# Patient Record
Sex: MALE | Race: WHITE | NOT HISPANIC OR LATINO | Employment: OTHER | URBAN - METROPOLITAN AREA
[De-identification: names, ages, dates, MRNs, and addresses within clinical notes are randomized per-mention and may not be internally consistent; named-entity substitution may affect disease eponyms.]

---

## 2017-01-04 ENCOUNTER — OFFICE VISIT (OUTPATIENT)
Dept: FAMILY MEDICINE | Facility: CLINIC | Age: 72
End: 2017-01-04
Payer: MEDICARE

## 2017-01-04 VITALS
HEIGHT: 69 IN | SYSTOLIC BLOOD PRESSURE: 132 MMHG | DIASTOLIC BLOOD PRESSURE: 76 MMHG | HEART RATE: 72 BPM | TEMPERATURE: 98 F | WEIGHT: 200.75 LBS | BODY MASS INDEX: 29.73 KG/M2

## 2017-01-04 DIAGNOSIS — D69.6 THROMBOCYTOPENIA: ICD-10-CM

## 2017-01-04 DIAGNOSIS — J04.0 LARYNGITIS: Primary | ICD-10-CM

## 2017-01-04 DIAGNOSIS — R05.9 COUGH: ICD-10-CM

## 2017-01-04 PROCEDURE — 1159F MED LIST DOCD IN RCRD: CPT | Mod: S$GLB,,, | Performed by: FAMILY MEDICINE

## 2017-01-04 PROCEDURE — 1157F ADVNC CARE PLAN IN RCRD: CPT | Mod: S$GLB,,, | Performed by: FAMILY MEDICINE

## 2017-01-04 PROCEDURE — 1125F AMNT PAIN NOTED PAIN PRSNT: CPT | Mod: S$GLB,,, | Performed by: FAMILY MEDICINE

## 2017-01-04 PROCEDURE — 99214 OFFICE O/P EST MOD 30 MIN: CPT | Mod: S$GLB,,, | Performed by: FAMILY MEDICINE

## 2017-01-04 PROCEDURE — 99999 PR PBB SHADOW E&M-EST. PATIENT-LVL III: CPT | Mod: PBBFAC,,, | Performed by: FAMILY MEDICINE

## 2017-01-04 PROCEDURE — 3078F DIAST BP <80 MM HG: CPT | Mod: S$GLB,,, | Performed by: FAMILY MEDICINE

## 2017-01-04 PROCEDURE — 3075F SYST BP GE 130 - 139MM HG: CPT | Mod: S$GLB,,, | Performed by: FAMILY MEDICINE

## 2017-01-04 PROCEDURE — 2022F DILAT RTA XM EVC RTNOPTHY: CPT | Mod: S$GLB,,, | Performed by: FAMILY MEDICINE

## 2017-01-04 PROCEDURE — 3045F PR MOST RECENT HEMOGLOBIN A1C LEVEL 7.0-9.0%: CPT | Mod: S$GLB,,, | Performed by: FAMILY MEDICINE

## 2017-01-04 PROCEDURE — 1160F RVW MEDS BY RX/DR IN RCRD: CPT | Mod: S$GLB,,, | Performed by: FAMILY MEDICINE

## 2017-01-04 PROCEDURE — 99499 UNLISTED E&M SERVICE: CPT | Mod: S$GLB,,, | Performed by: FAMILY MEDICINE

## 2017-01-04 PROCEDURE — 3060F POS MICROALBUMINURIA REV: CPT | Mod: S$GLB,,, | Performed by: FAMILY MEDICINE

## 2017-01-04 RX ORDER — ALBUTEROL SULFATE 90 UG/1
1-2 AEROSOL, METERED RESPIRATORY (INHALATION) EVERY 8 HOURS PRN
Qty: 18 G | Refills: 1 | Status: SHIPPED | OUTPATIENT
Start: 2017-01-04 | End: 2017-03-09

## 2017-01-04 RX ORDER — NYSTATIN 100000 [USP'U]/ML
4 SUSPENSION ORAL 2 TIMES DAILY
Qty: 60 ML | Refills: 0 | Status: SHIPPED | OUTPATIENT
Start: 2017-01-04 | End: 2017-02-24 | Stop reason: SDUPTHER

## 2017-01-04 RX ORDER — HYDROCODONE BITARTRATE AND ACETAMINOPHEN 10; 325 MG/1; MG/1
1 TABLET ORAL 4 TIMES DAILY
Qty: 120 TABLET | Refills: 0 | Status: SHIPPED | OUTPATIENT
Start: 2017-01-04 | End: 2017-01-30 | Stop reason: SDUPTHER

## 2017-01-04 NOTE — MR AVS SNAPSHOT
[facility]  [address]  [place] [address]  Phone: [phone]  Fax: [phone]                  Wilsonjanessa June JrTruman   2017 10:40 AM   Office Visit    Description:  Male : 1945   Provider:  Alie Womack MD   Department:  [facility]           Reason for Visit     Follow-up           Diagnoses this Visit        Comments    Laryngitis    -  Primary     Uncontrolled type 2 diabetes mellitus with diabetic polyneuropathy, without long-term current use of insulin         Cough                To Do List           Future Appointments        Provider Department Dept Phone    2017 8:15 AM LAB, [place] [facility] [phone]    2017 9:45 AM [facility] CT1 LIMIT 450 LBS [facility] [phone]      Goals (5 Years of Data)     None       These Medications        Disp Refills Start End    hydrocodone-acetaminophen 10-325mg (NORCO)  mg Tab 120 tablet 0 2017     Take 1 tablet by mouth 4 (four) times daily. - Oral    Pharmacy: [facility] [place] [address] & [facility] Ph #: [phone]       nystatin (MYCOSTATIN) 100,000 unit/mL suspension 60 mL 0 2017    Take 4 mLs (400,000 Units total) by mouth 2 (two) times daily. - Oral    Pharmacy: [facility] [place] [address] & [facility] Ph #: [phone]       VENTOLIN HFA 90 mcg/actuation inhaler 18 g 1 2017     Inhale 1-2 puffs into the lungs every 8 (eight) hours as needed for Shortness of Breath. - Inhalation    Pharmacy: [facility] [place] [address] & [facility] Ph #: [phone]         [facility] On Call     [facility] On Call Nurse Care Line -  Assistance  Registered nurses in the [facility] Center provide clinical advisement, health education, appointment  booking, and other advisory services.  Call for this free service at [phone].             Medications           Message regarding Medications     Verify the changes and/or additions to your medication regime listed below are the same as discussed with your clinician today.  If any of these changes or additions are incorrect, please notify your healthcare provider.        START taking these NEW medications        Refills    nystatin (MYCOSTATIN) 100,000 unit/mL suspension 0    Sig: Take 4 mLs (400,000 Units total) by mouth 2 (two) times daily.    Class: Normal    Route: Oral      CHANGE how you are taking these medications     Start Taking Instead of    VENTOLIN HFA 90 mcg/actuation inhaler VENTOLIN HFA 90 mcg/actuation inhaler    Dosage:  Inhale 1-2 puffs into the lungs every 8 (eight) hours as needed for Shortness of Breath.     Reason for Change:  Reorder            Verify that the below list of medications is an accurate representation of the medications you are currently taking.  If none reported, the list may be blank. If incorrect, please contact your healthcare provider. Carry this list with you in case of emergency.           Current Medications     ACCU-CHEK VEENA PLUS METER [facility]     blood sugar diagnostic Strp accu-chek veena plus test strp, 2 x day    blood-glucose meter kit Use as instructed    carvedilol (COREG) 6.25 MG tablet Take 1 tablet (6.25 mg total) by mouth 2 (two) times daily.    fluorouracil (EFUDEX) 5 % cream AAA scalp bid x 2 weeks, do not start treating until healed from cryo    GLUCOSAMINE HCL/CHONDR CHISHOLM A NA (OSTEO BI-FLEX ORAL) Take 2 tablets by mouth once daily.    hydrocodone-acetaminophen 10-325mg (NORCO)  mg Tab Take 1 tablet by mouth 4 (four) times daily.    lancets (ONETOUCH ULTRASOFT LANCETS) Misc 1 lancet by Misc.(Non-Drug; Combo Route) route 2 (two) times daily.    loratadine-pseudoephedrine  mg (CLARITIN-D 24-HOUR)  mg per 24 hr tablet Take 1 tablet by  "mouth once daily.    metformin (GLUCOPHAGE) 1000 MG tablet TAKE 1 TABLET BY MOUTH TWICE DAILY WITH MEALS    VENTOLIN HFA 90 mcg/actuation inhaler Inhale 1-2 puffs into the lungs every 8 (eight) hours as needed for Shortness of Breath.    nystatin (MYCOSTATIN) 100,000 unit/mL suspension Take 4 mLs (400,000 Units total) by mouth 2 (two) times daily.           Clinical Reference Information           Vital Signs - Last Recorded  Most recent update: 1/4/2017 11:03 AM by Shweta Lacy LPN    BP Pulse Temp Ht Wt BMI    132/76 72 98.2 °F (36.8 °C) 5' 9" (1.753 m) 91 kg (200 lb 11.7 oz) 29.64 kg/m2      Blood Pressure          Most Recent Value    BP  132/76      Allergies as of 1/4/2017     Adhesive Tape-silicones    Doxycycline    Oxycontin [Oxycodone]      Immunizations Administered on Date of Encounter - 1/4/2017     None      Orders Placed During Today's Visit     Future Labs/Procedures Expected by Expires    Comprehensive metabolic panel  1/4/2017 3/5/2018    CT Soft Tissue Neck With Contrast  1/4/2017 1/4/2018    Hemoglobin A1c  1/4/2017 3/5/2018      "

## 2017-01-04 NOTE — PROGRESS NOTES
Subjective:       Patient ID: Steve June Jr. is a 71 y.o. male.    Chief Complaint: Follow-up    HPI   Patient here for ongoing f/u.  We reviewed his last visit re: ongoing sore throat. He refuses to see the ENT bc he doesn't want to pay the copay and doesn't feel like it's an emergency. He states that he will go when it's an emergency. He understands that I am not able to fully or adequately visualize the vocal cords and larynx to further assess.   occ using the ventolin inhaler on med list.  He was under the impression that his recent labs and imaging had ruled out any potential cancers.  Post-nasal drainage with occ cough.   Refill of hydrocodone today. Taking 4 tablets daily, chronic and long-term regimen for multiple joint pain. Unable to progress therapy due to chronic medical conditions.  Reports that his FBS was over 500 a few days ago. Using apple cider vinegar. States that he is not eating any sugar at all, but his FBS this morning was still over 300. Neuropathy worse as a result.    Review of Systems   Constitutional: Positive for fatigue. Negative for fever and unexpected weight change.   HENT: Positive for postnasal drip, sore throat and voice change.    Respiratory: Positive for cough. Negative for shortness of breath.    Neurological: Negative for dizziness and headaches.       Objective:      Physical Exam   Constitutional: He is oriented to person, place, and time. He appears well-developed and well-nourished. No distress.   HENT:   Head: Normocephalic and atraumatic.   Hoarse voice.   Eyes: Conjunctivae are normal. Right eye exhibits no discharge. Left eye exhibits no discharge. No scleral icterus.   Cardiovascular: Normal rate.    Pulmonary/Chest: Effort normal and breath sounds normal. No respiratory distress.   Dry cough   Abdominal: He exhibits distension. There is no guarding.   Neurological: He is alert and oriented to person, place, and time. No cranial nerve deficit.   Skin: Skin is  warm and dry.   Psychiatric: He has a normal mood and affect. His behavior is normal.   Nursing note and vitals reviewed.        Laryngitis  -     CT Soft Tissue Neck With Contrast; Future; Expected date: 1/4/17  Update imaging, anticipate f/u with ENT which he has declined to date.  Uncontrolled type 2 diabetes mellitus with diabetic polyneuropathy, without long-term current use of insulin  -     Comprehensive metabolic panel; Future; Expected date: 1/4/17  -     Hemoglobin A1c; Future; Expected date: 1/4/17  FBS at home x 1-2 weeks. Discussed FBS goal <150, and anticipated need for medications or even insulin if unable to effect consistent change. Patient expressed understanding. Update lab and a1c in 1mo.  Cough  Add ventolin twice daily.  Other orders  -     hydrocodone-acetaminophen 10-325mg (NORCO)  mg Tab; Take 1 tablet by mouth 4 (four) times daily.  Dispense: 120 tablet; Refill: 0  -     nystatin (MYCOSTATIN) 100,000 unit/mL suspension; Take 4 mLs (400,000 Units total) by mouth 2 (two) times daily.  Dispense: 60 mL; Refill: 0  -     VENTOLIN HFA 90 mcg/actuation inhaler; Inhale 1-2 puffs into the lungs every 8 (eight) hours as needed for Shortness of Breath.  Dispense: 18 g; Refill: 1  Thrombocytopenia  Low platelet count, but stable compared to previous. Significant limiting factor when considering more active management of chronic health conditions.

## 2017-01-18 ENCOUNTER — HOSPITAL ENCOUNTER (OUTPATIENT)
Dept: RADIOLOGY | Facility: HOSPITAL | Age: 72
Discharge: HOME | End: 2017-01-18
Attending: FAMILY MEDICINE
Payer: MEDICARE

## 2017-01-18 DIAGNOSIS — J04.0 LARYNGITIS: ICD-10-CM

## 2017-01-18 PROCEDURE — 70491 CT SOFT TISSUE NECK W/DYE: CPT | Mod: 26,,, | Performed by: RADIOLOGY

## 2017-01-18 PROCEDURE — 25500020 PHARM REV CODE 255: Mod: PO | Performed by: FAMILY MEDICINE

## 2017-01-18 PROCEDURE — 70491 CT SOFT TISSUE NECK W/DYE: CPT | Mod: TC,PO

## 2017-01-18 RX ADMIN — IOHEXOL 75 ML: 350 INJECTION, SOLUTION INTRAVENOUS at 11:01

## 2017-01-19 RX ORDER — GLIMEPIRIDE 1 MG/1
1 TABLET ORAL
Qty: 30 TABLET | Refills: 2 | Status: SHIPPED | OUTPATIENT
Start: 2017-01-19 | End: 2017-04-04

## 2017-01-30 RX ORDER — HYDROCODONE BITARTRATE AND ACETAMINOPHEN 10; 325 MG/1; MG/1
1 TABLET ORAL 4 TIMES DAILY
Qty: 120 TABLET | Refills: 0 | Status: SHIPPED | OUTPATIENT
Start: 2017-01-30 | End: 2017-02-24 | Stop reason: SDUPTHER

## 2017-01-30 NOTE — TELEPHONE ENCOUNTER
----- Message from Ruchi Stallworth sent at 1/30/2017  8:07 AM CST -----  Contact: pt   hydrocodone-acetaminophen 10-325mg (NORCO)  mg   .  [facility] [address] [address] [facility] [address] [address]  Phone: [phone] Fax: [phone]    Call back [phone]

## 2017-01-31 ENCOUNTER — TELEPHONE (OUTPATIENT)
Dept: FAMILY MEDICINE | Facility: CLINIC | Age: 72
End: 2017-01-31

## 2017-01-31 NOTE — TELEPHONE ENCOUNTER
Spoke w/ pt and advised as recommended. He agreed and will report in 2 wk w/ BS log and will call PRN if hypoglycemic or feeling bad.

## 2017-01-31 NOTE — TELEPHONE ENCOUNTER
Pt came by and dropped off BS log for last 2 wks and would like a call back w/ advice. Log is in your tray for review. Pt states only DM med he takes is Metformin 1000mg BID. He would like us to call him back at [phone] once addressed by Dr Womack.

## 2017-01-31 NOTE — TELEPHONE ENCOUNTER
I sent glimepiride to his pharmacy on 1/19. Needs to take 1 tablet twice daily (breakfast and lunch). Cont FBS log twice daily and prn. Return log in 2 weeks. Call if hypoglycemia occurs.

## 2017-02-24 DIAGNOSIS — I10 ESSENTIAL HYPERTENSION: ICD-10-CM

## 2017-02-24 RX ORDER — NYSTATIN 100000 [USP'U]/ML
SUSPENSION ORAL
Qty: 60 ML | Refills: 0 | Status: SHIPPED | OUTPATIENT
Start: 2017-02-24 | End: 2017-03-24 | Stop reason: SDUPTHER

## 2017-02-24 RX ORDER — CARVEDILOL 6.25 MG/1
6.25 TABLET ORAL 2 TIMES DAILY
Qty: 60 TABLET | Refills: 2 | Status: SHIPPED | OUTPATIENT
Start: 2017-02-24 | End: 2017-08-22 | Stop reason: SDUPTHER

## 2017-02-24 RX ORDER — HYDROCODONE BITARTRATE AND ACETAMINOPHEN 10; 325 MG/1; MG/1
1 TABLET ORAL 4 TIMES DAILY
Qty: 120 TABLET | Refills: 0 | Status: SHIPPED | OUTPATIENT
Start: 2017-02-24 | End: 2017-03-24 | Stop reason: SDUPTHER

## 2017-02-24 NOTE — TELEPHONE ENCOUNTER
----- Message from Maykel Mckinney sent at 2/24/2017  8:56 AM CST -----  Contact: self   Patient need refill on medications please send to [facility] please call patient back after sent to pharmacy [phone] (home)       [facility] [address] [address] AT [facility] [address] [address]  Phone: [phone] Fax: [phone]

## 2017-03-09 ENCOUNTER — OFFICE VISIT (OUTPATIENT)
Dept: FAMILY MEDICINE | Facility: CLINIC | Age: 72
End: 2017-03-09
Payer: MEDICARE

## 2017-03-09 VITALS
HEIGHT: 69 IN | WEIGHT: 202.69 LBS | TEMPERATURE: 99 F | SYSTOLIC BLOOD PRESSURE: 126 MMHG | DIASTOLIC BLOOD PRESSURE: 70 MMHG | BODY MASS INDEX: 30.02 KG/M2

## 2017-03-09 DIAGNOSIS — S99.921A TOE INJURY, RIGHT, INITIAL ENCOUNTER: Primary | ICD-10-CM

## 2017-03-09 DIAGNOSIS — G89.29 CHRONIC BILATERAL LOW BACK PAIN WITHOUT SCIATICA: ICD-10-CM

## 2017-03-09 DIAGNOSIS — M54.50 CHRONIC BILATERAL LOW BACK PAIN WITHOUT SCIATICA: ICD-10-CM

## 2017-03-09 DIAGNOSIS — E11.65 UNCONTROLLED TYPE 2 DIABETES MELLITUS WITH HYPERGLYCEMIA, WITHOUT LONG-TERM CURRENT USE OF INSULIN: ICD-10-CM

## 2017-03-09 PROCEDURE — 3074F SYST BP LT 130 MM HG: CPT | Mod: S$GLB,,, | Performed by: FAMILY MEDICINE

## 2017-03-09 PROCEDURE — 99999 PR PBB SHADOW E&M-EST. PATIENT-LVL III: CPT | Mod: PBBFAC,,, | Performed by: FAMILY MEDICINE

## 2017-03-09 PROCEDURE — 1157F ADVNC CARE PLAN IN RCRD: CPT | Mod: S$GLB,,, | Performed by: FAMILY MEDICINE

## 2017-03-09 PROCEDURE — 99214 OFFICE O/P EST MOD 30 MIN: CPT | Mod: S$GLB,,, | Performed by: FAMILY MEDICINE

## 2017-03-09 PROCEDURE — 1159F MED LIST DOCD IN RCRD: CPT | Mod: S$GLB,,, | Performed by: FAMILY MEDICINE

## 2017-03-09 PROCEDURE — 1160F RVW MEDS BY RX/DR IN RCRD: CPT | Mod: S$GLB,,, | Performed by: FAMILY MEDICINE

## 2017-03-09 PROCEDURE — 99499 UNLISTED E&M SERVICE: CPT | Mod: S$GLB,,, | Performed by: FAMILY MEDICINE

## 2017-03-09 PROCEDURE — 3060F POS MICROALBUMINURIA REV: CPT | Mod: S$GLB,,, | Performed by: FAMILY MEDICINE

## 2017-03-09 PROCEDURE — 1125F AMNT PAIN NOTED PAIN PRSNT: CPT | Mod: S$GLB,,, | Performed by: FAMILY MEDICINE

## 2017-03-09 PROCEDURE — 3046F HEMOGLOBIN A1C LEVEL >9.0%: CPT | Mod: S$GLB,,, | Performed by: FAMILY MEDICINE

## 2017-03-09 PROCEDURE — 3078F DIAST BP <80 MM HG: CPT | Mod: S$GLB,,, | Performed by: FAMILY MEDICINE

## 2017-03-09 PROCEDURE — 2022F DILAT RTA XM EVC RTNOPTHY: CPT | Mod: S$GLB,,, | Performed by: FAMILY MEDICINE

## 2017-03-09 NOTE — MR AVS SNAPSHOT
[facility]  [address]  [place] [address]  Phone: [phone]  Fax: [phone]                  Wilsonjanessa June JrTruman   3/9/2017 1:00 PM   Office Visit    Description:  Male : 1945   Provider:  Alie Womack MD   Department:  [facility]           Reason for Visit     Follow-up     Toe Injury     Leg Pain           Diagnoses this Visit        Comments    Toe injury, right, initial encounter    -  Primary     Chronic bilateral low back pain without sciatica         Uncontrolled type 2 diabetes mellitus with hyperglycemia, without long-term current use of insulin                To Do List           Future Appointments        Provider Department Dept Phone    2017 2:30 PM MAKI Sherman,FNP-C [facility] [phone]      Goals (5 Years of Data)     None      [facility]     [facility] Nurse Care Line -  Assistance  Registered nurses in the [facility] On Call Center provide clinical advisement, health education, appointment booking, and other advisory services.  Call for this free service at [phone].             Medications           Message regarding Medications     Verify the changes and/or additions to your medication regime listed below are the same as discussed with your clinician today.  If any of these changes or additions are incorrect, please notify your healthcare provider.        STOP taking these medications     VENTOLIN HFA 90 mcg/actuation inhaler Inhale 1-2 puffs into the lungs every 8 (eight) hours as needed for Shortness of Breath.           Verify that the below list of medications is an accurate representation of the medications you are currently taking.  If none reported, the list may be blank. If incorrect, please contact your healthcare provider. Carry this list with you in case of emergency.           Current Medications     ACCU-CHEK VEENA PLUS METER Misc     blood sugar  "diagnostic Strp accu-chek john plus test strp, 2 x day    blood-glucose meter kit Use as instructed    carvedilol (COREG) 6.25 MG tablet Take 1 tablet (6.25 mg total) by mouth 2 (two) times daily.    fluorouracil (EFUDEX) 5 % cream AAA scalp bid x 2 weeks, do not start treating until healed from cryo    GLUCOSAMINE HCL/CHONDR RIZVI A NA (OSTEO BI-FLEX ORAL) Take 2 tablets by mouth once daily.    hydrocodone-acetaminophen 10-325mg (NORCO)  mg Tab Take 1 tablet by mouth 4 (four) times daily.    lancets (ONETOUCH ULTRASOFT LANCETS) Misc 1 lancet by Misc.(Non-Drug; Combo Route) route 2 (two) times daily.    metformin (GLUCOPHAGE) 1000 MG tablet TAKE 1 TABLET BY MOUTH TWICE DAILY WITH MEALS    nystatin (MYCOSTATIN) 100,000 unit/mL suspension TAKE 4ML BY MOUTH TWICE DAILY    glimepiride (AMARYL) 1 MG tablet Take 1 tablet (1 mg total) by mouth before breakfast.    loratadine-pseudoephedrine  mg (CLARITIN-D 24-HOUR)  mg per 24 hr tablet Take 1 tablet by mouth once daily.           Clinical Reference Information           Your Vitals Were     BP Temp Height Weight BMI    126/70 98.9 °F (37.2 °C) 5' 9" (1.753 m) 92 kg (202 lb 11.4 oz) 29.94 kg/m2      Blood Pressure          Most Recent Value    BP  126/70      Allergies as of 3/9/2017     Adhesive Tape-silicones    Doxycycline      Immunizations Administered on Date of Encounter - 3/9/2017     None      Orders Placed During Today's Visit      Normal Orders This Visit    Ambulatory referral to Endocrinology       Language Assistance Services     ATTENTION: Language assistance services are available, free of charge. Please call [phone].      ATENCIÓN: Si anastaciala amarjit, tiene a rizvi disposición servicios gratuitos de asistencia lingüística. Llame al [phone].     YARELIS Ý: N?u b?n nói Ti?ng Vi?t, có các d?ch v? h? tr? ngôn ng? mi?n phí dành cho b?n. G?i s? [phone].         [facility] complies with applicable Federal civil rights " laws and does not discriminate on the basis of race, color, national origin, age, disability, or sex.

## 2017-03-09 NOTE — PROGRESS NOTES
Subjective:       Patient ID: Steve June Jr. is a 71 y.o. male.    Chief Complaint: Follow-up (diabetes); Toe Injury (pt states he stubbed his toe about a month ago and believes it's broken. ); and Leg Pain (pt states he is being woken up 2-3 times a night with hip and leg pain. )    HPI   Patient states that he injured the R great toe while walking his dog. Tripped over a stump. Toe was black/blue, resolved over the course of the last 3 weeks. Walking is preserved, but toe flexion is less than previous.  Legs have been aching more in the evening hours. Currently taking hydrocodone 1/2 tablet twice during the night to help with rest. Also, waking up more often during the night with increase in pain from typical.   Has a friend who is on percocet and wonders if that could be used instead of the hydrocodone. Discussed risks of use, to f/u with pain management for request to change to percocet. He declined for now, but aware that we can do this in the future if needed.  DM2. Blood sugars are pretty elevated. He is ranging 215-380. Values seem to correlate with leg pain as well. Admits to significant dietary excursions. Only took glimepiride once, and then stopped bc he had a significant Increase in BS after taking.    Review of Systems   Constitutional: Negative for chills, fatigue and fever.   HENT: Negative for congestion and rhinorrhea.    Respiratory: Negative for cough and shortness of breath.    Cardiovascular: Negative for chest pain, palpitations and leg swelling.   Gastrointestinal: Positive for abdominal distention (chronic, related to his hernia) and abdominal pain (as above). Negative for blood in stool, constipation (resolved with use of prunes daily), diarrhea, nausea and vomiting.   Genitourinary: Negative for difficulty urinating and dysuria.   Musculoskeletal: Positive for arthralgias (chronic pain in his feet, reportedly using 1/2 tablet of norco TID, but also states that he is almost out of  the medication and worried that he will not improve) and back pain.   Skin: Negative for rash.   Neurological: Negative for dizziness, weakness, light-headedness and headaches.       Objective:      Physical Exam   Constitutional: He is oriented to person, place, and time. He appears well-developed and well-nourished. No distress.   HENT:   Head: Normocephalic and atraumatic.   Eyes: Conjunctivae are normal. Right eye exhibits no discharge. Left eye exhibits no discharge. No scleral icterus.   Cardiovascular: Normal rate.    Pulmonary/Chest: Effort normal. No respiratory distress.   Musculoskeletal:   R great toe coloration is normal. Limited flexion (30deg at most).   Neurological: He is alert and oriented to person, place, and time. No cranial nerve deficit.   Skin: Skin is warm and dry.   BLE changes c/w chronic vascular dz. No acute erythema observed.    Psychiatric: He has a normal mood and affect. His behavior is normal.   Nursing note and vitals reviewed.        Toe injury, right, initial encounter  Conservative management reviewed. Stable ambulation, and improving pain over time. Recommend observation. Discussed ortho eval with xrays, but patient well aware that he is not a surgical candidate if anything was needed due to comorbidities, and I would have concerns re: healing given vascular status.  Chronic bilateral low back pain without sciatica  Stable regimen. Discussed eval with pain management if he would like to pursue switching to percocet, but he declined for now and requests to stay on current dose.  Uncontrolled type 2 diabetes mellitus with hyperglycemia, without long-term current use of insulin  -     Ambulatory referral to Endocrinology  Need to consider insulin at this point. Will refer to Rain for review.

## 2017-03-24 RX ORDER — NYSTATIN 100000 [USP'U]/ML
SUSPENSION ORAL
Qty: 60 ML | Refills: 5 | Status: SHIPPED | OUTPATIENT
Start: 2017-03-24 | End: 2018-02-09 | Stop reason: ALTCHOICE

## 2017-03-24 RX ORDER — HYDROCODONE BITARTRATE AND ACETAMINOPHEN 10; 325 MG/1; MG/1
1 TABLET ORAL 4 TIMES DAILY
Qty: 120 TABLET | Refills: 0 | Status: SHIPPED | OUTPATIENT
Start: 2017-03-24 | End: 2017-04-18 | Stop reason: SDUPTHER

## 2017-03-24 NOTE — TELEPHONE ENCOUNTER
----- Message from Anne-Marie Conner sent at 3/24/2017  9:26 AM CDT -----  Refill  hydrocodone-acetaminophen 10-325mg (NORCO)  mg Tab  nystatin (MYCOSTATIN) 100,000 unit/mL suspension    [facility] [address] [address] [address] [address]  Phone: [phone] Fax: [phone]

## 2017-03-24 NOTE — TELEPHONE ENCOUNTER
norco and nystatin rinse refill, pt states he uses nystatin rinse when he develops thrush due to not having teeth.

## 2017-03-24 NOTE — TELEPHONE ENCOUNTER
----- Message from RT Ángel sent at 3/24/2017  9:57 AM CDT -----  Contact: pt    Called pod, pt , returned missed call, thanks.

## 2017-04-04 ENCOUNTER — OFFICE VISIT (OUTPATIENT)
Dept: ENDOCRINOLOGY | Facility: CLINIC | Age: 72
End: 2017-04-04
Payer: MEDICARE

## 2017-04-04 VITALS
HEART RATE: 61 BPM | BODY MASS INDEX: 29.46 KG/M2 | DIASTOLIC BLOOD PRESSURE: 70 MMHG | HEIGHT: 69 IN | WEIGHT: 198.88 LBS | SYSTOLIC BLOOD PRESSURE: 118 MMHG

## 2017-04-04 DIAGNOSIS — R80.9 MICROALBUMINURIA DUE TO TYPE 2 DIABETES MELLITUS: ICD-10-CM

## 2017-04-04 DIAGNOSIS — E11.51 TYPE 2 DIABETES MELLITUS WITH DIABETIC PERIPHERAL ANGIOPATHY WITHOUT GANGRENE, WITHOUT LONG-TERM CURRENT USE OF INSULIN: Primary | ICD-10-CM

## 2017-04-04 DIAGNOSIS — I10 ESSENTIAL HYPERTENSION: ICD-10-CM

## 2017-04-04 DIAGNOSIS — E11.29 MICROALBUMINURIA DUE TO TYPE 2 DIABETES MELLITUS: ICD-10-CM

## 2017-04-04 DIAGNOSIS — E11.42 TYPE 2 DIABETES MELLITUS WITH DIABETIC POLYNEUROPATHY, WITHOUT LONG-TERM CURRENT USE OF INSULIN: ICD-10-CM

## 2017-04-04 PROCEDURE — 99499 UNLISTED E&M SERVICE: CPT | Mod: S$GLB,,, | Performed by: NURSE PRACTITIONER

## 2017-04-04 PROCEDURE — 1126F AMNT PAIN NOTED NONE PRSNT: CPT | Mod: S$GLB,,, | Performed by: NURSE PRACTITIONER

## 2017-04-04 PROCEDURE — 3060F POS MICROALBUMINURIA REV: CPT | Mod: S$GLB,,, | Performed by: NURSE PRACTITIONER

## 2017-04-04 PROCEDURE — 99214 OFFICE O/P EST MOD 30 MIN: CPT | Mod: S$GLB,,, | Performed by: NURSE PRACTITIONER

## 2017-04-04 PROCEDURE — 99999 PR PBB SHADOW E&M-EST. PATIENT-LVL IV: CPT | Mod: PBBFAC,,, | Performed by: NURSE PRACTITIONER

## 2017-04-04 PROCEDURE — 1157F ADVNC CARE PLAN IN RCRD: CPT | Mod: S$GLB,,, | Performed by: NURSE PRACTITIONER

## 2017-04-04 PROCEDURE — 3078F DIAST BP <80 MM HG: CPT | Mod: S$GLB,,, | Performed by: NURSE PRACTITIONER

## 2017-04-04 PROCEDURE — 1159F MED LIST DOCD IN RCRD: CPT | Mod: S$GLB,,, | Performed by: NURSE PRACTITIONER

## 2017-04-04 PROCEDURE — 1160F RVW MEDS BY RX/DR IN RCRD: CPT | Mod: S$GLB,,, | Performed by: NURSE PRACTITIONER

## 2017-04-04 PROCEDURE — 3046F HEMOGLOBIN A1C LEVEL >9.0%: CPT | Mod: S$GLB,,, | Performed by: NURSE PRACTITIONER

## 2017-04-04 PROCEDURE — 3074F SYST BP LT 130 MM HG: CPT | Mod: S$GLB,,, | Performed by: NURSE PRACTITIONER

## 2017-04-04 RX ORDER — REPAGLINIDE 1 MG/1
1 TABLET ORAL
Qty: 90 TABLET | Refills: 6 | Status: SHIPPED | OUTPATIENT
Start: 2017-04-04 | End: 2017-05-15

## 2017-04-04 NOTE — LETTER
April 4, 2017      Alie Womack MD  [address]  [place] [address]           [facility] Endocrin[facility]  [address]  [place] [address]  Phone: [phone]  Fax: [phone]          Patient: Steve June Jr.   MR Number: [id number]   YOB: 1945   Date of Visit: 4/4/2017       Dear Dr. Alie Womack:    Thank you for referring Steve June to me for evaluation. Attached you will find relevant portions of my assessment and plan of care.    If you have questions, please do not hesitate to call me. I look forward to following Steve June along with you.    Sincerely,    MAKI Sherman,ANTHONYP-C    Enclosure  CC:  No Recipients    If you would like to receive this communication electronically, please contact [email or web address] or [phone] to request more information on [employer] access.    For providers and/or their staff who would like to refer a patient to [facility], please contact us through our one-stop-shop provider referral line, [facility], at [phone].    If you feel you have received this communication in error or would no longer like to receive these types of communications, please e-mail [email or web address]

## 2017-04-04 NOTE — PATIENT INSTRUCTIONS
Hypoglycemia (Low Blood Sugar)     Fast-acting sugar includes a cup of nonfat milk.     Too little sugar (glucose) in your blood is called hypoglycemia or low blood sugar. Low blood sugar usually means anything lower than 70 mg/dL. Talk with your healthcare provider about your target range and what level is too low for you. Diabetes itself doesnt cause low blood sugar. But some of the treatments for diabetes, such as pills or insulin, may raise your risk for it. Low blood sugar may cause you to pass out or have a seizure. So always treat low blood sugar right away, but don't overeat.  Special note: Always carry a source of fast-acting sugar and a snack in case of hypoglycemia.   What you may notice  If you have low blood sugar, you may have one or more of these symptoms:  · Shakiness or dizziness  · Cold, clammy skin or sweating  · Feelings of hunger  · Headache  · Nervousness  · A hard, fast heartbeat  · Weakness  · Confusion or irritability  · Blurred vision  · Having nightmares or waking up confused or sweating  · Numbness or tingling in the lips or tongue  What you should do  Here are tips to follow if you have hypoglycemia:   · First check your blood sugar. If it is too low (out of your target range), eat or drink 15 to 20 grams of fast-acting sugar. This may be 3 to 4 glucose tablets, 4 ounces (half a cup) of fruit juice or regular (nondiet) soda, 8 ounces (1 cup) of fat-free milk, or 1 tablespoon of honey. Dont take more than this, or your blood sugar may go too high.  · Wait 15 minutes. Then recheck your blood sugar if you can.  · If your blood sugar is still too low, repeat the steps above and check your blood sugar again. If your blood sugar still has not returned to your target range, contact your healthcare provider or seek emergency care.  · Once your blood sugar returns to target range, eat a snack or meal.  Preventing low blood sugar  Things you can do include the following:   · If your condition  needs a strict treatment plan, eat your meals and snacks at the same times each day. Dont skip meals!  · If your treatment plan lets you change when you eat and what you eat, learn how to change the time and dose of your rapid-acting insulin to match this.   · Ask your healthcare provider if it is safe for you to drink alcohol. Never drink on an empty stomach.  · Take your medicine at the prescribed times.  · Always carry a source of fast-acting sugar and a snack when youre away from home.  Other things to do  Additional tips include the following:  · Carry a medical ID card, a compact USB drive, or wear a medical alert bracelet or necklace. It should say that you have diabetes. It should also say what to do if you pass out or have a seizure.  · Make sure your family, friends, and coworkers know the signs of low blood sugar. Tell them what to do if your blood sugar falls very low and you cant treat yourself.  · Keep a glucagon emergency kit handy. Be sure your family, friends, and coworkers know how and when to use it. Check it regularly and replace the glucagon before it expires.  · Talk with your health care team about other things you can do to prevent low blood sugar.     If you have unexplained hypoglycemia or hypoglycemia several times, call your healthcare provider.   Date Last Reviewed: 5/1/2016  © 5779-5572 [employer]. [address] [place] [address], [place] [address]. All rights reserved. This information is not intended as a substitute for professional medical care. Always follow your healthcare professional's instructions.

## 2017-04-04 NOTE — PROGRESS NOTES
CC: Mr. Steve June Jr. arrives today for management of Type 2 DM and review of chronic medical conditions, as listed in the Visit Diagnosis section of this encounter.       HPI: Mr. Steve June Jr. was diagnosed with Type 2 DM in ~ . He was diagnosed based on lab work. Initial treatment consisted of metformin. He denies FH of DM. Denies hospitalizations due to DM.     This is his first time being seen in endocrine.   He is new to me today.    Dr. Womack is his PCP. He reports that since his last visit with her, he recently cut out sweets/snacking and his BG have come down.    BG readings are checked 1x/day (fasting only). No logs to clinic. He reports the following:  Fastin-200     Hypoglycemia: No    Missing Insulin/PO medication doses: sometimes misses night metformin dose.     Exercise: Walks 1.5 miles/day with his dog.     Dietary Habits: Eats 2-3 meals/day. May skip lunch. Avoids snacking and sugar sweetened beverages..    Last DM education appointment: He has declined DM education in the past. He states that he knows how he should.     He reports that he took glimepiride before but he reports this caused his BG to elevate.       CURRENT DIABETIC MEDS: metformin 1000 mg BID  Glucometer type: Accu check     Previous DM treatments:  Glimepiride - nausea, elevated BG    Last Eye Exam: 3/28/17, no DR per patient.   Last Podiatry Exam: 2016    REVIEW OF SYSTEMS  Constitutional: no c/o weakness, or weight loss. + fatigue  Eyes: + floaters, ophthalmology aware. Wears glasses.  ENT: denies dysphagia, hearing loss. + hoarseness since vocal cord injury that he believes occurred during EGD  Cardiac: no palpitations or chest pain.  Respiratory: no cough or dyspnea.  GI: no c/o nausea. Denies h/o pancreatitis. + generalized abd pain that has been going on for 1 week. Rolaids help.   : denies urinary frequency, burning, discharge, frequent UTIs  Skin: no lesions or rashes. + bruising that he  "attributes to low platelets.   Musculoskeletal: denies difficulty mobilizing, denies muscle or joint pains  Neuro: + numbness, tingling, pain in BLE  Endocrine: denies polyphagia, polydipsia, polyuria      Personally reviewed Past Medical, Surgical, Social History.    Vital Signs  /70 (BP Location: Left arm, Patient Position: Sitting, BP Method: Manual)  Pulse 61  Ht 5' 9" (1.753 m)  Wt 90.2 kg (198 lb 13.7 oz)  BMI 29.37 kg/m2    Personally reviewed the below labs:    Hemoglobin A1C   Date Value Ref Range Status   01/18/2017 9.4 (H) 4.5 - 6.2 % Final     Comment:     According to ADA guidelines, hemoglobin A1C <7.0% represents  optimal control in non-pregnant diabetic patients.  Different  metrics may apply to specific populations.   Standards of Medical Care in Diabetes - 2016.  For the purpose of screening for the presence of diabetes:  <5.7%     Consistent with the absence of diabetes  5.7-6.4%  Consistent with increasing risk for diabetes   (prediabetes)  >or=6.5%  Consistent with diabetes  Currently no consensus exists for use of hemoglobin A1C  for diagnosis of diabetes for children.     11/10/2016 8.2 (H) 4.5 - 6.2 % Final     Comment:     According to ADA guidelines, hemoglobin A1C <7.0% represents  optimal control in non-pregnant diabetic patients.  Different  metrics may apply to specific populations.   Standards of Medical Care in Diabetes - 2016.  For the purpose of screening for the presence of diabetes:  <5.7%     Consistent with the absence of diabetes  5.7-6.4%  Consistent with increasing risk for diabetes   (prediabetes)  >or=6.5%  Consistent with diabetes  Currently no consensus exists for use of hemoglobin A1C  for diagnosis of diabetes for children.     08/05/2016 8.2 (H) 4.5 - 6.2 % Final     Comment:     According to ADA guidelines, hemoglobin A1C <7.0% represents  optimal control in non-pregnant diabetic patients.  Different  metrics may apply to specific populations.   Standards of " Medical Care in Diabetes - 2016.  For the purpose of screening for the presence of diabetes:  <5.7%     Consistent with the absence of diabetes  5.7-6.4%  Consistent with increasing risk for diabetes   (prediabetes)  >or=6.5%  Consistent with diabetes  Currently no consensus exists for use of hemoglobin A1C  for diagnosis of diabetes for children.         Chemistry        Component Value Date/Time     01/18/2017 1002    K 5.4 (H) 01/18/2017 1002     01/18/2017 1002    CO2 26 01/18/2017 1002    BUN 22 01/18/2017 1002    CREATININE 1.1 01/18/2017 1002     (H) 01/18/2017 1002        Component Value Date/Time    CALCIUM 9.6 01/18/2017 1002    ALKPHOS 62 01/18/2017 1002    AST 20 01/18/2017 1002    ALT 14 01/18/2017 1002    BILITOT 1.4 (H) 01/18/2017 1002          Lab Results   Component Value Date    CHOL 158 12/17/2015    CHOL 145 08/13/2014    CHOL 162 02/04/2014     Lab Results   Component Value Date    HDL 32 (L) 12/17/2015    HDL 42 08/13/2014    HDL 48 02/04/2014     Lab Results   Component Value Date    LDLCALC 99.0 12/17/2015    LDLCALC 91.4 08/13/2014    LDLCALC 103.8 02/04/2014     Lab Results   Component Value Date    TRIG 135 12/17/2015    TRIG 58 08/13/2014    TRIG 51 02/04/2014     Lab Results   Component Value Date    CHOLHDL 20.3 12/17/2015    CHOLHDL 29.0 08/13/2014    CHOLHDL 29.6 02/04/2014       Lab Results   Component Value Date    MICALBCREAT 83.8 (H) 11/10/2016     Lab Results   Component Value Date    TSH 6.283 (H) 11/19/2015       CrCl cannot be calculated (Patient has no serum creatinine result on file.).    No results found for: [id number]      PHYSICAL EXAMINATION  Constitutional: Appears well, no distress  Neck: Supple, trachea midline; no thyromegaly or nodules.   Respiratory: CTA, even and unlabored.  Cardiovascular: RRR, no murmurs, no carotid bruits. DP pulses  2+ bilaterally; no edema.    GI: bowel sounds active, + umbilical hernia  Lymph: no cervical or  supraclavicular lymphadenopathy  Skin: warm and dry; no lipohypertrophy, or acanthosis nigracans observed.  Psych: normal affect, pleasant mood, conversant  Musculoskeletal: steady gait, no clubbing, full ROM to all extremities  Neuro: DTR 2+ BUE/2+BLE.  Feet: + calluses to plantar surface of feet. appropriate footwear. + varicosities.       Assessment/Plan  1. Type 2 diabetes mellitus with diabetic peripheral angiopathy without gangrene, without long-term current use of insulin  -- last A1c in January was elevated at 9.4%. He reports that BG have improved since cutting out sweets. However, no log to clinic.   -- discussed basal insulin but he declines.  repaglinide (PRANDIN) 1 MG tablet before each meal. Hold if not eating a meal. Discussed medication's mechanism of action, side effects, and contraindications.   -- continue metformin  -- he has a h/o hematuria. Will avoid Actos.  -- discussed adding either glipizide or Januvia in the future.   -- check BG 2x/day    -- Discussed diagnosis of DM, A1c goals, progression of disease, long term complications and tx options.  Advised patient to check BG before activities, such as driving or exercise.  -- Reviewed hypoglycemia management: treat with 1/2 glass of juice, 1/2 can regular coke, or 4 glucose tablets. Monitor and repeat treatment every 15 minutes until BG is >70 Then have a snack, which includes a complex carbohydrate and protein.     2. Type 2 diabetes mellitus with diabetic polyneuropathy, without long-term current use of insulin  -- obtain DM control    3. Microalbuminuria due to type 2 diabetes mellitus  -- obtain DM control  -- consider adding ACE-i    4. Essential hypertension  -- controlled, continue current meds       FOLLOW UP  Return in about 3 months (around 7/4/2017).   Patient instructed to bring BG logs to each follow up   Patient encouraged to call for any BG/medication issues, concerns, or questions.      Orders Placed This Encounter   Procedures     Hemoglobin A1c    Comprehensive metabolic panel    Lipid panel

## 2017-04-04 NOTE — MR AVS SNAPSHOT
[facility]  [address] [facility] [address]  Phone: [phone]  Fax: [phone]                  Wilsonjanessa June JrTruman   2017 2:30 PM   Office Visit    Description:  Male : 1945   Provider:  MAKI Sherman FNP-C   Department:  [facility]           Reason for Visit     Diabetes Mellitus           Diagnoses this Visit        Comments    Type 2 diabetes mellitus with diabetic peripheral angiopathy without gangrene, without long-term current use of insulin    -  Primary     Type 2 diabetes mellitus with diabetic polyneuropathy, without long-term current use of insulin         Microalbuminuria due to type 2 diabetes mellitus         Essential hypertension                To Do List           Future Appointments        Provider Department Dept Phone    2017 9:00 AM [facility] [phone]    2017 9:30 AM MAKI Sherman FNP-C [facility] [phone]      Goals (5 Years of Data)     None      Follow-Up and Disposition     Return in about 3 months (around 2017).       These Medications        Disp Refills Start End    repaglinide (PRANDIN) 1 MG tablet 90 tablet 6 2017    Take 1 tablet (1 mg total) by mouth 3 (three) times daily before meals. - Oral    Pharmacy: [facility] [address] [place] [address] AT [place] [address] & [facility] Ph #: [phone]         [facility] -  Assistance  Unless otherwise directed by your provider, please contact [facility], our nurse care line that is available for  assistance.     Registered nurses in the [facility] provide: appointment scheduling, clinical advisement, health education, and other advisory services.  Call: [phone] (toll free)               Medications           Message regarding Medications     Verify the changes  and/or additions to your medication regime listed below are the same as discussed with your clinician today.  If any of these changes or additions are incorrect, please notify your healthcare provider.        START taking these NEW medications        Refills    repaglinide (PRANDIN) 1 MG tablet 6    Sig: Take 1 tablet (1 mg total) by mouth 3 (three) times daily before meals.    Class: Print    Route: Oral      STOP taking these medications     lancets (ONETOUCH ULTRASOFT LANCETS) Misc 1 lancet by Misc.(Non-Drug; Combo Route) route 2 (two) times daily.    glimepiride (AMARYL) 1 MG tablet Take 1 tablet (1 mg total) by mouth before breakfast.           Verify that the below list of medications is an accurate representation of the medications you are currently taking.  If none reported, the list may be blank. If incorrect, please contact your healthcare provider. Carry this list with you in case of emergency.           Current Medications     ACCU-CHEK VEENA PLUS METER [facility]     blood sugar diagnostic Strp accu-chek veena plus test strp, 2 x day    blood-glucose meter kit Use as instructed    carvedilol (COREG) 6.25 MG tablet Take 1 tablet (6.25 mg total) by mouth 2 (two) times daily.    fluorouracil (EFUDEX) 5 % cream AAA scalp bid x 2 weeks, do not start treating until healed from cryo    GLUCOSAMINE HCL/CHONDR CHISHOLM A NA (OSTEO BI-FLEX ORAL) Take 2 tablets by mouth once daily.    hydrocodone-acetaminophen 10-325mg (NORCO)  mg Tab Take 1 tablet by mouth 4 (four) times daily.    loratadine-pseudoephedrine  mg (CLARITIN-D 24-HOUR)  mg per 24 hr tablet Take 1 tablet by mouth once daily.    metformin (GLUCOPHAGE) 1000 MG tablet TAKE 1 TABLET BY MOUTH TWICE DAILY WITH MEALS    nystatin (MYCOSTATIN) 100,000 unit/mL suspension TAKE 4ML BY MOUTH TWICE DAILY    repaglinide (PRANDIN) 1 MG tablet Take 1 tablet (1 mg total) by mouth 3 (three) times daily before meals.           Clinical Reference Information          "  Your Vitals Were     BP Pulse Height Weight BMI    118/70 (BP Location: Left arm, Patient Position: Sitting, BP Method: Manual) 61 5' 9" (1.753 m) 90.2 kg (198 lb 13.7 oz) 29.37 kg/m2      Blood Pressure          Most Recent Value    BP  118/70      Allergies as of 4/4/2017     Adhesive Tape-silicones    Doxycycline      Immunizations Administered on Date of Encounter - 4/4/2017     None      Orders Placed During Today's Visit     Future Labs/Procedures Expected by Expires    Comprehensive metabolic panel  4/4/2017 6/3/2018    Hemoglobin A1c  4/4/2017 6/3/2018    Lipid panel  4/4/2017 6/3/2018      Instructions      Hypoglycemia (Low Blood Sugar)     Fast-acting sugar includes a cup of nonfat milk.     Too little sugar (glucose) in your blood is called hypoglycemia or low blood sugar. Low blood sugar usually means anything lower than 70 mg/dL. Talk with your healthcare provider about your target range and what level is too low for you. Diabetes itself doesnt cause low blood sugar. But some of the treatments for diabetes, such as pills or insulin, may raise your risk for it. Low blood sugar may cause you to pass out or have a seizure. So always treat low blood sugar right away, but don't overeat.  Special note: Always carry a source of fast-acting sugar and a snack in case of hypoglycemia.   What you may notice  If you have low blood sugar, you may have one or more of these symptoms:  · Shakiness or dizziness  · Cold, clammy skin or sweating  · Feelings of hunger  · Headache  · Nervousness  · A hard, fast heartbeat  · Weakness  · Confusion or irritability  · Blurred vision  · Having nightmares or waking up confused or sweating  · Numbness or tingling in the lips or tongue  What you should do  Here are tips to follow if you have hypoglycemia:   · First check your blood sugar. If it is too low (out of your target range), eat or drink 15 to 20 grams of fast-acting sugar. This may be 3 to 4 glucose tablets, 4 ounces " (half a cup) of fruit juice or regular (nondiet) soda, 8 ounces (1 cup) of fat-free milk, or 1 tablespoon of honey. Dont take more than this, or your blood sugar may go too high.  · Wait 15 minutes. Then recheck your blood sugar if you can.  · If your blood sugar is still too low, repeat the steps above and check your blood sugar again. If your blood sugar still has not returned to your target range, contact your healthcare provider or seek emergency care.  · Once your blood sugar returns to target range, eat a snack or meal.  Preventing low blood sugar  Things you can do include the following:   · If your condition needs a strict treatment plan, eat your meals and snacks at the same times each day. Dont skip meals!  · If your treatment plan lets you change when you eat and what you eat, learn how to change the time and dose of your rapid-acting insulin to match this.   · Ask your healthcare provider if it is safe for you to drink alcohol. Never drink on an empty stomach.  · Take your medicine at the prescribed times.  · Always carry a source of fast-acting sugar and a snack when youre away from home.  Other things to do  Additional tips include the following:  · Carry a medical ID card, a compact USB drive, or wear a medical alert bracelet or necklace. It should say that you have diabetes. It should also say what to do if you pass out or have a seizure.  · Make sure your family, friends, and coworkers know the signs of low blood sugar. Tell them what to do if your blood sugar falls very low and you cant treat yourself.  · Keep a glucagon emergency kit handy. Be sure your family, friends, and coworkers know how and when to use it. Check it regularly and replace the glucagon before it expires.  · Talk with your health care team about other things you can do to prevent low blood sugar.     If you have unexplained hypoglycemia or hypoglycemia several times, call your healthcare provider.   Date Last Reviewed:  5/1/2016  © 9112-2995 [employer]. [address] [place] [address], [place] [address]. All rights reserved. This information is not intended as a substitute for professional medical care. Always follow your healthcare professional's instructions.             Language Assistance Services     ATTENTION: Language assistance services are available, free of charge. Please call [phone].      ATENCIÓN: Si habla español, tiene a rizvi disposición servicios gratuitos de asistencia lingüística. Llame al [phone].     CHÚ Ý: N?u b?n nói Ti?ng Vi?t, có các d?ch v? h? tr? ngôn ng? mi?n phí dành cho b?n. G?i s? [phone].         [facility] complies with applicable Federal civil rights laws and does not discriminate on the basis of race, color, national origin, age, disability, or sex.

## 2017-04-18 ENCOUNTER — TELEPHONE (OUTPATIENT)
Dept: FAMILY MEDICINE | Facility: CLINIC | Age: 72
End: 2017-04-18

## 2017-04-18 RX ORDER — HYDROCODONE BITARTRATE AND ACETAMINOPHEN 10; 325 MG/1; MG/1
1 TABLET ORAL 4 TIMES DAILY
Qty: 120 TABLET | Refills: 0 | Status: CANCELLED | OUTPATIENT
Start: 2017-04-18

## 2017-04-18 RX ORDER — HYDROCODONE BITARTRATE AND ACETAMINOPHEN 10; 325 MG/1; MG/1
1 TABLET ORAL 4 TIMES DAILY
Qty: 120 TABLET | Refills: 0 | Status: SHIPPED | OUTPATIENT
Start: 2017-04-21 | End: 2017-05-17 | Stop reason: SDUPTHER

## 2017-04-18 NOTE — TELEPHONE ENCOUNTER
----- Message from Rima Shelby sent at 4/18/2017  8:02 AM CDT -----  Pt is requesting refill on hydrocodone ... Call pt at [phone] (home)     [facility] [address] [address] & [address] [address]  Phone: [phone] Fax: [phone]

## 2017-04-25 ENCOUNTER — TELEPHONE (OUTPATIENT)
Dept: HEPATOLOGY | Facility: CLINIC | Age: 72
End: 2017-04-25

## 2017-04-25 NOTE — TELEPHONE ENCOUNTER
----- Message from Eva Cannon RN sent at 4/25/2017 11:06 AM CDT -----  Contact: patient      ----- Message -----     From: Rodger Clancy     Sent: 4/24/2017   3:33 PM       To: [facility] Hepatitis C Staff    Calling to schedule appt please call

## 2017-04-25 NOTE — TELEPHONE ENCOUNTER
MA called patient back, schedule patient US, labs and follow up visit patient accepted 6/6/17 mailed appt reminder to patient.Lyndsey

## 2017-05-01 ENCOUNTER — TELEPHONE (OUTPATIENT)
Dept: FAMILY MEDICINE | Facility: CLINIC | Age: 72
End: 2017-05-01

## 2017-05-01 DIAGNOSIS — F11.90 CHRONIC, CONTINUOUS USE OF OPIOIDS: ICD-10-CM

## 2017-05-01 DIAGNOSIS — G89.29 OTHER CHRONIC PAIN: Primary | ICD-10-CM

## 2017-05-01 NOTE — TELEPHONE ENCOUNTER
----- Message from Sarai Phillip sent at 5/1/2017  9:00 AM CDT -----  Contact: pt  Pt is requesting a Pain [facility]ament Dr in [place] area  Call back on # [phone]  thanks

## 2017-05-01 NOTE — TELEPHONE ENCOUNTER
Left message advised pt referral done. He can call scheduling to schedule with pain management in [place].

## 2017-05-01 NOTE — TELEPHONE ENCOUNTER
Spoke to pt, he is requesting a referral to pain management.  He states he wants to eventually be off of the norco because he fears he is addicted to them and wants to take another approach to managing his pain.  Pt states he is tired of being concerned with whether he took his pain med or not, or if he may have taken too many close together. He states its just gets to be too confusing to him.  Pt also states he is afraid of withdrawal symptoms.  Wants to know if pain management is the correct way to go. Please advise.

## 2017-05-03 ENCOUNTER — OFFICE VISIT (OUTPATIENT)
Dept: PAIN MEDICINE | Facility: CLINIC | Age: 72
End: 2017-05-03
Payer: MEDICARE

## 2017-05-03 ENCOUNTER — TELEPHONE (OUTPATIENT)
Dept: FAMILY MEDICINE | Facility: CLINIC | Age: 72
End: 2017-05-03

## 2017-05-03 VITALS
DIASTOLIC BLOOD PRESSURE: 83 MMHG | SYSTOLIC BLOOD PRESSURE: 164 MMHG | WEIGHT: 202.38 LBS | HEIGHT: 69 IN | HEART RATE: 74 BPM | BODY MASS INDEX: 29.98 KG/M2

## 2017-05-03 DIAGNOSIS — D69.6 THROMBOCYTOPENIA: ICD-10-CM

## 2017-05-03 DIAGNOSIS — M47.819 FACET ARTHROPATHY: Primary | ICD-10-CM

## 2017-05-03 DIAGNOSIS — M17.10 PRIMARY OSTEOARTHRITIS OF KNEE, UNSPECIFIED LATERALITY: ICD-10-CM

## 2017-05-03 PROCEDURE — 99499 UNLISTED E&M SERVICE: CPT | Mod: S$GLB,,, | Performed by: ANESTHESIOLOGY

## 2017-05-03 PROCEDURE — 1157F ADVNC CARE PLAN IN RCRD: CPT | Mod: 8P,S$GLB,, | Performed by: ANESTHESIOLOGY

## 2017-05-03 PROCEDURE — 1159F MED LIST DOCD IN RCRD: CPT | Mod: S$GLB,,, | Performed by: ANESTHESIOLOGY

## 2017-05-03 PROCEDURE — 3079F DIAST BP 80-89 MM HG: CPT | Mod: S$GLB,,, | Performed by: ANESTHESIOLOGY

## 2017-05-03 PROCEDURE — 3077F SYST BP >= 140 MM HG: CPT | Mod: S$GLB,,, | Performed by: ANESTHESIOLOGY

## 2017-05-03 PROCEDURE — 99204 OFFICE O/P NEW MOD 45 MIN: CPT | Mod: S$GLB,,, | Performed by: ANESTHESIOLOGY

## 2017-05-03 PROCEDURE — 1160F RVW MEDS BY RX/DR IN RCRD: CPT | Mod: S$GLB,,, | Performed by: ANESTHESIOLOGY

## 2017-05-03 PROCEDURE — 99999 PR PBB SHADOW E&M-EST. PATIENT-LVL III: CPT | Mod: PBBFAC,,, | Performed by: ANESTHESIOLOGY

## 2017-05-03 PROCEDURE — 1125F AMNT PAIN NOTED PAIN PRSNT: CPT | Mod: S$GLB,,, | Performed by: ANESTHESIOLOGY

## 2017-05-03 NOTE — PROGRESS NOTES
"This note was completed with dictation software and grammatical errors may exist.    Referring Physician: Alie Womack MD    PCP: Alie Womack MD      CC: Lower back and knee pain    HPI:   Steve June Jr. is a 71 y.o. male referred to us for lower back and knee pain.  He has significant history of pancytopenia, cirrhosis.  He presents with constant aching, sharp pain in his lower back as well as his left knee.  Pain worsens sitting, standing, bending, walking.  Pain improves with rest.  X-rays performed of the lumbar spine as well as knee shows left knee osteoarthritis.  He has tried physical therapy with minimal benefit.  He currently takes Norco 10 mg every 6 hours as needed with mild to moderate benefit.  He is unable to have any procedures due to his thrombocytopenia.  He also has ventral hernia, but surgical attention is currently not recommended due to his thrombocytopenia.  His main concern today is wishing to decrease his opioid medications.  He states being very "foggy" with his current medications.  He denies any increased sedation.  He denies any weakness.  No bowel bladder changes.    ROS:  CONSTITUTIONAL: No fevers, chills, night sweats, wt. loss, appetite changes  SKIN: no rashes or itching  ENT: No headaches, head trauma, vision changes, or eye pain  LYMPH NODES: None noticed   CV: No chest pain, palpitations.   RESP: No shortness of breath, dyspnea on exertion, cough, wheezing, or hemoptysis  GI: No nausea, emesis, diarrhea, constipation, melena, hematochezia, pain.    : No dysuria, hematuria, urgency, or frequency   HEME: No easy bruising, bleeding problems  PSYCHIATRIC: No depression, anxiety, psychosis, hallucinations.  NEURO: No seizures, memory loss, dizziness or difficulty sleeping  MSK: + History of present illness      Past Medical History:   Diagnosis Date    Abnormal thyroid function test     Allergy     Seasonal    Anemia     Arthritis     Gaviria esophagus     Basal " cell carcinoma     right forearm    Basal cell carcinoma 12/2011    lower post neck    Cancer     skin CA    Cataract     Cirrhosis     Encounter for blood transfusion     Esophageal varices in cirrhosis     grade II on 7/12 EGD    Gastritis     on 7/12 EGD    GERD (gastroesophageal reflux disease) 2/28/2015    Hard of hearing     Hiatal hernia     History of hepatitis C 8/10/2012    tx with harvoni x 41 days (started 10/22/15). 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    Valvular heart disease     mild MR 12     Past Surgical History:   Procedure Laterality Date    CATARACT EXTRACTION  1/10/13    left eye    CATARACT EXTRACTION      right eye    CHOLECYSTECTOMY      COLONOSCOPY      EYE SURGERY      Cataract surgery to right eye    KNEE ARTHROSCOPY W/ MENISCAL REPAIR      KNEE CARTILAGE SURGERY      left knee    KNEE SURGERY  12/2006    left    SKIN LESION EXCISION      TONSILLECTOMY      UPPER GASTROINTESTINAL ENDOSCOPY       Family History   Problem Relation Age of Onset    Leukemia Mother     Cancer Mother      bone    Alcohol abuse Father     Cirrhosis Father      EtOH induced    Amblyopia Neg Hx     Blindness Neg Hx     Cataracts Neg Hx     Diabetes Neg Hx     Glaucoma Neg Hx     Hypertension Neg Hx     Macular degeneration Neg Hx     Retinal detachment Neg Hx     Strabismus Neg Hx     Stroke Neg Hx     Thyroid disease Neg Hx     Psoriasis Neg Hx     Lupus Neg Hx     Eczema Neg Hx     Acne Neg Hx     Melanoma Neg Hx      Social History     Social History    Marital status:      Spouse name: N/A    Number of children: 5    Years of education: N/A     Social History Main Topics    Smoking  "status: Former Smoker     Packs/day: 1.00     Years: 25.00     Quit date: 8/9/2000    Smokeless tobacco: Never Used    Alcohol use No      Comment: states he stopped drinking approx. 7 yrs ago/"I might drink a beer every 2 weeks"    Drug use: No    Sexual activity: No     Other Topics Concern    None     Social History Narrative    He is .  He has children.  He is currently retired.         Medications/Allergies: See med card    Vitals:    05/03/17 1017   BP: (!) 164/83   Pulse: 74   Weight: 91.8 kg (202 lb 6.1 oz)   Height: 5' 9" (1.753 m)   PainSc:   8         Physical exam:    GENERAL: A and O x3, the patient appears well groomed and is in no acute distress.  Skin: No rashes or obvious lesions  HEENT: normocephalic, atraumatic  CARDIOVASCULAR:  Palpable peripheral pulses  LUNGS: easy work of breathing  ABDOMEN: soft, nontender, + sizaeable ventral hernia in epigastric region.    UPPER EXTREMITIES: Normal alignment, normal range of motion, no atrophy, no skin changes,  hair growth and nail growth normal and equal bilaterally. No swelling, no tenderness.    LOWER EXTREMITIES:  Normal alignment, normal range of motion, no atrophy, no skin changes,  hair growth and nail growth normal and equal bilaterally. No swelling, no tenderness.    LUMBAR SPINE  Lumbar spine: ROM is full with flexion extension and oblique extension with moderate increased pain.    Erasmo's test causes no increased pain on either side.    Supine straight leg raise is negative bilaterally.    Internal and external rotation of the hip causes no increased pain on either side.  Myofascial exam: No tenderness to palpation across lumbar paraspinous muscles.      MENTAL STATUS: normal orientation, speech, language, and fund of knowledge for social situation.  Emotional state appropriate.    CRANIAL NERVES:  II:  PERRL bilaterally,   III,IV,VI: EOMI.    V:  Facial sensation equal bilaterally  VII:  Facial motor function normal.  VIII: " " Hearing equal to finger rub bilaterally  IX/X: Gag normal, palate symmetric  XI:  Shoulder shrug equal, head turn equal  XII:  Tongue midline without fasciculations      MOTOR: Tone and bulk: normal bilateral upper and lower Strength: normal   Delt Bi Tri WE WF     R 5 5 5 5 5 5   L 5 5 5 5 5 5     IP ADD ABD Quad TA Gas HAM  R 5 5 5 5 5 5 5  L 5 5 5 5 5 5 5    SENSATION: Light touch and pinprick intact bilaterally  REFLEXES: normal, symmetric, nonbrisk.  Toes down, no clonus. No hoffmans.  GAIT: normal rise, base, steps, and arm swing.        Imaging:  Xray L-spine 4/2013   Alignment is otherwise normal.  Vertebral body heights and disc space heights are relatively well maintained.  Vertebral end plate osteophytes are present anteriorly   at multiple levels.  The facet joints and posterior elements are unremarkable         Xray Knee 12/2013  Degenerative change of the knees, left greater than right.    Assessment:  Patient referred for lower back and left knee pain  1. Facet arthropathy    2. Primary osteoarthritis of knee, unspecified laterality    3. Thrombocytopenia          Plan:  - I have stressed the importance of physical activity and exercise to improve overall health  - Any interventional procedures will be deferred due to his low platelet count.  Patient expressed understanding.  - Patient main concern was with his ongoing opioid medications.  He states feeling "foggy" with his medications.  Discuss that he can titrate down himself slowly to see if his symptoms improved.  He will try to take his opioid medications less frequently and discuss this with his PCP, Dr. Womack  - He will follow up with us as needed      Thank you for referring this interesting patient, and I look forward to continuing to collaborate in his care.    "

## 2017-05-03 NOTE — TELEPHONE ENCOUNTER
Dr Vu MAZARIEGOS  Pt called and stated he went to his visit with pain management.  Will continue pain medication but will try to titrate off as recommended by pain management physician.  Also advised pt to write down dates and times he takes his medication to make it easier to manage. Pt verbalized understanding.

## 2017-05-03 NOTE — TELEPHONE ENCOUNTER
----- Message from Tami Lal sent at 5/3/2017 11:42 AM CDT -----  Patient needs to talk to office concerning his medication. Please call back at [phone].

## 2017-05-15 ENCOUNTER — OFFICE VISIT (OUTPATIENT)
Dept: FAMILY MEDICINE | Facility: CLINIC | Age: 72
End: 2017-05-15
Payer: MEDICARE

## 2017-05-15 ENCOUNTER — TELEPHONE (OUTPATIENT)
Dept: FAMILY MEDICINE | Facility: CLINIC | Age: 72
End: 2017-05-15

## 2017-05-15 VITALS
WEIGHT: 197.56 LBS | TEMPERATURE: 98 F | HEIGHT: 69 IN | BODY MASS INDEX: 29.26 KG/M2 | SYSTOLIC BLOOD PRESSURE: 120 MMHG | DIASTOLIC BLOOD PRESSURE: 72 MMHG | OXYGEN SATURATION: 97 % | HEART RATE: 64 BPM

## 2017-05-15 DIAGNOSIS — S89.92XA LEFT KNEE INJURY, INITIAL ENCOUNTER: ICD-10-CM

## 2017-05-15 DIAGNOSIS — M54.2 NECK PAIN: ICD-10-CM

## 2017-05-15 DIAGNOSIS — M25.512 ACUTE PAIN OF LEFT SHOULDER: ICD-10-CM

## 2017-05-15 DIAGNOSIS — S09.90XA HEAD INJURY, INITIAL ENCOUNTER: ICD-10-CM
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
## 2017-05-15 DIAGNOSIS — S79.919A HIP INJURY, UNSPECIFIED LATERALITY, INITIAL ENCOUNTER: ICD-10-CM

## 2017-05-15 DIAGNOSIS — S99.912A LEFT ANKLE INJURY, INITIAL ENCOUNTER: ICD-10-CM

## 2017-05-15 PROCEDURE — 3078F DIAST BP <80 MM HG: CPT | Mod: S$GLB,,, | Performed by: NURSE PRACTITIONER

## 2017-05-15 PROCEDURE — 1125F AMNT PAIN NOTED PAIN PRSNT: CPT | Mod: S$GLB,,, | Performed by: NURSE PRACTITIONER

## 2017-05-15 PROCEDURE — 1160F RVW MEDS BY RX/DR IN RCRD: CPT | Mod: S$GLB,,, | Performed by: NURSE PRACTITIONER

## 2017-05-15 PROCEDURE — 1159F MED LIST DOCD IN RCRD: CPT | Mod: S$GLB,,, | Performed by: NURSE PRACTITIONER

## 2017-05-15 PROCEDURE — 99214 OFFICE O/P EST MOD 30 MIN: CPT | Mod: S$GLB,,, | Performed by: NURSE PRACTITIONER

## 2017-05-15 PROCEDURE — 3074F SYST BP LT 130 MM HG: CPT | Mod: S$GLB,,, | Performed by: NURSE PRACTITIONER

## 2017-05-15 PROCEDURE — 99999 PR PBB SHADOW E&M-EST. PATIENT-LVL V: CPT | Mod: PBBFAC,,, | Performed by: NURSE PRACTITIONER

## 2017-05-15 NOTE — TELEPHONE ENCOUNTER
----- Message from Paulo Jones sent at 5/15/2017  8:05 AM CDT -----  Contact: Patient  Patient states that he fell yesterday and is in a lot of pain.  States that the paramedics assisted him with the bruises and told the patient to see the doctor today.  Please call [phone].  Thank you

## 2017-05-15 NOTE — PROGRESS NOTES
"Subjective:       Patient ID: Steve June Jr. is a 72 y.o. male.    Chief Complaint: Fall (x 1 day ago at [facility])    HPI     Patient presents to clinic for a fall - he tripped while walking out of the [facility] yesterday evening.    He landed on the floor, hitting his left side of head, shoulder, hip, and ribs.  Notes persistent pain to his left side, neck, leg and arm. He was evaluated by EMS on site - was encouraged to see his PCP.   Denies loc. Gilbert "woozy" and some confusion afterwards.     Review of Systems   Constitutional: Negative for chills and fever.   Respiratory: Negative for cough, shortness of breath and wheezing.    Cardiovascular: Negative for chest pain and palpitations.   Musculoskeletal: Positive for arthralgias, back pain, myalgias, neck pain and neck stiffness.   Skin: Positive for color change and wound.   Neurological: Positive for light-headedness. Negative for dizziness, tremors, speech difficulty, weakness and headaches.       Objective:      Physical Exam   Constitutional: He is oriented to person, place, and time. He appears well-developed and well-nourished.   HENT:   Head: Normocephalic and atraumatic.   Cardiovascular: Normal rate, regular rhythm and normal heart sounds.    No murmur heard.  Pulmonary/Chest: Effort normal and breath sounds normal. No respiratory distress. He has no wheezes. He has no rales.   Musculoskeletal: He exhibits no edema or deformity.        Left shoulder: He exhibits decreased range of motion, tenderness, bony tenderness, swelling and pain.        Left hip: He exhibits decreased range of motion, decreased strength and bony tenderness.        Left knee: He exhibits decreased range of motion, swelling and ecchymosis. Tenderness found.        Left ankle: He exhibits decreased range of motion and swelling. Tenderness.        Cervical back: He exhibits tenderness and spasm. He exhibits normal range of motion and no bony tenderness.   Neurological: He is alert " and oriented to person, place, and time. No cranial nerve deficit.   Skin: Skin is warm and dry.   Psychiatric: He has a normal mood and affect. His behavior is normal.   Nursing note and vitals reviewed.      Assessment:       1. Fall, initial encounter    2. Head injury, initial encounter    3. Neck pain    4. Acute pain of left shoulder    5. Hip injury, unspecified laterality, initial encounter    6. Left ankle injury, initial encounter    7. Left knee injury, initial encounter        Plan:   Steve was seen today for fall.    Diagnoses and all orders for this visit:    Fall, initial encounter  -     CT Head Without Contrast; Future  -     X-Ray Cervical Spine AP And Lateral; Future  -     X-Ray Shoulder Trauma 3 view Left; Future  -     X-Ray Hip 2 or 3 views Left; Future  -     X-ray Knee Ortho Left; Future  -     X-Ray Ankle Complete Left; Future    Head injury, initial encounter  -     CT Head Without Contrast; Future  -     X-Ray Cervical Spine AP And Lateral; Future  -     X-Ray Shoulder Trauma 3 view Left; Future  -     X-Ray Hip 2 or 3 views Left; Future  -     X-ray Knee Ortho Left; Future  -     X-Ray Ankle Complete Left; Future    Neck pain  -     CT Head Without Contrast; Future  -     X-Ray Cervical Spine AP And Lateral; Future  -     X-Ray Shoulder Trauma 3 view Left; Future  -     X-Ray Hip 2 or 3 views Left; Future  -     X-ray Knee Ortho Left; Future  -     X-Ray Ankle Complete Left; Future    Acute pain of left shoulder  -     CT Head Without Contrast; Future  -     X-Ray Cervical Spine AP And Lateral; Future  -     X-Ray Shoulder Trauma 3 view Left; Future  -     X-Ray Hip 2 or 3 views Left; Future  -     X-ray Knee Ortho Left; Future  -     X-Ray Ankle Complete Left; Future    Hip injury, unspecified laterality, initial encounter  -     CT Head Without Contrast; Future  -     X-Ray Cervical Spine AP And Lateral; Future  -     X-Ray Shoulder Trauma 3 view Left; Future  -     X-Ray Hip 2 or 3  views Left; Future  -     X-ray Knee Ortho Left; Future  -     X-Ray Ankle Complete Left; Future    Left ankle injury, initial encounter  -     CT Head Without Contrast; Future  -     X-Ray Cervical Spine AP And Lateral; Future  -     X-Ray Shoulder Trauma 3 view Left; Future  -     X-Ray Hip 2 or 3 views Left; Future  -     X-ray Knee Ortho Left; Future  -     X-Ray Ankle Complete Left; Future    Left knee injury, initial encounter  -     CT Head Without Contrast; Future  -     X-Ray Cervical Spine AP And Lateral; Future  -     X-Ray Shoulder Trauma 3 view Left; Future  -     X-Ray Hip 2 or 3 views Left; Future  -     X-ray Knee Ortho Left; Future  -     X-Ray Ankle Complete Left; Future

## 2017-05-15 NOTE — MR AVS SNAPSHOT
[facility]  [address]  [place] [address]  Phone: [phone]  Fax: [phone]                  Steve June JrTruman   5/15/2017 9:40 AM   Office Visit    Description:  Male : 1945   Provider:  Ny Lujan NP   Department:  [facility]           Reason for Visit     Fall           Diagnoses this Visit        Comments    Fall, initial encounter    -  Primary     Head injury, initial encounter         Neck pain         Acute pain of left shoulder         Hip injury, unspecified laterality, initial encounter         Left ankle injury, initial encounter         Left knee injury, initial encounter                To Do List           Future Appointments        Provider Department Dept Phone    2017 12:00 AM [facility] [id number] [facility] [phone]    2017 10:45 AM [facility] CT2 LIMIT 500 LBS [facility] [phone]    2017 10:15 AM NOM US 11 ALL [facility] [phone]    2017 11:00 AM LAB, APPOINTMENT [place] [facility] [phone]    2017 1:00 PM Joanne Cunha NP [facility] - Hepatology [phone]      Goals (5 Years of Data)     None      [facility]     [facility] Nurse Care Line - 24/ Assistance  Unless otherwise directed by your provider, please contact [facility], our nurse care line that is available for / assistance.     Registered nurses in the [facility] provide: appointment scheduling, clinical advisement, health education, and other advisory services.  Call: [phone] (toll free)               Medications           Message regarding Medications     Verify the changes and/or additions to your medication regime listed below are the same as discussed with your clinician today.  If any of these changes or additions are incorrect, please notify your healthcare provider.        STOP taking these  "medications     repaglinide (PRANDIN) 1 MG tablet Take 1 tablet (1 mg total) by mouth 3 (three) times daily before meals.           Verify that the below list of medications is an accurate representation of the medications you are currently taking.  If none reported, the list may be blank. If incorrect, please contact your healthcare provider. Carry this list with you in case of emergency.           Current Medications     ACCU-CHEK VEENA PLUS METER Misc     blood sugar diagnostic Strp accu-chek veena plus test strp, 2 x day    blood-glucose meter kit Use as instructed    carvedilol (COREG) 6.25 MG tablet Take 1 tablet (6.25 mg total) by mouth 2 (two) times daily.    fluorouracil (EFUDEX) 5 % cream AAA scalp bid x 2 weeks, do not start treating until healed from cryo    GLUCOSAMINE HCL/CHONDR CHISHOLM A NA (OSTEO BI-FLEX ORAL) Take 2 tablets by mouth once daily.    hydrocodone-acetaminophen 10-325mg (NORCO)  mg Tab Take 1 tablet by mouth 4 (four) times daily.    loratadine-pseudoephedrine  mg (CLARITIN-D 24-HOUR)  mg per 24 hr tablet Take 1 tablet by mouth once daily.    metformin (GLUCOPHAGE) 1000 MG tablet TAKE 1 TABLET BY MOUTH TWICE DAILY WITH MEALS    nystatin (MYCOSTATIN) 100,000 unit/mL suspension TAKE 4ML BY MOUTH TWICE DAILY           Clinical Reference Information           Your Vitals Were     BP Pulse Temp Height Weight SpO2    120/72 (BP Location: Left arm, Patient Position: Sitting, BP Method: Manual) 64 98.2 °F (36.8 °C) (Oral) 5' 9" (1.753 m) 89.6 kg (197 lb 8.5 oz) 97%    BMI                29.17 kg/m2          Blood Pressure          Most Recent Value    BP  120/72      Allergies as of 5/15/2017     Adhesive Tape-silicones    Doxycycline      Immunizations Administered on Date of Encounter - 5/15/2017     None      Orders Placed During Today's Visit     Future Labs/Procedures Expected by Expires    CT Head Without Contrast  5/15/2017 5/15/2018    X-Ray Ankle Complete Left  5/15/2017 " 5/15/2018    X-Ray Cervical Spine AP And Lateral  5/15/2017 8/13/2017    X-Ray Hip 2 or 3 views Left  5/15/2017 8/13/2017    X-ray Knee Ortho Left  5/15/2017 5/15/2018    X-Ray Shoulder Trauma 3 view Left  5/15/2017 5/15/2018      Language Assistance Services     ATTENTION: Language assistance services are available, free of charge. Please call [phone].      ATENCIÓN: Si habla español, tiene a rizvi disposición servicios gratuitos de asistencia lingüística. Llame al [phone].     CHÚ Ý: N?u b?n nói Ti?ng Vi?t, có các d?ch v? h? tr? ngôn ng? mi?n phí dành cho b?n. G?i s? [phone].         [facility] complies with applicable Federal civil rights laws and does not discriminate on the basis of race, color, national origin, age, disability, or sex.

## 2017-05-16 ENCOUNTER — HOSPITAL ENCOUNTER (OUTPATIENT)
Dept: RADIOLOGY | Facility: HOSPITAL | Age: 72
Discharge: HOME | End: 2017-05-16
Attending: NURSE PRACTITIONER
Payer: MEDICARE
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
## 2017-05-16 DIAGNOSIS — M25.512 ACUTE PAIN OF LEFT SHOULDER: ICD-10-CM

## 2017-05-16 DIAGNOSIS — M54.2 NECK PAIN: ICD-10-CM

## 2017-05-16 DIAGNOSIS — S79.919A HIP INJURY, UNSPECIFIED LATERALITY, INITIAL ENCOUNTER: ICD-10-CM

## 2017-05-16 DIAGNOSIS — S09.90XA HEAD INJURY, INITIAL ENCOUNTER: ICD-10-CM

## 2017-05-16 DIAGNOSIS — S99.912A LEFT ANKLE INJURY, INITIAL ENCOUNTER: ICD-10-CM

## 2017-05-16 DIAGNOSIS — S89.92XA LEFT KNEE INJURY, INITIAL ENCOUNTER: ICD-10-CM

## 2017-05-16 PROCEDURE — 73502 X-RAY EXAM HIP UNI 2-3 VIEWS: CPT | Mod: TC,LT

## 2017-05-16 PROCEDURE — 72040 X-RAY EXAM NECK SPINE 2-3 VW: CPT | Mod: TC

## 2017-05-16 PROCEDURE — 70450 CT HEAD/BRAIN W/O DYE: CPT | Mod: TC

## 2017-05-16 PROCEDURE — 70450 CT HEAD/BRAIN W/O DYE: CPT | Mod: 26,,, | Performed by: RADIOLOGY

## 2017-05-16 PROCEDURE — 72040 X-RAY EXAM NECK SPINE 2-3 VW: CPT | Mod: 26,,, | Performed by: RADIOLOGY

## 2017-05-16 PROCEDURE — 73560 X-RAY EXAM OF KNEE 1 OR 2: CPT | Mod: 26,59,RT, | Performed by: RADIOLOGY

## 2017-05-16 PROCEDURE — 73030 X-RAY EXAM OF SHOULDER: CPT | Mod: TC,LT

## 2017-05-16 PROCEDURE — 73502 X-RAY EXAM HIP UNI 2-3 VIEWS: CPT | Mod: 26,LT,, | Performed by: RADIOLOGY

## 2017-05-16 PROCEDURE — 73610 X-RAY EXAM OF ANKLE: CPT | Mod: 26,LT,, | Performed by: RADIOLOGY

## 2017-05-16 PROCEDURE — 73560 X-RAY EXAM OF KNEE 1 OR 2: CPT | Mod: TC,RT,LT

## 2017-05-16 PROCEDURE — 73610 X-RAY EXAM OF ANKLE: CPT | Mod: TC,LT

## 2017-05-16 PROCEDURE — 73030 X-RAY EXAM OF SHOULDER: CPT | Mod: 26,LT,, | Performed by: RADIOLOGY

## 2017-05-16 PROCEDURE — 73562 X-RAY EXAM OF KNEE 3: CPT | Mod: 26,LT,, | Performed by: RADIOLOGY

## 2017-05-17 ENCOUNTER — PATIENT MESSAGE (OUTPATIENT)
Dept: FAMILY MEDICINE | Facility: CLINIC | Age: 72
End: 2017-05-17

## 2017-05-17 DIAGNOSIS — J90 PLEURAL EFFUSION: Primary | ICD-10-CM
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
## 2017-05-17 RX ORDER — HYDROCODONE BITARTRATE AND ACETAMINOPHEN 10; 325 MG/1; MG/1
1 TABLET ORAL 4 TIMES DAILY
Qty: 120 TABLET | Refills: 0 | Status: SHIPPED | OUTPATIENT
Start: 2017-05-17 | End: 2017-06-13 | Stop reason: SDUPTHER

## 2017-05-17 NOTE — TELEPHONE ENCOUNTER
Pt states he is still extremely sore, needs refill on pain meds and would like to know if there is anything else he can do to get some relief.   Also requesting xray results.   Please advise

## 2017-05-17 NOTE — TELEPHONE ENCOUNTER
----- Message from Sarai Phillip sent at 5/17/2017  8:08 AM CDT -----  Contact: pt  Refill on pain medication (pt will be out tomorrow), pt also have questions regarding fall  Call back on# [phone]  thanks    [facility] [employer] [address] [address] [address] [address]  Phone: [phone] Fax: [phone]

## 2017-05-17 NOTE — TELEPHONE ENCOUNTER
Results reviewed with patient.   Hydrocodone refill request sent to Dr. Womack for Review.    consistent.

## 2017-05-19 ENCOUNTER — TELEPHONE (OUTPATIENT)
Dept: FAMILY MEDICINE | Facility: CLINIC | Age: 72
End: 2017-05-19

## 2017-05-19 ENCOUNTER — CLINICAL SUPPORT (OUTPATIENT)
Dept: REHABILITATION | Facility: HOSPITAL | Age: 72
End: 2017-05-19
Attending: NURSE PRACTITIONER
Payer: MEDICARE
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
## 2017-05-19 DIAGNOSIS — R93.0 ABNORMAL HEAD CT: Primary | ICD-10-CM

## 2017-05-19 PROCEDURE — G8979 MOBILITY GOAL STATUS: HCPCS | Mod: CJ,PN

## 2017-05-19 PROCEDURE — 97162 PT EVAL MOD COMPLEX 30 MIN: CPT | Mod: PN

## 2017-05-19 PROCEDURE — G8978 MOBILITY CURRENT STATUS: HCPCS | Mod: CK,PN

## 2017-05-19 NOTE — PLAN OF CARE
TIME RECORD    05/19/2017    Start Time:  0910  Stop Time:  1000    PROCEDURES:    TIMED  Procedure Time Min.    Start:  Stop:     Start:  Stop:     Start:  Stop:     Start:  Stop:          UNTIMED  Procedure Time Min.   Evaluation Start:0910  Stop:1000 50    Start:  Stop:      Total Timed Minutes:  0  Total Timed Units:  0  Total Untimed Units:  1  Charges Billed/# of units:  1    OUTPATIENT PHYSICAL THERAPY   PATIENT EVALUATION  Onset Date: 05/14/17  Problem List Items Addressed This Visit     Fall      Other Visit Diagnoses    None.       Treatment Diagnosis: Impaired mobility following fall    Past Medical History:   Diagnosis Date    Abnormal thyroid function test     Allergy     Seasonal    Anemia     Arthritis     Gaviria esophagus     Basal cell carcinoma     right forearm    Basal cell carcinoma 12/2011    lower post neck    Cancer     skin CA    Cataract     Cirrhosis     Encounter for blood transfusion     Esophageal varices in cirrhosis     grade II on 7/12 EGD    Gastritis     on 7/12 EGD    GERD (gastroesophageal reflux disease) 2/28/2015    Hard of hearing     Hiatal hernia     History of hepatitis C 8/10/2012    tx with harvoni x 41 days (started 10/22/15). 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    Valvular heart disease     mild MR 12       Past Surgical History:   Procedure Laterality Date    CATARACT EXTRACTION  1/10/13    left eye    CATARACT EXTRACTION      right eye    CHOLECYSTECTOMY      COLONOSCOPY      EYE SURGERY      Cataract surgery to right eye    KNEE ARTHROSCOPY W/ MENISCAL REPAIR      KNEE CARTILAGE SURGERY      left knee    KNEE SURGERY  12/2006    left    SKIN LESION EXCISION       "TONSILLECTOMY      UPPER GASTROINTESTINAL ENDOSCOPY         has a current medication list which includes the following prescription(s): accu-chek john plus meter, blood sugar diagnostic, blood-glucose meter, carvedilol, fluorouracil, glucosamine hcl/chondr rizvi a na, hydrocodone-acetaminophen 10-325mg, loratadine-pseudoephedrine  mg, metformin, and nystatin.    Precautions: [place], cardiac, DM, hx CA   Surgical history: see above  Prior Therapy: no  History of Present Illness: Patient is a 71 yo right-handed male who presents to physical therapy with pain following a fall on 05/14/17 at the [facility]. Struck his head and the left side of his shoulder, hip, and ribs. He denies LOC but did feel woozy/confused afterwards. Pt was evaluated by EMS on-site and seen by NP on 05/15. Multiple xrays available.   Prior Level of Function: Independent  Social History:    Lives with wife   Living environment: 1st floor apartment    Stairs to enter home: none    Railings:    Bathroom setup: tub shower   Transportation: drives   Leisure activities/hobbies: enjoys [employer]    Current level of function: Independent  Functional Deficits Leading to Referral/Nature of Injury: decreased tolerance to activity, impaired I/ADL's  Patient Therapy Goals: "Relieve the pain I have, that's all."     Patient Identified Problem List:    1. Standing too long  2. Getting in/out of car (has to lift leg with his hands)  3. Getting out of chairs  4. Turning his head/driving    Subjective     Steve June Jr. states his neck pain is the worst area.    Pain:  Location: left sided occiput, lateral and posterior C/S, shoulder  Description: "hurting pain", tightness  Activities Which Increase Pain: moving his head and arm  Activities Which Decrease Pain: tatianaone,   Pain Scale: 6/10 at best 6/10 now  7/10 at worst    Location: lateral and posterior left hip, lateral and anterior left knee, lateral lower leg,  ankle  Description: "hurting " "pain"  Activities Which Increase Pain: laying on his left side, standing too long, getting out of chairs  Activities Which Decrease Pain: rest  Pain Scale: 5-6/10 at best 5-6/10 now, 7-8/10 at worst    Objective     Observation: + bruising, redness LUE, LLE at knee, lower leg  Posture: Fwd head position, rounded shoulders, fwd trunk position in standing c/ WBOS, dec'd WB LLE  Palpation: TTP cervical paraspinals, L sided UT, deltoids, lat/post hip, lat/post thigh, ant/lat knee, calf  Sensation: NT   DTRs: NT 2/2 pain/tenderness   Range of Motion/Strength:      Cervical AROM: Pain/Dysfunction with Movement:   Flexion 60* Increased pain   Extension 50* Increased pain   Right side bending 20* Increased pain    Left side bending 18* Increased pain   Right rotation 30* Increased pain    Left rotation 20* Increased pain     Upper Extremity Range of Motion:   Right Upper Extremity: WFL   Left Upper Extremity: shoulder AROM flexion 116*, ABD 94*, elbow/wrist WFL    Upper Extremity Strength:   Right Upper Extremity: 4+/5 to 5/5   Left Upper Extremity: shoulder 3+/5, elbow 4-/5, wrist 4+/5    Lower Extremity Range of Motion:  Right Lower Extremity: WFL  Left Lower Extremity: hip flex 108*, knee 0-110*, ankle WFL    Lower Extremity Strength:  Right Lower Extremity: 4+/5 to 5/5  Left Lower Extremity: hip flex 3+/5, knee 4-/5, ankle 4/5      Flexibility: Tightness present in bilateral gastrocs, hamstrings left 126*, right 140* c/ pain/muscle guarding  Gait: antalgic  Bed Mobility: Supine<>sit SBA  Transfers: Sit<>stand SBA bilateral UE support  Functional Outcome Measure:   Lower Extremity Functional Scale (LEFS):17/80  Neck Disability Index (NDI): 21/45  G code tool used: NDI  Current modifier: CK  Goal modifier: CJ  Treatment: Educated on role/goal PT, POC.  Pt verbalizing understanding and agreement.     Assessment       Initial Assessment (Pertinent finding, problem list and factors affecting outcome): Patient is a 73 yo male " presenting to PT with c/o pain in his neck and the left side of his body following a fall on 05/14/17. Notable impairments include decreased ROM, weakness, postural/gait abnormality, and impaired functional mobility. Patient would benefit from skilled PT to address notable impairments and improve functional mobility to PLOF.    Rehab Potiential: good    Short Term Goals (2 Weeks): 1) Pt will initiate HEP 2) Patient demonstrate C/S rotation to 45* L/R or >   Long Term Goals (4 Weeks): 1) Pt will be I with HEP 2) Pt will return to community ambulation with strength 4/5 or > LLE 3) Pt will demonstrate full and symmetric UE ROM with strength 4/5 or > 4) Pt will demonstrate sit<>stand c/ no UE usage 5) NDI <40% impairment 6) LEFS <70% impairment    Plan     Certification Period: 05/19/17 to 06/23/17  Recommended Treatment Plan: 2 times per week for 4 weeks: Electrical Stimulation PRN, Gait Training, Group Therapy, Manual Therapy, Moist Heat/ Ice, Patient Education, Therapeutic Activites, Therapeutic Exercise and Other dry needling PRN  Other Recommendations:     Thank you for referral.    Therapist: Karolina Sales, PT    I CERTIFY THE NEED FOR THESE SERVICES FURNISHED UNDER THIS PLAN OF TREATMENT AND WHILE UNDER MY CARE    Physician's comments: ________________________________________________________________________________________________________________________________________________      Physician's Name: ___________________________________

## 2017-05-22 NOTE — TELEPHONE ENCOUNTER
Called pt, no answer. Left message on voicemail with clinic call back number to call regarding referrals placed.

## 2017-05-23 ENCOUNTER — CLINICAL SUPPORT (OUTPATIENT)
Dept: REHABILITATION | Facility: HOSPITAL | Age: 72
End: 2017-05-23
Attending: NURSE PRACTITIONER
Payer: MEDICARE

## 2017-05-23 ENCOUNTER — TELEPHONE (OUTPATIENT)
Dept: FAMILY MEDICINE | Facility: CLINIC | Age: 72
End: 2017-05-23
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
## 2017-05-23 PROCEDURE — 97110 THERAPEUTIC EXERCISES: CPT | Mod: PN

## 2017-05-23 PROCEDURE — 97010 HOT OR COLD PACKS THERAPY: CPT | Mod: PN

## 2017-05-23 NOTE — TELEPHONE ENCOUNTER
3 rd attempt to call pt, lm on vm to call office and sent a my [facility] message.  Unable to reach patient how would you like to proceed.

## 2017-05-23 NOTE — PROGRESS NOTES
"Name: Steve June Jr.  Date:   05/23/2017  Primary Diagnosis: .  Problem List Items Addressed This Visit     Fall      Other Visit Diagnoses    None.         Time in: 1005  Time Out: 1057  Total Treatment Time: 52  Group Time: 0      Subjective:    Patient stating his pain is the worst in his neck and shoulders. Reports pain preventing him from sleeping, cannot get comfortable at night.  Patient reports their pain to be 7/10 in the neck, back, and hip on a 0-10 scale with 0 being no pain and 10 being the worst pain imaginable.    Objective    Patient received individual therapy to address strength, endurance, ROM, flexibility and posture with 0 other patients with activities as follows:     Patient received therapeutic exercises to develop strength, endurance, ROM, flexibility and posture for 42 minutes including:     OH pulleys x 3 min   C/S AROM flex/ext/SB/rot x 10 L/R  Shoulder rolls retro 2x10  Nustep L1 x 5 min LE's only  Seated hamstring stretch 3/30" B    CP x 10 min in seated to C/S, left hip/LB      Assessment:     Pt very guarded with all exercises and transitional movements. Labored sit<>stands c/ bilateral UE support on armrests. Increased time to perform all activities.    Pt will continue to benefit from skilled PT intervention. Medical Necessity is demonstrated by:  Fall Risk, Pain limits function of effected part for some activities, Unable to participate fully in daily activities, Requires skilled supervision to complete and progress HEP and Weakness.    Patient is making good progress towards established goals.    Plan:  Continue with established Plan of Care towards PT goals.     "

## 2017-05-25 ENCOUNTER — CLINICAL SUPPORT (OUTPATIENT)
Dept: CARDIOLOGY | Facility: CLINIC | Age: 72
End: 2017-05-25
Payer: MEDICARE

## 2017-05-25 ENCOUNTER — HOSPITAL ENCOUNTER (OUTPATIENT)
Dept: RADIOLOGY | Facility: HOSPITAL | Age: 72
Discharge: HOME | End: 2017-05-25
Attending: NURSE PRACTITIONER
Payer: MEDICARE

## 2017-05-25 DIAGNOSIS — J90 PLEURAL EFFUSION: ICD-10-CM
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
## 2017-05-25 LAB
DIASTOLIC DYSFUNCTION: NO
ESTIMATED PA SYSTOLIC PRESSURE: 23.98
MITRAL VALVE REGURGITATION: NORMAL
RETIRED EF AND QEF - SEE NOTES: 70 (ref 55–65)
TRICUSPID VALVE REGURGITATION: NORMAL

## 2017-05-25 PROCEDURE — 93306 TTE W/DOPPLER COMPLETE: CPT | Mod: S$GLB,,, | Performed by: INTERNAL MEDICINE

## 2017-05-25 PROCEDURE — 71020 XR CHEST PA AND LATERAL: CPT | Mod: TC

## 2017-05-25 PROCEDURE — 71020 XR CHEST PA AND LATERAL: CPT | Mod: 26,,, | Performed by: RADIOLOGY

## 2017-05-26 ENCOUNTER — OFFICE VISIT (OUTPATIENT)
Dept: CARDIOLOGY | Facility: CLINIC | Age: 72
End: 2017-05-26
Payer: MEDICARE

## 2017-05-26 ENCOUNTER — LAB VISIT (OUTPATIENT)
Dept: LAB | Facility: HOSPITAL | Age: 72
End: 2017-05-26
Attending: INTERNAL MEDICINE
Payer: MEDICARE

## 2017-05-26 ENCOUNTER — CLINICAL SUPPORT (OUTPATIENT)
Dept: REHABILITATION | Facility: HOSPITAL | Age: 72
End: 2017-05-26
Attending: NURSE PRACTITIONER
Payer: MEDICARE

## 2017-05-26 VITALS
DIASTOLIC BLOOD PRESSURE: 83 MMHG | WEIGHT: 199.75 LBS | HEIGHT: 69 IN | HEART RATE: 61 BPM | SYSTOLIC BLOOD PRESSURE: 138 MMHG | BODY MASS INDEX: 29.59 KG/M2

## 2017-05-26 DIAGNOSIS — R93.89 ABNORMAL CXR (CHEST X-RAY): ICD-10-CM
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
## 2017-05-26 DIAGNOSIS — R93.89 ABNORMAL CXR (CHEST X-RAY): Primary | ICD-10-CM

## 2017-05-26 LAB — BNP SERPL-MCNC: 60 PG/ML

## 2017-05-26 PROCEDURE — 97110 THERAPEUTIC EXERCISES: CPT | Mod: PN

## 2017-05-26 PROCEDURE — 99204 OFFICE O/P NEW MOD 45 MIN: CPT | Mod: S$GLB,,, | Performed by: INTERNAL MEDICINE

## 2017-05-26 PROCEDURE — 1159F MED LIST DOCD IN RCRD: CPT | Mod: S$GLB,,, | Performed by: INTERNAL MEDICINE

## 2017-05-26 PROCEDURE — 97010 HOT OR COLD PACKS THERAPY: CPT | Mod: PN

## 2017-05-26 PROCEDURE — 99999 PR PBB SHADOW E&M-EST. PATIENT-LVL III: CPT | Mod: PBBFAC,,, | Performed by: INTERNAL MEDICINE

## 2017-05-26 PROCEDURE — 1126F AMNT PAIN NOTED NONE PRSNT: CPT | Mod: S$GLB,,, | Performed by: INTERNAL MEDICINE

## 2017-05-26 PROCEDURE — 36415 COLL VENOUS BLD VENIPUNCTURE: CPT | Mod: PO

## 2017-05-26 PROCEDURE — 83880 ASSAY OF NATRIURETIC PEPTIDE: CPT

## 2017-05-26 PROCEDURE — 97014 ELECTRIC STIMULATION THERAPY: CPT | Mod: PN

## 2017-05-26 NOTE — LETTER
May 26, 2017      Alie Womack MD  [address]  [facility] [address]           [facility]  [address] [facility] [address] [place] [address]  Phone: [phone]          Patient: Steve June Jr.   MR Number: [id number]   YOB: 1945   Date of Visit: 5/26/2017       Dear Dr. Alie Womack:    Thank you for referring Steve June to me for evaluation. Attached you will find relevant portions of my assessment and plan of care.    If you have questions, please do not hesitate to call me. I look forward to following Steve June along with you.    Sincerely,    Erasmo Gonzalez Jr., MD    Enclosure  CC:  No Recipients    If you would like to receive this communication electronically, please contact [email or web address] or [phone] to request more information on [employer] access.    For providers and/or their staff who would like to refer a patient to [facility], please contact us through our one-stop-shop provider referral line, [facility], [place], at [phone].    If you feel you have received this communication in error or would no longer like to receive these types of communications, please e-mail [email or web address]

## 2017-05-26 NOTE — PROGRESS NOTES
Name: Steve June Jr.  Clinic Number: [id number]  Date of Treatment: 05/26/2017   Diagnosis:   Encounter Diagnosis   Name Primary?    Fall, subsequent encounter        Time in: 1115  Time Out: 1215  Total Treatment Time: 60    Subjective:    Steve reports decreased pain.  Patient reports their L shoulder pain to be 4/10 on a 0-10 scale with 0 being no pain and 10 being the worst pain imaginable. Liked the cold packs after session.     Objective    Patient received individual therapy to increase strength, endurance, ROM, flexibility, posture and core stabilization with activities as follows:     Steve received therapeutic exercises to develop strength, endurance, ROM, flexibility, posture and core stabilization for 35 minutes including:     Overhead pulley seated B shoulder flexion 5'  Seated c/s AROM 10 reps: rot, SB, flex, extn  Seated retro shoulder rolls B 10/3  Seated scap retraction B 10/3  NuStep L1 LE's only 5  Sit-stands without UE's x 3 reps with cues for proper weight shifting  Seated hamstring stretches 3/30s L/R    Steve received the following manual therapy techniques: Very gentle soft tissue Mobilization were applied to the: post neck and shoulders for 10 minutes.     CP with IFC applied to the L shoulder x 15 minutes in sitting position after being cleared for contradictions.    Continue HEP daily. Written and pictorial HEP of ex's in EPIC reviewed and issued to pt. Pt instructed to perform HEP daily and stop if symptoms increase.       Pt demo good understanding of the education provided. Steve demonstrated good return demonstration of activities.     Assessment:     Improved pain after session.     Pt will continue to benefit from skilled PT intervention. Medical Necessity is demonstrated by:  Requires skilled supervision to complete and progress HEP and Weakness.    Patient is making good progress towards established goals.    Plan:    Continue with established Plan of Care towards PT goals.

## 2017-05-26 NOTE — PATIENT INSTRUCTIONS
AROM: Lateral Neck Flexion        Slowly tilt head toward one shoulder, then the other. Hold each position __3__ seconds.  Repeat __10__ times per set. Do _1___ sets per session. Do _1-2__ sessions per day.     https://[employer]/296     Copyright © [employer]. All rights reserved.   AROM: Neck Extension        Bend head backward. Hold __3__ seconds.  Repeat _10___ times per set. Do _1___ sets per session. Do _1-2___ sessions per day.     https://[employer]/300     Copyright © [employer]. All rights reserved.   AROM: Neck Flexion        Bend head forward. Hold _3___ seconds.  Repeat _10___ times per set. Do _1___ sets per session. Do _1-2___ sessions per day.     https://[employer]/298     Copyright © [employer]. All rights reserved.   AROM: Neck Rotation        Turn head slowly to look over one shoulder, then the other. Hold each position _3___ seconds.  Repeat _10___ times per set. Do __1__ sets per session. Do _1-2__ sessions per day.     https://[employer]/294     Copyright © [employer]. All rights reserved.   Flexibility: Neck Retraction        Pull head straight back, keeping eyes and jaw level.  Repeat __10__ times per set. Do _3___ sets per session. Do __1-2__ sessions per day.     https://[employer]/344     Copyright © [employer]. All rights reserved.

## 2017-05-26 NOTE — PROGRESS NOTES
Subjective:    Patient ID:  Steve June Jr. is a 72 y.o. male who presents for evaluation of new pt (new pt); ref from [facility]ke[facility]; and Abnormal test results      HPI 73 yo WM with hx of cirrhosis and liver issues who fell and in the course of evaluation had some x- rays that the radiologist read as possible CHF. Patient has no cardiac hx. No hx of MI, CP or heart failure. He states the fall was the result of tripping on the carpet.       CONCLUSIONS     1 - No wall motion abnormalities.     2 - Hyperdynamic left ventricular systolic function (EF 65-70%).     3 - Normal left ventricular diastolic function.     4 - Normal right ventricular systolic function .     5 - The estimated PA systolic pressure is 24 mmHg.     6 - Suggestion for improve diastolic function from Echo in 3/2012.     7 - Difficult apical windows.             This document has been electronically    SIGNED BY: Chase Alexandre MD On: 05/25/2017 12:24    Review of Systems   Constitution: Negative for decreased appetite, fever, weakness, malaise/fatigue, weight gain and weight loss.   HENT: Negative for headaches, hearing loss and nosebleeds.    Eyes: Negative for visual disturbance.   Cardiovascular: Positive for dyspnea on exertion and leg swelling. Negative for chest pain, claudication, cyanosis, irregular heartbeat, near-syncope, orthopnea, palpitations, paroxysmal nocturnal dyspnea and syncope.   Respiratory: Positive for shortness of breath. Negative for cough, hemoptysis, sleep disturbances due to breathing, snoring and wheezing.    Endocrine: Negative for cold intolerance, heat intolerance, polydipsia and polyuria.   Hematologic/Lymphatic: Negative for adenopathy and bleeding problem. Does not bruise/bleed easily.   Skin: Positive for color change. Negative for itching, poor wound healing, rash and suspicious lesions.   Musculoskeletal: Positive for joint pain and joint swelling. Negative for arthritis, back pain, falls, muscle cramps,  "muscle weakness and myalgias.   Gastrointestinal: Positive for bloating. Negative for abdominal pain, change in bowel habit, constipation, flatus, heartburn, hematemesis, hematochezia, hemorrhoids, jaundice, melena, nausea and vomiting.   Genitourinary: Negative for bladder incontinence, decreased libido, frequency, hematuria, hesitancy and urgency.   Neurological: Negative for brief paralysis, difficulty with concentration, excessive daytime sleepiness, dizziness, focal weakness, light-headedness, loss of balance, numbness and vertigo.   Psychiatric/Behavioral: Negative for altered mental status, depression and memory loss. The patient does not have insomnia and is not nervous/anxious.    Allergic/Immunologic: Negative for environmental allergies, hives and persistent infections.        Objective:    Physical Exam   Constitutional: He is oriented to person, place, and time. He appears well-developed and well-nourished. No distress.   /83   Pulse 61   Ht 5' 9" (1.753 m)   Wt 90.6 kg (199 lb 11.8 oz)   BMI 29.50 kg/m²      HENT:   Head: Normocephalic and atraumatic.   Eyes: Conjunctivae and lids are normal. Pupils are equal, round, and reactive to light. Right eye exhibits no discharge. No scleral icterus.   Neck: Normal range of motion. Neck supple. No JVD present. No tracheal deviation present. No thyromegaly present.   Cardiovascular: Normal rate, regular rhythm, S1 normal, S2 normal and intact distal pulses.  Exam reveals no gallop and no friction rub.    Murmur heard.   Medium-pitched early systolic murmur is present with a grade of 1/6   Pulses:       Carotid pulses are 2+ on the right side, and 2+ on the left side.       Radial pulses are 2+ on the right side, and 2+ on the left side.        Femoral pulses are 2+ on the right side, and 2+ on the left side.       Popliteal pulses are 2+ on the right side, and 2+ on the left side.        Dorsalis pedis pulses are 2+ on the right side, and 2+ on the " left side.        Posterior tibial pulses are 1+ on the right side, and 1+ on the left side.   Pulmonary/Chest: Effort normal and breath sounds normal. No respiratory distress. He has no wheezes. He has no rales. He exhibits no tenderness.   Abdominal: Soft. Bowel sounds are normal. He exhibits no distension and no mass. There is no hepatosplenomegaly or hepatomegaly. There is no tenderness. There is no rebound and no guarding.   Ventral hernia   Musculoskeletal: Normal range of motion. He exhibits no edema or tenderness.   Lymphadenopathy:     He has no cervical adenopathy.   Neurological: He is alert and oriented to person, place, and time. He has normal reflexes. No cranial nerve deficit. Coordination normal.   Skin: Skin is warm and dry. No rash noted. He is not diaphoretic. No erythema. No pallor.   Psychiatric: He has a normal mood and affect. His speech is normal and behavior is normal. Judgment and thought content normal. Cognition and memory are normal.         Assessment:       1. 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

## 2017-05-30 ENCOUNTER — CLINICAL SUPPORT (OUTPATIENT)
Dept: REHABILITATION | Facility: HOSPITAL | Age: 72
End: 2017-05-30
Attending: NURSE PRACTITIONER
Payer: MEDICARE
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
## 2017-05-30 PROCEDURE — 97110 THERAPEUTIC EXERCISES: CPT | Mod: PN

## 2017-05-30 PROCEDURE — 97010 HOT OR COLD PACKS THERAPY: CPT | Mod: PN

## 2017-05-30 PROCEDURE — 97140 MANUAL THERAPY 1/> REGIONS: CPT | Mod: PN

## 2017-05-30 NOTE — PROGRESS NOTES
"Name: Steve June .  Clinic Number: [id number]  Date of Treatment: 05/30/2017   Diagnosis:   Encounter Diagnosis   Name Primary?    Fall, subsequent encounter        Time in: 1010  Time Out: 1100  Total Treatment Time: 50      Subjective:    Steve reports increased pain and "this weather has my arthritis all flared up".  Patient reports their pain to be 7/10 on a 0-10 scale with 0 being no pain and 10 being the worst pain imaginable.    Objective  Steve received therapeutic exercises to develop strength, endurance, ROM, flexibility, posture and core stabilization for 27 minutes including:   Overhead pulley seated B shoulder flexion 5'   Seated c/s AROM 10 reps: rot, SB, flex, extn (very small, painful, range of motion)   Seated retro shoulder rolls B 10/3   Seated scap retraction B 10/3     MHP x 10' to cspine and B UT in supine prior to manual therapy, 13' of STM in form of MFR and STM to B upper trap, levator scap, and paraspinals to decrease muscle tension and TrP's.    Written Home Exercises Provided: Cont with HEP  Pt demo good understanding of the education provided. Steve demonstrated good return demonstration of activities.     Assessment:   Patient with decreased pain and increased cervical ROM after heat and manual therapy.  Patient compensating for lack of ROM in cspine with trunk rotation.    Pt will continue to benefit from skilled PT intervention. Medical Necessity is demonstrated by:  Fall Risk, Continued inability to participate in vocational pursuits, Pain limits function of effected part for some activities, Unable to participate fully in daily activities, Requires skilled supervision to complete and progress HEP and Weakness.    Patient is making good progress towards established goals.    Plan:  Continue with established Plan of Care towards PT goals.   "

## 2017-06-02 ENCOUNTER — CLINICAL SUPPORT (OUTPATIENT)
Dept: REHABILITATION | Facility: HOSPITAL | Age: 72
End: 2017-06-02
Attending: NURSE PRACTITIONER
Payer: MEDICARE
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
## 2017-06-02 PROCEDURE — 97110 THERAPEUTIC EXERCISES: CPT | Mod: PN

## 2017-06-02 PROCEDURE — 97140 MANUAL THERAPY 1/> REGIONS: CPT | Mod: PN

## 2017-06-02 PROCEDURE — 97010 HOT OR COLD PACKS THERAPY: CPT | Mod: PN

## 2017-06-02 RX ORDER — METFORMIN HYDROCHLORIDE 1000 MG/1
TABLET ORAL
Qty: 180 TABLET | Refills: 1 | Status: SHIPPED | OUTPATIENT
Start: 2017-06-02 | End: 2017-08-23 | Stop reason: SDUPTHER

## 2017-06-02 NOTE — PROGRESS NOTES
"Name: Steve June .  Clinic Number: [id number]  Date of Treatment: 06/02/2017   Diagnosis:   Encounter Diagnosis   Name Primary?    Fall, subsequent encounter        Time in: 0956  Time Out: 1051  Total Treatment Time: 55      Subjective:    Steve reports "I was so sore after you worked on my neck last time, but after the soreness wore off I felt much better."  Patient reports their pain to be 5/10 on a 0-10 scale with 0 being no pain and 10 being the worst pain imaginable.    Objective  Steve received therapeutic exercises to develop strength, endurance, ROM, flexibility and posture for 35 minutes including:   NuStep 10' L5 with UE and LE  Seated c/s AROM 2x10 reps: rot, SB, flex, extn (very small, painful, range of motion)  Seated retro shoulder rolls B 3/10  Seated scap retraction B 3/10  Hamstring stretch 3/30s R/L  LAQ 3/10 R/L  Marching 3/10 R/L  Ball squeeze 3/10  Sitting CLAM GTB 3/10        MHP x 10' to cspine and B UT in sitting prior to manual therapy, 10' of STM in form of MFR and STM to B upper trap, levator scap, and paraspinals to decrease muscle tension and TrP's.    Written Home Exercises Provided: Cont with HEP  Pt demo good understanding of the education provided. Steve demonstrated good return demonstration of activities.     Assessment:   Good tolerance of therex, minimal increase in ease of cervical ROM therex noted.  Decreased pain/stiffness after manual therapy.    Pt will continue to benefit from skilled PT intervention. Medical Necessity is demonstrated by:  Fall Risk, Continued inability to participate in vocational pursuits, Pain limits function of effected part for some activities, Unable to participate fully in daily activities, Requires skilled supervision to complete and progress HEP and Weakness.    Patient is making good progress towards established goals.    Plan:  Continue with established Plan of Care towards PT goals.   "

## 2017-06-05 ENCOUNTER — CLINICAL SUPPORT (OUTPATIENT)
Dept: REHABILITATION | Facility: HOSPITAL | Age: 72
End: 2017-06-05
Attending: NURSE PRACTITIONER
Payer: MEDICARE
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
## 2017-06-05 PROCEDURE — 97010 HOT OR COLD PACKS THERAPY: CPT | Mod: PN

## 2017-06-05 PROCEDURE — 97110 THERAPEUTIC EXERCISES: CPT | Mod: PN

## 2017-06-05 PROCEDURE — 97140 MANUAL THERAPY 1/> REGIONS: CPT | Mod: PN

## 2017-06-05 NOTE — PROGRESS NOTES
"Name: Steve June Jr.  Clinic Number: [id number]  Date of Treatment: 06/05/2017   Diagnosis:   Encounter Diagnosis   Name Primary?    Fall, subsequent encounter        Time in: 1005  Time Out: 1105  Total Treatment Time: 60      Subjective:    Steve reports worsening of symptoms. States he played with his grand kids this weekend and now is in worse pain. "I could not sleep well last night and only able to sleep on my back due to pain." Patient reports their pain to be 7-8/10 on a 0-10 scale with 0 being no pain and 10 being the worst pain imaginable. After STM, patient stated he felt so much better and had no pain with shoulder movement on the left side. His over all pain is 3/10 after therapy today.    Objective      Steve received therapeutic exercises to develop strength, endurance, ROM, flexibility and posture for 50 minutes including:     NuStep 10' L5 with UE and LE   Seated cervical AROM 2x10 reps: rot, SB, flex, extension. MHP placed on patient during this exercise x 10 min.   Seated retro shoulder rolls B 3/10   Seated scap retraction B 3/10   Hamstring stretch 3/30s R/L   LAQ 3/10 R/L   Marching 3/10 R/L   Ball squeeze 3/10   Sitting CLAM GTB 3/10      Steve received the following manual therapy techniques: Soft tissue Mobilization were applied to the: Left upper traps and cervical spine for 10 minutes to reduce muscle spasms and increase ROM in pain free range.     Written Home Exercises Provided: continue with current  Pt demo fair understanding of the education provided. Steve demonstrated fair return demonstration of activities.     Assessment:       Pt will continue to benefit from skilled PT intervention. Medical Necessity is demonstrated by:  Pain limits function of effected part for some activities, Unable to participate fully in daily activities, Requires skilled supervision to complete and progress HEP and Weakness.    Patient is making fair progress towards established " goals.      Plan:  Continue with established Plan of Care towards PT goals.

## 2017-06-06 ENCOUNTER — OFFICE VISIT (OUTPATIENT)
Dept: HEPATOLOGY | Facility: CLINIC | Age: 72
End: 2017-06-06
Payer: MEDICARE

## 2017-06-06 ENCOUNTER — HOSPITAL ENCOUNTER (OUTPATIENT)
Dept: RADIOLOGY | Facility: HOSPITAL | Age: 72
Discharge: HOME | End: 2017-06-06
Attending: NURSE PRACTITIONER
Payer: MEDICARE

## 2017-06-06 ENCOUNTER — TELEPHONE (OUTPATIENT)
Dept: HEPATOLOGY | Facility: CLINIC | Age: 72
End: 2017-06-06

## 2017-06-06 VITALS
SYSTOLIC BLOOD PRESSURE: 155 MMHG | RESPIRATION RATE: 18 BRPM | TEMPERATURE: 97 F | OXYGEN SATURATION: 97 % | DIASTOLIC BLOOD PRESSURE: 78 MMHG | HEIGHT: 69 IN | BODY MASS INDEX: 29.29 KG/M2 | HEART RATE: 61 BPM | WEIGHT: 197.75 LBS

## 2017-06-06 DIAGNOSIS — K74.60 UNSPECIFIED CIRRHOSIS OF LIVER: ICD-10-CM

## 2017-06-06 DIAGNOSIS — K74.60 CIRRHOSIS OF LIVER WITHOUT ASCITES, UNSPECIFIED HEPATIC CIRRHOSIS TYPE: Primary | ICD-10-CM

## 2017-06-06 DIAGNOSIS — Z86.19 HISTORY OF HEPATITIS C: ICD-10-CM

## 2017-06-06 DIAGNOSIS — D69.6 THROMBOCYTOPENIA: ICD-10-CM

## 2017-06-06 DIAGNOSIS — R49.0 DYSPHONIA: ICD-10-CM

## 2017-06-06 DIAGNOSIS — I85.10 SECONDARY ESOPHAGEAL VARICES WITHOUT BLEEDING: ICD-10-CM

## 2017-06-06 DIAGNOSIS — K42.9 UMBILICAL HERNIA WITHOUT OBSTRUCTION AND WITHOUT GANGRENE: ICD-10-CM

## 2017-06-06 DIAGNOSIS — K76.6 PORTAL HYPERTENSION: ICD-10-CM

## 2017-06-06 DIAGNOSIS — R16.1 SPLENOMEGALY: ICD-10-CM

## 2017-06-06 PROCEDURE — 1126F AMNT PAIN NOTED NONE PRSNT: CPT | Mod: S$GLB,,, | Performed by: NURSE PRACTITIONER

## 2017-06-06 PROCEDURE — 99213 OFFICE O/P EST LOW 20 MIN: CPT | Mod: S$GLB,,, | Performed by: NURSE PRACTITIONER

## 2017-06-06 PROCEDURE — 99999 PR PBB SHADOW E&M-EST. PATIENT-LVL IV: CPT | Mod: PBBFAC,,, | Performed by: NURSE PRACTITIONER

## 2017-06-06 PROCEDURE — 76700 US EXAM ABDOM COMPLETE: CPT | Mod: 26,,, | Performed by: RADIOLOGY

## 2017-06-06 PROCEDURE — 99499 UNLISTED E&M SERVICE: CPT | Mod: S$GLB,,, | Performed by: NURSE PRACTITIONER

## 2017-06-06 PROCEDURE — 1159F MED LIST DOCD IN RCRD: CPT | Mod: S$GLB,,, | Performed by: NURSE PRACTITIONER

## 2017-06-06 NOTE — TELEPHONE ENCOUNTER
Received a call from Anne Parham with a Critical Lab Value. Platelets 25,000. Read Back and Verified. Joanne in clinic and notified. DB

## 2017-06-06 NOTE — PROGRESS NOTES
[facility] Established Patient Visit    Reason for Visit:  F/u cirrhosis    PCP: Alie Womack    HPI:  This is a 72 y.o. male pt of Dr. Guzman, here for f/u of well-compensated cirrhosis due to h/o hepatitis C, s/p successful treatment with Harvoni with SVR.  His cirrhosis has been complicated by significant portal HTN with h/o bleeding esophageal varices, splenomegaly, and thrombocytopenia. He was banded several times in the past, last 8/2014. He has refused repeat EGD since then because he thinks the last EGD caused his hoarseness.  He denies any hematemesis or melena. H/H has been stable. He is on Coreg 6.25 mg but takes this only daily. He is considering having repeat EGD. He continues to have hoarseness and had evaluation with ENT in 11/2015 with Dr. Gene Estes here and had scope done in office. He thinks they saw a growth on vocal cords and he thinks he may need to get this checked. Reviewed Dr. Estes's note, plan was to finish antibiotics and repeat scope to see if any improvement. If not, may need to consider evaluation in the OR via microlaryngoscopy.    He continues to have an umbilical hernia that has not worsened. Tx surgery recommended no surgical intervention. He wears an abd binder.    He had labs and U/s today. U/s showed no masses or ascites. Labs today are pending.   He has no complaints today. Denies jaundice, dark urine, hematemesis, melena, slowed mentation, abdominal distention.        ROS:   GENERAL: Denies fever, chills, weight loss/gain, fatigue  HEENT: Denies headaches, dizziness, vision/hearing changes, (+) hoarseness  CARDIOVASCULAR: Denies chest pain, palpitations, edema  RESPIRATORY: Denies dyspnea, cough  GI: Denies abdominal pain, rectal bleeding, nausea, vomiting. No change in bowel pattern or color  : Denies dysuria, hematuria   SKIN: Denies rash, itching   NEURO: Denies confusion, memory loss, or mood changes  PSYCH: Denies depression or  "anxiety  HEME/LYMPH: (+) easy bruising and bleeding      PMHX:  has a past medical history of Abnormal thyroid function test; Allergy; Anemia; Arthritis; Gaviria esophagus; Basal cell carcinoma; Basal cell carcinoma (12/2011); Cancer; Cataract; Cirrhosis; Encounter for blood transfusion; Esophageal varices in cirrhosis; Gastritis; GERD (gastroesophageal reflux disease) (2/28/2015); Hard of hearing; Hiatal hernia; History of hepatitis C (8/10/2012); Hoarseness (2/28/2015); Hypercholesteremia; Hypersplenism; Hypertension; Pain management (12/10/2014); Portal hypertensive gastropathy; Thrombocytopenia; Type II or unspecified type diabetes mellitus with neurological manifestations, 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    PSHX:  has a past surgical history that includes Cholecystectomy; Knee surgery (12/2006); Skin lesion excision; Tonsillectomy; Knee arthroscopy w/ meniscal repair; Knee cartilage surgery; Eye surgery; Cataract extraction (1/10/13); Cataract extraction; Colonoscopy; and Upper gastrointestinal endoscopy.    The patient's social and family histories were reviewed by me and updated in the appropriate section of the electronic medical record.    Review of patient's allergies indicates:   Allergen Reactions    Adhesive tape-silicones Other (See Comments)     pulls skin off    Doxycycline      Dizzy. "Just didn't feel right".    Oxycontin [oxycodone] Other (See Comments)       Medications reviewed in Epic    Objective Findings:    PHYSICAL EXAM:   Friendly White male, in no acute distress; alert and oriented to person, place and time  VITALS:   BP (!) 155/78 (BP Location: Left arm, Patient Position: Sitting)   Pulse 61   Temp 96.9 °F (36.1 °C) (Oral)   Resp 18   Ht 5' 9" (1.753 m)   Wt 89.7 kg (197 lb 12 oz)   SpO2 97%   BMI 29.20 kg/m²   HENT: Normocephalic, without obvious abnormality. Oral mucosa pink and moist.   EYES: Sclerae anicteric. No conjunctival pallor.   NECK: Supple. " No masses or cervical adenopathy.  CARDIOVASCULAR: Regular rate and rhythm. (+) murmur.  RESPIRATORY: Normal respiratory effort. BBS CTA. No wheezes or crackles.  GI: Soft, non-tender, non-distended. (+) splenomegaly. No masses palpable. No ascites. Large easily reducible umbilical hernia. Abd binder intact.  EXTREMITIES:  No clubbing, cyanosis or edema. (+) ecchymosis to BUE.  SKIN: Warm and dry. No jaundice. No rashes noted to exposed skin. No telangectasias noted. No palmar erythema.  NEURO:  Normal gate. No asterixis.   PSYCH:  Memory intact. Thought and speech pattern appropriate. Behavior normal. No depression or anxiety noted.      Labs:  Lab Results   Component Value Date    WBC 2.74 (L) 01/18/2017    HGB 11.1 (L) 01/18/2017    HCT 32.6 (L) 01/18/2017    PLT 35 (LL) 01/18/2017    CHOL 158 12/17/2015    TRIG 135 12/17/2015    HDL 32 (L) 12/17/2015     01/18/2017    K 5.4 (H) 01/18/2017    CREATININE 1.1 01/18/2017    ALT 14 01/18/2017    AST 20 01/18/2017    ALKPHOS 62 01/18/2017    BILITOT 1.4 (H) 01/18/2017    ALBUMIN 3.9 01/18/2017    INR 1.2 12/15/2016    AFP 2.7 12/15/2016     ABD U/s 6/6/17  Findings:    Pancreas:  Normal.     Liver:  Normal in size.  The liver demonstrates a nodular contour.  No focal hepatic lesions are seen.    Biliary system:  The gallbladder is surgically removed.  The common duct is not dilated, measuring 6 mm.  No dilated intrahepatic radicles are seen.  No fluid in the gallbladder fossa.    Spleen:  Enlarged in size measuring 20.5 x 7.6 cm with a homogeneous echotexture.    Aorta:  No aneurysm.      Inferior vena cava:  Normal in appearance.    Right kidney:  Normal in size with no hydronephrosis.      Left kidney:  Normal in size with no hydronephrosis.      Miscellaneous:  No ascites.   Impression         No significant change compared to prior exam.    Cirrhotic appearance of the liver similar to prior exam.    Cholecystectomy.    Splenomegaly.         ASSESSMENT:  72  y.o. White male with:  1.  Cirrhosis, well-compensated  -- MELD = MELD-Na score: 10 at 12/15/2016 12:25 PM  MELD score: 10 at 12/15/2016 12:25 PM  Calculated from:  Serum Creatinine: 0.9 mg/dL (Rounded to 1) at 12/15/2016 12:25 PM  Serum Sodium: 137 mmol/L at 12/15/2016 12:25 PM  Total Bilirubin: 1.6 mg/dL at 12/15/2016 12:25 PM  INR(ratio): 1.2 at 12/15/2016 12:25 PM  Age: 71 years  -- HCC screening- AFP and abd. U/S - up to date, next due 12/2017  -- Immunity to Hep A and B - unknown, can check with next labs  -- EGD - see below  2. History of hepatitis C, s/p successful treatment with Harvoni with SVR    3. Portal hypertension  -- EGD - H/o GIB s/p multiple EGD's with banding. Pt now refuses more EGD's d/t his concern that these caused his hoarseness. On coreg 6.25 mg daily.   -- Ascites - none  -- Splenomegaly and thrombocytopenia  4. Umbilical hernia  -- evaluated by transplant surgery and no intervention advised. Continue to wear abd binder  5. Hoarseness  -- seen by ENT in 11/2015 and had scope done in office. Never returned for f/u.      EDUCATION:   Signs and symptoms of hepatic decompensation were reviewed, including jaundice, ascites, and slowed mentation due to hepatic encephalopathy. The patient should seek medical attention if any of these things occur.  We discussed the potential for bleeding from esophageal varices with symptoms of hematemesis and melena. The patient should report to the Emergency Department for these symptoms.    We discussed the increased risk of hepatocellular carcinoma due to cirrhosis. Continued screening every six months with ultrasound and AFP is recommended.     No alcohol or raw seafood.  Tylenol/acetaminophen as needed for pain, up to 2000 mg daily    Discussed hep C is cured. Still cannot donate blood. Hep C Ab will likely remain (+) for life. Can get re-infected if he were to get re-exposed as treatment conveys no immunity. Discussed curing his hep C will help to  stabilize his liver functioning and decrease the chance the he will develop any decompensation in the future.       PLAN:  1. HCC screening Q 6 months with AFP and abd. U/S, next due 12/2017  2. Pt is considering repeat EGD for varices surveillance. He will d/w his wife and let me know if he agrees to proceed with this. He is not sure when last colonoscopy was and I discussed repeating both of these together but he really would like to defer any elective procedures. He would need platelet transfusion AM of procedures.  3. Discussed s/sx of GIB that he would need to report to ER for. Anemia is also stable. If this were to worsen, will repeat EGD  4. Discussed f/u appt with ENT but pt will again d/w wife and let me know if he wants us to coordinate f/u appt  5. Will check immunity markers for HBV/HAV and arrange for vaccination if needed, labs added to appt for 7/2017  6. F/u in 12/2017 with U/s and labs same day    Thank you for allowing me to participate in the care of Steve Cunha, BRITANY-C

## 2017-06-08 ENCOUNTER — CLINICAL SUPPORT (OUTPATIENT)
Dept: REHABILITATION | Facility: HOSPITAL | Age: 72
End: 2017-06-08
Attending: NURSE PRACTITIONER
Payer: MEDICARE
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
## 2017-06-08 PROCEDURE — 97010 HOT OR COLD PACKS THERAPY: CPT | Mod: PN

## 2017-06-08 PROCEDURE — 97110 THERAPEUTIC EXERCISES: CPT | Mod: PN

## 2017-06-08 NOTE — PROGRESS NOTES
"Name: Steve June Jr.  Clinic Number: [id number]  Date of Treatment: 06/08/2017   Diagnosis:   Encounter Diagnosis   Name Primary?    Fall, initial encounter        Time in: 1002  Time Out: 1107  Total Treatment Time: 65  Group Time: 0      Subjective:    Steve reports had increased LE soreness after last session and does not want to do much today.  Patient reports their pain to be 6-7/10 on a 0-10 scale with 0 being no pain and 10 being the worst pain imaginable.    Objective    Steve received therapeutic exercises to develop strength, endurance and flexibility for 55 minutes including:     Bike Lv3 5'  Hamstring stretch 3x30"  Calf stretch 3x30" B 1/2 roll  Retro Shoulder rolls 3x10  Scapular squeeze 3x10  Cervical ROM 2x10: chin tucks, rotation, flex, ext, side bend  UT stretch 3x30" B    Cold pack to neck 10' to decrease soreness    Pt demo fair understanding of the education provided. Steve demonstrated good return demonstration of activities with cues for proper form.     Assessment:     Pt will continue to benefit from skilled PT intervention. Medical Necessity is demonstrated by:  Pain limits function of effected part for some activities, Unable to participate fully in daily activities, Requires skilled supervision to complete and progress HEP and Weakness.    Patient is making fair progress towards established goals.    Plan:    Continue with established Plan of Care towards PT goals.   "

## 2017-06-13 ENCOUNTER — CLINICAL SUPPORT (OUTPATIENT)
Dept: REHABILITATION | Facility: HOSPITAL | Age: 72
End: 2017-06-13
Attending: NURSE PRACTITIONER
Payer: MEDICARE

## 2017-06-13 ENCOUNTER — TELEPHONE (OUTPATIENT)
Dept: FAMILY MEDICINE | Facility: CLINIC | Age: 72
End: 2017-06-13
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
## 2017-06-13 PROCEDURE — G8978 MOBILITY CURRENT STATUS: HCPCS | Mod: CJ,PN

## 2017-06-13 PROCEDURE — 97140 MANUAL THERAPY 1/> REGIONS: CPT | Mod: PN

## 2017-06-13 PROCEDURE — G8979 MOBILITY GOAL STATUS: HCPCS | Mod: CI,PN

## 2017-06-13 PROCEDURE — 97110 THERAPEUTIC EXERCISES: CPT | Mod: PN

## 2017-06-13 RX ORDER — HYDROCODONE BITARTRATE AND ACETAMINOPHEN 10; 325 MG/1; MG/1
1 TABLET ORAL 4 TIMES DAILY
Qty: 120 TABLET | Refills: 0 | Status: SHIPPED | OUTPATIENT
Start: 2017-06-14 | End: 2017-07-10 | Stop reason: SDUPTHER

## 2017-06-13 NOTE — PROGRESS NOTES
Name: Steve June Jr.  Clinic Number: [id number]  Date of Treatment: 06/13/2017   Diagnosis: No diagnosis found.    Time in: 1003  Time Out: 1110  Total Treatment Time: 51  Group Time: 0      Subjective:    Steve reports improvement of symptoms.  Patient reports their pain to be 6/10 on a 0-10 scale with 0 being no pain and 10 being the worst pain imaginable.    Objective    Patient received individual therapy to increase strength, ROM, flexibility and core stabilization with 0 patients with activities as follows:     Steve received therapeutic exercises to develop strength, ROM, flexibility and core stabilization for 39 minutes including:    Nu-step L2 x 10   Overhead pulley x 4' scaption   Chin tucks 2x10   Cervical SB 2x10   Cervical rotation 2x10   Cervical flex/ext 2x10   Seated hamstring stretch 3x30s   Standing calf stretch 3x30s   Upper traps stretch 3x30s   Levator scap stretch 3x30s   Supine ball squeeze x 17 (pt discontinued reporting add cramping)   Hooklying clamshells with Gtb 2x10    Recheck completed for progress update.      Steve received the following manual therapy techniques: Soft tissue Mobilization were applied to the: upper traps/levator scap (B)  for 12 minutes.     Written Home Exercises Provided: Reviewed  Pt demo good understanding of the education provided. Steve demonstrated fair return demonstration of activities. Requires frequent verbal cues for correct technique.      Assessment:   Improved cervical and UE ROM, improved posture.  Continues with excessive tenderness and muscle tension throughout upper traps/levator scap.      Pt will continue to benefit from skilled PT intervention. Medical Necessity is demonstrated by:  Fall Risk, Unable to participate fully in daily activities and Requires skilled supervision to complete and progress HEP.    Patient is making good progress towards established goals.    New/Revised goals: See updated POC      Plan:  Continue with established  Plan of Care towards PT goals.

## 2017-06-13 NOTE — TELEPHONE ENCOUNTER
----- Message from Anne-Marie Moulton sent at 6/13/2017  9:31 AM CDT -----  Contact: self  Patient called regarding refill of medication. The patient would like to have refilled today. Please contact [phone] (home)     hydrocodone-acetaminophen 10-325mg (NORCO)  mg [facility] [address] [place] [address] & [address] [address]  Phone: [phone] Fax: [phone]

## 2017-06-19 NOTE — PLAN OF CARE
TIME RECORD    Date: 06/13/2017    Start Time:  1005  Stop Time:  1110    PROCEDURES:    TIMED  Procedure Time Min.     Therex Start:  1005  Stop:  1040      MT Start: 1042  Stop: 1054     Start:  Stop:     Start:  Stop:          UNTIMED  Procedure Time Min.     Recheck Start: 1056  Stop: 1110     Start:  Stop:      Total Timed Minutes:  ***  Total Timed Units:  ***  Total Untimed Units:  ***  Charges Billed/# of units:  ***    PHYSICAL THERAPY UPDATED PLAN OF TREATMENT    Patient name: Steve June Jr.  Onset Date:  ***  SOC Date:  ***  Primary Diagnosis:  No diagnosis found.  Treatment Diagnosis:  ***  Certification Period:  *** to ***  Precautions:  ***  Visits from SOC:  ***  Functional Level Prior to SOC:  ***    Updated Assessment:  ***    Previous Short Term Goals Status:   ***  New Short Term Goals Status:   ***  Long Term Goal Status:   {DESC LONG TERM GOAL:63395}  Reasons for Recertification of Therapy:   ***    Certification Period: *** to ***  Recommended Treatment Plan: *** times per week for *** weeks: {TX PLAN:22125}  Other Recommendations: ***        Therapist's Name: ***   Date: 06/19/2017    I CERTIFY THE NEED FOR THESE SERVICES FURNISHED UNDER THIS PLAN OF TREATMENT AND WHILE UNDER MY CARE    Physician's comments: ________________________________________________________________________________________________________________________________________________      Physician's Name: ___________________________________

## 2017-06-20 NOTE — PLAN OF CARE
TIME RECORD    Date: 6/13/2017  Start Time:  1003  Stop Time:  1110    PROCEDURES:    TIMED  Procedure Time Min.     Therex Start:  1005  Stop:  1044  39     Manual therapy Start:  1046  Stop:  1058  12    Start:  Stop:     Start:  Stop:          UNTIMED  Procedure Time Min.     Recheck Start  1100:  Stop:  1110   10    Start:  Stop:      Total Timed Minutes:  51  Total Timed Units:  3  Total Untimed Units:  0  Charges Billed/# of units:  3    PHYSICAL THERAPY UPDATED PLAN OF TREATMENT    Patient name: Steve June Jr.  Onset Date:  5/14/2017  SOC Date:  5/19/2017  Primary Diagnosis:    1. Fall, subsequent encounter       Treatment Diagnosis:  Impaired mobility following fall  Precautions: [place], cardiac, DM, hx CA   Visits from SOC:  8  Functional Level Prior to SOC:  decreased tolerance to activity, impaired I/ADLs    Updated Assessment:  Pain current 6/10, worst 7/10, least 0.10,    Posture:  Sitting decreased lumbar lordosis, increased thoracic kyphosis, mod rounded shoulder posture.    ROM   Cervical A/PROM: Pain/Dysfunction with Movement:   Flexion                        48*/60*    Extension                    58*/65*    Right side bending      25*/32*    Left side bending        28*/35*     Right rotation              45*/50*    Left rotation                 40*/45*    B UE  Grossly WFL, 4/5.  Palpation:  Tenderness and trigger points throughout L upper traps and levator scapula.  Flexibility:  Hamstrings tight on R, increased tightness on L; Gastrocs moderate tightness B.    Moderate tightness B upper traps/levator scap L more than R  NDI  18/50 = 36% disability gcode current Mobility CJ, Goal mobility CI (updated)  LEFS 32/80  = 0.4 = 60% disability    Previous Short Term Goals Status:   1) Pt will initiate HEP  MET  2) Patient demonstrate C/S rotation to 45* L/R or >  Nearly met  New Short Term Goals Status:   1) Decrease max pain to < 4/10, 2) Facilitate improved LE flexibility, 3) Decrease  tenderness/excess muscle tension to minimal   Long Term Goal Status:   modified:   1) Pt will be I with HEP 2) Pt will return to community ambulation with strength 4/5 or > LLE 3) Pt will demonstrate full and symmetric UE ROM with strength 4/5 or > 4) Pt will demonstrate sit<>stand c/ no UE usage 5) NDI <25% impairment 6) LEFS <40% impairment    Reasons for Recertification of Therapy:   Pt is making steady progress in PT with improved UE ROM, improved flexibility and decreased pain/tenderness.  However, he continues to demonstrate limited U/LE flexibility, impaired posture, increased tenderness/muscle tension, limited cervical ROM.  He should continue to benefit from skilled PT services to address these limitations.      Certification Period: 06/19/2017 to 07/19/2017  Recommended Treatment Plan: 2 times per week for 4 weeks: Cervical/Lumbar Traction, Group Therapy, Manual Therapy, Moist Heat/ Ice, Patient Education, Therapeutic Activites, Therapeutic Exercise and Other dry needling prn  Other Recommendations: N/A         Therapist's Name: Ny Rodriguez, PT   Date: 06/13/2017    I CERTIFY THE NEED FOR THESE SERVICES FURNISHED UNDER THIS PLAN OF TREATMENT AND WHILE UNDER MY CARE    Physician's comments: ________________________________________________________________________________________________________________________________________________      Physician's Name: ___________________________________

## 2017-06-22 ENCOUNTER — TELEPHONE (OUTPATIENT)
Dept: FAMILY MEDICINE | Facility: CLINIC | Age: 72
End: 2017-06-22

## 2017-06-22 ENCOUNTER — CLINICAL SUPPORT (OUTPATIENT)
Dept: REHABILITATION | Facility: HOSPITAL | Age: 72
End: 2017-06-22
Attending: NURSE PRACTITIONER
Payer: MEDICARE
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
## 2017-06-22 PROCEDURE — 97110 THERAPEUTIC EXERCISES: CPT | Mod: PN

## 2017-06-22 NOTE — TELEPHONE ENCOUNTER
----- Message from Irma Walton sent at 6/22/2017  9:37 AM CDT -----  Contact: pt  Pt states he would like to speak to the nurse or someone in the office regarding  Physical therapy had ended and the feel pt has suffered a concussion, alsoblood sugar issue...[phone] (home)

## 2017-06-22 NOTE — TELEPHONE ENCOUNTER
Pt is still having the same pains from fall that he had in may with no improvement. And no improvement with PT. Pt states that PT told him he probably has a concussion.   Pt also states that his BS has been running in the 400's in the morning before his medication and before he eats.  Please advise

## 2017-06-22 NOTE — TELEPHONE ENCOUNTER
Needs appt.  Keep BS log and drop off for Rain.   Cut out all sweets and snacks. Very limited rice, bread, pototoes, pasta. No soft drinks.

## 2017-06-22 NOTE — PROGRESS NOTES
"Name: Steve June .  Clinic Number: [id number]  Date of Treatment: 06/22/2017   Diagnosis:   Encounter Diagnosis   Name Primary?    Fall, subsequent encounter        Time in: 0713  Time Out: 0800  Total Treatment Time: 47        Subjective:    Steve reports "I don't know if this is helping at all, my whole left side is still hurting".  Patient reports their pain to be 7/10 on a 0-10 scale with 0 being no pain and 10 being the worst pain imaginable.    Objective  Steve received therapeutic exercises to develop strength, endurance, ROM, flexibility, posture and core stabilization for 47 minutes including:             Overhead pulley x 4' scaption             Chin tucks 2x10             Cervical SB 2x10             Cervical rotation 2x10             Cervical flex/ext 2x10             Seated hamstring stretch 3x30s             Standing calf stretch 3x30s             Upper traps stretch 3x30s             Levator scap stretch 3x30s    Written Home Exercises Provided: Cont with HEP, even when not feeling well  Pt demo good understanding of the education provided. Steve demonstrated good return demonstration of activities.     Assessment:   Patient with slightly increased ROM which he is still using compensation techniques to achieve, however, he is still in amount of pain he started therapy with a month ago.    Pt will continue to benefit from skilled PT intervention. Medical Necessity is demonstrated by:  Fall Risk, Continued inability to participate in vocational pursuits, Pain limits function of effected part for some activities, Unable to participate fully in daily activities, Requires skilled supervision to complete and progress HEP and Weakness.    Patient is making fair progress towards established goals.    Plan:  Continue with established Plan of Care towards PT goals.   "

## 2017-06-26 ENCOUNTER — OFFICE VISIT (OUTPATIENT)
Dept: FAMILY MEDICINE | Facility: CLINIC | Age: 72
End: 2017-06-26
Payer: MEDICARE

## 2017-06-26 VITALS
TEMPERATURE: 98 F | OXYGEN SATURATION: 97 % | BODY MASS INDEX: 29.09 KG/M2 | HEIGHT: 69 IN | SYSTOLIC BLOOD PRESSURE: 130 MMHG | WEIGHT: 196.44 LBS | HEART RATE: 69 BPM | DIASTOLIC BLOOD PRESSURE: 80 MMHG

## 2017-06-26 DIAGNOSIS — E11.51 TYPE 2 DIABETES MELLITUS WITH DIABETIC PERIPHERAL ANGIOPATHY WITHOUT GANGRENE, WITHOUT LONG-TERM CURRENT USE OF INSULIN: Primary | ICD-10-CM

## 2017-06-26 DIAGNOSIS — S80.812D ABRASION OF LEFT LEG, SUBSEQUENT ENCOUNTER: ICD-10-CM

## 2017-06-26 DIAGNOSIS — M79.10 MYALGIA: ICD-10-CM

## 2017-06-26 PROCEDURE — 1159F MED LIST DOCD IN RCRD: CPT | Mod: S$GLB,,, | Performed by: NURSE PRACTITIONER

## 2017-06-26 PROCEDURE — 99214 OFFICE O/P EST MOD 30 MIN: CPT | Mod: S$GLB,,, | Performed by: NURSE PRACTITIONER

## 2017-06-26 PROCEDURE — 3046F HEMOGLOBIN A1C LEVEL >9.0%: CPT | Mod: S$GLB,,, | Performed by: NURSE PRACTITIONER

## 2017-06-26 PROCEDURE — 1126F AMNT PAIN NOTED NONE PRSNT: CPT | Mod: S$GLB,,, | Performed by: NURSE PRACTITIONER

## 2017-06-26 PROCEDURE — 99499 UNLISTED E&M SERVICE: CPT | Mod: S$GLB,,, | Performed by: NURSE PRACTITIONER

## 2017-06-26 PROCEDURE — 99999 PR PBB SHADOW E&M-EST. PATIENT-LVL V: CPT | Mod: PBBFAC,,, | Performed by: NURSE PRACTITIONER

## 2017-06-26 RX ORDER — AMOXICILLIN AND CLAVULANATE POTASSIUM 875; 125 MG/1; MG/1
1 TABLET, FILM COATED ORAL 2 TIMES DAILY
Qty: 14 TABLET | Refills: 0 | Status: SHIPPED | OUTPATIENT
Start: 2017-06-26 | End: 2017-07-03

## 2017-06-26 RX ORDER — PEN NEEDLE, DIABETIC 30 GX3/16"
10 NEEDLE, DISPOSABLE MISCELLANEOUS DAILY
Qty: 30 EACH | Refills: 11 | Status: SHIPPED | OUTPATIENT
Start: 2017-06-26 | End: 2018-02-05 | Stop reason: SDUPTHER

## 2017-06-26 NOTE — PROGRESS NOTES
Subjective:       Patient ID: Steve June Jr. is a 72 y.o. male.    Chief Complaint: Follow-up    HPI     Patient presents to clinic for follow-up of a fall.   He experienced a fall approximately mid-[place] Head CT and xray's of left shoulder, knee, hip and cervical spine were all negative.   He has been working with PT with some improvement. Reports that the neck pain has persisted, notes decreased ROM.     Also, hx of DM - he is maintained on Metformin and is established with Rain Wright NP with endocrine. LOV, he was placed on Prandin daily. He discontinued the medication after one use, because he noted that his blood sugar increased after using the medication. He has been monitoring his bs BID, before breakfast and before dinner. Reports that FBS has been ranging > 300 and preprandial blood sugar has also been ranging > 300. He denies any episodes of hypoglycemia.      Review of Systems   Constitutional: Negative for chills and fever.   Respiratory: Positive for cough (occasional - with some white sputum production). Negative for shortness of breath.    Musculoskeletal: Positive for arthralgias, myalgias, neck pain and neck stiffness.   Skin: Positive for rash and wound.   Neurological: Positive for headaches. Negative for dizziness and light-headedness.       Objective:      Physical Exam   Constitutional: He is oriented to person, place, and time. He appears well-developed and well-nourished.   HENT:   Head: Normocephalic and atraumatic.   Cardiovascular: Normal rate and regular rhythm.    Murmur heard.  Pulmonary/Chest: Effort normal and breath sounds normal. No respiratory distress. He has no wheezes. He has no rales.   Musculoskeletal: He exhibits no edema or deformity.        Cervical back: He exhibits decreased range of motion, tenderness, pain and spasm.   Neurological: He is alert and oriented to person, place, and time. No cranial nerve deficit.   Skin: Skin is  "warm and dry.        Psychiatric: He has a normal mood and affect. His behavior is normal.   Nursing note and vitals reviewed.      Assessment:       1. Type 2 diabetes mellitus with diabetic peripheral angiopathy without gangrene, without long-term current use of insulin    2. Myalgia    3. Abrasion of left leg, subsequent encounter        Plan:   Steve was seen today for follow-up.    Diagnoses and all orders for this visit:    Type 2 diabetes mellitus with diabetic peripheral angiopathy without gangrene, without long-term current use of insulin  -     insulin detemir (LEVEMIR FLEXTOUCH) 100 unit/mL (3 mL) SubQ InPn pen; Inject 10 Units into the skin every evening. May increase by 2 units every 3 days for a fasting blood sugar < 130.  -     pen needle, diabetic (BD ULTRA-FINE KORINA PEN NEEDLES) 32 gauge x 5/32" Ndle; 10 Units by Misc.(Non-Drug; Combo Route) route once daily at 6am.  Initiate Levemir at 10 units and titrate up for FBS of 130.  Side effects and precautions of medication use reviewed with patient, expressed understanding. No questions or concerns.    Myalgia  -     Ambulatory consult to Physical Medicine Rehab  Continue PT and PMR consult.     Abrasion of left leg, subsequent encounter  -     amoxicillin-clavulanate 875-125mg (AUGMENTIN) 875-125 mg per tablet; Take 1 tablet by mouth 2 (two) times daily.  RTC if sx worsen or fail to improve.         "

## 2017-06-27 ENCOUNTER — INITIAL CONSULT (OUTPATIENT)
Dept: PHYSICAL MEDICINE AND REHAB | Facility: CLINIC | Age: 72
End: 2017-06-27
Payer: MEDICARE

## 2017-06-27 ENCOUNTER — OUTPATIENT CASE MANAGEMENT (OUTPATIENT)
Dept: ADMINISTRATIVE | Facility: OTHER | Age: 72
End: 2017-06-27

## 2017-06-27 VITALS — BODY MASS INDEX: 29.03 KG/M2 | HEIGHT: 69 IN | WEIGHT: 196 LBS

## 2017-06-27 DIAGNOSIS — G89.29 CHRONIC LEFT SHOULDER PAIN: Primary | ICD-10-CM

## 2017-06-27 DIAGNOSIS — M25.512 CHRONIC LEFT SHOULDER PAIN: Primary | ICD-10-CM

## 2017-06-27 DIAGNOSIS — M19.012 ARTHROSIS OF LEFT ACROMIOCLAVICULAR JOINT: ICD-10-CM

## 2017-06-27 DIAGNOSIS — M79.18 MYOFASCIAL PAIN ON LEFT SIDE: ICD-10-CM

## 2017-06-27 PROCEDURE — 99204 OFFICE O/P NEW MOD 45 MIN: CPT | Mod: 25,S$GLB,, | Performed by: PHYSICAL MEDICINE & REHABILITATION

## 2017-06-27 PROCEDURE — 1159F MED LIST DOCD IN RCRD: CPT | Mod: S$GLB,,, | Performed by: PHYSICAL MEDICINE & REHABILITATION

## 2017-06-27 PROCEDURE — 99999 PR PBB SHADOW E&M-EST. PATIENT-LVL II: CPT | Mod: PBBFAC,,, | Performed by: PHYSICAL MEDICINE & REHABILITATION

## 2017-06-27 PROCEDURE — 20552 NJX 1/MLT TRIGGER POINT 1/2: CPT | Mod: 59,S$GLB,, | Performed by: PHYSICAL MEDICINE & REHABILITATION

## 2017-06-27 PROCEDURE — 20606 DRAIN/INJ JOINT/BURSA W/US: CPT | Mod: S$GLB,,, | Performed by: PHYSICAL MEDICINE & REHABILITATION

## 2017-06-27 RX ADMIN — TRIAMCINOLONE ACETONIDE 40 MG: 40 INJECTION, SUSPENSION INTRA-ARTICULAR; INTRAMUSCULAR at 11:06

## 2017-06-27 NOTE — PROGRESS NOTES
For your information:    The following patient has been assigned to Danya Martins RN with [facility] for high risk screening.    Reason: High Risk    Please contact [facility] at [phone] with any questions.    Thank you,  Amie Payan

## 2017-06-27 NOTE — LETTER
June 28, 2017      Ny Lujan, NP  [address]  [place] [address]           [facility]  [address] [facility] [address] [place] [address]  Phone: [phone]  Fax: [phone]          Patient: Steve June Jr.   MR Number: [id number]   YOB: 1945   Date of Visit: 6/27/2017       Dear Ny Lujan:    Thank you for referring Steve June to me for evaluation. Attached you will find relevant portions of my assessment and plan of care.    If you have questions, please do not hesitate to call me. I look forward to following Steve June along with you.    Sincerely,    Rolf Silva MD    Enclosure  CC:  No Recipients    If you would like to receive this communication electronically, please contact [email or web address] or [phone] to request more information on [employer] access.    For providers and/or their staff who would like to refer a patient to [facility], please contact us through our one-stop-shop provider referral line, [facility], at [phone].    If you feel you have received this communication in error or would no longer like to receive these types of communications, please e-mail [email or web address]

## 2017-06-28 ENCOUNTER — TELEPHONE (OUTPATIENT)
Dept: FAMILY MEDICINE | Facility: CLINIC | Age: 72
End: 2017-06-28

## 2017-06-28 ENCOUNTER — CLINICAL SUPPORT (OUTPATIENT)
Dept: FAMILY MEDICINE | Facility: CLINIC | Age: 72
End: 2017-06-28
Payer: MEDICARE

## 2017-06-28 PROCEDURE — 99499 UNLISTED E&M SERVICE: CPT | Mod: S$GLB,,, | Performed by: FAMILY MEDICINE

## 2017-06-28 RX ORDER — TRIAMCINOLONE ACETONIDE 40 MG/ML
40 INJECTION, SUSPENSION INTRA-ARTICULAR; INTRAMUSCULAR
Status: DISCONTINUED | OUTPATIENT
Start: 2017-06-27 | End: 2017-06-28 | Stop reason: HOSPADM

## 2017-06-28 NOTE — PROGRESS NOTES
2 pt identifiers used, pt presented to clinic for insulin education. Instructed pt on injection sites, demonstrated and had pt return demonstration on injection technique. Instructed him on increasing units as needed per Dr. Womack. Contacted Alena Mullins as pt is c/o not being able to afford insulin, needles and strips.

## 2017-06-28 NOTE — PROGRESS NOTES
[facility]    Consulting Provider: Ny Lujan NP    CHIEF COMPLAINT:   Chief Complaint   Patient presents with    Shoulder Pain     left shoulder pain     HISTORY OF PRESENT ILLNESS: Steve June Jr. is a 72 y.o. male who presents to me for the first time for evaluation and treatment of left shoulder pain.  He reports the pain began after a fall about 3 months ago.  He reports he fell on the tip of his left shoulder.  He rates his pain as a 6 on a scale of 1-10 in intensity.  He also struck his head in the process. Complains of left-sided neck and shoulder pain.  Has been in physical therapy for the last 1 month but notes no significant improvement.  He denies any numbness or tingling of the left upper extremity.  He describes the pain as sore.  The pain is reduced with rest, and increased with increased use of the left upper extremity.  Has had no prior injections of the area.  The pain is constant and has been stable over recent weeks.    Review of Systems   Constitutional: Negative for fever.   HENT: Negative for drooling.    Eyes: Negative for discharge.   Respiratory: Negative for choking.    Cardiovascular: Negative for chest pain.   Genitourinary: Negative for flank pain.   Skin: Negative for wound.   Allergic/Immunologic: Negative for immunocompromised state.   Neurological: Negative for tremors and syncope.   Psychiatric/Behavioral: Negative for behavioral problems.     Past Medical History:   Past Medical History:   Diagnosis Date    Abnormal thyroid function test     Allergy     Seasonal    Anemia     Arthritis     Gaviria esophagus     Basal cell carcinoma     right forearm    Basal cell carcinoma 12/2011    lower post neck    Cancer     skin CA    Cataract     Cirrhosis     Encounter for blood transfusion     Esophageal varices in cirrhosis     grade II on 7/12 EGD    Gastritis     on 7/12 EGD    GERD (gastroesophageal reflux disease) 2/28/2015    Hard of  hearing     Hiatal hernia     History of hepatitis C 8/10/2012    tx with harvoni x 41 days (started 10/22/15). 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    Valvular heart disease     mild MR 12       Past Surgical History:   Past Surgical History:   Procedure Laterality Date    CATARACT EXTRACTION  1/10/13    left eye    CATARACT EXTRACTION      right eye    CHOLECYSTECTOMY      COLONOSCOPY      EYE SURGERY      Cataract surgery to right eye    KNEE ARTHROSCOPY W/ MENISCAL REPAIR      KNEE CARTILAGE SURGERY      left knee    KNEE SURGERY  12/2006    left    SKIN LESION EXCISION      TONSILLECTOMY      UPPER GASTROINTESTINAL ENDOSCOPY         Family History:   Family History   Problem Relation Age of Onset    Leukemia Mother     Cancer Mother      bone    Alcohol abuse Father     Cirrhosis Father      EtOH induced    Amblyopia Neg Hx     Blindness Neg Hx     Cataracts Neg Hx     Diabetes Neg Hx     Glaucoma Neg Hx     Hypertension Neg Hx     Macular degeneration Neg Hx     Retinal detachment Neg Hx     Strabismus Neg Hx     Stroke Neg Hx     Thyroid disease Neg Hx     Psoriasis Neg Hx     Lupus Neg Hx     Eczema Neg Hx     Acne Neg Hx     Melanoma Neg Hx        Medications:   Current Outpatient Prescriptions on File Prior to Visit   Medication Sig Dispense Refill    ACCU-CHEK VEENA PLUS METER Misc       amoxicillin-clavulanate 875-125mg (AUGMENTIN) 875-125 mg per tablet Take 1 tablet by mouth 2 (two) times daily. 14 tablet 0    blood sugar diagnostic Strp accu-chek veena plus test strp, 2 x day (Patient taking differently: accu-chek veena plus test strp, daily) 100 strip 12    carvedilol (COREG) 6.25 MG tablet  "Take 1 tablet (6.25 mg total) by mouth 2 (two) times daily. (Patient taking differently: Take 6.25 mg by mouth once daily. ) 60 tablet 2    fluorouracil (EFUDEX) 5 % cream AAA scalp bid x 2 weeks, do not start treating until healed from cryo 40 g 1    GLUCOSAMINE HCL/CHONDR CHISHOLM A NA (OSTEO BI-FLEX ORAL) Take 2 tablets by mouth once daily.      hydrocodone-acetaminophen 10-325mg (NORCO)  mg Tab Take 1 tablet by mouth 4 (four) times daily. 120 tablet 0    insulin detemir (LEVEMIR FLEXTOUCH) 100 unit/mL (3 mL) SubQ InPn pen Inject 10 Units into the skin every evening. May increase by 2 units every 3 days for a fasting blood sugar < 130. 1 Box 11    loratadine-pseudoephedrine  mg (CLARITIN-D 24-HOUR)  mg per 24 hr tablet Take 1 tablet by mouth once daily.      metformin (GLUCOPHAGE) 1000 MG tablet TAKE 1 TABLET BY MOUTH TWICE DAILY WITH MEALS 180 tablet 1    nystatin (MYCOSTATIN) 100,000 unit/mL suspension TAKE 4ML BY MOUTH TWICE DAILY 60 mL 5    pen needle, diabetic (BD ULTRA-FINE KORINA PEN NEEDLES) 32 gauge x 5/32" Ndle 10 Units by Misc.(Non-Drug; Combo Route) route once daily at 6am. 30 each 11     No current facility-administered medications on file prior to visit.        Allergies:   Review of patient's allergies indicates:   Allergen Reactions    Adhesive tape-silicones Other (See Comments)     pulls skin off    Doxycycline      Dizzy. "Just didn't feel right".       Social History:   Social History     Social History    Marital status:      Spouse name: N/A    Number of children: 5    Years of education: N/A     Social History Main Topics    Smoking status: Former Smoker     Packs/day: 1.00     Years: 25.00     Quit date: 8/9/2000    Smokeless tobacco: Never Used    Alcohol use No      Comment: states he stopped drinking approx. 7 yrs ago/"I might drink a beer every 2 weeks"    Drug use: No    Sexual activity: No     Other Topics Concern    None     Social History " "Narrative    He is .  He has children.  He is currently retired.     PHYSICAL EXAMINATION:   General    Vitals:    06/27/17 1116   Weight: 88.9 kg (196 lb)   Height: 5' 9" (1.753 m)     Constitutional: Oriented to person, place, and time. No apparent distress. Appears well-developed and well-nourished. Pleasant.  HENT:   Head: Normocephalic and atraumatic.   Eyes: Right eye exhibits no discharge. Left eye exhibits no discharge. No scleral icterus.   Pulmonary/Chest: Effort normal. No respiratory distress.   Abdominal: There is no guarding.   Neurological: Alert and oriented to person, place, and time.   Psychiatric: Behavior is normal.   Right Shoulder Exam   Right shoulder exam is normal.    Tenderness   The patient is experiencing no tenderness.        Range of Motion   Active Abduction: normal   Passive Abduction: normal   Forward Flexion: normal   External Rotation: normal   Internal Rotation 90 degrees: normal     Muscle Strength   Abduction: 5/5   Internal Rotation: 5/5   External Rotation: 5/5   Biceps: 5/5     Other   Erythema: absent  Scars: absent  Sensation: normal  Pulse: present      Left Shoulder Exam     Tenderness   The patient is experiencing tenderness in the acromioclavicular joint (He has tenderness over multiple spots over the left cervical paraspinals and trapezial musculature).    Range of Motion   Active Abduction: 140   Passive Abduction: 160   Forward Flexion: 150   External Rotation: 80   Internal Rotation 90 degrees: 50     Muscle Strength   Abduction: 5/5   Internal Rotation: 5/5   External Rotation: 5/5   Biceps: 5/5     Other   Erythema: absent  Scars: absent  Sensation: normal  Pulse: present         INSPECTION: There is no swelling, ecchymoses, erythema or gross deformity about the left shoulder.    Imaging  X-ray of the left shoulder from 5/16/2017: There is generalized loss of bone mineral density. Moderate left a.c. joint osteophytosis is seen. No fracture line or " "dislocation is seen. No focal osseous lesion is seen. The visualized left sided ribs appear intact.    Data Reviewed: X-ray    Supportive Actions: Independent visualization of images or test specimens    ASSESSMENT:   1. Chronic left shoulder pain    2. Myofascial pain on left side    3. Arthrosis of left acromioclavicular joint      PLAN:     1. Time was spent reviewing the above diagnosis in depth with Steve today, including acute management and rehabilitation.     2.  Mr. June comparison multiple pain generators about the left shoulder region.  He has signs, symptoms, and x-ray evidence of left acromioclavicular joint arthrosis.  I offered him an ultrasound-guided injection of corticosteroid to his joint in hopes of sustained pain relief.  He wishes to proceed with this option, see separate procedure note.    3.  He also has signs and symptoms suggestive of cervical/trapezial myofascial pain.  Offered him trigger point injections and he wished to proceed with this option as well.  See procedure note below.    4.  Encourage him to continue his physical therapy and then transition to a home exercise program for long-term maintenance.    5. RTC in 4-6 months as needed.  If he is not improved with today's interventions may consider referral to Dr. Casillas/MRI for consideration of cervical spine interventions.    Procedure Note: Trigger point injections  Time: 11:30  Indications: Pain  Description: After 2 maximal tender points were identified in the left trapezial and cervical paraspinal musculature, the overlying skin was cleaned with etoh swabs. Each point was injected with 1 cc of 1% Lidocaine after negative aspiration using a 27 g 1.5" needle. A stellate pattern was then used to needle the surrounding area. Needle was removed and areas cleaned. Blood loss minimal.  Complications: None  Disposition: Pt left in good condition with no complaints.    This is a consult from Ny Lujan. Please see the " ""Communications" section of Epic to see how the consulting physician received the report of today's findings and recommendations. If it's an [facility] physician, it will be forwarded to his/her "in basket".    The above note was completed, in part, with the aid of Dragon dictation software/hardware. Translation errors may be present.    "

## 2017-06-28 NOTE — PROCEDURES
Intermediate Joint Aspiration/Injection  Date/Time: 6/27/2017 11:33 AM  Performed by: JENNIFER WEST  Authorized by: JENNIFER WEST     Consent Done?:  Yes (Verbal)  Indications:  Pain  Site marked: The procedure site was marked    Timeout: Prior to procedure the correct patient, procedure, and site was verified      Location:  Shoulder  Site:  L acromioclavicular  Prep: Patient was prepped and draped in usual sterile fashion    Ultrasonic Guidance for needle placement: Yes  Images are saved and documented.  Needle size:  25 G  Approach: Needle out of plane, anterior-posterior.  Medications:  40 mg triamcinolone acetonide 40 mg/mL  Patient tolerance:  Patient tolerated the procedure well with no immediate complications     Additional Comments: Ultrasound guidance was used for correct needle placement, the images were saved will be uploaded to EMR.

## 2017-06-28 NOTE — TELEPHONE ENCOUNTER
----- Message from Rima Shelby sent at 6/28/2017  2:03 PM CDT -----  Pt is at pharmacy ... Was advised prescriptions for glucometer meter and testing supplies would be sent / nothing received / [phone]  [facility] [address] [place] [address] AT [place] [address] & [address] [address]  Phone: [phone] Fax: [phone]

## 2017-06-29 RX ORDER — LANCETS
EACH MISCELLANEOUS
Qty: 100 EACH | Refills: 3 | Status: SHIPPED | OUTPATIENT
Start: 2017-06-29 | End: 2018-02-05 | Stop reason: SDUPTHER

## 2017-06-29 NOTE — TELEPHONE ENCOUNTER
Spoke with pt, he states he started insulin last night. New diabetic glucometer is showing high when he checks his sugar, not giving him a number. Patient is asymptomatic, denied being able to come to clinic this afternoon for accucheck due to lack of transportation. Scheduled for tomorrow morning. Pt will bring his glucometer for comparison with clinic meter.

## 2017-06-29 NOTE — TELEPHONE ENCOUNTER
----- Message from Nubia Rosas sent at 6/29/2017  2:16 PM CDT -----  Contact: Patient  Patient called with questions regarding diabetic medication. Please call back at [phone] to advise. Thanks,

## 2017-06-30 ENCOUNTER — HOSPITAL ENCOUNTER (EMERGENCY)
Facility: HOSPITAL | Age: 72
Discharge: HOME | End: 2017-06-30
Attending: EMERGENCY MEDICINE
Payer: MEDICARE

## 2017-06-30 ENCOUNTER — CLINICAL SUPPORT (OUTPATIENT)
Dept: FAMILY MEDICINE | Facility: CLINIC | Age: 72
End: 2017-06-30
Payer: MEDICARE

## 2017-06-30 VITALS
SYSTOLIC BLOOD PRESSURE: 129 MMHG | TEMPERATURE: 98 F | WEIGHT: 190 LBS | HEIGHT: 69 IN | HEART RATE: 57 BPM | RESPIRATION RATE: 20 BRPM | BODY MASS INDEX: 28.14 KG/M2 | OXYGEN SATURATION: 97 % | DIASTOLIC BLOOD PRESSURE: 64 MMHG

## 2017-06-30 DIAGNOSIS — Z91.119 DIETARY NONCOMPLIANCE: ICD-10-CM

## 2017-06-30 LAB
ALBUMIN SERPL BCP-MCNC: 4.1 G/DL
ALP SERPL-CCNC: 71 U/L
ALT SERPL W/O P-5'-P-CCNC: 15 U/L
ANION GAP SERPL CALC-SCNC: 10 MMOL/L
AST SERPL-CCNC: 15 U/L
B-OH-BUTYR BLD STRIP-SCNC: 0.2 MMOL/L
BACTERIA #/AREA URNS HPF: NORMAL /HPF
BASOPHILS # BLD AUTO: 0 K/UL
BASOPHILS NFR BLD: 0.5 %
BILIRUB SERPL-MCNC: 1.1 MG/DL
BILIRUB UR QL STRIP: NEGATIVE
BUN SERPL-MCNC: 24 MG/DL
CALCIUM SERPL-MCNC: 9.4 MG/DL
CHLORIDE SERPL-SCNC: 100 MMOL/L
CLARITY UR: CLEAR
CO2 SERPL-SCNC: 21 MMOL/L
COLOR UR: YELLOW
CREAT SERPL-MCNC: 1.2 MG/DL
DIFFERENTIAL METHOD: ABNORMAL
EOSINOPHIL # BLD AUTO: 0.1 K/UL
EOSINOPHIL NFR BLD: 1.4 %
ERYTHROCYTE [DISTWIDTH] IN BLOOD BY AUTOMATED COUNT: 15.1 %
EST. GFR  (AFRICAN AMERICAN): >60 ML/MIN/1.73 M^2
EST. GFR  (NON AFRICAN AMERICAN): >60 ML/MIN/1.73 M^2
GLUCOSE SERPL-MCNC: 542 MG/DL
GLUCOSE UR QL STRIP: ABNORMAL
HCT VFR BLD AUTO: 36.3 %
HGB BLD-MCNC: 12.4 G/DL
HGB UR QL STRIP: ABNORMAL
KETONES UR QL STRIP: NEGATIVE
LEUKOCYTE ESTERASE UR QL STRIP: NEGATIVE
LYMPHOCYTES # BLD AUTO: 0.5 K/UL
LYMPHOCYTES NFR BLD: 11.1 %
MCH RBC QN AUTO: 28.8 PG
MCHC RBC AUTO-ENTMCNC: 34.2 %
MCV RBC AUTO: 84 FL
MICROSCOPIC COMMENT: NORMAL
MONOCYTES # BLD AUTO: 0.4 K/UL
MONOCYTES NFR BLD: 8 %
NEUTROPHILS # BLD AUTO: 3.6 K/UL
NEUTROPHILS NFR BLD: 79 %
NITRITE UR QL STRIP: NEGATIVE
PH UR STRIP: 6 [PH] (ref 5–8)
PLATELET # BLD AUTO: 34 K/UL
PLATELET BLD QL SMEAR: ABNORMAL
PMV BLD AUTO: 8.2 FL
POCT GLUCOSE: 368 MG/DL (ref 70–110)
POCT GLUCOSE: 411 MG/DL (ref 70–110)
POCT GLUCOSE: 455 MG/DL (ref 70–110)
POCT GLUCOSE: 487 MG/DL (ref 70–110)
POTASSIUM SERPL-SCNC: 4.5 MMOL/L
PROT SERPL-MCNC: 7.2 G/DL
PROT UR QL STRIP: NEGATIVE
RBC # BLD AUTO: 4.31 M/UL
RBC #/AREA URNS HPF: 1 /HPF (ref 0–4)
SODIUM SERPL-SCNC: 131 MMOL/L
SP GR UR STRIP: 1.02 (ref 1–1.03)
URN SPEC COLLECT METH UR: ABNORMAL
UROBILINOGEN UR STRIP-ACNC: NEGATIVE EU/DL
WBC # BLD AUTO: 4.5 K/UL
YEAST URNS QL MICRO: NORMAL

## 2017-06-30 PROCEDURE — 85025 COMPLETE CBC W/AUTO DIFF WBC: CPT

## 2017-06-30 PROCEDURE — 96372 THER/PROPH/DIAG INJ SC/IM: CPT

## 2017-06-30 PROCEDURE — 80053 COMPREHEN METABOLIC PANEL: CPT

## 2017-06-30 PROCEDURE — 25000003 PHARM REV CODE 250: Performed by: PHYSICIAN ASSISTANT

## 2017-06-30 PROCEDURE — 96360 HYDRATION IV INFUSION INIT: CPT

## 2017-06-30 PROCEDURE — 82010 KETONE BODYS QUAN: CPT

## 2017-06-30 PROCEDURE — 99283 EMERGENCY DEPT VISIT LOW MDM: CPT | Mod: 25

## 2017-06-30 PROCEDURE — 63600175 PHARM REV CODE 636 W HCPCS: Performed by: PHYSICIAN ASSISTANT

## 2017-06-30 PROCEDURE — 36415 COLL VENOUS BLD VENIPUNCTURE: CPT

## 2017-06-30 PROCEDURE — 99499 UNLISTED E&M SERVICE: CPT | Mod: S$GLB,,, | Performed by: FAMILY MEDICINE

## 2017-06-30 PROCEDURE — 81000 URINALYSIS NONAUTO W/SCOPE: CPT

## 2017-06-30 PROCEDURE — 82962 GLUCOSE BLOOD TEST: CPT

## 2017-06-30 RX ADMIN — SODIUM CHLORIDE 1000 ML: 0.9 INJECTION, SOLUTION INTRAVENOUS at 10:06

## 2017-06-30 RX ADMIN — INSULIN HUMAN 10 UNITS: 100 INJECTION, SOLUTION PARENTERAL at 11:06

## 2017-06-30 NOTE — DISCHARGE INSTRUCTIONS
Eat a diabetic diet.  Continue your nightly insulin as prescribed: 10 Units into the skin every evening. May increase by 2 units every 3 days for a fasting blood sugar < 130.  See your primary care provider in one week.  See your endocrinologist as soon as possible.  For worsening symptoms, chest pain, shortness of breath, increased abdominal pain, high grade fever, stroke or stroke like symptoms, immediately go to the nearest Emergency Room or call 911 as soon as possible.

## 2017-06-30 NOTE — ED NOTES
Pt sent here by PCP after follow up today. Pt was started on insulin, failed to give self correctly per pt's wife and yesterday took with increase in bs. Pt takes oral med for bs control. Wife states pt has been having high bs for the past month. Pt c/o increased urination and thirst.

## 2017-06-30 NOTE — ED PROVIDER NOTES
"Encounter Date: 6/30/2017       History     Chief Complaint   Patient presents with    Hyperglycemia     Patient is a 72 year old male who presents with uncontrolled blood sugar. He has PMH significant for hypertension, hyperlipidemia, DM-2, cirrhosis and hepatitis C. He reports he was recently started on long acting insulin, 10 units nightly. He states two nights ago, his insulin pen wasn't work, so he just started insulin last night. He is also no metformin. He is suppose to be taking prandin but reports it makes his blood sugars go up. He saw his PCP today and was instructed to come to the ED because his blood sugar was >500. He reports polydipsia and polyuria. He denied nausea, vomiting, abdominal pain, chest pain or shortness of breath.      The history is provided by the patient and the spouse.     Review of patient's allergies indicates:   Allergen Reactions    Adhesive tape-silicones Other (See Comments)     pulls skin off    Doxycycline      Dizzy. "Just didn't feel right".     Past Medical History:   Diagnosis Date    Abnormal thyroid function test     Allergy     Seasonal    Anemia     Arthritis     Gaviria esophagus     Basal cell carcinoma     right forearm    Basal cell carcinoma 12/2011    lower post neck    Cancer     skin CA    Cataract     Cirrhosis     Encounter for blood transfusion     Esophageal varices in cirrhosis     grade II on 7/12 EGD    Gastritis     on 7/12 EGD    GERD (gastroesophageal reflux disease) 2/28/2015    Hard of hearing     Hiatal hernia     History of hepatitis C 8/10/2012    tx with harvoni x 41 days (started 10/22/15). 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    Valvular heart " "disease     mild MR 12     Past Surgical History:   Procedure Laterality Date    CATARACT EXTRACTION  1/10/13    left eye    CATARACT EXTRACTION      right eye    CHOLECYSTECTOMY      COLONOSCOPY      EYE SURGERY      Cataract surgery to right eye    KNEE ARTHROSCOPY W/ MENISCAL REPAIR      KNEE CARTILAGE SURGERY      left knee    KNEE SURGERY  12/2006    left    SKIN LESION EXCISION      TONSILLECTOMY      UPPER GASTROINTESTINAL ENDOSCOPY       Family History   Problem Relation Age of Onset    Leukemia Mother     Cancer Mother      bone    Alcohol abuse Father     Cirrhosis Father      EtOH induced    Amblyopia Neg Hx     Blindness Neg Hx     Cataracts Neg Hx     Diabetes Neg Hx     Glaucoma Neg Hx     Hypertension Neg Hx     Macular degeneration Neg Hx     Retinal detachment Neg Hx     Strabismus Neg Hx     Stroke Neg Hx     Thyroid disease Neg Hx     Psoriasis Neg Hx     Lupus Neg Hx     Eczema Neg Hx     Acne Neg Hx     Melanoma Neg Hx      Social History   Substance Use Topics    Smoking status: Former Smoker     Packs/day: 1.00     Years: 25.00     Quit date: 8/9/2000    Smokeless tobacco: Never Used    Alcohol use No      Comment: states he stopped drinking approx. 7 yrs ago/"I might drink a beer every 2 weeks"     Review of Systems   Constitutional: Negative for chills and fever.   HENT: Negative for sore throat.    Respiratory: Negative for cough and shortness of breath.    Cardiovascular: Negative for chest pain.   Gastrointestinal: Negative for abdominal pain, diarrhea, nausea and vomiting.   Endocrine: Positive for polyphagia and polyuria.   Genitourinary: Negative for dysuria.   Musculoskeletal: Negative for back pain.   Skin: Negative for rash.   Neurological: Negative for weakness.   Hematological: Does not bruise/bleed easily.       Physical Exam     Initial Vitals [06/30/17 0933]   BP Pulse Resp Temp SpO2   (!) 150/72 70 20 98.3 °F (36.8 °C) 97 %      MAP       98   "       Physical Exam    Nursing note and vitals reviewed.  Constitutional: He appears well-developed and well-nourished.   HENT:   Head: Normocephalic and atraumatic.   Right Ear: External ear normal.   Left Ear: External ear normal.   Nose: Nose normal.   Eyes: Conjunctivae are normal. Right eye exhibits no discharge. Left eye exhibits no discharge. No scleral icterus.   Neck: Normal range of motion. Neck supple.   Cardiovascular: Normal rate and regular rhythm. Exam reveals no gallop and no friction rub.    Murmur heard.  Pulmonary/Chest: Breath sounds normal. He has no wheezes. He has no rhonchi. He has no rales.   Abdominal: Soft. Bowel sounds are normal. He exhibits no distension. There is no tenderness.   Musculoskeletal: Normal range of motion. He exhibits no tenderness.   Neurological: He is oriented to person, place, and time.   Skin: Skin is warm and dry. Abrasion noted.              ED Course   Procedures  Labs Reviewed   CBC W/ AUTO DIFFERENTIAL - Abnormal; Notable for the following:        Result Value    RBC 4.31 (*)     Hemoglobin 12.4 (*)     Hematocrit 36.3 (*)     RDW 15.1 (*)     Platelets 34 (*)     MPV 8.2 (*)     Lymph # 0.5 (*)     Gran% 79.0 (*)     Lymph% 11.1 (*)     Platelet Estimate Decreased (*)     All other components within normal limits    Narrative:     PT critical result(s) called and verbal readback obtained from Kapil Perry, 06/30/2017 10:48   COMPREHENSIVE METABOLIC PANEL - Abnormal; Notable for the following:     Sodium 131 (*)     CO2 21 (*)     Glucose 542 (*)     BUN, Bld 24 (*)     Total Bilirubin 1.1 (*)     All other components within normal limits    Narrative:        critical gluc result(s) called and verbal readback obtained from   Kapil Wright104, 06/30/2017 10:41   URINALYSIS - Abnormal; Notable for the following:     Glucose, UA 4+ (*)     Occult Blood UA Trace (*)     All other components within normal limits   POCT GLUCOSE - Abnormal; Notable for the following:      POCT Glucose 455 (*)     All other components within normal limits   POCT GLUCOSE - Abnormal; Notable for the following:     POCT Glucose 487 (*)     All other components within normal limits   POCT GLUCOSE - Abnormal; Notable for the following:     POCT Glucose 411 (*)     All other components within normal limits   POCT GLUCOSE - Abnormal; Notable for the following:     POCT Glucose 368 (*)     All other components within normal limits   BETA - HYDROXYBUTYRATE, SERUM   URINALYSIS MICROSCOPIC   POCT GLUCOSE MONITORING CONTINUOUS             Medical Decision Making:   History:   I obtained history from: someone other than patient.  Old Medical Records: I decided to obtain old medical records.       APC / Resident Notes:   This is an emergent evaluation of a 72 year old male with complaint of uncontrolled blood sugar. He is well appearing and vital signs are stable. His abdomen is soft and non-tender with no rebound or guarding. Bowel sounds are normal. I doubt acute intra-abdominal process. Breath sounds are clear and equal bilaterally. Labs show no significant acidosis. Renal function is stable. He has chronic thrombocytopenia that is unchanged. He was given IVF hydration. Repeat showed 487. He was given 10 units of insulin with continued improvement. When asked if he was following his diet he reports he ate a biscuit for breakfast with coffee and sugar. I discussed with him a diabetic diet, he had no education on what was on a diabetic diet. He is to continue his insulin and increase as per his primary care provider. He voices understanding. Discussed results with patient. Return precautions given. Patient is to follow up with their primary care provider. Case was discussed with Dr. Barry who is in agreement with the plan of care. All questions answered.                 ED Course     Clinical Impression:   The primary encounter diagnosis was Uncontrolled type 2 diabetes mellitus without complication, without  long-term current use of insulin. A diagnosis of Dietary noncompliance was also pertinent to this visit.                           Loretta Quinones PA-C  06/30/17 [id number]

## 2017-06-30 NOTE — PROGRESS NOTES
2 pt identifiers used, pt presented to clinic for accucheck comparison. Home monitor 507, clinic monitor 500+. Pt c/o stomachache, no other sx. DR. Womack consulted and pt was referred to ED. Pt expressed understanding and wife agreed to bring pt to ED.

## 2017-07-06 ENCOUNTER — LAB VISIT (OUTPATIENT)
Dept: LAB | Facility: HOSPITAL | Age: 72
End: 2017-07-06
Attending: NURSE PRACTITIONER
Payer: MEDICARE

## 2017-07-06 DIAGNOSIS — K74.60 CIRRHOSIS OF LIVER WITHOUT ASCITES, UNSPECIFIED HEPATIC CIRRHOSIS TYPE: ICD-10-CM

## 2017-07-06 DIAGNOSIS — E11.51 TYPE 2 DIABETES MELLITUS WITH DIABETIC PERIPHERAL ANGIOPATHY WITHOUT GANGRENE, WITHOUT LONG-TERM CURRENT USE OF INSULIN: ICD-10-CM

## 2017-07-06 LAB
ALBUMIN SERPL BCP-MCNC: 3.8 G/DL
ALP SERPL-CCNC: 67 U/L
ALT SERPL W/O P-5'-P-CCNC: 19 U/L
ANION GAP SERPL CALC-SCNC: 9 MMOL/L
AST SERPL-CCNC: 25 U/L
BILIRUB SERPL-MCNC: 1.2 MG/DL
BUN SERPL-MCNC: 25 MG/DL
CALCIUM SERPL-MCNC: 9.3 MG/DL
CHLORIDE SERPL-SCNC: 101 MMOL/L
CHOLEST/HDLC SERPL: 2.6 {RATIO}
CO2 SERPL-SCNC: 25 MMOL/L
CREAT SERPL-MCNC: 1 MG/DL
EST. GFR  (AFRICAN AMERICAN): >60 ML/MIN/1.73 M^2
EST. GFR  (NON AFRICAN AMERICAN): >60 ML/MIN/1.73 M^2
GLUCOSE SERPL-MCNC: 301 MG/DL
HDL/CHOLESTEROL RATIO: 37.7 %
HDLC SERPL-MCNC: 151 MG/DL
HDLC SERPL-MCNC: 57 MG/DL
LDLC SERPL CALC-MCNC: 81.6 MG/DL
NONHDLC SERPL-MCNC: 94 MG/DL
POTASSIUM SERPL-SCNC: 5.1 MMOL/L
PROT SERPL-MCNC: 6.8 G/DL
SODIUM SERPL-SCNC: 135 MMOL/L
TRIGL SERPL-MCNC: 62 MG/DL

## 2017-07-06 PROCEDURE — 86704 HEP B CORE ANTIBODY TOTAL: CPT

## 2017-07-06 PROCEDURE — 86790 VIRUS ANTIBODY NOS: CPT

## 2017-07-06 PROCEDURE — 83036 HEMOGLOBIN GLYCOSYLATED A1C: CPT

## 2017-07-06 PROCEDURE — 36415 COLL VENOUS BLD VENIPUNCTURE: CPT

## 2017-07-06 PROCEDURE — 80061 LIPID PANEL: CPT

## 2017-07-06 PROCEDURE — 86706 HEP B SURFACE ANTIBODY: CPT

## 2017-07-06 PROCEDURE — 80053 COMPREHEN METABOLIC PANEL: CPT

## 2017-07-07 ENCOUNTER — PATIENT MESSAGE (OUTPATIENT)
Dept: HEPATOLOGY | Facility: CLINIC | Age: 72
End: 2017-07-07

## 2017-07-07 ENCOUNTER — PATIENT MESSAGE (OUTPATIENT)
Dept: ENDOCRINOLOGY | Facility: CLINIC | Age: 72
End: 2017-07-07

## 2017-07-07 ENCOUNTER — TELEPHONE (OUTPATIENT)
Dept: ADMINISTRATIVE | Facility: HOSPITAL | Age: 72
End: 2017-07-07

## 2017-07-07 ENCOUNTER — CLINICAL SUPPORT (OUTPATIENT)
Dept: REHABILITATION | Facility: HOSPITAL | Age: 72
End: 2017-07-07
Attending: NURSE PRACTITIONER
Payer: MEDICARE
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
## 2017-07-07 DIAGNOSIS — K74.60 CIRRHOSIS OF LIVER WITHOUT ASCITES, UNSPECIFIED HEPATIC CIRRHOSIS TYPE: Primary | ICD-10-CM

## 2017-07-07 DIAGNOSIS — E11.9 DIABETES MELLITUS WITHOUT COMPLICATION: ICD-10-CM

## 2017-07-07 LAB
ESTIMATED AVG GLUCOSE: 263 MG/DL
HBA1C MFR BLD HPLC: 10.8 %
HBV CORE AB SERPL QL IA: POSITIVE
HBV SURFACE AB SER-ACNC: NEGATIVE M[IU]/ML
HEPATITIS A ANTIBODY, IGG: NEGATIVE

## 2017-07-07 PROCEDURE — 97140 MANUAL THERAPY 1/> REGIONS: CPT | Mod: PN

## 2017-07-07 PROCEDURE — 97110 THERAPEUTIC EXERCISES: CPT | Mod: PN

## 2017-07-07 NOTE — TELEPHONE ENCOUNTER
----- Message from Amie Payan sent at 7/7/2017 12:07 PM CDT -----  Please note the following patient is currently being followed by Danya Martins, RN in [facility]. New referral information will be forwarded to the RN CM.     New referral reason: Diabetes mellitus without complication    Thank you,   Amie Payan

## 2017-07-10 ENCOUNTER — TELEPHONE (OUTPATIENT)
Dept: HEPATOLOGY | Facility: CLINIC | Age: 72
End: 2017-07-10

## 2017-07-10 RX ORDER — HYDROCODONE BITARTRATE AND ACETAMINOPHEN 10; 325 MG/1; MG/1
1 TABLET ORAL 4 TIMES DAILY
Qty: 120 TABLET | Refills: 0 | Status: SHIPPED | OUTPATIENT
Start: 2017-07-10 | End: 2017-08-14 | Stop reason: SDUPTHER

## 2017-07-10 NOTE — TELEPHONE ENCOUNTER
----- Message from Gladys Parry sent at 7/10/2017  8:20 AM CDT -----  Contact: Patient  Patient needs a refill on his hydrocodone, 10 mg sent to [facility] on [address]. Any questions patient [phone].

## 2017-07-10 NOTE — TELEPHONE ENCOUNTER
----- Message from James Grewal PharmD sent at 7/7/2017  4:54 PM CDT -----  Regarding: havrix  Called patient to let him know the havrix was $65. Patient said he will call back on Monday to schedule an appointment

## 2017-07-11 ENCOUNTER — CLINICAL SUPPORT (OUTPATIENT)
Dept: REHABILITATION | Facility: HOSPITAL | Age: 72
End: 2017-07-11
Attending: NURSE PRACTITIONER
Payer: MEDICARE
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
## 2017-07-11 PROCEDURE — 97010 HOT OR COLD PACKS THERAPY: CPT | Mod: PN

## 2017-07-11 PROCEDURE — 97140 MANUAL THERAPY 1/> REGIONS: CPT | Mod: PN

## 2017-07-11 PROCEDURE — 97110 THERAPEUTIC EXERCISES: CPT | Mod: PN

## 2017-07-11 NOTE — PROGRESS NOTES
"Name: Steve June [facility]  Clinic Number: [id number]  Date of Treatment: 07/11/2017   Diagnosis:   Encounter Diagnosis   Name Primary?    Fall, subsequent encounter        Time in: 0905  Time Out: 1004  Total Treatment Time: 59  Group Time: 0      Subjective:    Steve reports continued neck and shoulder soreness.  Patient reports blood sugar this a.m. was 176. Patient reports their pain to be 6.5/10 on a 0-10 scale with 0 being no pain and 10 being the worst pain imaginable. Patient reporting leg cramps in R quad with NuStep and unable to continue.    Objective    Steev received therapeutic exercises to develop strength, endurance and flexibility for 34 minutes including:     UBE 5' forward  Cervical ROM x10 5" hold: rotation, sidebend,flexion,extension in sit; x5 5" hold supine for sidebend and rotation  Chin tucks 3x10 supine with towel roll    Steve received the following manual therapy techniques: Myofacial release and Soft tissue Mobilization were applied to the: B neck and UT as well as scapular scouring for 15  minutes.     Hot pack to neck 10 minutes in supine    Pt demo fair understanding of the education provided. Steve demonstrated fair return demonstration of activities. Improved cervical ROM in supine, able to decrease accessory movements.    Assessment:     Pt will continue to benefit from skilled PT intervention. Medical Necessity is demonstrated by:  Pain limits function of effected part for some activities, Unable to participate fully in daily activities, Requires skilled supervision to complete and progress HEP and Weakness.    Patient is making fair progress towards established goals.    Plan:    Continue with established Plan of Care towards PT goals.   "

## 2017-07-12 ENCOUNTER — OFFICE VISIT (OUTPATIENT)
Dept: ENDOCRINOLOGY | Facility: CLINIC | Age: 72
End: 2017-07-12
Payer: MEDICARE

## 2017-07-12 VITALS
DIASTOLIC BLOOD PRESSURE: 70 MMHG | HEIGHT: 69 IN | BODY MASS INDEX: 28.57 KG/M2 | SYSTOLIC BLOOD PRESSURE: 130 MMHG | WEIGHT: 192.88 LBS | HEART RATE: 63 BPM

## 2017-07-12 DIAGNOSIS — E11.42 TYPE 2 DIABETES MELLITUS WITH DIABETIC POLYNEUROPATHY, WITHOUT LONG-TERM CURRENT USE OF INSULIN: ICD-10-CM

## 2017-07-12 DIAGNOSIS — E11.29 MICROALBUMINURIA DUE TO TYPE 2 DIABETES MELLITUS: ICD-10-CM

## 2017-07-12 DIAGNOSIS — R80.9 MICROALBUMINURIA DUE TO TYPE 2 DIABETES MELLITUS: ICD-10-CM

## 2017-07-12 DIAGNOSIS — I10 ESSENTIAL HYPERTENSION: ICD-10-CM

## 2017-07-12 DIAGNOSIS — E11.51 TYPE 2 DIABETES MELLITUS WITH DIABETIC PERIPHERAL ANGIOPATHY WITHOUT GANGRENE, WITHOUT LONG-TERM CURRENT USE OF INSULIN: Primary | ICD-10-CM

## 2017-07-12 PROCEDURE — 3046F HEMOGLOBIN A1C LEVEL >9.0%: CPT | Mod: S$GLB,,, | Performed by: NURSE PRACTITIONER

## 2017-07-12 PROCEDURE — 99999 PR PBB SHADOW E&M-EST. PATIENT-LVL IV: CPT | Mod: PBBFAC,,, | Performed by: NURSE PRACTITIONER

## 2017-07-12 PROCEDURE — 99214 OFFICE O/P EST MOD 30 MIN: CPT | Mod: S$GLB,,, | Performed by: NURSE PRACTITIONER

## 2017-07-12 PROCEDURE — 1126F AMNT PAIN NOTED NONE PRSNT: CPT | Mod: S$GLB,,, | Performed by: NURSE PRACTITIONER

## 2017-07-12 PROCEDURE — 1159F MED LIST DOCD IN RCRD: CPT | Mod: S$GLB,,, | Performed by: NURSE PRACTITIONER

## 2017-07-12 NOTE — PROGRESS NOTES
"Name: Steve June .  Clinic Number: [id number]  Date of Treatment: 07/07/2017   Diagnosis:   Encounter Diagnosis   Name Primary?    Fall, subsequent encounter        Time in: 0910  Time Out: 1000  Total Treatment Time: 48  Group Time: 0      Subjective:    Steve reports improvement of symptoms.  Patient reports their pain to be 6/10 on a 0-10 scale with 0 being no pain and 10 being the worst pain imaginable.    Objective    Patient received individual therapy to increase strength, ROM, flexibility and core stabilization with 0 patients with activities as follows:     Steve received therapeutic exercises to develop strength, ROM, flexibility and core stabilization for 38 minutes including:    Bike L2 x 10'   Chin tuck x 10   Cerv SB x 10   Cerv rot x 10   Shoulder rolls x 10   Standing calf stretches 3x30s   Seated hamstring stretches 3x30s   Seated Pulley (A) ROM (flex) x 4'   Standing marching 3x10   Hook lying ball squeeze 3x10   Hook lying clamshells w/gtb 3x10    Steve received the following manual therapy techniques: scapular scouring and PROM were applied to the: L shoulder for 10 minutes.     Written Home Exercises Provided: Reviewed  Pt demo good understanding of the education provided. Steve demonstrated fair return demonstration of activities. Requires verbal cues for technique.     Assessment:   Pt reported shoulder feeling "looser" following scapular scouring.  Decreased pain reported.      Pt will continue to benefit from skilled PT intervention. Medical Necessity is demonstrated by:  Fall Risk, Unable to participate fully in daily activities and Requires skilled supervision to complete and progress HEP.    Patient is making fair progress towards established goals.    New/Revised goals: N/A      Plan:  Continue with established Plan of Care towards PT goals.   "

## 2017-07-12 NOTE — PROGRESS NOTES
CC: Mr. Setve June Jr. arrives today for management of Type 2 DM and review of chronic medical conditions, as listed in the Visit Diagnosis section of this encounter.       HPI: Mr. Steve June Jr. was diagnosed with Type 2 DM in ~ . He was diagnosed based on lab work. Initial treatment consisted of metformin. He denies FH of DM. Denies hospitalization admissions due to DM.     At fist visit, it was recommended that he start basal insulin but he exerted his right to refuse. Instead, he was started on Prandin but he stopped taking because he reports it caused his blood sugars to increase. He had this same effect with glimepiride.     He was then started on Levemir on  by CHON Lujan NP. He went to ER a few days after this appt due to hyperglycemia after a delay in starting his Levemir. He also had a steroid injection leading up to this and reported BG >500. He now reports more energy since starting insulin.     BG readings are checked 1-2x/day (fasting only). No logs or meter to clinic. He got new meter yesterday. He reports the following glucoses:  Fastin-200   Dinner: low 200s    Hypoglycemia: No    Missing Insulin/PO medication doses: No    Exercise: Walks 1 mile/day     Dietary Habits: Eats 3 meals/day. May have 1/2 banana or a nectarine for a snack. He is trying to monitor carb portions. Avoids sugar sweetened beverages.   B: pancake today but usually egg and hewitt  L: sandwich  D: baked fish, cauliflower    Last DM education appointment: Has previously declined DM education.    Has h/o Hep C and had successful treatment with [facility]. Last seen by DARVIN Cunha NP in 2017. Following every 6 months.       CURRENT DIABETIC MEDS: metformin 1000 mg BID, Levemir 14 units QHS  Uses pen  Glucometer type: Accu check Tanya    Previous DM treatments:  Glimepiride - nausea, hyperglycemia   Prandin - hyperglycemia    Last Eye Exam: 3/28/17, no DR per patient.   Last Podiatry Exam: 2016    REVIEW  "OF SYSTEMS  Constitutional: no c/o weakness, fatigue. + weight loss and is now gaining back.   Eyes: + floaters, ophthalmology aware. Wears glasses.  Cardiac: no palpitations or chest pain.  Respiratory: no cough or dyspnea.  GI: no c/o abdominal pain, nausea. Denies h/o pancreatitis.  Skin: no lesions or rashes. + delayed wound healing. + bruising that he attributes to low platelets.   Neuro: + numbness, tingling, pain in BLE  Endocrine: denies polyphagia, polydipsia, polyuria. + nocturia x 3.      Personally reviewed Past Medical, Surgical, Social History.    Vital Signs  /70   Pulse 63   Ht 5' 9" (1.753 m)   Wt 87.5 kg (192 lb 14.4 oz)   BMI 28.49 kg/m²     Personally reviewed the below labs:    Hemoglobin A1C   Date Value Ref Range Status   07/06/2017 10.8 (H) 4.0 - 5.6 % Final     Comment:     According to ADA guidelines, hemoglobin A1c <7.0% represents  optimal control in non-pregnant diabetic patients. Different  metrics may apply to specific patient populations.   Standards of Medical Care in Diabetes-2016.  For the purpose of screening for the presence of diabetes:  <5.7%     Consistent with the absence of diabetes  5.7-6.4%  Consistent with increasing risk for diabetes   (prediabetes)  >or=6.5%  Consistent with diabetes  Currently, no consensus exists for use of hemoglobin A1c  for diagnosis of diabetes for children.  This Hemoglobin A1c assay has significant interference with fetal   hemoglobin   (HbF). The results are invalid for patients with abnormal amounts of   HbF,   including those with known Hereditary Persistence   of Fetal Hemoglobin. Heterozygous hemoglobin variants (HbAS, HbAC,   HbAD, HbAE, HbA2) do not significantly interfere with this assay;   however, presence of multiple variants in a sample may impact the %   interference.     01/18/2017 9.4 (H) 4.5 - 6.2 % Final     Comment:     According to ADA guidelines, hemoglobin A1C <7.0% represents  optimal control in non-pregnant " diabetic patients.  Different  metrics may apply to specific populations.   Standards of Medical Care in Diabetes - 2016.  For the purpose of screening for the presence of diabetes:  <5.7%     Consistent with the absence of diabetes  5.7-6.4%  Consistent with increasing risk for diabetes   (prediabetes)  >or=6.5%  Consistent with diabetes  Currently no consensus exists for use of hemoglobin A1C  for diagnosis of diabetes for children.     11/10/2016 8.2 (H) 4.5 - 6.2 % Final     Comment:     According to ADA guidelines, hemoglobin A1C <7.0% represents  optimal control in non-pregnant diabetic patients.  Different  metrics may apply to specific populations.   Standards of Medical Care in Diabetes - 2016.  For the purpose of screening for the presence of diabetes:  <5.7%     Consistent with the absence of diabetes  5.7-6.4%  Consistent with increasing risk for diabetes   (prediabetes)  >or=6.5%  Consistent with diabetes  Currently no consensus exists for use of hemoglobin A1C  for diagnosis of diabetes for children.         Chemistry        Component Value Date/Time     (L) 07/06/2017 0855    K 5.1 07/06/2017 0855     07/06/2017 0855    CO2 25 07/06/2017 0855    BUN 25 (H) 07/06/2017 0855    CREATININE 1.0 07/06/2017 0855     (H) 07/06/2017 0855        Component Value Date/Time    CALCIUM 9.3 07/06/2017 0855    ALKPHOS 67 07/06/2017 0855    AST 25 07/06/2017 0855    ALT 19 07/06/2017 0855    BILITOT 1.2 (H) 07/06/2017 0855          Lab Results   Component Value Date    CHOL 151 07/06/2017    CHOL 158 12/17/2015    CHOL 145 08/13/2014     Lab Results   Component Value Date    HDL 57 07/06/2017    HDL 32 (L) 12/17/2015    HDL 42 08/13/2014     Lab Results   Component Value Date    LDLCALC 81.6 07/06/2017    LDLCALC 99.0 12/17/2015    LDLCALC 91.4 08/13/2014     Lab Results   Component Value Date    TRIG 62 07/06/2017    TRIG 135 12/17/2015    TRIG 58 08/13/2014     Lab Results   Component Value Date  "   CHOLHDL 37.7 07/06/2017    CHOLHDL 20.3 12/17/2015    CHOLHDL 29.0 08/13/2014       Lab Results   Component Value Date    MICALBCREAT 83.8 (H) 11/10/2016     Lab Results   Component Value Date    TSH 6.283 (H) 11/19/2015       CrCl cannot be calculated (Unknown ideal weight.).    No results found for: [id number]      PHYSICAL EXAMINATION  Constitutional: Appears well, no distress. Disheveled appearance.   Neck: Supple, trachea midline; no thyromegaly or nodules.   Respiratory: CTA, even and unlabored.  Cardiovascular: RRR, no murmurs, no carotid bruits. DP pulses  2+ bilaterally; no edema.    Lymph: no cervical or supraclavicular lymphadenopathy  Skin: warm and dry; no lipohypertrophy, or acanthosis nigracans observed. + dermopathic skin changes noted to BLE. + bruises noted to BLE with 1.5" skin tear to LUE.  Neuro: DTR 2+ BUE/2+BLE.  Feet: appropriate footwear.       Assessment/Plan  1. Type 2 diabetes mellitus with diabetic peripheral angiopathy without gangrene, without long-term current use of insulin  -- A1c elevated. Recently began Levemir 2 weeks ago.   -- increase Levemir 18 units QHS. May increase dose by 2 units weekly if fasting BG > 150. Do not exceed 24 units. Contact us if this occurs.   -- continue metformin  -- he has a h/o hematuria. Will avoid Actos.  -- check BG 2x/day     -- DM education for guidance in making further dietary changes.     -- Discussed diagnosis of DM, A1c goals, progression of disease, long term complications and tx options.  Advised patient to check BG before activities, such as driving or exercise.  -- Reviewed hypoglycemia management: treat with 1/2 glass of juice, 1/2 can regular coke, or 4 glucose tablets. Monitor and repeat treatment every 15 minutes until BG is >70 Then have a snack, which includes a complex carbohydrate and protein.    -- will discuss adding statin at future appt, although he is hesitant about adding new medications. Not taking aspirin and is noted " to have low platelet count, which may further increase risk of bleeding.    2. Type 2 diabetes mellitus with diabetic polyneuropathy, without long-term current use of insulin  -- obtain DM control    3. Microalbuminuria due to type 2 diabetes mellitus  -- obtain DM control  -- consider adding ACE-i in the future   4. Essential hypertension  -- controlled, continue to monitor       FOLLOW UP  Return in about 6 weeks (around 8/23/2017). for BG log review   Patient instructed to bring BG logs to each follow up   Patient encouraged to call for any BG/medication issues, concerns, or questions.      Orders Placed This Encounter   Procedures    Ambulatory Referral to Diabetes Education

## 2017-07-12 NOTE — PATIENT INSTRUCTIONS
Hypoglycemia (Low Blood Sugar)     Fast-acting sugar includes a cup of nonfat milk.     Too little sugar (glucose) in your blood is called hypoglycemia or low blood sugar. Low blood sugar usually means anything lower than 70 mg/dL. Talk with your healthcare provider about your target range and what level is too low for you. Diabetes itself doesnt cause low blood sugar. But some of the treatments for diabetes, such as pills or insulin, may raise your risk for it. Low blood sugar may cause you to pass out or have a seizure. So always treat low blood sugar right away, but don't overeat.  Special note: Always carry a source of fast-acting sugar and a snack in case of hypoglycemia.   What you may notice  If you have low blood sugar, you may have one or more of these symptoms:  · Shakiness or dizziness  · Cold, clammy skin or sweating  · Feelings of hunger  · Headache  · Nervousness  · A hard, fast heartbeat  · Weakness  · Confusion or irritability  · Blurred vision  · Having nightmares or waking up confused or sweating  · Numbness or tingling in the lips or tongue  What you should do  Here are tips to follow if you have hypoglycemia:   · First check your blood sugar. If it is too low (out of your target range), eat or drink 15 to 20 grams of fast-acting sugar. This may be 3 to 4 glucose tablets, 4 ounces (half a cup) of fruit juice or regular (nondiet) soda, 8 ounces (1 cup) of fat-free milk, or 1 tablespoon of honey. Dont take more than this, or your blood sugar may go too high.  · Wait 15 minutes. Then recheck your blood sugar if you can.  · If your blood sugar is still too low, repeat the steps above and check your blood sugar again. If your blood sugar still has not returned to your target range, contact your healthcare provider or seek emergency care.  · Once your blood sugar returns to target range, eat a snack or meal.  Preventing low blood sugar  Things you can do include the following:   · If your condition  needs a strict treatment plan, eat your meals and snacks at the same times each day. Dont skip meals!  · If your treatment plan lets you change when you eat and what you eat, learn how to change the time and dose of your rapid-acting insulin to match this.   · Ask your healthcare provider if it is safe for you to drink alcohol. Never drink on an empty stomach.  · Take your medicine at the prescribed times.  · Always carry a source of fast-acting sugar and a snack when youre away from home.  Other things to do  Additional tips include the following:  · Carry a medical ID card, a compact USB drive, or wear a medical alert bracelet or necklace. It should say that you have diabetes. It should also say what to do if you pass out or have a seizure.  · Make sure your family, friends, and coworkers know the signs of low blood sugar. Tell them what to do if your blood sugar falls very low and you cant treat yourself.  · Keep a glucagon emergency kit handy. Be sure your family, friends, and coworkers know how and when to use it. Check it regularly and replace the glucagon before it expires.  · Talk with your health care team about other things you can do to prevent low blood sugar.     If you have unexplained hypoglycemia or hypoglycemia several times, call your healthcare provider.   Date Last Reviewed: 5/1/2016  © 8922-2584 [employer]. [address] [place] [address], [place] [address]. All rights reserved. This information is not intended as a substitute for professional medical care. Always follow your healthcare professional's instructions.

## 2017-07-14 ENCOUNTER — CLINICAL SUPPORT (OUTPATIENT)
Dept: REHABILITATION | Facility: HOSPITAL | Age: 72
End: 2017-07-14
Attending: NURSE PRACTITIONER
Payer: MEDICARE
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
## 2017-07-14 PROCEDURE — 97110 THERAPEUTIC EXERCISES: CPT | Mod: PN

## 2017-07-14 PROCEDURE — 97010 HOT OR COLD PACKS THERAPY: CPT | Mod: PN

## 2017-07-14 PROCEDURE — 97140 MANUAL THERAPY 1/> REGIONS: CPT | Mod: PN

## 2017-07-14 NOTE — PROGRESS NOTES
"Name: Steve June .  Clinic Number: [id number]  Date of Treatment: 07/14/2017   Diagnosis:   Encounter Diagnosis   Name Primary?    Fall, subsequent encounter        Time in: 0908  Time Out: 1006  Total Treatment Time: 58  Group Time: 0      Subjective:    Steve reports neck and back soreness and not doing HEP 2nd to "moving and packing boxes". Patient encouraged to do HEP.  Patient reports decreased pain after therapy sessions, but returns the next day.  Patient reports their pain to be 6.5/10 on a 0-10 scale with 0 being no pain and 10 being the worst pain imaginable.    Objective    Steve received therapeutic exercises to develop strength, endurance, flexibility and posture for 40 minutes including:     NuStep Lv1 with UE's 10'  Retro shoulder rolls 3x10  Scapular retraction 3x10    Cervical ROM in sit 2x10 5" hold: flexion,extension, rotation,sidebend while holding side of chair  Chin tucks 3x10 in sit     Steve received the following manual therapy techniques for 8 minutes: Myofacial release and Soft tissue Mobilization were applied to the: B Upper and middle traps     Hot pack to neck 10 minutes in supine    Pt demo fair understanding of the education provided. Steve demonstrated good return demonstration of activities with cueing for proper form and to decrease accessory movements.     Assessment:     Pt will continue to benefit from skilled PT intervention. Medical Necessity is demonstrated by:  Fall Risk, Pain limits function of effected part for some activities, Requires skilled supervision to complete and progress HEP and Weakness.    Patient is making fair progress towards established goals. Decreased accessory movements with sit cervical ROM.  Needs encouragement to do HEP daily.    Plan:    Continue with established Plan of Care towards PT goals.   "

## 2017-07-17 ENCOUNTER — OFFICE VISIT (OUTPATIENT)
Dept: PHYSICAL MEDICINE AND REHAB | Facility: CLINIC | Age: 72
End: 2017-07-17
Payer: MEDICARE

## 2017-07-17 VITALS
HEIGHT: 69 IN | SYSTOLIC BLOOD PRESSURE: 142 MMHG | DIASTOLIC BLOOD PRESSURE: 79 MMHG | HEART RATE: 62 BPM | WEIGHT: 192 LBS | BODY MASS INDEX: 28.44 KG/M2

## 2017-07-17 DIAGNOSIS — M67.912 TENDINOPATHY OF ROTATOR CUFF, LEFT: ICD-10-CM

## 2017-07-17 DIAGNOSIS — G89.29 CHRONIC LEFT SHOULDER PAIN: Primary | ICD-10-CM

## 2017-07-17 DIAGNOSIS — M25.512 CHRONIC LEFT SHOULDER PAIN: Primary | ICD-10-CM

## 2017-07-17 PROCEDURE — 99213 OFFICE O/P EST LOW 20 MIN: CPT | Mod: 25,S$GLB,, | Performed by: PHYSICAL MEDICINE & REHABILITATION

## 2017-07-17 PROCEDURE — 99999 PR PBB SHADOW E&M-EST. PATIENT-LVL II: CPT | Mod: PBBFAC,,, | Performed by: PHYSICAL MEDICINE & REHABILITATION

## 2017-07-17 PROCEDURE — 76942 ECHO GUIDE FOR BIOPSY: CPT | Mod: S$GLB,,, | Performed by: PHYSICAL MEDICINE & REHABILITATION

## 2017-07-17 PROCEDURE — 1125F AMNT PAIN NOTED PAIN PRSNT: CPT | Mod: S$GLB,,, | Performed by: PHYSICAL MEDICINE & REHABILITATION

## 2017-07-17 PROCEDURE — 1159F MED LIST DOCD IN RCRD: CPT | Mod: S$GLB,,, | Performed by: PHYSICAL MEDICINE & REHABILITATION

## 2017-07-17 PROCEDURE — 64418 NJX AA&/STRD SPRSCAP NRV: CPT | Mod: LT,S$GLB,, | Performed by: PHYSICAL MEDICINE & REHABILITATION

## 2017-07-18 ENCOUNTER — OUTPATIENT CASE MANAGEMENT (OUTPATIENT)
Dept: ADMINISTRATIVE | Facility: OTHER | Age: 72
End: 2017-07-18

## 2017-07-18 ENCOUNTER — CLINICAL SUPPORT (OUTPATIENT)
Dept: REHABILITATION | Facility: HOSPITAL | Age: 72
End: 2017-07-18
Attending: NURSE PRACTITIONER
Payer: MEDICARE
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
## 2017-07-18 PROCEDURE — G8980 MOBILITY D/C STATUS: HCPCS | Mod: CI,PN

## 2017-07-18 PROCEDURE — 97110 THERAPEUTIC EXERCISES: CPT | Mod: PN

## 2017-07-18 NOTE — PROCEDURES
"Nerve Block  Date/Time: 7/17/2017 3:13 PM  Performed by: JENNIFER WEST  Authorized by: JENNIFER WEST   Consent Done: Yes  Time out: Immediately prior to procedure a "time out" was called to verify the correct patient, procedure, equipment, support staff and site/side marked as required.  Indications: pain relief  Body area: upper extremity  Nerve: suprascapular  Laterality: left  Patient sedated: no  Preparation: Patient was prepped and draped in the usual sterile fashion.  Patient position: sitting  Needle size: 22 G  Location technique: ultrasound guidance  Local Anesthetic: lidocaine 1% without epinephrine and bupivacaine 0.25% without epinephrine  Anesthetic total: 4 mL  Outcome: pain improved  Patient tolerance: Patient tolerated the procedure well with no immediate complications  Comments: Ultrasound guidance was used for correct needle placement, the images were saved will be uploaded to EMR.          "

## 2017-07-18 NOTE — PROGRESS NOTES
[facility]    CHIEF COMPLAINT:   Chief Complaint   Patient presents with    Shoulder Pain     left    Neck Pain     HISTORY OF PRESENT ILLNESS: Steve June Jr. is a 72 y.o. male who presents to me in follow-up regarding his left shoulder pain.  I last saw him about 3 weeks ago where we performed trigger point injections in the left acromioclavicular joint corticosteroid injection.  He reports no noticeable relief from either of these injections.  Furthermore, he suffered significant hyperglycemia following the injection of corticosteroid and had to be treated in the emergency department.  He presents today with persistent left shoulder pain.  He rates the pain as a 6 on a scale of 1-10 on average.  He has been going to physical therapy and he does feel this is improving the shoulder.  He denies any numbness or tingling of the left upper extremity.  He denies any radiation of pain into the arm or hand.  His primary pain is centered over his left shoulder.    Previous history of present illness  He reports the pain began after a fall about 3 months ago.  He reports he fell on the tip of his left shoulder.  He rates his pain as a 6 on a scale of 1-10 in intensity.  He also struck his head in the process. Complains of left-sided neck and shoulder pain.  Has been in physical therapy for the last 1 month but notes no significant improvement.  He denies any numbness or tingling of the left upper extremity.  He describes the pain as sore.  The pain is reduced with rest, and increased with increased use of the left upper extremity.  Has had no prior injections of the area.  The pain is constant and has been stable over recent weeks.    Review of Systems   Constitutional: Negative for fever.   HENT: Negative for drooling.    Eyes: Negative for discharge.   Respiratory: Negative for choking.    Cardiovascular: Negative for chest pain.   Genitourinary: Negative for flank pain.   Skin: Negative for  wound.   Allergic/Immunologic: Negative for immunocompromised state.   Neurological: Negative for tremors and syncope.   Psychiatric/Behavioral: Negative for behavioral problems.     Past Medical History:   Past Medical History:   Diagnosis Date    Abnormal thyroid function test     Allergy     Seasonal    Anemia     Arthritis     Gaviria esophagus     Basal cell carcinoma     right forearm    Basal cell carcinoma 12/2011    lower post neck    Cancer     skin CA    Cataract     Cirrhosis     Encounter for blood transfusion     Esophageal varices in cirrhosis     grade II on 7/12 EGD    Gastritis     on 7/12 EGD    GERD (gastroesophageal reflux disease) 2/28/2015    Hard of hearing     Hiatal hernia     History of hepatitis C 8/10/2012    tx with harvoni x 41 days (started 10/22/15). 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    Valvular heart disease     mild MR 12       Past Surgical History:   Past Surgical History:   Procedure Laterality Date    CATARACT EXTRACTION  1/10/13    left eye    CATARACT EXTRACTION      right eye    CHOLECYSTECTOMY      COLONOSCOPY      EYE SURGERY      Cataract surgery to right eye    KNEE ARTHROSCOPY W/ MENISCAL REPAIR      KNEE CARTILAGE SURGERY      left knee    KNEE SURGERY  12/2006    left    SKIN LESION EXCISION      TONSILLECTOMY      UPPER GASTROINTESTINAL ENDOSCOPY         Family History:   Family History   Problem Relation Age of Onset    Leukemia Mother     Cancer Mother      bone    Alcohol abuse Father     Cirrhosis Father      EtOH induced    Amblyopia Neg Hx     Blindness Neg Hx     Cataracts Neg Hx     Diabetes Neg Hx     Glaucoma Neg Hx     Hypertension Neg Hx   "   Macular degeneration Neg Hx     Retinal detachment Neg Hx     Strabismus Neg Hx     Stroke Neg Hx     Thyroid disease Neg Hx     Psoriasis Neg Hx     Lupus Neg Hx     Eczema Neg Hx     Acne Neg Hx     Melanoma Neg Hx        Medications:   Current Outpatient Prescriptions on File Prior to Visit   Medication Sig Dispense Refill    ACCU-CHEK VEENA PLUS METER [facility]       ACCU-CHEK SOFTCLIX LANCETS [facility] USE TO TEST BLOOD SUGAR TWICE DAILY AS DIRECTED 100 each 3    blood sugar diagnostic Strp Accu-chek veena plus test strip. Test BG 3 x day 100 strip 12    carvedilol (COREG) 6.25 MG tablet Take 1 tablet (6.25 mg total) by mouth 2 (two) times daily. (Patient taking differently: Take 6.25 mg by mouth once daily. ) 60 tablet 2    fluorouracil (EFUDEX) 5 % cream AAA scalp bid x 2 weeks, do not start treating until healed from cryo 40 g 1    GLUCOSAMINE HCL/CHONDR CHISHOLM A NA (OSTEO BI-FLEX ORAL) Take 2 tablets by mouth once daily.      hydrocodone-acetaminophen 10-325mg (NORCO)  mg Tab Take 1 tablet by mouth 4 (four) times daily. 120 tablet 0    insulin detemir (LEVEMIR FLEXTOUCH) 100 unit/mL (3 mL) SubQ InPn pen Inject 18 Units into the skin every evening. 1 Box 11    loratadine-pseudoephedrine  mg (CLARITIN-D 24-HOUR)  mg per 24 hr tablet Take 1 tablet by mouth once daily.      metformin (GLUCOPHAGE) 1000 MG tablet TAKE 1 TABLET BY MOUTH TWICE DAILY WITH MEALS 180 tablet 1    nystatin (MYCOSTATIN) 100,000 unit/mL suspension TAKE 4ML BY MOUTH TWICE DAILY 60 mL 5    pen needle, diabetic (BD ULTRA-FINE KORINA PEN NEEDLES) 32 gauge x 5/32" Ndle 10 Units by Misc.(Non-Drug; Combo Route) route once daily at 6am. 30 each 11     No current facility-administered medications on file prior to visit.        Allergies:   Review of patient's allergies indicates:   Allergen Reactions    Adhesive tape-silicones Other (See Comments)     pulls skin off    Doxycycline      Dizzy. "Just didn't feel right". " "      Social History:   Social History     Social History    Marital status:      Spouse name: N/A    Number of children: 5    Years of education: N/A     Social History Main Topics    Smoking status: Former Smoker     Packs/day: 1.00     Years: 25.00     Quit date: 8/9/2000    Smokeless tobacco: Never Used    Alcohol use No      Comment: states he stopped drinking approx. 7 yrs ago/"I might drink a beer every 2 weeks"    Drug use: No    Sexual activity: No     Other Topics Concern    None     Social History Narrative    He is .  He has children.  He is currently retired.     PHYSICAL EXAMINATION:   General    Vitals:    07/17/17 1524   BP: (!) 142/79   BP Location: Right arm   Patient Position: Sitting   BP Method: Automatic   Pulse: 62   Weight: 87.1 kg (192 lb)   Height: 5' 9" (1.753 m)     Constitutional: Oriented to person, place, and time. No apparent distress. Appears well-developed and well-nourished. Pleasant.  HENT:   Head: Normocephalic and atraumatic.   Eyes: Right eye exhibits no discharge. Left eye exhibits no discharge. No scleral icterus.   Pulmonary/Chest: Effort normal. No respiratory distress.   Abdominal: There is no guarding.   Neurological: Alert and oriented to person, place, and time.   Psychiatric: Behavior is normal.   Right Shoulder Exam   Right shoulder exam is normal.    Tenderness   The patient is experiencing no tenderness.        Range of Motion   Active Abduction: normal   Passive Abduction: normal   Forward Flexion: normal   External Rotation: normal   Internal Rotation 90 degrees: normal     Muscle Strength   Abduction: 5/5   Internal Rotation: 5/5   External Rotation: 5/5   Biceps: 5/5     Other   Erythema: absent  Scars: absent  Sensation: normal  Pulse: present      Left Shoulder Exam     Tenderness   Left shoulder tenderness location: He has tenderness over multiple spots over the left cervical paraspinals and trapezial musculature, also tender over the " greater tuberosity of the humerus.    Range of Motion   Active Abduction: 130   Passive Abduction: 160   Forward Flexion: 150   External Rotation: 80   Internal Rotation 90 degrees: 50     Muscle Strength   Abduction: 4/5   Internal Rotation: 5/5   External Rotation: 4/5   Biceps: 5/5     Tests   Apprehension: negative  Cross Arm: negative  Drop Arm: negative  Hawkin's test: positive  Impingement: positive  Sulcus: absent    Other   Erythema: absent  Scars: absent  Sensation: normal  Pulse: present         INSPECTION: There is no swelling, ecchymoses, erythema or gross deformity about the left shoulder.    Imaging  X-ray of the left shoulder from 5/16/2017: There is generalized loss of bone mineral density. Moderate left a.c. joint osteophytosis is seen. No fracture line or dislocation is seen. No focal osseous lesion is seen. The visualized left sided ribs appear intact.    Diagnostic Ultrasound Exam:  FINDINGS: Real time examination was performed. Selected static and dynamic images were obtained, and correlated with prior x-ray and other available imaging.     The left shoulder was examined and findings were as follows: the long head of the biceps tendon was observed to have a normal fibular appearance and was positioned appropriately in the bicipital groove. The biceps tendon did not reveal subluxation on dynamic imaging. There is no visible evidence for coracohumeral impingement. The acromioclavicular joint has some cortical irregularity as well as hypoechoic swelling. There is no evidence for abnormal translation of the acromioclavicular joint on cross-body adduction.     The rotator cuff was examined in detail. The subscapularis is normal in echotexture, however there was significant cortical regularity at the distal attachment site upon the humerus. The supraspinatus was abnormal with a moderate degree of heterogenous echotexture consistent with tendinopathy.  Was also significant cortical regularity of the  humeral attachment site indicative of chronic tearing.  There was some increase hypoechoic regions seen in the anterior footprint attachment suggestive of chronic high-grade partial tearing. The infraspinatus was heterogenous in internal echotexture consistent with tendonopathy. The teres minor was not assessed. The subacromial/subdeltoid bursa was thickened and inflated with a small degree of hypoechoic fluid. There is no evidence of impingement involving the rotator cuff under the coracoacromial arch on dynamic testing, and there were fibers of the supraspinatus tendon moving in unison with the humeral head, consistent with the above-noted partial tear of the rotator cuff.    There does not appear to be any effusion within the posterior glenohumeral recess. The posterosuperior glenoid labrum is somewhat irregular. The spinoglenoid notch is normal, without a suprascapular ganglion cyst.    Data Reviewed: X-ray, ultrasound    Supportive Actions: Independent visualization of images or test specimens    ASSESSMENT:   1. Chronic left shoulder pain    2. Tendinopathy of rotator cuff, left      PLAN:     1.  Unfortunately, he did not respond to the prior injections last visit, indicating pain generation from other structures.  His physical exam today is more consistent with rotator cuff pathology.  A diagnostic ultrasound examination was performed of the left shoulder revealing significant rotator cuff tendinopathy and partial tearing.  We discussed that his options are somewhat limited secondary to his bout of hypoglycemia from the prior injection of corticosteroid.  He also has multiple medical comorbid use it may make surgical repair difficult.  We discussed utility performing suprascapular nerve interventions.  With the radiofrequency ablation he may get several months of pain reduction using this low risk procedure.  He wishes to pursue this option and we will first proceed with injection of the suprascapular nerve  today with anesthetic only.  If he does notice significant reduction shoulder pain, we may proceed with the radiofrequency ablation procedure.    2.  I encouraged him to continue with formal physical therapy and transition to a home exercise program.    3.  We will contact him by phone in about 3 days to assess his response to today's procedure.    The above note was completed, in part, with the aid of Dragon dictation software/hardware. Translation errors may be present.

## 2017-07-18 NOTE — PROGRESS NOTES
07/18/2017 RNGLADYS contacted patient to screen for [facility] enrollment. Spoke with patient who accepted enrollment. Patient is a 72 year old male with a prior medical history of Hepatitis C, Hepatic Cirrhosis, Type 2 Diabetes, Diabetic Polyneuropathy, Pancytopenia, Chronic Low Back Pain, Essential Hypertension, Malignant Hypertension, GERD, Thrombocytopenia, Esophageal Varices, PVD, Chronic Laryngitis, Purpura and Pancytopenia. Patient was recently started on insulin and states that his blood glucose levels have greatly improved. We reviewed problem list and started the initial assessment when patient asked if we could finish tomorrow morning. He was driving in the rain and had pulled over to talk to me. He stated that he had several more errands to run and did not want to be late. Patient asked if I could call him back tomorrow morning around 11:00 to complete enrollment.

## 2017-07-19 ENCOUNTER — OUTPATIENT CASE MANAGEMENT (OUTPATIENT)
Dept: ADMINISTRATIVE | Facility: OTHER | Age: 72
End: 2017-07-19
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
## 2017-07-19 NOTE — PROGRESS NOTES
"07/19/2017  RN-CM contacted patient today to complete enrollment. Patient stated that he thought about it overnight and changed his mind about enrolling. Stated he just has "too much going on right now" and doesn't feel like he needs the services at this time. Requested that I mail him the [facility] brochure and my business card and will contact in the future if he feels that he needs the services. Will close case at this time and dis-enroll patient.  "

## 2017-07-19 NOTE — PROGRESS NOTES
"Name: Steve June .  Clinic Number: [id number]  Date of Treatment: 07/18/2017  Diagnosis:   Encounter Diagnosis   Name Primary?    Fall, subsequent encounter        Time in: 0910  Time Out: 1003  Total Treatment Time: 49  Group Time: 0      Subjective:    Steve reports improvement of symptoms.  "The doctor says I have tears in my rotator cuff so I need surgery but I'm not gonna have that because of my heart.  And I can't do the cortisone injections because of my blood sugar. So he's gonna try killing the nerve."  Patient reports their pain to be 6/10 (cervical) on a 0-10 scale with 0 being no pain and 10 being the worst pain imaginable.    Objective    Patient received individual therapy to increase strength, endurance, flexibility and core stabilization with 0 patients with activities as follows:     Steve received therapeutic exercises to develop strength, endurance, flexibility and core stabilization for 49 minutes including:    UBE x 5'   Pulley (A) ROM for scaption x 4'   Upper traps stretch 3x30s   Levator scap stretch 3x30s   Chin tuck x 15   Cervical SB x 15   Cervical rotation x 15   Seated hamstring stretch 3x30s   Posterior pelvic tilt x 30   Hook lying ball squeeze x 30   Hook lying clamshell w/green tband x 30   Supine march x 20   Supine bridge x 20  Recheck for D/C summary    Written Home Exercises Provided: Issued illustrated HEP instructions and green tband.    Pt demo good understanding of the education provided. Steve demonstrated fair return demonstration of activities.     Assessment:   Pt demonstrated improved trunk and shoulder ROM; however, he continues to report pain of 6/10 in L hip and neck.  MD recommended completing therapy with this visit.      Pt will not continue to benefit from skilled PT intervention. Medical Necessity is no longer demonstrated; no further need for PT services at this time.    Patient has made fair progress towards established goals.    New/Revised goals: " N/A      Plan:  D/C PT at this time. Pt will need to continue HEP.

## 2017-07-19 NOTE — PLAN OF CARE
REHAB SERVICES OUTPATIENT DISCHARGE SUMMARY  Physical Therapy      Name:  Steve June Jr.  Date:  07/18/2017  Date of Evaluation:  5/19/2017  Physician:  Ny Lujan NP  Total # Of Visits:  13  Cancelled:  See EMR  No Shows:  See EMR  Diagnosis:    1. Fall, subsequent encounter         Physical/Functional Status:  At time of discharge, patient was able to Utilize L UE for dressing, bathing, reaching overhead without discomfort.  Pt ambulates without devices but continues to complain of pain in L lateral hip.   Pain current 6/10,   Posture:  Sitting flattened lumbar lordosis, minimally increased thoracic kyphosis, min to mod rounded shoulder posture.    ROM   Cervical A/PROM: Pain/Dysfunction with Movement:   Flexion                        52*/60*     Extension                    58*/65*     Right side bending      28*/34*     Left side bending        29*/37*      Right rotation              50*/55*     Left rotation                 48*/50*     B UE  Grossly WFL, 4/5.  Palpation:  Tenderness L lateral hip.  Flexibility:  Hamstrings min to mod tightness B; Gastrocs minimal to moderate tightness B.    Min to mod tightness B upper traps/levator scap L more than R  NDI  22/50 = 44% disability gcode D/C Mobility CK, Goal mobility CI (updated)  LEFS 38/80 = 0.475 = 52.5% disability    The patient is to be discharged from our Therapy service for the following reason(s):  Patient has reached the maximum rehab potential for the present time    Degree of Goal Achievement:  Patient has partially met goals    Patient Education:  Instructed pt in HEP    Discharge Plan:  Home Program:  Illustrated instructions issued.

## 2017-07-20 ENCOUNTER — TELEPHONE (OUTPATIENT)
Dept: FAMILY MEDICINE | Facility: CLINIC | Age: 72
End: 2017-07-20

## 2017-07-20 ENCOUNTER — TELEPHONE (OUTPATIENT)
Dept: PHYSICAL MEDICINE AND REHAB | Facility: CLINIC | Age: 72
End: 2017-07-20

## 2017-07-20 NOTE — TELEPHONE ENCOUNTER
----- Message from Rolf Silva MD sent at 7/20/2017  1:40 PM CDT -----  Please give him a call to see how he did following the nerve block.    ----- Message -----  From: Rolf Silva MD  Sent: 7/17/2017  11:14 PM  To: Rolf Silva MD

## 2017-07-20 NOTE — TELEPHONE ENCOUNTER
Pt is stating that PT didnt really help, went to Dr. Silva and was told he needs surgery on his shoulder.  Pt cannot have surgery due to platelets, but he was given injection. It helped, but until he gets the nerve burned, he will do injections.   Pt was told to contact pcp in regards to getting a referral for neck pain as Dr. Silva does not treat neck and back. Please advise.

## 2017-07-20 NOTE — TELEPHONE ENCOUNTER
He's already established with Dr Mario (pain mgmt) who can do this. Can schedule with pain mgmt at convenience.

## 2017-07-21 NOTE — TELEPHONE ENCOUNTER
Pt jn for appt to see pain management. Pt offered first available appt with Dr. Mario on 08/04 but states that he needs to be seen sooner. Pt jn for 07/24 with Dr. Felix. Pt verbalized understanding.

## 2017-07-22 ENCOUNTER — OUTPATIENT CASE MANAGEMENT (OUTPATIENT)
Dept: ADMINISTRATIVE | Facility: OTHER | Age: 72
End: 2017-07-22

## 2017-07-22 NOTE — PROGRESS NOTES
Please note an Outpatient Complex Care Management welcome packet and consent form was not  created and mailed to the patient he declined services before the assessment could be completed.    Please contact [facility] at [phone] with any questions.    Thank you,    Talia Pichardo, SSC

## 2017-07-24 ENCOUNTER — TELEPHONE (OUTPATIENT)
Dept: PAIN MEDICINE | Facility: CLINIC | Age: 72
End: 2017-07-24

## 2017-07-24 ENCOUNTER — TELEPHONE (OUTPATIENT)
Dept: FAMILY MEDICINE | Facility: CLINIC | Age: 72
End: 2017-07-24

## 2017-07-24 DIAGNOSIS — M54.50 CHRONIC BILATERAL LOW BACK PAIN WITHOUT SCIATICA: Primary | ICD-10-CM

## 2017-07-24 DIAGNOSIS — G89.29 CHRONIC BILATERAL LOW BACK PAIN WITHOUT SCIATICA: Primary | ICD-10-CM

## 2017-07-24 DIAGNOSIS — D69.6 THROMBOCYTOPENIA: ICD-10-CM

## 2017-07-24 NOTE — TELEPHONE ENCOUNTER
Pt has been informed, he can not have pain procedures done due to low platelets and was advised to discuss other tx options with PCP.

## 2017-07-24 NOTE — TELEPHONE ENCOUNTER
----- Message from Rebeca Barriga sent at 7/24/2017 11:40 AM CDT -----  Contact: self  Patient [phone] is calling/Dr Womack sent patient to a shoulder specialist and pain management for neck and back but doctors will not treat patient due to his low white platelet count/please call patient to discuss

## 2017-07-25 NOTE — TELEPHONE ENCOUNTER
Pain management for medications. He can go back to Dr Mario ([facility] pain mgmt, already established) or outside prov.  Referral done.

## 2017-07-25 NOTE — TELEPHONE ENCOUNTER
Offered patient appointment with Dr. Mario on 7/26/17 to discuss medication. Patient stated that he would also like to discuss neck pain. Informed patient that we will not be able to do any procedure due to his low platelets. Patient voiced understanding.

## 2017-07-25 NOTE — TELEPHONE ENCOUNTER
----- Message from Anne-Marie Moulton sent at 7/25/2017  8:56 AM CDT -----  Contact: self  Patient called regarding a message left yesterday for an issue regarding his appt was cancelled. Stating no callback was given. Please contact [phone] (home)

## 2017-07-25 NOTE — TELEPHONE ENCOUNTER
Spoke with pt, he states he was supposed to see pain management and pmr and his appt was cancelled due to low platelets.  Dr. Womack would like pain mgmt to treat with medication at this point, she does not treat chronic pain and will be on maternity leave as of today.

## 2017-07-26 ENCOUNTER — TELEPHONE (OUTPATIENT)
Dept: PHYSICAL MEDICINE AND REHAB | Facility: CLINIC | Age: 72
End: 2017-07-26

## 2017-07-26 ENCOUNTER — OFFICE VISIT (OUTPATIENT)
Dept: PAIN MEDICINE | Facility: CLINIC | Age: 72
End: 2017-07-26
Payer: MEDICARE

## 2017-07-26 VITALS
BODY MASS INDEX: 28.44 KG/M2 | DIASTOLIC BLOOD PRESSURE: 82 MMHG | SYSTOLIC BLOOD PRESSURE: 137 MMHG | WEIGHT: 192 LBS | HEART RATE: 62 BPM | HEIGHT: 69 IN

## 2017-07-26 DIAGNOSIS — M50.30 DDD (DEGENERATIVE DISC DISEASE), CERVICAL: Primary | ICD-10-CM

## 2017-07-26 DIAGNOSIS — G89.29 CHRONIC LEFT SHOULDER PAIN: Primary | ICD-10-CM

## 2017-07-26 DIAGNOSIS — M47.812 OSTEOARTHRITIS OF CERVICAL SPINE, UNSPECIFIED SPINAL OSTEOARTHRITIS COMPLICATION STATUS: ICD-10-CM

## 2017-07-26 DIAGNOSIS — M25.512 CHRONIC LEFT SHOULDER PAIN: Primary | ICD-10-CM

## 2017-07-26 PROCEDURE — 1159F MED LIST DOCD IN RCRD: CPT | Mod: S$GLB,,, | Performed by: ANESTHESIOLOGY

## 2017-07-26 PROCEDURE — 99999 PR PBB SHADOW E&M-EST. PATIENT-LVL III: CPT | Mod: PBBFAC,,, | Performed by: ANESTHESIOLOGY

## 2017-07-26 PROCEDURE — 99213 OFFICE O/P EST LOW 20 MIN: CPT | Mod: S$GLB,,, | Performed by: ANESTHESIOLOGY

## 2017-07-26 PROCEDURE — 1125F AMNT PAIN NOTED PAIN PRSNT: CPT | Mod: S$GLB,,, | Performed by: ANESTHESIOLOGY

## 2017-07-26 RX ORDER — OXYCODONE AND ACETAMINOPHEN 10; 325 MG/1; MG/1
1 TABLET ORAL EVERY 8 HOURS PRN
Qty: 90 TABLET | Refills: 0 | Status: SHIPPED | OUTPATIENT
Start: 2017-08-08 | End: 2017-08-23

## 2017-07-26 RX ORDER — MIDAZOLAM HYDROCHLORIDE 5 MG/ML
2 INJECTION INTRAMUSCULAR; INTRAVENOUS ONCE AS NEEDED
Status: CANCELLED | OUTPATIENT
Start: 2017-07-26 | End: 2017-07-26

## 2017-07-26 RX ORDER — SODIUM CHLORIDE, SODIUM LACTATE, POTASSIUM CHLORIDE, CALCIUM CHLORIDE 600; 310; 30; 20 MG/100ML; MG/100ML; MG/100ML; MG/100ML
INJECTION, SOLUTION INTRAVENOUS CONTINUOUS
Status: CANCELLED | OUTPATIENT
Start: 2017-07-26

## 2017-07-26 RX ORDER — LIDOCAINE HYDROCHLORIDE 10 MG/ML
1 INJECTION, SOLUTION EPIDURAL; INFILTRATION; INTRACAUDAL; PERINEURAL ONCE
Status: CANCELLED | OUTPATIENT
Start: 2017-07-26 | End: 2017-07-26

## 2017-07-26 NOTE — TELEPHONE ENCOUNTER
----- Message from Cindy Cruz sent at 7/26/2017  9:46 AM CDT -----  Patient needs to speak with nurse concerning cancelled procedure/has question/please call back at [phone] to advise.

## 2017-07-26 NOTE — PROGRESS NOTES
"This note was completed with dictation software and grammatical errors may exist.    Referring Physician: No ref. provider found    PCP: Alie Womack MD      CC: neck and left shoulder pain    Interval history:  Patient returns to our clinic.  He presents with worsening neck and left shoulder pain.  He had a fall 3 months ago and fell on his left shoulder.  Complains of left-sided neck and shoulder pain.   he has tried physical therapy which did not provide much benefit.  He underwent a left shoulder injection with Dr. Silva with moderate benefit but had a side effect with hyperglycemia.  He is scheduled for post-radiofrequency ablation of the left shoulder.  He presents to us with worsening posterior neck pain.  He does have history of cervical DDD.  However, he continues to have low platelets and we are unable to provide any neuro axonal interventional procedures.  He takes Norco 10 mg every 6 hours as needed with mild to moderate benefit.  This is prescribed by his PCP, however, she will be going on maternity leave in the near future.    Prior HPI:   Steve June Jr. is a 72 y.o. male referred to us for lower back and knee pain.  He has significant history of pancytopenia, cirrhosis.  He presents with constant aching, sharp pain in his lower back as well as his left knee.  Pain worsens sitting, standing, bending, walking.  Pain improves with rest.  X-rays performed of the lumbar spine as well as knee shows left knee osteoarthritis.  He has tried physical therapy with minimal benefit.  He currently takes Norco 10 mg every 6 hours as needed with mild to moderate benefit.  He is unable to have any procedures due to his thrombocytopenia.  He also has ventral hernia, but surgical attention is currently not recommended due to his thrombocytopenia.  His main concern today is wishing to decrease his opioid medications.  He states being very "foggy" with his current medications.  He denies any increased " sedation.  He denies any weakness.  No bowel bladder changes.    ROS:  CONSTITUTIONAL: No fevers, chills, night sweats, wt. loss, appetite changes  SKIN: no rashes or itching  ENT: No headaches, head trauma, vision changes, or eye pain  LYMPH NODES: None noticed   CV: No chest pain, palpitations.   RESP: No shortness of breath, dyspnea on exertion, cough, wheezing, or hemoptysis  GI: No nausea, emesis, diarrhea, constipation, melena, hematochezia, pain.    : No dysuria, hematuria, urgency, or frequency   HEME: No easy bruising, bleeding problems  PSYCHIATRIC: No depression, anxiety, psychosis, hallucinations.  NEURO: No seizures, memory loss, dizziness or difficulty sleeping  MSK: + History of present illness      Past Medical History:   Diagnosis Date    Abnormal thyroid function test     Allergy     Seasonal    Anemia     Arthritis     Gaviria esophagus     Basal cell carcinoma     right forearm    Basal cell carcinoma 12/2011    lower post neck    Cancer     skin CA    Cataract     Cirrhosis     Encounter for blood transfusion     Esophageal varices in cirrhosis     grade II on 7/12 EGD    Gastritis     on 7/12 EGD    GERD (gastroesophageal reflux disease) 2/28/2015    Hard of hearing     Hiatal hernia     History of hepatitis C 8/10/2012    tx with harvoni x 41 days (started 10/22/15). 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    Valvular heart disease     mild MR 12     Past Surgical History:   Procedure Laterality Date    CATARACT EXTRACTION  1/10/13    left eye    CATARACT EXTRACTION      right eye    CHOLECYSTECTOMY      COLONOSCOPY      EYE SURGERY      Cataract surgery to right eye    KNEE ARTHROSCOPY W/  "MENISCAL REPAIR      KNEE CARTILAGE SURGERY      left knee    KNEE SURGERY  12/2006    left    SKIN LESION EXCISION      TONSILLECTOMY      UPPER GASTROINTESTINAL ENDOSCOPY       Family History   Problem Relation Age of Onset    Leukemia Mother     Cancer Mother      bone    Alcohol abuse Father     Cirrhosis Father      EtOH induced    Amblyopia Neg Hx     Blindness Neg Hx     Cataracts Neg Hx     Diabetes Neg Hx     Glaucoma Neg Hx     Hypertension Neg Hx     Macular degeneration Neg Hx     Retinal detachment Neg Hx     Strabismus Neg Hx     Stroke Neg Hx     Thyroid disease Neg Hx     Psoriasis Neg Hx     Lupus Neg Hx     Eczema Neg Hx     Acne Neg Hx     Melanoma Neg Hx      Social History     Social History    Marital status:      Spouse name: N/A    Number of children: 5    Years of education: N/A     Social History Main Topics    Smoking status: Former Smoker     Packs/day: 1.00     Years: 25.00     Quit date: 8/9/2000    Smokeless tobacco: Never Used    Alcohol use No      Comment: states he stopped drinking approx. 7 yrs ago/"I might drink a beer every 2 weeks"    Drug use: No    Sexual activity: No     Other Topics Concern    None     Social History Narrative    He is .  He has children.  He is currently retired.         Medications/Allergies: See med card    Vitals:    07/26/17 1043   BP: 137/82   Pulse: 62   Weight: 87.1 kg (192 lb)   Height: 5' 9" (1.753 m)   PainSc:   8   PainLoc: Neck         Physical exam:    GENERAL: A and O x3, the patient appears well groomed and is in no acute distress.  Skin: No rashes or obvious lesions  HEENT: normocephalic, atraumatic  CARDIOVASCULAR:  Palpable peripheral pulses  LUNGS: easy work of breathing  ABDOMEN: soft, nontender, + sizaeable ventral hernia in epigastric region.    UPPER EXTREMITIES: Normal alignment, normal range of motion, no atrophy, no skin changes,  hair growth and nail growth normal and equal " bilaterally. No swelling, no tenderness.    LOWER EXTREMITIES:  Normal alignment, normal range of motion, no atrophy, no skin changes,  hair growth and nail growth normal and equal bilaterally. No swelling, no tenderness.    LUMBAR SPINE  Lumbar spine: ROM is full with flexion extension and oblique extension with moderate increased pain.    Erasmo's test causes no increased pain on either side.    Supine straight leg raise is negative bilaterally.    Internal and external rotation of the hip causes no increased pain on either side.  Myofascial exam: No tenderness to palpation across lumbar paraspinous muscles.      MENTAL STATUS: normal orientation, speech, language, and fund of knowledge for social situation.  Emotional state appropriate.    CRANIAL NERVES:  II:  PERRL bilaterally,   III,IV,VI: EOMI.    V:  Facial sensation equal bilaterally  VII:  Facial motor function normal.  VIII:  Hearing equal to finger rub bilaterally  IX/X: Gag normal, palate symmetric  XI:  Shoulder shrug equal, head turn equal  XII:  Tongue midline without fasciculations      MOTOR: Tone and bulk: normal bilateral upper and lower Strength: normal   Delt Bi Tri WE WF     R 5 5 5 5 5 5   L 5 5 5 5 5 5     IP ADD ABD Quad TA Gas HAM  R 5 5 5 5 5 5 5  L 5 5 5 5 5 5 5    SENSATION: Light touch and pinprick intact bilaterally  REFLEXES: normal, symmetric, nonbrisk.  Toes down, no clonus. No hoffmans.  GAIT: normal rise, base, steps, and arm swing.        Imaging:  Xray L-spine 4/2013   Alignment is otherwise normal.  Vertebral body heights and disc space heights are relatively well maintained.  Vertebral end plate osteophytes are present anteriorly   at multiple levels.  The facet joints and posterior elements are unremarkable         Xray Knee 12/2013  Degenerative change of the knees, left greater than right.    Assessment:  Patient referred for lower back and left knee pain  1. DDD (degenerative disc disease), cervical    2.  Osteoarthritis of cervical spine, unspecified spinal osteoarthritis complication status          Plan:  - I have stressed the importance of physical activity and exercise to improve overall health  - Any interventional procedures will be deferred due to his low platelet count.  Patient expressed understanding.  - Continue evaluation with PM&R, Dr. Silva  - He does request medication.  He wishes to switch to Percocet.  He can take Percocet 10 mg every 8 hours as needed once his current prescription is over.  - Follow-up 1 month

## 2017-07-27 ENCOUNTER — TELEPHONE (OUTPATIENT)
Dept: FAMILY MEDICINE | Facility: CLINIC | Age: 72
End: 2017-07-27

## 2017-07-27 NOTE — TELEPHONE ENCOUNTER
Appointment to see me. He has seen physical medicine and he has disc disease in his neck. I will discuss with him the choices available to him.

## 2017-07-27 NOTE — TELEPHONE ENCOUNTER
----- Message from Tami Lal sent at 7/26/2017  1:16 PM CDT -----  Patient would like to speak to office concerning his neck. Please call back for details at [phone].

## 2017-07-27 NOTE — TELEPHONE ENCOUNTER
Spoke with pt, he is seeing Dr. Mario for neck pain, he would like to see an orthopedist to find out what is wrong with his neck, not just treat the pain.

## 2017-07-27 NOTE — TELEPHONE ENCOUNTER
----- Message from Lacey Alcantara sent at 7/27/2017  2:47 PM CDT -----  Contact: patient  Patient calling in regards to speaking with a Nurse about a referral for pain management. Please advise.  Call back   Thanks!

## 2017-07-27 NOTE — TELEPHONE ENCOUNTER
----- Message from Jason Graham sent at 7/27/2017  9:01 AM CDT -----  Contact: same  Patient called in and stated he was waiting to hear back from nurse regarding getting a referral for a neck & back physician.  Patient call back number is [phone]

## 2017-08-01 ENCOUNTER — TELEPHONE (OUTPATIENT)
Dept: FAMILY MEDICINE | Facility: CLINIC | Age: 72
End: 2017-08-01

## 2017-08-01 NOTE — TELEPHONE ENCOUNTER
Patient informed, verbalized understanding that Dr. Justice is not taking any new patients.  Appointment was scheduled with patient for Saturday, 8/12/17 at 9:00 am and an appointment letter was mailed.

## 2017-08-01 NOTE — TELEPHONE ENCOUNTER
----- Message from Yovana Disla RT sent at 8/1/2017  8:15 AM CDT -----  Contact: pt    pt , requesting an appt as soon as possible with Dr. Justice to [facility] / Diabetes and his other medical conditions, thanks.

## 2017-08-12 ENCOUNTER — OFFICE VISIT (OUTPATIENT)
Dept: FAMILY MEDICINE | Facility: CLINIC | Age: 72
End: 2017-08-12
Payer: MEDICARE

## 2017-08-12 ENCOUNTER — TELEPHONE (OUTPATIENT)
Dept: FAMILY MEDICINE | Facility: CLINIC | Age: 72
End: 2017-08-12

## 2017-08-12 VITALS
BODY MASS INDEX: 28.96 KG/M2 | SYSTOLIC BLOOD PRESSURE: 145 MMHG | HEIGHT: 69 IN | DIASTOLIC BLOOD PRESSURE: 83 MMHG | HEART RATE: 64 BPM | WEIGHT: 195.56 LBS

## 2017-08-12 DIAGNOSIS — I10 ESSENTIAL HYPERTENSION: Primary | ICD-10-CM

## 2017-08-12 DIAGNOSIS — M54.2 NECK PAIN: ICD-10-CM

## 2017-08-12 PROCEDURE — 1159F MED LIST DOCD IN RCRD: CPT | Mod: S$GLB,,, | Performed by: FAMILY MEDICINE

## 2017-08-12 PROCEDURE — 3077F SYST BP >= 140 MM HG: CPT | Mod: S$GLB,,, | Performed by: FAMILY MEDICINE

## 2017-08-12 PROCEDURE — 3008F BODY MASS INDEX DOCD: CPT | Mod: S$GLB,,, | Performed by: FAMILY MEDICINE

## 2017-08-12 PROCEDURE — 1125F AMNT PAIN NOTED PAIN PRSNT: CPT | Mod: S$GLB,,, | Performed by: FAMILY MEDICINE

## 2017-08-12 PROCEDURE — 99999 PR PBB SHADOW E&M-EST. PATIENT-LVL II: CPT | Mod: PBBFAC,,, | Performed by: FAMILY MEDICINE

## 2017-08-12 PROCEDURE — 99214 OFFICE O/P EST MOD 30 MIN: CPT | Mod: S$GLB,,, | Performed by: FAMILY MEDICINE

## 2017-08-12 PROCEDURE — 3079F DIAST BP 80-89 MM HG: CPT | Mod: S$GLB,,, | Performed by: FAMILY MEDICINE

## 2017-08-12 NOTE — TELEPHONE ENCOUNTER
Dr. Skaggs put a referral in for this patient to be evaluated and treated for his pain, can you assist patient in getting an appointment scheduled, I was unable to.    Thanks maikol    Ext [phone]

## 2017-08-12 NOTE — PROGRESS NOTES
Subjective:       Patient ID: Steve June Jr. is a 72 y.o. male.    Chief Complaint: Hypertension    HPI     Hypertension  Patient reports compliance with medications.  Currently, the patient is taking Coreg.  The patient reports home BP measurements of 130's/80's.  Pt states that he has not taken his medications on today.  Pt does not engage in regular exercise and has attempted to maintain a low sodium diet   Patient further reports no edema/urinary changes.  Currently, the pt does not smoke.     Review of Systems   Constitutional: Negative for chills and fever.   Respiratory: Negative for chest tightness and shortness of breath.    Cardiovascular: Negative for chest pain and leg swelling.   Gastrointestinal: Negative for constipation, diarrhea and vomiting.   Genitourinary: Negative for decreased urine volume, dysuria and hematuria.   Musculoskeletal: Positive for arthralgias, back pain and neck pain.   Neurological: Negative for weakness and light-headedness.       Objective:      Physical Exam   Constitutional: He appears well-developed and well-nourished. No distress.   HENT:   Head: Normocephalic and atraumatic.   Mouth/Throat: Oropharynx is clear and moist. No oropharyngeal exudate.   Eyes: EOM are normal. Pupils are equal, round, and reactive to light.   Neck: Normal range of motion. Neck supple. No thyromegaly present.   Cardiovascular: Normal rate, regular rhythm, normal heart sounds and intact distal pulses.    Pulmonary/Chest: Effort normal and breath sounds normal. No respiratory distress. He has no wheezes.   Abdominal: Soft. Bowel sounds are normal. He exhibits no distension and no mass. There is no tenderness.   Musculoskeletal: He exhibits no edema.   Lymphadenopathy:     He has no cervical adenopathy.   Neurological: He is alert.   Skin: Skin is warm. No rash noted. No erythema.   Psychiatric: He has a normal mood and affect. His behavior is normal.   Vitals reviewed.      Assessment:        1. Essential hypertension        Plan:       1. Essential hypertension  Pt has not taken his  BP meds on today.   Will continue BP meds without changes.   F/u in 1 month.     Patient readiness: acceptance and barriers:none    During the course of the visit the patient was educated and counseled about the following:     Diabetes:  Educational material distributed.  Addressed ADA diet.  Suggested low cholesterol diet.  Encouraged aerobic exercise.  Discussed foot care.  Reminded to get yearly retinal exam.  Hypertension:   Dietary sodium restriction.  Regular aerobic exercise.    Goals: Diabetes: Maintain Hemoglobin A1C below 7, Hypertension: Reduce Blood Pressure: Reduce calorie intake and BMI    Did patient meet goals/outcomes: No    The following self management tools provided: blood pressure log  blood glucose log  excercise log    Patient Instructions (the written plan) was given to the patient/family.     Time spent with patient: 30 minutes    Portions of this note were created using Dragon voice recognition software. There may be voice recognition errors found in the text, and attempts were made to correct these errors prior to signature    Gil Skagsg MD    Family Medicine  8/12/2017

## 2017-08-14 ENCOUNTER — TELEPHONE (OUTPATIENT)
Dept: NEUROSURGERY | Facility: CLINIC | Age: 72
End: 2017-08-14

## 2017-08-14 ENCOUNTER — TELEPHONE (OUTPATIENT)
Dept: PAIN MEDICINE | Facility: CLINIC | Age: 72
End: 2017-08-14

## 2017-08-14 RX ORDER — HYDROCODONE BITARTRATE AND ACETAMINOPHEN 10; 325 MG/1; MG/1
1 TABLET ORAL 4 TIMES DAILY
Qty: 120 TABLET | Refills: 0 | Status: SHIPPED | OUTPATIENT
Start: 2017-08-14 | End: 2017-08-14 | Stop reason: SDUPTHER

## 2017-08-14 RX ORDER — HYDROCODONE BITARTRATE AND ACETAMINOPHEN 10; 325 MG/1; MG/1
1 TABLET ORAL 4 TIMES DAILY
Qty: 120 TABLET | Refills: 0 | Status: SHIPPED | OUTPATIENT
Start: 2017-08-14 | End: 2017-09-06

## 2017-08-14 NOTE — TELEPHONE ENCOUNTER
Pt called stating he has severe neck pain and needs to be seen. Pt scheduled with PA and needs imaging

## 2017-08-14 NOTE — TELEPHONE ENCOUNTER
Pt came into clinic and  Requesting to be switched back to Norco 10. He started percocet 10 on 8/8/17. He is c/o dry mouth, constipation and sleeping more often.

## 2017-08-14 NOTE — TELEPHONE ENCOUNTER
----- Message from Nicolasa Phillip sent at 8/14/2017  9:29 AM CDT -----  Contact: [phone]  Patient is requesting a call back from the nurse stated he have neck pain, referral in epic, unable to schedule appt.    Please call the patient upon request at phone number [phone].

## 2017-08-22 DIAGNOSIS — I10 ESSENTIAL HYPERTENSION: ICD-10-CM

## 2017-08-22 RX ORDER — METFORMIN HYDROCHLORIDE 1000 MG/1
TABLET ORAL
Qty: 60 TABLET | Refills: 0 | Status: SHIPPED | OUTPATIENT
Start: 2017-08-22 | End: 2017-10-30 | Stop reason: SDUPTHER

## 2017-08-22 RX ORDER — CARVEDILOL 6.25 MG/1
TABLET ORAL
Qty: 60 TABLET | Refills: 0 | Status: SHIPPED | OUTPATIENT
Start: 2017-08-22 | End: 2018-01-16 | Stop reason: SDUPTHER

## 2017-08-23 ENCOUNTER — INITIAL CONSULT (OUTPATIENT)
Dept: NEUROSURGERY | Facility: CLINIC | Age: 72
End: 2017-08-23
Payer: MEDICARE

## 2017-08-23 VITALS
BODY MASS INDEX: 29.09 KG/M2 | HEIGHT: 69 IN | WEIGHT: 196.44 LBS | HEART RATE: 62 BPM | SYSTOLIC BLOOD PRESSURE: 150 MMHG | DIASTOLIC BLOOD PRESSURE: 85 MMHG

## 2017-08-23 DIAGNOSIS — M47.812 OSTEOARTHRITIS OF CERVICAL SPINE, UNSPECIFIED SPINAL OSTEOARTHRITIS COMPLICATION STATUS: ICD-10-CM

## 2017-08-23 DIAGNOSIS — M54.2 CERVICALGIA: Primary | ICD-10-CM

## 2017-08-23 PROCEDURE — 1125F AMNT PAIN NOTED PAIN PRSNT: CPT | Mod: S$GLB,,, | Performed by: PHYSICIAN ASSISTANT

## 2017-08-23 PROCEDURE — 3008F BODY MASS INDEX DOCD: CPT | Mod: S$GLB,,, | Performed by: PHYSICIAN ASSISTANT

## 2017-08-23 PROCEDURE — 99203 OFFICE O/P NEW LOW 30 MIN: CPT | Mod: S$GLB,,, | Performed by: PHYSICIAN ASSISTANT

## 2017-08-23 PROCEDURE — 3077F SYST BP >= 140 MM HG: CPT | Mod: S$GLB,,, | Performed by: PHYSICIAN ASSISTANT

## 2017-08-23 PROCEDURE — 1159F MED LIST DOCD IN RCRD: CPT | Mod: S$GLB,,, | Performed by: PHYSICIAN ASSISTANT

## 2017-08-23 PROCEDURE — 3079F DIAST BP 80-89 MM HG: CPT | Mod: S$GLB,,, | Performed by: PHYSICIAN ASSISTANT

## 2017-08-23 NOTE — PROGRESS NOTES
Neurosurgery History & Physical    Patient ID: Steve June Jr. is a 72 y.o. male.    Chief Complaint   Patient presents with    Neck Pain       Review of Systems   Constitutional: Negative for activity change, chills, fatigue and unexpected weight change.   HENT: Negative for hearing loss, tinnitus, trouble swallowing and voice change.    Eyes: Negative for visual disturbance.   Respiratory: Negative for apnea, chest tightness and shortness of breath.    Cardiovascular: Negative for chest pain and palpitations.   Gastrointestinal: Negative for abdominal pain, constipation, diarrhea, nausea and vomiting.   Genitourinary: Negative for difficulty urinating, dysuria and frequency.   Musculoskeletal: Positive for arthralgias, back pain, neck pain and neck stiffness. Negative for gait problem.   Skin: Negative for wound.   Neurological: Negative for dizziness, tremors, seizures, facial asymmetry, speech difficulty, weakness, light-headedness, numbness and headaches.   Psychiatric/Behavioral: Negative for confusion and decreased concentration.       Past Medical History:   Diagnosis Date    Abnormal thyroid function test     Allergy     Seasonal    Anemia     Arthritis     Gaviria esophagus     Basal cell carcinoma     right forearm    Basal cell carcinoma 12/2011    lower post neck    Cancer     skin CA    Cataract     Cirrhosis     Encounter for blood transfusion     Esophageal varices in cirrhosis     grade II on 7/12 EGD    Gastritis     on 7/12 EGD    GERD (gastroesophageal reflux disease) 2/28/2015    Hard of hearing     Hiatal hernia     History of hepatitis C 8/10/2012    tx with harvoni x 41 days (started 10/22/15). 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     Social History Main Topics    Smoking status: Former Smoker     Packs/day: 1.00     Years: 25.00     Quit date: 8/9/2000    Smokeless tobacco: Never Used    Alcohol use No      Comment: states he stopped drinking approx. 7 yrs ago/"I might drink a beer every 2 weeks"    Drug use: No    Sexual activity: No     Other Topics Concern    Not on file     Social History Narrative    He is .  He has children.  He is currently retired.     Family History   Problem Relation Age of Onset    Leukemia Mother     Cancer Mother      bone    Alcohol abuse Father     Cirrhosis Father      EtOH induced    Amblyopia Neg Hx     Blindness Neg Hx     Cataracts Neg Hx     Diabetes Neg Hx     Glaucoma Neg Hx     Hypertension Neg Hx     Macular degeneration Neg Hx     Retinal detachment Neg Hx     Strabismus Neg Hx     Stroke Neg Hx     Thyroid disease Neg Hx     Psoriasis Neg Hx     Lupus Neg Hx     Eczema Neg Hx     Acne Neg Hx     Melanoma Neg Hx      Review of patient's allergies indicates:   Allergen Reactions    Adhesive tape-silicones Other (See Comments)     pulls skin off    Doxycycline      Dizzy. "Just didn't feel right".       Current Outpatient Prescriptions:     ACCU-CHEK VEENA PLUS METER Misc, , Disp: , Rfl:     ACCU-CHEK SOFTCLIX LANCETS Misc, USE TO TEST BLOOD SUGAR TWICE DAILY AS DIRECTED, Disp: 100 each, Rfl: 3    blood sugar diagnostic Strp, Accu-chek veena plus test strip. Test BG 3 x day, Disp: 100 strip, Rfl: 12    carvedilol (COREG) 6.25 MG tablet, TAKE 1 TABLET(6.25 MG) BY MOUTH TWICE DAILY, Disp: 60 tablet, Rfl: 0    fluorouracil (EFUDEX) 5 % cream, AAA scalp bid x 2 weeks, do not start treating until healed " "from cryo, Disp: 40 g, Rfl: 1    GLUCOSAMINE HCL/CHONDR CHISHOLM A NA (OSTEO BI-FLEX ORAL), Take 2 tablets by mouth once daily., Disp: , Rfl:     hydrocodone-acetaminophen 10-325mg (NORCO)  mg Tab, Take 1 tablet by mouth 4 (four) times daily., Disp: 120 tablet, Rfl: 0    insulin detemir (LEVEMIR FLEXTOUCH) 100 unit/mL (3 mL) SubQ InPn pen, Inject 18 Units into the skin every evening., Disp: 1 Box, Rfl: 11    loratadine-pseudoephedrine  mg (CLARITIN-D 24-HOUR)  mg per 24 hr tablet, Take 1 tablet by mouth once daily., Disp: , Rfl:     metformin (GLUCOPHAGE) 1000 MG tablet, TAKE 1 TABLET BY MOUTH TWICE DAILY WITH MEALS, Disp: 60 tablet, Rfl: 0    nystatin (MYCOSTATIN) 100,000 unit/mL suspension, TAKE 4ML BY MOUTH TWICE DAILY, Disp: 60 mL, Rfl: 5    pen needle, diabetic (BD ULTRA-FINE KORINA PEN NEEDLES) 32 gauge x 5/32" Ndle, 10 Units by Misc.(Non-Drug; Combo Route) route once daily at 6am., Disp: 30 each, Rfl: 11    Vitals:    08/23/17 1407   BP: (!) 150/85   BP Location: Right arm   Patient Position: Sitting   BP Method: Medium (Automatic)   Pulse: 62   Weight: 89.1 kg (196 lb 6.9 oz)   Height: 5' 9" (1.753 m)       Physical Exam   Constitutional: He is oriented to person, place, and time. He appears well-developed and well-nourished.   HENT:   Head: Normocephalic and atraumatic.   Eyes: Pupils are equal, round, and reactive to light.   Neck: Normal range of motion. Neck supple.   Cardiovascular: Normal rate.    Pulmonary/Chest: Effort normal.   Abdominal: He exhibits no distension.   Musculoskeletal: Normal range of motion. He exhibits no edema.   Neurological: He is alert and oriented to person, place, and time. He has a normal Finger-Nose-Finger Test, a normal Heel to Shin Test, a normal Romberg Test and a normal Tandem Gait Test. Gait normal.   Reflex Scores:       Tricep reflexes are 1+ on the right side and 1+ on the left side.       Bicep reflexes are 1+ on the right side and 1+ on the left " side.       Brachioradialis reflexes are 1+ on the right side and 1+ on the left side.       Patellar reflexes are 1+ on the right side and 1+ on the left side.       Achilles reflexes are 1+ on the right side and 1+ on the left side.  Skin: Skin is warm and dry.   Psychiatric: He has a normal mood and affect. His speech is normal and behavior is normal. Judgment and thought content normal.   Nursing note and vitals reviewed.      Neurologic Exam     Mental Status   Oriented to person, place, and time.   Oriented to person.   Oriented to place.   Oriented to time.   Follows 3 step commands.   Attention: normal. Concentration: normal.   Speech: speech is normal   Level of consciousness: alert  Knowledge: consistent with education.   Able to name object. Able to read. Able to repeat. Able to write. Normal comprehension.     Cranial Nerves     CN II   Visual acuity: normal  Right visual field deficit: none  Left visual field deficit: none     CN III, IV, VI   Pupils are equal, round, and reactive to light.  Right pupil: Size: 3 mm. Shape: regular. Reactivity: brisk. Consensual response: intact.   Left pupil: Size: 3 mm. Shape: regular. Reactivity: brisk. Consensual response: intact.   CN III: no CN III palsy  CN VI: no CN VI palsy  Nystagmus: none   Diplopia: none  Ophthalmoparesis: none  Conjugate gaze: present    CN V   Right facial sensation deficit: none  Left facial sensation deficit: none    CN VII   Right facial weakness: none  Left facial weakness: none    CN VIII   Hearing: intact    CN IX, X   CN IX normal.   CN X normal.     CN XI   Right sternocleidomastoid strength: normal  Left sternocleidomastoid strength: normal  Right trapezius strength: normal  Left trapezius strength: normal    CN XII   Fasciculations: absent  Tongue deviation: none    Motor Exam   Muscle bulk: normal  Overall muscle tone: normal  Right arm pronator drift: absent  Left arm pronator drift: absent    Strength   Right neck flexion:  5/5  Left neck flexion: 5/5  Right neck extension: 5/5  Left neck extension: 5/  Right deltoid: 5/5  Left deltoid:   Right biceps: 5  Left biceps: 5/  Right triceps: 5/  Left triceps: 5/  Right wrist flexion: 5/  Left wrist flexion: 5/  Right wrist extension:   Left wrist extension:   Right interossei:   Left interossei:   Right abdominals:   Left abdominals:   Right iliopsoas:   Left iliopsoas:   Right quadriceps:   Left quadriceps:   Right hamstrin/5  Left hamstrin/5  Right glutei:   Left glutei:   Right anterior tibial:   Left anterior tibial:   Right posterior tibial:   Left posterior tibial:   Right peroneal:   Left peroneal:   Right gastroc:   Left gastroc:     Sensory Exam   Right arm light touch: normal  Left arm light touch: normal  Right leg light touch: normal  Left leg light touch: normal  Right arm vibration: normal  Left arm vibration: normal  Right arm pinprick: normal  Left arm pinprick: normal    Gait, Coordination, and Reflexes     Gait  Gait: normal    Coordination   Romberg: negative  Finger to nose coordination: normal  Heel to shin coordination: normal  Tandem walking coordination: normal    Tremor   Resting tremor: absent  Intention tremor: absent  Action tremor: absent    Reflexes   Right brachioradialis: 1+  Left brachioradialis: 1+  Right biceps: 1+  Left biceps: 1+  Right triceps: 1+  Left triceps: 1+  Right patellar: 1+  Left patellar: 1+  Right achilles: 1+  Left achilles: 1+  Right Sanders: absent  Left Sanders: absent  Right ankle clonus: absent  Left ankle clonus: absent      Provider dictation:  72 year old  male with diabetes, liver disease and chronic back pain is referred by Dr. Skaggs for evaluation of neck pain.  He fell May 2017 landing on the left side of his body.  He has seen orthopedics for left rotator cuff tear and surgery was recommended; however he was unable to proceed due to low  "platelets.  He is scheduled for a shoulder injection to "deaden a nerve" this week.  He has pain in the posterior neck radiating superiorly to the head and inferiorly to the left shoulder and upper arm to the elbow.  He feels decrease in shoulder range of motion.  Use of hydrocodone helps with pain.  He has not had PT or ANEL.  NDI:  60%.  PHQ:  6.    On exam, he has depressed muscle stretch reflexes at 1+ throughout the upper and lower extremities equally.  He has full strength and sensation throughout.  He has limited range of motion with rotation to the right and left.    I have reviewed an xray of the cervical spine from 5/2017 demonstrating anterolisthesis of C4 on C5 with disk height loss at C6/7 greater than C3/4.  There is multi level facet arthropathy.    Mr. June has acute onset neck, left shoulder and left upper arm pain after a fall in May 2017.  It has not improved with PT and medications.  He has C4/5 spondylolisthesis on xray and it is possible he could be experiencing a left C5 radiculopathy.  We will assess for left C4/5 foraminal narrowing with an MRI cervical spine and check stability at C4/5 with cervical spine dynamic xrays.  Follow up in clinic after imaging is complete.    Visit Diagnosis:  Cervicalgia  -     MRI Cervical Spine Without Contrast; Future; Expected date: 08/23/2017  -     X-Ray Cervical Spine AP Lat with Flex Ex; Future; Expected date: 08/23/2017    Osteoarthritis of cervical spine, unspecified spinal osteoarthritis complication status  -     MRI Cervical Spine Without Contrast; Future; Expected date: 08/23/2017  -     X-Ray Cervical Spine AP Lat with Flex Ex; Future; Expected date: 08/23/2017        Total time spent counseling greater than fifty percent of total visit time.  Counseling included discussion regarding imaging findings, diagnosis possibilities, treatment options, risks and benefits.   The patient had many questions regarding the options and long-term effects. "

## 2017-08-23 NOTE — LETTER
August 23, 2017      Gil Skaggs MD  [address] [facility]  [address]  [facility] [address]           [facility]  [address] [facility] [address] [place] [address]  Phone: [phone]  Fax: [phone]          Patient: Steve June Jr.   MR Number: [id number]   YOB: 1945   Date of Visit: 8/23/2017       Dear Dr. Gil Skaggs:    Thank you for referring Steve June to me for evaluation. Attached you will find relevant portions of my assessment and plan of care.    If you have questions, please do not hesitate to call me. I look forward to following Steve June along with you.    Sincerely,    Clarisa Mantilla PA-C    Enclosure  CC:  No Recipients    If you would like to receive this communication electronically, please contact [email or web address] or [phone] to request more information on [employer] access.    For providers and/or their staff who would like to refer a patient to [facility], please contact us through our one-stop-shop provider referral line, [facility], at [phone].    If you feel you have received this communication in error or would no longer like to receive these types of communications, please e-mail [email or web address]

## 2017-08-25 ENCOUNTER — HOSPITAL ENCOUNTER (OUTPATIENT)
Facility: HOSPITAL | Age: 72
Discharge: HOME | End: 2017-08-25
Attending: PHYSICAL MEDICINE & REHABILITATION | Admitting: PHYSICAL MEDICINE & REHABILITATION
Payer: MEDICARE

## 2017-08-25 ENCOUNTER — SURGERY (OUTPATIENT)
Age: 72
End: 2017-08-25

## 2017-08-25 DIAGNOSIS — G89.29 CHRONIC LEFT SHOULDER PAIN: ICD-10-CM

## 2017-08-25 DIAGNOSIS — M25.512 CHRONIC LEFT SHOULDER PAIN: ICD-10-CM

## 2017-08-25 LAB — GLUCOSE SERPL-MCNC: 168 MG/DL (ref 70–110)

## 2017-08-25 PROCEDURE — 63600175 PHARM REV CODE 636 W HCPCS: Mod: PO | Performed by: PHYSICAL MEDICINE & REHABILITATION

## 2017-08-25 PROCEDURE — 25000003 PHARM REV CODE 250: Mod: PO | Performed by: PHYSICAL MEDICINE & REHABILITATION

## 2017-08-25 PROCEDURE — 82962 GLUCOSE BLOOD TEST: CPT | Mod: PO | Performed by: PHYSICAL MEDICINE & REHABILITATION

## 2017-08-25 PROCEDURE — 64640 INJECTION TREATMENT OF NERVE: CPT | Mod: LT,,, | Performed by: PHYSICAL MEDICINE & REHABILITATION

## 2017-08-25 PROCEDURE — 64640 INJECTION TREATMENT OF NERVE: CPT | Mod: PO | Performed by: PHYSICAL MEDICINE & REHABILITATION

## 2017-08-25 PROCEDURE — 76942 ECHO GUIDE FOR BIOPSY: CPT | Mod: 26,,, | Performed by: PHYSICAL MEDICINE & REHABILITATION

## 2017-08-25 RX ORDER — LIDOCAINE HYDROCHLORIDE 10 MG/ML
1 INJECTION, SOLUTION EPIDURAL; INFILTRATION; INTRACAUDAL; PERINEURAL ONCE
Status: DISCONTINUED | OUTPATIENT
Start: 2017-08-25 | End: 2017-08-25 | Stop reason: HOSPADM

## 2017-08-25 RX ORDER — LIDOCAINE HYDROCHLORIDE 10 MG/ML
INJECTION INFILTRATION; PERINEURAL
Status: DISCONTINUED | OUTPATIENT
Start: 2017-08-25 | End: 2017-08-25 | Stop reason: HOSPADM

## 2017-08-25 RX ORDER — LIDOCAINE HYDROCHLORIDE 20 MG/ML
INJECTION, SOLUTION EPIDURAL; INFILTRATION; INTRACAUDAL; PERINEURAL
Status: DISCONTINUED | OUTPATIENT
Start: 2017-08-25 | End: 2017-08-25 | Stop reason: HOSPADM

## 2017-08-25 RX ORDER — DEXAMETHASONE SODIUM PHOSPHATE 4 MG/ML
INJECTION, SOLUTION INTRA-ARTICULAR; INTRALESIONAL; INTRAMUSCULAR; INTRAVENOUS; SOFT TISSUE
Status: DISCONTINUED | OUTPATIENT
Start: 2017-08-25 | End: 2017-08-25 | Stop reason: HOSPADM

## 2017-08-25 RX ORDER — SODIUM CHLORIDE, SODIUM LACTATE, POTASSIUM CHLORIDE, CALCIUM CHLORIDE 600; 310; 30; 20 MG/100ML; MG/100ML; MG/100ML; MG/100ML
INJECTION, SOLUTION INTRAVENOUS CONTINUOUS
Status: DISCONTINUED | OUTPATIENT
Start: 2017-08-25 | End: 2017-08-25 | Stop reason: HOSPADM

## 2017-08-25 RX ORDER — MIDAZOLAM HYDROCHLORIDE 5 MG/ML
2 INJECTION INTRAMUSCULAR; INTRAVENOUS ONCE AS NEEDED
Status: COMPLETED | OUTPATIENT
Start: 2017-08-25 | End: 2017-08-25

## 2017-08-25 RX ADMIN — LIDOCAINE HYDROCHLORIDE 7 ML: 10 INJECTION, SOLUTION INFILTRATION; PERINEURAL at 09:08

## 2017-08-25 RX ADMIN — LIDOCAINE HYDROCHLORIDE 1 ML: 20 INJECTION, SOLUTION EPIDURAL; INFILTRATION; INTRACAUDAL; PERINEURAL at 09:08

## 2017-08-25 RX ADMIN — SODIUM CHLORIDE, SODIUM LACTATE, POTASSIUM CHLORIDE, AND CALCIUM CHLORIDE: .6; .31; .03; .02 INJECTION, SOLUTION INTRAVENOUS at 08:08

## 2017-08-25 RX ADMIN — DEXAMETHASONE SODIUM PHOSPHATE 4 MG: 4 INJECTION, SOLUTION INTRAMUSCULAR; INTRAVENOUS at 09:08

## 2017-08-25 RX ADMIN — MIDAZOLAM HYDROCHLORIDE 2 MG: 5 INJECTION, SOLUTION INTRAMUSCULAR; INTRAVENOUS at 09:08

## 2017-08-25 NOTE — OP NOTE
Operative Note       Surgery Date: 8/25/2017     Surgeon(s) and Role:     * Rolf Silva MD - Primary    Pre-op Diagnosis:  Chronic left shoulder pain [M25.512, G89.29]    Post-op Diagnosis: Post-Op Diagnosis Codes:     * Chronic left shoulder pain [M25.512, G89.29]    Procedure:  1.  Right knee genicular nerve radiofrequency ablation ×3 nerve branches.  2.  Fluoroscopic guidance.    Anesthesia: RN IV Sedation    Description of Procedure:    Steve June Jr. is a 72 y.o. male with chronic left shoulder pain presents for an elective radiofrequency ablation of the left suprascapular nerve. He did get significant pain relief (>80%) from the diagnostic block of the suprascapular nerve and has elected to undergo the RF procedure in the hopes of longer term pain relief. We discussed risks, benefits, and alternatives. Patient's verbal and written consent was obtained. He was brought to the fluoroscopic suite, placed in an upright sitting position. Once she was brought to the operating room, a time-out was performed confirming the name, the location and the procedure. The left shoulder was prepped and draped in the usual sterile fashion. 1% lidocaine was used to anesthetize the overlying subcutaneous cutaneous tissues using a 27-gauge 1.5 inch needle on a 10 cc syringe. The needle tract from the skin to the target suprascapular nerve was navigated via ultrasound guidance.  Also using ultrasound, a 20-gauge needle cannula was guided down from a medial to lateral trajectory down to the suprascapular ligament and suprascapular nerve observed on ultrasound. This was done in an in plane fashion. Once in position adjacent to the suprascapular nerve, the stylette was removed and the radiofrequency probe was then inserted through the cannula. Motor stimulation was then performed at 1.5 V showing active contraction of the infraspinatus. Following motor stimulation, the needle cannula was then pulled back 1 mm and 1 cc  of 2% of lidocaine was then injected through the cannula. Next, the radio frequency probe was placed back into the introducer needle and pulsed thermal radiofrequency lesioning was performed at 42°C for 2 minutes, 30 seconds. Following lesioning, a mixture of 1 cc of dexamethasone, with 1 cc 1% lidocaine was injected through the introducer needle. The cannula was then re-styletted and removed. Band-Aid was applied to the puncture site after cleaning the skin with sterile solution.    Estimated Blood Loss: Minimal    Attestation:  I performed the procedure.           Discharge Note    Admit Date: 8/25/2017    Attending Physician: Rolf Silva MD     Discharge Physician: Rolf Silva MD    Final Diagnosis: Post-Op Diagnosis Codes:     * Chronic left shoulder pain [M25.512, G89.29]    Disposition: Home or Self Care, pt discharged in good condition and will f/u in clinic in 2 weeks.    Patient Instructions:   Current Discharge Medication List      CONTINUE these medications which have NOT CHANGED    Details   ACCU-CHEK VEENA PLUS METER Misc       ACCU-CHEK SOFTCLIX LANCETS Misc USE TO TEST BLOOD SUGAR TWICE DAILY AS DIRECTED  Qty: 100 each, Refills: 3    Comments: **Patient requests 90 days supply**      blood sugar diagnostic Strp Accu-chek veena plus test strip. Test BG 3 x day  Qty: 100 strip, Refills: 12    Associated Diagnoses: Type 2 diabetes mellitus with diabetic peripheral angiopathy without gangrene, without long-term current use of insulin      carvedilol (COREG) 6.25 MG tablet TAKE 1 TABLET(6.25 MG) BY MOUTH TWICE DAILY  Qty: 60 tablet, Refills: 0    Associated Diagnoses: Essential hypertension      GLUCOSAMINE HCL/CHONDR CHISHOLM A NA (OSTEO BI-FLEX ORAL) Take 2 tablets by mouth once daily.      hydrocodone-acetaminophen 10-325mg (NORCO)  mg Tab Take 1 tablet by mouth 4 (four) times daily.  Qty: 120 tablet, Refills: 0      insulin detemir (LEVEMIR FLEXTOUCH) 100 unit/mL (3 mL) SubQ InPn  "pen Inject 18 Units into the skin every evening.  Qty: 1 Box, Refills: 11    Associated Diagnoses: Type 2 diabetes mellitus with diabetic peripheral angiopathy without gangrene, without long-term current use of insulin      metformin (GLUCOPHAGE) 1000 MG tablet TAKE 1 TABLET BY MOUTH TWICE DAILY WITH MEALS  Qty: 60 tablet, Refills: 0      nystatin (MYCOSTATIN) 100,000 unit/mL suspension TAKE 4ML BY MOUTH TWICE DAILY  Qty: 60 mL, Refills: 5      pen needle, diabetic (BD ULTRA-FINE KORINA PEN NEEDLES) 32 gauge x 5/32" Ndle 10 Units by Misc.(Non-Drug; Combo Route) route once daily at 6am.  Qty: 30 each, Refills: 11    Associated Diagnoses: Type 2 diabetes mellitus with diabetic peripheral angiopathy without gangrene, without long-term current use of insulin      fluorouracil (EFUDEX) 5 % cream AAA scalp bid x 2 weeks, do not start treating until healed from cryo  Qty: 40 g, Refills: 1    Associated Diagnoses: Multiple actinic keratoses      loratadine-pseudoephedrine  mg (CLARITIN-D 24-HOUR)  mg per 24 hr tablet Take 1 tablet by mouth once daily.             Discharge Procedure Orders (must include Diet, Follow-up, Activity)    Discharge Procedure Orders (must include Diet, Follow-up, Activity)  Diet general     Activity as tolerated     Shower on day dressing removed (No bath)     Ice to affected area     Call MD for:  temperature >100.4     Call MD for:  persistent nausea and vomiting     Call MD for:  severe uncontrolled pain     Call MD for:  difficulty breathing, headache or visual disturbances     Call MD for:  redness, tenderness, or signs of infection (pain, swelling, redness, odor or green/yellow discharge around incision site)     Call MD for:  hives     Call MD for:  persistent dizziness or light-headedness     Call MD for:  extreme fatigue     No dressing needed          Discharge Date: 8/25/17  "

## 2017-08-25 NOTE — DISCHARGE INSTRUCTIONS
Home care instructions  Apply ice pack to the injection site for 20 minutes periods for the first 24 hrs for soreness/discomfort at injection site DO NOT USE HEAT FOR 24 HOURS  Keep site clean and dry for 24 hours, remove bandaid when desired  Do not drive until tomorrow    Avoid strenuous activities for 2 days     Resume home medication as prescribed today  Resume Aspirin, Plavix, or Coumadin the day after the procedure unless otherwise instructed.    SEE IMMEDIATE MEDICAL HELP FOR:  Severe increase in your usual pain or appearance of new pain  Prolonged or increasing weakness or numbness in the legs or arms  Drainage, redness, active bleeding, or increased swelling at the injection site  Temperature over 100.0 degrees F.  Headache that increases when your head is upright and decreases when you lie flat    CALL 911 OR GO DIRECTLY TO EMERGENCY DEPARTMENT FOR:  Shortness of breath, chest pain, or problems breathing

## 2017-08-25 NOTE — PLAN OF CARE
Vss, angelique po fluids, denies pain, ambulates easily.  States ready to go home.  Discharged from facility with family.

## 2017-08-28 VITALS
WEIGHT: 190 LBS | SYSTOLIC BLOOD PRESSURE: 163 MMHG | BODY MASS INDEX: 28.14 KG/M2 | OXYGEN SATURATION: 99 % | HEIGHT: 69 IN | TEMPERATURE: 97 F | RESPIRATION RATE: 16 BRPM | HEART RATE: 69 BPM | DIASTOLIC BLOOD PRESSURE: 79 MMHG

## 2017-08-29 ENCOUNTER — HOSPITAL ENCOUNTER (OUTPATIENT)
Dept: RADIOLOGY | Facility: HOSPITAL | Age: 72
Discharge: HOME | End: 2017-08-29
Attending: PHYSICIAN ASSISTANT
Payer: MEDICARE

## 2017-08-29 DIAGNOSIS — M47.812 OSTEOARTHRITIS OF CERVICAL SPINE, UNSPECIFIED SPINAL OSTEOARTHRITIS COMPLICATION STATUS: ICD-10-CM

## 2017-08-29 DIAGNOSIS — M54.2 CERVICALGIA: ICD-10-CM

## 2017-08-29 PROCEDURE — 72141 MRI NECK SPINE W/O DYE: CPT | Mod: TC

## 2017-08-29 PROCEDURE — 72141 MRI NECK SPINE W/O DYE: CPT | Mod: 26,,, | Performed by: RADIOLOGY

## 2017-08-29 PROCEDURE — 72050 X-RAY EXAM NECK SPINE 4/5VWS: CPT | Mod: 26,,, | Performed by: RADIOLOGY

## 2017-08-29 PROCEDURE — 72050 X-RAY EXAM NECK SPINE 4/5VWS: CPT | Mod: TC

## 2017-08-31 ENCOUNTER — OFFICE VISIT (OUTPATIENT)
Dept: NEUROSURGERY | Facility: CLINIC | Age: 72
End: 2017-08-31
Payer: MEDICARE

## 2017-08-31 VITALS
DIASTOLIC BLOOD PRESSURE: 86 MMHG | HEART RATE: 64 BPM | HEIGHT: 69 IN | BODY MASS INDEX: 29.18 KG/M2 | WEIGHT: 197 LBS | SYSTOLIC BLOOD PRESSURE: 155 MMHG

## 2017-08-31 DIAGNOSIS — M75.102 TEAR OF LEFT ROTATOR CUFF, UNSPECIFIED TEAR EXTENT: ICD-10-CM

## 2017-08-31 DIAGNOSIS — M54.2 CERVICALGIA: Primary | ICD-10-CM

## 2017-08-31 DIAGNOSIS — M47.812 OSTEOARTHRITIS OF CERVICAL SPINE, UNSPECIFIED SPINAL OSTEOARTHRITIS COMPLICATION STATUS: ICD-10-CM

## 2017-08-31 PROCEDURE — 1125F AMNT PAIN NOTED PAIN PRSNT: CPT | Mod: S$GLB,,, | Performed by: PHYSICIAN ASSISTANT

## 2017-08-31 PROCEDURE — 3079F DIAST BP 80-89 MM HG: CPT | Mod: S$GLB,,, | Performed by: PHYSICIAN ASSISTANT

## 2017-08-31 PROCEDURE — 1159F MED LIST DOCD IN RCRD: CPT | Mod: S$GLB,,, | Performed by: PHYSICIAN ASSISTANT

## 2017-08-31 PROCEDURE — 3008F BODY MASS INDEX DOCD: CPT | Mod: S$GLB,,, | Performed by: PHYSICIAN ASSISTANT

## 2017-08-31 PROCEDURE — 3077F SYST BP >= 140 MM HG: CPT | Mod: S$GLB,,, | Performed by: PHYSICIAN ASSISTANT

## 2017-08-31 PROCEDURE — 99214 OFFICE O/P EST MOD 30 MIN: CPT | Mod: S$GLB,,, | Performed by: PHYSICIAN ASSISTANT

## 2017-08-31 NOTE — PROGRESS NOTES
Neurosurgery History & Physical    Patient ID: Steve Jnue Jr. is a 72 y.o. male.    Chief Complaint   Patient presents with    Follow-up     MRI & X-ray       Review of Systems   Constitutional: Negative for activity change, chills, fatigue and unexpected weight change.   HENT: Negative for hearing loss, tinnitus, trouble swallowing and voice change.    Eyes: Negative for visual disturbance.   Respiratory: Negative for apnea, chest tightness and shortness of breath.    Cardiovascular: Negative for chest pain and palpitations.   Gastrointestinal: Negative for abdominal pain, constipation, diarrhea, nausea and vomiting.   Genitourinary: Negative for difficulty urinating, dysuria and frequency.   Musculoskeletal: Negative for back pain, gait problem, neck pain and neck stiffness.   Skin: Negative for wound.   Neurological: Negative for dizziness, tremors, seizures, facial asymmetry, speech difficulty, weakness, light-headedness, numbness and headaches.   Psychiatric/Behavioral: Negative for confusion and decreased concentration.       Past Medical History:   Diagnosis Date    Abnormal thyroid function test     Allergy     Seasonal    Anemia     Arthritis     Gaviria esophagus     Basal cell carcinoma     right forearm    Basal cell carcinoma 12/2011    lower post neck    Cancer     skin CA    Cataract     Cirrhosis     Encounter for blood transfusion     Esophageal varices in cirrhosis     grade II on 7/12 EGD    Gastritis     on 7/12 EGD    GERD (gastroesophageal reflux disease) 2/28/2015    Hard of hearing     Hiatal hernia     History of hepatitis C 8/10/2012    tx with harvoni x 41 days (started 10/22/15). 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     Social History Main Topics    Smoking status: Former Smoker     Packs/day: 1.00     Years: 25.00     Quit date: 8/9/2000    Smokeless tobacco: Never Used    Alcohol use No      Comment: states he stopped drinking approx. 7 yrs ago/"I might drink a beer every 2 weeks"    Drug use: No    Sexual activity: No     Other Topics Concern    Not on file     Social History Narrative    He is .  He has children.  He is currently retired.     Family History   Problem Relation Age of Onset    Leukemia Mother     Cancer Mother      bone    Alcohol abuse Father     Cirrhosis Father      EtOH induced    Amblyopia Neg Hx     Blindness Neg Hx     Cataracts Neg Hx     Diabetes Neg Hx     Glaucoma Neg Hx     Hypertension Neg Hx     Macular degeneration Neg Hx     Retinal detachment Neg Hx     Strabismus Neg Hx     Stroke Neg Hx     Thyroid disease Neg Hx     Psoriasis Neg Hx     Lupus Neg Hx     Eczema Neg Hx     Acne Neg Hx     Melanoma Neg Hx      Review of patient's allergies indicates:   Allergen Reactions    Adhesive tape-silicones Other (See Comments)     pulls skin off    Doxycycline      Dizzy. "Just didn't feel right".       Current Outpatient Prescriptions:     ACCU-CHEK VEENA PLUS METER Misc, , Disp: , Rfl:     ACCU-CHEK SOFTCLIX LANCETS Misc, USE TO TEST BLOOD SUGAR TWICE DAILY AS DIRECTED, Disp: 100 each, Rfl: 3    blood sugar diagnostic Strp, Accu-chek veena plus test strip. Test BG 3 x day, Disp: 100 strip, Rfl: 12    carvedilol (COREG) 6.25 MG tablet, TAKE 1 TABLET(6.25 MG) BY MOUTH TWICE DAILY (Patient taking differently: take once daily), Disp: 60 tablet, Rfl: 0    fluorouracil (EFUDEX) 5 % cream, AAA scalp bid x 2 " "weeks, do not start treating until healed from cryo, Disp: 40 g, Rfl: 1    GLUCOSAMINE HCL/CHONDR CHISHOLM A NA (OSTEO BI-FLEX ORAL), Take 2 tablets by mouth once daily., Disp: , Rfl:     hydrocodone-acetaminophen 10-325mg (NORCO)  mg Tab, Take 1 tablet by mouth 4 (four) times daily., Disp: 120 tablet, Rfl: 0    insulin detemir (LEVEMIR FLEXTOUCH) 100 unit/mL (3 mL) SubQ InPn pen, Inject 18 Units into the skin every evening., Disp: 1 Box, Rfl: 11    loratadine-pseudoephedrine  mg (CLARITIN-D 24-HOUR)  mg per 24 hr tablet, Take 1 tablet by mouth once daily., Disp: , Rfl:     metformin (GLUCOPHAGE) 1000 MG tablet, TAKE 1 TABLET BY MOUTH TWICE DAILY WITH MEALS, Disp: 60 tablet, Rfl: 0    nystatin (MYCOSTATIN) 100,000 unit/mL suspension, TAKE 4ML BY MOUTH TWICE DAILY, Disp: 60 mL, Rfl: 5    pen needle, diabetic (BD ULTRA-FINE KORINA PEN NEEDLES) 32 gauge x 5/32" Ndle, 10 Units by Misc.(Non-Drug; Combo Route) route once daily at 6am., Disp: 30 each, Rfl: 11    Vitals:    08/31/17 1046   BP: (!) 155/86   BP Location: Right arm   Patient Position: Sitting   BP Method: Large (Automatic)   Pulse: 64   Weight: 89.4 kg (196 lb 15.7 oz)   Height: 5' 9" (1.753 m)       Physical Exam    Neurologic Exam    Provider dictation:  Oswestry score: ***.  PHQ:  ***.    Visit Diagnosis:  There are no diagnoses linked to this encounter.    Total time spent counseling greater than fifty percent of total visit time.  Counseling included discussion regarding imaging findings, diagnosis possibilities, treatment options, risks and benefits.   The patient had many questions regarding the options and long-term effects.             "

## 2017-09-01 ENCOUNTER — TELEPHONE (OUTPATIENT)
Dept: NEUROSURGERY | Facility: CLINIC | Age: 72
End: 2017-09-01

## 2017-09-01 NOTE — TELEPHONE ENCOUNTER
Called pt regarding below message. Pt states results have been released on my [facility] now but would like to have copies of the results. Results printed and mailed to pt.  Pt verbalized understanding and no further questions.       ----- Message from Chantale Calvert sent at 9/1/2017  8:32 AM CDT -----  states that mri/xray report isn't coming up on [facility]. pls adv..[phone] (home)

## 2017-09-01 NOTE — PROGRESS NOTES
Neurosurgery History & Physical    Patient ID: Steve June Jr. is a 72 y.o. male.    Chief Complaint   Patient presents with    Follow-up     MRI & X-ray       Review of Systems   Constitutional: Negative for activity change, chills, fatigue and unexpected weight change.   HENT: Negative for hearing loss, tinnitus, trouble swallowing and voice change.    Eyes: Negative for visual disturbance.   Respiratory: Negative for apnea, chest tightness and shortness of breath.    Cardiovascular: Negative for chest pain and palpitations.   Gastrointestinal: Negative for abdominal pain, constipation, diarrhea, nausea and vomiting.   Genitourinary: Negative for difficulty urinating, dysuria and frequency.   Musculoskeletal: Positive for arthralgias, back pain, neck pain and neck stiffness. Negative for gait problem.   Skin: Negative for wound.   Neurological: Negative for dizziness, tremors, seizures, facial asymmetry, speech difficulty, weakness, light-headedness, numbness and headaches.   Psychiatric/Behavioral: Negative for confusion and decreased concentration.       Past Medical History:   Diagnosis Date    Abnormal thyroid function test     Allergy     Seasonal    Anemia     Arthritis     Gaviria esophagus     Basal cell carcinoma     right forearm    Basal cell carcinoma 12/2011    lower post neck    Cancer     skin CA    Cataract     Cirrhosis     Encounter for blood transfusion     Esophageal varices in cirrhosis     grade II on 7/12 EGD    Gastritis     on 7/12 EGD    GERD (gastroesophageal reflux disease) 2/28/2015    Hard of hearing     Hiatal hernia     History of hepatitis C 8/10/2012    tx with harvoni x 41 days (started 10/22/15). SVR4     Hoarseness 2/28/2015    Hypercholesteremia     Hypersplenism     Hypertension     No meds    Pain management 12/10/2014    Portal hypertensive gastropathy     on 7/12 EGD    Thrombocytopenia     Type II or unspecified type diabetes mellitus with  "neurological manifestations, uncontrolled 12/24/2013    Valvular heart disease     mild MR 12     Social History     Social History    Marital status:      Spouse name: N/A    Number of children: 5    Years of education: N/A     Occupational History    Not on file.     Social History Main Topics    Smoking status: Former Smoker     Packs/day: 1.00     Years: 25.00     Quit date: 8/9/2000    Smokeless tobacco: Never Used    Alcohol use No      Comment: states he stopped drinking approx. 7 yrs ago/"I might drink a beer every 2 weeks"    Drug use: No    Sexual activity: No     Other Topics Concern    Not on file     Social History Narrative    He is .  He has children.  He is currently retired.     Family History   Problem Relation Age of Onset    Leukemia Mother     Cancer Mother      bone    Alcohol abuse Father     Cirrhosis Father      EtOH induced    Amblyopia Neg Hx     Blindness Neg Hx     Cataracts Neg Hx     Diabetes Neg Hx     Glaucoma Neg Hx     Hypertension Neg Hx     Macular degeneration Neg Hx     Retinal detachment Neg Hx     Strabismus Neg Hx     Stroke Neg Hx     Thyroid disease Neg Hx     Psoriasis Neg Hx     Lupus Neg Hx     Eczema Neg Hx     Acne Neg Hx     Melanoma Neg Hx      Review of patient's allergies indicates:   Allergen Reactions    Adhesive tape-silicones Other (See Comments)     pulls skin off    Doxycycline      Dizzy. "Just didn't feel right".       Current Outpatient Prescriptions:     ACCU-CHEK VEENA PLUS METER Misc, , Disp: , Rfl:     ACCU-CHEK SOFTCLIX LANCETS Misc, USE TO TEST BLOOD SUGAR TWICE DAILY AS DIRECTED, Disp: 100 each, Rfl: 3    blood sugar diagnostic Strp, Accu-chek veena plus test strip. Test BG 3 x day, Disp: 100 strip, Rfl: 12    carvedilol (COREG) 6.25 MG tablet, TAKE 1 TABLET(6.25 MG) BY MOUTH TWICE DAILY (Patient taking differently: take once daily), Disp: 60 tablet, Rfl: 0    fluorouracil (EFUDEX) 5 % cream, AAA " "scalp bid x 2 weeks, do not start treating until healed from cryo, Disp: 40 g, Rfl: 1    GLUCOSAMINE HCL/CHONDR CHISHOLM A NA (OSTEO BI-FLEX ORAL), Take 2 tablets by mouth once daily., Disp: , Rfl:     hydrocodone-acetaminophen 10-325mg (NORCO)  mg Tab, Take 1 tablet by mouth 4 (four) times daily., Disp: 120 tablet, Rfl: 0    insulin detemir (LEVEMIR FLEXTOUCH) 100 unit/mL (3 mL) SubQ InPn pen, Inject 18 Units into the skin every evening., Disp: 1 Box, Rfl: 11    loratadine-pseudoephedrine  mg (CLARITIN-D 24-HOUR)  mg per 24 hr tablet, Take 1 tablet by mouth once daily., Disp: , Rfl:     metformin (GLUCOPHAGE) 1000 MG tablet, TAKE 1 TABLET BY MOUTH TWICE DAILY WITH MEALS, Disp: 60 tablet, Rfl: 0    nystatin (MYCOSTATIN) 100,000 unit/mL suspension, TAKE 4ML BY MOUTH TWICE DAILY, Disp: 60 mL, Rfl: 5    pen needle, diabetic (BD ULTRA-FINE KORINA PEN NEEDLES) 32 gauge x 5/32" Ndle, 10 Units by Misc.(Non-Drug; Combo Route) route once daily at 6am., Disp: 30 each, Rfl: 11    Vitals:    08/31/17 1046   BP: (!) 155/86   BP Location: Right arm   Patient Position: Sitting   BP Method: Large (Automatic)   Pulse: 64   Weight: 89.4 kg (196 lb 15.7 oz)   Height: 5' 9" (1.753 m)       Physical Exam   Constitutional: He is oriented to person, place, and time. He appears well-developed and well-nourished.   HENT:   Head: Normocephalic and atraumatic.   Eyes: Pupils are equal, round, and reactive to light.   Neck: Normal range of motion. Neck supple.   Cardiovascular: Normal rate.    Pulmonary/Chest: Effort normal.   Abdominal: He exhibits no distension.   Musculoskeletal: Normal range of motion. He exhibits no edema.   Neurological: He is alert and oriented to person, place, and time. He has a normal Finger-Nose-Finger Test, a normal Heel to Shin Test, a normal Romberg Test and a normal Tandem Gait Test. Gait normal.   Reflex Scores:       Tricep reflexes are 1+ on the right side and 1+ on the left side.       Bicep " reflexes are 1+ on the right side and 1+ on the left side.       Brachioradialis reflexes are 1+ on the right side and 1+ on the left side.       Patellar reflexes are 1+ on the right side and 1+ on the left side.       Achilles reflexes are 1+ on the right side and 1+ on the left side.  Skin: Skin is warm and dry.   Psychiatric: He has a normal mood and affect. His speech is normal and behavior is normal. Judgment and thought content normal.   Nursing note and vitals reviewed.      Neurologic Exam     Mental Status   Oriented to person, place, and time.   Oriented to person.   Oriented to place.   Oriented to time.   Follows 3 step commands.   Attention: normal. Concentration: normal.   Speech: speech is normal   Level of consciousness: alert  Knowledge: consistent with education.   Able to name object. Able to read. Able to repeat. Able to write. Normal comprehension.     Cranial Nerves     CN II   Visual acuity: normal  Right visual field deficit: none  Left visual field deficit: none     CN III, IV, VI   Pupils are equal, round, and reactive to light.  Right pupil: Size: 3 mm. Shape: regular. Reactivity: brisk. Consensual response: intact.   Left pupil: Size: 3 mm. Shape: regular. Reactivity: brisk. Consensual response: intact.   CN III: no CN III palsy  CN VI: no CN VI palsy  Nystagmus: none   Diplopia: none  Ophthalmoparesis: none  Conjugate gaze: present    CN V   Right facial sensation deficit: none  Left facial sensation deficit: none    CN VII   Right facial weakness: none  Left facial weakness: none    CN VIII   Hearing: intact    CN IX, X   CN IX normal.   CN X normal.     CN XI   Right sternocleidomastoid strength: normal  Left sternocleidomastoid strength: normal  Right trapezius strength: normal  Left trapezius strength: normal    CN XII   Fasciculations: absent  Tongue deviation: none    Motor Exam   Muscle bulk: normal  Overall muscle tone: normal  Right arm pronator drift: absent  Left arm pronator  drift: absent    Strength   Right neck flexion: 5/5  Left neck flexion: 5/5  Right neck extension: 5/5  Left neck extension: 5/5  Right deltoid: 5/5  Left deltoid: 5/5  Right biceps: 5/5  Left biceps: 5/5  Right triceps: 5/5  Left triceps: 5/5  Right wrist flexion: 5/5  Left wrist flexion: 5/5  Right wrist extension: 5/5  Left wrist extension: 5/5  Right interossei: 5/5  Left interossei: 5/5  Right abdominals: 5/5  Left abdominals: 5/5  Right iliopsoas: 5/  Left iliopsoas: 5/  Right quadriceps: 5/  Left quadriceps: 5/  Right hamstrin/5  Left hamstrin/5  Right glutei:   Left glutei:   Right anterior tibial:   Left anterior tibial:   Right posterior tibial:   Left posterior tibial:   Right peroneal:   Left peroneal:   Right gastroc: 5/  Left gastroc:     Sensory Exam   Right arm light touch: normal  Left arm light touch: normal  Right leg light touch: normal  Left leg light touch: normal  Right arm vibration: normal  Left arm vibration: normal  Right arm pinprick: normal  Left arm pinprick: normal    Gait, Coordination, and Reflexes     Gait  Gait: normal    Coordination   Romberg: negative  Finger to nose coordination: normal  Heel to shin coordination: normal  Tandem walking coordination: normal    Tremor   Resting tremor: absent  Intention tremor: absent  Action tremor: absent    Reflexes   Right brachioradialis: 1+  Left brachioradialis: 1+  Right biceps: 1+  Left biceps: 1+  Right triceps: 1+  Left triceps: 1+  Right patellar: 1+  Left patellar: 1+  Right achilles: 1+  Left achilles: 1+  Right Sanders: absent  Left Sanders: absent  Right ankle clonus: absent  Left ankle clonus: absent      Provider dictation:  72 year old  male with diabetes, liver disease and chronic back pain presents for follow up of neck and left shoulder pain after obtaining an MRI.  He fell May 2017 landing on the left side of his body.  He has seen orthopedics for left rotator cuff tear and  surgery was recommended; however he was unable to proceed due to low platelets.  A recent shoulder injection has helped the shoulder however, he continues to have left sided body pain.  He has pain in the posterior neck radiating superiorly to the head and inferiorly to the bilateral shoulders.  At his last visit, pain extended to the left elbow.  He feels decrease in left shoulder range of motion.  Use of hydrocodone helps with pain.  He has tried PT, but not ANEL.  NDI:  60%.  PHQ:  6.    On exam, he has depressed muscle stretch reflexes at 1+ throughout the upper and lower extremities equally.  He has full strength and sensation throughout.  He has limited range of motion in the neck with rotation to the right and left.    I have reviewed an MRI and xray of the cervical spine demonstrating multilevel spondylosis that is stable on flexion/ extension.  The most significant change is seen at C3/4 with a disk/ osteophyte complex and left greater than right foraminal narrowing.  There are milder degenerative chagnes from C4/5 to C6/7 with mild foraminal narrowing at these areas.     Mr. June has acute onset neck, left shoulder and left upper arm pain after a fall in May 2017.  MRi shows multi level cervical spondylosis that is most significant at C3/4 which can contribute to a left C4 radiculopathy and his region of pain.  He also has left rotator cuff tear contributing to left shoulder region pain.  It has not improved with PT and medications.  He is not a candidate for surgery with his low platelet count.  I have encouraged neck stretches and offered further PT.  He will do home exercise.  He should also follow up with Dr. Mario (scheduled to see him 9/6/17) to discuss cervical ANEL/ procedures to help with pain.     Visit Diagnosis:  Cervicalgia    Osteoarthritis of cervical spine, unspecified spinal osteoarthritis complication status    Tear of left rotator cuff, unspecified tear extent        Total time spent  counseling greater than fifty percent of total visit time.  Counseling included discussion regarding imaging findings, diagnosis possibilities, treatment options, risks and benefits.   The patient had many questions regarding the options and long-term effects.

## 2017-09-06 ENCOUNTER — OFFICE VISIT (OUTPATIENT)
Dept: PAIN MEDICINE | Facility: CLINIC | Age: 72
End: 2017-09-06
Payer: MEDICARE

## 2017-09-06 ENCOUNTER — APPOINTMENT (OUTPATIENT)
Dept: LAB | Facility: HOSPITAL | Age: 72
End: 2017-09-06
Attending: ANESTHESIOLOGY
Payer: MEDICARE

## 2017-09-06 VITALS
BODY MASS INDEX: 29.03 KG/M2 | HEART RATE: 65 BPM | DIASTOLIC BLOOD PRESSURE: 83 MMHG | WEIGHT: 196 LBS | SYSTOLIC BLOOD PRESSURE: 141 MMHG | HEIGHT: 69 IN

## 2017-09-06 DIAGNOSIS — Z79.891 CHRONIC USE OF OPIATE FOR THERAPEUTIC PURPOSE: ICD-10-CM

## 2017-09-06 DIAGNOSIS — M47.812 OSTEOARTHRITIS OF CERVICAL SPINE, UNSPECIFIED SPINAL OSTEOARTHRITIS COMPLICATION STATUS: ICD-10-CM

## 2017-09-06 DIAGNOSIS — M50.30 DDD (DEGENERATIVE DISC DISEASE), CERVICAL: Primary | ICD-10-CM

## 2017-09-06 LAB
AMPHET+METHAMPHET UR QL: NEGATIVE
BARBITURATES UR QL SCN>200 NG/ML: NEGATIVE
BENZODIAZ UR QL SCN>200 NG/ML: NEGATIVE
BZE UR QL SCN: NEGATIVE
CANNABINOIDS UR QL SCN: NEGATIVE
CREAT UR-MCNC: 76.8 MG/DL
METHADONE UR QL SCN>300 NG/ML: NEGATIVE
OPIATES UR QL SCN: NORMAL
PCP UR QL SCN>25 NG/ML: NEGATIVE
TOXICOLOGY INFORMATION: NORMAL

## 2017-09-06 PROCEDURE — 99999 PR PBB SHADOW E&M-EST. PATIENT-LVL III: CPT | Mod: PBBFAC,,, | Performed by: ANESTHESIOLOGY

## 2017-09-06 PROCEDURE — 3008F BODY MASS INDEX DOCD: CPT | Mod: S$GLB,,, | Performed by: ANESTHESIOLOGY

## 2017-09-06 PROCEDURE — 3077F SYST BP >= 140 MM HG: CPT | Mod: S$GLB,,, | Performed by: ANESTHESIOLOGY

## 2017-09-06 PROCEDURE — 80307 DRUG TEST PRSMV CHEM ANLYZR: CPT

## 2017-09-06 PROCEDURE — 3079F DIAST BP 80-89 MM HG: CPT | Mod: S$GLB,,, | Performed by: ANESTHESIOLOGY

## 2017-09-06 PROCEDURE — 1125F AMNT PAIN NOTED PAIN PRSNT: CPT | Mod: S$GLB,,, | Performed by: ANESTHESIOLOGY

## 2017-09-06 PROCEDURE — 1159F MED LIST DOCD IN RCRD: CPT | Mod: S$GLB,,, | Performed by: ANESTHESIOLOGY

## 2017-09-06 PROCEDURE — 99214 OFFICE O/P EST MOD 30 MIN: CPT | Mod: S$GLB,,, | Performed by: ANESTHESIOLOGY

## 2017-09-06 PROCEDURE — 80365 DRUG SCREENING OXYCODONE: CPT

## 2017-09-06 PROCEDURE — 99499 UNLISTED E&M SERVICE: CPT | Mod: S$GLB,,, | Performed by: ANESTHESIOLOGY

## 2017-09-06 RX ORDER — HYDROCODONE BITARTRATE AND ACETAMINOPHEN 10; 325 MG/1; MG/1
1 TABLET ORAL EVERY 6 HOURS PRN
Qty: 120 TABLET | Refills: 0 | Status: SHIPPED | OUTPATIENT
Start: 2017-09-12 | End: 2017-10-12

## 2017-09-06 RX ORDER — HYDROCODONE BITARTRATE AND ACETAMINOPHEN 10; 325 MG/1; MG/1
1 TABLET ORAL EVERY 6 HOURS PRN
Qty: 120 TABLET | Refills: 0 | Status: ON HOLD | OUTPATIENT
Start: 2017-10-11 | End: 2017-09-19 | Stop reason: HOSPADM

## 2017-09-08 LAB
CODEINE UR-MCNC: 671 NG/ML
CODEINE UR-MCNC: NEGATIVE NG/ML
HYDROCODONE UR-MCNC: 1028 NG/ML
HYDROMORPHONE UR-MCNC: 442 NG/ML
MORPHINE UR-MCNC: NEGATIVE NG/ML
NALOXONE BY LS MS/MS: NEGATIVE NG/ML
NORHYDROCODONE BY LC MS/MS: 2321 NG/ML
NOROXYCODONE BY LC-MS/MS: NEGATIVE NG/ML
NOROXYMORPHONE BY LC-MS/MS: NEGATIVE NG/ML
OXYCODONE UR-MCNC: NEGATIVE NG/ML
OXYCODONE UR-MCNC: NEGATIVE NG/ML
TOXICOLOGIST REVIEW: NORMAL

## 2017-09-16 ENCOUNTER — HOSPITAL ENCOUNTER (INPATIENT)
Facility: HOSPITAL | Age: 72
LOS: 3 days | Discharge: HOME | DRG: 193 | End: 2017-09-19
Attending: EMERGENCY MEDICINE | Admitting: INTERNAL MEDICINE
Payer: MEDICARE

## 2017-09-16 DIAGNOSIS — J10.1 INFLUENZA A: Primary | ICD-10-CM

## 2017-09-16 DIAGNOSIS — R09.02 HYPOXIA: ICD-10-CM

## 2017-09-16 DIAGNOSIS — K70.30 ALCOHOLIC CIRRHOSIS OF LIVER WITHOUT ASCITES: ICD-10-CM

## 2017-09-16 DIAGNOSIS — J98.4 PNEUMONITIS: ICD-10-CM

## 2017-09-16 DIAGNOSIS — I10 ESSENTIAL HYPERTENSION: ICD-10-CM

## 2017-09-16 DIAGNOSIS — R05.9 COUGH: ICD-10-CM

## 2017-09-16 LAB
ALBUMIN SERPL BCP-MCNC: 3.9 G/DL
ALP SERPL-CCNC: 73 U/L
ALT SERPL W/O P-5'-P-CCNC: 20 U/L
ANION GAP SERPL CALC-SCNC: 17 MMOL/L
AST SERPL-CCNC: 28 U/L
BACTERIA #/AREA URNS HPF: NORMAL /HPF
BASOPHILS # BLD AUTO: 0 K/UL
BASOPHILS NFR BLD: 0 %
BILIRUB SERPL-MCNC: 2.6 MG/DL
BILIRUB UR QL STRIP: ABNORMAL
BNP SERPL-MCNC: 128 PG/ML
BUN SERPL-MCNC: 20 MG/DL
CALCIUM SERPL-MCNC: 9.1 MG/DL
CHLORIDE SERPL-SCNC: 98 MMOL/L
CLARITY UR: CLEAR
CO2 SERPL-SCNC: 18 MMOL/L
COLOR UR: YELLOW
CREAT SERPL-MCNC: 1 MG/DL
DEPRECATED S PYO AG THROAT QL EIA: NEGATIVE
DIFFERENTIAL METHOD: ABNORMAL
EOSINOPHIL # BLD AUTO: 0 K/UL
EOSINOPHIL NFR BLD: 0.1 %
ERYTHROCYTE [DISTWIDTH] IN BLOOD BY AUTOMATED COUNT: 15.4 %
EST. GFR  (AFRICAN AMERICAN): >60 ML/MIN/1.73 M^2
EST. GFR  (NON AFRICAN AMERICAN): >60 ML/MIN/1.73 M^2
ESTIMATED AVG GLUCOSE: 137 MG/DL
FLUAV AG SPEC QL IA: POSITIVE
FLUBV AG SPEC QL IA: NEGATIVE
GLUCOSE SERPL-MCNC: 208 MG/DL
GLUCOSE UR QL STRIP: NEGATIVE
HBA1C MFR BLD HPLC: 6.4 %
HCT VFR BLD AUTO: 38 %
HGB BLD-MCNC: 13 G/DL
HGB UR QL STRIP: ABNORMAL
HYALINE CASTS #/AREA URNS LPF: 0 /LPF
INR PPP: 1.3
KETONES UR QL STRIP: ABNORMAL
LACTATE SERPL-SCNC: 1.6 MMOL/L
LEUKOCYTE ESTERASE UR QL STRIP: NEGATIVE
LYMPHOCYTES # BLD AUTO: 0.2 K/UL
LYMPHOCYTES NFR BLD: 5.2 %
MCH RBC QN AUTO: 29.4 PG
MCHC RBC AUTO-ENTMCNC: 34.3 G/DL
MCV RBC AUTO: 86 FL
MICROSCOPIC COMMENT: NORMAL
MONOCYTES # BLD AUTO: 0.5 K/UL
MONOCYTES NFR BLD: 12.9 %
NEUTROPHILS # BLD AUTO: 3.3 K/UL
NEUTROPHILS NFR BLD: 81.8 %
NITRITE UR QL STRIP: NEGATIVE
PH UR STRIP: 5 [PH] (ref 5–8)
PLATELET # BLD AUTO: 31 K/UL
PMV BLD AUTO: 8.8 FL
POCT GLUCOSE: 287 MG/DL (ref 70–110)
POCT GLUCOSE: 306 MG/DL (ref 70–110)
POTASSIUM SERPL-SCNC: 4.4 MMOL/L
PROT SERPL-MCNC: 7.1 G/DL
PROT UR QL STRIP: ABNORMAL
PROTHROMBIN TIME: 13.9 SEC
RBC # BLD AUTO: 4.43 M/UL
RBC #/AREA URNS HPF: 2 /HPF (ref 0–4)
SODIUM SERPL-SCNC: 133 MMOL/L
SP GR UR STRIP: >=1.03 (ref 1–1.03)
SPECIMEN SOURCE: ABNORMAL
URN SPEC COLLECT METH UR: ABNORMAL
UROBILINOGEN UR STRIP-ACNC: ABNORMAL EU/DL
WBC # BLD AUTO: 4.1 K/UL
WBC #/AREA URNS HPF: 2 /HPF (ref 0–5)

## 2017-09-16 PROCEDURE — 36415 COLL VENOUS BLD VENIPUNCTURE: CPT

## 2017-09-16 PROCEDURE — 94640 AIRWAY INHALATION TREATMENT: CPT

## 2017-09-16 PROCEDURE — 27100107 HC POCKET PEAK FLOW METER

## 2017-09-16 PROCEDURE — 25000003 PHARM REV CODE 250: Performed by: INTERNAL MEDICINE

## 2017-09-16 PROCEDURE — 85610 PROTHROMBIN TIME: CPT

## 2017-09-16 PROCEDURE — 83605 ASSAY OF LACTIC ACID: CPT

## 2017-09-16 PROCEDURE — 83036 HEMOGLOBIN GLYCOSYLATED A1C: CPT

## 2017-09-16 PROCEDURE — 80053 COMPREHEN METABOLIC PANEL: CPT

## 2017-09-16 PROCEDURE — 25000242 PHARM REV CODE 250 ALT 637 W/ HCPCS: Performed by: EMERGENCY MEDICINE

## 2017-09-16 PROCEDURE — 83880 ASSAY OF NATRIURETIC PEPTIDE: CPT

## 2017-09-16 PROCEDURE — 87081 CULTURE SCREEN ONLY: CPT

## 2017-09-16 PROCEDURE — 81000 URINALYSIS NONAUTO W/SCOPE: CPT

## 2017-09-16 PROCEDURE — 25000003 PHARM REV CODE 250: Performed by: EMERGENCY MEDICINE

## 2017-09-16 PROCEDURE — 25000242 PHARM REV CODE 250 ALT 637 W/ HCPCS: Performed by: INTERNAL MEDICINE

## 2017-09-16 PROCEDURE — 99285 EMERGENCY DEPT VISIT HI MDM: CPT | Mod: 25

## 2017-09-16 PROCEDURE — 87400 INFLUENZA A/B EACH AG IA: CPT

## 2017-09-16 PROCEDURE — 96375 TX/PRO/DX INJ NEW DRUG ADDON: CPT

## 2017-09-16 PROCEDURE — 96374 THER/PROPH/DIAG INJ IV PUSH: CPT

## 2017-09-16 PROCEDURE — 99900035 HC TECH TIME PER 15 MIN (STAT)

## 2017-09-16 PROCEDURE — 85025 COMPLETE CBC W/AUTO DIFF WBC: CPT

## 2017-09-16 PROCEDURE — 96361 HYDRATE IV INFUSION ADD-ON: CPT

## 2017-09-16 PROCEDURE — 87880 STREP A ASSAY W/OPTIC: CPT

## 2017-09-16 PROCEDURE — 63600175 PHARM REV CODE 636 W HCPCS: Performed by: INTERNAL MEDICINE

## 2017-09-16 PROCEDURE — 63600175 PHARM REV CODE 636 W HCPCS: Performed by: EMERGENCY MEDICINE

## 2017-09-16 PROCEDURE — 12000002 HC ACUTE/MED SURGE SEMI-PRIVATE ROOM

## 2017-09-16 RX ORDER — IBUPROFEN 200 MG
24 TABLET ORAL
Status: DISCONTINUED | OUTPATIENT
Start: 2017-09-16 | End: 2017-09-19 | Stop reason: HOSPADM

## 2017-09-16 RX ORDER — METFORMIN HYDROCHLORIDE 500 MG/1
1000 TABLET ORAL 2 TIMES DAILY WITH MEALS
Status: DISCONTINUED | OUTPATIENT
Start: 2017-09-16 | End: 2017-09-19 | Stop reason: HOSPADM

## 2017-09-16 RX ORDER — GLUCAGON 1 MG
1 KIT INJECTION
Status: DISCONTINUED | OUTPATIENT
Start: 2017-09-16 | End: 2017-09-19 | Stop reason: HOSPADM

## 2017-09-16 RX ORDER — ACETAMINOPHEN 500 MG
1000 TABLET ORAL
Status: COMPLETED | OUTPATIENT
Start: 2017-09-16 | End: 2017-09-16

## 2017-09-16 RX ORDER — HYDROCODONE POLISTIREX AND CHLORPHENIRAMINE POLISTIREX 10; 8 MG/5ML; MG/5ML
5 SUSPENSION, EXTENDED RELEASE ORAL EVERY 12 HOURS PRN
Status: DISCONTINUED | OUTPATIENT
Start: 2017-09-16 | End: 2017-09-16

## 2017-09-16 RX ORDER — METHYLPREDNISOLONE SOD SUCC 125 MG
125 VIAL (EA) INJECTION
Status: COMPLETED | OUTPATIENT
Start: 2017-09-16 | End: 2017-09-16

## 2017-09-16 RX ORDER — ALBUTEROL SULFATE 2.5 MG/.5ML
2.5 SOLUTION RESPIRATORY (INHALATION)
Status: DISCONTINUED | OUTPATIENT
Start: 2017-09-16 | End: 2017-09-19 | Stop reason: HOSPADM

## 2017-09-16 RX ORDER — MORPHINE SULFATE 4 MG/ML
8 INJECTION, SOLUTION INTRAMUSCULAR; INTRAVENOUS
Status: COMPLETED | OUTPATIENT
Start: 2017-09-16 | End: 2017-09-16

## 2017-09-16 RX ORDER — ALBUTEROL SULFATE 2.5 MG/.5ML
2.5 SOLUTION RESPIRATORY (INHALATION)
Status: COMPLETED | OUTPATIENT
Start: 2017-09-16 | End: 2017-09-16

## 2017-09-16 RX ORDER — HYDROCODONE BITARTRATE AND ACETAMINOPHEN 10; 325 MG/1; MG/1
1 TABLET ORAL EVERY 6 HOURS PRN
Status: DISCONTINUED | OUTPATIENT
Start: 2017-09-16 | End: 2017-09-19 | Stop reason: HOSPADM

## 2017-09-16 RX ORDER — HYDROCODONE POLISTIREX AND CHLORPHENIRAMINE POLISTIREX 10; 8 MG/5ML; MG/5ML
5 SUSPENSION, EXTENDED RELEASE ORAL EVERY 8 HOURS PRN
Status: DISCONTINUED | OUTPATIENT
Start: 2017-09-16 | End: 2017-09-19 | Stop reason: HOSPADM

## 2017-09-16 RX ORDER — IBUPROFEN 200 MG
16 TABLET ORAL
Status: DISCONTINUED | OUTPATIENT
Start: 2017-09-16 | End: 2017-09-19 | Stop reason: HOSPADM

## 2017-09-16 RX ORDER — INSULIN ASPART 100 [IU]/ML
0-5 INJECTION, SOLUTION INTRAVENOUS; SUBCUTANEOUS
Status: DISCONTINUED | OUTPATIENT
Start: 2017-09-16 | End: 2017-09-19 | Stop reason: HOSPADM

## 2017-09-16 RX ORDER — BENZONATATE 100 MG/1
200 CAPSULE ORAL 3 TIMES DAILY PRN
Status: DISCONTINUED | OUTPATIENT
Start: 2017-09-16 | End: 2017-09-19 | Stop reason: HOSPADM

## 2017-09-16 RX ORDER — ONDANSETRON 2 MG/ML
4 INJECTION INTRAMUSCULAR; INTRAVENOUS
Status: COMPLETED | OUTPATIENT
Start: 2017-09-16 | End: 2017-09-16

## 2017-09-16 RX ORDER — ALBUTEROL SULFATE 2.5 MG/.5ML
2.5 SOLUTION RESPIRATORY (INHALATION)
Status: DISCONTINUED | OUTPATIENT
Start: 2017-09-16 | End: 2017-09-16 | Stop reason: SDUPTHER

## 2017-09-16 RX ADMIN — HYDROCODONE BITARTRATE AND ACETAMINOPHEN 1 TABLET: 10; 325 TABLET ORAL at 12:09

## 2017-09-16 RX ADMIN — INSULIN ASPART 1 UNITS: 100 INJECTION, SOLUTION INTRAVENOUS; SUBCUTANEOUS at 09:09

## 2017-09-16 RX ADMIN — SODIUM CHLORIDE 1000 ML: 0.9 INJECTION, SOLUTION INTRAVENOUS at 07:09

## 2017-09-16 RX ADMIN — HYDROCODONE POLISTIREX AND CHLORPHENIRAMINE POLISITREX 5 ML: 10; 8 SUSPENSION, EXTENDED RELEASE ORAL at 09:09

## 2017-09-16 RX ADMIN — ONDANSETRON 4 MG: 2 INJECTION INTRAMUSCULAR; INTRAVENOUS at 07:09

## 2017-09-16 RX ADMIN — INSULIN DETEMIR 18 UNITS: 100 INJECTION, SOLUTION SUBCUTANEOUS at 09:09

## 2017-09-16 RX ADMIN — METFORMIN HYDROCHLORIDE 1000 MG: 500 TABLET, FILM COATED ORAL at 05:09

## 2017-09-16 RX ADMIN — METHYLPREDNISOLONE SODIUM SUCCINATE 125 MG: 125 INJECTION, POWDER, FOR SOLUTION INTRAMUSCULAR; INTRAVENOUS at 11:09

## 2017-09-16 RX ADMIN — HYDROCODONE POLISTIREX AND CHLORPHENIRAMINE POLISITREX 5 ML: 10; 8 SUSPENSION, EXTENDED RELEASE ORAL at 12:09

## 2017-09-16 RX ADMIN — INSULIN ASPART 4 UNITS: 100 INJECTION, SOLUTION INTRAVENOUS; SUBCUTANEOUS at 05:09

## 2017-09-16 RX ADMIN — BENZONATATE 200 MG: 100 CAPSULE ORAL at 05:09

## 2017-09-16 RX ADMIN — ACETAMINOPHEN 1000 MG: 500 TABLET ORAL at 07:09

## 2017-09-16 RX ADMIN — ALBUTEROL SULFATE 2.5 MG: 2.5 SOLUTION RESPIRATORY (INHALATION) at 07:09

## 2017-09-16 RX ADMIN — ALBUTEROL SULFATE 2.5 MG: 2.5 SOLUTION RESPIRATORY (INHALATION) at 08:09

## 2017-09-16 RX ADMIN — HYDROCODONE BITARTRATE AND ACETAMINOPHEN 1 TABLET: 10; 325 TABLET ORAL at 06:09

## 2017-09-16 RX ADMIN — MORPHINE SULFATE 8 MG: 4 INJECTION INTRAVENOUS at 09:09

## 2017-09-16 NOTE — SUBJECTIVE & OBJECTIVE
"Past Medical History:   Diagnosis Date    Abnormal thyroid function test     Allergy     Seasonal    Anemia     Arthritis     Gaviria esophagus     Basal cell carcinoma     right forearm    Basal cell carcinoma 12/2011    lower post neck    Cancer     skin CA    Cataract     Cirrhosis     Encounter for blood transfusion     Esophageal varices in cirrhosis     grade II on 7/12 EGD    Gastritis     on 7/12 EGD    GERD (gastroesophageal reflux disease) 2/28/2015    Hard of hearing     Hiatal hernia     History of hepatitis C 8/10/2012    tx with harvoni x 41 days (started 10/22/15). 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    Valvular heart disease     mild MR 12       Past Surgical History:   Procedure Laterality Date    CATARACT EXTRACTION  1/10/13    left eye    CATARACT EXTRACTION      right eye    CHOLECYSTECTOMY      COLONOSCOPY      EYE SURGERY      Cataract surgery to right eye    KNEE ARTHROSCOPY W/ MENISCAL REPAIR      KNEE CARTILAGE SURGERY      left knee    KNEE SURGERY  12/2006    left    SKIN LESION EXCISION      TONSILLECTOMY      UPPER GASTROINTESTINAL ENDOSCOPY         Review of patient's allergies indicates:   Allergen Reactions    Adhesive tape-silicones Other (See Comments)     pulls skin off    Doxycycline      Dizzy. "Just didn't feel right".       No current facility-administered medications on file prior to encounter.      Current Outpatient Prescriptions on File Prior to Encounter   Medication Sig    hydrocodone-acetaminophen 10-325mg (NORCO)  mg Tab Take 1 tablet by mouth every 6 (six) hours as needed (pain).    [START ON 10/11/2017] hydrocodone-acetaminophen 10-325mg (NORCO)  mg " "Tab Take 1 tablet by mouth every 6 (six) hours as needed (pain).    insulin detemir (LEVEMIR FLEXTOUCH) 100 unit/mL (3 mL) SubQ InPn pen Inject 18 Units into the skin every evening.    metformin (GLUCOPHAGE) 1000 MG tablet TAKE 1 TABLET BY MOUTH TWICE DAILY WITH MEALS    ACCU-CHEK VEENA PLUS METER Misc     ACCU-CHEK SOFTCLIX LANCETS [facility] [place] BLOOD SUGAR TWICE DAILY AS DIRECTED    blood sugar diagnostic Strp Accu-chek veena plus test strip. Test BG 3 x day    carvedilol (COREG) 6.25 MG tablet TAKE 1 TABLET(6.25 MG) BY MOUTH TWICE DAILY (Patient taking differently: daily)    GLUCOSAMINE HCL/CHONDR CHISHOLM A NA (OSTEO BI-FLEX ORAL) Take 2 tablets by mouth once daily.    nystatin (MYCOSTATIN) 100,000 unit/mL suspension TAKE 4ML BY MOUTH TWICE DAILY    pen needle, diabetic (BD ULTRA-FINE KORINA PEN NEEDLES) 32 gauge x 5/32" Ndle 10 Units by Misc.(Non-Drug; Combo Route) route once daily at 6am.    [DISCONTINUED] fluorouracil (EFUDEX) 5 % cream AAA scalp bid x 2 weeks, do not start treating until healed from cryo    [DISCONTINUED] loratadine-pseudoephedrine  mg (CLARITIN-D 24-HOUR)  mg per 24 hr tablet Take 1 tablet by mouth once daily.     Family History     Problem Relation (Age of Onset)    Alcohol abuse Father    Cancer Mother    Cirrhosis Father    Leukemia Mother        Social History Main Topics    Smoking status: Former Smoker     Packs/day: 1.00     Years: 25.00     Quit date: 8/9/2000    Smokeless tobacco: Never Used    Alcohol use No      Comment: states he stopped drinking approx. 7 yrs ago/"I might drink a beer every 2 weeks"    Drug use: No    Sexual activity: No     Review of Systems   Constitutional: Positive for appetite change, chills, fatigue and fever.   HENT: Positive for congestion, postnasal drip, rhinorrhea and sore throat.    Eyes: Negative for discharge and itching.   Respiratory: Positive for cough and shortness of breath. Negative for apnea, chest tightness and " wheezing.    Cardiovascular: Negative for chest pain, palpitations and leg swelling.   Gastrointestinal: Negative for abdominal distention, abdominal pain, diarrhea, nausea and vomiting.   Endocrine: Negative for polyphagia and polyuria.   Genitourinary: Negative for difficulty urinating and dysuria.   Musculoskeletal: Positive for arthralgias, back pain and myalgias. Negative for joint swelling.   Skin: Negative for color change and pallor.   Allergic/Immunologic: Negative for environmental allergies and food allergies.   Neurological: Negative for dizziness, facial asymmetry and headaches.   Hematological: Negative for adenopathy.   Psychiatric/Behavioral: Negative for agitation and behavioral problems.     Objective:     Vital Signs (Most Recent):  Temp: 99.4 °F (37.4 °C) (09/16/17 1046)  Pulse: 77 (09/16/17 1217)  Resp: 18 (09/16/17 1046)  BP: (!) 144/73 (09/16/17 1217)  SpO2: 97 % (09/16/17 1101) Vital Signs (24h Range):  Temp:  [99.4 °F (37.4 °C)-100.6 °F (38.1 °C)] 99.4 °F (37.4 °C)  Pulse:  [75-86] 77  Resp:  [16-18] 18  SpO2:  [91 %-97 %] 97 %  BP: (137-190)/(65-96) 144/73     Weight: 86.2 kg (190 lb)  Body mass index is 28.06 kg/m².    Physical Exam   Constitutional: He is oriented to person, place, and time. He appears well-developed. No distress.   HENT:   Head: Normocephalic and atraumatic.   Eyes: EOM are normal. Pupils are equal, round, and reactive to light.   Neck: Normal range of motion. Neck supple. No JVD present.   Cardiovascular: Normal rate and regular rhythm.  Exam reveals no gallop and no friction rub.    No murmur heard.  Pulmonary/Chest: Effort normal. He has rales.   Abdominal: Soft. Bowel sounds are normal.   Musculoskeletal: Normal range of motion. He exhibits no edema, tenderness or deformity.   Neurological: He is alert and oriented to person, place, and time. No cranial nerve deficit. Coordination normal.   Skin: Skin is warm and dry. Capillary refill takes less than 2 seconds. No  rash noted. He is not diaphoretic. No erythema. No pallor.   Psychiatric: He has a normal mood and affect. His behavior is normal.        Significant Labs:   BMP:   Recent Labs  Lab 09/16/17  0744   *   *   K 4.4   CL 98   CO2 18*   BUN 20   CREATININE 1.0   CALCIUM 9.1     CBC:   Recent Labs  Lab 09/16/17  0744   WBC 4.10   HGB 13.0*   HCT 38.0*   PLT 31*     Lactic Acid:   Recent Labs  Lab 09/16/17  0744   LACTATE 1.6       Significant Imaging: I have reviewed all pertinent imaging results/findings within the past 24 hours.

## 2017-09-16 NOTE — ED PROVIDER NOTES
"Encounter Date: 9/16/2017       History     Chief Complaint   Patient presents with    Headache     with sore throat, recent sick contacts    Fever     102 at home,      This patient is a 72-year-old male presenting to emergency Department complaints of fever, malaise, sore throat, and headache over the last 3 days.  She reports multiple sick contacts in the family and a nonproductive cough.  He denies significant previous history of lung disease but does report a history of cirrhosis.  He denies taking anything for symptoms prior to arrival.          Review of patient's allergies indicates:   Allergen Reactions    Adhesive tape-silicones Other (See Comments)     pulls skin off    Doxycycline      Dizzy. "Just didn't feel right".     Past Medical History:   Diagnosis Date    Abnormal thyroid function test     Allergy     Seasonal    Anemia     Arthritis     Gaviria esophagus     Basal cell carcinoma     right forearm    Basal cell carcinoma 12/2011    lower post neck    Cancer     skin CA    Cataract     Cirrhosis     Encounter for blood transfusion     Esophageal varices in cirrhosis     grade II on 7/12 EGD    Gastritis     on 7/12 EGD    GERD (gastroesophageal reflux disease) 2/28/2015    Hard of hearing     Hiatal hernia     History of hepatitis C 8/10/2012    tx with harvoni x 41 days (started 10/22/15). 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    Valvular heart disease     mild MR 12     Past Surgical History:   Procedure Laterality Date    CATARACT EXTRACTION  1/10/13    left eye    CATARACT EXTRACTION      right eye    CHOLECYSTECTOMY      COLONOSCOPY      EYE SURGERY      Cataract surgery to right eye    KNEE " "ARTHROSCOPY W/ MENISCAL REPAIR      KNEE CARTILAGE SURGERY      left knee    KNEE SURGERY  12/2006    left    SKIN LESION EXCISION      TONSILLECTOMY      UPPER GASTROINTESTINAL ENDOSCOPY       Family History   Problem Relation Age of Onset    Leukemia Mother     Cancer Mother      bone    Alcohol abuse Father     Cirrhosis Father      EtOH induced    Amblyopia Neg Hx     Blindness Neg Hx     Cataracts Neg Hx     Diabetes Neg Hx     Glaucoma Neg Hx     Hypertension Neg Hx     Macular degeneration Neg Hx     Retinal detachment Neg Hx     Strabismus Neg Hx     Stroke Neg Hx     Thyroid disease Neg Hx     Psoriasis Neg Hx     Lupus Neg Hx     Eczema Neg Hx     Acne Neg Hx     Melanoma Neg Hx      Social History   Substance Use Topics    Smoking status: Former Smoker     Packs/day: 1.00     Years: 25.00     Quit date: 8/9/2000    Smokeless tobacco: Never Used    Alcohol use No      Comment: states he stopped drinking approx. 7 yrs ago/"I might drink a beer every 2 weeks"     Review of Systems   Constitutional: Positive for fatigue and fever.   HENT: Positive for congestion.    Eyes: Negative for visual disturbance.   Respiratory: Positive for cough.    Cardiovascular: Negative for chest pain.   Gastrointestinal: Negative for vomiting.   Genitourinary: Negative for dysuria.   Musculoskeletal: Positive for myalgias.   Neurological: Positive for light-headedness.   Hematological: Bruises/bleeds easily.   Psychiatric/Behavioral: Negative for confusion.       Physical Exam     Initial Vitals [09/16/17 0718]   BP Pulse Resp Temp SpO2   (!) 163/96 85 16 (!) 100.6 °F (38.1 °C) (!) 94 %      MAP       118.33         Physical Exam    Nursing note and vitals reviewed.  Constitutional: He appears well-developed and well-nourished. He is diaphoretic.   HENT:   Head: Normocephalic and atraumatic.   Erythematous posterior oropharynx without structure enlargement or exudates   Eyes: Conjunctivae and EOM are " normal. No scleral icterus.   Neck: Normal range of motion. Neck supple.   Cardiovascular: Normal rate and regular rhythm.   Pulmonary/Chest: No respiratory distress. He has rhonchi. He has rales.   Abdominal: Bowel sounds are normal. There is no tenderness.   Musculoskeletal: He exhibits no edema or tenderness.   Neurological: He is alert and oriented to person, place, and time.   Skin: No rash noted.   Psychiatric: Thought content normal.         ED Course   Procedures  Labs Reviewed   INFLUENZA A AND B ANTIGEN - Abnormal; Notable for the following:        Result Value    Influenza A Ag, EIA Positive (*)     All other components within normal limits   URINALYSIS - Abnormal; Notable for the following:     Specific Gravity, UA >=1.030 (*)     Protein, UA 2+ (*)     Ketones, UA 2+ (*)     Bilirubin (UA) 1+ (*)     Occult Blood UA 1+ (*)     Urobilinogen, UA 2.0-3.0 (*)     All other components within normal limits   CBC W/ AUTO DIFFERENTIAL - Abnormal; Notable for the following:     RBC 4.43 (*)     Hemoglobin 13.0 (*)     Hematocrit 38.0 (*)     RDW 15.4 (*)     Platelets 31 (*)     MPV 8.8 (*)     Lymph # 0.2 (*)     Gran% 81.8 (*)     Lymph% 5.2 (*)     All other components within normal limits    Narrative:       plt  critical result(s) called and verbal readback obtained from   chula hubbard , 09/16/2017 08:28   COMPREHENSIVE METABOLIC PANEL - Abnormal; Notable for the following:     Sodium 133 (*)     CO2 18 (*)     Glucose 208 (*)     Total Bilirubin 2.6 (*)     Anion Gap 17 (*)     All other components within normal limits   PROTIME-INR - Abnormal; Notable for the following:     Prothrombin Time 13.9 (*)     INR 1.3 (*)     All other components within normal limits   B-TYPE NATRIURETIC PEPTIDE - Abnormal; Notable for the following:      (*)     All other components within normal limits   THROAT SCREEN, RAPID   CULTURE, STREP A,  THROAT   LACTIC ACID, PLASMA   URINALYSIS MICROSCOPIC             Medical  Decision Making:   Initial Assessment:   This patient was interviewed and examined and does appear to be progressing with generalized viral illness.  Concern for underlying influenza is present, with his reports multiple sick contacts.  IV was established and he was provided bolus hydration with anti-inflammatory and antiemetic medication.  Screening labs including a chest x-ray will be obtained.   ED Management:  Workup today significant for evidence of a positive influenza A test.  X-rays are concerning for particular nodular airspace opacities consistent with acute pneumonitis.  I do believe this is exacerbating his respiratory course and he was noted to be hypoxic on arrival.  He did receive respiratory care protocol.  Secondary to the patient's new hypoxia and generalized constitutional symptoms.  I do think he warrants admission for further stabilization and monitoring of his respiratory course.  Case was discussed Dr. Savage no treatment the patient.  He'll be transferred to a telemetry bed in guarded condition.                   ED Course      Clinical Impression:   The primary encounter diagnosis was Influenza A. Diagnoses of Cough, Pneumonitis, and Hypoxia were also pertinent to this visit.    Disposition:   Disposition: Admitted  Condition: Serious                        Michael Horton MD  09/16/17 8123

## 2017-09-16 NOTE — HPI
Mr. June is a 73 yo M with hx of Hep C cirrhosis, Thrombocytopenia, DMII, Chronic back pain, and HTN presents being admitteted for Influenza. States that 3 days ago started feeling bad, productive yellow cough, myalgias, fevers, chills, runny nose. He did not feel better and had a fever of 102 this morning prompted him to come to the Er. He does alos complain of SOB especially with walking. He has not been able to eat much the past 2 days due to decreased appettie. In the ED he was noted to be hypoxic to the low 90s and found to have Influenza A. CXR was concerning for Pneumonitis.

## 2017-09-16 NOTE — ASSESSMENT & PLAN NOTE
Hemoglobin A1C   Date Value Ref Range Status   07/06/2017 10.8 (H) 4.0 - 5.6 % Final     Comment:     According to ADA guidelines, hemoglobin A1c <7.0% represents  optimal control in non-pregnant diabetic patients. Different  metrics may apply to specific patient populations.   Standards of Medical Care in Diabetes-2016.  For the purpose of screening for the presence of diabetes:  <5.7%     Consistent with the absence of diabetes  5.7-6.4%  Consistent with increasing risk for diabetes   (prediabetes)  >or=6.5%  Consistent with diabetes  Currently, no consensus exists for use of hemoglobin A1c  for diagnosis of diabetes for children.  This Hemoglobin A1c assay has significant interference with fetal   hemoglobin   (HbF). The results are invalid for patients with abnormal amounts of   HbF,   including those with known Hereditary Persistence   of Fetal Hemoglobin. Heterozygous hemoglobin variants (HbAS, HbAC,   HbAD, HbAE, HbA2) do not significantly interfere with this assay;   however, presence of multiple variants in a sample may impact the %   interference.     01/18/2017 9.4 (H) 4.5 - 6.2 % Final     Comment:     According to ADA guidelines, hemoglobin A1C <7.0% represents  optimal control in non-pregnant diabetic patients.  Different  metrics may apply to specific populations.   Standards of Medical Care in Diabetes - 2016.  For the purpose of screening for the presence of diabetes:  <5.7%     Consistent with the absence of diabetes  5.7-6.4%  Consistent with increasing risk for diabetes   (prediabetes)  >or=6.5%  Consistent with diabetes  Currently no consensus exists for use of hemoglobin A1C  for diagnosis of diabetes for children.     11/10/2016 8.2 (H) 4.5 - 6.2 % Final     Comment:     According to ADA guidelines, hemoglobin A1C <7.0% represents  optimal control in non-pregnant diabetic patients.  Different  metrics may apply to specific populations.   Standards of Medical Care in Diabetes - 2016.  For the  purpose of screening for the presence of diabetes:  <5.7%     Consistent with the absence of diabetes  5.7-6.4%  Consistent with increasing risk for diabetes   (prediabetes)  >or=6.5%  Consistent with diabetes  Currently no consensus exists for use of hemoglobin A1C  for diagnosis of diabetes for children.       Continue home Insulin dose  Hypo/hpeglycemia protocol

## 2017-09-16 NOTE — H&P
[facility] Medicine  History & Physical    Patient Name: Steve June Jr.  MRN: [id number]  Admission Date: 9/16/2017  Attending Physician: Uri Savage MD  Primary Care Provider: Alie Womack MD         Patient information was obtained from patient and ER records.     Subjective:     Principal Problem:Influenza A    Chief Complaint:   Chief Complaint   Patient presents with    Headache     with sore throat, recent sick contacts    Fever     102 at home,         HPI: Mr. June is a 73 yo M with hx of Hep C cirrhosis, Thrombocytopenia, DMII, Chronic back pain, and HTN presents being admitteted for Influenza. States that 3 days ago started feeling bad, productive yellow cough, myalgias, fevers, chills, runny nose. He did not feel better and had a fever of 102 this morning prompted him to come to the Er. He does alos complain of SOB especially with walking. He has not been able to eat much the past 2 days due to decreased appettie. In the ED he was noted to be hypoxic to the low 90s and found to have Influenza A. CXR was concerning for Pneumonitis.     Past Medical History:   Diagnosis Date    Abnormal thyroid function test     Allergy     Seasonal    Anemia     Arthritis     Gaviria esophagus     Basal cell carcinoma     right forearm    Basal cell carcinoma 12/2011    lower post neck    Cancer     skin CA    Cataract     Cirrhosis     Encounter for blood transfusion     Esophageal varices in cirrhosis     grade II on 7/12 EGD    Gastritis     on 7/12 EGD    GERD (gastroesophageal reflux disease) 2/28/2015    Hard of hearing     Hiatal hernia     History of hepatitis C 8/10/2012    tx with harvoni x 41 days (started 10/22/15). SVR4     Hoarseness 2/28/2015    Hypercholesteremia     Hypersplenism     Hypertension     No meds    Pain management 12/10/2014    Portal hypertensive gastropathy     on 7/12 EGD    Thrombocytopenia     Type II or unspecified type  "diabetes mellitus with neurological manifestations, uncontrolled 12/24/2013    Valvular heart disease     mild MR 12       Past Surgical History:   Procedure Laterality Date    CATARACT EXTRACTION  1/10/13    left eye    CATARACT EXTRACTION      right eye    CHOLECYSTECTOMY      COLONOSCOPY      EYE SURGERY      Cataract surgery to right eye    KNEE ARTHROSCOPY W/ MENISCAL REPAIR      KNEE CARTILAGE SURGERY      left knee    KNEE SURGERY  12/2006    left    SKIN LESION EXCISION      TONSILLECTOMY      UPPER GASTROINTESTINAL ENDOSCOPY         Review of patient's allergies indicates:   Allergen Reactions    Adhesive tape-silicones Other (See Comments)     pulls skin off    Doxycycline      Dizzy. "Just didn't feel right".       No current facility-administered medications on file prior to encounter.      Current Outpatient Prescriptions on File Prior to Encounter   Medication Sig    hydrocodone-acetaminophen 10-325mg (NORCO)  mg Tab Take 1 tablet by mouth every 6 (six) hours as needed (pain).    [START ON 10/11/2017] hydrocodone-acetaminophen 10-325mg (NORCO)  mg Tab Take 1 tablet by mouth every 6 (six) hours as needed (pain).    insulin detemir (LEVEMIR FLEXTOUCH) 100 unit/mL (3 mL) SubQ InPn pen Inject 18 Units into the skin every evening.    metformin (GLUCOPHAGE) 1000 MG tablet TAKE 1 TABLET BY MOUTH TWICE DAILY WITH MEALS    ACCU-CHEK VEENA PLUS METER Misc     ACCU-CHEK SOFTCLIX LANCETS [facility] USE TO TEST BLOOD SUGAR TWICE DAILY AS DIRECTED    blood sugar diagnostic Strp Accu-chek veena plus test strip. Test BG 3 x day    carvedilol (COREG) 6.25 MG tablet TAKE 1 TABLET(6.25 MG) BY MOUTH TWICE DAILY (Patient taking differently: daily)    GLUCOSAMINE HCL/CHONDR CHISHOLM A NA (OSTEO BI-FLEX ORAL) Take 2 tablets by mouth once daily.    nystatin (MYCOSTATIN) 100,000 unit/mL suspension TAKE 4ML BY MOUTH TWICE DAILY    pen needle, diabetic (BD ULTRA-FINE KORINA PEN NEEDLES) 32 gauge x 5/32" " "Ndle 10 Units by Misc.(Non-Drug; Combo Route) route once daily at 6am.    [DISCONTINUED] fluorouracil (EFUDEX) 5 % cream AAA scalp bid x 2 weeks, do not start treating until healed from cryo    [DISCONTINUED] loratadine-pseudoephedrine  mg (CLARITIN-D 24-HOUR)  mg per 24 hr tablet Take 1 tablet by mouth once daily.     Family History     Problem Relation (Age of Onset)    Alcohol abuse Father    Cancer Mother    Cirrhosis Father    Leukemia Mother        Social History Main Topics    Smoking status: Former Smoker     Packs/day: 1.00     Years: 25.00     Quit date: 8/9/2000    Smokeless tobacco: Never Used    Alcohol use No      Comment: states he stopped drinking approx. 7 yrs ago/"I might drink a beer every 2 weeks"    Drug use: No    Sexual activity: No     Review of Systems   Constitutional: Positive for appetite change, chills, fatigue and fever.   HENT: Positive for congestion, postnasal drip, rhinorrhea and sore throat.    Eyes: Negative for discharge and itching.   Respiratory: Positive for cough and shortness of breath. Negative for apnea, chest tightness and wheezing.    Cardiovascular: Negative for chest pain, palpitations and leg swelling.   Gastrointestinal: Negative for abdominal distention, abdominal pain, diarrhea, nausea and vomiting.   Endocrine: Negative for polyphagia and polyuria.   Genitourinary: Negative for difficulty urinating and dysuria.   Musculoskeletal: Positive for arthralgias, back pain and myalgias. Negative for joint swelling.   Skin: Negative for color change and pallor.   Allergic/Immunologic: Negative for environmental allergies and food allergies.   Neurological: Negative for dizziness, facial asymmetry and headaches.   Hematological: Negative for adenopathy.   Psychiatric/Behavioral: Negative for agitation and behavioral problems.     Objective:     Vital Signs (Most Recent):  Temp: 99.4 °F (37.4 °C) (09/16/17 1046)  Pulse: 77 (09/16/17 1217)  Resp: 18 " (09/16/17 1046)  BP: (!) 144/73 (09/16/17 1217)  SpO2: 97 % (09/16/17 1101) Vital Signs (24h Range):  Temp:  [99.4 °F (37.4 °C)-100.6 °F (38.1 °C)] 99.4 °F (37.4 °C)  Pulse:  [75-86] 77  Resp:  [16-18] 18  SpO2:  [91 %-97 %] 97 %  BP: (137-190)/(65-96) 144/73     Weight: 86.2 kg (190 lb)  Body mass index is 28.06 kg/m².    Physical Exam   Constitutional: He is oriented to person, place, and time. He appears well-developed. No distress.   HENT:   Head: Normocephalic and atraumatic.   Eyes: EOM are normal. Pupils are equal, round, and reactive to light.   Neck: Normal range of motion. Neck supple. No JVD present.   Cardiovascular: Normal rate and regular rhythm.  Exam reveals no gallop and no friction rub.    No murmur heard.  Pulmonary/Chest: Effort normal. He has rales.   Abdominal: Soft. Bowel sounds are normal.   Musculoskeletal: Normal range of motion. He exhibits no edema, tenderness or deformity.   Neurological: He is alert and oriented to person, place, and time. No cranial nerve deficit. Coordination normal.   Skin: Skin is warm and dry. Capillary refill takes less than 2 seconds. No rash noted. He is not diaphoretic. No erythema. No pallor.   Psychiatric: He has a normal mood and affect. His behavior is normal.        Significant Labs:   BMP:   Recent Labs  Lab 09/16/17  0744   *   *   K 4.4   CL 98   CO2 18*   BUN 20   CREATININE 1.0   CALCIUM 9.1     CBC:   Recent Labs  Lab 09/16/17  0744   WBC 4.10   HGB 13.0*   HCT 38.0*   PLT 31*     Lactic Acid:   Recent Labs  Lab 09/16/17  0744   LACTATE 1.6       Significant Imaging: I have reviewed all pertinent imaging results/findings within the past 24 hours.    Assessment/Plan:     * Influenza A    Supportive care, past the window for treatment  IVF  Air droplet precuations          Hypoxia    Supplemental O2  Nebs as needed  Monitor closely          Pneumonitis    Steroids given in ED  O2  Nebs as needed          GERD (gastroesophageal reflux  disease)      Stable        Type 2 diabetes mellitus with diabetic polyneuropathy, without long-term current use of insulin    Hemoglobin A1C   Date Value Ref Range Status   07/06/2017 10.8 (H) 4.0 - 5.6 % Final     Comment:     According to ADA guidelines, hemoglobin A1c <7.0% represents  optimal control in non-pregnant diabetic patients. Different  metrics may apply to specific patient populations.   Standards of Medical Care in Diabetes-2016.  For the purpose of screening for the presence of diabetes:  <5.7%     Consistent with the absence of diabetes  5.7-6.4%  Consistent with increasing risk for diabetes   (prediabetes)  >or=6.5%  Consistent with diabetes  Currently, no consensus exists for use of hemoglobin A1c  for diagnosis of diabetes for children.  This Hemoglobin A1c assay has significant interference with fetal   hemoglobin   (HbF). The results are invalid for patients with abnormal amounts of   HbF,   including those with known Hereditary Persistence   of Fetal Hemoglobin. Heterozygous hemoglobin variants (HbAS, HbAC,   HbAD, HbAE, HbA2) do not significantly interfere with this assay;   however, presence of multiple variants in a sample may impact the %   interference.     01/18/2017 9.4 (H) 4.5 - 6.2 % Final     Comment:     According to ADA guidelines, hemoglobin A1C <7.0% represents  optimal control in non-pregnant diabetic patients.  Different  metrics may apply to specific populations.   Standards of Medical Care in Diabetes - 2016.  For the purpose of screening for the presence of diabetes:  <5.7%     Consistent with the absence of diabetes  5.7-6.4%  Consistent with increasing risk for diabetes   (prediabetes)  >or=6.5%  Consistent with diabetes  Currently no consensus exists for use of hemoglobin A1C  for diagnosis of diabetes for children.     11/10/2016 8.2 (H) 4.5 - 6.2 % Final     Comment:     According to ADA guidelines, hemoglobin A1C <7.0% represents  optimal control in non-pregnant  diabetic patients.  Different  metrics may apply to specific populations.   Standards of Medical Care in Diabetes - 2016.  For the purpose of screening for the presence of diabetes:  <5.7%     Consistent with the absence of diabetes  5.7-6.4%  Consistent with increasing risk for diabetes   (prediabetes)  >or=6.5%  Consistent with diabetes  Currently no consensus exists for use of hemoglobin A1C  for diagnosis of diabetes for children.       Continue home Insulin dose  Hypo/hpeglycemia protocol          Pain management    Continue chronic pain medications          PVD (peripheral vascular disease)    Chronic, stable          Leukopenia              History of hepatitis C    Chronic, no signs of decompensation          Thrombocytopenia    Chronic, in setting of Hep C cirrhosis. Monitor closely with active infection            VTE Risk Mitigation         Ordered     Low Risk of VTE  Once      09/16/17 6811             Uri Savage MD  Department of Hospital Medicine   [facility]

## 2017-09-17 LAB
ANION GAP SERPL CALC-SCNC: 11 MMOL/L
BASOPHILS # BLD AUTO: 0 K/UL
BASOPHILS NFR BLD: 0.2 %
BUN SERPL-MCNC: 25 MG/DL
CALCIUM SERPL-MCNC: 8.5 MG/DL
CHLORIDE SERPL-SCNC: 99 MMOL/L
CO2 SERPL-SCNC: 21 MMOL/L
CREAT SERPL-MCNC: 1 MG/DL
DIFFERENTIAL METHOD: ABNORMAL
EOSINOPHIL # BLD AUTO: 0 K/UL
EOSINOPHIL NFR BLD: 0 %
ERYTHROCYTE [DISTWIDTH] IN BLOOD BY AUTOMATED COUNT: 15.2 %
EST. GFR  (AFRICAN AMERICAN): >60 ML/MIN/1.73 M^2
EST. GFR  (NON AFRICAN AMERICAN): >60 ML/MIN/1.73 M^2
GLUCOSE SERPL-MCNC: 310 MG/DL
HCT VFR BLD AUTO: 33.9 %
HGB BLD-MCNC: 11.9 G/DL
LYMPHOCYTES # BLD AUTO: 0.3 K/UL
LYMPHOCYTES NFR BLD: 5.7 %
MCH RBC QN AUTO: 30 PG
MCHC RBC AUTO-ENTMCNC: 35.1 G/DL
MCV RBC AUTO: 86 FL
MONOCYTES # BLD AUTO: 0.4 K/UL
MONOCYTES NFR BLD: 9.8 %
NEUTROPHILS # BLD AUTO: 3.7 K/UL
NEUTROPHILS NFR BLD: 84.3 %
PLATELET # BLD AUTO: 32 K/UL
PMV BLD AUTO: 8.6 FL
POCT GLUCOSE: 192 MG/DL (ref 70–110)
POCT GLUCOSE: 231 MG/DL (ref 70–110)
POCT GLUCOSE: 233 MG/DL (ref 70–110)
POCT GLUCOSE: 289 MG/DL (ref 70–110)
POCT GLUCOSE: 317 MG/DL (ref 70–110)
POTASSIUM SERPL-SCNC: 4.4 MMOL/L
RBC # BLD AUTO: 3.97 M/UL
SODIUM SERPL-SCNC: 131 MMOL/L
WBC # BLD AUTO: 4.4 K/UL

## 2017-09-17 PROCEDURE — 63600175 PHARM REV CODE 636 W HCPCS: Performed by: INTERNAL MEDICINE

## 2017-09-17 PROCEDURE — 85025 COMPLETE CBC W/AUTO DIFF WBC: CPT

## 2017-09-17 PROCEDURE — 80048 BASIC METABOLIC PNL TOTAL CA: CPT

## 2017-09-17 PROCEDURE — 36415 COLL VENOUS BLD VENIPUNCTURE: CPT

## 2017-09-17 PROCEDURE — 94640 AIRWAY INHALATION TREATMENT: CPT

## 2017-09-17 PROCEDURE — 11000001 HC ACUTE MED/SURG PRIVATE ROOM

## 2017-09-17 PROCEDURE — 25000242 PHARM REV CODE 250 ALT 637 W/ HCPCS: Performed by: INTERNAL MEDICINE

## 2017-09-17 PROCEDURE — 99900035 HC TECH TIME PER 15 MIN (STAT)

## 2017-09-17 PROCEDURE — 25000003 PHARM REV CODE 250: Performed by: INTERNAL MEDICINE

## 2017-09-17 RX ORDER — SODIUM CHLORIDE 9 MG/ML
INJECTION, SOLUTION INTRAVENOUS CONTINUOUS
Status: DISCONTINUED | OUTPATIENT
Start: 2017-09-17 | End: 2017-09-19 | Stop reason: HOSPADM

## 2017-09-17 RX ADMIN — METFORMIN HYDROCHLORIDE 1000 MG: 500 TABLET, FILM COATED ORAL at 08:09

## 2017-09-17 RX ADMIN — BENZONATATE 200 MG: 100 CAPSULE ORAL at 12:09

## 2017-09-17 RX ADMIN — HYDROCODONE POLISTIREX AND CHLORPHENIRAMINE POLISITREX 5 ML: 10; 8 SUSPENSION, EXTENDED RELEASE ORAL at 05:09

## 2017-09-17 RX ADMIN — HYDROCODONE BITARTRATE AND ACETAMINOPHEN 1 TABLET: 10; 325 TABLET ORAL at 12:09

## 2017-09-17 RX ADMIN — BENZONATATE 200 MG: 100 CAPSULE ORAL at 10:09

## 2017-09-17 RX ADMIN — HYDROCODONE BITARTRATE AND ACETAMINOPHEN 1 TABLET: 10; 325 TABLET ORAL at 06:09

## 2017-09-17 RX ADMIN — ALBUTEROL SULFATE 2.5 MG: 2.5 SOLUTION RESPIRATORY (INHALATION) at 08:09

## 2017-09-17 RX ADMIN — HYDROCODONE POLISTIREX AND CHLORPHENIRAMINE POLISITREX 5 ML: 10; 8 SUSPENSION, EXTENDED RELEASE ORAL at 06:09

## 2017-09-17 RX ADMIN — METFORMIN HYDROCHLORIDE 1000 MG: 500 TABLET, FILM COATED ORAL at 05:09

## 2017-09-17 RX ADMIN — INSULIN ASPART 3 UNITS: 100 INJECTION, SOLUTION INTRAVENOUS; SUBCUTANEOUS at 06:09

## 2017-09-17 RX ADMIN — SODIUM CHLORIDE: 0.9 INJECTION, SOLUTION INTRAVENOUS at 12:09

## 2017-09-17 RX ADMIN — INSULIN ASPART 1 UNITS: 100 INJECTION, SOLUTION INTRAVENOUS; SUBCUTANEOUS at 09:09

## 2017-09-17 RX ADMIN — INSULIN DETEMIR 18 UNITS: 100 INJECTION, SOLUTION SUBCUTANEOUS at 09:09

## 2017-09-17 RX ADMIN — INSULIN ASPART 2 UNITS: 100 INJECTION, SOLUTION INTRAVENOUS; SUBCUTANEOUS at 02:09

## 2017-09-17 NOTE — HOSPITAL COURSE
Patient given supportive care for cough, fever and other symptoms. He continued to have high fevers and weakness. He was started on IV abx and tamiflu for abnormal sputum production and abnormal CXR. Pulmonology was consulted. He started to improve in his symptoms. Patient examined at bedside on day of discharge. Exam findings within normal limits. He was deemed safe for discharge.

## 2017-09-17 NOTE — PLAN OF CARE
09/16/17 1940   Patient Assessment/Suction   Level of Consciousness (AVPU) alert   Respiratory Effort Normal;Unlabored   Expansion/Accessory Muscles/Retractions no use of accessory muscles   All Lung Fields Breath Sounds wheezes, expiratory   Cough Type good;nonproductive;dry   PRE-TX-O2-ETCO2   O2 Device (Oxygen Therapy) room air   SpO2 (!) 93 %   Pulse 76   Resp 18   Aerosol Therapy   $ Aerosol Therapy Charges Aerosol Treatment   Respiratory Treatment Status given   SVN/Inhaler Treatment Route mouthpiece;with oxygen   Position During Treatment HOB at 30 degrees   Patient Tolerance good   Post-Treatment   Post-treatment Heart Rate (beats/min) 72   Post-treatment Resp Rate (breaths/min) 18   All Fields Breath Sounds unchanged   Pt tolerates RA and treatments well.

## 2017-09-17 NOTE — PLAN OF CARE
Problem: Patient Care Overview  Goal: Plan of Care Review  Outcome: Ongoing (interventions implemented as appropriate)  angelique aerosol tx well bbas decreased clear

## 2017-09-17 NOTE — PLAN OF CARE
Problem: Patient Care Overview  Goal: Plan of Care Review  Outcome: Ongoing (interventions implemented as appropriate)  Pt AAO x3,, pt in droplet and contact precautions, maintained throughout shift.  IV in place, saline locked.  Tele maintained and monitored throughout shift.  PRN pain medication administered as order with adequate relief.  PRN cough medications administered as ordered.  AC/HS SSI administered as ordered.  Bedtime snack provided with long acting insulin.  VS stable.  Pt discharged to home.  Discharge instructions given.  Pt verbalizes understanding. VS stable, PIV removed.   Transported off of the unit via wheelchair.

## 2017-09-17 NOTE — ASSESSMENT & PLAN NOTE
Hemoglobin A1C   Date Value Ref Range Status   09/16/2017 6.4 (H) 4.0 - 5.6 % Final     Comment:     According to ADA guidelines, hemoglobin A1c <7.0% represents  optimal control in non-pregnant diabetic patients. Different  metrics may apply to specific patient populations.   Standards of Medical Care in Diabetes-2016.  For the purpose of screening for the presence of diabetes:  <5.7%     Consistent with the absence of diabetes  5.7-6.4%  Consistent with increasing risk for diabetes   (prediabetes)  >or=6.5%  Consistent with diabetes  Currently, no consensus exists for use of hemoglobin A1c  for diagnosis of diabetes for children.  This Hemoglobin A1c assay has significant interference with fetal   hemoglobin   (HbF). The results are invalid for patients with abnormal amounts of   HbF,   including those with known Hereditary Persistence   of Fetal Hemoglobin. Heterozygous hemoglobin variants (HbAS, HbAC,   HbAD, HbAE, HbA2) do not significantly interfere with this assay;   however, presence of multiple variants in a sample may impact the %   interference.     07/06/2017 10.8 (H) 4.0 - 5.6 % Final     Comment:     According to ADA guidelines, hemoglobin A1c <7.0% represents  optimal control in non-pregnant diabetic patients. Different  metrics may apply to specific patient populations.   Standards of Medical Care in Diabetes-2016.  For the purpose of screening for the presence of diabetes:  <5.7%     Consistent with the absence of diabetes  5.7-6.4%  Consistent with increasing risk for diabetes   (prediabetes)  >or=6.5%  Consistent with diabetes  Currently, no consensus exists for use of hemoglobin A1c  for diagnosis of diabetes for children.  This Hemoglobin A1c assay has significant interference with fetal   hemoglobin   (HbF). The results are invalid for patients with abnormal amounts of   HbF,   including those with known Hereditary Persistence   of Fetal Hemoglobin. Heterozygous hemoglobin variants (HbAS, HbAC,    HbAD, HbAE, HbA2) do not significantly interfere with this assay;   however, presence of multiple variants in a sample may impact the %   interference.     01/18/2017 9.4 (H) 4.5 - 6.2 % Final     Comment:     According to ADA guidelines, hemoglobin A1C <7.0% represents  optimal control in non-pregnant diabetic patients.  Different  metrics may apply to specific populations.   Standards of Medical Care in Diabetes - 2016.  For the purpose of screening for the presence of diabetes:  <5.7%     Consistent with the absence of diabetes  5.7-6.4%  Consistent with increasing risk for diabetes   (prediabetes)  >or=6.5%  Consistent with diabetes  Currently no consensus exists for use of hemoglobin A1C  for diagnosis of diabetes for children.       Continue home Insulin dose  Hypo/hpeglycemia protocol

## 2017-09-17 NOTE — PLAN OF CARE
Problem: Patient Care Overview  Goal: Plan of Care Review  Outcome: Ongoing (interventions implemented as appropriate)  Pt had an uneventful day free from falls/injury. Pt ambulates independently to restroom without difficulty. IV fluids infusing per order. Denies SOB. Blood glucose monitored, insulin coverage given as needed. Tolerating meals. Tele monitor in place, running NSR. Pt complains of pain from coughing, PRN pain meds utilized along with PRN cough medication resulting in moderate relief of symptoms. VSS. Pt to have CXR tomorrow. o2 therapy as needed, pt did not need today. Contact/droplet ISO precautions maintained through shift. Pt very [place]. Bed is locked and low. Call light in easy reach. Pt knows to call for assistance.

## 2017-09-17 NOTE — PROGRESS NOTES
[facility] Medicine  Progress Note    Patient Name: Steve June Jr.  MRN: [id number]  Patient Class: IP- Inpatient   Admission Date: 9/16/2017  Length of Stay: 1 days  Attending Physician: Uri Savage MD  Primary Care Provider: Alie Womack MD        Subjective:     Principal Problem:Influenza A    HPI:  Mr. June is a 73 yo M with hx of Hep C cirrhosis, Thrombocytopenia, DMII, Chronic back pain, and HTN presents being admitteted for Influenza. States that 3 days ago started feeling bad, productive yellow cough, myalgias, fevers, chills, runny nose. He did not feel better and had a fever of 102 this morning prompted him to come to the Er. He does alos complain of SOB especially with walking. He has not been able to eat much the past 2 days due to decreased appettie. In the ED he was noted to be hypoxic to the low 90s and found to have Influenza A. CXR was concerning for Pneumonitis.     Hospital Course:  9/17 - Still having cough and feeling week    Past Medical History:   Diagnosis Date    Abnormal thyroid function test     Allergy     Seasonal    Anemia     Arthritis     Gaviria esophagus     Basal cell carcinoma     right forearm    Basal cell carcinoma 12/2011    lower post neck    Cancer     skin CA    Cataract     Cirrhosis     Encounter for blood transfusion     Esophageal varices in cirrhosis     grade II on 7/12 EGD    Gastritis     on 7/12 EGD    GERD (gastroesophageal reflux disease) 2/28/2015    Hard of hearing     Hiatal hernia     History of hepatitis C 8/10/2012    tx with harvoni x 41 days (started 10/22/15). 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    Valvular heart  "disease     mild MR 12       Past Surgical History:   Procedure Laterality Date    CATARACT EXTRACTION  1/10/13    left eye    CATARACT EXTRACTION      right eye    CHOLECYSTECTOMY      COLONOSCOPY      EYE SURGERY      Cataract surgery to right eye    KNEE ARTHROSCOPY W/ MENISCAL REPAIR      KNEE CARTILAGE SURGERY      left knee    KNEE SURGERY  12/2006    left    SKIN LESION EXCISION      TONSILLECTOMY      UPPER GASTROINTESTINAL ENDOSCOPY         Review of patient's allergies indicates:   Allergen Reactions    Adhesive tape-silicones Other (See Comments)     pulls skin off    Doxycycline      Dizzy. "Just didn't feel right".       No current facility-administered medications on file prior to encounter.      Current Outpatient Prescriptions on File Prior to Encounter   Medication Sig    hydrocodone-acetaminophen 10-325mg (NORCO)  mg Tab Take 1 tablet by mouth every 6 (six) hours as needed (pain).    [START ON 10/11/2017] hydrocodone-acetaminophen 10-325mg (NORCO)  mg Tab Take 1 tablet by mouth every 6 (six) hours as needed (pain).    insulin detemir (LEVEMIR FLEXTOUCH) 100 unit/mL (3 mL) SubQ InPn pen Inject 18 Units into the skin every evening.    metformin (GLUCOPHAGE) 1000 MG tablet TAKE 1 TABLET BY MOUTH TWICE DAILY WITH MEALS    ACCU-CHEK VEENA PLUS METER [facility] [place]     ACCU-CHEK SOFTCLIX LANCETS [facility] [place] USE TO TEST BLOOD SUGAR TWICE DAILY AS DIRECTED    blood sugar diagnostic Strp Accu-chek veena plus test strip. Test BG 3 x day    carvedilol (COREG) 6.25 MG tablet TAKE 1 TABLET(6.25 MG) BY MOUTH TWICE DAILY (Patient taking differently: daily)    GLUCOSAMINE HCL/CHONDR CHISHOLM A NA (OSTEO BI-FLEX ORAL) Take 2 tablets by mouth once daily.    nystatin (MYCOSTATIN) 100,000 unit/mL suspension TAKE 4ML BY MOUTH TWICE DAILY    pen needle, diabetic (BD ULTRA-FINE KORINA PEN NEEDLES) 32 gauge x 5/32" Ndle 10 Units by Misc.(Non-Drug; Combo Route) route once daily at 6am.     Family History     " "Problem Relation (Age of Onset)    Alcohol abuse Father    Cancer Mother    Cirrhosis Father    Leukemia Mother        Social History Main Topics    Smoking status: Former Smoker     Packs/day: 1.00     Years: 25.00     Quit date: 8/9/2000    Smokeless tobacco: Never Used    Alcohol use No      Comment: states he stopped drinking approx. 7 yrs ago/"I might drink a beer every 2 weeks"    Drug use: No    Sexual activity: No     Review of Systems   Constitutional: Positive for appetite change, chills, fatigue and fever.   HENT: Positive for congestion, postnasal drip, rhinorrhea and sore throat.    Eyes: Negative for discharge and itching.   Respiratory: Positive for cough and shortness of breath. Negative for apnea, chest tightness and wheezing.    Cardiovascular: Negative for chest pain, palpitations and leg swelling.   Gastrointestinal: Negative for abdominal distention, abdominal pain, diarrhea, nausea and vomiting.   Endocrine: Negative for polyphagia and polyuria.   Genitourinary: Negative for difficulty urinating and dysuria.   Musculoskeletal: Positive for arthralgias, back pain and myalgias. Negative for joint swelling.   Skin: Negative for color change and pallor.   Allergic/Immunologic: Negative for environmental allergies and food allergies.   Neurological: Negative for dizziness, facial asymmetry and headaches.   Hematological: Negative for adenopathy.   Psychiatric/Behavioral: Negative for agitation and behavioral problems.     Objective:     Vital Signs (Most Recent):  Temp: 98.6 °F (37 °C) (09/17/17 0804)  Pulse: 76 (09/17/17 0820)  Resp: 14 (09/17/17 0820)  BP: (!) 144/73 (09/17/17 0804)  SpO2: 98 % (09/17/17 0820) Vital Signs (24h Range):  Temp:  [98.6 °F (37 °C)-99.1 °F (37.3 °C)] 98.6 °F (37 °C)  Pulse:  [68-83] 76  Resp:  [14-18] 14  SpO2:  [92 %-98 %] 98 %  BP: (131-145)/(72-82) 144/73     Weight: 86.2 kg (190 lb)  Body mass index is 28.06 kg/m².    Physical Exam   Constitutional: He is " oriented to person, place, and time. He appears well-developed. No distress.   HENT:   Head: Normocephalic and atraumatic.   Eyes: EOM are normal. Pupils are equal, round, and reactive to light.   Neck: Normal range of motion. Neck supple. No JVD present.   Cardiovascular: Normal rate and regular rhythm.  Exam reveals no gallop and no friction rub.    No murmur heard.  Pulmonary/Chest: Effort normal. He has rales.   Abdominal: Soft. Bowel sounds are normal.   Musculoskeletal: Normal range of motion. He exhibits no edema, tenderness or deformity.   Neurological: He is alert and oriented to person, place, and time. No cranial nerve deficit. Coordination normal.   Skin: Skin is warm and dry. Capillary refill takes less than 2 seconds. No rash noted. He is not diaphoretic. No erythema. No pallor.   Psychiatric: He has a normal mood and affect. His behavior is normal.        Significant Labs:   BMP:     Recent Labs  Lab 09/17/17  0525   *   *   K 4.4   CL 99   CO2 21*   BUN 25*   CREATININE 1.0   CALCIUM 8.5*     CBC:     Recent Labs  Lab 09/16/17  0744 09/17/17  0525   WBC 4.10 4.40   HGB 13.0* 11.9*   HCT 38.0* 33.9*   PLT 31* 32*     Lactic Acid:     Recent Labs  Lab 09/16/17  0744   LACTATE 1.6       Significant Imaging: I have reviewed all pertinent imaging results/findings within the past 24 hours.    Assessment/Plan:      * Influenza A    Supportive care, past the window for treatment  IVF  Air droplet precuations          Hypoxia    Supplemental O2  Nebs as needed  Monitor closely          Pneumonitis    Steroids given in ED  O2  Nebs as needed  Repeat CXR in the AM  IVF          GERD (gastroesophageal reflux disease)      Stable        Type 2 diabetes mellitus with diabetic polyneuropathy, without long-term current use of insulin    Hemoglobin A1C   Date Value Ref Range Status   09/16/2017 6.4 (H) 4.0 - 5.6 % Final     Comment:     According to ADA guidelines, hemoglobin A1c <7.0%  represents  optimal control in non-pregnant diabetic patients. Different  metrics may apply to specific patient populations.   Standards of Medical Care in Diabetes-2016.  For the purpose of screening for the presence of diabetes:  <5.7%     Consistent with the absence of diabetes  5.7-6.4%  Consistent with increasing risk for diabetes   (prediabetes)  >or=6.5%  Consistent with diabetes  Currently, no consensus exists for use of hemoglobin A1c  for diagnosis of diabetes for children.  This Hemoglobin A1c assay has significant interference with fetal   hemoglobin   (HbF). The results are invalid for patients with abnormal amounts of   HbF,   including those with known Hereditary Persistence   of Fetal Hemoglobin. Heterozygous hemoglobin variants (HbAS, HbAC,   HbAD, HbAE, HbA2) do not significantly interfere with this assay;   however, presence of multiple variants in a sample may impact the %   interference.     07/06/2017 10.8 (H) 4.0 - 5.6 % Final     Comment:     According to ADA guidelines, hemoglobin A1c <7.0% represents  optimal control in non-pregnant diabetic patients. Different  metrics may apply to specific patient populations.   Standards of Medical Care in Diabetes-2016.  For the purpose of screening for the presence of diabetes:  <5.7%     Consistent with the absence of diabetes  5.7-6.4%  Consistent with increasing risk for diabetes   (prediabetes)  >or=6.5%  Consistent with diabetes  Currently, no consensus exists for use of hemoglobin A1c  for diagnosis of diabetes for children.  This Hemoglobin A1c assay has significant interference with fetal   hemoglobin   (HbF). The results are invalid for patients with abnormal amounts of   HbF,   including those with known Hereditary Persistence   of Fetal Hemoglobin. Heterozygous hemoglobin variants (HbAS, HbAC,   HbAD, HbAE, HbA2) do not significantly interfere with this assay;   however, presence of multiple variants in a sample may impact the %    interference.     01/18/2017 9.4 (H) 4.5 - 6.2 % Final     Comment:     According to ADA guidelines, hemoglobin A1C <7.0% represents  optimal control in non-pregnant diabetic patients.  Different  metrics may apply to specific populations.   Standards of Medical Care in Diabetes - 2016.  For the purpose of screening for the presence of diabetes:  <5.7%     Consistent with the absence of diabetes  5.7-6.4%  Consistent with increasing risk for diabetes   (prediabetes)  >or=6.5%  Consistent with diabetes  Currently no consensus exists for use of hemoglobin A1C  for diagnosis of diabetes for children.       Continue home Insulin dose  Hypo/hpeglycemia protocol          Pain management    Continue chronic pain medications          PVD (peripheral vascular disease)    Chronic, stable          Leukopenia              History of hepatitis C    Chronic, no signs of decompensation          Thrombocytopenia    Chronic, in setting of Hep C cirrhosis. Monitor closely with active infection            VTE Risk Mitigation         Ordered     Low Risk of VTE  Once      09/16/17 9059              Uri Savage MD  Department of Hospital Medicine   [facility]

## 2017-09-17 NOTE — SUBJECTIVE & OBJECTIVE
"Past Medical History:   Diagnosis Date    Abnormal thyroid function test     Allergy     Seasonal    Anemia     Arthritis     Gaviria esophagus     Basal cell carcinoma     right forearm    Basal cell carcinoma 12/2011    lower post neck    Cancer     skin CA    Cataract     Cirrhosis     Encounter for blood transfusion     Esophageal varices in cirrhosis     grade II on 7/12 EGD    Gastritis     on 7/12 EGD    GERD (gastroesophageal reflux disease) 2/28/2015    Hard of hearing     Hiatal hernia     History of hepatitis C 8/10/2012    tx with harvoni x 41 days (started 10/22/15). 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    Valvular heart disease     mild MR 12       Past Surgical History:   Procedure Laterality Date    CATARACT EXTRACTION  1/10/13    left eye    CATARACT EXTRACTION      right eye    CHOLECYSTECTOMY      COLONOSCOPY      EYE SURGERY      Cataract surgery to right eye    KNEE ARTHROSCOPY W/ MENISCAL REPAIR      KNEE CARTILAGE SURGERY      left knee    KNEE SURGERY  12/2006    left    SKIN LESION EXCISION      TONSILLECTOMY      UPPER GASTROINTESTINAL ENDOSCOPY         Review of patient's allergies indicates:   Allergen Reactions    Adhesive tape-silicones Other (See Comments)     pulls skin off    Doxycycline      Dizzy. "Just didn't feel right".       No current facility-administered medications on file prior to encounter.      Current Outpatient Prescriptions on File Prior to Encounter   Medication Sig    hydrocodone-acetaminophen 10-325mg (NORCO)  mg Tab Take 1 tablet by mouth every 6 (six) hours as needed (pain).    [START ON 10/11/2017] hydrocodone-acetaminophen 10-325mg (NORCO)  mg " "Tab Take 1 tablet by mouth every 6 (six) hours as needed (pain).    insulin detemir (LEVEMIR FLEXTOUCH) 100 unit/mL (3 mL) SubQ InPn pen Inject 18 Units into the skin every evening.    metformin (GLUCOPHAGE) 1000 MG tablet TAKE 1 TABLET BY MOUTH TWICE DAILY WITH MEALS    ACCU-CHEK VEENA PLUS METER Misc     ACCU-CHEK SOFTCLIX LANCETS [facility] [place] TO TEST BLOOD SUGAR TWICE DAILY AS DIRECTED    blood sugar diagnostic Strp Accu-chek veena plus test strip. Test BG 3 x day    carvedilol (COREG) 6.25 MG tablet TAKE 1 TABLET(6.25 MG) BY MOUTH TWICE DAILY (Patient taking differently: daily)    GLUCOSAMINE HCL/CHONDR CHISHOLM A NA (OSTEO BI-FLEX ORAL) Take 2 tablets by mouth once daily.    nystatin (MYCOSTATIN) 100,000 unit/mL suspension TAKE 4ML BY MOUTH TWICE DAILY    pen needle, diabetic (BD ULTRA-FINE KORINA PEN NEEDLES) 32 gauge x 5/32" Ndle 10 Units by Misc.(Non-Drug; Combo Route) route once daily at 6am.     Family History     Problem Relation (Age of Onset)    Alcohol abuse Father    Cancer Mother    Cirrhosis Father    Leukemia Mother        Social History Main Topics    Smoking status: Former Smoker     Packs/day: 1.00     Years: 25.00     Quit date: 8/9/2000    Smokeless tobacco: Never Used    Alcohol use No      Comment: states he stopped drinking approx. 7 yrs ago/"I might drink a beer every 2 weeks"    Drug use: No    Sexual activity: No     Review of Systems   Constitutional: Positive for appetite change, chills, fatigue and fever.   HENT: Positive for congestion, postnasal drip, rhinorrhea and sore throat.    Eyes: Negative for discharge and itching.   Respiratory: Positive for cough and shortness of breath. Negative for apnea, chest tightness and wheezing.    Cardiovascular: Negative for chest pain, palpitations and leg swelling.   Gastrointestinal: Negative for abdominal distention, abdominal pain, diarrhea, nausea and vomiting.   Endocrine: Negative for polyphagia and polyuria.   Genitourinary: Negative " for difficulty urinating and dysuria.   Musculoskeletal: Positive for arthralgias, back pain and myalgias. Negative for joint swelling.   Skin: Negative for color change and pallor.   Allergic/Immunologic: Negative for environmental allergies and food allergies.   Neurological: Negative for dizziness, facial asymmetry and headaches.   Hematological: Negative for adenopathy.   Psychiatric/Behavioral: Negative for agitation and behavioral problems.     Objective:     Vital Signs (Most Recent):  Temp: 98.6 °F (37 °C) (09/17/17 0804)  Pulse: 76 (09/17/17 0820)  Resp: 14 (09/17/17 0820)  BP: (!) 144/73 (09/17/17 0804)  SpO2: 98 % (09/17/17 0820) Vital Signs (24h Range):  Temp:  [98.6 °F (37 °C)-99.1 °F (37.3 °C)] 98.6 °F (37 °C)  Pulse:  [68-83] 76  Resp:  [14-18] 14  SpO2:  [92 %-98 %] 98 %  BP: (131-145)/(72-82) 144/73     Weight: 86.2 kg (190 lb)  Body mass index is 28.06 kg/m².    Physical Exam   Constitutional: He is oriented to person, place, and time. He appears well-developed. No distress.   HENT:   Head: Normocephalic and atraumatic.   Eyes: EOM are normal. Pupils are equal, round, and reactive to light.   Neck: Normal range of motion. Neck supple. No JVD present.   Cardiovascular: Normal rate and regular rhythm.  Exam reveals no gallop and no friction rub.    No murmur heard.  Pulmonary/Chest: Effort normal. He has rales.   Abdominal: Soft. Bowel sounds are normal.   Musculoskeletal: Normal range of motion. He exhibits no edema, tenderness or deformity.   Neurological: He is alert and oriented to person, place, and time. No cranial nerve deficit. Coordination normal.   Skin: Skin is warm and dry. Capillary refill takes less than 2 seconds. No rash noted. He is not diaphoretic. No erythema. No pallor.   Psychiatric: He has a normal mood and affect. His behavior is normal.        Significant Labs:   BMP:     Recent Labs  Lab 09/17/17  0525   *   *   K 4.4   CL 99   CO2 21*   BUN 25*   CREATININE 1.0    CALCIUM 8.5*     CBC:     Recent Labs  Lab 09/16/17  0744 09/17/17  0525   WBC 4.10 4.40   HGB 13.0* 11.9*   HCT 38.0* 33.9*   PLT 31* 32*     Lactic Acid:     Recent Labs  Lab 09/16/17  0744   LACTATE 1.6       Significant Imaging: I have reviewed all pertinent imaging results/findings within the past 24 hours.

## 2017-09-18 PROBLEM — I50.32 CHRONIC DIASTOLIC CONGESTIVE HEART FAILURE: Status: ACTIVE | Noted: 2017-09-18

## 2017-09-18 PROBLEM — J96.01 ACUTE HYPOXEMIC RESPIRATORY FAILURE: Status: ACTIVE | Noted: 2017-09-16

## 2017-09-18 LAB
ANION GAP SERPL CALC-SCNC: 9 MMOL/L
BACTERIA THROAT CULT: NORMAL
BASOPHILS # BLD AUTO: 0 K/UL
BASOPHILS # BLD AUTO: 0 K/UL
BASOPHILS NFR BLD: 0 %
BASOPHILS NFR BLD: 0.7 %
BUN SERPL-MCNC: 19 MG/DL
CALCIUM SERPL-MCNC: 8.1 MG/DL
CHLORIDE SERPL-SCNC: 101 MMOL/L
CO2 SERPL-SCNC: 23 MMOL/L
CREAT SERPL-MCNC: 0.8 MG/DL
DIFFERENTIAL METHOD: ABNORMAL
DIFFERENTIAL METHOD: ABNORMAL
EOSINOPHIL # BLD AUTO: 0 K/UL
EOSINOPHIL # BLD AUTO: 0 K/UL
EOSINOPHIL NFR BLD: 0.2 %
EOSINOPHIL NFR BLD: 0.3 %
ERYTHROCYTE [DISTWIDTH] IN BLOOD BY AUTOMATED COUNT: 14.2 %
ERYTHROCYTE [DISTWIDTH] IN BLOOD BY AUTOMATED COUNT: 15.4 %
EST. GFR  (AFRICAN AMERICAN): >60 ML/MIN/1.73 M^2
EST. GFR  (NON AFRICAN AMERICAN): >60 ML/MIN/1.73 M^2
GLUCOSE SERPL-MCNC: 238 MG/DL
HCT VFR BLD AUTO: 32.4 %
HCT VFR BLD AUTO: 37.6 %
HGB BLD-MCNC: 11.4 G/DL
HGB BLD-MCNC: 12.9 G/DL
LACTATE SERPL-SCNC: 1.8 MMOL/L
LACTATE SERPL-SCNC: 2.1 MMOL/L
LACTATE SERPL-SCNC: 3.1 MMOL/L
LYMPHOCYTES # BLD AUTO: 0.4 K/UL
LYMPHOCYTES # BLD AUTO: 0.4 K/UL
LYMPHOCYTES NFR BLD: 10.3 %
LYMPHOCYTES NFR BLD: 8.3 %
MCH RBC QN AUTO: 29.6 PG
MCH RBC QN AUTO: 29.9 PG
MCHC RBC AUTO-ENTMCNC: 34.4 G/DL
MCHC RBC AUTO-ENTMCNC: 35.1 G/DL
MCV RBC AUTO: 85 FL
MCV RBC AUTO: 86 FL
MONOCYTES # BLD AUTO: 0.5 K/UL
MONOCYTES # BLD AUTO: 0.7 K/UL
MONOCYTES NFR BLD: 14.7 %
MONOCYTES NFR BLD: 15.3 %
NEUTROPHILS # BLD AUTO: 2.6 K/UL
NEUTROPHILS # BLD AUTO: 3.6 K/UL
NEUTROPHILS NFR BLD: 74 %
NEUTROPHILS NFR BLD: 76.2 %
PLATELET # BLD AUTO: 39 K/UL
PLATELET # BLD AUTO: 40 K/UL
PMV BLD AUTO: 8.5 FL
PMV BLD AUTO: 9.1 FL
POCT GLUCOSE: 276 MG/DL (ref 70–110)
POCT GLUCOSE: 295 MG/DL (ref 70–110)
POTASSIUM SERPL-SCNC: 4.1 MMOL/L
RBC # BLD AUTO: 3.8 M/UL
RBC # BLD AUTO: 4.36 M/UL
SODIUM SERPL-SCNC: 133 MMOL/L
WBC # BLD AUTO: 3.6 K/UL
WBC # BLD AUTO: 4.7 K/UL

## 2017-09-18 PROCEDURE — 87040 BLOOD CULTURE FOR BACTERIA: CPT | Mod: 59

## 2017-09-18 PROCEDURE — 99223 1ST HOSP IP/OBS HIGH 75: CPT | Mod: ,,, | Performed by: INTERNAL MEDICINE

## 2017-09-18 PROCEDURE — 36415 COLL VENOUS BLD VENIPUNCTURE: CPT

## 2017-09-18 PROCEDURE — 87070 CULTURE OTHR SPECIMN AEROBIC: CPT

## 2017-09-18 PROCEDURE — 80048 BASIC METABOLIC PNL TOTAL CA: CPT

## 2017-09-18 PROCEDURE — 11000001 HC ACUTE MED/SURG PRIVATE ROOM

## 2017-09-18 PROCEDURE — 83605 ASSAY OF LACTIC ACID: CPT | Mod: 91

## 2017-09-18 PROCEDURE — 87205 SMEAR GRAM STAIN: CPT

## 2017-09-18 PROCEDURE — 25000003 PHARM REV CODE 250: Performed by: NURSE PRACTITIONER

## 2017-09-18 PROCEDURE — 94640 AIRWAY INHALATION TREATMENT: CPT

## 2017-09-18 PROCEDURE — 25000003 PHARM REV CODE 250: Performed by: INTERNAL MEDICINE

## 2017-09-18 PROCEDURE — 63600175 PHARM REV CODE 636 W HCPCS: Performed by: NURSE PRACTITIONER

## 2017-09-18 PROCEDURE — 25000242 PHARM REV CODE 250 ALT 637 W/ HCPCS: Performed by: INTERNAL MEDICINE

## 2017-09-18 PROCEDURE — 84145 PROCALCITONIN (PCT): CPT

## 2017-09-18 PROCEDURE — 85025 COMPLETE CBC W/AUTO DIFF WBC: CPT

## 2017-09-18 RX ORDER — OSELTAMIVIR PHOSPHATE 75 MG/1
75 CAPSULE ORAL 2 TIMES DAILY
Status: DISCONTINUED | OUTPATIENT
Start: 2017-09-18 | End: 2017-09-19 | Stop reason: HOSPADM

## 2017-09-18 RX ORDER — ACETAMINOPHEN 325 MG/1
650 TABLET ORAL EVERY 6 HOURS PRN
Status: DISCONTINUED | OUTPATIENT
Start: 2017-09-18 | End: 2017-09-19 | Stop reason: HOSPADM

## 2017-09-18 RX ADMIN — ALBUTEROL SULFATE 2.5 MG: 2.5 SOLUTION RESPIRATORY (INHALATION) at 08:09

## 2017-09-18 RX ADMIN — HYDROCODONE POLISTIREX AND CHLORPHENIRAMINE POLISITREX 5 ML: 10; 8 SUSPENSION, EXTENDED RELEASE ORAL at 06:09

## 2017-09-18 RX ADMIN — PIPERACILLIN SODIUM, TAZOBACTAM SODIUM 4.5 G: 4; .5 INJECTION, POWDER, LYOPHILIZED, FOR SOLUTION INTRAVENOUS at 10:09

## 2017-09-18 RX ADMIN — BENZONATATE 200 MG: 100 CAPSULE ORAL at 08:09

## 2017-09-18 RX ADMIN — INSULIN ASPART 3 UNITS: 100 INJECTION, SOLUTION INTRAVENOUS; SUBCUTANEOUS at 12:09

## 2017-09-18 RX ADMIN — HYDROCODONE BITARTRATE AND ACETAMINOPHEN 1 TABLET: 10; 325 TABLET ORAL at 12:09

## 2017-09-18 RX ADMIN — PIPERACILLIN SODIUM, TAZOBACTAM SODIUM 4.5 G: 4; .5 INJECTION, POWDER, LYOPHILIZED, FOR SOLUTION INTRAVENOUS at 01:09

## 2017-09-18 RX ADMIN — ACETAMINOPHEN 650 MG: 325 TABLET, FILM COATED ORAL at 08:09

## 2017-09-18 RX ADMIN — METFORMIN HYDROCHLORIDE 1000 MG: 500 TABLET, FILM COATED ORAL at 04:09

## 2017-09-18 RX ADMIN — INSULIN ASPART 2 UNITS: 100 INJECTION, SOLUTION INTRAVENOUS; SUBCUTANEOUS at 06:09

## 2017-09-18 RX ADMIN — OSELTAMIVIR PHOSPHATE 75 MG: 75 CAPSULE ORAL at 08:09

## 2017-09-18 RX ADMIN — INSULIN ASPART 1 UNITS: 100 INJECTION, SOLUTION INTRAVENOUS; SUBCUTANEOUS at 09:09

## 2017-09-18 RX ADMIN — ALBUTEROL SULFATE 2.5 MG: 2.5 SOLUTION RESPIRATORY (INHALATION) at 07:09

## 2017-09-18 RX ADMIN — INSULIN ASPART 3 UNITS: 100 INJECTION, SOLUTION INTRAVENOUS; SUBCUTANEOUS at 04:09

## 2017-09-18 RX ADMIN — METFORMIN HYDROCHLORIDE 1000 MG: 500 TABLET, FILM COATED ORAL at 08:09

## 2017-09-18 RX ADMIN — HYDROCODONE BITARTRATE AND ACETAMINOPHEN 1 TABLET: 10; 325 TABLET ORAL at 06:09

## 2017-09-18 RX ADMIN — PIPERACILLIN SODIUM, TAZOBACTAM SODIUM 4.5 G: 4; .5 INJECTION, POWDER, LYOPHILIZED, FOR SOLUTION INTRAVENOUS at 05:09

## 2017-09-18 RX ADMIN — ACETAMINOPHEN 650 MG: 325 TABLET, FILM COATED ORAL at 12:09

## 2017-09-18 RX ADMIN — HYDROCODONE BITARTRATE AND ACETAMINOPHEN 1 TABLET: 10; 325 TABLET ORAL at 07:09

## 2017-09-18 NOTE — CONSULTS
09/18/2017      Admit Date: 9/16/2017  Steve June Jr.  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  Pt smoked ppd for 10-20 yr, , had cirrhosis, diabetic for 20yrs.  Had severe low plts from liver dz.    Pt was good with no premorbid lung dz, developed acute onset of headache, cough, weakness, sob, muscle aches, loss appetite with min nausea.  Pt presented 2 days later and tested pos for flu a.  Pt feels much better today, able to go on rm air, eats, and cough/breathing comfortable.           PFSH:  Past Medical History:   Diagnosis Date    Abnormal thyroid function test     Allergy     Seasonal    Anemia     Arthritis     Gaviria esophagus     Basal cell carcinoma     right forearm    Basal cell carcinoma 12/2011    lower post neck    Cancer     skin CA    Cataract     Chronic diastolic congestive heart failure 9/18/2017    Cirrhosis     Encounter for blood transfusion     Esophageal varices in cirrhosis     grade II on 7/12 EGD    Gastritis     on 7/12 EGD    GERD (gastroesophageal reflux disease) 2/28/2015    Hard of hearing     Hiatal hernia     History of hepatitis C 8/10/2012    tx with harvoni x 41 days (started 10/22/15). 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    Valvular heart disease     mild MR 12     Past Surgical History:   Procedure Laterality Date    CATARACT EXTRACTION  1/10/13    left eye    CATARACT EXTRACTION      right eye    CHOLECYSTECTOMY      COLONOSCOPY      EYE SURGERY       "Cataract surgery to right eye    KNEE ARTHROSCOPY W/ MENISCAL REPAIR      KNEE CARTILAGE SURGERY      left knee    KNEE SURGERY  12/2006    left    SKIN LESION EXCISION      TONSILLECTOMY      UPPER GASTROINTESTINAL ENDOSCOPY       Social History   Substance Use Topics    Smoking status: Former Smoker     Packs/day: 1.00     Years: 25.00     Quit date: 8/9/2000    Smokeless tobacco: Never Used    Alcohol use No      Comment: states he stopped drinking approx. 7 yrs ago/"I might drink a beer every 2 weeks"     Family History   Problem Relation Age of Onset    Leukemia Mother     Cancer Mother      bone    Alcohol abuse Father     Cirrhosis Father      EtOH induced    Amblyopia Neg Hx     Blindness Neg Hx     Cataracts Neg Hx     Diabetes Neg Hx     Glaucoma Neg Hx     Hypertension Neg Hx     Macular degeneration Neg Hx     Retinal detachment Neg Hx     Strabismus Neg Hx     Stroke Neg Hx     Thyroid disease Neg Hx     Psoriasis Neg Hx     Lupus Neg Hx     Eczema Neg Hx     Acne Neg Hx     Melanoma Neg Hx      Review of patient's allergies indicates:   Allergen Reactions    Adhesive tape-silicones Other (See Comments)     pulls skin off    Doxycycline      Dizzy. "Just didn't feel right".       Performance Status:Performance Status:The patient's activity level is functions out of house.    Review of Systems:  a review of eleven systems covering constitutional, Psych, Eye, HEENT, Respiratory, Cardiac, GI, , Musculoskeletal, Endocrine, Dermatologicwas negative except the above mentioned abnormalities and for any pertinent findings as listed below: pt cares for homeless grandson in his 20's, denies reg med problems save above and sl neuropathy.         Exam:Comprehensive exam done. /73   Pulse 69   Temp 99.2 °F (37.3 °C) (Oral)   Resp 18   Ht 5' 9" (1.753 m)   Wt 86.2 kg (190 lb)   SpO2 97%   BMI 28.06 kg/m²   Exam included Vitals as listed, and patient's appearance and " affect and alertness and mood, oral exam for yeast and hygiene and pharynx lesions and Mallapatti (M) score, neck with inspection for jvd and masses and thyroid abnormalities and lymph nodes (supraclavicular and infraclavicular nodes also examined and noted if abn), chest exam included symmetry and effort and fremitus and percussion and auscultation, cardiac exam included rhythm and gallops and murmur and rubs and jvd and edema, abdominal exam for mass and hepatosplenomegaly and tenderness and hernias and bowel sounds, Musculoskeletal exam with muscle tone and posture and mobility/gait and  strenght, and skin for rashes and cyanosis and pallor and turgor, extremity for clubbing.  Findings were normal except as listed below:  M3, looks mildly toxic, no distress, symmetric, nl fremitus and precussion , no sign rales or wheezes, good bs, no hs megaly or mass, nl cor, ? Early clubbing, affect good and moves all 4.     Radiographs reviewed: view by direct vision shallow inspiration and sl increased  markings.  No results found for this or any previous visit.]    Labs       Recent Labs  Lab 09/18/17 0449   WBC 3.60*   HGB 11.4*   HCT 32.4*   PLT 39*       Recent Labs  Lab 09/18/17 0449 09/18/17  0738   *  --    K 4.1  --      --    CO2 23  --    BUN 19  --    CREATININE 0.8  --    *  --    CALCIUM 8.1*  --    LACTATE 1.8 2.1   No results for input(s): PH, PCO2, PO2, HCO3 in the last 24 hours.  Microbiology Results (last 7 days)     Procedure Component Value Units Date/Time    Blood culture [[id number]] Collected:  09/18/17 0045    Order Status:  Completed Specimen:  Blood from Antecubital, Right  Arm Updated:  09/18/17 1715     Blood Culture, Routine No Growth to date    Blood culture [[id number]] Collected:  09/18/17 0044    Order Status:  Completed Specimen:  Blood from Antecubital, Left  Arm Updated:  09/18/17 1715     Blood Culture, Routine No Growth to date    Culture, Respiratory with Gram  Stain [[id number]] Collected:  09/18/17 0820    Order Status:  Sent Specimen:  Respiratory from Sputum Updated:  09/18/17 1406    Strep A culture, throat [[id number]] Collected:  09/16/17 0736    Order Status:  Completed Specimen:  Throat Updated:  09/18/17 0758     Strep A Culture No  Group A  Streptococcus isolated    Rapid strep screen [[id number]] Collected:  09/16/17 0736    Order Status:  Completed Specimen:  Throat from Throat Updated:  09/16/17 0815     Rapid Strep A Screen Negative     Comment: See Micro for reflexed Strep culture.             Impression:  Active Hospital Problems    Diagnosis  POA    *Influenza A [J10.1]  Yes    Chronic diastolic congestive heart failure [I50.32]  Yes    Influenza, pneumonia [J11.00]  Yes    Pneumonitis [J18.9]  Yes    Acute hypoxemic respiratory failure [J96.01]  Yes    Hepatic cirrhosis [K74.60]  Yes    GERD (gastroesophageal reflux disease) [K21.9]  Yes    Type 2 diabetes mellitus with diabetic polyneuropathy, without long-term current use of insulin [E11.42]  Yes    Pain management [R52]  Not Applicable    PVD (peripheral vascular disease) [I73.9]  Yes     Decreased pulses. No claudication      History of hepatitis C [Z86.19]  Yes     tx with harvoni x 41 days (started 10/22/15).      Thrombocytopenia [D69.6]  Yes      Resolved Hospital Problems    Diagnosis Date Resolved POA   No resolved problems to display.               Plan:     Pt looks to be compensating for influenza infections.    Consider outpt on tamiflu to complete 5 day course.      Would check procalcitionin, and if wnl would stop abx.    Liver dz increases risk of bad outcome.

## 2017-09-18 NOTE — PLAN OF CARE
Problem: Patient Care Overview  Goal: Plan of Care Review  Outcome: Ongoing (interventions implemented as appropriate)  Pt AAO x3, contact and droplet cautions maintained throughout shift. IVF d/c by patient.  Patient education to importance of fluids and safety.  IVF infused throughout rest of shift.  IV abx administered as ordered.  Bolus administered as ordered.  PRN medication given for elevated temperature of 102.2.  Afebrile one hour post check of administration.  Pt has remained free of injuries and falls throughout shift.  Environment free of clutter, side rails up x2, and call light within reach.  VS stable.  Pt has verbalized understanding of plan of care.

## 2017-09-18 NOTE — PLAN OF CARE
Problem: Patient Care Overview  Goal: Plan of Care Review  Pt with low grade fever part of today but better than last night.  Ambulates independently to and from bathroom.  IVF for hydration and CALI for poss pna.  Remains free from injury.  accu checks and sliding scale in use.  POC discussed with patient who verbalized understanding.

## 2017-09-18 NOTE — PLAN OF CARE
09/18/17 0719   Patient Assessment/Suction   Level of Consciousness (AVPU) alert   Respiratory Effort Normal;Unlabored   Expansion/Accessory Muscles/Retractions no retractions;no use of accessory muscles   All Lung Fields Breath Sounds clear   Cough Frequency frequent   Cough Type nonproductive;good   PRE-TX-O2-ETCO2   O2 Device (Oxygen Therapy) room air   SpO2 98 %   Pulse Oximetry Type Intermittent   Pulse 80   Resp 16   Positioning Sitting in bed   Aerosol Therapy   $ Aerosol Therapy Charges Aerosol Treatment   Respiratory Treatment Status given   SVN/Inhaler Treatment Route mask   Position During Treatment Sitting in bed   Patient Tolerance good   Post-Treatment   Post-treatment Heart Rate (beats/min) 88   Post-treatment Resp Rate (breaths/min) 20   All Fields Breath Sounds unchanged

## 2017-09-18 NOTE — PLAN OF CARE
Cm completed the assessment with his wife Lili.  Pt is self care, drives to appointments and activities. PCP is not Dr. Womack. Wife forgot pt's new PCP.  Cm will follow-up.  Pt is a diabetic, denies dialysis and coumadin.  Disposition:  Pt will discharge to home with wife.  No needs at this time.  Cm will follow-up with pt and family.       09/18/17 1651   Discharge Assessment   Assessment Type Discharge Planning Assessment   Confirmed/corrected address and phone number on facesheet? Yes   Assessment information obtained from? Patient  (Pt's wife completed the assessment - Lili June)   Prior to hospitilization cognitive status: Alert/Oriented   Prior to hospitalization functional status: Independent   Current cognitive status: Alert/Oriented   Current Functional Status: Independent   Lives With spouse   Able to Return to Prior Arrangements yes   Is patient able to care for self after discharge? Yes   Who are your caregiver(s) and their phone number(s)? wife- Lili June - [phone]   Patient's perception of discharge disposition home or selfcare   Readmission Within The Last 30 Days no previous admission in last 30 days   Patient currently being followed by outpatient case management? Yes   If yes, name of outpatient case management following: insurance company assigned oupatient case management   Patient currently receives any other outside agency services? No   Equipment Currently Used at Home walker, rolling;glucometer   Do you have any problems affording any of your prescribed medications? No   Is the patient taking medications as prescribed? yes  ([facility] [address])   Does the patient have transportation home? Yes   Transportation Available car;family or friend will provide   Dialysis Name and Scheduled days NA   Does the patient receive services at the Coumadin Clinic? No   Discharge Plan A Home with family   Discharge Plan B Home with family;Home Health   Patient/Family In Agreement With  Plan yes

## 2017-09-18 NOTE — PLAN OF CARE
09/17/17 2040   Patient Assessment/Suction   Level of Consciousness (AVPU) alert   Respiratory Effort Normal;Unlabored   Expansion/Accessory Muscles/Retractions no retractions;no use of accessory muscles   All Lung Fields Breath Sounds clear   RLL Breath Sounds crackles coarse   Cough Type nonproductive   PRE-TX-O2-ETCO2   O2 Device (Oxygen Therapy) room air   SpO2 95 %   Pulse Oximetry Type Intermittent   Pulse 103   Resp 20   Aerosol Therapy   $ Aerosol Therapy Charges Aerosol Treatment   Respiratory Treatment Status given   SVN/Inhaler Treatment Route mask;with oxygen   Position During Treatment Sitting in bed   Patient Tolerance good   Post-Treatment   Post-treatment Heart Rate (beats/min) 94   Post-treatment Resp Rate (breaths/min) 20   All Fields Breath Sounds unchanged

## 2017-09-19 VITALS
RESPIRATION RATE: 20 BRPM | SYSTOLIC BLOOD PRESSURE: 154 MMHG | TEMPERATURE: 99 F | OXYGEN SATURATION: 96 % | HEIGHT: 69 IN | WEIGHT: 190 LBS | BODY MASS INDEX: 28.14 KG/M2 | HEART RATE: 78 BPM | DIASTOLIC BLOOD PRESSURE: 81 MMHG

## 2017-09-19 LAB
ANION GAP SERPL CALC-SCNC: 12 MMOL/L
BASOPHILS # BLD AUTO: 0 K/UL
BASOPHILS NFR BLD: 0.1 %
BUN SERPL-MCNC: 11 MG/DL
CALCIUM SERPL-MCNC: 8.4 MG/DL
CHLORIDE SERPL-SCNC: 99 MMOL/L
CO2 SERPL-SCNC: 24 MMOL/L
CREAT SERPL-MCNC: 0.8 MG/DL
DIFFERENTIAL METHOD: ABNORMAL
EOSINOPHIL # BLD AUTO: 0 K/UL
EOSINOPHIL NFR BLD: 0.8 %
ERYTHROCYTE [DISTWIDTH] IN BLOOD BY AUTOMATED COUNT: 15.2 %
EST. GFR  (AFRICAN AMERICAN): >60 ML/MIN/1.73 M^2
EST. GFR  (NON AFRICAN AMERICAN): >60 ML/MIN/1.73 M^2
GLUCOSE SERPL-MCNC: 213 MG/DL
HCT VFR BLD AUTO: 33.8 %
HGB BLD-MCNC: 11.8 G/DL
LYMPHOCYTES # BLD AUTO: 0.2 K/UL
LYMPHOCYTES NFR BLD: 7.7 %
MCH RBC QN AUTO: 29.6 PG
MCHC RBC AUTO-ENTMCNC: 34.9 G/DL
MCV RBC AUTO: 85 FL
MONOCYTES # BLD AUTO: 0.4 K/UL
MONOCYTES NFR BLD: 11.9 %
NEUTROPHILS # BLD AUTO: 2.4 K/UL
NEUTROPHILS NFR BLD: 79.5 %
PLATELET # BLD AUTO: 34 K/UL
PMV BLD AUTO: 8.8 FL
POCT GLUCOSE: 228 MG/DL (ref 70–110)
POCT GLUCOSE: 229 MG/DL (ref 70–110)
POCT GLUCOSE: 267 MG/DL (ref 70–110)
POTASSIUM SERPL-SCNC: 3.6 MMOL/L
PROCALCITONIN SERPL IA-MCNC: 0.14 NG/ML
RBC # BLD AUTO: 3.98 M/UL
SODIUM SERPL-SCNC: 135 MMOL/L
WBC # BLD AUTO: 3.1 K/UL

## 2017-09-19 PROCEDURE — 25000003 PHARM REV CODE 250: Performed by: INTERNAL MEDICINE

## 2017-09-19 PROCEDURE — 25000003 PHARM REV CODE 250: Performed by: NURSE PRACTITIONER

## 2017-09-19 PROCEDURE — 25000242 PHARM REV CODE 250 ALT 637 W/ HCPCS: Performed by: INTERNAL MEDICINE

## 2017-09-19 PROCEDURE — 25000003 PHARM REV CODE 250: Performed by: FAMILY MEDICINE

## 2017-09-19 PROCEDURE — 36415 COLL VENOUS BLD VENIPUNCTURE: CPT

## 2017-09-19 PROCEDURE — 85025 COMPLETE CBC W/AUTO DIFF WBC: CPT

## 2017-09-19 PROCEDURE — 94640 AIRWAY INHALATION TREATMENT: CPT

## 2017-09-19 PROCEDURE — 63600175 PHARM REV CODE 636 W HCPCS: Performed by: NURSE PRACTITIONER

## 2017-09-19 PROCEDURE — 80048 BASIC METABOLIC PNL TOTAL CA: CPT

## 2017-09-19 RX ORDER — OSELTAMIVIR PHOSPHATE 75 MG/1
75 CAPSULE ORAL 2 TIMES DAILY
Qty: 7 CAPSULE | Refills: 0 | Status: SHIPPED | OUTPATIENT
Start: 2017-09-19 | End: 2017-09-22

## 2017-09-19 RX ORDER — ACETAMINOPHEN 325 MG/1
650 TABLET ORAL EVERY 6 HOURS PRN
Refills: 0 | Status: ON HOLD | COMMUNITY
Start: 2017-09-19 | End: 2017-12-15

## 2017-09-19 RX ORDER — BENZONATATE 200 MG/1
200 CAPSULE ORAL 3 TIMES DAILY PRN
Qty: 30 CAPSULE | Refills: 0 | Status: SHIPPED | OUTPATIENT
Start: 2017-09-19 | End: 2017-09-29

## 2017-09-19 RX ORDER — MOXIFLOXACIN HYDROCHLORIDE 400 MG/1
400 TABLET ORAL DAILY
Status: DISCONTINUED | OUTPATIENT
Start: 2017-09-19 | End: 2017-09-19 | Stop reason: HOSPADM

## 2017-09-19 RX ORDER — LEVOFLOXACIN 500 MG/1
500 TABLET, FILM COATED ORAL DAILY
Qty: 5 TABLET | Refills: 0 | Status: SHIPPED | OUTPATIENT
Start: 2017-09-20 | End: 2017-09-25

## 2017-09-19 RX ADMIN — OSELTAMIVIR PHOSPHATE 75 MG: 75 CAPSULE ORAL at 10:09

## 2017-09-19 RX ADMIN — HYDROCODONE BITARTRATE AND ACETAMINOPHEN 1 TABLET: 10; 325 TABLET ORAL at 07:09

## 2017-09-19 RX ADMIN — SODIUM CHLORIDE: 0.9 INJECTION, SOLUTION INTRAVENOUS at 12:09

## 2017-09-19 RX ADMIN — MOXIFLOXACIN HYDROCHLORIDE 400 MG: 400 TABLET, FILM COATED ORAL at 12:09

## 2017-09-19 RX ADMIN — ALBUTEROL SULFATE 2.5 MG: 2.5 SOLUTION RESPIRATORY (INHALATION) at 08:09

## 2017-09-19 RX ADMIN — PIPERACILLIN SODIUM, TAZOBACTAM SODIUM 4.5 G: 4; .5 INJECTION, POWDER, LYOPHILIZED, FOR SOLUTION INTRAVENOUS at 10:09

## 2017-09-19 RX ADMIN — INSULIN ASPART 2 UNITS: 100 INJECTION, SOLUTION INTRAVENOUS; SUBCUTANEOUS at 07:09

## 2017-09-19 RX ADMIN — HYDROCODONE BITARTRATE AND ACETAMINOPHEN 1 TABLET: 10; 325 TABLET ORAL at 12:09

## 2017-09-19 RX ADMIN — PIPERACILLIN SODIUM, TAZOBACTAM SODIUM 4.5 G: 4; .5 INJECTION, POWDER, LYOPHILIZED, FOR SOLUTION INTRAVENOUS at 02:09

## 2017-09-19 RX ADMIN — HYDROCODONE POLISTIREX AND CHLORPHENIRAMINE POLISITREX 5 ML: 10; 8 SUSPENSION, EXTENDED RELEASE ORAL at 12:09

## 2017-09-19 RX ADMIN — METFORMIN HYDROCHLORIDE 1000 MG: 500 TABLET, FILM COATED ORAL at 08:09

## 2017-09-19 NOTE — PROGRESS NOTES
09/19/17 0804   Patient Assessment/Suction   Level of Consciousness (AVPU) alert   All Lung Fields Breath Sounds clear   Cough Type none   PRE-TX-O2-ETCO2   O2 Device (Oxygen Therapy) room air   SpO2 96 %   Pulse Oximetry Type Intermittent   Pulse 81   Resp 16   Aerosol Therapy   $ Aerosol Therapy Charges Aerosol Treatment   Respiratory Treatment Status given   SVN/Inhaler Treatment Route mask   Position During Treatment HOB at 30 degrees   Patient Tolerance good   Post-Treatment   Post-treatment Heart Rate (beats/min) 85   Post-treatment Resp Rate (breaths/min) 16   All Fields Breath Sounds clear

## 2017-09-19 NOTE — DISCHARGE INSTRUCTIONS
"Thank you for choosing [facility] for your medical care. The primary doctor who is taking care of you at the time of your discharge is Rossana Carpenter MD.     You were admitted to the hospital with Influenza A.     Please note your discharge instructions, including diet/activity restrictions, follow-up appointments, and medication changes.  If you have any questions about your medical issues, prescriptions, or any other questions, please feel free to contact the [facility] Medicine Dept at [phone] and we will help.    If you are previously with Home health, outpatient PT/OT or under a therapy program, you are cleared to return to those programs.    Please direct all long term medication refills and follow up to your primary care provider, Alie Womack MD. Thank you again for letting us take care of your health care needs.    Please note the following discharge instructions per your discharging physician-  Take tylenol or motrin every 3 hours as needed for temperature greater than 101. The doses are 650 mg for tylenol and 400 mg for motrin. These medications are usually taken every 6 hours as needed, but if you alternate them, you have fever coverage every 3 hours.   Complete your full course of antibiotics and antivirals. Remember you received your dose of oral antibiotics today so start antibiotics tomorrow. You have to take second dose of tamiflu tonight.       Pt"s instructions:  Please call when going to your appointment with Ny Lujan NP for instructions to get there, because the GPS may give you different directions.  It's located across from  [facility]. Per Lacey in scheduling.  "

## 2017-09-19 NOTE — PLAN OF CARE
09/19/17 1105   Final Note   Assessment Type Final Discharge Note   Discharge Disposition Home   Hospital Follow Up  Appt(s) scheduled? Yes   Discharge plans and expectations educations in teach back method with documentation complete? Yes

## 2017-09-19 NOTE — ASSESSMENT & PLAN NOTE
Supportive care. Tamiflu ordered 2/2 to his high risk of complication to flu- underlying DM, cirrhosis and age >65.

## 2017-09-19 NOTE — SUBJECTIVE & OBJECTIVE
Interval History: still having high fever. Coughing up moderate amounts of green and yellow sputum.     Review of Systems   Constitutional: Positive for activity change, appetite change and fever.   Respiratory: Positive for cough. Negative for shortness of breath.    Cardiovascular: Negative for chest pain.   Gastrointestinal: Negative for abdominal pain.   Genitourinary: Negative for dysuria.   Musculoskeletal: Negative for back pain.   Neurological: Negative for headaches.   Psychiatric/Behavioral: Negative for confusion.     Objective:     Vital Signs (Most Recent):  Temp: 99.2 °F (37.3 °C) (09/18/17 1600)  Pulse: 69 (09/18/17 1600)  Resp: 18 (09/18/17 1600)  BP: 110/73 (09/18/17 1600)  SpO2: 97 % (09/18/17 1600) Vital Signs (24h Range):  Temp:  [98.6 °F (37 °C)-102 °F (38.9 °C)] 99.2 °F (37.3 °C)  Pulse:  [] 69  Resp:  [16-20] 18  SpO2:  [91 %-98 %] 97 %  BP: (110-154)/(61-77) 110/73     Weight: 86.2 kg (190 lb)  Body mass index is 28.06 kg/m².    Intake/Output Summary (Last 24 hours) at 09/18/17 2006  Last data filed at 09/18/17 1752   Gross per 24 hour   Intake             1280 ml   Output                0 ml   Net             1280 ml      Physical Exam   Constitutional: He appears well-developed and well-nourished. No distress.   HENT:   Head: Normocephalic and atraumatic.   Eyes: Conjunctivae are normal.   Neck: Neck supple. No JVD present.   Cardiovascular: Normal rate, regular rhythm and normal heart sounds.    Pulmonary/Chest: Effort normal. He has rales (vs rub in bibasilar bases).   Abdominal: Soft. Bowel sounds are normal. He exhibits no distension. There is no tenderness.   Musculoskeletal: He exhibits no edema.   Neurological: He is alert.   Psychiatric: He has a normal mood and affect. His behavior is normal.   Nursing note and vitals reviewed.      Significant Labs: All pertinent labs within the past 24 hours have been reviewed.    Significant Imaging: I have reviewed all pertinent imaging  results/findings within the past 24 hours.

## 2017-09-19 NOTE — ASSESSMENT & PLAN NOTE
Excellent disease control with A1C of 6.4. Blood sugars elevated.   Increased detemir to 20 units.

## 2017-09-19 NOTE — PLAN OF CARE
POC reviewed with pt, pt verbalized understanding. Safety maintained throughout shift, bed locked and in lowest position, call light in reach, Side rails up X 2. Non skid sock on when OOB. Pt remained free of fall/trauma. VSS, afebrile this shift. Pt self reports of pain treated with PRN medication.  Pt up in chair most of shift. IVF and IV abx  infused as ordered. Discharge instruction, follow up appointments and medication instructions given to pt, pt verbalized understanding. IV discontinued, site asymptomatic, pressure dressing applied. Rx called in to [facility] on [address] in [place]. Pt to  RX from pharmacy.  Pt spouse transporting pt home. All item gathered and taken at the time of discharge. Wheelchair assistance provided to the car

## 2017-09-19 NOTE — PLAN OF CARE
Problem: Patient Care Overview  Goal: Plan of Care Review  Outcome: Ongoing (interventions implemented as appropriate)    Pt AAO x3, contact and droplet cautions maintained throughout shift.   IVF infused throughout rest of shift.  IV abx administered as ordered.  Bolus administered as ordered.  PRN medication given for elevated temperature.  Afebrile one hour post check of administration.  Pt has remained free of injuries and falls throughout shift.  Environment free of clutter, side rails up x2, and call light within reach.  VS stable.  Pt has verbalized understanding of plan of care.

## 2017-09-19 NOTE — PROGRESS NOTES
[facility] Medicine  Progress Note    Patient Name: Steve June Jr.  MRN: [id number]  Patient Class: IP- Inpatient   Admission Date: 9/16/2017  Length of Stay: 2 days  Attending Physician: Rossana Carpenter MD  Primary Care Provider: Alie Womack MD    Subjective:     Principal Problem:Influenza A    HPI:  Mr. June is a 73 yo M with hx of Hep C cirrhosis, Thrombocytopenia, DMII, Chronic back pain, and HTN presents being admitteted for Influenza. States that 3 days ago started feeling bad, productive yellow cough, myalgias, fevers, chills, runny nose. He did not feel better and had a fever of 102 this morning prompted him to come to the Er. He does alos complain of SOB especially with walking. He has not been able to eat much the past 2 days due to decreased appettie. In the ED he was noted to be hypoxic to the low 90s and found to have Influenza A. CXR was concerning for Pneumonitis.     Hospital Course:  9/17 - Still having cough and feeling week    Interval History: still having high fever. Coughing up moderate amounts of green and yellow sputum.     Review of Systems   Constitutional: Positive for activity change, appetite change and fever.   Respiratory: Positive for cough. Negative for shortness of breath.    Cardiovascular: Negative for chest pain.   Gastrointestinal: Negative for abdominal pain.   Genitourinary: Negative for dysuria.   Musculoskeletal: Negative for back pain.   Neurological: Negative for headaches.   Psychiatric/Behavioral: Negative for confusion.     Objective:     Vital Signs (Most Recent):  Temp: 99.2 °F (37.3 °C) (09/18/17 1600)  Pulse: 69 (09/18/17 1600)  Resp: 18 (09/18/17 1600)  BP: 110/73 (09/18/17 1600)  SpO2: 97 % (09/18/17 1600) Vital Signs (24h Range):  Temp:  [98.6 °F (37 °C)-102 °F (38.9 °C)] 99.2 °F (37.3 °C)  Pulse:  [] 69  Resp:  [16-20] 18  SpO2:  [91 %-98 %] 97 %  BP: (110-154)/(61-77) 110/73     Weight: 86.2 kg (190 lb)  Body  mass index is 28.06 kg/m².    Intake/Output Summary (Last 24 hours) at 09/18/17 2006  Last data filed at 09/18/17 1752   Gross per 24 hour   Intake             1280 ml   Output                0 ml   Net             1280 ml      Physical Exam   Constitutional: He appears well-developed and well-nourished. No distress.   HENT:   Head: Normocephalic and atraumatic.   Eyes: Conjunctivae are normal.   Neck: Neck supple. No JVD present.   Cardiovascular: Normal rate, regular rhythm and normal heart sounds.    Pulmonary/Chest: Effort normal. He has rales (vs rub in bibasilar bases).   Abdominal: Soft. Bowel sounds are normal. He exhibits no distension. There is no tenderness.   Musculoskeletal: He exhibits no edema.   Neurological: He is alert.   Psychiatric: He has a normal mood and affect. His behavior is normal.   Nursing note and vitals reviewed.      Significant Labs: All pertinent labs within the past 24 hours have been reviewed.    Significant Imaging: I have reviewed all pertinent imaging results/findings within the past 24 hours.    Assessment/Plan:      * Influenza A    Supportive care. Tamiflu ordered 2/2 to his high risk of complication to flu- underlying DM, cirrhosis and age >65.           Influenza, pneumonia    Will de escalate abx once resp and blood cultures return. Pulmonology consulted.           Chronic diastolic congestive heart failure    Stable.   Noted on echo in 2012 after chart review. Monitor for clinical signs/ symptoms.           Acute hypoxemic respiratory failure    Supplemental O2- wean as tolerated.   Nebs as needed  Monitor closely          Pneumonitis    Stable. Pulmonology consulted.   Treated with course of steroids.           GERD (gastroesophageal reflux disease)    Stable        Type 2 diabetes mellitus with diabetic polyneuropathy, without long-term current use of insulin    Excellent disease control with A1C of 6.4. Blood sugars elevated.   Increased detemir to 20 units.          Pain management    Continue chronic pain medications          PVD (peripheral vascular disease)    Chronic, stable          Leukopenia              History of hepatitis C    Stable.  Chronic, no signs of decompensation          Thrombocytopenia    Chronic, in setting of Hep C cirrhosis. Stable. Monitor closely with active infection            VTE Risk Mitigation         Ordered     Low Risk of VTE  Once      09/16/17 1435      Possible d/c tomorrow.       Rossana Carpenter MD  Department of Hospital Medicine   [facility]

## 2017-09-20 PROBLEM — J96.01 ACUTE HYPOXEMIC RESPIRATORY FAILURE: Status: RESOLVED | Noted: 2017-09-16 | Resolved: 2017-09-20

## 2017-09-20 LAB
BACTERIA SPEC AEROBE CULT: NORMAL
GRAM STN SPEC: NORMAL

## 2017-09-20 NOTE — DISCHARGE SUMMARY
[facility]  Discharge Summary      Patient Name: Steve June Jr.  MRN: [id number]  Admission Date: 9/16/2017  Hospital Length of Stay: 3 days  Discharge Date and Time: 9/19/2017  1:14 PM  Attending Physician: Leatha Carpenter MD    Discharging Provider: Leatha Carpenter MD  Primary Care Provider: Alie Womack MD      HPI:   Mr. June is a 71 yo M with hx of Hep C cirrhosis, Thrombocytopenia, DMII, Chronic back pain, and HTN presents being admitteted for Influenza. States that 3 days ago started feeling bad, productive yellow cough, myalgias, fevers, chills, runny nose. He did not feel better and had a fever of 102 this morning prompted him to come to the Er. He does alos complain of SOB especially with walking. He has not been able to eat much the past 2 days due to decreased appettie. In the ED he was noted to be hypoxic to the low 90s and found to have Influenza A. CXR was concerning for Pneumonitis.         Indwelling Lines/Drains at time of discharge:   Lines/Drains/Airways          No matching active lines, drains, or airways        Hospital Course:   Patient given supportive care for cough, fever and other symptoms. He continued to have high fevers and weakness. He was started on IV abx and tamiflu for abnormal sputum production and abnormal CXR. Pulmonology was consulted. He started to improve in his symptoms. Patient examined at bedside on day of discharge. Exam findings within normal limits. He was deemed safe for discharge.       Consults:   Consults         Status Ordering Provider     Inpatient consult to Pulmonology  Once     Provider:  Duong Camarena MD    Completed LEATHA CARPENTER          Significant Diagnostic Studies: Microbiology:   Blood Culture   Lab Results   Component Value Date    LABBLOO No Growth to date 09/18/2017    LABBLOO No Growth to date 09/18/2017    and Sputum Culture   Lab Results   Component Value Date    GSRESP <10 epithelial  cells per low power field. 09/18/2017    GSRESP Few WBC's 09/18/2017    GSRESP Few Gram positive cocci 09/18/2017    GSRESP Rare Gram negative rods 09/18/2017    RESPIRATORYC Normal respiratory chente 09/18/2017       Pending Diagnostic Studies:     None        Final Active Diagnoses:    Diagnosis Date Noted POA    PRINCIPAL PROBLEM:  Influenza A [J10.1] 09/16/2017 Yes    Chronic diastolic congestive heart failure [I50.32] 09/18/2017 Yes    Influenza, pneumonia [J11.00] 09/18/2017 Clinically Undetermined    Pneumonitis [J18.9] 09/16/2017 Yes    GERD (gastroesophageal reflux disease) [K21.9] 02/28/2015 Yes    Type 2 diabetes mellitus with diabetic polyneuropathy, without long-term current use of insulin [E11.42] 01/22/2015 Yes    Pain management [R52] 12/10/2014 Not Applicable    PVD (peripheral vascular disease) [I73.9] 08/26/2014 Yes    History of hepatitis C [Z86.19] 08/10/2012 Yes    Thrombocytopenia [D69.6] 12/15/2011 Yes      Problems Resolved During this Admission:    Diagnosis Date Noted Date Resolved POA    Acute hypoxemic respiratory failure [J96.01] 09/16/2017 09/20/2017 Yes      * Influenza A    Continue supportive care. Complete course of PO abx and tamiflu as prescribed.            Influenza, pneumonia    Complete oral abx.          Chronic diastolic congestive heart failure    Stable.           Pneumonitis    Stable.   Treated with course of steroids in ED.           GERD (gastroesophageal reflux disease)    Stable        Type 2 diabetes mellitus with diabetic polyneuropathy, without long-term current use of insulin    Stable         Pain management    Stable.           PVD (peripheral vascular disease)    Stable           History of hepatitis C    Stable.          Thrombocytopenia    Stable.           Acute hypoxemic respiratory failure-resolved as of 9/20/2017    Supplemental O2- wean as tolerated.   Nebs as needed  Monitor closely          Discharged Condition: stable    Disposition: Home  or Self Care    Follow Up:    Patient Instructions:     Diet Cardiac     Activity as tolerated     Medications:  Reconciled Home Medications:   Discharge Medication List as of 9/19/2017 12:08 PM      START taking these medications    Details   acetaminophen (TYLENOL) 325 MG tablet Take 2 tablets (650 mg total) by mouth every 6 (six) hours as needed., Starting Tue 9/19/2017, OTC      benzonatate (TESSALON) 200 MG capsule Take 1 capsule (200 mg total) by mouth 3 (three) times daily as needed for Cough., Starting Tue 9/19/2017, Until Fri 9/29/2017, Normal      levoFLOXacin (LEVAQUIN) 500 MG tablet Take 1 tablet (500 mg total) by mouth once daily., Starting Wed 9/20/2017, Until Mon 9/25/2017, Normal      oseltamivir (TAMIFLU) 75 MG capsule Take 1 capsule (75 mg total) by mouth 2 (two) times daily., Starting Tue 9/19/2017, Until Fri 9/22/2017, Normal         CONTINUE these medications which have NOT CHANGED    Details   ACCU-CHEK VEENA PLUS METER Misc Starting 6/21/2016, Until Discontinued, Historical Med      ACCU-CHEK SOFTCLIX LANCETS [facility] USE TO TEST BLOOD SUGAR TWICE DAILY AS DIRECTED, Normal      blood sugar diagnostic Strp Accu-chek veena plus test strip. Test BG 3 x day, Normal      carvedilol (COREG) 6.25 MG tablet TAKE 1 TABLET(6.25 MG) BY MOUTH TWICE DAILY, Normal      GLUCOSAMINE HCL/CHONDR CHISHOLM A NA (OSTEO BI-FLEX ORAL) Take 2 tablets by mouth once daily., Until Discontinued, Historical Med      hydrocodone-acetaminophen 10-325mg (NORCO)  mg Tab Take 1 tablet by mouth every 6 (six) hours as needed (pain)., Starting Tue 9/12/2017, Until Thu 10/12/2017, Print      insulin detemir (LEVEMIR FLEXTOUCH) 100 unit/mL (3 mL) SubQ InPn pen Inject 18 Units into the skin every evening., Starting Wed 7/12/2017, Until Thu 7/12/2018, No Print      metformin (GLUCOPHAGE) 1000 MG tablet TAKE 1 TABLET BY MOUTH TWICE DAILY WITH MEALS, Normal      nystatin (MYCOSTATIN) 100,000 unit/mL suspension TAKE 4ML BY MOUTH TWICE  "DAILY, Normal      pen needle, diabetic (BD ULTRA-FINE KORINA PEN NEEDLES) 32 gauge x 5/32" Ndle 10 Units by Misc.(Non-Drug; Combo Route) route once daily at 6am., Starting Mon 6/26/2017, Normal         Time spent on the discharge of patient: 33 minutes      Rossana Carpenter MD  Department of Hospital Medicine  [facility]  "

## 2017-09-23 LAB
BACTERIA BLD CULT: NORMAL
BACTERIA BLD CULT: NORMAL

## 2017-10-04 ENCOUNTER — OFFICE VISIT (OUTPATIENT)
Dept: PODIATRY | Facility: CLINIC | Age: 72
End: 2017-10-04
Payer: MEDICARE

## 2017-10-04 VITALS — HEIGHT: 69 IN | BODY MASS INDEX: 28.73 KG/M2 | RESPIRATION RATE: 16 BRPM | WEIGHT: 194 LBS

## 2017-10-04 DIAGNOSIS — B35.1 ONYCHOMYCOSIS DUE TO DERMATOPHYTE: ICD-10-CM

## 2017-10-04 DIAGNOSIS — L84 CORN OR CALLUS: ICD-10-CM

## 2017-10-04 DIAGNOSIS — M21.6X2 ACQUIRED EQUINUS DEFORMITY OF BOTH FEET: ICD-10-CM

## 2017-10-04 DIAGNOSIS — E08.42 DIABETIC POLYNEUROPATHY ASSOCIATED WITH DIABETES MELLITUS DUE TO UNDERLYING CONDITION: Primary | ICD-10-CM

## 2017-10-04 DIAGNOSIS — M21.6X1 ACQUIRED EQUINUS DEFORMITY OF BOTH FEET: ICD-10-CM

## 2017-10-04 PROCEDURE — 99999 PR PBB SHADOW E&M-EST. PATIENT-LVL III: CPT | Mod: PBBFAC,,, | Performed by: PODIATRIST

## 2017-10-04 PROCEDURE — 99499 UNLISTED E&M SERVICE: CPT | Mod: S$GLB,,, | Performed by: PODIATRIST

## 2017-10-04 PROCEDURE — 99213 OFFICE O/P EST LOW 20 MIN: CPT | Mod: S$GLB,,, | Performed by: PODIATRIST

## 2017-10-04 RX ORDER — CICLOPIROX 80 MG/ML
SOLUTION TOPICAL NIGHTLY
Qty: 6.6 ML | Refills: 11 | Status: SHIPPED | OUTPATIENT
Start: 2017-10-04 | End: 2018-01-25 | Stop reason: SDUPTHER

## 2017-10-04 NOTE — LETTER
October 4, 2017      Gil Skaggs MD  [address] [facility]  [address]  [place] [address]           [place] [facility]  [address] [facility] [address]  [facility] [address]  Phone: [phone]          Patient: Steve June Jr.   MR Number: [id number]   YOB: 1945   Date of Visit: 10/4/2017       Dear Dr. Gil Skaggs:    Thank you for referring Steve June to me for evaluation. Attached you will find relevant portions of my assessment and plan of care.    If you have questions, please do not hesitate to call me. I look forward to following Steve June along with you.    Sincerely,    Florencio Virk, EYAL    Enclosure  CC:  No Recipients    If you would like to receive this communication electronically, please contact [email or web address] or [phone] to request more information on [employer] access.    For providers and/or their staff who would like to refer a patient to [facility], please contact us through our one-stop-shop provider referral line, [facility], at [phone].    If you feel you have received this communication in error or would no longer like to receive these types of communications, please e-mail [email or web address]

## 2017-10-04 NOTE — PROGRESS NOTES
Subjective:      Patient ID: Steve June Jr. is a 72 y.o. male.    Chief Complaint: Diabetic Foot Exam    Diabetes, increased risk amputation needing evaluation/management/optomization of foot care.    CC callus, thick nails, burning pain.  Gradual onset, worsening over past several weeks-months, aggravated by increased weight bearing, shoe gear, pressure.  Pain management for burning.  Self management callus and toenails with daughter.  Denies trauma, signs infection, and surgery both feet.    Review of Systems   Constitution: Negative for chills, diaphoresis, fever, malaise/fatigue and night sweats.   Cardiovascular: Negative for claudication, cyanosis, leg swelling and syncope.   Skin: Positive for nail changes and suspicious lesions. Negative for color change, dry skin, rash and unusual hair distribution.   Musculoskeletal: Negative for falls, joint pain, joint swelling, muscle cramps, muscle weakness and stiffness.   Gastrointestinal: Negative for constipation, diarrhea, nausea and vomiting.   Neurological: Positive for paresthesias and sensory change. Negative for brief paralysis, disturbances in coordination, focal weakness, numbness and tremors.           Objective:      Physical Exam   Constitutional: He is oriented to person, place, and time. He appears well-developed and well-nourished. He is cooperative.   Oriented to time, place, and person.   Cardiovascular:   Pulses:       Dorsalis pedis pulses are 1+ on the right side, and 1+ on the left side.        Posterior tibial pulses are 1+ on the right side, and 1+ on the left side.   Capillary fill time 3-5 seconds.  All toes warm to touch.      Negative lower extremity edema bilateral.    Negative elevational pallor and dependent rubor bilateral.     Musculoskeletal:   Normal angle, base, station of gait. Decreased stride length, early heel off, moderately propulsive toe off bilateral.    All ten toes without clubbing, cyanosis, or signs of ischemia.       Ankle dorsiflexion decreased at <10 degrees bilateral with moderate increase with knee flexion bilateral.    No pain to palpation bilateral lower extremities.      Range of motion, stability, muscle strength, and muscle tone are age and health appropriate normal bilateral feet and legs.       Lymphadenopathy:   Negative lymphadenopathy bilateral popliteal fossa and tarsal tunnel.     Neurological: He is alert and oriented to person, place, and time. He has normal strength. He is not disoriented. He displays no atrophy and no tremor. A sensory deficit is present. He exhibits normal muscle tone.   Reflex Scores:       Patellar reflexes are 2+ on the right side and 2+ on the left side.       Achilles reflexes are 2+ on the right side and 2+ on the left side.  Decreased/absent vibratory sensation bilateral feet to 128Hz tuning fork.    Diminished/loss of protective sensation all toes bilateral to 10 gram monofilament.    Paresthesias,  bilateral feet with no clearly identified trigger or source.     Skin: Skin is warm, dry and intact. No abrasion, no bruising, no burn, no ecchymosis, no laceration, no lesion, no petechiae and no rash noted. He is not diaphoretic. No cyanosis or erythema. No pallor. Nails show no clubbing.   Focal hyperkeratotic lesion consisting entirely of hyperkeratotic tissue without open skin, drainage, pus, fluctuance, malodor, or signs of infection plantar right hallux and mtpj. 1.    Skin thin, atrophic, with decreased density and distribution of pedal hair bilateral, but without , nasir discoloration,  ulcers, masses, nodules or cords palpated bilateral feet and legs.    Hyperpigmentation both legs consistent with stasis.    Toenails 1st, 2nd, 3rd, 4th, 5th  bilateral are hypertrophic thickened 2-3 mm, dystrophic, discolored tanish brown with tan, gray crumbly subungual debris.  Neatly trimmed and not tender to distal nail plate pressure, without periungual skin abnormality of each.                Assessment:       Encounter Diagnoses   Name Primary?    Diabetic polyneuropathy associated with diabetes mellitus due to underlying condition Yes    Corn or callus     Acquired equinus deformity of both feet     Onychomycosis due to dermatophyte          Plan:       Steve was seen today for diabetic foot exam.    Diagnoses and all orders for this visit:    Diabetic polyneuropathy associated with diabetes mellitus due to underlying condition  -     DIABETIC SHOES FOR HOME USE    Corn or callus  -     DIABETIC SHOES FOR HOME USE    Acquired equinus deformity of both feet  -     DIABETIC SHOES FOR HOME USE    Onychomycosis due to dermatophyte    Other orders  -     ciclopirox (PENLAC) 8 % Soln; Apply topically nightly.      I counseled the patient on his conditions, their implications and medical management.    Patient will stretch the tendo achilles complex three times daily as demonstrated in the office.  Literature was dispensed illustrating proper stretching technique.    The patient has received literature on basic diabetic foot care.  Patient will inspect feet daily, wear protective shoe gear when ambulatory, and apply moisturizer to skin as needed to maintain elasticity and help prevent ulceration.    Rx DM shoes, custom inserts    Declines at risk foot care non covered.    Continue pain management.    Rx penlac.          No Follow-up on file.

## 2017-10-05 ENCOUNTER — TELEPHONE (OUTPATIENT)
Dept: PODIATRY | Facility: CLINIC | Age: 72
End: 2017-10-05

## 2017-10-05 NOTE — TELEPHONE ENCOUNTER
----- Message from Vera Bertrand sent at 10/5/2017 10:40 AM CDT -----  Please call patient in reference to the prescription for diabetic shoes.  Call [phone].

## 2017-10-05 NOTE — TELEPHONE ENCOUNTER
"Pateint is upset that Dr Virk wrote  him a prescription for Diabetic shoe and his insurance company will not cover the shoes. He stated,  "feels that Dr Virk should have not taken his $50 Co-pay if he can not get the shoes." patient continued to complain that nothing was done at visit and he is upset he can not get his shoes. I tried to explained to him that his insurance is who needs to call. Patient began to call me dilcia and  "

## 2017-10-30 RX ORDER — METFORMIN HYDROCHLORIDE 1000 MG/1
TABLET ORAL
Qty: 60 TABLET | Refills: 0 | Status: SHIPPED | OUTPATIENT
Start: 2017-10-30 | End: 2018-01-16 | Stop reason: SDUPTHER

## 2017-11-01 ENCOUNTER — OFFICE VISIT (OUTPATIENT)
Dept: PHYSICAL MEDICINE AND REHAB | Facility: CLINIC | Age: 72
End: 2017-11-01
Payer: MEDICARE

## 2017-11-01 VITALS
DIASTOLIC BLOOD PRESSURE: 79 MMHG | HEART RATE: 61 BPM | WEIGHT: 194 LBS | SYSTOLIC BLOOD PRESSURE: 140 MMHG | HEIGHT: 69 IN | BODY MASS INDEX: 28.73 KG/M2

## 2017-11-01 DIAGNOSIS — G89.29 CHRONIC LEFT SHOULDER PAIN: Primary | ICD-10-CM

## 2017-11-01 DIAGNOSIS — M67.912 TENDINOPATHY OF ROTATOR CUFF, LEFT: ICD-10-CM

## 2017-11-01 DIAGNOSIS — M79.18 MYOFASCIAL PAIN ON LEFT SIDE: ICD-10-CM

## 2017-11-01 DIAGNOSIS — M25.512 CHRONIC LEFT SHOULDER PAIN: Primary | ICD-10-CM

## 2017-11-01 PROCEDURE — 99999 PR PBB SHADOW E&M-EST. PATIENT-LVL II: CPT | Mod: PBBFAC,,, | Performed by: PHYSICAL MEDICINE & REHABILITATION

## 2017-11-01 PROCEDURE — 99213 OFFICE O/P EST LOW 20 MIN: CPT | Mod: 25,S$GLB,, | Performed by: PHYSICAL MEDICINE & REHABILITATION

## 2017-11-01 PROCEDURE — 20553 NJX 1/MLT TRIGGER POINTS 3/>: CPT | Mod: S$GLB,,, | Performed by: PHYSICAL MEDICINE & REHABILITATION

## 2017-11-01 NOTE — PROGRESS NOTES
[facility]    CHIEF COMPLAINT:   Chief Complaint   Patient presents with    Follow-up     left shoulder pain     HISTORY OF PRESENT ILLNESS: Steve June Jr. is a 72 y.o. male who presents to me today for follow up. Last seen on 7/17/17 for left shoulder pain consistent with rotator cuff pathology. At that time, Mr. June had an injection to the suprascapular nerve which provided him immediate relief. We proceeded with radiofrequency ablation on 8/25/17. The procedure provided 100% relief for 2 months, however over the past 10 days the pain returned in the same quality, severity and distribution. Today, his pain is aching, 8/10 in severity. His pain is worse at night, he is unable to sleep on the left side, and his sleep is interrupted due to the pain.     Review of Systems   Constitutional: Negative for fever.   HENT: Negative for drooling.    Eyes: Negative for discharge.   Respiratory: Negative for choking.    Cardiovascular: Negative for chest pain.   Genitourinary: Negative for flank pain.   Skin: Negative for wound.   Allergic/Immunologic: Negative for immunocompromised state.   Neurological: Negative for tremors and syncope.   Psychiatric/Behavioral: Negative for behavioral problems.     Past Medical History:   Past Medical History:   Diagnosis Date    Abnormal thyroid function test     Allergy     Seasonal    Anemia     Arthritis     Gaviria esophagus     Basal cell carcinoma     right forearm    Basal cell carcinoma 12/2011    lower post neck    Cancer     skin CA    Cataract     Chronic diastolic congestive heart failure 9/18/2017    Cirrhosis     Encounter for blood transfusion     Esophageal varices in cirrhosis     grade II on 7/12 EGD    Gastritis     on 7/12 EGD    GERD (gastroesophageal reflux disease) 2/28/2015    Hard of hearing     Hiatal hernia     History of hepatitis C 8/10/2012    tx with harvoni x 41 days (started 10/22/15). SVR4     Hoarseness  2/28/2015    Hypercholesteremia     Hypersplenism     Hypertension     No meds    Pain management 12/10/2014    Portal hypertensive gastropathy     on 7/12 EGD    Thrombocytopenia     Type II or unspecified type diabetes mellitus with neurological manifestations, uncontrolled(250.62) 12/24/2013    Valvular heart disease     mild MR 12       Past Surgical History:   Past Surgical History:   Procedure Laterality Date    CATARACT EXTRACTION  1/10/13    left eye    CATARACT EXTRACTION      right eye    CHOLECYSTECTOMY      COLONOSCOPY      EYE SURGERY      Cataract surgery to right eye    KNEE ARTHROSCOPY W/ MENISCAL REPAIR      KNEE CARTILAGE SURGERY      left knee    KNEE SURGERY  12/2006    left    SKIN LESION EXCISION      TONSILLECTOMY      UPPER GASTROINTESTINAL ENDOSCOPY         Family History:   Family History   Problem Relation Age of Onset    Leukemia Mother     Cancer Mother      bone    Alcohol abuse Father     Cirrhosis Father      EtOH induced    Amblyopia Neg Hx     Blindness Neg Hx     Cataracts Neg Hx     Diabetes Neg Hx     Glaucoma Neg Hx     Hypertension Neg Hx     Macular degeneration Neg Hx     Retinal detachment Neg Hx     Strabismus Neg Hx     Stroke Neg Hx     Thyroid disease Neg Hx     Psoriasis Neg Hx     Lupus Neg Hx     Eczema Neg Hx     Acne Neg Hx     Melanoma Neg Hx        Medications:   Current Outpatient Prescriptions on File Prior to Visit   Medication Sig Dispense Refill    ACCU-CHEK VEENA PLUS METER Misc       ACCU-CHEK SOFTCLIX LANCETS [facility] USE TO TEST BLOOD SUGAR TWICE DAILY AS DIRECTED 100 each 3    acetaminophen (TYLENOL) 325 MG tablet Take 2 tablets (650 mg total) by mouth every 6 (six) hours as needed.  0    blood sugar diagnostic Strp Accu-chek veena plus test strip. Test BG 3 x day 100 strip 12    carvedilol (COREG) 6.25 MG tablet TAKE 1 TABLET(6.25 MG) BY MOUTH TWICE DAILY (Patient taking differently: daily) 60 tablet 0     "ciclopirox (PENLAC) 8 % Soln Apply topically nightly. 6.6 mL 11    GLUCOSAMINE HCL/CHONDR CHISHOLM A NA (OSTEO BI-FLEX ORAL) Take 2 tablets by mouth once daily.      insulin detemir (LEVEMIR FLEXTOUCH) 100 unit/mL (3 mL) SubQ InPn pen Inject 18 Units into the skin every evening. 1 Box 11    metFORMIN (GLUCOPHAGE) 1000 MG tablet TAKE 1 TABLET BY MOUTH TWICE DAILY WITH MEALS 60 tablet 0    nystatin (MYCOSTATIN) 100,000 unit/mL suspension TAKE 4ML BY MOUTH TWICE DAILY 60 mL 5    pen needle, diabetic (BD ULTRA-FINE KORINA PEN NEEDLES) 32 gauge x 5/32" Ndle 10 Units by Misc.(Non-Drug; Combo Route) route once daily at 6am. 30 each 11     No current facility-administered medications on file prior to visit.      Allergies:   Review of patient's allergies indicates:   Allergen Reactions    Adhesive tape-silicones Other (See Comments)     pulls skin off    Doxycycline      Dizzy. "Just didn't feel right".       Social History:   Social History     Social History    Marital status:      Spouse name: N/A    Number of children: 5    Years of education: N/A     Social History Main Topics    Smoking status: Former Smoker     Packs/day: 1.00     Years: 25.00     Quit date: 8/9/2000    Smokeless tobacco: Never Used    Alcohol use No      Comment: states he stopped drinking approx. 7 yrs ago/"I might drink a beer every 2 weeks"    Drug use: No    Sexual activity: No     Other Topics Concern    None     Social History Narrative    He is .  He has children.  He is currently retired.     Steve is currently retired. Used to work as an . Lives with wife in [place], [place].    PHYSICAL EXAMINATION:   General    Vitals:    11/01/17 1122   Weight: 88 kg (194 lb)   Height: 5' 9" (1.753 m)     Constitutional: Oriented to person, place, and time. No apparent distress. Appears well-developed and well-nourished. Pleasant.  HENT:   Head: Normocephalic and atraumatic.   Eyes: Right eye exhibits no discharge. Left eye " exhibits no discharge. No scleral icterus.   Skin: multiple petechiae, ecchymosis noted over extremities  Pulmonary/Chest: Effort normal. No respiratory distress.   Abdominal: There is no guarding.   Neurological: Alert and oriented to person, place, and time.   Psychiatric: Behavior is normal.   Ortho Exam  Left shoulder exam  Inspection: There is no swelling, ecchymoses, erythema or gross deformity.  Palpation: TTP over posterior/superior shoulder over the trapezius m.  ROM  Passive Abduction: 120 degrees  Passive Abduction: 150 degrees  Forward Flexion: 150 degrees  External Rotation: 70 degrees  Internal Rotation: 50 degrees  Special tests  Positive Vance, positive Neer, Positive empty can, negative lift off    Imaging  X-ray of the left shoulder from 5/16/2017: There is generalized loss of bone mineral density. Moderate left a.c. joint osteophytosis is seen. No fracture line or dislocation is seen. No focal osseous lesion is seen. The visualized left sided ribs appear intact.    Data Reviewed: X-ray    Supportive Actions: Independent visualization of images or test specimens    ASSESSMENT:   1. Chronic left shoulder pain      PLAN:     1. Time was spent reviewing the above diagnosis in depth with Steve today, including acute management and rehabilitation.     2.  He appears to have had a good favorable response to the suprascapular nerve ablation, however the beneficial effects appear to have worn off.  He does also appear to have some separate myofascial pain over the left trapezial musculature.  I offered trigger point injections in the hopes of helping to reduce this particular pain.  Performed a trigger point injection using 3 cc 1% lidocaine to the left trapezius m.    3. RTC in 10 days for follow up.  If he is not gets significant relief we may schedule him for full thermal radiofrequency ablation of the left suprascapular nerve.  He would also like his left knee and rest as next visit as  "well.    Procedure Note: Trigger point injections  Time: 12:30  Indications: Pain  Description: After 3 maximal tender points were identified in the left trapezial musculature, the overlying skin was cleaned with etoh swabs. Each point was injected with 1 cc of 1% Lidocaine after negative aspiration using a 27 g 1.25" needle. A stellate pattern was then used to needle the surrounding area. Needle was removed and areas cleaned. Blood loss minimal.  Complications: None  Disposition: Pt left in good condition with no complaints.    The above note was completed, in part, with the aid of Dragon dictation software/hardware. Translation errors may be present.    "

## 2017-11-06 ENCOUNTER — OFFICE VISIT (OUTPATIENT)
Dept: PAIN MEDICINE | Facility: CLINIC | Age: 72
End: 2017-11-06
Payer: MEDICARE

## 2017-11-06 VITALS
WEIGHT: 194 LBS | BODY MASS INDEX: 28.73 KG/M2 | HEIGHT: 69 IN | HEART RATE: 70 BPM | SYSTOLIC BLOOD PRESSURE: 147 MMHG | DIASTOLIC BLOOD PRESSURE: 79 MMHG

## 2017-11-06 DIAGNOSIS — D69.6 THROMBOCYTOPENIA: ICD-10-CM

## 2017-11-06 DIAGNOSIS — M50.30 DDD (DEGENERATIVE DISC DISEASE), CERVICAL: Primary | ICD-10-CM

## 2017-11-06 DIAGNOSIS — M47.892 OTHER SPONDYLOSIS, CERVICAL REGION: ICD-10-CM

## 2017-11-06 PROCEDURE — 99213 OFFICE O/P EST LOW 20 MIN: CPT | Mod: S$GLB,,, | Performed by: ANESTHESIOLOGY

## 2017-11-06 PROCEDURE — 99999 PR PBB SHADOW E&M-EST. PATIENT-LVL III: CPT | Mod: PBBFAC,,, | Performed by: ANESTHESIOLOGY

## 2017-11-06 PROCEDURE — 99499 UNLISTED E&M SERVICE: CPT | Mod: S$GLB,,, | Performed by: ANESTHESIOLOGY

## 2017-11-06 RX ORDER — HYDROCODONE BITARTRATE AND ACETAMINOPHEN 10; 325 MG/1; MG/1
1 TABLET ORAL EVERY 6 HOURS PRN
Qty: 120 TABLET | Refills: 0 | Status: SHIPPED | OUTPATIENT
Start: 2017-12-08 | End: 2017-12-05 | Stop reason: SDUPTHER

## 2017-11-06 RX ORDER — HYDROCODONE BITARTRATE AND ACETAMINOPHEN 10; 325 MG/1; MG/1
1 TABLET ORAL EVERY 6 HOURS PRN
Qty: 120 TABLET | Refills: 0 | Status: SHIPPED | OUTPATIENT
Start: 2017-11-09 | End: 2017-12-09

## 2017-11-06 RX ORDER — HYDROCODONE BITARTRATE AND ACETAMINOPHEN 10; 325 MG/1; MG/1
TABLET ORAL EVERY 6 HOURS PRN
COMMUNITY
End: 2017-11-06

## 2017-11-06 RX ORDER — HYDROCODONE BITARTRATE AND ACETAMINOPHEN 10; 325 MG/1; MG/1
1 TABLET ORAL EVERY 6 HOURS PRN
Qty: 120 TABLET | Refills: 0 | Status: SHIPPED | OUTPATIENT
Start: 2018-01-06 | End: 2017-12-05 | Stop reason: SDUPTHER

## 2017-11-06 NOTE — PROGRESS NOTES
"This note was completed with dictation software and grammatical errors may exist.    Referring Physician: No ref. provider found    PCP: Alie Womack MD      CC: neck and left shoulder pain    Interval history:  Patient returns to our clinic.  He presents with worsening neck and left shoulder pain.  He had a fall in April 2017 and fell on his left shoulder.  Complains of left-sided neck and shoulder pain.   He has tried physical therapy which did not provide much benefit.  He is scheduled to follow up with Dr. Silva in the near future.  Injections performed have only given him shortlived relief.  He presents to us with worsening posterior neck pain.  He does have history of cervical DDD.  However, he continues to have low platelets and we are unable to provide any neuro axonal interventional procedures.  He takes Norco 10 mg every 6 hours as needed with mild to moderate benefit.  This is prescribed by his PCP, however, she will be going on maternity leave in the near future.   He had tried Percocet but states it causes too much grogginess.    Prior HPI:   Steve June Jr. is a 72 y.o. male referred to us for lower back and knee pain.  He has significant history of pancytopenia, cirrhosis.  He presents with constant aching, sharp pain in his lower back as well as his left knee.  Pain worsens sitting, standing, bending, walking.  Pain improves with rest.  X-rays performed of the lumbar spine as well as knee shows left knee osteoarthritis.  He has tried physical therapy with minimal benefit.  He currently takes Norco 10 mg every 6 hours as needed with mild to moderate benefit.  He is unable to have any procedures due to his thrombocytopenia.  He also has ventral hernia, but surgical attention is currently not recommended due to his thrombocytopenia.  His main concern today is wishing to decrease his opioid medications.  He states being very "foggy" with his current medications.  He denies any increased " sedation.  He denies any weakness.  No bowel bladder changes.    ROS:  CONSTITUTIONAL: No fevers, chills, night sweats, wt. loss, appetite changes  SKIN: no rashes or itching  ENT: No headaches, head trauma, vision changes, or eye pain  LYMPH NODES: None noticed   CV: No chest pain, palpitations.   RESP: No shortness of breath, dyspnea on exertion, cough, wheezing, or hemoptysis  GI: No nausea, emesis, diarrhea, constipation, melena, hematochezia, pain.    : No dysuria, hematuria, urgency, or frequency   HEME: No easy bruising, bleeding problems  PSYCHIATRIC: No depression, anxiety, psychosis, hallucinations.  NEURO: No seizures, memory loss, dizziness or difficulty sleeping  MSK: + History of present illness      Past Medical History:   Diagnosis Date    Abnormal thyroid function test     Allergy     Seasonal    Anemia     Arthritis     Gaviria esophagus     Basal cell carcinoma     right forearm    Basal cell carcinoma 12/2011    lower post neck    Cancer     skin CA    Cataract     Chronic diastolic congestive heart failure 9/18/2017    Cirrhosis     Encounter for blood transfusion     Esophageal varices in cirrhosis     grade II on 7/12 EGD    Gastritis     on 7/12 EGD    GERD (gastroesophageal reflux disease) 2/28/2015    Hard of hearing     Hiatal hernia     History of hepatitis C 8/10/2012    tx with harvoni x 41 days (started 10/22/15). SVR4     Hoarseness 2/28/2015    Hypercholesteremia     Hypersplenism     Hypertension     No meds    Pain management 12/10/2014    Portal hypertensive gastropathy     on 7/12 EGD    Thrombocytopenia     Type II or unspecified type diabetes mellitus with neurological manifestations, uncontrolled(250.62) 12/24/2013    Valvular heart disease     mild MR 12     Past Surgical History:   Procedure Laterality Date    CATARACT EXTRACTION  1/10/13    left eye    CATARACT EXTRACTION      right eye    CHOLECYSTECTOMY      COLONOSCOPY      EYE SURGERY    "   Cataract surgery to right eye    KNEE ARTHROSCOPY W/ MENISCAL REPAIR      KNEE CARTILAGE SURGERY      left knee    KNEE SURGERY  12/2006    left    SKIN LESION EXCISION      TONSILLECTOMY      UPPER GASTROINTESTINAL ENDOSCOPY       Family History   Problem Relation Age of Onset    Leukemia Mother     Cancer Mother      bone    Alcohol abuse Father     Cirrhosis Father      EtOH induced    Amblyopia Neg Hx     Blindness Neg Hx     Cataracts Neg Hx     Diabetes Neg Hx     Glaucoma Neg Hx     Hypertension Neg Hx     Macular degeneration Neg Hx     Retinal detachment Neg Hx     Strabismus Neg Hx     Stroke Neg Hx     Thyroid disease Neg Hx     Psoriasis Neg Hx     Lupus Neg Hx     Eczema Neg Hx     Acne Neg Hx     Melanoma Neg Hx      Social History     Social History    Marital status:      Spouse name: N/A    Number of children: 5    Years of education: N/A     Social History Main Topics    Smoking status: Former Smoker     Packs/day: 1.00     Years: 25.00     Quit date: 8/9/2000    Smokeless tobacco: Never Used    Alcohol use No      Comment: states he stopped drinking approx. 7 yrs ago/"I might drink a beer every 2 weeks"    Drug use: No    Sexual activity: No     Other Topics Concern    None     Social History Narrative    He is .  He has children.  He is currently retired.         Medications/Allergies: See med card    Vitals:    11/06/17 1104   BP: (!) 147/79   Pulse: 70   Weight: 88 kg (194 lb)   Height: 5' 9" (1.753 m)   PainSc:   8   PainLoc: Neck         Physical exam:    GENERAL: A and O x3, the patient appears well groomed and is in no acute distress.  Skin: No rashes or obvious lesions  HEENT: normocephalic, atraumatic  CARDIOVASCULAR:  Palpable peripheral pulses  LUNGS: easy work of breathing  ABDOMEN: soft, nontender, + sizaeable ventral hernia in epigastric region.    UPPER EXTREMITIES: Normal alignment, normal range of motion, no atrophy, no skin " changes,  hair growth and nail growth normal and equal bilaterally. No swelling, no tenderness.    LOWER EXTREMITIES:  Normal alignment, normal range of motion, no atrophy, no skin changes,  hair growth and nail growth normal and equal bilaterally. No swelling, no tenderness.    LUMBAR SPINE  Lumbar spine: ROM is full with flexion extension and oblique extension with moderate increased pain.    Erasmo's test causes no increased pain on either side.    Supine straight leg raise is negative bilaterally.    Internal and external rotation of the hip causes no increased pain on either side.  Myofascial exam: No tenderness to palpation across lumbar paraspinous muscles.      MENTAL STATUS: normal orientation, speech, language, and fund of knowledge for social situation.  Emotional state appropriate.    CRANIAL NERVES:  II:  PERRL bilaterally,   III,IV,VI: EOMI.    V:  Facial sensation equal bilaterally  VII:  Facial motor function normal.  VIII:  Hearing equal to finger rub bilaterally  IX/X: Gag normal, palate symmetric  XI:  Shoulder shrug equal, head turn equal  XII:  Tongue midline without fasciculations      MOTOR: Tone and bulk: normal bilateral upper and lower Strength: normal   Delt Bi Tri WE WF     R 5 5 5 5 5 5   L 5 5 5 5 5 5     IP ADD ABD Quad TA Gas HAM  R 5 5 5 5 5 5 5  L 5 5 5 5 5 5 5    SENSATION: Light touch and pinprick intact bilaterally  REFLEXES: normal, symmetric, nonbrisk.  Toes down, no clonus. No hoffmans.  GAIT: normal rise, base, steps, and arm swing.        Imaging:  Xray L-spine 4/2013   Alignment is otherwise normal.  Vertebral body heights and disc space heights are relatively well maintained.  Vertebral end plate osteophytes are present anteriorly   at multiple levels.  The facet joints and posterior elements are unremarkable         Xray Knee 12/2013  Degenerative change of the knees, left greater than right.    Assessment:  Patient referred for lower back and left knee pain  1. DDD  (degenerative disc disease), cervical    2. Other spondylosis, cervical region    3. Thrombocytopenia          Plan:  - I have stressed the importance of physical activity and exercise to improve overall health.  Continue PT exercises learned at home.  - Any interventional procedures will be deferred due to his low platelet count.  Patient expressed understanding.  - Continue evaluation with PM&R, Dr. Silva  - He can continue Norco 10mg q6hrs as needed.  Script provided for two months.  UDS last visit consistent with use. .   - Follow-up 3 months

## 2017-11-13 ENCOUNTER — OFFICE VISIT (OUTPATIENT)
Dept: PHYSICAL MEDICINE AND REHAB | Facility: CLINIC | Age: 72
End: 2017-11-13
Payer: MEDICARE

## 2017-11-13 VITALS — SYSTOLIC BLOOD PRESSURE: 145 MMHG | HEART RATE: 70 BPM | DIASTOLIC BLOOD PRESSURE: 79 MMHG

## 2017-11-13 DIAGNOSIS — M67.912 TENDINOPATHY OF ROTATOR CUFF, LEFT: ICD-10-CM

## 2017-11-13 DIAGNOSIS — M17.12 PRIMARY OSTEOARTHRITIS OF LEFT KNEE: ICD-10-CM

## 2017-11-13 DIAGNOSIS — M25.512 CHRONIC LEFT SHOULDER PAIN: Primary | ICD-10-CM

## 2017-11-13 DIAGNOSIS — G89.29 CHRONIC LEFT SHOULDER PAIN: Primary | ICD-10-CM

## 2017-11-13 DIAGNOSIS — M19.012 ARTHROSIS OF LEFT ACROMIOCLAVICULAR JOINT: ICD-10-CM

## 2017-11-13 PROCEDURE — 99213 OFFICE O/P EST LOW 20 MIN: CPT | Mod: S$GLB,,, | Performed by: PHYSICAL MEDICINE & REHABILITATION

## 2017-11-13 PROCEDURE — 99999 PR PBB SHADOW E&M-EST. PATIENT-LVL II: CPT | Mod: PBBFAC,,, | Performed by: PHYSICAL MEDICINE & REHABILITATION

## 2017-11-13 NOTE — PROGRESS NOTES
[facility]    CHIEF COMPLAINT:   Chief Complaint   Patient presents with    Follow-up    Knee Pain    Shoulder Pain     HISTORY OF PRESENT ILLNESS: Steve June Jr. is a 72 y.o. male who presents to me today for follow up. Last seen on 11/1/17 for left shoulder pain.  At his last visit we performed trigger point injections to the left trapezial musculature.  He notes no noticeable reduction symptoms following this procedure.  He continues with consistent significant left knee pain.  He rates his pain as a 6 on a scale of 1-10.  His primary complaint today is chronic left knee pain.  He notes a history of left knee arthroscopic surgery for meniscus pathology about 67 years ago.  He feels this surgery did help significantly.  He has noticed increased pain in the past to 3 months insidiously.  He describes pain in both of the medial and lateral aspect of the left knee.  He rates his pain as 9 on a scale of 1-10.  He notes some swelling about the left knee.  He describes the pain is sharp.  He denies any locking symptoms.  He has increased pain with increased standing or walking distances.    Review of Systems   Constitutional: Negative for fever.   HENT: Negative for drooling.    Eyes: Negative for discharge.   Respiratory: Negative for choking.    Cardiovascular: Negative for chest pain.   Genitourinary: Negative for flank pain.   Skin: Negative for wound.   Allergic/Immunologic: Negative for immunocompromised state.   Neurological: Negative for tremors and syncope.   Psychiatric/Behavioral: Negative for behavioral problems.     Past Medical History:   Past Medical History:   Diagnosis Date    Abnormal thyroid function test     Allergy     Seasonal    Anemia     Arthritis     Gaviria esophagus     Basal cell carcinoma     right forearm    Basal cell carcinoma 12/2011    lower post neck    Cancer     skin CA    Cataract     Chronic diastolic congestive heart failure 9/18/2017     Cirrhosis     Encounter for blood transfusion     Esophageal varices in cirrhosis     grade II on 7/12 EGD    Gastritis     on 7/12 EGD    GERD (gastroesophageal reflux disease) 2/28/2015    Hard of hearing     Hiatal hernia     History of hepatitis C 8/10/2012    tx with harvoni x 41 days (started 10/22/15). SVR4     Hoarseness 2/28/2015    Hypercholesteremia     Hypersplenism     Hypertension     No meds    Pain management 12/10/2014    Portal hypertensive gastropathy     on 7/12 EGD    Thrombocytopenia     Type II or unspecified type diabetes mellitus with neurological manifestations, uncontrolled(250.62) 12/24/2013    Valvular heart disease     mild MR 12       Past Surgical History:   Past Surgical History:   Procedure Laterality Date    CATARACT EXTRACTION  1/10/13    left eye    CATARACT EXTRACTION      right eye    CHOLECYSTECTOMY      COLONOSCOPY      EYE SURGERY      Cataract surgery to right eye    KNEE ARTHROSCOPY W/ MENISCAL REPAIR      KNEE CARTILAGE SURGERY      left knee    KNEE SURGERY  12/2006    left    SKIN LESION EXCISION      TONSILLECTOMY      UPPER GASTROINTESTINAL ENDOSCOPY         Family History:   Family History   Problem Relation Age of Onset    Leukemia Mother     Cancer Mother      bone    Alcohol abuse Father     Cirrhosis Father      EtOH induced    Amblyopia Neg Hx     Blindness Neg Hx     Cataracts Neg Hx     Diabetes Neg Hx     Glaucoma Neg Hx     Hypertension Neg Hx     Macular degeneration Neg Hx     Retinal detachment Neg Hx     Strabismus Neg Hx     Stroke Neg Hx     Thyroid disease Neg Hx     Psoriasis Neg Hx     Lupus Neg Hx     Eczema Neg Hx     Acne Neg Hx     Melanoma Neg Hx        Medications:   Current Outpatient Prescriptions on File Prior to Visit   Medication Sig Dispense Refill    ACCU-CHEK VEENA PLUS METER Misc       ACCU-CHEK SOFTCLIX LANCETS Misc USE TO TEST BLOOD SUGAR TWICE DAILY AS DIRECTED 100 each 3     "acetaminophen (TYLENOL) 325 MG tablet Take 2 tablets (650 mg total) by mouth every 6 (six) hours as needed.  0    blood sugar diagnostic Strp Accu-chek john plus test strip. Test BG 3 x day 100 strip 12    carvedilol (COREG) 6.25 MG tablet TAKE 1 TABLET(6.25 MG) BY MOUTH TWICE DAILY (Patient taking differently: daily) 60 tablet 0    ciclopirox (PENLAC) 8 % Soln Apply topically nightly. 6.6 mL 11    GLUCOSAMINE HCL/CHONDR CHISHOLM A NA (OSTEO BI-FLEX ORAL) Take 2 tablets by mouth once daily.      hydrocodone-acetaminophen 10-325mg (NORCO)  mg Tab Take 1 tablet by mouth every 6 (six) hours as needed (pain). 120 tablet 0    [START ON 12/8/2017] hydrocodone-acetaminophen 10-325mg (NORCO)  mg Tab Take 1 tablet by mouth every 6 (six) hours as needed (pain). 120 tablet 0    [START ON 1/6/2018] hydrocodone-acetaminophen 10-325mg (NORCO)  mg Tab Take 1 tablet by mouth every 6 (six) hours as needed (pain). 120 tablet 0    insulin detemir (LEVEMIR FLEXTOUCH) 100 unit/mL (3 mL) SubQ InPn pen Inject 18 Units into the skin every evening. 1 Box 11    metFORMIN (GLUCOPHAGE) 1000 MG tablet TAKE 1 TABLET BY MOUTH TWICE DAILY WITH MEALS 60 tablet 0    nystatin (MYCOSTATIN) 100,000 unit/mL suspension TAKE 4ML BY MOUTH TWICE DAILY 60 mL 5    pen needle, diabetic (BD ULTRA-FINE KORINA PEN NEEDLES) 32 gauge x 5/32" Ndle 10 Units by Misc.(Non-Drug; Combo Route) route once daily at 6am. 30 each 11     No current facility-administered medications on file prior to visit.      Allergies:   Review of patient's allergies indicates:   Allergen Reactions    Adhesive tape-silicones Other (See Comments)     pulls skin off    Doxycycline      Dizzy. "Just didn't feel right".       Social History:   Social History     Social History    Marital status:      Spouse name: N/A    Number of children: 5    Years of education: N/A     Social History Main Topics    Smoking status: Former Smoker     Packs/day: 1.00     Years: " "25.00     Quit date: 8/9/2000    Smokeless tobacco: Never Used    Alcohol use No      Comment: states he stopped drinking approx. 7 yrs ago/"I might drink a beer every 2 weeks"    Drug use: No    Sexual activity: No     Other Topics Concern    Not on file     Social History Narrative    He is .  He has children.  He is currently retired.     Steve is currently retired. Used to work as an . Lives with wife in [place], [place].    PHYSICAL EXAMINATION:   General    Vitals:    11/13/17 0909   BP: (!) 145/79   Pulse: 70     Constitutional: Oriented to person, place, and time. No apparent distress. Appears well-developed and well-nourished. Pleasant.  HENT:   Head: Normocephalic and atraumatic.   Eyes: Right eye exhibits no discharge. Left eye exhibits no discharge. No scleral icterus.   Skin: multiple petechiae, ecchymosis noted over extremities  Pulmonary/Chest: Effort normal. No respiratory distress.   Abdominal: There is no guarding.   Neurological: Alert and oriented to person, place, and time.   Psychiatric: Behavior is normal.   Right Knee Exam     Tenderness   The patient is experiencing no tenderness.         Range of Motion   Extension: normal   Flexion: normal     Other   Scars: absent  Sensation: normal  Pulse: present  Swelling: none  Other tests: no effusion present      Left Knee Exam     Tenderness   The patient is experiencing tenderness in the medial joint line.    Range of Motion   Extension: 0   Flexion: 120     Tests   Didier:  Medial - positive Lateral - negative  Lachman:  Anterior - negative    Posterior - negative  Varus: negative  Valgus: negative  Patellar Apprehension: negative (Positive patellar grind)    Other   Erythema: absent  Scars: present  Sensation: normal  Pulse: present  Swelling: mild  Effusion: no effusion present        Left shoulder exam  Inspection: There is no swelling, ecchymoses, erythema or gross deformity.  Palpation: TTP over posterior/superior " shoulder over the trapezius m.  ROM  Passive Abduction: 120 degrees  Passive Abduction: 150 degrees  Forward Flexion: 150 degrees  External Rotation: 70 degrees  Internal Rotation: 50 degrees  Special tests  Positive Vance, positive Neer, Positive empty can, negative lift off    Imaging  X-ray of the left shoulder from 5/16/2017: There is generalized loss of bone mineral density. Moderate left a.c. joint osteophytosis is seen. No fracture line or dislocation is seen. No focal osseous lesion is seen. The visualized left sided ribs appear intact.    Data Reviewed: X-ray    Supportive Actions: Independent visualization of images or test specimens.    ASSESSMENT:   1. Chronic left shoulder pain    2. Tendinopathy of rotator cuff, left    3. Arthrosis of left acromioclavicular joint    4. Primary osteoarthritis of left knee      PLAN:     1.  In terms of his left shoulder pain, he notes no benefit from a trigger point injections.  This suggests more pain generation from the left shoulder.  He did have a good but temporary response to the prior suprascapular nerve pulsed radiofrequency ablation.  We once again discussed that his options are limited, especially surgically, secondary to his medical conditions.  He is very on chronic narcotics, and attempts to escalate these resulted in cognitive blunting.  Prior injection of corticosteroid caused severe hyperglycemia.  Biologic injections such as stem cell and platelet rich plasma or likely not financially feasible considering ultimate pain relief is not guaranteed.  We discussed performing suprascapular nerve radio patency ablation, however at full thermal ablation temperature.  We discussed the potential for some weakness about the shoulder following this procedure.  At this time, he wishes to come up with this option and he will let us know if or when he would like to proceed with this.    2.  In terms of his left knee, this is consistent with left knee osteoarthritis.   Similar to his shoulder, his options are limited.  I do believe he is a good candidate for the above reasons for the radiofrequency genicular nerve ablations.  He does wish to proceed with this option and we'll first proceed with diagnostic anesthetic block of the left knee genicular nerve branches to assess his candidacy for the radiofrequency ablation procedure.    3.  I prescribed a topical compounded pain cream to get him some relief before we were able to perform the interventional left knee procedures.    4.  Return in 2-3 weeks for the diagnostic block of the left knee genicular nerve branches.    The above note was completed, in part, with the aid of Dragon dictation software/hardware. Translation errors may be present.

## 2017-11-14 ENCOUNTER — TELEPHONE (OUTPATIENT)
Dept: PHYSICAL MEDICINE AND REHAB | Facility: CLINIC | Age: 72
End: 2017-11-14

## 2017-11-14 DIAGNOSIS — M17.12 PRIMARY OSTEOARTHRITIS OF LEFT KNEE: Primary | ICD-10-CM

## 2017-11-14 RX ORDER — MIDAZOLAM HYDROCHLORIDE 5 MG/ML
2 INJECTION INTRAMUSCULAR; INTRAVENOUS ONCE AS NEEDED
Status: CANCELLED | OUTPATIENT
Start: 2017-11-14 | End: 2017-11-14

## 2017-11-14 RX ORDER — SODIUM CHLORIDE, SODIUM LACTATE, POTASSIUM CHLORIDE, CALCIUM CHLORIDE 600; 310; 30; 20 MG/100ML; MG/100ML; MG/100ML; MG/100ML
INJECTION, SOLUTION INTRAVENOUS CONTINUOUS
Status: CANCELLED | OUTPATIENT
Start: 2017-11-14

## 2017-11-14 RX ORDER — LIDOCAINE HYDROCHLORIDE 10 MG/ML
1 INJECTION, SOLUTION EPIDURAL; INFILTRATION; INTRACAUDAL; PERINEURAL ONCE
Status: CANCELLED | OUTPATIENT
Start: 2017-11-14 | End: 2017-11-14

## 2017-11-14 NOTE — TELEPHONE ENCOUNTER
Spoke with patient advised that he has the first available date for procedure and if we get a new date that is sooner or someone cancels I will call him.

## 2017-11-14 NOTE — TELEPHONE ENCOUNTER
----- Message from Yovana Disla RT sent at 11/14/2017  9:17 AM CST -----  Contact: pt    pt ,requesting a call back to discuss if his procedure can be done more in  advance, he is in much pain, and would like it done sooner, thanks.

## 2017-11-30 ENCOUNTER — TELEPHONE (OUTPATIENT)
Dept: HEPATOLOGY | Facility: CLINIC | Age: 72
End: 2017-11-30

## 2017-11-30 ENCOUNTER — HOSPITAL ENCOUNTER (OUTPATIENT)
Dept: RADIOLOGY | Facility: CLINIC | Age: 72
Discharge: HOME | End: 2017-11-30
Attending: NURSE PRACTITIONER
Payer: MEDICARE

## 2017-11-30 DIAGNOSIS — K74.60 UNSPECIFIED CIRRHOSIS OF LIVER: ICD-10-CM

## 2017-11-30 PROCEDURE — 76700 US EXAM ABDOM COMPLETE: CPT | Mod: 26,,, | Performed by: RADIOLOGY

## 2017-11-30 PROCEDURE — 76700 US EXAM ABDOM COMPLETE: CPT | Mod: TC,PO

## 2017-11-30 NOTE — TELEPHONE ENCOUNTER
Patient had labs drawn 11/30.  Lab called to report panic plt count of 32,000.  Patient has a history of similar values.

## 2017-12-05 ENCOUNTER — OFFICE VISIT (OUTPATIENT)
Dept: HEPATOLOGY | Facility: CLINIC | Age: 72
End: 2017-12-05
Payer: MEDICARE

## 2017-12-05 VITALS
WEIGHT: 203.94 LBS | DIASTOLIC BLOOD PRESSURE: 70 MMHG | SYSTOLIC BLOOD PRESSURE: 154 MMHG | TEMPERATURE: 97 F | OXYGEN SATURATION: 97 % | BODY MASS INDEX: 30.21 KG/M2 | HEIGHT: 69 IN | RESPIRATION RATE: 18 BRPM | HEART RATE: 70 BPM

## 2017-12-05 DIAGNOSIS — Z86.19 HISTORY OF HEPATITIS C: ICD-10-CM

## 2017-12-05 DIAGNOSIS — K74.60 CIRRHOSIS OF LIVER WITHOUT ASCITES, UNSPECIFIED HEPATIC CIRRHOSIS TYPE: Primary | ICD-10-CM

## 2017-12-05 DIAGNOSIS — D69.6 THROMBOCYTOPENIA: ICD-10-CM

## 2017-12-05 DIAGNOSIS — K42.9 UMBILICAL HERNIA WITHOUT OBSTRUCTION AND WITHOUT GANGRENE: ICD-10-CM

## 2017-12-05 DIAGNOSIS — I85.10 ESOPHAGEAL VARICES IN CIRRHOSIS: ICD-10-CM

## 2017-12-05 DIAGNOSIS — K74.60 ESOPHAGEAL VARICES IN CIRRHOSIS: ICD-10-CM

## 2017-12-05 DIAGNOSIS — K76.6 PORTAL HYPERTENSION: ICD-10-CM

## 2017-12-05 PROBLEM — J10.1 INFLUENZA A: Status: RESOLVED | Noted: 2017-09-16 | Resolved: 2017-12-05

## 2017-12-05 PROCEDURE — 99499 UNLISTED E&M SERVICE: CPT | Mod: S$GLB,,, | Performed by: NURSE PRACTITIONER

## 2017-12-05 PROCEDURE — 99999 PR PBB SHADOW E&M-EST. PATIENT-LVL IV: CPT | Mod: PBBFAC,,, | Performed by: NURSE PRACTITIONER

## 2017-12-05 PROCEDURE — 99213 OFFICE O/P EST LOW 20 MIN: CPT | Mod: S$GLB,,, | Performed by: NURSE PRACTITIONER

## 2017-12-05 NOTE — PROGRESS NOTES
[facility] Established Patient Visit    Reason for Visit:  F/u cirrhosis    PCP: Alie Womack    HPI:  This is a 72 y.o. male pt of Dr. Guzman, here for f/u of well-compensated cirrhosis due to h/o hepatitis C, s/p successful treatment with Harvoni with SVR.  His cirrhosis has been complicated by significant portal HTN with h/o bleeding esophageal varices, splenomegaly, and thrombocytopenia. He was banded several times in the past, last 8/2014. He has refused repeat EGD since then because he thinks the last EGD caused his hoarseness.  He denies any hematemesis or melena. H/H has been stable. He is on Coreg 6.25 mg but takes this only daily. He has declined repeat EGD. He continues to have hoarseness and had evaluation with ENT in 11/2015 with Dr. Gene Estes here and had scope done in office. He thinks they saw a growth on vocal cords and he thinks he may need to get this checked. Reviewed Dr. Estes's note, plan was to finish antibiotics and repeat scope to see if any improvement. If not, may need to consider evaluation in the OR via microlaryngoscopy. Pt denies any difficulty swallowing and no change in hoarseness. He declines f/u appt in ENT as well.     He continues to have an umbilical hernia that has not worsened. Tx surgery recommended no surgical intervention. He wears an abd binder.    He had labs and U/s recently. U/s showed no masses or ascites. Labs stable. MELD low at 10. Has been seeing physical medicine. Denies jaundice, dark urine, hematemesis, melena, slowed mentation, abdominal distention.        ROS:   GENERAL: Denies fever, chills, weight loss/gain, fatigue  HEENT: Denies headaches, dizziness, vision/hearing changes, (+) hoarseness  CARDIOVASCULAR: Denies chest pain, palpitations, edema  RESPIRATORY: Denies dyspnea, cough  GI: Denies abdominal pain, rectal bleeding, nausea, vomiting. No change in bowel pattern or color  : Denies dysuria, hematuria   SKIN: Denies rash,  "itching, (+) dry skin   NEURO: Denies confusion, memory loss, or mood changes  PSYCH: Denies depression or anxiety  HEME/LYMPH: (+) easy bruising and bleeding      PMHX:  has a past medical history of Abnormal thyroid function test; Allergy; Anemia; Arthritis; Gaviria esophagus; Basal cell carcinoma; Basal cell carcinoma (12/2011); Cancer; Cataract; Chronic diastolic congestive heart failure (9/18/2017); Cirrhosis; Encounter for blood transfusion; Esophageal varices in cirrhosis; Gastritis; GERD (gastroesophageal reflux disease) (2/28/2015); Hard of hearing; Hiatal hernia; History of hepatitis C (8/10/2012); Hoarseness (2/28/2015); Hypercholesteremia; Hypersplenism; Hypertension; Pain management (12/10/2014); Portal hypertensive gastropathy; Thrombocytopenia; Type II or unspecified type diabetes mellitus with neurological manifestations, uncontrolled(250.62) (12/24/2013); and Valvular heart disease.    PSHX:  has a past surgical history that includes Cholecystectomy; Knee surgery (12/2006); Skin lesion excision; Tonsillectomy; Knee arthroscopy w/ meniscal repair; Knee cartilage surgery; Eye surgery; Cataract extraction (1/10/13); Cataract extraction; Colonoscopy; and Upper gastrointestinal endoscopy.    The patient's social and family histories were reviewed by me and updated in the appropriate section of the electronic medical record.    Review of patient's allergies indicates:   Allergen Reactions    Adhesive tape-silicones Other (See Comments)     pulls skin off    Doxycycline      Dizzy. "Just didn't feel right".    Oxycontin [oxycodone] Other (See Comments)       Medications reviewed in Epic    Objective Findings:    PHYSICAL EXAM:   Friendly White male, in no acute distress; alert and oriented to person, place and time  VITALS:   BP (!) 154/70 (BP Location: Right arm, Patient Position: Sitting, BP Method: Medium (Automatic))   Pulse 70   Temp 97.1 °F (36.2 °C) (Oral)   Resp 18   Ht 5' 9" (1.753 m)   Wt " 92.5 kg (203 lb 14.8 oz)   SpO2 97%   BMI 30.11 kg/m²   HENT: Normocephalic, without obvious abnormality. Oral mucosa pink and moist.   EYES: Sclerae anicteric. No conjunctival pallor.   NECK: Supple. No masses or cervical adenopathy.  CARDIOVASCULAR: Regular rate and rhythm. (+) murmur.  RESPIRATORY: Normal respiratory effort. BBS CTA. No wheezes or crackles.  GI: Soft, non-tender, non-distended. (+) splenomegaly. No masses palpable. No ascites. Large easily reducible umbilical hernia. Abd binder intact.  EXTREMITIES:  No clubbing, cyanosis or edema. (+) ecchymosis to BUE.  SKIN: Warm and dry. No jaundice. No rashes noted to exposed skin. No telangectasias noted. No palmar erythema. (+) dry skin to BLE  NEURO:  Normal gate. No asterixis.   PSYCH:  Memory intact. Thought and speech pattern appropriate. Behavior normal. No depression or anxiety noted.      Labs:  Lab Results   Component Value Date    WBC 3.09 (L) 11/30/2017    HGB 11.7 (L) 11/30/2017    HCT 34.7 (L) 11/30/2017    PLT 32 (LL) 11/30/2017    CHOL 151 07/06/2017    TRIG 62 07/06/2017    HDL 57 07/06/2017     11/30/2017    K 4.6 11/30/2017    CREATININE 0.9 11/30/2017    ALT 13 11/30/2017    AST 19 11/30/2017    ALKPHOS 74 11/30/2017    BILITOT 1.4 (H) 11/30/2017    ALBUMIN 3.6 11/30/2017    INR 1.2 11/30/2017    AFP 3.0 11/30/2017     ABD U/s 11/30/17  The liver is of normal size but has a heterogeneous echo contour and a nodular surface consistent with cirrhosis. Ascites is not seen. Flow within the main portal vein is hepatopetal. The gallbladder is absent..  The common bile duct is not dilated at 6.9 millimeters.    The pancreas is of normal contour and echogenicity without focal mass.  The spleen is markedly enlarged measuring 21.2 x 9.6 cm without focal mass.    The right kidney measures 10.8 centimeters without mass cyst or hydronephrosis. There is thinning of the renal parenchyma consistent with atrophy  The left kidney measures 10.7  centimeters without mass cyst or hydronephrosis. There is thinning of the renal parenchyma consistent with atrophy. The abdominal aorta and inferior vena cava are not enlarged.   Impression      Cirrhosis without ascites. Marked splenomegaly. Prior cholecystectomy. Bilateral renal parenchymal atrophy.         ASSESSMENT:  72 y.o. White male with:  1.  Cirrhosis, well-compensated  -- MELD = MELD-Na score: 10 at 11/30/2017 10:28 AM  MELD score: 10 at 11/30/2017 10:28 AM  Calculated from:  Serum Creatinine: 0.9 mg/dL (Rounded to 1) at 11/30/2017 10:28 AM  Serum Sodium: 136 mmol/L at 11/30/2017 10:28 AM  Total Bilirubin: 1.4 mg/dL at 11/30/2017 10:28 AM  INR(ratio): 1.2 at 11/30/2017 10:28 AM  Age: 72 years  -- HCC screening- AFP and abd. U/S - up to date, next due 6/2018  -- Immunity to Hep A and B - not immune to hep A, (+) hep B immunity from prior exposure  -- EGD - see below  2. History of hepatitis C, s/p successful treatment with Harvoni with SVR    3. Portal hypertension  -- EGD - H/o GIB s/p multiple EGD's with banding. Pt now refuses more EGD's d/t his concern that these caused his hoarseness. On coreg 6.25 mg daily.   -- Ascites - none  -- Splenomegaly and thrombocytopenia  4. Umbilical hernia  -- evaluated by transplant surgery and no intervention advised. Continue to wear abd binder      EDUCATION:   Signs and symptoms of hepatic decompensation were reviewed, including jaundice, ascites, and slowed mentation due to hepatic encephalopathy. The patient should seek medical attention if any of these things occur.  We discussed the potential for bleeding from esophageal varices with symptoms of hematemesis and melena. The patient should report to the Emergency Department for these symptoms.    We discussed the increased risk of hepatocellular carcinoma due to cirrhosis. Continued screening every six months with ultrasound and AFP is recommended.     No alcohol or raw seafood.  Tylenol/acetaminophen as needed for  pain, up to 2000 mg daily    Discussed hep C is cured. Still cannot donate blood. Hep C Ab will likely remain (+) for life. Can get re-infected if he were to get re-exposed as treatment conveys no immunity. Discussed curing his hep C will help to stabilize his liver functioning and decrease the chance the he will develop any decompensation in the future.       PLAN:  1. HCC screening Q 6 months with AFP and abd. U/S, next due 6/2018  2. Pt declines repeat EGD. Discussed s/sx of GIB that he would need to report to ER for. Anemia is also stable. If this were to worsen, will repeat EGD  3. Hepatitis A vaccines can be done in pharmacy for $65  4. F/u in 6/2018 with U/s and labs same day    Thank you for allowing me to participate in the care of Steve Cunha, NP-C

## 2017-12-12 DIAGNOSIS — M25.562 LEFT KNEE PAIN: Primary | ICD-10-CM

## 2017-12-15 ENCOUNTER — HOSPITAL ENCOUNTER (OUTPATIENT)
Facility: HOSPITAL | Age: 72
Discharge: HOME | End: 2017-12-15
Attending: PHYSICAL MEDICINE & REHABILITATION | Admitting: PHYSICAL MEDICINE & REHABILITATION
Payer: MEDICARE

## 2017-12-15 ENCOUNTER — HOSPITAL ENCOUNTER (OUTPATIENT)
Dept: RADIOLOGY | Facility: HOSPITAL | Age: 72
Discharge: HOME | End: 2017-12-15
Attending: PHYSICAL MEDICINE & REHABILITATION | Admitting: PHYSICAL MEDICINE & REHABILITATION
Payer: MEDICARE

## 2017-12-15 VITALS
DIASTOLIC BLOOD PRESSURE: 86 MMHG | HEART RATE: 72 BPM | HEIGHT: 69 IN | OXYGEN SATURATION: 98 % | TEMPERATURE: 98 F | RESPIRATION RATE: 15 BRPM | BODY MASS INDEX: 28.88 KG/M2 | SYSTOLIC BLOOD PRESSURE: 176 MMHG | WEIGHT: 195 LBS

## 2017-12-15 DIAGNOSIS — M25.562 LEFT KNEE PAIN: ICD-10-CM

## 2017-12-15 DIAGNOSIS — M17.12 PRIMARY OSTEOARTHRITIS OF LEFT KNEE: ICD-10-CM

## 2017-12-15 DIAGNOSIS — G89.29 CHRONIC PAIN OF LEFT KNEE: Primary | ICD-10-CM

## 2017-12-15 DIAGNOSIS — M25.562 CHRONIC PAIN OF LEFT KNEE: Primary | ICD-10-CM

## 2017-12-15 LAB — GLUCOSE SERPL-MCNC: 213 MG/DL (ref 70–110)

## 2017-12-15 PROCEDURE — 82962 GLUCOSE BLOOD TEST: CPT | Mod: PO | Performed by: PHYSICAL MEDICINE & REHABILITATION

## 2017-12-15 PROCEDURE — 25000003 PHARM REV CODE 250: Mod: PO | Performed by: PHYSICAL MEDICINE & REHABILITATION

## 2017-12-15 PROCEDURE — 64450 NJX AA&/STRD OTHER PN/BRANCH: CPT | Mod: LT,,, | Performed by: PHYSICAL MEDICINE & REHABILITATION

## 2017-12-15 PROCEDURE — 64450 NJX AA&/STRD OTHER PN/BRANCH: CPT | Mod: PO | Performed by: PHYSICAL MEDICINE & REHABILITATION

## 2017-12-15 PROCEDURE — 63600175 PHARM REV CODE 636 W HCPCS: Mod: PO | Performed by: PHYSICAL MEDICINE & REHABILITATION

## 2017-12-15 PROCEDURE — 76000 FLUOROSCOPY <1 HR PHYS/QHP: CPT | Mod: TC,PO

## 2017-12-15 RX ORDER — LIDOCAINE HYDROCHLORIDE 10 MG/ML
1 INJECTION, SOLUTION EPIDURAL; INFILTRATION; INTRACAUDAL; PERINEURAL ONCE
Status: DISCONTINUED | OUTPATIENT
Start: 2017-12-15 | End: 2017-12-15 | Stop reason: HOSPADM

## 2017-12-15 RX ORDER — MIDAZOLAM HYDROCHLORIDE 1 MG/ML
2 INJECTION INTRAMUSCULAR; INTRAVENOUS ONCE AS NEEDED
Status: COMPLETED | OUTPATIENT
Start: 2017-12-15 | End: 2017-12-15

## 2017-12-15 RX ORDER — HYDROCODONE BITARTRATE AND ACETAMINOPHEN 10; 325 MG/1; MG/1
TABLET ORAL EVERY 6 HOURS PRN
COMMUNITY
End: 2018-02-05

## 2017-12-15 RX ORDER — SODIUM CHLORIDE, SODIUM LACTATE, POTASSIUM CHLORIDE, CALCIUM CHLORIDE 600; 310; 30; 20 MG/100ML; MG/100ML; MG/100ML; MG/100ML
INJECTION, SOLUTION INTRAVENOUS CONTINUOUS
Status: DISCONTINUED | OUTPATIENT
Start: 2017-12-15 | End: 2017-12-15 | Stop reason: HOSPADM

## 2017-12-15 RX ORDER — LIDOCAINE HYDROCHLORIDE 10 MG/ML
INJECTION INFILTRATION; PERINEURAL
Status: DISCONTINUED | OUTPATIENT
Start: 2017-12-15 | End: 2017-12-15 | Stop reason: HOSPADM

## 2017-12-15 RX ORDER — BUPIVACAINE HYDROCHLORIDE 2.5 MG/ML
INJECTION, SOLUTION EPIDURAL; INFILTRATION; INTRACAUDAL
Status: DISCONTINUED | OUTPATIENT
Start: 2017-12-15 | End: 2017-12-15 | Stop reason: HOSPADM

## 2017-12-15 RX ADMIN — SODIUM CHLORIDE, SODIUM LACTATE, POTASSIUM CHLORIDE, CALCIUM CHLORIDE: 600; 310; 30; 20 INJECTION, SOLUTION INTRAVENOUS at 01:12

## 2017-12-15 NOTE — DISCHARGE INSTRUCTIONS
Home care instructions  Apply ice pack to the injection site for 20 minutes periods for the first 24 hrs for soreness/discomfort at injection site DO NOT USE HEAT FOR 24 HOURS  Keep site clean and dry for 24 hours, remove bandaid when desired  Do not drive until tomorrow  Take care when walking after a LEFT KNEE NERVE BLOCK   DO NORMAL ACTIVITY AND TRY TO REMEMBER TO KEEP THE LOG OF PAIN RELATED ACTIVITY FOR DR. WEST WHEN HE CALLS TO CHECK ON YOU.  Resume home medication as prescribed today  Resume Aspirin, Plavix, or Coumadin the day after the procedure unless otherwise instructed.    SEE IMMEDIATE MEDICAL HELP FOR:  Severe increase in your usual pain or appearance of new pain  Prolonged or increasing weakness or numbness in the legs or arms  Drainage, redness, active bleeding, or increased swelling at the injection site  Temperature over 100.0 degrees F.  Headache that increases when your head is upright and decreases when you lie flat    CALL 911 OR GO DIRECTLY TO EMERGENCY DEPARTMENT FOR:  Shortness of breath, chest pain, or problems breathing

## 2017-12-15 NOTE — HPI
CHIEF COMPLAINT:   Chief Complaint   Patient presents with    Follow-up    Knee Pain    Shoulder Pain      HISTORY OF PRESENT ILLNESS: Steve June Jr. is a 72 y.o. male who presents to me today for elective diagnostic block of the left knee genicular nerve branches.     Review of Systems   Constitutional: Negative for fever.   HENT: Negative for drooling.    Eyes: Negative for discharge.   Respiratory: Negative for choking.    Cardiovascular: Negative for chest pain.   Genitourinary: Negative for flank pain.   Skin: Negative for wound.   Allergic/Immunologic: Negative for immunocompromised state.   Neurological: Negative for tremors and syncope.   Psychiatric/Behavioral: Negative for behavioral problems.      Past Medical History:        Past Medical History:   Diagnosis Date    Abnormal thyroid function test      Allergy       Seasonal    Anemia      Arthritis      Gaviria esophagus      Basal cell carcinoma       right forearm    Basal cell carcinoma 12/2011     lower post neck    Cancer       skin CA    Cataract      Chronic diastolic congestive heart failure 9/18/2017    Cirrhosis      Encounter for blood transfusion      Esophageal varices in cirrhosis       grade II on 7/12 EGD    Gastritis       on 7/12 EGD    GERD (gastroesophageal reflux disease) 2/28/2015    Hard of hearing      Hiatal hernia      History of hepatitis C 8/10/2012     tx with harvoni x 41 days (started 10/22/15). 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) 12/24/2013    Valvular heart disease       mild MR 12         Past Surgical History:         Past Surgical History:   Procedure Laterality Date    CATARACT EXTRACTION   1/10/13     left  eye    CATARACT EXTRACTION         right eye    CHOLECYSTECTOMY        COLONOSCOPY        EYE SURGERY         Cataract surgery to right eye    KNEE ARTHROSCOPY W/ MENISCAL REPAIR        KNEE CARTILAGE SURGERY         left knee    KNEE SURGERY   12/2006     left    SKIN LESION EXCISION        TONSILLECTOMY        UPPER GASTROINTESTINAL ENDOSCOPY             Family History:          Family History   Problem Relation Age of Onset    Leukemia Mother      Cancer Mother         bone    Alcohol abuse Father      Cirrhosis Father         EtOH induced    Amblyopia Neg Hx      Blindness Neg Hx      Cataracts Neg Hx      Diabetes Neg Hx      Glaucoma Neg Hx      Hypertension Neg Hx      Macular degeneration Neg Hx      Retinal detachment Neg Hx      Strabismus Neg Hx      Stroke Neg Hx      Thyroid disease Neg Hx      Psoriasis Neg Hx      Lupus Neg Hx      Eczema Neg Hx      Acne Neg Hx      Melanoma Neg Hx           Medications:          Current Outpatient Prescriptions on File Prior to Visit   Medication Sig Dispense Refill    ACCU-CHEK VEENA PLUS METER Misc          ACCU-CHEK SOFTCLIX LANCETS Misc USE TO TEST BLOOD SUGAR TWICE DAILY AS DIRECTED 100 each 3    acetaminophen (TYLENOL) 325 MG tablet Take 2 tablets (650 mg total) by mouth every 6 (six) hours as needed.   0    blood sugar diagnostic Strp Accu-chek veena plus test strip. Test BG 3 x day 100 strip 12    carvedilol (COREG) 6.25 MG tablet TAKE 1 TABLET(6.25 MG) BY MOUTH TWICE DAILY (Patient taking differently: daily) 60 tablet 0    ciclopirox (PENLAC) 8 % Soln Apply topically nightly. 6.6 mL 11    GLUCOSAMINE HCL/CHONDR CHISHOLM A NA (OSTEO BI-FLEX ORAL) Take 2 tablets by mouth once daily.        hydrocodone-acetaminophen 10-325mg (NORCO)  mg Tab Take 1 tablet by mouth every 6 (six) hours as needed (pain). 120 tablet 0    [START ON 12/8/2017] hydrocodone-acetaminophen 10-325mg (NORCO)  mg Tab Take 1 tablet by mouth every  "6 (six) hours as needed (pain). 120 tablet 0    [START ON 1/6/2018] hydrocodone-acetaminophen 10-325mg (NORCO)  mg Tab Take 1 tablet by mouth every 6 (six) hours as needed (pain). 120 tablet 0    insulin detemir (LEVEMIR FLEXTOUCH) 100 unit/mL (3 mL) SubQ InPn pen Inject 18 Units into the skin every evening. 1 Box 11    metFORMIN (GLUCOPHAGE) 1000 MG tablet TAKE 1 TABLET BY MOUTH TWICE DAILY WITH MEALS 60 tablet 0    nystatin (MYCOSTATIN) 100,000 unit/mL suspension TAKE 4ML BY MOUTH TWICE DAILY 60 mL 5    pen needle, diabetic (BD ULTRA-FINE KORINA PEN NEEDLES) 32 gauge x 5/32" Ndle 10 Units by Misc.(Non-Drug; Combo Route) route once daily at 6am. 30 each 11      No current facility-administered medications on file prior to visit.       Allergies:         Review of patient's allergies indicates:   Allergen Reactions    Adhesive tape-silicones Other (See Comments)       pulls skin off    Doxycycline         Dizzy. "Just didn't feel right".         Social History:   Social History            Social History    Marital status:        Spouse name: N/A    Number of children: 5    Years of education: N/A             Social History Main Topics    Smoking status: Former Smoker       Packs/day: 1.00       Years: 25.00       Quit date: 8/9/2000    Smokeless tobacco: Never Used    Alcohol use No         Comment: states he stopped drinking approx. 7 yrs ago/"I might drink a beer every 2 weeks"    Drug use: No    Sexual activity: No           Other Topics Concern    Not on file          Social History Narrative     He is .  He has children.  He is currently retired.      Steve is currently retired. Used to work as an . Lives with wife in [place], [place].     PHYSICAL EXAMINATION:   General         \               VSS   Constitutional: Oriented to person, place, and time. No apparent distress. Appears well-developed and well-nourished. Pleasant.  HENT:   Head: Normocephalic and atraumatic. "   Eyes: Right eye exhibits no discharge. Left eye exhibits no discharge. No scleral icterus.   Skin: multiple petechiae, ecchymosis noted over extremities  Pulmonary/Chest: Effort normal. No respiratory distress.   Abdominal: There is no guarding.   Neurological: Alert and oriented to person, place, and time.   Psychiatric: Behavior is normal.   Right Knee Exam      Tenderness   The patient is experiencing no tenderness.            Range of Motion   Extension: normal   Flexion: normal      Other   Scars: absent  Sensation: normal  Pulse: present  Swelling: none  Other tests: no effusion present        Left Knee Exam      Tenderness   The patient is experiencing tenderness in the medial joint line.     Range of Motion   Extension: 0   Flexion: 120      Tests   Didier:  Medial - positive Lateral - negative  Lachman:  Anterior - negative    Posterior - negative  Varus: negative  Valgus: negative  Patellar Apprehension: negative (Positive patellar grind)     Other   Erythema: absent  Scars: present  Sensation: normal  Pulse: present  Swelling: mild  Effusion: no effusion present        Left shoulder exam  Inspection: There is no swelling, ecchymoses, erythema or gross deformity.  Palpation: TTP over posterior/superior shoulder over the trapezius m.  ROM  Passive Abduction: 120 degrees  Passive Abduction: 150 degrees  Forward Flexion: 150 degrees  External Rotation: 70 degrees  Internal Rotation: 50 degrees  Special tests  Positive Vance, positive Neer, Positive empty can, negative lift off     Imaging  X-ray of the left shoulder from 5/16/2017: There is generalized loss of bone mineral density. Moderate left a.c. joint osteophytosis is seen. No fracture line or dislocation is seen. No focal osseous lesion is seen. The visualized left sided ribs appear intact.     Data Reviewed: X-ray     Supportive Actions: Independent visualization of images or test specimens.     ASSESSMENT:   1. Chronic left shoulder pain    2.  Tendinopathy of rotator cuff, left    3. Arthrosis of left acromioclavicular joint    4. Primary osteoarthritis of left knee       PLAN:      1. Proceed today with the diagnostic block of the left knee genicular nerve branches, pt consented.    2. He will f/u by phone in 4 days.

## 2017-12-15 NOTE — PLAN OF CARE
SEE EPIC DETAILS FOR INFO ON PACU TIME S/P LT KNEE NERVE ROOT BLOCK.  PT PRESENTS TO PACU WITH #22 RT F/A PIV SWOLLEN.  OR CIRC D/C'S LINE ON ARRIVAL.  WRAPPED AND ELEVATED FOR SUPPORT AND COMFORT.  VSS.

## 2017-12-15 NOTE — OP NOTE
Operative Note       Surgery Date: 12/15/2017     Surgeon(s) and Role:     * Rolf Silva MD - Primary    Pre-op Diagnosis:  Primary osteoarthritis of left knee [M17.12]    Post-op Diagnosis: Post-Op Diagnosis Codes:     * Primary osteoarthritis of left knee [M17.12]    Procedure(s) (LRB):  Diagnostic block of the genicular branches to the left knee (Left) x 4 nerve branches    Anesthesia: RN IV Sedation    Procedure in Detail/Findings:    Description of Procedure:     A 72 y.o. male with chronic left knee pain presents for an elective genicular nerve branch block X4 for the left knee. We discussed risks, benefits, and alternatives. He has failed conservation care. We are going to see if a water-cooled radiofrequency ablation procedure to the genicular nerve branches would benefit his knee pain. Patient's verbal and written consent was obtained. He was brought to the fluoro scopic suite, placed in supine position. Left knee was prepped in the usual sterile fashion. Using AP and lateral views, with 1 % lidocaine with a 27g needle, starting with a skin wheal down to the periosteum the area was injected for local anesthesia in each of the four sites of the genicular branches. Next, a 25-gauge needle was introduced down to the proximal medial tibial metaphysis. A similar needle was placed at the lateral aspect of the distal femur. A third introducer needle was guided to the medial aspect of the distal femur. Finally, a fourth needle was placed two finger breadths proximal to the proximal pole of the patella in the midline for the suprapatellar branch of the femoral nerve. All 4 needles being placed near or around the genicular nerve origin prior to entering the left knee joint. AP views were obtained to assure proper location then lateral fluoroscopic views were obtained to assure the needle was at the appropriate mid shaft location. Once this was obtained, then the stylette was removed in each needle and 1cc  of 0.25% Marcaine was injected. The stylette was replaced after each injection. The needles were then removed and a sterile dressing with antibiotic ointment was applied. The patient was returned to the recovery area in stable condition. There were no complications. Neurovascular exam was performed and revealed no injury. Patient was discharged to follow up in the clinic in several days. We discussed postoperative protocol including pain diary recording and voiced understanding.       Estimated Blood Loss: Minimal           Specimens     None                Condition: Good    Attestation:  I performed the procedure.           Discharge Note    Admit Date: 12/15/2017    Attending Physician: Rolf Silva MD     Discharge Physician: Rolf Silva MD    Final Diagnosis: Post-Op Diagnosis Codes:     * Primary osteoarthritis of left knee [M17.12]    Disposition: Home or Self Care, pt discharged in good condition and will follow up by phone in 4 days.    Patient Instructions:   Current Discharge Medication List      CONTINUE these medications which have NOT CHANGED    Details   carvedilol (COREG) 6.25 MG tablet TAKE 1 TABLET(6.25 MG) BY MOUTH TWICE DAILY  Qty: 60 tablet, Refills: 0    Associated Diagnoses: Essential hypertension      ciclopirox (PENLAC) 8 % Soln Apply topically nightly.  Qty: 6.6 mL, Refills: 11      GLUCOSAMINE HCL/CHONDR CHISHOLM A NA (OSTEO BI-FLEX ORAL) Take 2 tablets by mouth once daily.      hydrocodone-acetaminophen 10-325mg (NORCO)  mg Tab Take by mouth every 6 (six) hours as needed for Pain.      insulin detemir (LEVEMIR FLEXTOUCH) 100 unit/mL (3 mL) SubQ InPn pen Inject 18 Units into the skin every evening.  Qty: 1 Box, Refills: 11    Associated Diagnoses: Type 2 diabetes mellitus with diabetic peripheral angiopathy without gangrene, without long-term current use of insulin      metFORMIN (GLUCOPHAGE) 1000 MG tablet TAKE 1 TABLET BY MOUTH TWICE DAILY WITH MEALS  Qty: 60 tablet,  "Refills: 0      nystatin (MYCOSTATIN) 100,000 unit/mL suspension TAKE 4ML BY MOUTH TWICE DAILY  Qty: 60 mL, Refills: 5      ACCU-CHEK VEENA PLUS METER Mis       ACCU-CHEK SOFTCLIX LANCETS [facility] USE TO TEST BLOOD SUGAR TWICE DAILY AS DIRECTED  Qty: 100 each, Refills: 3    Comments: **Patient requests 90 days supply**      blood sugar diagnostic Strp Accu-chek veena plus test strip. Test BG 3 x day  Qty: 100 strip, Refills: 12    Associated Diagnoses: Type 2 diabetes mellitus with diabetic peripheral angiopathy without gangrene, without long-term current use of insulin      pen needle, diabetic (BD ULTRA-FINE KORINA PEN NEEDLES) 32 gauge x 5/32" Ndle 10 Units by Misc.(Non-Drug; Combo Route) route once daily at 6am.  Qty: 30 each, Refills: 11    Associated Diagnoses: Type 2 diabetes mellitus with diabetic peripheral angiopathy without gangrene, without long-term current use of insulin         STOP taking these medications       acetaminophen (TYLENOL) 325 MG tablet Comments:   Reason for Stopping:               Discharge Procedure Orders (must include Diet, Follow-up, Activity)    Discharge Procedure Orders (must include Diet, Follow-up, Activity)  Diet general     Activity as tolerated     Shower on day dressing removed (No bath)     Ice to affected area     Call MD for:  temperature >100.4     Call MD for:  persistent nausea and vomiting     Call MD for:  severe uncontrolled pain     Call MD for:  difficulty breathing, headache or visual disturbances     Call MD for:  redness, tenderness, or signs of infection (pain, swelling, redness, odor or green/yellow discharge around incision site)     Call MD for:  hives     Call MD for:  persistent dizziness or light-headedness     Call MD for:  extreme fatigue     No dressing needed          Discharge Date: 12/15/17  "

## 2017-12-21 ENCOUNTER — TELEPHONE (OUTPATIENT)
Dept: PHYSICAL MEDICINE AND REHAB | Facility: CLINIC | Age: 72
End: 2017-12-21

## 2018-01-01 NOTE — H&P
CHIEF COMPLAINT:       Chief Complaint   Patient presents with    Follow-up    Knee Pain    Shoulder Pain      HISTORY OF PRESENT ILLNESS: Steve June Jr. is a 72 y.o. male who presents to me today for elective diagnostic block of the left knee genicular nerve branches.     Review of Systems   Constitutional: Negative for fever.   HENT: Negative for drooling.    Eyes: Negative for discharge.   Respiratory: Negative for choking.    Cardiovascular: Negative for chest pain.   Genitourinary: Negative for flank pain.   Skin: Negative for wound.   Allergic/Immunologic: Negative for immunocompromised state.   Neurological: Negative for tremors and syncope.   Psychiatric/Behavioral: Negative for behavioral problems.      Past Medical History:           Past Medical History:   Diagnosis Date    Abnormal thyroid function test      Allergy       Seasonal    Anemia      Arthritis      Gaviria esophagus      Basal cell carcinoma       right forearm    Basal cell carcinoma 12/2011     lower post neck    Cancer       skin CA    Cataract      Chronic diastolic congestive heart failure 9/18/2017    Cirrhosis      Encounter for blood transfusion      Esophageal varices in cirrhosis       grade II on 7/12 EGD    Gastritis       on 7/12 EGD    GERD (gastroesophageal reflux disease) 2/28/2015    Hard of hearing      Hiatal hernia      History of hepatitis C 8/10/2012     tx with harvoni x 41 days (started 10/22/15). 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) 12/24/2013    Valvular heart disease       mild MR 12         Past Surgical History:             Past Surgical History:   Procedure Laterality Date    CATARACT EXTRACTION   1/10/13      left eye    CATARACT EXTRACTION         right eye    CHOLECYSTECTOMY        COLONOSCOPY        EYE SURGERY         Cataract surgery to right eye    KNEE ARTHROSCOPY W/ MENISCAL REPAIR        KNEE CARTILAGE SURGERY         left knee    KNEE SURGERY   12/2006     left    SKIN LESION EXCISION        TONSILLECTOMY        UPPER GASTROINTESTINAL ENDOSCOPY             Family History:               Family History   Problem Relation Age of Onset    Leukemia Mother      Cancer Mother         bone    Alcohol abuse Father      Cirrhosis Father         EtOH induced    Amblyopia Neg Hx      Blindness Neg Hx      Cataracts Neg Hx      Diabetes Neg Hx      Glaucoma Neg Hx      Hypertension Neg Hx      Macular degeneration Neg Hx      Retinal detachment Neg Hx      Strabismus Neg Hx      Stroke Neg Hx      Thyroid disease Neg Hx      Psoriasis Neg Hx      Lupus Neg Hx      Eczema Neg Hx      Acne Neg Hx      Melanoma Neg Hx           Medications:               Current Outpatient Prescriptions on File Prior to Visit   Medication Sig Dispense Refill    ACCU-CHEK VEENA PLUS METER Misc          ACCU-CHEK SOFTCLIX LANCETS Misc USE TO TEST BLOOD SUGAR TWICE DAILY AS DIRECTED 100 each 3    acetaminophen (TYLENOL) 325 MG tablet Take 2 tablets (650 mg total) by mouth every 6 (six) hours as needed.   0    blood sugar diagnostic Strp Accu-chek veena plus test strip. Test BG 3 x day 100 strip 12    carvedilol (COREG) 6.25 MG tablet TAKE 1 TABLET(6.25 MG) BY MOUTH TWICE DAILY (Patient taking differently: daily) 60 tablet 0    ciclopirox (PENLAC) 8 % Soln Apply topically nightly. 6.6 mL 11    GLUCOSAMINE HCL/CHONDR CHISHOLM A NA (OSTEO BI-FLEX ORAL) Take 2 tablets by mouth once daily.        hydrocodone-acetaminophen 10-325mg (NORCO)  mg Tab Take 1 tablet by mouth every 6 (six) hours as needed (pain). 120 tablet 0    [START ON 12/8/2017] hydrocodone-acetaminophen 10-325mg (NORCO)  mg Tab Take 1  "tablet by mouth every 6 (six) hours as needed (pain). 120 tablet 0    [START ON 1/6/2018] hydrocodone-acetaminophen 10-325mg (NORCO)  mg Tab Take 1 tablet by mouth every 6 (six) hours as needed (pain). 120 tablet 0    insulin detemir (LEVEMIR FLEXTOUCH) 100 unit/mL (3 mL) SubQ InPn pen Inject 18 Units into the skin every evening. 1 Box 11    metFORMIN (GLUCOPHAGE) 1000 MG tablet TAKE 1 TABLET BY MOUTH TWICE DAILY WITH MEALS 60 tablet 0    nystatin (MYCOSTATIN) 100,000 unit/mL suspension TAKE 4ML BY MOUTH TWICE DAILY 60 mL 5    pen needle, diabetic (BD ULTRA-FINE KORINA PEN NEEDLES) 32 gauge x 5/32" Ndle 10 Units by Misc.(Non-Drug; Combo Route) route once daily at 6am. 30 each 11      No current facility-administered medications on file prior to visit.       Allergies:             Review of patient's allergies indicates:   Allergen Reactions    Adhesive tape-silicones Other (See Comments)       pulls skin off    Doxycycline         Dizzy. "Just didn't feel right".         Social History:   Social History                Social History    Marital status:        Spouse name: N/A    Number of children: 5    Years of education: N/A                  Social History Main Topics    Smoking status: Former Smoker       Packs/day: 1.00       Years: 25.00       Quit date: 8/9/2000    Smokeless tobacco: Never Used    Alcohol use No         Comment: states he stopped drinking approx. 7 yrs ago/"I might drink a beer every 2 weeks"    Drug use: No    Sexual activity: No              Other Topics Concern    Not on file            Social History Narrative     He is .  He has children.  He is currently retired.      Steve is currently retired. Used to work as an . Lives with wife in [place], [place].     Imaging  X-ray of the left shoulder from 5/16/2017: There is generalized loss of bone mineral density. Moderate left a.c. joint osteophytosis is seen. No fracture line or dislocation is seen. No " focal osseous lesion is seen. The visualized left sided ribs appear intact.     Data Reviewed: X-ray     Supportive Actions: Independent visualization of images or test specimens.     ASSESSMENT:   1. Chronic left shoulder pain    2. Tendinopathy of rotator cuff, left    3. Arthrosis of left acromioclavicular joint    4. Primary osteoarthritis of left knee       PLAN:      1. Proceed today with the diagnostic block of the left knee genicular nerve branches, pt consented.     2. He will f/u by phone in 4 days.    This H&P is a late entry on 1/1/18, 14:35.

## 2018-01-02 ENCOUNTER — TELEPHONE (OUTPATIENT)
Dept: PHYSICAL MEDICINE AND REHAB | Facility: CLINIC | Age: 73
End: 2018-01-02

## 2018-01-02 NOTE — TELEPHONE ENCOUNTER
----- Message from Vivian Panchal sent at 1/2/2018 12:40 PM CST -----  Contact: self  Patient called asking for advice about the procedure on his knee. Patient feels it did not work. Patient also needs an appointment.  Please call patient at [phone]. Thanks!

## 2018-01-02 NOTE — TELEPHONE ENCOUNTER
"RN returned call to pt who states he wanted to let MD know that knee injection did not work as he hoped it would. Would like to schedule an appointment to speak with MD about other options. "Pain is in knee and "tightness has increased to kneecap."    RN let pt know they are out today but will forward message to the staff to reach out to him upon their return. Pt thanked RN and understanding.  "

## 2018-01-03 NOTE — TELEPHONE ENCOUNTER
Left msg on machine for pt to call back to schedule appt to come in to see Dr Silva does not need to wait to speak with nurse

## 2018-01-09 ENCOUNTER — OFFICE VISIT (OUTPATIENT)
Dept: PODIATRY | Facility: CLINIC | Age: 73
End: 2018-01-09
Payer: MEDICARE

## 2018-01-09 ENCOUNTER — TELEPHONE (OUTPATIENT)
Dept: PODIATRY | Facility: CLINIC | Age: 73
End: 2018-01-09

## 2018-01-09 VITALS — BODY MASS INDEX: 30.01 KG/M2 | WEIGHT: 202.63 LBS | HEIGHT: 69 IN

## 2018-01-09 DIAGNOSIS — L90.9 FAT PAD ATROPHY OF FOOT: ICD-10-CM

## 2018-01-09 DIAGNOSIS — L84 CORN OR CALLUS: ICD-10-CM

## 2018-01-09 DIAGNOSIS — E11.51 TYPE II DIABETES MELLITUS WITH PERIPHERAL CIRCULATORY DISORDER: ICD-10-CM

## 2018-01-09 DIAGNOSIS — I83.893 VARICOSE VEINS OF BOTH LEGS WITH EDEMA: ICD-10-CM

## 2018-01-09 DIAGNOSIS — B35.1 ONYCHOMYCOSIS DUE TO DERMATOPHYTE: ICD-10-CM

## 2018-01-09 DIAGNOSIS — E08.42 DIABETIC POLYNEUROPATHY ASSOCIATED WITH DIABETES MELLITUS DUE TO UNDERLYING CONDITION: Primary | ICD-10-CM

## 2018-01-09 PROCEDURE — 11056 PARNG/CUTG B9 HYPRKR LES 2-4: CPT | Mod: Q9,S$GLB,, | Performed by: PODIATRIST

## 2018-01-09 PROCEDURE — 99999 PR PBB SHADOW E&M-EST. PATIENT-LVL III: CPT | Mod: PBBFAC,,, | Performed by: PODIATRIST

## 2018-01-09 PROCEDURE — 99213 OFFICE O/P EST LOW 20 MIN: CPT | Mod: 25,S$GLB,, | Performed by: PODIATRIST

## 2018-01-09 PROCEDURE — 99499 UNLISTED E&M SERVICE: CPT | Mod: S$GLB,,, | Performed by: PODIATRIST

## 2018-01-09 PROCEDURE — 11721 DEBRIDE NAIL 6 OR MORE: CPT | Mod: 59,Q9,S$GLB, | Performed by: PODIATRIST

## 2018-01-09 RX ORDER — AMMONIUM LACTATE 12 G/100G
1 CREAM TOPICAL 2 TIMES DAILY PRN
Qty: 1 BOTTLE | Refills: 6 | Status: SHIPPED | OUTPATIENT
Start: 2018-01-09 | End: 2018-05-30

## 2018-01-09 NOTE — TELEPHONE ENCOUNTER
----- Message from Anne-Marie Conner sent at 1/9/2018  2:36 PM CST -----  Patient is requesting a Rx for diabetic shoes please lane [phone] (home)

## 2018-01-09 NOTE — TELEPHONE ENCOUNTER
Spoke with patient. Informed him that I would send another copy of the prescription to him.  He stated to disregard  the call as he found it within the other paperwork that he had.

## 2018-01-12 NOTE — PROGRESS NOTES
Subjective:      Patient ID: Steve June Jr. is a 72 y.o. male.    Chief Complaint: Diabetes Mellitus (PCP:  Dr Womack ([place]) 8/12/17; HgbA1c: 9/16/17  6.4); Foot Pain (both); Callouses; and Nail Care    Diabetes, increased risk amputation needing evaluation/management/optomization of foot care.    CC thick nails and calluses, burning pain.  Gradual onset, worsening over past several weeks-months, aggravated by increased weight bearing, shoe gear, pressure.  Pain management for burning.  Having difficulty with trimming his own nails.  Denies trauma, signs infection, and surgery both feet.    Review of Systems   Constitution: Negative for chills, diaphoresis, fever, malaise/fatigue and night sweats.   Cardiovascular: Positive for leg swelling. Negative for claudication, cyanosis and syncope.   Skin: Positive for nail changes and suspicious lesions. Negative for color change, dry skin, rash and unusual hair distribution.   Musculoskeletal: Negative for falls, joint pain, joint swelling, muscle cramps, muscle weakness and stiffness.   Gastrointestinal: Negative for constipation, diarrhea, nausea and vomiting.   Neurological: Positive for paresthesias and sensory change. Negative for brief paralysis, disturbances in coordination, focal weakness, numbness and tremors.           Objective:      Physical Exam   Constitutional: He is oriented to person, place, and time. He appears well-developed and well-nourished. He is cooperative.   Oriented to time, place, and person.   Cardiovascular:   Pulses:       Dorsalis pedis pulses are 1+ on the right side, and 1+ on the left side.        Posterior tibial pulses are 1+ on the right side, and 1+ on the left side.   Capillary fill time 3-5 seconds.  Feet are warm to touch proximal with distal cooling.      2+ edema to bilateral lower extremities with varicosities noted.    No pedal hair noted bilateral.     Musculoskeletal:   Normal angle, base, station of gait. Decreased  stride length, early heel off, moderately propulsive toe off bilateral.    All ten toes without clubbing, cyanosis, or signs of ischemia.      Ankle dorsiflexion decreased at <10 degrees bilateral with moderate increase with knee flexion bilateral.    No pain to palpation bilateral lower extremities.      Range of motion, stability, muscle strength, and muscle tone are age and health appropriate normal bilateral feet and legs.       Feet:   Right Foot:   Protective Sensation: 10 sites tested. 4 sites sensed.   Left Foot:   Protective Sensation: 10 sites tested. 6 sites sensed.   Lymphadenopathy:   Negative lymphadenopathy bilateral popliteal fossa and tarsal tunnel.     Neurological: He is alert and oriented to person, place, and time. He has normal strength. He is not disoriented. He displays no atrophy and no tremor. A sensory deficit is present. He exhibits normal muscle tone.   Decreased/absent vibratory sensation bilateral feet to 128Hz tuning fork.    Diminished/loss of protective sensation bilateral to 10 gram monofilament.    Paresthesias,  bilateral feet with no clearly identified trigger or source.     Skin: Skin is warm, dry and intact. No abrasion, no bruising, no burn, no ecchymosis, no laceration, no lesion, no petechiae and no rash noted. He is not diaphoretic. No cyanosis or erythema. No pallor. Nails show no clubbing.   Focal hyperkeratotic lesion consisting entirely of hyperkeratotic tissue without open skin, drainage, pus, fluctuance, malodor, or signs of infection plantar right hallux and mtpj.    Skin thin, atrophic, with decreased density and distribution of pedal hair bilateral, but without ulcers, masses, nodules or cords palpated bilateral feet and legs.    Hyperpigmentation both legs consistent with stasis.    Toenails 1st, 2nd, 3rd, 4th, 5th  bilateral are hypertrophic thickened 2-3 mm, dystrophic, discolored tanish brown with tan, gray crumbly subungual debris.  Long 4-7 mm, without  periungual skin abnormality of each.               Assessment:       Encounter Diagnoses   Name Primary?    Diabetic polyneuropathy associated with diabetes mellitus due to underlying condition Yes    Onychomycosis due to dermatophyte     Corn or callus     Fat pad atrophy of foot     Type II diabetes mellitus with peripheral circulatory disorder     Varicose veins of both legs with edema          Plan:       Steve was seen today for diabetes mellitus, foot pain, callouses and nail care.    Diagnoses and all orders for this visit:    Diabetic polyneuropathy associated with diabetes mellitus due to underlying condition  -     DIABETIC SHOES FOR HOME USE    Onychomycosis due to dermatophyte  -     DIABETIC SHOES FOR HOME USE    Corn or callus  -     DIABETIC SHOES FOR HOME USE    Fat pad atrophy of foot  -     DIABETIC SHOES FOR HOME USE    Type II diabetes mellitus with peripheral circulatory disorder  -     DIABETIC SHOES FOR HOME USE    Varicose veins of both legs with edema  -     DIABETIC SHOES FOR HOME USE    Other orders  -     ammonium lactate 12 % Crea; Apply 1 application topically 2 (two) times daily as needed.      I counseled the patient on his conditions, their implications and medical management.    Patient will stretch the tendo achilles complex three times daily as demonstrated in the office.  Literature was dispensed illustrating proper stretching technique.    Shoe inspection. Diabetic Foot Education. Patient reminded of the importance of good nutrition and blood sugar control to help prevent podiatric complications of diabetes. Patient instructed on proper foot hygeine. We discussed wearing proper shoe gear, daily foot inspections, never walking without protective shoe gear, never putting sharp instruments to feet    - With patient's permission, nails were aggressively reduced and debrided x 10 to their soft tissue attachment mechanically and with electric , removing all offending  nail and debris. Patient relates relief following the procedure. he will continue to monitor the areas daily, inspect his feet, wear protective shoe gear when ambulatory, moisturizer to maintain skin integrity and follow in this office in approximately 2-3 months, sooner p.r.n.    With patient's verbal consent the hyperkeratotic lesions right foot were trimmed to healthy appearing skin using a # 15 scalpel. Patient relates relief of pain following the procedure. Patient tolerated the procedure well without complications. No anesthesia or hemostasis required. No blood loss.    Rx DM shoes, custom inserts    Continue pain management.    Rx amlactin daily  To feet.    Return in 3 months routine care    Fausto Harvey DPM

## 2018-01-15 ENCOUNTER — OFFICE VISIT (OUTPATIENT)
Dept: PHYSICAL MEDICINE AND REHAB | Facility: CLINIC | Age: 73
End: 2018-01-15
Payer: MEDICARE

## 2018-01-15 VITALS
BODY MASS INDEX: 29.92 KG/M2 | WEIGHT: 202 LBS | HEIGHT: 69 IN | DIASTOLIC BLOOD PRESSURE: 88 MMHG | HEART RATE: 73 BPM | SYSTOLIC BLOOD PRESSURE: 162 MMHG

## 2018-01-15 DIAGNOSIS — M23.52 CHRONIC INSTABILITY OF LEFT KNEE: ICD-10-CM

## 2018-01-15 DIAGNOSIS — M25.562 CHRONIC PAIN OF LEFT KNEE: Primary | ICD-10-CM

## 2018-01-15 DIAGNOSIS — M17.12 PRIMARY OSTEOARTHRITIS OF LEFT KNEE: Primary | ICD-10-CM

## 2018-01-15 DIAGNOSIS — G89.29 CHRONIC PAIN OF LEFT KNEE: Primary | ICD-10-CM

## 2018-01-15 DIAGNOSIS — M17.12 PRIMARY OSTEOARTHRITIS OF LEFT KNEE: ICD-10-CM

## 2018-01-15 PROCEDURE — 99213 OFFICE O/P EST LOW 20 MIN: CPT | Mod: S$GLB,,, | Performed by: PHYSICAL MEDICINE & REHABILITATION

## 2018-01-15 PROCEDURE — 99999 PR PBB SHADOW E&M-EST. PATIENT-LVL III: CPT | Mod: PBBFAC,,, | Performed by: PHYSICAL MEDICINE & REHABILITATION

## 2018-01-15 RX ORDER — DICLOFENAC SODIUM 10 MG/G
2 GEL TOPICAL 2 TIMES DAILY
Qty: 1 TUBE | Refills: 2 | Status: SHIPPED | OUTPATIENT
Start: 2018-01-15 | End: 2018-05-30

## 2018-01-15 NOTE — PROGRESS NOTES
[facility]    CHIEF COMPLAINT:   Chief Complaint   Patient presents with    Knee Pain     discuss left knee pain proc 12/15/17 dx block     HISTORY OF PRESENT ILLNESS: Steve June Jr. is a 72 y.o. male who returns to clinic for follow-up of left knee pain. Mr. June was last seen in clinic on 11/13/18 for this knee pain and then received a diagnostic nerve block of the geniculate branches of the left knee for this same pain on 12/15/17. He reports no substantial relief of pain from the procedure. He denies any new onset weakness, numbness or tingling. He reports instability of the left knee. The pain is located over the medial knee. He has tried topical cream in the past with moderate relief.  He rates his pain as a 7 on a scale of 1-10.  He notes pain increases with increased standing or walking.  He also experiences some pain at rest.  He does report some swelling of the knee.    Review of Systems   Constitutional: Negative for fever.   HENT: Negative for drooling.    Eyes: Negative for discharge.   Respiratory: Negative for choking.    Cardiovascular: Negative for chest pain.   Genitourinary: Negative for flank pain.   Skin: Negative for wound.   Allergic/Immunologic: Negative for immunocompromised state.   Neurological: Negative for tremors and syncope.   Psychiatric/Behavioral: Negative for behavioral problems.     Past Medical History:   Past Medical History:   Diagnosis Date    Abnormal thyroid function test     Allergy     Seasonal    Anemia     Arthritis     Gaviria esophagus     Basal cell carcinoma     right forearm    Basal cell carcinoma 12/2011    lower post neck    Cancer     skin CA    Cataract     Cirrhosis     Diabetes mellitus     Diabetes mellitus, type 2     Encounter for blood transfusion     Esophageal varices in cirrhosis     grade II on 7/12 EGD    Gastritis     on 7/12 EGD    GERD (gastroesophageal reflux disease) 2/28/2015    Hard of hearing      Hiatal hernia     History of hepatitis C 8/10/2012    tx with harvoni x 41 days (started 10/22/15). SVR4     Hoarseness 2/28/2015    Hypercholesteremia     Hypersplenism     Hypertension     No meds    Pain management 12/10/2014    Portal hypertensive gastropathy     on 7/12 EGD    Thrombocytopenia     Type II or unspecified type diabetes mellitus with neurological manifestations, uncontrolled(250.62) 12/24/2013    Valvular heart disease     mild MR 12       Past Surgical History:   Past Surgical History:   Procedure Laterality Date    CATARACT EXTRACTION  1/10/13    left eye    CATARACT EXTRACTION      right eye    CHOLECYSTECTOMY      COLONOSCOPY      EYE SURGERY      Cataract surgery to right eye    KNEE ARTHROSCOPY W/ MENISCAL REPAIR      KNEE CARTILAGE SURGERY      left knee    KNEE SURGERY  12/2006    left    SKIN LESION EXCISION      TONSILLECTOMY      UPPER GASTROINTESTINAL ENDOSCOPY         Family History:   Family History   Problem Relation Age of Onset    Leukemia Mother     Cancer Mother      bone    Alcohol abuse Father     Cirrhosis Father      EtOH induced    Amblyopia Neg Hx     Blindness Neg Hx     Cataracts Neg Hx     Diabetes Neg Hx     Glaucoma Neg Hx     Hypertension Neg Hx     Macular degeneration Neg Hx     Retinal detachment Neg Hx     Strabismus Neg Hx     Stroke Neg Hx     Thyroid disease Neg Hx     Psoriasis Neg Hx     Lupus Neg Hx     Eczema Neg Hx     Acne Neg Hx     Melanoma Neg Hx        Medications:   Current Outpatient Prescriptions on File Prior to Visit   Medication Sig Dispense Refill    ACCU-CHEK VEENA PLUS METER Misc       ACCU-CHEK SOFTCLIX LANCETS Misc USE TO TEST BLOOD SUGAR TWICE DAILY AS DIRECTED 100 each 3    ammonium lactate 12 % Crea Apply 1 application topically 2 (two) times daily as needed. 1 Bottle 6    blood sugar diagnostic Strp Accu-chek veena plus test strip. Test BG 3 x day 100 strip 12    carvedilol (COREG) 6.25  "MG tablet TAKE 1 TABLET(6.25 MG) BY MOUTH TWICE DAILY (Patient taking differently: daily) 60 tablet 0    ciclopirox (PENLAC) 8 % Soln Apply topically nightly. 6.6 mL 11    GLUCOSAMINE HCL/CHONDR CHISHOLM A NA (OSTEO BI-FLEX ORAL) Take 2 tablets by mouth once daily.      hydrocodone-acetaminophen 10-325mg (NORCO)  mg Tab Take by mouth every 6 (six) hours as needed for Pain.      insulin detemir (LEVEMIR FLEXTOUCH) 100 unit/mL (3 mL) SubQ InPn pen Inject 18 Units into the skin every evening. 1 Box 11    metFORMIN (GLUCOPHAGE) 1000 MG tablet TAKE 1 TABLET BY MOUTH TWICE DAILY WITH MEALS 60 tablet 0    nystatin (MYCOSTATIN) 100,000 unit/mL suspension TAKE 4ML BY MOUTH TWICE DAILY 60 mL 5    pen needle, diabetic (BD ULTRA-FINE KORINA PEN NEEDLES) 32 gauge x 5/32" Ndle 10 Units by Misc.(Non-Drug; Combo Route) route once daily at 6am. 30 each 11     No current facility-administered medications on file prior to visit.        Allergies:   Review of patient's allergies indicates:   Allergen Reactions    Adhesive tape-silicones Other (See Comments)     pulls skin off    Doxycycline      Dizzy. "Just didn't feel right".       Social History:   Social History     Social History    Marital status:      Spouse name: N/A    Number of children: 5    Years of education: N/A     Social History Main Topics    Smoking status: Former Smoker     Packs/day: 1.00     Years: 25.00     Quit date: 8/9/2000    Smokeless tobacco: Never Used    Alcohol use Yes      Comment: rarely    Drug use: No    Sexual activity: No     Other Topics Concern    None     Social History Narrative    He is .  He has children.  He is currently retired.     PHYSICAL EXAMINATION:   General    Vitals:    01/15/18 0903   Weight: 91.6 kg (202 lb)   Height: 5' 9" (1.753 m)     Constitutional: Oriented to person, place, and time. No apparent distress. Appears well-developed and well-nourished. Pleasant.  HENT:   Head: Normocephalic and " atraumatic.   Eyes: Right eye exhibits no discharge. Left eye exhibits no discharge. No scleral icterus.   Pulmonary/Chest: Effort normal. No respiratory distress.   Abdominal: There is no guarding.   Neurological: Alert and oriented to person, place, and time.   Psychiatric: Behavior is normal.   Right Knee Exam     Tenderness   The patient is experiencing no tenderness.         Range of Motion   Extension: normal   Flexion: normal     Other   Erythema: absent  Scars: absent  Sensation: normal  Pulse: present  Swelling: none  Other tests: no effusion present      Left Knee Exam     Tenderness   The patient is experiencing tenderness in the medial joint line (Tender over the medial femoral condyle).    Range of Motion   Extension: 0   Flexion: 130     Tests   Didier:  Medial - negative Lateral - negative  Lachman:  Anterior - negative    Posterior - negative  Drawer:       Anterior - negative     Posterior - negative  Varus: negative  Valgus: positive  Patellar Apprehension: negative    Other   Erythema: absent  Scars: absent  Sensation: normal  Pulse: present  Swelling: mild  Effusion: no effusion present        INSPECTION: There is no ecchymoses, erythema or gross deformity about the left knee.  GAIT/DYNAMIC: Somewhat antalgic.    Imaging  X-ray of the left knee from 5/16/2017: Moderate to severe, tricompartmental left knee osteoarthritis which is most pronounced in the medial compartment and similar in severity when compared to the 11/14/15 study.  2. No acute abnormality is seen.  3. Bilateral bipartite patella, also present previously.    Data Reviewed: X-ray    Supportive Actions: Independent visualization of images or test specimens    ASSESSMENT:   1. Chronic pain of left knee      PLAN:     1. Time was spent reviewing the above diagnosis in depth with Steve today, including acute management and rehabilitation.     2.  Unfortunately, he had no beneficial response from the diagnostic genicular block of the  left knee.  We discussed that his other medical comorbidities make the surgery challenging and risky.  We will continue to avoid injections of corticosteroid to do this issue of hyperglycemia.  With these issues in mind, we discussed performing injection of hyaluronic acid.  This may provide some anti-inflammatory and pain relieving effects and also promote intra-articular joint health.  He is in favor this option.    3.  He describes some knee instability and there is some laxity with valgus force on exam.  He also has significant medial knee compartment osteoarthritis.  We'll have him fitted for a medial knee and leg brace to help with the symptoms.     4.  I prescribed topical Voltaren gel to apply to the area twice per day.    5. RTC in 2 weeks for the first series of 3 left knee Euflexxa injections.  If these injections are unsuccessful, we could consider repeat diagnostic genicular block.    The above note was completed, in part, with the aid of Dragon dictation software/hardware. Translation errors may be present.

## 2018-01-16 ENCOUNTER — TELEPHONE (OUTPATIENT)
Dept: FAMILY MEDICINE | Facility: CLINIC | Age: 73
End: 2018-01-16

## 2018-01-16 DIAGNOSIS — I10 ESSENTIAL HYPERTENSION: ICD-10-CM

## 2018-01-16 NOTE — TELEPHONE ENCOUNTER
----- Message from Maribell Mac sent at 1/16/2018  2:15 PM CST -----  Contact: Diabetes Management  Checking status of request for most recent chart notes/physician order to be filled out for diabetic shoes. Please call back at  ext [phone] fax .

## 2018-01-16 NOTE — TELEPHONE ENCOUNTER
Spoke with Diabetes management. Informed them Giles has not seen patient regarding this issue and is not patient current PCP.

## 2018-01-18 RX ORDER — METFORMIN HYDROCHLORIDE 1000 MG/1
TABLET ORAL
Qty: 60 TABLET | Refills: 0 | Status: SHIPPED | OUTPATIENT
Start: 2018-01-18 | End: 2018-02-05

## 2018-01-18 RX ORDER — CARVEDILOL 6.25 MG/1
TABLET ORAL
Qty: 60 TABLET | Refills: 0 | Status: SHIPPED | OUTPATIENT
Start: 2018-01-18 | End: 2018-05-04 | Stop reason: SDUPTHER

## 2018-01-19 ENCOUNTER — TELEPHONE (OUTPATIENT)
Dept: FAMILY MEDICINE | Facility: CLINIC | Age: 73
End: 2018-01-19

## 2018-01-19 NOTE — TELEPHONE ENCOUNTER
----- Message from Vera Bertrand sent at 1/18/2018  3:41 PM CST -----  Checking the status of patients diabetic shoes.  Please call Diabetes Management/Brinda at [phone] ext [phone].

## 2018-01-23 NOTE — TELEPHONE ENCOUNTER
----- Message from Irma Walton sent at 1/22/2018  8:44 AM CST -----  Contact: [facility]  [facility] calling for status of request sent for clinical notes,physician order that needs to be filled out and faxed over so pt can get his diabetic shoes...[phone] ext [phone]

## 2018-01-23 NOTE — TELEPHONE ENCOUNTER
Spoke to [facility] and advised her that it is noted podiatry had faxed back the forms to them.  She states she did not rec them but that Dr Womack will have to sign off on them as well. I IM'd Mora Johnson with Podiatry to let her know and verify fax # [phone].  Once they rec the forms Brinda will fax to Dr Womack for review.

## 2018-01-24 ENCOUNTER — TELEPHONE (OUTPATIENT)
Dept: PODIATRY | Facility: CLINIC | Age: 73
End: 2018-01-24

## 2018-01-24 NOTE — TELEPHONE ENCOUNTER
----- Message from Abimbola Marinelli sent at 1/24/2018 12:32 PM CST -----  Contact: self [phone]  Call placed to pod. Patient returned your call, please call back.  Thank you!

## 2018-01-24 NOTE — TELEPHONE ENCOUNTER
Patient stated that he was given a cream (Amlactin)  for dry skin, but thought that he was to have something for the nail fungus instead.  Please advise.

## 2018-01-25 RX ORDER — CICLOPIROX 80 MG/ML
SOLUTION TOPICAL NIGHTLY
Qty: 6.6 ML | Refills: 11 | Status: SHIPPED | OUTPATIENT
Start: 2018-01-25 | End: 2018-05-30

## 2018-01-26 ENCOUNTER — TELEPHONE (OUTPATIENT)
Dept: ADMINISTRATIVE | Facility: HOSPITAL | Age: 73
End: 2018-01-26

## 2018-01-29 ENCOUNTER — TELEPHONE (OUTPATIENT)
Dept: PODIATRY | Facility: CLINIC | Age: 73
End: 2018-01-29

## 2018-01-29 ENCOUNTER — OFFICE VISIT (OUTPATIENT)
Dept: PHYSICAL MEDICINE AND REHAB | Facility: CLINIC | Age: 73
End: 2018-01-29
Payer: MEDICARE

## 2018-01-29 DIAGNOSIS — G89.29 CHRONIC PAIN OF LEFT KNEE: Primary | ICD-10-CM

## 2018-01-29 DIAGNOSIS — M25.562 CHRONIC PAIN OF LEFT KNEE: Primary | ICD-10-CM

## 2018-01-29 DIAGNOSIS — M17.12 PRIMARY OSTEOARTHRITIS OF LEFT KNEE: ICD-10-CM

## 2018-01-29 PROCEDURE — 99999 PR PBB SHADOW E&M-EST. PATIENT-LVL I: CPT | Mod: PBBFAC,,, | Performed by: PHYSICAL MEDICINE & REHABILITATION

## 2018-01-29 PROCEDURE — 99499 UNLISTED E&M SERVICE: CPT | Mod: S$GLB,,, | Performed by: PHYSICAL MEDICINE & REHABILITATION

## 2018-01-29 PROCEDURE — 20611 DRAIN/INJ JOINT/BURSA W/US: CPT | Mod: LT,S$GLB,, | Performed by: PHYSICAL MEDICINE & REHABILITATION

## 2018-01-29 RX ORDER — HYALURONATE SODIUM 20 MG/2 ML
20 SYRINGE (ML) INTRAARTICULAR
Status: DISCONTINUED | OUTPATIENT
Start: 2018-01-29 | End: 2018-01-29 | Stop reason: HOSPADM

## 2018-01-29 RX ADMIN — Medication 20 MG: at 08:01

## 2018-01-29 NOTE — TELEPHONE ENCOUNTER
----- Message from Elizabeth Stover sent at 1/29/2018  2:15 PM CST -----  Contact: [facility] [phone] ext [phone]  Brinda called from [facility] called and asked if you will fax the documents they have not received anything at this time the fax is [phone]

## 2018-01-29 NOTE — PROCEDURES
Large Joint Aspiration/Injection  Date/Time: 1/29/2018 8:53 AM  Performed by: JENNIFER WEST  Authorized by: JENNIFER WEST     Consent Done?:  Yes (Verbal)  Indications:  Pain  Procedure site marked: Yes    Timeout: Prior to procedure the correct patient, procedure, and site was verified      Location:  Knee  Site:  L knee  Prep: Patient was prepped and draped in usual sterile fashion    Ultrasonic Guidance for needle placement: Yes  Images are saved and documented.  Needle size:  22 G  Approach: superolateral.  Medications:  20 mg EUFLEXXA 10 mg/mL(mw 2.4 -3.6 million)  Patient tolerance:  Patient tolerated the procedure well with no immediate complications    Additional Comments: Ultrasound guidance was used for correct needle placement, the images were saved will be uploaded to EMR.

## 2018-01-29 NOTE — PROGRESS NOTES
[facility]    Consulting Provider: Dr. Rolf Silva    CHIEF COMPLAINT:   Chief Complaint   Patient presents with    Injections     euflexxa to left knee 1/3     HISTORY OF PRESENT ILLNESS: Steve June Jr. is a 72 y.o. male who presents to me in follow-up for evaluation of left knee pain. He received diagnostic nerve block of the geniculate branches of the left knee on 12/15/17 without significant pain relief. He describes intermittent achy pain to medial aspect of left knee which is worse with prolonged standing and walking. He takes Norco prn which eases the pain. He is not a surgical candidate and would like to try viscosupplementation to left knee.    Review of Systems   Constitutional: Negative for fever.   HENT: Negative for drooling.    Eyes: Negative for discharge.   Respiratory: Negative for choking.    Cardiovascular: Negative for chest pain.   Genitourinary: Negative for flank pain.   Skin: Negative for wound.   Allergic/Immunologic: Negative for immunocompromised state.   Neurological: Negative for tremors and syncope.   Psychiatric/Behavioral: Negative for behavioral problems.     Past Medical History:   Past Medical History:   Diagnosis Date    Abnormal thyroid function test     Allergy     Seasonal    Anemia     Arthritis     Gaviria esophagus     Basal cell carcinoma     right forearm    Basal cell carcinoma 12/2011    lower post neck    Cancer     skin CA    Cataract     Cirrhosis     Diabetes mellitus     Diabetes mellitus, type 2     Encounter for blood transfusion     Esophageal varices in cirrhosis     grade II on 7/12 EGD    Gastritis     on 7/12 EGD    GERD (gastroesophageal reflux disease) 2/28/2015    Hard of hearing     Hiatal hernia     History of hepatitis C 8/10/2012    tx with harvoni x 41 days (started 10/22/15). SVR4     Hoarseness 2/28/2015    Hypercholesteremia     Hypersplenism     Hypertension     No meds    Pain  management 12/10/2014    Portal hypertensive gastropathy     on 7/12 EGD    Thrombocytopenia     Type II or unspecified type diabetes mellitus with neurological manifestations, uncontrolled(250.62) 12/24/2013    Valvular heart disease     mild MR 12       Past Surgical History:   Past Surgical History:   Procedure Laterality Date    CATARACT EXTRACTION  1/10/13    left eye    CATARACT EXTRACTION      right eye    CHOLECYSTECTOMY      COLONOSCOPY      EYE SURGERY      Cataract surgery to right eye    KNEE ARTHROSCOPY W/ MENISCAL REPAIR      KNEE CARTILAGE SURGERY      left knee    KNEE SURGERY  12/2006    left    SKIN LESION EXCISION      TONSILLECTOMY      UPPER GASTROINTESTINAL ENDOSCOPY         Family History:   Family History   Problem Relation Age of Onset    Leukemia Mother     Cancer Mother      bone    Alcohol abuse Father     Cirrhosis Father      EtOH induced    Amblyopia Neg Hx     Blindness Neg Hx     Cataracts Neg Hx     Diabetes Neg Hx     Glaucoma Neg Hx     Hypertension Neg Hx     Macular degeneration Neg Hx     Retinal detachment Neg Hx     Strabismus Neg Hx     Stroke Neg Hx     Thyroid disease Neg Hx     Psoriasis Neg Hx     Lupus Neg Hx     Eczema Neg Hx     Acne Neg Hx     Melanoma Neg Hx        Medications:   Current Outpatient Prescriptions on File Prior to Visit   Medication Sig Dispense Refill    ACCU-CHEK VEENA PLUS METER Misc       ACCU-CHEK SOFTCLIX LANCETS [facility] USE TO TEST BLOOD SUGAR TWICE DAILY AS DIRECTED 100 each 3    ammonium lactate 12 % Crea Apply 1 application topically 2 (two) times daily as needed. 1 Bottle 6    blood sugar diagnostic Strp Accu-chek veena plus test strip. Test BG 3 x day 100 strip 12    carvedilol (COREG) 6.25 MG tablet TAKE 1 TABLET(6.25 MG) BY MOUTH TWICE DAILY 60 tablet 0    ciclopirox (PENLAC) 8 % Soln Apply topically nightly. 6.6 mL 11    diclofenac sodium (VOLTAREN) 1 % Gel Apply 2 g topically 2 (two) times daily.  "1 Tube 2    GLUCOSAMINE HCL/CHONDR CHISHOLM A NA (OSTEO BI-FLEX ORAL) Take 2 tablets by mouth once daily.      hydrocodone-acetaminophen 10-325mg (NORCO)  mg Tab Take by mouth every 6 (six) hours as needed for Pain.      insulin detemir (LEVEMIR FLEXTOUCH) 100 unit/mL (3 mL) SubQ InPn pen Inject 18 Units into the skin every evening. 1 Box 11    metFORMIN (GLUCOPHAGE) 1000 MG tablet TAKE 1 TABLET BY MOUTH TWICE DAILY WITH MEALS 60 tablet 0    nystatin (MYCOSTATIN) 100,000 unit/mL suspension TAKE 4ML BY MOUTH TWICE DAILY 60 mL 5    pen needle, diabetic (BD ULTRA-FINE KORINA PEN NEEDLES) 32 gauge x 5/32" Ndle 10 Units by Misc.(Non-Drug; Combo Route) route once daily at 6am. 30 each 11     No current facility-administered medications on file prior to visit.        Allergies:   Review of patient's allergies indicates:   Allergen Reactions    Adhesive tape-silicones Other (See Comments)     pulls skin off    Doxycycline      Dizzy. "Just didn't feel right".       Social History:   Social History     Social History    Marital status:      Spouse name: N/A    Number of children: 5    Years of education: N/A     Social History Main Topics    Smoking status: Former Smoker     Packs/day: 1.00     Years: 25.00     Quit date: 8/9/2000    Smokeless tobacco: Never Used    Alcohol use Yes      Comment: rarely    Drug use: No    Sexual activity: No     Other Topics Concern    Not on file     Social History Narrative    He is .  He has children.  He is currently retired.       PHYSICAL EXAMINATION:   General    There were no vitals filed for this visit.  Constitutional: Oriented to person, place, and time. No apparent distress. Appears well-developed and well-nourished. Pleasant.  HENT:   Head: Normocephalic and atraumatic.   Eyes: Right eye exhibits no discharge. Left eye exhibits no discharge. No scleral icterus.   Pulmonary/Chest: Effort normal. No respiratory distress.   Abdominal: There is no " guarding.   Neurological: Alert and oriented to person, place, and time.   Psychiatric: Behavior is normal.     Right Knee Exam     Tenderness   The patient is experiencing no tenderness.         Range of Motion   Extension: normal   Flexion: normal     Tests   Lachman:  Anterior - negative    Posterior - negative  Pivot Shift: negative  Patellar Apprehension: negative    Other   Erythema: absent  Scars: absent  Sensation: normal  Pulse: present  Other tests: no effusion present      Left Knee Exam     Tenderness   The patient is experiencing tenderness in the medial joint line.    Range of Motion   Extension: 0   Flexion: 130     Tests   Didier:  Medial - negative Lateral - negative  Lachman:  Anterior - negative    Posterior - negative  Varus: negative  Valgus: negative  Pivot Shift: negative  Patellar Apprehension: negative    Other   Erythema: absent  Sensation: normal  Pulse: present  Effusion: no effusion present          INSPECTION: There is no swelling, ecchymoses, erythema or gross deformity.  GAIT/DYNAMIC: antalgic    Imaging  X-ray of the left knee from 5/16/2017: Moderate to severe, tricompartmental left knee osteoarthritis which is most pronounced in the medial compartment and similar in severity when compared to the 11/14/15 study. No acute abnormality is seen. Bilateral bipartite patella, also present previously.    Data Reviewed: X-ray    Supportive Actions: Independent visualization of images or test specimens    ASSESSMENT:   1. Chronic pain of left knee    2. Primary osteoarthritis of left knee      PLAN:     1. Time was spent reviewing the above diagnosis in depth with Steve today, including acute management and rehabilitation. He is not a good surgical candidate due to medical co-morbidities. We will continue to avoid corticosteroid injections due to issues with hyperglycemia.  He would like to proceed with hyaluronic acid injections as this may provide some anti-inflammatory and pain  relieving effects and also promote intra-articular joint health.      2. Performed left knee Euflexxa injection, #1/3. See separate procedure note.    3. Return to clinic next week for #2/3 Euflexxa injection. If these injections are unsuccessful, we could consider repeat diagnostic genicular block.    The above note was completed, in part, with the aid of Dragon dictation software/hardware. Translation errors may be present.

## 2018-01-30 ENCOUNTER — OFFICE VISIT (OUTPATIENT)
Dept: FAMILY MEDICINE | Facility: CLINIC | Age: 73
End: 2018-01-30
Payer: MEDICARE

## 2018-01-30 ENCOUNTER — LAB VISIT (OUTPATIENT)
Dept: LAB | Facility: HOSPITAL | Age: 73
End: 2018-01-30
Attending: FAMILY MEDICINE
Payer: MEDICARE

## 2018-01-30 VITALS
BODY MASS INDEX: 29.7 KG/M2 | SYSTOLIC BLOOD PRESSURE: 124 MMHG | WEIGHT: 200.5 LBS | RESPIRATION RATE: 18 BRPM | HEART RATE: 61 BPM | TEMPERATURE: 99 F | DIASTOLIC BLOOD PRESSURE: 76 MMHG | HEIGHT: 69 IN

## 2018-01-30 DIAGNOSIS — E11.42 TYPE 2 DIABETES MELLITUS WITH DIABETIC POLYNEUROPATHY, WITHOUT LONG-TERM CURRENT USE OF INSULIN: ICD-10-CM

## 2018-01-30 DIAGNOSIS — D69.6 THROMBOCYTOPENIA: ICD-10-CM

## 2018-01-30 DIAGNOSIS — K74.60 CIRRHOSIS OF LIVER WITHOUT ASCITES, UNSPECIFIED HEPATIC CIRRHOSIS TYPE: ICD-10-CM

## 2018-01-30 DIAGNOSIS — R35.0 FREQUENCY OF URINATION: Primary | ICD-10-CM

## 2018-01-30 DIAGNOSIS — I50.32 CHRONIC DIASTOLIC CONGESTIVE HEART FAILURE: ICD-10-CM

## 2018-01-30 DIAGNOSIS — E11.42 TYPE 2 DIABETES MELLITUS WITH DIABETIC POLYNEUROPATHY, WITHOUT LONG-TERM CURRENT USE OF INSULIN: Primary | ICD-10-CM

## 2018-01-30 PROBLEM — J98.4 PNEUMONITIS: Status: RESOLVED | Noted: 2017-09-16 | Resolved: 2018-01-30

## 2018-01-30 LAB
BILIRUB SERPL-MCNC: NORMAL MG/DL
BLOOD URINE, POC: NORMAL
COLOR, POC UA: YELLOW
ESTIMATED AVG GLUCOSE: 235 MG/DL
GLUCOSE UR QL STRIP: 1000
HBA1C MFR BLD HPLC: 9.8 %
KETONES UR QL STRIP: NORMAL
LEUKOCYTE ESTERASE URINE, POC: NORMAL
NITRITE, POC UA: NORMAL
PH, POC UA: 5
PROTEIN, POC: NORMAL
SPECIFIC GRAVITY, POC UA: 1.02
UROBILINOGEN, POC UA: NORMAL

## 2018-01-30 PROCEDURE — 87086 URINE CULTURE/COLONY COUNT: CPT

## 2018-01-30 PROCEDURE — 99214 OFFICE O/P EST MOD 30 MIN: CPT | Mod: 25,S$GLB,, | Performed by: FAMILY MEDICINE

## 2018-01-30 PROCEDURE — 81002 URINALYSIS NONAUTO W/O SCOPE: CPT | Mod: S$GLB,,, | Performed by: FAMILY MEDICINE

## 2018-01-30 PROCEDURE — 99999 PR PBB SHADOW E&M-EST. PATIENT-LVL III: CPT | Mod: PBBFAC,,, | Performed by: FAMILY MEDICINE

## 2018-01-30 PROCEDURE — 83036 HEMOGLOBIN GLYCOSYLATED A1C: CPT

## 2018-01-30 PROCEDURE — G0008 ADMIN INFLUENZA VIRUS VAC: HCPCS | Mod: S$GLB,,, | Performed by: FAMILY MEDICINE

## 2018-01-30 PROCEDURE — 36415 COLL VENOUS BLD VENIPUNCTURE: CPT | Mod: PN

## 2018-01-30 PROCEDURE — 90662 IIV NO PRSV INCREASED AG IM: CPT | Mod: S$GLB,,, | Performed by: FAMILY MEDICINE

## 2018-01-30 PROCEDURE — 99499 UNLISTED E&M SERVICE: CPT | Mod: S$PBB,,, | Performed by: FAMILY MEDICINE

## 2018-01-30 PROCEDURE — 3008F BODY MASS INDEX DOCD: CPT | Mod: S$GLB,,, | Performed by: FAMILY MEDICINE

## 2018-01-30 PROCEDURE — 1125F AMNT PAIN NOTED PAIN PRSNT: CPT | Mod: S$GLB,,, | Performed by: FAMILY MEDICINE

## 2018-01-30 PROCEDURE — 1159F MED LIST DOCD IN RCRD: CPT | Mod: S$GLB,,, | Performed by: FAMILY MEDICINE

## 2018-01-30 NOTE — PROGRESS NOTES
Subjective:       Patient ID: Steve June Jr. is a 72 y.o. male.    Chief Complaint: Urinary Frequency (pt states urinating q 2 hours)    HPI   Patient here for sx review.  PMH sig for DM2 uncontrolled with neuropathy and circulatory issues, cirrhosis with thrombocytosis, chronic joint issues, abdominal hernia.   Not eligible for elective procedures for any of above due to thrombocytosis.    Currently, c/o urinary frequency q2 hours x several weeks. No dysuria, hematuria. Minor cramp in lower abdomen. Reports complete emptying.   Reports normal urination preceding, good stream, nighttime x 1. No PSA on file.   -300 currently. occ 400. appt with endo/[facility] today. Urine sx may correspond to increase in BS.    today.    Review of Systems   Constitutional: Negative for chills and fever.   Gastrointestinal: Negative for abdominal pain, diarrhea and nausea.   Genitourinary: Positive for frequency. Negative for decreased urine volume, difficulty urinating, dysuria, hematuria and urgency.   Neurological: Negative for dizziness and headaches.       Objective:      Physical Exam   Constitutional: He is oriented to person, place, and time. He appears well-developed and well-nourished. No distress.   HENT:   Head: Normocephalic and atraumatic.   Nose: Nose normal.   Eyes: Conjunctivae are normal. Right eye exhibits no discharge. Left eye exhibits no discharge. No scleral icterus.   Neck: Normal range of motion. Neck supple.   Cardiovascular: Normal rate and regular rhythm.    Pulmonary/Chest: Effort normal and breath sounds normal. No respiratory distress.   Abdominal: Soft.   Large abdominal hernia   Musculoskeletal: Normal range of motion. He exhibits no edema.   Neurological: He is alert and oriented to person, place, and time. No cranial nerve deficit.   Skin: Skin is warm and dry.   Psychiatric: He has a normal mood and affect. His behavior is normal.   Nursing note and vitals reviewed.      Frequency  of urination  -     POCT URINE DIPSTICK WITHOUT MICROSCOPE  Likely due to elev BS. Reviewed with pt need for improved glycemic control. Compliant with basal insulin (increased to 22 units at bedtime on his own), but admits to significant dietary excursions. Overdue for endocrine/diab f/u and appt scheduled for this.  Will send urine for cx to review.   Chronic diastolic congestive heart failure  Last echo in 5/2017 with improvement to diastolic dysfunction. Hyperdynamic/preserved EF.  Cirrhosis of liver without ascites, unspecified hepatic cirrhosis type  Resultant thrombocytopenia. Stable lfts.  Type 2 diabetes mellitus with diabetic polyneuropathy, without long-term current use of insulin  Uncontrolled. Established with endocrine. Overdue for f/u. Was given BS log for monitoring.  Thrombocytopenia  Stable.  Other orders  -     Influenza - High Dose (65+) (PF) (IM)

## 2018-01-31 ENCOUNTER — PATIENT MESSAGE (OUTPATIENT)
Dept: ENDOCRINOLOGY | Facility: CLINIC | Age: 73
End: 2018-01-31

## 2018-02-01 ENCOUNTER — TELEPHONE (OUTPATIENT)
Dept: PODIATRY | Facility: CLINIC | Age: 73
End: 2018-02-01

## 2018-02-01 LAB — BACTERIA UR CULT: NO GROWTH

## 2018-02-01 NOTE — TELEPHONE ENCOUNTER
----- Message from Maykel Mckinney sent at 2/1/2018  8:35 AM CST -----  Contact: [facility] need Doctors orders and chart notes fax to [phone] and questions please call back at [phone] ext [phone]

## 2018-02-05 ENCOUNTER — OFFICE VISIT (OUTPATIENT)
Dept: PHYSICAL MEDICINE AND REHAB | Facility: CLINIC | Age: 73
End: 2018-02-05
Payer: MEDICARE

## 2018-02-05 ENCOUNTER — OFFICE VISIT (OUTPATIENT)
Dept: ENDOCRINOLOGY | Facility: CLINIC | Age: 73
End: 2018-02-05
Payer: MEDICARE

## 2018-02-05 ENCOUNTER — OFFICE VISIT (OUTPATIENT)
Dept: PAIN MEDICINE | Facility: CLINIC | Age: 73
End: 2018-02-05
Payer: MEDICARE

## 2018-02-05 VITALS
DIASTOLIC BLOOD PRESSURE: 88 MMHG | SYSTOLIC BLOOD PRESSURE: 133 MMHG | WEIGHT: 200 LBS | HEART RATE: 74 BPM | HEIGHT: 69 IN | BODY MASS INDEX: 29.62 KG/M2

## 2018-02-05 VITALS
HEART RATE: 72 BPM | SYSTOLIC BLOOD PRESSURE: 118 MMHG | WEIGHT: 201.06 LBS | DIASTOLIC BLOOD PRESSURE: 74 MMHG | BODY MASS INDEX: 29.78 KG/M2 | HEIGHT: 69 IN

## 2018-02-05 VITALS — BODY MASS INDEX: 29.62 KG/M2 | WEIGHT: 200 LBS | HEIGHT: 69 IN

## 2018-02-05 DIAGNOSIS — K74.60 CIRRHOSIS OF LIVER WITHOUT ASCITES, UNSPECIFIED HEPATIC CIRRHOSIS TYPE: ICD-10-CM

## 2018-02-05 DIAGNOSIS — G89.29 CHRONIC PAIN OF LEFT KNEE: Primary | ICD-10-CM

## 2018-02-05 DIAGNOSIS — M17.12 PRIMARY OSTEOARTHRITIS OF LEFT KNEE: ICD-10-CM

## 2018-02-05 DIAGNOSIS — M25.562 CHRONIC PAIN OF LEFT KNEE: Primary | ICD-10-CM

## 2018-02-05 DIAGNOSIS — E11.51 TYPE 2 DIABETES MELLITUS WITH DIABETIC PERIPHERAL ANGIOPATHY WITHOUT GANGRENE, WITHOUT LONG-TERM CURRENT USE OF INSULIN: Primary | ICD-10-CM

## 2018-02-05 DIAGNOSIS — R80.9 MICROALBUMINURIA DUE TO TYPE 2 DIABETES MELLITUS: ICD-10-CM

## 2018-02-05 DIAGNOSIS — M17.10 PRIMARY OSTEOARTHRITIS OF KNEE, UNSPECIFIED LATERALITY: ICD-10-CM

## 2018-02-05 DIAGNOSIS — E11.29 MICROALBUMINURIA DUE TO TYPE 2 DIABETES MELLITUS: ICD-10-CM

## 2018-02-05 DIAGNOSIS — M47.892 OTHER SPONDYLOSIS, CERVICAL REGION: ICD-10-CM

## 2018-02-05 DIAGNOSIS — I10 ESSENTIAL HYPERTENSION: ICD-10-CM

## 2018-02-05 DIAGNOSIS — M50.30 DDD (DEGENERATIVE DISC DISEASE), CERVICAL: Primary | ICD-10-CM

## 2018-02-05 DIAGNOSIS — E11.42 TYPE 2 DIABETES MELLITUS WITH DIABETIC POLYNEUROPATHY, WITHOUT LONG-TERM CURRENT USE OF INSULIN: ICD-10-CM

## 2018-02-05 PROCEDURE — 99999 PR PBB SHADOW E&M-EST. PATIENT-LVL II: CPT | Mod: PBBFAC,,, | Performed by: PHYSICAL MEDICINE & REHABILITATION

## 2018-02-05 PROCEDURE — 1159F MED LIST DOCD IN RCRD: CPT | Mod: S$GLB,,, | Performed by: ANESTHESIOLOGY

## 2018-02-05 PROCEDURE — 99999 PR PBB SHADOW E&M-EST. PATIENT-LVL V: CPT | Mod: PBBFAC,,, | Performed by: NURSE PRACTITIONER

## 2018-02-05 PROCEDURE — 3008F BODY MASS INDEX DOCD: CPT | Mod: S$GLB,,, | Performed by: ANESTHESIOLOGY

## 2018-02-05 PROCEDURE — 99499 UNLISTED E&M SERVICE: CPT | Mod: S$GLB,,, | Performed by: PHYSICAL MEDICINE & REHABILITATION

## 2018-02-05 PROCEDURE — 1125F AMNT PAIN NOTED PAIN PRSNT: CPT | Mod: S$GLB,,, | Performed by: ANESTHESIOLOGY

## 2018-02-05 PROCEDURE — 99215 OFFICE O/P EST HI 40 MIN: CPT | Mod: S$GLB,,, | Performed by: NURSE PRACTITIONER

## 2018-02-05 PROCEDURE — 20611 DRAIN/INJ JOINT/BURSA W/US: CPT | Mod: LT,S$GLB,, | Performed by: PHYSICAL MEDICINE & REHABILITATION

## 2018-02-05 PROCEDURE — 99999 PR PBB SHADOW E&M-EST. PATIENT-LVL III: CPT | Mod: PBBFAC,,, | Performed by: ANESTHESIOLOGY

## 2018-02-05 PROCEDURE — 1126F AMNT PAIN NOTED NONE PRSNT: CPT | Mod: S$GLB,,, | Performed by: NURSE PRACTITIONER

## 2018-02-05 PROCEDURE — 99214 OFFICE O/P EST MOD 30 MIN: CPT | Mod: S$GLB,,, | Performed by: ANESTHESIOLOGY

## 2018-02-05 PROCEDURE — 3008F BODY MASS INDEX DOCD: CPT | Mod: S$GLB,,, | Performed by: NURSE PRACTITIONER

## 2018-02-05 PROCEDURE — 1159F MED LIST DOCD IN RCRD: CPT | Mod: S$GLB,,, | Performed by: NURSE PRACTITIONER

## 2018-02-05 PROCEDURE — 99499 UNLISTED E&M SERVICE: CPT | Mod: S$PBB,,, | Performed by: NURSE PRACTITIONER

## 2018-02-05 RX ORDER — OXYCODONE AND ACETAMINOPHEN 10; 325 MG/1; MG/1
1 TABLET ORAL EVERY 8 HOURS PRN
Qty: 90 TABLET | Refills: 0 | Status: SHIPPED | OUTPATIENT
Start: 2018-02-05 | End: 2018-02-26

## 2018-02-05 RX ORDER — LANCETS
EACH MISCELLANEOUS
Qty: 400 EACH | Refills: 3 | Status: SHIPPED | OUTPATIENT
Start: 2018-02-05 | End: 2019-04-24 | Stop reason: CLARIF

## 2018-02-05 RX ORDER — HYALURONATE SODIUM 20 MG/2 ML
20 SYRINGE (ML) INTRAARTICULAR
Status: DISCONTINUED | OUTPATIENT
Start: 2018-02-05 | End: 2018-02-05 | Stop reason: HOSPADM

## 2018-02-05 RX ORDER — METFORMIN HYDROCHLORIDE 1000 MG/1
1000 TABLET ORAL 2 TIMES DAILY WITH MEALS
Qty: 60 TABLET | Refills: 0 | Status: CANCELLED | OUTPATIENT
Start: 2018-02-05

## 2018-02-05 RX ORDER — HYDROCODONE BITARTRATE AND ACETAMINOPHEN 10; 325 MG/1; MG/1
1 TABLET ORAL EVERY 6 HOURS PRN
Qty: 120 TABLET | Refills: 0 | Status: SHIPPED | OUTPATIENT
Start: 2018-02-05 | End: 2018-02-05

## 2018-02-05 RX ORDER — HYDROCODONE BITARTRATE AND ACETAMINOPHEN 10; 325 MG/1; MG/1
1 TABLET ORAL EVERY 6 HOURS PRN
Qty: 120 TABLET | Refills: 0 | Status: SHIPPED | OUTPATIENT
Start: 2018-04-04 | End: 2018-02-05

## 2018-02-05 RX ORDER — PEN NEEDLE, DIABETIC 30 GX3/16"
NEEDLE, DISPOSABLE MISCELLANEOUS
Qty: 400 EACH | Refills: 3 | Status: SHIPPED | OUTPATIENT
Start: 2018-02-05 | End: 2019-04-24 | Stop reason: CLARIF

## 2018-02-05 RX ORDER — HYDROCODONE BITARTRATE AND ACETAMINOPHEN 10; 325 MG/1; MG/1
1 TABLET ORAL EVERY 6 HOURS PRN
Qty: 120 TABLET | Refills: 0 | Status: SHIPPED | OUTPATIENT
Start: 2018-03-06 | End: 2018-02-05

## 2018-02-05 RX ORDER — INSULIN ASPART 100 [IU]/ML
8 INJECTION, SOLUTION INTRAVENOUS; SUBCUTANEOUS
Qty: 2 BOX | Refills: 3 | Status: SHIPPED | OUTPATIENT
Start: 2018-02-05 | End: 2018-02-08 | Stop reason: SDUPTHER

## 2018-02-05 RX ADMIN — Medication 20 MG: at 10:02

## 2018-02-05 NOTE — PROGRESS NOTES
This note was completed with dictation software and grammatical errors may exist.    Referring Physician: No ref. provider found    PCP: Alie Womack MD      CC: neck and left shoulder pain; knee pain    Interval history:  Patient returns to our clinic.  He presents with worsening neck and left shoulder pain.  He had a fall in April 2017 and fell on his left shoulder.  Complains of left-sided neck and shoulder pain.   He has tried physical therapy which did not provide much benefit.  He is scheduled to follow up with Dr. Silva in the near future.  Injections performed have only given him shortlived relief.  He is undergoing visco supplementation injections for his left knee.  He has not noted much benefit.   He presents to us with worsening posterior neck pain.  He does have history of cervical DDD.  However, he continues to have low platelets and we are unable to provide any neuro axonal interventional procedures.  He takes Norco 10 mg every 6 hours as needed with mild to moderate benefit.  This is prescribed by his PCP, however, she will be going on maternity leave in the near future.   He had tried Percocet but states it causes too much grogginess, but does wish to try it again as he feels like the Norco has not been helpful recently.   Prior HPI:   Steve June Jr. is a 72 y.o. male referred to us for lower back and knee pain.  He has significant history of pancytopenia, cirrhosis.  He presents with constant aching, sharp pain in his lower back as well as his left knee.  Pain worsens sitting, standing, bending, walking.  Pain improves with rest.  X-rays performed of the lumbar spine as well as knee shows left knee osteoarthritis.  He has tried physical therapy with minimal benefit.  He currently takes Norco 10 mg every 6 hours as needed with mild to moderate benefit.  He is unable to have any procedures due to his thrombocytopenia.  He also has ventral hernia, but surgical attention is currently not  "recommended due to his thrombocytopenia.  His main concern today is wishing to decrease his opioid medications.  He states being very "foggy" with his current medications.  He denies any increased sedation.  He denies any weakness.  No bowel bladder changes.    ROS:  CONSTITUTIONAL: No fevers, chills, night sweats, wt. loss, appetite changes  SKIN: no rashes or itching  ENT: No headaches, head trauma, vision changes, or eye pain  LYMPH NODES: None noticed   CV: No chest pain, palpitations.   RESP: No shortness of breath, dyspnea on exertion, cough, wheezing, or hemoptysis  GI: No nausea, emesis, diarrhea, constipation, melena, hematochezia, pain.    : No dysuria, hematuria, urgency, or frequency   HEME: No easy bruising, bleeding problems  PSYCHIATRIC: No depression, anxiety, psychosis, hallucinations.  NEURO: No seizures, memory loss, dizziness or difficulty sleeping  MSK: + History of present illness      Past Medical History:   Diagnosis Date    Abnormal thyroid function test     Allergy     Seasonal    Anemia     Arthritis     Gaviria esophagus     Basal cell carcinoma     right forearm    Basal cell carcinoma 12/2011    lower post neck    Cancer     skin CA    Cataract     Cirrhosis     Diabetes mellitus     Diabetes mellitus, type 2     Encounter for blood transfusion     Esophageal varices in cirrhosis     grade II on 7/12 EGD    Gastritis     on 7/12 EGD    GERD (gastroesophageal reflux disease) 2/28/2015    Hard of hearing     Hiatal hernia     History of hepatitis C 8/10/2012    tx with harvoni x 41 days (started 10/22/15). SVR4     Hoarseness 2/28/2015    Hypercholesteremia     Hypersplenism     Hypertension     No meds    Pain management 12/10/2014    Portal hypertensive gastropathy     on 7/12 EGD    Thrombocytopenia     Type II or unspecified type diabetes mellitus with neurological manifestations, uncontrolled(250.62) 12/24/2013    Valvular heart disease     mild MR 12 " "    Past Surgical History:   Procedure Laterality Date    CATARACT EXTRACTION  1/10/13    left eye    CATARACT EXTRACTION      right eye    CHOLECYSTECTOMY      COLONOSCOPY      EYE SURGERY      Cataract surgery to right eye    KNEE ARTHROSCOPY W/ MENISCAL REPAIR      KNEE CARTILAGE SURGERY      left knee    KNEE SURGERY  12/2006    left    SKIN LESION EXCISION      TONSILLECTOMY      UPPER GASTROINTESTINAL ENDOSCOPY       Family History   Problem Relation Age of Onset    Leukemia Mother     Cancer Mother      bone    Alcohol abuse Father     Cirrhosis Father      EtOH induced    Amblyopia Neg Hx     Blindness Neg Hx     Cataracts Neg Hx     Diabetes Neg Hx     Glaucoma Neg Hx     Hypertension Neg Hx     Macular degeneration Neg Hx     Retinal detachment Neg Hx     Strabismus Neg Hx     Stroke Neg Hx     Thyroid disease Neg Hx     Psoriasis Neg Hx     Lupus Neg Hx     Eczema Neg Hx     Acne Neg Hx     Melanoma Neg Hx      Social History     Social History    Marital status:      Spouse name: N/A    Number of children: 5    Years of education: N/A     Social History Main Topics    Smoking status: Former Smoker     Packs/day: 1.00     Years: 25.00     Quit date: 8/9/2000    Smokeless tobacco: Never Used    Alcohol use Yes      Comment: rarely    Drug use: No    Sexual activity: No     Other Topics Concern    None     Social History Narrative    He is .  He has children.  He is currently retired.         Medications/Allergies: See med card    Vitals:    02/05/18 1107   BP: 133/88   Pulse: 74   Weight: 90.7 kg (200 lb)   Height: 5' 9" (1.753 m)   PainSc:   8   PainLoc: Neck         Physical exam:    GENERAL: A and O x3, the patient appears well groomed and is in no acute distress.  Skin: No rashes or obvious lesions  HEENT: normocephalic, atraumatic  CARDIOVASCULAR:  Palpable peripheral pulses  LUNGS: easy work of breathing  ABDOMEN: soft, nontender, + sizaeable " ventral hernia in epigastric region.    UPPER EXTREMITIES: Normal alignment, normal range of motion, no atrophy, no skin changes,  hair growth and nail growth normal and equal bilaterally. No swelling, no tenderness.    LOWER EXTREMITIES:  Normal alignment, normal range of motion, no atrophy, no skin changes,  hair growth and nail growth normal and equal bilaterally. No swelling, no tenderness.    LUMBAR SPINE  Lumbar spine: ROM is full with flexion extension and oblique extension with moderate increased pain.    Erasmo's test causes no increased pain on either side.    Supine straight leg raise is negative bilaterally.    Internal and external rotation of the hip causes no increased pain on either side.  Myofascial exam: No tenderness to palpation across lumbar paraspinous muscles.      MENTAL STATUS: normal orientation, speech, language, and fund of knowledge for social situation.  Emotional state appropriate.    CRANIAL NERVES:  II:  PERRL bilaterally,   III,IV,VI: EOMI.    V:  Facial sensation equal bilaterally  VII:  Facial motor function normal.  VIII:  Hearing equal to finger rub bilaterally  IX/X: Gag normal, palate symmetric  XI:  Shoulder shrug equal, head turn equal  XII:  Tongue midline without fasciculations      MOTOR: Tone and bulk: normal bilateral upper and lower Strength: normal   Delt Bi Tri WE WF     R 5 5 5 5 5 5   L 5 5 5 5 5 5     IP ADD ABD Quad TA Gas HAM  R 5 5 5 5 5 5 5  L 5 5 5 5 5 5 5    SENSATION: Light touch and pinprick intact bilaterally  REFLEXES: normal, symmetric, nonbrisk.  Toes down, no clonus. No hoffmans.  GAIT: normal rise, base, steps, and arm swing.        Imaging:  Xray L-spine 4/2013   Alignment is otherwise normal.  Vertebral body heights and disc space heights are relatively well maintained.  Vertebral end plate osteophytes are present anteriorly   at multiple levels.  The facet joints and posterior elements are unremarkable         Xray Knee 12/2013  Degenerative  change of the knees, left greater than right.    Assessment:  Patient referred for lower back and left knee pain  1. DDD (degenerative disc disease), cervical    2. Other spondylosis, cervical region    3. Primary osteoarthritis of knee, unspecified laterality          Plan:  - I have stressed the importance of physical activity and exercise to improve overall health.  Continue PT exercises learned at home.  - Any interventional procedures will be deferred due to his low platelet count.  Patient expressed understanding.  - Continue evaluation with PM&R, Dr. Silva  - Switch to percocet 10mg q8hrs as needed.  Hold for sedation. He will contact us in the next few weeks to see if this is more helpful than the hydrocodone.   - Follow-up 3 months

## 2018-02-05 NOTE — PROGRESS NOTES
CC: Mr. Steve June Jr. arrives today for management of Type 2 DM and review of chronic medical conditions, as listed in the Visit Diagnosis section of this encounter.       HPI: Mr. Steve June Jr. was diagnosed with Type 2 DM in ~ 2007. He was diagnosed based on lab work. Initial treatment consisted of metformin. Levemir added in 2017. He denies FH of DM. Denies hospitalization admissions due to DM. However, went to ER in 2017 for hyperglycemia following steroid injection.     Last seen by me in July 2017. Levemir dose was increased during this visit.    Diabetes control has deteriorated since last appointment. States he is feeling worse.     BG readings are checked 4x/day. Of note, bedtime readings are 2 hours post-prandial.             Hypoglycemia: No    Missing Insulin/PO medication doses: No    Exercise: Not currently, due to knee pain.     Dietary Habits: Eats 3 meals/day. Has issues with many foods, due to lack of teeth. Not snacking often but does eat prunes to help with constipation. Drinks 1/2 of Gatroade G2 daily.   B: grits, egg, cheese  L: roast beef sandwich  D: fried fish, asparagus     Last DM education appointment: Has previously declined DM education but is now interested.     Has cirrhosis and h/o Hep C - now stable after treatment with Harvoni. Last seen by DARVIN Cunha NP in 12/2017. Following every 6 months.       CURRENT DIABETIC MEDS: metformin 1000 mg BID, Levemir 22 units QHS  Uses pen  Glucometer type: Accu check Tanya    Previous DM treatments:  Glimepiride - nausea, hyperglycemia   Prandin - hyperglycemia    Last Eye Exam: 1/2018, no DR per patient. Plans to change to [facility] provider w next visit.   Last Podiatry Exam: 1/2018. Podiatrist ordered footwear.     REVIEW OF SYSTEMS  Constitutional: no c/o weakness, fatigue. + weight gain  Eyes: denies visual disturbances   Cardiac: no palpitations or chest pain.  Respiratory: no cough. + dyspnea that waxes and wanes, not  "accompanied by other symptoms.   GI: no c/o nausea. Denies h/o pancreatitis. C/o occasional abdominal cramping.   Skin: no lesions or rashes. + delayed wound healing.   Neuro: + numbness, pain in B feet  Endocrine: denies polydipsia. + polyphagia, polyuria.       Personally reviewed Past Medical, Surgical, Social History.    Vital Signs  /74   Pulse 72   Ht 5' 9" (1.753 m)   Wt 91.2 kg (201 lb 1 oz)   BMI 29.69 kg/m²     Personally reviewed the below labs:    Hemoglobin A1C   Date Value Ref Range Status   01/30/2018 9.8 (H) 4.0 - 5.6 % Final     Comment:     According to ADA guidelines, hemoglobin A1c <7.0% represents  optimal control in non-pregnant diabetic patients. Different  metrics may apply to specific patient populations.   Standards of Medical Care in Diabetes-2016.  For the purpose of screening for the presence of diabetes:  <5.7%     Consistent with the absence of diabetes  5.7-6.4%  Consistent with increasing risk for diabetes   (prediabetes)  >or=6.5%  Consistent with diabetes  Currently, no consensus exists for use of hemoglobin A1c  for diagnosis of diabetes for children.  This Hemoglobin A1c assay has significant interference with fetal   hemoglobin   (HbF). The results are invalid for patients with abnormal amounts of   HbF,   including those with known Hereditary Persistence   of Fetal Hemoglobin. Heterozygous hemoglobin variants (HbAS, HbAC,   HbAD, HbAE, HbA2) do not significantly interfere with this assay;   however, presence of multiple variants in a sample may impact the %   interference.     09/16/2017 6.4 (H) 4.0 - 5.6 % Final     Comment:     According to ADA guidelines, hemoglobin A1c <7.0% represents  optimal control in non-pregnant diabetic patients. Different  metrics may apply to specific patient populations.   Standards of Medical Care in Diabetes-2016.  For the purpose of screening for the presence of diabetes:  <5.7%     Consistent with the absence of diabetes  5.7-6.4%  " Consistent with increasing risk for diabetes   (prediabetes)  >or=6.5%  Consistent with diabetes  Currently, no consensus exists for use of hemoglobin A1c  for diagnosis of diabetes for children.  This Hemoglobin A1c assay has significant interference with fetal   hemoglobin   (HbF). The results are invalid for patients with abnormal amounts of   HbF,   including those with known Hereditary Persistence   of Fetal Hemoglobin. Heterozygous hemoglobin variants (HbAS, HbAC,   HbAD, HbAE, HbA2) do not significantly interfere with this assay;   however, presence of multiple variants in a sample may impact the %   interference.     07/06/2017 10.8 (H) 4.0 - 5.6 % Final     Comment:     According to ADA guidelines, hemoglobin A1c <7.0% represents  optimal control in non-pregnant diabetic patients. Different  metrics may apply to specific patient populations.   Standards of Medical Care in Diabetes-2016.  For the purpose of screening for the presence of diabetes:  <5.7%     Consistent with the absence of diabetes  5.7-6.4%  Consistent with increasing risk for diabetes   (prediabetes)  >or=6.5%  Consistent with diabetes  Currently, no consensus exists for use of hemoglobin A1c  for diagnosis of diabetes for children.  This Hemoglobin A1c assay has significant interference with fetal   hemoglobin   (HbF). The results are invalid for patients with abnormal amounts of   HbF,   including those with known Hereditary Persistence   of Fetal Hemoglobin. Heterozygous hemoglobin variants (HbAS, HbAC,   HbAD, HbAE, HbA2) do not significantly interfere with this assay;   however, presence of multiple variants in a sample may impact the %   interference.         Chemistry        Component Value Date/Time     11/30/2017 1028    K 4.6 11/30/2017 1028     11/30/2017 1028    CO2 28 11/30/2017 1028    BUN 17 11/30/2017 1028    CREATININE 0.9 11/30/2017 1028     (H) 11/30/2017 1028        Component Value Date/Time    CALCIUM  9.2 11/30/2017 1028    ALKPHOS 74 11/30/2017 1028    AST 19 11/30/2017 1028    ALT 13 11/30/2017 1028    BILITOT 1.4 (H) 11/30/2017 1028          Lab Results   Component Value Date    CHOL 151 07/06/2017    CHOL 158 12/17/2015    CHOL 145 08/13/2014     Lab Results   Component Value Date    HDL 57 07/06/2017    HDL 32 (L) 12/17/2015    HDL 42 08/13/2014     Lab Results   Component Value Date    LDLCALC 81.6 07/06/2017    LDLCALC 99.0 12/17/2015    LDLCALC 91.4 08/13/2014     Lab Results   Component Value Date    TRIG 62 07/06/2017    TRIG 135 12/17/2015    TRIG 58 08/13/2014     Lab Results   Component Value Date    CHOLHDL 37.7 07/06/2017    CHOLHDL 20.3 12/17/2015    CHOLHDL 29.0 08/13/2014       Lab Results   Component Value Date    MICALBCREAT 83.8 (H) 11/10/2016     Lab Results   Component Value Date    TSH 6.283 (H) 11/19/2015       CrCl cannot be calculated (Patient's most recent lab result is older than the maximum 7 days allowed.).    No results found for: [id number]      PHYSICAL EXAMINATION  Constitutional: Appears well, no distress. Disheveled appearance.   Neck: Supple, trachea midline; no thyromegaly or nodules.   Respiratory: CTA, even and unlabored.  Cardiovascular: RRR, no murmurs, no carotid bruits. DP pulses  2+ bilaterally; no edema.    Lymph: no cervical or supraclavicular lymphadenopathy  Skin: warm and dry; no lipohypertrophy, or acanthosis nigracans observed. + dermopathic skin changes noted to BLE.   Neuro: DTR 2+ BUE/2+BLE.  Feet: appropriate footwear.       Assessment/Plan  1. Type 2 diabetes mellitus with diabetic peripheral angiopathy without gangrene, without long-term current use of insulin  -- Worsening. A1c has increased. Glucoses are globally elevated in 200-400s. Needs prandial insulin.   -- Start Novolog 8 units AC. Discussed pen use/storage, proper timing, administration technique.   -- increase Levemir 24 units QHS for now until prandial excursions are under better control.    -- discontinue metformin. Doesn't seem to be effective   -- he has a h/o hematuria. Will avoid Actos.  -- check BG 4x/day     -- DM education for dietary education and to assist with food limitations, due to lack of teeth    -- Discussed diagnosis of DM, A1c goals, progression of disease, long term complications and tx options.  Advised patient to check BG before activities, such as driving or exercise.  -- Reviewed hypoglycemia management: treat with 1/2 glass of juice, 1/2 can regular coke, or 4 glucose tablets. Monitor and repeat treatment every 15 minutes until BG is >70 Then have a snack, which includes a complex carbohydrate and protein.   2. Type 2 diabetes mellitus with diabetic polyneuropathy, without long-term current use of insulin  -- obtain DM control    3. Microalbuminuria due to type 2 diabetes mellitus  -- obtain DM control  -- will order at next visit  -- consider adding ACE-i in the future if necessary   4. Essential hypertension  -- controlled, continue to monitor   5. Cirrhosis of the liver -- follows with hepatology       Total Time and Counselin minutes, >50% time spent counseling as noted above in #1 A/P.       FOLLOW UP  Follow-up in about 4 weeks (around 3/5/2018). for BG log review   Patient instructed to bring BG logs to each follow up   Patient encouraged to call for any BG/medication issues, concerns, or questions.      Orders Placed This Encounter   Procedures    Ambulatory Referral to Diabetes Education

## 2018-02-05 NOTE — PROCEDURES
Large Joint Aspiration/Injection  Date/Time: 2/5/2018 10:05 AM  Performed by: JENNIFER WEST  Authorized by: JENNIFER WEST     Consent Done?:  Yes (Verbal)  Indications:  Pain  Procedure site marked: Yes    Timeout: Prior to procedure the correct patient, procedure, and site was verified      Location:  Knee  Site:  L knee  Prep: Patient was prepped and draped in usual sterile fashion    Ultrasonic Guidance for needle placement: Yes  Images are saved and documented.  Needle size:  22 G  Approach: Needle in plane, lateral to medial.  Medications:  20 mg EUFLEXXA 10 mg/mL(mw 2.4 -3.6 million)  Patient tolerance:  Patient tolerated the procedure well with no immediate complications    Additional Comments: Ultrasound guidance was used for correct needle placement, the images were saved will be uploaded to EMR.

## 2018-02-05 NOTE — PROGRESS NOTES
[facility]    CHIEF COMPLAINT:   Chief Complaint   Patient presents with    Knee Pain     euflexxa 2/3 left knee     HISTORY OF PRESENT ILLNESS: Steve June Jr. is a 72 y.o. male who presents to me in follow-up for injection of Euflexxa to the left knee, 2/3.  He does note some reduction in pain following the first injection last week.    Review of Systems   Constitutional: Negative for fever.   HENT: Negative for drooling.    Eyes: Negative for discharge.   Respiratory: Negative for choking.    Cardiovascular: Negative for chest pain.   Genitourinary: Negative for flank pain.   Skin: Negative for wound.   Allergic/Immunologic: Negative for immunocompromised state.   Neurological: Negative for tremors and syncope.   Psychiatric/Behavioral: Negative for behavioral problems.     Past Medical History:   Past Medical History:   Diagnosis Date    Abnormal thyroid function test     Allergy     Seasonal    Anemia     Arthritis     Gaviria esophagus     Basal cell carcinoma     right forearm    Basal cell carcinoma 12/2011    lower post neck    Cancer     skin CA    Cataract     Cirrhosis     Diabetes mellitus     Diabetes mellitus, type 2     Encounter for blood transfusion     Esophageal varices in cirrhosis     grade II on 7/12 EGD    Gastritis     on 7/12 EGD    GERD (gastroesophageal reflux disease) 2/28/2015    Hard of hearing     Hiatal hernia     History of hepatitis C 8/10/2012    tx with harvoni x 41 days (started 10/22/15). SVR4     Hoarseness 2/28/2015    Hypercholesteremia     Hypersplenism     Hypertension     No meds    Pain management 12/10/2014    Portal hypertensive gastropathy     on 7/12 EGD    Thrombocytopenia     Type II or unspecified type diabetes mellitus with neurological manifestations, uncontrolled(250.62) 12/24/2013    Valvular heart disease     mild MR 12       Past Surgical History:   Past Surgical History:   Procedure Laterality Date     CATARACT EXTRACTION  1/10/13    left eye    CATARACT EXTRACTION      right eye    CHOLECYSTECTOMY      COLONOSCOPY      EYE SURGERY      Cataract surgery to right eye    KNEE ARTHROSCOPY W/ MENISCAL REPAIR      KNEE CARTILAGE SURGERY      left knee    KNEE SURGERY  12/2006    left    SKIN LESION EXCISION      TONSILLECTOMY      UPPER GASTROINTESTINAL ENDOSCOPY         Family History:   Family History   Problem Relation Age of Onset    Leukemia Mother     Cancer Mother      bone    Alcohol abuse Father     Cirrhosis Father      EtOH induced    Amblyopia Neg Hx     Blindness Neg Hx     Cataracts Neg Hx     Diabetes Neg Hx     Glaucoma Neg Hx     Hypertension Neg Hx     Macular degeneration Neg Hx     Retinal detachment Neg Hx     Strabismus Neg Hx     Stroke Neg Hx     Thyroid disease Neg Hx     Psoriasis Neg Hx     Lupus Neg Hx     Eczema Neg Hx     Acne Neg Hx     Melanoma Neg Hx        Medications:   Current Outpatient Prescriptions on File Prior to Visit   Medication Sig Dispense Refill    ACCU-CHEK VEENA PLUS METER Misc       ACCU-CHEK SOFTCLIX LANCETS Misc USE TO TEST BLOOD SUGAR TWICE DAILY AS DIRECTED 100 each 3    ammonium lactate 12 % Crea Apply 1 application topically 2 (two) times daily as needed. 1 Bottle 6    blood sugar diagnostic Strp Accu-chek veena plus test strip. Test BG 3 x day 100 strip 12    carvedilol (COREG) 6.25 MG tablet TAKE 1 TABLET(6.25 MG) BY MOUTH TWICE DAILY 60 tablet 0    ciclopirox (PENLAC) 8 % Soln Apply topically nightly. 6.6 mL 11    diclofenac sodium (VOLTAREN) 1 % Gel Apply 2 g topically 2 (two) times daily. 1 Tube 2    GLUCOSAMINE HCL/CHONDR CHISHOLM A NA (OSTEO BI-FLEX ORAL) Take 2 tablets by mouth once daily.      hydrocodone-acetaminophen 10-325mg (NORCO)  mg Tab Take by mouth every 6 (six) hours as needed for Pain.      insulin detemir (LEVEMIR FLEXTOUCH) 100 unit/mL (3 mL) SubQ InPn pen Inject 18 Units into the skin every evening. 1  "Box 11    metFORMIN (GLUCOPHAGE) 1000 MG tablet TAKE 1 TABLET BY MOUTH TWICE DAILY WITH MEALS 60 tablet 0    nystatin (MYCOSTATIN) 100,000 unit/mL suspension TAKE 4ML BY MOUTH TWICE DAILY 60 mL 5    pen needle, diabetic (BD ULTRA-FINE KORINA PEN NEEDLES) 32 gauge x 5/32" Ndle 10 Units by Misc.(Non-Drug; Combo Route) route once daily at 6am. 30 each 11     No current facility-administered medications on file prior to visit.        Allergies:   Review of patient's allergies indicates:   Allergen Reactions    Adhesive tape-silicones Other (See Comments)     pulls skin off    Doxycycline      Dizzy. "Just didn't feel right".       Social History:   Social History     Social History    Marital status:      Spouse name: N/A    Number of children: 5    Years of education: N/A     Social History Main Topics    Smoking status: Former Smoker     Packs/day: 1.00     Years: 25.00     Quit date: 8/9/2000    Smokeless tobacco: Never Used    Alcohol use Yes      Comment: rarely    Drug use: No    Sexual activity: No     Other Topics Concern    None     Social History Narrative    He is .  He has children.  He is currently retired.     PHYSICAL EXAMINATION:   General    Vitals:    02/05/18 0921   Weight: 90.7 kg (200 lb)   Height: 5' 9" (1.753 m)     Constitutional: Oriented to person, place, and time. No apparent distress. Appears well-developed and well-nourished. Pleasant.  HENT:   Head: Normocephalic and atraumatic.   Eyes: Right eye exhibits no discharge. Left eye exhibits no discharge. No scleral icterus.   Pulmonary/Chest: Effort normal. No respiratory distress.   Abdominal: There is no guarding.   Neurological: Alert and oriented to person, place, and time.   Psychiatric: Behavior is normal.     Right Knee Exam     Tenderness   The patient is experiencing no tenderness.         Range of Motion   Extension: normal   Flexion: normal     Tests   Lachman:  Anterior - negative    Posterior - " negative  Pivot Shift: negative  Patellar Apprehension: negative    Other   Erythema: absent  Scars: absent  Sensation: normal  Pulse: present  Other tests: no effusion present      Left Knee Exam     Tenderness   The patient is experiencing tenderness in the medial joint line.    Range of Motion   Extension: 0   Flexion: 130     Tests   Didier:  Medial - negative Lateral - negative  Lachman:  Anterior - negative    Posterior - negative  Varus: negative  Valgus: negative  Pivot Shift: negative  Patellar Apprehension: negative    Other   Erythema: absent  Sensation: normal  Pulse: present  Effusion: no effusion present        INSPECTION: There is no swelling, ecchymoses, erythema or gross deformity of the L knee.  GAIT/DYNAMIC: antalgic    Imaging  X-ray of the left knee from 5/16/2017: Moderate to severe, tricompartmental left knee osteoarthritis which is most pronounced in the medial compartment and similar in severity when compared to the 11/14/15 study. No acute abnormality is seen. Bilateral bipartite patella, also present previously.    Data Reviewed: X-ray    Supportive Actions: Independent visualization of images or test specimens    ASSESSMENT:   1. Chronic pain of left knee    2. Primary osteoarthritis of left knee      PLAN:     1. Performed left knee Euflexxa injection, #2/3. See separate procedure note.    2. Return to clinic next week for #3/3 Euflexxa injection.    The above note was completed, in part, with the aid of Dragon dictation software/hardware. Translation errors may be present.

## 2018-02-05 NOTE — PATIENT INSTRUCTIONS
Hypoglycemia (Low Blood Sugar)     Fast-acting sugar includes a cup of nonfat milk.     Too little sugar (glucose) in your blood is called hypoglycemia or low blood sugar. Low blood sugar usually means anything lower than 70 mg/dL. Talk with your healthcare provider about your target range and what level is too low for you. Diabetes itself doesnt cause low blood sugar. But some of the treatments for diabetes, such as pills or insulin, may raise your risk for it. Low blood sugar may cause you to pass out or have a seizure. So always treat low blood sugar right away, but don't overeat.  Special note: Always carry a source of fast-acting sugar and a snack in case of hypoglycemia.   What you may notice  If you have low blood sugar, you may have one or more of these symptoms:  · Shakiness or dizziness  · Cold, clammy skin or sweating  · Feelings of hunger  · Headache  · Nervousness  · A hard, fast heartbeat  · Weakness  · Confusion or irritability  · Blurred vision  · Having nightmares or waking up confused or sweating  · Numbness or tingling in the lips or tongue  What you should do  Here are tips to follow if you have hypoglycemia:   · First check your blood sugar. If it is too low (out of your target range), eat or drink 15 to 20 grams of fast-acting sugar. This may be 3 to 4 glucose tablets, 4 ounces (half a cup) of fruit juice or regular (nondiet) soda, 8 ounces (1 cup) of fat-free milk, or 1 tablespoon of honey. Dont take more than this, or your blood sugar may go too high.  · Wait 15 minutes. Then recheck your blood sugar if you can.  · If your blood sugar is still too low, repeat the steps above and check your blood sugar again. If your blood sugar still has not returned to your target range, contact your healthcare provider or seek emergency care.  · Once your blood sugar returns to target range, eat a snack or meal.  Preventing low blood sugar  Things you can do include the following:   · If your condition  needs a strict treatment plan, eat your meals and snacks at the same times each day. Dont skip meals!  · If your treatment plan lets you change when you eat and what you eat, learn how to change the time and dose of your rapid-acting insulin to match this.   · Ask your healthcare provider if it is safe for you to drink alcohol. Never drink on an empty stomach.  · Take your medicine at the prescribed times.  · Always carry a source of fast-acting sugar and a snack when youre away from home.  Other things to do  Additional tips include the following:  · Carry a medical ID card, a compact USB drive, or wear a medical alert bracelet or necklace. It should say that you have diabetes. It should also say what to do if you pass out or have a seizure.  · Make sure your family, friends, and coworkers know the signs of low blood sugar. Tell them what to do if your blood sugar falls very low and you cant treat yourself.  · Keep a glucagon emergency kit handy. Be sure your family, friends, and coworkers know how and when to use it. Check it regularly and replace the glucagon before it expires.  · Talk with your health care team about other things you can do to prevent low blood sugar.     If you have unexplained hypoglycemia or hypoglycemia several times, call your healthcare provider.   Date Last Reviewed: 5/1/2016  © 0336-0548 [employer]. [address] [place] [address], [place] [address]. All rights reserved. This information is not intended as a substitute for professional medical care. Always follow your healthcare professional's instructions.

## 2018-02-06 ENCOUNTER — TELEPHONE (OUTPATIENT)
Dept: PODIATRY | Facility: CLINIC | Age: 73
End: 2018-02-06

## 2018-02-07 ENCOUNTER — TELEPHONE (OUTPATIENT)
Dept: PODIATRY | Facility: CLINIC | Age: 73
End: 2018-02-07

## 2018-02-07 ENCOUNTER — TELEPHONE (OUTPATIENT)
Dept: FAMILY MEDICINE | Facility: CLINIC | Age: 73
End: 2018-02-07

## 2018-02-07 NOTE — TELEPHONE ENCOUNTER
----- Message from Jacqui Roman sent at 2/7/2018 11:23 AM CST -----  Contact: Brinda with [facility] at [phone] ext [phone]  Brinda with [facility] at [phone] ext [phone], calling about the patient's physician order which needed to be signed an faxed back to [phone]. Patient is waiting on diabetic shoes. Please advise. Thank.

## 2018-02-07 NOTE — TELEPHONE ENCOUNTER
Brinda informed that the paperwork received must be completed by the patients PCP - or Dr treating the patient for his diabetes.  She verbalized understanding.

## 2018-02-07 NOTE — TELEPHONE ENCOUNTER
Spoke with Brinda.  She faxed additional paperwork for Dr Harvey to complete.  Will fax once completed.

## 2018-02-08 DIAGNOSIS — E11.51 TYPE 2 DIABETES MELLITUS WITH DIABETIC PERIPHERAL ANGIOPATHY WITHOUT GANGRENE, WITHOUT LONG-TERM CURRENT USE OF INSULIN: ICD-10-CM

## 2018-02-08 RX ORDER — INSULIN ASPART 100 [IU]/ML
8 INJECTION, SOLUTION INTRAVENOUS; SUBCUTANEOUS
Qty: 2 BOX | Refills: 3 | Status: SHIPPED | OUTPATIENT
Start: 2018-02-08 | End: 2018-02-09 | Stop reason: SDUPTHER

## 2018-02-08 NOTE — TELEPHONE ENCOUNTER
----- Message from Nico Barlow sent at 2/8/2018  9:07 AM CST -----  Contact: pt  Pt is requesting a refill on his insulin aspart (NOVOLOG FLEXPEN) 100 unit/mL InPn pen and insulin detemir (LEVEMIR FLEXTOUCH) 100 unit/mL (3 mL) SubQ InPn pen  Call Back#[phone]  Thanks    [facility] [id number] [address] AT [facility]  [address] [place] [address]  Phone: [phone] Fax: [phone]

## 2018-02-09 ENCOUNTER — OFFICE VISIT (OUTPATIENT)
Dept: FAMILY MEDICINE | Facility: CLINIC | Age: 73
End: 2018-02-09
Payer: MEDICARE

## 2018-02-09 ENCOUNTER — TELEPHONE (OUTPATIENT)
Dept: FAMILY MEDICINE | Facility: CLINIC | Age: 73
End: 2018-02-09

## 2018-02-09 VITALS
SYSTOLIC BLOOD PRESSURE: 136 MMHG | TEMPERATURE: 98 F | BODY MASS INDEX: 29.84 KG/M2 | HEART RATE: 67 BPM | DIASTOLIC BLOOD PRESSURE: 80 MMHG | OXYGEN SATURATION: 98 % | WEIGHT: 201.5 LBS | HEIGHT: 69 IN

## 2018-02-09 DIAGNOSIS — E11.51 TYPE 2 DIABETES MELLITUS WITH DIABETIC PERIPHERAL ANGIOPATHY WITHOUT GANGRENE, WITHOUT LONG-TERM CURRENT USE OF INSULIN: ICD-10-CM

## 2018-02-09 DIAGNOSIS — K74.60 CIRRHOSIS OF LIVER WITHOUT ASCITES, UNSPECIFIED HEPATIC CIRRHOSIS TYPE: ICD-10-CM

## 2018-02-09 DIAGNOSIS — I77.6 VASCULITIS: ICD-10-CM

## 2018-02-09 DIAGNOSIS — J02.9 SORE THROAT: Primary | ICD-10-CM

## 2018-02-09 DIAGNOSIS — D61.818 PANCYTOPENIA: ICD-10-CM

## 2018-02-09 LAB
CTP QC/QA: YES
S PYO RRNA THROAT QL PROBE: NEGATIVE

## 2018-02-09 PROCEDURE — 87880 STREP A ASSAY W/OPTIC: CPT | Mod: QW,S$GLB,, | Performed by: NURSE PRACTITIONER

## 2018-02-09 PROCEDURE — 99214 OFFICE O/P EST MOD 30 MIN: CPT | Mod: S$GLB,,, | Performed by: NURSE PRACTITIONER

## 2018-02-09 PROCEDURE — 99999 PR PBB SHADOW E&M-EST. PATIENT-LVL IV: CPT | Mod: PBBFAC,,, | Performed by: NURSE PRACTITIONER

## 2018-02-09 PROCEDURE — 1125F AMNT PAIN NOTED PAIN PRSNT: CPT | Mod: S$GLB,,, | Performed by: NURSE PRACTITIONER

## 2018-02-09 PROCEDURE — 3008F BODY MASS INDEX DOCD: CPT | Mod: S$GLB,,, | Performed by: NURSE PRACTITIONER

## 2018-02-09 PROCEDURE — 99499 UNLISTED E&M SERVICE: CPT | Mod: S$PBB,,, | Performed by: NURSE PRACTITIONER

## 2018-02-09 PROCEDURE — 1159F MED LIST DOCD IN RCRD: CPT | Mod: S$GLB,,, | Performed by: NURSE PRACTITIONER

## 2018-02-09 RX ORDER — AMOXICILLIN 500 MG/1
500 TABLET, FILM COATED ORAL EVERY 12 HOURS
Qty: 20 TABLET | Refills: 0 | Status: SHIPPED | OUTPATIENT
Start: 2018-02-09 | End: 2018-02-19

## 2018-02-09 RX ORDER — INSULIN ASPART 100 [IU]/ML
8 INJECTION, SOLUTION INTRAVENOUS; SUBCUTANEOUS
Qty: 2 BOX | Refills: 3 | Status: SHIPPED | OUTPATIENT
Start: 2018-02-09 | End: 2018-03-09 | Stop reason: SDUPTHER

## 2018-02-09 NOTE — PATIENT INSTRUCTIONS
Pharyngitis: Strep (Presumed)    You have pharyngitis (sore throat). The cause is thought to be the streptococcus, or strep, bacterium. Strep throat infection can cause throat pain that is worse when swallowing, aching all over, headache, and fever. The infection may be spread by coughing, kissing, or touching others after touching your mouth or nose. Antibiotic medications are given to treat the infection.  Home care  · Rest at home. Drink plenty of fluids to avoid dehydration.  · No work or school for the first 2 days of taking the antibiotics. After this time, you will not be contagious. You can then return to work or school if you are feeling better.   · The antibiotic medication must be taken for the full 10 days, even if you feel better. This is very important to ensure the infection is treated. It is also important to prevent drug-resistant organisms from developing. If you were given an antibiotic shot, no more antibiotics are needed.  · You may use acetaminophen or ibuprofen to control pain or fever, unless another medicine was prescribed for this. If you have chronic liver or kidney disease or ever had a stomach ulcer or GI bleeding, talk with your doctor before using these medicines.  · Throat lozenges or a throat-numbing sprays can help reduce throat pain. Gargling with warm salt water can also help. Dissolve 1/2 teaspoon of salt in 1 8 ounce glass of warm water.   · Avoid salty or spicy foods, which can irritate the throat.  Follow-up care  Follow up with your healthcare provider or our staff if you are not improving over the next week.  When to seek medical advice  Call your healthcare provider right away if any of these occur:  · Fever as directed by your doctor.   · New or worsening ear pain, sinus pain, or headache  · Painful lumps in the back of neck  · Stiff neck  · Lymph nodes are getting larger  · Inability to swallow liquids, excessive drooling, or inability to open mouth wide due to throat  pain  · Signs of dehydration (very dark urine or no urine, sunken eyes, dizziness)  · Trouble breathing or noisy breathing  · Muffled voice  · New rash  Date Last Reviewed: 4/13/2015  © 2146-6874 [employer]. [address] [place] [address], [place] [address]. All rights reserved. This information is not intended as a substitute for professional medical care. Always follow your healthcare professional's instructions.

## 2018-02-09 NOTE — TELEPHONE ENCOUNTER
----- Message from Jason Graham sent at 2/9/2018  8:28 AM CST -----  Contact: Lili/Wife  Lili called in and stated she left a message earlier and wants to know why no one has called the attached patient () back about an appt today 2/9/18?  Patient has a sore throat.  Lili's call back number is [phone] patient or Lili's [phone]

## 2018-02-09 NOTE — PROGRESS NOTES
Subjective:       Patient ID: Setve June Jr. is a 72 y.o. male.    Chief Complaint: Sore throat, rash on legs    Sore Throat    This is a new problem. The current episode started yesterday. The problem has been gradually worsening. There has been no fever. Associated symptoms include swollen glands and trouble swallowing. Pertinent negatives include no abdominal pain, congestion, coughing, diarrhea, drooling, ear discharge, ear pain, headaches, hoarse voice, plugged ear sensation, neck pain, shortness of breath, stridor or vomiting. He has had exposure to strep. He has had no exposure to mono.     Vitals:    02/09/18 1240   BP: 136/80   Pulse: 67   Temp: 98.2 °F (36.8 °C)     Review of Systems   Constitutional: Negative.  Negative for fatigue and fever.   HENT: Positive for sore throat and trouble swallowing. Negative for congestion, drooling, ear discharge, ear pain and hoarse voice.    Eyes: Negative.    Respiratory: Negative.  Negative for cough, shortness of breath and stridor.    Cardiovascular: Negative.  Negative for chest pain.   Gastrointestinal: Negative.  Negative for abdominal pain, diarrhea, nausea and vomiting.   Endocrine: Negative.    Genitourinary: Negative.  Negative for dysuria and hematuria.   Musculoskeletal: Negative.  Negative for neck pain.   Skin: Positive for rash. Negative for color change.   Allergic/Immunologic: Negative.    Neurological: Negative.  Negative for numbness and headaches.   Hematological: Negative.    Psychiatric/Behavioral: Negative.        Past Medical History:   Diagnosis Date    Abnormal thyroid function test     Allergy     Seasonal    Anemia     Arthritis     Gaviria esophagus     Basal cell carcinoma     right forearm    Basal cell carcinoma 12/2011    lower post neck    Cancer     skin CA    Cataract     Cirrhosis     Diabetes mellitus     Diabetes mellitus, type 2     Encounter for blood transfusion     Esophageal varices in cirrhosis     grade  II on 7/12 EGD    Gastritis     on 7/12 EGD    GERD (gastroesophageal reflux disease) 2/28/2015    Hard of hearing     Hiatal hernia     History of hepatitis C 8/10/2012    tx with harvoni x 41 days (started 10/22/15). SVR4     Hoarseness 2/28/2015    Hypercholesteremia     Hypersplenism     Hypertension     No meds    Pain management 12/10/2014    Portal hypertensive gastropathy     on 7/12 EGD    Thrombocytopenia     Type II or unspecified type diabetes mellitus with neurological manifestations, uncontrolled(250.62) 12/24/2013    Valvular heart disease     mild MR 12     Objective:      Physical Exam   Constitutional: He is oriented to person, place, and time. He appears well-developed and well-nourished.   HENT:   Head: Normocephalic and atraumatic.   Right Ear: Hearing, tympanic membrane and ear canal normal.   Left Ear: Hearing, tympanic membrane and ear canal normal.   Nose: Nose normal. Right sinus exhibits no maxillary sinus tenderness and no frontal sinus tenderness. Left sinus exhibits no maxillary sinus tenderness and no frontal sinus tenderness.   Mouth/Throat: Uvula is midline and mucous membranes are normal. Posterior oropharyngeal erythema present. Tonsils are 1+ on the right. Tonsils are 1+ on the left.   Eyes: Conjunctivae and EOM are normal. Pupils are equal, round, and reactive to light.   Neck: Normal range of motion. Neck supple.   Cardiovascular: Normal rate, regular rhythm, normal heart sounds and intact distal pulses.  Exam reveals no friction rub.    No murmur heard.  Pulmonary/Chest: Effort normal and breath sounds normal. No respiratory distress. He has no wheezes. He has no rales.   Abdominal: Soft. Bowel sounds are normal.   Musculoskeletal: Normal range of motion.   Neurological: He is alert and oriented to person, place, and time.   Skin: Skin is warm and dry.   Psychiatric: He has a normal mood and affect. His behavior is normal.   Nursing note and vitals reviewed.           Assessment:       1. Sore throat    2. Vasculitis    3. Pancytopenia    4. Cirrhosis of liver without ascites, unspecified hepatic cirrhosis type    5. Type 2 diabetes mellitus with diabetic peripheral angiopathy without gangrene, without long-term current use of insulin        Plan:       Sore throat  -     POCT RAPID STREP A  -     amoxicillin (AMOXIL) 500 MG Tab; Take 1 tablet (500 mg total) by mouth every 12 (twelve) hours.  Dispense: 20 tablet; Refill: 0    Vasculitis  -     POCT RAPID STREP A  -     amoxicillin (AMOXIL) 500 MG Tab; Take 1 tablet (500 mg total) by mouth every 12 (twelve) hours.  Dispense: 20 tablet; Refill: 0  In light of vasculitis and sore throat and redness to back of throat   Will cover with abx     Pancytopenia  -     POCT RAPID STREP A  -     amoxicillin (AMOXIL) 500 MG Tab; Take 1 tablet (500 mg total) by mouth every 12 (twelve) hours.  Dispense: 20 tablet; Refill: 0    Cirrhosis of liver without ascites, unspecified hepatic cirrhosis type  Sees hepatology     Type 2 diabetes mellitus with diabetic peripheral angiopathy without gangrene, without long-term current use of insulin  On meds         discussed to call if not better

## 2018-02-09 NOTE — TELEPHONE ENCOUNTER
----- Message from RT Ángel sent at 2/9/2018  7:04 AM CST -----  Contact: pt    pt , requesting an appt to be worked in today for real bad sore throat / legs are hurting with vein issues, thanks.

## 2018-02-16 ENCOUNTER — TELEPHONE (OUTPATIENT)
Dept: FAMILY MEDICINE | Facility: CLINIC | Age: 73
End: 2018-02-16

## 2018-02-16 NOTE — TELEPHONE ENCOUNTER
----- Message from Jessica Turpin sent at 2/15/2018 10:53 AM CST -----  Contact: Diabetes Management   Diabetes Management is needing the Physicians's order form to be filled out and signed and sent back to Diabetes Management     Please fax to Diabetes Management  [phone]

## 2018-02-16 NOTE — TELEPHONE ENCOUNTER
Recd request for [facility]abetic [facility] and Supplies for Dr Womack to complete. Will review with her when she returns on 02/19/2018.

## 2018-02-19 ENCOUNTER — OFFICE VISIT (OUTPATIENT)
Dept: PHYSICAL MEDICINE AND REHAB | Facility: CLINIC | Age: 73
End: 2018-02-19
Payer: MEDICARE

## 2018-02-19 DIAGNOSIS — M25.562 CHRONIC PAIN OF LEFT KNEE: Primary | ICD-10-CM

## 2018-02-19 DIAGNOSIS — M17.12 PRIMARY OSTEOARTHRITIS OF LEFT KNEE: ICD-10-CM

## 2018-02-19 DIAGNOSIS — G89.29 CHRONIC PAIN OF LEFT KNEE: Primary | ICD-10-CM

## 2018-02-19 PROCEDURE — 99999 PR PBB SHADOW E&M-EST. PATIENT-LVL I: CPT | Mod: PBBFAC,,, | Performed by: PHYSICAL MEDICINE & REHABILITATION

## 2018-02-19 PROCEDURE — 20611 DRAIN/INJ JOINT/BURSA W/US: CPT | Mod: S$GLB,,, | Performed by: PHYSICAL MEDICINE & REHABILITATION

## 2018-02-19 PROCEDURE — 99499 UNLISTED E&M SERVICE: CPT | Mod: S$GLB,,, | Performed by: PHYSICAL MEDICINE & REHABILITATION

## 2018-02-19 RX ORDER — HYALURONATE SODIUM 20 MG/2 ML
20 SYRINGE (ML) INTRAARTICULAR
Status: DISCONTINUED | OUTPATIENT
Start: 2018-02-19 | End: 2018-02-19 | Stop reason: HOSPADM

## 2018-02-19 RX ADMIN — Medication 20 MG: at 10:02

## 2018-02-19 NOTE — PROGRESS NOTES
[facility]    CHIEF COMPLAINT:   Chief Complaint   Patient presents with    Injections     euflexxa 3/3 left knee     HISTORY OF PRESENT ILLNESS: Steve June Jr. is a 72 y.o. male who presents to me in follow-up for injection of Euflexxa to the left knee, 3/3.  He does note some reduction in pain following the first injection last week.    Review of Systems   Constitutional: Negative for fever.   HENT: Negative for drooling.    Eyes: Negative for discharge.   Respiratory: Negative for choking.    Cardiovascular: Negative for chest pain.   Genitourinary: Negative for flank pain.   Skin: Negative for wound.   Allergic/Immunologic: Negative for immunocompromised state.   Neurological: Negative for tremors and syncope.   Psychiatric/Behavioral: Negative for behavioral problems.     Past Medical History:   Past Medical History:   Diagnosis Date    Abnormal thyroid function test     Allergy     Seasonal    Anemia     Arthritis     Gaviria esophagus     Basal cell carcinoma     right forearm    Basal cell carcinoma 12/2011    lower post neck    Cancer     skin CA    Cataract     Cirrhosis     Diabetes mellitus     Diabetes mellitus, type 2     Encounter for blood transfusion     Esophageal varices in cirrhosis     grade II on 7/12 EGD    Gastritis     on 7/12 EGD    GERD (gastroesophageal reflux disease) 2/28/2015    Hard of hearing     Hiatal hernia     History of hepatitis C 8/10/2012    tx with harvoni x 41 days (started 10/22/15). SVR4     Hoarseness 2/28/2015    Hypercholesteremia     Hypersplenism     Hypertension     No meds    Pain management 12/10/2014    Portal hypertensive gastropathy     on 7/12 EGD    Thrombocytopenia     Type II or unspecified type diabetes mellitus with neurological manifestations, uncontrolled(250.62) 12/24/2013    Valvular heart disease     mild MR 12       Past Surgical History:   Past Surgical History:   Procedure Laterality Date     CATARACT EXTRACTION  1/10/13    left eye    CATARACT EXTRACTION      right eye    CHOLECYSTECTOMY      COLONOSCOPY      EYE SURGERY      Cataract surgery to right eye    KNEE ARTHROSCOPY W/ MENISCAL REPAIR      KNEE CARTILAGE SURGERY      left knee    KNEE SURGERY  12/2006    left    SKIN LESION EXCISION      TONSILLECTOMY      UPPER GASTROINTESTINAL ENDOSCOPY         Family History:   Family History   Problem Relation Age of Onset    Leukemia Mother     Cancer Mother      bone    Alcohol abuse Father     Cirrhosis Father      EtOH induced    Amblyopia Neg Hx     Blindness Neg Hx     Cataracts Neg Hx     Diabetes Neg Hx     Glaucoma Neg Hx     Hypertension Neg Hx     Macular degeneration Neg Hx     Retinal detachment Neg Hx     Strabismus Neg Hx     Stroke Neg Hx     Thyroid disease Neg Hx     Psoriasis Neg Hx     Lupus Neg Hx     Eczema Neg Hx     Acne Neg Hx     Melanoma Neg Hx        Medications:   Current Outpatient Prescriptions on File Prior to Visit   Medication Sig Dispense Refill    ACCU-CHEK VEENA PLUS METER Misc 1 Device by Misc.(Non-Drug; Combo Route) route 4 (four) times daily. 1 each 0    ammonium lactate 12 % Crea Apply 1 application topically 2 (two) times daily as needed. 1 Bottle 6    amoxicillin (AMOXIL) 500 MG Tab Take 1 tablet (500 mg total) by mouth every 12 (twelve) hours. 20 tablet 0    blood sugar diagnostic Strp Accu-chek veena plus test strip. Test blood sugar 4 times daily 400 strip 3    carvedilol (COREG) 6.25 MG tablet TAKE 1 TABLET(6.25 MG) BY MOUTH TWICE DAILY 60 tablet 0    ciclopirox (PENLAC) 8 % Soln Apply topically nightly. 6.6 mL 11    diclofenac sodium (VOLTAREN) 1 % Gel Apply 2 g topically 2 (two) times daily. 1 Tube 2    GLUCOSAMINE HCL/CHONDR CHISHOLM A NA (OSTEO BI-FLEX ORAL) Take 2 tablets by mouth once daily.      insulin aspart (NOVOLOG FLEXPEN) 100 unit/mL InPn pen Inject 8 Units into the skin 3 (three) times daily with meals. 2 Box 3     "insulin detemir (LEVEMIR FLEXTOUCH) 100 unit/mL (3 mL) SubQ InPn pen Inject 24 Units into the skin every evening. 2 Box 3    lancets (ACCU-CHEK SOFTCLIX LANCETS) Misc Test blood sugar 4 times daily 400 each 3    oxyCODONE-acetaminophen (PERCOCET)  mg per tablet Take 1 tablet by mouth every 8 (eight) hours as needed for Pain. 90 tablet 0    pen needle, diabetic (BD ULTRA-FINE KORINA PEN NEEDLES) 32 gauge x 5/32" Ndle Use 4 needles daily with insulin injecitons 400 each 3     No current facility-administered medications on file prior to visit.        Allergies:   Review of patient's allergies indicates:   Allergen Reactions    Adhesive tape-silicones Other (See Comments)     pulls skin off    Doxycycline      Dizzy. "Just didn't feel right".       Social History:   Social History     Social History    Marital status:      Spouse name: N/A    Number of children: 5    Years of education: N/A     Social History Main Topics    Smoking status: Former Smoker     Packs/day: 1.00     Years: 25.00     Quit date: 8/9/2000    Smokeless tobacco: Never Used    Alcohol use Yes      Comment: rarely    Drug use: No    Sexual activity: No     Other Topics Concern    Not on file     Social History Narrative    He is .  He has children.  He is currently retired.     PHYSICAL EXAMINATION:   General  VSS  Constitutional: Oriented to person, place, and time. No apparent distress. Appears well-developed and well-nourished. Pleasant.  HENT:   Head: Normocephalic and atraumatic.   Eyes: Right eye exhibits no discharge. Left eye exhibits no discharge. No scleral icterus.   Pulmonary/Chest: Effort normal. No respiratory distress.   Abdominal: There is no guarding.   Neurological: Alert and oriented to person, place, and time.   Psychiatric: Behavior is normal.     Right Knee Exam     Tenderness   The patient is experiencing no tenderness.         Range of Motion   Extension: normal   Flexion: normal     Tests "   Lachman:  Anterior - negative    Posterior - negative  Pivot Shift: negative  Patellar Apprehension: negative    Other   Erythema: absent  Scars: absent  Sensation: normal  Pulse: present  Other tests: no effusion present      Left Knee Exam     Tenderness   The patient is experiencing tenderness in the medial joint line.    Range of Motion   Extension: 0   Flexion: 130     Tests   Didier:  Medial - negative Lateral - negative  Lachman:  Anterior - negative    Posterior - negative  Varus: negative  Valgus: negative  Pivot Shift: negative  Patellar Apprehension: negative    Other   Erythema: absent  Sensation: normal  Pulse: present  Effusion: no effusion present        INSPECTION: There is no swelling, ecchymoses, erythema or gross deformity of the L knee.  GAIT/DYNAMIC: antalgic    Imaging  X-ray of the left knee from 5/16/2017: Moderate to severe, tricompartmental left knee osteoarthritis which is most pronounced in the medial compartment and similar in severity when compared to the 11/14/15 study. No acute abnormality is seen. Bilateral bipartite patella, also present previously.    Data Reviewed: X-ray    Supportive Actions: Independent visualization of images or test specimens    ASSESSMENT:   1. Chronic pain of left knee    2. Primary osteoarthritis of left knee      PLAN:     1. Performed left knee Euflexxa injection, #3/3. See separate procedure note.    2. Return to clinic prn.    The above note was completed, in part, with the aid of Dragon dictation software/hardware. Translation errors may be present.

## 2018-02-19 NOTE — PROCEDURES
Large Joint Aspiration/Injection  Date/Time: 2/19/2018 10:21 AM  Performed by: JENNIFER WEST  Authorized by: JENNIFER WEST     Consent Done?:  Yes (Verbal)  Indications:  Pain  Procedure site marked: Yes    Timeout: Prior to procedure the correct patient, procedure, and site was verified      Location:  Knee  Site:  L knee  Prep: Patient was prepped and draped in usual sterile fashion    Ultrasonic Guidance for needle placement: Yes  Images are saved and documented.  Needle size:  22 G  Approach: needle in plane, lateral to medial.  Medications:  20 mg EUFLEXXA 10 mg/mL(mw 2.4 -3.6 million)  Patient tolerance:  Patient tolerated the procedure well with no immediate complications    Additional Comments: Ultrasound guidance was used for correct needle placement, the images were saved will be uploaded to EMR.

## 2018-02-20 ENCOUNTER — TELEPHONE (OUTPATIENT)
Dept: FAMILY MEDICINE | Facility: CLINIC | Age: 73
End: 2018-02-20

## 2018-02-20 NOTE — TELEPHONE ENCOUNTER
Spoke to pt and advised that Dr Womack will review form and sign if agrees. Pt verbalized understanding.

## 2018-02-20 NOTE — TELEPHONE ENCOUNTER
----- Message from Rebeca Parry sent at 2/20/2018  9:46 AM CST -----  Contact: Steve  Patient has been trying since 1/9/18 to get order signed off for diabetic shoes as has Rx from Dr Duke for shoes and inserts. Patient is asking for this matter to be taken care of today as he is told by one doctor to call this doctor, etc, and ends up back at point started from and just wants this resolved. States if can not do just please let know. Please call [phone] let know sent to [facility], phone [phone], fax [phone]. Thanks!

## 2018-02-21 ENCOUNTER — TELEPHONE (OUTPATIENT)
Dept: PHYSICAL MEDICINE AND REHAB | Facility: CLINIC | Age: 73
End: 2018-02-21

## 2018-02-21 NOTE — TELEPHONE ENCOUNTER
----- Message from Rolf Silva MD sent at 2/16/2018  1:06 PM CST -----  Contact: Sly  We can proceed with peer to peer.    ----- Message -----  From: Sudha Merino LPN  Sent: 2/15/2018  11:08 AM  To: Rolf Silva MD    Is this medically necessary? Do you want to proceed with peer to peer?  ----- Message -----  From: Irma Walton  Sent: 2/14/2018   1:21 PM  To: [facility] calling regarding order for knee brace has been denied as medical necessity,calling to offer a peer review....[phone] ext [phone]

## 2018-02-22 ENCOUNTER — TELEPHONE (OUTPATIENT)
Dept: FAMILY MEDICINE | Facility: CLINIC | Age: 73
End: 2018-02-22

## 2018-02-22 ENCOUNTER — TELEPHONE (OUTPATIENT)
Dept: PAIN MEDICINE | Facility: CLINIC | Age: 73
End: 2018-02-22

## 2018-02-22 NOTE — TELEPHONE ENCOUNTER
----- Message from Anne-Marie Moulton sent at 2/22/2018 10:15 AM CST -----  Contact: Brinda  [facility]abetic Management and supplies called regarding a  received chart note from patient's Podiatrist. Need physician orders completed from Dr. Womack. Please fax [phone] and contact [phone] ext [phone]

## 2018-02-22 NOTE — TELEPHONE ENCOUNTER
Chart notes faxed, pt aware we have completed form and sent in chart notes as requested. He expressed understanding and appreciation

## 2018-02-22 NOTE — TELEPHONE ENCOUNTER
----- Message from Radha Magana sent at 2/21/2018  3:52 PM CST -----  Contact: Patient came to registration desk  He would like the Percocet increase to 4 times a day instead of the initial dosage. Dr. Mario and he did talk about this.  Patient is aware that Dr. Mario is out this week and will not return until Monday 2/26/18. He said he is good with medication until that time.

## 2018-02-22 NOTE — TELEPHONE ENCOUNTER
----- Message from Maykel Mckinney sent at 2/22/2018 10:22 AM CST -----  Contact: self   Patient want to speak with a nurse regarding diabetic shoes rx please call back at [phone] (home) Patient trying to get his shoes since January 9th

## 2018-02-26 RX ORDER — OXYCODONE AND ACETAMINOPHEN 10; 325 MG/1; MG/1
1 TABLET ORAL EVERY 6 HOURS PRN
Qty: 120 TABLET | Refills: 0 | Status: SHIPPED | OUTPATIENT
Start: 2018-02-26 | End: 2018-03-26 | Stop reason: SDUPTHER

## 2018-03-07 ENCOUNTER — OFFICE VISIT (OUTPATIENT)
Dept: PHYSICAL MEDICINE AND REHAB | Facility: CLINIC | Age: 73
End: 2018-03-07
Payer: MEDICARE

## 2018-03-07 VITALS
SYSTOLIC BLOOD PRESSURE: 147 MMHG | BODY MASS INDEX: 29.77 KG/M2 | WEIGHT: 201 LBS | DIASTOLIC BLOOD PRESSURE: 82 MMHG | HEART RATE: 70 BPM | HEIGHT: 69 IN

## 2018-03-07 DIAGNOSIS — G89.29 CHRONIC PAIN OF LEFT KNEE: Primary | ICD-10-CM

## 2018-03-07 DIAGNOSIS — M25.562 CHRONIC PAIN OF LEFT KNEE: Primary | ICD-10-CM

## 2018-03-07 DIAGNOSIS — S80.02XA CONTUSION OF LEFT KNEE, INITIAL ENCOUNTER: ICD-10-CM

## 2018-03-07 PROCEDURE — 99499 UNLISTED E&M SERVICE: CPT | Mod: S$PBB,,, | Performed by: PHYSICAL MEDICINE & REHABILITATION

## 2018-03-07 PROCEDURE — 3077F SYST BP >= 140 MM HG: CPT | Mod: S$GLB,,, | Performed by: PHYSICAL MEDICINE & REHABILITATION

## 2018-03-07 PROCEDURE — 99213 OFFICE O/P EST LOW 20 MIN: CPT | Mod: S$GLB,,, | Performed by: PHYSICAL MEDICINE & REHABILITATION

## 2018-03-07 PROCEDURE — 99999 PR PBB SHADOW E&M-EST. PATIENT-LVL III: CPT | Mod: PBBFAC,,, | Performed by: PHYSICAL MEDICINE & REHABILITATION

## 2018-03-07 PROCEDURE — 3079F DIAST BP 80-89 MM HG: CPT | Mod: S$GLB,,, | Performed by: PHYSICAL MEDICINE & REHABILITATION

## 2018-03-07 NOTE — PROGRESS NOTES
[facility]    CHIEF COMPLAINT:   Chief Complaint   Patient presents with    Knee Pain     left knee pain     HISTORY OF PRESENT ILLNESS: Steve June Jr. is a 72 y.o. male who presents to me in follow-up for evaluation of left knee pain. He was last seen on 2/19/18 for the final injection of the Euflexxa series (#3/3) to his left knee. He reports that he was having good benefit from the injection, until he suffered an injury while in a power scooter at [facility] where he drove himself into a shelving unit and had direct contact to his left anterior knee. He reports that he suffered lacerations and had increased swelling and pain to the left knee since. He has only been taking percocet and using ice/heat for his pain relief which has only provided minimal relief. He has received diagnostic nerve block of the geniculate branches of the left knee on 12/15/17 without significant pain relief. He describes intermittent achy pain to anteromedial aspect of left knee which is worse with prolonged standing and walking.  He is not a surgical candidate secondary to his medical co-morbidities. The patient is concerned that he fractured his knee cap with the recent injury. He is also hoping to receive a brace for his left knee as he describes having instability of the left knee. He does have a history of a fall in 5/2017 when he knee gave out. He denies the knee locking up on him, but does have repeat episodes of the left knee giving out and causing him to feel imbalanced.     Review of Systems   Constitutional: Negative for fever.   HENT: Negative for drooling.    Eyes: Negative for discharge.   Respiratory: Negative for choking.    Cardiovascular: Negative for chest pain.   Genitourinary: Negative for flank pain.   Skin: Negative for wound.   Allergic/Immunologic: Negative for immunocompromised state.   Neurological: Negative for tremors and syncope.   Psychiatric/Behavioral: Negative for behavioral  problems.     Past Medical History:   Past Medical History:   Diagnosis Date    Abnormal thyroid function test     Allergy     Seasonal    Anemia     Arthritis     Gaviria esophagus     Basal cell carcinoma     right forearm    Basal cell carcinoma 12/2011    lower post neck    Cancer     skin CA    Cataract     Cirrhosis     Diabetes mellitus     Diabetes mellitus, type 2     Encounter for blood transfusion     Esophageal varices in cirrhosis     grade II on 7/12 EGD    Gastritis     on 7/12 EGD    GERD (gastroesophageal reflux disease) 2/28/2015    Hard of hearing     Hiatal hernia     History of hepatitis C 8/10/2012    tx with harvoni x 41 days (started 10/22/15). SVR4     Hoarseness 2/28/2015    Hypercholesteremia     Hypersplenism     Hypertension     No meds    Pain management 12/10/2014    Portal hypertensive gastropathy     on 7/12 EGD    Thrombocytopenia     Type II or unspecified type diabetes mellitus with neurological manifestations, uncontrolled(250.62) 12/24/2013    Valvular heart disease     mild MR 12       Past Surgical History:   Past Surgical History:   Procedure Laterality Date    CATARACT EXTRACTION  1/10/13    left eye    CATARACT EXTRACTION      right eye    CHOLECYSTECTOMY      COLONOSCOPY      EYE SURGERY      Cataract surgery to right eye    KNEE ARTHROSCOPY W/ MENISCAL REPAIR      KNEE CARTILAGE SURGERY      left knee    KNEE SURGERY  12/2006    left    SKIN LESION EXCISION      TONSILLECTOMY      UPPER GASTROINTESTINAL ENDOSCOPY         Family History:   Family History   Problem Relation Age of Onset    Leukemia Mother     Cancer Mother      bone    Alcohol abuse Father     Cirrhosis Father      EtOH induced    Amblyopia Neg Hx     Blindness Neg Hx     Cataracts Neg Hx     Diabetes Neg Hx     Glaucoma Neg Hx     Hypertension Neg Hx     Macular degeneration Neg Hx     Retinal detachment Neg Hx     Strabismus Neg Hx     Stroke Neg Hx   "   Thyroid disease Neg Hx     Psoriasis Neg Hx     Lupus Neg Hx     Eczema Neg Hx     Acne Neg Hx     Melanoma Neg Hx        Medications:   Current Outpatient Prescriptions on File Prior to Visit   Medication Sig Dispense Refill    ACCU-CHEK VEENA PLUS METER Misc 1 Device by Misc.(Non-Drug; Combo Route) route 4 (four) times daily. 1 each 0    ammonium lactate 12 % Crea Apply 1 application topically 2 (two) times daily as needed. 1 Bottle 6    blood sugar diagnostic Strp Accu-chek veena plus test strip. Test blood sugar 4 times daily 400 strip 3    carvedilol (COREG) 6.25 MG tablet TAKE 1 TABLET(6.25 MG) BY MOUTH TWICE DAILY 60 tablet 0    ciclopirox (PENLAC) 8 % Soln Apply topically nightly. 6.6 mL 11    diclofenac sodium (VOLTAREN) 1 % Gel Apply 2 g topically 2 (two) times daily. 1 Tube 2    GLUCOSAMINE HCL/CHONDR CHISHOLM A NA (OSTEO BI-FLEX ORAL) Take 2 tablets by mouth once daily.      insulin aspart (NOVOLOG FLEXPEN) 100 unit/mL InPn pen Inject 8 Units into the skin 3 (three) times daily with meals. 2 Box 3    insulin detemir (LEVEMIR FLEXTOUCH) 100 unit/mL (3 mL) SubQ InPn pen Inject 24 Units into the skin every evening. 2 Box 3    lancets (ACCU-CHEK SOFTCLIX LANCETS) Misc Test blood sugar 4 times daily 400 each 3    oxyCODONE-acetaminophen (PERCOCET)  mg per tablet Take 1 tablet by mouth every 6 (six) hours as needed for Pain. 120 tablet 0    pen needle, diabetic (BD ULTRA-FINE KORINA PEN NEEDLES) 32 gauge x 5/32" Ndle Use 4 needles daily with insulin injecitons 400 each 3     No current facility-administered medications on file prior to visit.        Allergies:   Review of patient's allergies indicates:   Allergen Reactions    Adhesive tape-silicones Other (See Comments)     pulls skin off    Doxycycline      Dizzy. "Just didn't feel right".       Social History:   Social History     Social History    Marital status:      Spouse name: N/A    Number of children: 5    Years of " "education: N/A     Social History Main Topics    Smoking status: Former Smoker     Packs/day: 1.00     Years: 25.00     Quit date: 8/9/2000    Smokeless tobacco: Never Used    Alcohol use Yes      Comment: rarely    Drug use: No    Sexual activity: No     Other Topics Concern    None     Social History Narrative    He is .  He has children.  He is currently retired.     PHYSICAL EXAMINATION:   General    Vitals:    03/07/18 1554   BP: (!) 147/82   Pulse: 70   Weight: 91.2 kg (201 lb)   Height: 5' 9" (1.753 m)     Constitutional: Oriented to person, place, and time. No apparent distress. Appears well-developed and well-nourished. Pleasant.  HENT:   Head: Normocephalic and atraumatic.   Eyes: Right eye exhibits no discharge. Left eye exhibits no discharge. No scleral icterus.   Pulmonary/Chest: Effort normal. No respiratory distress.   Abdominal: There is no guarding.   Neurological: Alert and oriented to person, place, and time.   Psychiatric: Behavior is normal.     Right Knee Exam     Tenderness   The patient is experiencing no tenderness.         Range of Motion   Extension: normal   Flexion: normal     Tests   Lachman:  Anterior - negative    Posterior - negative  Pivot Shift: negative  Patellar Apprehension: negative    Other   Erythema: absent  Scars: absent  Sensation: normal  Pulse: present  Other tests: no effusion present      Left Knee Exam     Tenderness   The patient is experiencing tenderness in the medial joint line, lateral joint line, pes anserinus and patella.    Range of Motion   Extension: 0   Flexion: 130     Tests   Didier:  Medial - negative Lateral - negative  Lachman:  Anterior - negative    Posterior - negative  Varus: negative  Valgus: negative  Pivot Shift: negative  Patellar Apprehension: negative    Other   Erythema: absent  Sensation: normal  Pulse: present  Effusion: no effusion present        INSPECTION: There is no swelling, but there is some ecchymoses about the " anterior knee, no erythema or gross deformity.  GAIT/DYNAMIC: antalgic without any assistive device    Imaging  X-ray of the left knee from 5/16/2017: Moderate to severe, tricompartmental left knee osteoarthritis which is most pronounced in the medial compartment and similar in severity when compared to the 11/14/15 study. No acute abnormality is seen. Bilateral bipartite patella, also present previously.    Data Reviewed: X-ray    Supportive Actions: Independent visualization of images or test specimens    ASSESSMENT:   1. Chronic pain of left knee      Encounter Diagnoses   Name Primary?    Chronic pain of left knee Yes    Contusion of left knee, initial encounter      PLAN:     1. Time was spent reviewing the above diagnosis in depth with Steve today, including acute management and rehabilitation. He is not a good surgical candidate due to medical co-morbidities.     2. Unfortunately, we cannot provide any interventional procedures today as he recently had the Euflexxa series and cannot tolerate steroids due to his uncontrolled diabetes. We explained to Steve that his pain is still coming from the primary osteoarthritis of the left knee and is intra-articular in nature. We visualized the left knee joint under ultrasound today in clinic, and there was no evidence of further damage from his recent injury. There was a small effusion, but not enough warranting an aspiration. Since he continues with knee instability and the knee keeps giving out, we will work to have a knee brace provided to him. May consider repeating the Euflexxa injection series in the future if appropriate. Also discussed other topical medications to trial, but he did not appear interested as previous topical medications were not worth the cost to him.     3. Return to clinic on as needed basis.     The above note was completed, in part, with the aid of Dragon dictation software/hardware. Translation errors may be present.

## 2018-03-09 ENCOUNTER — OFFICE VISIT (OUTPATIENT)
Dept: ENDOCRINOLOGY | Facility: CLINIC | Age: 73
End: 2018-03-09
Payer: MEDICARE

## 2018-03-09 VITALS
WEIGHT: 201.25 LBS | BODY MASS INDEX: 29.81 KG/M2 | SYSTOLIC BLOOD PRESSURE: 128 MMHG | DIASTOLIC BLOOD PRESSURE: 72 MMHG | HEIGHT: 69 IN | HEART RATE: 64 BPM

## 2018-03-09 DIAGNOSIS — E11.51 TYPE 2 DIABETES MELLITUS WITH DIABETIC PERIPHERAL ANGIOPATHY WITHOUT GANGRENE, WITHOUT LONG-TERM CURRENT USE OF INSULIN: Primary | ICD-10-CM

## 2018-03-09 DIAGNOSIS — E11.29 MICROALBUMINURIA DUE TO TYPE 2 DIABETES MELLITUS: ICD-10-CM

## 2018-03-09 DIAGNOSIS — E11.42 TYPE 2 DIABETES MELLITUS WITH DIABETIC POLYNEUROPATHY, WITHOUT LONG-TERM CURRENT USE OF INSULIN: ICD-10-CM

## 2018-03-09 DIAGNOSIS — Z51.81 MEDICATION MONITORING ENCOUNTER: ICD-10-CM

## 2018-03-09 DIAGNOSIS — R80.9 MICROALBUMINURIA DUE TO TYPE 2 DIABETES MELLITUS: ICD-10-CM

## 2018-03-09 PROCEDURE — 99214 OFFICE O/P EST MOD 30 MIN: CPT | Mod: S$GLB,,, | Performed by: NURSE PRACTITIONER

## 2018-03-09 PROCEDURE — 99999 PR PBB SHADOW E&M-EST. PATIENT-LVL IV: CPT | Mod: PBBFAC,,, | Performed by: NURSE PRACTITIONER

## 2018-03-09 PROCEDURE — 3078F DIAST BP <80 MM HG: CPT | Mod: S$GLB,,, | Performed by: NURSE PRACTITIONER

## 2018-03-09 PROCEDURE — 99499 UNLISTED E&M SERVICE: CPT | Mod: S$PBB,,, | Performed by: NURSE PRACTITIONER

## 2018-03-09 PROCEDURE — 3074F SYST BP LT 130 MM HG: CPT | Mod: S$GLB,,, | Performed by: NURSE PRACTITIONER

## 2018-03-09 RX ORDER — INSULIN ASPART 100 [IU]/ML
10 INJECTION, SOLUTION INTRAVENOUS; SUBCUTANEOUS
Qty: 2 BOX | Refills: 3
Start: 2018-03-09 | End: 2019-04-03

## 2018-03-09 NOTE — PATIENT INSTRUCTIONS
Relion brand insulins are $25 per vial. Please let me know if you would like this sent to your local [facility] to replace the Novolog.

## 2018-03-09 NOTE — PROGRESS NOTES
CC: Mr. Steve June Jr. arrives today for management of Type 2 DM and review of chronic medical conditions, as listed in the Visit Diagnosis section of this encounter.       HPI: Mr. Steve uJne Jr. was diagnosed with Type 2 DM in ~ 2007. He was diagnosed based on lab work. Initial treatment consisted of metformin. Levemir added in 2017. He denies FH of DM. Denies hospitalization admissions due to DM. However, went to ER in 2017 for hyperglycemia following steroid injection.     Last seen by me 4 weeks ago. Novolog was initiated at this time and metformin was discontinued.     He presents today for BG log review.     He states that Novolog is expensive and he has also developed welts at injection site, which causes him to sometimes skip his doses.  However, he is using suprapubic area and buttocks as his primary injection sites.     He states that he is upset about the cost and doesn't like the idea of giving 4 injections/day.     BG readings are checked 1-4x/day.               Hypoglycemia: No    Missing Insulin/PO medication doses: Yes - misses Novolog. Also administering 30 min before eating.     Dietary Habits: Eats 3 meals/day. Has issues with many foods, due to lack of teeth. Rare snacking.     Follows every 6 months with hepatology for cirrhosis and h/o Hep C. Past tx with Martine.      CURRENT DIABETIC MEDS: Levemir 24 units QHS, Novolog 10 units AC  Uses pen  Glucometer type: Accu check Tanya    Previous DM treatments:  Glimepiride - nausea, hyperglycemia   Prandin - hyperglycemia  Metformin - not effective    Last Eye Exam: 1/2018, no DR per patient.   Last Podiatry Exam: 1/2018. Podiatrist ordered footwear.     REVIEW OF SYSTEMS  Constitutional: no c/o weakness, fatigue, weight loss  Eyes: denies visual disturbances   Cardiac: no palpitations or chest pain.  Respiratory: no cough, dyspnea  GI: no c/o abdominal pain, nausea. Denies h/o pancreatitis.   Skin: no rashes. S/o red lesions at certain  "injection sites.   Neuro: + numbness, pain in B feet  Endocrine: denies polydipsia, polyphagia, polyuria.       Personally reviewed Past Medical, Surgical, Social History.    Vital Signs  /72   Pulse 64   Ht 5' 9" (1.753 m)   Wt 91.3 kg (201 lb 4.5 oz)   BMI 29.72 kg/m²     Personally reviewed the below labs:    Hemoglobin A1C   Date Value Ref Range Status   01/30/2018 9.8 (H) 4.0 - 5.6 % Final     Comment:     According to ADA guidelines, hemoglobin A1c <7.0% represents  optimal control in non-pregnant diabetic patients. Different  metrics may apply to specific patient populations.   Standards of Medical Care in Diabetes-2016.  For the purpose of screening for the presence of diabetes:  <5.7%     Consistent with the absence of diabetes  5.7-6.4%  Consistent with increasing risk for diabetes   (prediabetes)  >or=6.5%  Consistent with diabetes  Currently, no consensus exists for use of hemoglobin A1c  for diagnosis of diabetes for children.  This Hemoglobin A1c assay has significant interference with fetal   hemoglobin   (HbF). The results are invalid for patients with abnormal amounts of   HbF,   including those with known Hereditary Persistence   of Fetal Hemoglobin. Heterozygous hemoglobin variants (HbAS, HbAC,   HbAD, HbAE, HbA2) do not significantly interfere with this assay;   however, presence of multiple variants in a sample may impact the %   interference.     09/16/2017 6.4 (H) 4.0 - 5.6 % Final     Comment:     According to ADA guidelines, hemoglobin A1c <7.0% represents  optimal control in non-pregnant diabetic patients. Different  metrics may apply to specific patient populations.   Standards of Medical Care in Diabetes-2016.  For the purpose of screening for the presence of diabetes:  <5.7%     Consistent with the absence of diabetes  5.7-6.4%  Consistent with increasing risk for diabetes   (prediabetes)  >or=6.5%  Consistent with diabetes  Currently, no consensus exists for use of hemoglobin " A1c  for diagnosis of diabetes for children.  This Hemoglobin A1c assay has significant interference with fetal   hemoglobin   (HbF). The results are invalid for patients with abnormal amounts of   HbF,   including those with known Hereditary Persistence   of Fetal Hemoglobin. Heterozygous hemoglobin variants (HbAS, HbAC,   HbAD, HbAE, HbA2) do not significantly interfere with this assay;   however, presence of multiple variants in a sample may impact the %   interference.     07/06/2017 10.8 (H) 4.0 - 5.6 % Final     Comment:     According to ADA guidelines, hemoglobin A1c <7.0% represents  optimal control in non-pregnant diabetic patients. Different  metrics may apply to specific patient populations.   Standards of Medical Care in Diabetes-2016.  For the purpose of screening for the presence of diabetes:  <5.7%     Consistent with the absence of diabetes  5.7-6.4%  Consistent with increasing risk for diabetes   (prediabetes)  >or=6.5%  Consistent with diabetes  Currently, no consensus exists for use of hemoglobin A1c  for diagnosis of diabetes for children.  This Hemoglobin A1c assay has significant interference with fetal   hemoglobin   (HbF). The results are invalid for patients with abnormal amounts of   HbF,   including those with known Hereditary Persistence   of Fetal Hemoglobin. Heterozygous hemoglobin variants (HbAS, HbAC,   HbAD, HbAE, HbA2) do not significantly interfere with this assay;   however, presence of multiple variants in a sample may impact the %   interference.         Chemistry        Component Value Date/Time     11/30/2017 1028    K 4.6 11/30/2017 1028     11/30/2017 1028    CO2 28 11/30/2017 1028    BUN 17 11/30/2017 1028    CREATININE 0.9 11/30/2017 1028     (H) 11/30/2017 1028        Component Value Date/Time    CALCIUM 9.2 11/30/2017 1028    ALKPHOS 74 11/30/2017 1028    AST 19 11/30/2017 1028    ALT 13 11/30/2017 1028    BILITOT 1.4 (H) 11/30/2017 1028          Lab  Results   Component Value Date    CHOL 151 07/06/2017    CHOL 158 12/17/2015    CHOL 145 08/13/2014     Lab Results   Component Value Date    HDL 57 07/06/2017    HDL 32 (L) 12/17/2015    HDL 42 08/13/2014     Lab Results   Component Value Date    LDLCALC 81.6 07/06/2017    LDLCALC 99.0 12/17/2015    LDLCALC 91.4 08/13/2014     Lab Results   Component Value Date    TRIG 62 07/06/2017    TRIG 135 12/17/2015    TRIG 58 08/13/2014     Lab Results   Component Value Date    CHOLHDL 37.7 07/06/2017    CHOLHDL 20.3 12/17/2015    CHOLHDL 29.0 08/13/2014       Lab Results   Component Value Date    MICALBCREAT 83.8 (H) 11/10/2016     Lab Results   Component Value Date    TSH 6.283 (H) 11/19/2015       CrCl cannot be calculated (Patient's most recent lab result is older than the maximum 7 days allowed.).    No results found for: [id number]      PHYSICAL EXAMINATION  Constitutional: Appears well, no distress. Disheveled appearance.   Skin: warm and dry; no lipohypertrophy, or acanthosis nigracans observed. + dermopathic skin changes noted to BLE. + quarter-sized, erythematous, non-raised lesion noted to suprapubic area.   Psych: normal affect, frustrated mood, conversant  Musculoskeletal: steady gait, no clubbing, full ROM to all extremities  Neuro: CN II-XII grossly intact  Feet: appropriate footwear.       Assessment/Plan  1. Type 2 diabetes mellitus with diabetic peripheral angiopathy without gangrene, without long-term current use of insulin  -- Improving since addition of prandial insulin. Patient is frustrated with use of Novolog but would like to continue using for now since he purchased it.   -- I advised him to use abdomen for injection sites and to be sure to rotate. This may improve his skin reaction. I advised him to stop and notify me if he develops recurrent welts or rash. Educated pt to take 5-10 min before eating meal.   -- Recommended Relion R in place of Novolog, due to cost purposes, but he declines today.  He will let me know if he changes his mind.   -- increase Levemir to 30 units QHS. He would like to continue with Levemir pen instead of changing to NPH.  -- if affordable, he could be a candidate for VGo.   -- he has a h/o hematuria. Will avoid Actos.  -- check BG 4x/day       -- Discussed diagnosis of DM, A1c goals, progression of disease, long term complications and tx options.  Advised patient to check BG before activities, such as driving or exercise.  -- Reviewed hypoglycemia management: treat with 1/2 glass of juice, 1/2 can regular coke, or 4 glucose tablets. Monitor and repeat treatment every 15 minutes until BG is >70 Then have a snack, which includes a complex carbohydrate and protein.   2. Type 2 diabetes mellitus with diabetic polyneuropathy, without long-term current use of insulin  -- obtain DM control    3. Microalbuminuria due to type 2 diabetes mellitus  -- obtain DM control  -- urine MCR with RTC  -- consider adding ACE-i in the future if necessary   4. Medication monitoring encounter -- Total Time and Counselin minutes, >50% time spent counseling as noted above in #1 A/P.           FOLLOW UP  Follow-up in about 2 months (around 2018). in [place] with NP.  Patient instructed to bring BG logs to each follow up   Patient encouraged to call for any BG/medication issues, concerns, or questions.      Orders Placed This Encounter   Procedures    Hemoglobin A1c    Comprehensive metabolic panel    TSH    Microalbumin/creatinine urine ratio

## 2018-03-26 RX ORDER — OXYCODONE AND ACETAMINOPHEN 10; 325 MG/1; MG/1
1 TABLET ORAL EVERY 6 HOURS PRN
Qty: 120 TABLET | Refills: 0 | Status: SHIPPED | OUTPATIENT
Start: 2018-03-26 | End: 2018-04-23 | Stop reason: SDUPTHER

## 2018-03-26 NOTE — TELEPHONE ENCOUNTER
----- Message from Lucy Griffin sent at 3/26/2018  8:29 AM CDT -----  Contact: Steve  Calling to request his pain medication that will run out today.   Rx oxyCODONE-acetaminophen (PERCOCET)  mg per tablet    [id number] - [place], [place] - [address] AT [address] & [address]  [address]  [place] [address]  Phone: [phone] Fax: [phone]    Please call him when sent. [phone] (home)   thanks

## 2018-04-10 ENCOUNTER — OFFICE VISIT (OUTPATIENT)
Dept: PODIATRY | Facility: CLINIC | Age: 73
End: 2018-04-10
Payer: MEDICARE

## 2018-04-10 VITALS — BODY MASS INDEX: 29.71 KG/M2 | WEIGHT: 200.63 LBS | HEIGHT: 69 IN

## 2018-04-10 DIAGNOSIS — E08.42 DIABETIC POLYNEUROPATHY ASSOCIATED WITH DIABETES MELLITUS DUE TO UNDERLYING CONDITION: Primary | ICD-10-CM

## 2018-04-10 DIAGNOSIS — B35.1 ONYCHOMYCOSIS DUE TO DERMATOPHYTE: ICD-10-CM

## 2018-04-10 DIAGNOSIS — L84 PRE-ULCERATIVE CALLUSES: ICD-10-CM

## 2018-04-10 DIAGNOSIS — E11.51 TYPE II DIABETES MELLITUS WITH PERIPHERAL CIRCULATORY DISORDER: ICD-10-CM

## 2018-04-10 PROCEDURE — 99499 UNLISTED E&M SERVICE: CPT | Mod: S$GLB,,, | Performed by: PODIATRIST

## 2018-04-10 PROCEDURE — 99999 PR PBB SHADOW E&M-EST. PATIENT-LVL III: CPT | Mod: PBBFAC,,, | Performed by: PODIATRIST

## 2018-04-10 PROCEDURE — 11721 DEBRIDE NAIL 6 OR MORE: CPT | Mod: 59,Q9,S$GLB, | Performed by: PODIATRIST

## 2018-04-10 PROCEDURE — 11056 PARNG/CUTG B9 HYPRKR LES 2-4: CPT | Mod: Q9,S$GLB,, | Performed by: PODIATRIST

## 2018-04-10 NOTE — PROGRESS NOTES
"Subjective:      Patient ID: Steve June Jr. is a 72 y.o. male.    Chief Complaint: Diabetes Mellitus (PCP-Dr.Lauren Womack-2/9/18, A1c-9.8-1/30/18)    Diabetes, increased risk amputation needing evaluation/management/optomization of foot care.    CC thick nails and calluses, burning pain.  Gradual onset, worsening over past several weeks-months, aggravated by increased weight bearing, shoe gear, pressure.  Pain management for burning.  Having difficulty with trimming his own nails.  Denies trauma, signs infection, and surgery both feet. Has noticed some color changes to the left hallux associated callus, "it appears dark like there is blood under the callus."  Has gotten the diabetic shoes but feels they are not helping.    Review of Systems   Constitution: Negative for chills, diaphoresis, fever, malaise/fatigue and night sweats.   Cardiovascular: Positive for leg swelling. Negative for claudication, cyanosis and syncope.   Skin: Positive for nail changes and suspicious lesions. Negative for color change, dry skin, rash and unusual hair distribution.   Musculoskeletal: Negative for falls, joint pain, joint swelling, muscle cramps, muscle weakness and stiffness.   Gastrointestinal: Negative for constipation, diarrhea, nausea and vomiting.   Neurological: Positive for paresthesias and sensory change. Negative for brief paralysis, disturbances in coordination, focal weakness, numbness and tremors.           Objective:      Physical Exam   Constitutional: He is oriented to person, place, and time. He appears well-developed and well-nourished. He is cooperative.   Oriented to time, place, and person.   Cardiovascular:   Pulses:       Dorsalis pedis pulses are 1+ on the right side, and 1+ on the left side.        Posterior tibial pulses are 1+ on the right side, and 1+ on the left side.   Capillary fill time 3-5 seconds.  Feet are warm to touch proximal with distal cooling.      2+ edema to bilateral lower " extremities with varicosities noted.    No pedal hair noted bilateral.     Musculoskeletal:   Normal angle, base, station of gait. Decreased stride length, early heel off, moderately propulsive toe off bilateral.    All ten toes without clubbing, cyanosis, or signs of ischemia.      Ankle dorsiflexion decreased at <10 degrees bilateral with moderate increase with knee flexion bilateral.    No pain to palpation bilateral lower extremities.      Range of motion, stability, muscle strength, and muscle tone are age and health appropriate normal bilateral feet and legs.       Feet:   Right Foot:   Protective Sensation: 10 sites tested. 4 sites sensed.   Left Foot:   Protective Sensation: 10 sites tested. 6 sites sensed.   Lymphadenopathy:   Negative lymphadenopathy bilateral popliteal fossa and tarsal tunnel.     Neurological: He is alert and oriented to person, place, and time. He has normal strength. He is not disoriented. He displays no atrophy and no tremor. A sensory deficit is present. He exhibits normal muscle tone.   Decreased/absent vibratory sensation bilateral feet to 128Hz tuning fork.    Diminished/loss of protective sensation bilateral to 10 gram monofilament.    Paresthesias,  bilateral feet with no clearly identified trigger or source.     Skin: Skin is warm, dry and intact. No abrasion, no bruising, no burn, no ecchymosis, no laceration, no lesion, no petechiae and no rash noted. He is not diaphoretic. No cyanosis or erythema. No pallor. Nails show no clubbing.   Focal hyperkeratotic lesion consisting entirely of hyperkeratotic tissue without open skin, drainage, pus, fluctuance, malodor, or signs of infection plantar right hallux and mtpj.    Left plantar hallux IPJ there is a focal hyperkeratotic lesion with underlying discoloration, after trimming the underlying skin was macerated but intact, small partial thickness ulceration without erythema, drainage or malodor.    Skin thin, atrophic, with  decreased density and distribution of pedal hair bilateral, but without ulcers, masses, nodules or cords palpated bilateral feet and legs.    Hyperpigmentation both legs consistent with stasis.    Toenails 1st, 2nd, 3rd, 4th, 5th  bilateral are hypertrophic thickened 2-3 mm, dystrophic, discolored tanish brown with tan, gray crumbly subungual debris.  Long 4-7 mm, without periungual skin abnormality of each.               Assessment:       Encounter Diagnoses   Name Primary?    Diabetic polyneuropathy associated with diabetes mellitus due to underlying condition Yes    Onychomycosis due to dermatophyte     Type II diabetes mellitus with peripheral circulatory disorder     Pre-ulcerative calluses          Plan:       Steve was seen today for diabetes mellitus.    Diagnoses and all orders for this visit:    Diabetic polyneuropathy associated with diabetes mellitus due to underlying condition    Onychomycosis due to dermatophyte    Type II diabetes mellitus with peripheral circulatory disorder    Pre-ulcerative calluses      I counseled the patient on his conditions, their implications and medical management.    Patient will stretch the tendo achilles complex three times daily as demonstrated in the office.  Literature was dispensed illustrating proper stretching technique.    Shoe inspection. Diabetic Foot Education. Patient reminded of the importance of good nutrition and blood sugar control to help prevent podiatric complications of diabetes. Patient instructed on proper foot hygeine. We discussed wearing proper shoe gear, daily foot inspections, never walking without protective shoe gear, never putting sharp instruments to feet    - With patient's permission, nails were aggressively reduced and debrided x 10 to their soft tissue attachment mechanically and with electric , removing all offending nail and debris. Patient relates relief following the procedure. he will continue to monitor the areas daily,  inspect his feet, wear protective shoe gear when ambulatory, moisturizer to maintain skin integrity and follow in this office in approximately 2-3 months, sooner p.r.n.    With patient's verbal consent the hyperkeratotic lesions x3 total both feet were trimmed to healthy appearing skin using a # 15 scalpel. Patient relates relief of pain following the procedure. Patient tolerated the procedure well without complications. No anesthesia or hemostasis required. No blood loss.    He is to take the shoes back to where he got them and have them adjusted.    Continue pain management.    Rx amlactin daily  To feet.    Return in 3 months routine care    Fausto Harvey DPM

## 2018-04-23 ENCOUNTER — TELEPHONE (OUTPATIENT)
Dept: FAMILY MEDICINE | Facility: CLINIC | Age: 73
End: 2018-04-23

## 2018-04-23 DIAGNOSIS — R10.9 ABDOMINAL PAIN, UNSPECIFIED ABDOMINAL LOCATION: Primary | ICD-10-CM

## 2018-04-23 RX ORDER — OXYCODONE AND ACETAMINOPHEN 10; 325 MG/1; MG/1
1 TABLET ORAL EVERY 6 HOURS PRN
Qty: 120 TABLET | Refills: 0 | Status: SHIPPED | OUTPATIENT
Start: 2018-04-23 | End: 2018-05-01 | Stop reason: SDUPTHER

## 2018-04-23 NOTE — TELEPHONE ENCOUNTER
----- Message from Lacey Alcantara sent at 4/23/2018 10:30 AM CDT -----  Contact: patient  Type: Needs Medical Advice    Who Called:  Patient   Symptoms (please be specific): naval hernia that is giving him problems  How long has patient had these symptoms:  3 days   Pharmacy name and phone #:  NA  Best Call Back Number:   Additional Information: He wants to speak with the Nurse please advise.

## 2018-04-23 NOTE — TELEPHONE ENCOUNTER
----- Message from Luma Long sent at 4/23/2018 11:08 AM CDT -----  Contact: Patient  Type:  RX Refill Request    Who Called:  Patient  Refill or New Rx:  Refill  RX Name and Strength:  oxyCODONE-acetaminophen (PERCOCET)  mg per tablet  How is the patient currently taking it? (ex. 1XDay):  Every 6 hours  Is this a 30 day or 90 day RX:  120 Pills per month  Preferred Pharmacy with phone number:    [id number] - [place], [place] - [address] AT [facility] & [facility]  [address] [address]  Phone: [phone] Fax: [phone]  Local or Mail Order:  Local  Ordering Provider:  Fabiano Grimes Call Back Number:  [phone]  Additional Information:  Please call patient when this has been called in

## 2018-04-23 NOTE — TELEPHONE ENCOUNTER
Pt states it is soft w/ some lumps. Pt states the reducible pain is a 3/10 and pt is still having normal bowel movements.

## 2018-04-23 NOTE — TELEPHONE ENCOUNTER
Pt states that his hernia in navel has been causeing pain for last 4 days. It is the size of a tennis ball. Pt would like advise on what to do. Pt states he will have an us on 4-27-18 for liver and wants to know if they can scan that as well. Please review and advise.

## 2018-04-27 ENCOUNTER — HOSPITAL ENCOUNTER (OUTPATIENT)
Dept: RADIOLOGY | Facility: CLINIC | Age: 73
Discharge: HOME | End: 2018-04-27
Attending: NURSE PRACTITIONER
Payer: MEDICARE

## 2018-04-27 ENCOUNTER — TELEPHONE (OUTPATIENT)
Dept: HEPATOLOGY | Facility: CLINIC | Age: 73
End: 2018-04-27

## 2018-04-27 DIAGNOSIS — K74.60 UNSPECIFIED CIRRHOSIS OF LIVER: ICD-10-CM

## 2018-04-27 DIAGNOSIS — R10.9 ABDOMINAL PAIN, UNSPECIFIED ABDOMINAL LOCATION: ICD-10-CM

## 2018-04-27 DIAGNOSIS — K43.9 HERNIA OF ABDOMINAL WALL: ICD-10-CM

## 2018-04-27 PROCEDURE — 76705 ECHO EXAM OF ABDOMEN: CPT | Mod: TC,PO

## 2018-04-27 PROCEDURE — 76700 US EXAM ABDOM COMPLETE: CPT | Mod: TC,PO

## 2018-04-27 PROCEDURE — 76700 US EXAM ABDOM COMPLETE: CPT | Mod: 26,,, | Performed by: RADIOLOGY

## 2018-04-27 NOTE — TELEPHONE ENCOUNTER
Received call from Farheen Lazo with [facility] with a critical lab value. Platelets 29,000. Read Back and Verified. Joanne Cunha NP in clinic and notified.

## 2018-05-01 ENCOUNTER — OFFICE VISIT (OUTPATIENT)
Dept: PAIN MEDICINE | Facility: CLINIC | Age: 73
End: 2018-05-01
Payer: MEDICARE

## 2018-05-01 VITALS
SYSTOLIC BLOOD PRESSURE: 124 MMHG | DIASTOLIC BLOOD PRESSURE: 79 MMHG | BODY MASS INDEX: 29.62 KG/M2 | HEIGHT: 69 IN | WEIGHT: 200 LBS | HEART RATE: 61 BPM

## 2018-05-01 DIAGNOSIS — M50.30 DDD (DEGENERATIVE DISC DISEASE), CERVICAL: ICD-10-CM

## 2018-05-01 DIAGNOSIS — M17.10 PRIMARY OSTEOARTHRITIS OF KNEE, UNSPECIFIED LATERALITY: ICD-10-CM

## 2018-05-01 DIAGNOSIS — D69.6 THROMBOCYTOPENIA: ICD-10-CM

## 2018-05-01 DIAGNOSIS — M47.819 FACET ARTHROPATHY: ICD-10-CM

## 2018-05-01 DIAGNOSIS — Z79.891 CHRONIC USE OF OPIATE FOR THERAPEUTIC PURPOSE: Primary | ICD-10-CM

## 2018-05-01 DIAGNOSIS — M47.812 OSTEOARTHRITIS OF CERVICAL SPINE, UNSPECIFIED SPINAL OSTEOARTHRITIS COMPLICATION STATUS: ICD-10-CM

## 2018-05-01 PROCEDURE — 3074F SYST BP LT 130 MM HG: CPT | Mod: CPTII,S$GLB,, | Performed by: PHYSICIAN ASSISTANT

## 2018-05-01 PROCEDURE — 80307 DRUG TEST PRSMV CHEM ANLYZR: CPT

## 2018-05-01 PROCEDURE — 3078F DIAST BP <80 MM HG: CPT | Mod: CPTII,S$GLB,, | Performed by: PHYSICIAN ASSISTANT

## 2018-05-01 PROCEDURE — 99499 UNLISTED E&M SERVICE: CPT | Mod: S$GLB,,, | Performed by: PHYSICIAN ASSISTANT

## 2018-05-01 PROCEDURE — 99999 PR PBB SHADOW E&M-EST. PATIENT-LVL IV: CPT | Mod: PBBFAC,,, | Performed by: PHYSICIAN ASSISTANT

## 2018-05-01 PROCEDURE — 99214 OFFICE O/P EST MOD 30 MIN: CPT | Mod: S$GLB,,, | Performed by: PHYSICIAN ASSISTANT

## 2018-05-01 RX ORDER — OXYCODONE AND ACETAMINOPHEN 10; 325 MG/1; MG/1
1 TABLET ORAL EVERY 6 HOURS PRN
Qty: 120 TABLET | Refills: 0 | Status: SHIPPED | OUTPATIENT
Start: 2018-05-22 | End: 2018-06-20

## 2018-05-01 RX ORDER — OXYCODONE AND ACETAMINOPHEN 10; 325 MG/1; MG/1
1 TABLET ORAL EVERY 6 HOURS PRN
Qty: 120 TABLET | Refills: 0 | Status: SHIPPED | OUTPATIENT
Start: 2018-06-20 | End: 2018-07-18 | Stop reason: SDUPTHER

## 2018-05-01 NOTE — PROGRESS NOTES
"Referring Physician: No ref. provider found    PCP: Alie Womack MD      CC: neck and left shoulder pain; knee pain    Interval history:  Steve June Jr. is a 72 y.o. male with  neck and left shoulder pain who presents today for f/u. Pain is unchanged since LCV.  He has tried physical therapy which did not provide much benefit.   Injections performed have only given him shortlived relief.  He underwent visco supplementation injections for his left knee without much benefit.  He continues to have neck pain. He has a history of cervical DDD.  However, he continues to have low platelets and we are unable to provide any neuro axonal interventional procedures.  He takes oxycodone 10 mg every 6 hours as needed with mild to moderate benefit.  Does cause some drowsiness. Pain today is rated 8/10.  Pt has been seen in the clinic before, however pt is new to me.     History below per Dr. Mario    Prior HPI:   Steve June Jr. is a 72 y.o. male referred to us for lower back and knee pain.  He has significant history of pancytopenia, cirrhosis.  He presents with constant aching, sharp pain in his lower back as well as his left knee.  Pain worsens sitting, standing, bending, walking.  Pain improves with rest.  X-rays performed of the lumbar spine as well as knee shows left knee osteoarthritis.  He has tried physical therapy with minimal benefit.  He currently takes Norco 10 mg every 6 hours as needed with mild to moderate benefit.  He is unable to have any procedures due to his thrombocytopenia.  He also has ventral hernia, but surgical attention is currently not recommended due to his thrombocytopenia.  His main concern today is wishing to decrease his opioid medications.  He states being very "foggy" with his current medications.  He denies any increased sedation.  He denies any weakness.  No bowel bladder changes.    ROS:  CONSTITUTIONAL: No fevers, chills, night sweats, wt. loss, appetite changes  SKIN: no rashes " or itching  ENT: No headaches, head trauma, vision changes, or eye pain  LYMPH NODES: None noticed   CV: No chest pain, palpitations.   RESP: No shortness of breath, dyspnea on exertion, cough, wheezing, or hemoptysis  GI: No nausea, emesis, diarrhea, constipation, melena, hematochezia, pain.    : No dysuria, hematuria, urgency, or frequency   HEME: No easy bruising, bleeding problems  PSYCHIATRIC: No depression, anxiety, psychosis, hallucinations.  NEURO: No seizures, memory loss, dizziness or difficulty sleeping  MSK: + History of present illness      Past Medical History:   Diagnosis Date    Abnormal thyroid function test     Allergy     Seasonal    Anemia     Arthritis     Gaviria esophagus     Basal cell carcinoma     right forearm    Basal cell carcinoma 12/2011    lower post neck    Cancer     skin CA    Cataract     Cirrhosis     Diabetes mellitus     Diabetes mellitus, type 2     Encounter for blood transfusion     Esophageal varices in cirrhosis     grade II on 7/12 EGD    Gastritis     on 7/12 EGD    GERD (gastroesophageal reflux disease) 2/28/2015    Hard of hearing     Hiatal hernia     History of hepatitis C 8/10/2012    tx with harvoni x 41 days (started 10/22/15). SVR4     Hoarseness 2/28/2015    Hypercholesteremia     Hypersplenism     Hypertension     No meds    Pain management 12/10/2014    Portal hypertensive gastropathy     on 7/12 EGD    Thrombocytopenia     Type II or unspecified type diabetes mellitus with neurological manifestations, uncontrolled(250.62) 12/24/2013    Valvular heart disease     mild MR 12     Past Surgical History:   Procedure Laterality Date    CATARACT EXTRACTION  1/10/13    left eye    CATARACT EXTRACTION      right eye    CHOLECYSTECTOMY      COLONOSCOPY      EYE SURGERY      Cataract surgery to right eye    KNEE ARTHROSCOPY W/ MENISCAL REPAIR      KNEE CARTILAGE SURGERY      left knee    KNEE SURGERY  12/2006    left    SKIN LESION  "EXCISION      TONSILLECTOMY      UPPER GASTROINTESTINAL ENDOSCOPY       Family History   Problem Relation Age of Onset    Leukemia Mother     Cancer Mother      bone    Alcohol abuse Father     Cirrhosis Father      EtOH induced    Amblyopia Neg Hx     Blindness Neg Hx     Cataracts Neg Hx     Diabetes Neg Hx     Glaucoma Neg Hx     Hypertension Neg Hx     Macular degeneration Neg Hx     Retinal detachment Neg Hx     Strabismus Neg Hx     Stroke Neg Hx     Thyroid disease Neg Hx     Psoriasis Neg Hx     Lupus Neg Hx     Eczema Neg Hx     Acne Neg Hx     Melanoma Neg Hx      Social History     Social History    Marital status:      Spouse name: N/A    Number of children: 5    Years of education: N/A     Social History Main Topics    Smoking status: Former Smoker     Packs/day: 1.00     Years: 25.00     Quit date: 8/9/2000    Smokeless tobacco: Never Used    Alcohol use Yes      Comment: rarely    Drug use: No    Sexual activity: No     Other Topics Concern    None     Social History Narrative    He is .  He has children.  He is currently retired.         Medications/Allergies: See med card    Vitals:    05/01/18 1107   BP: 124/79   Pulse: 61   Weight: 90.7 kg (200 lb)   Height: 5' 9" (1.753 m)   PainSc:   8   PainLoc: Neck     Physical exam:    GENERAL: A and O x3, the patient appears well groomed and is in no acute distress.  Skin: No rashes or obvious lesions  HEENT: normocephalic, atraumatic  CARDIOVASCULAR:  RRR  LUNGS: non labored breathing  ABDOMEN: soft, nontender, + sizaeable ventral hernia in epigastric region.    UPPER EXTREMITIES: Normal alignment, normal range of motion, no atrophy, no skin changes,  hair growth and nail growth normal and equal bilaterally. No swelling, no tenderness.    LOWER EXTREMITIES:  Normal alignment, normal range of motion, no atrophy, no skin changes,  hair growth and nail growth normal and equal bilaterally. No swelling, no " tenderness.    LUMBAR SPINE  Lumbar spine: ROM is full with flexion extension and oblique extension with moderate increased pain.    Erasmo's test causes no increased pain on either side.    Supine straight leg raise is negative bilaterally.    Internal and external rotation of the hip causes no increased pain on either side.  Myofascial exam: No tenderness to palpation across lumbar paraspinous muscles.      MENTAL STATUS: normal orientation, speech, language, and fund of knowledge for social situation.  Emotional state appropriate.    CRANIAL NERVES:  II:  PERRL bilaterally,   III,IV,VI: EOMI.    V:  Facial sensation equal bilaterally  VII:  Facial motor function normal.  VIII:  Hearing equal to finger rub bilaterally  IX/X: Gag normal, palate symmetric  XI:  Shoulder shrug equal, head turn equal  XII:  Tongue midline without fasciculations      MOTOR: Tone and bulk: normal bilateral upper and lower Strength: normal   Delt Bi Tri WE WF     R 5 5 5 5 5 5   L 5 5 5 5 5 5     IP ADD ABD Quad TA Gas HAM  R 5 5 5 5 5 5 5  L 5 5 5 5 5 5 5    SENSATION: Light touch and pinprick intact bilaterally  REFLEXES: normal, symmetric, nonbrisk.  Toes down, no clonus. No hoffmans.  GAIT: normal rise, base, steps, and arm swing.        Imaging:  Xray L-spine 4/2013   Alignment is otherwise normal.  Vertebral body heights and disc space heights are relatively well maintained.  Vertebral end plate osteophytes are present anteriorly   at multiple levels.  The facet joints and posterior elements are unremarkable         Xray Knee 12/2013  Degenerative change of the knees, left greater than right.    Assessment:  Steve Juen Jr. is a 72 y.o. male with neck, back and left knee pain  1. Chronic use of opiate for therapeutic purpose    2. DDD (degenerative disc disease), cervical    3. Primary osteoarthritis of knee, unspecified laterality    4. Thrombocytopenia    5. Osteoarthritis of cervical spine, unspecified spinal  osteoarthritis complication status    6. Facet arthropathy      Plan:  1. I have stressed the importance of physical activity and exercise to improve overall health.  Continue PT exercises learned at home.  2. Any interventional procedures will be deferred due to his low platelet count.  Patient expressed understanding.  3.  Continue evaluation with PM&R, Dr. Silva  4. Percocet 10mg q 8 hrs as needed.  Hold for sedation.  UDS today  5. Follow-up 3 months  All medication management was performed by Dr. Kapil Mario

## 2018-05-04 DIAGNOSIS — I10 ESSENTIAL HYPERTENSION: ICD-10-CM

## 2018-05-04 RX ORDER — CARVEDILOL 6.25 MG/1
TABLET ORAL
Qty: 60 TABLET | Refills: 1 | Status: SHIPPED | OUTPATIENT
Start: 2018-05-04 | End: 2018-08-15 | Stop reason: SDUPTHER

## 2018-05-04 NOTE — TELEPHONE ENCOUNTER
----- Message from Rima Shelby sent at 5/4/2018  1:22 PM CDT -----  Dr Skaggs last prescribed carvedilol  6.25 MG tablet / [facility] on  (pt is out)   .. Pt states he called the pharmacy but changed Dr's and pharmacies / please call pt at [phone] (home)

## 2018-05-05 LAB

## 2018-05-07 ENCOUNTER — OFFICE VISIT (OUTPATIENT)
Dept: HEPATOLOGY | Facility: CLINIC | Age: 73
End: 2018-05-07
Payer: MEDICARE

## 2018-05-07 VITALS
SYSTOLIC BLOOD PRESSURE: 135 MMHG | HEIGHT: 66 IN | TEMPERATURE: 97 F | DIASTOLIC BLOOD PRESSURE: 65 MMHG | RESPIRATION RATE: 20 BRPM | BODY MASS INDEX: 32.27 KG/M2 | HEART RATE: 55 BPM | OXYGEN SATURATION: 94 % | WEIGHT: 200.81 LBS

## 2018-05-07 DIAGNOSIS — Z86.19 HISTORY OF HEPATITIS C: ICD-10-CM

## 2018-05-07 DIAGNOSIS — K74.60 CIRRHOSIS OF LIVER WITHOUT ASCITES, UNSPECIFIED HEPATIC CIRRHOSIS TYPE: Primary | ICD-10-CM

## 2018-05-07 DIAGNOSIS — Z23 NEED FOR HEPATITIS A VACCINATION: ICD-10-CM

## 2018-05-07 DIAGNOSIS — K76.6 PORTAL HYPERTENSION: ICD-10-CM

## 2018-05-07 DIAGNOSIS — K42.9 UMBILICAL HERNIA WITHOUT OBSTRUCTION AND WITHOUT GANGRENE: ICD-10-CM

## 2018-05-07 DIAGNOSIS — I85.10 ESOPHAGEAL VARICES IN CIRRHOSIS: ICD-10-CM

## 2018-05-07 DIAGNOSIS — K74.60 ESOPHAGEAL VARICES IN CIRRHOSIS: ICD-10-CM

## 2018-05-07 DIAGNOSIS — Z00.00 PREVENTATIVE HEALTH CARE: ICD-10-CM

## 2018-05-07 PROCEDURE — 99499 UNLISTED E&M SERVICE: CPT | Mod: S$GLB,,, | Performed by: NURSE PRACTITIONER

## 2018-05-07 PROCEDURE — 99213 OFFICE O/P EST LOW 20 MIN: CPT | Mod: S$GLB,,, | Performed by: NURSE PRACTITIONER

## 2018-05-07 PROCEDURE — 99999 PR PBB SHADOW E&M-EST. PATIENT-LVL V: CPT | Mod: PBBFAC,,, | Performed by: NURSE PRACTITIONER

## 2018-05-07 PROCEDURE — 3078F DIAST BP <80 MM HG: CPT | Mod: CPTII,S$GLB,, | Performed by: NURSE PRACTITIONER

## 2018-05-07 PROCEDURE — 3075F SYST BP GE 130 - 139MM HG: CPT | Mod: CPTII,S$GLB,, | Performed by: NURSE PRACTITIONER

## 2018-05-07 NOTE — PROGRESS NOTES
[facility] Established Patient Visit    Reason for Visit:  F/u cirrhosis    PCP: Alie Womack    HPI:  This is a 72 y.o. male pt of Dr. Guzman, here for f/u of well-compensated cirrhosis due to h/o hepatitis C, s/p successful treatment with Harvoni with SVR.  His cirrhosis has been complicated by significant portal HTN with h/o bleeding esophageal varices, splenomegaly, and thrombocytopenia. He was banded several times in the past, last 8/2014. He has refused repeat EGD since then because he thinks the last EGD caused his hoarseness.  He denies any hematemesis or melena. H/H has been stable. He is on Coreg 6.25 mg but takes this only daily.     He had evaluation with ENT in 11/2015 with Dr. Gene Estes here and had scope done in office. He thinks they saw a growth on vocal cords and he thinks he may need to get this checked. Reviewed Dr. Estes's note, plan was to finish antibiotics and repeat scope to see if any improvement. If not, may need to consider evaluation in the OR via microlaryngoscopy. Pt denies any difficulty swallowing and no change in hoarseness. He declines f/u appt in ENT as well.     He continues to have an umbilical hernia that has not worsened. Tx surgery recommended no surgical intervention. He wears an abd binder.    He had labs and U/s recently. U/s showed no masses or ascites. Labs stable. MELD low at 10. Denies jaundice, dark urine, hematemesis, melena, slowed mentation, abdominal distention. He is seeing endocrine now for his uncontrolled DM.       ROS:   GENERAL: Denies fever, chills, weight loss/gain, fatigue  HEENT: Denies headaches, dizziness, vision/hearing changes, (+) hoarseness  CARDIOVASCULAR: Denies chest pain, palpitations, edema  RESPIRATORY: Denies dyspnea, cough  GI: Denies abdominal pain, rectal bleeding, nausea, vomiting. No change in bowel pattern or color  : Denies dysuria, hematuria   SKIN: Denies rash, itching  NEURO: Denies confusion, memory  "loss, or mood changes  PSYCH: Denies depression or anxiety  HEME/LYMPH: (+) easy bruising and bleeding      PMHX:  has a past medical history of Abnormal thyroid function test; Allergy; Anemia; Arthritis; Gaviria esophagus; Basal cell carcinoma; Basal cell carcinoma (12/2011); Cancer; Cataract; Cirrhosis; Diabetes mellitus; Diabetes mellitus, type 2; Encounter for blood transfusion; Esophageal varices in cirrhosis; Gastritis; GERD (gastroesophageal reflux disease) (2/28/2015); Hard of hearing; Hiatal hernia; History of hepatitis C (8/10/2012); Hoarseness (2/28/2015); Hypercholesteremia; Hypersplenism; Hypertension; Pain management (12/10/2014); Portal hypertensive gastropathy; Thrombocytopenia; Type II or unspecified type diabetes mellitus with neurological manifestations, uncontrolled(250.62) (12/24/2013); and Valvular heart disease.    PSHX:  has a past surgical history that includes Cholecystectomy; Knee surgery (12/2006); Skin lesion excision; Tonsillectomy; Knee arthroscopy w/ meniscal repair; Knee cartilage surgery; Eye surgery; Cataract extraction (1/10/13); Cataract extraction; Colonoscopy; and Upper gastrointestinal endoscopy.    The patient's social and family histories were reviewed by me and updated in the appropriate section of the electronic medical record.    Review of patient's allergies indicates:   Allergen Reactions    Adhesive tape-silicones Other (See Comments)     pulls skin off    Doxycycline      Dizzy. "Just didn't feel right".    Oxycontin [oxycodone] Other (See Comments)       Current Outpatient Prescriptions on File Prior to Visit   Medication Sig Dispense Refill    ACCU-CHEK VEENA PLUS METER Misc 1 Device by Misc.(Non-Drug; Combo Route) route 4 (four) times daily. 1 each 0    ammonium lactate 12 % Crea Apply 1 application topically 2 (two) times daily as needed. 1 Bottle 6    blood sugar diagnostic Strp Accu-chek veena plus test strip. Test blood sugar 4 times daily 400 strip 3    " "carvedilol (COREG) 6.25 MG tablet TAKE 1 TABLET(6.25 MG) BY MOUTH TWICE DAILY 60 tablet 1    ciclopirox (PENLAC) 8 % Soln Apply topically nightly. 6.6 mL 11    diclofenac sodium (VOLTAREN) 1 % Gel Apply 2 g topically 2 (two) times daily. 1 Tube 2    GLUCOSAMINE HCL/CHONDR CHISHOLM A NA (OSTEO BI-FLEX ORAL) Take 2 tablets by mouth once daily.      insulin aspart U-100 (NOVOLOG FLEXPEN U-100 INSULIN) 100 unit/mL InPn pen Inject 10 Units into the skin 3 (three) times daily with meals. 2 Box 3    insulin detemir U-100 (LEVEMIR FLEXTOUCH) 100 unit/mL (3 mL) SubQ InPn pen Inject 30 Units into the skin every evening. 2 Box 3    lancets (ACCU-CHEK SOFTCLIX LANCETS) Misc Test blood sugar 4 times daily 400 each 3    [START ON 5/22/2018] oxyCODONE-acetaminophen (PERCOCET)  mg per tablet Take 1 tablet by mouth every 6 (six) hours as needed for Pain. 120 tablet 0    [START ON 6/20/2018] oxyCODONE-acetaminophen (PERCOCET)  mg per tablet Take 1 tablet by mouth every 6 (six) hours as needed for Pain. 120 tablet 0    pen needle, diabetic (BD ULTRA-FINE KORINA PEN NEEDLES) 32 gauge x 5/32" Ndle Use 4 needles daily with insulin injecitons 400 each 3     No current facility-administered medications on file prior to visit.         Objective Findings:    PHYSICAL EXAM:   Friendly White male, in no acute distress; alert and oriented to person, place and time  VITALS:   /65 (BP Location: Left leg, Patient Position: Sitting, BP Method: Large (Automatic))   Pulse (!) 55   Temp 97 °F (36.1 °C) (Oral)   Resp 20   Ht 5' 6" (1.676 m)   Wt 91.1 kg (200 lb 13.4 oz)   SpO2 (!) 94%   BMI 32.42 kg/m²    HENT: Normocephalic, without obvious abnormality. Oral mucosa pink and moist.   EYES: Sclerae anicteric. No conjunctival pallor.   NECK: Supple.   CARDIOVASCULAR: Regular rate and rhythm. (+) murmur.  RESPIRATORY: Normal respiratory effort. BBS CTA. No wheezes or crackles.  GI: Soft, non-tender, non-distended. (+) " splenomegaly. No masses palpable. No ascites. Large easily reducible umbilical hernia. Abd binder intact.  EXTREMITIES:  No clubbing, cyanosis or edema. (+) ecchymosis to BUE.  SKIN: Warm and dry. No jaundice. No rashes noted to exposed skin. No telangectasias noted. No palmar erythema.   NEURO:  Normal gate. No asterixis.   PSYCH:  Memory intact. Thought and speech pattern appropriate. Behavior normal. No depression or anxiety noted.      Labs:  Lab Results   Component Value Date    WBC 2.69 (L) 04/27/2018    HGB 11.5 (L) 04/27/2018    HCT 34.9 (L) 04/27/2018    PLT 29 (LL) 04/27/2018    CHOL 151 07/06/2017    TRIG 62 07/06/2017    HDL 57 07/06/2017     (L) 04/27/2018    K 4.9 04/27/2018    CREATININE 1.0 04/27/2018    ALT 14 04/27/2018    AST 18 04/27/2018    ALKPHOS 74 04/27/2018    BILITOT 1.4 (H) 04/27/2018    ALBUMIN 3.7 04/27/2018    INR 1.2 04/27/2018    AFP 3.5 04/27/2018     ABD U/s 4/27/18  FINDINGS:  On today's study the liver appears of normal size without a focal mass.  There is mild nodularity of the surface suggesting cirrhosis.  The echogenicity is within normal limits.  No ascites is seen.  The gallbladder is absent.  The common bile duct is mildly prominent at 8 mm which may be due to the prior cholecystectomy.    The spleen measures 21.5 x 18.1 cm which is significantly enlarged and unchanged from the prior study.  The right kidney measures 10.6 cm without mass cyst or hydronephrosis.  The left kidney measures 10.7 cm without mass cyst or hydronephrosis.  There is however thinning of the renal parenchyma of both kidneys suggesting intrinsic renal disease.  The visualized portions of the pancreas and aorta appear within normal limits.   Impression       Cirrhosis without a focal mass or ascites seen.  Significant splenomegaly.  Thinning of the renal parenchyma bilaterally suggesting intrinsic renal disease without hydronephrosis       ASSESSMENT:  72 y.o. White male with:  1.  Cirrhosis,  well-compensated  -- MELD = MELD-Na score: 10 at 4/27/2018 10:16 AM  MELD score: 10 at 4/27/2018 10:16 AM  Calculated from:  Serum Creatinine: 1 mg/dL at 4/27/2018 10:16 AM  Serum Sodium: 134 mmol/L at 4/27/2018 10:16 AM  Total Bilirubin: 1.4 mg/dL at 4/27/2018 10:16 AM  INR(ratio): 1.2 at 4/27/2018 10:16 AM  Age: 72 years  -- HCC screening- AFP and abd. U/S - up to date, next due 11/2018  -- Immunity to Hep A and B - not immune to hep A, (+) hep B immunity from prior exposure  -- EGD - see below  2. History of hepatitis C, s/p successful treatment with Harvoni with SVR    3. Portal hypertension  -- EGD - H/o GIB s/p multiple EGD's with banding. Pt now refuses more EGD's d/t his concern that these caused his hoarseness. On coreg 6.25 mg daily.   -- Ascites - none  -- Splenomegaly and thrombocytopenia  4. Umbilical hernia  -- evaluated by transplant surgery and no intervention advised. Continue to wear abd binder      EDUCATION:   Signs and symptoms of hepatic decompensation were reviewed, including jaundice, ascites, and slowed mentation due to hepatic encephalopathy. The patient should seek medical attention if any of these things occur.  We discussed the potential for bleeding from esophageal varices with symptoms of hematemesis and melena. The patient should report to the Emergency Department for these symptoms.    We discussed the increased risk of hepatocellular carcinoma due to cirrhosis. Continued screening every six months with ultrasound and AFP is recommended.     No alcohol or raw seafood.  Tylenol/acetaminophen as needed for pain, up to 2000 mg daily    Discussed hep C is cured. Still cannot donate blood. Hep C Ab will likely remain (+) for life. Can get re-infected if he were to get re-exposed as treatment conveys no immunity. Discussed curing his hep C will help to stabilize his liver functioning and decrease the chance the he will develop any decompensation in the future.       PLAN:  1. HCC screening  Q 6 months with AFP and abd. U/S, next due 11/2018  2. Pt declines repeat EGD. Discussed s/sx of GIB that he would need to report to ER for. Anemia is also stable. If this were to worsen, will repeat EGD  3. Hepatitis A vaccines, start today  4. F/u in 11/2018 with U/s and labs in [place] before appt    Thank you for allowing me to participate in the care of Steve Cunha, BRITANY-C

## 2018-05-09 ENCOUNTER — LAB VISIT (OUTPATIENT)
Dept: LAB | Facility: HOSPITAL | Age: 73
End: 2018-05-09
Attending: FAMILY MEDICINE
Payer: MEDICARE

## 2018-05-09 DIAGNOSIS — E11.51 TYPE 2 DIABETES MELLITUS WITH DIABETIC PERIPHERAL ANGIOPATHY WITHOUT GANGRENE, WITHOUT LONG-TERM CURRENT USE OF INSULIN: ICD-10-CM

## 2018-05-09 LAB
ALBUMIN SERPL BCP-MCNC: 3.6 G/DL
ALP SERPL-CCNC: 82 U/L
ALT SERPL W/O P-5'-P-CCNC: 16 U/L
ANION GAP SERPL CALC-SCNC: 7 MMOL/L
AST SERPL-CCNC: 20 U/L
BILIRUB SERPL-MCNC: 1.2 MG/DL
BUN SERPL-MCNC: 19 MG/DL
CALCIUM SERPL-MCNC: 9.6 MG/DL
CHLORIDE SERPL-SCNC: 102 MMOL/L
CO2 SERPL-SCNC: 27 MMOL/L
CREAT SERPL-MCNC: 1 MG/DL
EST. GFR  (AFRICAN AMERICAN): >60 ML/MIN/1.73 M^2
EST. GFR  (NON AFRICAN AMERICAN): >60 ML/MIN/1.73 M^2
ESTIMATED AVG GLUCOSE: 212 MG/DL
GLUCOSE SERPL-MCNC: 336 MG/DL
HBA1C MFR BLD HPLC: 9 %
POTASSIUM SERPL-SCNC: 4.9 MMOL/L
PROT SERPL-MCNC: 6.5 G/DL
SODIUM SERPL-SCNC: 136 MMOL/L
T4 FREE SERPL-MCNC: 0.93 NG/DL
TSH SERPL DL<=0.005 MIU/L-ACNC: 4.01 UIU/ML

## 2018-05-09 PROCEDURE — 84443 ASSAY THYROID STIM HORMONE: CPT

## 2018-05-09 PROCEDURE — 80053 COMPREHEN METABOLIC PANEL: CPT

## 2018-05-09 PROCEDURE — 84439 ASSAY OF FREE THYROXINE: CPT

## 2018-05-09 PROCEDURE — 36415 COLL VENOUS BLD VENIPUNCTURE: CPT | Mod: PO

## 2018-05-09 PROCEDURE — 83036 HEMOGLOBIN GLYCOSYLATED A1C: CPT

## 2018-05-10 ENCOUNTER — PATIENT MESSAGE (OUTPATIENT)
Dept: ENDOCRINOLOGY | Facility: CLINIC | Age: 73
End: 2018-05-10

## 2018-05-29 ENCOUNTER — TELEPHONE (OUTPATIENT)
Dept: PODIATRY | Facility: CLINIC | Age: 73
End: 2018-05-29

## 2018-05-29 NOTE — TELEPHONE ENCOUNTER
Patient stated that the callus on the bottom of his foot needs trimmed and that he cannot wait until July.  Appointment made.  Informed the patient that we will let him know tomorrow if the appointment can be sent to his insurance co or if it will be PROC B.  Patient verbalized understanding.

## 2018-05-29 NOTE — TELEPHONE ENCOUNTER
----- Message from Luma Long sent at 5/29/2018 12:11 PM CDT -----  Contact: Patient  Type:  Sooner Apoointment Request    Caller is requesting a sooner appointment.  Caller declined first available appointment listed below.  Caller will not accept being placed on the waitlist and is requesting a message be sent to doctor.    Name of Caller:  Patient  When is the first available appointment?  6/5  Symptoms:  Patient said that he can barely walk on his feet, they are killing him  Best Call Back Number:    Additional Information:

## 2018-05-30 ENCOUNTER — OFFICE VISIT (OUTPATIENT)
Dept: PODIATRY | Facility: CLINIC | Age: 73
End: 2018-05-30
Payer: MEDICARE

## 2018-05-30 VITALS — BODY MASS INDEX: 29.59 KG/M2 | WEIGHT: 199.75 LBS | HEIGHT: 69 IN

## 2018-05-30 DIAGNOSIS — E08.42 DIABETIC POLYNEUROPATHY ASSOCIATED WITH DIABETES MELLITUS DUE TO UNDERLYING CONDITION: Primary | ICD-10-CM

## 2018-05-30 DIAGNOSIS — L84 PRE-ULCERATIVE CALLUSES: ICD-10-CM

## 2018-05-30 PROCEDURE — 99212 OFFICE O/P EST SF 10 MIN: CPT | Mod: S$GLB,,, | Performed by: PODIATRIST

## 2018-05-30 PROCEDURE — 3045F PR MOST RECENT HEMOGLOBIN A1C LEVEL 7.0-9.0%: CPT | Mod: CPTII,S$GLB,, | Performed by: PODIATRIST

## 2018-05-30 PROCEDURE — 99499 UNLISTED E&M SERVICE: CPT | Mod: S$GLB,,, | Performed by: PODIATRIST

## 2018-05-30 PROCEDURE — 99999 PR PBB SHADOW E&M-EST. PATIENT-LVL III: CPT | Mod: PBBFAC,,, | Performed by: PODIATRIST

## 2018-05-30 NOTE — PROGRESS NOTES
"Subjective:      Patient ID: Steve June Jr. is a 73 y.o. male.    Chief Complaint: Callouses (both feet) and Diabetes Mellitus (PCP:  Dr Womack (S Hiro NP) 2/9/18; HgbA1c: 5/9/18  9.0)    Diabetes, increased risk amputation needing evaluation/management/optomization of foot care.    CC thick nails and calluses, burning pain.  Gradual onset, worsening over past several weeks-months, aggravated by increased weight bearing, shoe gear, pressure.  Pain management for burning.  Having difficulty with trimming his own nails.  Denies trauma, signs infection, and surgery both feet. Has noticed some color changes to the left hallux associated callus, "it appears dark like there is blood under the callus."  Has gotten the diabetic shoes but feels they are not helping.    5/30/18: patient returns with worry that the callus to the left great toe is breaking down and ulcerating as there is discoloration and pain associated with it. He has been wearing the new diabetic shoes with heat molded inserts, although today is wearing flip flop sandals. Has not been using the amlactin.    Review of Systems   Constitution: Negative for chills, diaphoresis, fever, malaise/fatigue and night sweats.   Cardiovascular: Positive for leg swelling. Negative for claudication, cyanosis and syncope.   Skin: Positive for nail changes and suspicious lesions. Negative for color change, dry skin, rash and unusual hair distribution.   Musculoskeletal: Negative for falls, joint pain, joint swelling, muscle cramps, muscle weakness and stiffness.   Gastrointestinal: Negative for constipation, diarrhea, nausea and vomiting.   Neurological: Positive for paresthesias and sensory change. Negative for brief paralysis, disturbances in coordination, focal weakness, numbness and tremors.           Objective:      Physical Exam   Constitutional: He is oriented to person, place, and time. He appears well-developed and well-nourished. He is cooperative. "   Oriented to time, place, and person.   Cardiovascular:   Pulses:       Dorsalis pedis pulses are 1+ on the right side, and 1+ on the left side.        Posterior tibial pulses are 1+ on the right side, and 1+ on the left side.   Capillary fill time 3-5 seconds.  Feet are warm to touch proximal with distal cooling.      2+ edema to bilateral lower extremities with varicosities noted.    No pedal hair noted bilateral.     Musculoskeletal:   Normal angle, base, station of gait. Decreased stride length, early heel off, moderately propulsive toe off bilateral.    All ten toes without clubbing, cyanosis, or signs of ischemia.      Ankle dorsiflexion decreased at <10 degrees bilateral with moderate increase with knee flexion bilateral.    No pain to palpation bilateral lower extremities.      Range of motion, stability, muscle strength, and muscle tone are age and health appropriate normal bilateral feet and legs.       Feet:   Right Foot:   Protective Sensation: 10 sites tested. 4 sites sensed.   Left Foot:   Protective Sensation: 10 sites tested. 6 sites sensed.   Lymphadenopathy:   Negative lymphadenopathy bilateral popliteal fossa and tarsal tunnel.     Neurological: He is alert and oriented to person, place, and time. He has normal strength. He is not disoriented. He displays no atrophy and no tremor. A sensory deficit is present. He exhibits normal muscle tone.   Decreased/absent vibratory sensation bilateral feet to 128Hz tuning fork.    Diminished/loss of protective sensation bilateral to 10 gram monofilament.    Paresthesias,  bilateral feet with no clearly identified trigger or source.     Skin: Skin is warm, dry and intact. No abrasion, no bruising, no burn, no ecchymosis, no laceration, no lesion, no petechiae and no rash noted. He is not diaphoretic. No cyanosis or erythema. No pallor. Nails show no clubbing.   Focal hyperkeratotic lesion consisting entirely of hyperkeratotic tissue without open skin,  drainage, pus, fluctuance, malodor, or signs of infection plantar right hallux and mtpj.    Left plantar hallux IPJ there is a focal hyperkeratotic lesion with underlying discoloration, after trimming the underlying skin was discolored but intact, without erythema, drainage or malodor.    Skin thin, atrophic, with decreased density and distribution of pedal hair bilateral, but without ulcers, masses, nodules or cords palpated bilateral feet and legs.    Hyperpigmentation both legs consistent with stasis.    Toenails 1st, 2nd, 3rd, 4th, 5th  bilateral are hypertrophic thickened 2-3 mm, dystrophic, discolored tanish brown with tan, gray crumbly subungual debris.               Assessment:       Encounter Diagnoses   Name Primary?    Diabetic polyneuropathy associated with diabetes mellitus due to underlying condition Yes    Pre-ulcerative calluses          Plan:       Steve was seen today for callouses and diabetes mellitus.    Diagnoses and all orders for this visit:    Diabetic polyneuropathy associated with diabetes mellitus due to underlying condition    Pre-ulcerative calluses      I counseled the patient on his conditions, their implications and medical management.    Patient will stretch the tendo achilles complex three times daily as demonstrated in the office.  Literature was dispensed illustrating proper stretching technique.    Shoe inspection. Diabetic Foot Education. Patient reminded of the importance of good nutrition and blood sugar control to help prevent podiatric complications of diabetes. Patient instructed on proper foot hygeine. We discussed wearing proper shoe gear, daily foot inspections, never walking without protective shoe gear, never putting sharp instruments to feet    With patient's verbal consent out of courtesy the hyperkeratotic lesions x3 total both feet were trimmed to healthy appearing skin using a # 15 scalpel. Patient relates relief of pain following the procedure. Patient  tolerated the procedure well without complications. No anesthesia or hemostasis required. No blood loss.    Continue pain management.    Rx amlactin twice daily  To feet.    Discussed that if he has to return more often than every 2 months some visits may need to be scheduled as Proc B as the insurance will not pay for visits more often. Today he needed to be seen as the callus was beginning to ulcerate. Discussed no barefoot or socks weight bearing, keep the diabetic shoes on.    Return at previous set up July 10 appointment routine care.    Fausto Harvey DPM

## 2018-06-11 ENCOUNTER — TELEPHONE (OUTPATIENT)
Dept: FAMILY MEDICINE | Facility: CLINIC | Age: 73
End: 2018-06-11

## 2018-06-15 ENCOUNTER — DOCUMENTATION ONLY (OUTPATIENT)
Dept: FAMILY MEDICINE | Facility: CLINIC | Age: 73
End: 2018-06-15

## 2018-06-15 NOTE — PROGRESS NOTES
Pre-Visit Chart Review  For Appointment Scheduled on 06/18/2018    Health Maintenance Due   Topic Date Due    TETANUS VACCINE  05/13/1963    Zoster Vaccine  05/13/2007    Pneumococcal (65+) (1 of 2 - PCV13) 05/13/2012

## 2018-06-18 ENCOUNTER — LAB VISIT (OUTPATIENT)
Dept: LAB | Facility: HOSPITAL | Age: 73
End: 2018-06-18
Attending: PHYSICIAN ASSISTANT
Payer: MEDICARE

## 2018-06-18 ENCOUNTER — DOCUMENTATION ONLY (OUTPATIENT)
Dept: FAMILY MEDICINE | Facility: CLINIC | Age: 73
End: 2018-06-18

## 2018-06-18 ENCOUNTER — OFFICE VISIT (OUTPATIENT)
Dept: FAMILY MEDICINE | Facility: CLINIC | Age: 73
End: 2018-06-18
Payer: MEDICARE

## 2018-06-18 VITALS
HEART RATE: 60 BPM | BODY MASS INDEX: 30.17 KG/M2 | SYSTOLIC BLOOD PRESSURE: 115 MMHG | DIASTOLIC BLOOD PRESSURE: 67 MMHG | HEIGHT: 69 IN | WEIGHT: 203.69 LBS | TEMPERATURE: 98 F

## 2018-06-18 DIAGNOSIS — L03.113 CELLULITIS OF RIGHT UPPER EXTREMITY: Primary | ICD-10-CM

## 2018-06-18 DIAGNOSIS — R52 PAIN MANAGEMENT: ICD-10-CM

## 2018-06-18 DIAGNOSIS — I85.10 ESOPHAGEAL VARICES IN CIRRHOSIS: ICD-10-CM

## 2018-06-18 DIAGNOSIS — L03.113 CELLULITIS OF RIGHT UPPER EXTREMITY: ICD-10-CM

## 2018-06-18 DIAGNOSIS — D69.2 PURPURA: ICD-10-CM

## 2018-06-18 DIAGNOSIS — I73.9 PVD (PERIPHERAL VASCULAR DISEASE): ICD-10-CM

## 2018-06-18 DIAGNOSIS — I15.2 HYPERTENSION ASSOCIATED WITH DIABETES: ICD-10-CM

## 2018-06-18 DIAGNOSIS — R80.9 MICROALBUMINURIA DUE TO TYPE 2 DIABETES MELLITUS: ICD-10-CM

## 2018-06-18 DIAGNOSIS — I77.9 CAROTID DISEASE, BILATERAL: ICD-10-CM

## 2018-06-18 DIAGNOSIS — Z00.00 ENCOUNTER FOR PREVENTIVE HEALTH EXAMINATION: Primary | ICD-10-CM

## 2018-06-18 DIAGNOSIS — D69.6 THROMBOCYTOPENIA: ICD-10-CM

## 2018-06-18 DIAGNOSIS — E11.51 TYPE 2 DIABETES MELLITUS WITH DIABETIC PERIPHERAL ANGIOPATHY WITHOUT GANGRENE, WITHOUT LONG-TERM CURRENT USE OF INSULIN: ICD-10-CM

## 2018-06-18 DIAGNOSIS — E11.29 MICROALBUMINURIA DUE TO TYPE 2 DIABETES MELLITUS: ICD-10-CM

## 2018-06-18 DIAGNOSIS — I50.32 CHRONIC DIASTOLIC CONGESTIVE HEART FAILURE: ICD-10-CM

## 2018-06-18 DIAGNOSIS — D68.9 COAGULOPATHY: ICD-10-CM

## 2018-06-18 DIAGNOSIS — E11.59 HYPERTENSION ASSOCIATED WITH DIABETES: ICD-10-CM

## 2018-06-18 DIAGNOSIS — E11.42 TYPE 2 DIABETES MELLITUS WITH DIABETIC POLYNEUROPATHY, WITHOUT LONG-TERM CURRENT USE OF INSULIN: ICD-10-CM

## 2018-06-18 DIAGNOSIS — K21.9 GASTROESOPHAGEAL REFLUX DISEASE, ESOPHAGITIS PRESENCE NOT SPECIFIED: ICD-10-CM

## 2018-06-18 DIAGNOSIS — K74.60 ESOPHAGEAL VARICES IN CIRRHOSIS: ICD-10-CM

## 2018-06-18 DIAGNOSIS — I70.0 ABDOMINAL AORTIC ATHEROSCLEROSIS: ICD-10-CM

## 2018-06-18 DIAGNOSIS — K74.60 CIRRHOSIS OF LIVER WITHOUT ASCITES, UNSPECIFIED HEPATIC CIRRHOSIS TYPE: ICD-10-CM

## 2018-06-18 PROBLEM — M17.12 PRIMARY OSTEOARTHRITIS OF LEFT KNEE: Status: RESOLVED | Noted: 2017-12-15 | Resolved: 2018-06-18

## 2018-06-18 LAB
ALBUMIN SERPL BCP-MCNC: 3.8 G/DL
ALP SERPL-CCNC: 81 U/L
ALT SERPL W/O P-5'-P-CCNC: 16 U/L
ANION GAP SERPL CALC-SCNC: 7 MMOL/L
AST SERPL-CCNC: 20 U/L
BASOPHILS # BLD AUTO: 0.02 K/UL
BASOPHILS NFR BLD: 0.5 %
BILIRUB SERPL-MCNC: 1.2 MG/DL
BUN SERPL-MCNC: 23 MG/DL
CALCIUM SERPL-MCNC: 8.9 MG/DL
CHLORIDE SERPL-SCNC: 99 MMOL/L
CO2 SERPL-SCNC: 28 MMOL/L
CREAT SERPL-MCNC: 1.1 MG/DL
DIFFERENTIAL METHOD: ABNORMAL
EOSINOPHIL # BLD AUTO: 0.4 K/UL
EOSINOPHIL NFR BLD: 8.9 %
ERYTHROCYTE [DISTWIDTH] IN BLOOD BY AUTOMATED COUNT: 14.9 %
EST. GFR  (AFRICAN AMERICAN): >60 ML/MIN/1.73 M^2
EST. GFR  (NON AFRICAN AMERICAN): >60 ML/MIN/1.73 M^2
GLUCOSE SERPL-MCNC: 264 MG/DL
HCT VFR BLD AUTO: 37.6 %
HGB BLD-MCNC: 12.2 G/DL
IMM GRANULOCYTES # BLD AUTO: 0.02 K/UL
IMM GRANULOCYTES NFR BLD AUTO: 0.5 %
LYMPHOCYTES # BLD AUTO: 0.6 K/UL
LYMPHOCYTES NFR BLD: 12.8 %
MCH RBC QN AUTO: 28.3 PG
MCHC RBC AUTO-ENTMCNC: 32.4 G/DL
MCV RBC AUTO: 87 FL
MONOCYTES # BLD AUTO: 0.4 K/UL
MONOCYTES NFR BLD: 9.4 %
NEUTROPHILS # BLD AUTO: 3 K/UL
NEUTROPHILS NFR BLD: 67.9 %
NRBC BLD-RTO: 0 /100 WBC
PLATELET # BLD AUTO: 42 K/UL
PMV BLD AUTO: 11.5 FL
POTASSIUM SERPL-SCNC: 5.2 MMOL/L
PROT SERPL-MCNC: 6.6 G/DL
RBC # BLD AUTO: 4.31 M/UL
SODIUM SERPL-SCNC: 134 MMOL/L
WBC # BLD AUTO: 4.36 K/UL

## 2018-06-18 PROCEDURE — 36415 COLL VENOUS BLD VENIPUNCTURE: CPT | Mod: PO

## 2018-06-18 PROCEDURE — 99499 UNLISTED E&M SERVICE: CPT | Mod: S$GLB,,, | Performed by: PHYSICIAN ASSISTANT

## 2018-06-18 PROCEDURE — 3074F SYST BP LT 130 MM HG: CPT | Mod: CPTII,S$GLB,, | Performed by: PHYSICIAN ASSISTANT

## 2018-06-18 PROCEDURE — 85025 COMPLETE CBC W/AUTO DIFF WBC: CPT

## 2018-06-18 PROCEDURE — 3078F DIAST BP <80 MM HG: CPT | Mod: CPTII,S$GLB,, | Performed by: PHYSICIAN ASSISTANT

## 2018-06-18 PROCEDURE — 99999 PR PBB SHADOW E&M-EST. PATIENT-LVL IV: CPT | Mod: PBBFAC,,, | Performed by: PHYSICIAN ASSISTANT

## 2018-06-18 PROCEDURE — 80053 COMPREHEN METABOLIC PANEL: CPT

## 2018-06-18 PROCEDURE — G0439 PPPS, SUBSEQ VISIT: HCPCS | Mod: S$GLB,,, | Performed by: PHYSICIAN ASSISTANT

## 2018-06-18 PROCEDURE — 3045F PR MOST RECENT HEMOGLOBIN A1C LEVEL 7.0-9.0%: CPT | Mod: CPTII,S$GLB,, | Performed by: PHYSICIAN ASSISTANT

## 2018-06-18 RX ORDER — DOXYCYCLINE 100 MG/1
100 CAPSULE ORAL 2 TIMES DAILY
Qty: 20 CAPSULE | Refills: 0 | Status: ON HOLD | OUTPATIENT
Start: 2018-06-18 | End: 2018-06-26 | Stop reason: HOSPADM

## 2018-06-18 NOTE — PATIENT INSTRUCTIONS
Weight Management: Getting Started  Healthy bodies come in all shapes and sizes. Not all bodies are made to be thin. For some people, a healthy weight is higher than the average weight listed on weight charts. Your healthcare provider can help you decide on a healthy weight for you.    Reasons to lose weight  Losing weight can help with some health problems, such as high blood pressure, heart disease, diabetes, sleep apnea, and arthritis. You may also feel more energy.  Set your long-term goal  Your goal doesn't even have to be a specific weight. You may decide on a fitness goal (such as being able to walk 10 miles a week), or a health goal (such as lowering your blood pressure). Choose a goal that is measurable and reasonable, so you know when you've reached it. A goal of reaching a BMI of less than 25 is not always reasonable (or possible).   Make an action plan  Habits dont change overnight. Setting your goals too high can leave you feeling discouraged if you cant reach them. Be realistic. Choose one or two small changes you can make now. Set an action plan for how you are going to make these changes. When you can stick to this plan, keep making a few more small changes. Taking small steps will help you stay on the path to success.  Track your progress  Write down your goals. Then, keep a daily record of your progress. Write down what you eat and how active you are. This record lets you look back on how much youve done. It may also help when youre feeling frustrated. Reward yourself for success. Even if you dont reach every goal, give yourself credit for what you do get done.  Get support  Encouragement from others can help make losing weight easier. Ask your family members and friends for support. They may even want to join you. Also look to your healthcare provider, registered dietitian, and  for help. Your local hospital can give you more information about nutrition, exercise, and  weight loss.  Date Last Reviewed: 1/31/2016 © 2000-2017 [employer]. [address] [place] [address], [place] [address]. All rights reserved. This information is not intended as a substitute for professional medical care. Always follow your healthcare professional's instructions.        Walking for Fitness  Fitness walking has something for everyone, even people who are already fit. Walking is one of the safest ways to condition your body aerobically. It can boost energy, help you lose weight, and reduce stress.    Physical benefits  · Walking strengthens your heart and lungs, and tones your muscles.  · When walking, your feet land with less impact than in other sports. This reduces chances of muscle, bone, and joint injury.  · Regular walking improves your cholesterol levels and lowers your risk of heart disease. And it helps you control your blood sugar if you have diabetes.  · Walking is a weight-bearing activity, which helps maintain bone density. This can help prevent osteoporosis.  Personal rewards  · Taking walks can help you relax and manage stress. And fitness walking may make you feel better about yourself.  · Walking can help you sleep better at night and make you less likely to be depressed.  · Regular walking may help maintain your memory as you get older.  · Walking is a great way to spend extra time with friends and family members. Be sure to invite your dog along!  Q&A about fitness walking  Q: Will walking keep me fit?  A: Yes. Regular walking at the right pace gives you all the benefits of other aerobic activities, such as jogging and swimming.  Q: Will walking help me lose weight and keep it off?  A: Yes. Per mile, walking can burn as many calories as jogging. Your health care provider can help work walking into your weight-loss plan.  Q: Is walking safe for my health?  A: Yes. Walking is safe if you have high blood pressure, diabetes, heart disease, or other conditions. Talk to your  healthcare provider before you start.  Date Last Reviewed: 4/1/2017  © 9782-0505 [employer]. [address] [place] [address], [place] [address]. All rights reserved. This information is not intended as a substitute for professional medical care. Always follow your healthcare professional's instructions.          Counseling and Referral of Other Preventative  (Italic type indicates deductible and co-insurance are waived)    Patient Name: Steve June  Today's Date: 6/18/2018    Health Maintenance       Date Due Completion Date    Pneumococcal (65+) (1 of 2 - PCV13) 07/30/2018 (Originally 5/13/2012) ---    TETANUS VACCINE 06/03/2019 (Originally 5/13/1963) ---    Lipid Panel 07/06/2018 7/6/2017    Influenza Vaccine 08/01/2018 1/30/2018    Override on 2/5/2015: Declined    Hemoglobin A1c 08/09/2018 5/9/2018    Foot Exam 10/04/2018 10/4/2017    Override on 11/1/2016: Done (Dr. Andrade's note)    Eye Exam 01/22/2019 1/22/2018    Override on 3/28/2017: Done (no DR per patient)    Urine Microalbumin 05/09/2019 5/9/2018    Colonoscopy 08/01/2022 8/1/2012 (Done)    Override on 8/1/2012: Done (per [facility] note)        No orders of the defined types were placed in this encounter.    The following information is provided to all patients.  This information is to help you find resources for any of the problems found today that may be affecting your health:                Living healthy guide: www.[facility].[place].gov      Understanding Diabetes: www.diabetes.org      Eating healthy: www.cdc.gov/healthyweight      CDC home safety checklist: www.cdc.gov/steadi/patient.html      Agency on Aging: www.goea.[place].gov      Alcoholics anonymous (AA): www.aa.org      Physical Activity: www.radha.nih.gov/zj2htly      Tobacco use: www.quitwithusla.org

## 2018-06-18 NOTE — PROGRESS NOTES
"Steve June presented for a  Medicare AWV and comprehensive Health Risk Assessment today. The following components were reviewed and updated:    · Medical history  · Family History  · Social history  · Allergies and Current Medications  · Health Risk Assessment  · Health Maintenance  · Care Team     ** See Completed Assessments for Annual Wellness Visit within the encounter summary.**       The following assessments were completed:  · Living Situation  · CAGE  · Depression Screening  · Timed Get Up and Go  · Whisper Test  · Cognitive Function Screening  · Nutrition Screening  · ADL Screening  · PAQ Screening    Vitals:    06/18/18 1303   BP: 115/67   BP Location: Left arm   Patient Position: Sitting   BP Method: Large (Automatic)   Pulse: 60   Temp: 98.2 °F (36.8 °C)   TempSrc: Oral   Weight: 92.4 kg (203 lb 11.3 oz)   Height: 5' 9" (1.753 m)     Body mass index is 30.08 kg/m².  Physical Exam   Constitutional: He is oriented to person, place, and time. He appears well-developed and well-nourished. No distress.   HENT:   Head: Normocephalic and atraumatic.   Neck: Normal range of motion. Neck supple. No JVD present.   Pulmonary/Chest: Effort normal.   Neurological: He is alert and oriented to person, place, and time.   Skin: He is not diaphoretic.               Diagnoses and health risks identified today and associated recommendations/orders:    Steve was seen today for medicare awv.    Diagnoses and all orders for this visit:    Encounter for preventive health examination    Type 2 diabetes mellitus with diabetic peripheral angiopathy without gangrene, without long-term current use of insulin  Comments:  uncontrolled, continue current regimen  Orders:  -     Ambulatory referral to Outpatient Case Management    Hypertension associated with diabetes  Comments:  controlled, continue meds  Orders:  -     Ambulatory referral to Outpatient Case Management    Microalbuminuria due to type 2 diabetes " mellitus  Comments:  stable, continue to monitor    Chronic diastolic congestive heart failure  Comments:  stable, followed by cardiology  Orders:  -     Ambulatory referral to Outpatient Case Management    Thrombocytopenia  Comments:  uncontrolled, labs today  Orders:  -     Ambulatory referral to Outpatient Case Management    PVD (peripheral vascular disease)  Comments:  stable, continue to monitor  Orders:  -     Ambulatory referral to Outpatient Case Management    Cirrhosis of liver without ascites, unspecified hepatic cirrhosis type  Comments:  stable, followed by hepatology  Orders:  -     Ambulatory referral to Outpatient Case Management    Purpura  Comments:  stable, continue to monitor    Esophageal varices with a history of bleeding  Comments:  controlled, continue to monitor  Orders:  -     Ambulatory referral to Outpatient Case Management    Type 2 diabetes mellitus with diabetic polyneuropathy, without long-term current use of insulin  Comments:  uncontrolled, continue meds  Orders:  -     Ambulatory referral to Outpatient Case Management    Coagulopathy  Comments:  labs ordered    Abdominal aortic atherosclerosis  Comments:  stable, continue to monitor    Carotid disease, bilateral  Comments:  stable, continue to monitor    Gastroesophageal reflux disease, esophagitis presence not specified  Comments:  stable, continue to monitor    Pain management  Comments:  follows chronic pain  Orders:  -     Ambulatory referral to Outpatient Case Management        Provided Steve with a 5-10 year written screening schedule and personal prevention plan. Recommendations were developed using the USPSTF age appropriate recommendations. Education, counseling, and referrals were provided as needed. After Visit Summary printed and given to patient which includes a list of additional screenings\tests needed.    No Follow-up on file.    CORNEL De Los Santos     Patient readiness: acceptance and barriers:none    During the  course of the visit the patient was educated and counseled about the following:     Diabetes:  Discussed general issues about diabetes pathophysiology and management.  Hypertension:   Dietary sodium restriction.  Obesity:   General weight loss/lifestyle modification strategies discussed (elicit support from others; identify saboteurs; non-food rewards, etc).    Goals: Diabetes: Maintain Hemoglobin A1C below 7, Hypertension: Reduce Blood Pressure and Obesity: Reduce calorie intake and BMI    Did patient meet goals/outcomes: No    The following self management tools provided: declined    Patient Instructions (the written plan) was given to the patient/family.     Time spent with patient: 55 minutes    Barriers to medications present (no )    Adverse reactions to current medications (no)    Over the counter medications reviewed (Yes)

## 2018-06-19 ENCOUNTER — OUTPATIENT CASE MANAGEMENT (OUTPATIENT)
Dept: ADMINISTRATIVE | Facility: OTHER | Age: 73
End: 2018-06-19

## 2018-06-19 ENCOUNTER — TELEPHONE (OUTPATIENT)
Dept: FAMILY MEDICINE | Facility: CLINIC | Age: 73
End: 2018-06-19

## 2018-06-19 ENCOUNTER — OFFICE VISIT (OUTPATIENT)
Dept: FAMILY MEDICINE | Facility: CLINIC | Age: 73
End: 2018-06-19
Payer: MEDICARE

## 2018-06-19 VITALS
SYSTOLIC BLOOD PRESSURE: 127 MMHG | WEIGHT: 203.69 LBS | HEART RATE: 56 BPM | DIASTOLIC BLOOD PRESSURE: 65 MMHG | TEMPERATURE: 98 F | HEIGHT: 69 IN | BODY MASS INDEX: 30.17 KG/M2

## 2018-06-19 DIAGNOSIS — L03.113 CELLULITIS OF RIGHT UPPER EXTREMITY: Primary | ICD-10-CM

## 2018-06-19 DIAGNOSIS — E87.5 HYPERKALEMIA: ICD-10-CM

## 2018-06-19 PROCEDURE — 3078F DIAST BP <80 MM HG: CPT | Mod: CPTII,S$GLB,, | Performed by: PHYSICIAN ASSISTANT

## 2018-06-19 PROCEDURE — 99999 PR PBB SHADOW E&M-EST. PATIENT-LVL IV: CPT | Mod: PBBFAC,,, | Performed by: PHYSICIAN ASSISTANT

## 2018-06-19 PROCEDURE — 3074F SYST BP LT 130 MM HG: CPT | Mod: CPTII,S$GLB,, | Performed by: PHYSICIAN ASSISTANT

## 2018-06-19 PROCEDURE — 99213 OFFICE O/P EST LOW 20 MIN: CPT | Mod: S$GLB,,, | Performed by: PHYSICIAN ASSISTANT

## 2018-06-19 NOTE — PROGRESS NOTES
Subjective:       Patient ID: Steve June Jr. is a 73 y.o. male.    Chief Complaint: Follow-up (cellulitis right arm)    Patient is here for follow up of arm cellulitis.  He did not start the antibiotic as directed yesterday but has noticed a slight improvement.  No fever but has continued pain.  Labs as follows:    Lab Results       Component                Value               Date                       WBC                      4.36                06/18/2018                 HGB                      12.2 (L)            06/18/2018                 HCT                      37.6 (L)            06/18/2018                 PLT                      42 (L)              06/18/2018                 CHOL                     151                 07/06/2017                 TRIG                     62                  07/06/2017                 HDL                      57                  07/06/2017                 ALT                      16                  06/18/2018                 AST                      20                  06/18/2018                 NA                       134 (L)             06/18/2018                 K                        5.2 (H)             06/18/2018                 CL                       99                  06/18/2018                 CREATININE               1.1                 06/18/2018                 BUN                      23                  06/18/2018                 CO2                      28                  06/18/2018                 TSH                      4.010 (H)           05/09/2018                 PSA                      0.54                03/13/2012                 INR                      1.2                 04/27/2018                 HGBA1C                   9.0 (H)             05/09/2018                Review of Systems   Constitutional: Negative for chills, fatigue and fever.   Respiratory: Negative for chest tightness, shortness of breath and wheezing.     Cardiovascular: Negative for chest pain, palpitations and leg swelling.   Gastrointestinal: Negative for nausea.   Musculoskeletal: Negative for arthralgias and joint swelling.   Skin: Positive for color change and wound. Negative for pallor and rash.       Objective:      Physical Exam   Constitutional: He appears well-developed and well-nourished. No distress.   HENT:   Head: Normocephalic and atraumatic.   Right Ear: External ear normal.   Left Ear: External ear normal.   Mouth/Throat: No oropharyngeal exudate.   Eyes: Conjunctivae and EOM are normal. Pupils are equal, round, and reactive to light. Right eye exhibits no discharge. Left eye exhibits no discharge.   Neck: Normal range of motion. Neck supple. No thyromegaly present.   Cardiovascular: Normal rate, regular rhythm and normal heart sounds.  Exam reveals no gallop and no friction rub.    No murmur heard.  Pulmonary/Chest: Effort normal and breath sounds normal. No respiratory distress. He has no wheezes. He has no rales.   Abdominal: Soft. Bowel sounds are normal. He exhibits no mass. There is no tenderness. There is no rebound and no guarding.   Lymphadenopathy:     He has no cervical adenopathy.   Skin: He is not diaphoretic.   Slight improvement in erythema in the upper part of the right arm, rest of arm unchanged. No drainage        Assessment:       1. Cellulitis of right upper extremity    2. Hyperkalemia        Plan:       Steve was seen today for follow-up.    Diagnoses and all orders for this visit:    Cellulitis of right upper extremity    Hyperkalemia  -     Potassium; Future    Patient to go directly from the pharmacy to  Doxycycline.  Will RTC in 3 days for recheck or call if symptoms worsening.  Repeat potassium at visit in 3 days

## 2018-06-19 NOTE — PROGRESS NOTES
Attempt #:  1  This LMSW attempted to reach patient to provide resources. Patient reports he is unavailable at this time. Patient ask that this LMSW contact him later today.

## 2018-06-19 NOTE — TELEPHONE ENCOUNTER
----- Message from Katelynn Parry LMSW sent at 6/19/2018  8:04 AM CDT -----  Thank you for the referral.  Patient has been assigned to Katelynn Parry LMSW for low risk screening for Outpatient Case Management.     Reason for referral:Type 2 diabetes mellitus with diabetic peripheral angiopathy without gangrene, without long-term current use of insulin  Hypertension associated with diabetes  Chronic diastolic congestive heart failure  Thrombocytopenia  PVD (peripheral vascular disease)  Cirrhosis of liver without ascites, unspecified hepatic cirrhosis type  Esophageal varices in cirrhosis  Type 2 diabetes mellitus with diabetic polyneuropathy, without long-term current use of insulin  Pain management  Please contact [facility] at [phone] with any questions.    Thank you,    Katelynn Parry LMSW

## 2018-06-19 NOTE — PROGRESS NOTES
Thank you for the referral.  Patient has been assigned to Katelynn Parry LMSW for low risk screening for Outpatient Case Management.     Reason for referral:Type 2 diabetes mellitus with diabetic peripheral angiopathy without gangrene, without long-term current use of insulin  Hypertension associated with diabetes  Chronic diastolic congestive heart failure  Thrombocytopenia  PVD (peripheral vascular disease)  Cirrhosis of liver without ascites, unspecified hepatic cirrhosis type  Esophageal varices in cirrhosis  Type 2 diabetes mellitus with diabetic polyneuropathy, without long-term current use of insulin  Pain management  Please contact [facility] at [phone] with any questions.    Thank you,    Katelynn Parry LMSW

## 2018-06-20 ENCOUNTER — OUTPATIENT CASE MANAGEMENT (OUTPATIENT)
Dept: ADMINISTRATIVE | Facility: OTHER | Age: 73
End: 2018-06-20

## 2018-06-20 ENCOUNTER — TELEPHONE (OUTPATIENT)
Dept: FAMILY MEDICINE | Facility: CLINIC | Age: 73
End: 2018-06-20

## 2018-06-20 NOTE — TELEPHONE ENCOUNTER
----- Message from Katelynn Parry LMSW sent at 6/20/2018  3:33 PM CDT -----  This SW received a referral on the above patient. This [facility] [place] with the following resource: [facility] , [facility]. The resource can be located within [facility] under the Health, Care category.    Thank you for the referral,    Katelynn Parry LMSW

## 2018-06-20 NOTE — PROGRESS NOTES
This SW received a referral on the above patient.   Reason for referral:Type 2 diabetes mellitus with diabetic peripheral angiopathy without gangrene, without long-term current use of insulin   Hypertension associated with diabetes   Chronic diastolic congestive heart failure   Thrombocytopenia   PVD (peripheral vascular disease)   Cirrhosis of liver without ascites, unspecified hepatic cirrhosis type   Esophageal varices in cirrhosis   Type 2 diabetes mellitus with diabetic polyneuropathy, without long-term current use of insulin   Pain management   Name of the community resource that was provided:[facility] , [facility]  Resource given to: Patient  via [employer] Mail and Telephone    This SW completed assessment with patient . Patient resides with spouse. Patient reports he is able to complete ADL's. Without assistance. Patient denied needing assistance with food and shelter, however needs assistance with medication and medical. LMSW sent a referral to Pharmacy Assistance to assist with medication affordability. Patient reports being connected with [facility], however at this time need to reapply. Patient will reapply with  [facility] in [place]. Patient reports he has all diabetic materials. Patient denied referral to Diabetic Educator. Patient reports he will make an appointment with Endocrine. Patient reports he drives himself to and from appointments. Patient was given information on Advance Care Planning on last office visit. Patient would like additional resources on [facility]. SW mailed patient information on [facility]. SW mailed all resources and provided her contact information for any additional needs.

## 2018-06-23 ENCOUNTER — OFFICE VISIT (OUTPATIENT)
Dept: FAMILY MEDICINE | Facility: CLINIC | Age: 73
End: 2018-06-23
Payer: MEDICARE

## 2018-06-23 ENCOUNTER — LAB VISIT (OUTPATIENT)
Dept: LAB | Facility: HOSPITAL | Age: 73
End: 2018-06-23
Attending: PHYSICIAN ASSISTANT
Payer: MEDICARE

## 2018-06-23 VITALS
HEART RATE: 59 BPM | SYSTOLIC BLOOD PRESSURE: 131 MMHG | HEIGHT: 69 IN | TEMPERATURE: 98 F | BODY MASS INDEX: 29.68 KG/M2 | WEIGHT: 200.38 LBS | DIASTOLIC BLOOD PRESSURE: 66 MMHG

## 2018-06-23 DIAGNOSIS — L03.113 CELLULITIS OF RIGHT UPPER EXTREMITY: Primary | ICD-10-CM

## 2018-06-23 DIAGNOSIS — E87.5 HYPERKALEMIA: ICD-10-CM

## 2018-06-23 LAB — POTASSIUM SERPL-SCNC: 5.5 MMOL/L

## 2018-06-23 PROCEDURE — 3075F SYST BP GE 130 - 139MM HG: CPT | Mod: CPTII,S$GLB,, | Performed by: PHYSICIAN ASSISTANT

## 2018-06-23 PROCEDURE — 99999 PR PBB SHADOW E&M-EST. PATIENT-LVL III: CPT | Mod: PBBFAC,,, | Performed by: PHYSICIAN ASSISTANT

## 2018-06-23 PROCEDURE — 99213 OFFICE O/P EST LOW 20 MIN: CPT | Mod: S$GLB,,, | Performed by: PHYSICIAN ASSISTANT

## 2018-06-23 PROCEDURE — 3078F DIAST BP <80 MM HG: CPT | Mod: CPTII,S$GLB,, | Performed by: PHYSICIAN ASSISTANT

## 2018-06-23 PROCEDURE — 36415 COLL VENOUS BLD VENIPUNCTURE: CPT | Mod: PO

## 2018-06-23 PROCEDURE — 84132 ASSAY OF SERUM POTASSIUM: CPT

## 2018-06-23 NOTE — PROGRESS NOTES
Subjective:       Patient ID: Steve June Jr. is a 73 y.o. male.    Chief Complaint: Follow-up (cellulitis)    Patient has been taking the Doxycycline as prescribed but says it does make him sleepy.  She states the arm looks and feels significantly better and the pain has drastically reduced. No fever      Review of Systems   Constitutional: Negative for chills, fatigue and fever.   Respiratory: Negative for chest tightness, shortness of breath and wheezing.    Cardiovascular: Negative for chest pain, palpitations and leg swelling.   Gastrointestinal: Negative for nausea.   Musculoskeletal: Negative for arthralgias and joint swelling.   Skin: Positive for color change. Negative for pallor, rash and wound.       Objective:      Physical Exam   Constitutional: He is oriented to person, place, and time. He appears well-developed and well-nourished. No distress.   HENT:   Head: Normocephalic and atraumatic.   Eyes: Conjunctivae are normal. Pupils are equal, round, and reactive to light.   Neck: Normal range of motion. Neck supple. No JVD present. No thyromegaly present.   Cardiovascular: Normal rate and regular rhythm.  Exam reveals no gallop and no friction rub.    No murmur heard.  Pulmonary/Chest: Effort normal. No respiratory distress. He has no wheezes. He has no rales. He exhibits no tenderness.   Neurological: He is alert and oriented to person, place, and time.   Skin: He is not diaphoretic.   Right arm: erythema almost completely resolved.  Area of blister healed.  No drainage.  Skin is dry and peeling.        Assessment:       1. Cellulitis of right upper extremity        Plan:       Finish ABX and call if symptoms recur or worsen

## 2018-06-25 ENCOUNTER — OFFICE VISIT (OUTPATIENT)
Dept: FAMILY MEDICINE | Facility: CLINIC | Age: 73
End: 2018-06-25
Payer: MEDICARE

## 2018-06-25 ENCOUNTER — HOSPITAL ENCOUNTER (INPATIENT)
Facility: HOSPITAL | Age: 73
LOS: 1 days | Discharge: HOME | DRG: 603 | End: 2018-06-26
Attending: INTERNAL MEDICINE | Admitting: INTERNAL MEDICINE
Payer: MEDICARE

## 2018-06-25 ENCOUNTER — DOCUMENTATION ONLY (OUTPATIENT)
Dept: FAMILY MEDICINE | Facility: CLINIC | Age: 73
End: 2018-06-25

## 2018-06-25 VITALS
HEIGHT: 69 IN | HEART RATE: 62 BPM | WEIGHT: 200.38 LBS | SYSTOLIC BLOOD PRESSURE: 133 MMHG | TEMPERATURE: 98 F | DIASTOLIC BLOOD PRESSURE: 71 MMHG | BODY MASS INDEX: 29.68 KG/M2

## 2018-06-25 DIAGNOSIS — D68.9 COAGULOPATHY: ICD-10-CM

## 2018-06-25 DIAGNOSIS — K74.60 CIRRHOSIS OF LIVER WITHOUT ASCITES, UNSPECIFIED HEPATIC CIRRHOSIS TYPE: ICD-10-CM

## 2018-06-25 DIAGNOSIS — L03.115 CELLULITIS OF RIGHT LOWER EXTREMITY: Primary | ICD-10-CM

## 2018-06-25 DIAGNOSIS — I50.32 CHRONIC DIASTOLIC CONGESTIVE HEART FAILURE: ICD-10-CM

## 2018-06-25 DIAGNOSIS — D69.6 THROMBOCYTOPENIA: ICD-10-CM

## 2018-06-25 DIAGNOSIS — L03.90 CELLULITIS: ICD-10-CM

## 2018-06-25 DIAGNOSIS — E11.51 TYPE 2 DIABETES MELLITUS WITH DIABETIC PERIPHERAL ANGIOPATHY WITHOUT GANGRENE, WITHOUT LONG-TERM CURRENT USE OF INSULIN: ICD-10-CM

## 2018-06-25 DIAGNOSIS — I73.9 PVD (PERIPHERAL VASCULAR DISEASE): ICD-10-CM

## 2018-06-25 DIAGNOSIS — E11.42 TYPE 2 DIABETES MELLITUS WITH DIABETIC POLYNEUROPATHY, WITHOUT LONG-TERM CURRENT USE OF INSULIN: Primary | ICD-10-CM

## 2018-06-25 LAB
ANION GAP SERPL CALC-SCNC: 10 MMOL/L
ANISOCYTOSIS BLD QL SMEAR: SLIGHT
BASOPHILS # BLD AUTO: 0 K/UL
BASOPHILS NFR BLD: 0.5 %
BUN SERPL-MCNC: 21 MG/DL
CALCIUM SERPL-MCNC: 9.3 MG/DL
CHLORIDE SERPL-SCNC: 101 MMOL/L
CO2 SERPL-SCNC: 24 MMOL/L
CREAT SERPL-MCNC: 0.9 MG/DL
CRP SERPL-MCNC: 2.9 MG/L
DIFFERENTIAL METHOD: ABNORMAL
EOSINOPHIL # BLD AUTO: 0.2 K/UL
EOSINOPHIL NFR BLD: 7.7 %
ERYTHROCYTE [DISTWIDTH] IN BLOOD BY AUTOMATED COUNT: 15.5 %
EST. GFR  (AFRICAN AMERICAN): >60 ML/MIN/1.73 M^2
EST. GFR  (NON AFRICAN AMERICAN): >60 ML/MIN/1.73 M^2
ESTIMATED AVG GLUCOSE: 212 MG/DL
GLUCOSE SERPL-MCNC: 275 MG/DL
HBA1C MFR BLD HPLC: 9 %
HCT VFR BLD AUTO: 33.2 %
HGB BLD-MCNC: 11.5 G/DL
LYMPHOCYTES # BLD AUTO: 0.5 K/UL
LYMPHOCYTES NFR BLD: 14.7 %
MCH RBC QN AUTO: 28.5 PG
MCHC RBC AUTO-ENTMCNC: 34.6 G/DL
MCV RBC AUTO: 82 FL
MONOCYTES # BLD AUTO: 0.2 K/UL
MONOCYTES NFR BLD: 6.9 %
NEUTROPHILS # BLD AUTO: 2.2 K/UL
NEUTROPHILS NFR BLD: 70.2 %
OVALOCYTES BLD QL SMEAR: ABNORMAL
PLATELET # BLD AUTO: 28 K/UL
PLATELET BLD QL SMEAR: ABNORMAL
PMV BLD AUTO: 8.5 FL
POCT GLUCOSE: 166 MG/DL (ref 70–110)
POCT GLUCOSE: 248 MG/DL (ref 70–110)
POCT GLUCOSE: 297 MG/DL (ref 70–110)
POIKILOCYTOSIS BLD QL SMEAR: SLIGHT
POTASSIUM SERPL-SCNC: 4.5 MMOL/L
RBC # BLD AUTO: 4.03 M/UL
SODIUM SERPL-SCNC: 135 MMOL/L
WBC # BLD AUTO: 3.1 K/UL

## 2018-06-25 PROCEDURE — 83036 HEMOGLOBIN GLYCOSYLATED A1C: CPT

## 2018-06-25 PROCEDURE — 25000003 PHARM REV CODE 250: Performed by: NURSE PRACTITIONER

## 2018-06-25 PROCEDURE — 85025 COMPLETE CBC W/AUTO DIFF WBC: CPT

## 2018-06-25 PROCEDURE — 63600175 PHARM REV CODE 636 W HCPCS: Performed by: HOSPITALIST

## 2018-06-25 PROCEDURE — 86140 C-REACTIVE PROTEIN: CPT

## 2018-06-25 PROCEDURE — 99214 OFFICE O/P EST MOD 30 MIN: CPT | Mod: S$GLB,,, | Performed by: PHYSICIAN ASSISTANT

## 2018-06-25 PROCEDURE — 99999 PR PBB SHADOW E&M-EST. PATIENT-LVL III: CPT | Mod: PBBFAC,,, | Performed by: PHYSICIAN ASSISTANT

## 2018-06-25 PROCEDURE — 25000003 PHARM REV CODE 250: Performed by: HOSPITALIST

## 2018-06-25 PROCEDURE — 3078F DIAST BP <80 MM HG: CPT | Mod: CPTII,S$GLB,, | Performed by: PHYSICIAN ASSISTANT

## 2018-06-25 PROCEDURE — 99499 UNLISTED E&M SERVICE: CPT | Mod: S$GLB,,, | Performed by: PHYSICIAN ASSISTANT

## 2018-06-25 PROCEDURE — 3075F SYST BP GE 130 - 139MM HG: CPT | Mod: CPTII,S$GLB,, | Performed by: PHYSICIAN ASSISTANT

## 2018-06-25 PROCEDURE — S0077 INJECTION, CLINDAMYCIN PHOSP: HCPCS | Performed by: HOSPITALIST

## 2018-06-25 PROCEDURE — 80048 BASIC METABOLIC PNL TOTAL CA: CPT

## 2018-06-25 PROCEDURE — 36415 COLL VENOUS BLD VENIPUNCTURE: CPT

## 2018-06-25 PROCEDURE — 11000001 HC ACUTE MED/SURG PRIVATE ROOM

## 2018-06-25 RX ORDER — CLINDAMYCIN PHOSPHATE 600 MG/50ML
600 INJECTION, SOLUTION INTRAVENOUS
Status: DISCONTINUED | OUTPATIENT
Start: 2018-06-25 | End: 2018-06-25

## 2018-06-25 RX ORDER — IBUPROFEN 200 MG
24 TABLET ORAL
Status: DISCONTINUED | OUTPATIENT
Start: 2018-06-25 | End: 2018-06-26 | Stop reason: HOSPADM

## 2018-06-25 RX ORDER — CLINDAMYCIN PHOSPHATE 600 MG/50ML
600 INJECTION, SOLUTION INTRAVENOUS
Status: DISCONTINUED | OUTPATIENT
Start: 2018-06-25 | End: 2018-06-26 | Stop reason: HOSPADM

## 2018-06-25 RX ORDER — INSULIN ASPART 100 [IU]/ML
0-5 INJECTION, SOLUTION INTRAVENOUS; SUBCUTANEOUS
Status: DISCONTINUED | OUTPATIENT
Start: 2018-06-25 | End: 2018-06-26 | Stop reason: HOSPADM

## 2018-06-25 RX ORDER — CARVEDILOL 6.25 MG/1
6.25 TABLET ORAL 2 TIMES DAILY WITH MEALS
Status: DISCONTINUED | OUTPATIENT
Start: 2018-06-25 | End: 2018-06-26 | Stop reason: HOSPADM

## 2018-06-25 RX ORDER — CLINDAMYCIN PHOSPHATE 600 MG/50ML
600 INJECTION, SOLUTION INTRAVENOUS ONCE
Status: COMPLETED | OUTPATIENT
Start: 2018-06-25 | End: 2018-06-25

## 2018-06-25 RX ORDER — OXYCODONE AND ACETAMINOPHEN 10; 325 MG/1; MG/1
1 TABLET ORAL EVERY 6 HOURS PRN
Status: DISCONTINUED | OUTPATIENT
Start: 2018-06-25 | End: 2018-06-26 | Stop reason: HOSPADM

## 2018-06-25 RX ORDER — ONDANSETRON 4 MG/1
8 TABLET, ORALLY DISINTEGRATING ORAL EVERY 8 HOURS PRN
Status: DISCONTINUED | OUTPATIENT
Start: 2018-06-25 | End: 2018-06-26 | Stop reason: HOSPADM

## 2018-06-25 RX ORDER — VANCOMYCIN HCL IN 5 % DEXTROSE 1G/250ML
1000 PLASTIC BAG, INJECTION (ML) INTRAVENOUS
Status: DISCONTINUED | OUTPATIENT
Start: 2018-06-25 | End: 2018-06-25

## 2018-06-25 RX ORDER — GLUCAGON 1 MG
1 KIT INJECTION
Status: DISCONTINUED | OUTPATIENT
Start: 2018-06-25 | End: 2018-06-26 | Stop reason: HOSPADM

## 2018-06-25 RX ORDER — DIPHENHYDRAMINE HCL 25 MG
25 CAPSULE ORAL EVERY 6 HOURS PRN
Status: DISCONTINUED | OUTPATIENT
Start: 2018-06-25 | End: 2018-06-26 | Stop reason: HOSPADM

## 2018-06-25 RX ORDER — AMMONIUM LACTATE 12 G/100G
LOTION TOPICAL 2 TIMES DAILY PRN
Status: DISCONTINUED | OUTPATIENT
Start: 2018-06-25 | End: 2018-06-26 | Stop reason: HOSPADM

## 2018-06-25 RX ORDER — PANTOPRAZOLE SODIUM 40 MG/1
40 TABLET, DELAYED RELEASE ORAL DAILY
Status: DISCONTINUED | OUTPATIENT
Start: 2018-06-26 | End: 2018-06-26 | Stop reason: HOSPADM

## 2018-06-25 RX ORDER — ACETAMINOPHEN 500 MG
1000 TABLET ORAL EVERY 6 HOURS PRN
Status: DISCONTINUED | OUTPATIENT
Start: 2018-06-25 | End: 2018-06-26 | Stop reason: HOSPADM

## 2018-06-25 RX ORDER — IBUPROFEN 200 MG
16 TABLET ORAL
Status: DISCONTINUED | OUTPATIENT
Start: 2018-06-25 | End: 2018-06-26 | Stop reason: HOSPADM

## 2018-06-25 RX ORDER — INSULIN ASPART 100 [IU]/ML
10 INJECTION, SOLUTION INTRAVENOUS; SUBCUTANEOUS
Status: DISCONTINUED | OUTPATIENT
Start: 2018-06-25 | End: 2018-06-26 | Stop reason: HOSPADM

## 2018-06-25 RX ORDER — CALCIUM CARBONATE 200(500)MG
500 TABLET,CHEWABLE ORAL 3 TIMES DAILY PRN
Status: DISCONTINUED | OUTPATIENT
Start: 2018-06-25 | End: 2018-06-26 | Stop reason: HOSPADM

## 2018-06-25 RX ORDER — SODIUM CHLORIDE 0.9 % (FLUSH) 0.9 %
5 SYRINGE (ML) INJECTION
Status: DISCONTINUED | OUTPATIENT
Start: 2018-06-25 | End: 2018-06-26 | Stop reason: HOSPADM

## 2018-06-25 RX ADMIN — CLINDAMYCIN IN 5 PERCENT DEXTROSE 600 MG: 12 INJECTION, SOLUTION INTRAVENOUS at 12:06

## 2018-06-25 RX ADMIN — INSULIN ASPART 1 UNITS: 100 INJECTION, SOLUTION INTRAVENOUS; SUBCUTANEOUS at 08:06

## 2018-06-25 RX ADMIN — INSULIN ASPART 10 UNITS: 100 INJECTION, SOLUTION INTRAVENOUS; SUBCUTANEOUS at 05:06

## 2018-06-25 RX ADMIN — INSULIN DETEMIR 30 UNITS: 100 INJECTION, SOLUTION SUBCUTANEOUS at 08:06

## 2018-06-25 RX ADMIN — DIPHENHYDRAMINE HYDROCHLORIDE 25 MG: 25 CAPSULE ORAL at 05:06

## 2018-06-25 RX ADMIN — INSULIN ASPART 3 UNITS: 100 INJECTION, SOLUTION INTRAVENOUS; SUBCUTANEOUS at 12:06

## 2018-06-25 RX ADMIN — CALCIUM CARBONATE (ANTACID) CHEW TAB 500 MG 500 MG: 500 CHEW TAB at 01:06

## 2018-06-25 RX ADMIN — OXYCODONE HYDROCHLORIDE AND ACETAMINOPHEN 1 TABLET: 10; 325 TABLET ORAL at 08:06

## 2018-06-25 RX ADMIN — INSULIN ASPART 10 UNITS: 100 INJECTION, SOLUTION INTRAVENOUS; SUBCUTANEOUS at 12:06

## 2018-06-25 RX ADMIN — CARVEDILOL 6.25 MG: 6.25 TABLET, FILM COATED ORAL at 05:06

## 2018-06-25 RX ADMIN — OXYCODONE HYDROCHLORIDE AND ACETAMINOPHEN 1 TABLET: 10; 325 TABLET ORAL at 01:06

## 2018-06-25 NOTE — ASSESSMENT & PLAN NOTE
Chronic; not currently on statin or ASA secondary to liver disease.  Continue to monitor clinically.

## 2018-06-25 NOTE — SUBJECTIVE & OBJECTIVE
"Past Medical History:   Diagnosis Date    Abnormal thyroid function test     Allergy     Seasonal    Anemia     Anemia due to blood loss 7/2/2014    Arthritis     Gaviria esophagus     Basal cell carcinoma     right forearm    Basal cell carcinoma 12/2011    lower post neck    Cancer     skin CA    Cataract     Cirrhosis     Diabetes mellitus     Diabetes mellitus, type 2     Encounter for blood transfusion     Esophageal varices in cirrhosis     grade II on 7/12 EGD    Gastritis     on 7/12 EGD    GERD (gastroesophageal reflux disease) 2/28/2015    Hard of hearing     Hiatal hernia     History of hepatitis C 8/10/2012    tx with harvoni x 41 days (started 10/22/15). SVR4     Hoarseness 2/28/2015    Hypercholesteremia     Hypersplenism     Hypertension     No meds    Pain management 12/10/2014    Petechial hemorrhage 11/25/2015    Lower extremities bilat     Portal hypertensive gastropathy     on 7/12 EGD    Thrombocytopenia     Type II or unspecified type diabetes mellitus with neurological manifestations, uncontrolled(250.62) 12/24/2013    Valvular heart disease     mild MR 12       Past Surgical History:   Procedure Laterality Date    CATARACT EXTRACTION  1/10/13    left eye    CATARACT EXTRACTION      right eye    CHOLECYSTECTOMY      COLONOSCOPY      EYE SURGERY      Cataract surgery to right eye    KNEE ARTHROSCOPY W/ MENISCAL REPAIR      KNEE CARTILAGE SURGERY      left knee    KNEE SURGERY  12/2006    left    SKIN LESION EXCISION      TONSILLECTOMY      UPPER GASTROINTESTINAL ENDOSCOPY         Review of patient's allergies indicates:   Allergen Reactions    Adhesive tape-silicones Other (See Comments)     pulls skin off    Doxycycline      Dizzy. "Just didn't feel right".       No current facility-administered medications on file prior to encounter.      Current Outpatient Prescriptions on File Prior to Encounter   Medication Sig    carvedilol (COREG) 6.25 MG " "tablet TAKE 1 TABLET(6.25 MG) BY MOUTH TWICE DAILY    doxycycline (MONODOX) 100 MG capsule Take 1 capsule (100 mg total) by mouth 2 (two) times daily.    GLUCOSAMINE HCL/CHONDR CHISHOLM A NA (OSTEO BI-FLEX ORAL) Take 2 tablets by mouth once daily.    insulin aspart U-100 (NOVOLOG FLEXPEN U-100 INSULIN) 100 unit/mL InPn pen Inject 10 Units into the skin 3 (three) times daily with meals.    insulin detemir U-100 (LEVEMIR FLEXTOUCH) 100 unit/mL (3 mL) SubQ InPn pen Inject 30 Units into the skin every evening. (Patient taking differently: Inject 21-22 Units into the skin every evening. )    oxyCODONE-acetaminophen (PERCOCET)  mg per tablet Take 1 tablet by mouth every 6 (six) hours as needed for Pain.    ACCU-CHEK VEENA PLUS METER Misc 1 Device by Misc.(Non-Drug; Combo Route) route 4 (four) times daily.    blood sugar diagnostic Strp Accu-chek veena plus test strip. Test blood sugar 4 times daily    lancets (ACCU-CHEK SOFTCLIX LANCETS) Misc Test blood sugar 4 times daily    pen needle, diabetic (BD ULTRA-FINE KORINA PEN NEEDLES) 32 gauge x 5/32" Ndle Use 4 needles daily with insulin injecitons     Family History     Problem Relation (Age of Onset)    Alcohol abuse Father    Cancer Mother    Cirrhosis Father    Leukemia Mother    No Known Problems Daughter, Son, Daughter, Daughter, Daughter, Sister        Social History Main Topics    Smoking status: Former Smoker     Packs/day: 1.00     Years: 25.00     Quit date: 8/9/2000    Smokeless tobacco: Never Used    Alcohol use Yes      Comment: rarely    Drug use: No    Sexual activity: No     Review of Systems   Constitutional: Negative for appetite change, chills, fatigue and fever.   HENT: Negative for congestion and postnasal drip.    Eyes: Negative for redness and visual disturbance.   Respiratory: Negative for cough, shortness of breath and wheezing.    Cardiovascular: Negative for chest pain, palpitations and leg swelling.   Gastrointestinal: Positive for " diarrhea. Negative for abdominal pain, nausea and vomiting.   Endocrine: Negative for polyphagia and polyuria.   Genitourinary: Negative for difficulty urinating and dysuria.   Musculoskeletal: Negative for back pain and gait problem.   Skin: Positive for color change (bilateral lower extremities >R).   Neurological: Negative for dizziness, weakness and headaches.   Hematological: Negative for adenopathy. Bruises/bleeds easily.   Psychiatric/Behavioral: Negative for confusion. The patient is not nervous/anxious.    All other systems reviewed and are negative.    Objective:     Vital Signs (Most Recent):  Temp: 97.9 °F (36.6 °C) (06/25/18 1514)  Pulse: (!) 57 (06/25/18 1514)  Resp: 18 (06/25/18 1514)  BP: (!) 140/72 (06/25/18 1514)  SpO2: (!) 93 % (06/25/18 1514) Vital Signs (24h Range):  Temp:  [97.9 °F (36.6 °C)] 97.9 °F (36.6 °C)  Pulse:  [57-62] 57  Resp:  [18] 18  SpO2:  [93 %-96 %] 93 %  BP: (133-141)/(68-72) 140/72     Weight: 87.5 kg (193 lb)  Body mass index is 28.5 kg/m².    Physical Exam   Constitutional: He is oriented to person, place, and time. He appears well-developed and well-nourished.   HENT:   Head: Normocephalic and atraumatic.   Mouth/Throat: Oropharynx is clear and moist.   Eyes: Conjunctivae and EOM are normal. Pupils are equal, round, and reactive to light.   Neck: Normal range of motion. Neck supple. No tracheal deviation present.   Cardiovascular: Normal rate, regular rhythm, normal heart sounds and intact distal pulses.    Pulmonary/Chest: Effort normal and breath sounds normal.   Abdominal: Soft. Bowel sounds are normal. A hernia (umbilical) is present.   Genitourinary:   Genitourinary Comments: deferred   Musculoskeletal: Normal range of motion. He exhibits no edema.   Neurological: He is alert and oriented to person, place, and time.   Skin: Skin is warm and dry. There is erythema (BLE; anterior RLE dark red, tender to palpation without drainage.).   Psychiatric: He has a normal mood  and affect. His behavior is normal. Judgment and thought content normal.         CRANIAL NERVES     CN III, IV, VI   Pupils are equal, round, and reactive to light.  Extraocular motions are normal.        Significant Labs:   CBC:   Recent Labs  Lab 06/25/18  1244   WBC 3.10*   HGB 11.5*   HCT 33.2*   PLT 28*     CMP:   Recent Labs  Lab 06/25/18  1244   *   K 4.5      CO2 24   *   BUN 21   CREATININE 0.9   CALCIUM 9.3   ANIONGAP 10   EGFRNONAA >60       Significant Imaging: No imaging in past 24 hours.

## 2018-06-25 NOTE — ASSESSMENT & PLAN NOTE
Chronic; due to h/o hepatitis C, s/p successful treatment with Harvoni.  Obtain LFT's and trend  Monitor clinically.

## 2018-06-25 NOTE — PROGRESS NOTES
Pre-Visit Chart Review  For Appointment Scheduled on 06/25/2018    There are no preventive care reminders to display for this patient.

## 2018-06-25 NOTE — H&P
"[facility] Medicine  History & Physical    Patient Name: Steve June Jr.  MRN: [id number]  Admission Date: 6/25/2018  Attending Physician: Padilla Perry MD   Primary Care Provider: Alei Womack MD         Patient information was obtained from patient, past medical records and ER records.     Subjective:     Principal Problem:Cellulitis    Chief Complaint: No chief complaint on file.       HPI: Steve June Jr. is a 74 y/o male with a PMHx of liver cirrhosis, hep C, DM2, chronic pain, thrombocytopenia, HTN, PVD, GERD, and esophageal varices who was admitted directly to the service of hospital medicine from his doctor's office after being treated x 2 days with doxycycline for cellulitis of the RUE. Pt states that the doxycyline made him "dizzy and confused for a couple of hours after taking it."  He also reports that it caused diarrhea.  Today, pt states that the cellulitis is now affecting his BLE, more so on the right, and that his RUE has improved.  He denies swelling, but reports increased pain and redness, stating that his lower leg has been warm to touch.  Denies systemic fever.  Denies headache, chest pain, abd pain, N/V or dysuria.  Reports that blood glucose has been high; platelets are chronically low, prohibiting him from any elective surgical procedure; therefore, he has chronic L shoulder, back and L knee pain.  He is under the care of pain management.  Mr. June is admitted for further evaluation and IV antibiotic therapy.     Past Medical History:   Diagnosis Date    Abnormal thyroid function test     Allergy     Seasonal    Anemia     Anemia due to blood loss 7/2/2014    Arthritis     Gaviria esophagus     Basal cell carcinoma     right forearm    Basal cell carcinoma 12/2011    lower post neck    Cancer     skin CA    Cataract     Cirrhosis     Diabetes mellitus     Diabetes mellitus, type 2     Encounter for blood transfusion     " "Esophageal varices in cirrhosis     grade II on 7/12 EGD    Gastritis     on 7/12 EGD    GERD (gastroesophageal reflux disease) 2/28/2015    Hard of hearing     Hiatal hernia     History of hepatitis C 8/10/2012    tx with harvoni x 41 days (started 10/22/15). SVR4     Hoarseness 2/28/2015    Hypercholesteremia     Hypersplenism     Hypertension     No meds    Pain management 12/10/2014    Petechial hemorrhage 11/25/2015    Lower extremities bilat     Portal hypertensive gastropathy     on 7/12 EGD    Thrombocytopenia     Type II or unspecified type diabetes mellitus with neurological manifestations, uncontrolled(250.62) 12/24/2013    Valvular heart disease     mild MR 12       Past Surgical History:   Procedure Laterality Date    CATARACT EXTRACTION  1/10/13    left eye    CATARACT EXTRACTION      right eye    CHOLECYSTECTOMY      COLONOSCOPY      EYE SURGERY      Cataract surgery to right eye    KNEE ARTHROSCOPY W/ MENISCAL REPAIR      KNEE CARTILAGE SURGERY      left knee    KNEE SURGERY  12/2006    left    SKIN LESION EXCISION      TONSILLECTOMY      UPPER GASTROINTESTINAL ENDOSCOPY         Review of patient's allergies indicates:   Allergen Reactions    Adhesive tape-silicones Other (See Comments)     pulls skin off    Doxycycline      Dizzy. "Just didn't feel right".       No current facility-administered medications on file prior to encounter.      Current Outpatient Prescriptions on File Prior to Encounter   Medication Sig    carvedilol (COREG) 6.25 MG tablet TAKE 1 TABLET(6.25 MG) BY MOUTH TWICE DAILY    doxycycline (MONODOX) 100 MG capsule Take 1 capsule (100 mg total) by mouth 2 (two) times daily.    GLUCOSAMINE HCL/CHONDR CHISHOLM A NA (OSTEO BI-FLEX ORAL) Take 2 tablets by mouth once daily.    insulin aspart U-100 (NOVOLOG FLEXPEN U-100 INSULIN) 100 unit/mL InPn pen Inject 10 Units into the skin 3 (three) times daily with meals.    insulin detemir U-100 (LEVEMIR FLEXTOUCH) 100 " "unit/mL (3 mL) SubQ InPn pen Inject 30 Units into the skin every evening. (Patient taking differently: Inject 21-22 Units into the skin every evening. )    oxyCODONE-acetaminophen (PERCOCET)  mg per tablet Take 1 tablet by mouth every 6 (six) hours as needed for Pain.    ACCU-CHEK VEENA PLUS METER Misc 1 Device by Misc.(Non-Drug; Combo Route) route 4 (four) times daily.    blood sugar diagnostic Strp Accu-chek veena plus test strip. Test blood sugar 4 times daily    lancets (ACCU-CHEK SOFTCLIX LANCETS) Misc Test blood sugar 4 times daily    pen needle, diabetic (BD ULTRA-FINE KORINA PEN NEEDLES) 32 gauge x 5/32" Ndle Use 4 needles daily with insulin injecitons     Family History     Problem Relation (Age of Onset)    Alcohol abuse Father    Cancer Mother    Cirrhosis Father    Leukemia Mother    No Known Problems Daughter, Son, Daughter, Daughter, Daughter, Sister        Social History Main Topics    Smoking status: Former Smoker     Packs/day: 1.00     Years: 25.00     Quit date: 8/9/2000    Smokeless tobacco: Never Used    Alcohol use Yes      Comment: rarely    Drug use: No    Sexual activity: No     Review of Systems   Constitutional: Negative for appetite change, chills, fatigue and fever.   HENT: Negative for congestion and postnasal drip.    Eyes: Negative for redness and visual disturbance.   Respiratory: Negative for cough, shortness of breath and wheezing.    Cardiovascular: Negative for chest pain, palpitations and leg swelling.   Gastrointestinal: Positive for diarrhea. Negative for abdominal pain, nausea and vomiting.   Endocrine: Negative for polyphagia and polyuria.   Genitourinary: Negative for difficulty urinating and dysuria.   Musculoskeletal: Negative for back pain and gait problem.   Skin: Positive for color change (bilateral lower extremities >R).   Neurological: Negative for dizziness, weakness and headaches.   Hematological: Negative for adenopathy. Bruises/bleeds easily. "   Psychiatric/Behavioral: Negative for confusion. The patient is not nervous/anxious.    All other systems reviewed and are negative.    Objective:     Vital Signs (Most Recent):  Temp: 97.9 °F (36.6 °C) (06/25/18 1514)  Pulse: (!) 57 (06/25/18 1514)  Resp: 18 (06/25/18 1514)  BP: (!) 140/72 (06/25/18 1514)  SpO2: (!) 93 % (06/25/18 1514) Vital Signs (24h Range):  Temp:  [97.9 °F (36.6 °C)] 97.9 °F (36.6 °C)  Pulse:  [57-62] 57  Resp:  [18] 18  SpO2:  [93 %-96 %] 93 %  BP: (133-141)/(68-72) 140/72     Weight: 87.5 kg (193 lb)  Body mass index is 28.5 kg/m².    Physical Exam   Constitutional: He is oriented to person, place, and time. He appears well-developed and well-nourished.   HENT:   Head: Normocephalic and atraumatic.   Mouth/Throat: Oropharynx is clear and moist.   Eyes: Conjunctivae and EOM are normal. Pupils are equal, round, and reactive to light.   Neck: Normal range of motion. Neck supple. No tracheal deviation present.   Cardiovascular: Normal rate, regular rhythm, normal heart sounds and intact distal pulses.    Pulmonary/Chest: Effort normal and breath sounds normal.   Abdominal: Soft. Bowel sounds are normal. A hernia (umbilical) is present.   Genitourinary:   Genitourinary Comments: deferred   Musculoskeletal: Normal range of motion. He exhibits no edema.   Neurological: He is alert and oriented to person, place, and time.   Skin: Skin is warm and dry. There is erythema (BLE; anterior RLE dark red, tender to palpation without drainage.).   Psychiatric: He has a normal mood and affect. His behavior is normal. Judgment and thought content normal.         CRANIAL NERVES     CN III, IV, VI   Pupils are equal, round, and reactive to light.  Extraocular motions are normal.        Significant Labs:   CBC:   Recent Labs  Lab 06/25/18  1244   WBC 3.10*   HGB 11.5*   HCT 33.2*   PLT 28*     CMP:   Recent Labs  Lab 06/25/18  1244   *   K 4.5      CO2 24   *   BUN 21   CREATININE 0.9    CALCIUM 9.3   ANIONGAP 10   EGFRNONAA >60       Significant Imaging: No imaging in past 24 hours.    Assessment/Plan:     * Cellulitis    Keep extremity elevated. Continue IV Clindamycin.  Has been afebrile-continue to monitor.  Trend WBC's  CRP pending.  Monitor for clinical improvement.            Chronic diastolic congestive heart failure    Last echocardiogram 5/25/17     1 - No wall motion abnormalities.     2 - Hyperdynamic left ventricular systolic function (EF 65-70%).     3 - Normal left ventricular diastolic function.     4 - Normal right ventricular systolic function .     5 - The estimated PA systolic pressure is 24 mmHg.     6 - Suggestion for improve diastolic function from Echo in 3/2012.     7 - Difficult apical windows.     Monitor telemetry; monitor clinically.          Hypertension associated with diabetes    Chronic; controlled  Continue home med  Monitor bp and adjust meds as clinically indicated.    Hypertension Medications             carvedilol (COREG) 6.25 MG tablet TAKE 1 TABLET(6.25 MG) BY MOUTH TWICE DAILY      Hospital Medications             carvedilol tablet 6.25 mg Take 1 tablet (6.25 mg total) by mouth 2 (two) times daily with meals.              Cirrhosis of liver without ascites    Chronic; due to h/o hepatitis C, s/p successful treatment with Harvoni.  Obtain LFT's and trend  Monitor clinically.          GERD (gastroesophageal reflux disease)    Chronic; currently not on home med.  Protonix 40mg PO daily while hospitalized.  Tums PRN.          Type 2 diabetes mellitus with diabetic polyneuropathy, without long-term current use of insulin    Chronic, per pt is uncontrolled  Accucheck ac/hs with SSI coverage.  [id number]          Pain management    Chronic pain under pain management-continue home regimen          PVD (peripheral vascular disease)    Chronic; not currently on statin or ASA secondary to liver disease.  Continue to monitor clinically.          Thrombocytopenia     Chronic secondary to liver cirrhosis.  Platelets 28 today-continue to trend and treat as clinically indicated.            VTE Risk Mitigation         Ordered     IP VTE LOW RISK PATIENT  Once      06/25/18 1141      Time spent seeing patient( greater than 1/2 spent in direct contact) : 60 minutes       MAURY Ty  Department of Hospital Medicine   [facility]

## 2018-06-25 NOTE — ASSESSMENT & PLAN NOTE
Chronic secondary to liver cirrhosis.  Platelets 28 today-continue to trend and treat as clinically indicated.

## 2018-06-25 NOTE — CONSULTS
Steve Kunzjose Black. [id number] is a 73 y.o. male who had been consulted for vancomycin dosing.    Vancomycin has been discontinued.  Pharmacy consult for vancomycin dosing in no longer required.      Thank you for allowing us to participate in this patient's care.     Christo Kim, PharmD

## 2018-06-25 NOTE — HPI
"Steve June Jr. is a 72 y/o male with a PMHx of liver cirrhosis, hep C, DM2, chronic pain, thrombocytopenia, HTN, PVD, GERD, and esophageal varices who was admitted directly to the service of hospital medicine from his doctor's office after being treated x 2 days with doxycycline for cellulitis of the RUE. Pt states that the doxycyline made him "dizzy and confused for a couple of hours after taking it."  He also reports that it caused diarrhea.  Today, pt states that the cellulitis is now affecting his BLE, more so on the right, and that his RUE has improved.  He denies swelling, but reports increased pain and redness, stating that his lower leg has been warm to touch.  Denies systemic fever.  Denies headache, chest pain, abd pain, N/V or dysuria.  Reports that blood glucose has been high; platelets are chronically low, prohibiting him from any elective surgical procedure; therefore, he has chronic L shoulder, back and L knee pain.  He is under the care of pain management.  Mr. June is admitted for further evaluation and IV antibiotic therapy.   "

## 2018-06-25 NOTE — PROGRESS NOTES
Subjective:       Patient ID: Steve June Jr. is a 73 y.o. male.    Chief Complaint: right leg red    Patient returns here with cellulitis.  He was just seen here 2 days ago for follow up of the cellulitis in the right arm and was cleared to finish ABX.  He complains today of 2 day history of red painful lower right leg that he rates 7/10.  He is having trouble tolerating the antibiotic and says the doxycycline is making him confused for about 2 hours after he takes it. He states starting last night he has been feeling very winded but this is better this morning.      Patient Active Problem List:     Thrombocytopenia     Posterior vitreous detachment     Myopia with astigmatism and presbyopia     Chronic pain of left knee     Esophageal varices with a history of bleeding     PVD (peripheral vascular disease)     Pain management     Type 2 diabetes mellitus with diabetic polyneuropathy, without long-term current use of insulin     GERD (gastroesophageal reflux disease)     Chronic low back pain     Umbilical hernia     Chronic laryngitis     Leukoplakia of larynx     Cirrhosis of liver without ascites     Hypertension associated with diabetes     Coagulopathy     Type 2 diabetes mellitus with diabetic peripheral angiopathy without gangrene, without long-term current use of insulin     Stasis dermatitis of both legs     Purpura     Microalbuminuria due to type 2 diabetes mellitus     Chronic left shoulder pain     Chronic diastolic congestive heart failure     Abdominal aortic atherosclerosis     Carotid disease, bilateral        Review of Systems   Constitutional: Negative for activity change, appetite change, fatigue and unexpected weight change.   HENT: Negative for nosebleeds.    Eyes: Negative for pain and visual disturbance.   Respiratory: Negative for chest tightness, shortness of breath and wheezing.    Cardiovascular: Negative for chest pain, palpitations and leg swelling.   Musculoskeletal: Negative  for neck pain.   Skin: Positive for color change.   Neurological: Negative for dizziness, syncope, light-headedness and headaches.       Objective:      Physical Exam   Constitutional: He is oriented to person, place, and time. He appears well-developed and well-nourished. No distress.   HENT:   Head: Normocephalic and atraumatic.   Eyes: Conjunctivae are normal. Pupils are equal, round, and reactive to light.   Neck: Normal range of motion. Neck supple. No JVD present. No thyromegaly present.   Cardiovascular: Normal rate and regular rhythm.  Exam reveals no gallop and no friction rub.    No murmur heard.  Pulmonary/Chest: Effort normal. No respiratory distress. He has no wheezes. He has no rales. He exhibits no tenderness.   Neurological: He is alert and oriented to person, place, and time.   Skin: He is not diaphoretic.            Assessment:       1. Cellulitis of right lower extremity    2. Cirrhosis of liver without ascites, unspecified hepatic cirrhosis type    3. Coagulopathy    4. Chronic diastolic congestive heart failure    5. Thrombocytopenia    6. PVD (peripheral vascular disease)        Plan:       Patient has failed outpatient therapy and I now having trouble tolerating the Doxycycline.  At this time I think the best course of action in inpatient IV antibiotics where his other medical problems can be addressed. He expressed understanding and patient admitted to NS.

## 2018-06-25 NOTE — ASSESSMENT & PLAN NOTE
Last echocardiogram 5/25/17     1 - No wall motion abnormalities.     2 - Hyperdynamic left ventricular systolic function (EF 65-70%).     3 - Normal left ventricular diastolic function.     4 - Normal right ventricular systolic function .     5 - The estimated PA systolic pressure is 24 mmHg.     6 - Suggestion for improve diastolic function from Echo in 3/2012.     7 - Difficult apical windows.     Monitor telemetry; monitor clinically.

## 2018-06-25 NOTE — ASSESSMENT & PLAN NOTE
Keep extremity elevated. Continue IV Clindamycin.  Has been afebrile-continue to monitor.  Trend WBC's  CRP pending.  Monitor for clinical improvement.

## 2018-06-25 NOTE — ASSESSMENT & PLAN NOTE
Chronic; controlled  Continue home med  Monitor bp and adjust meds as clinically indicated.    Hypertension Medications             carvedilol (COREG) 6.25 MG tablet TAKE 1 TABLET(6.25 MG) BY MOUTH TWICE DAILY      Hospital Medications             carvedilol tablet 6.25 mg Take 1 tablet (6.25 mg total) by mouth 2 (two) times daily with meals.

## 2018-06-25 NOTE — PLAN OF CARE
Cm completed the assessment at pt's bedside.  Pt arrived from the clinic-after seeing Truman Mckeon NP.  Pt is independent in care and lives with wife.  PCP is Dr. Skaggs/Mrs. Mike NP.   Insurance verified as [employer].   POA is wife and daughter Maury MONAHAN.  Pt is a diabetic, denies dialysis and coumadin.   Pharmacy is [facility] on  [address]  Disposition:  Pt will discharge to home with family.  Pt verbalized no needs at this time.         06/25/18 1445   Discharge Assessment   Assessment Type Discharge Planning Assessment   Confirmed/corrected address and phone number on facesheet? Yes   Assessment information obtained from? Patient   Expected Length of Stay (days) (2-3 days)   Communicated expected length of stay with patient/caregiver yes   Prior to hospitilization cognitive status: Alert/Oriented   Prior to hospitalization functional status: Independent   Current cognitive status: Alert/Oriented   Current Functional Status: Independent   Facility Arrived From: Pt came form clinic seeing  BRITANY Mckeon   Lives With spouse   Able to Return to Prior Arrangements yes   Is patient able to care for self after discharge? Yes   Who are your caregiver(s) and their phone number(s)? Kristopher June - [phone]   Patient's perception of discharge disposition home or selfcare   Readmission Within The Last 30 Days no previous admission in last 30 days   Patient currently being followed by outpatient case management? Yes   If yes, name of outpatient case management following: insurance company assigned oupatient case management   Patient currently receives any other outside agency services? No   Equipment Currently Used at Home glucometer;cane, straight   Do you have any problems affording any of your prescribed medications? No   Is the patient taking medications as prescribed? yes  (Pharmacy of choice is [facility] on  [address]   Does the patient have transportation home? Yes   Transportation Available  family or friend will provide   Dialysis Name and Scheduled days na   Does the patient receive services at the Coumadin Clinic? No   Discharge Plan A Home with family   Patient/Family In Agreement With Plan yes

## 2018-06-25 NOTE — CONSULTS
Date: 6/25/2018   Steve June Jr. [id number] is a 73 y.o. male who has been consulted for vancomycin dosing.    The patient has the following labs:     Creatinine (mg/dl)    WBC Count   Serum creatinine: 0.9 mg/dL 06/25/18 1244  Estimated creatinine clearance: 82.4 mL/min Lab Results   Component Value Date    WBC 3.10 (L) 06/25/2018        Current weight is 87.5 kg (193 lb)      The patient will be started on vancomycin at a dose of 1000 mg every 12 hours. The vancomycin trough has been ordered for 6/27 at 0415.  Target goal is 10-15.     Patient will be followed by pharmacy for changes in renal function, toxicity, and efficacy.      Thank you for allowing us to participate in this patient's care.     Irasema Garcia, AnaD

## 2018-06-26 ENCOUNTER — TELEPHONE (OUTPATIENT)
Dept: FAMILY MEDICINE | Facility: CLINIC | Age: 73
End: 2018-06-26

## 2018-06-26 ENCOUNTER — TELEPHONE (OUTPATIENT)
Dept: MEDSURG UNIT | Facility: HOSPITAL | Age: 73
End: 2018-06-26

## 2018-06-26 VITALS
TEMPERATURE: 98 F | SYSTOLIC BLOOD PRESSURE: 127 MMHG | RESPIRATION RATE: 18 BRPM | WEIGHT: 193 LBS | BODY MASS INDEX: 28.58 KG/M2 | HEART RATE: 54 BPM | DIASTOLIC BLOOD PRESSURE: 61 MMHG | OXYGEN SATURATION: 97 % | HEIGHT: 69 IN

## 2018-06-26 LAB
ALBUMIN SERPL BCP-MCNC: 3.6 G/DL
ALP SERPL-CCNC: 62 U/L
ALT SERPL W/O P-5'-P-CCNC: 13 U/L
ANION GAP SERPL CALC-SCNC: 8 MMOL/L
AST SERPL-CCNC: 16 U/L
BASOPHILS # BLD AUTO: 0 K/UL
BASOPHILS NFR BLD: 0.1 %
BILIRUB SERPL-MCNC: 1 MG/DL
BUN SERPL-MCNC: 24 MG/DL
CALCIUM SERPL-MCNC: 8.8 MG/DL
CHLORIDE SERPL-SCNC: 102 MMOL/L
CO2 SERPL-SCNC: 27 MMOL/L
CREAT SERPL-MCNC: 0.9 MG/DL
DIFFERENTIAL METHOD: ABNORMAL
EOSINOPHIL # BLD AUTO: 0.2 K/UL
EOSINOPHIL NFR BLD: 8.3 %
ERYTHROCYTE [DISTWIDTH] IN BLOOD BY AUTOMATED COUNT: 15.6 %
EST. GFR  (AFRICAN AMERICAN): >60 ML/MIN/1.73 M^2
EST. GFR  (NON AFRICAN AMERICAN): >60 ML/MIN/1.73 M^2
GLUCOSE SERPL-MCNC: 244 MG/DL
HCT VFR BLD AUTO: 32.5 %
HGB BLD-MCNC: 11.1 G/DL
LYMPHOCYTES # BLD AUTO: 0.4 K/UL
LYMPHOCYTES NFR BLD: 15.7 %
MAGNESIUM SERPL-MCNC: 1.7 MG/DL
MCH RBC QN AUTO: 28.8 PG
MCHC RBC AUTO-ENTMCNC: 34.2 G/DL
MCV RBC AUTO: 84 FL
MONOCYTES # BLD AUTO: 0.2 K/UL
MONOCYTES NFR BLD: 9.7 %
NEUTROPHILS # BLD AUTO: 1.6 K/UL
NEUTROPHILS NFR BLD: 66.2 %
PHOSPHATE SERPL-MCNC: 4.9 MG/DL
PLATELET # BLD AUTO: 28 K/UL
PMV BLD AUTO: 8.2 FL
POCT GLUCOSE: 246 MG/DL (ref 70–110)
POCT GLUCOSE: 302 MG/DL (ref 70–110)
POTASSIUM SERPL-SCNC: 4.4 MMOL/L
PROT SERPL-MCNC: 5.9 G/DL
RBC # BLD AUTO: 3.86 M/UL
SODIUM SERPL-SCNC: 137 MMOL/L
WBC # BLD AUTO: 2.5 K/UL

## 2018-06-26 PROCEDURE — 84100 ASSAY OF PHOSPHORUS: CPT

## 2018-06-26 PROCEDURE — 80053 COMPREHEN METABOLIC PANEL: CPT

## 2018-06-26 PROCEDURE — S0077 INJECTION, CLINDAMYCIN PHOSP: HCPCS | Performed by: NURSE PRACTITIONER

## 2018-06-26 PROCEDURE — 63600175 PHARM REV CODE 636 W HCPCS: Performed by: NURSE PRACTITIONER

## 2018-06-26 PROCEDURE — 85025 COMPLETE CBC W/AUTO DIFF WBC: CPT

## 2018-06-26 PROCEDURE — 36415 COLL VENOUS BLD VENIPUNCTURE: CPT

## 2018-06-26 PROCEDURE — 25000003 PHARM REV CODE 250: Performed by: NURSE PRACTITIONER

## 2018-06-26 PROCEDURE — 25000003 PHARM REV CODE 250: Performed by: HOSPITALIST

## 2018-06-26 PROCEDURE — 83735 ASSAY OF MAGNESIUM: CPT

## 2018-06-26 RX ORDER — PANTOPRAZOLE SODIUM 40 MG/1
40 TABLET, DELAYED RELEASE ORAL DAILY
Qty: 30 TABLET | Refills: 0 | Status: SHIPPED | OUTPATIENT
Start: 2018-06-27 | End: 2018-07-14

## 2018-06-26 RX ORDER — DIPHENHYDRAMINE HCL 25 MG
25 CAPSULE ORAL EVERY 6 HOURS PRN
Refills: 0 | COMMUNITY
Start: 2018-06-26 | End: 2018-07-14

## 2018-06-26 RX ORDER — AMMONIUM LACTATE 12 G/100G
LOTION TOPICAL 2 TIMES DAILY PRN
Qty: 225 G | Refills: 0 | Status: SHIPPED | OUTPATIENT
Start: 2018-06-26 | End: 2019-01-24

## 2018-06-26 RX ORDER — CLINDAMYCIN HYDROCHLORIDE 300 MG/1
600 CAPSULE ORAL 2 TIMES DAILY
Qty: 28 CAPSULE | Refills: 0 | Status: SHIPPED | OUTPATIENT
Start: 2018-06-26 | End: 2018-07-03

## 2018-06-26 RX ADMIN — INSULIN ASPART 10 UNITS: 100 INJECTION, SOLUTION INTRAVENOUS; SUBCUTANEOUS at 06:06

## 2018-06-26 RX ADMIN — INSULIN DETEMIR 8 UNITS: 100 INJECTION, SOLUTION SUBCUTANEOUS at 12:06

## 2018-06-26 RX ADMIN — DIPHENHYDRAMINE HYDROCHLORIDE 25 MG: 25 CAPSULE ORAL at 12:06

## 2018-06-26 RX ADMIN — OXYCODONE HYDROCHLORIDE AND ACETAMINOPHEN 1 TABLET: 10; 325 TABLET ORAL at 07:06

## 2018-06-26 RX ADMIN — PANTOPRAZOLE SODIUM 40 MG: 40 TABLET, DELAYED RELEASE ORAL at 09:06

## 2018-06-26 RX ADMIN — CARVEDILOL 6.25 MG: 6.25 TABLET, FILM COATED ORAL at 07:06

## 2018-06-26 RX ADMIN — OXYCODONE HYDROCHLORIDE AND ACETAMINOPHEN 1 TABLET: 10; 325 TABLET ORAL at 01:06

## 2018-06-26 RX ADMIN — INSULIN ASPART 2 UNITS: 100 INJECTION, SOLUTION INTRAVENOUS; SUBCUTANEOUS at 06:06

## 2018-06-26 RX ADMIN — INSULIN ASPART 4 UNITS: 100 INJECTION, SOLUTION INTRAVENOUS; SUBCUTANEOUS at 12:06

## 2018-06-26 RX ADMIN — CLINDAMYCIN IN 5 PERCENT DEXTROSE 600 MG: 12 INJECTION, SOLUTION INTRAVENOUS at 05:06

## 2018-06-26 RX ADMIN — CLINDAMYCIN IN 5 PERCENT DEXTROSE 600 MG: 12 INJECTION, SOLUTION INTRAVENOUS at 12:06

## 2018-06-26 RX ADMIN — INSULIN ASPART 10 UNITS: 100 INJECTION, SOLUTION INTRAVENOUS; SUBCUTANEOUS at 12:06

## 2018-06-26 RX ADMIN — Medication: at 09:06

## 2018-06-26 NOTE — DISCHARGE SUMMARY
"[facility] Medicine  Discharge Summary      Patient Name: Steve June Jr.  MRN: [id number]  Admission Date: 6/25/2018  Hospital Length of Stay: 1 days  Discharge Date and Time: 6/26/2018  2:02 PM  Attending Physician: Nahomy att. providers found   Discharging Provider: MAURY Ty  Primary Care Provider: CORNEL De Los Santos      HPI:   Steve June Jr. is a 72 y/o male with a PMHx of liver cirrhosis, hep C, DM2, chronic pain, thrombocytopenia, HTN, PVD, GERD, and esophageal varices who was admitted directly to the service of hospital medicine from his doctor's office after being treated x 2 days with doxycycline for cellulitis of the RUE. Pt states that the doxycyline made him "dizzy and confused for a couple of hours after taking it."  He also reports that it caused diarrhea.  Today, pt states that the cellulitis is now affecting his BLE, more so on the right, and that his RUE has improved.  He denies swelling, but reports increased pain and redness, stating that his lower leg has been warm to touch.  Denies systemic fever.  Denies headache, chest pain, abd pain, N/V or dysuria.  Reports that blood glucose has been high; platelets are chronically low, prohibiting him from any elective surgical procedure; therefore, he has chronic L shoulder, back and L knee pain.  He is under the care of pain management.  Mr. June is admitted for further evaluation and IV antibiotic therapy.     * No surgery found *      Hospital Course:   Steve June Jr. is a 72 y/o male with PMHx of liver cirrhosis, hep C, DM2, chronic pain, thrombocytopenia, HTN, PVD, GERD, and esophageal varices who was admitted to the service of hospital medicine with a diagnosis of cellulitis.  This was first effecting his RUE and was placed on doxycycline after being evaluated by his PCP; pt then c/o symptoms of cellulitis to the BLE.  He was having difficulty tolerating doxycycline; admission for IV " abx was requested.  Mr. June was admitted and IV clindamycin was initiated.  There was no leukocytosis; however, pt is chronically leukopenic as well as thrombocytopenic.  These lab values were consistent with his baseline.  His CRP was WNL.  He remained afebrile and hemodynamically stable.  His blood glucose was monitored with sliding scale insulin coverage as needed.  His A1C was elevated at 9.0.  His home insulin dose was adjusted and he was referred to endocrinology to follow as outpatient.  He has a scheduled appointment for Monday of next week.  Mr. June's cellulitis improved.  He was prescribed oral clindamycin to complete outpatient.  He was discharged home in stable condition and will f/u with his PCP in 2 weeks; he will be seen as a new patient by endocrinology on Monday, July 2, 2018.  He will return to the ED if his cellulitis becomes worse or he becomes febrile.  Pt verbalized understanding of all instructions.     Consults:   Consults         Status Ordering Provider     Inpatient consult to Social Work/Case Management  Once     Provider:  (Not yet assigned)    JAYDA Dave          No new Assessment & Plan notes have been filed under this hospital service since the last note was generated.  Service: Hospital Medicine    Final Active Diagnoses:    Diagnosis Date Noted POA    PRINCIPAL PROBLEM:  Cellulitis [L03.90] 06/25/2018 Yes    Chronic diastolic congestive heart failure [I50.32] 09/18/2017 Yes    Cirrhosis of liver without ascites [K74.60] 11/05/2015 Yes    Hypertension associated with diabetes [E11.59, I10] 11/05/2015 Yes    GERD (gastroesophageal reflux disease) [K21.9] 02/28/2015 Yes    Type 2 diabetes mellitus with diabetic polyneuropathy, without long-term current use of insulin [E11.42] 01/22/2015 Yes    Pain management [R52] 12/10/2014 Yes    PVD (peripheral vascular disease) [I73.9] 08/26/2014 Yes    Thrombocytopenia [D69.6] 12/15/2011 Yes      Problems  Resolved During this Admission:    Diagnosis Date Noted Date Resolved POA       Discharged Condition: stable    Disposition: Home or Self Care    Follow Up:  Follow-up Information     CORNEL De Los Santos In 2 weeks.    Specialty:  Internal Medicine  Why:  Jodee with scheduling sent a message to the nurse to schedule and notify pt of appointment  Contact information:  [address]  [place] [address]  [phone]             Geronimo Pedesron PA-C. Schedule an appointment as soon as possible for a visit on 7/2/2018.    Specialty:  Endocrinology  Why:  New patient appt for diabetes management  Contact information:  [address]  [place] [address]  [phone]                 Patient Instructions:     Ambulatory referral to Endocrinology   Referral Priority: Routine Referral Type: Consultation   Referred to Provider: DAQUAN FRANKS Specialty: Endocrinology   Number of Visits Requested: 1      Ambulatory referral to Endocrinology   Referral Priority: Routine Referral Type: Consultation   Referred to Provider: GERONIMO PEDERSON    Number of Visits Requested: 1      Diet diabetic     Notify your health care provider if you experience any of the following:  temperature >100.4     Notify your health care provider if you experience any of the following:  persistent nausea and vomiting or diarrhea     Notify your health care provider if you experience any of the following:  severe uncontrolled pain     Notify your health care provider if you experience any of the following:  difficulty breathing or increased cough     Notify your health care provider if you experience any of the following:  severe persistent headache     Notify your health care provider if you experience any of the following:  worsening rash     Notify your health care provider if you experience any of the following:  persistent dizziness, light-headedness, or visual disturbances     Notify your health care provider if you  experience any of the following:  increased confusion or weakness     Activity as tolerated         Significant Diagnostic Studies: Labs:   CMP   Recent Labs  Lab 06/25/18  1244 06/26/18  0538   * 137   K 4.5 4.4    102   CO2 24 27   * 244*   BUN 21 24*   CREATININE 0.9 0.9   CALCIUM 9.3 8.8   PROT  --  5.9*   ALBUMIN  --  3.6   BILITOT  --  1.0   ALKPHOS  --  62   AST  --  16   ALT  --  13   ANIONGAP 10 8   ESTGFRAFRICA >60 >60   EGFRNONAA >60 >60    and CBC   Recent Labs  Lab 06/25/18  1244 06/26/18  0538   WBC 3.10* 2.50*   HGB 11.5* 11.1*   HCT 33.2* 32.5*   PLT 28* 28*       Pending Diagnostic Studies:     None         Medications:  Reconciled Home Medications:      Medication List      START taking these medications    ammonium lactate 12 % lotion  Commonly known as:  LAC-HYDRIN  Apply topically 2 (two) times daily as needed for Dry Skin.     clindamycin 300 MG capsule  Commonly known as:  CLEOCIN  Take 2 capsules (600 mg total) by mouth 2 (two) times daily. for 7 days     diphenhydrAMINE 25 mg capsule  Commonly known as:  BENADRYL  Take 1 each (25 mg total) by mouth every 6 (six) hours as needed for Itching.     pantoprazole 40 MG tablet  Commonly known as:  PROTONIX  Take 1 tablet (40 mg total) by mouth once daily.  Start taking on:  6/27/2018        CHANGE how you take these medications    insulin detemir U-100 100 unit/mL (3 mL) Inpn pen  Commonly known as:  LEVEMIR FLEXTOUCH  Inject 36 Units into the skin every evening.  What changed:  how much to take        CONTINUE taking these medications    ACCU-CHEK VEENA PLUS METER [facility]  Generic drug:  blood-glucose meter  1 Device by Misc.(Non-Drug; Combo Route) route 4 (four) times daily.     blood sugar diagnostic Strp  Accu-chek veena plus test strip. Test blood sugar 4 times daily     carvedilol 6.25 MG tablet  Commonly known as:  COREG  TAKE 1 TABLET(6.25 MG) BY MOUTH TWICE DAILY     insulin aspart U-100 100 unit/mL Inpn pen  Commonly known  "as:  NovoLOG Flexpen U-100 Insulin  Inject 10 Units into the skin 3 (three) times daily with meals.     lancets Misc  Commonly known as:  ACCU-CHEK SOFTCLIX LANCETS  Test blood sugar 4 times daily     OSTEO BI-FLEX ORAL  Take 2 tablets by mouth once daily.     oxyCODONE-acetaminophen  mg per tablet  Commonly known as:  PERCOCET  Take 1 tablet by mouth every 6 (six) hours as needed for Pain.     pen needle, diabetic 32 gauge x 5/32" Ndle  Commonly known as:  BD ULTRA-FINE KORINA PEN NEEDLE  Use 4 needles daily with insulin injecitons        STOP taking these medications    doxycycline 100 MG capsule  Commonly known as:  MONODOX            Indwelling Lines/Drains at time of discharge:   Lines/Drains/Airways          No matching active lines, drains, or airways          Time spent on the discharge of patient: 25 minutes  Patient was seen and examined on the date of discharge and determined to be suitable for discharge.         Miranda Valentine, MAURY  Department of Hospital Medicine  [facility]  "

## 2018-06-26 NOTE — PLAN OF CARE
Problem: Patient Care Overview  Goal: Plan of Care Review  Outcome: Ongoing (interventions implemented as appropriate)  Patient awake/alert x 4. VS stable this shift. POC reviewed with patient, patient verbalized understanding. Encouraged questions regarding care and concerns. Pain controlled with ordered medication. Blood sugar monitored and covered per sliding scale. Up ad mateusz to bathroom. Non skid socks on when OOB, bed low and locked with rails up x 2 and call light in reach. Patient verbally denies any needs at this time. Will continue to monitor and adjust POC as needed.

## 2018-06-26 NOTE — HOSPITAL COURSE
Steve June Jr. is a 74 y/o male with PMHx of liver cirrhosis, hep C, DM2, chronic pain, thrombocytopenia, HTN, PVD, GERD, and esophageal varices who was admitted to the service of hospital medicine with a diagnosis of cellulitis.  This was first effecting his RUE and was placed on doxycycline after being evaluated by his PCP; pt then c/o symptoms of cellulitis to the BLE.  He was having difficulty tolerating doxycycline; admission for IV abx was requested.  Mr. June was admitted and IV clindamycin was initiated.  There was no leukocytosis; however, pt is chronically leukopenic as well as thrombocytopenic.  These lab values were consistent with his baseline.  His CRP was WNL.  He remained afebrile and hemodynamically stable.  His blood glucose was monitored with sliding scale insulin coverage as needed.  His A1C was elevated at 9.0.  His home insulin dose was adjusted and he was referred to endocrinology to follow as outpatient.  He has a scheduled appointment for Monday of next week.  Mr. June's cellulitis improved.  He was prescribed oral clindamycin to complete outpatient.  He was discharged home in stable condition and will f/u with his PCP in 2 weeks; he will be seen as a new patient by endocrinology on Monday, July 2, 2018.  He will return to the ED if his cellulitis becomes worse or he becomes febrile.  Pt verbalized understanding of all instructions.

## 2018-06-26 NOTE — PLAN OF CARE
06/26/18 1216   Final Note   Assessment Type Final Discharge Note   Discharge Disposition Home   Hospital Follow Up  Appt(s) scheduled? Yes

## 2018-06-26 NOTE — DISCHARGE INSTRUCTIONS
Thank you for choosing [facility] for your medical care. The primary doctor who is taking care of you at the time of your discharge is Padilla Perry MD.     You were admitted to the hospital with Cellulitis.     Please note your discharge instructions, including diet/activity restrictions, follow-up appointments, and medication changes.  If you have any questions about your medical issues, prescriptions, or any other questions, please feel free to contact the [facility] Medicine Dept at [phone] and we will help.    If you are previously with Home health, outpatient PT/OT or under a therapy program, you are cleared to return to those programs.    Please direct all long term medication refills and follow up to your primary care provider, Alie Womack MD. Thank you again for letting us take care of your health care needs.

## 2018-06-26 NOTE — TELEPHONE ENCOUNTER
----- Message from Jodee Orr sent at 6/26/2018 12:03 PM CDT -----  Contact: Nissa Dias to schedule 1-2 week hospital follow up. First available for EP Ext was July 23rd. Please call [phone]

## 2018-06-27 ENCOUNTER — PATIENT OUTREACH (OUTPATIENT)
Dept: ADMINISTRATIVE | Facility: CLINIC | Age: 73
End: 2018-06-27

## 2018-06-27 NOTE — PROGRESS NOTES
C3 nurse attempted to contact patient. No answer. The following message was left for the patient to return the call:  Good morning  I am a nurse calling on behalf of [facility] from the [facility]ination [facility].  This is a Transitional Care Call for Steve June Jr. . When you have a moment please contact us at [phone] or [phone] Monday through Friday, between the hours of 8 am to 4 pm. We look forward to speaking with you. On behalf of [facility] have a nice day.    The patient has a scheduled HOSFU appointment with Katelynn UNGER on 7/3/18 @ 1pm. Message sent to Physician staff.

## 2018-07-03 ENCOUNTER — LAB VISIT (OUTPATIENT)
Dept: LAB | Facility: HOSPITAL | Age: 73
End: 2018-07-03
Attending: PHYSICIAN ASSISTANT
Payer: MEDICARE

## 2018-07-03 ENCOUNTER — OFFICE VISIT (OUTPATIENT)
Dept: ENDOCRINOLOGY | Facility: CLINIC | Age: 73
End: 2018-07-03
Payer: MEDICARE

## 2018-07-03 ENCOUNTER — OFFICE VISIT (OUTPATIENT)
Dept: FAMILY MEDICINE | Facility: CLINIC | Age: 73
End: 2018-07-03
Payer: MEDICARE

## 2018-07-03 VITALS
DIASTOLIC BLOOD PRESSURE: 76 MMHG | HEART RATE: 59 BPM | BODY MASS INDEX: 29.78 KG/M2 | RESPIRATION RATE: 18 BRPM | WEIGHT: 201.06 LBS | TEMPERATURE: 98 F | HEIGHT: 69 IN | SYSTOLIC BLOOD PRESSURE: 154 MMHG

## 2018-07-03 VITALS
HEART RATE: 66 BPM | HEIGHT: 69 IN | WEIGHT: 199.94 LBS | DIASTOLIC BLOOD PRESSURE: 83 MMHG | SYSTOLIC BLOOD PRESSURE: 148 MMHG | TEMPERATURE: 98 F | BODY MASS INDEX: 29.61 KG/M2

## 2018-07-03 DIAGNOSIS — E11.42 TYPE 2 DIABETES MELLITUS WITH DIABETIC POLYNEUROPATHY, WITHOUT LONG-TERM CURRENT USE OF INSULIN: ICD-10-CM

## 2018-07-03 DIAGNOSIS — E11.51 TYPE 2 DIABETES MELLITUS WITH DIABETIC PERIPHERAL ANGIOPATHY WITHOUT GANGRENE, WITHOUT LONG-TERM CURRENT USE OF INSULIN: ICD-10-CM

## 2018-07-03 DIAGNOSIS — E55.9 HYPOVITAMINOSIS D: ICD-10-CM

## 2018-07-03 DIAGNOSIS — E11.29 MICROALBUMINURIA DUE TO TYPE 2 DIABETES MELLITUS: ICD-10-CM

## 2018-07-03 DIAGNOSIS — E11.51 TYPE 2 DIABETES MELLITUS WITH DIABETIC PERIPHERAL ANGIOPATHY WITHOUT GANGRENE, WITHOUT LONG-TERM CURRENT USE OF INSULIN: Primary | ICD-10-CM

## 2018-07-03 DIAGNOSIS — R80.9 MICROALBUMINURIA DUE TO TYPE 2 DIABETES MELLITUS: ICD-10-CM

## 2018-07-03 DIAGNOSIS — Z09 HOSPITAL DISCHARGE FOLLOW-UP: Primary | ICD-10-CM

## 2018-07-03 DIAGNOSIS — E06.9 THYROIDITIS: ICD-10-CM

## 2018-07-03 DIAGNOSIS — L03.90 CELLULITIS, UNSPECIFIED CELLULITIS SITE: ICD-10-CM

## 2018-07-03 LAB
25(OH)D3+25(OH)D2 SERPL-MCNC: 21 NG/ML
T3 SERPL-MCNC: 97 NG/DL
T4 FREE SERPL-MCNC: 0.96 NG/DL
THYROGLOB AB SERPL IA-ACNC: <4 IU/ML
THYROPEROXIDASE IGG SERPL-ACNC: <6 IU/ML
TSH SERPL DL<=0.005 MIU/L-ACNC: 2.99 UIU/ML

## 2018-07-03 PROCEDURE — 86800 THYROGLOBULIN ANTIBODY: CPT

## 2018-07-03 PROCEDURE — 99999 PR PBB SHADOW E&M-EST. PATIENT-LVL III: CPT | Mod: PBBFAC,,, | Performed by: PHYSICIAN ASSISTANT

## 2018-07-03 PROCEDURE — 84378 SUGARS SINGLE QUANT: CPT

## 2018-07-03 PROCEDURE — 84439 ASSAY OF FREE THYROXINE: CPT

## 2018-07-03 PROCEDURE — 99495 TRANSJ CARE MGMT MOD F2F 14D: CPT | Mod: S$GLB,,, | Performed by: PHYSICIAN ASSISTANT

## 2018-07-03 PROCEDURE — 99214 OFFICE O/P EST MOD 30 MIN: CPT | Mod: S$GLB,,, | Performed by: PHYSICIAN ASSISTANT

## 2018-07-03 PROCEDURE — 3078F DIAST BP <80 MM HG: CPT | Mod: CPTII,S$GLB,, | Performed by: PHYSICIAN ASSISTANT

## 2018-07-03 PROCEDURE — 84443 ASSAY THYROID STIM HORMONE: CPT

## 2018-07-03 PROCEDURE — 86376 MICROSOMAL ANTIBODY EACH: CPT

## 2018-07-03 PROCEDURE — 82306 VITAMIN D 25 HYDROXY: CPT

## 2018-07-03 PROCEDURE — 3077F SYST BP >= 140 MM HG: CPT | Mod: CPTII,S$GLB,, | Performed by: PHYSICIAN ASSISTANT

## 2018-07-03 PROCEDURE — 99499 UNLISTED E&M SERVICE: CPT | Mod: S$GLB,,, | Performed by: PHYSICIAN ASSISTANT

## 2018-07-03 PROCEDURE — 84432 ASSAY OF THYROGLOBULIN: CPT

## 2018-07-03 PROCEDURE — 82985 ASSAY OF GLYCATED PROTEIN: CPT

## 2018-07-03 PROCEDURE — 3045F PR MOST RECENT HEMOGLOBIN A1C LEVEL 7.0-9.0%: CPT | Mod: CPTII,S$GLB,, | Performed by: PHYSICIAN ASSISTANT

## 2018-07-03 PROCEDURE — 36415 COLL VENOUS BLD VENIPUNCTURE: CPT | Mod: PO

## 2018-07-03 PROCEDURE — 84480 ASSAY TRIIODOTHYRONINE (T3): CPT

## 2018-07-03 NOTE — PROGRESS NOTES
CC: Mr. Steve June Jr. arrives today for management of Type 2 DM and review of chronic medical conditions, as listed in the Visit Diagnosis section of this encounter.     HPI: Mr. Steve June Jr. was diagnosed with Type 2 DM in ~ . He was diagnosed based on lab work. Initial treatment consisted of metformin. Denies hospitalization admissions due to DM.  No fhx of DM.     He states that Novolog is expensive and he has also developed welts at injection site, which causes him to sometimes skip his doses.     Recently discharged from the hospital for cellulitis of the RUE. Pt reports his grandson also had the same rash.     BG readings are checked 1-2x/day.   Fastin-250s    Hypoglycemia: No    Missing Insulin/PO medication doses: He misses the novolog frequently and does not like to take it when he leaves home.     Dietary Habits: Eats 3 meals daily. Eats at restaurants often. No snacking. Avoids sugary beverages.     Past Hx of Hep C treated with Harvoni. He follows every 6 months with hepatology for cirrhosis.    CURRENT DIABETIC MEDS: Levemir 30 units QHS, Novolog 10 units AC  Uses pen  Glucometer type: Accu check Tanya    Previous DM treatments:  Glimepiride - nausea, hyperglycemia   Prandin - hyperglycemia  Metformin - not effective    Last Eye Exam: 2018, no DR.  Last Podiatry Exam: 2018. Podiatrist ordered footwear.     REVIEW OF SYSTEMS  Constitutional: no c/o weakness, fatigue, weight stable.   Eyes: floaters, cataracts.   Cardiac: no palpitations or chest pain.  Respiratory: no cough, dyspnea  GI: no c/o abdominal pain, nausea. Denies h/o pancreatitis.   Skin: no rashes. S/o red lesions at certain injection sites.   Neuro: + numbness, pain in BL feet  Endocrine: denies polydipsia, polyphagia, polyuria.   Musc: shoulder pain  Remainder ROS negative    Personally reviewed Past Medical, Surgical, Social History.    Vital Signs  BP (!) 154/76 (BP Location: Right arm, Patient Position:  "Sitting, BP Method: Large (Automatic))   Pulse (!) 59   Temp 98.4 °F (36.9 °C) (Oral)   Resp 18   Ht 5' 9" (1.753 m)   Wt 91.2 kg (201 lb 1 oz)   BMI 29.69 kg/m²     Personally reviewed the below labs:    Hemoglobin A1C   Date Value Ref Range Status   06/25/2018 9.0 (H) 4.0 - 5.6 % Final     Comment:     ADA Screening Guidelines:  5.7-6.4%  Consistent with prediabetes  >or=6.5%  Consistent with diabetes  High levels of fetal hemoglobin interfere with the HbA1C  assay. Heterozygous hemoglobin variants (HbS, HgC, etc)do  not significantly interfere with this assay.   However, presence of multiple variants may affect accuracy.     05/09/2018 9.0 (H) 4.0 - 5.6 % Final     Comment:     According to ADA guidelines, hemoglobin A1c <7.0% represents  optimal control in non-pregnant diabetic patients. Different  metrics may apply to specific patient populations.   Standards of Medical Care in Diabetes-2016.  For the purpose of screening for the presence of diabetes:  <5.7%     Consistent with the absence of diabetes  5.7-6.4%  Consistent with increasing risk for diabetes   (prediabetes)  >or=6.5%  Consistent with diabetes  Currently, no consensus exists for use of hemoglobin A1c  for diagnosis of diabetes for children.  This Hemoglobin A1c assay has significant interference with fetal   hemoglobin   (HbF). The results are invalid for patients with abnormal amounts of   HbF,   including those with known Hereditary Persistence   of Fetal Hemoglobin. Heterozygous hemoglobin variants (HbAS, HbAC,   HbAD, HbAE, HbA2) do not significantly interfere with this assay;   however, presence of multiple variants in a sample may impact the %   interference.     01/30/2018 9.8 (H) 4.0 - 5.6 % Final     Comment:     According to ADA guidelines, hemoglobin A1c <7.0% represents  optimal control in non-pregnant diabetic patients. Different  metrics may apply to specific patient populations.   Standards of Medical Care in " Diabetes-2016.  For the purpose of screening for the presence of diabetes:  <5.7%     Consistent with the absence of diabetes  5.7-6.4%  Consistent with increasing risk for diabetes   (prediabetes)  >or=6.5%  Consistent with diabetes  Currently, no consensus exists for use of hemoglobin A1c  for diagnosis of diabetes for children.  This Hemoglobin A1c assay has significant interference with fetal   hemoglobin   (HbF). The results are invalid for patients with abnormal amounts of   HbF,   including those with known Hereditary Persistence   of Fetal Hemoglobin. Heterozygous hemoglobin variants (HbAS, HbAC,   HbAD, HbAE, HbA2) do not significantly interfere with this assay;   however, presence of multiple variants in a sample may impact the %   interference.         Chemistry        Component Value Date/Time     06/26/2018 0538    K 4.4 06/26/2018 0538     06/26/2018 0538    CO2 27 06/26/2018 0538    BUN 24 (H) 06/26/2018 0538    CREATININE 0.9 06/26/2018 0538     (H) 06/26/2018 0538        Component Value Date/Time    CALCIUM 8.8 06/26/2018 0538    ALKPHOS 62 06/26/2018 0538    AST 16 06/26/2018 0538    ALT 13 06/26/2018 0538    BILITOT 1.0 06/26/2018 0538        Lab Results   Component Value Date    CHOL 151 07/06/2017    CHOL 158 12/17/2015    CHOL 145 08/13/2014     Lab Results   Component Value Date    HDL 57 07/06/2017    HDL 32 (L) 12/17/2015    HDL 42 08/13/2014     Lab Results   Component Value Date    LDLCALC 81.6 07/06/2017    LDLCALC 99.0 12/17/2015    LDLCALC 91.4 08/13/2014     Lab Results   Component Value Date    TRIG 62 07/06/2017    TRIG 135 12/17/2015    TRIG 58 08/13/2014     Lab Results   Component Value Date    CHOLHDL 37.7 07/06/2017    CHOLHDL 20.3 12/17/2015    CHOLHDL 29.0 08/13/2014     Lab Results   Component Value Date    MICALBCREAT 38.3 (H) 05/09/2018     Lab Results   Component Value Date    TSH 2.990 07/03/2018     Lab Results   Component Value Date    [id number] 21  (L) 07/03/2018      CrCl cannot be calculated (Patient's most recent lab result is older than the maximum 7 days allowed.).    No results found for: [id number]    PHYSICAL EXAMINATION  Constitutional: elderly male, no distress, well hydrated.   Skin: warm and dry; no lipohypertrophy, + rashes on arms  Cardio: normal S1, S2, no mrg.  Respiratory: CTAB, no rales or rhonci  Psych: normal affect, bad mood, conversant  Musculoskeletal: steady gait, no clubbing, FROM to all extremities  Neuro: CN II-XII grossly intact  Feet: appropriate footwear.     Assessment/Plan  1. Type 2 diabetes mellitus with diabetic peripheral angiopathy without gangrene, without long-term current use of insulin  -- Improving since addition of prandial insulin. Patient is frustrated with use of Novolog but would like to continue using for now since he purchased it.   -- increase Levemir to 40 units QHS. He would like to continue with Levemir pen instead of changing to NPH.  -- Increase Novolog 12 units  --Pt declined all other treatment options--VGo, GLP-1s, SGLT2s  -- he has a h/o hematuria. Will avoid Actos.  -- check BG 4x/day      2. Type 2 diabetes mellitus with diabetic polyneuropathy, without long-term current use of insulin  -- optimize DM control    3. Microalbuminuria due to type 2 diabetes mellitus  -- optimize DM control  --check MAC next visit   4. Hypovitaminosis D --  Start taking 2000 IU of OTC vitamin D daily.   5.  Thyroiditis TFTs wnl today. Monitor        FOLLOW UP  3 mths

## 2018-07-03 NOTE — PROGRESS NOTES
"Subjective:       Patient ID: Steve June Jr. is a 73 y.o. male.    Chief Complaint: Transitional Care    Patient is here for TCC followup    Transitional Care Note    Family and/or Caretaker present at visit?  No.  Diagnostic tests reviewed/disposition: No diagnosic tests pending after this hospitalization.  Disease/illness education: yes, patient did not complete antibiotic, he states her only took 2 doses after discharge as it made him "crazy" and upset his stomach  Home health/community services discussion/referrals: Patient does not have home health established from hospital visit.  They do not need home health.  If needed, we will set up home health for the patient.   Establishment or re-establishment of referral orders for community resources: No other necessary community resources.   Discussion with other health care providers: No discussion with other health care providers necessary.     Hospital Course:   Steve June Jr. is a 74 y/o male with PMHx of liver cirrhosis, hep C, DM2, chronic pain, thrombocytopenia, HTN, PVD, GERD, and esophageal varices who was admitted to the service of hospital medicine with a diagnosis of cellulitis.  This was first effecting his RUE and was placed on doxycycline after being evaluated by his PCP; pt then c/o symptoms of cellulitis to the BLE.  He was having difficulty tolerating doxycycline; admission for IV abx was requested.  Mr. June was admitted and IV clindamycin was initiated.  There was no leukocytosis; however, pt is chronically leukopenic as well as thrombocytopenic.  These lab values were consistent with his baseline.  His CRP was WNL.  He remained afebrile and hemodynamically stable.  His blood glucose was monitored with sliding scale insulin coverage as needed.  His A1C was elevated at 9.0.  His home insulin dose was adjusted and he was referred to endocrinology to follow as outpatient.  He has a scheduled appointment for Monday of next week.  Mr." Simmerly's cellulitis improved.  He was prescribed oral clindamycin to complete outpatient.  He was discharged home in stable condition and will f/u with his PCP in 2 weeks; he will be seen as a new patient by endocrinology on Monday, July 2, 2018.  He will return to the ED if his cellulitis becomes worse or he becomes febrile.  Pt verbalized understanding of all instructions.           Review of Systems   Constitutional: Negative for activity change, appetite change, fatigue and unexpected weight change.   HENT: Negative for nosebleeds.    Eyes: Negative for pain and visual disturbance.   Respiratory: Negative for chest tightness, shortness of breath and wheezing.    Cardiovascular: Negative for chest pain, palpitations and leg swelling.   Musculoskeletal: Negative for neck pain.   Skin: Positive for color change (chronic stasis changes, no active cellulitis). Negative for wound.   Neurological: Negative for dizziness, syncope, light-headedness and headaches.       Objective:      Physical Exam   Constitutional: He is oriented to person, place, and time. He appears well-developed and well-nourished. No distress.   HENT:   Head: Normocephalic and atraumatic.   Eyes: Conjunctivae are normal. Pupils are equal, round, and reactive to light.   Neck: Normal range of motion. Neck supple. No JVD present. No thyromegaly present.   Cardiovascular: Normal rate and regular rhythm.  Exam reveals no gallop and no friction rub.    No murmur heard.  Pulmonary/Chest: Effort normal. No respiratory distress. He has no wheezes. He has no rales. He exhibits no tenderness.   Neurological: He is alert and oriented to person, place, and time.   Skin: He is not diaphoretic.   Chronic diffuse ecchymosis, no erythema or heat consistent with cellulitis       Assessment:       1. Hospital discharge follow-up    2. Cellulitis, unspecified cellulitis site        Plan:       Steve was seen today for transitional care.    Diagnoses and all orders  for this visit:    Hospital discharge follow-up    Cellulitis, unspecified cellulitis site    Patient refused to take Clindamycin.  He will watch for sings of infection and call office.  Expressed understanding.

## 2018-07-05 ENCOUNTER — TELEPHONE (OUTPATIENT)
Dept: ENDOCRINOLOGY | Facility: CLINIC | Age: 73
End: 2018-07-05

## 2018-07-05 NOTE — TELEPHONE ENCOUNTER
Called Pt. To give results, Pt stated that he was in the store and and will view his results in [facility], verbalized understanding

## 2018-07-06 LAB
FRUCTOSAMINE SERPL-SCNC: 591 UMOL /L
THRYOGLOBULIN INTERPRETATION: ABNORMAL
THYROGLOB AB SERPL-ACNC: <1.8 IU/ML
THYROGLOB SERPL-MCNC: 5.3 NG/ML

## 2018-07-07 LAB — GLYCOMARK (TM): 3.6 UG/ML

## 2018-07-12 ENCOUNTER — OFFICE VISIT (OUTPATIENT)
Dept: PODIATRY | Facility: CLINIC | Age: 73
End: 2018-07-12
Payer: MEDICARE

## 2018-07-12 ENCOUNTER — TELEPHONE (OUTPATIENT)
Dept: PHARMACY | Facility: CLINIC | Age: 73
End: 2018-07-12

## 2018-07-12 VITALS
HEIGHT: 69 IN | BODY MASS INDEX: 29.61 KG/M2 | DIASTOLIC BLOOD PRESSURE: 69 MMHG | HEART RATE: 63 BPM | SYSTOLIC BLOOD PRESSURE: 149 MMHG | WEIGHT: 199.94 LBS

## 2018-07-12 DIAGNOSIS — L84 PRE-ULCERATIVE CALLUSES: ICD-10-CM

## 2018-07-12 DIAGNOSIS — E08.42 DIABETIC POLYNEUROPATHY ASSOCIATED WITH DIABETES MELLITUS DUE TO UNDERLYING CONDITION: Primary | ICD-10-CM

## 2018-07-12 DIAGNOSIS — E11.51 TYPE II DIABETES MELLITUS WITH PERIPHERAL CIRCULATORY DISORDER: ICD-10-CM

## 2018-07-12 PROCEDURE — 99499 UNLISTED E&M SERVICE: CPT | Mod: S$GLB,,, | Performed by: PODIATRIST

## 2018-07-12 PROCEDURE — 11057 PARNG/CUTG B9 HYPRKR LES >4: CPT | Mod: Q9,S$GLB,, | Performed by: PODIATRIST

## 2018-07-12 PROCEDURE — 99999 PR PBB SHADOW E&M-EST. PATIENT-LVL II: CPT | Mod: PBBFAC,,, | Performed by: PODIATRIST

## 2018-07-12 NOTE — PROGRESS NOTES
Subjective:      Patient ID: Steve June Jr. is a 73 y.o. male.    Chief Complaint: Diabetes Mellitus (CORNEL Burton 7/31/18 a1c-9.0-6/25/18)    Diabetes, increased risk amputation needing evaluation/management/optomization of foot care.    CC thick calluses, burning pain with pressure.  Gradual onset, worsening over past several weeks-months, aggravated by increased weight bearing, shoe gear, pressure.  Pain management for burning.  His daughter has been trimming his nails but the calluses are hurting.  Denies trauma, signs infection, and surgery both feet. Has noticed some color changes to the left hallux associated callus.  Has gotten the diabetic shoes but feels they are not helping much.        Review of Systems   Constitution: Negative for chills, diaphoresis, fever, malaise/fatigue and night sweats.   Cardiovascular: Positive for leg swelling. Negative for claudication, cyanosis and syncope.   Skin: Positive for nail changes and suspicious lesions. Negative for color change, dry skin, rash and unusual hair distribution.   Musculoskeletal: Negative for falls, joint pain, joint swelling, muscle cramps, muscle weakness and stiffness.   Gastrointestinal: Negative for constipation, diarrhea, nausea and vomiting.   Neurological: Positive for paresthesias and sensory change. Negative for brief paralysis, disturbances in coordination, focal weakness, numbness and tremors.           Objective:      Physical Exam   Constitutional: He is oriented to person, place, and time. He appears well-developed and well-nourished. He is cooperative.   Oriented to time, place, and person.   Cardiovascular:   Pulses:       Dorsalis pedis pulses are 1+ on the right side, and 1+ on the left side.        Posterior tibial pulses are 1+ on the right side, and 1+ on the left side.   Capillary fill time 3-5 seconds.  Feet are warm to touch proximal with distal cooling.      2+ edema to bilateral lower extremities with varicosities  noted.    No pedal hair noted bilateral.     Musculoskeletal:   Normal angle, base, station of gait. Decreased stride length, early heel off, moderately propulsive toe off bilateral.    All ten toes without clubbing, cyanosis, or signs of ischemia.      Ankle dorsiflexion decreased at <10 degrees bilateral with moderate increase with knee flexion bilateral.    No pain to palpation bilateral lower extremities.      Range of motion, stability, muscle strength, and muscle tone are age and health appropriate normal bilateral feet and legs.       Feet:   Right Foot:   Protective Sensation: 10 sites tested. 4 sites sensed.   Left Foot:   Protective Sensation: 10 sites tested. 6 sites sensed.   Lymphadenopathy:   Negative lymphadenopathy bilateral popliteal fossa and tarsal tunnel.     Neurological: He is alert and oriented to person, place, and time. He has normal strength. He is not disoriented. He displays no atrophy and no tremor. A sensory deficit is present. He exhibits normal muscle tone.   Decreased/absent vibratory sensation bilateral feet to 128Hz tuning fork.    Diminished/loss of protective sensation bilateral to 10 gram monofilament.    Paresthesias,  bilateral feet with no clearly identified trigger or source.     Skin: Skin is warm, dry and intact. No abrasion, no bruising, no burn, no ecchymosis, no laceration, no lesion, no petechiae and no rash noted. He is not diaphoretic. No cyanosis or erythema. No pallor. Nails show no clubbing.   Focal hyperkeratotic lesion consisting entirely of hyperkeratotic tissue without open skin, drainage, pus, fluctuance, malodor, or signs of infection plantar right hallux and first and fifth mtpj bilateral.    Left plantar hallux IPJ there is a focal hyperkeratotic lesion with underlying discoloration, after trimming the underlying skin was discolored but intact, without erythema, drainage or malodor.    Skin thin, atrophic, with decreased density and distribution of pedal  hair bilateral, but without ulcers, masses, nodules or cords palpated bilateral feet and legs.    Hyperpigmentation both legs consistent with stasis.    Toenails 1st, 2nd, 3rd, 4th, 5th  bilateral are hypertrophic thickened 2-3 mm, dystrophic, discolored tanish brown with tan, gray crumbly subungual debris.               Assessment:       Encounter Diagnoses   Name Primary?    Diabetic polyneuropathy associated with diabetes mellitus due to underlying condition Yes    Type II diabetes mellitus with peripheral circulatory disorder     Pre-ulcerative calluses          Plan:       Steve was seen today for diabetes mellitus.    Diagnoses and all orders for this visit:    Diabetic polyneuropathy associated with diabetes mellitus due to underlying condition    Type II diabetes mellitus with peripheral circulatory disorder    Pre-ulcerative calluses      I counseled the patient on his conditions, their implications and medical management.    Patient will stretch the tendo achilles complex three times daily as demonstrated in the office.  Literature was dispensed illustrating proper stretching technique.    Shoe inspection. Diabetic Foot Education. Patient reminded of the importance of good nutrition and blood sugar control to help prevent podiatric complications of diabetes. Patient instructed on proper foot hygeine. We discussed wearing proper shoe gear, daily foot inspections, never walking without protective shoe gear, never putting sharp instruments to feet    With patient's verbal consent the hyperkeratotic lesions x5 total both feet were trimmed to healthy appearing skin using a # 15 scalpel. Patient relates relief of pain following the procedure. Patient tolerated the procedure well without complications. No anesthesia or hemostasis required. No blood loss.    Continue pain management.    Rx amlactin twice daily  To feet.    Discussed that if he has to return more often than every 2 months some visits may need  to be scheduled as Proc B as the insurance will not pay for visits more often. Discussed no barefoot or socks weight bearing, keep the diabetic shoes on.    Return 2+ months or sooner PRN as proc B    Fausto Harvey DPM

## 2018-07-14 ENCOUNTER — OFFICE VISIT (OUTPATIENT)
Dept: FAMILY MEDICINE | Facility: CLINIC | Age: 73
End: 2018-07-14
Payer: MEDICARE

## 2018-07-14 ENCOUNTER — HOSPITAL ENCOUNTER (OUTPATIENT)
Dept: RADIOLOGY | Facility: HOSPITAL | Age: 73
Discharge: HOME | End: 2018-07-14
Attending: FAMILY MEDICINE
Payer: MEDICARE

## 2018-07-14 VITALS
RESPIRATION RATE: 16 BRPM | SYSTOLIC BLOOD PRESSURE: 150 MMHG | HEART RATE: 62 BPM | WEIGHT: 202.63 LBS | DIASTOLIC BLOOD PRESSURE: 82 MMHG | HEIGHT: 69 IN | TEMPERATURE: 99 F | BODY MASS INDEX: 30.01 KG/M2

## 2018-07-14 DIAGNOSIS — R51.9 SEVERE HEADACHE: ICD-10-CM

## 2018-07-14 DIAGNOSIS — G44.201 ACUTE INTRACTABLE TENSION-TYPE HEADACHE: ICD-10-CM

## 2018-07-14 DIAGNOSIS — G44.201 ACUTE INTRACTABLE TENSION-TYPE HEADACHE: Primary | ICD-10-CM

## 2018-07-14 DIAGNOSIS — G44.89 CHRONIC MIXED HEADACHE SYNDROME: ICD-10-CM

## 2018-07-14 PROCEDURE — 3077F SYST BP >= 140 MM HG: CPT | Mod: CPTII,S$GLB,, | Performed by: FAMILY MEDICINE

## 2018-07-14 PROCEDURE — 99214 OFFICE O/P EST MOD 30 MIN: CPT | Mod: S$GLB,,, | Performed by: FAMILY MEDICINE

## 2018-07-14 PROCEDURE — 99999 PR PBB SHADOW E&M-EST. PATIENT-LVL IV: CPT | Mod: PBBFAC,,, | Performed by: FAMILY MEDICINE

## 2018-07-14 PROCEDURE — 70450 CT HEAD/BRAIN W/O DYE: CPT | Mod: TC

## 2018-07-14 PROCEDURE — 70450 CT HEAD/BRAIN W/O DYE: CPT | Mod: 26,,, | Performed by: RADIOLOGY

## 2018-07-14 PROCEDURE — 3079F DIAST BP 80-89 MM HG: CPT | Mod: CPTII,S$GLB,, | Performed by: FAMILY MEDICINE

## 2018-07-14 RX ORDER — BACLOFEN 10 MG/1
TABLET ORAL
Qty: 270 TABLET | Refills: 0 | OUTPATIENT
Start: 2018-07-14

## 2018-07-14 RX ORDER — BACLOFEN 10 MG/1
10 TABLET ORAL 3 TIMES DAILY
Qty: 60 TABLET | Refills: 0 | Status: SHIPPED | OUTPATIENT
Start: 2018-07-14 | End: 2018-11-24

## 2018-07-14 NOTE — PROGRESS NOTES
"Subjective:       Patient ID: Steve June Jr. is a 73 y.o. male.    Chief Complaint: Headache (X 5 days, goes down into shoulder, nausea)    Can't take steroids due to DM effects and told to not take NSAID's.  Pt reports that pain precludes him from sleeping.      Headache    This is a new problem. The current episode started in the past 7 days. The problem occurs constantly. The problem has been unchanged. The pain is located in the right unilateral and parietal region. The pain radiates to the right neck. The pain quality is not similar to prior headaches. Quality: steady; constant; between "dull and sharp" The pain is severe. Associated symptoms include scalp tenderness. Pertinent negatives include no abdominal pain, abnormal behavior, blurred vision, dizziness, eye watering, fever, nausea, numbness, sinus pressure, visual change or weakness. The symptoms are aggravated by activity (touch; moving neck/shoulder.). He has tried oral narcotics (Chr pain and on Percocet 10 mg q 6hours) for the symptoms. The treatment provided no relief.     Review of Systems   Constitutional: Negative for fever.   HENT: Negative for sinus pressure.    Eyes: Negative for blurred vision.   Respiratory: Negative for shortness of breath.    Cardiovascular: Negative for chest pain.   Gastrointestinal: Negative for abdominal pain and nausea.   Skin: Negative for rash.   Neurological: Positive for headaches. Negative for dizziness, weakness and numbness.   All other systems reviewed and are negative.      Objective:      Physical Exam   Constitutional: He appears well-developed. No distress.   HENT:   Mouth/Throat: Oropharynx is clear and moist.   Neck: Neck supple.   Cardiovascular: Normal rate and regular rhythm.    Murmur heard.  Pulmonary/Chest: Effort normal and breath sounds normal.   Musculoskeletal:   Pt has TTP in right parietal scalp and right cervical muscles and right trapezius area.   Lymphadenopathy:     He has no " cervical adenopathy.   Neurological: He is alert. He has normal strength. No cranial nerve deficit. He displays a negative Romberg sign.   Reflex Scores:       Patellar reflexes are 1+ on the right side and 1+ on the left side.  Skin: Skin is warm and dry.       Assessment:       1. Acute intractable tension-type headache    2. Severe headache    3. Chronic mixed headache syndrome        Plan:         Steve was seen today for headache.    Diagnoses and all orders for this visit:    Acute intractable tension-type headache  -     baclofen (LIORESAL) 10 MG tablet; Take 1 tablet (10 mg total) by mouth 3 (three) times daily.  -     CT Head Without Contrast; Future    Severe headache  -     CT Head Without Contrast; Future    Chronic mixed headache syndrome  -     CT Head Without Contrast; Future

## 2018-07-15 ENCOUNTER — HOSPITAL ENCOUNTER (EMERGENCY)
Facility: HOSPITAL | Age: 73
Discharge: HOME | End: 2018-07-15
Attending: EMERGENCY MEDICINE
Payer: MEDICARE

## 2018-07-15 VITALS
SYSTOLIC BLOOD PRESSURE: 183 MMHG | BODY MASS INDEX: 29.62 KG/M2 | HEIGHT: 69 IN | DIASTOLIC BLOOD PRESSURE: 83 MMHG | OXYGEN SATURATION: 99 % | WEIGHT: 200 LBS | RESPIRATION RATE: 16 BRPM | TEMPERATURE: 98 F | HEART RATE: 62 BPM

## 2018-07-15 DIAGNOSIS — M79.2 NEURALGIA INVOLVING SCALP: Primary | ICD-10-CM

## 2018-07-15 PROCEDURE — 99283 EMERGENCY DEPT VISIT LOW MDM: CPT

## 2018-07-15 RX ORDER — GABAPENTIN 100 MG/1
100 CAPSULE ORAL 3 TIMES DAILY
Qty: 90 CAPSULE | Refills: 0 | Status: SHIPPED | OUTPATIENT
Start: 2018-07-15 | End: 2018-08-09

## 2018-07-15 NOTE — ED PROVIDER NOTES
"Encounter Date: 7/15/2018    SCRIBE #1 NOTE: I, Krystle Joel, am scribing for, and in the presence of, Dr. Murguia.       History     Chief Complaint   Patient presents with    Headache     x several days       07/15/2018  7:03 AM    Chief Complaint: Headache      The patient is a 73 y.o. male who presents with worsening right-sided headache for 4-5 days. He describes the headache as "burning and shooting" pain. Pt endorses nausea that began this morning and congestion. He states the rash on his scalp is chronic. He saw Dr. Alvarez at the clinic yesterday for a CAT scan, but he does not know the results. He was prescribed muscle relaxers, but after Googling the medication he decided he should not be taking it. At 2300 last night, patient took 2 Benadryl and 2 Excedrin, but this has not provided relief. No other exacerbating or alleviating factors. Denies vomiting, diarrhea, fever, cough. PMHx of cirrhosis, HTN, GERD, Hep C, DM, valvular heart disease. No pertinent PSHx.       The history is provided by the patient and the spouse.     Review of patient's allergies indicates:   Allergen Reactions    Adhesive tape-silicones Other (See Comments)     pulls skin off    Doxycycline      Dizzy. "Just didn't feel right".     Past Medical History:   Diagnosis Date    Abnormal thyroid function test     Allergy     Seasonal    Anemia     Anemia due to blood loss 7/2/2014    Arthritis     Gaviria esophagus     Basal cell carcinoma     right forearm    Basal cell carcinoma 12/2011    lower post neck    Cancer     skin CA    Cataract     Cirrhosis     Diabetes mellitus     Diabetes mellitus, type 2     Encounter for blood transfusion     Esophageal varices in cirrhosis     grade II on 7/12 EGD    Gastritis     on 7/12 EGD    GERD (gastroesophageal reflux disease) 2/28/2015    Hard of hearing     Hiatal hernia     History of hepatitis C 8/10/2012    tx with harvoni x 41 days (started 10/22/15). SVR4     " Hoarseness 2/28/2015    Hypercholesteremia     Hypersplenism     Hypertension     No meds    Pain management 12/10/2014    Petechial hemorrhage 11/25/2015    Lower extremities bilat     Portal hypertensive gastropathy     on 7/12 EGD    Thrombocytopenia     Type II or unspecified type diabetes mellitus with neurological manifestations, uncontrolled(250.62) 12/24/2013    Valvular heart disease     mild MR 12     Past Surgical History:   Procedure Laterality Date    CATARACT EXTRACTION  1/10/13    left eye    CATARACT EXTRACTION      right eye    CHOLECYSTECTOMY      COLONOSCOPY      EYE SURGERY      Cataract surgery to right eye    KNEE ARTHROSCOPY W/ MENISCAL REPAIR      KNEE CARTILAGE SURGERY      left knee    KNEE SURGERY  12/2006    left    SKIN LESION EXCISION      TONSILLECTOMY      UPPER GASTROINTESTINAL ENDOSCOPY       Family History   Problem Relation Age of Onset    Leukemia Mother     Cancer Mother         bone    Alcohol abuse Father     Cirrhosis Father         EtOH induced    No Known Problems Daughter     No Known Problems Son     No Known Problems Daughter     No Known Problems Daughter     No Known Problems Daughter     No Known Problems Sister     Amblyopia Neg Hx     Blindness Neg Hx     Cataracts Neg Hx     Diabetes Neg Hx     Glaucoma Neg Hx     Hypertension Neg Hx     Macular degeneration Neg Hx     Retinal detachment Neg Hx     Strabismus Neg Hx     Stroke Neg Hx     Thyroid disease Neg Hx     Psoriasis Neg Hx     Lupus Neg Hx     Eczema Neg Hx     Acne Neg Hx     Melanoma Neg Hx      Social History   Substance Use Topics    Smoking status: Former Smoker     Packs/day: 1.00     Years: 25.00     Quit date: 8/9/2000    Smokeless tobacco: Never Used    Alcohol use Yes      Comment: rarely     Review of Systems   Constitutional: Negative for fever.   HENT: Positive for congestion. Negative for rhinorrhea and sore throat.    Respiratory: Negative for  cough and shortness of breath.    Cardiovascular: Negative for chest pain.   Gastrointestinal: Positive for nausea. Negative for diarrhea and vomiting.   Genitourinary: Negative for dysuria.   Musculoskeletal: Negative for back pain.   Skin: Positive for rash (scalp; chronic).   Neurological: Positive for headaches. Negative for weakness.   Hematological: Does not bruise/bleed easily.       Physical Exam     Initial Vitals [07/15/18 0630]   BP Pulse Resp Temp SpO2   (!) 183/83 62 16 97.7 °F (36.5 °C) 99 %      MAP       --         Physical Exam    Nursing note and vitals reviewed.  Constitutional: He appears well-developed and well-nourished. He is not diaphoretic. No distress.   HENT:   Head: Normocephalic and atraumatic.   Tenderness of right scalp.   Eyes: EOM are normal. Pupils are equal, round, and reactive to light.   Neck: Normal range of motion. Neck supple.   Cardiovascular: Normal rate, regular rhythm, normal heart sounds and intact distal pulses. Exam reveals no gallop and no friction rub.    No murmur heard.  Pulmonary/Chest: Breath sounds normal. No respiratory distress. He has no wheezes. He has no rhonchi. He has no rales.   Abdominal: Soft. Bowel sounds are normal. There is no tenderness. There is no rebound and no guarding.   Musculoskeletal: Normal range of motion.   Neurological: He is alert and oriented to person, place, and time.   Skin: Skin is warm.   Psychiatric: He has a normal mood and affect. His behavior is normal. Judgment and thought content normal.         ED Course   Procedures  Labs Reviewed - No data to display       Imaging Results    None          Medical Decision Making:   History:   Old Medical Records: I decided to obtain old medical records.  Old Records Summarized: records from clinic visits.       <> Summary of Records: CAT from yesterday showed no acute abnormalities.   Initial Assessment:   73-year-old male presented with a chief complaint of a headache.  Differential  Diagnosis:   Differential diagnosis includes, but is not limited to tension headache, shingles, and migraine headache.   ED Management:  The patient was emergently evaluated in the emergency department, his evaluation was significant for an elderly male with a normal neurologic exam, and mild tenderness noted to the right side of his scalp.  There is no evidence of any rashes noted at present.  The patient had a normal CT scan done yesterday from the clinic for this same headache. I do not believe he needs any further workup at this time.  The patient may have a neuralgia of the a scalp nerve on that side.  He is stable for discharge to home.  He will be discharged home with p.o. Neurontin and he is to follow up with his PCP for further care.            Scribe Attestation:   Scribe #1: I performed the above scribed service and the documentation accurately describes the services I performed. I attest to the accuracy of the note.           I, Dr. Yung Murguia, personally performed the services described in this documentation. All medical record entries made by the scribe were at my direction and in my presence.  I have reviewed the chart and agree that the record reflects my personal performance and is accurate and complete. Yung Murguia MD.  8:16 AM 07/15/2018       Clinical Impression:   The encounter diagnosis was Neuralgia involving scalp.      Disposition:   Disposition: Discharged  Condition: Stable                        Yung Murguia MD  07/15/18 0818

## 2018-07-16 ENCOUNTER — TELEPHONE (OUTPATIENT)
Dept: FAMILY MEDICINE | Facility: CLINIC | Age: 73
End: 2018-07-16

## 2018-07-16 ENCOUNTER — HOSPITAL ENCOUNTER (EMERGENCY)
Facility: HOSPITAL | Age: 73
Discharge: HOME | End: 2018-07-16
Attending: EMERGENCY MEDICINE
Payer: MEDICARE

## 2018-07-16 VITALS
WEIGHT: 200 LBS | DIASTOLIC BLOOD PRESSURE: 82 MMHG | SYSTOLIC BLOOD PRESSURE: 188 MMHG | BODY MASS INDEX: 29.62 KG/M2 | HEIGHT: 69 IN | HEART RATE: 68 BPM | TEMPERATURE: 98 F | OXYGEN SATURATION: 97 % | RESPIRATION RATE: 18 BRPM

## 2018-07-16 DIAGNOSIS — B02.9 HERPES ZOSTER WITHOUT COMPLICATION: Primary | ICD-10-CM

## 2018-07-16 PROCEDURE — 63600175 PHARM REV CODE 636 W HCPCS: Performed by: EMERGENCY MEDICINE

## 2018-07-16 PROCEDURE — 99283 EMERGENCY DEPT VISIT LOW MDM: CPT | Mod: 25

## 2018-07-16 PROCEDURE — 96372 THER/PROPH/DIAG INJ SC/IM: CPT

## 2018-07-16 RX ORDER — HYDROMORPHONE HYDROCHLORIDE 1 MG/ML
1 INJECTION, SOLUTION INTRAMUSCULAR; INTRAVENOUS; SUBCUTANEOUS
Status: COMPLETED | OUTPATIENT
Start: 2018-07-16 | End: 2018-07-16

## 2018-07-16 RX ORDER — VALACYCLOVIR HYDROCHLORIDE 1 G/1
1000 TABLET, FILM COATED ORAL 3 TIMES DAILY
Qty: 21 TABLET | Refills: 0 | Status: SHIPPED | OUTPATIENT
Start: 2018-07-16 | End: 2018-07-23

## 2018-07-16 RX ORDER — OXYCODONE HYDROCHLORIDE 5 MG/1
5 TABLET ORAL EVERY 4 HOURS PRN
Qty: 11 TABLET | Refills: 0 | Status: SHIPPED | OUTPATIENT
Start: 2018-07-16 | End: 2018-07-17 | Stop reason: SDUPTHER

## 2018-07-16 RX ADMIN — HYDROMORPHONE HYDROCHLORIDE 1 MG: 1 INJECTION, SOLUTION INTRAMUSCULAR; INTRAVENOUS; SUBCUTANEOUS at 05:07

## 2018-07-16 NOTE — ED PROVIDER NOTES
"Encounter Date: 7/16/2018    SCRIBE #1 NOTE: Isabell KINGSTON, am scribing for, and in the presence of, Dr. Parish.       History     Chief Complaint   Patient presents with    Herpes Zoster     presents with pain        Time seen by provider: 5:35 PM on 07/16/2018    Steve June Jr. is a 73 y.o. male who presents to the ED for evaluation of "shingles". The patient reports having a "rash" on the top right side of his head that started today. he believes is the shingles. He was seen at this ED yesterday by Dr. Murguia and was diagnosed with neuralgia involving the scalp. At the time, there was no evidence of a rash. The patient was prescribed Gabapentin with no relief to the pain. Patient currently takes percocet 10mg 4 times a day. He is scheduled to go to pain management in 2 days. The patient has no other medical concerns or complaints at this moment. He denies onset of any other new symptoms. PMHx includes cirrhosis, thrombocytopenia, DM, HTN, and cancer. SHx includes cholecystectomy and colonoscopy.       The history is provided by the patient and the spouse.     Review of patient's allergies indicates:   Allergen Reactions    Adhesive tape-silicones Other (See Comments)     pulls skin off    Doxycycline      Dizzy. "Just didn't feel right".     Past Medical History:   Diagnosis Date    Abnormal thyroid function test     Allergy     Seasonal    Anemia     Anemia due to blood loss 7/2/2014    Arthritis     Gaviria esophagus     Basal cell carcinoma     right forearm    Basal cell carcinoma 12/2011    lower post neck    Cancer     skin CA    Cataract     Cirrhosis     Diabetes mellitus     Diabetes mellitus, type 2     Encounter for blood transfusion     Esophageal varices in cirrhosis     grade II on 7/12 EGD    Gastritis     on 7/12 EGD    GERD (gastroesophageal reflux disease) 2/28/2015    Hard of hearing     Hiatal hernia     History of hepatitis C 8/10/2012    tx with harvoni x 41 days " (started 10/22/15). SVR4     Hoarseness 2/28/2015    Hypercholesteremia     Hypersplenism     Hypertension     No meds    Pain management 12/10/2014    Petechial hemorrhage 11/25/2015    Lower extremities bilat     Portal hypertensive gastropathy     on 7/12 EGD    Thrombocytopenia     Type II or unspecified type diabetes mellitus with neurological manifestations, uncontrolled(250.62) 12/24/2013    Valvular heart disease     mild MR 12     Past Surgical History:   Procedure Laterality Date    CATARACT EXTRACTION  1/10/13    left eye    CATARACT EXTRACTION      right eye    CHOLECYSTECTOMY      COLONOSCOPY      EYE SURGERY      Cataract surgery to right eye    KNEE ARTHROSCOPY W/ MENISCAL REPAIR      KNEE CARTILAGE SURGERY      left knee    KNEE SURGERY  12/2006    left    SKIN LESION EXCISION      TONSILLECTOMY      UPPER GASTROINTESTINAL ENDOSCOPY       Family History   Problem Relation Age of Onset    Leukemia Mother     Cancer Mother         bone    Alcohol abuse Father     Cirrhosis Father         EtOH induced    No Known Problems Daughter     No Known Problems Son     No Known Problems Daughter     No Known Problems Daughter     No Known Problems Daughter     No Known Problems Sister     Amblyopia Neg Hx     Blindness Neg Hx     Cataracts Neg Hx     Diabetes Neg Hx     Glaucoma Neg Hx     Hypertension Neg Hx     Macular degeneration Neg Hx     Retinal detachment Neg Hx     Strabismus Neg Hx     Stroke Neg Hx     Thyroid disease Neg Hx     Psoriasis Neg Hx     Lupus Neg Hx     Eczema Neg Hx     Acne Neg Hx     Melanoma Neg Hx      Social History   Substance Use Topics    Smoking status: Former Smoker     Packs/day: 1.00     Years: 25.00     Quit date: 8/9/2000    Smokeless tobacco: Never Used    Alcohol use Yes      Comment: rarely     Review of Systems   Constitutional: Negative for activity change and fever.   HENT: Negative for congestion, rhinorrhea and sore  throat.    Respiratory: Negative for cough and shortness of breath.    Gastrointestinal: Negative for abdominal pain, nausea and vomiting.   Musculoskeletal: Negative for gait problem.   Skin: Positive for rash (right side of scalp). Negative for color change, pallor and wound.   Neurological: Negative for dizziness, weakness, numbness and headaches.   Psychiatric/Behavioral: Negative for confusion.       Physical Exam     Initial Vitals [07/16/18 1718]   BP Pulse Resp Temp SpO2   (!) 188/82 68 18 98 °F (36.7 °C) 97 %      MAP       --         Physical Exam    Nursing note and vitals reviewed.  Constitutional: He appears well-developed and well-nourished. He is not diaphoretic.  Non-toxic appearance. He does not have a sickly appearance. He does not appear ill. He appears distressed.   Moderate distress with pain.    HENT:   Head: Normocephalic and atraumatic.   No facial lesions. Right scalp lesion noted.   Eyes: Conjunctivae, EOM and lids are normal. Pupils are equal, round, and reactive to light. Right conjunctiva is not injected. Left conjunctiva is not injected.   No lesions surrounding eyes.    Neck: Normal range of motion. Neck supple.   Neck is supple, no meningismus.    Cardiovascular: Normal rate, regular rhythm, normal heart sounds and intact distal pulses. Exam reveals no gallop and no friction rub.    No murmur heard.  Pulmonary/Chest: Breath sounds normal. No respiratory distress. He has no wheezes. He has no rhonchi. He has no rales.   Abdominal: Soft.   Musculoskeletal: Normal range of motion. He exhibits no edema or tenderness.   Neurological: He is alert and oriented to person, place, and time. He has normal strength.   Skin: Skin is warm and dry. Lesion and rash noted. Rash is macular and papular.   Macular, papular lesion with erythematous base to the right side of scalp on the crown and occiput. No drainage. Significant TTP of lesion is noted without fluctuance and induration.          ED Course    Procedures  Labs Reviewed - No data to display       Imaging Results    None          Medical Decision Making:   History:   Old Medical Records: I decided to obtain old medical records.  Initial Assessment:   73-year-old male presented with a chief complaint of a rash.  ED Management:  Patient was urgently evaluated in the emergency room. He appears to have acute herpes zoster as the rash was just noted last night. I will treat with Valacyclovir. The complicating factor is that the patient is already on a large amount of pain medication. I will prescribe the patient with a low dose of oxycodone for breakthrough pain. He will be instructed to continue to take the Neurontin as prescribed yesterday. Steroids is not a good option as he has diabetes. I will provide a dose of pain control in the ED. I fell he is clinically stable for discharge and is instructed for a close follow-up with his PCP.             Scribe Attestation:   Scribe #1: I performed the above scribed service and the documentation accurately describes the services I performed. I attest to the accuracy of the note.      I, Dr. Nathen Parish, personally performed the services described in this documentation. All medical record entries made by the scribe were at my direction and in my presence.  I have reviewed the chart and agree that the record reflects my personal performance and is accurate and complete. Nathen Parish MD.  8:11 PM 07/16/2018             Clinical Impression:   The encounter diagnosis was Herpes zoster without complication.      Disposition:   Disposition: Discharged  Condition: Stable                        Nathen Parish MD  07/16/18 2011

## 2018-07-16 NOTE — TELEPHONE ENCOUNTER
----- Message from Dianne Pratt sent at 7/16/2018  8:39 AM CDT -----  Contact: patient   Patient calling to speak to the Nurse. Please advise. Call to pod. No answer.   Call back   Thanks!

## 2018-07-16 NOTE — TELEPHONE ENCOUNTER
Patient calling for a f/u from the ED r/t Neuralgia involving scalp. Patient reports the hospital doesn't know what they are doing (he reports he googled his symptoms and has shingles.) Informed patient he must be revaluated. Appointment scheduled on 7/18 in [place]. Patient declined appointment. Patient reports he wants an appointment today. Informed patient nothing was available. If symptoms worsen proceed to Urgent Care or Ed.

## 2018-07-17 ENCOUNTER — TELEPHONE (OUTPATIENT)
Dept: FAMILY MEDICINE | Facility: CLINIC | Age: 73
End: 2018-07-17

## 2018-07-17 ENCOUNTER — OFFICE VISIT (OUTPATIENT)
Dept: FAMILY MEDICINE | Facility: CLINIC | Age: 73
End: 2018-07-17
Attending: FAMILY MEDICINE
Payer: MEDICARE

## 2018-07-17 VITALS
BODY MASS INDEX: 29.06 KG/M2 | TEMPERATURE: 98 F | DIASTOLIC BLOOD PRESSURE: 82 MMHG | HEIGHT: 69 IN | HEART RATE: 72 BPM | RESPIRATION RATE: 17 BRPM | SYSTOLIC BLOOD PRESSURE: 142 MMHG | WEIGHT: 196.19 LBS | OXYGEN SATURATION: 97 %

## 2018-07-17 DIAGNOSIS — B02.29 POST HERPETIC NEURALGIA: Primary | ICD-10-CM

## 2018-07-17 PROCEDURE — 99999 PR PBB SHADOW E&M-EST. PATIENT-LVL III: CPT | Mod: PBBFAC,,, | Performed by: FAMILY MEDICINE

## 2018-07-17 PROCEDURE — 3077F SYST BP >= 140 MM HG: CPT | Mod: CPTII,S$GLB,, | Performed by: FAMILY MEDICINE

## 2018-07-17 PROCEDURE — 99213 OFFICE O/P EST LOW 20 MIN: CPT | Mod: S$GLB,,, | Performed by: FAMILY MEDICINE

## 2018-07-17 PROCEDURE — 3079F DIAST BP 80-89 MM HG: CPT | Mod: CPTII,S$GLB,, | Performed by: FAMILY MEDICINE

## 2018-07-17 RX ORDER — OXYCODONE HYDROCHLORIDE 10 MG/1
10 TABLET ORAL EVERY 4 HOURS PRN
Qty: 12 TABLET | Refills: 0 | Status: SHIPPED | OUTPATIENT
Start: 2018-07-17 | End: 2018-08-15

## 2018-07-17 NOTE — TELEPHONE ENCOUNTER
----- Message from Jodee Orr sent at 7/17/2018  3:57 PM CDT -----  Contact: Kwesi Torres  Calling to get clarification on oxyCODONE (ROXICODONE) 10 mg Tab immediate release tablet. Please call  [phone]

## 2018-07-17 NOTE — TELEPHONE ENCOUNTER
----- Message from Cindy Cruz sent at 7/17/2018  9:14 AM CDT -----  Type:  Same Day Appointment Request    Caller is requesting a same day appointment.      Name of Caller: Wife (Britney)  When is the first available appointment?  End of week  Symptoms:  Shingles on scalp,ears, face and inside mouth  Best Call Back Number:  [phone]  Additional Information:

## 2018-07-17 NOTE — PROGRESS NOTES
Subjective:       Patient ID: Steve June Jr. is a 73 y.o. male.    Chief Complaint: Follow-up ([facility]/ shingles)    73-year-old male has been experiencing severe right sided headache for several days.  He has been seen here on at least one occasion and in the emergency room twice.  He was started about a few days ago on gabapentin 100 mg 3 times daily and yesterday was given oxycodone 5 mg every 4 hours when necessary pain along with high-dose valacyclovir..  He is a chronic pain patient of Dr. Mario and normally takes Percocet 10 4 times a day but is out of those.  He is having excruciating level X out of 10 pain in the right occipital area radiating up over the scalp to the temple.  He doesn't have any ocular involvement in the rash which started breaking out yesterday.  He has a follow-up appointment with Dr. Mario tomorrow.    Past Medical History:  No date: Abnormal thyroid function test  No date: Allergy      Comment: Seasonal  No date: Anemia  7/2/2014: Anemia due to blood loss  No date: Arthritis  No date: Gaviria esophagus  No date: Basal cell carcinoma      Comment: right forearm  12/2011: Basal cell carcinoma      Comment: lower post neck  No date: Cancer      Comment: skin CA  No date: Cataract  No date: Cirrhosis  No date: Diabetes mellitus  No date: Diabetes mellitus, type 2  No date: Encounter for blood transfusion  No date: Esophageal varices in cirrhosis      Comment: grade II on 7/12 EGD  No date: Gastritis      Comment: on 7/12 EGD  2/28/2015: GERD (gastroesophageal reflux disease)  No date: Hard of hearing  No date: Hiatal hernia  8/10/2012: History of hepatitis C      Comment: tx with harvoni x 41 days (started 10/22/15).                SVR4   2/28/2015: Hoarseness  No date: Hypercholesteremia  No date: Hypersplenism  No date: Hypertension      Comment: No meds  12/10/2014: Pain management  11/25/2015: Petechial hemorrhage      Comment: Lower extremities bilat   No date: Portal hypertensive  "gastropathy      Comment: on 7/12 EGD  No date: Thrombocytopenia  12/24/2013: Type II or unspecified type diabetes mellitus *  No date: Valvular heart disease      Comment: mild MR 12    Past Surgical History:  1/10/13: CATARACT EXTRACTION      Comment: left eye  No date: CATARACT EXTRACTION      Comment: right eye  No date: CHOLECYSTECTOMY  No date: COLONOSCOPY  No date: EYE SURGERY      Comment: Cataract surgery to right eye  No date: KNEE ARTHROSCOPY W/ MENISCAL REPAIR  No date: KNEE CARTILAGE SURGERY      Comment: left knee  12/2006: KNEE SURGERY      Comment: left  No date: SKIN LESION EXCISION  No date: TONSILLECTOMY  No date: UPPER GASTROINTESTINAL ENDOSCOPY    Current Outpatient Prescriptions on File Prior to Visit:  ACCU-CHEK VEENA PLUS METER Misc, 1 Device by Misc.(Non-Drug; Combo Route) route 4 (four) times daily., Disp: 1 each, Rfl: 0  ammonium lactate (LAC-HYDRIN) 12 % lotion, Apply topically 2 (two) times daily as needed for Dry Skin., Disp: 225 g, Rfl: 0  baclofen (LIORESAL) 10 MG tablet, Take 1 tablet (10 mg total) by mouth 3 (three) times daily., Disp: 60 tablet, Rfl: 0  blood sugar diagnostic Strp, Accu-chek veena plus test strip. Test blood sugar 4 times daily, Disp: 400 strip, Rfl: 3  carvedilol (COREG) 6.25 MG tablet, TAKE 1 TABLET(6.25 MG) BY MOUTH TWICE DAILY, Disp: 60 tablet, Rfl: 1  gabapentin (NEURONTIN) 100 MG capsule, Take 1 capsule (100 mg total) by mouth 3 (three) times daily., Disp: 90 capsule, Rfl: 0  insulin aspart U-100 (NOVOLOG FLEXPEN U-100 INSULIN) 100 unit/mL InPn pen, Inject 10 Units into the skin 3 (three) times daily with meals., Disp: 2 Box, Rfl: 3  insulin detemir U-100 (LEVEMIR FLEXTOUCH) 100 unit/mL (3 mL) SubQ InPn pen, Inject 36 Units into the skin every evening., Disp: 2 Box, Rfl: 3  lancets (ACCU-CHEK SOFTCLIX LANCETS) Misc, Test blood sugar 4 times daily, Disp: 400 each, Rfl: 3  pen needle, diabetic (BD ULTRA-FINE KORINA PEN NEEDLES) 32 gauge x 5/32" Ndle, Use 4 " needles daily with insulin injecitons, Disp: 400 each, Rfl: 3  valACYclovir (VALTREX) 1000 MG tablet, Take 1 tablet (1,000 mg total) by mouth 3 (three) times daily. for 7 days, Disp: 21 tablet, Rfl: 0  (DISCONTINUED) oxyCODONE (ROXICODONE) 5 MG immediate release tablet, Take 1 tablet (5 mg total) by mouth every 4 (four) hours as needed for Pain (breakthrough pain)., Disp: 11 tablet, Rfl: 0  oxyCODONE-acetaminophen (PERCOCET)  mg per tablet, Take 1 tablet by mouth every 6 (six) hours as needed for Pain., Disp: 120 tablet, Rfl: 0-out    Current Facility-Administered Medications on File Prior to Visit:  (COMPLETED) HYDROmorphone injection 1 mg, 1 mg, Intramuscular, ED 1 Time, Nathen Parish MD, 1 mg at 07/16/18 1745            Review of Systems   Skin: Positive for rash.   Neurological: Positive for headaches.       Objective:      Physical Exam   Constitutional: He is oriented to person, place, and time.   Mildly elevated systolic blood pressure but the patient is in a great deal of pain   Neurological: He is alert and oriented to person, place, and time.   Skin:        Psychiatric: His speech is normal. Judgment and thought content normal. His mood appears anxious. His affect is angry and labile. He is agitated. Cognition and memory are normal. He is attentive.   Nursing note and vitals reviewed.      Assessment:       1. Post herpetic neuralgia        Plan:       1. Post herpetic neuralgia  Patient instructed to double up on his gabapentin going to 300 mg twice a day.  He may take 3 of his 100s at a time until he uses them up.  He is nearly out of the oxycodone 5 mg and is out of the Percocet tens.   was checked with no inappropriate activity found.  I have given him some oxycodone 10 mg to use up to every 4 hours.  Keep appointment with Dr. Mario tomorrow.  I don't know if he would be a candidate for a nerve block with his history of cirrhosis and thrombocytopenia.  - oxyCODONE (ROXICODONE) 10 mg Tab  immediate release tablet; Take 1 tablet (10 mg total) by mouth every 4 (four) hours as needed for Pain (breakthrough pain).  Dispense: 12 tablet; Refill: 0

## 2018-07-18 ENCOUNTER — OFFICE VISIT (OUTPATIENT)
Dept: PAIN MEDICINE | Facility: CLINIC | Age: 73
End: 2018-07-18
Payer: MEDICARE

## 2018-07-18 VITALS
SYSTOLIC BLOOD PRESSURE: 136 MMHG | WEIGHT: 196 LBS | HEIGHT: 69 IN | DIASTOLIC BLOOD PRESSURE: 89 MMHG | BODY MASS INDEX: 29.03 KG/M2 | HEART RATE: 68 BPM

## 2018-07-18 DIAGNOSIS — M17.10 PRIMARY OSTEOARTHRITIS OF KNEE, UNSPECIFIED LATERALITY: ICD-10-CM

## 2018-07-18 DIAGNOSIS — B02.9 HERPES ZOSTER WITHOUT COMPLICATION: Primary | ICD-10-CM

## 2018-07-18 DIAGNOSIS — M47.819 FACET ARTHROPATHY: ICD-10-CM

## 2018-07-18 DIAGNOSIS — M50.30 DDD (DEGENERATIVE DISC DISEASE), CERVICAL: ICD-10-CM

## 2018-07-18 PROCEDURE — 3079F DIAST BP 80-89 MM HG: CPT | Mod: CPTII,S$GLB,, | Performed by: PHYSICIAN ASSISTANT

## 2018-07-18 PROCEDURE — 99999 PR PBB SHADOW E&M-EST. PATIENT-LVL IV: CPT | Mod: PBBFAC,,, | Performed by: PHYSICIAN ASSISTANT

## 2018-07-18 PROCEDURE — 3075F SYST BP GE 130 - 139MM HG: CPT | Mod: CPTII,S$GLB,, | Performed by: PHYSICIAN ASSISTANT

## 2018-07-18 PROCEDURE — 99214 OFFICE O/P EST MOD 30 MIN: CPT | Mod: S$GLB,,, | Performed by: PHYSICIAN ASSISTANT

## 2018-07-18 RX ORDER — OXYCODONE AND ACETAMINOPHEN 10; 325 MG/1; MG/1
1 TABLET ORAL EVERY 6 HOURS PRN
Qty: 120 TABLET | Refills: 0 | Status: SHIPPED | OUTPATIENT
Start: 2018-07-18 | End: 2018-08-15

## 2018-07-18 RX ORDER — OXYCODONE AND ACETAMINOPHEN 10; 325 MG/1; MG/1
1 TABLET ORAL EVERY 6 HOURS PRN
Qty: 120 TABLET | Refills: 0 | Status: SHIPPED | OUTPATIENT
Start: 2018-09-14 | End: 2018-10-13

## 2018-07-18 RX ORDER — OXYCODONE AND ACETAMINOPHEN 10; 325 MG/1; MG/1
1 TABLET ORAL EVERY 6 HOURS PRN
Qty: 120 TABLET | Refills: 0 | Status: SHIPPED | OUTPATIENT
Start: 2018-08-16 | End: 2018-08-15

## 2018-07-20 NOTE — PROGRESS NOTES
"Referring Physician: No ref. provider found    PCP: CORNEL De Los Santos      CC: neck and left shoulder pain; knee pain    Interval history:  Steve June Jr. is a 73 y.o. male with  neck and left shoulder pain who presents today for f/u and medication refill. He is currently being treated for herpes zoster. He is taking Valtrex as well as Gabapentin 100 mg TID. Continues to have severe pain.   Chronic pain is unchanged. He has tried physical therapy which did not provide much benefit.   Injections performed have only given him shortlived relief.  He underwent visco supplementation injections for his left knee without much benefit.  He continues to have neck pain. He has a history of cervical DDD.  However, he continues to have low platelets and we are unable to provide any neuro axonal interventional procedures.  He takes oxycodone 10 mg every 6 hours as needed with mild to moderate benefit.  Does cause some drowsiness. Pain today is rated 10/10.    Prior HPI:   Steve June Jr. is a 73 y.o. male referred to us for lower back and knee pain.  He has significant history of pancytopenia, cirrhosis.  He presents with constant aching, sharp pain in his lower back as well as his left knee.  Pain worsens sitting, standing, bending, walking.  Pain improves with rest.  X-rays performed of the lumbar spine as well as knee shows left knee osteoarthritis.  He has tried physical therapy with minimal benefit.  He currently takes Norco 10 mg every 6 hours as needed with mild to moderate benefit.  He is unable to have any procedures due to his thrombocytopenia.  He also has ventral hernia, but surgical attention is currently not recommended due to his thrombocytopenia.  His main concern today is wishing to decrease his opioid medications.  He states being very "foggy" with his current medications.  He denies any increased sedation.  He denies any weakness.  No bowel bladder changes.    ROS:  CONSTITUTIONAL: No fevers, " chills, night sweats, wt. loss, appetite changes  SKIN: no rashes or itching  ENT: No headaches, head trauma, vision changes, or eye pain  LYMPH NODES: None noticed   CV: No chest pain, palpitations.   RESP: No shortness of breath, dyspnea on exertion, cough, wheezing, or hemoptysis  GI: No nausea, emesis, diarrhea, constipation, melena, hematochezia, pain.    : No dysuria, hematuria, urgency, or frequency   HEME: No easy bruising, bleeding problems  PSYCHIATRIC: No depression, anxiety, psychosis, hallucinations.  NEURO: No seizures, memory loss, dizziness or difficulty sleeping  MSK: + History of present illness      Past Medical History:   Diagnosis Date    Abnormal thyroid function test     Allergy     Seasonal    Anemia     Anemia due to blood loss 7/2/2014    Arthritis     Gaviria esophagus     Basal cell carcinoma     right forearm    Basal cell carcinoma 12/2011    lower post neck    Cancer     skin CA    Cataract     Cirrhosis     Diabetes mellitus     Diabetes mellitus, type 2     Encounter for blood transfusion     Esophageal varices in cirrhosis     grade II on 7/12 EGD    Gastritis     on 7/12 EGD    GERD (gastroesophageal reflux disease) 2/28/2015    Hard of hearing     Hiatal hernia     History of hepatitis C 8/10/2012    tx with harvoni x 41 days (started 10/22/15). SVR4     Hoarseness 2/28/2015    Hypercholesteremia     Hypersplenism     Hypertension     No meds    Pain management 12/10/2014    Petechial hemorrhage 11/25/2015    Lower extremities bilat     Portal hypertensive gastropathy     on 7/12 EGD    Thrombocytopenia     Type II or unspecified type diabetes mellitus with neurological manifestations, uncontrolled(250.62) 12/24/2013    Valvular heart disease     mild MR 12     Past Surgical History:   Procedure Laterality Date    CATARACT EXTRACTION  1/10/13    left eye    CATARACT EXTRACTION      right eye    CHOLECYSTECTOMY      COLONOSCOPY      EYE SURGERY   "    Cataract surgery to right eye    KNEE ARTHROSCOPY W/ MENISCAL REPAIR      KNEE CARTILAGE SURGERY      left knee    KNEE SURGERY  12/2006    left    SKIN LESION EXCISION      TONSILLECTOMY      UPPER GASTROINTESTINAL ENDOSCOPY       Family History   Problem Relation Age of Onset    Leukemia Mother     Cancer Mother         bone    Alcohol abuse Father     Cirrhosis Father         EtOH induced    No Known Problems Daughter     No Known Problems Son     No Known Problems Daughter     No Known Problems Daughter     No Known Problems Daughter     No Known Problems Sister     Amblyopia Neg Hx     Blindness Neg Hx     Cataracts Neg Hx     Diabetes Neg Hx     Glaucoma Neg Hx     Hypertension Neg Hx     Macular degeneration Neg Hx     Retinal detachment Neg Hx     Strabismus Neg Hx     Stroke Neg Hx     Thyroid disease Neg Hx     Psoriasis Neg Hx     Lupus Neg Hx     Eczema Neg Hx     Acne Neg Hx     Melanoma Neg Hx      Social History     Social History    Marital status:      Spouse name: N/A    Number of children: 5    Years of education: N/A     Social History Main Topics    Smoking status: Former Smoker     Packs/day: 1.00     Years: 25.00     Quit date: 8/9/2000    Smokeless tobacco: Never Used    Alcohol use Yes      Comment: rarely    Drug use: No    Sexual activity: No     Other Topics Concern    None     Social History Narrative    He is .  He has children.  He is currently retired.         Medications/Allergies: See med card    Vitals:    07/18/18 1102   BP: 136/89   Pulse: 68   Weight: 88.9 kg (196 lb)   Height: 5' 9" (1.753 m)   PainSc: 10-Worst pain ever   PainLoc: Neck     Physical exam:    GENERAL: A and O x3, the patient appears well groomed and is in no acute distress.  Skin: No rashes or obvious lesions  HEENT: normocephalic, atraumatic  CARDIOVASCULAR:  RRR  LUNGS: non labored breathing  ABDOMEN: soft, nontender, + sizaeable ventral hernia in " epigastric region.    UPPER EXTREMITIES: Normal alignment, normal range of motion, no atrophy, no skin changes,  hair growth and nail growth normal and equal bilaterally. No swelling, no tenderness.    LOWER EXTREMITIES:  Normal alignment, normal range of motion, no atrophy, no skin changes,  hair growth and nail growth normal and equal bilaterally. No swelling, no tenderness.    LUMBAR SPINE  Lumbar spine: ROM is full with flexion extension and oblique extension with moderate increased pain.    Erasmo's test causes no increased pain on either side.    Supine straight leg raise is negative bilaterally.    Internal and external rotation of the hip causes no increased pain on either side.  Myofascial exam: No tenderness to palpation across lumbar paraspinous muscles.      MENTAL STATUS: normal orientation, speech, language, and fund of knowledge for social situation.  Emotional state appropriate.    CRANIAL NERVES:  II:  PERRL bilaterally,   III,IV,VI: EOMI.    V:  Facial sensation equal bilaterally  VII:  Facial motor function normal.  VIII:  Hearing equal to finger rub bilaterally  IX/X: Gag normal, palate symmetric  XI:  Shoulder shrug equal, head turn equal  XII:  Tongue midline without fasciculations      MOTOR: Tone and bulk: normal bilateral upper and lower Strength: normal   Delt Bi Tri WE WF     R 5 5 5 5 5 5   L 5 5 5 5 5 5     IP ADD ABD Quad TA Gas HAM  R 5 5 5 5 5 5 5  L 5 5 5 5 5 5 5    SENSATION: Light touch and pinprick intact bilaterally  REFLEXES: normal, symmetric, nonbrisk.  Toes down, no clonus. No hoffmans.  GAIT: normal rise, base, steps, and arm swing.      Imaging:  Xray L-spine 4/2013   Alignment is otherwise normal.  Vertebral body heights and disc space heights are relatively well maintained.  Vertebral end plate osteophytes are present anteriorly   at multiple levels.  The facet joints and posterior elements are unremarkable       Xray Knee 12/2013  Degenerative change of the knees,  left greater than right.    Assessment:  Steve June Jr. is a 73 y.o. male with neck, back and left knee pain  1. Herpes zoster without complication    2. DDD (degenerative disc disease), cervical    3. Primary osteoarthritis of knee, unspecified laterality    4. Facet arthropathy      Plan:  1. I have stressed the importance of physical activity and exercise to improve overall health.  Continue PT exercises learned at home.  2.  Any interventional procedures will be deferred due to his low platelet count.  Patient expressed understanding.  3.  Continue evaluation with PM&R, Dr. Silva  4. Percocet 10mg q 8 hrs as needed.  Hold for sedation.  UDS LCV consistent.  5. Titrate Gabapentin to 300 mg TID. Continue Valtrex  6. Follow-up 3 months  All medication management was performed by Dr. Kapil Mario

## 2018-07-23 ENCOUNTER — TELEPHONE (OUTPATIENT)
Dept: FAMILY MEDICINE | Facility: CLINIC | Age: 73
End: 2018-07-23

## 2018-07-23 NOTE — TELEPHONE ENCOUNTER
----- Message from Opal Deluca sent at 7/23/2018  8:29 AM CDT -----  Contact: self   Patient has shingles and would like to speak with a nurse. Please call back at [phone] (home) asap

## 2018-07-23 NOTE — TELEPHONE ENCOUNTER
Spoke to patient, who was treated for shingles by Dr. Parry on 7/17/18. Informed patient to complete medication prescribed. Symptoms can last between 3-5 weeks. Patient states he still is in pain. Patient is currently taking Percocet for pain. Patient reports he isn't taking medication as prescribed, has been taking more quantity daily. Informed patient he must take medication as prescribed. Pt. Verbalized understanding.

## 2018-07-27 ENCOUNTER — PATIENT MESSAGE (OUTPATIENT)
Dept: PAIN MEDICINE | Facility: CLINIC | Age: 73
End: 2018-07-27

## 2018-07-27 RX ORDER — GABAPENTIN 100 MG/1
300 CAPSULE ORAL 3 TIMES DAILY
Qty: 90 CAPSULE | Refills: 0 | Status: CANCELLED | OUTPATIENT
Start: 2018-07-27 | End: 2019-07-27

## 2018-07-27 RX ORDER — GABAPENTIN 300 MG/1
300 CAPSULE ORAL 3 TIMES DAILY
Qty: 90 CAPSULE | Refills: 1 | Status: SHIPPED | OUTPATIENT
Start: 2018-07-27 | End: 2018-08-09

## 2018-07-27 NOTE — TELEPHONE ENCOUNTER
Informed patient that Rx was sent to the pharmacy. Instructed patient that the dosage was changed to 300mg capsules and to take 1 TID. Patient accepted and voiced understanding.

## 2018-08-08 ENCOUNTER — PATIENT MESSAGE (OUTPATIENT)
Dept: PAIN MEDICINE | Facility: CLINIC | Age: 73
End: 2018-08-08

## 2018-08-09 RX ORDER — GABAPENTIN 600 MG/1
600 TABLET ORAL 3 TIMES DAILY
Qty: 90 TABLET | Refills: 2 | Status: SHIPPED | OUTPATIENT
Start: 2018-08-09 | End: 2019-01-03

## 2018-08-13 ENCOUNTER — TELEPHONE (OUTPATIENT)
Dept: FAMILY MEDICINE | Facility: CLINIC | Age: 73
End: 2018-08-13

## 2018-08-13 NOTE — TELEPHONE ENCOUNTER
----- Message from Genoveva Salazar LPN sent at 8/13/2018  4:37 PM CDT -----  Contact: [phone]      ----- Message -----  From: Nicolasa Phillip  Sent: 8/13/2018   2:37 PM  To: Elida Velazco Staff    Patient is requesting a call back from the nurse stated he has shingles need a sooner date than Sept.  Please call the patient upon request at phone number [phone].

## 2018-08-14 ENCOUNTER — TELEPHONE (OUTPATIENT)
Dept: FAMILY MEDICINE | Facility: CLINIC | Age: 73
End: 2018-08-14

## 2018-08-14 NOTE — TELEPHONE ENCOUNTER
Patient was called, stated Dr Marrero will be his new pcp, not yet booked.   Booked with Mr Arevalo 8-15-18 at 2pm

## 2018-08-14 NOTE — TELEPHONE ENCOUNTER
----- Message from Jason Graham sent at 8/14/2018 10:17 AM CDT -----  Contact: same  Patient called in and stated that he saw Dr. Parry for his shingles on 7/17/18 and still has them for about 5 weeks.  Patient wanted to see if Dr. Parry could see him sometime this week?  Patient had forgotten by the end of the phone call who Dr. Parry was???      Patient call back number is [phone]

## 2018-08-15 ENCOUNTER — OFFICE VISIT (OUTPATIENT)
Dept: FAMILY MEDICINE | Facility: CLINIC | Age: 73
End: 2018-08-15
Payer: MEDICARE

## 2018-08-15 VITALS
HEART RATE: 83 BPM | BODY MASS INDEX: 28.15 KG/M2 | OXYGEN SATURATION: 96 % | SYSTOLIC BLOOD PRESSURE: 128 MMHG | HEIGHT: 69 IN | WEIGHT: 190.06 LBS | DIASTOLIC BLOOD PRESSURE: 74 MMHG

## 2018-08-15 DIAGNOSIS — B02.8 HERPES ZOSTER COMPLICATED: Primary | ICD-10-CM

## 2018-08-15 DIAGNOSIS — B02.29 POST HERPETIC NEURALGIA: ICD-10-CM

## 2018-08-15 DIAGNOSIS — B02.8 HERPES ZOSTER COMPLICATED: ICD-10-CM

## 2018-08-15 DIAGNOSIS — I10 ESSENTIAL HYPERTENSION: ICD-10-CM

## 2018-08-15 PROCEDURE — 99213 OFFICE O/P EST LOW 20 MIN: CPT | Mod: S$GLB,,, | Performed by: PHYSICIAN ASSISTANT

## 2018-08-15 PROCEDURE — 3074F SYST BP LT 130 MM HG: CPT | Mod: CPTII,S$GLB,, | Performed by: PHYSICIAN ASSISTANT

## 2018-08-15 PROCEDURE — 3078F DIAST BP <80 MM HG: CPT | Mod: CPTII,S$GLB,, | Performed by: PHYSICIAN ASSISTANT

## 2018-08-15 PROCEDURE — 99999 PR PBB SHADOW E&M-EST. PATIENT-LVL III: CPT | Mod: PBBFAC,,, | Performed by: PHYSICIAN ASSISTANT

## 2018-08-15 RX ORDER — AMITRIPTYLINE HYDROCHLORIDE 10 MG/1
10 TABLET, FILM COATED ORAL NIGHTLY PRN
Qty: 60 TABLET | Refills: 1 | Status: SHIPPED | OUTPATIENT
Start: 2018-08-15 | End: 2018-08-15 | Stop reason: SDUPTHER

## 2018-08-15 RX ORDER — CARVEDILOL 6.25 MG/1
TABLET ORAL
Qty: 60 TABLET | Refills: 1 | Status: SHIPPED | OUTPATIENT
Start: 2018-08-15 | End: 2018-08-15 | Stop reason: SDUPTHER

## 2018-08-15 RX ORDER — AMITRIPTYLINE HYDROCHLORIDE 10 MG/1
TABLET, FILM COATED ORAL
Qty: 90 TABLET | Refills: 1 | Status: SHIPPED | OUTPATIENT
Start: 2018-08-15 | End: 2019-01-24

## 2018-08-15 RX ORDER — CARVEDILOL 6.25 MG/1
TABLET ORAL
Qty: 180 TABLET | Refills: 1 | Status: SHIPPED | OUTPATIENT
Start: 2018-08-15 | End: 2019-04-03

## 2018-08-15 RX ORDER — VALACYCLOVIR HYDROCHLORIDE 1 G/1
1000 TABLET, FILM COATED ORAL 3 TIMES DAILY
Qty: 21 TABLET | Refills: 0 | Status: SHIPPED | OUTPATIENT
Start: 2018-08-15 | End: 2018-11-24

## 2018-08-15 NOTE — PROGRESS NOTES
Subjective:       Patient ID: Steve June Jr. is a 73 y.o. male.    Chief Complaint: Herpes Zoster    HPI   rx of zoster 5 wks ago  rx'd with valtrex x 1 wk  R scalp lesions drying and going away  Pain is still severe  Review of Systems   Constitutional: Negative.  Negative for activity change, appetite change, chills, diaphoresis, fatigue, fever and unexpected weight change.   HENT: Negative.    Eyes: Negative.    Respiratory: Negative.    Cardiovascular: Negative.    Gastrointestinal: Negative.    Endocrine: Negative.    Genitourinary: Negative.    Musculoskeletal: Negative.    Skin: Positive for rash.   Neurological: Negative.        Objective:      Physical Exam   Constitutional: He appears well-developed and well-nourished. No distress.   HENT:   Head: Normocephalic and atraumatic.   Eyes: Conjunctivae are normal. No scleral icterus.   Neck: Normal range of motion. Neck supple. No tracheal deviation present. No thyromegaly present.   Lymphadenopathy:     He has no cervical adenopathy.   Skin: Skin is warm and dry. Rash noted.   Dried very tender lesions R CROWN OF SCALP   Vitals reviewed.      Assessment:       1. Herpes zoster complicated    2. Essential hypertension    3. Post herpetic neuralgia        Plan:       Herpes zoster complicated  -     valACYclovir (VALTREX) 1000 MG tablet; Take 1 tablet (1,000 mg total) by mouth 3 (three) times daily. for 7 days  Dispense: 21 tablet; Refill: 0  -     amitriptyline (ELAVIL) 10 MG tablet; Take 1 tablet (10 mg total) by mouth nightly as needed for Insomnia.  Dispense: 60 tablet; Refill: 1    Essential hypertension  -     carvedilol (COREG) 6.25 MG tablet; TAKE 1 TABLET(6.25 MG) BY MOUTH TWICE DAILY  Dispense: 60 tablet; Refill: 1    Post herpetic neuralgia  -     amitriptyline (ELAVIL) 10 MG tablet; Take 1 tablet (10 mg total) by mouth nightly as needed for Insomnia.  Dispense: 60 tablet; Refill: 1    discussed otc's

## 2018-08-23 ENCOUNTER — TELEPHONE (OUTPATIENT)
Dept: PODIATRY | Facility: CLINIC | Age: 73
End: 2018-08-23

## 2018-08-23 NOTE — TELEPHONE ENCOUNTER
"Pt came 20 minutes late to appt scheduled today. Pt advised that we would fit in today per the Approval of Dr. Harvey, Dr. Duke approved.  Pt advised at check in that a $42 fee needed at check in due to this being a Proc B appt and prior to the 60 days where it would be covered by insurnace. Pt advised at last appointment that if not seen after 60 days, that it would not be covered by insurance, hence the appointment being scheduled as a Proc B. Pt stated that he had no money at this time and requested to be checked in anyway. As appt is a Proc B, the fee is due at check in, but we were notified by check in staff to confirm. Explained to pt issue and advised pt that if he is seen today, he would be billed a substantial bill. The reason we offer the proc b @ $42 is so the pt doesn't get such a high bill. Pt exclaimed loudly" So you cutting me loose over $42 !! I don't have the money for that! Why do I have to pay to get my feet worked on!!" Explained to pt again the reason that he would have to pay the fee. Told him the date that it would be covered by Insurance 9/12/2018, pt stated that he couldn't wait that long. Advised pt that appt could be rescheduled to another date if he wished, pt again stated" You wont see me over $42!! My feet hurt!! If they send me a big bill, I just wont pay it!!!" explained to pt that he could be rescheduled to a date where the $42 dollars was not needed, after 9/12/2018, or another date where the money would be available. Pt walked out of clinic stating that " You don't care about me!!! All you care about is money!! You don't care at all!"  Provider notified of encounter.   "

## 2018-09-21 ENCOUNTER — TELEPHONE (OUTPATIENT)
Dept: FAMILY MEDICINE | Facility: CLINIC | Age: 73
End: 2018-09-21

## 2018-09-21 NOTE — TELEPHONE ENCOUNTER
Called pt regarding below message. Pt reports having recurrent issues with shingles. Appt date, time, and location given. Pt verbalized understanding with no further questions.     ----- Message from Luma Long sent at 9/21/2018 11:13 AM CDT -----  Contact: Patient  Type:  Sooner Apoointment Request    Caller is requesting a sooner appointment.  Caller declined first available appointment listed below.  Caller will not accept being placed on the waitlist and is requesting a message be sent to doctor.    Name of Caller:  Patient  When is the first available appointment?  10/19  Symptoms:  Still having problems with shingles  Best Call Back Number:    Additional Information:

## 2018-09-24 ENCOUNTER — OFFICE VISIT (OUTPATIENT)
Dept: FAMILY MEDICINE | Facility: CLINIC | Age: 73
End: 2018-09-24
Payer: MEDICARE

## 2018-09-24 VITALS
SYSTOLIC BLOOD PRESSURE: 128 MMHG | HEART RATE: 66 BPM | TEMPERATURE: 98 F | WEIGHT: 193.56 LBS | OXYGEN SATURATION: 95 % | BODY MASS INDEX: 28.67 KG/M2 | DIASTOLIC BLOOD PRESSURE: 75 MMHG | HEIGHT: 69 IN

## 2018-09-24 DIAGNOSIS — E11.42 TYPE 2 DIABETES MELLITUS WITH DIABETIC POLYNEUROPATHY, WITHOUT LONG-TERM CURRENT USE OF INSULIN: ICD-10-CM

## 2018-09-24 DIAGNOSIS — G62.9 NEUROPATHY: Primary | ICD-10-CM

## 2018-09-24 DIAGNOSIS — B02.29 POST HERPETIC NEURALGIA: ICD-10-CM

## 2018-09-24 DIAGNOSIS — L30.9 DERMATITIS: ICD-10-CM

## 2018-09-24 PROCEDURE — 3045F PR MOST RECENT HEMOGLOBIN A1C LEVEL 7.0-9.0%: CPT | Mod: CPTII,,, | Performed by: PHYSICIAN ASSISTANT

## 2018-09-24 PROCEDURE — 3074F SYST BP LT 130 MM HG: CPT | Mod: CPTII,,, | Performed by: PHYSICIAN ASSISTANT

## 2018-09-24 PROCEDURE — 99214 OFFICE O/P EST MOD 30 MIN: CPT | Mod: PBBFAC,PO | Performed by: PHYSICIAN ASSISTANT

## 2018-09-24 PROCEDURE — 99213 OFFICE O/P EST LOW 20 MIN: CPT | Mod: S$PBB,,, | Performed by: PHYSICIAN ASSISTANT

## 2018-09-24 PROCEDURE — 1101F PT FALLS ASSESS-DOCD LE1/YR: CPT | Mod: CPTII,,, | Performed by: PHYSICIAN ASSISTANT

## 2018-09-24 PROCEDURE — 3078F DIAST BP <80 MM HG: CPT | Mod: CPTII,,, | Performed by: PHYSICIAN ASSISTANT

## 2018-09-24 PROCEDURE — 99999 PR PBB SHADOW E&M-EST. PATIENT-LVL IV: CPT | Mod: PBBFAC,,, | Performed by: PHYSICIAN ASSISTANT

## 2018-09-24 RX ORDER — BETAMETHASONE DIPROPIONATE 0.5 MG/G
LOTION TOPICAL 2 TIMES DAILY
Qty: 60 ML | Refills: 3 | Status: SHIPPED | OUTPATIENT
Start: 2018-09-24 | End: 2019-01-24

## 2018-09-24 NOTE — PROGRESS NOTES
Subjective:       Patient ID: Steve June Jr. is a 73 y.o. male.    Chief Complaint: shingles (pain)    HPI   Neuralgia  Post herpetic  DM  Poor control  rx with amitriptyline and gabapentin  meds make pt sleepy  Review of Systems   Constitutional: Negative.  Negative for activity change, appetite change, chills, diaphoresis, fatigue, fever and unexpected weight change.   HENT: Negative.    Eyes: Negative.    Respiratory: Negative.    Cardiovascular: Negative.    Gastrointestinal: Negative.    Endocrine: Negative.    Genitourinary: Negative.    Musculoskeletal: Negative.    Skin: Positive for rash and wound.   Neurological: Negative.        Objective:      Physical Exam   Constitutional: He appears well-developed and well-nourished. No distress.   HENT:   Head: Normocephalic and atraumatic.   Mouth/Throat: Oropharynx is clear and moist.   Eyes: Conjunctivae are normal. No scleral icterus.   Neck: Normal range of motion. Neck supple. No tracheal deviation present. No thyromegaly present.   Musculoskeletal: He exhibits no edema.   Lymphadenopathy:     He has no cervical adenopathy.   Skin: Skin is warm and dry. Rash noted.   Mild excoriation of skin at previous zoster site   Vitals reviewed.      Assessment:       1. Neuropathy    2. Post herpetic neuralgia    3. Type 2 diabetes mellitus with diabetic polyneuropathy, without long-term current use of insulin    4. Dermatitis        Plan:       Neuropathy  -     betamethasone dipropionate (DIPROLENE) 0.05 % lotion; Apply topically 2 (two) times daily. for 10 days  Dispense: 60 mL; Refill: 3    Post herpetic neuralgia  -     betamethasone dipropionate (DIPROLENE) 0.05 % lotion; Apply topically 2 (two) times daily. for 10 days  Dispense: 60 mL; Refill: 3    Type 2 diabetes mellitus with diabetic polyneuropathy, without long-term current use of insulin  -     betamethasone dipropionate (DIPROLENE) 0.05 % lotion; Apply topically 2 (two) times daily. for 10 days   Dispense: 60 mL; Refill: 3    Dermatitis  -     betamethasone dipropionate (DIPROLENE) 0.05 % lotion; Apply topically 2 (two) times daily. for 10 days  Dispense: 60 mL; Refill: 3    discussed otc's  Discussed diet and better control of pt's diabetes

## 2018-10-11 ENCOUNTER — OFFICE VISIT (OUTPATIENT)
Dept: PAIN MEDICINE | Facility: CLINIC | Age: 73
End: 2018-10-11
Payer: MEDICARE

## 2018-10-11 VITALS
WEIGHT: 193 LBS | BODY MASS INDEX: 28.58 KG/M2 | DIASTOLIC BLOOD PRESSURE: 72 MMHG | HEIGHT: 69 IN | HEART RATE: 59 BPM | SYSTOLIC BLOOD PRESSURE: 110 MMHG

## 2018-10-11 DIAGNOSIS — M50.30 DDD (DEGENERATIVE DISC DISEASE), CERVICAL: ICD-10-CM

## 2018-10-11 DIAGNOSIS — B02.9 HERPES ZOSTER WITHOUT COMPLICATION: Primary | ICD-10-CM

## 2018-10-11 DIAGNOSIS — M47.819 FACET ARTHROPATHY: ICD-10-CM

## 2018-10-11 DIAGNOSIS — M47.892 OTHER SPONDYLOSIS, CERVICAL REGION: ICD-10-CM

## 2018-10-11 PROCEDURE — 99214 OFFICE O/P EST MOD 30 MIN: CPT | Mod: PBBFAC,PN | Performed by: PHYSICIAN ASSISTANT

## 2018-10-11 PROCEDURE — 99214 OFFICE O/P EST MOD 30 MIN: CPT | Mod: S$PBB,,, | Performed by: PHYSICIAN ASSISTANT

## 2018-10-11 PROCEDURE — 3078F DIAST BP <80 MM HG: CPT | Mod: CPTII,,, | Performed by: PHYSICIAN ASSISTANT

## 2018-10-11 PROCEDURE — 99999 PR PBB SHADOW E&M-EST. PATIENT-LVL IV: CPT | Mod: PBBFAC,,, | Performed by: PHYSICIAN ASSISTANT

## 2018-10-11 PROCEDURE — 3074F SYST BP LT 130 MM HG: CPT | Mod: CPTII,,, | Performed by: PHYSICIAN ASSISTANT

## 2018-10-11 PROCEDURE — 1101F PT FALLS ASSESS-DOCD LE1/YR: CPT | Mod: CPTII,,, | Performed by: PHYSICIAN ASSISTANT

## 2018-10-11 RX ORDER — OXYCODONE AND ACETAMINOPHEN 10; 325 MG/1; MG/1
1 TABLET ORAL EVERY 6 HOURS PRN
Qty: 120 TABLET | Refills: 0 | Status: SHIPPED | OUTPATIENT
Start: 2018-10-13 | End: 2018-11-11

## 2018-10-11 RX ORDER — OXYCODONE AND ACETAMINOPHEN 10; 325 MG/1; MG/1
1 TABLET ORAL EVERY 6 HOURS PRN
Qty: 120 TABLET | Refills: 0 | Status: SHIPPED | OUTPATIENT
Start: 2018-11-11 | End: 2018-12-10

## 2018-10-11 RX ORDER — OXYCODONE AND ACETAMINOPHEN 10; 325 MG/1; MG/1
1 TABLET ORAL EVERY 6 HOURS PRN
Qty: 120 TABLET | Refills: 0 | Status: SHIPPED | OUTPATIENT
Start: 2018-12-10 | End: 2018-11-29 | Stop reason: SDUPTHER

## 2018-10-11 NOTE — PROGRESS NOTES
"Referring Physician: No ref. provider found    PCP: CORNEL De Los Santos      CC: neck and left shoulder pain; knee pain    Interval history:  Steve June Jr. is a 73 y.o. male with  neck and left shoulder pain who presents today for f/u and medication refill. He is taking Gabapentin 600 mg TID and Amitriptyline for shingles.  Continues to have moderate pain that is improving. Chronic pain is unchanged. He has tried physical therapy which did not provide much benefit.   Injections performed have only given him shortlived relief.  He underwent visco supplementation injections for his left knee without much benefit.  He continues to have neck pain. He has a history of cervical DDD.  However, he continues to have low platelets and we are unable to provide any neuro axonal interventional procedures.  He takes oxycodone 10 mg every 6 hours as needed with mild to moderate benefit.  Does cause some drowsiness. Pain today is rated 6/10.    Prior HPI:   Steve June Jr. is a 73 y.o. male referred to us for lower back and knee pain.  He has significant history of pancytopenia, cirrhosis.  He presents with constant aching, sharp pain in his lower back as well as his left knee.  Pain worsens sitting, standing, bending, walking.  Pain improves with rest.  X-rays performed of the lumbar spine as well as knee shows left knee osteoarthritis.  He has tried physical therapy with minimal benefit.  He currently takes Norco 10 mg every 6 hours as needed with mild to moderate benefit.  He is unable to have any procedures due to his thrombocytopenia.  He also has ventral hernia, but surgical attention is currently not recommended due to his thrombocytopenia.  His main concern today is wishing to decrease his opioid medications.  He states being very "foggy" with his current medications.  He denies any increased sedation.  He denies any weakness.  No bowel bladder changes.    ROS:  CONSTITUTIONAL: No fevers, chills, night " sweats, wt. loss, appetite changes  SKIN: no rashes or itching  ENT: No headaches, head trauma, vision changes, or eye pain  LYMPH NODES: None noticed   CV: No chest pain, palpitations.   RESP: No shortness of breath, dyspnea on exertion, cough, wheezing, or hemoptysis  GI: No nausea, emesis, diarrhea, constipation, melena, hematochezia, pain.    : No dysuria, hematuria, urgency, or frequency   HEME: No easy bruising, bleeding problems  PSYCHIATRIC: No depression, anxiety, psychosis, hallucinations.  NEURO: No seizures, memory loss, dizziness or difficulty sleeping  MSK: + History of present illness      Past Medical History:   Diagnosis Date    Abnormal thyroid function test     Allergy     Seasonal    Anemia     Anemia due to blood loss 7/2/2014    Arthritis     Gaviria esophagus     Basal cell carcinoma     right forearm    Basal cell carcinoma 12/2011    lower post neck    Cancer     skin CA    Cataract     Cirrhosis     Diabetes mellitus     Diabetes mellitus, type 2     Encounter for blood transfusion     Esophageal varices in cirrhosis     grade II on 7/12 EGD    Gastritis     on 7/12 EGD    GERD (gastroesophageal reflux disease) 2/28/2015    Hard of hearing     Hiatal hernia     History of hepatitis C 8/10/2012    tx with harvoni x 41 days (started 10/22/15). SVR4     Hoarseness 2/28/2015    Hypercholesteremia     Hypersplenism     Hypertension     No meds    Pain management 12/10/2014    Petechial hemorrhage 11/25/2015    Lower extremities bilat     Portal hypertensive gastropathy     on 7/12 EGD    Thrombocytopenia     Type II or unspecified type diabetes mellitus with neurological manifestations, uncontrolled(250.62) 12/24/2013    Valvular heart disease     mild MR 12     Past Surgical History:   Procedure Laterality Date    ABLATION, RADIOFREQUENCY-left suprascapula Left 8/25/2017    Performed by [facility] at [facility] OR    CATARACT EXTRACTION  1/10/13    left  eye    CATARACT EXTRACTION      right eye    CHOLECYSTECTOMY      COLONOSCOPY      diagnostic block of the genicular branches to the left knee Left 12/15/2017    [facility]    EGD (ESOPHAGOGASTRODUODENOSCOPY) N/A 3/13/2014    Performed by [facility] at [facility] ENDO (4TH FLR)    EGD (ESOPHAGOGASTRODUODENOSCOPY) N/A 7/11/2013    Performed by Campos Walters [facility] at [facility] ENDO (4TH FLR)    ESOPHAGOGASTRODUODENOSCOPY (EGD) N/A 8/1/2014    Performed by Juan David Booker MD at [facility] ENDO (4TH FLR)    ESOPHAGOGASTRODUODENOSCOPY (EGD) N/A 7/3/2014    Performed by Juan [facility] Booker [facility] at [facility] ENDO (2ND FLR)    EYE SURGERY      Cataract surgery to right eye    INSERTION, IOL PROSTHESIS Left 1/10/2013    Performed by Domi Baker [facility] at [facility] OR 1ST FLR    KNEE ARTHROSCOPY W/ MENISCAL REPAIR      KNEE CARTILAGE SURGERY      left knee    KNEE SURGERY  12/2006    left    LARYNGOSCOPY Bilateral 12/5/2014    Performed by Anoop Bernstein [facility] at [facility] OR 2ND FLR    PHACOEMULSIFICATION, CATARACT Left 1/10/2013    Performed by [facility] at [facility] OR 1ST FLR    PHACOEMULSIFICATION, CATARACT Right 9/27/2012    [facility] at [facility] OR    SKIN LESION EXCISION      TONSILLECTOMY      UPPER GASTROINTESTINAL ENDOSCOPY       Family History   Problem Relation Age of Onset    Leukemia Mother     Cancer Mother         bone    Alcohol abuse Father     Cirrhosis Father         EtOH induced    No Known Problems Daughter     No Known Problems Son     No Known Problems Daughter     No Known Problems Daughter     No Known Problems Daughter     No Known Problems Sister     Amblyopia Neg Hx     Blindness Neg Hx     Cataracts Neg Hx     Diabetes Neg Hx     Glaucoma Neg Hx     Hypertension Neg Hx     Macular degeneration Neg Hx     Retinal detachment Neg Hx     Strabismus Neg Hx     Stroke Neg Hx     Thyroid disease Neg Hx      "Psoriasis Neg Hx     Lupus Neg Hx     Eczema Neg Hx     Acne Neg Hx     Melanoma Neg Hx      Social History     Socioeconomic History    Marital status:      Spouse name: None    Number of children: 5    Years of education: None    Highest education level: None   Social Needs    Financial resource strain: None    Food insecurity - worry: None    Food insecurity - inability: None    Transportation needs - medical: None    Transportation needs - non-medical: None   Occupational History    None   Tobacco Use    Smoking status: Former Smoker     Packs/day: 1.00     Years: 25.00     Pack years: 25.00     Last attempt to quit: 2000     Years since quittin.1    Smokeless tobacco: Never Used   Substance and Sexual Activity    Alcohol use: Yes     Comment: rarely    Drug use: No    Sexual activity: No   Other Topics Concern    None   Social History Narrative    He is .  He has children.  He is currently retired.         Medications/Allergies: See med card    Vitals:    10/11/18 0909   BP: 110/72   Pulse: (!) 59   Weight: 87.5 kg (193 lb)   Height: 5' 9" (1.753 m)   PainSc:   6   PainLoc: Back     Physical exam:    GENERAL: A and O x3, the patient appears well groomed and is in no acute distress.  Skin: No rashes or obvious lesions  HEENT: normocephalic, atraumatic  CARDIOVASCULAR:  Bradycardia  LUNGS: non labored breathing  ABDOMEN: soft, nontender, + sizaeable ventral hernia in epigastric region.    UPPER EXTREMITIES: Normal alignment, normal range of motion, no atrophy, no skin changes,  hair growth and nail growth normal and equal bilaterally. No swelling, no tenderness.    LOWER EXTREMITIES:  Normal alignment, normal range of motion, no atrophy, no skin changes,  hair growth and nail growth normal and equal bilaterally. No swelling, no tenderness.    LUMBAR SPINE  Lumbar spine: ROM is full with flexion extension and oblique extension with moderate increased pain.    Erasmo's " test causes no increased pain on either side.    Supine straight leg raise is negative bilaterally.    Internal and external rotation of the hip causes no increased pain on either side.  Myofascial exam: No tenderness to palpation across lumbar paraspinous muscles.      MENTAL STATUS: normal orientation, speech, language, and fund of knowledge for social situation.  Emotional state appropriate.    CRANIAL NERVES:  II:  PERRL bilaterally,   III,IV,VI: EOMI.    V:  Facial sensation equal bilaterally  VII:  Facial motor function normal.  VIII:  Hearing equal to finger rub bilaterally  IX/X: Gag normal, palate symmetric  XI:  Shoulder shrug equal, head turn equal  XII:  Tongue midline without fasciculations      MOTOR: Tone and bulk: normal bilateral upper and lower Strength: normal   Delt Bi Tri WE WF     R 5 5 5 5 5 5   L 5 5 5 5 5 5     IP ADD ABD Quad TA Gas HAM  R 5 5 5 5 5 5 5  L 5 5 5 5 5 5 5    SENSATION: Light touch and pinprick intact bilaterally  REFLEXES: normal, symmetric, nonbrisk.  Toes down, no clonus. No hoffmans.  GAIT: normal rise, base, steps, and arm swing.      Imaging:  Xray L-spine 4/2013   Alignment is otherwise normal.  Vertebral body heights and disc space heights are relatively well maintained.  Vertebral end plate osteophytes are present anteriorly   at multiple levels.  The facet joints and posterior elements are unremarkable       Xray Knee 12/2013  Degenerative change of the knees, left greater than right.    Assessment:  Steve June Jr. is a 73 y.o. male with neck, back and left knee pain  1. Herpes zoster without complication    2. DDD (degenerative disc disease), cervical    3. Facet arthropathy    4. Other spondylosis, cervical region      Plan:  1. I have stressed the importance of physical activity and exercise to improve overall health.  Continue PT exercises learned at home.  2.  Any interventional procedures will be deferred due to his low platelet count.  Patient  expressed understanding.  3.  Continue evaluation with PM&R, Dr. Silva  4. Percocet 10mg q 8 hrs as needed.  Hold for sedation.  UDS LCV consistent.  5. Continue Gabapentin and Elavil   6. Follow-up 3 months  All medication management was performed by Dr. Kapil Mario

## 2018-10-12 ENCOUNTER — TELEPHONE (OUTPATIENT)
Dept: FAMILY MEDICINE | Facility: CLINIC | Age: 73
End: 2018-10-12

## 2018-10-12 NOTE — TELEPHONE ENCOUNTER
Called pt regarding below message. Pt requesting appt for cough/sob. Informed pt that the only appts available today will be in [facility]. Pt is unable to go to [place] pt requesting appt for next week. Appt date, time, and location given. Pt verbalized understanding with no further questions.     ----- Message from Anne Talbot sent at 10/12/2018 10:01 AM CDT -----  Contact: self  Patient need to speak to nurse regarding scheduling appointment for today 10/12   Patient states he become short of breath when walking ,and patient states he has a cough       Please call to advice [phone] (home)       Epic earliest is 10/15 but patient will like to be seen today if possible

## 2018-10-15 ENCOUNTER — OFFICE VISIT (OUTPATIENT)
Dept: FAMILY MEDICINE | Facility: CLINIC | Age: 73
End: 2018-10-15
Payer: MEDICARE

## 2018-10-15 ENCOUNTER — HOSPITAL ENCOUNTER (OUTPATIENT)
Dept: RADIOLOGY | Facility: CLINIC | Age: 73
Discharge: HOME | End: 2018-10-15
Attending: PHYSICIAN ASSISTANT
Payer: MEDICARE

## 2018-10-15 VITALS
BODY MASS INDEX: 27.88 KG/M2 | HEIGHT: 69 IN | TEMPERATURE: 99 F | SYSTOLIC BLOOD PRESSURE: 113 MMHG | HEART RATE: 75 BPM | DIASTOLIC BLOOD PRESSURE: 64 MMHG | OXYGEN SATURATION: 88 % | WEIGHT: 188.25 LBS

## 2018-10-15 DIAGNOSIS — R05.9 COUGH: ICD-10-CM

## 2018-10-15 DIAGNOSIS — R06.02 SOB (SHORTNESS OF BREATH): ICD-10-CM

## 2018-10-15 DIAGNOSIS — R06.89 NARCOTIC-INDUCED RESPIRATORY DEPRESSION: ICD-10-CM

## 2018-10-15 DIAGNOSIS — R53.83 OTHER FATIGUE: ICD-10-CM

## 2018-10-15 DIAGNOSIS — R05.9 COUGH: Primary | ICD-10-CM

## 2018-10-15 DIAGNOSIS — J20.8 ACUTE BACTERIAL BRONCHITIS: ICD-10-CM

## 2018-10-15 DIAGNOSIS — T40.605A NARCOTIC-INDUCED RESPIRATORY DEPRESSION: ICD-10-CM

## 2018-10-15 DIAGNOSIS — B96.89 ACUTE BACTERIAL BRONCHITIS: ICD-10-CM

## 2018-10-15 PROCEDURE — 99214 OFFICE O/P EST MOD 30 MIN: CPT | Mod: PBBFAC,25,PO | Performed by: PHYSICIAN ASSISTANT

## 2018-10-15 PROCEDURE — 3074F SYST BP LT 130 MM HG: CPT | Mod: CPTII,,, | Performed by: PHYSICIAN ASSISTANT

## 2018-10-15 PROCEDURE — 71046 X-RAY EXAM CHEST 2 VIEWS: CPT | Mod: 26,,, | Performed by: RADIOLOGY

## 2018-10-15 PROCEDURE — 71046 X-RAY EXAM CHEST 2 VIEWS: CPT | Mod: TC,FY,PO

## 2018-10-15 PROCEDURE — 99214 OFFICE O/P EST MOD 30 MIN: CPT | Mod: S$PBB,,, | Performed by: PHYSICIAN ASSISTANT

## 2018-10-15 PROCEDURE — 99999 PR PBB SHADOW E&M-EST. PATIENT-LVL IV: CPT | Mod: PBBFAC,,, | Performed by: PHYSICIAN ASSISTANT

## 2018-10-15 PROCEDURE — 3078F DIAST BP <80 MM HG: CPT | Mod: CPTII,,, | Performed by: PHYSICIAN ASSISTANT

## 2018-10-15 PROCEDURE — 1101F PT FALLS ASSESS-DOCD LE1/YR: CPT | Mod: CPTII,,, | Performed by: PHYSICIAN ASSISTANT

## 2018-10-15 RX ORDER — AZITHROMYCIN 250 MG/1
TABLET, FILM COATED ORAL
Qty: 6 TABLET | Refills: 0 | Status: SHIPPED | OUTPATIENT
Start: 2018-10-15 | End: 2018-11-24

## 2018-10-15 NOTE — PROGRESS NOTES
Subjective:       Patient ID: Steve June Jr. is a 73 y.o. male.    Chief Complaint: Fatigue (muscle, unable to function and no appetite)    HPI   Cough and SOB x 10 days  Sleeps all the time  Started after taking amitriptyline and increased gabapentin    Review of Systems   Constitutional: Positive for activity change and fatigue. Negative for appetite change, chills, diaphoresis, fever and unexpected weight change.   HENT: Negative.    Eyes: Negative.    Respiratory: Positive for cough and shortness of breath. Negative for wheezing.    Cardiovascular: Negative.  Negative for chest pain and leg swelling.   Gastrointestinal: Negative.    Endocrine: Negative.    Genitourinary: Negative.    Musculoskeletal: Negative.    Skin: Negative.  Negative for rash.       Objective:      Physical Exam   Constitutional: He appears well-developed and well-nourished. No distress.   HENT:   Head: Normocephalic and atraumatic.   Right Ear: External ear normal.   Left Ear: External ear normal.   Nose: Nose normal.   Mouth/Throat: Oropharynx is clear and moist. No oropharyngeal exudate.   Sinuses non tender  Mucus clear   Eyes: Conjunctivae are normal. No scleral icterus.   Neck: Normal range of motion. Neck supple. No tracheal deviation present. No thyromegaly present.   Cardiovascular: Normal rate, regular rhythm, normal heart sounds and intact distal pulses. Exam reveals no gallop and no friction rub.   No murmur heard.  Pulmonary/Chest: Effort normal. No stridor. No respiratory distress. He has no wheezes. He has no rales.   Few scattered rhonchi   Musculoskeletal: He exhibits no edema.   Lymphadenopathy:     He has no cervical adenopathy.   Skin: Skin is warm and dry. No rash noted.   Vitals reviewed.      Assessment:       1. Cough    2. SOB (shortness of breath)    3. Other fatigue    4. Acute bacterial bronchitis    5. Narcotic-induced respiratory depression        Plan:       Cough  -     X-Ray Chest PA And Lateral;  Future; Expected date: 10/15/2018  -     azithromycin (Z-NAN) 250 MG tablet; 2 tabs day one then one tab daily for 4 days  Dispense: 6 tablet; Refill: 0    SOB (shortness of breath)  -     X-Ray Chest PA And Lateral; Future; Expected date: 10/15/2018  -     azithromycin (Z-NAN) 250 MG tablet; 2 tabs day one then one tab daily for 4 days  Dispense: 6 tablet; Refill: 0    Other fatigue  -     X-Ray Chest PA And Lateral; Future; Expected date: 10/15/2018    Acute bacterial bronchitis  -     azithromycin (Z-NAN) 250 MG tablet; 2 tabs day one then one tab daily for 4 days  Dispense: 6 tablet; Refill: 0    Narcotic-induced respiratory depression    DC amitriptyline   Reduce opiates 50 % until sx return to normal  Discussed otc's    Neb rx Q6H  ( pt has meds)    No acute changes on chest xray

## 2018-10-26 ENCOUNTER — TELEPHONE (OUTPATIENT)
Dept: HEPATOLOGY | Facility: CLINIC | Age: 73
End: 2018-10-26

## 2018-10-26 ENCOUNTER — HOSPITAL ENCOUNTER (OUTPATIENT)
Dept: RADIOLOGY | Facility: CLINIC | Age: 73
Discharge: HOME | End: 2018-10-26
Attending: NURSE PRACTITIONER
Payer: MEDICARE

## 2018-10-26 PROCEDURE — 76700 US EXAM ABDOM COMPLETE: CPT | Mod: TC,PO

## 2018-10-26 PROCEDURE — 76700 US EXAM ABDOM COMPLETE: CPT | Mod: 26,,, | Performed by: RADIOLOGY

## 2018-10-26 NOTE — TELEPHONE ENCOUNTER
Patient scheduled for Follow Up appointment with Joanne Cunha NP on Thursday 11/29/18 at 10 am.  Vaccination appt to follow at 10:45 am.  Appt letters placed in the mail.

## 2018-10-26 NOTE — TELEPHONE ENCOUNTER
----- Message from Joanne Cunha NP sent at 10/26/2018 12:31 PM CDT -----  Pt due for f/u appt with me. Had labs and u/s. Please schedule for first available and move his vaccine appt to same day he's seeing me so he can get done at same time.

## 2018-11-24 ENCOUNTER — HOSPITAL ENCOUNTER (EMERGENCY)
Facility: HOSPITAL | Age: 73
Discharge: HOME | End: 2018-11-24
Attending: EMERGENCY MEDICINE
Payer: MEDICARE

## 2018-11-24 ENCOUNTER — NURSE TRIAGE (OUTPATIENT)
Dept: ADMINISTRATIVE | Facility: CLINIC | Age: 73
End: 2018-11-24

## 2018-11-24 VITALS
WEIGHT: 180 LBS | HEIGHT: 69 IN | OXYGEN SATURATION: 95 % | BODY MASS INDEX: 26.66 KG/M2 | TEMPERATURE: 98 F | HEART RATE: 84 BPM | DIASTOLIC BLOOD PRESSURE: 91 MMHG | SYSTOLIC BLOOD PRESSURE: 194 MMHG | RESPIRATION RATE: 20 BRPM

## 2018-11-24 DIAGNOSIS — R06.02 SOB (SHORTNESS OF BREATH): ICD-10-CM

## 2018-11-24 DIAGNOSIS — J18.9 PNEUMONIA DUE TO INFECTIOUS ORGANISM, UNSPECIFIED LATERALITY, UNSPECIFIED PART OF LUNG: Primary | ICD-10-CM

## 2018-11-24 LAB
ALBUMIN SERPL BCP-MCNC: 3.8 G/DL
ALP SERPL-CCNC: 75 U/L
ALT SERPL W/O P-5'-P-CCNC: 15 U/L
ANION GAP SERPL CALC-SCNC: 12 MMOL/L
AST SERPL-CCNC: 26 U/L
BASOPHILS # BLD AUTO: 0.1 K/UL
BASOPHILS NFR BLD: 1.6 %
BILIRUB SERPL-MCNC: 1.6 MG/DL
BILIRUB UR QL STRIP: NEGATIVE
BNP SERPL-MCNC: 90 PG/ML
BUN SERPL-MCNC: 15 MG/DL
CALCIUM SERPL-MCNC: 9.4 MG/DL
CHLORIDE SERPL-SCNC: 102 MMOL/L
CLARITY UR: CLEAR
CO2 SERPL-SCNC: 24 MMOL/L
COLOR UR: YELLOW
CREAT SERPL-MCNC: 1 MG/DL
DIFFERENTIAL METHOD: ABNORMAL
EOSINOPHIL # BLD AUTO: 0.1 K/UL
EOSINOPHIL NFR BLD: 3.7 %
ERYTHROCYTE [DISTWIDTH] IN BLOOD BY AUTOMATED COUNT: 16.2 %
EST. GFR  (AFRICAN AMERICAN): >60 ML/MIN/1.73 M^2
EST. GFR  (NON AFRICAN AMERICAN): >60 ML/MIN/1.73 M^2
GLUCOSE SERPL-MCNC: 274 MG/DL
GLUCOSE UR QL STRIP: ABNORMAL
HCT VFR BLD AUTO: 37.8 %
HGB BLD-MCNC: 12.6 G/DL
HGB UR QL STRIP: NEGATIVE
KETONES UR QL STRIP: NEGATIVE
LEUKOCYTE ESTERASE UR QL STRIP: NEGATIVE
LYMPHOCYTES # BLD AUTO: 0.5 K/UL
LYMPHOCYTES NFR BLD: 12.2 %
MCH RBC QN AUTO: 27.4 PG
MCHC RBC AUTO-ENTMCNC: 33.4 G/DL
MCV RBC AUTO: 82 FL
MONOCYTES # BLD AUTO: 0.3 K/UL
MONOCYTES NFR BLD: 7.1 %
NEUTROPHILS # BLD AUTO: 2.8 K/UL
NEUTROPHILS NFR BLD: 75.4 %
NITRITE UR QL STRIP: NEGATIVE
PH UR STRIP: 7 [PH] (ref 5–8)
PLATELET # BLD AUTO: 45 K/UL
PMV BLD AUTO: 8 FL
POCT GLUCOSE: 97 MG/DL (ref 70–110)
POTASSIUM SERPL-SCNC: 4.9 MMOL/L
PROT SERPL-MCNC: 6.7 G/DL
PROT UR QL STRIP: NEGATIVE
RBC # BLD AUTO: 4.61 M/UL
SODIUM SERPL-SCNC: 138 MMOL/L
SP GR UR STRIP: 1.02 (ref 1–1.03)
URN SPEC COLLECT METH UR: ABNORMAL
UROBILINOGEN UR STRIP-ACNC: 1 EU/DL
WBC # BLD AUTO: 3.7 K/UL

## 2018-11-24 PROCEDURE — 82962 GLUCOSE BLOOD TEST: CPT | Mod: HCWC

## 2018-11-24 PROCEDURE — 99285 EMERGENCY DEPT VISIT HI MDM: CPT | Mod: 25,HCWC

## 2018-11-24 PROCEDURE — 80053 COMPREHEN METABOLIC PANEL: CPT | Mod: HCWC

## 2018-11-24 PROCEDURE — 85025 COMPLETE CBC W/AUTO DIFF WBC: CPT | Mod: HCWC

## 2018-11-24 PROCEDURE — 63600175 PHARM REV CODE 636 W HCPCS: Mod: HCWC | Performed by: EMERGENCY MEDICINE

## 2018-11-24 PROCEDURE — 96372 THER/PROPH/DIAG INJ SC/IM: CPT | Mod: HCWC

## 2018-11-24 PROCEDURE — 83880 ASSAY OF NATRIURETIC PEPTIDE: CPT | Mod: HCWC

## 2018-11-24 PROCEDURE — 93005 ELECTROCARDIOGRAM TRACING: CPT | Mod: HCWC

## 2018-11-24 PROCEDURE — 36415 COLL VENOUS BLD VENIPUNCTURE: CPT | Mod: HCWC

## 2018-11-24 PROCEDURE — 25000003 PHARM REV CODE 250: Mod: HCWC | Performed by: EMERGENCY MEDICINE

## 2018-11-24 PROCEDURE — 81003 URINALYSIS AUTO W/O SCOPE: CPT | Mod: HCWC

## 2018-11-24 PROCEDURE — 96374 THER/PROPH/DIAG INJ IV PUSH: CPT | Mod: HCWC

## 2018-11-24 RX ORDER — ONDANSETRON 2 MG/ML
4 INJECTION INTRAMUSCULAR; INTRAVENOUS
Status: COMPLETED | OUTPATIENT
Start: 2018-11-24 | End: 2018-11-24

## 2018-11-24 RX ORDER — AZITHROMYCIN 250 MG/1
TABLET, FILM COATED ORAL
Qty: 6 TABLET | Refills: 0 | Status: SHIPPED | OUTPATIENT
Start: 2018-11-24 | End: 2018-11-29

## 2018-11-24 RX ADMIN — SODIUM CHLORIDE 500 ML: 0.9 INJECTION, SOLUTION INTRAVENOUS at 12:11

## 2018-11-24 RX ADMIN — INSULIN HUMAN 5 UNITS: 100 INJECTION, SOLUTION PARENTERAL at 01:11

## 2018-11-24 RX ADMIN — ONDANSETRON 4 MG: 2 INJECTION INTRAMUSCULAR; INTRAVENOUS at 11:11

## 2018-11-24 NOTE — ED PROVIDER NOTES
"Encounter Date: 11/24/2018    SCRIBE #1 NOTE: I, Daniela Garcia, am scribing for, and in the presence of, Dr. Nieto.       History     Chief Complaint   Patient presents with    Hematuria    Shortness of Breath     x 1 week    Flank Pain       Time seen by provider: 10:56 AM on 11/24/2018    Steve June Jr. is a 73 y.o. male with DMII, HTN GERD, cirrhosis and anemia who presents to the ED with an onset of worsening SOB with associated cough. He was seen in his PCP's office over a month ago on 10/14 by CORNEL Arevalo for the same complaints and was prescribed a 6-day tapering course of Azithromycin 250 mg for acute bacterial bronchitis. The patient states that he did not take the antibiotics. His SOB is worsened with exertion and worse with laying flat. He reports being propped up with 1 pillow at nighttime. The patient has endorsed chills for the past couple of weeks. He was noted to have blood in his urine 1 week ago and has had none since. The patient denies prior similar symptoms, being on home Oxygen, being diagnosed with CHF or recent PNA, history of cardiac, thyroid or prostate problems, fevers, chest pain or any other symptoms at this time. No cardiac SHx noted. Doxycycline drug allergy noted.      The history is provided by the patient and the spouse (wife).     Review of patient's allergies indicates:   Allergen Reactions    Adhesive tape-silicones Other (See Comments)     pulls skin off    Doxycycline      Dizzy. "Just didn't feel right".     Past Medical History:   Diagnosis Date    Abnormal thyroid function test     Allergy     Seasonal    Anemia     Anemia due to blood loss 7/2/2014    Arthritis     Gaviria esophagus     Basal cell carcinoma     right forearm    Basal cell carcinoma 12/2011    lower post neck    Cancer     skin CA    Cataract     Cirrhosis     Diabetes mellitus     Diabetes mellitus, type 2     Encounter for blood transfusion     Esophageal varices in " cirrhosis     grade II on 7/12 EGD    Gastritis     on 7/12 EGD    GERD (gastroesophageal reflux disease) 2/28/2015    Hard of hearing     Hiatal hernia     History of hepatitis C 8/10/2012    tx with harvoni x 41 days (started 10/22/15). SVR4     Hoarseness 2/28/2015    Hypercholesteremia     Hypersplenism     Hypertension     No meds    Pain management 12/10/2014    Petechial hemorrhage 11/25/2015    Lower extremities bilat     Portal hypertensive gastropathy     on 7/12 EGD    Thrombocytopenia     Type II or unspecified type diabetes mellitus with neurological manifestations, uncontrolled(250.62) 12/24/2013    Valvular heart disease     mild MR 12     Past Surgical History:   Procedure Laterality Date    ABLATION, RADIOFREQUENCY-left suprascapula Left 8/25/2017    Performed by Rolf Silva [facility] at [facility]    CATARACT EXTRACTION  1/10/13    left eye    CATARACT EXTRACTION      right eye    CHOLECYSTECTOMY      COLONOSCOPY      diagnostic block of the genicular branches to the left knee Left 12/15/2017    Performed by [facility]olf [facility] at [facility]    EGD (ESOPHAGOGASTRODUODENOSCOPY) N/A 3/13/2014    Performed by [facility]ach [facility] at [facility] (4TH FLR)    EGD (ESOPHAGOGASTRODUODENOSCOPY) N/A 7/11/2013    Performed by Campos Walters MD at [facility] END[facility] (4TH FLR)    ESOPHAGOGASTRODUODENOSCOPY (EGD) N/A 8/1/2014    Performed by Juan David Booker MD at [facility] END[facility] (4TH FLR)    ESOPHAGOGASTRODUODENOSCOPY (EGD) N/A 7/3/2014    Performed by Juan David Booker MD at [facility] END[facility] (2ND FLR)    EYE SURGERY      Cataract surgery to right eye    INSERTION, IOL PROSTHESIS Left 1/10/2013    Performed by Domi Baker [facility] at [facility] OR 1ST FLR    KNEE ARTHROSCOPY W/ MENISCAL REPAIR      KNEE CARTILAGE SURGERY      left knee    KNEE SURGERY  12/2006    left    LARYNGOSCOPY Bilateral 12/5/2014    Performed by Anoop Bernstein [facility] at [facility] OR 2ND FLR    PHACOEMULSIFICATION, CATARACT Left  1/10/2013    [facility] at [facility] F[facility]    PHACOEMULSIFICATION, CATARACT Right 2012    [facility] at [facility] OR    SKIN LESION EXCISION      TONSILLECTOMY      UPPER GASTROINTESTINAL ENDOSCOPY       Family History   Problem Relation Age of Onset    Leukemia Mother     Cancer Mother         bone    Alcohol abuse Father     Cirrhosis Father         EtOH induced    No Known Problems Daughter     No Known Problems Son     No Known Problems Daughter     No Known Problems Daughter     No Known Problems Daughter     No Known Problems Sister     Amblyopia Neg Hx     Blindness Neg Hx     Cataracts Neg Hx     Diabetes Neg Hx     Glaucoma Neg Hx     Hypertension Neg Hx     Macular degeneration Neg Hx     Retinal detachment Neg Hx     Strabismus Neg Hx     Stroke Neg Hx     Thyroid disease Neg Hx     Psoriasis Neg Hx     Lupus Neg Hx     Eczema Neg Hx     Acne Neg Hx     Melanoma Neg Hx      Social History     Tobacco Use    Smoking status: Former Smoker     Packs/day: 1.00     Years: 25.00     Pack years: 25.00     Last attempt to quit: 2000     Years since quittin.3    Smokeless tobacco: Never Used   Substance Use Topics    Alcohol use: Yes     Comment: rarely    Drug use: No     Review of Systems   Constitutional: Positive for chills. Negative for activity change, diaphoresis and fever.   HENT: Negative for drooling, rhinorrhea, sore throat and trouble swallowing.    Eyes: Negative for pain and visual disturbance.   Respiratory: Positive for cough and shortness of breath. Negative for stridor.    Cardiovascular: Negative for chest pain and leg swelling.   Gastrointestinal: Negative for abdominal distention, abdominal pain, constipation and vomiting.   Genitourinary: Positive for hematuria (resolved). Negative for discharge and dysuria.   Musculoskeletal: Negative for gait problem.   Skin: Negative for rash.   Neurological:  Negative for seizures, facial asymmetry and headaches.   Psychiatric/Behavioral: Negative for hallucinations and suicidal ideas.     Physical Exam     Initial Vitals [11/24/18 1050]   BP Pulse Resp Temp SpO2   (!) 179/88 79 (!) 22 98.3 °F (36.8 °C) (!) 94 %      MAP       --         Physical Exam    Nursing note and vitals reviewed.  Constitutional: He appears well-developed. No distress.   HENT:   Head: Normocephalic and atraumatic.   Nose: Nose normal.   Eyes: EOM are normal.   Neck: Neck supple. No tracheal deviation present. No JVD present.   Cardiovascular: Normal rate, regular rhythm, normal heart sounds and intact distal pulses. Exam reveals no gallop and no friction rub.    No murmur heard.  Pulmonary/Chest: No respiratory distress. He has no wheezes. He has no rhonchi. He has no rales.   Coarse breath sounds noted in the right upper lobe. Otherwise, clear.   Abdominal: Soft. Bowel sounds are normal. There is no tenderness. A hernia is present.   Reducible umbilical hernia. Old, well-healed abdominal surgical scars.    Musculoskeletal: Normal range of motion.   Neurological: He is alert and oriented to person, place, and time. No cranial nerve deficit.   Skin: Skin is warm and dry. No rash noted.   Psychiatric: He has a normal mood and affect.       ED Course   Procedures  Labs Reviewed   CBC W/ AUTO DIFFERENTIAL - Abnormal; Notable for the following components:       Result Value    WBC 3.70 (*)     Hemoglobin 12.6 (*)     Hematocrit 37.8 (*)     RDW 16.2 (*)     Platelets 45 (*)     MPV 8.0 (*)     Lymph # 0.5 (*)     Gran% 75.4 (*)     Lymph% 12.2 (*)     All other components within normal limits   COMPREHENSIVE METABOLIC PANEL - Abnormal; Notable for the following components:    Glucose 274 (*)     Total Bilirubin 1.6 (*)     All other components within normal limits   URINALYSIS - Abnormal; Notable for the following components:    Glucose, UA 2+ (*)     All other components within normal limits   B-TYPE  NATRIURETIC PEPTIDE     EKG Readings: (Independently Interpreted)   Initial Reading: No STEMI.   Normal sinus rhythm at a rate of 71 bpm with normal intervals. No ST elevation.      Imaging Results          X-Ray Chest PA And Lateral (Final result)  Result time 11/24/18 11:59:36    Final result by Bert Don MD (11/24/18 11:59:36)                 Impression:      Interstitial opacities could reflect pulmonary edema, atypical infection or pneumonitis in the acute setting.  However, there appears to be some evidence of chronicity and interstitial lung disease/fibrosis is also a consideration.  Consider elective pulmonary medicine consultation.      Electronically signed by: Bert Don  Date:    11/24/2018  Time:    11:59             Narrative:    EXAMINATION:  XR CHEST PA AND LATERAL    CLINICAL HISTORY:  Shortness of breath    TECHNIQUE:  PA and lateral views of the chest were performed.    COMPARISON:  09/18/2017 and 10/15/2018    FINDINGS:  Diffuse interstitial opacities in the lungs.  Normal sized heart.  Indistinct pulmonary vasculature.  Aortic atherosclerosis.  No pleural effusion or pneumothorax.  Chronic elevation right hemidiaphragm.  No acute osseous abnormality.                              X-Rays:   Independently Interpreted Readings:   Chest X-Ray: Diffuse interstitial opacities, right greater than left. No cardiomegaly or obvious abnormality.      Medical Decision Making:   History:   Old Medical Records: I decided to obtain old medical records.  Clinical Tests:   Lab Tests: Reviewed and Ordered  Radiological Study: Ordered and Reviewed  Medical Tests: Reviewed and Ordered            Scribe Attestation:   Scribe #1: I performed the above scribed service and the documentation accurately describes the services I performed. I attest to the accuracy of the note.      Attending Attestation:     Physician Attestation for Scribe:    I, Dr. Sg Nieto, personally performed the services  described in this documentation.   All medical record entries made by the scribe were at my direction and in my presence.   I have reviewed the chart and agree that the record is accurate and complete.   Sg Nieto MD  11:17 AM 11/26/2018           ED Course as of Nov 26 1117   Sat Nov 24, 2018   1234 73-year-old male past medical history of diabetes, hypertension, cirrhosis and anemia presents today with shortness of breath. Patient reports worsening shortness of breath times 10 days.  Dyspnea on exertion.  Reports intermittent cough.  Denies fevers but subjective chills. The exam remarkable for chronically ill-appearing otherwise with some coarse breath sounds on the right.  [BD]   1330 Chest x-ray with diffuse interstitial opacities.  Given symptoms of cough, chills and worsening shortness of breath will treat for pneumonia.  Per prior note patient was given antibiotics for pneumonia last month but patient reports he did not take them.  Labs with mild hyperglycemia will give 5 units of insulin and some fluids.  Patient with known thrombocytopenia being worked up as outpatient improved from prior labs.  Patient told of x-ray findings and will follow PCP closely for further outpatient management.  Patient has follow-up with liver doctor for cirrhosis this month.  Patient and family member understand and agree with discharge instructions and strict return precautions.  [BD]      ED Course User Index  [BD] Sg Nieto MD     Clinical Impression:   The primary encounter diagnosis was Pneumonia due to infectious organism, unspecified laterality, unspecified part of lung. A diagnosis of SOB (shortness of breath) was also pertinent to this visit.      Disposition:   Disposition: Discharged  Condition: Stable                        Sg Nieto MD  11/26/18 1111

## 2018-11-24 NOTE — TELEPHONE ENCOUNTER
Reason for Disposition   Severe difficulty breathing (e.g., struggling for each breath, speaks in single words)    Protocols used: ST BREATHING DIFFICULTY-A-AH    Pt calling regarding having trouble breathing.  Started a couple of days ago-becoming worse.  Pt also states he urinated blood (~ an oz) a few days ago, only saw that one time and urine has been clear since.

## 2018-11-29 ENCOUNTER — OFFICE VISIT (OUTPATIENT)
Dept: HEPATOLOGY | Facility: CLINIC | Age: 73
End: 2018-11-29
Payer: MEDICARE

## 2018-11-29 ENCOUNTER — TELEPHONE (OUTPATIENT)
Dept: FAMILY MEDICINE | Facility: CLINIC | Age: 73
End: 2018-11-29

## 2018-11-29 VITALS
WEIGHT: 190.69 LBS | HEIGHT: 69 IN | SYSTOLIC BLOOD PRESSURE: 129 MMHG | BODY MASS INDEX: 28.24 KG/M2 | HEART RATE: 64 BPM | DIASTOLIC BLOOD PRESSURE: 70 MMHG | OXYGEN SATURATION: 95 % | TEMPERATURE: 99 F | RESPIRATION RATE: 18 BRPM

## 2018-11-29 DIAGNOSIS — K74.60 CIRRHOSIS OF LIVER WITHOUT ASCITES, UNSPECIFIED HEPATIC CIRRHOSIS TYPE: Primary | ICD-10-CM

## 2018-11-29 DIAGNOSIS — I85.10 ESOPHAGEAL VARICES IN CIRRHOSIS: ICD-10-CM

## 2018-11-29 DIAGNOSIS — R06.02 SOB (SHORTNESS OF BREATH): ICD-10-CM

## 2018-11-29 DIAGNOSIS — K76.6 PORTAL HYPERTENSION: ICD-10-CM

## 2018-11-29 DIAGNOSIS — K74.60 ESOPHAGEAL VARICES IN CIRRHOSIS: ICD-10-CM

## 2018-11-29 DIAGNOSIS — K42.9 UMBILICAL HERNIA WITHOUT OBSTRUCTION AND WITHOUT GANGRENE: ICD-10-CM

## 2018-11-29 DIAGNOSIS — Z86.19 HISTORY OF HEPATITIS C: ICD-10-CM

## 2018-11-29 DIAGNOSIS — R93.89 ABNORMAL CHEST X-RAY: ICD-10-CM

## 2018-11-29 PROBLEM — L03.90 CELLULITIS: Status: RESOLVED | Noted: 2018-06-25 | Resolved: 2018-11-29

## 2018-11-29 PROCEDURE — 3078F DIAST BP <80 MM HG: CPT | Mod: CPTII,HCWC,S$GLB, | Performed by: NURSE PRACTITIONER

## 2018-11-29 PROCEDURE — 99499 UNLISTED E&M SERVICE: CPT | Mod: HCNC,S$GLB,, | Performed by: NURSE PRACTITIONER

## 2018-11-29 PROCEDURE — 99214 OFFICE O/P EST MOD 30 MIN: CPT | Mod: HCWC,S$GLB,, | Performed by: NURSE PRACTITIONER

## 2018-11-29 PROCEDURE — 99999 PR PBB SHADOW E&M-EST. PATIENT-LVL V: CPT | Mod: PBBFAC,HCWC,, | Performed by: NURSE PRACTITIONER

## 2018-11-29 PROCEDURE — 1101F PT FALLS ASSESS-DOCD LE1/YR: CPT | Mod: CPTII,HCWC,S$GLB, | Performed by: NURSE PRACTITIONER

## 2018-11-29 PROCEDURE — 3074F SYST BP LT 130 MM HG: CPT | Mod: CPTII,HCWC,S$GLB, | Performed by: NURSE PRACTITIONER

## 2018-11-29 NOTE — PROGRESS NOTES
[facility] Established Patient Visit    Reason for Visit:  F/u cirrhosis    PCP: Katelynn Merrill    HPI:  This is a 73 y.o. male pt of Dr. Guzman, here for f/u of well-compensated cirrhosis due to h/o hepatitis C, s/p successful treatment with Harvoni with SVR. His cirrhosis has been complicated by significant portal HTN with h/o bleeding esophageal varices, splenomegaly, and thrombocytopenia. He was banded several times in the past, last 8/2014. He has refused repeat EGD since then because he thinks the last EGD caused his hoarseness. He denies any hematemesis or melena. H/H has been stable. He is on Coreg 6.25 mg but takes this only daily.     He had labs and U/s recently. U/s showed no masses or ascites. Labs stable. MELD low at 8. Denies jaundice, dark urine, hematemesis, melena, slowed mentation, abdominal distention. He c/o SOB today. Has been occurring x 4-6 weeks now. Reports he cannot walk more than 10 feet w/o stopping to catch his breath. Was Rx'ed a 6-day tapering course of Azithromycin 250 mg for acute bacterial bronchitis on 10/14/18. He went to ER on 11/24. Had CXR:    Impression       Interstitial opacities could reflect pulmonary edema, atypical infection or pneumonitis in the acute setting.  However, there appears to be some evidence of chronicity and interstitial lung disease/fibrosis is also a consideration.  Consider elective pulmonary medicine consultation.     He has not seen his PCP since ER visit. Reports he does not want to see PA or NP at [place]. Wants to see MD. He denies fevers, cough, chest pain.       ROS:   GENERAL: Denies fever, chills, weight loss/gain, fatigue  HEENT: Denies headaches, dizziness, vision/hearing changes  CARDIOVASCULAR: Denies chest pain, palpitations, edema  RESPIRATORY: (+) dyspnea, no cough  GI: Denies abdominal pain, rectal bleeding, nausea, vomiting. No change in bowel pattern or color  : Denies dysuria, hematuria   SKIN: Denies rash,  itching  NEURO: Denies confusion, memory loss, or mood changes  PSYCH: Denies depression or anxiety  HEME/LYMPH: (+) easy bruising and bleeding      PMHX:  has a past medical history of Abnormal thyroid function test, Allergy, Anemia, Anemia due to blood loss (7/2/2014), Arthritis, Gaviria esophagus, Basal cell carcinoma, Basal cell carcinoma (12/2011), Cancer, Cataract, Cirrhosis, Diabetes mellitus, Diabetes mellitus, type 2, Encounter for blood transfusion, Esophageal varices in cirrhosis, Gastritis, GERD (gastroesophageal reflux disease) (2/28/2015), Hard of hearing, Hiatal hernia, History of hepatitis C (8/10/2012), Hoarseness (2/28/2015), Hypercholesteremia, Hypersplenism, Hypertension, Pain management (12/10/2014), Petechial hemorrhage (11/25/2015), Portal hypertensive gastropathy, Thrombocytopenia, Type II or unspecified type diabetes mellitus with neurological manifestations, uncontrolled(250.62) (12/24/2013), and Valvular heart disease.    PSHX:  has a past surgical history that includes Cholecystectomy; Knee surgery (12/2006); Skin lesion excision; Tonsillectomy; Knee arthroscopy w/ meniscal repair; Knee cartilage surgery; Eye surgery; Cataract extraction (1/10/13); Cataract extraction; Colonoscopy; Upper gastrointestinal endoscopy; diagnostic block of the genicular branches to the left knee (Left, 12/15/2017); ABLATION, RADIOFREQUENCY-left suprascapula (Left, 8/25/2017); LARYNGOSCOPY (Bilateral, 12/5/2014); ESOPHAGOGASTRODUODENOSCOPY (EGD) (N/A, 8/1/2014); ESOPHAGOGASTRODUODENOSCOPY (EGD) (N/A, 7/3/2014); EGD (ESOPHAGOGASTRODUODENOSCOPY) (N/A, 3/13/2014); EGD (ESOPHAGOGASTRODUODENOSCOPY) (N/A, 7/11/2013); PHACOEMULSIFICATION, CATARACT (Left, 1/10/2013); INSERTION, IOL PROSTHESIS (Left, 1/10/2013); and PHACOEMULSIFICATION, CATARACT (Right, 9/27/2012).    The patient's social and family histories were reviewed by me and updated in the appropriate section of the electronic medical record.    Review of  "patient's allergies indicates:   Allergen Reactions    Adhesive tape-silicones Other (See Comments)     pulls skin off    Doxycycline      Dizzy. "Just didn't feel right".    Oxycontin [oxycodone] Other (See Comments)       Current Outpatient Medications on File Prior to Visit   Medication Sig Dispense Refill    ACCU-CHEK VEENA PLUS METER Misc 1 Device by Misc.(Non-Drug; Combo Route) route 4 (four) times daily. 1 each 0    amitriptyline (ELAVIL) 10 MG tablet TAKE 1 TABLET BY MOUTH NIGHTLY AS NEEDED FOR INSOMNIA 90 tablet 1    ammonium lactate (LAC-HYDRIN) 12 % lotion Apply topically 2 (two) times daily as needed for Dry Skin. 225 g 0    blood sugar diagnostic Strp Accu-chek veena plus test strip. Test blood sugar 4 times daily 400 strip 3    carvedilol (COREG) 6.25 MG tablet TAKE 1 TABLET BY MOUTH TWICE DAILY 180 tablet 1    gabapentin (NEURONTIN) 600 MG tablet Take 1 tablet (600 mg total) by mouth 3 (three) times daily. 90 tablet 2    insulin aspart U-100 (NOVOLOG FLEXPEN U-100 INSULIN) 100 unit/mL InPn pen Inject 10 Units into the skin 3 (three) times daily with meals. 2 Box 3    insulin detemir U-100 (LEVEMIR FLEXTOUCH) 100 unit/mL (3 mL) SubQ InPn pen Inject 36 Units into the skin every evening. 2 Box 3    lancets (ACCU-CHEK SOFTCLIX LANCETS) Misc Test blood sugar 4 times daily 400 each 3    oxyCODONE-acetaminophen (PERCOCET)  mg per tablet Take 1 tablet by mouth every 6 (six) hours as needed for Pain. 120 tablet 0    pen needle, diabetic (BD ULTRA-FINE KORINA PEN NEEDLES) 32 gauge x 5/32" Ndle Use 4 needles daily with insulin injecitons 400 each 3    betamethasone dipropionate (DIPROLENE) 0.05 % lotion Apply topically 2 (two) times daily. for 10 days 60 mL 3    [DISCONTINUED] azithromycin (ZITHROMAX Z-NAN) 250 MG tablet Take 2 tablets (500 mg total) by mouth once daily for 1 day, THEN 1 tablet (250 mg total) once daily for 4 days. 6 tablet 0    [DISCONTINUED] oxyCODONE-acetaminophen " "(PERCOCET)  mg per tablet Take 1 tablet by mouth every 6 (six) hours as needed for Pain. 120 tablet 0     No current facility-administered medications on file prior to visit.         Objective Findings:    PHYSICAL EXAM:   Friendly White male, in no acute distress; alert and oriented to person, place and time  VITALS:   /70 (BP Location: Left arm, Patient Position: Sitting)   Pulse 64   Temp 98.7 °F (37.1 °C) (Oral)   Resp 18   Ht 5' 9" (1.753 m)   Wt 86.5 kg (190 lb 11.2 oz)   SpO2 95%   BMI 28.16 kg/m²    HENT: Normocephalic, without obvious abnormality. Oral mucosa pink and moist.   EYES: Sclerae anicteric. No conjunctival pallor.   NECK: Supple.   CARDIOVASCULAR: Regular rate and rhythm. (+) murmur.  RESPIRATORY: Normal respiratory effort. BBS CTA. No wheezes or crackles.  GI: Soft, non-tender, non-distended. (+) splenomegaly. No masses palpable. No ascites. Large easily reducible umbilical hernia. Abd binder intact.  EXTREMITIES:  No clubbing, cyanosis or edema. (+) ecchymosis to BUE.  SKIN: Warm and dry. No jaundice. No rashes noted to exposed skin. No telangectasias noted. No palmar erythema.   NEURO:  Normal gate. No asterixis.   PSYCH:  Memory intact. Thought and speech pattern appropriate. Behavior normal. No depression or anxiety noted.      Labs:  Lab Results   Component Value Date    WBC 3.70 (L) 11/24/2018    HGB 12.6 (L) 11/24/2018    HCT 37.8 (L) 11/24/2018    PLT 45 (L) 11/24/2018    CHOL 151 07/06/2017    TRIG 62 07/06/2017    HDL 57 07/06/2017     11/24/2018    K 4.9 11/24/2018    CREATININE 1.0 11/24/2018    ALT 15 11/24/2018    AST 26 11/24/2018    ALKPHOS 75 11/24/2018    BILITOT 1.6 (H) 11/24/2018    ALBUMIN 3.8 11/24/2018    INR 1.1 10/26/2018    AFP 3.1 10/26/2018     ABD U/s 10/26/18  FINDINGS:  Pancreas: The pancreas is normal in size and echotexture.    Aorta: There is no aneurysm.  The distal abdominal aorta measures 2.1 cm.    Inferior vena cava: Normal in " appearance.    Biliary system: The gallbladder is surgically absent.  The common bile duct measures 5.2 mm which is within normal limits.    Liver: There is no detrimental change in the appearance of the liver compared to the prior study.  The liver is at the upper limits of normal for size measuring 16.8 cm in greatest longitudinal dimension.  There is a coarse echotexture with nodular appearance as well as mild contour nodularity suggesting cirrhosis without focal mass.  The hepato renal index is 0.64.  There is normal directional flow in the main portal vein.    Right kidney: Normal in size, measuring 11.3 cm with no hydronephrosis.  There is no solid or cystic mass.  There are no renal calculi.  There is parenchymal cortical thinning.    Left kidney: Normal in size, measuring 10.7 cm with no hydronephrosis.  There is no solid or cystic mass.  There are no renal calculi.  There is parenchymal cortical thinning.    Spleen: The spleen is normal in echotexture but enlarged measuring 21 cm in greatest longitudinal dimension, unchanged.  There is no focal mass.    Miscellaneous: No ascites.      Impression       1. There is no detrimental change in the appearance of the liver compared to the prior study.  There is a coarse echotexture with nodular contour suggesting cirrhosis but without ascites.  There is significant splenomegaly which is unchanged.  2. There is thinning of the renal parenchyma bilaterally suggesting intrinsic renal disease.  There is no hydronephrosis..       ASSESSMENT:  73 y.o. White male with:  1.  Cirrhosis, well-compensated  -- MELD = MELD-Na score: 8 at 10/26/2018  8:30 AM  MELD score: 8 at 10/26/2018  8:30 AM  Calculated from:  Serum Creatinine: 0.9 mg/dL (Rounded to 1 mg/dL) at 10/26/2018  8:30 AM  Serum Sodium: 137 mmol/L at 10/26/2018  8:30 AM  Total Bilirubin: 1.2 mg/dL at 10/26/2018  8:30 AM  INR(ratio): 1.1 at 10/26/2018  8:30 AM  Age: 73 years  -- HCC screening- AFP and abd. U/S - up  to date, next due 5/2019  -- Immunity to Hep A and B - not immune to hep A, (+) hep B immunity from prior exposure  -- EGD - see below  2. History of hepatitis C, s/p successful treatment with Harvoni with SVR    3. Portal hypertension  -- EGD - H/o GIB s/p multiple EGD's with banding. Pt now refuses more EGD's d/t his concern that these caused his hoarseness. On coreg 6.25 mg daily.   -- Ascites - none  -- Splenomegaly and thrombocytopenia  4. SOB/abnormal CXR  -- has not see PCP since ER visit and does not want to see PA or NP  -- will place referral to pulmonary      EDUCATION:   Signs and symptoms of hepatic decompensation were reviewed, including jaundice, ascites, and slowed mentation due to hepatic encephalopathy. The patient should seek medical attention if any of these things occur.  We discussed the potential for bleeding from esophageal varices with symptoms of hematemesis and melena. The patient should report to the Emergency Department for these symptoms.    We discussed the increased risk of hepatocellular carcinoma due to cirrhosis. Continued screening every six months with ultrasound and AFP is recommended.     No alcohol or raw seafood.  Tylenol/acetaminophen as needed for pain, up to 2000 mg daily      PLAN:  1. HCC screening Q 6 months with AFP and abd. U/S, next due 5/2019  2. Pt declines repeat EGD. Discussed s/sx of GIB that he would need to report to ER for. Anemia is also stable. If this were to worsen, will repeat EGD  3. Hepatitis A vaccines, supposed to finish today but unsure if he got first shot in 5/2018. Will check with [facility]  4. Referral to pulmonary. First available appt not until 2/2019. Pt advised to f/u with PCP/sonu primary care office  5. F/u in 5/2019 with U/s and labs in [place] before appt    Thank you for allowing me to participate in the care of Steve Cunha, KAJAL

## 2018-11-29 NOTE — TELEPHONE ENCOUNTER
Called pt regarding below message. Scheduled an appt with Dorothy for next Wed 12/5/18.  Pt verbalized appt date, time, and location with no further questions

## 2018-11-29 NOTE — TELEPHONE ENCOUNTER
----- Message from Joanne Cunha, NP sent at 11/29/2018 12:15 PM CST -----  Not sure if you are aware pt was seen in ER on 11/24. Had abnormal CXR. Instructed to f/u with PCP, consider pulmonary referral. He continues to c/o SOB. Pt told me today he does not want to see PA or NP in primary care there. I put in for pulmonary referral but first available is not until 2/2019. We were able to get him appt with Dr. Parry over there with you but not until end of Dec. Just wanted to notify you to see if ya'll can get him in any sooner to see someone.    Thanks,  Joanne

## 2018-11-29 NOTE — TELEPHONE ENCOUNTER
Please see message from hepatology regarding abnormal chest xray and see if Dr. Demarco, Radha, Sung or Dorothy have any openings as it appears that he will only see an MD

## 2018-11-29 NOTE — Clinical Note
Not sure if you are aware pt was seen in ER on 11/24. Had abnormal CXR. Instructed to f/u with PCP, consider pulmonary referral. He continues to c/o SOB. Pt told me today he does not want to see PA or NP in primary care there. I put in for pulmonary referral but first available is not until 2/2019. We were able to get him appt with Dr. Parry over there with you but not until end of Dec. Just wanted to notify you to see if ya'll can get him in any sooner to see someone.Thanks,Joanne

## 2018-12-05 ENCOUNTER — HOSPITAL ENCOUNTER (OUTPATIENT)
Dept: RADIOLOGY | Facility: CLINIC | Age: 73
Discharge: HOME | End: 2018-12-05
Attending: FAMILY MEDICINE
Payer: MEDICARE

## 2018-12-05 ENCOUNTER — OFFICE VISIT (OUTPATIENT)
Dept: FAMILY MEDICINE | Facility: CLINIC | Age: 73
End: 2018-12-05
Attending: FAMILY MEDICINE
Payer: MEDICARE

## 2018-12-05 ENCOUNTER — PATIENT MESSAGE (OUTPATIENT)
Dept: PULMONOLOGY | Facility: CLINIC | Age: 73
End: 2018-12-05

## 2018-12-05 ENCOUNTER — TELEPHONE (OUTPATIENT)
Dept: PULMONOLOGY | Facility: CLINIC | Age: 73
End: 2018-12-05

## 2018-12-05 VITALS
TEMPERATURE: 98 F | SYSTOLIC BLOOD PRESSURE: 119 MMHG | DIASTOLIC BLOOD PRESSURE: 70 MMHG | OXYGEN SATURATION: 93 % | BODY MASS INDEX: 28.8 KG/M2 | WEIGHT: 194.44 LBS | HEART RATE: 70 BPM | HEIGHT: 69 IN

## 2018-12-05 DIAGNOSIS — R06.02 SHORTNESS OF BREATH: ICD-10-CM

## 2018-12-05 DIAGNOSIS — R06.02 SHORTNESS OF BREATH: Primary | ICD-10-CM

## 2018-12-05 PROCEDURE — 1101F PT FALLS ASSESS-DOCD LE1/YR: CPT | Mod: CPTII,HCWC,S$GLB, | Performed by: FAMILY MEDICINE

## 2018-12-05 PROCEDURE — 3074F SYST BP LT 130 MM HG: CPT | Mod: CPTII,HCWC,S$GLB, | Performed by: FAMILY MEDICINE

## 2018-12-05 PROCEDURE — 71046 X-RAY EXAM CHEST 2 VIEWS: CPT | Mod: TC,FY,HCWC,PO

## 2018-12-05 PROCEDURE — 71046 X-RAY EXAM CHEST 2 VIEWS: CPT | Mod: 26,HCWC,, | Performed by: RADIOLOGY

## 2018-12-05 PROCEDURE — 94640 AIRWAY INHALATION TREATMENT: CPT | Mod: HCWC,S$GLB,, | Performed by: FAMILY MEDICINE

## 2018-12-05 PROCEDURE — 99999 PR PBB SHADOW E&M-EST. PATIENT-LVL III: CPT | Mod: PBBFAC,HCWC,, | Performed by: FAMILY MEDICINE

## 2018-12-05 PROCEDURE — 99214 OFFICE O/P EST MOD 30 MIN: CPT | Mod: 25,HCWC,S$GLB, | Performed by: FAMILY MEDICINE

## 2018-12-05 PROCEDURE — 3078F DIAST BP <80 MM HG: CPT | Mod: CPTII,HCWC,S$GLB, | Performed by: FAMILY MEDICINE

## 2018-12-05 RX ORDER — IPRATROPIUM BROMIDE AND ALBUTEROL SULFATE 2.5; .5 MG/3ML; MG/3ML
SOLUTION RESPIRATORY (INHALATION)
Qty: 1080 ML | Refills: 0 | OUTPATIENT
Start: 2018-12-05

## 2018-12-05 RX ORDER — IPRATROPIUM BROMIDE AND ALBUTEROL SULFATE 2.5; .5 MG/3ML; MG/3ML
3 SOLUTION RESPIRATORY (INHALATION) EVERY 6 HOURS PRN
Qty: 1 BOX | Refills: 0 | Status: SHIPPED | OUTPATIENT
Start: 2018-12-05 | End: 2018-12-05 | Stop reason: CLARIF

## 2018-12-05 RX ORDER — IPRATROPIUM BROMIDE AND ALBUTEROL SULFATE 2.5; .5 MG/3ML; MG/3ML
3 SOLUTION RESPIRATORY (INHALATION)
Status: COMPLETED | OUTPATIENT
Start: 2018-12-05 | End: 2018-12-05

## 2018-12-05 RX ADMIN — IPRATROPIUM BROMIDE AND ALBUTEROL SULFATE 3 ML: 2.5; .5 SOLUTION RESPIRATORY (INHALATION) at 03:12

## 2018-12-05 NOTE — PROGRESS NOTES
Subjective:       Patient ID: Steve June Jr. is a 73 y.o. male.    Chief Complaint: Shortness of Breath (ER f/u)    Denies CP      Shortness of Breath   This is a recurrent problem. The current episode started 1 to 4 weeks ago. The problem occurs daily. The problem has been gradually worsening. Associated symptoms include orthopnea. Pertinent negatives include no abdominal pain, chest pain, fever, headaches, hemoptysis, rhinorrhea, sore throat, sputum production, vomiting or wheezing. The treatment provided moderate relief.     Review of Systems   Constitutional: Negative for fever.   HENT: Negative for rhinorrhea and sore throat.    Respiratory: Positive for shortness of breath. Negative for hemoptysis, sputum production and wheezing.    Cardiovascular: Positive for orthopnea. Negative for chest pain.   Gastrointestinal: Negative for abdominal pain and vomiting.   Neurological: Negative for headaches.       Objective:      Physical Exam   Constitutional: He appears well-developed and well-nourished. No distress.   HENT:   Head: Normocephalic and atraumatic.   Mouth/Throat: Oropharynx is clear and moist.   Cardiovascular: Normal rate and regular rhythm. Exam reveals no friction rub.   No murmur heard.  Pulmonary/Chest: Effort normal. No respiratory distress. He has decreased breath sounds. He has no wheezes.   Musculoskeletal: He exhibits no edema.   Skin: Skin is warm and dry. No rash noted. He is not diaphoretic. No erythema.   Nursing note and vitals reviewed.      Assessment:       1. Shortness of breath        Plan:     Problem List Items Addressed This Visit        Other    Shortness of breath - Primary    Current Assessment & Plan     Some hypoxia, recent PNA - cxr.  Denies CP.    Wells score of 0.    Could benefit from steroids, but pt states he will not take, b/c of severe glucose escalations.         Relevant Medications    albuterol-ipratropium 2.5 mg-0.5 mg/3 mL nebulizer solution 3 mL (Completed)     Other Relevant Orders    X-Ray Chest PA And Lateral    Ambulatory referral to Pulmonology    Brain natriuretic peptide    Comprehensive metabolic panel

## 2018-12-05 NOTE — ASSESSMENT & PLAN NOTE
Some hypoxia, recent PNA - cxr.  Denies CP.    Wells score of 0.    Could benefit from steroids, but pt states he will not take, b/c of severe glucose escalations.

## 2018-12-05 NOTE — TELEPHONE ENCOUNTER
Contacted pt and scheduled appt with MD for 12/6/18----- Message from Maria De Jesus Sanchez sent at 12/5/2018  3:44 PM CST -----  Pt has an appt in feb but is having SOB and breathing problems now and wants to be seen asap if possible  Please call him @ [phone]  Thanks !

## 2018-12-06 ENCOUNTER — HOSPITAL ENCOUNTER (OUTPATIENT)
Dept: RESPIRATORY THERAPY | Facility: HOSPITAL | Age: 73
Discharge: HOME | End: 2018-12-06
Attending: INTERNAL MEDICINE
Payer: MEDICARE

## 2018-12-06 ENCOUNTER — OFFICE VISIT (OUTPATIENT)
Dept: PULMONOLOGY | Facility: CLINIC | Age: 73
End: 2018-12-06
Payer: MEDICARE

## 2018-12-06 VITALS
HEART RATE: 64 BPM | DIASTOLIC BLOOD PRESSURE: 78 MMHG | OXYGEN SATURATION: 94 % | WEIGHT: 193.56 LBS | BODY MASS INDEX: 28.67 KG/M2 | SYSTOLIC BLOOD PRESSURE: 143 MMHG | HEIGHT: 69 IN

## 2018-12-06 DIAGNOSIS — R06.09 DOE (DYSPNEA ON EXERTION): ICD-10-CM

## 2018-12-06 DIAGNOSIS — R93.89 ABNORMAL CXR: ICD-10-CM

## 2018-12-06 DIAGNOSIS — R06.09 DOE (DYSPNEA ON EXERTION): Primary | ICD-10-CM

## 2018-12-06 DIAGNOSIS — J44.9 COPD MIXED TYPE: ICD-10-CM

## 2018-12-06 LAB
BR6MWT: NORMAL
BRPFT: ABNORMAL
DLCO ADJ PRE: 7.39 ML/(MIN*MMHG) (ref 19.13–32.98)
DLCO SINGLE BREATH LLN: 19.13
DLCO SINGLE BREATH PRE REF: 28.4 %
DLCO SINGLE BREATH REF: 26.06
DLCOC SBVA LLN: 2.59
DLCOC SBVA PRE REF: 65.7 %
DLCOC SBVA REF: 3.78
DLCOC SINGLE BREATH LLN: 19.13
DLCOC SINGLE BREATH PRE REF: 28.4 %
DLCOC SINGLE BREATH REF: 26.06
DLCOVA LLN: 2.59
DLCOVA PRE REF: 65.7 %
DLCOVA PRE: 2.48 ML/(MIN*MMHG*L) (ref 2.59–4.96)
DLCOVA REF: 3.78
DLVAADJ PRE: 2.48 ML/(MIN*MMHG*L) (ref 2.59–4.96)
ERV LLN: 1.02
ERV REF: 1.02
ERVN2 LLN: 1.02
ERVN2 PRE REF: 60.4 %
ERVN2 PRE: 0.62 L (ref 1.02–1.02)
ERVN2 REF: 1.02
FEF 25 75 CHG: -29.8 %
FEF 25 75 LLN: 0.98
FEF 25 75 POST REF: 56.3 %
FEF 25 75 PRE REF: 80.2 %
FEF 25 75 REF: 2.31
FET100 CHG: 2.7 %
FEV1 CHG: -6.1 %
FEV1 FVC CHG: -7.4 %
FEV1 FVC LLN: 62
FEV1 FVC POST REF: 93.9 %
FEV1 FVC PRE REF: 101.4 %
FEV1 FVC REF: 76
FEV1 LLN: 2.19
FEV1 POST REF: 52.9 %
FEV1 PRE REF: 56.3 %
FEV1 REF: 3.05
FEV6 CHG: 1.4 %
FEV6 LLN: 3.08
FEV6 POST REF: 57 %
FEV6 POST: 2.26 L (ref 3.08–4.84)
FEV6 PRE REF: 56.2 %
FEV6 PRE: 2.23 L (ref 3.08–4.84)
FEV6 REF: 3.96
FRCN2 LLN: 2.66
FRCN2 PRE REF: 43.3 %
FRCN2 REF: 3.64
FRCPLETH LLN: 2.66
FRCPLETH REF: 3.64
FVC CHG: 1.4 %
FVC LLN: 2.98
FVC POST REF: 56.1 %
FVC PRE REF: 55.3 %
FVC REF: 4.03
IVC PRE: 2.14 L (ref 2.98–5.09)
IVC SINGLE BREATH LLN: 2.98
IVC SINGLE BREATH PRE REF: 53 %
IVC SINGLE BREATH REF: 4.03
MVV LLN: 101
MVV PRE REF: 65.5 %
MVV REF: 119
PEF CHG: -10 %
PEF LLN: 5.77
PEF POST REF: 32.9 %
PEF PRE REF: 36.5 %
PEF REF: 8.01
POST FEF 25 75: 1.3 L/S (ref 0.98–3.64)
POST FET 100: 6.6 SEC
POST FEV1 FVC: 71.34 % (ref 62.31–89.59)
POST FEV1: 1.61 L (ref 2.19–3.91)
POST FVC: 2.26 L (ref 2.98–5.09)
POST PEF: 2.63 L/S (ref 5.77–10.26)
PRE DLCO: 7.39 ML/(MIN*MMHG) (ref 19.13–32.98)
PRE FEF 25 75: 1.85 L/S (ref 0.98–3.64)
PRE FET 100: 6.42 SEC
PRE FEV1 FVC: 77.03 % (ref 62.31–89.59)
PRE FEV1: 1.72 L (ref 2.19–3.91)
PRE FRC N2: 1.58 L
PRE FVC: 2.23 L (ref 2.98–5.09)
PRE MVV: 78 L/MIN (ref 101.18–136.9)
PRE PEF: 2.92 L/S (ref 5.77–10.26)
RAW LLN: 3.06
RAW REF: 3.06
RV LLN: 1.95
RV REF: 2.62
RVN2 LLN: 1.95
RVN2 PRE REF: 35.1 %
RVN2 PRE: 0.92 L (ref 1.95–3.3)
RVN2 REF: 2.62
RVN2TLCN2 LLN: 33
RVN2TLCN2 PRE REF: 69.4 %
RVN2TLCN2 PRE: 28.91 % (ref 32.67–50.63)
RVN2TLCN2 REF: 42
RVTLC LLN: 33
RVTLC REF: 42
TLC LLN: 5.75
TLC REF: 6.9
TLCN2 LLN: 5.75
TLCN2 PRE REF: 46.2 %
TLCN2 PRE: 3.19 L (ref 5.75–8.05)
TLCN2 REF: 6.9
VA PRE: 2.98 L (ref 6.75–6.75)
VA SINGLE BREATH LLN: 6.75
VA SINGLE BREATH PRE REF: 44.1 %
VA SINGLE BREATH REF: 6.75
VC LLN: 2.98
VC REF: 4.03
VCMAXN2 LLN: 2.98
VCMAXN2 PRE REF: 56.2 %
VCMAXN2 PRE: 2.27 L (ref 2.98–5.09)
VCMAXN2 REF: 4.03

## 2018-12-06 PROCEDURE — 94727 GAS DIL/WSHOT DETER LNG VOL: CPT | Mod: HCWC

## 2018-12-06 PROCEDURE — 99499 UNLISTED E&M SERVICE: CPT | Mod: HCNC,S$GLB,, | Performed by: INTERNAL MEDICINE

## 2018-12-06 PROCEDURE — 99214 OFFICE O/P EST MOD 30 MIN: CPT | Mod: 25,HCWC,S$GLB, | Performed by: INTERNAL MEDICINE

## 2018-12-06 PROCEDURE — 99999 PR PBB SHADOW E&M-EST. PATIENT-LVL IV: CPT | Mod: PBBFAC,HCWC,, | Performed by: INTERNAL MEDICINE

## 2018-12-06 PROCEDURE — 1101F PT FALLS ASSESS-DOCD LE1/YR: CPT | Mod: CPTII,HCWC,S$GLB, | Performed by: INTERNAL MEDICINE

## 2018-12-06 PROCEDURE — 3077F SYST BP >= 140 MM HG: CPT | Mod: CPTII,HCWC,S$GLB, | Performed by: INTERNAL MEDICINE

## 2018-12-06 PROCEDURE — 94618 PULMONARY STRESS TESTING: CPT | Mod: HCWC

## 2018-12-06 PROCEDURE — 94060 EVALUATION OF WHEEZING: CPT | Mod: HCWC,59

## 2018-12-06 PROCEDURE — 3078F DIAST BP <80 MM HG: CPT | Mod: CPTII,HCWC,S$GLB, | Performed by: INTERNAL MEDICINE

## 2018-12-06 PROCEDURE — 94729 DIFFUSING CAPACITY: CPT | Mod: HCWC

## 2018-12-06 NOTE — LETTER
December 6, 2018      CORNEL Krishna  [address]  [place] [address]           [facility]  [address]  [place] [address]  Phone: [phone]  Fax: [phone]          Patient: Steve June Jr.   MR Number: [id number]   YOB: 1945   Date of Visit: 12/6/2018       Dear Katelynn Merrill:    Thank you for referring Steve June to me for evaluation. Attached you will find relevant portions of my assessment and plan of care.    If you have questions, please do not hesitate to call me. I look forward to following Steve June along with you.    Sincerely,    Duong Camarena MD    Enclosure  CC:  No Recipients    If you would like to receive this communication electronically, please contact [email or web address] or [phone] to request more information on [employer] access.    For providers and/or their staff who would like to refer a patient to [facility], please contact us through our one-stop-shop provider referral line, [facility], at [phone].    If you feel you have received this communication in error or would no longer like to receive these types of communications, please e-mail [email or web address]

## 2018-12-06 NOTE — PROGRESS NOTES
Some lab values are out of range, but I feel that no further workup is required at this time.  Please feel free to contact my office with any questions.  oarse chronic appearing areas of likely chronic interstitial infiltrates are evident bilaterally.  There is volume loss on the right.  The changes seem chronic as suggested by radiologist read. Cont appt with pulmonology.  No PNA evident.    Derrick De La Cruz MD

## 2018-12-06 NOTE — PATIENT INSTRUCTIONS
Exam not bad, xray with very small lungs and increasd markings last yr so.    Check blood again.  If blood test suggest blood clot- need urgent ct chest to screen for blood clot.  Would do regular ct chest if no blood clot concern.     Otherwise need heart and breathing test.      Inhaler no help.  Try trelegy once daily

## 2018-12-06 NOTE — PROGRESS NOTES
12/6/2018    Steve June Jr.  New Patient Consult    Chief Complaint   Patient presents with    Shortness of Breath     has not been able to breath well for the past three weeks.       HPI: off smokes 30 yrs after ppd for 20yrs.  Miserable sob last 3 wks.  Onset not recalled but noted.  Pt  54 yr, does chore bout house  With no yd wk.  Pt notes marrero activity.  No cough.  No near death sensation. No chest pain.  Appetite good.      Pt was in ER recently with below hpi:Time seen by provider: 10:56 AM on 11/24/2018     Steve June Jr. is a 73 y.o. male with DMII, HTN GERD, cirrhosis and anemia who presents to the ED with an onset of worsening SOB with associated cough. He was seen in his PCP's office over a month ago on 10/14 by CORNEL Arevalo for the same complaints and was prescribed a 6-day tapering course of Azithromycin 250 mg for acute bacterial bronchitis. The patient states that he did not take the antibiotics. His SOB is worsened with exertion and worse with laying flat. He reports being propped up with 1 pillow at nighttime. The patient has endorsed chills for the past couple of weeks. He was noted to have blood in his urine 1 week ago and has had none since. The patient denies prior similar symptoms, being on home Oxygen, being diagnosed with CHF or recent PNA, history of cardiac, thyroid or prostate problems, fevers, chest pain or any other symptoms at this time. No cardiac SHx noted. Doxycycline drug allergy noted.   The history is provided by the patient and the spouse (wife).   ED Course as of Nov 26 1117   Sat Nov 24, 2018   1234 73-year-old male past medical history of diabetes, hypertension, cirrhosis and anemia presents today with shortness of breath. Patient reports worsening shortness of breath times 10 days.  Dyspnea on exertion.  Reports intermittent cough.  Denies fevers but subjective chills. The exam remarkable for chronically ill-appearing otherwise with some coarse  "breath sounds on the right.  [BD]   1330 Chest x-ray with diffuse interstitial opacities.  Given symptoms of cough, chills and worsening shortness of breath will treat for pneumonia.  Per prior note patient was given antibiotics for pneumonia last month but patient reports he did not take them.  Labs with mild hyperglycemia will give 5 units of insulin and some fluids.  Patient with known thrombocytopenia being worked up as outpatient improved from prior labs.  Patient told of x-ray findings and will follow PCP closely for further outpatient management.  Patient has follow-up with liver doctor for cirrhosis this month.  Patient and family member understand and agree with discharge instructions and strict return precautions         The chief compliant  problem is new to me",   PFSH:  Past Medical History:   Diagnosis Date    Abnormal thyroid function test     Allergy     Seasonal    Anemia     Anemia due to blood loss 7/2/2014    Arthritis     Gaviria esophagus     Basal cell carcinoma     right forearm    Basal cell carcinoma 12/2011    lower post neck    Cancer     skin CA    Cataract     Cirrhosis     Diabetes mellitus     Diabetes mellitus, type 2     Encounter for blood transfusion     Esophageal varices in cirrhosis     grade II on 7/12 EGD    Gastritis     on 7/12 EGD    GERD (gastroesophageal reflux disease) 2/28/2015    Hard of hearing     Hiatal hernia     History of hepatitis C 8/10/2012    tx with harvoni x 41 days (started 10/22/15). SVR4     Hoarseness 2/28/2015    Hypercholesteremia     Hypersplenism     Hypertension     No meds    Pain management 12/10/2014    Petechial hemorrhage 11/25/2015    Lower extremities bilat     Portal hypertensive gastropathy     on 7/12 EGD    Thrombocytopenia     Type II or unspecified type diabetes mellitus with neurological manifestations, uncontrolled(250.62) 12/24/2013    Valvular heart disease     mild MR 12         Past Surgical " History:   Procedure Laterality Date    ABLATION, RADIOFREQUENCY-left suprascapula Left 2017    [facility]    CATARACT EXTRACTION  1/10/13    left eye    CATARACT EXTRACTION      right eye    CHOLECYSTECTOMY      COLONOSCOPY      diagnostic block of the genicular branches to the left knee Left 12/15/2017    [facility] at [facility] OR    EGD (ESOPHAGOGASTRODUODENOSCOPY) N/A 3/13/2014    [facility] at [facility] ENDO (4TH FLR)    EGD (ESOPHAGOGASTRODUODENOSCOPY) N/A 2013    Performed by Campos Walters MD at [facility] END[facility] (4TH FLR)    ESOPHAGOGASTRODUODENOSCOPY (EGD) N/A 2014    Performed by Juan David Booker MD at [facility] ENDO (4TH FLR)    ESOPHAGOGASTRODUODENOSCOPY (EGD) N/A 7/3/2014    Performed by Juan David Booker [facility] at [facility] ENDO (2ND FLR)    EYE SURGERY      Cataract surgery to right eye    INSERTION, IOL PROSTHESIS Left 1/10/2013    [facility]i Baker [facility] at [facility] OR 1ST FLR    KNEE ARTHROSCOPY W/ MENISCAL REPAIR      KNEE CARTILAGE SURGERY      left knee    KNEE SURGERY  2006    left    LARYNGOSCOPY Bilateral 2014    Performed [facility] Anoop Bernstein [facility] at [facility] OR 2ND FLR    PHACOEMULSIFICATION, CATARACT Left 1/10/2013    [facility] at [facility] OR 1ST FLR    PHACOEMULSIFICATION, CATARACT Right 2012    [facility]gado [facility] at [facility] OR    SKIN LESION EXCISION      TONSILLECTOMY      UPPER GASTROINTESTINAL ENDOSCOPY       Social History     Tobacco Use    Smoking status: Former Smoker     Packs/day: 1.00     Years: 25.00     Pack years: 25.00     Last attempt to quit: 2000     Years since quittin.3    Smokeless tobacco: Never Used   Substance Use Topics    Alcohol use: Yes     Comment: rarely    Drug use: No     Family History   Problem Relation Age of Onset    Leukemia Mother     Cancer Mother         bone    Alcohol abuse Father   "   Cirrhosis Father         EtOH induced    No Known Problems Daughter     No Known Problems Son     No Known Problems Daughter     No Known Problems Daughter     No Known Problems Daughter     No Known Problems Sister     Amblyopia Neg Hx     Blindness Neg Hx     Cataracts Neg Hx     Diabetes Neg Hx     Glaucoma Neg Hx     Hypertension Neg Hx     Macular degeneration Neg Hx     Retinal detachment Neg Hx     Strabismus Neg Hx     Stroke Neg Hx     Thyroid disease Neg Hx     Psoriasis Neg Hx     Lupus Neg Hx     Eczema Neg Hx     Acne Neg Hx     Melanoma Neg Hx      Review of patient's allergies indicates:   Allergen Reactions    Adhesive tape-silicones Other (See Comments)     pulls skin off    Doxycycline      Dizzy. "Just didn't feel right".       Performance Status:The patient's activity level is housebound activities.      Review of Systems:  a review of eleven systems covering constitutional, Eye, HEENT, Psych, Respiratory, Cardiac, GI, , Musculoskeletal, Endocrine, Dermatologic was negative except for pertinent findings as listed ABOVE and below:  pertinent positive as above, rest is good  ,chr diabetes , umbilical hernia     Exam:Comprehensive exam done. BP (!) 143/78 (BP Location: Right arm, Patient Position: Sitting)   Pulse 64   Ht 5' 9" (1.753 m)   Wt 87.8 kg (193 lb 9 oz)   SpO2 (!) 94% Comment: on room air  BMI 28.58 kg/m²   Exam included Vitals as listed, and patient's appearance and affect and alertness and mood, oral exam for yeast and hygiene and pharynx lesions and Mallapatti (M) score, neck with inspection for jvd and masses and thyroid abnormalities and lymph nodes (supraclavicular and infraclavicular nodes and axillary also examined and noted if abn), chest exam included symmetry and effort and fremitus and percussion and auscultation, cardiac exam included rhythm and gallops and murmur and rubs and jvd and edema, abdominal exam for mass and hepatosplenomegaly and " tenderness and hernias and bowel sounds, Musculoskeletal exam with muscle tone and posture and mobility/gait and  strength, and skin for rashes and cyanosis and pallor and turgor, extremity for clubbing.  Findings were normal except for pertinent findings listed below:  Looks sl breathless.  M3, min rales, umbilical hernia, venous stasis, min early clubbing?      Radiographs (ct chest and cxr) reviewed: view by direct vision  - progressively sm lung last 2 yrs, increased markings on cxr.    Labs reviewed      hbg ok nov, plt low 20's in past, was 45 -  70 recently    PFT will be done and results to be reviewed   CONCLUSIONS     1 - No wall motion abnormalities.     2 - Hyperdynamic left ventricular systolic function (EF 65-70%).     3 - Normal left ventricular diastolic function.     4 - Normal right ventricular systolic function .     5 - The estimated PA systolic pressure is 24 mmHg.     6 - Suggestion for improve diastolic function from Echo in 3/2012.     7 - Difficult apical windows.             This document has been electronically    SIGNED BY: Chase Alexandre MD On: 05/25/2017 12:24    Plan:  Clinical impression is resonably certain and repeated evaluation prn +/- follow up will be needed as below.     Steve was seen today for shortness of breath.    Diagnoses and all orders for this visit:    AUGUSTINE (dyspnea on exertion)  -     D-DIMER, QUANTITATIVE; Future  -     CBC auto differential; Future  -     Complete PFT without bronchodilator; Future  -     Six Minute Walk Test to qualify for Home Oxygen; Future  -     2D echo with color flow doppler; Future  -     fluticasone-umeclidin-vilanter (TRELEGY ELLIPTA) 100-62.5-25 mcg DsDv; Inhale 1 puff into the lungs once daily.    Abnormal CXR  -     D-DIMER, QUANTITATIVE; Future  -     CBC auto differential; Future  -     Complete PFT without bronchodilator; Future  -     Six Minute Walk Test to qualify for Home Oxygen; Future  -     2D echo with color flow doppler;  Future    COPD mixed type  -     fluticasone-umeclidin-vilanter (TRELEGY ELLIPTA) 100-62.5-25 mcg DsDv; Inhale 1 puff into the lungs once daily.        Follow-up in about 1 week (around 12/13/2018).    Discussed with patient above for education the following:      Patient Instructions   Exam not bad, xray with very small lungs and increasd markings last yr so.    Check blood again.  If blood test suggest blood clot- need urgent ct chest to screen for blood clot.  Would do regular ct chest if no blood clot concern.     Otherwise need heart and breathing test.      Inhaler no help.  Try trelegy once daily

## 2018-12-11 ENCOUNTER — PATIENT OUTREACH (OUTPATIENT)
Dept: ADMINISTRATIVE | Facility: HOSPITAL | Age: 73
End: 2018-12-11

## 2018-12-12 ENCOUNTER — TELEPHONE (OUTPATIENT)
Dept: FAMILY MEDICINE | Facility: CLINIC | Age: 73
End: 2018-12-12

## 2018-12-13 ENCOUNTER — OFFICE VISIT (OUTPATIENT)
Dept: PULMONOLOGY | Facility: CLINIC | Age: 73
End: 2018-12-13
Payer: MEDICARE

## 2018-12-13 ENCOUNTER — PATIENT MESSAGE (OUTPATIENT)
Dept: FAMILY MEDICINE | Facility: CLINIC | Age: 73
End: 2018-12-13

## 2018-12-13 VITALS
DIASTOLIC BLOOD PRESSURE: 75 MMHG | HEIGHT: 69 IN | SYSTOLIC BLOOD PRESSURE: 123 MMHG | HEART RATE: 57 BPM | WEIGHT: 194.88 LBS | OXYGEN SATURATION: 93 % | BODY MASS INDEX: 28.87 KG/M2

## 2018-12-13 DIAGNOSIS — E11.42 TYPE 2 DIABETES MELLITUS WITH DIABETIC POLYNEUROPATHY, WITHOUT LONG-TERM CURRENT USE OF INSULIN: ICD-10-CM

## 2018-12-13 DIAGNOSIS — R06.02 SHORTNESS OF BREATH: ICD-10-CM

## 2018-12-13 DIAGNOSIS — J84.9 INTERSTITIAL LUNG DISEASE: Primary | ICD-10-CM

## 2018-12-13 PROCEDURE — 3074F SYST BP LT 130 MM HG: CPT | Mod: CPTII,S$GLB,, | Performed by: NURSE PRACTITIONER

## 2018-12-13 PROCEDURE — 3045F PR MOST RECENT HEMOGLOBIN A1C LEVEL 7.0-9.0%: CPT | Mod: CPTII,S$GLB,, | Performed by: NURSE PRACTITIONER

## 2018-12-13 PROCEDURE — 99999 PR PBB SHADOW E&M-EST. PATIENT-LVL IV: CPT | Mod: PBBFAC,HCWC,, | Performed by: NURSE PRACTITIONER

## 2018-12-13 PROCEDURE — 3078F DIAST BP <80 MM HG: CPT | Mod: CPTII,S$GLB,, | Performed by: NURSE PRACTITIONER

## 2018-12-13 PROCEDURE — 1101F PT FALLS ASSESS-DOCD LE1/YR: CPT | Mod: CPTII,S$GLB,, | Performed by: NURSE PRACTITIONER

## 2018-12-13 PROCEDURE — 99214 OFFICE O/P EST MOD 30 MIN: CPT | Mod: S$GLB,,, | Performed by: NURSE PRACTITIONER

## 2018-12-13 RX ORDER — PREDNISONE 10 MG/1
TABLET ORAL
Qty: 12 TABLET | Refills: 0 | Status: SHIPPED | OUTPATIENT
Start: 2018-12-13 | End: 2018-12-17 | Stop reason: SDUPTHER

## 2018-12-13 NOTE — PATIENT INSTRUCTIONS
Prednisone  Take 2 pills if blood sugar stays below 250, if above take 1 pill a day for 3 days. Repeat if needed for Shortness of breath    Monitor blood sugar levels closely while on prednisone. If Level is high go to emergency room as you did before.  Take all Diabetic medications as prescribed while taking prednisone.    Have blood work drawn before next appointment at hospital.    Have CT of chest before next appointment    Will Repeat 6 minute walk in 3 months.    Use Albuterol rescue inhaler 2 puffs every 4 hours when short of breath

## 2018-12-13 NOTE — PROGRESS NOTES
12/13/2018    Steve June Jr.  Office Note    Chief Complaint   Patient presents with    Cough    Shortness of Breath    Wheezing       HPI: 12/13/18- States no benefit from Trelegy. SOB with any level of exertion including talking. Feels like can not catch breath, relieved by rest. States not using Albuterol inhaler.      12/6/18- off smokes 30 yrs after ppd for 20yrs.  Miserable sob last 3 wks.  Onset not recalled but noted.  Pt  54 yr, does chore bout house  With no yd wk.  Pt notes marrero activity.  No cough.  No near death sensation. No chest pain.  Appetite good.     Exam not bad, xray with very small lungs and increasd markings last yr so.     Check blood again.  If blood test suggest blood clot- need urgent ct chest to screen for blood clot.  Would do regular ct chest if no blood clot concern.      Otherwise need heart and breathing test.       Inhaler no help.  Try trelegy once daily       Pt was in ER recently with below hpi:Time seen by provider: 10:56 AM on 11/24/2018     Steve June Jr. is a 73 y.o. male with DMII, HTN GERD, cirrhosis and anemia who presents to the ED with an onset of worsening SOB with associated cough. He was seen in his PCP's office over a month ago on 10/14 by CORNEL Arevalo for the same complaints and was prescribed a 6-day tapering course of Azithromycin 250 mg for acute bacterial bronchitis. The patient states that he did not take the antibiotics. His SOB is worsened with exertion and worse with laying flat. He reports being propped up with 1 pillow at nighttime. The patient has endorsed chills for the past couple of weeks. He was noted to have blood in his urine 1 week ago and has had none since. The patient denies prior similar symptoms, being on home Oxygen, being diagnosed with CHF or recent PNA, history of cardiac, thyroid or prostate problems, fevers, chest pain or any other symptoms at this time. No cardiac SHx noted. Doxycycline drug allergy noted.    The history is provided by the patient and the spouse (wife).       ED Course as of Nov 26 1117   Sat Nov 24, 2018   1234 73-year-old male past medical history of diabetes, hypertension, cirrhosis and anemia presents today with shortness of breath. Patient reports worsening shortness of breath times 10 days.  Dyspnea on exertion.  Reports intermittent cough.  Denies fevers but subjective chills. The exam remarkable for chronically ill-appearing otherwise with some coarse breath sounds on the right.  [BD]   1330 Chest x-ray with diffuse interstitial opacities.  Given symptoms of cough, chills and worsening shortness of breath will treat for pneumonia.  Per prior note patient was given antibiotics for pneumonia last month but patient reports he did not take them.  Labs with mild hyperglycemia will give 5 units of insulin and some fluids.  Patient with known thrombocytopenia being worked up as outpatient improved from prior labs.  Patient told of x-ray findings and will follow PCP closely for further outpatient management.  Patient has follow-up with liver doctor for cirrhosis this month.  Patient and family member understand and agree with discharge instructions and strict return precautions         The chief compliant  problem is new to me  PFSH:  Past Medical History:   Diagnosis Date    Abnormal thyroid function test     Allergy     Seasonal    Anemia     Anemia due to blood loss 7/2/2014    Arthritis     Gaviria esophagus     Basal cell carcinoma     right forearm    Basal cell carcinoma 12/2011    lower post neck    Cancer     skin CA    Cataract     Cirrhosis     Diabetes mellitus     Diabetes mellitus, type 2     Encounter for blood transfusion     Esophageal varices in cirrhosis     grade II on 7/12 EGD    Gastritis     on 7/12 EGD    GERD (gastroesophageal reflux disease) 2/28/2015    Hard of hearing     Hiatal hernia     History of hepatitis C 8/10/2012    tx with harvoni x 41 days  (started 10/22/15). SVR4     Hoarseness 2/28/2015    Hypercholesteremia     Hypersplenism     Hypertension     No meds    Pain management 12/10/2014    Petechial hemorrhage 11/25/2015    Lower extremities bilat     Portal hypertensive gastropathy     on 7/12 EGD    Thrombocytopenia     Type II or unspecified type diabetes mellitus with neurological manifestations, uncontrolled(250.62) 12/24/2013    Valvular heart disease     mild MR 12         Past Surgical History:   Procedure Laterality Date    ABLATION, RADIOFREQUENCY-left suprascapula Left 8/25/2017    [facility]    CATARACT EXTRACTION  1/10/13    left eye    CATARACT EXTRACTION      right eye    CHOLECYSTECTOMY      COLONOSCOPY      diagnostic block of the genicular branches to the left knee Left 12/15/2017    [facility] at [facility] OR    EGD (ESOPHAGOGASTRODUODENOSCOPY) N/A 3/13/2014    Performed by Kiara Leach MD at [facility] ENDO (4TH FLR)    EGD (ESOPHAGOGASTRODUODENOSCOPY) N/A 7/11/2013    Performed by Campos Walters MD at [facility] ENDO (4TH FLR)    ESOPHAGOGASTRODUODENOSCOPY (EGD) N/A 8/1/2014    Performed by Juan David Booker MD at [facility] ENDO (4TH FLR)    ESOPHAGOGASTRODUODENOSCOPY (EGD) N/A 7/3/2014    Performed by Juan David Booker [facility] at [facility] ENDO (2ND FLR)    EYE SURGERY      Cataract surgery to right eye    INSERTION, IOL PROSTHESIS Left 1/10/2013    Per[facility] by Domi Baker [facility] at [facility] OR 1ST FLR    KNEE ARTHROSCOPY W/ MENISCAL REPAIR      KNEE CARTILAGE SURGERY      left knee    KNEE SURGERY  12/2006    left    LARYNGOSCOPY Bilateral 12/5/2014    Performed by Anoop Bernstein [facility] at [facility] OR 2ND FLR    PHACOEMULSIFICATION, CATARACT Left 1/10/2013    [facility] at [facility] OR 1ST FLR    PHACOEMULSIFICATION, CATARACT Right 9/27/2012    [facility]da [facility]gado [facility] at [facility] OR    SKIN LESION EXCISION      TONSILLECTOMY       "UPPER GASTROINTESTINAL ENDOSCOPY       Social History     Tobacco Use    Smoking status: Former Smoker     Packs/day: 1.00     Years: 25.00     Pack years: 25.00     Last attempt to quit: 2000     Years since quittin.3    Smokeless tobacco: Never Used   Substance Use Topics    Alcohol use: Yes     Comment: rarely    Drug use: No     Family History   Problem Relation Age of Onset    Leukemia Mother     Cancer Mother         bone    Alcohol abuse Father     Cirrhosis Father         EtOH induced    No Known Problems Daughter     No Known Problems Son     No Known Problems Daughter     No Known Problems Daughter     No Known Problems Daughter     No Known Problems Sister     Amblyopia Neg Hx     Blindness Neg Hx     Cataracts Neg Hx     Diabetes Neg Hx     Glaucoma Neg Hx     Hypertension Neg Hx     Macular degeneration Neg Hx     Retinal detachment Neg Hx     Strabismus Neg Hx     Stroke Neg Hx     Thyroid disease Neg Hx     Psoriasis Neg Hx     Lupus Neg Hx     Eczema Neg Hx     Acne Neg Hx     Melanoma Neg Hx      Review of patient's allergies indicates:   Allergen Reactions    Adhesive tape-silicones Other (See Comments)     pulls skin off    Doxycycline      Dizzy. "Just didn't feel right".     I have reviewed past medical, family, and social history. I have reviewed previous nurse notes.    Performance Status:The patient's activity level is housebound activities.          Review of Systems   Constitutional: Negative for activity change, appetite change, chills, diaphoresis,  fever and unexpected weight change. Positive fatigue,  HENT: Negative for dental problem, postnasal drip, rhinorrhea, sinus pressure, sinus pain, sneezing, sore throat, trouble swallowing and voice change.    Respiratory: Negative for apnea, cough, chest tightness, , wheezing and stridor.  Positive for shortness of breath  Cardiovascular: Negative for chest pain, palpitations and leg swelling. " "  Gastrointestinal: Negative for abdominal distention, abdominal pain, constipation and nausea.   Musculoskeletal: Negative for gait problem, myalgias and neck pain.   Skin: Negative for color change and pallor.   Allergic/Immunologic: Negative for environmental allergies and food allergies.   Neurological: Negative for dizziness, speech difficulty, weakness, light-headedness, numbness and headaches.   Hematological: Negative for adenopathy. Does not bruise/bleed easily.   Psychiatric/Behavioral: Negative for dysphoric mood and sleep disturbance. The patient is not nervous/anxious.           Exam:Comprehensive exam done. /75 (BP Location: Left arm, Patient Position: Sitting)   Pulse (!) 57   Ht 5' 9" (1.753 m)   Wt 88.4 kg (194 lb 14.2 oz)   SpO2 (!) 93% Comment: on room air  BMI 28.78 kg/m²   Exam included Vitals as listed  Constitutional: He is oriented to person, place, and time. He appears well-developed. No distress.   Nose: Nose normal.   Mouth/Throat: Uvula is midline, oropharynx is clear and moist and mucous membranes are normal. No dental caries. No oropharyngeal exudate, posterior oropharyngeal edema, posterior oropharyngeal erythema or tonsillar abscesses.  Mallapatti (M) score 3  Eyes: Pupils are equal, round, and reactive to light.   Neck: No JVD present. No thyromegaly present.   Cardiovascular: Normal rate, regular rhythm and normal heart sounds. Exam reveals no gallop and no friction rub.   No murmur heard.  Pulmonary/Chest: Effort normal and breath sounds abnormal: bilateral rales, worse on right lower lung field. No accessory muscle usage or stridor. No apnea and no tachypnea. No respiratory distress, decreased breath sounds, wheezes, rhonchi, or tenderness.   Abdominal: Soft. He exhibits no mass. There is no tenderness. No hepatosplenomegaly, and normoactive bowel sounds. Positive for umbilical hernia  Musculoskeletal: Normal range of motion. exhibits no edema.   Lymphadenopathy:     He " has no cervical adenopathy.     He has no axillary adenopathy.   Neurological:  alert and oriented to person, place, and time. not disoriented.   Skin: Skin is warm and dry. Capillary refill takes less 2 sec. No cyanosis or erythema. No pallor. Nails show clubbing.   Psychiatric: normal mood and affect. behavior is normal. Judgment and thought content normal.       Radiographs (ct chest and cxr) reviewed: results reviewed progressively sm lung last 2 yrs, increased markings on cxr.     XR CHEST PA AND LATERAL   Electronically signed by: Crispin Vences MD  Date: 12/05/2018   Little change relative to October 15, 2018 and November 24, 2018.  Coarse chronic appearing areas of likely chronic interstitial infiltrates are evident bilaterally.  There is volume loss on the right.       Labs reviewed   Lab Results   Component Value Date    WBC 3.70 (L) 11/24/2018    RBC 4.61 11/24/2018    HGB 12.6 (L) 11/24/2018    HCT 37.8 (L) 11/24/2018    MCV 82 11/24/2018    MCH 27.4 11/24/2018    MCHC 33.4 11/24/2018    RDW 16.2 (H) 11/24/2018    PLT 45 (L) 11/24/2018    MPV 8.0 (L) 11/24/2018    GRAN 2.8 11/24/2018    GRAN 75.4 (H) 11/24/2018    LYMPH 0.5 (L) 11/24/2018    LYMPH 12.2 (L) 11/24/2018    MONO 0.3 11/24/2018    MONO 7.1 11/24/2018    EOS 0.1 11/24/2018    BASO 0.10 11/24/2018    EOSINOPHIL 3.7 11/24/2018    BASOPHIL 1.6 11/24/2018       PFT results reviewed  Pulmonary Functions Testing Results:  spirometry bronchodilator, lung volume by gas dilution, diffusion capacity with done December 6, 2018.  The FEV1 FVC ratio 77%.  There is no airflow obstruction.  Both the vital capacity FEV1 reduced to about 56% of predicted.  There was no improvement   following bronchodilator.  Total lung capacity was only 46% of predicted.  There is no air trapping measured by gas dilution.  Maximal voluntary ventilation was well preserved.  Diffusion was decreased but did improved to 66% predicted when corrected for    lung volume.   Patient  has restrictive process.    Diffusion is low.  Clinical correlation recommended.     6 min walk study was done December 6, 2018.  Baseline room air saturation was 94%.  Patient's O2 sats fell to 89% by 3 min of walking.  O2 sat however did not fall any lower.  On room air O2 sat remained 89 and low 90s throughout the remainder of the   test.  Patient walked 170 m or 33% of the reference distance.       Plan:  Clinical impression is resonably certain and repeated evaluation prn +/- follow up will be needed as below.    Steve was seen today for cough, shortness of breath and wheezing.    Diagnoses and all orders for this visit:    Interstitial lung disease  -     CT Chest Without Contrast; Future  -     FRANCISCO; Future  -     RHEUMATOID FACTOR; Future  -     predniSONE (DELTASONE) 10 MG tablet; Take 2 pills a day for three days, if blood sugar increases cut to 10 mg a day    Shortness of breath  -     Six Minute Walk Test to qualify for Home Oxygen; Future    Type 2 diabetes mellitus with diabetic polyneuropathy, without long-term current use of insulin       Total face-to-face time with the patient was 40 minutes and greater than 50% was spent in counseling and coordination of care. The above assessment and plan have been discussed at length. Labs and tests reviewed. Problem List updated. Asked to bring in all active medications / pills bottles with next visit.    Follow-up in about 4 weeks (around 1/10/2019), or if symptoms worsen or fail to improve.    Discussed with patient above for education the following:      Patient Instructions   Prednisone  Take 2 pills if blood sugar stays below 250, if above take 1 pill a day for 3 days. Repeat if needed for Shortness of breath    Monitor blood sugar levels closely while on prednisone. If Level is high go to emergency room as you did before.  Take all Diabetic medications as prescribed while taking prednisone.    Have blood work drawn before next appointment at  \[facility]\"".    Have CT of chest before next appointment    Will Repeat 6 minute walk in 3 months.    Use Albuterol rescue inhaler 2 puffs every 4 hours when short of breath

## 2018-12-14 ENCOUNTER — APPOINTMENT (OUTPATIENT)
Dept: LAB | Facility: HOSPITAL | Age: 73
End: 2018-12-14
Attending: INTERNAL MEDICINE
Payer: MEDICARE

## 2018-12-17 DIAGNOSIS — J84.9 INTERSTITIAL LUNG DISEASE: ICD-10-CM

## 2018-12-17 DIAGNOSIS — R06.02 SHORTNESS OF BREATH: Primary | ICD-10-CM

## 2018-12-17 RX ORDER — PREDNISONE 10 MG/1
TABLET ORAL
Qty: 12 TABLET | Refills: 0 | Status: SHIPPED | OUTPATIENT
Start: 2018-12-17 | End: 2019-01-24

## 2018-12-17 NOTE — TELEPHONE ENCOUNTER
PT NOTIFIED     ----- Message from Edna Olvera NP sent at 12/17/2018 11:10 AM CST -----  Call with instruction to go to Hospital for CTA of chest no need for appointment

## 2018-12-18 ENCOUNTER — HOSPITAL ENCOUNTER (OUTPATIENT)
Dept: RADIOLOGY | Facility: HOSPITAL | Age: 73
Discharge: HOME | End: 2018-12-18
Attending: NURSE PRACTITIONER
Payer: MEDICARE

## 2018-12-18 DIAGNOSIS — R06.02 SHORTNESS OF BREATH: ICD-10-CM

## 2018-12-18 DIAGNOSIS — J84.9 INTERSTITIAL LUNG DISEASE: Primary | ICD-10-CM

## 2018-12-18 PROCEDURE — 71275 CT ANGIOGRAPHY CHEST: CPT | Mod: 26,,, | Performed by: RADIOLOGY

## 2018-12-18 PROCEDURE — 71275 CT ANGIOGRAPHY CHEST: CPT | Mod: TC

## 2018-12-18 PROCEDURE — 25500020 PHARM REV CODE 255

## 2018-12-18 RX ORDER — PREDNISONE 10 MG/1
10 TABLET ORAL 2 TIMES DAILY
Qty: 60 TABLET | Refills: 0 | Status: SHIPPED | OUTPATIENT
Start: 2018-12-18 | End: 2019-01-24

## 2018-12-18 RX ADMIN — IOHEXOL 100 ML: 350 INJECTION, SOLUTION INTRAVENOUS at 09:12

## 2018-12-19 ENCOUNTER — TELEPHONE (OUTPATIENT)
Dept: PULMONOLOGY | Facility: CLINIC | Age: 73
End: 2018-12-19

## 2018-12-19 NOTE — TELEPHONE ENCOUNTER
Attempted to call pt. No answer, no voice mail set up . No action taken     ----- Message from Edna Olvera NP sent at 12/18/2018  1:26 PM CST -----  Call him and set up appt for 1-2 weeks, get appt with Dr. Valenzuela, take prednisone 10 mg twice daily until next appointment.

## 2018-12-20 ENCOUNTER — TELEPHONE (OUTPATIENT)
Dept: PULMONOLOGY | Facility: CLINIC | Age: 73
End: 2018-12-20

## 2018-12-20 NOTE — TELEPHONE ENCOUNTER
Attempted to contact pt three times today.  No answer. No voicemail available. Will try again later.  ----- Message from Jason Graham sent at 12/20/2018 10:12 AM CST -----  Contact: same  Patient called in and stated his Rx for predniSONE (DELTASONE) 10 MG tablet is running his blood sugar high.  Patient stated Dr. Camarena told him to stop taking this if this happened.  Patient would like a call back to see what he should do.    Patient call back number is [phone]

## 2018-12-27 ENCOUNTER — DOCUMENTATION ONLY (OUTPATIENT)
Dept: FAMILY MEDICINE | Facility: CLINIC | Age: 73
End: 2018-12-27

## 2018-12-27 NOTE — PROGRESS NOTES
Health Maintenance Due   Topic Date Due    TETANUS VACCINE  05/13/1963    Pneumococcal Vaccine (65+ Low/Medium Risk) (1 of 2 - PCV13) 05/13/2012    Lipid Panel  07/06/2018    Influenza Vaccine  08/01/2018    Foot Exam  10/04/2018    Hemoglobin A1c  12/24/2018    Eye Exam  01/22/2019

## 2019-01-02 RX ORDER — OXYCODONE AND ACETAMINOPHEN 10; 325 MG/1; MG/1
1 TABLET ORAL EVERY 6 HOURS PRN
Qty: 120 TABLET | Refills: 0 | Status: SHIPPED | OUTPATIENT
Start: 2019-02-06 | End: 2019-03-07

## 2019-01-02 RX ORDER — OXYCODONE AND ACETAMINOPHEN 10; 325 MG/1; MG/1
1 TABLET ORAL EVERY 6 HOURS PRN
Qty: 120 TABLET | Refills: 0 | Status: SHIPPED | OUTPATIENT
Start: 2019-03-07 | End: 2019-04-03 | Stop reason: SDUPTHER

## 2019-01-02 RX ORDER — OXYCODONE AND ACETAMINOPHEN 10; 325 MG/1; MG/1
1 TABLET ORAL EVERY 6 HOURS PRN
Qty: 120 TABLET | Refills: 0 | Status: SHIPPED | OUTPATIENT
Start: 2019-01-08 | End: 2019-02-06

## 2019-01-03 ENCOUNTER — TELEPHONE (OUTPATIENT)
Dept: FAMILY MEDICINE | Facility: CLINIC | Age: 74
End: 2019-01-03

## 2019-01-03 ENCOUNTER — OFFICE VISIT (OUTPATIENT)
Dept: PAIN MEDICINE | Facility: CLINIC | Age: 74
End: 2019-01-03
Payer: MEDICARE

## 2019-01-03 VITALS
WEIGHT: 194 LBS | DIASTOLIC BLOOD PRESSURE: 70 MMHG | SYSTOLIC BLOOD PRESSURE: 117 MMHG | HEART RATE: 59 BPM | HEIGHT: 69 IN | BODY MASS INDEX: 28.73 KG/M2

## 2019-01-03 DIAGNOSIS — M47.819 FACET ARTHROPATHY: ICD-10-CM

## 2019-01-03 DIAGNOSIS — M50.30 DDD (DEGENERATIVE DISC DISEASE), CERVICAL: Primary | ICD-10-CM

## 2019-01-03 DIAGNOSIS — M17.10 PRIMARY OSTEOARTHRITIS OF KNEE, UNSPECIFIED LATERALITY: ICD-10-CM

## 2019-01-03 PROCEDURE — 1101F PT FALLS ASSESS-DOCD LE1/YR: CPT | Mod: CPTII,S$GLB,, | Performed by: PHYSICIAN ASSISTANT

## 2019-01-03 PROCEDURE — 99499 RISK ADDL DX/OHS AUDIT: ICD-10-PCS | Mod: S$GLB,,, | Performed by: PHYSICIAN ASSISTANT

## 2019-01-03 PROCEDURE — 3074F PR MOST RECENT SYSTOLIC BLOOD PRESSURE < 130 MM HG: ICD-10-PCS | Mod: CPTII,S$GLB,, | Performed by: PHYSICIAN ASSISTANT

## 2019-01-03 PROCEDURE — 99999 PR PBB SHADOW E&M-EST. PATIENT-LVL IV: CPT | Mod: PBBFAC,,, | Performed by: PHYSICIAN ASSISTANT

## 2019-01-03 PROCEDURE — 99214 OFFICE O/P EST MOD 30 MIN: CPT | Mod: S$GLB,,, | Performed by: PHYSICIAN ASSISTANT

## 2019-01-03 PROCEDURE — 99499 UNLISTED E&M SERVICE: CPT | Mod: S$GLB,,, | Performed by: PHYSICIAN ASSISTANT

## 2019-01-03 PROCEDURE — 3078F PR MOST RECENT DIASTOLIC BLOOD PRESSURE < 80 MM HG: ICD-10-PCS | Mod: CPTII,S$GLB,, | Performed by: PHYSICIAN ASSISTANT

## 2019-01-03 PROCEDURE — 99999 PR PBB SHADOW E&M-EST. PATIENT-LVL IV: ICD-10-PCS | Mod: PBBFAC,,, | Performed by: PHYSICIAN ASSISTANT

## 2019-01-03 PROCEDURE — 3078F DIAST BP <80 MM HG: CPT | Mod: CPTII,S$GLB,, | Performed by: PHYSICIAN ASSISTANT

## 2019-01-03 PROCEDURE — 3074F SYST BP LT 130 MM HG: CPT | Mod: CPTII,S$GLB,, | Performed by: PHYSICIAN ASSISTANT

## 2019-01-03 PROCEDURE — 1101F PR PT FALLS ASSESS DOC 0-1 FALLS W/OUT INJ PAST YR: ICD-10-PCS | Mod: CPTII,S$GLB,, | Performed by: PHYSICIAN ASSISTANT

## 2019-01-03 PROCEDURE — 99214 PR OFFICE/OUTPT VISIT, EST, LEVL IV, 30-39 MIN: ICD-10-PCS | Mod: S$GLB,,, | Performed by: PHYSICIAN ASSISTANT

## 2019-01-03 RX ORDER — GABAPENTIN 300 MG/1
300 CAPSULE ORAL 3 TIMES DAILY
Qty: 90 CAPSULE | Refills: 2 | Status: SHIPPED | OUTPATIENT
Start: 2019-01-03 | End: 2019-01-03

## 2019-01-03 RX ORDER — GABAPENTIN 600 MG/1
600 TABLET ORAL 3 TIMES DAILY
Qty: 90 TABLET | Refills: 2 | Status: SHIPPED | OUTPATIENT
Start: 2019-01-03 | End: 2019-01-24

## 2019-01-03 NOTE — PROGRESS NOTES
"Referring Physician: No ref. provider found    PCP: Primary Doctor No      CC: neck and left shoulder pain; knee pain    Interval history:  Steve June Jr. is a 73 y.o. male with  neck and left shoulder pain who presents today for f/u and medication refill. He is taking Gabapentin 600 mg TID  for shingles.  Continues to have moderate pain that is improving. Chronic pain is unchanged. He has tried physical therapy which did not provide much benefit.   Injections performed have only given him shortlived relief.  He underwent visco supplementation injections for his left knee without much benefit.  He continues to have neck pain. He has a history of cervical DDD.  However, he continues to have low platelets and we are unable to provide any  interventional procedures.  He takes oxycodone 10 mg every 6 hours as needed with mild to moderate benefit.  Does cause some drowsiness. Pain today is rated 6/10.    Prior HPI:   Steve June Jr. is a 73 y.o. male referred to us for lower back and knee pain.  He has significant history of pancytopenia, cirrhosis.  He presents with constant aching, sharp pain in his lower back as well as his left knee.  Pain worsens sitting, standing, bending, walking.  Pain improves with rest.  X-rays performed of the lumbar spine as well as knee shows left knee osteoarthritis.  He has tried physical therapy with minimal benefit.  He currently takes Norco 10 mg every 6 hours as needed with mild to moderate benefit.  He is unable to have any procedures due to his thrombocytopenia.  He also has ventral hernia, but surgical attention is currently not recommended due to his thrombocytopenia.  His main concern today is wishing to decrease his opioid medications.  He states being very "foggy" with his current medications.  He denies any increased sedation.  He denies any weakness.  No bowel bladder changes.    ROS:  CONSTITUTIONAL: No fevers, chills, night sweats, wt. loss, appetite " changes  SKIN: no rashes or itching  ENT: No headaches, head trauma, vision changes, or eye pain  LYMPH NODES: None noticed   CV: No chest pain, palpitations.   RESP: No shortness of breath, dyspnea on exertion, cough, wheezing, or hemoptysis  GI: No nausea, emesis, diarrhea, constipation, melena, hematochezia, pain.    : No dysuria, hematuria, urgency, or frequency   HEME: No easy bruising, bleeding problems  PSYCHIATRIC: No depression, anxiety, psychosis, hallucinations.  NEURO: No seizures, memory loss, dizziness or difficulty sleeping  MSK: + History of present illness      Past Medical History:   Diagnosis Date    Abnormal thyroid function test     Allergy     Seasonal    Anemia     Anemia due to blood loss 7/2/2014    Arthritis     Gaviria esophagus     Basal cell carcinoma     right forearm    Basal cell carcinoma 12/2011    lower post neck    Cancer     skin CA    Cataract     Cirrhosis     Diabetes mellitus     Diabetes mellitus, type 2     Encounter for blood transfusion     Esophageal varices in cirrhosis     grade II on 7/12 EGD    Gastritis     on 7/12 EGD    GERD (gastroesophageal reflux disease) 2/28/2015    Hard of hearing     Hiatal hernia     History of hepatitis C 8/10/2012    tx with harvoni x 41 days (started 10/22/15). SVR4     Hoarseness 2/28/2015    Hypercholesteremia     Hypersplenism     Hypertension     No meds    Pain management 12/10/2014    Petechial hemorrhage 11/25/2015    Lower extremities bilat     Portal hypertensive gastropathy     on 7/12 EGD    Thrombocytopenia     Type II or unspecified type diabetes mellitus with neurological manifestations, uncontrolled(250.62) 12/24/2013    Valvular heart disease     mild MR 12     Past Surgical History:   Procedure Laterality Date    ABLATION, RADIOFREQUENCY-left suprascapula Left 8/25/2017    [facility]    CATARACT EXTRACTION  1/10/13    left eye    CATARACT  EXTRACTION      right eye    CHOLECYSTECTOMY      COLONOSCOPY      diagnostic block of the genicular branches to the left knee Left 12/15/2017    [facility]    EGD (ESOPHAGOGASTRODUODENOSCOPY) N/A 3/13/2014    [facility]formed by [facility] at [facility] ENDO (4TH FLR)    EGD (ESOPHAGOGASTRODUODENOSCOPY) N/A 7/11/2013    Performed by Campos Walters [facility] at [facility] ENDO (4TH FLR)    ESOPHAGOGASTRODUODENOSCOPY (EGD) N/A 8/1/2014    Performed by Juan David Booker [facility] at [facility] ENDO (4TH FLR)    ESOPHAGOGASTRODUODENOSCOPY (EGD) N/A 7/3/2014    Performed by Juan [facility] Booker [facility] at [facility] ENDO (2ND FLR)    EYE SURGERY      Cataract surgery to right eye    INSERTION, IOL PROSTHESIS Left 1/10/2013    Performed by Domi Baker [facility] at [facility] OR 1ST FLR    KNEE ARTHROSCOPY W/ MENISCAL REPAIR      KNEE CARTILAGE SURGERY      left knee    KNEE SURGERY  12/2006    left    LARYNGOSCOPY Bilateral 12/5/2014    Performed by Anoop Bernstein [facility] at [facility] OR 2ND FLR    PHACOEMULSIFICATION, CATARACT Left 1/10/2013    Performed by [facility] at [facility] OR 1ST FLR    PHACOEMULSIFICATION, CATARACT Right 9/27/2012    [facility]    SKIN LESION EXCISION      TONSILLECTOMY      UPPER GASTROINTESTINAL ENDOSCOPY       Family History   Problem Relation Age of Onset    Leukemia Mother     Cancer Mother         bone    Alcohol abuse Father     Cirrhosis Father         EtOH induced    No Known Problems Daughter     No Known Problems Son     No Known Problems Daughter     No Known Problems Daughter     No Known Problems Daughter     No Known Problems Sister     Amblyopia Neg Hx     Blindness Neg Hx     Cataracts Neg Hx     Diabetes Neg Hx     Glaucoma Neg Hx     Hypertension Neg Hx     Macular degeneration Neg Hx     Retinal detachment Neg Hx     Strabismus Neg Hx     Stroke Neg Hx     Thyroid disease Neg Hx     Psoriasis Neg Hx   "   Lupus Neg Hx     Eczema Neg Hx     Acne Neg Hx     Melanoma Neg Hx      Social History     Socioeconomic History    Marital status:      Spouse name: None    Number of children: 5    Years of education: None    Highest education level: None   Social Needs    Financial resource strain: None    Food insecurity - worry: None    Food insecurity - inability: None    Transportation needs - medical: None    Transportation needs - non-medical: None   Occupational History    None   Tobacco Use    Smoking status: Former Smoker     Packs/day: 1.00     Years: 25.00     Pack years: 25.00     Last attempt to quit: 2000     Years since quittin.4    Smokeless tobacco: Never Used   Substance and Sexual Activity    Alcohol use: Yes     Comment: rarely    Drug use: No    Sexual activity: No   Other Topics Concern    None   Social History Narrative    He is .  He has children.  He is currently retired.         Medications/Allergies: See med card    Vitals:    19 0858   BP: 117/70   Pulse: (!) 59   Weight: 88 kg (194 lb)   Height: 5' 9" (1.753 m)   PainSc:   6   PainLoc: Back     Physical exam:    GENERAL: A and O x3, the patient appears well groomed and is in no acute distress.  Skin: No rashes or obvious lesions  HEENT: normocephalic, atraumatic  CARDIOVASCULAR:  Bradycardia  LUNGS: non labored breathing  ABDOMEN: soft, nontender, + sizaeable ventral hernia in epigastric region.    UPPER EXTREMITIES: Normal alignment, normal range of motion, no atrophy, no skin changes,  hair growth and nail growth normal and equal bilaterally. No swelling, no tenderness.    LOWER EXTREMITIES:  Normal alignment, normal range of motion, no atrophy, no skin changes,  hair growth and nail growth normal and equal bilaterally. No swelling, no tenderness.    LUMBAR SPINE  Lumbar spine: ROM is full with flexion extension and oblique extension with moderate increased pain.    Erasmo's test causes no " increased pain on either side.    Supine straight leg raise is negative bilaterally.    Internal and external rotation of the hip causes no increased pain on either side.  Myofascial exam: No tenderness to palpation across lumbar paraspinous muscles.      MENTAL STATUS: normal orientation, speech, language, and fund of knowledge for social situation.  Emotional state appropriate.    CRANIAL NERVES:  II:  PERRL bilaterally,   III,IV,VI: EOMI.    V:  Facial sensation equal bilaterally  VII:  Facial motor function normal.  VIII:  Hearing equal to finger rub bilaterally  IX/X: Gag normal, palate symmetric  XI:  Shoulder shrug equal, head turn equal  XII:  Tongue midline without fasciculations      MOTOR: Tone and bulk: normal bilateral upper and lower Strength: normal   Delt Bi Tri WE WF     R 5 5 5 5 5 5   L 5 5 5 5 5 5     IP ADD ABD Quad TA Gas HAM  R 5 5 5 5 5 5 5  L 5 5 5 5 5 5 5    SENSATION: Light touch and pinprick intact bilaterally  REFLEXES: normal, symmetric, nonbrisk.  Toes down, no clonus. No hoffmans.  GAIT: normal rise, base, steps, and arm swing.      Imaging:  Xray L-spine 4/2013   Alignment is otherwise normal.  Vertebral body heights and disc space heights are relatively well maintained.  Vertebral end plate osteophytes are present anteriorly   at multiple levels.  The facet joints and posterior elements are unremarkable       Xray Knee 12/2013  Degenerative change of the knees, left greater than right.    Assessment:  Steve June Jr. is a 73 y.o. male with neck, back and left knee pain  1. DDD (degenerative disc disease), cervical    2. Facet arthropathy    3. Primary osteoarthritis of knee, unspecified laterality      Plan:  1. I have stressed the importance of physical activity and exercise to improve overall health.  Continue PT exercises learned at home.  2.  Any interventional procedures will be deferred due to his low platelet count.  Patient expressed understanding.  3. Continue  evaluation with PM&R, Dr. Silva  4. Percocet 10mg q 8 hrs as needed.  Hold for sedation.  Previous UDS consistent.  5. Gabapentin 600 mg TID. #90 2 refills.   6.  Follow-up 3 months  All medication management was performed by Dr. Kapil Mario

## 2019-01-03 NOTE — TELEPHONE ENCOUNTER
Spoke to pt. Pt requested sooner appt to est care with PCP. Scheduled pt appt. Pt verbalized understanding and confirmed time/date of appt.         ----- Message from Corey Lo sent at 1/3/2019 11:29 AM CST -----  Contact: Patient  Type: Needs Sooner Appointment    Who Called:  Patient  Best Call Back Number: [phone]  Additional Information: Patient is looking to establish care and new PCP. First available appointment is 6/28/19. Please advise if patient can be seen sooner. Thank you

## 2019-01-04 ENCOUNTER — PATIENT MESSAGE (OUTPATIENT)
Dept: FAMILY MEDICINE | Facility: CLINIC | Age: 74
End: 2019-01-04

## 2019-01-07 ENCOUNTER — HOSPITAL ENCOUNTER (OUTPATIENT)
Dept: RADIOLOGY | Facility: HOSPITAL | Age: 74
Discharge: HOME | End: 2019-01-07
Attending: NURSE PRACTITIONER
Payer: MEDICARE

## 2019-01-07 ENCOUNTER — TELEPHONE (OUTPATIENT)
Dept: PULMONOLOGY | Facility: CLINIC | Age: 74
End: 2019-01-07

## 2019-01-07 DIAGNOSIS — J84.9 INTERSTITIAL LUNG DISEASE: ICD-10-CM

## 2019-01-07 PROCEDURE — 71250 CT THORAX DX C-: CPT | Mod: TC

## 2019-01-07 PROCEDURE — 71250 CT CHEST WITHOUT CONTRAST: ICD-10-PCS | Mod: 26,,, | Performed by: RADIOLOGY

## 2019-01-07 PROCEDURE — 71250 CT THORAX DX C-: CPT | Mod: 26,,, | Performed by: RADIOLOGY

## 2019-01-07 NOTE — TELEPHONE ENCOUNTER
contacted pt. Advised we do not have CT results yet. Stated someone called him and left a message with the wrong call back number.  Pt thought our office called him advised our office did not call him. Pt verbalized understanding no further action needed.     ----- Message from Anne May sent at 1/7/2019  3:14 PM CST -----  Contact: self  Type:  Patient Returning Call    Who Called:  Patient   Who Left Message for Patient:  Nurse   Does the patient know what this is regarding?:   Best Call Back Number:  [phone] (home)     Additional Information:

## 2019-01-08 ENCOUNTER — DOCUMENTATION ONLY (OUTPATIENT)
Dept: FAMILY MEDICINE | Facility: CLINIC | Age: 74
End: 2019-01-08

## 2019-01-08 ENCOUNTER — PATIENT MESSAGE (OUTPATIENT)
Dept: FAMILY MEDICINE | Facility: CLINIC | Age: 74
End: 2019-01-08

## 2019-01-10 ENCOUNTER — PATIENT OUTREACH (OUTPATIENT)
Dept: ADMINISTRATIVE | Facility: HOSPITAL | Age: 74
End: 2019-01-10

## 2019-01-14 ENCOUNTER — OFFICE VISIT (OUTPATIENT)
Dept: PULMONOLOGY | Facility: CLINIC | Age: 74
End: 2019-01-14
Payer: MEDICARE

## 2019-01-14 ENCOUNTER — LAB VISIT (OUTPATIENT)
Dept: LAB | Facility: HOSPITAL | Age: 74
End: 2019-01-14
Attending: NURSE PRACTITIONER
Payer: MEDICARE

## 2019-01-14 VITALS
WEIGHT: 194 LBS | DIASTOLIC BLOOD PRESSURE: 72 MMHG | OXYGEN SATURATION: 94 % | SYSTOLIC BLOOD PRESSURE: 139 MMHG | HEIGHT: 69 IN | BODY MASS INDEX: 28.73 KG/M2 | HEART RATE: 55 BPM

## 2019-01-14 DIAGNOSIS — J84.10 PULMONARY FIBROSIS DETERMINED BY HIGH RESOLUTION COMPUTED TOMOGRAPHY: ICD-10-CM

## 2019-01-14 DIAGNOSIS — J40 BRONCHITIS: ICD-10-CM

## 2019-01-14 DIAGNOSIS — J44.9 CHRONIC OBSTRUCTIVE PULMONARY DISEASE, UNSPECIFIED COPD TYPE: ICD-10-CM

## 2019-01-14 DIAGNOSIS — J84.112 IDIOPATHIC PULMONARY FIBROSIS: Primary | ICD-10-CM

## 2019-01-14 DIAGNOSIS — K74.60 CIRRHOSIS OF LIVER WITHOUT ASCITES, UNSPECIFIED HEPATIC CIRRHOSIS TYPE: ICD-10-CM

## 2019-01-14 DIAGNOSIS — R06.09 DYSPNEA ON EXERTION: ICD-10-CM

## 2019-01-14 DIAGNOSIS — J84.9 INTERSTITIAL LUNG DISEASE: ICD-10-CM

## 2019-01-14 DIAGNOSIS — J98.9 CHRONIC RESPIRATORY DISEASE: ICD-10-CM

## 2019-01-14 LAB
ALBUMIN SERPL BCP-MCNC: 3.9 G/DL
ALP SERPL-CCNC: 80 U/L
ALT SERPL W/O P-5'-P-CCNC: 12 U/L
ANION GAP SERPL CALC-SCNC: 11 MMOL/L
AST SERPL-CCNC: 17 U/L
BILIRUB SERPL-MCNC: 1.1 MG/DL
BUN SERPL-MCNC: 19 MG/DL
CALCIUM SERPL-MCNC: 9.5 MG/DL
CHLORIDE SERPL-SCNC: 99 MMOL/L
CO2 SERPL-SCNC: 24 MMOL/L
CREAT SERPL-MCNC: 1 MG/DL
EST. GFR  (AFRICAN AMERICAN): >60 ML/MIN/1.73 M^2
EST. GFR  (NON AFRICAN AMERICAN): >60 ML/MIN/1.73 M^2
GLUCOSE SERPL-MCNC: 373 MG/DL
POTASSIUM SERPL-SCNC: 4.2 MMOL/L
PROT SERPL-MCNC: 6.7 G/DL
SODIUM SERPL-SCNC: 134 MMOL/L

## 2019-01-14 PROCEDURE — 99499 RISK ADDL DX/OHS AUDIT: ICD-10-PCS | Mod: HCNC,S$GLB,, | Performed by: NURSE PRACTITIONER

## 2019-01-14 PROCEDURE — 1101F PT FALLS ASSESS-DOCD LE1/YR: CPT | Mod: CPTII,S$GLB,, | Performed by: NURSE PRACTITIONER

## 2019-01-14 PROCEDURE — 1101F PR PT FALLS ASSESS DOC 0-1 FALLS W/OUT INJ PAST YR: ICD-10-PCS | Mod: CPTII,S$GLB,, | Performed by: NURSE PRACTITIONER

## 2019-01-14 PROCEDURE — 80053 COMPREHEN METABOLIC PANEL: CPT

## 2019-01-14 PROCEDURE — 3078F DIAST BP <80 MM HG: CPT | Mod: CPTII,S$GLB,, | Performed by: NURSE PRACTITIONER

## 2019-01-14 PROCEDURE — 99214 OFFICE O/P EST MOD 30 MIN: CPT | Mod: S$GLB,,, | Performed by: NURSE PRACTITIONER

## 2019-01-14 PROCEDURE — 99214 PR OFFICE/OUTPT VISIT, EST, LEVL IV, 30-39 MIN: ICD-10-PCS | Mod: S$GLB,,, | Performed by: NURSE PRACTITIONER

## 2019-01-14 PROCEDURE — 3078F PR MOST RECENT DIASTOLIC BLOOD PRESSURE < 80 MM HG: ICD-10-PCS | Mod: CPTII,S$GLB,, | Performed by: NURSE PRACTITIONER

## 2019-01-14 PROCEDURE — 99999 PR PBB SHADOW E&M-EST. PATIENT-LVL V: CPT | Mod: PBBFAC,,, | Performed by: NURSE PRACTITIONER

## 2019-01-14 PROCEDURE — 99499 UNLISTED E&M SERVICE: CPT | Mod: HCNC,S$GLB,, | Performed by: NURSE PRACTITIONER

## 2019-01-14 PROCEDURE — 99999 PR PBB SHADOW E&M-EST. PATIENT-LVL V: ICD-10-PCS | Mod: PBBFAC,,, | Performed by: NURSE PRACTITIONER

## 2019-01-14 PROCEDURE — 36415 COLL VENOUS BLD VENIPUNCTURE: CPT

## 2019-01-14 PROCEDURE — 3075F PR MOST RECENT SYSTOLIC BLOOD PRESS GE 130-139MM HG: ICD-10-PCS | Mod: CPTII,S$GLB,, | Performed by: NURSE PRACTITIONER

## 2019-01-14 PROCEDURE — 3075F SYST BP GE 130 - 139MM HG: CPT | Mod: CPTII,S$GLB,, | Performed by: NURSE PRACTITIONER

## 2019-01-14 RX ORDER — PREDNISONE 5 MG/1
5 TABLET ORAL DAILY
Qty: 10 TABLET | Refills: 0 | Status: SHIPPED | OUTPATIENT
Start: 2019-01-14 | End: 2019-01-24

## 2019-01-14 RX ORDER — GUAIFENESIN 600 MG/1
1200 TABLET, EXTENDED RELEASE ORAL 2 TIMES DAILY
Qty: 60 TABLET | Refills: 0 | Status: SHIPPED | OUTPATIENT
Start: 2019-01-14 | End: 2019-01-24

## 2019-01-14 RX ORDER — AMOXICILLIN 875 MG/1
875 TABLET, FILM COATED ORAL EVERY 12 HOURS
Qty: 10 TABLET | Refills: 0 | Status: SHIPPED | OUTPATIENT
Start: 2019-01-14 | End: 2019-01-19

## 2019-01-14 NOTE — PROGRESS NOTES
2/4/2019    Steve June Jr.  Office Note    Chief Complaint   Patient presents with    Follow-up     1 month    Shortness of Breath    Sputum Production     green    Interstitial Lung Disease       HPI: 1/14/19- cough onset 1 weeks, worse at night, unable to sleep due to coughing. Productive mucous occasionally green in color  Post nasal drip. Six minute walk did not show desaturation with ambulating.   Resent prednisone dose for 1 day, increased BS to 400s, stopped.   Hx: cirrhosis.       12/13/18- States no benefit from Trelegy. SOB with any level of exertion including talking. Feels like can not catch breath, relieved by rest. States not using Albuterol inhaler.      12/6/18- off smokes 30 yrs after ppd for 20yrs.  Miserable sob last 3 wks.  Onset not recalled but noted.  Pt  54 yr, does chore bout house  With no yd wk.  Pt notes marrero activity.  No cough.  No near death sensation. No chest pain.  Appetite good.     Exam not bad, xray with very small lungs and increasd markings last yr so.     Check blood again.  If blood test suggest blood clot- need urgent ct chest to screen for blood clot.  Would do regular ct chest if no blood clot concern.      Otherwise need heart and breathing test.       Inhaler no help.  Try trelegy once daily       Pt was in ER recently with below hpi:Time seen by provider: 10:56 AM on 11/24/2018     Steve June JrTruman is a 73 y.o. male with DMII, HTN GERD, cirrhosis and anemia who presents to the ED with an onset of worsening SOB with associated cough. He was seen in his PCP's office over a month ago on 10/14 by CORNEL Arevalo for the same complaints and was prescribed a 6-day tapering course of Azithromycin 250 mg for acute bacterial bronchitis. The patient states that he did not take the antibiotics. His SOB is worsened with exertion and worse with laying flat. He reports being propped up with 1 pillow at nighttime. The patient has endorsed chills for the past  couple of weeks. He was noted to have blood in his urine 1 week ago and has had none since. The patient denies prior similar symptoms, being on home Oxygen, being diagnosed with CHF or recent PNA, history of cardiac, thyroid or prostate problems, fevers, chest pain or any other symptoms at this time. No cardiac SHx noted. Doxycycline drug allergy noted.   The history is provided by the patient and the spouse (wife).       ED Course as of Nov 26 1117   Sat Nov 24, 2018   1234 73-year-old male past medical history of diabetes, hypertension, cirrhosis and anemia presents today with shortness of breath. Patient reports worsening shortness of breath times 10 days.  Dyspnea on exertion.  Reports intermittent cough.  Denies fevers but subjective chills. The exam remarkable for chronically ill-appearing otherwise with some coarse breath sounds on the right.  [BD]   1330 Chest x-ray with diffuse interstitial opacities.  Given symptoms of cough, chills and worsening shortness of breath will treat for pneumonia.  Per prior note patient was given antibiotics for pneumonia last month but patient reports he did not take them.  Labs with mild hyperglycemia will give 5 units of insulin and some fluids.  Patient with known thrombocytopenia being worked up as outpatient improved from prior labs.  Patient told of x-ray findings and will follow PCP closely for further outpatient management.  Patient has follow-up with liver doctor for cirrhosis this month.  Patient and family member understand and agree with discharge instructions and strict return precautions         The chief compliant  problem varies with instablilty at time    PFSH:  Past Medical History:   Diagnosis Date    Abnormal thyroid function test     Allergy     Seasonal    Anemia     Anemia due to blood loss 7/2/2014    Arthritis     Gaviria esophagus     Basal cell carcinoma     right forearm    Basal cell carcinoma 12/2011    lower post neck    Cancer     skin  CA    Cataract     Cirrhosis     Diabetes mellitus     Diabetes mellitus, type 2     Encounter for blood transfusion     Esophageal varices in cirrhosis     grade II on 7/12 EGD    Gastritis     on 7/12 EGD    GERD (gastroesophageal reflux disease) 2/28/2015    Hard of hearing     Hiatal hernia     History of hepatitis C 8/10/2012    tx with harvoni x 41 days (started 10/22/15). SVR4     Hoarseness 2/28/2015    Hypercholesteremia     Hypersplenism     Hypertension     No meds    Pain management 12/10/2014    Petechial hemorrhage 11/25/2015    Lower extremities bilat     Portal hypertensive gastropathy     on 7/12 EGD    Thrombocytopenia     Type II or unspecified type diabetes mellitus with neurological manifestations, uncontrolled(250.62) 12/24/2013    Valvular heart disease     mild MR 12         Past Surgical History:   Procedure Laterality Date    ABLATION, RADIOFREQUENCY-left suprascapula Left 8/25/2017    [facility]    CATARACT EXTRACTION  1/10/13    left eye    CATARACT EXTRACTION      right eye    CHOLECYSTECTOMY      COLONOSCOPY      diagnostic block of the genicular branches to the left knee Left 12/15/2017    [facility] at [facility] OR    EGD (ESOPHAGOGASTRODUODENOSCOPY) N/A 3/13/2014    Performed by [facility]ach [facility] at [facility] ENDO (4TH FLR)    EGD (ESOPHAGOGASTRODUODENOSCOPY) N/A 7/11/2013    Performed by Campos Walters MD at [facility] ENDO (4TH FLR)    ESOPHAGOGASTRODUODENOSCOPY (EGD) N/A 8/1/2014    Performed by Juan David Booker MD at [facility] ENDO (4TH FLR)    ESOPHAGOGASTRODUODENOSCOPY (EGD) N/A 7/3/2014    Performed by Juan [facility] Booker [facility] at [facility] ENDO (2ND FLR)    EYE SURGERY      Cataract surgery to right eye    INSERTION, IOL PROSTHESIS Left 1/10/2013    Performed [facility] Domi Baker [facility] at [facility] OR 1ST FLR    KNEE ARTHROSCOPY W/ MENISCAL REPAIR      KNEE CARTILAGE SURGERY      left knee    KNEE SURGERY  " 2006    left    LARYNGOSCOPY Bilateral 2014    [facility]formed [facility] at [facility] 2ND FLR    PHACOEMULSIFICATION, CATARACT Left 1/10/2013    Performed by [facility] at [facility] OR 1ST FLR    PHACOEMULSIFICATION, CATARACT Right 2012    Performed by [facility]da [facility]gado [facility] at [facility] OR    SKIN LESION EXCISION      TONSILLECTOMY      UPPER GASTROINTESTINAL ENDOSCOPY       Social History     Tobacco Use    Smoking status: Former Smoker     Packs/day: 1.00     Years: 25.00     Pack years: 25.00     Last attempt to quit: 2000     Years since quittin.5    Smokeless tobacco: Never Used   Substance Use Topics    Alcohol use: Yes     Comment: rarely    Drug use: No     Family History   Problem Relation Age of Onset    Leukemia Mother     Cancer Mother         bone    Alcohol abuse Father     Cirrhosis Father         EtOH induced    No Known Problems Daughter     No Known Problems Son     No Known Problems Daughter     No Known Problems Daughter     No Known Problems Daughter     No Known Problems Sister     Amblyopia Neg Hx     Blindness Neg Hx     Cataracts Neg Hx     Diabetes Neg Hx     Glaucoma Neg Hx     Hypertension Neg Hx     Macular degeneration Neg Hx     Retinal detachment Neg Hx     Strabismus Neg Hx     Stroke Neg Hx     Thyroid disease Neg Hx     Psoriasis Neg Hx     Lupus Neg Hx     Eczema Neg Hx     Acne Neg Hx     Melanoma Neg Hx      Review of patient's allergies indicates:   Allergen Reactions    Adhesive tape-silicones Other (See Comments)     pulls skin off    Doxycycline      Dizzy. "Just didn't feel right".     I have reviewed past medical, family, and social history. I have reviewed previous nurse notes.    Performance Status:The patient's activity level is housebound activities.          Review of Systems   Constitutional: Negative for activity change, appetite change, chills, diaphoresis,  fever and unexpected weight " "change. Positive fatigue,  HENT: Negative for dental problem, rhinorrhea, sinus pressure, sinus pain, sneezing, sore throat, trouble swallowing and voice change.  Positive for postnasal drip  Respiratory: Negative for apnea  and stridor.  Positive for shortness of breath and cough, chest tightness, wheezing  Cardiovascular: Negative for chest pain, palpitations and leg swelling.   Gastrointestinal: Negative for abdominal distention, abdominal pain, constipation and nausea.   Musculoskeletal: Negative for gait problem, myalgias and neck pain.   Skin: Negative for color change and pallor.   Allergic/Immunologic: Negative for environmental allergies and food allergies.   Neurological: Negative for dizziness, speech difficulty, weakness, light-headedness, numbness and headaches.   Hematological: Negative for adenopathy. Does not bruise/bleed easily.   Psychiatric/Behavioral: Negative for dysphoric mood and sleep disturbance. The patient is not nervous/anxious.           Exam:Comprehensive exam done. /72 (BP Location: Right arm, Patient Position: Sitting)   Pulse (!) 55   Ht 5' 9" (1.753 m)   Wt 88 kg (194 lb 0.1 oz)   SpO2 (!) 94% Comment: on room air  BMI 28.65 kg/m²   Exam included Vitals as listed  Constitutional: He is oriented to person, place, and time. He appears well-developed. No distress.   Nose: Nose normal.   Mouth/Throat: Uvula is midline, oropharynx is clear and moist and mucous membranes are normal. No dental caries. No oropharyngeal exudate, posterior oropharyngeal edema, posterior oropharyngeal erythema or tonsillar abscesses.  Mallapatti (M) score 3  Eyes: Pupils are equal, round, and reactive to light.   Neck: No JVD present. No thyromegaly present.   Cardiovascular: Normal rate, regular rhythm and normal heart sounds. Exam reveals no gallop and no friction rub.   No murmur heard.  Pulmonary/Chest: Effort normal and breath sounds abnormal: bilateral rales, worse on right lower lung field. " No accessory muscle usage or stridor. No apnea and no tachypnea. No respiratory distress, decreased breath sounds, wheezes, rhonchi, or tenderness.   Abdominal: Soft. He exhibits no mass. There is no tenderness. No hepatosplenomegaly, and normoactive bowel sounds. Positive for umbilical hernia  Musculoskeletal: Normal range of motion. exhibits no edema.   Lymphadenopathy:     He has no cervical adenopathy.     He has no axillary adenopathy.   Neurological:  alert and oriented to person, place, and time. not disoriented.   Skin: Skin is warm and dry. Capillary refill takes less 2 sec. No cyanosis or erythema. No pallor. Nails show clubbing.   Psychiatric: normal mood and affect. behavior is normal. Judgment and thought content normal.       Radiographs (ct chest and cxr) reviewed: results reviewed progressively sm lung last 2 yrs, increased markings on cxr and CT Honey combing pattern seen.   CT Chest Without Contrast.     Date: 01/07/2019   FINDINGS:  There is patchy subpleural interlobular septal thickening and intervening ground-glass opacity in both lungs.  Honeycombing in the upper lobes and right lower lobe.  Some peripheral traction bronchiectasis.  No significant air trapping.  No pleural effusion or pneumothorax.  Normal sized heart with coronary artery disease and calcification of the mitral annulus.  Wall thickening distal 3rd of the esophagus.  No mediastinal adenopathy.  Spleen 20 cm in length.  Pancreas atrophy.  Nodular liver contour.  Collateral vessels in the upper abdominal quadrants.  Osteopenia     1. Some of the pulmonary ground-glass opacities have resolved.  Background mild interstitial lung disease persists, pattern favoring UIP type.  2. Sequela of cirrhosis and portal hypertension.  3. Wall thickening distal esophagus is nonspecific and could relate to reflux, esophagitis or mass.  Esophagram could add further characterization.  4. Coronary artery disease   Electronically signed by: Bert  Don       CTA CHEST NON CORONARY   12/18/2018   No evidence for pulmonary thromboembolic disease.    Pulmonary parenchymal findings most compatible with a chronic interstitial lung process with considerations including idiopathic pulmonary fibrosis/usual interstitial pneumonia and nonspecific interstitial pneumonia.    Possible superimposed acute airspace disease within the left upper lobe may represent pneumonia in the appropriate clinical setting.    Hepatic cirrhosis.  Splenomegaly.    Small hiatal hernia.  Distal esophageal thickening may represent reflux esophagitis.       XR CHEST PA AND LATERAL   Electronically signed by: Crispin Vences MD  Date: 12/05/2018   Little change relative to October 15, 2018 and November 24, 2018.  Coarse chronic appearing areas of likely chronic interstitial infiltrates are evident bilaterally.  There is volume loss on the right.       Labs reviewed   Lab Results   Component Value Date    WBC 3.21 (L) 01/24/2019    RBC 4.38 (L) 01/24/2019    HGB 12.2 (L) 01/24/2019    HCT 36.9 (L) 01/24/2019    MCV 84 01/24/2019    MCH 27.9 01/24/2019    MCHC 33.1 01/24/2019    RDW 14.5 01/24/2019    PLT 32 (LL) 01/24/2019    MPV 11.8 01/24/2019    GRAN 2.4 01/24/2019    GRAN 74.5 (H) 01/24/2019    LYMPH 0.4 (L) 01/24/2019    LYMPH 13.7 (L) 01/24/2019    MONO 0.3 01/24/2019    MONO 8.1 01/24/2019    EOS 0.1 01/24/2019    BASO 0.01 01/24/2019    EOSINOPHIL 3.1 01/24/2019    BASOPHIL 0.3 01/24/2019     Results for ARNIE HALEY JR. (MRN [id number]) as of 1/14/2019 10:15   Ref. Range 12/14/2018 10:26   FRANCISCO Screen Latest Ref Range: Negative <1:160  Negative <1:160   Rheumatoid Factor Latest Ref Range: 0.0 - 15.0 IU/mL 48.0 (H)     PFT results reviewed  Pulmonary Functions Testing Results:  spirometry bronchodilator, lung volume by gas dilution, diffusion capacity with done December 6, 2018.  The FEV1 FVC ratio 77%.  There is no airflow obstruction.  Both the vital capacity FEV1 reduced to about  56% of predicted.  There was no improvement   following bronchodilator.  Total lung capacity was only 46% of predicted.  There is no air trapping measured by gas dilution.  Maximal voluntary ventilation was well preserved.  Diffusion was decreased but did improved to 66% predicted when corrected for    lung volume.   Patient has restrictive process.    Diffusion is low.  Clinical correlation recommended.     6 min walk study was done December 6, 2018.  Baseline room air saturation was 94%.  Patient's O2 sats fell to 89% by 3 min of walking.  O2 sat however did not fall any lower.  On room air O2 sat remained 89 and low 90s throughout the remainder of the   test.  Patient walked 170 m or 33% of the reference distance.       Plan:  Clinical impression is resonably certain and repeated evaluation prn +/- follow up will be needed as below.    Steve was seen today for follow-up, shortness of breath, sputum production and interstitial lung disease.    Diagnoses and all orders for this visit:    Idiopathic pulmonary fibrosis  -     nintedanib (OFEV) 100 mg Cap; Take 100 mg by mouth 2 (two) times daily.    Pulmonary fibrosis determined by high resolution computed tomography  -     Discontinue: nintedanib (OFEV) 100 mg Cap; Take 100 mg by mouth 2 (two) times daily.  -     Comprehensive metabolic panel; Standing    Bronchitis  -     Culture, Respiratory with Gram Stain; Future  -     amoxicillin (AMOXIL) 875 MG tablet; Take 1 tablet (875 mg total) by mouth every 12 (twelve) hours. for 5 days  -     Discontinue: predniSONE (DELTASONE) 5 MG tablet; Take 1 tablet (5 mg total) by mouth once daily. for 10 days  -     Discontinue: guaiFENesin (MUCINEX) 600 mg 12 hr tablet; Take 2 tablets (1,200 mg total) by mouth 2 (two) times daily.    Interstitial lung disease  -     Comprehensive metabolic panel; Standing  -     Discontinue: guaiFENesin (MUCINEX) 600 mg 12 hr tablet; Take 2 tablets (1,200 mg total) by mouth 2 (two) times  daily.    Cirrhosis of liver without ascites, unspecified hepatic cirrhosis type  -     Comprehensive metabolic panel; Standing    Dyspnea on exertion    Chronic respiratory disease    Chronic obstructive pulmonary disease, unspecified COPD type  -     Pulse oximetry overnight; Future        Addendum 02/4/2019- Change Dx order for OFEV therapy from Pulmonary fibrosis to Idiopathic Pulmonary fibrosis       Total face-to-face time with the patient was 40 minutes and greater than 50% was spent in counseling and coordination of care. The above assessment and plan have been discussed at length. Labs and tests reviewed. Problem List updated. Asked to bring in all active medications / pills bottles with next visit.    OFEV Dosing: Hepatic Impairment: Adult   Hepatic impairment at baseline:  Mild impairment (Child-Oakley class A): 100 mg every 12 hours. If a patient does not tolerate 100 mg every 12 hours, consider treatment interruption or discontinue treatment to manage adverse reactions.    Pt is Class A with Child-Oakley score of 5     Follow-up in about 3 months (around 4/14/2019), or if symptoms worsen or fail to improve.    Discussed with patient above for education the following:      Patient Instructions   Six minute walk in future. Need to walk to lower oxygen saturation level.  One is scheduled 3/13/2019 at 10 am at hospital, need to push yourself.    Bronchitis is the reason for cough. Treatment includes prednisone, antibiotics, mucinex pills. Will resolve in a few weeks.   Monitor blood sugar levels while on prednisone    Pulmonary fibrosis is a disease seen on the CT of chest.  It is a slow disease where the lungs become scarred. Scarred lungs are not able to work like healthy lungs. The lungs do not get better.    OFEV is a medication that can slow down the scarring process. Blood work has to be drawn every month for 3 months to make sure the OFEV is not hurting the liver.

## 2019-01-14 NOTE — PATIENT INSTRUCTIONS
Six minute walk in future. Need to walk to lower oxygen saturation level.  One is scheduled 3/13/2019 at 10 am at hospital, need to push yourself.    Bronchitis is the reason for cough. Treatment includes prednisone, antibiotics, mucinex pills. Will resolve in a few weeks.   Monitor blood sugar levels while on prednisone    Pulmonary fibrosis is a disease seen on the CT of chest.  It is a slow disease where the lungs become scarred. Scarred lungs are not able to work like healthy lungs. The lungs do not get better.    OFEV is a medication that can slow down the scarring process. Blood work has to be drawn every month for 3 months to make sure the OFEV is not hurting the liver.

## 2019-01-15 ENCOUNTER — TELEPHONE (OUTPATIENT)
Dept: PHARMACY | Facility: CLINIC | Age: 74
End: 2019-01-15

## 2019-01-16 ENCOUNTER — HOSPITAL ENCOUNTER (OUTPATIENT)
Dept: RESPIRATORY THERAPY | Facility: HOSPITAL | Age: 74
Discharge: HOME | End: 2019-01-16
Attending: NURSE PRACTITIONER
Payer: MEDICARE

## 2019-01-16 DIAGNOSIS — J44.9 COPD WITH HYPOXIA: Primary | ICD-10-CM

## 2019-01-16 DIAGNOSIS — R06.02 SHORTNESS OF BREATH: ICD-10-CM

## 2019-01-16 PROCEDURE — 94618 PULMONARY STRESS TESTING: CPT

## 2019-01-17 ENCOUNTER — TELEPHONE (OUTPATIENT)
Dept: PULMONOLOGY | Facility: CLINIC | Age: 74
End: 2019-01-17

## 2019-01-17 LAB — BR6MWT: NORMAL

## 2019-01-17 NOTE — TELEPHONE ENCOUNTER
Oxygen order was sent to [facility] today. No further action is needed at this time.----- Message from Edna Olvera NP sent at 1/16/2019  4:57 PM CST -----  Order for portable oxygen. Please set up

## 2019-01-17 NOTE — TELEPHONE ENCOUNTER
DOCUMENTATION ONLY  The prior authorization request for Ofev was denied by the patient's insurance.   Forwarded to the clinical pharmacist for review. 1/17 1:28pm ARR

## 2019-01-23 ENCOUNTER — DOCUMENTATION ONLY (OUTPATIENT)
Dept: FAMILY MEDICINE | Facility: CLINIC | Age: 74
End: 2019-01-23

## 2019-01-23 NOTE — PROGRESS NOTES
Pre-Visit Chart Review  For Appointment Scheduled on 1/24/19    Health Maintenance Due   Topic Date Due    TETANUS VACCINE  05/13/1963    Colonoscopy  05/13/1997    Pneumococcal Vaccine (65+ Low/Medium Risk) (1 of 2 - PCV13) 05/13/2012    Lipid Panel  07/06/2018    Influenza Vaccine  08/01/2018    Foot Exam  10/04/2018    Hemoglobin A1c  12/24/2018    Eye Exam  01/22/2019

## 2019-01-24 ENCOUNTER — OFFICE VISIT (OUTPATIENT)
Dept: OPTOMETRY | Facility: CLINIC | Age: 74
End: 2019-01-24
Payer: MEDICARE

## 2019-01-24 ENCOUNTER — OFFICE VISIT (OUTPATIENT)
Dept: PODIATRY | Facility: CLINIC | Age: 74
End: 2019-01-24
Payer: MEDICARE

## 2019-01-24 ENCOUNTER — OFFICE VISIT (OUTPATIENT)
Dept: FAMILY MEDICINE | Facility: CLINIC | Age: 74
End: 2019-01-24
Payer: MEDICARE

## 2019-01-24 ENCOUNTER — LAB VISIT (OUTPATIENT)
Dept: LAB | Facility: HOSPITAL | Age: 74
End: 2019-01-24
Attending: FAMILY MEDICINE
Payer: MEDICARE

## 2019-01-24 VITALS
HEART RATE: 52 BPM | SYSTOLIC BLOOD PRESSURE: 133 MMHG | HEIGHT: 69 IN | TEMPERATURE: 98 F | DIASTOLIC BLOOD PRESSURE: 64 MMHG | WEIGHT: 194.25 LBS | BODY MASS INDEX: 28.77 KG/M2

## 2019-01-24 VITALS — HEIGHT: 69 IN | WEIGHT: 194.25 LBS | BODY MASS INDEX: 28.77 KG/M2

## 2019-01-24 DIAGNOSIS — E08.42 DIABETIC POLYNEUROPATHY ASSOCIATED WITH DIABETES MELLITUS DUE TO UNDERLYING CONDITION: ICD-10-CM

## 2019-01-24 DIAGNOSIS — E11.8 TYPE 2 DIABETES MELLITUS WITH COMPLICATION, WITH LONG-TERM CURRENT USE OF INSULIN: ICD-10-CM

## 2019-01-24 DIAGNOSIS — L84 PRE-ULCERATIVE CALLUSES: ICD-10-CM

## 2019-01-24 DIAGNOSIS — Z79.4 TYPE 2 DIABETES MELLITUS WITH COMPLICATION, WITH LONG-TERM CURRENT USE OF INSULIN: Primary | ICD-10-CM

## 2019-01-24 DIAGNOSIS — E11.9 DIABETES MELLITUS WITHOUT OPHTHALMIC MANIFESTATIONS: Primary | ICD-10-CM

## 2019-01-24 DIAGNOSIS — H52.7 REFRACTIVE ERROR: ICD-10-CM

## 2019-01-24 DIAGNOSIS — E11.8 TYPE 2 DIABETES MELLITUS WITH COMPLICATION, WITH LONG-TERM CURRENT USE OF INSULIN: Primary | ICD-10-CM

## 2019-01-24 DIAGNOSIS — H35.372 EPIRETINAL MEMBRANE (ERM) OF LEFT EYE: ICD-10-CM

## 2019-01-24 DIAGNOSIS — I15.2 HYPERTENSION ASSOCIATED WITH DIABETES: ICD-10-CM

## 2019-01-24 DIAGNOSIS — E11.51 TYPE II DIABETES MELLITUS WITH PERIPHERAL CIRCULATORY DISORDER: Primary | ICD-10-CM

## 2019-01-24 DIAGNOSIS — E11.42 DM TYPE 2 WITH DIABETIC PERIPHERAL NEUROPATHY: ICD-10-CM

## 2019-01-24 DIAGNOSIS — Z79.4 TYPE 2 DIABETES MELLITUS WITH COMPLICATION, WITH LONG-TERM CURRENT USE OF INSULIN: ICD-10-CM

## 2019-01-24 DIAGNOSIS — E11.59 HYPERTENSION ASSOCIATED WITH DIABETES: ICD-10-CM

## 2019-01-24 DIAGNOSIS — B35.1 ONYCHOMYCOSIS DUE TO DERMATOPHYTE: ICD-10-CM

## 2019-01-24 DIAGNOSIS — Z96.1 PSEUDOPHAKIA: ICD-10-CM

## 2019-01-24 DIAGNOSIS — H04.123 DRY EYE SYNDROME, BILATERAL: ICD-10-CM

## 2019-01-24 DIAGNOSIS — L90.9 FAT PAD ATROPHY OF FOOT: ICD-10-CM

## 2019-01-24 LAB
ALBUMIN SERPL BCP-MCNC: 3.6 G/DL
ALP SERPL-CCNC: 70 U/L
ALT SERPL W/O P-5'-P-CCNC: 12 U/L
ANION GAP SERPL CALC-SCNC: 9 MMOL/L
ANISOCYTOSIS BLD QL SMEAR: SLIGHT
AST SERPL-CCNC: 19 U/L
BASOPHILS # BLD AUTO: 0.01 K/UL
BASOPHILS NFR BLD: 0.3 %
BILIRUB SERPL-MCNC: 1.3 MG/DL
BUN SERPL-MCNC: 21 MG/DL
BURR CELLS BLD QL SMEAR: ABNORMAL
CALCIUM SERPL-MCNC: 9.8 MG/DL
CHLORIDE SERPL-SCNC: 102 MMOL/L
CHOLEST SERPL-MCNC: 150 MG/DL
CHOLEST/HDLC SERPL: 3.1 {RATIO}
CO2 SERPL-SCNC: 24 MMOL/L
CREAT SERPL-MCNC: 0.9 MG/DL
DACRYOCYTES BLD QL SMEAR: ABNORMAL
DIFFERENTIAL METHOD: ABNORMAL
EOSINOPHIL # BLD AUTO: 0.1 K/UL
EOSINOPHIL NFR BLD: 3.1 %
ERYTHROCYTE [DISTWIDTH] IN BLOOD BY AUTOMATED COUNT: 14.5 %
EST. GFR  (AFRICAN AMERICAN): >60 ML/MIN/1.73 M^2
EST. GFR  (NON AFRICAN AMERICAN): >60 ML/MIN/1.73 M^2
ESTIMATED AVG GLUCOSE: 223 MG/DL
GLUCOSE SERPL-MCNC: 418 MG/DL
HBA1C MFR BLD HPLC: 9.4 %
HCT VFR BLD AUTO: 36.9 %
HDLC SERPL-MCNC: 48 MG/DL
HDLC SERPL: 32 %
HGB BLD-MCNC: 12.2 G/DL
HYPOCHROMIA BLD QL SMEAR: ABNORMAL
IMM GRANULOCYTES # BLD AUTO: 0.01 K/UL
IMM GRANULOCYTES NFR BLD AUTO: 0.3 %
LDLC SERPL CALC-MCNC: 87.4 MG/DL
LYMPHOCYTES # BLD AUTO: 0.4 K/UL
LYMPHOCYTES NFR BLD: 13.7 %
MCH RBC QN AUTO: 27.9 PG
MCHC RBC AUTO-ENTMCNC: 33.1 G/DL
MCV RBC AUTO: 84 FL
MONOCYTES # BLD AUTO: 0.3 K/UL
MONOCYTES NFR BLD: 8.1 %
NEUTROPHILS # BLD AUTO: 2.4 K/UL
NEUTROPHILS NFR BLD: 74.5 %
NONHDLC SERPL-MCNC: 102 MG/DL
NRBC BLD-RTO: 0 /100 WBC
OVALOCYTES BLD QL SMEAR: ABNORMAL
PLATELET # BLD AUTO: 32 K/UL
PLATELET BLD QL SMEAR: ABNORMAL
PMV BLD AUTO: 11.8 FL
POIKILOCYTOSIS BLD QL SMEAR: SLIGHT
POLYCHROMASIA BLD QL SMEAR: ABNORMAL
POTASSIUM SERPL-SCNC: 4.6 MMOL/L
PROT SERPL-MCNC: 6.5 G/DL
RBC # BLD AUTO: 4.38 M/UL
SODIUM SERPL-SCNC: 135 MMOL/L
TRIGL SERPL-MCNC: 73 MG/DL
WBC # BLD AUTO: 3.21 K/UL

## 2019-01-24 PROCEDURE — 99214 PR OFFICE/OUTPT VISIT, EST, LEVL IV, 30-39 MIN: ICD-10-PCS | Mod: 25,HCNC,S$GLB, | Performed by: FAMILY MEDICINE

## 2019-01-24 PROCEDURE — 11057 PARNG/CUTG B9 HYPRKR LES >4: CPT | Mod: Q9,HCNC,S$GLB, | Performed by: PODIATRIST

## 2019-01-24 PROCEDURE — 92004 COMPRE OPH EXAM NEW PT 1/>: CPT | Mod: S$GLB,,, | Performed by: OPTOMETRIST

## 2019-01-24 PROCEDURE — 3078F DIAST BP <80 MM HG: CPT | Mod: HCNC,CPTII,S$GLB, | Performed by: PODIATRIST

## 2019-01-24 PROCEDURE — 99499 UNLISTED E&M SERVICE: CPT | Mod: HCNC,S$GLB,, | Performed by: PODIATRIST

## 2019-01-24 PROCEDURE — 3074F SYST BP LT 130 MM HG: CPT | Mod: HCNC,CPTII,S$GLB, | Performed by: PODIATRIST

## 2019-01-24 PROCEDURE — 99213 OFFICE O/P EST LOW 20 MIN: CPT | Mod: 25,HCNC,S$GLB, | Performed by: PODIATRIST

## 2019-01-24 PROCEDURE — 99999 PR PBB SHADOW E&M-EST. PATIENT-LVL IV: CPT | Mod: PBBFAC,,, | Performed by: FAMILY MEDICINE

## 2019-01-24 PROCEDURE — 90670 PNEUMOCOCCAL CONJUGATE VACCINE 13-VALENT LESS THAN 5YO & GREATER THAN: ICD-10-PCS | Mod: HCNC,S$GLB,, | Performed by: FAMILY MEDICINE

## 2019-01-24 PROCEDURE — 3075F SYST BP GE 130 - 139MM HG: CPT | Mod: HCNC,CPTII,S$GLB, | Performed by: FAMILY MEDICINE

## 2019-01-24 PROCEDURE — 99499 RISK ADDL DX/OHS AUDIT: ICD-10-PCS | Mod: HCNC,S$GLB,, | Performed by: FAMILY MEDICINE

## 2019-01-24 PROCEDURE — 3078F DIAST BP <80 MM HG: CPT | Mod: HCNC,CPTII,S$GLB, | Performed by: FAMILY MEDICINE

## 2019-01-24 PROCEDURE — G0009 PNEUMOCOCCAL CONJUGATE VACCINE 13-VALENT LESS THAN 5YO & GREATER THAN: ICD-10-PCS | Mod: 59,HCNC,S$GLB, | Performed by: FAMILY MEDICINE

## 2019-01-24 PROCEDURE — 1101F PT FALLS ASSESS-DOCD LE1/YR: CPT | Mod: HCNC,CPTII,S$GLB, | Performed by: PODIATRIST

## 2019-01-24 PROCEDURE — 99499 UNLISTED E&M SERVICE: CPT | Mod: HCNC,S$GLB,, | Performed by: FAMILY MEDICINE

## 2019-01-24 PROCEDURE — 3045F PR MOST RECENT HEMOGLOBIN A1C LEVEL 7.0-9.0%: CPT | Mod: HCNC,CPTII,S$GLB, | Performed by: PODIATRIST

## 2019-01-24 PROCEDURE — 1101F PR PT FALLS ASSESS DOC 0-1 FALLS W/OUT INJ PAST YR: ICD-10-PCS | Mod: HCNC,CPTII,S$GLB, | Performed by: PODIATRIST

## 2019-01-24 PROCEDURE — 3075F PR MOST RECENT SYSTOLIC BLOOD PRESS GE 130-139MM HG: ICD-10-PCS | Mod: HCNC,CPTII,S$GLB, | Performed by: FAMILY MEDICINE

## 2019-01-24 PROCEDURE — 99213 PR OFFICE/OUTPT VISIT, EST, LEVL III, 20-29 MIN: ICD-10-PCS | Mod: 25,HCNC,S$GLB, | Performed by: PODIATRIST

## 2019-01-24 PROCEDURE — 1101F PR PT FALLS ASSESS DOC 0-1 FALLS W/OUT INJ PAST YR: ICD-10-PCS | Mod: HCNC,CPTII,S$GLB, | Performed by: FAMILY MEDICINE

## 2019-01-24 PROCEDURE — 85025 COMPLETE CBC W/AUTO DIFF WBC: CPT | Mod: HCNC

## 2019-01-24 PROCEDURE — 90670 PCV13 VACCINE IM: CPT | Mod: HCNC,S$GLB,, | Performed by: FAMILY MEDICINE

## 2019-01-24 PROCEDURE — 80061 LIPID PANEL: CPT | Mod: HCNC

## 2019-01-24 PROCEDURE — 3045F PR MOST RECENT HEMOGLOBIN A1C LEVEL 7.0-9.0%: ICD-10-PCS | Mod: HCNC,CPTII,S$GLB, | Performed by: PODIATRIST

## 2019-01-24 PROCEDURE — 11057 PR TRIM BENIGN HYPERKERATOTIC SKIN LESION,>4: ICD-10-PCS | Mod: Q9,HCNC,S$GLB, | Performed by: PODIATRIST

## 2019-01-24 PROCEDURE — 80053 COMPREHEN METABOLIC PANEL: CPT | Mod: HCNC

## 2019-01-24 PROCEDURE — 83036 HEMOGLOBIN GLYCOSYLATED A1C: CPT | Mod: HCNC

## 2019-01-24 PROCEDURE — 99999 PR PBB SHADOW E&M-EST. PATIENT-LVL II: CPT | Mod: PBBFAC,,, | Performed by: OPTOMETRIST

## 2019-01-24 PROCEDURE — G0009 ADMIN PNEUMOCOCCAL VACCINE: HCPCS | Mod: 59,HCNC,S$GLB, | Performed by: FAMILY MEDICINE

## 2019-01-24 PROCEDURE — 3078F PR MOST RECENT DIASTOLIC BLOOD PRESSURE < 80 MM HG: ICD-10-PCS | Mod: HCNC,CPTII,S$GLB, | Performed by: FAMILY MEDICINE

## 2019-01-24 PROCEDURE — 99999 PR PBB SHADOW E&M-EST. PATIENT-LVL II: ICD-10-PCS | Mod: PBBFAC,,, | Performed by: OPTOMETRIST

## 2019-01-24 PROCEDURE — 1101F PT FALLS ASSESS-DOCD LE1/YR: CPT | Mod: HCNC,CPTII,S$GLB, | Performed by: FAMILY MEDICINE

## 2019-01-24 PROCEDURE — 3074F PR MOST RECENT SYSTOLIC BLOOD PRESSURE < 130 MM HG: ICD-10-PCS | Mod: HCNC,CPTII,S$GLB, | Performed by: PODIATRIST

## 2019-01-24 PROCEDURE — 99999 PR PBB SHADOW E&M-EST. PATIENT-LVL II: ICD-10-PCS | Mod: PBBFAC,,, | Performed by: PODIATRIST

## 2019-01-24 PROCEDURE — 99999 PR PBB SHADOW E&M-EST. PATIENT-LVL II: CPT | Mod: PBBFAC,,, | Performed by: PODIATRIST

## 2019-01-24 PROCEDURE — 3045F PR MOST RECENT HEMOGLOBIN A1C LEVEL 7.0-9.0%: CPT | Mod: HCNC,CPTII,S$GLB, | Performed by: FAMILY MEDICINE

## 2019-01-24 PROCEDURE — 99999 PR PBB SHADOW E&M-EST. PATIENT-LVL IV: ICD-10-PCS | Mod: PBBFAC,,, | Performed by: FAMILY MEDICINE

## 2019-01-24 PROCEDURE — 99499 RISK ADDL DX/OHS AUDIT: ICD-10-PCS | Mod: HCNC,S$GLB,, | Performed by: PODIATRIST

## 2019-01-24 PROCEDURE — 3045F PR MOST RECENT HEMOGLOBIN A1C LEVEL 7.0-9.0%: ICD-10-PCS | Mod: HCNC,CPTII,S$GLB, | Performed by: FAMILY MEDICINE

## 2019-01-24 PROCEDURE — 3078F PR MOST RECENT DIASTOLIC BLOOD PRESSURE < 80 MM HG: ICD-10-PCS | Mod: HCNC,CPTII,S$GLB, | Performed by: PODIATRIST

## 2019-01-24 PROCEDURE — 92004 PR EYE EXAM, NEW PATIENT,COMPREHESV: ICD-10-PCS | Mod: S$GLB,,, | Performed by: OPTOMETRIST

## 2019-01-24 PROCEDURE — 99214 OFFICE O/P EST MOD 30 MIN: CPT | Mod: 25,HCNC,S$GLB, | Performed by: FAMILY MEDICINE

## 2019-01-24 PROCEDURE — 36415 COLL VENOUS BLD VENIPUNCTURE: CPT | Mod: HCNC,PO

## 2019-01-24 RX ORDER — DULOXETIN HYDROCHLORIDE 20 MG/1
20 CAPSULE, DELAYED RELEASE ORAL DAILY
Qty: 30 CAPSULE | Refills: 0 | Status: SHIPPED | OUTPATIENT
Start: 2019-01-24 | End: 2019-01-24 | Stop reason: SDUPTHER

## 2019-01-24 RX ORDER — INSULIN GLARGINE 100 [IU]/ML
30 INJECTION, SOLUTION SUBCUTANEOUS NIGHTLY
Qty: 27 ML | Refills: 3 | Status: SHIPPED | OUTPATIENT
Start: 2019-01-24 | End: 2019-04-03

## 2019-01-24 RX ORDER — DULOXETIN HYDROCHLORIDE 20 MG/1
20 CAPSULE, DELAYED RELEASE ORAL DAILY
Qty: 30 CAPSULE | Refills: 0 | Status: SHIPPED | OUTPATIENT
Start: 2019-01-24 | End: 2019-01-26 | Stop reason: SDUPTHER

## 2019-01-24 NOTE — PROGRESS NOTES
Subjective:      Patient ID: Steve June Jr. is a 73 y.o. male.    Chief Complaint: Diabetic Foot Exam (routine); Other Misc (PCP- Crispin Garcia- 1/24/19); and Diabetes Mellitus (A1C- 9.0 6/25/18)    Diabetes, increased risk amputation needing evaluation/management/optomization of foot care.    CC thick calluses, burning pain with pressure.  Gradual onset, worsening over past several weeks-months, aggravated by increased weight bearing, shoe gear, pressure.   His daughter has been trimming his nails but the calluses are hurting.  Denies trauma, signs infection, and surgery both feet.  Has gotten the diabetic shoes but they have worn out and seeking new shoes.        Review of Systems   Constitution: Negative for chills, diaphoresis, fever, malaise/fatigue and night sweats.   Cardiovascular: Positive for leg swelling. Negative for claudication, cyanosis and syncope.   Skin: Positive for nail changes and suspicious lesions. Negative for color change, dry skin, rash and unusual hair distribution.   Musculoskeletal: Negative for falls, joint pain, joint swelling, muscle cramps, muscle weakness and stiffness.   Gastrointestinal: Negative for constipation, diarrhea, nausea and vomiting.   Neurological: Positive for paresthesias and sensory change. Negative for brief paralysis, disturbances in coordination, focal weakness, numbness and tremors.           Objective:      Physical Exam   Constitutional: He is oriented to person, place, and time. He appears well-developed and well-nourished. He is cooperative.   Oriented to time, place, and person.   Cardiovascular:   Pulses:       Dorsalis pedis pulses are 1+ on the right side, and 1+ on the left side.        Posterior tibial pulses are 1+ on the right side, and 1+ on the left side.   Capillary fill time 3-5 seconds.  Feet are warm to touch proximal with distal cooling.      2+ edema to bilateral lower extremities with varicosities noted.    No pedal hair noted  bilateral.     Musculoskeletal:   Normal angle, base, station of gait. Decreased stride length, early heel off, moderately propulsive toe off bilateral.    All ten toes without clubbing, cyanosis, or signs of ischemia.      Ankle dorsiflexion decreased at <10 degrees bilateral with moderate increase with knee flexion bilateral.    No pain to palpation bilateral lower extremities.      Range of motion, stability, muscle strength, and muscle tone are age and health appropriate normal bilateral feet and legs.       Feet:   Right Foot:   Protective Sensation: 10 sites tested. 4 sites sensed.   Left Foot:   Protective Sensation: 10 sites tested. 6 sites sensed.   Lymphadenopathy:   Negative lymphadenopathy bilateral popliteal fossa and tarsal tunnel.     Neurological: He is alert and oriented to person, place, and time. He has normal strength. He is not disoriented. He displays no atrophy and no tremor. A sensory deficit is present. He exhibits normal muscle tone.   Decreased/absent vibratory sensation bilateral feet to 128Hz tuning fork.    Diminished/loss of protective sensation bilateral to 10 gram monofilament.    Paresthesias,  bilateral feet with no clearly identified trigger or source.     Skin: Skin is warm, dry and intact. No abrasion, no bruising, no burn, no ecchymosis, no laceration, no lesion, no petechiae and no rash noted. He is not diaphoretic. No cyanosis or erythema. No pallor. Nails show no clubbing.   Focal hyperkeratotic lesion consisting entirely of hyperkeratotic tissue without open skin, drainage, pus, fluctuance, malodor, or signs of infection plantar bilateral hallux and first and fifth mtpj bilateral. 6 total    Skin thin, atrophic, with decreased density and distribution of pedal hair bilateral, but without ulcers, masses, nodules or cords palpated bilateral feet and legs.    Hyperpigmentation both legs consistent with stasis.    Toenails 1st, 2nd, 3rd, 4th, 5th  bilateral are hypertrophic  thickened 2-3 mm, dystrophic, discolored tanish brown with tan, gray crumbly subungual debris.               Assessment:       Encounter Diagnoses   Name Primary?    Type II diabetes mellitus with peripheral circulatory disorder Yes    Diabetic polyneuropathy associated with diabetes mellitus due to underlying condition     Pre-ulcerative calluses     Fat pad atrophy of foot     Onychomycosis due to dermatophyte          Plan:       Steve was seen today for diabetic foot exam, other misc and diabetes mellitus.    Diagnoses and all orders for this visit:    Type II diabetes mellitus with peripheral circulatory disorder  -     DIABETIC SHOES FOR HOME USE    Diabetic polyneuropathy associated with diabetes mellitus due to underlying condition  -     DIABETIC SHOES FOR HOME USE    Pre-ulcerative calluses  -     DIABETIC SHOES FOR HOME USE    Fat pad atrophy of foot  -     DIABETIC SHOES FOR HOME USE    Onychomycosis due to dermatophyte  -     DIABETIC SHOES FOR HOME USE      I counseled the patient on his conditions, their implications and medical management.    Patient will stretch the tendo achilles complex three times daily as demonstrated in the office.  Literature was dispensed illustrating proper stretching technique.    Shoe inspection. Diabetic Foot Education. Patient reminded of the importance of good nutrition and blood sugar control to help prevent podiatric complications of diabetes. Patient instructed on proper foot hygeine. We discussed wearing proper shoe gear, daily foot inspections, never walking without protective shoe gear, never putting sharp instruments to feet    With patient's verbal consent the hyperkeratotic lesions x6 total both feet were trimmed to healthy appearing skin using a # 15 scalpel. Patient relates relief of pain following the procedure. Patient tolerated the procedure well without complications. No anesthesia or hemostasis required. No blood loss.    Continue pain  management.    Return 3 months     Fausto Harvey DPM

## 2019-01-24 NOTE — PROGRESS NOTES
Patient, Steve June Jr. (MRN #[id number]), presented with a recent Platelet count less than 150 K/uL consistent with the definition of thrombocytopenia (ICD10 - D69.6).    Platelets   Date Value Ref Range Status   12/14/2018 48 (L) 150 - 350 K/uL Final     The patient's thrombocytopenia was monitored, evaluated, addressed and/or treated. This addendum to the medical record is made on 01/24/2019.

## 2019-01-24 NOTE — LETTER
January 24, 2019      Crispin Garcia MD  [address] [facility] [address]           [facility]  [address] [facility] [address]  Phone: [phone]          Patient: Steve June Jr.   MR Number: [id number]   YOB: 1945   Date of Visit: 1/24/2019       Dear Dr. Crispin Garcia:    Thank you for referring Steve June to me for evaluation. Attached you will find relevant portions of my assessment and plan of care.    If you have questions, please do not hesitate to call me. I look forward to following Steve June along with you.    Sincerely,    Fausto Harvey, DPCRISTOBAL    Enclosure  CC:  No Recipients    If you would like to receive this communication electronically, please contact [email or web address] or [phone] to request more information on [employer] access.    For providers and/or their staff who would like to refer a patient to [facility], please contact us through our one-stop-shop provider referral line, [facility], at [phone].    If you feel you have received this communication in error or would no longer like to receive these types of communications, please e-mail [email or web address]

## 2019-01-24 NOTE — PROGRESS NOTES
HPI     Presenting Complaint:Pt here today for yearly diabetic eye exam. Pt blood   sugar has not been controlled.     Lab Results       Component                Value               Date                       HGBA1C                   9.0 (H)             06/25/2018                 HGBA1C                   9.0 (H)             05/09/2018                 HGBA1C                   9.8 (H)             01/30/2018               Pt states near and distance vision has been blurry.     Ophthalmic medication / drops:  None    (+) Pt states eyes have been feeling gritty    (-) headaches  (-) diplopia   (-) flashes  / (-) floaters      Last edited by Jerry Barraza, OD on 1/24/2019  3:04 PM. (History)            Assessment /Plan     For exam results, see Encounter Report.    Diabetes mellitus without ophthalmic manifestations    Pseudophakia    Refractive error    Dry eye syndrome, bilateral    Epiretinal membrane (ERM) of left eye      DM type 2 w/o ocular retinopathy OU. Discussed possible ocular affects of uncontrolled blood sugar with patient. Recommended continued strong blood sugar control and continued care with PCP. Monitor yearly.     S/p cataract extraction OU with good results. Pt doing well with current specs, pt reports PCP changing DM meds, will hold spec Rx until sugars are stable, has been running between 200-300 daily. Return when ready for refraction and updated spec rx.    DESMOND OU. Discussed ocular affects of dry eyes. Recommend OTC blink artificial tears two to four times a day in OU. Discussed chronicity of DESMOND. RTC if symptoms not alleviated by continued use of artificial tears.     Mild ERM OS, not visually significant. Monitor yearly.      RTC in 1 year for comprehensive eye exam, or sooner prn.

## 2019-01-24 NOTE — PROGRESS NOTES
Subjective:   Patient ID: Steve June Jr. is a 73 y.o. male     Chief Complaint:Establish Care      Patient with type 2 diabetes was very poorly controlled on Levemir.  Patient with diabetic neuropathy which is not been very well controlled on his gabapentin.  Patient with hypertension which is well controlled on oral medications for      Review of Systems   Respiratory: Negative for shortness of breath.    Cardiovascular: Negative for chest pain.   Gastrointestinal: Negative for abdominal pain.   Genitourinary: Negative for dysuria.     Past Medical History:   Diagnosis Date    Abnormal thyroid function test     Allergy     Seasonal    Anemia     Anemia due to blood loss 7/2/2014    Arthritis     Gaviria esophagus     Basal cell carcinoma     right forearm    Basal cell carcinoma 12/2011    lower post neck    Cancer     skin CA    Cataract     Cirrhosis     Diabetes mellitus     Diabetes mellitus, type 2     Encounter for blood transfusion     Esophageal varices in cirrhosis     grade II on 7/12 EGD    Gastritis     on 7/12 EGD    GERD (gastroesophageal reflux disease) 2/28/2015    Hard of hearing     Hiatal hernia     History of hepatitis C 8/10/2012    tx with harvoni x 41 days (started 10/22/15). SVR4     Hoarseness 2/28/2015    Hypercholesteremia     Hypersplenism     Hypertension     No meds    Pain management 12/10/2014    Petechial hemorrhage 11/25/2015    Lower extremities bilat     Portal hypertensive gastropathy     on 7/12 EGD    Thrombocytopenia     Type II or unspecified type diabetes mellitus with neurological manifestations, uncontrolled(250.62) 12/24/2013    Valvular heart disease     mild MR 12     Objective:     Vitals:    01/24/19 1013   BP: 133/64   Pulse: (!) 52   Temp: 98 °F (36.7 °C)     Body mass index is 28.68 kg/m².  Physical Exam   Neck: Neck supple. No tracheal deviation present.   Cardiovascular: Normal rate, regular rhythm and normal heart sounds.    Pulses:       Dorsalis pedis pulses are 2+ on the right side, and 2+ on the left side.        Posterior tibial pulses are 2+ on the right side, and 2+ on the left side.   Pulmonary/Chest: Effort normal. No respiratory distress.   Musculoskeletal: Normal range of motion. He exhibits no edema.        Right foot: There is normal range of motion and no deformity.        Left foot: There is normal range of motion and no deformity.   Feet:   Right Foot:   Protective Sensation: 4 sites tested. 4 sites sensed.   Skin Integrity: Negative for ulcer or blister.   Left Foot:   Protective Sensation: 4 sites tested. 4 sites sensed.   Skin Integrity: Negative for ulcer or blister.     Assessment:     1. Type 2 diabetes mellitus with complication, with long-term current use of insulin    2. DM type 2 with diabetic peripheral neuropathy    3. Hypertension associated with diabetes      Plan:   Type 2 diabetes mellitus with complication, with long-term current use of insulin  -     Hemoglobin A1c; Future; Expected date: 01/24/2019  -     Lipid panel; Future; Expected date: 01/24/2019  -     CBC auto differential; Future; Expected date: 01/24/2019  -     Comprehensive metabolic panel; Future; Expected date: 01/24/2019  -     insulin (LANTUS SOLOSTAR U-100 INSULIN) glargine 100 units/mL (3mL) SubQ pen; Inject 30 Units into the skin every evening.  Dispense: 27 mL; Refill: 3  -     Ambulatory referral to Podiatry  Patient with type 2 diabetes.  Reviewed blood work from June 2018 shows an A1c at 9.0 which is very poorly controlled.  Will repeat blood work and consider increasing density of diabetic regimen.    DM type 2 with diabetic peripheral neuropathy  -     DULoxetine (CYMBALTA) 20 MG capsule; Take 1 capsule (20 mg total) by mouth once daily.  Dispense: 30 capsule; Refill: 0  We will trial patient on Cymbalta for neuropathic pain and follow-up.    Hypertension associated with diabetes  Patient hypertension which is well controlled  today.  Reviewed blood work from January 2019 shows no signs end organ damage such as kidney disease indicating controlled BP has reduced patients risk of hypertensive comorbidity    Other orders  -     Discontinue: DULoxetine (CYMBALTA) 20 MG capsule; Take 1 capsule (20 mg total) by mouth once daily.  Dispense: 30 capsule; Refill: 0  -     (In Office Administered) Pneumococcal Conjugate Vaccine (13 Valent) (IM)      Time spent with patient: 30 minutes and over half of that time was spent on counseling an coordination of care.    Crispin Garcia MD  01/24/2019    Portions of this note have been dictated with CRISTOBAL Garcia

## 2019-01-25 NOTE — PROGRESS NOTES
Notified of critical lab- plt 32. Reviewed labs. Platelet count chronically < 50 for more than a year related to chronic liver disease. PCP will address tomorrow

## 2019-01-26 DIAGNOSIS — E11.42 DM TYPE 2 WITH DIABETIC PERIPHERAL NEUROPATHY: ICD-10-CM

## 2019-01-28 RX ORDER — DULOXETIN HYDROCHLORIDE 20 MG/1
CAPSULE, DELAYED RELEASE ORAL
Qty: 90 CAPSULE | Refills: 0 | Status: SHIPPED | OUTPATIENT
Start: 2019-01-28 | End: 2019-04-03

## 2019-01-28 NOTE — TELEPHONE ENCOUNTER
"Ofev PA was submitted with the diagnosis of pulmonary fibrosis. However, the reason given on PA denial was "member doesn't have a known explanation for interstitial lung disease, including but not limited to radiation, sarcoidosis, hypersensitivity pneumonitis, bronchiolitis obliterans organizing pneumonia, HIV, viral hepatitis, and cancer." inDeepaet sent.   "

## 2019-01-31 ENCOUNTER — DOCUMENTATION ONLY (OUTPATIENT)
Dept: FAMILY MEDICINE | Facility: CLINIC | Age: 74
End: 2019-01-31

## 2019-01-31 NOTE — PROGRESS NOTES
Pre-Visit Chart Review  For Appointment Scheduled on 2/1/19    Health Maintenance Due   Topic Date Due    TETANUS VACCINE  05/13/1965    Colonoscopy  05/13/1997    Influenza Vaccine  08/01/2018

## 2019-02-01 ENCOUNTER — OFFICE VISIT (OUTPATIENT)
Dept: FAMILY MEDICINE | Facility: CLINIC | Age: 74
End: 2019-02-01
Payer: MEDICARE

## 2019-02-01 VITALS
HEIGHT: 69 IN | WEIGHT: 192.88 LBS | HEART RATE: 60 BPM | SYSTOLIC BLOOD PRESSURE: 124 MMHG | BODY MASS INDEX: 28.57 KG/M2 | DIASTOLIC BLOOD PRESSURE: 69 MMHG | TEMPERATURE: 98 F

## 2019-02-01 DIAGNOSIS — E11.42 DM TYPE 2 WITH DIABETIC PERIPHERAL NEUROPATHY: Primary | ICD-10-CM

## 2019-02-01 PROCEDURE — 99214 OFFICE O/P EST MOD 30 MIN: CPT | Mod: HCNC,S$GLB,, | Performed by: FAMILY MEDICINE

## 2019-02-01 PROCEDURE — 99999 PR PBB SHADOW E&M-EST. PATIENT-LVL IV: ICD-10-PCS | Mod: PBBFAC,HCNC,, | Performed by: FAMILY MEDICINE

## 2019-02-01 PROCEDURE — 3078F PR MOST RECENT DIASTOLIC BLOOD PRESSURE < 80 MM HG: ICD-10-PCS | Mod: HCNC,CPTII,S$GLB, | Performed by: FAMILY MEDICINE

## 2019-02-01 PROCEDURE — 3078F DIAST BP <80 MM HG: CPT | Mod: HCNC,CPTII,S$GLB, | Performed by: FAMILY MEDICINE

## 2019-02-01 PROCEDURE — 99214 PR OFFICE/OUTPT VISIT, EST, LEVL IV, 30-39 MIN: ICD-10-PCS | Mod: HCNC,S$GLB,, | Performed by: FAMILY MEDICINE

## 2019-02-01 PROCEDURE — 1101F PR PT FALLS ASSESS DOC 0-1 FALLS W/OUT INJ PAST YR: ICD-10-PCS | Mod: HCNC,CPTII,S$GLB, | Performed by: FAMILY MEDICINE

## 2019-02-01 PROCEDURE — 3074F PR MOST RECENT SYSTOLIC BLOOD PRESSURE < 130 MM HG: ICD-10-PCS | Mod: HCNC,CPTII,S$GLB, | Performed by: FAMILY MEDICINE

## 2019-02-01 PROCEDURE — 99999 PR PBB SHADOW E&M-EST. PATIENT-LVL IV: CPT | Mod: PBBFAC,HCNC,, | Performed by: FAMILY MEDICINE

## 2019-02-01 PROCEDURE — 3046F HEMOGLOBIN A1C LEVEL >9.0%: CPT | Mod: HCNC,CPTII,S$GLB, | Performed by: FAMILY MEDICINE

## 2019-02-01 PROCEDURE — 3046F PR MOST RECENT HEMOGLOBIN A1C LEVEL > 9.0%: ICD-10-PCS | Mod: HCNC,CPTII,S$GLB, | Performed by: FAMILY MEDICINE

## 2019-02-01 PROCEDURE — 1101F PT FALLS ASSESS-DOCD LE1/YR: CPT | Mod: HCNC,CPTII,S$GLB, | Performed by: FAMILY MEDICINE

## 2019-02-01 PROCEDURE — 3074F SYST BP LT 130 MM HG: CPT | Mod: HCNC,CPTII,S$GLB, | Performed by: FAMILY MEDICINE

## 2019-02-01 RX ORDER — METFORMIN HYDROCHLORIDE 500 MG/1
500 TABLET ORAL 2 TIMES DAILY WITH MEALS
Qty: 180 TABLET | Refills: 3 | Status: SHIPPED | OUTPATIENT
Start: 2019-02-01 | End: 2019-10-11 | Stop reason: SDUPTHER

## 2019-02-01 NOTE — PATIENT INSTRUCTIONS
Understanding Carbohydrates, Fats, and Protein  Food is a source of fuel and nourishment for your body. Its also a source of pleasure. Having diabetes doesnt mean you have to eat special foods or give up desserts. Instead, your dietitian can show you how to plan meals to suit your body. To start, learn how different foods affect blood sugar.  Carbohydrates  Carbohydrates are the main source of fuel for the body. Carbohydrates raise blood sugar. Many people think carbohydrates are only found in pasta or bread. But carbohydrates are actually in many kinds of foods:  · Sugars occur naturally in foods such as fruit, milk, honey, and molasses. Sugars can also be added to many foods, from cereals and yogurt to candy and desserts. Sugars raise blood sugar.  · Starches are found in bread, cereals, pasta, and dried beans. Theyre also found in corn, peas, potatoes, yam, acorn squash, and butternut squash. Starches also raise blood sugar.   · Fiber is found in foods such as vegetables, fruits, beans, and whole grains. Unlike other carbs, fiber isnt digested or absorbed. So it doesnt raise blood sugar. In fact, fiber can help keep blood sugar from rising too fast. It also helps keep blood cholesterol at a healthy level.  Did you know?  Even though carbohydrates raise blood sugar, its best to have some in every meal. They are an important part of a healthy diet.   Fat  Fat is an energy source that can be stored until needed. Fat does not raise blood sugar. However, it can raise blood cholesterol, increasing the risk of heart disease. Fat is also high in calories, which can cause weight gain. Not all types of fat are the same.  More Healthy:  · Monounsaturated fats are mostly found in vegetable oils, such as olive, canola, and peanut oils. They are also found in avocados and some nuts. Monounsaturated fats are healthy for your heart. Thats because they lower LDL (unhealthy) cholesterol.  · Polyunsaturated fats are mostly  found in vegetable oils, such as corn, safflower, and soybean oils. They are also found in some seeds, nuts, and fish. Polyunsaturated fats lower LDL (unhealthy) cholesterol. So, choosing them instead of saturated fats is healthy for your heart. Certain unsaturated fats can help lower triglycerides.   Less Healthy:  · Saturated fats are found in animal products, such as meat, poultry, whole milk, lard, and butter. Saturated fats raise LDL cholesterol and are not healthy for your heart.  · Hydrogenated oils and trans fats are formed when vegetable oils are processed into solid fats. They are found in many processed foods. Hydrogenated oils and trans fats raise LDL cholesterol and lower HDL (healthy) cholesterol. They are not healthy for your heart.  Protein  Protein helps the body build and repair muscle and other tissue. Protein has little or no effect on blood sugar. However, many foods that contain protein also contain saturated fat. By choosing low-fat protein sources, you can get the benefits of protein without the extra fat:  · Plant protein is found in dry beans and peas, nuts, and soy products, such as tofu and soymilk. These sources tend to be cholesterol-free and low in saturated fat.  · Animal protein is found in fish, poultry, meat, cheese, milk, and eggs. These contain cholesterol and can be high in saturated fat. Aim for lean, lower-fat choices.  Date Last Reviewed: 3/1/2016  © 1698-9377 [employer]. [address] [place] [address], [place] [address]. All rights reserved. This information is not intended as a substitute for professional medical care. Always follow your healthcare professional's instructions.

## 2019-02-04 ENCOUNTER — PATIENT OUTREACH (OUTPATIENT)
Dept: ADMINISTRATIVE | Facility: HOSPITAL | Age: 74
End: 2019-02-04

## 2019-02-04 NOTE — LETTER
February 25, 2019    Steve June Jr.  [address]  [place] [address]             [facility]  [address]  [facility] [address]  Phone: [phone] Dear Dudley [facility] is committed to your overall health and would like to ensure that you are up to date on your recommended test and/or procedures.   Crispin Garcia MD  has found that your chart shows you may be due for the following:     COLORECTAL CANCER SCREENING       If you have had any of the above done at another facility, please let us know so that we may obtain copies from that facility.  If you have a copy of these records, please provide a copy for us to scan into your chart.  You are welcome to request that the report be faxed to us at  ([phone]).     Otherwise, please schedule these appointments at your earliest convenience by calling [phone] or going to [email or web address].     If you have an upcoming scheduled appointment for the item above, please disregard this letter.     Sincerely,   Your [facility]   MD Edna Michael LPN Clinical Care Coordinator   [facility]   [address] [facility] [address]   Phone [phone]   Fax [phone]

## 2019-02-05 NOTE — PROGRESS NOTES
Subjective:   Patient ID: Steve June Jr. is a 73 y.o. male     Chief Complaint:Follow-up (2 weeks blood count)      Patient with type 2 diabetes which is very poorly controlled today.  Patient has been on insulin for period of time.  Patient states in the past there is unsure when he has been on metformin was discontinued in favor of insulin.  Otherwise patient states his blood sugar at home typically runs in the 200s to 300s.  Patient has minimal to following up regularly to have this improved.      Review of Systems   Respiratory: Negative for shortness of breath.    Cardiovascular: Negative for chest pain.   Gastrointestinal: Negative for abdominal pain.   Genitourinary: Negative for dysuria.     Past Medical History:   Diagnosis Date    Abnormal thyroid function test     Allergy     Seasonal    Anemia     Anemia due to blood loss 7/2/2014    Arthritis     Gaviria esophagus     Basal cell carcinoma     right forearm    Basal cell carcinoma 12/2011    lower post neck    Cancer     skin CA    Cataract     Cirrhosis     Diabetes mellitus     Diabetes mellitus, type 2     Encounter for blood transfusion     Esophageal varices in cirrhosis     grade II on 7/12 EGD    Gastritis     on 7/12 EGD    GERD (gastroesophageal reflux disease) 2/28/2015    Hard of hearing     Hiatal hernia     History of hepatitis C 8/10/2012    tx with harvoni x 41 days (started 10/22/15). SVR4     Hoarseness 2/28/2015    Hypercholesteremia     Hypersplenism     Hypertension     No meds    Pain management 12/10/2014    Petechial hemorrhage 11/25/2015    Lower extremities bilat     Portal hypertensive gastropathy     on 7/12 EGD    Thrombocytopenia     Type II or unspecified type diabetes mellitus with neurological manifestations, uncontrolled(250.62) 12/24/2013    Valvular heart disease     mild MR 12     Objective:     Vitals:    02/01/19 1042   BP: 124/69   Pulse: 60   Temp: 98.2 °F (36.8 °C)     Body  mass index is 28.49 kg/m².  Physical Exam   Neck: Neck supple. No tracheal deviation present.   Cardiovascular: Normal rate, regular rhythm and normal heart sounds.   Pulmonary/Chest: Effort normal. No respiratory distress.   Musculoskeletal: Normal range of motion. He exhibits no edema.     Assessment:     1. DM type 2 with diabetic peripheral neuropathy      Plan:   DM type 2 with diabetic peripheral neuropathy  -     metFORMIN (GLUCOPHAGE) 500 MG tablet; Take 1 tablet (500 mg total) by mouth 2 (two) times daily with meals.  Dispense: 180 tablet; Refill: 3  -     POCT Glucose, Hand-Held Device  Review of blood work from January 2018 shows an A1c which is very well controlled 9.4.  Also reviewed blood work from January 2019 shows kidney function within acceptable range for initiating metformin therapy.  Counseled patient on the importance of adhering to his metformin therapy take it daily as prescribed.  Also counseled patient on the importance of stopping his metformin if he has any decreased p.o. intake or nausea or emesis.  Patient if he has any reactions with medication has been instructed to contact my office.  Went over the common side effects as well as the risks of taking the medications in this patient's situation his poorly-controlled type 2 diabetes benefits outweigh the risks.    Time spent with patient: 30 minutes and over half of that time was spent on counseling an coordination of care.    Crispin Garcia MD  02/05/2019    Portions of this note have been dictated with CRISTOBAL Garcia

## 2019-02-08 NOTE — TELEPHONE ENCOUNTER
DOCUMENTATION ONLY:  Prior Authorization for Ofev approved from 02/08/19 to 02/08/2021    Case Id: [id number]    Co-pay: $2,255.44    Patient Assistance IS required and is being researched.     -ARR

## 2019-02-14 ENCOUNTER — DOCUMENTATION ONLY (OUTPATIENT)
Dept: FAMILY MEDICINE | Facility: CLINIC | Age: 74
End: 2019-02-14

## 2019-02-15 ENCOUNTER — OFFICE VISIT (OUTPATIENT)
Dept: FAMILY MEDICINE | Facility: CLINIC | Age: 74
End: 2019-02-15
Payer: MEDICARE

## 2019-02-15 VITALS
BODY MASS INDEX: 28.73 KG/M2 | HEIGHT: 69 IN | TEMPERATURE: 98 F | SYSTOLIC BLOOD PRESSURE: 126 MMHG | WEIGHT: 194 LBS | HEART RATE: 59 BPM | DIASTOLIC BLOOD PRESSURE: 66 MMHG

## 2019-02-15 DIAGNOSIS — E11.8 TYPE 2 DIABETES MELLITUS WITH COMPLICATION, WITH LONG-TERM CURRENT USE OF INSULIN: Primary | ICD-10-CM

## 2019-02-15 DIAGNOSIS — L21.9 SEBORRHEIC DERMATITIS: ICD-10-CM

## 2019-02-15 DIAGNOSIS — Z79.4 TYPE 2 DIABETES MELLITUS WITH COMPLICATION, WITH LONG-TERM CURRENT USE OF INSULIN: Primary | ICD-10-CM

## 2019-02-15 DIAGNOSIS — J32.9 SINUSITIS, UNSPECIFIED CHRONICITY, UNSPECIFIED LOCATION: ICD-10-CM

## 2019-02-15 PROCEDURE — 99214 OFFICE O/P EST MOD 30 MIN: CPT | Mod: HCNC,S$GLB,, | Performed by: FAMILY MEDICINE

## 2019-02-15 PROCEDURE — 99999 PR PBB SHADOW E&M-EST. PATIENT-LVL III: ICD-10-PCS | Mod: PBBFAC,HCNC,, | Performed by: FAMILY MEDICINE

## 2019-02-15 PROCEDURE — 3074F PR MOST RECENT SYSTOLIC BLOOD PRESSURE < 130 MM HG: ICD-10-PCS | Mod: HCNC,CPTII,S$GLB, | Performed by: FAMILY MEDICINE

## 2019-02-15 PROCEDURE — 3046F PR MOST RECENT HEMOGLOBIN A1C LEVEL > 9.0%: ICD-10-PCS | Mod: HCNC,CPTII,S$GLB, | Performed by: FAMILY MEDICINE

## 2019-02-15 PROCEDURE — 1101F PR PT FALLS ASSESS DOC 0-1 FALLS W/OUT INJ PAST YR: ICD-10-PCS | Mod: HCNC,CPTII,S$GLB, | Performed by: FAMILY MEDICINE

## 2019-02-15 PROCEDURE — 99999 PR PBB SHADOW E&M-EST. PATIENT-LVL III: CPT | Mod: PBBFAC,HCNC,, | Performed by: FAMILY MEDICINE

## 2019-02-15 PROCEDURE — 3078F DIAST BP <80 MM HG: CPT | Mod: HCNC,CPTII,S$GLB, | Performed by: FAMILY MEDICINE

## 2019-02-15 PROCEDURE — 3078F PR MOST RECENT DIASTOLIC BLOOD PRESSURE < 80 MM HG: ICD-10-PCS | Mod: HCNC,CPTII,S$GLB, | Performed by: FAMILY MEDICINE

## 2019-02-15 PROCEDURE — 1101F PT FALLS ASSESS-DOCD LE1/YR: CPT | Mod: HCNC,CPTII,S$GLB, | Performed by: FAMILY MEDICINE

## 2019-02-15 PROCEDURE — 3074F SYST BP LT 130 MM HG: CPT | Mod: HCNC,CPTII,S$GLB, | Performed by: FAMILY MEDICINE

## 2019-02-15 PROCEDURE — 99214 PR OFFICE/OUTPT VISIT, EST, LEVL IV, 30-39 MIN: ICD-10-PCS | Mod: HCNC,S$GLB,, | Performed by: FAMILY MEDICINE

## 2019-02-15 PROCEDURE — 3046F HEMOGLOBIN A1C LEVEL >9.0%: CPT | Mod: HCNC,CPTII,S$GLB, | Performed by: FAMILY MEDICINE

## 2019-02-15 RX ORDER — PROMETHAZINE HYDROCHLORIDE AND CODEINE PHOSPHATE 6.25; 1 MG/5ML; MG/5ML
5 SOLUTION ORAL EVERY 6 HOURS PRN
Qty: 240 ML | Refills: 0 | Status: SHIPPED | OUTPATIENT
Start: 2019-02-15 | End: 2019-02-25

## 2019-02-15 RX ORDER — AZITHROMYCIN 250 MG/1
TABLET, FILM COATED ORAL
Qty: 6 TABLET | Refills: 0 | Status: SHIPPED | OUTPATIENT
Start: 2019-02-15 | End: 2019-02-20

## 2019-02-15 RX ORDER — KETOCONAZOLE 20 MG/ML
SHAMPOO, SUSPENSION TOPICAL EVERY OTHER DAY
Qty: 120 ML | Refills: 0 | Status: SHIPPED | OUTPATIENT
Start: 2019-02-15 | End: 2019-04-24 | Stop reason: CLARIF

## 2019-02-16 NOTE — PROGRESS NOTES
Subjective:   Patient ID: Steve June Jr. is a 73 y.o. male     Chief Complaint:Follow-up (2 weeks)      Patient presents with cough, rhinorrhea, 50 which going on for past 3 days.  Nothing makes it better.  Nothing makes it worse.  Symptoms are intermittent.  Symptoms are keeping him up at night.  Symptoms are moderate in severity.  Patient also here for follow-up of type 2 diabetes and adjustment of medication.  Patient was recently started on metformin 500 mg b.i.d..      Review of Systems   HENT: Positive for postnasal drip, rhinorrhea and sinus pain.    Respiratory: Positive for cough. Negative for shortness of breath.    Cardiovascular: Negative for chest pain.   Gastrointestinal: Negative for abdominal pain.   Genitourinary: Negative for dysuria.     Past Medical History:   Diagnosis Date    Abnormal thyroid function test     Allergy     Seasonal    Anemia     Anemia due to blood loss 7/2/2014    Arthritis     Gaviria esophagus     Basal cell carcinoma     right forearm    Basal cell carcinoma 12/2011    lower post neck    Cancer     skin CA    Cataract     Cirrhosis     Diabetes mellitus     Diabetes mellitus, type 2     Encounter for blood transfusion     Esophageal varices in cirrhosis     grade II on 7/12 EGD    Gastritis     on 7/12 EGD    GERD (gastroesophageal reflux disease) 2/28/2015    Hard of hearing     Hiatal hernia     History of hepatitis C 8/10/2012    tx with harvoni x 41 days (started 10/22/15). SVR4     Hoarseness 2/28/2015    Hypercholesteremia     Hypersplenism     Hypertension     No meds    Pain management 12/10/2014    Petechial hemorrhage 11/25/2015    Lower extremities bilat     Portal hypertensive gastropathy     on 7/12 EGD    Thrombocytopenia     Type II or unspecified type diabetes mellitus with neurological manifestations, uncontrolled(250.62) 12/24/2013    Valvular heart disease     mild MR 12     Objective:     Vitals:    02/15/19 1006    BP: 126/66   Pulse: (!) 59   Temp: 98.1 °F (36.7 °C)     Body mass index is 28.65 kg/m².  Physical Exam   Neck: Neck supple. No tracheal deviation present.   Cardiovascular: Normal rate, regular rhythm and normal heart sounds.   Pulmonary/Chest: Effort normal. No respiratory distress.   Musculoskeletal: Normal range of motion. He exhibits no edema.     Assessment:     1. Type 2 diabetes mellitus with complication, with long-term current use of insulin    2. Sinusitis, unspecified chronicity, unspecified location    3. Seborrheic dermatitis      Plan:   Type 2 diabetes mellitus with complication, with long-term current use of insulin  -     Hemoglobin A1c; Future; Expected date: 04/15/2019  Patient has been doing well on metformin b.i.d. for the past 2 weeks.  Patient denies any symptoms from taking medications such as diarrhea or nausea.  Patient reports blood sugars ranging from 130-150 with the occasional low 200.  Patient also on Lantus daily.  Will have patient follow up in 2 months to have his A1c repeated.    Sinusitis, unspecified chronicity, unspecified location  -     azithromycin (Z-NAN) 250 MG tablet; Take 2 tablets by mouth on day 1; Take 1 tablet by mouth on days 2-5  Dispense: 6 tablet; Refill: 0  -     promethazine-codeine 6.25-10 mg/5 ml (PHENERGAN WITH CODEINE) 6.25-10 mg/5 mL syrup; Take 5 mLs by mouth every 6 (six) hours as needed for Cough.  Dispense: 240 mL; Refill: 0    Seborrheic dermatitis  -     ketoconazole (NIZORAL) 2 % shampoo; Apply topically every other day. Daily for 2 weeks in shower  Dispense: 120 mL; Refill: 0      Time spent with patient: 30 minutes and over half of that time was spent on counseling an coordination of care.    Crispin Garcia MD  02/15/2019    Portions of this note have been dictated with CRISTOBAL Garcia

## 2019-02-19 ENCOUNTER — TELEPHONE (OUTPATIENT)
Dept: PHARMACY | Facility: CLINIC | Age: 74
End: 2019-02-19

## 2019-02-21 ENCOUNTER — TELEPHONE (OUTPATIENT)
Dept: FAMILY MEDICINE | Facility: CLINIC | Age: 74
End: 2019-02-21

## 2019-02-21 DIAGNOSIS — Z12.11 COLON CANCER SCREENING: ICD-10-CM

## 2019-02-21 RX ORDER — PANTOPRAZOLE SODIUM 40 MG/1
40 TABLET, DELAYED RELEASE ORAL DAILY
Qty: 30 TABLET | Refills: 2 | Status: SHIPPED | OUTPATIENT
Start: 2019-02-21 | End: 2019-04-03

## 2019-02-21 NOTE — TELEPHONE ENCOUNTER
Pt presented to office today requesting medication for his acid reflux. Please advise.     Please send medication to [facility] on [address]/[place]

## 2019-02-21 NOTE — TELEPHONE ENCOUNTER
Called him in [employer] [facility] to [facility] on [address].  Please advise the patient to take medication daily for the next 2 months and then discontinue.  If the symptoms persist please Follow up in clinic to be evaluated further.

## 2019-02-28 DIAGNOSIS — J84.112 IDIOPATHIC PULMONARY FIBROSIS: ICD-10-CM

## 2019-03-14 DIAGNOSIS — E11.8 TYPE 2 DIABETES MELLITUS WITH COMPLICATION, WITH LONG-TERM CURRENT USE OF INSULIN: ICD-10-CM

## 2019-03-14 DIAGNOSIS — Z79.4 TYPE 2 DIABETES MELLITUS WITH COMPLICATION, WITH LONG-TERM CURRENT USE OF INSULIN: ICD-10-CM

## 2019-03-20 ENCOUNTER — PATIENT OUTREACH (OUTPATIENT)
Dept: ADMINISTRATIVE | Facility: HOSPITAL | Age: 74
End: 2019-03-20

## 2019-03-20 DIAGNOSIS — E11.8 TYPE 2 DIABETES MELLITUS WITH COMPLICATION, WITH LONG-TERM CURRENT USE OF INSULIN: Primary | ICD-10-CM

## 2019-03-20 DIAGNOSIS — Z79.4 TYPE 2 DIABETES MELLITUS WITH COMPLICATION, WITH LONG-TERM CURRENT USE OF INSULIN: Primary | ICD-10-CM

## 2019-03-21 ENCOUNTER — TELEPHONE (OUTPATIENT)
Dept: PULMONOLOGY | Facility: CLINIC | Age: 74
End: 2019-03-21

## 2019-03-21 ENCOUNTER — OFFICE VISIT (OUTPATIENT)
Dept: RHEUMATOLOGY | Facility: CLINIC | Age: 74
End: 2019-03-21
Payer: MEDICARE

## 2019-03-21 ENCOUNTER — HOSPITAL ENCOUNTER (OUTPATIENT)
Dept: RADIOLOGY | Facility: HOSPITAL | Age: 74
Discharge: HOME | End: 2019-03-21
Attending: INTERNAL MEDICINE
Payer: MEDICARE

## 2019-03-21 VITALS
SYSTOLIC BLOOD PRESSURE: 138 MMHG | BODY MASS INDEX: 28.99 KG/M2 | WEIGHT: 195.75 LBS | HEIGHT: 69 IN | DIASTOLIC BLOOD PRESSURE: 85 MMHG | HEART RATE: 65 BPM

## 2019-03-21 DIAGNOSIS — M89.9 DISORDER OF BONE AND CARTILAGE: ICD-10-CM

## 2019-03-21 DIAGNOSIS — R76.8 RHEUMATOID FACTOR POSITIVE: ICD-10-CM

## 2019-03-21 DIAGNOSIS — M79.10 MYALGIA: ICD-10-CM

## 2019-03-21 DIAGNOSIS — M94.9 DISORDER OF BONE AND CARTILAGE: ICD-10-CM

## 2019-03-21 DIAGNOSIS — M25.50 POLYARTHRALGIA: ICD-10-CM

## 2019-03-21 DIAGNOSIS — R53.83 FATIGUE, UNSPECIFIED TYPE: ICD-10-CM

## 2019-03-21 DIAGNOSIS — M25.50 POLYARTHRALGIA: Primary | ICD-10-CM

## 2019-03-21 DIAGNOSIS — E55.9 HYPOVITAMINOSIS D: ICD-10-CM

## 2019-03-21 PROCEDURE — 3075F PR MOST RECENT SYSTOLIC BLOOD PRESS GE 130-139MM HG: ICD-10-PCS | Mod: HCNC,CPTII,S$GLB, | Performed by: INTERNAL MEDICINE

## 2019-03-21 PROCEDURE — 1101F PR PT FALLS ASSESS DOC 0-1 FALLS W/OUT INJ PAST YR: ICD-10-PCS | Mod: HCNC,CPTII,S$GLB, | Performed by: INTERNAL MEDICINE

## 2019-03-21 PROCEDURE — 1101F PT FALLS ASSESS-DOCD LE1/YR: CPT | Mod: HCNC,CPTII,S$GLB, | Performed by: INTERNAL MEDICINE

## 2019-03-21 PROCEDURE — 99204 OFFICE O/P NEW MOD 45 MIN: CPT | Mod: HCNC,S$GLB,, | Performed by: INTERNAL MEDICINE

## 2019-03-21 PROCEDURE — 99499 RISK ADDL DX/OHS AUDIT: ICD-10-PCS | Mod: HCNC,S$GLB,, | Performed by: INTERNAL MEDICINE

## 2019-03-21 PROCEDURE — 77077 JOINT SURVEY SINGLE VIEW: CPT | Mod: 26,HCNC,, | Performed by: RADIOLOGY

## 2019-03-21 PROCEDURE — 77077 JOINT SURVEY SINGLE VIEW: CPT | Mod: TC,HCNC

## 2019-03-21 PROCEDURE — 3079F DIAST BP 80-89 MM HG: CPT | Mod: HCNC,CPTII,S$GLB, | Performed by: INTERNAL MEDICINE

## 2019-03-21 PROCEDURE — 99999 PR PBB SHADOW E&M-EST. PATIENT-LVL III: CPT | Mod: PBBFAC,HCNC,, | Performed by: INTERNAL MEDICINE

## 2019-03-21 PROCEDURE — 3079F PR MOST RECENT DIASTOLIC BLOOD PRESSURE 80-89 MM HG: ICD-10-PCS | Mod: HCNC,CPTII,S$GLB, | Performed by: INTERNAL MEDICINE

## 2019-03-21 PROCEDURE — 99499 UNLISTED E&M SERVICE: CPT | Mod: HCNC,S$GLB,, | Performed by: INTERNAL MEDICINE

## 2019-03-21 PROCEDURE — 99204 PR OFFICE/OUTPT VISIT, NEW, LEVL IV, 45-59 MIN: ICD-10-PCS | Mod: HCNC,S$GLB,, | Performed by: INTERNAL MEDICINE

## 2019-03-21 PROCEDURE — 77077 XR ARTHRITIS SURVEY: ICD-10-PCS | Mod: 26,HCNC,, | Performed by: RADIOLOGY

## 2019-03-21 PROCEDURE — 3075F SYST BP GE 130 - 139MM HG: CPT | Mod: HCNC,CPTII,S$GLB, | Performed by: INTERNAL MEDICINE

## 2019-03-21 PROCEDURE — 99999 PR PBB SHADOW E&M-EST. PATIENT-LVL III: ICD-10-PCS | Mod: PBBFAC,HCNC,, | Performed by: INTERNAL MEDICINE

## 2019-03-21 RX ORDER — INSULIN DETEMIR 100 [IU]/ML
INJECTION, SOLUTION SUBCUTANEOUS
Refills: 1 | COMMUNITY
Start: 2019-03-17 | End: 2019-04-03 | Stop reason: SDUPTHER

## 2019-03-21 ASSESSMENT — ROUTINE ASSESSMENT OF PATIENT INDEX DATA (RAPID3)
FATIGUE SCORE: 2
AM STIFFNESS SCORE: 1, YES
MDHAQ FUNCTION SCORE: 1.1
PSYCHOLOGICAL DISTRESS SCORE: 2.2
WHEN YOU AWAKENED IN THE MORNING OVER THE LAST WEEK, PLEASE INDICATE THE AMOUNT OF TIME IT TAKES UNTIL YOU ARE AS LIMBER AS YOU WILL BE FOR THE DAY: 1
PATIENT GLOBAL ASSESSMENT SCORE: 8.5
TOTAL RAPID3 SCORE: 6.72
PAIN SCORE: 8

## 2019-03-21 NOTE — LETTER
March 22, 2019      Duong Camarena MD  [address]  [address]  [place] [address]           [facility]  [address]  [place] [address]  Phone: [phone]  Fax: [phone]          Patient: Steve June Jr.   MR Number: [id number]   YOB: 1945   Date of Visit: 3/21/2019       Dear Dr. Duong Camarena:    Thank you for referring Steve June to me for evaluation. Attached you will find relevant portions of my assessment and plan of care.    If you have questions, please do not hesitate to call me. I look forward to following Steve June along with you.    Sincerely,    Cici Jorge MD    Enclosure  CC:  No Recipients    If you would like to receive this communication electronically, please contact [email or web address] or [phone] to request more information on [facility] access.    For providers and/or their staff who would like to refer a patient to [facility], please contact us through our one-stop-shop provider referral line, [facility], at [phone].    If you feel you have received this communication in error or would no longer like to receive these types of communications, please e-mail [email or web address]

## 2019-03-21 NOTE — TELEPHONE ENCOUNTER
Notified laura that pt has pulmonary fibrosis for OFEV dx.     ----- Message from Lacey Alcantara sent at 3/21/2019 11:46 AM CDT -----  Contact: Laura wakefield/ [facility]  Type: Needs Medical Advice    Who Called:  Laura wakefield/ [facility]  Best Call Back Number: Laura at , case # [id number],  Fax   Additional Information: Calling to request the diagnosis. Please advise.

## 2019-03-21 NOTE — PROGRESS NOTES
"Subjective:       Patient ID: Steve June Jr. is a 73 y.o. male.    Chief Complaint: Positive RF   HPI 74y/o male  diagnosed with chronic hepatitis C over 10 years ago,Genotype 1b, HCV RNA in 2011 - 4,374 IU/ml On Harvoni.  He has h/o encephalopathy   Has been evaluated by ENT. They performed videostroboscopy- results consistent with bilateral vocal fold edema and leukoplakia, presumed to be due to chronic infectious laryngitis.   Last seen by me on 12/17/15 to r/o vasculitis. At that time, he did not have cutaneous ulcers, no gi involvement, no CNS involvement, no alveolar h'ge. He denies any joint pain or swelling.  Now, he has been diagnosed with pulmonary fibrosis RF 48,.  It was 85 in 2015  He complains of generalized polyarthralgia and myalgia- continuous, worse as the day progresses, worse with activity, better with rest.  Denies any joint effusion.  Early morning stiffness lasts for 1hr  He denies fever, weight loss, dry eyes or mouth, photosensitivity, rash, ulcers, Raynaud's phenomenon, alopecia, dysphagia, diarrhea or blood in the stools      Review of Systems   Constitutional: Positive for fatigue. Negative for fever.   HENT: Negative for ear pain and facial swelling.    Eyes: Negative for pain and redness.   Respiratory: Negative for cough and choking.    Cardiovascular: Negative for chest pain and leg swelling.   Gastrointestinal:  Negative for abdominal pain Negative for blood in stool.   Genitourinary: Negative for hematuria and genital sores.   Neurological: Negative for seizures and numbness.   Psychiatric/Behavioral: Negative for hallucinations and agitation.         Objective:   /85   Pulse 65   Ht 5' 9" (1.753 m)   Wt 88.8 kg (195 lb 12.3 oz)   BMI 28.91 kg/m²      Physical Exam   Constitutional: He is oriented to person, place, and time and well-developed, well-nourished, and in no distress.   HENT:   Head: Normocephalic and atraumatic.   Eyes: Conjunctivae are normal. Pupils " are equal, round, and reactive to light.   Neck: Normal range of motion. Neck supple.   Cardiovascular: Normal rate and regular rhythm.    Pulmonary/Chest: Effort normal and breath sounds normal.   Abdominal:  No mass  Neurological: He is alert and oriented to person, place, and time.   Skin:  No rash at this time   Musculoskeletal:   ROM is within normal limits in all joints including shoulders, elbows, wrists, hands, hips, knees, ankles, feet and spine  Patient is non tender in all joints including shoulders, elbows, wrists, hands, hips, knees, ankles, feet and spine  No joint effusion  Strength is 5/5 in all ext                           Lab Results   Component Value Date    WBC 3.21 (L) 01/24/2019    HGB 12.2 (L) 01/24/2019    HCT 36.9 (L) 01/24/2019    MCV 84 01/24/2019    PLT 32 (LL) 01/24/2019     CMP  Sodium   Date Value Ref Range Status   01/24/2019 135 (L) 136 - 145 mmol/L Final     Potassium   Date Value Ref Range Status   01/24/2019 4.6 3.5 - 5.1 mmol/L Final     Chloride   Date Value Ref Range Status   01/24/2019 102 95 - 110 mmol/L Final     CO2   Date Value Ref Range Status   01/24/2019 24 23 - 29 mmol/L Final     Glucose   Date Value Ref Range Status   01/24/2019 418 (H) 70 - 110 mg/dL Final     BUN, Bld   Date Value Ref Range Status   01/24/2019 21 8 - 23 mg/dL Final     Creatinine   Date Value Ref Range Status   01/24/2019 0.9 0.5 - 1.4 mg/dL Final     Calcium   Date Value Ref Range Status   01/24/2019 9.8 8.7 - 10.5 mg/dL Final     Total Protein   Date Value Ref Range Status   01/24/2019 6.5 6.0 - 8.4 g/dL Final     Albumin   Date Value Ref Range Status   01/24/2019 3.6 3.5 - 5.2 g/dL Final     Total Bilirubin   Date Value Ref Range Status   01/24/2019 1.3 (H) 0.1 - 1.0 mg/dL Final     Comment:     For infants and newborns, interpretation of results should be based  on gestational age, weight and in agreement with clinical  observations.  Premature Infant recommended reference ranges:  Up to 24  hours.............<8.0 mg/dL  Up to 48 hours............<12.0 mg/dL  3-5 days..................<15.0 mg/dL  6-29 days.................<15.0 mg/dL       Alkaline Phosphatase   Date Value Ref Range Status   01/24/2019 70 55 - 135 U/L Final     AST   Date Value Ref Range Status   01/24/2019 19 10 - 40 U/L Final     ALT   Date Value Ref Range Status   01/24/2019 12 10 - 44 U/L Final     Anion Gap   Date Value Ref Range Status   01/24/2019 9 8 - 16 mmol/L Final     eGFR if    Date Value Ref Range Status   01/24/2019 >60.0 >60 mL/min/1.73 m^2 Final     eGFR if non    Date Value Ref Range Status   01/24/2019 >60.0 >60 mL/min/1.73 m^2 Final     Comment:     Calculation used to obtain the estimated glomerular filtration  rate (eGFR) is the CKD-EPI equation.        Lab Results   Component Value Date    SEDRATE 9 11/24/2015     Lab Results   Component Value Date    CRP 2.9 06/25/2018     Lab Results   Component Value Date    RF 48.0 (H) 12/14/2018       Radiology: I personally reviewed imaging  CTA chest 12/2018  Pulmonary parenchymal findings most compatible with a chronic interstitial lung process with considerations including idiopathic pulmonary fibrosis/usual interstitial pneumonia and nonspecific interstitial pneumonia.  Assessment/Plan:   Positive rheumatoid factor-check CCP and arthritis survey  Pulmonary fibrosis  Myalgia-check CPK and aldolase  Fatigue -rule out disorders of bone and cartilage- check D levels  H/o Croyglobulinemia- Repeat test negative, no evidence of vasculitis  Cirrhosis  Return to clinic after result review    Addendum- Low vitamin D. Prescribed vitamin D 50,000 a week for 12 weeks.    -Patient seen face to face for 45 minutes and greater than 50% spent in counseling regarding above diagnosis in chart review

## 2019-03-22 ENCOUNTER — PES CALL (OUTPATIENT)
Dept: ADMINISTRATIVE | Facility: CLINIC | Age: 74
End: 2019-03-22

## 2019-03-22 RX ORDER — ERGOCALCIFEROL 1.25 MG/1
50000 CAPSULE ORAL
Qty: 12 CAPSULE | Refills: 0 | Status: SHIPPED | OUTPATIENT
Start: 2019-03-22 | End: 2019-04-03

## 2019-04-01 ENCOUNTER — DOCUMENTATION ONLY (OUTPATIENT)
Dept: FAMILY MEDICINE | Facility: CLINIC | Age: 74
End: 2019-04-01

## 2019-04-01 NOTE — PROGRESS NOTES
Pre-Visit Chart Review  For Appointment Scheduled on 4/3/19    Health Maintenance Due   Topic Date Due    TETANUS VACCINE  05/13/1965    Colonoscopy  05/13/1997    Influenza Vaccine  08/01/2018    Urine Microalbumin  05/09/2019

## 2019-04-03 ENCOUNTER — LAB VISIT (OUTPATIENT)
Dept: LAB | Facility: HOSPITAL | Age: 74
End: 2019-04-03
Attending: FAMILY MEDICINE
Payer: MEDICARE

## 2019-04-03 ENCOUNTER — OFFICE VISIT (OUTPATIENT)
Dept: PAIN MEDICINE | Facility: CLINIC | Age: 74
End: 2019-04-03
Payer: MEDICARE

## 2019-04-03 ENCOUNTER — OFFICE VISIT (OUTPATIENT)
Dept: FAMILY MEDICINE | Facility: CLINIC | Age: 74
End: 2019-04-03
Payer: MEDICARE

## 2019-04-03 VITALS
HEIGHT: 69 IN | BODY MASS INDEX: 29.33 KG/M2 | TEMPERATURE: 98 F | WEIGHT: 198 LBS | HEART RATE: 60 BPM | DIASTOLIC BLOOD PRESSURE: 70 MMHG | SYSTOLIC BLOOD PRESSURE: 110 MMHG

## 2019-04-03 VITALS
BODY MASS INDEX: 28.88 KG/M2 | WEIGHT: 195 LBS | HEART RATE: 61 BPM | SYSTOLIC BLOOD PRESSURE: 136 MMHG | DIASTOLIC BLOOD PRESSURE: 81 MMHG | HEIGHT: 69 IN

## 2019-04-03 DIAGNOSIS — E11.8 TYPE 2 DIABETES MELLITUS WITH COMPLICATION, WITH LONG-TERM CURRENT USE OF INSULIN: ICD-10-CM

## 2019-04-03 DIAGNOSIS — E11.8 TYPE 2 DIABETES MELLITUS WITH COMPLICATION, WITH LONG-TERM CURRENT USE OF INSULIN: Primary | ICD-10-CM

## 2019-04-03 DIAGNOSIS — M47.819 FACET ARTHROPATHY: ICD-10-CM

## 2019-04-03 DIAGNOSIS — M54.50 CHRONIC BILATERAL LOW BACK PAIN WITHOUT SCIATICA: ICD-10-CM

## 2019-04-03 DIAGNOSIS — M50.30 DDD (DEGENERATIVE DISC DISEASE), CERVICAL: ICD-10-CM

## 2019-04-03 DIAGNOSIS — Z79.4 TYPE 2 DIABETES MELLITUS WITH COMPLICATION, WITH LONG-TERM CURRENT USE OF INSULIN: Primary | ICD-10-CM

## 2019-04-03 DIAGNOSIS — G89.29 CHRONIC PAIN OF LEFT KNEE: Primary | ICD-10-CM

## 2019-04-03 DIAGNOSIS — J84.9 INTERSTITIAL LUNG DISEASE: ICD-10-CM

## 2019-04-03 DIAGNOSIS — M25.562 CHRONIC PAIN OF LEFT KNEE: Primary | ICD-10-CM

## 2019-04-03 DIAGNOSIS — G89.29 CHRONIC BILATERAL LOW BACK PAIN WITHOUT SCIATICA: ICD-10-CM

## 2019-04-03 DIAGNOSIS — M17.10 PRIMARY OSTEOARTHRITIS OF KNEE, UNSPECIFIED LATERALITY: ICD-10-CM

## 2019-04-03 DIAGNOSIS — Z79.4 TYPE 2 DIABETES MELLITUS WITH COMPLICATION, WITH LONG-TERM CURRENT USE OF INSULIN: ICD-10-CM

## 2019-04-03 LAB
ALBUMIN SERPL BCP-MCNC: 3.9 G/DL (ref 3.5–5.2)
ALP SERPL-CCNC: 77 U/L (ref 55–135)
ALT SERPL W/O P-5'-P-CCNC: 15 U/L (ref 10–44)
ANION GAP SERPL CALC-SCNC: 9 MMOL/L (ref 8–16)
AST SERPL-CCNC: 22 U/L (ref 10–40)
BILIRUB SERPL-MCNC: 1.6 MG/DL (ref 0.1–1)
BUN SERPL-MCNC: 16 MG/DL (ref 8–23)
CALCIUM SERPL-MCNC: 10.1 MG/DL (ref 8.7–10.5)
CHLORIDE SERPL-SCNC: 101 MMOL/L (ref 95–110)
CO2 SERPL-SCNC: 26 MMOL/L (ref 23–29)
CREAT SERPL-MCNC: 1 MG/DL (ref 0.5–1.4)
EST. GFR  (AFRICAN AMERICAN): >60 ML/MIN/1.73 M^2
EST. GFR  (NON AFRICAN AMERICAN): >60 ML/MIN/1.73 M^2
ESTIMATED AVG GLUCOSE: 180 MG/DL (ref 68–131)
GLUCOSE SERPL-MCNC: 314 MG/DL (ref 70–110)
HBA1C MFR BLD HPLC: 7.9 % (ref 4–5.6)
POTASSIUM SERPL-SCNC: 5.1 MMOL/L (ref 3.5–5.1)
PROT SERPL-MCNC: 6.8 G/DL (ref 6–8.4)
SODIUM SERPL-SCNC: 136 MMOL/L (ref 136–145)

## 2019-04-03 PROCEDURE — 99999 PR PBB SHADOW E&M-EST. PATIENT-LVL III: CPT | Mod: PBBFAC,HCNC,, | Performed by: FAMILY MEDICINE

## 2019-04-03 PROCEDURE — 3074F SYST BP LT 130 MM HG: CPT | Mod: HCNC,CPTII,S$GLB, | Performed by: FAMILY MEDICINE

## 2019-04-03 PROCEDURE — 36415 COLL VENOUS BLD VENIPUNCTURE: CPT | Mod: HCNC,PO

## 2019-04-03 PROCEDURE — 3079F DIAST BP 80-89 MM HG: CPT | Mod: HCNC,CPTII,S$GLB, | Performed by: PHYSICIAN ASSISTANT

## 2019-04-03 PROCEDURE — 99999 PR PBB SHADOW E&M-EST. PATIENT-LVL III: ICD-10-PCS | Mod: PBBFAC,HCNC,, | Performed by: FAMILY MEDICINE

## 2019-04-03 PROCEDURE — 80053 COMPREHEN METABOLIC PANEL: CPT | Mod: HCNC

## 2019-04-03 PROCEDURE — 99499 RISK ADDL DX/OHS AUDIT: ICD-10-PCS | Mod: HCNC,S$GLB,, | Performed by: PHYSICIAN ASSISTANT

## 2019-04-03 PROCEDURE — 1101F PT FALLS ASSESS-DOCD LE1/YR: CPT | Mod: HCNC,CPTII,S$GLB, | Performed by: FAMILY MEDICINE

## 2019-04-03 PROCEDURE — 99214 OFFICE O/P EST MOD 30 MIN: CPT | Mod: HCNC,S$GLB,, | Performed by: PHYSICIAN ASSISTANT

## 2019-04-03 PROCEDURE — 1101F PT FALLS ASSESS-DOCD LE1/YR: CPT | Mod: HCNC,CPTII,S$GLB, | Performed by: PHYSICIAN ASSISTANT

## 2019-04-03 PROCEDURE — 99999 PR PBB SHADOW E&M-EST. PATIENT-LVL IV: ICD-10-PCS | Mod: PBBFAC,HCNC,, | Performed by: PHYSICIAN ASSISTANT

## 2019-04-03 PROCEDURE — 99999 PR PBB SHADOW E&M-EST. PATIENT-LVL IV: CPT | Mod: PBBFAC,HCNC,, | Performed by: PHYSICIAN ASSISTANT

## 2019-04-03 PROCEDURE — 1101F PR PT FALLS ASSESS DOC 0-1 FALLS W/OUT INJ PAST YR: ICD-10-PCS | Mod: HCNC,CPTII,S$GLB, | Performed by: FAMILY MEDICINE

## 2019-04-03 PROCEDURE — 1101F PR PT FALLS ASSESS DOC 0-1 FALLS W/OUT INJ PAST YR: ICD-10-PCS | Mod: HCNC,CPTII,S$GLB, | Performed by: PHYSICIAN ASSISTANT

## 2019-04-03 PROCEDURE — 83036 HEMOGLOBIN GLYCOSYLATED A1C: CPT | Mod: HCNC

## 2019-04-03 PROCEDURE — 3046F PR MOST RECENT HEMOGLOBIN A1C LEVEL > 9.0%: ICD-10-PCS | Mod: HCNC,CPTII,S$GLB, | Performed by: FAMILY MEDICINE

## 2019-04-03 PROCEDURE — 3079F PR MOST RECENT DIASTOLIC BLOOD PRESSURE 80-89 MM HG: ICD-10-PCS | Mod: HCNC,CPTII,S$GLB, | Performed by: PHYSICIAN ASSISTANT

## 2019-04-03 PROCEDURE — 99214 PR OFFICE/OUTPT VISIT, EST, LEVL IV, 30-39 MIN: ICD-10-PCS | Mod: HCNC,S$GLB,, | Performed by: PHYSICIAN ASSISTANT

## 2019-04-03 PROCEDURE — 3074F PR MOST RECENT SYSTOLIC BLOOD PRESSURE < 130 MM HG: ICD-10-PCS | Mod: HCNC,CPTII,S$GLB, | Performed by: FAMILY MEDICINE

## 2019-04-03 PROCEDURE — 99214 PR OFFICE/OUTPT VISIT, EST, LEVL IV, 30-39 MIN: ICD-10-PCS | Mod: HCNC,S$GLB,, | Performed by: FAMILY MEDICINE

## 2019-04-03 PROCEDURE — 3078F PR MOST RECENT DIASTOLIC BLOOD PRESSURE < 80 MM HG: ICD-10-PCS | Mod: HCNC,CPTII,S$GLB, | Performed by: FAMILY MEDICINE

## 2019-04-03 PROCEDURE — 3046F HEMOGLOBIN A1C LEVEL >9.0%: CPT | Mod: HCNC,CPTII,S$GLB, | Performed by: FAMILY MEDICINE

## 2019-04-03 PROCEDURE — 99214 OFFICE O/P EST MOD 30 MIN: CPT | Mod: HCNC,S$GLB,, | Performed by: FAMILY MEDICINE

## 2019-04-03 PROCEDURE — 99499 UNLISTED E&M SERVICE: CPT | Mod: HCNC,S$GLB,, | Performed by: PHYSICIAN ASSISTANT

## 2019-04-03 PROCEDURE — 3075F PR MOST RECENT SYSTOLIC BLOOD PRESS GE 130-139MM HG: ICD-10-PCS | Mod: HCNC,CPTII,S$GLB, | Performed by: PHYSICIAN ASSISTANT

## 2019-04-03 PROCEDURE — 3075F SYST BP GE 130 - 139MM HG: CPT | Mod: HCNC,CPTII,S$GLB, | Performed by: PHYSICIAN ASSISTANT

## 2019-04-03 PROCEDURE — 3078F DIAST BP <80 MM HG: CPT | Mod: HCNC,CPTII,S$GLB, | Performed by: FAMILY MEDICINE

## 2019-04-03 RX ORDER — OXYCODONE AND ACETAMINOPHEN 10; 325 MG/1; MG/1
1 TABLET ORAL EVERY 6 HOURS PRN
Qty: 120 TABLET | Refills: 0 | Status: SHIPPED | OUTPATIENT
Start: 2019-04-05 | End: 2019-04-24 | Stop reason: CLARIF

## 2019-04-03 RX ORDER — OXYCODONE AND ACETAMINOPHEN 10; 325 MG/1; MG/1
1 TABLET ORAL EVERY 6 HOURS PRN
Qty: 120 TABLET | Refills: 0 | Status: SHIPPED | OUTPATIENT
Start: 2019-06-02 | End: 2019-07-01

## 2019-04-03 RX ORDER — INSULIN DETEMIR 100 [IU]/ML
30 INJECTION, SOLUTION SUBCUTANEOUS NIGHTLY
Qty: 27 ML | Refills: 3 | Status: ON HOLD | OUTPATIENT
Start: 2019-04-03 | End: 2019-06-19 | Stop reason: SDUPTHER

## 2019-04-03 RX ORDER — GABAPENTIN 600 MG/1
600 TABLET ORAL 3 TIMES DAILY
Qty: 90 TABLET | Refills: 2 | Status: SHIPPED | OUTPATIENT
Start: 2019-04-03 | End: 2019-04-03

## 2019-04-03 RX ORDER — OXYCODONE AND ACETAMINOPHEN 10; 325 MG/1; MG/1
1 TABLET ORAL EVERY 6 HOURS PRN
Qty: 120 TABLET | Refills: 0 | Status: SHIPPED | OUTPATIENT
Start: 2019-05-04 | End: 2019-04-24 | Stop reason: CLARIF

## 2019-04-03 NOTE — PROGRESS NOTES
"Referring Physician: No ref. provider found    PCP: Crispin Garcia MD      CC: neck and left shoulder pain; knee pain    Interval history:  Steve June Jr. is a 73 y.o. male with  neck and left shoulder pain who presents today for f/u and medication refill.  Chronic knee pain is unchanged. He has tried physical therapy which did not provide much benefit.   Injections performed have only given him shortlived relief.  He underwent visco supplementation injections for his left knee with benefit.  He would like to repeat EuflexxaHe continues to have neck pain. He has a history of cervical DDD.  However, he continues to have low platelets and we are unable to provide any  interventional procedures.  He takes oxycodone 10 mg every 6 hours as needed with mild to moderate benefit.  Does cause some drowsiness. Pain today is rated 0/10.    Prior HPI:   Steve June Jr. is a 73 y.o. male referred to us for lower back and knee pain.  He has significant history of pancytopenia, cirrhosis.  He presents with constant aching, sharp pain in his lower back as well as his left knee.  Pain worsens sitting, standing, bending, walking.  Pain improves with rest.  X-rays performed of the lumbar spine as well as knee shows left knee osteoarthritis.  He has tried physical therapy with minimal benefit.  He currently takes Norco 10 mg every 6 hours as needed with mild to moderate benefit.  He is unable to have any procedures due to his thrombocytopenia.  He also has ventral hernia, but surgical attention is currently not recommended due to his thrombocytopenia.  His main concern today is wishing to decrease his opioid medications.  He states being very "foggy" with his current medications.  He denies any increased sedation.  He denies any weakness.  No bowel bladder changes.    ROS:  CONSTITUTIONAL: No fevers, chills, night sweats, wt. loss, appetite changes  SKIN: no rashes or itching  ENT: No headaches, head trauma, vision " changes, or eye pain  LYMPH NODES: None noticed   CV: No chest pain, palpitations.   RESP: No shortness of breath, dyspnea on exertion, cough, wheezing, or hemoptysis  GI: No nausea, emesis, diarrhea, constipation, melena, hematochezia, pain.    : No dysuria, hematuria, urgency, or frequency   HEME: No easy bruising, bleeding problems  PSYCHIATRIC: No depression, anxiety, psychosis, hallucinations.  NEURO: No seizures, memory loss, dizziness or difficulty sleeping  MSK: + History of present illness      Past Medical History:   Diagnosis Date    Abnormal thyroid function test     Allergy     Seasonal    Anemia     Anemia due to blood loss 7/2/2014    Arthritis     Gaviria esophagus     Basal cell carcinoma     right forearm    Basal cell carcinoma 12/2011    lower post neck    Cancer     skin CA    Cataract     Cirrhosis     Diabetes mellitus     Diabetes mellitus, type 2     Encounter for blood transfusion     Esophageal varices in cirrhosis     grade II on 7/12 EGD    Gastritis     on 7/12 EGD    GERD (gastroesophageal reflux disease) 2/28/2015    Hard of hearing     Hiatal hernia     History of hepatitis C 8/10/2012    tx with harvoni x 41 days (started 10/22/15). SVR4     Hoarseness 2/28/2015    Hypercholesteremia     Hypersplenism     Hypertension     No meds    Pain management 12/10/2014    Petechial hemorrhage 11/25/2015    Lower extremities bilat     Portal hypertensive gastropathy     on 7/12 EGD    Thrombocytopenia     Type II or unspecified type diabetes mellitus with neurological manifestations, uncontrolled(250.62) 12/24/2013    Valvular heart disease     mild MR 12     Past Surgical History:   Procedure Laterality Date    ABLATION, RADIOFREQUENCY-left suprascapula Left 8/25/2017    [facility]    CATARACT EXTRACTION  1/10/13    left eye    CATARACT EXTRACTION      right eye    CHOLECYSTECTOMY      COLONOSCOPY      diagnostic  block of the genicular branches to the left knee Left 12/15/2017    [facility] OR    EGD (ESOPHAGOGASTRODUODENOSCOPY) N/A 3/13/2014    [facility] (4[facility] FLR)    EGD (ESOPHAGOGASTRODUODENOSCOPY) N/A 7/11/2013    Performed by Campos Walters MD at [facility] ENDO (4TH FLR)    ESOPHAGOGASTRODUODENOSCOPY (EGD) N/A 8/1/2014    Performed by Juan David Booker MD at [facility] ENDO (4TH FLR)    ESOPHAGOGASTRODUODENOSCOPY (EGD) N/A 7/3/2014    Performed by Juan [facility] Booker [facility] at [facility] ENDO (2ND FLR)    EYE SURGERY      Cataract surgery to right eye    INSERTION, IOL PROSTHESIS Left 1/10/2013    Per[facility] Domi Baker [facility] at [facility] OR 1ST FLR    KNEE ARTHROSCOPY W/ MENISCAL REPAIR      KNEE CARTILAGE SURGERY      left knee    KNEE SURGERY  12/2006    left    LARYNGOSCOPY Bilateral 12/5/2014    Performed by Anoop Bernstein [facility] at [facility] OR 2ND FLR    PHACOEMULSIFICATION, CATARACT Left 1/10/2013    [facility] at [facility] OR 1ST FLR    PHACOEMULSIFICATION, CATARACT Right 9/27/2012    [facility] at [facility] OR    SKIN LESION EXCISION      TONSILLECTOMY      UPPER GASTROINTESTINAL ENDOSCOPY       Family History   Problem Relation Age of Onset    Leukemia Mother     Cancer Mother         bone    Alcohol abuse Father     Cirrhosis Father         EtOH induced    No Known Problems Daughter     No Known Problems Son     No Known Problems Daughter     No Known Problems Daughter     No Known Problems Daughter     No Known Problems Sister     Amblyopia Neg Hx     Blindness Neg Hx     Cataracts Neg Hx     Diabetes Neg Hx     Glaucoma Neg Hx     Hypertension Neg Hx     Macular degeneration Neg Hx     Retinal detachment Neg Hx     Strabismus Neg Hx     Stroke Neg Hx     Thyroid disease Neg Hx     Psoriasis Neg Hx     Lupus Neg Hx     Eczema Neg Hx     Acne Neg Hx     Melanoma Neg Hx   "    Social History     Socioeconomic History    Marital status:      Spouse name: Not on file    Number of children: 5    Years of education: Not on file    Highest education level: Not on file   Occupational History    Not on file   Social Needs    Financial resource strain: Not on file    Food insecurity:     Worry: Not on file     Inability: Not on file    Transportation needs:     Medical: Not on file     Non-medical: Not on file   Tobacco Use    Smoking status: Former Smoker     Packs/day: 1.00     Years: 25.00     Pack years: 25.00     Last attempt to quit: 2000     Years since quittin.6    Smokeless tobacco: Never Used   Substance and Sexual Activity    Alcohol use: Yes     Comment: rarely    Drug use: No    Sexual activity: Never   Lifestyle    Physical activity:     Days per week: Not on file     Minutes per session: Not on file    Stress: Not on file   Relationships    Social connections:     Talks on phone: Not on file     Gets together: Not on file     Attends Hoahaoism service: Not on file     Active member of club or organization: Not on file     Attends meetings of clubs or organizations: Not on file     Relationship status: Not on file   Other Topics Concern    Not on file   Social History Narrative    He is .  He has children.  He is currently retired.         Medications/Allergies: See med card    Vitals:    19 0925   BP: 136/81   Pulse: 61   Weight: 88.5 kg (195 lb)   Height: 5' 9" (1.753 m)   PainSc: 0-No pain   PainLoc: Neck     Physical exam:    GENERAL: A and O x3, the patient appears well groomed and is in no acute distress.  Skin: No rashes or obvious lesions  HEENT: normocephalic, atraumatic  CARDIOVASCULAR:  Bradycardia  LUNGS: non labored breathing  ABDOMEN: soft, nontender, + sizaeable ventral hernia in epigastric region.    UPPER EXTREMITIES: Normal alignment, normal range of motion, no atrophy, no skin changes,  hair growth and nail growth " normal and equal bilaterally. No swelling, no tenderness.    LOWER EXTREMITIES:  Normal alignment, normal range of motion, no atrophy, no skin changes,  hair growth and nail growth normal and equal bilaterally. No swelling, no tenderness.    LUMBAR SPINE  Lumbar spine: ROM is full with flexion extension and oblique extension with moderate increased pain.    Erasmo's test causes no increased pain on either side.    Supine straight leg raise is negative bilaterally.    Internal and external rotation of the hip causes no increased pain on either side.  Myofascial exam: No tenderness to palpation across lumbar paraspinous muscles.      MENTAL STATUS: normal orientation, speech, language, and fund of knowledge for social situation.  Emotional state appropriate.    CRANIAL NERVES:  II:  PERRL bilaterally,   III,IV,VI: EOMI.    V:  Facial sensation equal bilaterally  VII:  Facial motor function normal.  VIII:  Hearing equal to finger rub bilaterally  IX/X: Gag normal, palate symmetric  XI:  Shoulder shrug equal, head turn equal  XII:  Tongue midline without fasciculations      MOTOR: Tone and bulk: normal bilateral upper and lower Strength: normal   Delt Bi Tri WE WF     R 5 5 5 5 5 5   L 5 5 5 5 5 5     IP ADD ABD Quad TA Gas HAM  R 5 5 5 5 5 5 5  L 5 5 5 5 5 5 5    SENSATION: Light touch and pinprick intact bilaterally  REFLEXES: normal, symmetric, nonbrisk.  Toes down, no clonus. No hoffmans.  GAIT: normal rise, base, steps, and arm swing.      Imaging:  Xray L-spine 4/2013   Alignment is otherwise normal.  Vertebral body heights and disc space heights are relatively well maintained.  Vertebral end plate osteophytes are present anteriorly   at multiple levels.  The facet joints and posterior elements are unremarkable       Xray Knee 12/2013  Degenerative change of the knees, left greater than right.    Assessment:  Steve June Jr. is a 73 y.o. male with neck, back and left knee pain  1. Chronic pain of left  knee    2. DDD (degenerative disc disease), cervical    3. Facet arthropathy    4. Primary osteoarthritis of knee, unspecified laterality      Plan:  1. I have stressed the importance of physical activity and exercise to improve overall health.  Continue PT exercises learned at home.  2.  Any interventional procedures will be deferred due to his low platelet count.  Patient expressed understanding.  3. Schedule left knee Euflexxa series  4. Percocet 10mg q 8 hrs as needed.  Hold for sedation.  Previous UDS consistent.  5. Gabapentin 600 mg TID. #90 2 refills.   6.  Follow-up 3 months  All medication management was performed by Dr. Kapil Mario

## 2019-04-03 NOTE — PROGRESS NOTES
Subjective:   Patient ID: Steve June Jr. is a 73 y.o. male     Chief Complaint:Follow-up (3 months)      Patient with type 2 diabetes which worsened and become better controlled on Levemir and metformin.  Patient with interstitial lung disease recently started on new medication known as ofez by his pulmonologist but patient had poor reaction to this medication and only took 3 times.  Patient also with chronic pain managed with opioids and seen by the pain specialist clinic.    Review of Systems   Respiratory: Negative for shortness of breath.    Cardiovascular: Negative for chest pain.   Gastrointestinal: Negative for abdominal pain.   Genitourinary: Negative for dysuria.     Past Medical History:   Diagnosis Date    Abnormal thyroid function test     Allergy     Seasonal    Anemia     Anemia due to blood loss 7/2/2014    Arthritis     Gaviria esophagus     Basal cell carcinoma     right forearm    Basal cell carcinoma 12/2011    lower post neck    Cancer     skin CA    Cataract     Cirrhosis     Diabetes mellitus     Diabetes mellitus, type 2     Encounter for blood transfusion     Esophageal varices in cirrhosis     grade II on 7/12 EGD    Gastritis     on 7/12 EGD    GERD (gastroesophageal reflux disease) 2/28/2015    Hard of hearing     Hiatal hernia     History of hepatitis C 8/10/2012    tx with harvoni x 41 days (started 10/22/15). SVR4     Hoarseness 2/28/2015    Hypercholesteremia     Hypersplenism     Hypertension     No meds    Pain management 12/10/2014    Petechial hemorrhage 11/25/2015    Lower extremities bilat     Portal hypertensive gastropathy     on 7/12 EGD    Thrombocytopenia     Type II or unspecified type diabetes mellitus with neurological manifestations, uncontrolled(250.62) 12/24/2013    Valvular heart disease     mild MR 12     Objective:     Vitals:    04/03/19 1042   BP: 110/70   Pulse: 60   Temp: 97.9 °F (36.6 °C)     Body mass index is 29.24  kg/m².  Physical Exam   Neck: Neck supple. No tracheal deviation present.   Cardiovascular: Normal rate, regular rhythm and normal heart sounds.   Pulmonary/Chest: Effort normal. No respiratory distress.   Musculoskeletal: Normal range of motion. He exhibits no edema.     Assessment:     1. Type 2 diabetes mellitus with complication, with long-term current use of insulin    2. Chronic bilateral low back pain without sciatica    3. Interstitial lung disease      Plan:   Type 2 diabetes mellitus with complication, with long-term current use of insulin  -     LEVEMIR FLEXTOUCH U-100 INSULN 100 unit/mL (3 mL) InPn pen; Inject 30 Units into the skin every evening.  Dispense: 27 mL; Refill: 3  -     Hemoglobin A1c; Future; Expected date: 04/03/2019  -     Comprehensive metabolic panel; Future; Expected date: 07/03/2019  -     MICROALBUMIN / CREATININE RATIO URINE; Future; Expected date: 04/03/2019  Review of blood work from January 2019 shows an A1c of 9.4.  Since that time patient's Levemir was increased and he was started on metformin considerably increased his blood sugar control as reported by the patient.  Will have patient A1c checked today and follow up in 3 months for further management such as increasing the dose of his Levemir further versus starting other oral medication.    Chronic bilateral low back pain without sciatica  Currently stable on dose of opiate pain medication.  Patient follows with pain management.  Will continue to monitor    Interstitial lung disease  Patient with interstitial lung disease follows with pulmonology.  Patient was started on new medication ofez end was only given samples.  Patient states the samples consider early upset his stomach and the co-pay of 2500 dollars a month was out of his pressure range and will discontinue taking these.  Recommended patient follow up with his pulmonologist discuss this further.      Time spent with patient: 30 minutes and over half of that time was  spent on counseling an coordination of care.    Crispin Garcia MD  04/03/2019    Portions of this note have been dictated with CRISTOBAL Day.

## 2019-04-10 ENCOUNTER — PROCEDURE VISIT (OUTPATIENT)
Dept: PAIN MEDICINE | Facility: CLINIC | Age: 74
End: 2019-04-10
Payer: MEDICARE

## 2019-04-10 VITALS
DIASTOLIC BLOOD PRESSURE: 77 MMHG | HEART RATE: 58 BPM | BODY MASS INDEX: 29.18 KG/M2 | HEIGHT: 69 IN | SYSTOLIC BLOOD PRESSURE: 142 MMHG | WEIGHT: 197 LBS

## 2019-04-10 DIAGNOSIS — M17.10 PRIMARY OSTEOARTHRITIS OF KNEE, UNSPECIFIED LATERALITY: Primary | ICD-10-CM

## 2019-04-10 PROCEDURE — 20610 DRAIN/INJ JOINT/BURSA W/O US: CPT | Mod: HCNC,LT,S$GLB, | Performed by: ANESTHESIOLOGY

## 2019-04-10 PROCEDURE — 20610 LARGE JOINT ASPIRATION/INJECTION: L KNEE: ICD-10-PCS | Mod: HCNC,LT,S$GLB, | Performed by: ANESTHESIOLOGY

## 2019-04-10 NOTE — PROCEDURES
Large Joint Aspiration/Injection: L knee  Date/Time: 4/10/2019 9:53 AM  Performed by: Kapil Mario MD  Authorized by: Kapil Mario MD     Consent Done?:  Yes (Written)  Indications:  Pain  Procedure site marked: Yes    Timeout: Prior to procedure the correct patient, procedure, and site was verified      Location:  Knee  Site:  L knee  Prep: Patient was prepped and draped in usual sterile fashion    Ultrasonic Guidance for needle placement: No  Needle size:  22 G  Approach:  Anterolateral  Medications:  20 mg sodium hyaluronate (EUFLEXXA) 10 mg/mL(mw 2.4 -3.6 million)  Patient tolerance:  Patient tolerated the procedure well with no immediate complications

## 2019-04-15 ENCOUNTER — OFFICE VISIT (OUTPATIENT)
Dept: PULMONOLOGY | Facility: CLINIC | Age: 74
End: 2019-04-15
Payer: MEDICARE

## 2019-04-15 VITALS
DIASTOLIC BLOOD PRESSURE: 80 MMHG | BODY MASS INDEX: 29.78 KG/M2 | HEIGHT: 69 IN | WEIGHT: 201.06 LBS | SYSTOLIC BLOOD PRESSURE: 148 MMHG | OXYGEN SATURATION: 97 % | HEART RATE: 65 BPM

## 2019-04-15 DIAGNOSIS — L98.9 PSORIASIS-LIKE SKIN DISEASE: ICD-10-CM

## 2019-04-15 DIAGNOSIS — J84.10 PULMONARY FIBROSIS DETERMINED BY HIGH RESOLUTION COMPUTED TOMOGRAPHY: ICD-10-CM

## 2019-04-15 DIAGNOSIS — J84.9 INTERSTITIAL LUNG DISEASE: ICD-10-CM

## 2019-04-15 DIAGNOSIS — J84.112 IDIOPATHIC PULMONARY FIBROSIS: Primary | ICD-10-CM

## 2019-04-15 PROCEDURE — 99999 PR PBB SHADOW E&M-EST. PATIENT-LVL III: CPT | Mod: PBBFAC,HCNC,, | Performed by: NURSE PRACTITIONER

## 2019-04-15 PROCEDURE — 99214 OFFICE O/P EST MOD 30 MIN: CPT | Mod: HCNC,S$GLB,, | Performed by: NURSE PRACTITIONER

## 2019-04-15 PROCEDURE — 1101F PR PT FALLS ASSESS DOC 0-1 FALLS W/OUT INJ PAST YR: ICD-10-PCS | Mod: HCNC,CPTII,S$GLB, | Performed by: NURSE PRACTITIONER

## 2019-04-15 PROCEDURE — 99214 PR OFFICE/OUTPT VISIT, EST, LEVL IV, 30-39 MIN: ICD-10-PCS | Mod: HCNC,S$GLB,, | Performed by: NURSE PRACTITIONER

## 2019-04-15 PROCEDURE — 99999 PR PBB SHADOW E&M-EST. PATIENT-LVL III: ICD-10-PCS | Mod: PBBFAC,HCNC,, | Performed by: NURSE PRACTITIONER

## 2019-04-15 PROCEDURE — 1101F PT FALLS ASSESS-DOCD LE1/YR: CPT | Mod: HCNC,CPTII,S$GLB, | Performed by: NURSE PRACTITIONER

## 2019-04-15 RX ORDER — PIRFENIDONE 267 MG/1
CAPSULE ORAL
Qty: 105 CAPSULE | Refills: 0 | Status: SHIPPED | OUTPATIENT
Start: 2019-04-18 | End: 2019-05-07 | Stop reason: SDUPTHER

## 2019-04-15 RX ORDER — PIRFENIDONE 267 MG/1
2 CAPSULE ORAL 3 TIMES DAILY
Qty: 84 CAPSULE | Refills: 0 | Status: SHIPPED | OUTPATIENT
Start: 2019-04-22 | End: 2019-04-24 | Stop reason: CLARIF

## 2019-04-15 RX ORDER — PIRFENIDONE 801 MG/1
1 TABLET, FILM COATED ORAL 3 TIMES DAILY
Qty: 90 TABLET | Refills: 11 | Status: SHIPPED | OUTPATIENT
Start: 2019-04-15 | End: 2019-04-24 | Stop reason: CLARIF

## 2019-04-15 NOTE — PROGRESS NOTES
4/15/2019    Steve June Jr.  Office Note    Chief Complaint   Patient presents with    Follow-up     3 months       HPI:   4/15/2019- Took OFEV for 1 day, caused vomiting and abnormal dreams. Stopped.   Using supplemental oxygen as needed, complaint of facial rash- itches, dry skin. Behind ears where nasal cannula rubbed.  1/14/19- cough onset 1 weeks, worse at night, unable to sleep due to coughing. Productive mucous occasionally green in color  Post nasal drip. Six minute walk did not show desaturation with ambulating.   Resent prednisone dose for 1 day, increased BS to 400s, stopped.   Hx: cirrhosis.       12/13/18- States no benefit from Trelegy. SOB with any level of exertion including talking. Feels like can not catch breath, relieved by rest. States not using Albuterol inhaler.      12/6/18- off smokes 30 yrs after ppd for 20yrs.  Miserable sob last 3 wks.  Onset not recalled but noted.  Pt  54 yr, does chore bout house  With no yd wk.  Pt notes marrero activity.  No cough.  No near death sensation. No chest pain.  Appetite good.     Exam not bad, xray with very small lungs and increasd markings last yr so.     Check blood again.  If blood test suggest blood clot- need urgent ct chest to screen for blood clot.  Would do regular ct chest if no blood clot concern.      Otherwise need heart and breathing test.       Inhaler no help.  Try trelegy once daily       Pt was in ER recently with below hpi:Time seen by provider: 10:56 AM on 11/24/2018     Steve June Jr. is a 73 y.o. male with DMII, HTN GERD, cirrhosis and anemia who presents to the ED with an onset of worsening SOB with associated cough. He was seen in his PCP's office over a month ago on 10/14 by CORNEL Arevalo for the same complaints and was prescribed a 6-day tapering course of Azithromycin 250 mg for acute bacterial bronchitis. The patient states that he did not take the antibiotics. His SOB is worsened with exertion and worse  with laying flat. He reports being propped up with 1 pillow at nighttime. The patient has endorsed chills for the past couple of weeks. He was noted to have blood in his urine 1 week ago and has had none since. The patient denies prior similar symptoms, being on home Oxygen, being diagnosed with CHF or recent PNA, history of cardiac, thyroid or prostate problems, fevers, chest pain or any other symptoms at this time. No cardiac SHx noted. Doxycycline drug allergy noted.   The history is provided by the patient and the spouse (wife).       ED Course as of Nov 26 1117   Sat Nov 24, 2018   1234 73-year-old male past medical history of diabetes, hypertension, cirrhosis and anemia presents today with shortness of breath. Patient reports worsening shortness of breath times 10 days.  Dyspnea on exertion.  Reports intermittent cough.  Denies fevers but subjective chills. The exam remarkable for chronically ill-appearing otherwise with some coarse breath sounds on the right.  [BD]   1330 Chest x-ray with diffuse interstitial opacities.  Given symptoms of cough, chills and worsening shortness of breath will treat for pneumonia.  Per prior note patient was given antibiotics for pneumonia last month but patient reports he did not take them.  Labs with mild hyperglycemia will give 5 units of insulin and some fluids.  Patient with known thrombocytopenia being worked up as outpatient improved from prior labs.  Patient told of x-ray findings and will follow PCP closely for further outpatient management.  Patient has follow-up with liver doctor for cirrhosis this month.  Patient and family member understand and agree with discharge instructions and strict return precautions         The chief compliant  problem varies with instablilty at time    PFSH:  Past Medical History:   Diagnosis Date    Abnormal thyroid function test     Allergy     Seasonal    Anemia     Anemia due to blood loss 7/2/2014    Arthritis     Everette  esophagus     Basal cell carcinoma     right forearm    Basal cell carcinoma 12/2011    lower post neck    Cancer     skin CA    Cataract     Cirrhosis     Diabetes mellitus     Diabetes mellitus, type 2     Encounter for blood transfusion     Esophageal varices in cirrhosis     grade II on 7/12 EGD    Gastritis     on 7/12 EGD    GERD (gastroesophageal reflux disease) 2/28/2015    Hard of hearing     Hiatal hernia     History of hepatitis C 8/10/2012    tx with harvoni x 41 days (started 10/22/15). SVR4     Hoarseness 2/28/2015    Hypercholesteremia     Hypersplenism     Hypertension     No meds    Pain management 12/10/2014    Petechial hemorrhage 11/25/2015    Lower extremities bilat     Portal hypertensive gastropathy     on 7/12 EGD    Thrombocytopenia     Type II or unspecified type diabetes mellitus with neurological manifestations, uncontrolled(250.62) 12/24/2013    Valvular heart disease     mild MR 12         Past Surgical History:   Procedure Laterality Date    ABLATION, RADIOFREQUENCY-left suprascapula Left 8/25/2017    [facility]    CATARACT EXTRACTION  1/10/13    left eye    CATARACT EXTRACTION      right eye    CHOLECYSTECTOMY      COLONOSCOPY      diagnostic block of the genicular branches to the left knee Left 12/15/2017    [facility]    EGD (ESOPHAGOGASTRODUODENOSCOPY) N/A 3/13/2014    Performed by [facility]ach [facility] at [facility] ENDO (4TH FLR)    EGD (ESOPHAGOGASTRODUODENOSCOPY) N/A 7/11/2013    Performed by Campos Walters MD at [facility] END[facility] (4TH FLR)    ESOPHAGOGASTRODUODENOSCOPY (EGD) N/A 8/1/2014    Performed by Juan David Booker MD at [facility] ENDO (4TH FLR)    ESOPHAGOGASTRODUODENOSCOPY (EGD) N/A 7/3/2014    Performed by Juan [facility] Booker [facility] at [facility] ENDO (2ND FLR)    EYE SURGERY      Cataract surgery to right eye    INSERTION, IOL PROSTHESIS Left 1/10/2013    Performed by Domi BOSTON  "MD Olivia at [facility] 1ST FLR    KNEE ARTHROSCOPY W/ MENISCAL REPAIR      KNEE CARTILAGE SURGERY      left knee    KNEE SURGERY  2006    left    LARYNGOSCOPY Bilateral 2014    Performed by Anoop Bernstein MD at [facility] OR 2ND FLR    PHACOEMULSIFICATION, CATARACT Left 1/10/2013    [facility]formed by Domi Baker [facility] at [facility] OR 1ST FLR    PHACOEMULSIFICATION, CATARACT Right 2012    [facility] at [facility]    SKIN LESION EXCISION      TONSILLECTOMY      UPPER GASTROINTESTINAL ENDOSCOPY       Social History     Tobacco Use    Smoking status: Former Smoker     Packs/day: 1.00     Years: 25.00     Pack years: 25.00     Last attempt to quit: 2000     Years since quittin.6    Smokeless tobacco: Never Used   Substance Use Topics    Alcohol use: Yes     Comment: rarely    Drug use: No     Family History   Problem Relation Age of Onset    Leukemia Mother     Cancer Mother         bone    Alcohol abuse Father     Cirrhosis Father         EtOH induced    No Known Problems Daughter     No Known Problems Son     No Known Problems Daughter     No Known Problems Daughter     No Known Problems Daughter     No Known Problems Sister     Amblyopia Neg Hx     Blindness Neg Hx     Cataracts Neg Hx     Diabetes Neg Hx     Glaucoma Neg Hx     Hypertension Neg Hx     Macular degeneration Neg Hx     Retinal detachment Neg Hx     Strabismus Neg Hx     Stroke Neg Hx     Thyroid disease Neg Hx     Psoriasis Neg Hx     Lupus Neg Hx     Eczema Neg Hx     Acne Neg Hx     Melanoma Neg Hx      Review of patient's allergies indicates:   Allergen Reactions    Adhesive tape-silicones Other (See Comments)     pulls skin off    Doxycycline      Dizzy. "Just didn't feel right".     I have reviewed past medical, family, and social history. I have reviewed previous nurse notes.    Performance Status:The patient's activity level is housebound activities.          Review of " "Systems   Constitutional: Negative for activity change, appetite change, chills, diaphoresis,  fever and unexpected weight change.  HENT: Negative for dental problem, rhinorrhea, sinus pressure, sinus pain, sneezing, postnasal drip, sore throat, trouble swallowing and voice change.    Respiratory: Negative for apnea, stridor, shortness of breath and cough, chest tightness, wheezing  Cardiovascular: Negative for chest pain, palpitations and leg swelling.   Gastrointestinal: Negative for abdominal distention, abdominal pain, constipation and nausea.   Musculoskeletal: Negative for gait problem, myalgias and neck pain.   Skin: Positive for rash across forehead, ears, bilateral arms, states sores not healing  Allergic/Immunologic: Negative for environmental allergies and food allergies.   Neurological: Negative for dizziness, speech difficulty, weakness, light-headedness, numbness and headaches.   Hematological: Negative for adenopathy. Does not bruise/bleed easily.   Psychiatric/Behavioral: Negative for dysphoric mood and sleep disturbance. The patient is not nervous/anxious.           Exam:Comprehensive exam done. BP (!) 148/80 (BP Location: Left arm, Patient Position: Sitting, BP Method: Medium (Manual))   Pulse 65   Ht 5' 9" (1.753 m)   Wt 91.2 kg (201 lb 1 oz)   SpO2 97% Comment: on room air  BMI 29.69 kg/m²   Exam included Vitals as listed  Constitutional: He is oriented to person, place, and time. He appears well-developed. No distress.   Nose: Nose normal.   Mouth/Throat: Uvula is midline, oropharynx is clear and moist and mucous membranes are normal. No dental caries. No oropharyngeal exudate, posterior oropharyngeal edema, posterior oropharyngeal erythema or tonsillar abscesses.  Mallapatti (M) score 3  Eyes: Pupils are equal, round, and reactive to light.   Neck: No JVD present. No thyromegaly present.   Cardiovascular: Normal rate, regular rhythm and normal heart sounds. Exam reveals no gallop and no " friction rub.   No murmur heard.  Pulmonary/Chest: Effort normal and breath sounds abnormal: bilateral rales, worse on right lower lung field. No accessory muscle usage or stridor. No apnea and no tachypnea. No respiratory distress, decreased breath sounds, wheezes, rhonchi, or tenderness.   Abdominal: Soft. He exhibits no mass. There is no tenderness. No hepatosplenomegaly, and normoactive bowel sounds. Positive for umbilical hernia  Musculoskeletal: Normal range of motion. exhibits no edema.   Lymphadenopathy:     He has no cervical adenopathy.     He has no axillary adenopathy.   Neurological:  alert and oriented to person, place, and time. not disoriented.   Skin: Skin is warm and dry. Capillary refill takes less 2 sec. No cyanosis or erythema. No pallor. Nails show clubbing. psoriatic rash bilateral plaques.  Psychiatric: normal mood and affect. behavior is normal. Judgment and thought content normal.       Radiographs (ct chest and cxr) reviewed: results reviewed progressively sm lung last 2 yrs, increased markings on cxr and CT Honey combing pattern seen.   CT Chest Without Contrast.     Date: 01/07/2019   FINDINGS:  There is patchy subpleural interlobular septal thickening and intervening ground-glass opacity in both lungs.  Honeycombing in the upper lobes and right lower lobe.  Some peripheral traction bronchiectasis.  No significant air trapping.  No pleural effusion or pneumothorax.  Normal sized heart with coronary artery disease and calcification of the mitral annulus.  Wall thickening distal 3rd of the esophagus.  No mediastinal adenopathy.  Spleen 20 cm in length.  Pancreas atrophy.  Nodular liver contour.  Collateral vessels in the upper abdominal quadrants.  Osteopenia     1. Some of the pulmonary ground-glass opacities have resolved.  Background mild interstitial lung disease persists, pattern favoring UIP type.  2. Sequela of cirrhosis and portal hypertension.  3. Wall thickening distal esophagus  is nonspecific and could relate to reflux, esophagitis or mass.  Esophagram could add further characterization.  4. Coronary artery disease   Electronically signed by: Bert Don       CTA CHEST NON CORONARY   12/18/2018   No evidence for pulmonary thromboembolic disease.    Pulmonary parenchymal findings most compatible with a chronic interstitial lung process with considerations including idiopathic pulmonary fibrosis/usual interstitial pneumonia and nonspecific interstitial pneumonia.    Possible superimposed acute airspace disease within the left upper lobe may represent pneumonia in the appropriate clinical setting.    Hepatic cirrhosis.  Splenomegaly.    Small hiatal hernia.  Distal esophageal thickening may represent reflux esophagitis.       XR CHEST PA AND LATERAL   Electronically signed by: Crispin Vences MD  Date: 12/05/2018   Little change relative to October 15, 2018 and November 24, 2018.  Coarse chronic appearing areas of likely chronic interstitial infiltrates are evident bilaterally.  There is volume loss on the right.       Labs reviewed   Results for ARNIE HALEY JR. (MRN [id number]) as of 4/15/2019 10:48   Ref. Range 4/3/2019 11:16   Sodium Latest Ref Range: 136 - 145 mmol/L 136   Potassium Latest Ref Range: 3.5 - 5.1 mmol/L 5.1   Chloride Latest Ref Range: 95 - 110 mmol/L 101   CO2 Latest Ref Range: 23 - 29 mmol/L 26   Anion Gap Latest Ref Range: 8 - 16 mmol/L 9   BUN, Bld Latest Ref Range: 8 - 23 mg/dL 16   Creatinine Latest Ref Range: 0.5 - 1.4 mg/dL 1.0   eGFR if non African American Latest Ref Range: >60 mL/min/1.73 m^2 >60.0   eGFR if African American Latest Ref Range: >60 mL/min/1.73 m^2 >60.0   Glucose Latest Ref Range: 70 - 110 mg/dL 314 (H)   Calcium Latest Ref Range: 8.7 - 10.5 mg/dL 10.1   Alkaline Phosphatase Latest Ref Range: 55 - 135 U/L 77   Total Protein Latest Ref Range: 6.0 - 8.4 g/dL 6.8   Albumin Latest Ref Range: 3.5 - 5.2 g/dL 3.9   Total Bilirubin Latest Ref Range:  0.1 - 1.0 mg/dL 1.6 (H)   AST Latest Ref Range: 10 - 40 U/L 22   ALT Latest Ref Range: 10 - 44 U/L 15     Results for STEVE HALEY JR. (MRN [id number]) as of 1/14/2019 10:15   Ref. Range 12/14/2018 10:26   FRANCISCO Screen Latest Ref Range: Negative <1:160  Negative <1:160   Rheumatoid Factor Latest Ref Range: 0.0 - 15.0 IU/mL 48.0 (H)     PFT results reviewed  Pulmonary Functions Testing Results:  spirometry bronchodilator, lung volume by gas dilution, diffusion capacity with done December 6, 2018.  The FEV1 FVC ratio 77%.  There is no airflow obstruction.  Both the vital capacity FEV1 reduced to about 56% of predicted.  There was no improvement   following bronchodilator.  Total lung capacity was only 46% of predicted.  There is no air trapping measured by gas dilution.  Maximal voluntary ventilation was well preserved.  Diffusion was decreased but did improved to 66% predicted when corrected for    lung volume.   Patient has restrictive process.    Diffusion is low.  Clinical correlation recommended.     6 min walk study was done December 6, 2018.  Baseline room air saturation was 94%.  Patient's O2 sats fell to 89% by 3 min of walking.  O2 sat however did not fall any lower.  On room air O2 sat remained 89 and low 90s throughout the remainder of the   test.  Patient walked 170 m or 33% of the reference distance.   Esbriet Medication Taper  Days 1 to 7: 267 mg 3 times daily (total dose: 801 mg/day)  Days 8 to 14: 534 mg 3 times daily (total dose: 1,602 mg/day)  Day 15 and thereafter: 801 mg 3 times daily (total dose: 2,403 mg/day). Maximum dose: 2,403 mg/day.    Plan:  Clinical impression is resonably certain and repeated evaluation prn +/- follow up will be needed as below.    Steve was seen today for follow-up.    Diagnoses and all orders for this visit:    Idiopathic pulmonary fibrosis  -     pirfenidone (ESBRIET) 267 mg Cap; Take 1 capsule (267 mg total) by mouth 3 (three) times daily. for 7 days  -      pirfenidone (ESBRIET) 267 mg Cap; Take 2 capsules (534 mg total) by mouth 3 (three) times daily. for 14 days  -     pirfenidone (ESBRIET) 801 mg Tab; Take 1 capsule by mouth 3 (three) times daily.  -     Comprehensive metabolic panel; Standing    Pulmonary fibrosis determined by high resolution computed tomography  -     pirfenidone (ESBRIET) 267 mg Cap; Take 1 capsule (267 mg total) by mouth 3 (three) times daily. for 7 days  -     pirfenidone (ESBRIET) 267 mg Cap; Take 2 capsules (534 mg total) by mouth 3 (three) times daily. for 14 days  -     pirfenidone (ESBRIET) 801 mg Tab; Take 1 capsule by mouth 3 (three) times daily.    Interstitial lung disease  -     pirfenidone (ESBRIET) 267 mg Cap; Take 1 capsule (267 mg total) by mouth 3 (three) times daily. for 7 days  -     pirfenidone (ESBRIET) 267 mg Cap; Take 2 capsules (534 mg total) by mouth 3 (three) times daily. for 14 days  -     pirfenidone (ESBRIET) 801 mg Tab; Take 1 capsule by mouth 3 (three) times daily.    Psoriasis-like skin disease  -     Ambulatory Referral to Dermatology             Total face-to-face time with the patient was 30 minutes and greater than 50% was spent in counseling and coordination of care. The above assessment and plan have been discussed at length. Labs and tests reviewed. Problem List updated.       Follow up in about 3 months (around 7/15/2019), or if symptoms worsen or fail to improve.    Discussed with patient above for education the following:      Patient Instructions   Stopped OFEV due to nausea, We can decrease the dose and see if medication is more tolerable if Esbriet medication is not beneficial    Start Esbriet 1 pill three times a day for 7 days  Then   Esbriet 2 pills three times a day for 14 days  Then   Esbriet 1 bigger pill 3 times a day.    Have blood work every months for 6 months, clinic will call with results    Continue to use supplemental oxygen

## 2019-04-15 NOTE — PATIENT INSTRUCTIONS
Stopped OFEV due to nausea, We can decrease the dose and see if medication is more tolerable if Esbriet medication is not beneficial    Start Esbriet 1 pill three times a day for 7 days  Then   Esbriet 2 pills three times a day for 14 days  Then   Esbriet 1 bigger pill 3 times a day.    Have blood work every months for 6 months, clinic will call with results    Continue to use supplemental oxygen

## 2019-04-16 ENCOUNTER — TELEPHONE (OUTPATIENT)
Dept: PHARMACY | Facility: CLINIC | Age: 74
End: 2019-04-16

## 2019-04-17 ENCOUNTER — PROCEDURE VISIT (OUTPATIENT)
Dept: PAIN MEDICINE | Facility: CLINIC | Age: 74
End: 2019-04-17
Payer: MEDICARE

## 2019-04-17 VITALS
HEART RATE: 69 BPM | HEIGHT: 69 IN | BODY MASS INDEX: 29.77 KG/M2 | WEIGHT: 201 LBS | DIASTOLIC BLOOD PRESSURE: 76 MMHG | SYSTOLIC BLOOD PRESSURE: 131 MMHG

## 2019-04-17 DIAGNOSIS — M17.10 PRIMARY OSTEOARTHRITIS OF KNEE, UNSPECIFIED LATERALITY: Primary | ICD-10-CM

## 2019-04-17 PROCEDURE — 20610 LARGE JOINT ASPIRATION/INJECTION: L KNEE: ICD-10-PCS | Mod: HCNC,LT,S$GLB, | Performed by: ANESTHESIOLOGY

## 2019-04-17 PROCEDURE — 20610 DRAIN/INJ JOINT/BURSA W/O US: CPT | Mod: HCNC,LT,S$GLB, | Performed by: ANESTHESIOLOGY

## 2019-04-17 NOTE — PROCEDURES
Large Joint Aspiration/Injection: L knee  Date/Time: 4/17/2019 10:26 AM  Performed by: Kapil Mario MD  Authorized by: Kapil Mario MD     Consent Done?:  Yes (Written)  Indications:  Pain  Procedure site marked: Yes    Timeout: Prior to procedure the correct patient, procedure, and site was verified      Location:  Knee  Site:  L knee  Prep: Patient was prepped and draped in usual sterile fashion    Ultrasonic Guidance for needle placement: No  Needle size:  22 G and 25 G  Approach:  Lateral  Medications:  20 mg sodium hyaluronate (EUFLEXXA) 10 mg/mL(mw 2.4 -3.6 million)  Patient tolerance:  Patient tolerated the procedure well with no immediate complications

## 2019-04-18 NOTE — TELEPHONE ENCOUNTER
DOCUMENTATION ONLY:  Prior Authorization for [place] approved from 04/18/19 to 04/18/2021    Co-pay: $1,247.76    Patient Assistance IS required and is being researched.     -ARR

## 2019-04-23 ENCOUNTER — OFFICE VISIT (OUTPATIENT)
Dept: DERMATOLOGY | Facility: CLINIC | Age: 74
End: 2019-04-23
Payer: MEDICARE

## 2019-04-23 VITALS — HEIGHT: 69 IN | BODY MASS INDEX: 29.78 KG/M2 | WEIGHT: 201.06 LBS

## 2019-04-23 DIAGNOSIS — L57.0 ACTINIC KERATOSES: ICD-10-CM

## 2019-04-23 DIAGNOSIS — D48.5 NEOPLASM OF UNCERTAIN BEHAVIOR OF SKIN: Primary | ICD-10-CM

## 2019-04-23 DIAGNOSIS — Z85.828 HISTORY OF NONMELANOMA SKIN CANCER: ICD-10-CM

## 2019-04-23 DIAGNOSIS — L82.1 SEBORRHEIC KERATOSES: ICD-10-CM

## 2019-04-23 PROCEDURE — 88305 TISSUE EXAM BY PATHOLOGIST: CPT | Mod: HCNC | Performed by: PATHOLOGY

## 2019-04-23 PROCEDURE — 1101F PT FALLS ASSESS-DOCD LE1/YR: CPT | Mod: HCNC,CPTII,S$GLB, | Performed by: DERMATOLOGY

## 2019-04-23 PROCEDURE — 99212 PR OFFICE/OUTPT VISIT, EST, LEVL II, 10-19 MIN: ICD-10-PCS | Mod: 25,HCNC,S$GLB, | Performed by: DERMATOLOGY

## 2019-04-23 PROCEDURE — 99999 PR PBB SHADOW E&M-EST. PATIENT-LVL III: ICD-10-PCS | Mod: PBBFAC,HCNC,, | Performed by: DERMATOLOGY

## 2019-04-23 PROCEDURE — 99999 PR PBB SHADOW E&M-EST. PATIENT-LVL III: CPT | Mod: PBBFAC,HCNC,, | Performed by: DERMATOLOGY

## 2019-04-23 PROCEDURE — 17004 DESTROY PREMAL LESIONS 15/>: CPT | Mod: HCNC,S$GLB,, | Performed by: DERMATOLOGY

## 2019-04-23 PROCEDURE — 11102 TANGNTL BX SKIN SINGLE LES: CPT | Mod: 59,HCNC,S$GLB, | Performed by: DERMATOLOGY

## 2019-04-23 PROCEDURE — 11103 PR TANGENTIAL BIOPSY, SKIN, EA ADDTL LESION: ICD-10-PCS | Mod: HCNC,S$GLB,, | Performed by: DERMATOLOGY

## 2019-04-23 PROCEDURE — 1101F PR PT FALLS ASSESS DOC 0-1 FALLS W/OUT INJ PAST YR: ICD-10-PCS | Mod: HCNC,CPTII,S$GLB, | Performed by: DERMATOLOGY

## 2019-04-23 PROCEDURE — 11102 PR TANGENTIAL BIOPSY, SKIN, SINGLE LESION: ICD-10-PCS | Mod: 59,HCNC,S$GLB, | Performed by: DERMATOLOGY

## 2019-04-23 PROCEDURE — 17004 PR DESTRUCTION, PREMALIGNANT LESIONS; 15 OR MORE LESIONS: ICD-10-PCS | Mod: HCNC,S$GLB,, | Performed by: DERMATOLOGY

## 2019-04-23 PROCEDURE — 99212 OFFICE O/P EST SF 10 MIN: CPT | Mod: 25,HCNC,S$GLB, | Performed by: DERMATOLOGY

## 2019-04-23 PROCEDURE — 11103 TANGNTL BX SKIN EA SEP/ADDL: CPT | Mod: HCNC,S$GLB,, | Performed by: DERMATOLOGY

## 2019-04-23 PROCEDURE — 88305 TISSUE SPECIMEN TO PATHOLOGY, DERMATOLOGY: ICD-10-PCS | Mod: 26,HCNC,, | Performed by: PATHOLOGY

## 2019-04-23 NOTE — PROGRESS NOTES
Subjective:       Patient ID:  Steve June Jr. is a 73 y.o. male who presents for   Chief Complaint   Patient presents with    Spot     arms, forehead, back of ears and scalp, dry, scaly and bleed occ, has a cream he used but unknown name, no relief     Last seen by Dr Gerardo 07/2016, new dx BCC R ear, s/p Mohs Dr IZAGUIRRE    Hx of Aks treated with cryo in the past and one PDT session in (2012?), efudex filed tx 2016  Hx of BCC left posterior neck s/p excision 2011,  BCC of the R proximal forearm (1/4/2012) involving margins - this was not excised 2/2 chronic severe thrombocytopenia. He was scheduled for radiation but did not end up going -   Hx bx proven scc in situ right forearm with efudex bid x 7 weeks (could not afford aldara) 2014    New complaints today  This is a high risk patient here to check for the development of new lesions.            Spot  - Initial  Affected locations: forehead, scalp, ears, arms.  Signs / symptoms: dryness, scaling and pain  Severity: mild to moderate  Timing: constant  Aggravated by: nothing  Relieving factors/Treatments tried: nothing        Review of Systems   Constitutional: Negative for fever, chills and fatigue.   Skin: Negative for daily sunscreen use, activity-related sunscreen use and wears hat.        Objective:    Physical Exam   Constitutional: He appears well-developed and well-nourished. No distress.   Neurological: He is alert and oriented to person, place, and time. He is not disoriented.   Psychiatric: He has a normal mood and affect.   Skin:   Areas Examined (abnormalities noted in diagram):   Scalp / Hair Palpated and Inspected  Head / Face Inspection Performed  Neck Inspection Performed  Chest / Axilla Inspection Performed  Abdomen Inspection Performed  Back Inspection Performed  RUE Inspected  LUE Inspection Performed  Nails and Digits Inspection Performed                           Diagram Legend     Erythematous scaling macule/papule c/w actinic  keratosis       Vascular papule c/w angioma      Pigmented verrucoid papule/plaque c/w seborrheic keratosis      Yellow umbilicated papule c/w sebaceous hyperplasia      Irregularly shaped tan macule c/w lentigo     1-2 mm smooth white papules consistent with Milia      Movable subcutaneous cyst with punctum c/w epidermal inclusion cyst      Subcutaneous movable cyst c/w pilar cyst      Firm pink to brown papule c/w dermatofibroma      Pedunculated fleshy papule(s) c/w skin tag(s)      Evenly pigmented macule c/w junctional nevus     Mildly variegated pigmented, slightly irregular-bordered macule c/w mildly atypical nevus      Flesh colored to evenly pigmented papule c/w intradermal nevus       Pink pearly papule/plaque c/w basal cell carcinoma      Erythematous hyperkeratotic cursted plaque c/w SCC      Surgical scar with no sign of skin cancer recurrence      Open and closed comedones      Inflammatory papules and pustules      Verrucoid papule consistent consistent with wart     Erythematous eczematous patches and plaques     Dystrophic onycholytic nail with subungual debris c/w onychomycosis     Umbilicated papule    Erythematous-base heme-crusted tan verrucoid plaque consistent with inflamed seborrheic keratosis     Erythematous Silvery Scaling Plaque c/w Psoriasis     See annotation                  Assessment / Plan:      Pathology Orders:     Normal Orders This Visit    Tissue Specimen To Pathology, Dermatology     Questions:    Directional Terms:  Other(comment)    Clinical Information:  all four non healing crusted papules, SCC vs BCC    Specific Site:  1/4- R temple 2/4- R preauric cheek 3/4- left preauric cheek 4/4- left posterior helix,        Neoplasm of uncertain behavior of skin  -     Tissue Specimen To Pathology, Dermatology  Shave biopsy procedure note:    Shave biopsy performed after verbal consent including risk of infection, scar, recurrence, need for additional treatment of site. Area prepped  with alcohol, anesthetized with approximately 1.0cc of 1% lidocaine with epinephrine. Lesional tissue shaved with razor blade. Hemostasis achieved with application of aluminum chloride followed by hyfrecation. No complications. Dressing applied. Wound care explained.    Actinic keratoses  Cryosurgery Procedure Note    Verbal consent from the patient is obtained and the patient is aware of the precancerous quality and need for treatment of these lesions. Liquid nitrogen cryosurgery is applied to the 18 actinic keratoses, as detailed in the physical exam, to produce a freeze injury. The patient is aware that blisters may form and is instructed on wound care with gentle cleansing and use of vaseline ointment to keep moist until healed. The patient is supplied a handout on cryosurgery and is instructed to call if lesions do not completely resolve.    Plan field tx with efudex    Seborrheic keratoses  These are benign inherited growths without a malignant potential. Reassurance given to patient. No treatment is necessary.     History of nonmelanoma skin cancer  Area of previous NMSC (multiple) examined. Site well healed with no signs of recurrence.  Upper body skin examination performed today including at least 9 points as noted in physical examination. No lesions suspicious for malignancy noted.    Patient instructed in importance in daily sun protection of at least spf 30. Mineral sunscreen ingredients preferred (Zinc +/- Titanium).   Recommend Elta MD for daily use on face and neck.  Patient encouraged to wear hat for all outdoor exposure.   Also discussed sun avoidance and use of protective clothing.             Follow up in about 3 months (around 7/23/2019).

## 2019-04-23 NOTE — PATIENT INSTRUCTIONS
Shave Biopsy Wound Care    Your doctor has performed a shave biopsy today.  A band aid and vaseline ointment has been placed over the site.  This should remain in place for 24 hours.  It is recommended that you keep the area dry for the first 24 hours.  After 24 hours, you may remove the band aid and wash the area with warm soap and water and apply Vaseline jelly.  Many patients prefer to use Neosporin or Bacitracin ointment.  This is acceptable; however, know that you can develop an allergy to this medication even if you have used it safely for years.  It is important to keep the area moist.  Letting it dry out and get air slows healing time, and will worsen the scar.  Band aid is optional after first 24 hours.      If you notice increasing redness, tenderness, pain, or yellow drainage at the biopsy site, please notify your doctor.  These are signs of an infection.    If your biopsy site is bleeding, apply firm pressure for 15 minutes straight.  Repeat for another 15 minutes, if it is still bleeding.   If the surgical site continues to bleed, then please contact your doctor.       [facility]  [facility]  [address] [place] [address] [place] [address]  Dept: [phone]  CRYOSURGERY      Your doctor has used a method called cryosurgery to treat your skin condition. Cryosurgery refers to the use of very cold substances to treat a variety of skin conditions such as warts, pre-skin cancers, molluscum contagiosum, sun spots, and several benign growths. The substance we use in cryosurgery is liquid nitrogen and is so cold (-195 degrees Celsius) that is burns when administered.     Following treatment in the office, the skin may immediately burn and become red. You may find the area around the lesion is affected as well. It is sometimes necessary to treat not only the lesion, but a small area of the surrounding normal skin to achieve a good response.     A blister, and even a blood filled  blister, may form after treatment.   This is a normal response. If the blister is painful, it is acceptable to sterilize a needle and with rubbing alcohol and gently pop the blister. It is important that you gently wash the area with soap and warm water as the blister fluid may contain wart virus if a wart was treated. Do no remove the roof of the blister.     The area treated can take anywhere from 1-3 weeks to heal. Healing time depends on the kind skin lesion treated, the location, and how aggressively the lesion was treated. It is recommended that the areas treated are covered with Vaseline or bacitracin ointment and a band-aid. If a band-aid is not practical, just ointment applied several times per day will do. Keeping these areas moist will speed the healing time.    Treatment with liquid nitrogen can leave a scar. In dark skin, it may be a light or dark scar, in light skin it may be a white or pink scar. These will generally fade with time, but may never go away completely.     If you have any concerns after your treatment, please feel free to call the office.         [facility]  [facility]  [address] [place] [address] [place] [id number]  Dept: [phone]

## 2019-04-23 NOTE — LETTER
April 23, 2019      Edna Olvera, NP  [address]  [address]  [facility] [address]           [place] - Dermatology  [address] [place] [address] [address]  [facility] [address]  Phone: [phone]          Patient: Steve June Jr.   MR Number: [id number]   YOB: 1945   Date of Visit: 4/23/2019       Dear Edna Olvera:    Thank you for referring Steve June to me for evaluation. Attached you will find relevant portions of my assessment and plan of care.    If you have questions, please do not hesitate to call me. I look forward to following Steve June along with you.    Sincerely,    Salma Aguilera MD    Enclosure  CC:  No Recipients    If you would like to receive this communication electronically, please contact [email or web address] or [phone] to request more information on [email or web address] Link access.    For providers and/or their staff who would like to refer a patient to [facility], please contact us through our one-stop-shop provider referral line, [facility], at [phone].    If you feel you have received this communication in error or would no longer like to receive these types of communications, please e-mail [email or web address]

## 2019-04-24 ENCOUNTER — HOSPITAL ENCOUNTER (EMERGENCY)
Facility: HOSPITAL | Age: 74
Discharge: HOME | End: 2019-04-24
Attending: EMERGENCY MEDICINE
Payer: MEDICARE

## 2019-04-24 VITALS
BODY MASS INDEX: 28.14 KG/M2 | OXYGEN SATURATION: 95 % | SYSTOLIC BLOOD PRESSURE: 159 MMHG | DIASTOLIC BLOOD PRESSURE: 77 MMHG | TEMPERATURE: 98 F | HEIGHT: 69 IN | HEART RATE: 66 BPM | WEIGHT: 190 LBS | RESPIRATION RATE: 18 BRPM

## 2019-04-24 DIAGNOSIS — T14.8XXA BLEEDING FROM WOUND: Primary | ICD-10-CM

## 2019-04-24 LAB
ANION GAP SERPL CALC-SCNC: 9 MMOL/L (ref 8–16)
APTT BLDCRRT: 25.9 SEC (ref 21–32)
BASOPHILS # BLD AUTO: 0 K/UL (ref 0–0.2)
BASOPHILS NFR BLD: 0.9 % (ref 0–1.9)
BUN SERPL-MCNC: 20 MG/DL (ref 8–23)
CALCIUM SERPL-MCNC: 9.4 MG/DL (ref 8.7–10.5)
CHLORIDE SERPL-SCNC: 103 MMOL/L (ref 95–110)
CO2 SERPL-SCNC: 24 MMOL/L (ref 23–29)
CREAT SERPL-MCNC: 1 MG/DL (ref 0.5–1.4)
DIFFERENTIAL METHOD: ABNORMAL
EOSINOPHIL # BLD AUTO: 0.1 K/UL (ref 0–0.5)
EOSINOPHIL NFR BLD: 3.8 % (ref 0–8)
ERYTHROCYTE [DISTWIDTH] IN BLOOD BY AUTOMATED COUNT: 14.8 % (ref 11.5–14.5)
EST. GFR  (AFRICAN AMERICAN): >60 ML/MIN/1.73 M^2
EST. GFR  (NON AFRICAN AMERICAN): >60 ML/MIN/1.73 M^2
GLUCOSE SERPL-MCNC: 176 MG/DL (ref 70–110)
HCT VFR BLD AUTO: 36.2 % (ref 40–54)
HGB BLD-MCNC: 11.9 G/DL (ref 14–18)
INR PPP: 1.2 (ref 0.8–1.2)
LYMPHOCYTES # BLD AUTO: 0.4 K/UL (ref 1–4.8)
LYMPHOCYTES NFR BLD: 11.6 % (ref 18–48)
MCH RBC QN AUTO: 28.1 PG (ref 27–31)
MCHC RBC AUTO-ENTMCNC: 33 G/DL (ref 32–36)
MCV RBC AUTO: 85 FL (ref 82–98)
MONOCYTES # BLD AUTO: 0.3 K/UL (ref 0.3–1)
MONOCYTES NFR BLD: 9 % (ref 4–15)
NEUTROPHILS # BLD AUTO: 2.8 K/UL (ref 1.8–7.7)
NEUTROPHILS NFR BLD: 74.7 % (ref 38–73)
PLATELET # BLD AUTO: 42 K/UL (ref 150–350)
PMV BLD AUTO: 8.2 FL (ref 9.2–12.9)
POTASSIUM SERPL-SCNC: 4.3 MMOL/L (ref 3.5–5.1)
PROTHROMBIN TIME: 12.2 SEC (ref 9–12.5)
RBC # BLD AUTO: 4.25 M/UL (ref 4.6–6.2)
SODIUM SERPL-SCNC: 136 MMOL/L (ref 136–145)
WBC # BLD AUTO: 3.7 K/UL (ref 3.9–12.7)

## 2019-04-24 PROCEDURE — 85025 COMPLETE CBC W/AUTO DIFF WBC: CPT | Mod: HCNC

## 2019-04-24 PROCEDURE — 17110 DESTRUCTION B9 LES UP TO 14: CPT | Mod: HCNC

## 2019-04-24 PROCEDURE — 85730 THROMBOPLASTIN TIME PARTIAL: CPT | Mod: HCNC

## 2019-04-24 PROCEDURE — 85610 PROTHROMBIN TIME: CPT | Mod: HCNC

## 2019-04-24 PROCEDURE — 99283 EMERGENCY DEPT VISIT LOW MDM: CPT | Mod: 25,HCNC

## 2019-04-24 PROCEDURE — 25000003 PHARM REV CODE 250: Mod: HCNC | Performed by: EMERGENCY MEDICINE

## 2019-04-24 PROCEDURE — 80048 BASIC METABOLIC PNL TOTAL CA: CPT | Mod: HCNC

## 2019-04-24 PROCEDURE — 36415 COLL VENOUS BLD VENIPUNCTURE: CPT | Mod: HCNC

## 2019-04-24 RX ORDER — SILVER NITRATE 38.21; 12.74 MG/1; MG/1
1 STICK TOPICAL
Status: COMPLETED | OUTPATIENT
Start: 2019-04-24 | End: 2019-04-24

## 2019-04-24 RX ADMIN — SILVER NITRATE 1 APPLICATOR: 38.21; 12.74 STICK TOPICAL at 03:04

## 2019-04-24 NOTE — ED PROVIDER NOTES
"Encounter Date: 4/24/2019    SCRIBE #1 NOTE: I, Ladonna Youssef, am scribing for, and in the presence of, Dr. Rafael Campo.       History     Chief Complaint   Patient presents with    Bleeding/Bruising     pt reports had growth removed from back of ear by Dr Aguilera yesterday, pt reports site started bleeding approx 2 hrs ago and he has been unable to stop bleeding, also reports platelets are usually  low       Time seen by provider: 2:59 PM on 04/24/2019    Steve June Jr. is a 73 y.o. male who presents to the ED with a wound to his left ear that began bleeding 2 hours ago. One day ago, he had a growth removed from the back of his left ear by Dr. Aguilera. The site started bleeding as he was laying down sleeping. Pt is now unable to stop the bleeding. The patient denies any other symptoms at this time. Pt's PMHx includes HTN, hypercholesteremia, skin cancer, DM type II, and esophageal varices in cirrhosis. No pertinent PSHx noted. She is a former smoker. Drug allergies Doxycycline.    The history is provided by the patient.     Review of patient's allergies indicates:   Allergen Reactions    Adhesive tape-silicones Other (See Comments)     pulls skin off    Doxycycline      Dizzy. "Just didn't feel right".     Past Medical History:   Diagnosis Date    Abnormal thyroid function test     Allergy     Seasonal    Anemia     Anemia due to blood loss 7/2/2014    Arthritis     Gaviria esophagus     Basal cell carcinoma     right forearm    Basal cell carcinoma 12/2011    lower post neck    Cancer     skin CA    Cataract     Cirrhosis     Diabetes mellitus     Diabetes mellitus, type 2     Encounter for blood transfusion     Esophageal varices in cirrhosis     grade II on 7/12 EGD    Gastritis     on 7/12 EGD    GERD (gastroesophageal reflux disease) 2/28/2015    Hard of hearing     Hiatal hernia     History of hepatitis C 8/10/2012    tx with harvoni x 41 days (started 10/22/15). SVR4  "    Hoarseness 2/28/2015    Hypercholesteremia     Hypersplenism     Hypertension     No meds    Pain management 12/10/2014    Petechial hemorrhage 11/25/2015    Lower extremities bilat     Portal hypertensive gastropathy     on 7/12 EGD    Thrombocytopenia     Type II or unspecified type diabetes mellitus with neurological manifestations, uncontrolled(250.62) 12/24/2013    Valvular heart disease     mild MR 12     Past Surgical History:   Procedure Laterality Date    ABLATION, RADIOFREQUENCY-left suprascapula Left 8/25/2017    [facility]    CATARACT EXTRACTION  1/10/13    left eye    CATARACT EXTRACTION      right eye    CHOLECYSTECTOMY      COLONOSCOPY      diagnostic block of the genicular branches to the left knee Left 12/15/2017    [facility]    EGD (ESOPHAGOGASTRODUODENOSCOPY) N/A 3/13/2014    [facility]    EGD (ESOPHAGOGASTRODUODENOSCOPY) N/A 7/11/2013    [facility]    ESOPHAGOGASTRODUODENOSCOPY (EGD) N/A 8/1/2014    [facility]    ESOPHAGOGASTRODUODENOSCOPY (EGD) N/A 7/3/2014    [facility]    EYE SURGERY      Cataract surgery to right eye    INSERTION, IOL PROSTHESIS Left 1/10/2013    [facility]    KNEE ARTHROSCOPY W/ MENISCAL REPAIR      KNEE CARTILAGE SURGERY      left knee    KNEE SURGERY  12/2006    left    LARYNGOSCOPY Bilateral 12/5/2014    [facility]    PHACOEMULSIFICATION, CATARACT Left 1/10/2013    [facility]    PHACOEMULSIFICATION, CATARACT Right 9/27/2012    [facility]    SKIN LESION EXCISION      TONSILLECTOMY      UPPER GASTROINTESTINAL  ENDOSCOPY       Family History   Problem Relation Age of Onset    Leukemia Mother     Cancer Mother         bone    Alcohol abuse Father     Cirrhosis Father         EtOH induced    No Known Problems Daughter     No Known Problems Son     No Known Problems Daughter     No Known Problems Daughter     No Known Problems Daughter     No Known Problems Sister     Amblyopia Neg Hx     Blindness Neg Hx     Cataracts Neg Hx     Diabetes Neg Hx     Glaucoma Neg Hx     Hypertension Neg Hx     Macular degeneration Neg Hx     Retinal detachment Neg Hx     Strabismus Neg Hx     Stroke Neg Hx     Thyroid disease Neg Hx     Psoriasis Neg Hx     Lupus Neg Hx     Eczema Neg Hx     Acne Neg Hx     Melanoma Neg Hx      Social History     Tobacco Use    Smoking status: Former Smoker     Packs/day: 1.00     Years: 25.00     Pack years: 25.00     Last attempt to quit: 2000     Years since quittin.7    Smokeless tobacco: Never Used   Substance Use Topics    Alcohol use: Yes     Comment: rarely    Drug use: No     Review of Systems   Constitutional: Negative for activity change, appetite change, chills, fatigue and fever.   Eyes: Negative for visual disturbance.   Respiratory: Negative for apnea and shortness of breath.    Cardiovascular: Negative for chest pain and palpitations.   Gastrointestinal: Negative for abdominal distention and abdominal pain.   Genitourinary: Negative for difficulty urinating.   Musculoskeletal: Negative for neck pain.   Skin: Positive for wound. Negative for pallor and rash.        Positive for wound with active bleeding to left ear.   Neurological: Negative for headaches.   Hematological: Does not bruise/bleed easily.   Psychiatric/Behavioral: Negative for agitation.       Physical Exam     Initial Vitals [19 1432]   BP Pulse Resp Temp SpO2   (!) 159/77 66 18 97.8 °F (36.6 °C) 95 %      MAP       --         Physical Exam    Nursing note and vitals  reviewed.  Constitutional: He appears well-developed and well-nourished.   HENT:   Head: Normocephalic.   1 cm ulceration to the posterior left ear with active bleeding.   Eyes: Conjunctivae are normal.   Neck: Normal range of motion. Neck supple.   Cardiovascular: Normal rate, regular rhythm and normal heart sounds. Exam reveals no gallop and no friction rub.    No murmur heard.  Pulmonary/Chest: Breath sounds normal. No respiratory distress. He has no wheezes. He has no rhonchi. He has no rales.   Abdominal: Soft. He exhibits no distension. There is no tenderness.   Musculoskeletal: Normal range of motion.   Neurological: He is alert and oriented to person, place, and time.   Skin: Skin is warm and dry.   Psychiatric: He has a normal mood and affect.         ED Course   Procedures  Labs Reviewed   CBC W/ AUTO DIFFERENTIAL - Abnormal; Notable for the following components:       Result Value    WBC 3.70 (*)     RBC 4.25 (*)     Hemoglobin 11.9 (*)     Hematocrit 36.2 (*)     RDW 14.8 (*)     Platelets 42 (*)     MPV 8.2 (*)     Lymph # 0.4 (*)     Gran% 74.7 (*)     Lymph% 11.6 (*)     All other components within normal limits   BASIC METABOLIC PANEL - Abnormal; Notable for the following components:    Glucose 176 (*)     All other components within normal limits   APTT   PROTIME-INR          Imaging Results    None          Medical Decision Making:   History:   Old Medical Records: I decided to obtain old medical records.  Clinical Tests:   Lab Tests: Ordered and Reviewed  ED Management:  73-year-old male presents with with bleeding of the left ear status post biopsy yesterday.  He has persistent thrombocytopenia secondary to cirrhosis.  The left ear is cauterized by me with silver nitrate with cessation of bleeding.  The ear is dressed with quick clot.  He is encouraged to return for worsening bleeding.       APC / Resident Notes:   I, Dr. Rafael Campo III, personally performed the services described in this  documentation. All medical record entries made by the scribe were at my direction and in my presence.  I have reviewed the chart and agree that the record reflects my personal performance and is accurate and complete       Scribe Attestation:   Scribe #1: I performed the above scribed service and the documentation accurately describes the services I performed. I attest to the accuracy of the note.               Clinical Impression:       ICD-10-CM ICD-9-CM   1. Bleeding from wound T14.8XXA 958.2         Disposition:   Disposition: Discharged  Condition: Stable                        Rafael Campo III, MD  04/24/19 5229

## 2019-04-25 ENCOUNTER — TELEPHONE (OUTPATIENT)
Dept: DERMATOLOGY | Facility: CLINIC | Age: 74
End: 2019-04-25

## 2019-04-25 NOTE — TELEPHONE ENCOUNTER
----- Message from Rebeca Parry sent at 4/25/2019 11:35 AM CDT -----  Contact: Steve  Type: Needs Medical Advice    Who Called:  patient  Best Call Back Number: [phone]  Additional Information:  States woke up yesterday 4/24/19 and had blood all over bedding. Went to [facility] and was instructed to let Dr Aguilera know of situation. Thanks!

## 2019-04-25 NOTE — TELEPHONE ENCOUNTER
Spoke to pt. States his biopsy sites bleed when he sleeps at night. Advised to hold firm pressure and keep covered with vaseline and bandage. Verbalized understanding. Pt states he likes to keep it uncovered so he can keep an eye on it.

## 2019-04-26 DIAGNOSIS — D48.5 NEOPLASM OF UNCERTAIN BEHAVIOR OF SKIN: Primary | ICD-10-CM

## 2019-04-30 ENCOUNTER — TELEPHONE (OUTPATIENT)
Dept: PULMONOLOGY | Facility: CLINIC | Age: 74
End: 2019-04-30

## 2019-04-30 NOTE — TELEPHONE ENCOUNTER
Wife stated pt is not at home, stated pt will call office when he comes home.   ----- Message from Edna Olvera NP sent at 4/30/2019  1:30 PM CDT -----  Please call and ask if he has heard about esbriet medication and if a Aleta Pierson from pharmacy spoke to him. Does he still want the Esbriet?

## 2019-05-01 ENCOUNTER — TELEPHONE (OUTPATIENT)
Dept: PULMONOLOGY | Facility: CLINIC | Age: 74
End: 2019-05-01

## 2019-05-02 ENCOUNTER — OFFICE VISIT (OUTPATIENT)
Dept: PODIATRY | Facility: CLINIC | Age: 74
End: 2019-05-02
Payer: MEDICARE

## 2019-05-02 VITALS
WEIGHT: 198 LBS | DIASTOLIC BLOOD PRESSURE: 76 MMHG | HEART RATE: 68 BPM | SYSTOLIC BLOOD PRESSURE: 124 MMHG | HEIGHT: 69 IN | BODY MASS INDEX: 29.33 KG/M2

## 2019-05-02 DIAGNOSIS — L84 PRE-ULCERATIVE CALLUSES: ICD-10-CM

## 2019-05-02 DIAGNOSIS — E08.42 DIABETIC POLYNEUROPATHY ASSOCIATED WITH DIABETES MELLITUS DUE TO UNDERLYING CONDITION: ICD-10-CM

## 2019-05-02 DIAGNOSIS — B35.3 TINEA PEDIS OF BOTH FEET: ICD-10-CM

## 2019-05-02 DIAGNOSIS — L90.9 FAT PAD ATROPHY OF FOOT: ICD-10-CM

## 2019-05-02 DIAGNOSIS — E11.51 TYPE II DIABETES MELLITUS WITH PERIPHERAL CIRCULATORY DISORDER: Primary | ICD-10-CM

## 2019-05-02 DIAGNOSIS — B35.1 ONYCHOMYCOSIS DUE TO DERMATOPHYTE: ICD-10-CM

## 2019-05-02 PROCEDURE — 3074F PR MOST RECENT SYSTOLIC BLOOD PRESSURE < 130 MM HG: ICD-10-PCS | Mod: CPTII,S$GLB,, | Performed by: PODIATRIST

## 2019-05-02 PROCEDURE — 99213 PR OFFICE/OUTPT VISIT, EST, LEVL III, 20-29 MIN: ICD-10-PCS | Mod: 25,S$GLB,, | Performed by: PODIATRIST

## 2019-05-02 PROCEDURE — 3045F PR MOST RECENT HEMOGLOBIN A1C LEVEL 7.0-9.0%: ICD-10-PCS | Mod: CPTII,S$GLB,, | Performed by: PODIATRIST

## 2019-05-02 PROCEDURE — 11057 PR TRIM BENIGN HYPERKERATOTIC SKIN LESION,>4: ICD-10-PCS | Mod: Q9,S$GLB,, | Performed by: PODIATRIST

## 2019-05-02 PROCEDURE — 11057 PARNG/CUTG B9 HYPRKR LES >4: CPT | Mod: Q9,S$GLB,, | Performed by: PODIATRIST

## 2019-05-02 PROCEDURE — 3078F PR MOST RECENT DIASTOLIC BLOOD PRESSURE < 80 MM HG: ICD-10-PCS | Mod: CPTII,S$GLB,, | Performed by: PODIATRIST

## 2019-05-02 PROCEDURE — 99999 PR PBB SHADOW E&M-EST. PATIENT-LVL III: ICD-10-PCS | Mod: PBBFAC,HCNC,, | Performed by: PODIATRIST

## 2019-05-02 PROCEDURE — 99999 PR PBB SHADOW E&M-EST. PATIENT-LVL III: CPT | Mod: PBBFAC,HCNC,, | Performed by: PODIATRIST

## 2019-05-02 PROCEDURE — 11721 PR DEBRIDEMENT OF NAILS, 6 OR MORE: ICD-10-PCS | Mod: 59,Q9,S$GLB, | Performed by: PODIATRIST

## 2019-05-02 PROCEDURE — 3074F SYST BP LT 130 MM HG: CPT | Mod: CPTII,S$GLB,, | Performed by: PODIATRIST

## 2019-05-02 PROCEDURE — 11721 DEBRIDE NAIL 6 OR MORE: CPT | Mod: 59,Q9,S$GLB, | Performed by: PODIATRIST

## 2019-05-02 PROCEDURE — 3045F PR MOST RECENT HEMOGLOBIN A1C LEVEL 7.0-9.0%: CPT | Mod: CPTII,S$GLB,, | Performed by: PODIATRIST

## 2019-05-02 PROCEDURE — 1101F PR PT FALLS ASSESS DOC 0-1 FALLS W/OUT INJ PAST YR: ICD-10-PCS | Mod: CPTII,S$GLB,, | Performed by: PODIATRIST

## 2019-05-02 PROCEDURE — 99213 OFFICE O/P EST LOW 20 MIN: CPT | Mod: 25,S$GLB,, | Performed by: PODIATRIST

## 2019-05-02 PROCEDURE — 1101F PT FALLS ASSESS-DOCD LE1/YR: CPT | Mod: CPTII,S$GLB,, | Performed by: PODIATRIST

## 2019-05-02 PROCEDURE — 3078F DIAST BP <80 MM HG: CPT | Mod: CPTII,S$GLB,, | Performed by: PODIATRIST

## 2019-05-02 RX ORDER — CICLOPIROX OLAMINE 7.7 MG/G
CREAM TOPICAL 2 TIMES DAILY
Qty: 1 TUBE | Refills: 3 | Status: SHIPPED | OUTPATIENT
Start: 2019-05-02 | End: 2019-07-28

## 2019-05-02 NOTE — PROGRESS NOTES
Subjective:      Patient ID: Steve June Jr. is a 73 y.o. male.    Chief Complaint: Diabetic Foot Exam (PCP- Crispin Garcia 4/3/19)    Diabetes, increased risk amputation needing evaluation/management/optomization of foot care.    CC thick calluses, burning pain with pressure.  Gradual onset, worsening over past several weeks-months, aggravated by increased weight bearing, shoe gear, pressure.   His daughter has been trimming his nails best she can but the calluses are hurting.  Denies trauma, signs infection, and surgery both feet.     CC#2 there is a new problem with the fourth interdigital space with peeling skin and itching, he has tried OTC meds and they have not worked. Seeking further treatment options.       Review of Systems   Constitution: Negative for chills, diaphoresis, fever, malaise/fatigue and night sweats.   Cardiovascular: Positive for leg swelling. Negative for claudication, cyanosis and syncope.   Skin: Positive for nail changes and suspicious lesions. Negative for color change, dry skin, rash and unusual hair distribution.   Musculoskeletal: Negative for falls, joint pain, joint swelling, muscle cramps, muscle weakness and stiffness.   Gastrointestinal: Negative for constipation, diarrhea, nausea and vomiting.   Neurological: Positive for paresthesias and sensory change. Negative for brief paralysis, disturbances in coordination, focal weakness, numbness and tremors.           Objective:      Physical Exam   Constitutional: He is oriented to person, place, and time. He appears well-developed and well-nourished. He is cooperative.   Oriented to time, place, and person.   Cardiovascular:   Pulses:       Dorsalis pedis pulses are 1+ on the right side, and 1+ on the left side.        Posterior tibial pulses are 1+ on the right side, and 1+ on the left side.   Capillary fill time 3-5 seconds.  Feet are warm to touch proximal with distal cooling.      2+ edema to bilateral lower extremities  with varicosities noted.    No pedal hair noted bilateral.     Musculoskeletal:   Normal angle, base, station of gait. Decreased stride length, early heel off, moderately propulsive toe off bilateral.    All ten toes without clubbing, cyanosis, or signs of ischemia.      Ankle dorsiflexion decreased at <10 degrees bilateral with moderate increase with knee flexion bilateral.    No pain to palpation bilateral lower extremities.      Range of motion, stability, muscle strength, and muscle tone are age and health appropriate normal bilateral feet and legs.       Feet:   Right Foot:   Protective Sensation: 10 sites tested. 4 sites sensed.   Left Foot:   Protective Sensation: 10 sites tested. 6 sites sensed.   Lymphadenopathy:   Negative lymphadenopathy bilateral popliteal fossa and tarsal tunnel.     Neurological: He is alert and oriented to person, place, and time. He has normal strength. He is not disoriented. He displays no atrophy and no tremor. A sensory deficit is present. He exhibits normal muscle tone.   Decreased/absent vibratory sensation bilateral feet to 128Hz tuning fork.    Diminished/loss of protective sensation bilateral to 10 gram monofilament.    Paresthesias,  bilateral feet with no clearly identified trigger or source.     Skin: Skin is warm, dry and intact. No abrasion, no bruising, no burn, no ecchymosis, no laceration, no lesion, no petechiae and no rash noted. He is not diaphoretic. No cyanosis or erythema. No pallor. Nails show no clubbing.   Focal hyperkeratotic lesion consisting entirely of hyperkeratotic tissue without open skin, drainage, pus, fluctuance, malodor, or signs of infection plantar bilateral hallux and first and fifth mtpj bilateral. 6 total    Skin thin, atrophic, with decreased density and distribution of pedal hair bilateral, but without ulcers, masses, nodules or cords palpated bilateral feet and legs.    Hyperpigmentation both legs consistent with stasis.    Toenails 1st,  2nd, 3rd, 4th, 5th  bilateral are hypertrophic thickened 2-3 mm, dystrophic, discolored tanish brown with tan, gray crumbly subungual debris.  Nails 1-5 left and 1-2 right are long 2-3 mm the other nails are well trimmed    Fifth toe interspaces are slightly macerated and erythematous with peeling skin.             Assessment:       Encounter Diagnoses   Name Primary?    Type II diabetes mellitus with peripheral circulatory disorder Yes    Diabetic polyneuropathy associated with diabetes mellitus due to underlying condition     Fat pad atrophy of foot     Pre-ulcerative calluses     Onychomycosis due to dermatophyte          Plan:       Steve was seen today for diabetic foot exam.    Diagnoses and all orders for this visit:    Type II diabetes mellitus with peripheral circulatory disorder    Diabetic polyneuropathy associated with diabetes mellitus due to underlying condition    Fat pad atrophy of foot    Pre-ulcerative calluses    Onychomycosis due to dermatophyte    Other orders  -     ciclopirox (LOPROX) 0.77 % Crea; Apply topically 2 (two) times daily.      I counseled the patient on his conditions, their implications and medical management.    Patient will stretch the tendo achilles complex three times daily as demonstrated in the office.  Literature was dispensed illustrating proper stretching technique.    Shoe inspection. Diabetic Foot Education. Patient reminded of the importance of good nutrition and blood sugar control to help prevent podiatric complications of diabetes. Patient instructed on proper foot hygeine. We discussed wearing proper shoe gear, daily foot inspections, never walking without protective shoe gear, never putting sharp instruments to feet    With patient's verbal consent the hyperkeratotic lesions x6 total both feet were trimmed to healthy appearing skin using a # 15 scalpel. Patient relates relief of pain following the procedure. Patient tolerated the procedure well without  complications. No anesthesia or hemostasis required. No blood loss.    With patient's verbal consent, nails were aggressively reduced and debrided x 10 to their soft tissue attachment mechanically and with electric , removing all offending nail and debris. Patient relates relief following the procedure. No anesthesia or hemostasis required. No blood loss.     Loprox cream BID until symptoms of athletes foot resolves    Continue pain management.    Return 3 months     Fausto Harvey DPM

## 2019-05-03 ENCOUNTER — TELEPHONE (OUTPATIENT)
Dept: PULMONOLOGY | Facility: CLINIC | Age: 74
End: 2019-05-03

## 2019-05-03 ENCOUNTER — TELEPHONE (OUTPATIENT)
Dept: PODIATRY | Facility: CLINIC | Age: 74
End: 2019-05-03

## 2019-05-03 NOTE — TELEPHONE ENCOUNTER
Called and let patient know on detailed voicemail that this medication was sent over yesterday to his correct pharmacy.

## 2019-05-03 NOTE — TELEPHONE ENCOUNTER
third attempt to reach pt. No answer. No voicemail available. voicemail box full.     ----- Message from Edna Olvera NP sent at 4/30/2019  1:30 PM CDT -----  Please call and ask if he has heard about esbriet medication and if a Aleta Pierson from pharmacy spoke to him. Does he still want the Esbriet?

## 2019-05-07 ENCOUNTER — TELEPHONE (OUTPATIENT)
Dept: PHARMACY | Facility: CLINIC | Age: 74
End: 2019-05-07

## 2019-05-07 DIAGNOSIS — J84.9 INTERSTITIAL LUNG DISEASE: ICD-10-CM

## 2019-05-07 DIAGNOSIS — J84.112 IDIOPATHIC PULMONARY FIBROSIS: ICD-10-CM

## 2019-05-07 DIAGNOSIS — J84.10 PULMONARY FIBROSIS DETERMINED BY HIGH RESOLUTION COMPUTED TOMOGRAPHY: ICD-10-CM

## 2019-05-07 RX ORDER — PIRFENIDONE 267 MG/1
CAPSULE ORAL
Qty: 105 CAPSULE | Refills: 0 | Status: SHIPPED | OUTPATIENT
Start: 2019-05-07 | End: 2019-06-18

## 2019-05-07 RX ORDER — PIRFENIDONE 801 MG/1
801 TABLET, FILM COATED ORAL 3 TIMES DAILY
Qty: 90 TABLET | Refills: 11 | Status: SHIPPED | OUTPATIENT
Start: 2019-05-21 | End: 2019-07-28

## 2019-05-14 RX ORDER — FLUOROURACIL 50 MG/G
CREAM TOPICAL
Qty: 40 G | Refills: 0 | Status: SHIPPED | OUTPATIENT
Start: 2019-05-14 | End: 2019-07-28

## 2019-05-21 ENCOUNTER — PATIENT MESSAGE (OUTPATIENT)
Dept: DERMATOLOGY | Facility: CLINIC | Age: 74
End: 2019-05-21

## 2019-05-21 ENCOUNTER — TELEPHONE (OUTPATIENT)
Dept: DERMATOLOGY | Facility: CLINIC | Age: 74
End: 2019-05-21

## 2019-05-21 NOTE — TELEPHONE ENCOUNTER
----- Message from Payal Joel sent at 5/21/2019  1:41 PM CDT -----  Contact: patient  Type: Needs Medical Advice    Who Called:  patient  Best Call Back Number: [phone]  Additional Information: patient stated that fluorouracil (EFUDEX) 5 % cream is too expensive and would like to be prescribed something else. Please advise

## 2019-05-22 ENCOUNTER — TELEPHONE (OUTPATIENT)
Dept: DERMATOLOGY | Facility: CLINIC | Age: 74
End: 2019-05-22

## 2019-05-22 NOTE — TELEPHONE ENCOUNTER
Spoke to pt. States efudex is too expensive $125. Informed could send compounded efudex/calci for $50 cash pay. Pt agrees.

## 2019-05-24 ENCOUNTER — TELEPHONE (OUTPATIENT)
Dept: PHARMACY | Facility: CLINIC | Age: 74
End: 2019-05-24

## 2019-05-24 NOTE — TELEPHONE ENCOUNTER
Initial Esbriet consult completed on .  Esbriet will be shipped on  to arrive at patient's home on  via [employer]. $0.00 copay. Patient plans to start Esbriet on . Address confirmed.  Confirmed 2 patient identifiers - name and . Therapy Appropriate.      Patient is on limited income, so his first worry was cost with this medication. He has a monthly income of about $1300    Patient was counseled on the administration directions:  - Titration:  · Take 1 tablet by mouth three times daily for 1 week, then   · take 2 tablets by mouth three times daily for 1 week, then   · take 3 tablets by mouth three times daily thereafter  -Do not chew, crush, or break the tablets.   -Discussed the importance of staying well hydrated while on therapy.       Patient was counseled on the following possible side effects, which include, but are not limited to:  - nausea, skin reactions, abdominal pain, upper respiratory tract infection, diarrhea, fatigue, headache, dyspepsia/ gastro-esophageal reflux disease, dizziness, anorexia/ weight loss, sinusitis, insomnia, arthralgia  · Counseled on loperamide dosing for diarrhea: take 2 tablets with the first episode, then 1 tablet after each following episode for a max of 7 episodes a day.   · Inform provider if regularly experiencing 4+ episodes daily.     Montioring:  · LFTs: monthly for the first 6 months  · Reviewed signs of liver dysfunction including, but not limited to, jaundice and/or white/markie colored stools.       DDIs: medication list reviewed, No interactions found.       Patient was given patient education handouts on how to handle hazardous agents and specific recommendations- do's and don'ts.       Compliance stressed - patient will get back on track at next dose after a missed dose and will not double up. Patient will report questions or concerns to myself or practitioner. Patient verbalizes understanding. Consultation included: indication; goals of treatment;  administration; storage and handling; side effects; how to handle side effects; the importance of compliance; how to handle missed doses; the importance of laboratory monitoring; the importance of keeping all follow up appointments. Patient understands to report any medication changes to [facility] and provider. All questions answered and addressed to patients satisfaction. [facility] will contact patient in 3 weeks to coordinate next refill.    Jose Luis Booker, PharmD  Clinical Pharmacist  [facility]  P: [phone]     InBasket sent 5/24 @ 2:28PM

## 2019-05-29 ENCOUNTER — TELEPHONE (OUTPATIENT)
Dept: HEPATOLOGY | Facility: CLINIC | Age: 74
End: 2019-05-29

## 2019-05-29 NOTE — TELEPHONE ENCOUNTER
----- Message from Joanne Cunha NP sent at 5/29/2019  2:42 PM CDT -----  Pt due for f/u with u/s and labs, pls schedule    ----- Message -----  From: Joanne Cunha NP  Sent: 5/29/2019  To: Joanne Cunha NP    hcc due

## 2019-05-30 ENCOUNTER — INITIAL CONSULT (OUTPATIENT)
Dept: DERMATOLOGY | Facility: CLINIC | Age: 74
End: 2019-05-30
Payer: MEDICARE

## 2019-05-30 VITALS
HEIGHT: 69 IN | BODY MASS INDEX: 28.14 KG/M2 | SYSTOLIC BLOOD PRESSURE: 133 MMHG | DIASTOLIC BLOOD PRESSURE: 81 MMHG | WEIGHT: 190 LBS | HEART RATE: 67 BPM

## 2019-05-30 DIAGNOSIS — C44.219 BASAL CELL CARCINOMA, EAR, LEFT: Primary | ICD-10-CM

## 2019-05-30 PROCEDURE — 3079F PR MOST RECENT DIASTOLIC BLOOD PRESSURE 80-89 MM HG: ICD-10-PCS | Mod: HCNC,CPTII,S$GLB, | Performed by: DERMATOLOGY

## 2019-05-30 PROCEDURE — 3079F DIAST BP 80-89 MM HG: CPT | Mod: HCNC,CPTII,S$GLB, | Performed by: DERMATOLOGY

## 2019-05-30 PROCEDURE — 3075F SYST BP GE 130 - 139MM HG: CPT | Mod: HCNC,CPTII,S$GLB, | Performed by: DERMATOLOGY

## 2019-05-30 PROCEDURE — 3075F PR MOST RECENT SYSTOLIC BLOOD PRESS GE 130-139MM HG: ICD-10-PCS | Mod: HCNC,CPTII,S$GLB, | Performed by: DERMATOLOGY

## 2019-05-30 PROCEDURE — 99999 PR PBB SHADOW E&M-EST. PATIENT-LVL III: ICD-10-PCS | Mod: PBBFAC,HCNC,, | Performed by: DERMATOLOGY

## 2019-05-30 PROCEDURE — 99214 OFFICE O/P EST MOD 30 MIN: CPT | Mod: HCNC,S$GLB,, | Performed by: DERMATOLOGY

## 2019-05-30 PROCEDURE — 1101F PT FALLS ASSESS-DOCD LE1/YR: CPT | Mod: HCNC,CPTII,S$GLB, | Performed by: DERMATOLOGY

## 2019-05-30 PROCEDURE — 99999 PR PBB SHADOW E&M-EST. PATIENT-LVL III: CPT | Mod: PBBFAC,HCNC,, | Performed by: DERMATOLOGY

## 2019-05-30 PROCEDURE — 99214 PR OFFICE/OUTPT VISIT, EST, LEVL IV, 30-39 MIN: ICD-10-PCS | Mod: HCNC,S$GLB,, | Performed by: DERMATOLOGY

## 2019-05-30 PROCEDURE — 1101F PR PT FALLS ASSESS DOC 0-1 FALLS W/OUT INJ PAST YR: ICD-10-PCS | Mod: HCNC,CPTII,S$GLB, | Performed by: DERMATOLOGY

## 2019-05-30 NOTE — LETTER
June 1, 2019      Salma Aguilera MD  [address] [place] [address] [address]  [facility] [address]           [place] [facility] [address] [facility] [address] [place] [address]  Phone: [phone]          Patient: Steve June Jr.   MR Number: [id number]   YOB: 1945   Date of Visit: 5/30/2019       Dear Dr. Salma Aguilera:    Thank you for referring Steve June to me for evaluation. Attached you will find relevant portions of my assessment and plan of care.    If you have questions, please do not hesitate to call me. I look forward to following Steve June along with you.    Sincerely,    Lonnie Blank MD    Enclosure  CC:  No Recipients    If you would like to receive this communication electronically, please contact [email or web address] or [phone] to request more information on [facility] access.    For providers and/or their staff who would like to refer a patient to [facility], please contact us through our one-stop-shop provider referral line, [facility], at [phone].    If you feel you have received this communication in error or would no longer like to receive these types of communications, please e-mail [email or web address]

## 2019-05-30 NOTE — PROGRESS NOTES
ALLERGIES:  Adhesive tape-silicones and Doxycycline    CHIEF COMPLAINT:  This 74 y.o. male comes for evaluation for Mohs' Micrographic Surgery, Fresh Tissue Technique, for treatment of a biopsy-proven basal cell carcinoma on the left posterior helix. Consultation requested by Salma Aguilera M.D.    HISTORY OF PRESENT ILLNESS:   Location: left posterior helix  Duration: 3-4  months or more  Quality: persistent  Context: status post biopsy by Salma Aguilera M.D.; path = basal cell carcinoma; pathology accession #[id number], [facility]     Prior Treatment: none  See also the handwritten notes/diagrams scanned to chart for additional details.    Defibrillator: No  Pacemaker: No  Artificial heart valves: No  Artificial joints: No    REVIEW OF SYSTEMS:   General: general health good  Skin: previous history of skin cancer(s) Yes If yes, details: right and left ear, neck  Other:  Yes , had Mohs surgery  Cardiovascular:   High Blood Pressure: Yes   Chest Pain:  No   Defibrillator: as above  Pacemaker: as above  Artificial hear valves: as above  Prior Endocarditis: No   Prior Heart Attack/Myocardial Infarction: No   Prior Cardiac Bypass or Stents:  No     Mitral Valve Prolapse: No Other:  No   Respiratory:   Shortness of breath:  Yes   Other:  Yes  pulmanory fibrosis  Endocrine:   Diabetes:  Yes   Other:  No   Hem/Lymph:   Taking Prescribed Blood Thinners:  No   Easy Bleeding:  Yes   Other: he had persistent bleeding at the biopsy site after the biopsy; had to go to the ER  He did not have any notable bleeding issues after his previous surgery on the right ear, which involved a wedge repair  Allergy/Immuno: has yes allergies as noted above  Other:  No   GI:   Prior Hepatitis:  No   Other:  Yes cirrhosis, Gaviria esophagus  Musculoskeletal:   Artificial joints: as above  Other:  Yes   Neurologic:   Prior Stroke:  No   Other:  No   Other Systems:  No      PAST MEDICAL HISTORY:  Past Medical History:   Diagnosis Date     Abnormal thyroid function test     Allergy     Seasonal    Anemia     Anemia due to blood loss 7/2/2014    Arthritis     Gaviria esophagus     Basal cell carcinoma     right forearm    Basal cell carcinoma 12/2011    lower post neck    Cancer     skin CA    Cataract     Cirrhosis     Diabetes mellitus     Diabetes mellitus, type 2     Encounter for blood transfusion     Esophageal varices in cirrhosis     grade II on 7/12 EGD    Gastritis     on 7/12 EGD    GERD (gastroesophageal reflux disease) 2/28/2015    Hard of hearing     Hiatal hernia     History of hepatitis C 8/10/2012    tx with harvoni x 41 days (started 10/22/15). SVR4     Hoarseness 2/28/2015    Hypercholesteremia     Hypersplenism     Hypertension     No meds    Pain management 12/10/2014    Petechial hemorrhage 11/25/2015    Lower extremities bilat     Portal hypertensive gastropathy     on 7/12 EGD    Thrombocytopenia     Type II or unspecified type diabetes mellitus with neurological manifestations, uncontrolled(250.62) 12/24/2013    Valvular heart disease     mild MR 12       PAST SURGICAL HISTORY:  Past Surgical History:   Procedure Laterality Date    ABLATION, RADIOFREQUENCY-left suprascapula Left 8/25/2017    [facility]    CATARACT EXTRACTION  1/10/13    left eye    CATARACT EXTRACTION      right eye    CHOLECYSTECTOMY      COLONOSCOPY      diagnostic block of the genicular branches to the left knee Left 12/15/2017    [facility] at [facility]    EGD (ESOPHAGOGASTRODUODENOSCOPY) N/A 3/13/2014    Performed by [facility] at [facility] ENDO (4TH FLR)    EGD (ESOPHAGOGASTRODUODENOSCOPY) N/A 7/11/2013    Performed by Campos Walters MD at [facility] ENDO (4TH FLR)    ESOPHAGOGASTRODUODENOSCOPY (EGD) N/A 8/1/2014    Performed by Juan David Booker MD at [facility] ENDO (4TH FLR)    ESOPHAGOGASTRODUODENOSCOPY (EGD) N/A 7/3/2014    Performed by Juan David  BRIANNA Booker MD at [facility] ENDO (2ND FLR)    EYE SURGERY      Cataract surgery to right eye    INSERTION, IOL PROSTHESIS Left 1/10/2013    Performed by [facility]i [facility] at [facility] OR 1ST FLR    KNEE ARTHROSCOPY W/ MENISCAL REPAIR      KNEE CARTILAGE SURGERY      left knee    KNEE SURGERY  2006    left    LARYNGOSCOPY Bilateral 2014    Performed by Anoop Bernstein [facility] at [facility] OR 2ND FLR    PHACOEMULSIFICATION, CATARACT Left 1/10/2013    Performed by [facility] at [facility] OR 1ST FLR    PHACOEMULSIFICATION, CATARACT Right 2012    [facility] at [facility]    SKIN LESION EXCISION      TONSILLECTOMY      UPPER GASTROINTESTINAL ENDOSCOPY          SOCIAL HISTORY:  Dependencies: smoking status as noted below  Social History     Tobacco Use    Smoking status: Former Smoker     Packs/day: 1.00     Years: 25.00     Pack years: 25.00     Last attempt to quit: 2000     Years since quittin.8    Smokeless tobacco: Never Used   Substance Use Topics    Alcohol use: Yes     Comment: rarely    Drug use: No       PERTINENT MEDICATIONS:  See medications list.    Current Outpatient Medications:     fluorouracil (EFUDEX) 5 % cream, AAA BID x 4 weeks, Disp: 40 g, Rfl: 0    LEVEMIR FLEXTOUCH U-100 INSULN 100 unit/mL (3 mL) InPn pen, Inject 30 Units into the skin every evening., Disp: 27 mL, Rfl: 3    metFORMIN (GLUCOPHAGE) 500 MG tablet, Take 1 tablet (500 mg total) by mouth 2 (two) times daily with meals., Disp: 180 tablet, Rfl: 3    [START ON 2019] oxyCODONE-acetaminophen (PERCOCET)  mg per tablet, Take 1 tablet by mouth every 6 (six) hours as needed for Pain., Disp: 120 tablet, Rfl: 0    pirfenidone (ESBRIET) 267 mg Cap, Take 1 capsule (267 mg total) by mouth 3 (three) times daily for 7 days, THEN 2 capsules (534 mg total) 3 (three) times daily for 14 days., Disp: 105 capsule, Rfl: 0    pirfenidone (ESBRIET) 801 mg Tab, Take 1 tablet (801 mg) by mouth 3  (three) times daily., Disp: 90 tablet, Rfl: 11    ciclopirox (LOPROX) 0.77 % Crea, Apply topically 2 (two) times daily., Disp: 1 Tube, Rfl: 3    ALLERGIES:  Adhesive tape-silicones and Doxycycline    EXAM:  See also the handwritten notes/diagrams scanned to chart for additional details.  Constitutional  General appearance: well-developed, well-nourished, well-kempt older white male    Eyes  Inspection of conjunctivae and lids reveals no abnormalities; sclerae anicteric  Neurologic/Psychiatric  Alert,  normal orientation to time, place, person  Normal mood and affect with no evidence of depression, anxiety, agitation  Skin: see photo(s)  Head: background marked solar damage to exposed areas of skin; in addition, inspection/palpation reveals an approximately 1-1.5 cm scar on the left posterior helical rim; site(s) confirmed by reference to the photograph(s) in the chart taken at the time of the biopsy/biopsies by the referring physician and he confirmed this as the site of the prior biopsy  Neck: examination reveals marked chronic solar damage  Right upper extremity: examination reveals marked chronic solar damage; some ecchymosis/ecchymoses   Left upper extremity: examination reveals marked chronic solar damage; some ecchymosis/ecchymoses   Photo(s) this visit:       Photo(s) from biopsy visit:           ASSESSMENT: biopsy-proven basal cell carcinoma, infiltrative, of the left posterior ear/helical rim  chronic solar damage to areas as noted above  personal history of non-melanoma skin cancer  Underlying thrombocytopenia    PLAN:  The diagnosis and management options, and risks and benefits of the alternatives, including observation/non-treatment, radiation treatment, excision with vertical frozen section or paraffin-embedded section margin evaluation, and Mohs' Micrographic Surgery, Fresh Tissue Technique, were discussed at length with the patient. In particular, the discussion included, but was not limited to, the  following:    One alternative at this point would be to defer further treatment and observe the lesion. With small skin cancers of this kind, it is possible that a biopsy can be sufficient to definitively treat a small skin cancer of this kind. Alternatively, some skin cancers are slow growing and do not require immediate treatment. The potential advantage of this choice would be to avoid the need for possibly unnecessary additional surgery. Among the potential disadvantages of this would be the possibility of enlargement of the lesion, more extensive spread of the lesion or recurrence at a later date, which might necessitate a larger and more complex surgery.    Radiation treatment can be an effective treatment for this type of skin cancer. The usual course of treatment is every weekday for several weeks. Local irritation will result from treatment, although no systemic side effects are expected. The potential advantage of radiation treatment is that it avoids the need for surgery. Among the disadvantages of radiation treatment are the length of treatment, the local inflammatory response, the absence of pathologic confirmation of the removal of the skin cancer, a possible increased risk of additional skin cancer in the treated area in later years, and a somewhat increased risk of recurrence at a later date.     Excisional surgery can be an effective treatment for this type of skin cancer. This would involve excision of the lesion with margin evaluation by submitting the specimen to a pathologist for either immediate marginal assessment via frozen section processing, or delayed marginal assessment by fixed-tissue processing. The potential advantage of this technique is that it offers a way of treating the lesion with some degree of histologic confirmation of tumor removal. Among the disadvantages of this treatment are the possible need for re-excision if marginal involvement is identified, a somewhat greater  likelihood of recurrence as compared to Mohs' surgery because of the less comprehensive margin evaluation inherent in the technique, and the general potential risks of surgery, including allergic reactions to the anesthetic and other materials used, infection, injury to nerves in the area with consequent loss of sensation or muscle function, and scarring or distortion of surrounding structures.    Mohs' surgery is a very effective treatment for this type of skin cancer. The potential advantage of Mohs' surgery is that this technique offers the greatest possible certainty of knowing that the skin cancer has been completely removed, with the removal of the least amount of normal tissue. The potential disadvantages of Mohs' surgery include the duration of the surgery, the possible need for a separate surgery for reconstruction following tumor removal, and scarring as a result. In addition, general potential risks of surgery as noted above also apply to treatment via Mohs' surgery.    In light of the nature of this tumor and the location in an area of increased risk of recurrence,  Mohs' micrographic surgery was thought to be the most appropriate management choice, and this diagnosis is appropriate for treatment by Mohs' micrographic surgery.     Sufficient time was available for questions, and all questions were answered to his satisfaction. He fully understands the aims, risks, alternatives, and possible complications, and has elected to proceed with the surgery, and verbally consented to do so.     However, prior to scheduling his surgery, I will discuss his thrombocytopenia with Dr. Ponce to see if he would have any concerns regarding this.  We will coordinate his care thereafter.    Addendum:   I discussed his circumstances with Dr. Ponce, did not feel that his current platelet count would represent a problem.    We will contact the patient to schedule his surgery and proceed as  planned.    --------------------------------------  Note: Some or all of this note may have been generated using voice recognition software. There may be voice recognition errors including grammatical and/or spelling errors found in the text. Attempts were made to correct these errors prior to signature.

## 2019-05-31 ENCOUNTER — TELEPHONE (OUTPATIENT)
Dept: HEPATOLOGY | Facility: CLINIC | Age: 74
End: 2019-05-31

## 2019-05-31 NOTE — TELEPHONE ENCOUNTER
----- Message from Billie Morales MA sent at 5/29/2019  4:22 PM CDT -----  Please call patient to schedule on Dr. June schedule in [place].   ----- Message -----  From: Joanne Cunha NP  Sent: 5/29/2019   2:42 PM  To: Alphonse Rubio Staff    Pt due for f/u with u/s and labs, pls schedule    ----- Message -----  From: Joanne Cunha NP  Sent: 5/29/2019  To: Joanne Cunha NP    hcc due

## 2019-05-31 NOTE — TELEPHONE ENCOUNTER
MA called patient schedule his follow up visit to see Dr. June and US. Mailed appt reminder to patient.

## 2019-06-02 ENCOUNTER — HOSPITAL ENCOUNTER (EMERGENCY)
Facility: HOSPITAL | Age: 74
Discharge: HOME | End: 2019-06-02
Attending: EMERGENCY MEDICINE
Payer: MEDICARE

## 2019-06-02 VITALS
DIASTOLIC BLOOD PRESSURE: 79 MMHG | TEMPERATURE: 98 F | HEIGHT: 69 IN | WEIGHT: 190 LBS | OXYGEN SATURATION: 96 % | HEART RATE: 76 BPM | SYSTOLIC BLOOD PRESSURE: 166 MMHG | RESPIRATION RATE: 20 BRPM | BODY MASS INDEX: 28.14 KG/M2

## 2019-06-02 DIAGNOSIS — R07.9 CHEST PAIN: ICD-10-CM

## 2019-06-02 DIAGNOSIS — M79.81: Primary | ICD-10-CM

## 2019-06-02 DIAGNOSIS — R73.9 HYPERGLYCEMIA WITHOUT KETOSIS: ICD-10-CM

## 2019-06-02 DIAGNOSIS — D69.6 THROMBOCYTOPENIA: ICD-10-CM

## 2019-06-02 DIAGNOSIS — M79.604 RIGHT LEG PAIN: ICD-10-CM

## 2019-06-02 LAB
ALBUMIN SERPL BCP-MCNC: 3.9 G/DL (ref 3.5–5.2)
ALP SERPL-CCNC: 91 U/L (ref 55–135)
ALT SERPL W/O P-5'-P-CCNC: 16 U/L (ref 10–44)
ANION GAP SERPL CALC-SCNC: 10 MMOL/L (ref 8–16)
AST SERPL-CCNC: 19 U/L (ref 10–40)
BASOPHILS # BLD AUTO: 0 K/UL (ref 0–0.2)
BASOPHILS NFR BLD: 0.3 % (ref 0–1.9)
BILIRUB SERPL-MCNC: 1 MG/DL (ref 0.1–1)
BUN SERPL-MCNC: 20 MG/DL (ref 8–23)
CALCIUM SERPL-MCNC: 9 MG/DL (ref 8.7–10.5)
CHLORIDE SERPL-SCNC: 100 MMOL/L (ref 95–110)
CO2 SERPL-SCNC: 24 MMOL/L (ref 23–29)
CREAT SERPL-MCNC: 1.3 MG/DL (ref 0.5–1.4)
DIFFERENTIAL METHOD: ABNORMAL
EOSINOPHIL # BLD AUTO: 0.1 K/UL (ref 0–0.5)
EOSINOPHIL NFR BLD: 3.7 % (ref 0–8)
ERYTHROCYTE [DISTWIDTH] IN BLOOD BY AUTOMATED COUNT: 15.1 % (ref 11.5–14.5)
EST. GFR  (AFRICAN AMERICAN): >60 ML/MIN/1.73 M^2
EST. GFR  (NON AFRICAN AMERICAN): 55 ML/MIN/1.73 M^2
GLUCOSE SERPL-MCNC: 351 MG/DL (ref 70–110)
HCT VFR BLD AUTO: 36.8 % (ref 40–54)
HGB BLD-MCNC: 12.3 G/DL (ref 14–18)
LYMPHOCYTES # BLD AUTO: 0.4 K/UL (ref 1–4.8)
LYMPHOCYTES NFR BLD: 11.1 % (ref 18–48)
MCH RBC QN AUTO: 28.1 PG (ref 27–31)
MCHC RBC AUTO-ENTMCNC: 33.4 G/DL (ref 32–36)
MCV RBC AUTO: 84 FL (ref 82–98)
MONOCYTES # BLD AUTO: 0.3 K/UL (ref 0.3–1)
MONOCYTES NFR BLD: 7.6 % (ref 4–15)
NEUTROPHILS # BLD AUTO: 3 K/UL (ref 1.8–7.7)
NEUTROPHILS NFR BLD: 77.3 % (ref 38–73)
PLATELET # BLD AUTO: 44 K/UL (ref 150–350)
PMV BLD AUTO: 8.4 FL (ref 9.2–12.9)
POCT GLUCOSE: 336 MG/DL (ref 70–110)
POTASSIUM SERPL-SCNC: 4.1 MMOL/L (ref 3.5–5.1)
PROT SERPL-MCNC: 6.9 G/DL (ref 6–8.4)
RBC # BLD AUTO: 4.37 M/UL (ref 4.6–6.2)
SODIUM SERPL-SCNC: 134 MMOL/L (ref 136–145)
WBC # BLD AUTO: 3.8 K/UL (ref 3.9–12.7)

## 2019-06-02 PROCEDURE — 85025 COMPLETE CBC W/AUTO DIFF WBC: CPT | Mod: HCNC

## 2019-06-02 PROCEDURE — 25000003 PHARM REV CODE 250: Mod: HCNC | Performed by: EMERGENCY MEDICINE

## 2019-06-02 PROCEDURE — 99285 EMERGENCY DEPT VISIT HI MDM: CPT | Mod: 25,HCNC

## 2019-06-02 PROCEDURE — 80053 COMPREHEN METABOLIC PANEL: CPT | Mod: HCNC

## 2019-06-02 PROCEDURE — 36415 COLL VENOUS BLD VENIPUNCTURE: CPT | Mod: HCNC

## 2019-06-02 PROCEDURE — 63600175 PHARM REV CODE 636 W HCPCS: Mod: HCNC | Performed by: EMERGENCY MEDICINE

## 2019-06-02 PROCEDURE — 93005 ELECTROCARDIOGRAM TRACING: CPT | Mod: HCNC

## 2019-06-02 PROCEDURE — 82962 GLUCOSE BLOOD TEST: CPT | Mod: HCNC

## 2019-06-02 PROCEDURE — 96374 THER/PROPH/DIAG INJ IV PUSH: CPT | Mod: HCNC

## 2019-06-02 RX ORDER — OXYCODONE AND ACETAMINOPHEN 10; 325 MG/1; MG/1
1 TABLET ORAL EVERY 4 HOURS PRN
Status: DISCONTINUED | OUTPATIENT
Start: 2019-06-02 | End: 2019-06-02 | Stop reason: HOSPADM

## 2019-06-02 RX ADMIN — INSULIN HUMAN 8 UNITS: 100 INJECTION, SOLUTION PARENTERAL at 09:06

## 2019-06-02 RX ADMIN — OXYCODONE AND ACETAMINOPHEN 1 TABLET: 10; 325 TABLET ORAL at 07:06

## 2019-06-03 ENCOUNTER — TELEPHONE (OUTPATIENT)
Dept: FAMILY MEDICINE | Facility: CLINIC | Age: 74
End: 2019-06-03

## 2019-06-03 ENCOUNTER — DOCUMENTATION ONLY (OUTPATIENT)
Dept: FAMILY MEDICINE | Facility: CLINIC | Age: 74
End: 2019-06-03

## 2019-06-03 NOTE — ED NOTES
Pt presents with complaints of bleeding and pain to right lower leg. Woke up from a nap today with bruising and redness to right lower leg. Complains of pain and burning to right lower leg. Leg is warm to touch in area of concern. Pt also complains of leg cramps which he has been having and says he has to get up at night and walk to try to relieve the pain.  Pt alert and oriented with neuro intact. Family at bedside. Dr. Horton in to evaluate pt.

## 2019-06-03 NOTE — ED PROVIDER NOTES
"Encounter Date: 6/2/2019    SCRIBE #1 NOTE: I, Dakota Aponte, am scribing for, and in the presence of, Dr. Michael Horton.       History     Chief Complaint   Patient presents with    Bleeding/Bruising     to right calf.       Time seen by provider: 7:17 PM on 06/02/2019    Steve June Jr. is a 74 y.o. male with PMHx of Thrombocytopenia, Hypersplenism, [place], Hiatal hernia, DM, Cirrhosis, Anemia, Valvular heart disease and an Encounter for blood transfusion. He presents to the ED accompanied by spouse with an onset of large area of bruising to right lower extremity. He states he woke up at 4:00am, x16 hours PTA this morning complaining of pain to his right lower leg when he noticed a bruise. He adds 5 hours later he realized the redness began to spread. He denies hitting his leg against anything. Other than pain to his right lower extremity, he also complains of itching, burning, cramping and aching as well. Pt confirms "spleen problems" secondary to cirrhosis of the liver with the spouse adding "he has a low platelet count of 30,000". She also states he had a blood transfusion "years ago". The pt says his blood sugar was high today around 400 confirming eating a piece of pie around 10:00am this morning, x10 hours PTA. The patient denies being on blood thinners at this time. SHx of Skin lesion excision. Allergic to Doxycycline.        Review of patient's allergies indicates:   Allergen Reactions    Adhesive tape-silicones Other (See Comments)     pulls skin off    Doxycycline      Dizzy. "Just didn't feel right".     Past Medical History:   Diagnosis Date    Abnormal thyroid function test     Allergy     Seasonal    Anemia     Anemia due to blood loss 7/2/2014    Arthritis     Gaviria esophagus     Basal cell carcinoma     right forearm    Basal cell carcinoma 12/2011    lower post neck    Cancer     skin CA    Cataract     Cirrhosis     Diabetes mellitus     Diabetes mellitus, type 2     " Encounter for blood transfusion     Esophageal varices in cirrhosis     grade II on 7/12 EGD    Gastritis     on 7/12 EGD    GERD (gastroesophageal reflux disease) 2/28/2015    Hard of hearing     Hiatal hernia     History of hepatitis C 8/10/2012    tx with harvoni x 41 days (started 10/22/15). SVR4     Hoarseness 2/28/2015    Hypercholesteremia     Hypersplenism     Hypertension     No meds    Pain management 12/10/2014    Petechial hemorrhage 11/25/2015    Lower extremities bilat     Portal hypertensive gastropathy     on 7/12 EGD    Thrombocytopenia     Type II or unspecified type diabetes mellitus with neurological manifestations, uncontrolled(250.62) 12/24/2013    Valvular heart disease     mild MR 12     Past Surgical History:   Procedure Laterality Date    ABLATION, RADIOFREQUENCY-left suprascapula Left 8/25/2017    [facility] OR    CATARACT EXTRACTION  1/10/13    left eye    CATARACT EXTRACTION      right eye    CHOLECYSTECTOMY      COLONOSCOPY      diagnostic block of the genicular branches to the left knee Left 12/15/2017    [facility] at [facility] OR    EGD (ESOPHAGOGASTRODUODENOSCOPY) N/A 3/13/2014    Performed by [facility] at [facility] ENDO (4TH FLR)    EGD (ESOPHAGOGASTRODUODENOSCOPY) N/A 7/11/2013    Performed by Campos Walters MD at [facility] ENDO (4TH FLR)    ESOPHAGOGASTRODUODENOSCOPY (EGD) N/A 8/1/2014    Performed by Juan David Booker MD at [facility] ENDO (4TH FLR)    ESOPHAGOGASTRODUODENOSCOPY (EGD) N/A 7/3/2014    Performed by Juan [facility] Booker [facility] at [facility] ENDO (2ND FLR)    EYE SURGERY      Cataract surgery to right eye    INSERTION, IOL PROSTHESIS Left 1/10/2013    Performed by [facility]i Baker [facility] at [facility] OR 1ST FLR    KNEE ARTHROSCOPY W/ MENISCAL REPAIR      KNEE CARTILAGE SURGERY      left knee    KNEE SURGERY  12/2006    left    LARYNGOSCOPY Bilateral 12/5/2014    Performed by [facility]  at [facility] OR 2ND FLR    PHACOEMULSIFICATION, CATARACT Left 1/10/2013    Performed by [facility] at [facility] OR 1ST FLR    PHACOEMULSIFICATION, CATARACT Right 2012    [facility] at [facility] OR    SKIN LESION EXCISION      TONSILLECTOMY      UPPER GASTROINTESTINAL ENDOSCOPY       Family History   Problem Relation Age of Onset    Leukemia Mother     Cancer Mother         bone    Alcohol abuse Father     Cirrhosis Father         EtOH induced    No Known Problems Daughter     No Known Problems Son     No Known Problems Daughter     No Known Problems Daughter     No Known Problems Daughter     No Known Problems Sister     Amblyopia Neg Hx     Blindness Neg Hx     Cataracts Neg Hx     Diabetes Neg Hx     Glaucoma Neg Hx     Hypertension Neg Hx     Macular degeneration Neg Hx     Retinal detachment Neg Hx     Strabismus Neg Hx     Stroke Neg Hx     Thyroid disease Neg Hx     Psoriasis Neg Hx     Lupus Neg Hx     Eczema Neg Hx     Acne Neg Hx     Melanoma Neg Hx      Social History     Tobacco Use    Smoking status: Former Smoker     Packs/day: 1.00     Years: 25.00     Pack years: 25.00     Last attempt to quit: 2000     Years since quittin.8    Smokeless tobacco: Never Used   Substance Use Topics    Alcohol use: Yes     Comment: rarely    Drug use: No     Review of Systems   Constitutional: Negative for fever.   HENT: Negative for congestion.    Eyes: Negative for visual disturbance.   Respiratory: Negative for wheezing.    Cardiovascular: Negative for chest pain and leg swelling.   Gastrointestinal: Negative for abdominal pain.   Genitourinary: Negative for dysuria.   Musculoskeletal: Positive for myalgias. Negative for arthralgias and joint swelling.   Skin: Positive for color change (Bruising and redness to right lower extremity). Negative for rash.   Neurological: Negative for syncope.   Hematological: Bruises/bleeds easily.    Psychiatric/Behavioral: Negative for confusion.       Physical Exam     Initial Vitals [06/02/19 1746]   BP Pulse Resp Temp SpO2   (!) 166/79 76 20 98.3 °F (36.8 °C) 96 %      MAP       --         Physical Exam    Nursing note and vitals reviewed.  Constitutional: He appears well-developed and well-nourished.   [place].   HENT:   Head: Normocephalic and atraumatic.   Eyes: Conjunctivae and EOM are normal.   Neck: Normal range of motion. Neck supple. No thyroid mass present.   Cardiovascular: Normal rate, regular rhythm and normal heart sounds. Exam reveals no gallop and no friction rub.    No murmur heard.  Pulmonary/Chest: Breath sounds normal. He has no wheezes. He has no rhonchi. He has no rales.   Abdominal: Soft. Normal appearance and bowel sounds are normal. There is no tenderness. A hernia is present.   Positive for easily reducible large umbilical hernia. He has a well healed vertical upper midline incision.   Neurological: He is alert and oriented to person, place, and time. He has normal strength. No cranial nerve deficit or sensory deficit.   Skin: Skin is warm and dry. Ecchymosis noted. No rash noted.   15cm by 15cm area of right lateral leg ecchymosis. Patient has evidence of bilateral lower extremity chronic venous stasis dermatitis.    Psychiatric: He has a normal mood and affect. His speech is normal. Cognition and memory are normal.         ED Course   Procedures  Labs Reviewed   CBC W/ AUTO DIFFERENTIAL - Abnormal; Notable for the following components:       Result Value    WBC 3.80 (*)     RBC 4.37 (*)     Hemoglobin 12.3 (*)     Hematocrit 36.8 (*)     RDW 15.1 (*)     Platelets 44 (*)     MPV 8.4 (*)     Lymph # 0.4 (*)     Gran% 77.3 (*)     Lymph% 11.1 (*)     All other components within normal limits   COMPREHENSIVE METABOLIC PANEL - Abnormal; Notable for the following components:    Sodium 134 (*)     Glucose 351 (*)     eGFR if non  55 (*)     All other components within  normal limits   POCT GLUCOSE - Abnormal; Notable for the following components:    POCT Glucose 336 (*)     All other components within normal limits     EKG Readings: (Independently Interpreted)   Initial Reading: No STEMI. Rhythm: Normal Sinus Rhythm. Heart Rate: 68 BPM. Axis: Left Axis Deviation.   Moderate voltage criteria for LVH, may be normal variant. Cannot rule out septal infarct, age undetermined. Borderline left axis, normal intervals.       Imaging Results          US Lower Extremity Veins Right (In process)                  Medical Decision Making:   History:   Old Medical Records: I decided to obtain old medical records.  Clinical Tests:   Lab Tests: Ordered and Reviewed  Radiological Study: Ordered and Reviewed  Medical Tests: Reviewed and Ordered  ED Management:  This patient was emergently received and assessed upon arrival.  Initial vital signs are stable with the exception of isolated systolic hypertension.  EKG is negative for acute ischemic changes.  The patient denies ever experiencing chest pain. He has an area of what appears to be spontaneous superficial ecchymosis of the right lateral leg.  There is no evidence of pain out of proportion, bullae or additional findings concerning for evolving necrotizing soft tissue infection.  Labs are significant for stable anemia but the patient's platelet count has improved 44,000. He is noted to be hyperglycemic without evidence of ketosis and reports eating a piece of pie prior to arrival.  He did receive 8 units of insulin IV push is asked to take his blood sugar at home and resume his normal sliding scale tonight.  He is educated about chronic venous stasis dermatitis and supportive care for these areas of spontaneous ecchymosis. He is asked to follow up with his primary care doctor soon as possible regarding appropriate healing.  He is asked return to the ER for any new, concerning, or worsening symptoms.  Patient was agreeable with this plan for  follow-up and was discharged in stable condition.            Scribe Attestation:   Scribe #1: I performed the above scribed service and the documentation accurately describes the services I performed. I attest to the accuracy of the note.        I, Dr. Michael Horton, personally performed the services described in this documentation. All medical record entries made by the scribe were at my direction and in my presence.  I have reviewed the chart and agree that the record reflects my personal performance and is accurate and complete. Michael Horton MD.  4:27 AM 06/03/2019          Clinical Impression:       ICD-10-CM ICD-9-CM   1. Nontraumatic hematoma of skin and subcutaneous tissue M79.81 729.92   2. Chest pain R07.9 786.50   3. Right leg pain M79.604 729.5   4. Thrombocytopenia D69.6 287.5   5. Hyperglycemia without ketosis R73.9 790.29         Disposition:   Disposition: Discharged  Condition: Stable                        Michael Horton MD  06/03/19 0428

## 2019-06-03 NOTE — TELEPHONE ENCOUNTER
----- Message from Autumn Bella sent at 6/3/2019 11:08 AM CDT -----  Type:  Same Day Appointment Request    Caller is requesting a same day appointment.  Caller declined first available appointment listed below.      Name of Caller:  pt  When is the first available appointment?  Several  Weeks   Symptoms:  ER followup r  leg  red  and  pain //  hot to   touch  diabetic  Best Call Back Number:  [phone]  Additional Information:    Needs to  Fitted in today

## 2019-06-03 NOTE — PROGRESS NOTES
Pre-Visit Chart Review  For Appointment Scheduled on 6/4/19    Health Maintenance Due   Topic Date Due    TETANUS VACCINE  05/13/1963    Colonoscopy  05/13/1997

## 2019-06-04 ENCOUNTER — TELEPHONE (OUTPATIENT)
Dept: HEMATOLOGY/ONCOLOGY | Facility: CLINIC | Age: 74
End: 2019-06-04

## 2019-06-04 ENCOUNTER — OFFICE VISIT (OUTPATIENT)
Dept: FAMILY MEDICINE | Facility: CLINIC | Age: 74
End: 2019-06-04
Payer: MEDICARE

## 2019-06-04 VITALS
TEMPERATURE: 98 F | SYSTOLIC BLOOD PRESSURE: 108 MMHG | BODY MASS INDEX: 29.09 KG/M2 | DIASTOLIC BLOOD PRESSURE: 78 MMHG | WEIGHT: 196.44 LBS | HEART RATE: 68 BPM | HEIGHT: 69 IN

## 2019-06-04 DIAGNOSIS — L03.115 CELLULITIS OF RIGHT LOWER EXTREMITY: ICD-10-CM

## 2019-06-04 DIAGNOSIS — D69.6 THROMBOCYTOPENIA: Primary | ICD-10-CM

## 2019-06-04 PROCEDURE — 99214 PR OFFICE/OUTPT VISIT, EST, LEVL IV, 30-39 MIN: ICD-10-PCS | Mod: HCNC,S$GLB,, | Performed by: FAMILY MEDICINE

## 2019-06-04 PROCEDURE — 99999 PR PBB SHADOW E&M-EST. PATIENT-LVL IV: ICD-10-PCS | Mod: PBBFAC,HCNC,, | Performed by: FAMILY MEDICINE

## 2019-06-04 PROCEDURE — 1101F PR PT FALLS ASSESS DOC 0-1 FALLS W/OUT INJ PAST YR: ICD-10-PCS | Mod: HCNC,CPTII,S$GLB, | Performed by: FAMILY MEDICINE

## 2019-06-04 PROCEDURE — 3078F PR MOST RECENT DIASTOLIC BLOOD PRESSURE < 80 MM HG: ICD-10-PCS | Mod: HCNC,CPTII,S$GLB, | Performed by: FAMILY MEDICINE

## 2019-06-04 PROCEDURE — 1101F PT FALLS ASSESS-DOCD LE1/YR: CPT | Mod: HCNC,CPTII,S$GLB, | Performed by: FAMILY MEDICINE

## 2019-06-04 PROCEDURE — 3074F SYST BP LT 130 MM HG: CPT | Mod: HCNC,CPTII,S$GLB, | Performed by: FAMILY MEDICINE

## 2019-06-04 PROCEDURE — 99214 OFFICE O/P EST MOD 30 MIN: CPT | Mod: HCNC,S$GLB,, | Performed by: FAMILY MEDICINE

## 2019-06-04 PROCEDURE — 3074F PR MOST RECENT SYSTOLIC BLOOD PRESSURE < 130 MM HG: ICD-10-PCS | Mod: HCNC,CPTII,S$GLB, | Performed by: FAMILY MEDICINE

## 2019-06-04 PROCEDURE — 3078F DIAST BP <80 MM HG: CPT | Mod: HCNC,CPTII,S$GLB, | Performed by: FAMILY MEDICINE

## 2019-06-04 PROCEDURE — 99999 PR PBB SHADOW E&M-EST. PATIENT-LVL IV: CPT | Mod: PBBFAC,HCNC,, | Performed by: FAMILY MEDICINE

## 2019-06-04 RX ORDER — SULFAMETHOXAZOLE AND TRIMETHOPRIM 800; 160 MG/1; MG/1
1 TABLET ORAL 2 TIMES DAILY
Qty: 14 TABLET | Refills: 0 | Status: ON HOLD | OUTPATIENT
Start: 2019-06-04 | End: 2019-06-10 | Stop reason: HOSPADM

## 2019-06-04 NOTE — TELEPHONE ENCOUNTER
----- Message from Maria De Jesus Sanchez sent at 6/4/2019  9:02 AM CDT -----  Pt was seen today and needs an appt for Thrombocytopenia  Please call him @ [phone]  Thanks !

## 2019-06-04 NOTE — PROGRESS NOTES
Subjective:   Patient ID: Steve June Jr. is a 74 y.o. male     Chief Complaint:Leg Pain      Patient follow-up from the emergency room after being evaluated for right lower extremity swelling and bruising.  Evaluation in emergency room did not indicate any and infectious etiology or blood clot.  On presentation patient notes worsening swelling with tenderness. Patient notes worsening pain as well.  Patient denies any fevers at this time.    Review of Systems   Respiratory: Negative for shortness of breath.    Cardiovascular: Negative for chest pain.   Gastrointestinal: Negative for abdominal pain.   Genitourinary: Negative for dysuria.     Past Medical History:   Diagnosis Date    Abnormal thyroid function test     Allergy     Seasonal    Anemia     Anemia due to blood loss 7/2/2014    Arthritis     Gaviria esophagus     Basal cell carcinoma     right forearm    Basal cell carcinoma 12/2011    lower post neck    Cancer     skin CA    Cataract     Cirrhosis     Diabetes mellitus     Diabetes mellitus, type 2     Encounter for blood transfusion     Esophageal varices in cirrhosis     grade II on 7/12 EGD    Gastritis     on 7/12 EGD    GERD (gastroesophageal reflux disease) 2/28/2015    Hard of hearing     Hiatal hernia     History of hepatitis C 8/10/2012    tx with harvoni x 41 days (started 10/22/15). SVR4     Hoarseness 2/28/2015    Hypercholesteremia     Hypersplenism     Hypertension     No meds    Pain management 12/10/2014    Petechial hemorrhage 11/25/2015    Lower extremities bilat     Portal hypertensive gastropathy     on 7/12 EGD    Thrombocytopenia     Type II or unspecified type diabetes mellitus with neurological manifestations, uncontrolled(250.62) 12/24/2013    Valvular heart disease     mild MR 12     Objective:     Vitals:    06/04/19 0814   BP: 108/78   Pulse: 68   Temp: 98.2 °F (36.8 °C)     Body mass index is 29.01 kg/m².  Physical Exam   Neck: Neck supple.  No tracheal deviation present.   Cardiovascular: Normal rate, regular rhythm and normal heart sounds.   Pulmonary/Chest: Effort normal. No respiratory distress.   Musculoskeletal: Normal range of motion. He exhibits edema.   Skin: Rash noted.     Assessment:     1. Thrombocytopenia    2. Cellulitis of right lower extremity      Plan:   Thrombocytopenia  -     Ambulatory referral to Hematology / Oncology  Reviewed blood work from emergency room shows patient with platelet counts in the 40s and this is consistent on review of further blood work.  Do suspect double cytopenias contributing to patient's bruising as noted on exam.  Patient may benefit from evaluation of his chronic thrombocytopenia.    Cellulitis of right lower extremity  -     sulfamethoxazole-trimethoprim 800-160mg (BACTRIM DS) 800-160 mg Tab; Take 1 tablet by mouth 2 (two) times daily. for 7 days  Dispense: 14 tablet; Refill: 0  Will treat patient for cellulitis due to suspicion that may have developed after being discharged from the emergency room.  Patient denies ever having any issues taking Bactrim in the past.  Patient also advised to follow up with my office if symptoms worsen or do not improve or starts to develop fevers.          Time spent with patient: 30 minutes and over half of that time was spent on counseling an coordination of care.    Crispin Garcia MD  06/04/2019    Portions of this note have been dictated with CRISTOBAL Garcia

## 2019-06-04 NOTE — TELEPHONE ENCOUNTER
Spoke to pt to schedule referral apt for dr. Cai. Pt confirmed apt date and time scheduled and verbalized understanding.

## 2019-06-05 ENCOUNTER — HOSPITAL ENCOUNTER (EMERGENCY)
Facility: HOSPITAL | Age: 74
Discharge: HOME | End: 2019-06-05
Attending: EMERGENCY MEDICINE
Payer: MEDICARE

## 2019-06-05 ENCOUNTER — NURSE TRIAGE (OUTPATIENT)
Dept: ADMINISTRATIVE | Facility: CLINIC | Age: 74
End: 2019-06-05

## 2019-06-05 ENCOUNTER — TELEPHONE (OUTPATIENT)
Dept: FAMILY MEDICINE | Facility: CLINIC | Age: 74
End: 2019-06-05

## 2019-06-05 VITALS
WEIGHT: 190 LBS | HEIGHT: 69 IN | TEMPERATURE: 99 F | OXYGEN SATURATION: 97 % | SYSTOLIC BLOOD PRESSURE: 169 MMHG | HEART RATE: 70 BPM | RESPIRATION RATE: 20 BRPM | DIASTOLIC BLOOD PRESSURE: 76 MMHG | BODY MASS INDEX: 28.14 KG/M2

## 2019-06-05 DIAGNOSIS — L03.119 CELLULITIS OF LOWER EXTREMITY, UNSPECIFIED LATERALITY: Primary | ICD-10-CM

## 2019-06-05 PROCEDURE — 96372 THER/PROPH/DIAG INJ SC/IM: CPT | Mod: HCNC

## 2019-06-05 PROCEDURE — 99284 EMERGENCY DEPT VISIT MOD MDM: CPT | Mod: 25,HCNC

## 2019-06-05 PROCEDURE — 63600175 PHARM REV CODE 636 W HCPCS: Mod: HCNC | Performed by: EMERGENCY MEDICINE

## 2019-06-05 RX ORDER — CEPHALEXIN 500 MG/1
500 CAPSULE ORAL EVERY 8 HOURS
Qty: 20 CAPSULE | Refills: 0 | Status: ON HOLD | OUTPATIENT
Start: 2019-06-05 | End: 2019-06-10 | Stop reason: HOSPADM

## 2019-06-05 RX ORDER — CEFTRIAXONE 1 G/1
1 INJECTION, POWDER, FOR SOLUTION INTRAMUSCULAR; INTRAVENOUS
Status: COMPLETED | OUTPATIENT
Start: 2019-06-05 | End: 2019-06-05

## 2019-06-05 RX ADMIN — CEFTRIAXONE SODIUM 1 G: 1 INJECTION, POWDER, FOR SOLUTION INTRAMUSCULAR; INTRAVENOUS at 06:06

## 2019-06-05 NOTE — TELEPHONE ENCOUNTER
Left detailed message on patient's recorder that he might need to be seen again to evaluate his left leg regarding his condition. Advised to give us a call back ASAP.

## 2019-06-05 NOTE — TELEPHONE ENCOUNTER
Had a large bruise on his right calf on Sunday am, became blood red from his ankle up to his knee, he was seen in the ED later that day, us negative for DVT, told to f/u with his PCP.  Saw PCP on Tuesday, started on abx, dx as infected insect bite.  Today the left lower leg is starting to look the same as the right one.  See image attached from the ED visit.  S/w Niru at Dr. Garcia's office, there is nobody available to see pt today.  Referred pt to the ER or [facility].    Reason for Disposition   Patient sounds very sick or weak to the triager    Protocols used: LEG SWELLING AND EDEMA-A-OH

## 2019-06-05 NOTE — SUBJECTIVE & OBJECTIVE
Past Medical History:   Diagnosis Date    Abnormal thyroid function test     Allergy     Seasonal    Anemia     Anemia due to blood loss 7/2/2014    Arthritis     Gaviria esophagus     Basal cell carcinoma     right forearm    Basal cell carcinoma 12/2011    lower post neck    Cancer     skin CA    Cataract     Cirrhosis     Diabetes mellitus     Diabetes mellitus, type 2     Encounter for blood transfusion     Esophageal varices in cirrhosis     grade II on 7/12 EGD    Gastritis     on 7/12 EGD    GERD (gastroesophageal reflux disease) 2/28/2015    Hard of hearing     Hiatal hernia     History of hepatitis C 8/10/2012    tx with harvoni x 41 days (started 10/22/15). SVR4     Hoarseness 2/28/2015    Hypercholesteremia     Hypersplenism     Hypertension     No meds    Pain management 12/10/2014    Petechial hemorrhage 11/25/2015    Lower extremities bilat     Portal hypertensive gastropathy     on 7/12 EGD    Thrombocytopenia     Type II or unspecified type diabetes mellitus with neurological manifestations, uncontrolled(250.62) 12/24/2013    Valvular heart disease     mild MR 12       Past Surgical History:   Procedure Laterality Date    ABLATION, RADIOFREQUENCY-left suprascapula Left 8/25/2017    Performed by [facility]olf [facility] at [facility]    CATARACT EXTRACTION  1/10/13    left eye    CATARACT EXTRACTION      right eye    CHOLECYSTECTOMY      COLONOSCOPY      diagnostic block of the genicular branches to the left knee Left 12/15/2017    Performed by [facility]olf [facility] at [facility] OR    EGD (ESOPHAGOGASTRODUODENOSCOPY) N/A 3/13/2014    Performed by [facility] at [facility] ENDO (4TH FLR)    EGD (ESOPHAGOGASTRODUODENOSCOPY) N/A 7/11/2013    Performed by Campos Walters MD at [facility] ENDO (4TH FLR)    ESOPHAGOGASTRODUODENOSCOPY (EGD) N/A 8/1/2014    Performed by Juan David Booker MD at [facility] ENDO (4TH FLR)    ESOPHAGOGASTRODUODENOSCOPY (EGD) N/A 7/3/2014     "Performed by Juan David Booker MD at [facility] ENDO (2ND FLR)    EYE SURGERY      Cataract surgery to right eye    INSERTION, IOL PROSTHESIS Left 1/10/2013    Performed by Domi Baker [facility] at [facility] OR 1ST FLR    KNEE ARTHROSCOPY W/ MENISCAL REPAIR      KNEE CARTILAGE SURGERY      left knee    KNEE SURGERY  12/2006    left    LARYNGOSCOPY Bilateral 12/5/2014    Performed by Anoop Bernstein MD at [facility] OR 2ND FLR    PHACOEMULSIFICATION, CATARACT Left 1/10/2013    Performed by Domi Baker MD at [facility] OR 1ST FLR    PHACOEMULSIFICATION, CATARACT Right 9/27/2012    Performed by Zelda Delgado [facility] at [facility] OR    SKIN LESION EXCISION      TONSILLECTOMY      UPPER GASTROINTESTINAL ENDOSCOPY         Review of patient's allergies indicates:   Allergen Reactions    Adhesive tape-silicones Other (See Comments)     pulls skin off    Doxycycline      Dizzy. "Just didn't feel right".       No current facility-administered medications on file prior to encounter.      Current Outpatient Medications on File Prior to Encounter   Medication Sig    ciclopirox (LOPROX) 0.77 % Crea Apply topically 2 (two) times daily.    fluorouracil (EFUDEX) 5 % cream AAA BID x 4 weeks    LEVEMIR FLEXTOUCH U-100 INSULN 100 unit/mL (3 mL) InPn pen Inject 30 Units into the skin every evening.    metFORMIN (GLUCOPHAGE) 500 MG tablet Take 1 tablet (500 mg total) by mouth 2 (two) times daily with meals.    oxyCODONE-acetaminophen (PERCOCET)  mg per tablet Take 1 tablet by mouth every 6 (six) hours as needed for Pain.    pirfenidone (ESBRIET) 267 mg Cap Take 1 capsule (267 mg total) by mouth 3 (three) times daily for 7 days, THEN 2 capsules (534 mg total) 3 (three) times daily for 14 days.    pirfenidone (ESBRIET) 801 mg Tab Take 1 tablet (801 mg) by mouth 3 (three) times daily.    sulfamethoxazole-trimethoprim 800-160mg (BACTRIM DS) 800-160 mg Tab Take 1 tablet by mouth 2 (two) times daily. for 7 days     Family History     " Problem Relation (Age of Onset)    Alcohol abuse Father    Cancer Mother    Cirrhosis Father    Leukemia Mother    No Known Problems Daughter, Son, Daughter, Daughter, Daughter, Sister        Tobacco Use    Smoking status: Former Smoker     Packs/day: 1.00     Years: 25.00     Pack years: 25.00     Last attempt to quit: 2000     Years since quittin.8    Smokeless tobacco: Never Used   Substance and Sexual Activity    Alcohol use: Yes     Comment: rarely    Drug use: No    Sexual activity: Never     Review of Systems   Constitutional: Negative for chills and fever.   Respiratory: Negative for cough, shortness of breath and wheezing.    Cardiovascular: Positive for leg swelling. Negative for chest pain and palpitations.   Gastrointestinal: Negative for abdominal distention, abdominal pain, diarrhea and nausea.   Genitourinary: Negative for difficulty urinating, flank pain and hematuria.   Musculoskeletal: Negative for gait problem and joint swelling.   Skin: Positive for color change.   Neurological: Negative for tremors, weakness, light-headedness and headaches.   Psychiatric/Behavioral: Negative for agitation, behavioral problems and confusion.     Objective:     Vital Signs (Most Recent):  Temp: 98.7 °F (37.1 °C) (19 1654)  Pulse: 70 (19 1654)  Resp: 20 (19 1654)  BP: (!) 176/79 (19 1654)  SpO2: 95 % (19 1654) Vital Signs (24h Range):  Temp:  [98.7 °F (37.1 °C)] 98.7 °F (37.1 °C)  Pulse:  [70] 70  Resp:  [20] 20  SpO2:  [95 %] 95 %  BP: (176)/(79) 176/79     Weight: 86.2 kg (190 lb)  Body mass index is 28.06 kg/m².    Physical Exam   Constitutional: He is oriented to person, place, and time.   Cardiovascular: Normal rate, regular rhythm, normal heart sounds and intact distal pulses.   Pulmonary/Chest: Effort normal and breath sounds normal. No respiratory distress. He has no wheezes.   Abdominal: Soft. Bowel sounds are normal. He exhibits no distension. There is no  tenderness.   Musculoskeletal: Normal range of motion. He exhibits no edema or tenderness.   Neurological: He is alert and oriented to person, place, and time. He displays normal reflexes. No cranial nerve deficit or sensory deficit.   Skin: Skin is warm and dry. No rash noted.   Purplish discoloration of almost the entire right leg with mild swelling in the ankle.  Slightly increased warmth.    There is a small patch of erythema in the left calf.   Psychiatric: He has a normal mood and affect. His behavior is normal. Thought content normal.   Nursing note and vitals reviewed.      Significant Labs: BMP: No results for input(s): GLU, NA, K, CL, CO2, BUN, CREATININE, CALCIUM, MG in the last 48 hours.  CBC: No results for input(s): WBC, HGB, HCT, PLT in the last 48 hours.

## 2019-06-05 NOTE — CONSULTS
[facility] Hospital Medicine  Consult Note    Patient Name: Steve June Jr.  MRN: [id number]  Admission Date: 6/5/2019  Hospital Length of Stay: 0 days  Attending Physician: Rafael Campo III, MD   Primary Care Provider: Crispin Garcia MD           Patient information was obtained from patient, spouse/SO and ER records.     Consults  Subjective:     Principal Problem: Cellulitis of leg    Chief Complaint:   Chief Complaint   Patient presents with    Leg Pain     pt c/o reddness and swelling to bilat legs        HPI: The patient is a 74-year-old gentleman who developed swelling and redness in his right leg on Sunday.  He was evaluated in the emergency room and a Doppler ultrasound was obtained that was negative for DVT.  The ER physician did not think he had an infection at that time.  The patient went to see his primary care doctor yesterday and they diagnosed him with cellulitis and prescribed him Bactrim.  He noted is up patch of redness in his other leg today and his PCP asked him to come to the emergency room.    The patient denies having fever or chills.  He denies trauma to either leg.    A CBC and BMP were obtained June 2nd during his ER visit.  Everything was normal except for the chronic thrombocytopenia.    Past Medical History:   Diagnosis Date    Abnormal thyroid function test     Allergy     Seasonal    Anemia     Anemia due to blood loss 7/2/2014    Arthritis     Gaviria esophagus     Basal cell carcinoma     right forearm    Basal cell carcinoma 12/2011    lower post neck    Cancer     skin CA    Cataract     Cirrhosis     Diabetes mellitus     Diabetes mellitus, type 2     Encounter for blood transfusion     Esophageal varices in cirrhosis     grade II on 7/12 EGD    Gastritis     on 7/12 EGD    GERD (gastroesophageal reflux disease) 2/28/2015    Hard of hearing     Hiatal hernia     History of hepatitis C 8/10/2012    tx with harvoni x 41 days  (started 10/22/15). SVR4     Hoarseness 2/28/2015    Hypercholesteremia     Hypersplenism     Hypertension     No meds    Pain management 12/10/2014    Petechial hemorrhage 11/25/2015    Lower extremities bilat     Portal hypertensive gastropathy     on 7/12 EGD    Thrombocytopenia     Type II or unspecified type diabetes mellitus with neurological manifestations, uncontrolled(250.62) 12/24/2013    Valvular heart disease     mild MR 12       Past Surgical History:   Procedure Laterality Date    ABLATION, RADIOFREQUENCY-left suprascapula Left 8/25/2017    [facility] by [facility] OR    CATARACT EXTRACTION  1/10/13    left eye    CATARACT EXTRACTION      right eye    CHOLECYSTECTOMY      COLONOSCOPY      diagnostic block of the genicular branches to the left knee Left 12/15/2017    [facility]formed by [facility] at [facility] OR    EGD (ESOPHAGOGASTRODUODENOSCOPY) N/A 3/13/2014    Performed by [facility]ach MD at [facility] ENDO (4TH FLR)    EGD (ESOPHAGOGASTRODUODENOSCOPY) N/A 7/11/2013    Performed by Campos Walters MD at [facility] ENDO (4TH FLR)    ESOPHAGOGASTRODUODENOSCOPY (EGD) N/A 8/1/2014    Performed by Juan David Booker MD at [facility] ENDO (4TH FLR)    ESOPHAGOGASTRODUODENOSCOPY (EGD) N/A 7/3/2014    Performed by Juan David Booker MD at [facility] ENDO (2ND FLR)    EYE SURGERY      Cataract surgery to right eye    INSERTION, IOL PROSTHESIS Left 1/10/2013    Performed by Domi Baker MD at [facility] OR 1ST FLR    KNEE ARTHROSCOPY W/ MENISCAL REPAIR      KNEE CARTILAGE SURGERY      left knee    KNEE SURGERY  12/2006    left    LARYNGOSCOPY Bilateral 12/5/2014    Performed by Anoop Bernstein MD at [facility] OR 2ND FLR    PHACOEMULSIFICATION, CATARACT Left 1/10/2013    Performed by Domi Baker MD at [facility] OR 1ST FLR    PHACOEMULSIFICATION, CATARACT Right 9/27/2012    Performed by Zelda Delgado MD at [facility] OR    SKIN LESION EXCISION      TONSILLECTOMY       "UPPER GASTROINTESTINAL ENDOSCOPY         Review of patient's allergies indicates:   Allergen Reactions    Adhesive tape-silicones Other (See Comments)     pulls skin off    Doxycycline      Dizzy. "Just didn't feel right".       No current facility-administered medications on file prior to encounter.      Current Outpatient Medications on File Prior to Encounter   Medication Sig    ciclopirox (LOPROX) 0.77 % Crea Apply topically 2 (two) times daily.    fluorouracil (EFUDEX) 5 % cream AAA BID x 4 weeks    LEVEMIR FLEXTOUCH U-100 INSULN 100 unit/mL (3 mL) InPn pen Inject 30 Units into the skin every evening.    metFORMIN (GLUCOPHAGE) 500 MG tablet Take 1 tablet (500 mg total) by mouth 2 (two) times daily with meals.    oxyCODONE-acetaminophen (PERCOCET)  mg per tablet Take 1 tablet by mouth every 6 (six) hours as needed for Pain.    pirfenidone (ESBRIET) 267 mg Cap Take 1 capsule (267 mg total) by mouth 3 (three) times daily for 7 days, THEN 2 capsules (534 mg total) 3 (three) times daily for 14 days.    pirfenidone (ESBRIET) 801 mg Tab Take 1 tablet (801 mg) by mouth 3 (three) times daily.    sulfamethoxazole-trimethoprim 800-160mg (BACTRIM DS) 800-160 mg Tab Take 1 tablet by mouth 2 (two) times daily. for 7 days     Family History     Problem Relation (Age of Onset)    Alcohol abuse Father    Cancer Mother    Cirrhosis Father    Leukemia Mother    No Known Problems Daughter, Son, Daughter, Daughter, Daughter, Sister        Tobacco Use    Smoking status: Former Smoker     Packs/day: 1.00     Years: 25.00     Pack years: 25.00     Last attempt to quit: 2000     Years since quittin.8    Smokeless tobacco: Never Used   Substance and Sexual Activity    Alcohol use: Yes     Comment: rarely    Drug use: No    Sexual activity: Never     Review of Systems   Constitutional: Negative for chills and fever.   Respiratory: Negative for cough, shortness of breath and wheezing.    Cardiovascular: " Positive for leg swelling. Negative for chest pain and palpitations.   Gastrointestinal: Negative for abdominal distention, abdominal pain, diarrhea and nausea.   Genitourinary: Negative for difficulty urinating, flank pain and hematuria.   Musculoskeletal: Negative for gait problem and joint swelling.   Skin: Positive for color change.   Neurological: Negative for tremors, weakness, light-headedness and headaches.   Psychiatric/Behavioral: Negative for agitation, behavioral problems and confusion.     Objective:     Vital Signs (Most Recent):  Temp: 98.7 °F (37.1 °C) (06/05/19 1654)  Pulse: 70 (06/05/19 1654)  Resp: 20 (06/05/19 1654)  BP: (!) 176/79 (06/05/19 1654)  SpO2: 95 % (06/05/19 1654) Vital Signs (24h Range):  Temp:  [98.7 °F (37.1 °C)] 98.7 °F (37.1 °C)  Pulse:  [70] 70  Resp:  [20] 20  SpO2:  [95 %] 95 %  BP: (176)/(79) 176/79     Weight: 86.2 kg (190 lb)  Body mass index is 28.06 kg/m².    Physical Exam   Constitutional: He is oriented to person, place, and time.   Cardiovascular: Normal rate, regular rhythm, normal heart sounds and intact distal pulses.   Pulmonary/Chest: Effort normal and breath sounds normal. No respiratory distress. He has no wheezes.   Abdominal: Soft. Bowel sounds are normal. He exhibits no distension. There is no tenderness.   Musculoskeletal: Normal range of motion. He exhibits no edema or tenderness.   Neurological: He is alert and oriented to person, place, and time. He displays normal reflexes. No cranial nerve deficit or sensory deficit.   Skin: Skin is warm and dry. No rash noted.   Purplish discoloration of almost the entire right leg with mild swelling in the ankle.  Slightly increased warmth.    There is a small patch of erythema in the left calf.   Psychiatric: He has a normal mood and affect. His behavior is normal. Thought content normal.   Nursing note and vitals reviewed.      Significant Labs: BMP: No results for input(s): GLU, NA, K, CL, CO2, BUN, CREATININE,  CALCIUM, MG in the last 48 hours.  CBC: No results for input(s): WBC, HGB, HCT, PLT in the last 48 hours.      Assessment/Plan:     * Cellulitis of leg  It is hard to know if he has cellulitis in the right leg or if the color change is secondary to a bruise.  He does appear to have a very small area of cellulitis in the left calf.    He will receive a gram of Rocephin in the emergency room and be discharged home with a prescription for Keflex in addition to Bactrim.  I asked the patient to elevate his right leg as much as possible.  He will follow up with his PCP early next week.      Type 2 diabetes mellitus with complication, with long-term current use of insulin  Stable      Cirrhosis  The patient has known cirrhosis secondary to hepatitis C.  He has severe thrombocytopenia and states that his platelet count is usually around 30,000.  It has been this way for years.        VTE Risk Mitigation (From admission, onward)    None              Thank you for your consult. I will sign off. Please contact us if you have any additional questions.    Sheyla Stern MD  Department of Hospital Medicine   [facility]

## 2019-06-05 NOTE — HPI
The patient is a 74-year-old gentleman who developed swelling and redness in his right leg on Sunday.  He was evaluated in the emergency room and a Doppler ultrasound was obtained that was negative for DVT.  The ER physician did not think he had an infection at that time.  The patient went to see his primary care doctor yesterday and they diagnosed him with cellulitis and prescribed him Bactrim.  He noted is up patch of redness in his other leg today and his PCP asked him to come to the emergency room.    The patient denies having fever or chills.  He denies trauma to either leg.    A CBC and BMP were obtained June 2nd during his ER visit.  Everything was normal except for the chronic thrombocytopenia.

## 2019-06-05 NOTE — ASSESSMENT & PLAN NOTE
It is hard to know if he has cellulitis in the right leg or if the color change is secondary to a bruise.  He does appear to have a very small area of cellulitis in the left calf.    He will receive a gram of Rocephin in the emergency room and be discharged home with a prescription for Keflex in addition to Bactrim.  I asked the patient to elevate his right leg as much as possible.  He will follow up with his PCP early next week.

## 2019-06-05 NOTE — TELEPHONE ENCOUNTER
----- Message from Tami Lal sent at 6/5/2019  2:58 PM CDT -----  Type: Needs Medical Advice    Who Called:  Patient  Symptoms (please be specific):  Left leg is now swollen and red, very painful  How long has patient had these symptoms:  This morning  Pharmacy name and phone #:    [facility] [address] [address] & [address] [address]  Phone: [phone] Fax: [phone]    Best Call Back Number: [phone] (home)     Additional Information: The bruise on his right leg has extended to his knee from his ankles. The doctor said the right leg must be from an insect bite. The patient is very worried now because his left leg is doing the same thing. Please call to advise. It keeps getting worse.

## 2019-06-05 NOTE — ASSESSMENT & PLAN NOTE
The patient has known cirrhosis secondary to hepatitis C.  He has severe thrombocytopenia and states that his platelet count is usually around 30,000.  It has been this way for years.

## 2019-06-05 NOTE — ED NOTES
Pt in room 8 for evaluation of leg redness and swelling.  Pt is awake, alert and oriented. Resp even and unlabored. Abd soft and non-tender. Pt denies chest pain or sob. Pt has redness and swelling to allyssa lower extremities.

## 2019-06-05 NOTE — ED PROVIDER NOTES
"            Encounter Date: 6/5/2019    SCRIBE #1 NOTE: I, Daniela Garcia, am scribing for, and in the presence of, Rafael Campo MD.       History     Chief Complaint   Patient presents with    Leg Pain     pt c/o reddness and swelling to bilat legs       Time seen by provider: 5:05 PM on 06/05/2019    Steve June Jr. is a 74 y.o. male who presents to the ED with an onset of bilateral lower leg pain with associated swelling, redness, and warmth. Per wife, the patient was seen in the ER on 6/2 (3 days ago ) for the same complaints. He was discharged after a DVT was ruled out. The patient states his symptoms began as an area of ecchymosis to his right calf and developed redness that worsened since. He states the right lower leg redness worsened and developed swelling in both lower legs as well as an area of redness to his outer left lower leg. The patient reports his pain is worse in his left leg since the redness developed. He was seen by his PCP Dr. Crispin Garcia yesterday day and was started on a 7-day course of Bactrim for what believed to be a bug bite.The patient denies fevers or any other symptoms at this time. He has a PMHx of DMII and HTN. No cardiac PSHx noted. Doxycycline drug allergy noted.    The history is provided by the spouse and the patient (wife).     Review of patient's allergies indicates:   Allergen Reactions    Adhesive tape-silicones Other (See Comments)     pulls skin off    Doxycycline      Dizzy. "Just didn't feel right".     Past Medical History:   Diagnosis Date    Abnormal thyroid function test     Allergy     Seasonal    Anemia     Anemia due to blood loss 7/2/2014    Arthritis     Gaviria esophagus     Basal cell carcinoma     right forearm    Basal cell carcinoma 12/2011    lower post neck    Cancer     skin CA    Cataract     Cirrhosis     Diabetes mellitus     Diabetes mellitus, type 2     Encounter for blood transfusion     Esophageal varices in cirrhosis  "    grade II on 7/12 EGD    Gastritis     on 7/12 EGD    GERD (gastroesophageal reflux disease) 2/28/2015    Hard of hearing     Hiatal hernia     History of hepatitis C 8/10/2012    tx with harvoni x 41 days (started 10/22/15). SVR4     Hoarseness 2/28/2015    Hypercholesteremia     Hypersplenism     Hypertension     No meds    Pain management 12/10/2014    Petechial hemorrhage 11/25/2015    Lower extremities bilat     Portal hypertensive gastropathy     on 7/12 EGD    Thrombocytopenia     Type II or unspecified type diabetes mellitus with neurological manifestations, uncontrolled(250.62) 12/24/2013    Valvular heart disease     mild MR 12     Past Surgical History:   Procedure Laterality Date    ABLATION, RADIOFREQUENCY-left suprascapula Left 8/25/2017    [facility]    CATARACT EXTRACTION  1/10/13    left eye    CATARACT EXTRACTION      right eye    CHOLECYSTECTOMY      COLONOSCOPY      diagnostic block of the genicular branches to the left knee Left 12/15/2017    [facility] at [facility]    EGD (ESOPHAGOGASTRODUODENOSCOPY) N/A 3/13/2014    [facility] (4TH F[facility])    EGD (ESOPHAGOGASTRODUODENOSCOPY) N/A 7/11/2013    Performed by Campos Walters [facility] at [facility] (4TH FLR)    ESOPHAGOGASTRODUODENOSCOPY (EGD) N/A 8/1/2014    Performed by Juan David Booker [facility] at [facility] (4TH FLR)    ESOPHAGOGASTRODUODENOSCOPY (EGD) N/A 7/3/2014    Performed by Juan [facility] at [facility] (2ND FLR)    EYE SURGERY      Cataract surgery to right eye    INSERTION, IOL PROSTHESIS Left 1/10/2013    [facility]formed by Domi [facility] at [facility] 1ST FLR    KNEE ARTHROSCOPY W/ MENISCAL REPAIR      KNEE CARTILAGE SURGERY      left knee    KNEE SURGERY  12/2006    left    LARYNGOSCOPY Bilateral 12/5/2014    Performed by Anoop Bernstein [facility] at [facility] 2ND FLR    PHACOEMULSIFICATION, CATARACT Left 1/10/2013     Performed by [facility] at [facility] 1ST FLR    PHACOEMULSIFICATION, CATARACT Right 2012    [facility]    SKIN LESION EXCISION      TONSILLECTOMY      UPPER GASTROINTESTINAL ENDOSCOPY       Family History   Problem Relation Age of Onset    Leukemia Mother     Cancer Mother         bone    Alcohol abuse Father     Cirrhosis Father         EtOH induced    No Known Problems Daughter     No Known Problems Son     No Known Problems Daughter     No Known Problems Daughter     No Known Problems Daughter     No Known Problems Sister     Amblyopia Neg Hx     Blindness Neg Hx     Cataracts Neg Hx     Diabetes Neg Hx     Glaucoma Neg Hx     Hypertension Neg Hx     Macular degeneration Neg Hx     Retinal detachment Neg Hx     Strabismus Neg Hx     Stroke Neg Hx     Thyroid disease Neg Hx     Psoriasis Neg Hx     Lupus Neg Hx     Eczema Neg Hx     Acne Neg Hx     Melanoma Neg Hx      Social History     Tobacco Use    Smoking status: Former Smoker     Packs/day: 1.00     Years: 25.00     Pack years: 25.00     Last attempt to quit: 2000     Years since quittin.8    Smokeless tobacco: Never Used   Substance Use Topics    Alcohol use: Yes     Comment: rarely    Drug use: No     Review of Systems   Constitutional: Negative for activity change, appetite change, chills, fatigue and fever.   Eyes: Negative for visual disturbance.   Respiratory: Negative for apnea and shortness of breath.    Cardiovascular: Positive for leg swelling (BLE's). Negative for chest pain and palpitations.   Gastrointestinal: Negative for abdominal distention and abdominal pain.   Genitourinary: Negative for difficulty urinating.   Musculoskeletal: Positive for myalgias (BLE's (left > right)). Negative for neck pain.   Skin: Positive for color change (erythema, BLE's). Negative for pallor and rash.        Positive for warmth (BLE's).    Neurological: Negative for  headaches.   Hematological: Does not bruise/bleed easily.   Psychiatric/Behavioral: Negative for agitation.     Physical Exam     Initial Vitals [06/05/19 1654]   BP Pulse Resp Temp SpO2   (!) 176/79 70 20 98.7 °F (37.1 °C) 95 %      MAP       --         Physical Exam    Nursing note and vitals reviewed.  Constitutional: He appears well-developed and well-nourished.   HENT:   Head: Normocephalic and atraumatic.   Eyes: Conjunctivae are normal.   Neck: Normal range of motion. Neck supple.   Cardiovascular: Normal rate and regular rhythm. Exam reveals no gallop and no friction rub.    Murmur heard.   Systolic murmur is present with a grade of 2/6.  Pulmonary/Chest: Breath sounds normal. No respiratory distress. He has no wheezes. He has no rhonchi. He has no rales.   Clear to auscultation.    Abdominal: Soft. He exhibits no distension. There is no tenderness. A hernia is present.   Reducible umbilical hernia.    Musculoskeletal: Normal range of motion. He exhibits edema.   Left leg: Area of erythema measuring 8 cm x 3 cm to the left lateral ankle with 1+ pitting edema. No calor.   Right leg: Circumferential erythema of the right lower leg with 1+ pitting edema.   See pictures below.    Neurological: He is alert and oriented to person, place, and time.   Skin: Skin is warm and dry. There is erythema.   Psychiatric: He has a normal mood and affect.                 ED Course   Procedures  Labs Reviewed - No data to display     Imaging Results    None          Medical Decision Making:   History:   Old Medical Records: I decided to obtain old medical records.  Clinical Tests:   Lab Tests: Reviewed and Ordered  ED Management:  74-year-old male with a history of chronic venous insufficiency with associated thrombocytopenia.  He has worsening redness of the leg.  It is unclear whether this represents bacterial cellulitis or subcutaneous hemorrhage from thrombocytopenia with venous insufficiency.  He was started on Bactrim 1  day ago.  Dr. Stern is consulted for possible admission and feels that there has been insufficient duration of treatment to the clear treatment failure.  She advocates an additional antibiotic, cephalexin, and recommends a further trial of outpatient treatment.  The patient is in agreement and will return for worsening symptoms.       APC / Resident Notes:   I, Dr. Rafael Campo III, personally performed the services described in this documentation. All medical record entries made by the scribe were at my direction and in my presence.  I have reviewed the chart and agree that the record reflects my personal performance and is accurate and complete       Scribe Attestation:   Scribe #1: I performed the above scribed service and the documentation accurately describes the services I performed. I attest to the accuracy of the note.               Clinical Impression:       ICD-10-CM ICD-9-CM   1. Cellulitis of lower extremity, unspecified laterality L03.119 682.6         Disposition:   Disposition: Discharged  Condition: Stable                        Rafael Campo III, MD  06/05/19 1174

## 2019-06-07 ENCOUNTER — HOSPITAL ENCOUNTER (OUTPATIENT)
Facility: HOSPITAL | Age: 74
Discharge: HOME | End: 2019-06-10
Attending: INTERNAL MEDICINE | Admitting: INTERNAL MEDICINE
Payer: MEDICARE

## 2019-06-07 DIAGNOSIS — L03.90 CELLULITIS: ICD-10-CM

## 2019-06-07 DIAGNOSIS — D69.6 THROMBOCYTOPENIA: Primary | ICD-10-CM

## 2019-06-07 LAB
ALBUMIN SERPL BCP-MCNC: 3.8 G/DL (ref 3.5–5.2)
ALP SERPL-CCNC: 90 U/L (ref 55–135)
ALT SERPL W/O P-5'-P-CCNC: 16 U/L (ref 10–44)
ANION GAP SERPL CALC-SCNC: 7 MMOL/L (ref 8–16)
APTT BLDCRRT: 25.9 SEC (ref 21–32)
AST SERPL-CCNC: 16 U/L (ref 10–40)
BASOPHILS # BLD AUTO: 0 K/UL (ref 0–0.2)
BASOPHILS NFR BLD: 0.4 % (ref 0–1.9)
BILIRUB SERPL-MCNC: 0.9 MG/DL (ref 0.1–1)
BUN SERPL-MCNC: 21 MG/DL (ref 8–23)
CALCIUM SERPL-MCNC: 10.5 MG/DL (ref 8.7–10.5)
CHLORIDE SERPL-SCNC: 98 MMOL/L (ref 95–110)
CO2 SERPL-SCNC: 26 MMOL/L (ref 23–29)
CREAT SERPL-MCNC: 1.3 MG/DL (ref 0.5–1.4)
DIFFERENTIAL METHOD: ABNORMAL
EOSINOPHIL # BLD AUTO: 0.2 K/UL (ref 0–0.5)
EOSINOPHIL NFR BLD: 6 % (ref 0–8)
ERYTHROCYTE [DISTWIDTH] IN BLOOD BY AUTOMATED COUNT: 14.8 % (ref 11.5–14.5)
EST. GFR  (AFRICAN AMERICAN): >60 ML/MIN/1.73 M^2
EST. GFR  (NON AFRICAN AMERICAN): 55 ML/MIN/1.73 M^2
GLUCOSE SERPL-MCNC: 301 MG/DL (ref 70–110)
HCT VFR BLD AUTO: 35.5 % (ref 40–54)
HGB BLD-MCNC: 12 G/DL (ref 14–18)
INR PPP: 1.2 (ref 0.8–1.2)
LYMPHOCYTES # BLD AUTO: 0.5 K/UL (ref 1–4.8)
LYMPHOCYTES NFR BLD: 12.5 % (ref 18–48)
MCH RBC QN AUTO: 28.2 PG (ref 27–31)
MCHC RBC AUTO-ENTMCNC: 33.9 G/DL (ref 32–36)
MCV RBC AUTO: 83 FL (ref 82–98)
MONOCYTES # BLD AUTO: 0.3 K/UL (ref 0.3–1)
MONOCYTES NFR BLD: 8.5 % (ref 4–15)
NEUTROPHILS # BLD AUTO: 2.9 K/UL (ref 1.8–7.7)
NEUTROPHILS NFR BLD: 72.6 % (ref 38–73)
PLATELET # BLD AUTO: 56 K/UL (ref 150–350)
PMV BLD AUTO: 7.9 FL (ref 9.2–12.9)
POCT GLUCOSE: 277 MG/DL (ref 70–110)
POCT GLUCOSE: 285 MG/DL (ref 70–110)
POCT GLUCOSE: 383 MG/DL (ref 70–110)
POCT GLUCOSE: 414 MG/DL (ref 70–110)
POTASSIUM SERPL-SCNC: 4.7 MMOL/L (ref 3.5–5.1)
PROT SERPL-MCNC: 6.8 G/DL (ref 6–8.4)
PROTHROMBIN TIME: 12 SEC (ref 9–12.5)
RBC # BLD AUTO: 4.27 M/UL (ref 4.6–6.2)
SODIUM SERPL-SCNC: 131 MMOL/L (ref 136–145)
WBC # BLD AUTO: 4 K/UL (ref 3.9–12.7)

## 2019-06-07 PROCEDURE — 85730 THROMBOPLASTIN TIME PARTIAL: CPT | Mod: HCNC

## 2019-06-07 PROCEDURE — 96376 TX/PRO/DX INJ SAME DRUG ADON: CPT | Mod: 59

## 2019-06-07 PROCEDURE — 25500020 PHARM REV CODE 255: Mod: HCNC | Performed by: INTERNAL MEDICINE

## 2019-06-07 PROCEDURE — 96375 TX/PRO/DX INJ NEW DRUG ADDON: CPT | Mod: 59

## 2019-06-07 PROCEDURE — G0378 HOSPITAL OBSERVATION PER HR: HCPCS | Mod: HCNC

## 2019-06-07 PROCEDURE — 96372 THER/PROPH/DIAG INJ SC/IM: CPT | Mod: 59

## 2019-06-07 PROCEDURE — 36415 COLL VENOUS BLD VENIPUNCTURE: CPT | Mod: HCNC

## 2019-06-07 PROCEDURE — 85025 COMPLETE CBC W/AUTO DIFF WBC: CPT | Mod: HCNC

## 2019-06-07 PROCEDURE — G0379 DIRECT REFER HOSPITAL OBSERV: HCPCS | Mod: HCNC

## 2019-06-07 PROCEDURE — S5571 INSULIN DISPOS PEN 3 ML: HCPCS | Mod: HCNC | Performed by: INTERNAL MEDICINE

## 2019-06-07 PROCEDURE — 80053 COMPREHEN METABOLIC PANEL: CPT | Mod: HCNC

## 2019-06-07 PROCEDURE — 85610 PROTHROMBIN TIME: CPT | Mod: HCNC

## 2019-06-07 PROCEDURE — 25000003 PHARM REV CODE 250: Mod: HCNC | Performed by: NURSE PRACTITIONER

## 2019-06-07 PROCEDURE — 25000003 PHARM REV CODE 250: Mod: HCNC | Performed by: INTERNAL MEDICINE

## 2019-06-07 PROCEDURE — 63600175 PHARM REV CODE 636 W HCPCS: Mod: HCNC | Performed by: INTERNAL MEDICINE

## 2019-06-07 RX ORDER — CYCLOBENZAPRINE HCL 5 MG
5 TABLET ORAL 3 TIMES DAILY PRN
Status: DISCONTINUED | OUTPATIENT
Start: 2019-06-07 | End: 2019-06-10 | Stop reason: HOSPADM

## 2019-06-07 RX ORDER — RAMELTEON 8 MG/1
8 TABLET ORAL NIGHTLY PRN
Status: DISCONTINUED | OUTPATIENT
Start: 2019-06-07 | End: 2019-06-10 | Stop reason: HOSPADM

## 2019-06-07 RX ORDER — POTASSIUM CHLORIDE 20 MEQ/15ML
40 SOLUTION ORAL
Status: DISCONTINUED | OUTPATIENT
Start: 2019-06-07 | End: 2019-06-10 | Stop reason: HOSPADM

## 2019-06-07 RX ORDER — ONDANSETRON 2 MG/ML
8 INJECTION INTRAMUSCULAR; INTRAVENOUS EVERY 8 HOURS PRN
Status: DISCONTINUED | OUTPATIENT
Start: 2019-06-07 | End: 2019-06-10 | Stop reason: HOSPADM

## 2019-06-07 RX ORDER — OXYCODONE AND ACETAMINOPHEN 10; 325 MG/1; MG/1
1 TABLET ORAL EVERY 6 HOURS PRN
Status: DISCONTINUED | OUTPATIENT
Start: 2019-06-07 | End: 2019-06-10 | Stop reason: HOSPADM

## 2019-06-07 RX ORDER — IBUPROFEN 200 MG
16 TABLET ORAL
Status: DISCONTINUED | OUTPATIENT
Start: 2019-06-07 | End: 2019-06-10 | Stop reason: HOSPADM

## 2019-06-07 RX ORDER — GLUCAGON 1 MG
1 KIT INJECTION
Status: DISCONTINUED | OUTPATIENT
Start: 2019-06-07 | End: 2019-06-10 | Stop reason: HOSPADM

## 2019-06-07 RX ORDER — ACETAMINOPHEN 500 MG
1000 TABLET ORAL EVERY 6 HOURS PRN
Status: DISCONTINUED | OUTPATIENT
Start: 2019-06-07 | End: 2019-06-10 | Stop reason: HOSPADM

## 2019-06-07 RX ORDER — AMOXICILLIN 250 MG
1 CAPSULE ORAL 2 TIMES DAILY
Status: DISCONTINUED | OUTPATIENT
Start: 2019-06-07 | End: 2019-06-10 | Stop reason: HOSPADM

## 2019-06-07 RX ORDER — LANOLIN ALCOHOL/MO/W.PET/CERES
800 CREAM (GRAM) TOPICAL
Status: DISCONTINUED | OUTPATIENT
Start: 2019-06-07 | End: 2019-06-10 | Stop reason: HOSPADM

## 2019-06-07 RX ORDER — FUROSEMIDE 10 MG/ML
20 INJECTION INTRAMUSCULAR; INTRAVENOUS ONCE
Status: COMPLETED | OUTPATIENT
Start: 2019-06-07 | End: 2019-06-07

## 2019-06-07 RX ORDER — INSULIN ASPART 100 [IU]/ML
0-5 INJECTION, SOLUTION INTRAVENOUS; SUBCUTANEOUS
Status: DISCONTINUED | OUTPATIENT
Start: 2019-06-07 | End: 2019-06-10 | Stop reason: HOSPADM

## 2019-06-07 RX ORDER — SODIUM CHLORIDE 0.9 % (FLUSH) 0.9 %
10 SYRINGE (ML) INJECTION
Status: DISCONTINUED | OUTPATIENT
Start: 2019-06-07 | End: 2019-06-10 | Stop reason: HOSPADM

## 2019-06-07 RX ORDER — IBUPROFEN 200 MG
24 TABLET ORAL
Status: DISCONTINUED | OUTPATIENT
Start: 2019-06-07 | End: 2019-06-10 | Stop reason: HOSPADM

## 2019-06-07 RX ADMIN — INSULIN DETEMIR 30 UNITS: 100 INJECTION, SOLUTION SUBCUTANEOUS at 08:06

## 2019-06-07 RX ADMIN — IOHEXOL 100 ML: 350 INJECTION, SOLUTION INTRAVENOUS at 02:06

## 2019-06-07 RX ADMIN — RAMELTEON 8 MG: 8 TABLET, FILM COATED ORAL at 11:06

## 2019-06-07 RX ADMIN — SENNOSIDES AND DOCUSATE SODIUM 1 TABLET: 8.6; 5 TABLET ORAL at 08:06

## 2019-06-07 RX ADMIN — OXYCODONE AND ACETAMINOPHEN 1 TABLET: 10; 325 TABLET ORAL at 07:06

## 2019-06-07 RX ADMIN — CYCLOBENZAPRINE HYDROCHLORIDE 5 MG: 5 TABLET, FILM COATED ORAL at 08:06

## 2019-06-07 RX ADMIN — VANCOMYCIN HYDROCHLORIDE 1500 MG: 1.5 INJECTION, POWDER, LYOPHILIZED, FOR SOLUTION INTRAVENOUS at 05:06

## 2019-06-07 RX ADMIN — OXYCODONE AND ACETAMINOPHEN 1 TABLET: 10; 325 TABLET ORAL at 12:06

## 2019-06-07 RX ADMIN — INSULIN ASPART 4 UNITS: 100 INJECTION, SOLUTION INTRAVENOUS; SUBCUTANEOUS at 08:06

## 2019-06-07 RX ADMIN — FUROSEMIDE 20 MG: 10 INJECTION, SOLUTION INTRAMUSCULAR; INTRAVENOUS at 04:06

## 2019-06-07 RX ADMIN — ACETAMINOPHEN 1000 MG: 500 TABLET ORAL at 11:06

## 2019-06-07 RX ADMIN — INSULIN ASPART 3 UNITS: 100 INJECTION, SOLUTION INTRAVENOUS; SUBCUTANEOUS at 05:06

## 2019-06-07 NOTE — ASSESSMENT & PLAN NOTE
The patient has bilateral leg pain with mild edema and discoloration.  He has not improved with double antibiotic therapy.  I will admit him and obtain a CT scan of the left leg which is the 1 that looks the worst.  Treatment will depend on the results of the CT.

## 2019-06-07 NOTE — ASSESSMENT & PLAN NOTE
The patient will be placed on insulin sliding scale in addition to his home dose of long-acting insulin.  Check hemoglobin A1c.

## 2019-06-07 NOTE — CONSULTS
"Pharmacokinetic Initial Assessment: IV Vancomycin    Assessment/Plan:    Initiate intravenous vancomycin dose of 1500 mg and followed by a maintenance dose of vancomycin 1500 mg IV every 24 hours  Desired empiric serum trough concentration is 10 to 15 mcg/mL.  Draw vancomycin trough level 30 min prior to third dose on 06/09 at approximately 1700.   Pharmacy will continue to follow and monitor vancomycin.      Please contact pharmacy at extension [phone] with any questions regarding this assessment.     Thank you for the consult,   Christo Kim, PharmD     Patient brief summary:  Steve June Jr. is a 74 y.o. male initiated on antimicrobial therapy with IV Vancomycin for treatment of suspected skin & soft tissue    Drug Allergies:   Review of patient's allergies indicates:   Allergen Reactions    Adhesive tape-silicones Other (See Comments)     pulls skin off    Doxycycline      Dizzy. "Just didn't feel right".       Actual Body Weight:   89.4 kg    Renal Function:   Estimated Creatinine Clearance: 56.8 mL/min (based on SCr of 1.3 mg/dL).,       CBC (last 72 hours):  Recent Labs   Lab Result Units 06/07/19  1216   WBC K/uL 4.00   Hemoglobin g/dL 12.0*   Hematocrit % 35.5*   Platelets K/uL 56*   Gran% % 72.6   Lymph% % 12.5*   Mono% % 8.5   Eosinophil% % 6.0   Basophil% % 0.4   Differential Method  Automated       Metabolic Panel (last 72 hours):  Recent Labs   Lab Result Units 06/07/19  1216   Sodium mmol/L 131*   Potassium mmol/L 4.7   Chloride mmol/L 98   CO2 mmol/L 26   Glucose mg/dL 301*   BUN, Bld mg/dL 21   Creatinine mg/dL 1.3   Albumin g/dL 3.8   Total Bilirubin mg/dL 0.9   Alkaline Phosphatase U/L 90   AST U/L 16   ALT U/L 16       Drug levels (last 3 results):  No results for input(s): VANCOMYCINRA, VANCOMYCINPE, VANCOMYCINTR in the last 72 hours.    Microbiologic Results:  Microbiology Results (last 7 days)       ** No results found for the last 168 hours. **            "

## 2019-06-07 NOTE — HPI
The patient is a 74-year-old gentleman who developed redness and swelling in his right leg this past Sunday.  He was seen in the emergency room and ultrasound was obtained that was negative for DVT.  He was discharged home.  He went to see his PCP a couple of days later and was diagnosed with cellulitis and placed on Bactrim.  He came back to the emergency room the next day because he developed a patch of erythema in his left leg. He was discharged home on Keflex and Bactrim. He is now developing swelling in the left leg and both legs have purple discoloration.  He has a history of cirrhosis and chronic thrombocytopenia but his platelet count is stable.  Coagulation studies are normal.  He does not take NSAIDs.  He denied having any trauma to either leg.

## 2019-06-07 NOTE — SUBJECTIVE & OBJECTIVE
Past Medical History:   Diagnosis Date    Abnormal thyroid function test     Allergy     Seasonal    Anemia     Anemia due to blood loss 7/2/2014    Arthritis     Gaviria esophagus     Basal cell carcinoma     right forearm    Basal cell carcinoma 12/2011    lower post neck    Cancer     skin CA    Cataract     Cirrhosis     Diabetes mellitus     Diabetes mellitus, type 2     Encounter for blood transfusion     Esophageal varices in cirrhosis     grade II on 7/12 EGD    Gastritis     on 7/12 EGD    GERD (gastroesophageal reflux disease) 2/28/2015    Hard of hearing     Hiatal hernia     History of hepatitis C 8/10/2012    tx with harvoni x 41 days (started 10/22/15). SVR4     Hoarseness 2/28/2015    Hypercholesteremia     Hypersplenism     Hypertension     No meds    Pain management 12/10/2014    Petechial hemorrhage 11/25/2015    Lower extremities bilat     Portal hypertensive gastropathy     on 7/12 EGD    Thrombocytopenia     Type II or unspecified type diabetes mellitus with neurological manifestations, uncontrolled(250.62) 12/24/2013    Valvular heart disease     mild MR 12       Past Surgical History:   Procedure Laterality Date    ABLATION, RADIOFREQUENCY-left suprascapula Left 8/25/2017    Performed by [facility] at [facility] OR    CATARACT EXTRACTION  1/10/13    left eye    CATARACT EXTRACTION      right eye    CHOLECYSTECTOMY      COLONOSCOPY      diagnostic block of the genicular branches to the left knee Left 12/15/2017    Performed by [facility]olf [facility] at [facility] OR    EGD (ESOPHAGOGASTRODUODENOSCOPY) N/A 3/13/2014    Performed by Kiara Leach MD at [facility] ENDO (4TH FLR)    EGD (ESOPHAGOGASTRODUODENOSCOPY) N/A 7/11/2013    Performed by Campos Walters MD at [facility] ENDO (4TH FLR)    ESOPHAGOGASTRODUODENOSCOPY (EGD) N/A 8/1/2014    Performed by Juan David Booker MD at [facility] ENDO (4TH FLR)    ESOPHAGOGASTRODUODENOSCOPY (EGD) N/A 7/3/2014     "Performed by Juan [facility] at [facility] ENDO (2ND FLR)    EYE SURGERY      Cataract surgery to right eye    INSERTION, IOL PROSTHESIS Left 1/10/2013    Performed by [facility] at [facility] OR 1ST FLR    KNEE ARTHROSCOPY W/ MENISCAL REPAIR      KNEE CARTILAGE SURGERY      left knee    KNEE SURGERY  12/2006    left    LARYNGOSCOPY Bilateral 12/5/2014    Performed by nAoop Bernstein MD at [facility] OR 2ND FLR    PHACOEMULSIFICATION, CATARACT Left 1/10/2013    Performed by [facility] at [facility] OR 1ST FLR    PHACOEMULSIFICATION, CATARACT Right 9/27/2012    Per[facility] by [facility] at [facility] OR    SKIN LESION EXCISION      TONSILLECTOMY      UPPER GASTROINTESTINAL ENDOSCOPY         Review of patient's allergies indicates:   Allergen Reactions    Adhesive tape-silicones Other (See Comments)     pulls skin off    Doxycycline      Dizzy. "Just didn't feel right".       No current facility-administered medications on file prior to encounter.      Current Outpatient Medications on File Prior to Encounter   Medication Sig    cephALEXin (KEFLEX) 500 MG capsule Take 1 capsule (500 mg total) by mouth every 8 (eight) hours. for 5 days    oxyCODONE-acetaminophen (PERCOCET)  mg per tablet Take 1 tablet by mouth every 6 (six) hours as needed for Pain.    ciclopirox (LOPROX) 0.77 % Crea Apply topically 2 (two) times daily.    fluorouracil (EFUDEX) 5 % cream AAA BID x 4 weeks    LEVEMIR FLEXTOUCH U-100 INSULN 100 unit/mL (3 mL) InPn pen Inject 30 Units into the skin every evening.    metFORMIN (GLUCOPHAGE) 500 MG tablet Take 1 tablet (500 mg total) by mouth 2 (two) times daily with meals.    pirfenidone (ESBRIET) 267 mg Cap Take 1 capsule (267 mg total) by mouth 3 (three) times daily for 7 days, THEN 2 capsules (534 mg total) 3 (three) times daily for 14 days.    pirfenidone (ESBRIET) 801 mg Tab Take 1 tablet (801 mg) by mouth 3 (three) times daily.    sulfamethoxazole-trimethoprim " 800-160mg (BACTRIM DS) 800-160 mg Tab Take 1 tablet by mouth 2 (two) times daily. for 7 days     Family History     Problem Relation (Age of Onset)    Alcohol abuse Father    Cancer Mother    Cirrhosis Father    Leukemia Mother    No Known Problems Daughter, Son, Daughter, Daughter, Daughter, Sister        Tobacco Use    Smoking status: Former Smoker     Packs/day: 1.00     Years: 25.00     Pack years: 25.00     Last attempt to quit: 2000     Years since quittin.8    Smokeless tobacco: Never Used   Substance and Sexual Activity    Alcohol use: Yes     Comment: rarely    Drug use: No    Sexual activity: Never     Review of Systems   Constitutional: Negative for chills and fever.   Respiratory: Negative for cough, shortness of breath and wheezing.    Cardiovascular: Negative for chest pain, palpitations and leg swelling.   Gastrointestinal: Negative for abdominal distention, abdominal pain, diarrhea and nausea.   Genitourinary: Negative for difficulty urinating, flank pain and hematuria.   Musculoskeletal: Positive for myalgias. Negative for gait problem and joint swelling.   Skin: Positive for color change and rash.   Neurological: Negative for tremors, weakness, light-headedness and headaches.   Hematological: Bruises/bleeds easily.   Psychiatric/Behavioral: Negative for agitation, behavioral problems and confusion.     Objective:     Vital Signs (Most Recent):  Temp: 97.5 °F (36.4 °C) (19 1203)  Pulse: 60 (19 1203)  Resp: 18 (19 1203)  BP: (!) 147/76 (19 1203)  SpO2: 97 % (19 1203) Vital Signs (24h Range):  Temp:  [97.5 °F (36.4 °C)] 97.5 °F (36.4 °C)  Pulse:  [60] 60  Resp:  [18] 18  SpO2:  [97 %] 97 %  BP: (147)/(76) 147/76     Weight: 89.4 kg (197 lb 1.5 oz)  Body mass index is 29.11 kg/m².    Physical Exam   Constitutional: He is oriented to person, place, and time. He appears well-developed and well-nourished. No distress.   HENT:   Head: Normocephalic and  atraumatic.   Eyes: Pupils are equal, round, and reactive to light. Conjunctivae and EOM are normal.   Neck: Normal range of motion. Neck supple.   Cardiovascular: Normal rate, regular rhythm, normal heart sounds and intact distal pulses. Exam reveals no gallop and no friction rub.   No murmur heard.  Pulmonary/Chest: Effort normal and breath sounds normal. No respiratory distress. He has no rales.   Abdominal: Soft. Bowel sounds are normal. He exhibits no distension. There is no tenderness. There is no rebound.   Musculoskeletal: Normal range of motion. He exhibits edema.   Bilateral ankle edema   Neurological: He is alert and oriented to person, place, and time. No cranial nerve deficit or sensory deficit. He exhibits normal muscle tone. Coordination normal.   Skin: Skin is warm and dry. He is not diaphoretic.   Bilateral lower extremity purplish discoloration without increased warmth or redness   Psychiatric: He has a normal mood and affect. His behavior is normal. Judgment and thought content normal.   Vitals reviewed.        CRANIAL NERVES     CN III, IV, VI   Pupils are equal, round, and reactive to light.  Extraocular motions are normal.        Significant Labs:   CBC:   Recent Labs   Lab 06/07/19  1216   WBC 4.00   HGB 12.0*   HCT 35.5*   PLT 56*     CMP:   Recent Labs   Lab 06/07/19  1216   *   K 4.7   CL 98   CO2 26   *   BUN 21   CREATININE 1.3   CALCIUM 10.5   PROT 6.8   ALBUMIN 3.8   BILITOT 0.9   ALKPHOS 90   AST 16   ALT 16   ANIONGAP 7*   EGFRNONAA 55*

## 2019-06-07 NOTE — HOSPITAL COURSE
Pt was admitted to undergo treatment for cellulitis.  CT scan was done of the leg, which was negative for abscess or fasciitis.  With the IVAB, the patient reported improvement in swelling and pain.  On exam, there was less evidence of infection over time.  He was discharged a few days later and prescribed oral antibiotics to finish treatment at home.    Physical Exam   Constitutional: He is oriented to person, place, and time. He appears well-developed and well-nourished. No distress.   HENT:   Head: Normocephalic and atraumatic.   Eyes: Pupils are equal, round, and reactive to light. Conjunctivae and EOM are normal.   Neck: Normal range of motion. Neck supple.   Cardiovascular: Normal rate, regular rhythm, normal heart sounds and intact distal pulses. Exam reveals no gallop and no friction rub.   No murmur heard.  Pulmonary/Chest: Effort normal and breath sounds normal. No respiratory distress. He has no rales.   Abdominal: Soft. Bowel sounds are normal. He exhibits no distension. There is no tenderness. There is no rebound.

## 2019-06-07 NOTE — ASSESSMENT & PLAN NOTE
This is chronic.  Patient states that his platelet count is usually 30,000. He does not use NSAIDs.

## 2019-06-07 NOTE — H&P
[facility] Hospital Medicine  History & Physical    Patient Name: Steve June Jr.  MRN: [id number]  Admission Date: 6/7/2019  Attending Physician: Sheyla Stern MD   Primary Care Provider: Crispin Garcia MD         Patient information was obtained from patient, spouse/SO and ER records.     Subjective:     Principal Problem:Cellulitis    Chief Complaint:  My legs hurt     HPI: The patient is a 74-year-old gentleman who developed redness and swelling in his right leg this past Sunday.  He was seen in the emergency room and ultrasound was obtained that was negative for DVT.  He was discharged home.  He went to see his PCP a couple of days later and was diagnosed with cellulitis and placed on Bactrim.  He came back to the emergency room the next day because he developed a patch of erythema in his left leg.  I saw him in the ER and thought he had very mild cellulitis in the left calf.  He was discharged home on Keflex and Bactrim.  The patient and his wife called me today because they feel like his legs are worse. He is now developing swelling in the left leg and both legs have purple discoloration.  He has a history of cirrhosis and chronic thrombocytopenia but his platelet count is stable.  Coagulation studies are normal.  He does not take NSAIDs.  He denies having any trauma to either leg.    Past Medical History:   Diagnosis Date    Abnormal thyroid function test     Allergy     Seasonal    Anemia     Anemia due to blood loss 7/2/2014    Arthritis     Gaviria esophagus     Basal cell carcinoma     right forearm    Basal cell carcinoma 12/2011    lower post neck    Cancer     skin CA    Cataract     Cirrhosis     Diabetes mellitus     Diabetes mellitus, type 2     Encounter for blood transfusion     Esophageal varices in cirrhosis     grade II on 7/12 EGD    Gastritis     on 7/12 EGD    GERD (gastroesophageal reflux disease) 2/28/2015    Hard of hearing     Hiatal  hernia     History of hepatitis C 8/10/2012    tx with harvoni x 41 days (started 10/22/15). SVR4     Hoarseness 2/28/2015    Hypercholesteremia     Hypersplenism     Hypertension     No meds    Pain management 12/10/2014    Petechial hemorrhage 11/25/2015    Lower extremities bilat     Portal hypertensive gastropathy     on 7/12 EGD    Thrombocytopenia     Type II or unspecified type diabetes mellitus with neurological manifestations, uncontrolled(250.62) 12/24/2013    Valvular heart disease     mild MR 12       Past Surgical History:   Procedure Laterality Date    ABLATION, RADIOFREQUENCY-left suprascapula Left 8/25/2017    [facility]    CATARACT EXTRACTION  1/10/13    left eye    CATARACT EXTRACTION      right eye    CHOLECYSTECTOMY      COLONOSCOPY      diagnostic block of the genicular branches to the left knee Left 12/15/2017    [facility] by [facility] at [facility] OR    EGD (ESOPHAGOGASTRODUODENOSCOPY) N/A 3/13/2014    Performed by [facility] at [facility] ENDO (4TH FLR)    EGD (ESOPHAGOGASTRODUODENOSCOPY) N/A 7/11/2013    Performed by Campos Walters MD at [facility] ENDO (4TH FLR)    ESOPHAGOGASTRODUODENOSCOPY (EGD) N/A 8/1/2014    Performed by Juan David Booker MD at [facility] ENDO (4TH FLR)    ESOPHAGOGASTRODUODENOSCOPY (EGD) N/A 7/3/2014    Performed by Juan David Booker MD at [facility] ENDO (2ND FLR)    EYE SURGERY      Cataract surgery to right eye    INSERTION, IOL PROSTHESIS Left 1/10/2013    Performed by Domi Baker MD at [facility] OR 1ST FLR    KNEE ARTHROSCOPY W/ MENISCAL REPAIR      KNEE CARTILAGE SURGERY      left knee    KNEE SURGERY  12/2006    left    LARYNGOSCOPY Bilateral 12/5/2014    Performed by Anoop Bernstein MD at [facility] OR 2ND FLR    PHACOEMULSIFICATION, CATARACT Left 1/10/2013    Performed by Domi Baker MD at [facility] OR 1ST FLR    PHACOEMULSIFICATION, CATARACT Right 9/27/2012    Per[facility] Zelda Alaniz  "MD [facility]    SKIN LESION EXCISION      TONSILLECTOMY      UPPER GASTROINTESTINAL ENDOSCOPY         Review of patient's allergies indicates:   Allergen Reactions    Adhesive tape-silicones Other (See Comments)     pulls skin off    Doxycycline      Dizzy. "Just didn't feel right".       No current facility-administered medications on file prior to encounter.      Current Outpatient Medications on File Prior to Encounter   Medication Sig    cephALEXin (KEFLEX) 500 MG capsule Take 1 capsule (500 mg total) by mouth every 8 (eight) hours. for 5 days    oxyCODONE-acetaminophen (PERCOCET)  mg per tablet Take 1 tablet by mouth every 6 (six) hours as needed for Pain.    ciclopirox (LOPROX) 0.77 % Crea Apply topically 2 (two) times daily.    fluorouracil (EFUDEX) 5 % cream AAA BID x 4 weeks    LEVEMIR FLEXTOUCH U-100 INSULN 100 unit/mL (3 mL) InPn pen Inject 30 Units into the skin every evening.    metFORMIN (GLUCOPHAGE) 500 MG tablet Take 1 tablet (500 mg total) by mouth 2 (two) times daily with meals.    pirfenidone (ESBRIET) 267 mg Cap Take 1 capsule (267 mg total) by mouth 3 (three) times daily for 7 days, THEN 2 capsules (534 mg total) 3 (three) times daily for 14 days.    pirfenidone (ESBRIET) 801 mg Tab Take 1 tablet (801 mg) by mouth 3 (three) times daily.    sulfamethoxazole-trimethoprim 800-160mg (BACTRIM DS) 800-160 mg Tab Take 1 tablet by mouth 2 (two) times daily. for 7 days     Family History     Problem Relation (Age of Onset)    Alcohol abuse Father    Cancer Mother    Cirrhosis Father    Leukemia Mother    No Known Problems Daughter, Son, Daughter, Daughter, Daughter, Sister        Tobacco Use    Smoking status: Former Smoker     Packs/day: 1.00     Years: 25.00     Pack years: 25.00     Last attempt to quit: 2000     Years since quittin.8    Smokeless tobacco: Never Used   Substance and Sexual Activity    Alcohol use: Yes     Comment: rarely    Drug use: No    " Sexual activity: Never     Review of Systems   Constitutional: Negative for chills and fever.   Respiratory: Negative for cough, shortness of breath and wheezing.    Cardiovascular: Negative for chest pain, palpitations and leg swelling.   Gastrointestinal: Negative for abdominal distention, abdominal pain, diarrhea and nausea.   Genitourinary: Negative for difficulty urinating, flank pain and hematuria.   Musculoskeletal: Positive for myalgias. Negative for gait problem and joint swelling.   Skin: Positive for color change and rash.   Neurological: Negative for tremors, weakness, light-headedness and headaches.   Hematological: Bruises/bleeds easily.   Psychiatric/Behavioral: Negative for agitation, behavioral problems and confusion.     Objective:     Vital Signs (Most Recent):  Temp: 97.5 °F (36.4 °C) (06/07/19 1203)  Pulse: 60 (06/07/19 1203)  Resp: 18 (06/07/19 1203)  BP: (!) 147/76 (06/07/19 1203)  SpO2: 97 % (06/07/19 1203) Vital Signs (24h Range):  Temp:  [97.5 °F (36.4 °C)] 97.5 °F (36.4 °C)  Pulse:  [60] 60  Resp:  [18] 18  SpO2:  [97 %] 97 %  BP: (147)/(76) 147/76     Weight: 89.4 kg (197 lb 1.5 oz)  Body mass index is 29.11 kg/m².    Physical Exam   Constitutional: He is oriented to person, place, and time. He appears well-developed and well-nourished. No distress.   HENT:   Head: Normocephalic and atraumatic.   Eyes: Pupils are equal, round, and reactive to light. Conjunctivae and EOM are normal.   Neck: Normal range of motion. Neck supple.   Cardiovascular: Normal rate, regular rhythm, normal heart sounds and intact distal pulses. Exam reveals no gallop and no friction rub.   No murmur heard.  Pulmonary/Chest: Effort normal and breath sounds normal. No respiratory distress. He has no rales.   Abdominal: Soft. Bowel sounds are normal. He exhibits no distension. There is no tenderness. There is no rebound.   Musculoskeletal: Normal range of motion. He exhibits edema.   Bilateral ankle edema    Neurological: He is alert and oriented to person, place, and time. No cranial nerve deficit or sensory deficit. He exhibits normal muscle tone. Coordination normal.   Skin: Skin is warm and dry. He is not diaphoretic.   Bilateral lower extremity purplish discoloration without increased warmth or redness   Psychiatric: He has a normal mood and affect. His behavior is normal. Judgment and thought content normal.   Vitals reviewed.        CRANIAL NERVES     CN III, IV, VI   Pupils are equal, round, and reactive to light.  Extraocular motions are normal.        Significant Labs:   CBC:   Recent Labs   Lab 06/07/19  1216   WBC 4.00   HGB 12.0*   HCT 35.5*   PLT 56*     CMP:   Recent Labs   Lab 06/07/19  1216   *   K 4.7   CL 98   CO2 26   *   BUN 21   CREATININE 1.3   CALCIUM 10.5   PROT 6.8   ALBUMIN 3.8   BILITOT 0.9   ALKPHOS 90   AST 16   ALT 16   ANIONGAP 7*   EGFRNONAA 55*           Assessment/Plan:     * Cellulitis  The patient has bilateral leg pain with mild edema and discoloration.  He has not improved with double antibiotic therapy.  I will admit him and obtain a CT scan of the left leg which is the 1 that looks the worst.  Treatment will depend on the results of the CT.      Type 2 diabetes mellitus with complication, with long-term current use of insulin  The patient will be placed on insulin sliding scale in addition to his home dose of long-acting insulin.  Check hemoglobin A1c.      Cirrhosis    Secondary to hepatitis-C.  Appears well compensated    Thrombocytopenia    This is chronic.  Patient states that his platelet count is usually 30,000. He does not use NSAIDs.    Pulmonary fibrosis  Continue Esbriet      VTE Risk Mitigation (From admission, onward)        Ordered     IP VTE HIGH RISK PATIENT  Once      06/07/19 1201             Sheyla Stern MD  Department of Hospital Medicine   [facility]

## 2019-06-08 LAB
CREAT SERPL-MCNC: 1.4 MG/DL (ref 0.5–1.4)
EST. GFR  (AFRICAN AMERICAN): 58 ML/MIN/1.73 M^2
EST. GFR  (NON AFRICAN AMERICAN): 50 ML/MIN/1.73 M^2
PHOSPHATE SERPL-MCNC: 4.4 MG/DL (ref 2.7–4.5)
POCT GLUCOSE: 166 MG/DL (ref 70–110)
POCT GLUCOSE: 230 MG/DL (ref 70–110)
POCT GLUCOSE: 290 MG/DL (ref 70–110)
POCT GLUCOSE: 360 MG/DL (ref 70–110)

## 2019-06-08 PROCEDURE — 36415 COLL VENOUS BLD VENIPUNCTURE: CPT | Mod: HCNC

## 2019-06-08 PROCEDURE — 82565 ASSAY OF CREATININE: CPT | Mod: HCNC

## 2019-06-08 PROCEDURE — 96372 THER/PROPH/DIAG INJ SC/IM: CPT

## 2019-06-08 PROCEDURE — 25000003 PHARM REV CODE 250: Mod: HCNC | Performed by: INTERNAL MEDICINE

## 2019-06-08 PROCEDURE — 63600175 PHARM REV CODE 636 W HCPCS: Mod: HCNC | Performed by: INTERNAL MEDICINE

## 2019-06-08 PROCEDURE — 25000003 PHARM REV CODE 250: Mod: HCNC | Performed by: NURSE PRACTITIONER

## 2019-06-08 PROCEDURE — G0378 HOSPITAL OBSERVATION PER HR: HCPCS | Mod: HCNC

## 2019-06-08 PROCEDURE — 96376 TX/PRO/DX INJ SAME DRUG ADON: CPT

## 2019-06-08 PROCEDURE — 84100 ASSAY OF PHOSPHORUS: CPT | Mod: HCNC

## 2019-06-08 RX ORDER — DIAZEPAM 5 MG/1
5 TABLET ORAL ONCE
Status: COMPLETED | OUTPATIENT
Start: 2019-06-08 | End: 2019-06-08

## 2019-06-08 RX ADMIN — OXYCODONE AND ACETAMINOPHEN 1 TABLET: 10; 325 TABLET ORAL at 10:06

## 2019-06-08 RX ADMIN — INSULIN ASPART 5 UNITS: 100 INJECTION, SOLUTION INTRAVENOUS; SUBCUTANEOUS at 11:06

## 2019-06-08 RX ADMIN — OXYCODONE AND ACETAMINOPHEN 1 TABLET: 10; 325 TABLET ORAL at 04:06

## 2019-06-08 RX ADMIN — DIAZEPAM 5 MG: 5 TABLET ORAL at 12:06

## 2019-06-08 RX ADMIN — OXYCODONE AND ACETAMINOPHEN 1 TABLET: 10; 325 TABLET ORAL at 03:06

## 2019-06-08 RX ADMIN — SENNOSIDES AND DOCUSATE SODIUM 1 TABLET: 8.6; 5 TABLET ORAL at 09:06

## 2019-06-08 RX ADMIN — INSULIN ASPART 2 UNITS: 100 INJECTION, SOLUTION INTRAVENOUS; SUBCUTANEOUS at 04:06

## 2019-06-08 RX ADMIN — RAMELTEON 8 MG: 8 TABLET, FILM COATED ORAL at 10:06

## 2019-06-08 RX ADMIN — OXYCODONE AND ACETAMINOPHEN 1 TABLET: 10; 325 TABLET ORAL at 09:06

## 2019-06-08 RX ADMIN — VANCOMYCIN HYDROCHLORIDE 1500 MG: 1.5 INJECTION, POWDER, LYOPHILIZED, FOR SOLUTION INTRAVENOUS at 05:06

## 2019-06-08 RX ADMIN — CYCLOBENZAPRINE HYDROCHLORIDE 5 MG: 5 TABLET, FILM COATED ORAL at 10:06

## 2019-06-08 RX ADMIN — CYCLOBENZAPRINE HYDROCHLORIDE 5 MG: 5 TABLET, FILM COATED ORAL at 04:06

## 2019-06-08 RX ADMIN — INSULIN DETEMIR 30 UNITS: 100 INJECTION, SOLUTION SUBCUTANEOUS at 09:06

## 2019-06-08 NOTE — PLAN OF CARE
Problem: Adult Inpatient Plan of Care  Goal: Plan of Care Review  Outcome: Ongoing (interventions implemented as appropriate)     06/08/19 2104   Plan of Care Review   Plan of Care Reviewed With patient   Progress no change   Outcome Summary Very restless night, up out of bed many times complaining of leg cramps, Oxycodone 10mg and Flexaril given with minor relief obtained, PO Valium given at 12:46AM as a one time dose per pt request to help sleep. Previous dose of Ramelteon failed to help with sleep. Finally after 3AM dose of Oxycodone 10mg he was able to go to sleep. Blood glucose was noted to be 414 and 383 when checked at HS, ss insulin and Levemir given as per MAR, recheck at 11:17PM came down to 277.

## 2019-06-08 NOTE — NURSING
Walking around room complaining of leg cramps, Bela Morrell NP informed, new orders noted, will give Muscle relaxer

## 2019-06-09 LAB
ANION GAP SERPL CALC-SCNC: 9 MMOL/L (ref 8–16)
BASOPHILS # BLD AUTO: 0 K/UL (ref 0–0.2)
BASOPHILS NFR BLD: 0.4 % (ref 0–1.9)
BUN SERPL-MCNC: 25 MG/DL (ref 8–23)
CALCIUM SERPL-MCNC: 10 MG/DL (ref 8.7–10.5)
CHLORIDE SERPL-SCNC: 97 MMOL/L (ref 95–110)
CO2 SERPL-SCNC: 27 MMOL/L (ref 23–29)
CREAT SERPL-MCNC: 1.3 MG/DL (ref 0.5–1.4)
DIFFERENTIAL METHOD: ABNORMAL
EOSINOPHIL # BLD AUTO: 0.2 K/UL (ref 0–0.5)
EOSINOPHIL NFR BLD: 5.4 % (ref 0–8)
ERYTHROCYTE [DISTWIDTH] IN BLOOD BY AUTOMATED COUNT: 15.1 % (ref 11.5–14.5)
EST. GFR  (AFRICAN AMERICAN): >60 ML/MIN/1.73 M^2
EST. GFR  (NON AFRICAN AMERICAN): 55 ML/MIN/1.73 M^2
GLUCOSE SERPL-MCNC: 256 MG/DL (ref 70–110)
HCT VFR BLD AUTO: 37.3 % (ref 40–54)
HGB BLD-MCNC: 12.6 G/DL (ref 14–18)
LYMPHOCYTES # BLD AUTO: 0.5 K/UL (ref 1–4.8)
LYMPHOCYTES NFR BLD: 12.3 % (ref 18–48)
MCH RBC QN AUTO: 28.2 PG (ref 27–31)
MCHC RBC AUTO-ENTMCNC: 33.8 G/DL (ref 32–36)
MCV RBC AUTO: 83 FL (ref 82–98)
MONOCYTES # BLD AUTO: 0.4 K/UL (ref 0.3–1)
MONOCYTES NFR BLD: 10.4 % (ref 4–15)
NEUTROPHILS # BLD AUTO: 3.1 K/UL (ref 1.8–7.7)
NEUTROPHILS NFR BLD: 71.5 % (ref 38–73)
PHOSPHATE SERPL-MCNC: 4.4 MG/DL (ref 2.7–4.5)
PLATELET # BLD AUTO: 52 K/UL (ref 150–350)
PMV BLD AUTO: 8.4 FL (ref 9.2–12.9)
POCT GLUCOSE: 212 MG/DL (ref 70–110)
POCT GLUCOSE: 254 MG/DL (ref 70–110)
POCT GLUCOSE: 254 MG/DL (ref 70–110)
POCT GLUCOSE: 344 MG/DL (ref 70–110)
POCT GLUCOSE: 382 MG/DL (ref 70–110)
POTASSIUM SERPL-SCNC: 4.7 MMOL/L (ref 3.5–5.1)
RBC # BLD AUTO: 4.48 M/UL (ref 4.6–6.2)
SODIUM SERPL-SCNC: 133 MMOL/L (ref 136–145)
VANCOMYCIN TROUGH SERPL-MCNC: 6.1 UG/ML (ref 10–22)
WBC # BLD AUTO: 4.3 K/UL (ref 3.9–12.7)

## 2019-06-09 PROCEDURE — 96372 THER/PROPH/DIAG INJ SC/IM: CPT

## 2019-06-09 PROCEDURE — 25000003 PHARM REV CODE 250: Mod: HCNC | Performed by: NURSE PRACTITIONER

## 2019-06-09 PROCEDURE — 85025 COMPLETE CBC W/AUTO DIFF WBC: CPT | Mod: HCNC

## 2019-06-09 PROCEDURE — 25000003 PHARM REV CODE 250: Mod: HCNC | Performed by: INTERNAL MEDICINE

## 2019-06-09 PROCEDURE — 36415 COLL VENOUS BLD VENIPUNCTURE: CPT | Mod: HCNC

## 2019-06-09 PROCEDURE — 80202 ASSAY OF VANCOMYCIN: CPT | Mod: HCNC

## 2019-06-09 PROCEDURE — 80048 BASIC METABOLIC PNL TOTAL CA: CPT | Mod: HCNC

## 2019-06-09 PROCEDURE — 84100 ASSAY OF PHOSPHORUS: CPT | Mod: HCNC

## 2019-06-09 PROCEDURE — 63600175 PHARM REV CODE 636 W HCPCS: Mod: HCNC | Performed by: INTERNAL MEDICINE

## 2019-06-09 PROCEDURE — 63600175 PHARM REV CODE 636 W HCPCS: Mod: HCNC | Performed by: HOSPITALIST

## 2019-06-09 PROCEDURE — G0378 HOSPITAL OBSERVATION PER HR: HCPCS | Mod: HCNC

## 2019-06-09 PROCEDURE — 96365 THER/PROPH/DIAG IV INF INIT: CPT

## 2019-06-09 RX ORDER — MAG HYDROX/ALUMINUM HYD/SIMETH 200-200-20
30 SUSPENSION, ORAL (FINAL DOSE FORM) ORAL EVERY 6 HOURS PRN
Status: DISCONTINUED | OUTPATIENT
Start: 2019-06-09 | End: 2019-06-10 | Stop reason: HOSPADM

## 2019-06-09 RX ORDER — VANCOMYCIN HCL IN 5 % DEXTROSE 1G/250ML
1000 PLASTIC BAG, INJECTION (ML) INTRAVENOUS
Status: DISCONTINUED | OUTPATIENT
Start: 2019-06-10 | End: 2019-06-10 | Stop reason: HOSPADM

## 2019-06-09 RX ADMIN — Medication 3.38 G: at 09:06

## 2019-06-09 RX ADMIN — ALUMINUM HYDROXIDE, MAGNESIUM HYDROXIDE, AND SIMETHICONE 30 ML: 200; 200; 20 SUSPENSION ORAL at 08:06

## 2019-06-09 RX ADMIN — INSULIN ASPART 3 UNITS: 100 INJECTION, SOLUTION INTRAVENOUS; SUBCUTANEOUS at 12:06

## 2019-06-09 RX ADMIN — OXYCODONE AND ACETAMINOPHEN 1 TABLET: 10; 325 TABLET ORAL at 11:06

## 2019-06-09 RX ADMIN — OXYCODONE AND ACETAMINOPHEN 1 TABLET: 10; 325 TABLET ORAL at 04:06

## 2019-06-09 RX ADMIN — SENNOSIDES AND DOCUSATE SODIUM 1 TABLET: 8.6; 5 TABLET ORAL at 08:06

## 2019-06-09 RX ADMIN — INSULIN ASPART 2 UNITS: 100 INJECTION, SOLUTION INTRAVENOUS; SUBCUTANEOUS at 06:06

## 2019-06-09 RX ADMIN — CYCLOBENZAPRINE HYDROCHLORIDE 5 MG: 5 TABLET, FILM COATED ORAL at 06:06

## 2019-06-09 RX ADMIN — CYCLOBENZAPRINE HYDROCHLORIDE 5 MG: 5 TABLET, FILM COATED ORAL at 08:06

## 2019-06-09 RX ADMIN — RAMELTEON 8 MG: 8 TABLET, FILM COATED ORAL at 08:06

## 2019-06-09 RX ADMIN — INSULIN ASPART 2 UNITS: 100 INJECTION, SOLUTION INTRAVENOUS; SUBCUTANEOUS at 09:06

## 2019-06-09 RX ADMIN — INSULIN DETEMIR 30 UNITS: 100 INJECTION, SOLUTION SUBCUTANEOUS at 09:06

## 2019-06-09 RX ADMIN — SENNOSIDES AND DOCUSATE SODIUM 1 TABLET: 8.6; 5 TABLET ORAL at 09:06

## 2019-06-09 RX ADMIN — INSULIN ASPART 3 UNITS: 100 INJECTION, SOLUTION INTRAVENOUS; SUBCUTANEOUS at 04:06

## 2019-06-09 NOTE — ASSESSMENT & PLAN NOTE
- Chronic issue, continue to monitor.   Unable to reach patient, and patient has not returned call. HRA letter mailed to patient requesting she mail back with the enclosed self addressed envelope.

## 2019-06-09 NOTE — ASSESSMENT & PLAN NOTE
The patient has bilateral leg pain with mild edema and discoloration.  He had a CT scan that did not show any evidence of abscess or necrotizing fasciitis.  Continue current antibiotic regimen.

## 2019-06-09 NOTE — SUBJECTIVE & OBJECTIVE
Interval History: Wife in room. Both report overall improvement in swelling. Patient reports improved pain.    Review of Systems   Constitutional: Negative for chills and fever.   Respiratory: Negative for cough, shortness of breath and wheezing.    Cardiovascular: Negative for chest pain, palpitations and leg swelling.   Gastrointestinal: Negative for abdominal distention, abdominal pain, diarrhea and nausea.   Genitourinary: Negative for difficulty urinating, flank pain and hematuria.   Musculoskeletal: Positive for myalgias. Negative for gait problem and joint swelling.   Skin: Positive for color change and rash.   Neurological: Negative for tremors, weakness, light-headedness and headaches.   Hematological: Bruises/bleeds easily.   Psychiatric/Behavioral: Negative for agitation, behavioral problems and confusion.     Objective:     Vital Signs (Most Recent):  Temp: 96.1 °F (35.6 °C) (06/09/19 1617)  Pulse: 62 (06/09/19 1617)  Resp: 18 (06/09/19 1617)  BP: 125/71 (06/09/19 1617)  SpO2: 95 % (06/09/19 1617) Vital Signs (24h Range):  Temp:  [96.1 °F (35.6 °C)-97.4 °F (36.3 °C)] 96.1 °F (35.6 °C)  Pulse:  [61-64] 62  Resp:  [17-18] 18  SpO2:  [92 %-95 %] 95 %  BP: (124-156)/(71-76) 125/71     Weight: 89.4 kg (197 lb 1.5 oz)  Body mass index is 29.11 kg/m².    Intake/Output Summary (Last 24 hours) at 6/9/2019 1642  Last data filed at 6/9/2019 0551  Gross per 24 hour   Intake 490 ml   Output --   Net 490 ml      Physical Exam   Constitutional: He is oriented to person, place, and time. He appears well-developed and well-nourished. No distress.   HENT:   Head: Normocephalic and atraumatic.   Eyes: Pupils are equal, round, and reactive to light. Conjunctivae and EOM are normal.   Neck: Normal range of motion. Neck supple.   Cardiovascular: Normal rate, regular rhythm, normal heart sounds and intact distal pulses. Exam reveals no gallop and no friction rub.   No murmur heard.  Pulmonary/Chest: Effort normal and breath  sounds normal. No respiratory distress. He has no rales.   Abdominal: Soft. Bowel sounds are normal. He exhibits no distension. There is no tenderness. There is no rebound.   Musculoskeletal: Normal range of motion. He exhibits edema.   Bilateral ankle edema   Neurological: He is alert and oriented to person, place, and time. No cranial nerve deficit or sensory deficit. He exhibits normal muscle tone. Coordination normal.   Skin: Skin is warm and dry. He is not diaphoretic.   Bilateral lower extremity purplish discoloration without increased warmth or redness   Psychiatric: He has a normal mood and affect. His behavior is normal. Judgment and thought content normal.   Vitals reviewed.    Significant Labs:   A1C:   Recent Labs   Lab 01/24/19  1151 04/03/19  1116   HGBA1C 9.4* 7.9*     ABGs: No results for input(s): PH, PCO2, HCO3, POCSATURATED, BE, TOTALHB, COHB, METHB, O2HB, POCFIO2 in the last 48 hours.  Bilirubin:   Recent Labs   Lab 06/02/19  1939 06/07/19  1216   BILITOT 1.0 0.9     Blood Culture: No results for input(s): LABBLOO in the last 48 hours.  BMP:   Recent Labs   Lab 06/09/19  0349   *   *   K 4.7   CL 97   CO2 27   BUN 25*   CREATININE 1.3   CALCIUM 10.0     CBC:   Recent Labs   Lab 06/09/19  0349   WBC 4.30   HGB 12.6*   HCT 37.3*   PLT 52*     CMP:   Recent Labs   Lab 06/08/19  0401 06/09/19  0349   NA  --  133*   K  --  4.7   CL  --  97   CO2  --  27   GLU  --  256*   BUN  --  25*   CREATININE 1.4 1.3   CALCIUM  --  10.0   ANIONGAP  --  9   EGFRNONAA 50* 55*     Cardiac Markers: No results for input(s): CKMB, MYOGLOBIN, BNP, TROPISTAT in the last 48 hours.  Coagulation:   No results for input(s): PT, INR, APTT in the last 48 hours.  Lactic Acid: No results for input(s): LACTATE in the last 48 hours.  Lipase: No results for input(s): LIPASE in the last 48 hours.  Lipid Panel: No results for input(s): CHOL, HDL, LDLCALC, TRIG, CHOLHDL in the last 48 hours.  Magnesium: No results for  input(s): MG in the last 48 hours.    Significant Imaging: I have reviewed all pertinent imaging results/findings within the past 24 hours.

## 2019-06-09 NOTE — PROGRESS NOTES
[facility] Hospital Medicine  Progress Note    Patient Name: Steve June Jr.  MRN: [id number]  Patient Class: OP- Observation   Admission Date: 6/7/2019  Length of Stay: 0 days  Attending Physician: Rito Arnett MD  Primary Care Provider: Crispin Garcia MD    Subjective:     Principal Problem:Cellulitis    HPI:  The patient is a 74-year-old gentleman who developed redness and swelling in his right leg this past Sunday.  He was seen in the emergency room and ultrasound was obtained that was negative for DVT.  He was discharged home.  He went to see his PCP a couple of days later and was diagnosed with cellulitis and placed on Bactrim.  He came back to the emergency room the next day because he developed a patch of erythema in his left leg. He was discharged home on Keflex and Bactrim. He is now developing swelling in the left leg and both legs have purple discoloration.  He has a history of cirrhosis and chronic thrombocytopenia but his platelet count is stable.  Coagulation studies are normal.  He does not take NSAIDs.  He denied having any trauma to either leg.    Hospital Course:  No notes on file    Interval History: No acute overnight events. The patient denied any acute complaints this morning.    Review of Systems   Constitutional: Negative for chills and fever.   Respiratory: Negative for cough, shortness of breath and wheezing.    Cardiovascular: Negative for chest pain, palpitations and leg swelling.   Gastrointestinal: Negative for abdominal distention, abdominal pain, diarrhea and nausea.   Genitourinary: Negative for difficulty urinating, flank pain and hematuria.   Musculoskeletal: Positive for myalgias. Negative for gait problem and joint swelling.   Skin: Positive for color change and rash.   Neurological: Negative for tremors, weakness, light-headedness and headaches.   Hematological: Bruises/bleeds easily.   Psychiatric/Behavioral: Negative for agitation, behavioral  problems and confusion.     Objective:     Vital Signs (Most Recent):  Temp: 97.1 °F (36.2 °C) (06/08/19 1939)  Pulse: 64 (06/08/19 1939)  Resp: 17 (06/08/19 1939)  BP: 136/75 (06/08/19 1939)  SpO2: (!) 93 % (06/08/19 1939) Vital Signs (24h Range):  Temp:  [96.2 °F (35.7 °C)-97.3 °F (36.3 °C)] 97.1 °F (36.2 °C)  Pulse:  [56-70] 64  Resp:  [17-18] 17  SpO2:  [91 %-97 %] 93 %  BP: (126-156)/(69-78) 136/75     Weight: 89.4 kg (197 lb 1.5 oz)  Body mass index is 29.11 kg/m².    Intake/Output Summary (Last 24 hours) at 6/8/2019 2122  Last data filed at 6/8/2019 0436  Gross per 24 hour   Intake 610 ml   Output 1450 ml   Net -840 ml      Physical Exam   Constitutional: He is oriented to person, place, and time. He appears well-developed and well-nourished. No distress.   HENT:   Head: Normocephalic and atraumatic.   Eyes: Pupils are equal, round, and reactive to light. Conjunctivae and EOM are normal.   Neck: Normal range of motion. Neck supple.   Cardiovascular: Normal rate, regular rhythm, normal heart sounds and intact distal pulses. Exam reveals no gallop and no friction rub.   No murmur heard.  Pulmonary/Chest: Effort normal and breath sounds normal. No respiratory distress. He has no rales.   Abdominal: Soft. Bowel sounds are normal. He exhibits no distension. There is no tenderness. There is no rebound.   Musculoskeletal: Normal range of motion. He exhibits edema.   Bilateral ankle edema   Neurological: He is alert and oriented to person, place, and time. No cranial nerve deficit or sensory deficit. He exhibits normal muscle tone. Coordination normal.   Skin: Skin is warm and dry. He is not diaphoretic.   Bilateral lower extremity purplish discoloration without increased warmth or redness   Psychiatric: He has a normal mood and affect. His behavior is normal. Judgment and thought content normal.   Vitals reviewed.    Significant Labs:   A1C:   Recent Labs   Lab 01/24/19  1151 04/03/19  1116   HGBA1C 9.4* 7.9*      ABGs: No results for input(s): PH, PCO2, HCO3, POCSATURATED, BE, TOTALHB, COHB, METHB, O2HB, POCFIO2 in the last 48 hours.  Bilirubin:   Recent Labs   Lab 06/02/19  1939 06/07/19  1216   BILITOT 1.0 0.9     Blood Culture: No results for input(s): LABBLOO in the last 48 hours.  BMP:   Recent Labs   Lab 06/07/19  1216 06/08/19  0401   *  --    *  --    K 4.7  --    CL 98  --    CO2 26  --    BUN 21  --    CREATININE 1.3 1.4   CALCIUM 10.5  --      CBC:   Recent Labs   Lab 06/07/19  1216   WBC 4.00   HGB 12.0*   HCT 35.5*   PLT 56*     CMP:   Recent Labs   Lab 06/07/19  1216 06/08/19  0401   *  --    K 4.7  --    CL 98  --    CO2 26  --    *  --    BUN 21  --    CREATININE 1.3 1.4   CALCIUM 10.5  --    PROT 6.8  --    ALBUMIN 3.8  --    BILITOT 0.9  --    ALKPHOS 90  --    AST 16  --    ALT 16  --    ANIONGAP 7*  --    EGFRNONAA 55* 50*     Cardiac Markers: No results for input(s): CKMB, MYOGLOBIN, BNP, TROPISTAT in the last 48 hours.  Coagulation:   Recent Labs   Lab 06/07/19  1216   INR 1.2   APTT 25.9     Lactic Acid: No results for input(s): LACTATE in the last 48 hours.  Lipase: No results for input(s): LIPASE in the last 48 hours.  Lipid Panel: No results for input(s): CHOL, HDL, LDLCALC, TRIG, CHOLHDL in the last 48 hours.  Magnesium: No results for input(s): MG in the last 48 hours.    Significant Imaging: I have reviewed all pertinent imaging results/findings within the past 24 hours.    Assessment/Plan:      * Cellulitis  The patient has bilateral leg pain with mild edema and discoloration.  He had a CT scan that did not show any evidence of abscess or necrotizing fasciitis.  Continue current antibiotic regimen.      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  Appears well compensated  [facility] follow up at [facility]    Type 2 diabetes mellitus with complication, with long-term current use of insulin  Sliding scale  insulin.      Thrombocytopenia  - Chronic issue, continue to monitor.      VTE Risk Mitigation (From admission, onward)        Ordered     IP VTE HIGH RISK PATIENT  Once      06/07/19 1201      Not on DVT prophylaxis due to thrombocytopenia and leg cellulitis    Rito Arnett MD  Department of Hospital Medicine   [facility]

## 2019-06-09 NOTE — SUBJECTIVE & OBJECTIVE
Interval History: No acute overnight events. The patient denied any acute complaints this morning.    Review of Systems   Constitutional: Negative for chills and fever.   Respiratory: Negative for cough, shortness of breath and wheezing.    Cardiovascular: Negative for chest pain, palpitations and leg swelling.   Gastrointestinal: Negative for abdominal distention, abdominal pain, diarrhea and nausea.   Genitourinary: Negative for difficulty urinating, flank pain and hematuria.   Musculoskeletal: Positive for myalgias. Negative for gait problem and joint swelling.   Skin: Positive for color change and rash.   Neurological: Negative for tremors, weakness, light-headedness and headaches.   Hematological: Bruises/bleeds easily.   Psychiatric/Behavioral: Negative for agitation, behavioral problems and confusion.     Objective:     Vital Signs (Most Recent):  Temp: 97.1 °F (36.2 °C) (06/08/19 1939)  Pulse: 64 (06/08/19 1939)  Resp: 17 (06/08/19 1939)  BP: 136/75 (06/08/19 1939)  SpO2: (!) 93 % (06/08/19 1939) Vital Signs (24h Range):  Temp:  [96.2 °F (35.7 °C)-97.3 °F (36.3 °C)] 97.1 °F (36.2 °C)  Pulse:  [56-70] 64  Resp:  [17-18] 17  SpO2:  [91 %-97 %] 93 %  BP: (126-156)/(69-78) 136/75     Weight: 89.4 kg (197 lb 1.5 oz)  Body mass index is 29.11 kg/m².    Intake/Output Summary (Last 24 hours) at 6/8/2019 2122  Last data filed at 6/8/2019 0436  Gross per 24 hour   Intake 610 ml   Output 1450 ml   Net -840 ml      Physical Exam   Constitutional: He is oriented to person, place, and time. He appears well-developed and well-nourished. No distress.   HENT:   Head: Normocephalic and atraumatic.   Eyes: Pupils are equal, round, and reactive to light. Conjunctivae and EOM are normal.   Neck: Normal range of motion. Neck supple.   Cardiovascular: Normal rate, regular rhythm, normal heart sounds and intact distal pulses. Exam reveals no gallop and no friction rub.   No murmur heard.  Pulmonary/Chest: Effort normal and breath  sounds normal. No respiratory distress. He has no rales.   Abdominal: Soft. Bowel sounds are normal. He exhibits no distension. There is no tenderness. There is no rebound.   Musculoskeletal: Normal range of motion. He exhibits edema.   Bilateral ankle edema   Neurological: He is alert and oriented to person, place, and time. No cranial nerve deficit or sensory deficit. He exhibits normal muscle tone. Coordination normal.   Skin: Skin is warm and dry. He is not diaphoretic.   Bilateral lower extremity purplish discoloration without increased warmth or redness   Psychiatric: He has a normal mood and affect. His behavior is normal. Judgment and thought content normal.   Vitals reviewed.    Significant Labs:   A1C:   Recent Labs   Lab 01/24/19  1151 04/03/19  1116   HGBA1C 9.4* 7.9*     ABGs: No results for input(s): PH, PCO2, HCO3, POCSATURATED, BE, TOTALHB, COHB, METHB, O2HB, POCFIO2 in the last 48 hours.  Bilirubin:   Recent Labs   Lab 06/02/19  1939 06/07/19  1216   BILITOT 1.0 0.9     Blood Culture: No results for input(s): LABBLOO in the last 48 hours.  BMP:   Recent Labs   Lab 06/07/19  1216 06/08/19  0401   *  --    *  --    K 4.7  --    CL 98  --    CO2 26  --    BUN 21  --    CREATININE 1.3 1.4   CALCIUM 10.5  --      CBC:   Recent Labs   Lab 06/07/19  1216   WBC 4.00   HGB 12.0*   HCT 35.5*   PLT 56*     CMP:   Recent Labs   Lab 06/07/19  1216 06/08/19  0401   *  --    K 4.7  --    CL 98  --    CO2 26  --    *  --    BUN 21  --    CREATININE 1.3 1.4   CALCIUM 10.5  --    PROT 6.8  --    ALBUMIN 3.8  --    BILITOT 0.9  --    ALKPHOS 90  --    AST 16  --    ALT 16  --    ANIONGAP 7*  --    EGFRNONAA 55* 50*     Cardiac Markers: No results for input(s): CKMB, MYOGLOBIN, BNP, TROPISTAT in the last 48 hours.  Coagulation:   Recent Labs   Lab 06/07/19  1216   INR 1.2   APTT 25.9     Lactic Acid: No results for input(s): LACTATE in the last 48 hours.  Lipase: No results for input(s):  LIPASE in the last 48 hours.  Lipid Panel: No results for input(s): CHOL, HDL, LDLCALC, TRIG, CHOLHDL in the last 48 hours.  Magnesium: No results for input(s): MG in the last 48 hours.    Significant Imaging: I have reviewed all pertinent imaging results/findings within the past 24 hours.

## 2019-06-09 NOTE — PROGRESS NOTES
[facility] Hospital Medicine  Progress Note    Patient Name: Steve June Jr.  MRN: [id number]  Patient Class: OP- Observation   Admission Date: 6/7/2019  Length of Stay: 0 days  Attending Physician: Rito Arnett MD  Primary Care Provider: Crispin Garcia MD    Subjective:     Principal Problem:Cellulitis    HPI:  The patient is a 74-year-old gentleman who developed redness and swelling in his right leg this past Sunday.  He was seen in the emergency room and ultrasound was obtained that was negative for DVT.  He was discharged home.  He went to see his PCP a couple of days later and was diagnosed with cellulitis and placed on Bactrim.  He came back to the emergency room the next day because he developed a patch of erythema in his left leg. He was discharged home on Keflex and Bactrim. He is now developing swelling in the left leg and both legs have purple discoloration.  He has a history of cirrhosis and chronic thrombocytopenia but his platelet count is stable.  Coagulation studies are normal.  He does not take NSAIDs.  He denied having any trauma to either leg.    Hospital Course:  No notes on file    Interval History: Wife in room. Both report overall improvement in swelling. Patient reports improved pain.    Review of Systems   Constitutional: Negative for chills and fever.   Respiratory: Negative for cough, shortness of breath and wheezing.    Cardiovascular: Negative for chest pain, palpitations and leg swelling.   Gastrointestinal: Negative for abdominal distention, abdominal pain, diarrhea and nausea.   Genitourinary: Negative for difficulty urinating, flank pain and hematuria.   Musculoskeletal: Positive for myalgias. Negative for gait problem and joint swelling.   Skin: Positive for color change and rash.   Neurological: Negative for tremors, weakness, light-headedness and headaches.   Hematological: Bruises/bleeds easily.   Psychiatric/Behavioral: Negative for agitation,  behavioral problems and confusion.     Objective:     Vital Signs (Most Recent):  Temp: 96.1 °F (35.6 °C) (06/09/19 1617)  Pulse: 62 (06/09/19 1617)  Resp: 18 (06/09/19 1617)  BP: 125/71 (06/09/19 1617)  SpO2: 95 % (06/09/19 1617) Vital Signs (24h Range):  Temp:  [96.1 °F (35.6 °C)-97.4 °F (36.3 °C)] 96.1 °F (35.6 °C)  Pulse:  [61-64] 62  Resp:  [17-18] 18  SpO2:  [92 %-95 %] 95 %  BP: (124-156)/(71-76) 125/71     Weight: 89.4 kg (197 lb 1.5 oz)  Body mass index is 29.11 kg/m².    Intake/Output Summary (Last 24 hours) at 6/9/2019 1642  Last data filed at 6/9/2019 0551  Gross per 24 hour   Intake 490 ml   Output --   Net 490 ml      Physical Exam   Constitutional: He is oriented to person, place, and time. He appears well-developed and well-nourished. No distress.   HENT:   Head: Normocephalic and atraumatic.   Eyes: Pupils are equal, round, and reactive to light. Conjunctivae and EOM are normal.   Neck: Normal range of motion. Neck supple.   Cardiovascular: Normal rate, regular rhythm, normal heart sounds and intact distal pulses. Exam reveals no gallop and no friction rub.   No murmur heard.  Pulmonary/Chest: Effort normal and breath sounds normal. No respiratory distress. He has no rales.   Abdominal: Soft. Bowel sounds are normal. He exhibits no distension. There is no tenderness. There is no rebound.   Musculoskeletal: Normal range of motion. He exhibits edema.   Bilateral ankle edema   Neurological: He is alert and oriented to person, place, and time. No cranial nerve deficit or sensory deficit. He exhibits normal muscle tone. Coordination normal.   Skin: Skin is warm and dry. He is not diaphoretic.   Bilateral lower extremity purplish discoloration without increased warmth or redness   Psychiatric: He has a normal mood and affect. His behavior is normal. Judgment and thought content normal.   Vitals reviewed.    Significant Labs:   A1C:   Recent Labs   Lab 01/24/19  1151 04/03/19  1116   HGBA1C 9.4* 7.9*      ABGs: No results for input(s): PH, PCO2, HCO3, POCSATURATED, BE, TOTALHB, COHB, METHB, O2HB, POCFIO2 in the last 48 hours.  Bilirubin:   Recent Labs   Lab 06/02/19  1939 06/07/19  1216   BILITOT 1.0 0.9     Blood Culture: No results for input(s): LABBLOO in the last 48 hours.  BMP:   Recent Labs   Lab 06/09/19  0349   *   *   K 4.7   CL 97   CO2 27   BUN 25*   CREATININE 1.3   CALCIUM 10.0     CBC:   Recent Labs   Lab 06/09/19 0349   WBC 4.30   HGB 12.6*   HCT 37.3*   PLT 52*     CMP:   Recent Labs   Lab 06/08/19  0401 06/09/19 0349   NA  --  133*   K  --  4.7   CL  --  97   CO2  --  27   GLU  --  256*   BUN  --  25*   CREATININE 1.4 1.3   CALCIUM  --  10.0   ANIONGAP  --  9   EGFRNONAA 50* 55*     Cardiac Markers: No results for input(s): CKMB, MYOGLOBIN, BNP, TROPISTAT in the last 48 hours.  Coagulation:   No results for input(s): PT, INR, APTT in the last 48 hours.  Lactic Acid: No results for input(s): LACTATE in the last 48 hours.  Lipase: No results for input(s): LIPASE in the last 48 hours.  Lipid Panel: No results for input(s): CHOL, HDL, LDLCALC, TRIG, CHOLHDL in the last 48 hours.  Magnesium: No results for input(s): MG in the last 48 hours.    Significant Imaging: I have reviewed all pertinent imaging results/findings within the past 24 hours.    Assessment/Plan:      * Cellulitis  The patient has bilateral leg pain with mild edema and discoloration.  He had a CT scan that did not show any evidence of abscess or necrotizing fasciitis.  Continue current antibiotic regimen.  Can likely transition to oral antibiotics tomorrow if swelling and pain continues to improve.      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  Appears well compensated  Has follow up at main campus    Type 2 diabetes mellitus with complication, with long-term current use of insulin  Sliding scale insulin.      Thrombocytopenia  - Chronic issue, continue to monitor.      VTE Risk Mitigation (From  admission, onward)        Ordered     IP VTE HIGH RISK PATIENT  Once      06/07/19 1201      Not on Lovenox due to low platelets. Not on compression device due to cellulitis. Encouraged ambulation.    Rito Arnett MD  Department of Hospital Medicine   [facility]

## 2019-06-09 NOTE — ASSESSMENT & PLAN NOTE
The patient has bilateral leg pain with mild edema and discoloration.  He had a CT scan that did not show any evidence of abscess or necrotizing fasciitis.  Continue current antibiotic regimen.  Can likely transition to oral antibiotics tomorrow if swelling and pain continues to improve.

## 2019-06-10 VITALS
WEIGHT: 197.06 LBS | HEIGHT: 69 IN | HEART RATE: 59 BPM | BODY MASS INDEX: 29.19 KG/M2 | TEMPERATURE: 98 F | RESPIRATION RATE: 18 BRPM | DIASTOLIC BLOOD PRESSURE: 69 MMHG | SYSTOLIC BLOOD PRESSURE: 147 MMHG | OXYGEN SATURATION: 96 %

## 2019-06-10 LAB
PHOSPHATE SERPL-MCNC: 3.9 MG/DL (ref 2.7–4.5)
POCT GLUCOSE: 220 MG/DL (ref 70–110)
POCT GLUCOSE: 271 MG/DL (ref 70–110)

## 2019-06-10 PROCEDURE — 25000003 PHARM REV CODE 250: Mod: HCNC | Performed by: INTERNAL MEDICINE

## 2019-06-10 PROCEDURE — 96372 THER/PROPH/DIAG INJ SC/IM: CPT

## 2019-06-10 PROCEDURE — 63600175 PHARM REV CODE 636 W HCPCS: Mod: HCNC | Performed by: HOSPITALIST

## 2019-06-10 PROCEDURE — 25000003 PHARM REV CODE 250: Mod: HCNC | Performed by: HOSPITALIST

## 2019-06-10 PROCEDURE — 96367 TX/PROPH/DG ADDL SEQ IV INF: CPT

## 2019-06-10 PROCEDURE — 96376 TX/PRO/DX INJ SAME DRUG ADON: CPT

## 2019-06-10 PROCEDURE — G0378 HOSPITAL OBSERVATION PER HR: HCPCS | Mod: HCNC

## 2019-06-10 PROCEDURE — 84100 ASSAY OF PHOSPHORUS: CPT | Mod: HCNC

## 2019-06-10 PROCEDURE — 36415 COLL VENOUS BLD VENIPUNCTURE: CPT | Mod: HCNC

## 2019-06-10 RX ORDER — CLINDAMYCIN HYDROCHLORIDE 300 MG/1
300 CAPSULE ORAL 4 TIMES DAILY
Qty: 20 CAPSULE | Refills: 0 | Status: ON HOLD | OUTPATIENT
Start: 2019-06-10 | End: 2019-06-19 | Stop reason: HOSPADM

## 2019-06-10 RX ADMIN — VANCOMYCIN HYDROCHLORIDE 1000 MG: 1 INJECTION, POWDER, LYOPHILIZED, FOR SOLUTION INTRAVENOUS at 05:06

## 2019-06-10 RX ADMIN — Medication 3.38 G: at 04:06

## 2019-06-10 RX ADMIN — OXYCODONE AND ACETAMINOPHEN 1 TABLET: 10; 325 TABLET ORAL at 11:06

## 2019-06-10 RX ADMIN — INSULIN ASPART 3 UNITS: 100 INJECTION, SOLUTION INTRAVENOUS; SUBCUTANEOUS at 11:06

## 2019-06-10 RX ADMIN — OXYCODONE AND ACETAMINOPHEN 1 TABLET: 10; 325 TABLET ORAL at 05:06

## 2019-06-10 RX ADMIN — SENNOSIDES AND DOCUSATE SODIUM 1 TABLET: 8.6; 5 TABLET ORAL at 08:06

## 2019-06-10 RX ADMIN — INSULIN ASPART 3 UNITS: 100 INJECTION, SOLUTION INTRAVENOUS; SUBCUTANEOUS at 07:06

## 2019-06-10 RX ADMIN — Medication 3.38 G: at 01:06

## 2019-06-10 NOTE — NURSING
Discharge instructions and education reviewed with pt and pt's spouse.  Peripheral IV removed by SIM Syed RN. Pt free of cardiac monitoring.  Pt prescriptions sent to [facility] on [address].   Pt escorted to family vehicle via wheelchair.

## 2019-06-10 NOTE — PLAN OF CARE
Problem: Adult Inpatient Plan of Care  Goal: Plan of Care Review  Outcome: Ongoing (interventions implemented as appropriate)     06/10/19 0550   Plan of Care Review   Plan of Care Reviewed With patient   Progress improving   Outcome Summary Less restless than previous nights, continues to require pain meds for chronic pain issues and leg cramps, Blood glucose continues to be elevated, see MAR for insulin given, is recieving IV abx in dextrose and non-compliant with diet, family noted to bring Fast food from outside of Hospital, no other needs or complaints, NAD

## 2019-06-10 NOTE — CONSULTS
"Pharmacokinetic Assessment Follow Up: IV Vancomycin    Vancomycin serum concentration assessment(s):    The trough level was drawned correctly and can be used to guide therapy at this time. The measurement is below the desired definitive target range of 10 to 15 mcg/mL.    Vancomycin Regimen Plan:    Change regimen to Vancomycin 1000 mg IV every 12hour with next serum trough concentration measured at 1630 prior to 3rd dose on 06/10     Pharmacy will continue to follow and monitor vancomycin.    Please contact pharmacy at extension [phone] for questions regarding this assessment.    Thank you for the consult,   Ashley Hanson     Patient brief summary:  Steve June Jr. is a 74 y.o. male initiated on antimicrobial therapy with IV Vancomycin for treatment of suspected skin & soft tissue        Drug Allergies:   Review of patient's allergies indicates:   Allergen Reactions    Adhesive tape-silicones Other (See Comments)     pulls skin off    Doxycycline      Dizzy. "Just didn't feel right".       Actual Body Weight:   89.4 kg    Renal Function:   Estimated Creatinine Clearance: 56.8 mL/min (based on SCr of 1.3 mg/dL).,       CBC (last 72 hours):  Recent Labs   Lab Result Units 06/07/19  1216 06/09/19  0349   WBC K/uL 4.00 4.30   Hemoglobin g/dL 12.0* 12.6*   Hematocrit % 35.5* 37.3*   Platelets K/uL 56* 52*   Gran% % 72.6 71.5   Lymph% % 12.5* 12.3*   Mono% % 8.5 10.4   Eosinophil% % 6.0 5.4   Basophil% % 0.4 0.4   Differential Method  Automated Automated       Metabolic Panel (last 72 hours):  Recent Labs   Lab Result Units 06/07/19  1216 06/08/19  0401 06/09/19  0349   Sodium mmol/L 131*  --  133*   Potassium mmol/L 4.7  --  4.7   Chloride mmol/L 98  --  97   CO2 mmol/L 26  --  27   Glucose mg/dL 301*  --  256*   BUN, Bld mg/dL 21  --  25*   Creatinine mg/dL 1.3 1.4 1.3   Albumin g/dL 3.8  --   --    Total Bilirubin mg/dL 0.9  --   --    Alkaline Phosphatase U/L 90  --   --    AST U/L 16  --   --    ALT U/L 16  --  "  --    Phosphorus mg/dL  --  4.4 4.4       Vancomycin Administrations:  vancomycin given in the last 96 hours                     vancomycin 1.5 g in dextrose 5 % 250 mL IVPB (ready to mix) (mg) 1,500 mg New Bag 06/08/19 1701     1,500 mg New Bag 06/07/19 1714                      Drug levels (last 3 results):  Recent Labs   Lab Result Units 06/09/19  1743   Vancomycin-Trough ug/mL 6.1*       Microbiologic Results:  Microbiology Results (last 7 days)       ** No results found for the last 168 hours. **

## 2019-06-10 NOTE — PLAN OF CARE
Hospital follow up scheduled with PCP. AVS updated.      06/10/19 1416   Discharge Assessment   Assessment Type Discharge Planning Reassessment

## 2019-06-10 NOTE — PROGRESS NOTES
Wilson TAHIR June Jr. [id number] is a 74 y.o. male who has been consulted for vancomycin dosing.    Pharmacy consult for vancomycin dosing in no longer required. Patient was discharged.    Thank you for allowing us to participate in this patient's care.     Selina Moody, PharmD 6/10/2019 4:25 PM

## 2019-06-11 ENCOUNTER — DOCUMENTATION ONLY (OUTPATIENT)
Dept: FAMILY MEDICINE | Facility: CLINIC | Age: 74
End: 2019-06-11

## 2019-06-11 NOTE — PROGRESS NOTES
Pre-Visit Chart Review  For Appointment Scheduled on 6/12/19    Health Maintenance Due   Topic Date Due    TETANUS VACCINE  05/13/1963    Colonoscopy  05/13/1997

## 2019-06-12 ENCOUNTER — TELEPHONE (OUTPATIENT)
Dept: PHARMACY | Facility: CLINIC | Age: 74
End: 2019-06-12

## 2019-06-12 ENCOUNTER — OFFICE VISIT (OUTPATIENT)
Dept: FAMILY MEDICINE | Facility: CLINIC | Age: 74
End: 2019-06-12
Payer: MEDICARE

## 2019-06-12 VITALS
OXYGEN SATURATION: 94 % | HEIGHT: 69 IN | TEMPERATURE: 98 F | HEART RATE: 65 BPM | DIASTOLIC BLOOD PRESSURE: 66 MMHG | WEIGHT: 196.19 LBS | SYSTOLIC BLOOD PRESSURE: 122 MMHG | BODY MASS INDEX: 29.06 KG/M2

## 2019-06-12 DIAGNOSIS — N63.0 BREAST MASS IN MALE: ICD-10-CM

## 2019-06-12 DIAGNOSIS — R53.1 WEAKNESS: ICD-10-CM

## 2019-06-12 DIAGNOSIS — D24.2 BENIGN NEOPLASM OF LEFT BREAST: ICD-10-CM

## 2019-06-12 DIAGNOSIS — Z09 HOSPITAL DISCHARGE FOLLOW-UP: Primary | ICD-10-CM

## 2019-06-12 PROCEDURE — 3074F PR MOST RECENT SYSTOLIC BLOOD PRESSURE < 130 MM HG: ICD-10-PCS | Mod: HCNC,CPTII,S$GLB, | Performed by: FAMILY MEDICINE

## 2019-06-12 PROCEDURE — 99214 OFFICE O/P EST MOD 30 MIN: CPT | Mod: HCNC,S$GLB,, | Performed by: FAMILY MEDICINE

## 2019-06-12 PROCEDURE — 99214 PR OFFICE/OUTPT VISIT, EST, LEVL IV, 30-39 MIN: ICD-10-PCS | Mod: HCNC,S$GLB,, | Performed by: FAMILY MEDICINE

## 2019-06-12 PROCEDURE — 3078F PR MOST RECENT DIASTOLIC BLOOD PRESSURE < 80 MM HG: ICD-10-PCS | Mod: HCNC,CPTII,S$GLB, | Performed by: FAMILY MEDICINE

## 2019-06-12 PROCEDURE — 99999 PR PBB SHADOW E&M-EST. PATIENT-LVL V: ICD-10-PCS | Mod: PBBFAC,HCNC,, | Performed by: FAMILY MEDICINE

## 2019-06-12 PROCEDURE — 99999 PR PBB SHADOW E&M-EST. PATIENT-LVL V: CPT | Mod: PBBFAC,HCNC,, | Performed by: FAMILY MEDICINE

## 2019-06-12 PROCEDURE — 3074F SYST BP LT 130 MM HG: CPT | Mod: HCNC,CPTII,S$GLB, | Performed by: FAMILY MEDICINE

## 2019-06-12 PROCEDURE — 1101F PT FALLS ASSESS-DOCD LE1/YR: CPT | Mod: HCNC,CPTII,S$GLB, | Performed by: FAMILY MEDICINE

## 2019-06-12 PROCEDURE — 3078F DIAST BP <80 MM HG: CPT | Mod: HCNC,CPTII,S$GLB, | Performed by: FAMILY MEDICINE

## 2019-06-12 PROCEDURE — 1101F PR PT FALLS ASSESS DOC 0-1 FALLS W/OUT INJ PAST YR: ICD-10-PCS | Mod: HCNC,CPTII,S$GLB, | Performed by: FAMILY MEDICINE

## 2019-06-12 NOTE — TELEPHONE ENCOUNTER
Spoke to patient's wife Lili (Raina) who reported that Mr. June has not yet started his Esbriet due to a recent hospitalization. He plans to start soon once he gets the 'ok' from his PCP. Will pend refill dates accordingly and continue to followup.

## 2019-06-13 NOTE — PROGRESS NOTES
Subjective:   Patient ID: Steve June Jr. is a 74 y.o. male     Chief Complaint:Transitional Care      Patient for hospital follow-up.  Patient was admitted for cellulitis.  Patient improved significantly on IV antibiotic.  Patient states he is doing well in taking his oral antibiotics at home and his symptoms have improved significantly.      Review of Systems   Constitutional: Negative for chills and fever.   HENT: Negative for sore throat.    Eyes: Negative for visual disturbance.   Respiratory: Negative for shortness of breath.    Cardiovascular: Negative for chest pain.   Gastrointestinal: Negative for abdominal pain.   Endocrine: Negative for polyuria.   Genitourinary: Negative for dysuria.   Musculoskeletal: Negative for back pain.   Skin: Positive for rash. Negative for color change.   Neurological: Negative for headaches.   Psychiatric/Behavioral: Negative for agitation and confusion.     Past Medical History:   Diagnosis Date    Abnormal thyroid function test     Allergy     Seasonal    Anemia     Anemia due to blood loss 7/2/2014    Arthritis     Gaviria esophagus     Basal cell carcinoma     right forearm    Basal cell carcinoma 12/2011    lower post neck    Cancer     skin CA    Cataract     Cirrhosis     Diabetes mellitus     Diabetes mellitus, type 2     Encounter for blood transfusion     Esophageal varices in cirrhosis     grade II on 7/12 EGD    Gastritis     on 7/12 EGD    GERD (gastroesophageal reflux disease) 2/28/2015    Hard of hearing     Hiatal hernia     History of hepatitis C 8/10/2012    tx with harvoni x 41 days (started 10/22/15). SVR4     Hoarseness 2/28/2015    Hypercholesteremia     Hypersplenism     Hypertension     No meds    Pain management 12/10/2014    Petechial hemorrhage 11/25/2015    Lower extremities bilat     Portal hypertensive gastropathy     on 7/12 EGD    Thrombocytopenia     Type II or unspecified type diabetes mellitus with  neurological manifestations, uncontrolled(250.62) 12/24/2013    Valvular heart disease     mild MR 12     Objective:     Vitals:    06/12/19 0928   BP: 122/66   Pulse: 65   Temp: 98.2 °F (36.8 °C)     Body mass index is 28.98 kg/m².  Physical Exam   Constitutional: No distress.   HENT:   Head: Normocephalic and atraumatic.   Eyes: EOM are normal.   Cardiovascular: Normal rate and normal heart sounds.   Pulmonary/Chest: Effort normal.   Abdominal: Bowel sounds are normal.   Musculoskeletal: Normal range of motion.   Bilateral swelling noted to the breast.   Neurological: He is alert.   Psychiatric: He has a normal mood and affect.     Assessment:     1. Weakness    2. Breast mass in male    3. Benign neoplasm of left breast       Plan:   Hospital discharge follow-up  Reviewed blood work from hospitalization patient appears to be improving.  Weakness  -     Ambulatory Referral to Physical/Occupational Therapy  Cause patient on importance of physical therapy to help improve his function bili day after being discharged.  Breast mass in male  -     Mammo Digital Diagnostic Bilateral; Future; Expected date: 06/12/2019  -     US Breast Bilateral Complete; Future; Expected date: 06/12/2019    Benign neoplasm of left breast   -     Mammo Digital Diagnostic Bilateral; Future; Expected date: 06/12/2019  -     US Breast Bilateral Complete; Future; Expected date: 06/12/2019        Time spent with patient: 30 minutes and over half of that time was spent on counseling an coordination of care.    Crispin Garcia MD  06/12/2019    Portions of this note have been dictated with CRISTOBAL Garcia

## 2019-06-14 ENCOUNTER — TELEPHONE (OUTPATIENT)
Dept: FAMILY MEDICINE | Facility: CLINIC | Age: 74
End: 2019-06-14

## 2019-06-14 DIAGNOSIS — T14.8XXA BLISTERED SKIN: Primary | ICD-10-CM

## 2019-06-14 NOTE — TELEPHONE ENCOUNTER
LM patient call clinic for provider message. He was refer to derm, but if he wants to be seen we can /scheduled appt for him on 6/17/19

## 2019-06-14 NOTE — TELEPHONE ENCOUNTER
----- Message from Rebeca Parry sent at 6/14/2019 10:29 AM CDT -----  Contact: Steve  Type: Needs Medical Advice    Who Called:  patient  Symptoms (please be specific):  Swelling and has blisters on left ankle   How long has patient had these symptoms:  One day  Pharmacy name and phone #:    [facility] [address] [place] [address] & [address] [address]  Phone: [phone] Fax: [phone]  Best Call Back Number: [phone]  Additional Information: na

## 2019-06-14 NOTE — TELEPHONE ENCOUNTER
Please assist patient with scheduling an appointemnt to see derm. Referral placed. Please also have patient contact my office next week if he needs to be seen again for a visit.

## 2019-06-15 ENCOUNTER — HOSPITAL ENCOUNTER (INPATIENT)
Facility: HOSPITAL | Age: 74
LOS: 4 days | Discharge: HOME | DRG: 603 | End: 2019-06-19
Attending: EMERGENCY MEDICINE | Admitting: HOSPITALIST
Payer: MEDICARE

## 2019-06-15 DIAGNOSIS — E08.42 DIABETIC POLYNEUROPATHY ASSOCIATED WITH DIABETES MELLITUS DUE TO UNDERLYING CONDITION: ICD-10-CM

## 2019-06-15 DIAGNOSIS — I50.32 CHRONIC DIASTOLIC CONGESTIVE HEART FAILURE: ICD-10-CM

## 2019-06-15 DIAGNOSIS — E11.42 DM TYPE 2 WITH DIABETIC PERIPHERAL NEUROPATHY: ICD-10-CM

## 2019-06-15 DIAGNOSIS — D69.6 THROMBOCYTOPENIA: ICD-10-CM

## 2019-06-15 DIAGNOSIS — E11.8 TYPE 2 DIABETES MELLITUS WITH COMPLICATION, WITH LONG-TERM CURRENT USE OF INSULIN: ICD-10-CM

## 2019-06-15 DIAGNOSIS — K29.70 GASTRITIS, PRESENCE OF BLEEDING UNSPECIFIED, UNSPECIFIED CHRONICITY, UNSPECIFIED GASTRITIS TYPE: ICD-10-CM

## 2019-06-15 DIAGNOSIS — I15.2 HYPERTENSION ASSOCIATED WITH DIABETES: ICD-10-CM

## 2019-06-15 DIAGNOSIS — E11.59 HYPERTENSION ASSOCIATED WITH DIABETES: ICD-10-CM

## 2019-06-15 DIAGNOSIS — L03.116 CELLULITIS OF LEFT LOWER EXTREMITY: Primary | ICD-10-CM

## 2019-06-15 DIAGNOSIS — Z79.4 TYPE 2 DIABETES MELLITUS WITH COMPLICATION, WITH LONG-TERM CURRENT USE OF INSULIN: ICD-10-CM

## 2019-06-15 PROBLEM — S80.822A BLISTER OF LEFT LOWER LEG: Status: ACTIVE | Noted: 2019-06-15

## 2019-06-15 PROBLEM — I87.2 VENOUS INSUFFICIENCY OF BOTH LOWER EXTREMITIES: Status: ACTIVE | Noted: 2019-06-15

## 2019-06-15 LAB
ALBUMIN SERPL BCP-MCNC: 3.5 G/DL (ref 3.5–5.2)
ALP SERPL-CCNC: 86 U/L (ref 55–135)
ALT SERPL W/O P-5'-P-CCNC: 17 U/L (ref 10–44)
ANION GAP SERPL CALC-SCNC: 9 MMOL/L (ref 8–16)
APTT BLDCRRT: 26 SEC (ref 21–32)
AST SERPL-CCNC: 21 U/L (ref 10–40)
BASOPHILS # BLD AUTO: 0 K/UL (ref 0–0.2)
BASOPHILS NFR BLD: 0.6 % (ref 0–1.9)
BILIRUB SERPL-MCNC: 1.1 MG/DL (ref 0.1–1)
BUN SERPL-MCNC: 13 MG/DL (ref 8–23)
CALCIUM SERPL-MCNC: 8.9 MG/DL (ref 8.7–10.5)
CHLORIDE SERPL-SCNC: 99 MMOL/L (ref 95–110)
CO2 SERPL-SCNC: 25 MMOL/L (ref 23–29)
CREAT SERPL-MCNC: 1.1 MG/DL (ref 0.5–1.4)
DIFFERENTIAL METHOD: ABNORMAL
EOSINOPHIL # BLD AUTO: 0.3 K/UL (ref 0–0.5)
EOSINOPHIL NFR BLD: 7.5 % (ref 0–8)
ERYTHROCYTE [DISTWIDTH] IN BLOOD BY AUTOMATED COUNT: 15 % (ref 11.5–14.5)
EST. GFR  (AFRICAN AMERICAN): >60 ML/MIN/1.73 M^2
EST. GFR  (NON AFRICAN AMERICAN): >60 ML/MIN/1.73 M^2
GLUCOSE SERPL-MCNC: 381 MG/DL (ref 70–110)
HCT VFR BLD AUTO: 35 % (ref 40–54)
HGB BLD-MCNC: 11.8 G/DL (ref 14–18)
INR PPP: 1.2 (ref 0.8–1.2)
LYMPHOCYTES # BLD AUTO: 0.5 K/UL (ref 1–4.8)
LYMPHOCYTES NFR BLD: 12 % (ref 18–48)
MCH RBC QN AUTO: 28.2 PG (ref 27–31)
MCHC RBC AUTO-ENTMCNC: 33.8 G/DL (ref 32–36)
MCV RBC AUTO: 84 FL (ref 82–98)
MONOCYTES # BLD AUTO: 0.2 K/UL (ref 0.3–1)
MONOCYTES NFR BLD: 6 % (ref 4–15)
NEUTROPHILS # BLD AUTO: 2.9 K/UL (ref 1.8–7.7)
NEUTROPHILS NFR BLD: 73.9 % (ref 38–73)
PLATELET # BLD AUTO: 39 K/UL (ref 150–350)
PMV BLD AUTO: 7.8 FL (ref 9.2–12.9)
POCT GLUCOSE: 416 MG/DL (ref 70–110)
POTASSIUM SERPL-SCNC: 4.5 MMOL/L (ref 3.5–5.1)
PROT SERPL-MCNC: 6 G/DL (ref 6–8.4)
PROTHROMBIN TIME: 12.7 SEC (ref 9–12.5)
RBC # BLD AUTO: 4.19 M/UL (ref 4.6–6.2)
SODIUM SERPL-SCNC: 133 MMOL/L (ref 136–145)
WBC # BLD AUTO: 3.9 K/UL (ref 3.9–12.7)

## 2019-06-15 PROCEDURE — 80053 COMPREHEN METABOLIC PANEL: CPT | Mod: HCNC

## 2019-06-15 PROCEDURE — 12000002 HC ACUTE/MED SURGE SEMI-PRIVATE ROOM: Mod: HCNC

## 2019-06-15 PROCEDURE — 87205 SMEAR GRAM STAIN: CPT | Mod: HCNC

## 2019-06-15 PROCEDURE — 85025 COMPLETE CBC W/AUTO DIFF WBC: CPT | Mod: HCNC

## 2019-06-15 PROCEDURE — 63600175 PHARM REV CODE 636 W HCPCS: Mod: HCNC | Performed by: HOSPITALIST

## 2019-06-15 PROCEDURE — 85610 PROTHROMBIN TIME: CPT | Mod: HCNC

## 2019-06-15 PROCEDURE — 36415 COLL VENOUS BLD VENIPUNCTURE: CPT | Mod: HCNC

## 2019-06-15 PROCEDURE — 85730 THROMBOPLASTIN TIME PARTIAL: CPT | Mod: HCNC

## 2019-06-15 PROCEDURE — 87070 CULTURE OTHR SPECIMN AEROBIC: CPT | Mod: HCNC

## 2019-06-15 PROCEDURE — 96374 THER/PROPH/DIAG INJ IV PUSH: CPT | Mod: HCNC

## 2019-06-15 PROCEDURE — S5571 INSULIN DISPOS PEN 3 ML: HCPCS | Mod: HCNC | Performed by: EMERGENCY MEDICINE

## 2019-06-15 PROCEDURE — 63600175 PHARM REV CODE 636 W HCPCS: Mod: HCNC | Performed by: EMERGENCY MEDICINE

## 2019-06-15 PROCEDURE — 99285 EMERGENCY DEPT VISIT HI MDM: CPT | Mod: HCNC

## 2019-06-15 PROCEDURE — 25000003 PHARM REV CODE 250: Mod: HCNC | Performed by: HOSPITALIST

## 2019-06-15 PROCEDURE — 99900035 HC TECH TIME PER 15 MIN (STAT): Mod: HCNC

## 2019-06-15 PROCEDURE — 25000003 PHARM REV CODE 250: Mod: HCNC | Performed by: EMERGENCY MEDICINE

## 2019-06-15 RX ORDER — IBUPROFEN 200 MG
24 TABLET ORAL
Status: DISCONTINUED | OUTPATIENT
Start: 2019-06-15 | End: 2019-06-19 | Stop reason: HOSPADM

## 2019-06-15 RX ORDER — VANCOMYCIN HCL IN 5 % DEXTROSE 1G/250ML
1000 PLASTIC BAG, INJECTION (ML) INTRAVENOUS
Status: DISCONTINUED | OUTPATIENT
Start: 2019-06-15 | End: 2019-06-16

## 2019-06-15 RX ORDER — OXYCODONE AND ACETAMINOPHEN 10; 325 MG/1; MG/1
1 TABLET ORAL EVERY 6 HOURS PRN
Status: DISCONTINUED | OUTPATIENT
Start: 2019-06-15 | End: 2019-06-15

## 2019-06-15 RX ORDER — IBUPROFEN 200 MG
16 TABLET ORAL
Status: DISCONTINUED | OUTPATIENT
Start: 2019-06-15 | End: 2019-06-19 | Stop reason: HOSPADM

## 2019-06-15 RX ORDER — INSULIN ASPART 100 [IU]/ML
0-5 INJECTION, SOLUTION INTRAVENOUS; SUBCUTANEOUS
Status: DISCONTINUED | OUTPATIENT
Start: 2019-06-15 | End: 2019-06-16

## 2019-06-15 RX ORDER — IPRATROPIUM BROMIDE AND ALBUTEROL SULFATE 2.5; .5 MG/3ML; MG/3ML
3 SOLUTION RESPIRATORY (INHALATION) EVERY 4 HOURS PRN
Status: DISCONTINUED | OUTPATIENT
Start: 2019-06-15 | End: 2019-06-19 | Stop reason: HOSPADM

## 2019-06-15 RX ORDER — CEFAZOLIN SODIUM 1 G/50ML
1 SOLUTION INTRAVENOUS
Status: DISCONTINUED | OUTPATIENT
Start: 2019-06-15 | End: 2019-06-18

## 2019-06-15 RX ORDER — MORPHINE SULFATE ORAL SOLUTION 10 MG/5ML
15 SOLUTION ORAL ONCE
Status: COMPLETED | OUTPATIENT
Start: 2019-06-16 | End: 2019-06-16

## 2019-06-15 RX ORDER — SODIUM CHLORIDE 0.9 % (FLUSH) 0.9 %
10 SYRINGE (ML) INJECTION
Status: DISCONTINUED | OUTPATIENT
Start: 2019-06-15 | End: 2019-06-19 | Stop reason: HOSPADM

## 2019-06-15 RX ORDER — OXYCODONE AND ACETAMINOPHEN 10; 325 MG/1; MG/1
1 TABLET ORAL EVERY 6 HOURS PRN
Status: DISCONTINUED | OUTPATIENT
Start: 2019-06-15 | End: 2019-06-18

## 2019-06-15 RX ORDER — PIRFENIDONE 801 MG/1
801 TABLET, FILM COATED ORAL 3 TIMES DAILY
Status: DISCONTINUED | OUTPATIENT
Start: 2019-06-15 | End: 2019-06-16

## 2019-06-15 RX ORDER — GLUCAGON 1 MG
1 KIT INJECTION
Status: DISCONTINUED | OUTPATIENT
Start: 2019-06-15 | End: 2019-06-19 | Stop reason: HOSPADM

## 2019-06-15 RX ADMIN — CEFAZOLIN SODIUM 1 G: 1 SOLUTION INTRAVENOUS at 06:06

## 2019-06-15 RX ADMIN — INSULIN DETEMIR 30 UNITS: 100 INJECTION, SOLUTION SUBCUTANEOUS at 09:06

## 2019-06-15 RX ADMIN — OXYCODONE AND ACETAMINOPHEN 1 TABLET: 10; 325 TABLET ORAL at 07:06

## 2019-06-15 RX ADMIN — VANCOMYCIN HYDROCHLORIDE 1250 MG: 1.25 INJECTION, POWDER, LYOPHILIZED, FOR SOLUTION INTRAVENOUS at 11:06

## 2019-06-15 RX ADMIN — OXYCODONE AND ACETAMINOPHEN 1 TABLET: 10; 325 TABLET ORAL at 12:06

## 2019-06-15 NOTE — ED PROVIDER NOTES
"Encounter Date: 6/15/2019    SCRIBE #1 NOTE: IAmber, am scribing for, and in the presence of, Dr. Iyer.       History     Chief Complaint   Patient presents with    Cellulitis     lt lower extremity     Time seen by provider: 10:24 AM     Wilsonchris June Jr. is a 74 y.o. male with a PMHx of cirrhosis and DM who presents to the ED with an onset of wound that worsened today. Patient reports worsening left leg redness and left leg swelling. Patient had recent hospital admission for cellulitis and treated inpatient with vancomycin with improvement. He had negative DVT study and CT scan did not show any sign of necrotizing fascitis or abscess. He was discharged with bactrim and keflex. The patient denies fever, nausea, vomiting, or any other symptoms at this time. Patient has dermatology appointment scheduled for 7/3. No pertinent PSHx noted. Doxycycline drug allergy noted.    The history is provided by the patient and the spouse.     Review of patient's allergies indicates:   Allergen Reactions    Adhesive tape-silicones Other (See Comments)     pulls skin off    Doxycycline      Dizzy. "Just didn't feel right".     Past Medical History:   Diagnosis Date    Abnormal thyroid function test     Allergy     Seasonal    Anemia     Anemia due to blood loss 7/2/2014    Arthritis     Gaviria esophagus     Basal cell carcinoma     right forearm    Basal cell carcinoma 12/2011    lower post neck    Cancer     skin CA    Cataract     Cirrhosis     Diabetes mellitus     Diabetes mellitus, type 2     Encounter for blood transfusion     Esophageal varices in cirrhosis     grade II on 7/12 EGD    Gastritis     on 7/12 EGD    GERD (gastroesophageal reflux disease) 2/28/2015    Hard of hearing     Hiatal hernia     History of hepatitis C 8/10/2012    tx with harvoni x 41 days (started 10/22/15). SVR4     Hoarseness 2/28/2015    Hypercholesteremia     Hypersplenism     Hypertension     No meds "    Pain management 12/10/2014    Petechial hemorrhage 11/25/2015    Lower extremities bilat     Portal hypertensive gastropathy     on 7/12 EGD    Thrombocytopenia     Type II or unspecified type diabetes mellitus with neurological manifestations, uncontrolled(250.62) 12/24/2013    Valvular heart disease     mild MR 12     Past Surgical History:   Procedure Laterality Date    ABLATION, RADIOFREQUENCY-left suprascapula Left 8/25/2017    [facility]    CATARACT EXTRACTION  1/10/13    left eye    CATARACT EXTRACTION      right eye    CHOLECYSTECTOMY      COLONOSCOPY      diagnostic block of the genicular branches to the left knee Left 12/15/2017    [facility] at [facility] OR    EGD (ESOPHAGOGASTRODUODENOSCOPY) N/A 3/13/2014    [facility] by [facility] at [facility] END[facility] (4TH FLR)    EGD (ESOPHAGOGASTRODUODENOSCOPY) N/A 7/11/2013    Performed by Campos Walters [facility] at [facility] ENDO (4TH FLR)    ESOPHAGOGASTRODUODENOSCOPY (EGD) N/A 8/1/2014    Performed by Juan David Booker [facility] at [facility] ENDO (4TH FLR)    ESOPHAGOGASTRODUODENOSCOPY (EGD) N/A 7/3/2014    Performed by [facility] at [facility] ENDO (2ND FLR)    EYE SURGERY      Cataract surgery to right eye    INSERTION, IOL PROSTHESIS Left 1/10/2013    [facility] by [facility]i [facility] at [facility] OR 1ST FLR    KNEE ARTHROSCOPY W/ MENISCAL REPAIR      KNEE CARTILAGE SURGERY      left knee    KNEE SURGERY  12/2006    left    LARYNGOSCOPY Bilateral 12/5/2014    Performed by Anoop Bernstein [facility] at [facility] OR 2ND FLR    PHACOEMULSIFICATION, CATARACT Left 1/10/2013    [facility] at [facility] OR 1ST FLR    PHACOEMULSIFICATION, CATARACT Right 9/27/2012    [facility] by [facility]da [facility] at [facility] OR    SKIN LESION EXCISION      TONSILLECTOMY      UPPER GASTROINTESTINAL ENDOSCOPY       Family History   Problem Relation Age of Onset    Leukemia Mother     Cancer Mother          bone    Alcohol abuse Father     Cirrhosis Father         EtOH induced    No Known Problems Daughter     No Known Problems Son     No Known Problems Daughter     No Known Problems Daughter     No Known Problems Daughter     No Known Problems Sister     Amblyopia Neg Hx     Blindness Neg Hx     Cataracts Neg Hx     Diabetes Neg Hx     Glaucoma Neg Hx     Hypertension Neg Hx     Macular degeneration Neg Hx     Retinal detachment Neg Hx     Strabismus Neg Hx     Stroke Neg Hx     Thyroid disease Neg Hx     Psoriasis Neg Hx     Lupus Neg Hx     Eczema Neg Hx     Acne Neg Hx     Melanoma Neg Hx      Social History     Tobacco Use    Smoking status: Former Smoker     Packs/day: 1.00     Years: 25.00     Pack years: 25.00     Last attempt to quit: 2000     Years since quittin.8    Smokeless tobacco: Never Used   Substance Use Topics    Alcohol use: Yes     Comment: rarely    Drug use: No     Review of Systems   Constitutional: Negative for fever.   HENT: Negative for sore throat.    Respiratory: Negative for shortness of breath.    Cardiovascular: Positive for leg swelling. Negative for chest pain.   Gastrointestinal: Negative for nausea and vomiting.   Genitourinary: Negative for dysuria.   Musculoskeletal: Negative for back pain.   Skin: Positive for color change (Redness) and wound.   Neurological: Negative for weakness.   Hematological: Does not bruise/bleed easily.       Physical Exam     Initial Vitals [06/15/19 1000]   BP Pulse Resp Temp SpO2   (!) 142/69 69 20 98.5 °F (36.9 °C) 95 %      MAP       --         Physical Exam    Nursing note and vitals reviewed.  Constitutional: He appears well-developed and well-nourished. He is not diaphoretic. No distress.   HENT:   Head: Normocephalic and atraumatic.   Mouth/Throat: Oropharynx is clear and moist.   Eyes: Conjunctivae are normal.   Neck: Neck supple.   Cardiovascular: Normal rate, regular rhythm and intact distal pulses.  Exam reveals no gallop and no friction rub.    Murmur heard.   Systolic murmur is present with a grade of 2/6.  Pulses:       Dorsalis pedis pulses are 2+ on the right side, and 2+ on the left side.        Posterior tibial pulses are 2+ on the right side, and 2+ on the left side.   Pulmonary/Chest: Breath sounds normal. He has no wheezes. He has no rhonchi. He has no rales.   Abdominal: Soft. He exhibits no distension. There is no tenderness.   Musculoskeletal: Normal range of motion.        Left knee: He exhibits normal range of motion.        Left ankle: He exhibits ecchymosis. He exhibits normal range of motion.   Full strength at knee and ankle without pain.    Neurological: He is alert and oriented to person, place, and time.   Skin: Ecchymosis noted. There is erythema.   Brawny skin discoloration. Deep erythema LLE area well demarcated.  Minimal tenderness. Ecchymosis with minor skin breakdown with pururlent drainage.          ED Course   Procedures  Labs Reviewed   CBC W/ AUTO DIFFERENTIAL - Abnormal; Notable for the following components:       Result Value    RBC 4.19 (*)     Hemoglobin 11.8 (*)     Hematocrit 35.0 (*)     RDW 15.0 (*)     Platelets 39 (*)     MPV 7.8 (*)     Lymph # 0.5 (*)     Mono # 0.2 (*)     Gran% 73.9 (*)     Lymph% 12.0 (*)     All other components within normal limits    Narrative:        critical plt result(s) called and verbal readback obtained from   Elis Peters @8305, 06/15/2019 11:02   PROTIME-INR - Abnormal; Notable for the following components:    Prothrombin Time 12.7 (*)     All other components within normal limits   APTT          Imaging Results    None          Medical Decision Making:   History:   Old Medical Records: I decided to obtain old medical records.  Clinical Tests:   Lab Tests: Ordered and Reviewed            Scribe Attestation:   Scribe #1: I performed the above scribed service and the documentation accurately describes the services I performed. I attest  to the accuracy of the note.    I, Dr. Bert Iyer, personally performed the services described in this documentation. All medical record entries made by the scribe were at my direction and in my presence.  I have reviewed the chart and agree that the record reflects my personal performance and is accurate and complete. Bert Iyer MD.  4:48 PM 06/15/2019    Steve June Jr. is a 74 y.o. male presenting with left lower extremity recurrent erythema in the setting of recently treated cellulitis.  I will admit patient for recurrent treatment since he seemed to benefit from further therapy, although other sources of erythema in the setting of chronic stasis dermatitis and edema considered.  IV vancomycin begun in the ED after discussion with Dr. Perry from the hospitalist service.  Low suspicion for emergent process such as necrotizing fasciitis or DVT, particularly with prior recent negative testing for similar symptoms.             Clinical Impression:       ICD-10-CM ICD-9-CM   1. Cellulitis of left lower extremity L03.116 682.6                                Bert Iyer MD  06/15/19 1642

## 2019-06-15 NOTE — CONSULTS
"Pharmacokinetic Initial Assessment: IV Vancomycin    Assessment/Plan:    Initiate intravenous vancomycin with first dose of 1250 mg IV given once in the ED at 1104 followed by a maintenance dose of vancomycin 1000 mg IV every 12 hours. Desired empiric serum trough concentration is 10 to 15 mcg/mL for suspected skin & soft tissue. Draw vancomycin trough level 30 min prior to fourth dose on 6/16 at approximately 2230. Pharmacy will continue to follow and monitor vancomycin.      Please contact pharmacy at extension [phone] with any questions regarding this assessment.     Thank you for the consult and for allowing us to participate in this patient's care.     Selina Moody, PharmD     Patient brief summary:  Steve June Jr. is a 74 y.o. male initiated on antimicrobial therapy with IV Vancomycin for treatment of suspected skin & soft tissue.    Drug Allergies:   Review of patient's allergies indicates:   Allergen Reactions    Adhesive tape-silicones Other (See Comments)     pulls skin off    Doxycycline      Dizzy. "Just didn't feel right".       Actual Body Weight:   91.3 kg    Renal Function:   Estimated Creatinine Clearance: 67.7 mL/min (based on SCr of 1.1 mg/dL).,     Dialysis Method (if applicable):  N/A     CBC (last 72 hours):  Recent Labs   Lab Result Units 06/15/19  1049   WBC K/uL 3.90   Hemoglobin g/dL 11.8*   Hematocrit % 35.0*   Platelets K/uL 39*   Gran% % 73.9*   Lymph% % 12.0*   Mono% % 6.0   Eosinophil% % 7.5   Basophil% % 0.6   Differential Method  Automated       Metabolic Panel (last 72 hours):  Recent Labs   Lab Result Units 06/15/19  1049   Sodium mmol/L 133*   Potassium mmol/L 4.5   Chloride mmol/L 99   CO2 mmol/L 25   Glucose mg/dL 381*   BUN, Bld mg/dL 13   Creatinine mg/dL 1.1   Albumin g/dL 3.5   Total Bilirubin mg/dL 1.1*   Alkaline Phosphatase U/L 86   AST U/L 21   ALT U/L 17       Drug levels (last 3 results):  No results for input(s): VANCOMYCINRA, VANCOMYCINPE, VANCOMYCINTR in the " last 72 hours.    Microbiologic Results:  Microbiology Results (last 7 days)       ** No results found for the last 168 hours. **

## 2019-06-15 NOTE — NURSING
Pt arrived to room 309 via wheelchair with IV vanco infusing transferred self to bed stand by assist.

## 2019-06-16 LAB
ALBUMIN SERPL BCP-MCNC: 3.4 G/DL (ref 3.5–5.2)
ALP SERPL-CCNC: 82 U/L (ref 55–135)
ALT SERPL W/O P-5'-P-CCNC: 15 U/L (ref 10–44)
ANION GAP SERPL CALC-SCNC: 8 MMOL/L (ref 8–16)
AST SERPL-CCNC: 18 U/L (ref 10–40)
BASOPHILS # BLD AUTO: 0 K/UL (ref 0–0.2)
BASOPHILS NFR BLD: 0.3 % (ref 0–1.9)
BILIRUB SERPL-MCNC: 0.7 MG/DL (ref 0.1–1)
BUN SERPL-MCNC: 12 MG/DL (ref 8–23)
CALCIUM SERPL-MCNC: 8.9 MG/DL (ref 8.7–10.5)
CHLORIDE SERPL-SCNC: 99 MMOL/L (ref 95–110)
CO2 SERPL-SCNC: 27 MMOL/L (ref 23–29)
CREAT SERPL-MCNC: 1 MG/DL (ref 0.5–1.4)
DIFFERENTIAL METHOD: ABNORMAL
EOSINOPHIL # BLD AUTO: 0.3 K/UL (ref 0–0.5)
EOSINOPHIL NFR BLD: 8.8 % (ref 0–8)
ERYTHROCYTE [DISTWIDTH] IN BLOOD BY AUTOMATED COUNT: 15.2 % (ref 11.5–14.5)
EST. GFR  (AFRICAN AMERICAN): >60 ML/MIN/1.73 M^2
EST. GFR  (NON AFRICAN AMERICAN): >60 ML/MIN/1.73 M^2
GLUCOSE SERPL-MCNC: 334 MG/DL (ref 70–110)
GLUCOSE SERPL-MCNC: 424 MG/DL (ref 70–110)
GRAM STN SPEC: NORMAL
GRAM STN SPEC: NORMAL
HCT VFR BLD AUTO: 33.7 % (ref 40–54)
HGB BLD-MCNC: 11.4 G/DL (ref 14–18)
LYMPHOCYTES # BLD AUTO: 0.4 K/UL (ref 1–4.8)
LYMPHOCYTES NFR BLD: 11.1 % (ref 18–48)
MCH RBC QN AUTO: 28 PG (ref 27–31)
MCHC RBC AUTO-ENTMCNC: 33.8 G/DL (ref 32–36)
MCV RBC AUTO: 83 FL (ref 82–98)
MONOCYTES # BLD AUTO: 0.2 K/UL (ref 0.3–1)
MONOCYTES NFR BLD: 6.8 % (ref 4–15)
NEUTROPHILS # BLD AUTO: 2.4 K/UL (ref 1.8–7.7)
NEUTROPHILS NFR BLD: 73 % (ref 38–73)
PLATELET # BLD AUTO: 41 K/UL (ref 150–350)
PMV BLD AUTO: 8.3 FL (ref 9.2–12.9)
POCT GLUCOSE: 250 MG/DL (ref 70–110)
POCT GLUCOSE: 315 MG/DL (ref 70–110)
POCT GLUCOSE: 355 MG/DL (ref 70–110)
POCT GLUCOSE: 374 MG/DL (ref 70–110)
POCT GLUCOSE: 404 MG/DL (ref 70–110)
POCT GLUCOSE: 422 MG/DL (ref 70–110)
POTASSIUM SERPL-SCNC: 4.2 MMOL/L (ref 3.5–5.1)
PROT SERPL-MCNC: 5.9 G/DL (ref 6–8.4)
RBC # BLD AUTO: 4.06 M/UL (ref 4.6–6.2)
SODIUM SERPL-SCNC: 134 MMOL/L (ref 136–145)
VANCOMYCIN TROUGH SERPL-MCNC: 13.6 UG/ML (ref 10–22)
WBC # BLD AUTO: 3.3 K/UL (ref 3.9–12.7)

## 2019-06-16 PROCEDURE — 25000003 PHARM REV CODE 250: Mod: HCNC | Performed by: NURSE PRACTITIONER

## 2019-06-16 PROCEDURE — 36415 COLL VENOUS BLD VENIPUNCTURE: CPT | Mod: HCNC

## 2019-06-16 PROCEDURE — 12000002 HC ACUTE/MED SURGE SEMI-PRIVATE ROOM: Mod: HCNC

## 2019-06-16 PROCEDURE — 80202 ASSAY OF VANCOMYCIN: CPT | Mod: HCNC

## 2019-06-16 PROCEDURE — 63600175 PHARM REV CODE 636 W HCPCS: Mod: HCNC | Performed by: HOSPITALIST

## 2019-06-16 PROCEDURE — 63600175 PHARM REV CODE 636 W HCPCS: Mod: HCNC | Performed by: INTERNAL MEDICINE

## 2019-06-16 PROCEDURE — 99900035 HC TECH TIME PER 15 MIN (STAT): Mod: HCNC

## 2019-06-16 PROCEDURE — 25000003 PHARM REV CODE 250: Mod: HCNC | Performed by: HOSPITALIST

## 2019-06-16 PROCEDURE — 80053 COMPREHEN METABOLIC PANEL: CPT | Mod: HCNC

## 2019-06-16 PROCEDURE — 85025 COMPLETE CBC W/AUTO DIFF WBC: CPT | Mod: HCNC

## 2019-06-16 PROCEDURE — 82947 ASSAY GLUCOSE BLOOD QUANT: CPT | Mod: HCNC

## 2019-06-16 RX ORDER — VANCOMYCIN/0.9 % SOD CHLORIDE 1 G/100 ML
1000 PLASTIC BAG, INJECTION (ML) INTRAVENOUS
Status: DISCONTINUED | OUTPATIENT
Start: 2019-06-17 | End: 2019-06-18

## 2019-06-16 RX ORDER — INSULIN ASPART 100 [IU]/ML
5 INJECTION, SOLUTION INTRAVENOUS; SUBCUTANEOUS ONCE
Status: COMPLETED | OUTPATIENT
Start: 2019-06-16 | End: 2019-06-16

## 2019-06-16 RX ORDER — VANCOMYCIN HCL IN 5 % DEXTROSE 1G/250ML
1000 PLASTIC BAG, INJECTION (ML) INTRAVENOUS
Status: DISCONTINUED | OUTPATIENT
Start: 2019-06-17 | End: 2019-06-16

## 2019-06-16 RX ORDER — DIPHENHYDRAMINE HCL 25 MG
25 CAPSULE ORAL EVERY 6 HOURS PRN
Status: DISCONTINUED | OUTPATIENT
Start: 2019-06-16 | End: 2019-06-19 | Stop reason: HOSPADM

## 2019-06-16 RX ORDER — INSULIN ASPART 100 [IU]/ML
1-10 INJECTION, SOLUTION INTRAVENOUS; SUBCUTANEOUS
Status: DISCONTINUED | OUTPATIENT
Start: 2019-06-16 | End: 2019-06-19 | Stop reason: HOSPADM

## 2019-06-16 RX ORDER — INSULIN ASPART 100 [IU]/ML
7 INJECTION, SOLUTION INTRAVENOUS; SUBCUTANEOUS
Status: DISCONTINUED | OUTPATIENT
Start: 2019-06-17 | End: 2019-06-17

## 2019-06-16 RX ADMIN — OXYCODONE AND ACETAMINOPHEN 1 TABLET: 10; 325 TABLET ORAL at 07:06

## 2019-06-16 RX ADMIN — CEFAZOLIN SODIUM 1 G: 1 SOLUTION INTRAVENOUS at 05:06

## 2019-06-16 RX ADMIN — INSULIN ASPART 5 UNITS: 100 INJECTION, SOLUTION INTRAVENOUS; SUBCUTANEOUS at 09:06

## 2019-06-16 RX ADMIN — CEFAZOLIN SODIUM 1 G: 1 SOLUTION INTRAVENOUS at 03:06

## 2019-06-16 RX ADMIN — INSULIN ASPART 4 UNITS: 100 INJECTION, SOLUTION INTRAVENOUS; SUBCUTANEOUS at 11:06

## 2019-06-16 RX ADMIN — OXYCODONE AND ACETAMINOPHEN 1 TABLET: 10; 325 TABLET ORAL at 01:06

## 2019-06-16 RX ADMIN — CEFAZOLIN SODIUM 1 G: 1 SOLUTION INTRAVENOUS at 09:06

## 2019-06-16 RX ADMIN — INSULIN DETEMIR 30 UNITS: 100 INJECTION, SOLUTION SUBCUTANEOUS at 08:06

## 2019-06-16 RX ADMIN — VANCOMYCIN HYDROCHLORIDE 1000 MG: 1 INJECTION, POWDER, FOR SOLUTION INTRAVENOUS at 01:06

## 2019-06-16 RX ADMIN — INSULIN ASPART 2 UNITS: 100 INJECTION, SOLUTION INTRAVENOUS; SUBCUTANEOUS at 07:06

## 2019-06-16 RX ADMIN — DIPHENHYDRAMINE HYDROCHLORIDE 25 MG: 25 CAPSULE ORAL at 07:06

## 2019-06-16 RX ADMIN — DIPHENHYDRAMINE HYDROCHLORIDE 25 MG: 25 CAPSULE ORAL at 02:06

## 2019-06-16 RX ADMIN — CEFAZOLIN SODIUM 1 G: 1 SOLUTION INTRAVENOUS at 12:06

## 2019-06-16 RX ADMIN — INSULIN ASPART 4 UNITS: 100 INJECTION, SOLUTION INTRAVENOUS; SUBCUTANEOUS at 05:06

## 2019-06-16 RX ADMIN — INSULIN ASPART 5 UNITS: 100 INJECTION, SOLUTION INTRAVENOUS; SUBCUTANEOUS at 12:06

## 2019-06-16 RX ADMIN — INSULIN ASPART 3 UNITS: 100 INJECTION, SOLUTION INTRAVENOUS; SUBCUTANEOUS at 08:06

## 2019-06-16 RX ADMIN — MORPHINE SULFATE 15 MG: 10 SOLUTION ORAL at 12:06

## 2019-06-16 RX ADMIN — SODIUM CHLORIDE, SODIUM LACTATE, POTASSIUM CHLORIDE, AND CALCIUM CHLORIDE 1000 ML: .6; .31; .03; .02 INJECTION, SOLUTION INTRAVENOUS at 11:06

## 2019-06-16 RX ADMIN — VANCOMYCIN HYDROCHLORIDE 1000 MG: 1 INJECTION, POWDER, FOR SOLUTION INTRAVENOUS at 12:06

## 2019-06-16 NOTE — NURSING
Secure chat Dr Perry that pt CBG was 315 at 11am and 374 at 5pm. Pt is on low dose sliding scale insulin. Would he like to order moderate dose sliding scale or scheduled insulin. Md responded that he wants to keep low dose sliding scale.

## 2019-06-16 NOTE — NURSING
Pt refused blood sugar to be taken. Dr Perry aware and pt states he will let nurse take CBG next time.

## 2019-06-16 NOTE — PLAN OF CARE
SW met w/ pt and spouse for assessment. [place] was [place] [facility] [place]10/19 for same admit reason.   Pt denies current home health but states he has had [facility] (approx 2 years ago).  Pt states he has home O2 w/ portable tanks through [facility] [place]--uses O2 as needed, not continuous.  Pt reports independent w/ ADL and very active (mows lawn, basketball).         06/16/19 1443   Discharge Assessment   Assessment Type Discharge Planning Assessment   Confirmed/corrected address and phone number on facesheet? Yes   Assessment information obtained from? Patient;Caregiver   Prior to hospitilization cognitive status: Alert/Oriented   Prior to hospitalization functional status: Independent;Assistive Equipment   Current cognitive status: Alert/Oriented   Current Functional Status: Independent;Assistive Equipment   Facility Arrived From: home   Lives With spouse   Able to Return to Prior Arrangements yes   Is patient able to care for self after discharge? Yes   Who are your caregiver(s) and their phone number(s)? spouse:  Lili June  [phone]   Patient's perception of discharge disposition home or selfcare   Readmission Within the Last 30 Days no previous admission in last 30 days   Patient currently being followed by outpatient case management? No   Patient currently receives any other outside agency services? No   Equipment Currently Used at Home oxygen;respiratory supplies;walker, rolling;rollator;glucometer;cane, straight   Do you have any problems affording any of your prescribed medications? No   Is the patient taking medications as prescribed? yes   Does the patient have transportation home? Yes   Transportation Anticipated family or friend will provide   Does the patient receive services at the [facility]? No   Discharge Plan A Home with family   Discharge Plan B Home with family;Home Health   DME Needed Upon Discharge  none   Patient/Family in Agreement with Plan yes

## 2019-06-16 NOTE — NURSING
Nurse messaged on lane provider, Dr. Arnett regarding pt's continued pain.  MD ordered pt a 1x dose of PO morphine and phoned to discuss that the pt has a hx of liver disease and he would like to avoid IV pain medication if possible.  Nurse to administer pain medication and monitor for results.

## 2019-06-16 NOTE — PLAN OF CARE
06/16/19 0820   PRE-TX-O2   O2 Device (Oxygen Therapy) room air   SpO2 96 %   Pulse Oximetry Type Intermittent   Aerosol Therapy   $ Aerosol Therapy Charges PRN treatment not required   Respiratory Treatment Status (SVN) PRN treatment not required

## 2019-06-16 NOTE — NURSING
Pt's LLE wound was cleansed w/ wound cleanser and covered w/ gauze and wrapped with Kerlix.  The pt tolerated this well; with minimal pain reported.  2+ palpable pule noted to LLE.  Pictures were taken of LLE wound for comparison to admit photos (redness to LLE increasing up pt's calf with Lg. previous blister now open and draining.  Dressing dated and LLE was elevated on a pillow at this time.

## 2019-06-16 NOTE — ASSESSMENT & PLAN NOTE
CHF currently controlled. Latest ECHO shows ejection to be preserved. Monitor clinical status closely. Monitor on telemetry. Continue to stress to patient importance of self efficacy and  on diet for CHF.

## 2019-06-16 NOTE — PLAN OF CARE
06/15/19 2000   Patient Assessment/Suction   Level of Consciousness (AVPU) alert   Respiratory Effort Unlabored   Expansion/Accessory Muscles/Retractions no use of accessory muscles   All Lung Fields Breath Sounds clear   PRE-TX-O2   O2 Device (Oxygen Therapy) room air   SpO2 96 %   Pulse Oximetry Type Intermittent   Pulse 77   Resp 20   Aerosol Therapy   $ Aerosol Therapy Charges PRN treatment not required

## 2019-06-16 NOTE — HPI
Mr. June presented to ED today with increasing erythema and pain of left lower leg.  Associated with chronic pitting edema.  There's edema in right leg, too, but left has greater amount.  He was recently on our hospital service for cellulitis.  He was treated with IVAB, and the redness and pain decreased.  Discharged five days ago with prescriptions for Bactrim and cephalexin.  Yesterday, he noticed the symptoms were increasing again.  His primary care MD wants to consult with dermatology to get another opinion.  Patient has appt with one in early July.  Pt has liver cirrhosis and idiopathic pulmonary fibrosis.

## 2019-06-16 NOTE — SUBJECTIVE & OBJECTIVE
Past Medical History:   Diagnosis Date    Abnormal thyroid function test     Allergy     Seasonal    Anemia     Anemia due to blood loss 7/2/2014    Arthritis     Gaviria esophagus     Basal cell carcinoma     right forearm    Basal cell carcinoma 12/2011    lower post neck    Cancer     skin CA    Cataract     Cirrhosis     Diabetes mellitus     Diabetes mellitus, type 2     Encounter for blood transfusion     Esophageal varices in cirrhosis     grade II on 7/12 EGD    Gastritis     on 7/12 EGD    GERD (gastroesophageal reflux disease) 2/28/2015    Hard of hearing     Hiatal hernia     History of hepatitis C 8/10/2012    tx with harvoni x 41 days (started 10/22/15). SVR4     Hoarseness 2/28/2015    Hypercholesteremia     Hypersplenism     Hypertension     No meds    Pain management 12/10/2014    Petechial hemorrhage 11/25/2015    Lower extremities bilat     Portal hypertensive gastropathy     on 7/12 EGD    Thrombocytopenia     Type II or unspecified type diabetes mellitus with neurological manifestations, uncontrolled(250.62) 12/24/2013    Valvular heart disease     mild MR 12       Past Surgical History:   Procedure Laterality Date    ABLATION, RADIOFREQUENCY-left suprascapula Left 8/25/2017    [facility] OR    CATARACT EXTRACTION  1/10/13    left eye    CATARACT EXTRACTION      right eye    CHOLECYSTECTOMY      COLONOSCOPY      diagnostic block of the genicular branches to the left knee Left 12/15/2017    [facility] by [facility] at [facility] OR    EGD (ESOPHAGOGASTRODUODENOSCOPY) N/A 3/13/2014    Performed by Kiara Leach MD at [facility] ENDO (4TH FLR)    EGD (ESOPHAGOGASTRODUODENOSCOPY) N/A 7/11/2013    Performed by Campos Walters MD at [facility] ENDO (4TH FLR)    ESOPHAGOGASTRODUODENOSCOPY (EGD) N/A 8/1/2014    Performed by Juan David Booker MD at [facility] ENDO (4TH FLR)    ESOPHAGOGASTRODUODENOSCOPY (EGD) N/A 7/3/2014     "Performed by Juan David Booker MD at [facility] ENDO (2ND FLR)    EYE SURGERY      Cataract surgery to right eye    INSERTION, IOL PROSTHESIS Left 1/10/2013    Performed by Domi [facility] at [facility] OR 1ST FLR    KNEE ARTHROSCOPY W/ MENISCAL REPAIR      KNEE CARTILAGE SURGERY      left knee    KNEE SURGERY  12/2006    left    LARYNGOSCOPY Bilateral 12/5/2014    Performed by Anoop Bernstein MD at [facility] OR 2ND FLR    PHACOEMULSIFICATION, CATARACT Left 1/10/2013    Performed by [facility]i [facility] at [facility] OR 1ST FLR    PHACOEMULSIFICATION, CATARACT Right 9/27/2012    [facility]    SKIN LESION EXCISION      TONSILLECTOMY      UPPER GASTROINTESTINAL ENDOSCOPY         Review of patient's allergies indicates:   Allergen Reactions    Adhesive tape-silicones Other (See Comments)     pulls skin off    Doxycycline      Dizzy. "Just didn't feel right".       No current facility-administered medications on file prior to encounter.      Current Outpatient Medications on File Prior to Encounter   Medication Sig    ciclopirox (LOPROX) 0.77 % Crea Apply topically 2 (two) times daily.    clindamycin (CLEOCIN) 300 MG capsule Take 1 capsule (300 mg total) by mouth 4 (four) times daily. for 5 days    fluorouracil (EFUDEX) 5 % cream AAA BID x 4 weeks    LEVEMIR FLEXTOUCH U-100 INSULN 100 unit/mL (3 mL) InPn pen Inject 30 Units into the skin every evening.    metFORMIN (GLUCOPHAGE) 500 MG tablet Take 1 tablet (500 mg total) by mouth 2 (two) times daily with meals.    oxyCODONE-acetaminophen (PERCOCET)  mg per tablet Take 1 tablet by mouth every 6 (six) hours as needed for Pain.    pirfenidone (ESBRIET) 267 mg Cap Take 1 capsule (267 mg total) by mouth 3 (three) times daily for 7 days, THEN 2 capsules (534 mg total) 3 (three) times daily for 14 days.    pirfenidone (ESBRIET) 801 mg Tab Take 1 tablet (801 mg) by mouth 3 (three) times daily.     Family History     Problem " Relation (Age of Onset)    Alcohol abuse Father    Cancer Mother    Cirrhosis Father    Leukemia Mother    No Known Problems Daughter, Son, Daughter, Daughter, Daughter, Sister        Tobacco Use    Smoking status: Former Smoker     Packs/day: 1.00     Years: 25.00     Pack years: 25.00     Last attempt to quit: 2000     Years since quittin.8    Smokeless tobacco: Never Used   Substance and Sexual Activity    Alcohol use: Yes     Comment: rarely    Drug use: No    Sexual activity: Never     Review of Systems   Constitutional: Negative for chills, fatigue and fever.   Respiratory: Negative for cough and shortness of breath.    Cardiovascular: Negative for chest pain and palpitations.   Gastrointestinal: Negative for abdominal distention, abdominal pain, nausea and vomiting.   Genitourinary: Negative for difficulty urinating and hematuria.   Hematological: Bruises/bleeds easily.   All other systems reviewed and are negative.    Objective:     Vital Signs (Most Recent):  Temp: 98.6 °F (37 °C) (06/15/19 2110)  Pulse: 69 (06/15/19 2110)  Resp: 18 (06/15/19 2110)  BP: (!) 157/74 (06/15/19 2110)  SpO2: 98 % (06/15/19 2110) Vital Signs (24h Range):  Temp:  [97.8 °F (36.6 °C)-98.6 °F (37 °C)] 98.6 °F (37 °C)  Pulse:  [65-92] 69  Resp:  [18-20] 18  SpO2:  [95 %-98 %] 98 %  BP: (131-157)/(66-74) 157/74     Weight: 91.3 kg (201 lb 4.5 oz)  Body mass index is 29.72 kg/m².    Physical Exam   Constitutional: He appears well-nourished. No distress.   HENT:   Head: Atraumatic.   Mouth/Throat: Oropharynx is clear and moist. No oropharyngeal exudate.   Eyes: Conjunctivae are normal. Right eye exhibits no discharge. Left eye exhibits no discharge. No scleral icterus.   Neck: Normal range of motion. Neck supple. No JVD present. No thyromegaly present.   Cardiovascular: Normal rate and regular rhythm.   Pulmonary/Chest: Effort normal and breath sounds normal.   Abdominal: Soft. He exhibits no distension and no mass. There  is no tenderness.   Musculoskeletal:   Tense, pitting edema in both legs.  Left much more so than right.  Weeping of fluid from left leg.  Partially collapsed blister with serosanguinous drainage located lateral aspect of left lower leg.   Lymphadenopathy:     He has no cervical adenopathy.   Neurological: He is alert.   Skin: Skin is warm. He is not diaphoretic. There is erythema (left lower leg.  there is bright erythema on a background of venous stasis dermatitis).           Significant Labs:   BMP:   Recent Labs   Lab 06/15/19  1049   *   *   K 4.5   CL 99   CO2 25   BUN 13   CREATININE 1.1   CALCIUM 8.9     CBC:   Recent Labs   Lab 06/15/19  1049   WBC 3.90   HGB 11.8*   HCT 35.0*   PLT 39*       Significant Imaging: I have reviewed all pertinent imaging results/findings within the past 24 hours.

## 2019-06-16 NOTE — PROGRESS NOTES
"Pharmacokinetic Assessment Follow Up: IV Vancomycin    Vancomycin serum concentration assessment(s):    The trough level was drawn correctly and may be used to guide therapy at this time. The measurement was 13.4 mcg/mL and within the desired definitive target range of 10-15 mcg/mL for treatment of suspected skin and soft tissue.     Vancomycin Regimen Plan:    Continue regimen to Vancomycin 1000 mg IV every 12 hour with next serum trough concentration measured at approximately 2330 prior to 4th dose on 6/17.     Pharmacy will continue to follow and monitor vancomycin.    Please contact pharmacy at extension [phone] for questions regarding this assessment.    Thank you for the consult,     Selina Moody     Patient brief summary:  Steve June Jr. is a 74 y.o. male initiated on antimicrobial therapy with IV Vancomycin for treatment of suspected skin & soft tissue.    The patient received an initial dose of 1250 mg in ED, followed by the current treatment regimen: vancomycin 1000 mg IV every 12 hours.    Drug Allergies:   Review of patient's allergies indicates:   Allergen Reactions    Adhesive tape-silicones Other (See Comments)     pulls skin off    Doxycycline      Dizzy. "Just didn't feel right".       Actual Body Weight:   91.3 kg    Renal Function:   Estimated Creatinine Clearance: 74.5 mL/min (based on SCr of 1 mg/dL).,     Dialysis Method (if applicable):  N/A     CBC (last 72 hours):  Recent Labs   Lab Result Units 06/15/19  1049 06/16/19  0952   WBC K/uL 3.90 3.30*   Hemoglobin g/dL 11.8* 11.4*   Hematocrit % 35.0* 33.7*   Platelets K/uL 39* 41*   Gran% % 73.9* 73.0   Lymph% % 12.0* 11.1*   Mono% % 6.0 6.8   Eosinophil% % 7.5 8.8*   Basophil% % 0.6 0.3   Differential Method  Automated Automated       Metabolic Panel (last 72 hours):  Recent Labs   Lab Result Units 06/15/19  1049 06/16/19  0952   Sodium mmol/L 133* 134*   Potassium mmol/L 4.5 4.2   Chloride mmol/L 99 99   CO2 mmol/L 25 27   Glucose mg/dL " 381* 334*   BUN, Bld mg/dL 13 12   Creatinine mg/dL 1.1 1.0   Albumin g/dL 3.5 3.4*   Total Bilirubin mg/dL 1.1* 0.7   Alkaline Phosphatase U/L 86 82   AST U/L 21 18   ALT U/L 17 15       Vancomycin Administrations:  vancomycin given in the last 96 hours                     vancomycin in dextrose 5 % 1 gram/250 mL IVPB 1,000 mg (mg) 1,000 mg New Bag 06/16/19 0000    vancomycin 1.25 g in dextrose 5% 250 mL IVPB (ready to mix) (mg) 1,250 mg New Bag 06/15/19 1104                      Drug levels (last 3 results):  Recent Labs   Lab Result Units 06/16/19  1043   Vancomycin-Trough ug/mL 13.6       Microbiologic Results:  Microbiology Results (last 7 days)       Procedure Component Value Units Date/Time    Gram Stain [[id number]] Collected:  06/15/19 2300    Order Status:  Sent Specimen:  Body Fluid from Leg, Left Updated:  06/16/19 1050    Aerobic Culture [[id number]] Collected:  06/15/19 2300    Order Status:  Sent Specimen:  Body Fluid from Leg, Left Updated:  06/16/19 1049

## 2019-06-16 NOTE — H&P
[facility] Hospital Medicine  History & Physical    Patient Name: Steve June Jr.  MRN: [id number]  Admission Date: 6/15/2019  Attending Physician: Padilla Perry MD   Primary Care Provider: Crispin Garcia MD         Patient information was obtained from patient, past medical records and ER records.     Subjective:     Principal Problem:Cellulitis of left lower extremity    Chief Complaint:   Chief Complaint   Patient presents with    Cellulitis     lt lower extremity        HPI: Mr. June presented to ED today with increasing erythema and pain of left lower leg.  Associated with chronic pitting edema.  There's edema in right leg, too, but left has greater amount.  He was recently on our hospital service for cellulitis.  He was treated with IVAB, and the redness and pain decreased.  Discharged five days ago with prescriptions for Bactrim and cephalexin.  Yesterday, he noticed the symptoms were increasing again.  His primary care MD wants to consult with dermatology to get another opinion.  Patient has appt with one in early July.  Pt has liver cirrhosis and idiopathic pulmonary fibrosis.    Past Medical History:   Diagnosis Date    Abnormal thyroid function test     Allergy     Seasonal    Anemia     Anemia due to blood loss 7/2/2014    Arthritis     Gaviria esophagus     Basal cell carcinoma     right forearm    Basal cell carcinoma 12/2011    lower post neck    Cancer     skin CA    Cataract     Cirrhosis     Diabetes mellitus     Diabetes mellitus, type 2     Encounter for blood transfusion     Esophageal varices in cirrhosis     grade II on 7/12 EGD    Gastritis     on 7/12 EGD    GERD (gastroesophageal reflux disease) 2/28/2015    Hard of hearing     Hiatal hernia     History of hepatitis C 8/10/2012    tx with harvoni x 41 days (started 10/22/15). SVR4     Hoarseness 2/28/2015    Hypercholesteremia     Hypersplenism     Hypertension     No meds    Pain  management 12/10/2014    Petechial hemorrhage 11/25/2015    Lower extremities bilat     Portal hypertensive gastropathy     on 7/12 EGD    Thrombocytopenia     Type II or unspecified type diabetes mellitus with neurological manifestations, uncontrolled(250.62) 12/24/2013    Valvular heart disease     mild MR 12       Past Surgical History:   Procedure Laterality Date    ABLATION, RADIOFREQUENCY-left suprascapula Left 8/25/2017    Performed [facility] at [facility] OR    CATARACT EXTRACTION  1/10/13    left eye    CATARACT EXTRACTION      right eye    CHOLECYSTECTOMY      COLONOSCOPY      diagnostic block of the genicular branches to the left knee Left 12/15/2017    Performed by [facility] at [facility] OR    EGD (ESOPHAGOGASTRODUODENOSCOPY) N/A 3/13/2014    Performed by Kiara Leach MD at [facility] ENDO (4TH FLR)    EGD (ESOPHAGOGASTRODUODENOSCOPY) N/A 7/11/2013    Performed by Campos Walters MD at [facility] ENDO (4TH FLR)    ESOPHAGOGASTRODUODENOSCOPY (EGD) N/A 8/1/2014    Performed by Juan David Booker MD at [facility] ENDO (4TH FLR)    ESOPHAGOGASTRODUODENOSCOPY (EGD) N/A 7/3/2014    Performed by Juan David Booker MD at [facility] ENDO (2ND FLR)    EYE SURGERY      Cataract surgery to right eye    INSERTION, IOL PROSTHESIS Left 1/10/2013    Performed by Domi Baker MD at [facility] OR 1ST FLR    KNEE ARTHROSCOPY W/ MENISCAL REPAIR      KNEE CARTILAGE SURGERY      left knee    KNEE SURGERY  12/2006    left    LARYNGOSCOPY Bilateral 12/5/2014    Performed by Anoop Bernstein MD at [facility] OR 2ND FLR    PHACOEMULSIFICATION, CATARACT Left 1/10/2013    Performed by Domi Baker MD at [facility] OR 1ST FLR    PHACOEMULSIFICATION, CATARACT Right 9/27/2012    Performed by Zelda Delgado MD at [facility] OR    SKIN LESION EXCISION      TONSILLECTOMY      UPPER GASTROINTESTINAL ENDOSCOPY         Review of patient's allergies indicates:   Allergen Reactions    Adhesive tape-silicones  "Other (See Comments)     pulls skin off    Doxycycline      Dizzy. "Just didn't feel right".       No current facility-administered medications on file prior to encounter.      Current Outpatient Medications on File Prior to Encounter   Medication Sig    ciclopirox (LOPROX) 0.77 % Crea Apply topically 2 (two) times daily.    clindamycin (CLEOCIN) 300 MG capsule Take 1 capsule (300 mg total) by mouth 4 (four) times daily. for 5 days    fluorouracil (EFUDEX) 5 % cream AAA BID x 4 weeks    LEVEMIR FLEXTOUCH U-100 INSULN 100 unit/mL (3 mL) InPn pen Inject 30 Units into the skin every evening.    metFORMIN (GLUCOPHAGE) 500 MG tablet Take 1 tablet (500 mg total) by mouth 2 (two) times daily with meals.    oxyCODONE-acetaminophen (PERCOCET)  mg per tablet Take 1 tablet by mouth every 6 (six) hours as needed for Pain.    pirfenidone (ESBRIET) 267 mg Cap Take 1 capsule (267 mg total) by mouth 3 (three) times daily for 7 days, THEN 2 capsules (534 mg total) 3 (three) times daily for 14 days.    pirfenidone (ESBRIET) 801 mg Tab Take 1 tablet (801 mg) by mouth 3 (three) times daily.     Family History     Problem Relation (Age of Onset)    Alcohol abuse Father    Cancer Mother    Cirrhosis Father    Leukemia Mother    No Known Problems Daughter, Son, Daughter, Daughter, Daughter, Sister        Tobacco Use    Smoking status: Former Smoker     Packs/day: 1.00     Years: 25.00     Pack years: 25.00     Last attempt to quit: 2000     Years since quittin.8    Smokeless tobacco: Never Used   Substance and Sexual Activity    Alcohol use: Yes     Comment: rarely    Drug use: No    Sexual activity: Never     Review of Systems   Constitutional: Negative for chills, fatigue and fever.   Respiratory: Negative for cough and shortness of breath.    Cardiovascular: Negative for chest pain and palpitations.   Gastrointestinal: Negative for abdominal distention, abdominal pain, nausea and vomiting.   Genitourinary: " Negative for difficulty urinating and hematuria.   Hematological: Bruises/bleeds easily.   All other systems reviewed and are negative.    Objective:     Vital Signs (Most Recent):  Temp: 98.6 °F (37 °C) (06/15/19 2110)  Pulse: 69 (06/15/19 2110)  Resp: 18 (06/15/19 2110)  BP: (!) 157/74 (06/15/19 2110)  SpO2: 98 % (06/15/19 2110) Vital Signs (24h Range):  Temp:  [97.8 °F (36.6 °C)-98.6 °F (37 °C)] 98.6 °F (37 °C)  Pulse:  [65-92] 69  Resp:  [18-20] 18  SpO2:  [95 %-98 %] 98 %  BP: (131-157)/(66-74) 157/74     Weight: 91.3 kg (201 lb 4.5 oz)  Body mass index is 29.72 kg/m².    Physical Exam   Constitutional: He appears well-nourished. No distress.   HENT:   Head: Atraumatic.   Mouth/Throat: Oropharynx is clear and moist. No oropharyngeal exudate.   Eyes: Conjunctivae are normal. Right eye exhibits no discharge. Left eye exhibits no discharge. No scleral icterus.   Neck: Normal range of motion. Neck supple. No JVD present. No thyromegaly present.   Cardiovascular: Normal rate and regular rhythm.   Pulmonary/Chest: Effort normal and breath sounds normal.   Abdominal: Soft. He exhibits no distension and no mass. There is no tenderness.   Musculoskeletal:   Tense, pitting edema in both legs.  Left much more so than right.  Weeping of fluid from left leg.  Partially collapsed blister with serosanguinous drainage located lateral aspect of left lower leg.   Lymphadenopathy:     He has no cervical adenopathy.   Neurological: He is alert.   Skin: Skin is warm. He is not diaphoretic. There is erythema (left lower leg.  there is bright erythema on a background of venous stasis dermatitis).           Significant Labs:   BMP:   Recent Labs   Lab 06/15/19  1049   *   *   K 4.5   CL 99   CO2 25   BUN 13   CREATININE 1.1   CALCIUM 8.9     CBC:   Recent Labs   Lab 06/15/19  1049   WBC 3.90   HGB 11.8*   HCT 35.0*   PLT 39*       Significant Imaging: I have reviewed all pertinent imaging results/findings within the past  24 hours.    Assessment/Plan:     * Cellulitis of left lower extremity  Treat as such.  Antibiotics (From admission, onward)    Start     Stop Route Frequency Ordered    06/15/19 2300  vancomycin in dextrose 5 % 1 gram/250 mL IVPB 1,000 mg      -- IV Every 12 hours (non-standard times) 06/15/19 1315    06/15/19 1745  ceFAZolin (ANCEF) 1 gram in dextrose 5 % 50 mL IVPB (premix)      -- IV Every 6 hours (non-standard times) 06/15/19 1638        I took a culture of the fluid from the partially ruptured blister on the left lower leg.  Will see what grows.      Venous insufficiency of both lower extremities  Chronic.  Elevate legs.      Blister of left lower leg  Local wound care orders written.      Idiopathic pulmonary fibrosis  Continue Pirfenidone, the antifibrotic drug he takes.        DM type 2 with diabetic peripheral neuropathy  Monitor CBG.  Resume his detemir 30 units nightly.  Use correction insulin per scale.      Chronic diastolic congestive heart failure  CHF currently controlled. Latest ECHO shows ejection to be preserved. Monitor clinical status closely. Monitor on telemetry. Continue to stress to patient importance of self efficacy and  on diet for CHF.     Hypertension associated with diabetes  Not on any BP meds.  Will monitor BP and use BP-lowering agents when appropriate.      Cirrhosis  Stable at present time.  Monitor liver function labs periodically.      Thrombocytopenia  Stable.  Will monitor periodically.  Avoid heparin products.        VTE Risk Mitigation (From admission, onward)        Ordered     IP VTE LOW RISK PATIENT  Once      06/15/19 7321             Padilla Perry MD  Department of Hospital Medicine   [facility]

## 2019-06-16 NOTE — NURSING
20 gauge IV catheter inserted to left forearm. Nurse notified pharmacy to reschedule ancef and vancomycin.

## 2019-06-16 NOTE — PROGRESS NOTES
Per Nurse Jayla, patient just gained IV access. Next dose will be administered today at 1315. Therefore, Vancomycin trough is scheduled for 6/18 at 0045.

## 2019-06-16 NOTE — ASSESSMENT & PLAN NOTE
Treat as such.  Antibiotics (From admission, onward)    Start     Stop Route Frequency Ordered    06/15/19 2300  vancomycin in dextrose 5 % 1 gram/250 mL IVPB 1,000 mg      -- IV Every 12 hours (non-standard times) 06/15/19 1315    06/15/19 1745  ceFAZolin (ANCEF) 1 gram in dextrose 5 % 50 mL IVPB (premix)      -- IV Every 6 hours (non-standard times) 06/15/19 1638        I took a culture of the fluid from the partially ruptured blister on the left lower leg.  Will see what grows.

## 2019-06-16 NOTE — NURSING
Pt notified nurse that IV site was bleeding. Nurse found IV catheter out with a small amount of bleeding to site. Three unsuccessful attempts to start new IV. IV medication of Vancomycin and Ancef rescheduled with pharmacy.  vancomycin trough drawn, results therapeutic.

## 2019-06-17 ENCOUNTER — DOCUMENTATION ONLY (OUTPATIENT)
Dept: FAMILY MEDICINE | Facility: CLINIC | Age: 74
End: 2019-06-17

## 2019-06-17 LAB
BASOPHILS # BLD AUTO: 0 K/UL (ref 0–0.2)
BASOPHILS NFR BLD: 0.4 % (ref 0–1.9)
CREAT SERPL-MCNC: 0.9 MG/DL (ref 0.5–1.4)
DIFFERENTIAL METHOD: ABNORMAL
EOSINOPHIL # BLD AUTO: 0.2 K/UL (ref 0–0.5)
EOSINOPHIL NFR BLD: 9.6 % (ref 0–8)
ERYTHROCYTE [DISTWIDTH] IN BLOOD BY AUTOMATED COUNT: 14.5 % (ref 11.5–14.5)
EST. GFR  (AFRICAN AMERICAN): >60 ML/MIN/1.73 M^2
EST. GFR  (NON AFRICAN AMERICAN): >60 ML/MIN/1.73 M^2
HCT VFR BLD AUTO: 31.2 % (ref 40–54)
HGB BLD-MCNC: 10.7 G/DL (ref 14–18)
LYMPHOCYTES # BLD AUTO: 0.4 K/UL (ref 1–4.8)
LYMPHOCYTES NFR BLD: 15.1 % (ref 18–48)
MCH RBC QN AUTO: 28.3 PG (ref 27–31)
MCHC RBC AUTO-ENTMCNC: 34.3 G/DL (ref 32–36)
MCV RBC AUTO: 82 FL (ref 82–98)
MONOCYTES # BLD AUTO: 0.2 K/UL (ref 0.3–1)
MONOCYTES NFR BLD: 7.3 % (ref 4–15)
NEUTROPHILS # BLD AUTO: 1.7 K/UL (ref 1.8–7.7)
NEUTROPHILS NFR BLD: 67.6 % (ref 38–73)
PLATELET # BLD AUTO: 35 K/UL (ref 150–350)
PMV BLD AUTO: 8.4 FL (ref 9.2–12.9)
POCT GLUCOSE: 110 MG/DL (ref 70–110)
POCT GLUCOSE: 204 MG/DL (ref 70–110)
POCT GLUCOSE: 210 MG/DL (ref 70–110)
POCT GLUCOSE: 213 MG/DL (ref 70–110)
POCT GLUCOSE: 246 MG/DL (ref 70–110)
RBC # BLD AUTO: 3.79 M/UL (ref 4.6–6.2)
WBC # BLD AUTO: 2.5 K/UL (ref 3.9–12.7)

## 2019-06-17 PROCEDURE — 63600175 PHARM REV CODE 636 W HCPCS: Mod: HCNC | Performed by: INTERNAL MEDICINE

## 2019-06-17 PROCEDURE — 82565 ASSAY OF CREATININE: CPT | Mod: HCNC

## 2019-06-17 PROCEDURE — 25000003 PHARM REV CODE 250: Mod: HCNC | Performed by: NURSE PRACTITIONER

## 2019-06-17 PROCEDURE — 63600175 PHARM REV CODE 636 W HCPCS: Mod: HCNC | Performed by: HOSPITALIST

## 2019-06-17 PROCEDURE — S5571 INSULIN DISPOS PEN 3 ML: HCPCS | Mod: HCNC | Performed by: INTERNAL MEDICINE

## 2019-06-17 PROCEDURE — 99900035 HC TECH TIME PER 15 MIN (STAT): Mod: HCNC

## 2019-06-17 PROCEDURE — 25000003 PHARM REV CODE 250: Mod: HCNC | Performed by: HOSPITALIST

## 2019-06-17 PROCEDURE — 25000003 PHARM REV CODE 250: Mod: HCNC | Performed by: INTERNAL MEDICINE

## 2019-06-17 PROCEDURE — 12000002 HC ACUTE/MED SURGE SEMI-PRIVATE ROOM: Mod: HCNC

## 2019-06-17 PROCEDURE — 85025 COMPLETE CBC W/AUTO DIFF WBC: CPT | Mod: HCNC

## 2019-06-17 PROCEDURE — 36415 COLL VENOUS BLD VENIPUNCTURE: CPT | Mod: HCNC

## 2019-06-17 RX ORDER — ACETAMINOPHEN 325 MG/1
325 TABLET ORAL ONCE
Status: COMPLETED | OUTPATIENT
Start: 2019-06-18 | End: 2019-06-18

## 2019-06-17 RX ORDER — INSULIN ASPART 100 [IU]/ML
8 INJECTION, SOLUTION INTRAVENOUS; SUBCUTANEOUS
Status: DISCONTINUED | OUTPATIENT
Start: 2019-06-17 | End: 2019-06-19 | Stop reason: HOSPADM

## 2019-06-17 RX ADMIN — CEFAZOLIN SODIUM 1 G: 1 SOLUTION INTRAVENOUS at 03:06

## 2019-06-17 RX ADMIN — INSULIN ASPART 8 UNITS: 100 INJECTION, SOLUTION INTRAVENOUS; SUBCUTANEOUS at 12:06

## 2019-06-17 RX ADMIN — CEFAZOLIN SODIUM 1 G: 1 SOLUTION INTRAVENOUS at 04:06

## 2019-06-17 RX ADMIN — OXYCODONE AND ACETAMINOPHEN 1 TABLET: 10; 325 TABLET ORAL at 02:06

## 2019-06-17 RX ADMIN — OXYCODONE AND ACETAMINOPHEN 1 TABLET: 10; 325 TABLET ORAL at 12:06

## 2019-06-17 RX ADMIN — INSULIN ASPART 4 UNITS: 100 INJECTION, SOLUTION INTRAVENOUS; SUBCUTANEOUS at 12:06

## 2019-06-17 RX ADMIN — INSULIN ASPART 4 UNITS: 100 INJECTION, SOLUTION INTRAVENOUS; SUBCUTANEOUS at 05:06

## 2019-06-17 RX ADMIN — VANCOMYCIN HCL-SODIUM CHLORIDE IV SOLN 1 GM/250ML-0.9% 1 G: 1-0.9/25 SOLUTION at 12:06

## 2019-06-17 RX ADMIN — DIPHENHYDRAMINE HYDROCHLORIDE 25 MG: 25 CAPSULE ORAL at 10:06

## 2019-06-17 RX ADMIN — INSULIN DETEMIR 30 UNITS: 100 INJECTION, SOLUTION SUBCUTANEOUS at 10:06

## 2019-06-17 RX ADMIN — CEFAZOLIN SODIUM 1 G: 1 SOLUTION INTRAVENOUS at 08:06

## 2019-06-17 RX ADMIN — INSULIN ASPART 8 UNITS: 100 INJECTION, SOLUTION INTRAVENOUS; SUBCUTANEOUS at 04:06

## 2019-06-17 RX ADMIN — OXYCODONE AND ACETAMINOPHEN 1 TABLET: 10; 325 TABLET ORAL at 08:06

## 2019-06-17 RX ADMIN — INSULIN ASPART 4 UNITS: 100 INJECTION, SOLUTION INTRAVENOUS; SUBCUTANEOUS at 04:06

## 2019-06-17 RX ADMIN — DIPHENHYDRAMINE HYDROCHLORIDE 25 MG: 25 CAPSULE ORAL at 12:06

## 2019-06-17 RX ADMIN — INSULIN ASPART 8 UNITS: 100 INJECTION, SOLUTION INTRAVENOUS; SUBCUTANEOUS at 08:06

## 2019-06-17 NOTE — ASSESSMENT & PLAN NOTE
Patient's FSGs are not controlled on current hypoglycemics.   Last A1c reviewed-   Lab Results   Component Value Date    HGBA1C 7.9 (H) 04/03/2019     Most recent fingerstick glucose reviewed-   Recent Labs   Lab 06/16/19  1114 06/16/19  1625 06/16/19 2036 06/16/19 2038   POCTGLUCOSE 315* 374* 422* 404*     Current correctional scale  Medium  Increase anti-hyperglycemic dose as follows-  INCREASE SCHEDULED ASPART TO 8 UNITS BEFORE EACH MEAL   Antihyperglycemics (From admission, onward)    Start     Stop Route Frequency Ordered    06/17/19 0715  insulin aspart U-100 pen 7 Units      -- SubQ 3 times daily with meals 06/16/19 2100 06/16/19 2200  insulin aspart U-100 pen 1-10 Units      -- SubQ Before meals & nightly PRN 06/16/19 2100    06/15/19 2100  insulin detemir U-100 pen 30 Units      -- SubQ Nightly 06/15/19 1149

## 2019-06-17 NOTE — PROGRESS NOTES
Progress Note  Hospital Medicine  Patient Name:Steve June Jr.  MRN:  [id number]  Patient Class: IP- Inpatient  Admit Date: 6/15/2019  Length of Stay: 2 days  Expected Discharge Date:   Attending Physician: Bart Shukla MD  Primary Care Provider:  Crispin Garcia MD    SUBJECTIVE:     Principal Problem: Cellulitis of left lower extremity  Initial history of present illness: Mr. June presented to ED today with increasing erythema and pain of left lower leg.  Associated with chronic pitting edema.  There's edema in right leg, too, but left has greater amount.  He was recently on our hospital service for cellulitis.  He was treated with IVAB, and the redness and pain decreased.  Discharged five days ago with prescriptions for Bactrim and cephalexin.  Yesterday, he noticed the symptoms were increasing again.  His primary care MD wants to consult with dermatology to get another opinion.  Patient has appt with one in early July.  Pt has liver cirrhosis and idiopathic pulmonary fibrosis.    PMH/PSH/SH/FH/Meds: reviewed.    Symptoms/Review of Systems:  Patient is with significant left lower extremity cellulitis with hemorrhagic conversion.  Left leg is still very swollen, tender and hot to touch.  No shortness of breath, cough, chest pain or headache, fever or abdominal pain. Left shin skin break covered with dressing noted.    Diet:  Adequate intake.    Activity level:  Up with assist  Pain:  Patient reports no pain.       OBJECTIVE:   Vital Signs (Most Recent):      Temp: 97.5 °F (36.4 °C) (06/17/19 0811)  Pulse: 66 (06/17/19 0811)  Resp: 18 (06/17/19 0811)  BP: (!) 173/81 (06/17/19 0811)  SpO2: 95 % (06/17/19 0811)       Vital Signs Range (Last 24H):  Temp:  [97 °F (36.1 °C)-98.7 °F (37.1 °C)]   Pulse:  [64-72]   Resp:  [16-20]   BP: (134-173)/(63-89)   SpO2:  [91 %-96 %]     Weight: 91.3 kg (201 lb 4.5 oz)  Body mass index is 29.72 kg/m².    Intake/Output Summary (Last 24 hours) at 6/17/2019 1012  Last data filed  at 6/17/2019 0329  Gross per 24 hour   Intake 3000 ml   Output 425 ml   Net 2575 ml     Physical Examination:  Constitutional: He appears well-nourished. No distress.   HENT:   Head: Atraumatic.   Mouth/Throat: Oropharynx is clear and moist. No oropharyngeal exudate.   Eyes: Conjunctivae are normal. Right eye exhibits no discharge. Left eye exhibits no discharge. No scleral icterus.   Neck: Normal range of motion. Neck supple. No JVD present. No thyromegaly present.   Cardiovascular: Normal rate and regular rhythm.   Pulmonary/Chest: Effort normal and breath sounds normal.   Abdominal: Soft. He exhibits no distension and no mass. There is no tenderness.   Musculoskeletal:   Tense, pitting edema in both legs.  Left much more so than right.  Weeping of fluid from left leg.  Partially collapsed blister with serosanguinous drainage located lateral aspect of left lower leg.   Lymphadenopathy:     He has no cervical adenopathy.   Neurological: He is alert.   Skin: Skin is warm. He is not diaphoretic. There is erythema (left lower leg.  there is bright erythema on a background of venous stasis dermatitis).     CBC:  Recent Labs   Lab 06/15/19  1049 06/16/19  0952 06/17/19  0444   WBC 3.90 3.30* 2.50*   RBC 4.19* 4.06* 3.79*   HGB 11.8* 11.4* 10.7*   HCT 35.0* 33.7* 31.2*   PLT 39* 41* 35*   MCV 84 83 82   MCH 28.2 28.0 28.3   MCHC 33.8 33.8 34.3   BMP  Recent Labs   Lab 06/15/19  1049 06/16/19  0952 06/16/19  2109   * 334* 424*   * 134*  --    K 4.5 4.2  --    CL 99 99  --    CO2 25 27  --    BUN 13 12  --    CREATININE 1.1 1.0  --    CALCIUM 8.9 8.9  --       Diagnostic Results:  Microbiology Results (last 7 days)     Procedure Component Value Units Date/Time    Aerobic Culture [[id number]] Collected:  06/15/19 2300    Order Status:  Completed Specimen:  Body Fluid from Leg, Left Updated:  06/17/19 0730     Aerobic Bacterial Culture No growth    Narrative:       Blister on right lower leg    Gram Stain  [[id number]] Collected:  06/15/19 2300    Order Status:  Completed Specimen:  Body Fluid from Leg, Left Updated:  06/16/19 1332     Gram Stain Result No WBC's      No organisms seen    Narrative:       Blister on right lower leg         CT scan of the leg with contrast (06-07-19):  Moderate subcutaneous fat stranding and mild skin thickening predominantly of the distal left leg, compatible with cellulitis in the appropriate clinical setting.  No evidence for abscess, necrotizing soft tissue infection, or osteomyelitis.    Assessment/Plan:   * Cellulitis of left lower extremity  Treat as such.  There is hemorrhagic cellulitis likely contributed by significant thrombocytopenia.             Antibiotics (From admission, onward)     Start     Stop Route Frequency Ordered     06/15/19 2300   vancomycin in dextrose 5 % 1 gram/250 mL IVPB 1,000 mg      -- IV Every 12 hours (non-standard times) 06/15/19 1315     06/15/19 1745   ceFAZolin (ANCEF) 1 gram in dextrose 5 % 50 mL IVPB (premix)      -- IV Every 6 hours (non-standard times) 06/15/19 1638       Fall culture of the fluid from the partially ruptured blister on the left lower leg.  Will see what grows.  Patient counseled regarding elevation of left lower extremity.  Obtain left leg venous Doppler scan to rule out deep venous thrombosis.    Venous insufficiency of both lower extremities  Chronic.  Elevate legs.     Blister of left lower leg  Local wound care as directed.     Idiopathic pulmonary fibrosis  Continue Pirfenidone, the antifibrotic drug he takes.     DM type 2 with diabetic peripheral neuropathy  Monitor CBG.  Increased detemir 36 units nightly.  Use correction insulin per scale.     Chronic diastolic congestive heart failure  CHF currently controlled. Latest ECHO shows ejection to be preserved. Monitor clinical status closely. Monitor on telemetry. Continue to stress to patient importance of self efficacy and  on diet for CHF.      Hypertension  associated with diabetes  Not on any BP meds.  Will monitor BP and use BP-lowering agents when appropriate.    Cirrhosis  Stable at present time.  Monitor liver function labs periodically.     Thrombocytopenia  Stable.  Will monitor periodically.  Avoid heparin products.    DVT prophylaxis: Use SCD and TEDs. No anticoagulation due to low platelet counts.     Discussed with patient's family members.  Answered all questions.    Bart Shukla MD  Department of Hospital Medicine   [facility]

## 2019-06-17 NOTE — PROGRESS NOTES
Pre-Visit Chart Review  For Appointment Scheduled on 6/17/19    Health Maintenance Due   Topic Date Due    TETANUS VACCINE  05/13/1963    Colonoscopy  05/13/1997

## 2019-06-17 NOTE — NURSING
Situation-   Who are you? Who is the patient? What is going on with the patient currently?         Platelets decreased from 41 to 35 and are now critical.   Background- What is the patient's significant medical history (CAD, ESRD, HIV positive, history of recent surgery/chemo.transfusion) and recent labs Has a past medical history of   Past Medical History:   Diagnosis Date    Abnormal thyroid function test     Allergy     Seasonal    Anemia     Anemia due to blood loss 7/2/2014    Arthritis     Gaviria esophagus     Basal cell carcinoma     right forearm    Basal cell carcinoma 12/2011    lower post neck    Cancer     skin CA    Cataract     Cirrhosis     Diabetes mellitus     Diabetes mellitus, type 2     Encounter for blood transfusion     Esophageal varices in cirrhosis     grade II on 7/12 EGD    Gastritis     on 7/12 EGD    GERD (gastroesophageal reflux disease) 2/28/2015    Hard of hearing     Hiatal hernia     History of hepatitis C 8/10/2012    tx with harvoni x 41 days (started 10/22/15). SVR4     Hoarseness 2/28/2015    Hypercholesteremia     Hypersplenism     Hypertension     No meds    Pain management 12/10/2014    Petechial hemorrhage 11/25/2015    Lower extremities bilat     Portal hypertensive gastropathy     on 7/12 EGD    Thrombocytopenia     Type II or unspecified type diabetes mellitus with neurological manifestations, uncontrolled(250.62) 12/24/2013    Valvular heart disease     mild MR 12   . Most recent vitals include  Temp:  [97 °F (36.1 °C)-98.7 °F (37.1 °C)]   Pulse:  [62-72]   Resp:  [16-20]   BP: (134-169)/(63-89)   SpO2:  [91 %-96 %]  and labs          Assessment-   Has the following issues (abnormal labs, abnormal vitals, change in status, uncontrolled symptoms, patient request) Platelets decreased from 41 to 35 and are now critical.     NP notified, no new orders

## 2019-06-17 NOTE — NURSING
Plan of care reviewed with patient and patient verbalized understanding.  Safety maintained, call light within reach, bed locked and in low position, side rails up x2.  Patient remains free from injury.

## 2019-06-17 NOTE — NURSING
PM accucheck 404 and repeat 422.  Stat glucose 424.  Patient given 3 units of novolog sliding scale and 30 units of levemir.  Dr Gillette notified-additional 5 units novolog ordered and given.  Two hour follow up accucheck 355.  Dr Gillette notified-one liter LR ordered and infusing.

## 2019-06-17 NOTE — PLAN OF CARE
Problem: Adult Inpatient Plan of Care  Goal: Plan of Care Review  POC reviewed with pt who verbalized understanding, pt AAOX4, PIV20G left forearm, site clean dry and intact no redness or swelling noted, ABT as ordered, remains afebrile during shift, remains free of falls/injury during shift, pain controlled with pain meds, pain due to left lower cellulitis, bed in low and locked position with side rails X2, safety maintained, personal items and call light in reach at bedside, will cont to monitor for safety

## 2019-06-17 NOTE — PROGRESS NOTES
"                                                        Food & Nutrition                                                           Education    Diet Education: Diabetic Diet  Time Spent: 10 Minutes  Learners: Patient and family       Nutrition Education provided with handouts: Yes left for wife      Comments: Patient with elevated glucose and states that his sugar is always all over the place at home as well. He does not carb count and is, "not interested in learning." He states that he does check his glucose and his wife normally cooks. I left informational materials for his wife because he declined discussion. Tried to motivate him to want to make changes by discussing consequences of high blood sugar but pt does not want to hear negative talk. He seems to be in denial. Was able to discuss the importance of a regular meal schedule and a consistent amount of carbohydrate at meals.       All questions and concerns answered. Dietitian's contact information provided.       Follow-Up: Yes    Please Re-consult as needed        Thanks!    "

## 2019-06-17 NOTE — PLAN OF CARE
06/16/19 1914   Patient Assessment/Suction   Level of Consciousness (AVPU) alert   Respiratory Effort Unlabored   Expansion/Accessory Muscles/Retractions no use of accessory muscles   All Lung Fields Breath Sounds clear   PRE-TX-O2   O2 Device (Oxygen Therapy) room air   SpO2 (!) 94 %   Pulse Oximetry Type Intermittent   Pulse 72   Resp 18   Aerosol Therapy   $ Aerosol Therapy Charges PRN treatment not required

## 2019-06-17 NOTE — SUBJECTIVE & OBJECTIVE
Interval History:  No changes since yesterday.  No new complaints.    Review of Systems   Constitutional: Negative for fever.   Respiratory: Negative for cough and shortness of breath.    Cardiovascular: Negative for chest pain.   Gastrointestinal: Negative for abdominal pain.     Objective:     Vital Signs (Most Recent):  Temp: 97 °F (36.1 °C) (06/16/19 1927)  Pulse: 71 (06/16/19 1927)  Resp: 16 (06/16/19 1927)  BP: (!) 140/71 (06/16/19 1927)  SpO2: 96 % (06/16/19 1927) Vital Signs (24h Range):  Temp:  [97 °F (36.1 °C)-98.6 °F (37 °C)] 97 °F (36.1 °C)  Pulse:  [62-72] 71  Resp:  [14-18] 16  SpO2:  [94 %-96 %] 96 %  BP: (131-143)/(61-71) 140/71     Weight: 91.3 kg (201 lb 4.5 oz)  Body mass index is 29.72 kg/m².    Intake/Output Summary (Last 24 hours) at 6/16/2019 2221  Last data filed at 6/16/2019 1800  Gross per 24 hour   Intake 2430 ml   Output --   Net 2430 ml      Physical Exam   Constitutional: No distress.   HENT:   Mouth/Throat: Oropharynx is clear and moist.   Eyes: Right eye exhibits no discharge. Left eye exhibits no discharge.   Neck: No JVD present.   Cardiovascular: Normal rate and regular rhythm.   Pulmonary/Chest: Effort normal and breath sounds normal.   Abdominal: Soft. He exhibits no distension.   Musculoskeletal:   Tense, pitting edema in both legs.  Left much more so than right.  Weeping of fluid from left leg.  Partially collapsed blister with serosanguinous drainage located lateral aspect of left lower leg.   Neurological: He is alert.   Skin: Skin is warm. He is not diaphoretic. There is erythema (left lower leg.  there is bright erythema on a background of venous stasis dermatitis).       Significant Labs: All pertinent labs within the past 24 hours have been reviewed.    Significant Imaging: I have reviewed all pertinent imaging results/findings within the past 24 hours.

## 2019-06-17 NOTE — PROGRESS NOTES
[facility] Hospital Medicine  Progress Note    Patient Name: Steve June Jr.  MRN: [id number]  Patient Class: IP- Inpatient   Admission Date: 6/15/2019  Length of Stay: 1 days  Attending Physician: Padilla Perry MD  Primary Care Provider: Crispin Garcia MD        Subjective:     Principal Problem:Cellulitis of left lower extremity      HPI:  Mr. June presented to ED today with increasing erythema and pain of left lower leg.  Associated with chronic pitting edema.  There's edema in right leg, too, but left has greater amount.  He was recently on our hospital service for cellulitis.  He was treated with IVAB, and the redness and pain decreased.  Discharged five days ago with prescriptions for Bactrim and cephalexin.  Yesterday, he noticed the symptoms were increasing again.  His primary care MD wants to consult with dermatology to get another opinion.  Patient has appt with one in early July.  Pt has liver cirrhosis and idiopathic pulmonary fibrosis.    Overview/Hospital Course:  No notes on file    Interval History:  No changes since yesterday.  No new complaints.    Review of Systems   Constitutional: Negative for fever.   Respiratory: Negative for cough and shortness of breath.    Cardiovascular: Negative for chest pain.   Gastrointestinal: Negative for abdominal pain.     Objective:     Vital Signs (Most Recent):  Temp: 97 °F (36.1 °C) (06/16/19 1927)  Pulse: 71 (06/16/19 1927)  Resp: 16 (06/16/19 1927)  BP: (!) 140/71 (06/16/19 1927)  SpO2: 96 % (06/16/19 1927) Vital Signs (24h Range):  Temp:  [97 °F (36.1 °C)-98.6 °F (37 °C)] 97 °F (36.1 °C)  Pulse:  [62-72] 71  Resp:  [14-18] 16  SpO2:  [94 %-96 %] 96 %  BP: (131-143)/(61-71) 140/71     Weight: 91.3 kg (201 lb 4.5 oz)  Body mass index is 29.72 kg/m².    Intake/Output Summary (Last 24 hours) at 6/16/2019 2221  Last data filed at 6/16/2019 1800  Gross per 24 hour   Intake 2430 ml   Output --   Net 2430 ml      Physical Exam    Constitutional: No distress.   HENT:   Mouth/Throat: Oropharynx is clear and moist.   Eyes: Right eye exhibits no discharge. Left eye exhibits no discharge.   Neck: No JVD present.   Cardiovascular: Normal rate and regular rhythm.   Pulmonary/Chest: Effort normal and breath sounds normal.   Abdominal: Soft. He exhibits no distension.   Musculoskeletal:   Tense, pitting edema in both legs.  Left much more so than right.  Weeping of fluid from left leg.  Partially collapsed blister with serosanguinous drainage located lateral aspect of left lower leg.   Neurological: He is alert.   Skin: Skin is warm. He is not diaphoretic. There is erythema (left lower leg.  there is bright erythema on a background of venous stasis dermatitis).       Significant Labs: All pertinent labs within the past 24 hours have been reviewed.    Significant Imaging: I have reviewed all pertinent imaging results/findings within the past 24 hours.      Assessment/Plan:      * Cellulitis of left lower extremity  Treat as such.  Antibiotics (From admission, onward)    Start     Stop Route Frequency Ordered    06/15/19 2300  vancomycin in dextrose 5 % 1 gram/250 mL IVPB 1,000 mg      -- IV Every 12 hours (non-standard times) 06/15/19 1315    06/15/19 1745  ceFAZolin (ANCEF) 1 gram in dextrose 5 % 50 mL IVPB (premix)      -- IV Every 6 hours (non-standard times) 06/15/19 1638        I took a culture of the fluid from the partially ruptured blister on the left lower leg.  Will see what grows.      Venous insufficiency of both lower extremities  Chronic.  Elevate legs.      Blister of left lower leg  Local wound care orders written.      Idiopathic pulmonary fibrosis  Continue Pirfenidone, the antifibrotic drug he takes.        DM type 2 with diabetic peripheral neuropathy  Patient's FSGs are not controlled on current hypoglycemics.   Last A1c reviewed-   Lab Results   Component Value Date    HGBA1C 7.9 (H) 04/03/2019     Most recent fingerstick  glucose reviewed-   Recent Labs   Lab 06/16/19  1114 06/16/19  1625 06/16/19  2036 06/16/19 2038   POCTGLUCOSE 315* 374* 422* 404*     Current correctional scale  Medium  Increase anti-hyperglycemic dose as follows-  INCREASE SCHEDULED ASPART TO 8 UNITS BEFORE EACH MEAL   Antihyperglycemics (From admission, onward)    Start     Stop Route Frequency Ordered    06/17/19 0715  insulin aspart U-100 pen 7 Units      -- SubQ 3 times daily with meals 06/16/19 2100 06/16/19 2200  insulin aspart U-100 pen 1-10 Units      -- SubQ Before meals & nightly PRN 06/16/19 2100    06/15/19 2100  insulin detemir U-100 pen 30 Units      -- SubQ Nightly 06/15/19 1149              Chronic diastolic congestive heart failure  CHF currently controlled. Latest ECHO shows ejection to be preserved. Monitor clinical status closely. Monitor on telemetry. Continue to stress to patient importance of self efficacy and  on diet for CHF.     Hypertension associated with diabetes  Not on any BP meds.  Will monitor BP and use BP-lowering agents when appropriate.      Cirrhosis  Stable at present time.  Monitor liver function labs periodically.      Thrombocytopenia  Stable.  Will monitor periodically.  Avoid heparin products.        VTE Risk Mitigation (From admission, onward)        Ordered     IP VTE LOW RISK PATIENT  Once      06/15/19 1149                Padilla Perry MD  Department of Hospital Medicine   [facility]

## 2019-06-18 LAB
ANION GAP SERPL CALC-SCNC: 8 MMOL/L (ref 8–16)
BASOPHILS # BLD AUTO: 0 K/UL (ref 0–0.2)
BASOPHILS NFR BLD: 0.3 % (ref 0–1.9)
BUN SERPL-MCNC: 15 MG/DL (ref 8–23)
CALCIUM SERPL-MCNC: 9.2 MG/DL (ref 8.7–10.5)
CHLORIDE SERPL-SCNC: 102 MMOL/L (ref 95–110)
CO2 SERPL-SCNC: 28 MMOL/L (ref 23–29)
CREAT SERPL-MCNC: 1.1 MG/DL (ref 0.5–1.4)
DIFFERENTIAL METHOD: ABNORMAL
EOSINOPHIL # BLD AUTO: 0.2 K/UL (ref 0–0.5)
EOSINOPHIL NFR BLD: 8.1 % (ref 0–8)
ERYTHROCYTE [DISTWIDTH] IN BLOOD BY AUTOMATED COUNT: 14.9 % (ref 11.5–14.5)
EST. GFR  (AFRICAN AMERICAN): >60 ML/MIN/1.73 M^2
EST. GFR  (NON AFRICAN AMERICAN): >60 ML/MIN/1.73 M^2
FOLATE SERPL-MCNC: 8.8 NG/ML (ref 4–24)
GLUCOSE SERPL-MCNC: 230 MG/DL (ref 70–110)
HCT VFR BLD AUTO: 33 % (ref 40–54)
HGB BLD-MCNC: 11.1 G/DL (ref 14–18)
LYMPHOCYTES # BLD AUTO: 0.3 K/UL (ref 1–4.8)
LYMPHOCYTES NFR BLD: 13 % (ref 18–48)
MCH RBC QN AUTO: 27.9 PG (ref 27–31)
MCHC RBC AUTO-ENTMCNC: 33.6 G/DL (ref 32–36)
MCV RBC AUTO: 83 FL (ref 82–98)
MONOCYTES # BLD AUTO: 0.1 K/UL (ref 0.3–1)
MONOCYTES NFR BLD: 5.6 % (ref 4–15)
NEUTROPHILS # BLD AUTO: 1.9 K/UL (ref 1.8–7.7)
NEUTROPHILS NFR BLD: 73 % (ref 38–73)
PLATELET # BLD AUTO: 30 K/UL (ref 150–350)
PMV BLD AUTO: 8 FL (ref 9.2–12.9)
POCT GLUCOSE: 153 MG/DL (ref 70–110)
POCT GLUCOSE: 194 MG/DL (ref 70–110)
POTASSIUM SERPL-SCNC: 4.8 MMOL/L (ref 3.5–5.1)
RBC # BLD AUTO: 3.98 M/UL (ref 4.6–6.2)
SODIUM SERPL-SCNC: 138 MMOL/L (ref 136–145)
VANCOMYCIN TROUGH SERPL-MCNC: 17.8 UG/ML (ref 10–22)
WBC # BLD AUTO: 2.6 K/UL (ref 3.9–12.7)

## 2019-06-18 PROCEDURE — 25000003 PHARM REV CODE 250: Mod: HCNC | Performed by: HOSPITALIST

## 2019-06-18 PROCEDURE — 99222 PR INITIAL HOSPITAL CARE,LEVL II: ICD-10-PCS | Mod: ,,, | Performed by: INTERNAL MEDICINE

## 2019-06-18 PROCEDURE — 86022 PLATELET ANTIBODIES: CPT | Mod: HCNC

## 2019-06-18 PROCEDURE — 36415 COLL VENOUS BLD VENIPUNCTURE: CPT | Mod: HCNC

## 2019-06-18 PROCEDURE — 63600175 PHARM REV CODE 636 W HCPCS: Mod: HCNC | Performed by: HOSPITALIST

## 2019-06-18 PROCEDURE — 86023 IMMUNOGLOBULIN ASSAY: CPT | Mod: HCNC

## 2019-06-18 PROCEDURE — 84425 ASSAY OF VITAMIN B-1: CPT | Mod: HCNC

## 2019-06-18 PROCEDURE — 25000003 PHARM REV CODE 250: Mod: HCNC | Performed by: INTERNAL MEDICINE

## 2019-06-18 PROCEDURE — 99223 1ST HOSP IP/OBS HIGH 75: CPT | Mod: HCNC,,, | Performed by: INTERNAL MEDICINE

## 2019-06-18 PROCEDURE — 82746 ASSAY OF FOLIC ACID SERUM: CPT | Mod: HCNC

## 2019-06-18 PROCEDURE — 85025 COMPLETE CBC W/AUTO DIFF WBC: CPT | Mod: HCNC

## 2019-06-18 PROCEDURE — 80048 BASIC METABOLIC PNL TOTAL CA: CPT | Mod: HCNC

## 2019-06-18 PROCEDURE — 99900035 HC TECH TIME PER 15 MIN (STAT): Mod: HCNC

## 2019-06-18 PROCEDURE — 99222 1ST HOSP IP/OBS MODERATE 55: CPT | Mod: ,,, | Performed by: INTERNAL MEDICINE

## 2019-06-18 PROCEDURE — 80202 ASSAY OF VANCOMYCIN: CPT | Mod: HCNC

## 2019-06-18 PROCEDURE — 94761 N-INVAS EAR/PLS OXIMETRY MLT: CPT | Mod: HCNC

## 2019-06-18 PROCEDURE — 99223 PR INITIAL HOSPITAL CARE,LEVL III: ICD-10-PCS | Mod: HCNC,,, | Performed by: INTERNAL MEDICINE

## 2019-06-18 PROCEDURE — 63600175 PHARM REV CODE 636 W HCPCS: Mod: HCNC | Performed by: INTERNAL MEDICINE

## 2019-06-18 PROCEDURE — 84207 ASSAY OF VITAMIN B-6: CPT | Mod: HCNC

## 2019-06-18 PROCEDURE — 12000002 HC ACUTE/MED SURGE SEMI-PRIVATE ROOM: Mod: HCNC

## 2019-06-18 PROCEDURE — 63600175 PHARM REV CODE 636 W HCPCS: Mod: HCNC | Performed by: NURSE PRACTITIONER

## 2019-06-18 RX ORDER — OXYCODONE AND ACETAMINOPHEN 10; 325 MG/1; MG/1
1 TABLET ORAL EVERY 4 HOURS PRN
Status: DISCONTINUED | OUTPATIENT
Start: 2019-06-18 | End: 2019-06-19 | Stop reason: HOSPADM

## 2019-06-18 RX ORDER — CEPHALEXIN 500 MG/1
500 CAPSULE ORAL EVERY 8 HOURS
Status: DISCONTINUED | OUTPATIENT
Start: 2019-06-18 | End: 2019-06-19 | Stop reason: HOSPADM

## 2019-06-18 RX ORDER — ONDANSETRON 2 MG/ML
4 INJECTION INTRAMUSCULAR; INTRAVENOUS EVERY 6 HOURS PRN
Status: DISCONTINUED | OUTPATIENT
Start: 2019-06-18 | End: 2019-06-19 | Stop reason: HOSPADM

## 2019-06-18 RX ADMIN — INSULIN ASPART 8 UNITS: 100 INJECTION, SOLUTION INTRAVENOUS; SUBCUTANEOUS at 08:06

## 2019-06-18 RX ADMIN — INSULIN ASPART 2 UNITS: 100 INJECTION, SOLUTION INTRAVENOUS; SUBCUTANEOUS at 12:06

## 2019-06-18 RX ADMIN — INSULIN DETEMIR 36 UNITS: 100 INJECTION, SOLUTION SUBCUTANEOUS at 10:06

## 2019-06-18 RX ADMIN — OXYCODONE AND ACETAMINOPHEN 1 TABLET: 10; 325 TABLET ORAL at 02:06

## 2019-06-18 RX ADMIN — OXYCODONE AND ACETAMINOPHEN 1 TABLET: 10; 325 TABLET ORAL at 01:06

## 2019-06-18 RX ADMIN — INSULIN ASPART 1 UNITS: 100 INJECTION, SOLUTION INTRAVENOUS; SUBCUTANEOUS at 10:06

## 2019-06-18 RX ADMIN — VANCOMYCIN HCL-SODIUM CHLORIDE IV SOLN 1 GM/250ML-0.9% 1 G: 1-0.9/25 SOLUTION at 01:06

## 2019-06-18 RX ADMIN — CEFAZOLIN SODIUM 1 G: 1 SOLUTION INTRAVENOUS at 08:06

## 2019-06-18 RX ADMIN — ONDANSETRON 4 MG: 2 INJECTION INTRAMUSCULAR; INTRAVENOUS at 01:06

## 2019-06-18 RX ADMIN — CEPHALEXIN 500 MG: 500 CAPSULE ORAL at 10:06

## 2019-06-18 RX ADMIN — OXYCODONE AND ACETAMINOPHEN 1 TABLET: 10; 325 TABLET ORAL at 09:06

## 2019-06-18 RX ADMIN — INSULIN ASPART 8 UNITS: 100 INJECTION, SOLUTION INTRAVENOUS; SUBCUTANEOUS at 04:06

## 2019-06-18 RX ADMIN — CEFAZOLIN SODIUM 1 G: 1 SOLUTION INTRAVENOUS at 02:06

## 2019-06-18 RX ADMIN — ACETAMINOPHEN 325 MG: 325 TABLET ORAL at 12:06

## 2019-06-18 RX ADMIN — INSULIN ASPART 8 UNITS: 100 INJECTION, SOLUTION INTRAVENOUS; SUBCUTANEOUS at 11:06

## 2019-06-18 RX ADMIN — DEXTROSE 750 MG: 5 SOLUTION INTRAVENOUS at 01:06

## 2019-06-18 RX ADMIN — OXYCODONE AND ACETAMINOPHEN 1 TABLET: 10; 325 TABLET ORAL at 10:06

## 2019-06-18 RX ADMIN — OXYCODONE AND ACETAMINOPHEN 1 TABLET: 10; 325 TABLET ORAL at 05:06

## 2019-06-18 NOTE — PLAN OF CARE
Problem: Adult Inpatient Plan of Care  Goal: Plan of Care Review  Outcome: Ongoing (interventions implemented as appropriate)  02 saturation 96%.  PRN tx not required at this time.

## 2019-06-18 NOTE — PROGRESS NOTES
06/17/19 2020   Patient Assessment/Suction   Level of Consciousness (AVPU) alert   Respiratory Effort Unlabored   Rhythm/Pattern, Respiratory no shortness of breath reported   PRE-TX-O2   O2 Device (Oxygen Therapy) room air   Aerosol Therapy   $ Aerosol Therapy Charges PRN treatment not required

## 2019-06-18 NOTE — PLAN OF CARE
Problem: Adult Inpatient Plan of Care  Goal: Plan of Care Review  Outcome: Ongoing (interventions implemented as appropriate)  No acute events overnight. Alert and oriented when awake, slept between care. Patient understands and agrees with plan of care.20g L FA PIV in place. Vitals stable and within patient's baseline since admission on room air. Diligent coughing, deep breathing encouraged. Pain controlled on current regimen. Nausea reported overnight that was treated with Zofran. Patient also c/o headache that was treated with acetaminophen. Patient said medications worked and does not complain of anymore nausea or headache.  No BM's overnight. Dressing to LLE; clean, dry and intact. Instructed to call for help as needed, call light in reach. Bed in lowest position and brake set. Frequent rounds made for patient's safety. Continuing to monitor closely.

## 2019-06-18 NOTE — PROGRESS NOTES
Steve June . [id number] is a 74 y.o. male who has been consulted for vancomycin dosing.    Pharmacy consult for vancomycin dosing in no longer required.  Vancomycin was discontinued.    Thank you for allowing us to participate in this patient's care.     Crispin Sen, PharmD

## 2019-06-18 NOTE — PROGRESS NOTES
Progress Note  Hospital Medicine  Patient Name:Steve June Jr.  MRN:  [id number]  Patient Class: IP- Inpatient  Admit Date: 6/15/2019  Length of Stay: 3 days  Expected Discharge Date:   Attending Physician: Bart Shukla MD  Primary Care Provider:  Crispin Garcia MD    SUBJECTIVE:     Principal Problem: Cellulitis of left lower extremity  Initial history of present illness: Mr. June presented to ED today with increasing erythema and pain of left lower leg.  Associated with chronic pitting edema.  There's edema in right leg, too, but left has greater amount.  He was recently on our hospital service for cellulitis.  He was treated with IVAB, and the redness and pain decreased.  Discharged five days ago with prescriptions for Bactrim and cephalexin.  Yesterday, he noticed the symptoms were increasing again.  His primary care MD wants to consult with dermatology to get another opinion.  Patient has appt with one in early July.  Pt has liver cirrhosis and idiopathic pulmonary fibrosis.    PMH/PSH/SH/FH/Meds: reviewed.    Symptoms/Review of Systems:  Patient is with significant left lower extremity cellulitis with hemorrhagic conversion.  Left leg is still very swollen, tender and hot to touch. Complaining of leg pain. Platelet count 30K. No shortness of breath, cough, chest pain or headache, fever or abdominal pain. Left shin skin break covered with dressing noted.    Diet:  Adequate intake.    Activity level:  Up with assist  Pain:  Patient reports no pain.       OBJECTIVE:   Vital Signs (Most Recent):      Temp: 96.9 °F (36.1 °C) (06/18/19 0744)  Pulse: 61 (06/18/19 0744)  Resp: 18 (06/18/19 0744)  BP: (!) 154/72 (06/18/19 0744)  SpO2: 96 % (06/18/19 0744)       Vital Signs Range (Last 24H):  Temp:  [96.7 °F (35.9 °C)-98.3 °F (36.8 °C)]   Pulse:  [61-69]   Resp:  [16-18]   BP: (138-172)/(64-75)   SpO2:  [93 %-96 %]     Weight: 91.3 kg (201 lb 4.5 oz)  Body mass index is 29.72 kg/m².    Intake/Output Summary  (Last 24 hours) at 6/18/2019 0818  Last data filed at 6/18/2019 0600  Gross per 24 hour   Intake 950 ml   Output 1500 ml   Net -550 ml     Physical Examination:  Constitutional: He appears well-nourished. No distress.   HENT:   Head: Atraumatic.   Mouth/Throat: Oropharynx is clear and moist. No oropharyngeal exudate.   Eyes: Conjunctivae are normal. Right eye exhibits no discharge. Left eye exhibits no discharge. No scleral icterus.   Neck: Normal range of motion. Neck supple. No JVD present. No thyromegaly present.   Cardiovascular: Normal rate and regular rhythm.   Pulmonary/Chest: Effort normal and breath sounds normal.   Abdominal: Soft. He exhibits no distension and no mass. There is no tenderness.   Musculoskeletal:   Tense, pitting edema in both legs.  Left much more so than right.  Weeping of fluid from left leg.  Partially collapsed blister with serosanguinous drainage located lateral aspect of left lower leg.   Lymphadenopathy:     He has no cervical adenopathy.   Neurological: He is alert.   Skin: Skin is warm. He is not diaphoretic. There is erythema (left lower leg.  there is bright erythema on a background of venous stasis dermatitis).     CBC:  Recent Labs   Lab 06/16/19  0952 06/17/19  0444 06/18/19  0428   WBC 3.30* 2.50* 2.60*   RBC 4.06* 3.79* 3.98*   HGB 11.4* 10.7* 11.1*   HCT 33.7* 31.2* 33.0*   PLT 41* 35* 30*   MCV 83 82 83   MCH 28.0 28.3 27.9   MCHC 33.8 34.3 33.6   BMP  Recent Labs   Lab 06/15/19  1049 06/16/19  0952 06/16/19  2109 06/17/19  0444 06/18/19  0428   * 334* 424*  --  230*   * 134*  --   --  138   K 4.5 4.2  --   --  4.8   CL 99 99  --   --  102   CO2 25 27  --   --  28   BUN 13 12  --   --  15   CREATININE 1.1 1.0  --  0.9 1.1   CALCIUM 8.9 8.9  --   --  9.2      Diagnostic Results:  Microbiology Results (last 7 days)     Procedure Component Value Units Date/Time    Aerobic Culture [[id number]] Collected:  06/15/19 2300    Order Status:  Completed Specimen:  Body  Fluid from Leg, Left Updated:  06/17/19 0730     Aerobic Bacterial Culture No growth    Narrative:       Blister on right lower leg    Gram Stain [[id number]] Collected:  06/15/19 2300    Order Status:  Completed Specimen:  Body Fluid from Leg, Left Updated:  06/16/19 1332     Gram Stain Result No WBC's      No organisms seen    Narrative:       Blister on right lower leg         CT scan of the leg with contrast (06-07-19):  Moderate subcutaneous fat stranding and mild skin thickening predominantly of the distal left leg, compatible with cellulitis in the appropriate clinical setting.  No evidence for abscess, necrotizing soft tissue infection, or osteomyelitis.    Assessment/Plan:   * Cellulitis of left lower extremity  Treat as such.  There is hemorrhagic cellulitis likely contributed by significant thrombocytopenia.  Antimicrobials (From admission, onward)    Ordered     Dose Route Frequency Start Stop    06/18/19 0135  vancomycin (VANCOCIN) 750 mg in dextrose 5 % 250 mL IVPB     INDICATION:  Skin & Soft Tissue        750 mg IV Every 12 hours (non-standard times) 06/18/19 1330 --    06/15/19 1638  ceFAZolin (ANCEF) 1 gram in dextrose 5 % 50 mL IVPB (premix)     INDICATION:  Skin & Soft Tissue        1 g IV Every 6 hours (non-standard times) 06/15/19 1745 --        Fall culture of the fluid from the partially ruptured blister on the left lower leg.  Will see what grows.  Patient counseled regarding elevation of left lower extremity.  Obtain left leg venous Doppler scan to rule out deep venous thrombosis.    Venous insufficiency of both lower extremities  Chronic.  Elevate legs.     Blister of left lower leg  Local wound care as directed.     Idiopathic pulmonary fibrosis  Continue Pirfenidone, the antifibrotic drug he takes.     DM type 2 with diabetic peripheral neuropathy  Monitor CBG.  Increased detemir 36 units nightly.  Use correction insulin per scale.     Chronic diastolic congestive heart failure  CHF  currently controlled. Latest ECHO shows ejection to be preserved. Monitor clinical status closely. Monitor on telemetry. Continue to stress to patient importance of self efficacy and  on diet for CHF.      Hypertension associated with diabetes  Not on any BP meds.  Will monitor BP and use BP-lowering agents when appropriate.    Cirrhosis  Stable at present time.  Monitor liver function labs periodically.     Thrombocytopenia  Stable.  Will monitor periodically.  Avoid heparin products. Check anti-HIT and anti-platelet antiby. Patient states, he has never seen hematologist ever. Will consult Dr. Cai.     DVT prophylaxis: Use SCD and TEDs. No anticoagulation due to low platelet counts.     Discussed with patient's family members.  Answered all questions.    Bart Shukla MD  Department of Hospital Medicine   [facility]

## 2019-06-18 NOTE — CONSULTS
"Consult Note  Infectious Disease    Reason for Consult:  cellulitis    HPI: Steve June Jr. is a   74 y.o. male with numerous medical problems who on:  6/2 was seen in ED for spontaneous hemorrhage right calf with tracking of blood down right leg and no sign of infectious cellulitis.  6/4 was seen by Dr. garcia who placed him on bactrim for suspicion of cellulitis developing after dc from ED  6/5 was seen in ED for increased right leg redness looks like soft tissue tracking of blood and edema and petechiae, and pain and redness in left leg (a streak on left lateral lower leg the p hoto of which looks like traumatic petechiae. He was given rocephin 1g and then cephalexin was added.  6/7 he returned to the ED for left leg and was admitted for 2 days, received Vanc and zosyn, and discharged on bactrim and keflex.   6/12 he was seen by Dr. Garcia, no changes mad  6/15 he was readmitted for worsening left leg pain (right leg was better) and ecchymosis(he acknowledges being on his feet quite a  Bit) and placed on vanc and ancef with some improvement.   He has not had any constitutional symptoms of infection.     Results for STEVE JUNE JR. (MRN [id number]) as of 6/18/2019 15:39   Ref. Range 6/2/2019 19:39 6/7/2019 12:16 6/9/2019 03:49 6/15/2019 10:49 6/16/2019 09:52 6/17/2019 04:44 6/18/2019 04:28   Platelets Latest Ref Range: 150 - 350 K/uL 44 (L) 56 (L) 52 (L) 39 (LL) 41 (L) 35 (LL) 30 (LL)       Review of patient's allergies indicates:   Allergen Reactions    Adhesive tape-silicones Other (See Comments)     pulls skin off    Doxycycline      Dizzy. "Just didn't feel right".     Past Medical History:   Diagnosis Date    Abnormal thyroid function test     Allergy     Seasonal    Anemia     Anemia due to blood loss 7/2/2014    Arthritis     Gaviria esophagus     Basal cell carcinoma     right forearm    Basal cell carcinoma 12/2011    lower post neck    Cancer     skin CA    Cataract     " Cirrhosis     Diabetes mellitus     Diabetes mellitus, type 2     Encounter for blood transfusion     Esophageal varices in cirrhosis     grade II on 7/12 EGD    Gastritis     on 7/12 EGD    GERD (gastroesophageal reflux disease) 2/28/2015    Hard of hearing     Hiatal hernia     History of hepatitis C 8/10/2012    tx with harvoni x 41 days (started 10/22/15). SVR4     Hoarseness 2/28/2015    Hypercholesteremia     Hypersplenism     Hypertension     No meds    Pain management 12/10/2014    Petechial hemorrhage 11/25/2015    Lower extremities bilat     Portal hypertensive gastropathy     on 7/12 EGD    Thrombocytopenia     Type II or unspecified type diabetes mellitus with neurological manifestations, uncontrolled(250.62) 12/24/2013    Valvular heart disease     mild MR 12     Past Surgical History:   Procedure Laterality Date    ABLATION, RADIOFREQUENCY-left suprascapula Left 8/25/2017    [facility]    CATARACT EXTRACTION  1/10/13    left eye    CATARACT EXTRACTION      right eye    CHOLECYSTECTOMY      COLONOSCOPY      diagnostic block of the genicular branches to the left knee Left 12/15/2017    [facility] at [facility] OR    EGD (ESOPHAGOGASTRODUODENOSCOPY) N/A 3/13/2014    Performed by [facility] at [facility] ENDO (4TH FLR)    EGD (ESOPHAGOGASTRODUODENOSCOPY) N/A 7/11/2013    Performed by Campos Walters MD at [facility] ENDO (4TH FLR)    ESOPHAGOGASTRODUODENOSCOPY (EGD) N/A 8/1/2014    Performed by Juan David Booker MD at [facility] ENDO (4TH FLR)    ESOPHAGOGASTRODUODENOSCOPY (EGD) N/A 7/3/2014    Performed by Juan David Booker [facility] at [facility] ENDO (2ND FLR)    EYE SURGERY      Cataract surgery to right eye    INSERTION, IOL PROSTHESIS Left 1/10/2013    Performed by Domi Baker [facility] at [facility] OR 1ST FLR    KNEE ARTHROSCOPY W/ MENISCAL REPAIR      KNEE CARTILAGE SURGERY      left knee    KNEE SURGERY  12/2006    left     LARYNGOSCOPY Bilateral 2014    Performed by [facility] at [facility] OR 2ND FLR    PHACOEMULSIFICATION, CATARACT Left 1/10/2013    Performed by Domi Baker [facility] at [facility] OR 1ST FLR    PHACOEMULSIFICATION, CATARACT Right 2012    [facility]    SKIN LESION EXCISION      TONSILLECTOMY      UPPER GASTROINTESTINAL ENDOSCOPY       Social History     Socioeconomic History    Marital status:      Spouse name: Not on file    Number of children: 5    Years of education: Not on file    Highest education level: Not on file   Occupational History    Not on file   Social Needs    Financial resource strain: Not on file    Food insecurity:     Worry: Not on file     Inability: Not on file    Transportation needs:     Medical: Not on file     Non-medical: Not on file   Tobacco Use    Smoking status: Former Smoker     Packs/day: 1.00     Years: 25.00     Pack years: 25.00     Last attempt to quit: 2000     Years since quittin.8    Smokeless tobacco: Never Used   Substance and Sexual Activity    Alcohol use: Yes     Comment: rarely    Drug use: No    Sexual activity: Never   Lifestyle    Physical activity:     Days per week: Not on file     Minutes per session: Not on file    Stress: Not on file   Relationships    Social connections:     Talks on phone: Not on file     Gets together: Not on file     Attends Rastafari service: Not on file     Active member of club or organization: Not on file     Attends meetings of clubs or organizations: Not on file     Relationship status: Not on file   Other Topics Concern    Not on file   Social History Narrative    He is .  He has children.  He is currently retired.     Family History   Problem Relation Age of Onset    Leukemia Mother     Cancer Mother         bone    Alcohol abuse Father     Cirrhosis Father         EtOH induced    No Known Problems Daughter     No Known Problems Son      No Known Problems Daughter     No Known Problems Daughter     No Known Problems Daughter     No Known Problems Sister     Amblyopia Neg Hx     Blindness Neg Hx     Cataracts Neg Hx     Diabetes Neg Hx     Glaucoma Neg Hx     Hypertension Neg Hx     Macular degeneration Neg Hx     Retinal detachment Neg Hx     Strabismus Neg Hx     Stroke Neg Hx     Thyroid disease Neg Hx     Psoriasis Neg Hx     Lupus Neg Hx     Eczema Neg Hx     Acne Neg Hx     Melanoma Neg Hx        Pertinent medications noted:     Review of Systems:   No chills, fever, sweats  No nausea, vomiting, diarrhea, constipation,  , or focal abd pain  No swelling of joints, redness of joints, and he denies leg injuries, but has shooting pain in left ankle  No  Falls though he has an abrasion right elbow from a door jamb  Has diabetes,   Has had variceal bleeding, unusual bruising  Treated and cured of HCV, but has cirrhosis, history of varices and hypersplenism. Follows with Dr. June at [facility] [place]    EXAM & DIAGNOSTICS REVIEWED:   Vitals:     Temp:  [96.9 °F (36.1 °C)-98.3 °F (36.8 °C)]   Temp: 98.3 °F (36.8 °C) (06/18/19 1524)  Pulse: 66 (06/18/19 1524)  Resp: 20 (06/18/19 1524)  BP: (!) 165/78 (06/18/19 1524)  SpO2: (!) 91 % (06/18/19 1524)    Intake/Output Summary (Last 24 hours) at 6/18/2019 1551  Last data filed at 6/18/2019 0600  Gross per 24 hour   Intake 710 ml   Output 1500 ml   Net -790 ml       General:  In NAD. Looks non toxic. Alert and attentive, cooperative  Eyes:  Anicteric, PERRL, EOMI  ENT:  Mouth w/ pink MMM, no lesions/exudate,edentulous  Neck:  Trachea midline, supple, no adenopathy appreciated  Lungs: Clear  Heart:  RRR,  holosytolic aortic systolic murmur noted  Abd:  soft, NT, ND, protuberant with ventral hernia.  normal BS, no masses/organomegaly appreciated.  :  Voids  Musc:  Joints without effusion, swelling,  erythema, synovitis,   Skin:  Venous stasis hyperpigmentation both LE confluent to knees then patchy on  thighs  Wound: Left lateral lower leg has skin avulsion where a blister had accumulated fluid. There is also a tiny hemorrhagic blister left lateral malleolus. Nothing to suggest vibrio infection or necrotizing fasciitis.  Neuro: AAOx3, speech clear, moves all extrems equally  Extrem: Left leg is mildly edematous and there is patchy purpura and innumerable petechiae/confluent left anterior tibia proximally,without lymphangitis or lymphadenitis. ROM of the ankle and knee and hip are normal,   VAD:        Lines/Tubes/Drains:    General Labs reviewed:    Recent Labs   Lab 06/16/19  0952 06/17/19  0444 06/18/19  0428   WBC 3.30* 2.50* 2.60*   HGB 11.4* 10.7* 11.1*   HCT 33.7* 31.2* 33.0*   PLT 41* 35* 30*       Recent Labs   Lab 06/18/19  0428   CALCIUM 9.2      K 4.8   CO2 28      BUN 15   CREATININE 1.1           Micro:  Microbiology Results (last 7 days)     Procedure Component Value Units Date/Time    Aerobic Culture [[id number]] Collected:  06/15/19 2300    Order Status:  Completed Specimen:  Body Fluid from Leg, Left Updated:  06/17/19 0730     Aerobic Bacterial Culture No growth    Narrative:       Blister on right lower leg    Gram Stain [[id number]] Collected:  06/15/19 2300    Order Status:  Completed Specimen:  Body Fluid from Leg, Left Updated:  06/16/19 1332     Gram Stain Result No WBC's      No organisms seen    Narrative:       Blister on right lower leg        Imaging Reviewed:   US of left leg   CT of leg    IMPRESSION & PLAN   1. Resolving left leg cellulitis(may just have been venous stasis plus hemorrhage from low platelets)  2. Severe thrombocytopenia, primarily from cirrhosis and hypersplenism, and may have been worsened by recent bactrim used to treat cellulitis, though he has been as low as 28k in the past.   3. Diabetes, pulmonary fibrosis, and other comorbidities.    Recommendation:   De-escalate to keflex  Would avoid sulfa given his already compromised platelet count  LIDYA hose for  support when he is up.   Local care to blistered area    thanks

## 2019-06-18 NOTE — PLAN OF CARE
Problem: Adult Inpatient Plan of Care  Goal: Plan of Care Review  POC reviewed with pt who verbalized understanding, pt AAOX4, PIV 20G left forearm, site clean dry and intact no redness or swelling noted, ABT as ordered, remains afebrile during shift, remains free of falls/injury during shift, cellulitis pain to left lower leg controlled with pain meds, dressing to left leg changed per orders, tolerated well, bed in low and locked position with side rails X2, safety maintained, personal items and call light in reach at bedside, will cont to monitor for safety

## 2019-06-18 NOTE — PROGRESS NOTES
"Pharmacokinetic Assessment Follow Up: IV Vancomycin    Vancomycin serum concentration assessment(s):    The measurement is above the desired definitive target range of 10 to 15 mcg/mL.    Vancomycin Regimen Plan:    Change regimen to Vancomycin 750 mg IV every 12 hour with next serum trough concentration measured at 1300 prior to 4th dose on 6/19     Pharmacy will continue to follow and monitor vancomycin.    Please contact pharmacy at extension [phone] for questions regarding this assessment.    Thank you for the consult,   Enma Garcia     Patient brief summary:  Steve June Jr. is a 74 y.o. male initiated on antimicrobial therapy with IV Vancomycin for treatment of suspected skin & soft tissue    The patient did not receive a loading dose, followed by the current treatment regimen: vancomycin 1000 mg IV every 12 hours    Drug Allergies:   Review of patient's allergies indicates:   Allergen Reactions    Adhesive tape-silicones Other (See Comments)     pulls skin off    Doxycycline      Dizzy. "Just didn't feel right".       Actual Body Weight:   91.3 kg    Renal Function:   Estimated Creatinine Clearance: 82.8 mL/min (based on SCr of 0.9 mg/dL).,         CBC (last 72 hours):  Recent Labs   Lab Result Units 06/15/19  1049 06/16/19  0952 06/17/19 0444   WBC K/uL 3.90 3.30* 2.50*   Hemoglobin g/dL 11.8* 11.4* 10.7*   Hematocrit % 35.0* 33.7* 31.2*   Platelets K/uL 39* 41* 35*   Gran% % 73.9* 73.0 67.6   Lymph% % 12.0* 11.1* 15.1*   Mono% % 6.0 6.8 7.3   Eosinophil% % 7.5 8.8* 9.6*   Basophil% % 0.6 0.3 0.4   Differential Method  Automated Automated Automated       Metabolic Panel (last 72 hours):  Recent Labs   Lab Result Units 06/15/19  1049 06/16/19  0952 06/16/19 2109 06/17/19  0444   Sodium mmol/L 133* 134*  --   --    Potassium mmol/L 4.5 4.2  --   --    Chloride mmol/L 99 99  --   --    CO2 mmol/L 25 27  --   --    Glucose mg/dL 381* 334* 424*  --    BUN, Bld mg/dL 13 12  --   --    Creatinine mg/dL 1.1 " 1.0  --  0.9   Albumin g/dL 3.5 3.4*  --   --    Total Bilirubin mg/dL 1.1* 0.7  --   --    Alkaline Phosphatase U/L 86 82  --   --    AST U/L 21 18  --   --    ALT U/L 17 15  --   --        Vancomycin Administrations:  vancomycin given in the last 96 hours                     vancomycin 1 gram/250 mL in sodium chloride 0.9% IVPB 1 g (g) 1 g New Bag 06/18/19 0128     1 g New Bag 06/17/19 1221     1 g New Bag  0054    vancomycin in dextrose 5 % 1 gram/250 mL IVPB 1,000 mg (mg) 1,000 mg New Bag 06/16/19 1308     1,000 mg New Bag  0000                      Drug levels (last 3 results):  Recent Labs   Lab Result Units 06/16/19  1043 06/18/19  0032   Vancomycin-Trough ug/mL 13.6 17.8       Microbiologic Results:  Microbiology Results (last 7 days)       Procedure Component Value Units Date/Time    Aerobic Culture [[id number]] Collected:  06/15/19 2300    Order Status:  Completed Specimen:  Body Fluid from Leg, Left Updated:  06/17/19 0730     Aerobic Bacterial Culture No growth    Narrative:       Blister on right lower leg    Gram Stain [[id number]] Collected:  06/15/19 2300    Order Status:  Completed Specimen:  Body Fluid from Leg, Left Updated:  06/16/19 1332     Gram Stain Result No WBC's      No organisms seen    Narrative:       Blister on right lower leg

## 2019-06-18 NOTE — CONSULTS
"[facility]  Hematology/Oncology  Consult Note    Patient Name: Steve June Jr.  MRN: [id number]  Admission Date: 6/15/2019  Hospital Length of Stay: 3 days  Code Status: Full Code   Attending Provider: Bart Shukla MD  Consulting Provider: Daiana Cai MD  Primary Care Physician: Crispin Garcia MD  Principal Problem:Cellulitis of left lower extremity  June 28   Consults  Subjective:     HPI:   This is a 74 year old man admitted with cellulitis of his lower extremity  History of cirrhosis , thrombocytopenia, anemia and pulmonary fibrosis.   He is tolerating vancomycin IV for now and ambulating around his room without complications. Pt states his usual platelet count is around 84157. He has no evidence of bleeding at the moment    Medications:  Continuous Infusions:  Scheduled Meds:   ceFAZolin (ANCEF) IVPB  1 g Intravenous Q6H    insulin aspart U-100  8 Units Subcutaneous TIDWM    insulin detemir U-100  36 Units Subcutaneous QHS    vancomycin (VANCOCIN) IVPB  750 mg Intravenous Q12H     PRN Meds:albuterol-ipratropium, dextrose 50%, dextrose 50%, diphenhydrAMINE, glucagon (human recombinant), glucose, glucose, insulin aspart U-100, ondansetron, oxyCODONE-acetaminophen, sodium chloride 0.9%     Review of patient's allergies indicates:   Allergen Reactions    Adhesive tape-silicones Other (See Comments)     pulls skin off    Doxycycline      Dizzy. "Just didn't feel right".        Past Medical History:   Diagnosis Date    Abnormal thyroid function test     Allergy     Seasonal    Anemia     Anemia due to blood loss 7/2/2014    Arthritis     Gaviria esophagus     Basal cell carcinoma     right forearm    Basal cell carcinoma 12/2011    lower post neck    Cancer     skin CA    Cataract     Cirrhosis     Diabetes mellitus     Diabetes mellitus, type 2     Encounter for blood transfusion     Esophageal varices in cirrhosis     grade II on 7/12 EGD    Gastritis     on 7/12 EGD "    GERD (gastroesophageal reflux disease) 2/28/2015    Hard of hearing     Hiatal hernia     History of hepatitis C 8/10/2012    tx with harvoni x 41 days (started 10/22/15). SVR4     Hoarseness 2/28/2015    Hypercholesteremia     Hypersplenism     Hypertension     No meds    Pain management 12/10/2014    Petechial hemorrhage 11/25/2015    Lower extremities bilat     Portal hypertensive gastropathy     on 7/12 EGD    Thrombocytopenia     Type II or unspecified type diabetes mellitus with neurological manifestations, uncontrolled(250.62) 12/24/2013    Valvular heart disease     mild MR 12     Past Surgical History:   Procedure Laterality Date    ABLATION, RADIOFREQUENCY-left suprascapula Left 8/25/2017    [facility]    CATARACT EXTRACTION  1/10/13    left eye    CATARACT EXTRACTION      right eye    CHOLECYSTECTOMY      COLONOSCOPY      diagnostic block of the genicular branches to the left knee Left 12/15/2017    Performed by [facility] at [facility] OR    EGD (ESOPHAGOGASTRODUODENOSCOPY) N/A 3/13/2014    Performed by Kiara Leach MD at [facility] ENDO (4TH FLR)    EGD (ESOPHAGOGASTRODUODENOSCOPY) N/A 7/11/2013    Performed by Campos Walters MD at [facility] ENDO (4TH FLR)    ESOPHAGOGASTRODUODENOSCOPY (EGD) N/A 8/1/2014    Performed by Juan David Booker MD at [facility] ENDO (4TH FLR)    ESOPHAGOGASTRODUODENOSCOPY (EGD) N/A 7/3/2014    Performed by Juan David Booker MD at [facility] ENDO (2ND FLR)    EYE SURGERY      Cataract surgery to right eye    INSERTION, IOL PROSTHESIS Left 1/10/2013    Performed by Domi Baker MD at [facility] OR 1ST FLR    KNEE ARTHROSCOPY W/ MENISCAL REPAIR      KNEE CARTILAGE SURGERY      left knee    KNEE SURGERY  12/2006    left    LARYNGOSCOPY Bilateral 12/5/2014    Performed by Anoop Bernstein MD at [facility] OR 2ND FLR    PHACOEMULSIFICATION, CATARACT Left 1/10/2013    Performed by Domi Baker MD at [facility] OR 1ST FLR     PHACOEMULSIFICATION, CATARACT Right 2012    Performed by Zelda Delgado MD at [facility] OR    SKIN LESION EXCISION      TONSILLECTOMY      UPPER GASTROINTESTINAL ENDOSCOPY       Family History     Problem Relation (Age of Onset)    Alcohol abuse Father    Cancer Mother    Cirrhosis Father    Leukemia Mother    No Known Problems Daughter, Son, Daughter, Daughter, Daughter, Sister        Tobacco Use    Smoking status: Former Smoker     Packs/day: 1.00     Years: 25.00     Pack years: 25.00     Last attempt to quit: 2000     Years since quittin.8    Smokeless tobacco: Never Used   Substance and Sexual Activity    Alcohol use: Yes     Comment: rarely    Drug use: No    Sexual activity: Never       Review of Systems     Review of Systems:  General: Weight gain: No, Weight Loss: No, Fatigue: yes  Fever: No, Chills: No, Night Sweats: No, Insomnia: No, Excessive sleeping: No   Respiratory:  Cough: No, Shortness of Breath: No,   Wheezing: No, Excessive Snoring: No, Coughing up blood: No  Endocrine: Heat Intolerance: No, Cold Intolerance: No,   Excessive Thirst: No, Excessive Hunger: No,   Eyes:  Blurred Vision: No, Double Vision: No,   Light Sensitivity: No, Eye pain: No  Musculoskeletal: Muscle Aches/Pain: yes Joint Pain/Swelling: yes Muscle Cramps: No, Muscle Weakness: No, Neck Pain: No, Back Pain: No   Neurological: Difficulty Walking/Falls: Yes, Headache Migraine: No, Dizziness/Vertigo: No, Fainting: No, Difficulty with Speech: No, Weakness: No, Tingling/Numbness: No, Tremors: No,  Memory Problems: No, Seizures: No, Difficulty Swallowing: No, Altered Taste: No.  Cardiovascular: Chest Pain: No, Shortness of Breath: No,   Palpitations: No Positive leg swelling  Gastrointestinal: Nausea/Vomiting: No, Constipation: No, Diarrhea: No, Bloody Stools: No   Psych/Cog:  Depression: No, Anxiety: No, Hallucinations: No, Problems Concentrating: No  : Frequent Urination: No, Incontinence: No, Blood of  Urine: No,  ENT:Hearing Loss: No, Earache: No, Ringing in Ears: No,   Facial Pain: No, Chronic Congestion: No   Immune: Seasonal Allergies: No, Hives and/or Rashes: No  The remainder of the review of twelve body systems was reviewed and normal        Objective:     Vital Signs (Most Recent):  Temp: 98 °F (36.7 °C) (06/18/19 1121)  Pulse: 74 (06/18/19 1128)  Resp: 20 (06/18/19 1121)  BP: (!) 183/89 (06/18/19 1128)  SpO2: 96 % (06/18/19 1121) Vital Signs (24h Range):  Temp:  [96.9 °F (36.1 °C)-98.3 °F (36.8 °C)] 98 °F (36.7 °C)  Pulse:  [61-74] 74  Resp:  [16-20] 20  SpO2:  [94 %-96 %] 96 %  BP: (138-193)/(65-89) 183/89     Weight: 91.3 kg (201 lb 4.5 oz)  Body mass index is 29.72 kg/m².  Body surface area is 2.11 meters squared.      Intake/Output Summary (Last 24 hours) at 6/18/2019 1236  Last data filed at 6/18/2019 0600  Gross per 24 hour   Intake 710 ml   Output 1500 ml   Net -790 ml       Physical Exam    Constitutional: oriented to person, place, and time.  well-developed and well-nourished.   HENT:   Head: Normocephalic and atraumatic.   Right Ear: External ear normal.Left Ear: External ear normal.   Nose: Nose normal.   Mouth/Throat: Oropharynx is clear and moist.   Eyes: Conjunctivae and EOM are normal. Pupils are equal, round, and reactive to light.   Neck: Normal range of motion. Neck supple. No thyromegaly present.   Cardiovascular: Normal rate, regular rhythm, normal heart sounds   No murmur heard.  Pulmonary/Chest: Effort normal and breath sounds normal.  no wheezes.  no rales.   Abdominal: Soft. Bowel sounds are normal.  no mass. There is no tenderness. There is no rebound.   Musculoskeletal: Normal range of motion.   2 + pitting  edema    Lymphadenopathy: no cervical adenopathy.   Neurological: alert and oriented to person, place, and time  Skin: S. Changes in skin turgor consistent with chronic venous stasis and erythema with weeping lesion on his left leg  Psychiatric: normal mood and affect.    behavior is normal.   Vitals reviewed.    Significant Labs:     Lab Results   Component Value Date    WBC 2.60 (L) 06/18/2019    RBC 3.98 (L) 06/18/2019    HGB 11.1 (L) 06/18/2019    HCT 33.0 (L) 06/18/2019    MCV 83 06/18/2019    MCH 27.9 06/18/2019    MCHC 33.6 06/18/2019    RDW 14.9 (H) 06/18/2019    PLT 30 (LL) 06/18/2019    MPV 8.0 (L) 06/18/2019    GRAN 1.9 06/18/2019    GRAN 73.0 06/18/2019    LYMPH 0.3 (L) 06/18/2019    LYMPH 13.0 (L) 06/18/2019    MONO 0.1 (L) 06/18/2019    MONO 5.6 06/18/2019    EOS 0.2 06/18/2019    BASO 0.00 06/18/2019    EOSINOPHIL 8.1 (H) 06/18/2019    BASOPHIL 0.3 06/18/2019     CMP  Sodium   Date Value Ref Range Status   06/18/2019 138 136 - 145 mmol/L Final     Potassium   Date Value Ref Range Status   06/18/2019 4.8 3.5 - 5.1 mmol/L Final     Chloride   Date Value Ref Range Status   06/18/2019 102 95 - 110 mmol/L Final     CO2   Date Value Ref Range Status   06/18/2019 28 23 - 29 mmol/L Final     Glucose   Date Value Ref Range Status   06/18/2019 230 (H) 70 - 110 mg/dL Final     BUN, Bld   Date Value Ref Range Status   06/18/2019 15 8 - 23 mg/dL Final     Creatinine   Date Value Ref Range Status   06/18/2019 1.1 0.5 - 1.4 mg/dL Final     Calcium   Date Value Ref Range Status   06/18/2019 9.2 8.7 - 10.5 mg/dL Final     Total Protein   Date Value Ref Range Status   06/16/2019 5.9 (L) 6.0 - 8.4 g/dL Final     Albumin   Date Value Ref Range Status   06/16/2019 3.4 (L) 3.5 - 5.2 g/dL Final     Total Bilirubin   Date Value Ref Range Status   06/16/2019 0.7 0.1 - 1.0 mg/dL Final     Comment:     For infants and newborns, interpretation of results should be based  on gestational age, weight and in agreement with clinical  observations.  Premature Infant recommended reference ranges:  Up to 24 hours.............<8.0 mg/dL  Up to 48 hours............<12.0 mg/dL  3-5 days..................<15.0 mg/dL  6-29 days.................<15.0 mg/dL       Alkaline Phosphatase   Date Value Ref Range  Status   06/16/2019 82 55 - 135 U/L Final     AST   Date Value Ref Range Status   06/16/2019 18 10 - 40 U/L Final     ALT   Date Value Ref Range Status   06/16/2019 15 10 - 44 U/L Final     Anion Gap   Date Value Ref Range Status   06/18/2019 8 8 - 16 mmol/L Final     eGFR if    Date Value Ref Range Status   06/18/2019 >60 >60 mL/min/1.73 m^2 Final     eGFR if non    Date Value Ref Range Status   06/18/2019 >60 >60 mL/min/1.73 m^2 Final     Comment:     Calculation used to obtain the estimated glomerular filtration  rate (eGFR) is the CKD-EPI equation.            Diagnostic Results:      Assessment/Plan:     Active Diagnoses:    Diagnosis Date Noted POA    PRINCIPAL PROBLEM:  Cellulitis of left lower extremity [L03.116] 06/07/2019 Yes    Blister of left lower leg [S80.822A] 06/15/2019 Yes    Venous insufficiency of both lower extremities [I87.2] 06/15/2019 Yes    Idiopathic pulmonary fibrosis [J84.112] 04/15/2019 Yes    DM type 2 with diabetic peripheral neuropathy [E11.42] 01/24/2019 Yes    Chronic diastolic congestive heart failure [I50.32] 09/18/2017 Yes    Hypertension associated with diabetes [E11.59, I10] 11/05/2015 Yes    Cirrhosis [K74.60] 11/05/2015 Yes    Thrombocytopenia [D69.6] 12/15/2011 Yes      Problems Resolved During this Admission:       Pancytopenia with cirrhosis  Cellulitis of lower extremity on vancomycin which can cause bone marrow suppression and drop his platelets further  watch counts closely  If he starts bleeding must transfuse platelets  Will check nutritional factors as well to see if heath lombardi may need stimulants for his marrow to compensate while on vancomycin  ANC stable for now: no need for neupogen  Anemia stable for now as well      Daiana Cai MD  Hematology/Oncology  [facility]

## 2019-06-18 NOTE — NURSING
"Situation-   Who are you? Who is the patient? What is going on with the patient currently?         I am Chrissy Arnett  from [facility]  in regards to Wilson TAHIR June Jr. who is a 74 y.o. year old male admitted with Cellulitis of left lower extremity who has a BG of 110   Background- What is the patient's significant medical history (CAD, ESRD, HIV positive, history of recent surgery/chemo.transfusion) and recent labs Has a past medical history of   Past Medical History:   Diagnosis Date    Abnormal thyroid function test     Allergy     Seasonal    Anemia     Anemia due to blood loss 7/2/2014    Arthritis     Gaviria esophagus     Basal cell carcinoma     right forearm    Basal cell carcinoma 12/2011    lower post neck    Cancer     skin CA    Cataract     Cirrhosis     Diabetes mellitus     Diabetes mellitus, type 2     Encounter for blood transfusion     Esophageal varices in cirrhosis     grade II on 7/12 EGD    Gastritis     on 7/12 EGD    GERD (gastroesophageal reflux disease) 2/28/2015    Hard of hearing     Hiatal hernia     History of hepatitis C 8/10/2012    tx with harvoni x 41 days (started 10/22/15). SVR4     Hoarseness 2/28/2015    Hypercholesteremia     Hypersplenism     Hypertension     No meds    Pain management 12/10/2014    Petechial hemorrhage 11/25/2015    Lower extremities bilat     Portal hypertensive gastropathy     on 7/12 EGD    Thrombocytopenia     Type II or unspecified type diabetes mellitus with neurological manifestations, uncontrolled(250.62) 12/24/2013    Valvular heart disease     mild MR 12   . Most recent vitals include  BP (!) 151/70 (BP Location: Right arm, Patient Position: Lying)   Pulse 64   Temp 98.3 °F (36.8 °C) (Oral)   Resp 16   Ht 5' 9" (1.753 m)   Wt 91.3 kg (201 lb 4.5 oz)   SpO2 96%   BMI 29.72 kg/m²            Assessment-   Has the following issues (abnormal labs, abnormal vitals, change in " status, uncontrolled symptoms, patient request) Who has DM2    Recommendation- What is the change in the plan of care requested? Do you think we should hold or give insulin Levemir 36 units   Plan-  What did we decide to do? Plan was discussed and is as follows: Give home dose of 30U one time. No further orders at this time.

## 2019-06-18 NOTE — DISCHARGE SUMMARY
[facility] Hospital Medicine  Discharge Summary      Patient Name: Steve June Jr.  MRN: [id number]  Admission Date: 6/7/2019  Hospital Length of Stay: 0 days  Discharge Date and Time: 6/10/2019  2:46 PM  Attending Physician: No att. providers found   Discharging Provider: Padilla Perry MD  Primary Care Provider: Crispin Garcia MD      HPI:   The patient is a 74-year-old gentleman who developed redness and swelling in his right leg this past Sunday.  He was seen in the emergency room and ultrasound was obtained that was negative for DVT.  He was discharged home.  He went to see his PCP a couple of days later and was diagnosed with cellulitis and placed on Bactrim.  He came back to the emergency room the next day because he developed a patch of erythema in his left leg. He was discharged home on Keflex and Bactrim. He is now developing swelling in the left leg and both legs have purple discoloration.  He has a history of cirrhosis and chronic thrombocytopenia but his platelet count is stable.  Coagulation studies are normal.  He does not take NSAIDs.  He denied having any trauma to either leg.    * No surgery found *      Hospital Course:   Pt was admitted to undergo treatment for cellulitis.  CT scan was done of the leg, which was negative for abscess or fasciitis.  With the IVAB, the patient reported improvement in swelling and pain.  On exam, there was less evidence of infection over time.  He was discharged a few days later and prescribed oral antibiotics to finish treatment at home.    Physical Exam   Constitutional: He is oriented to person, place, and time. He appears well-developed and well-nourished. No distress.   HENT:   Head: Normocephalic and atraumatic.   Eyes: Pupils are equal, round, and reactive to light. Conjunctivae and EOM are normal.   Neck: Normal range of motion. Neck supple.   Cardiovascular: Normal rate, regular rhythm, normal heart sounds and intact distal  pulses. Exam reveals no gallop and no friction rub.   No murmur heard.  Pulmonary/Chest: Effort normal and breath sounds normal. No respiratory distress. He has no rales.   Abdominal: Soft. Bowel sounds are normal. He exhibits no distension. There is no tenderness. There is no rebound.      Consults:     No new Assessment & Plan notes have been filed under this hospital service since the last note was generated.  Service: Hospital Medicine    Final Active Diagnoses:    Diagnosis Date Noted POA    PRINCIPAL PROBLEM:  Cellulitis of left lower extremity [L03.116] 06/07/2019 Yes    Pulmonary fibrosis [J84.10] 01/14/2019 Yes    Cirrhosis [K74.60] 11/05/2015 Yes    Type 2 diabetes mellitus with complication, with long-term current use of insulin [E11.8, Z79.4] 01/22/2015 Not Applicable    Thrombocytopenia [D69.6] 12/15/2011 Yes      Problems Resolved During this Admission:       Discharged Condition: good    Disposition: Home or Self Care    Follow Up:    Patient Instructions:      Diet diabetic     Activity as tolerated       Significant Diagnostic Studies:      6/9/2019 03:49   WBC 4.30   RBC 4.48 (L)   Hemoglobin 12.6 (L)   Hematocrit 37.3 (L)   MCV 83   MCH 28.2   MCHC 33.8   RDW 15.1 (H)   Platelets 52 (L)      6/9/2019 03:49   Sodium 133 (L)   Potassium 4.7   Chloride 97   CO2 27   Anion Gap 9   BUN, Bld 25 (H)   Creatinine 1.3   eGFR if non  55 (A)   eGFR if African American >60   Glucose 256 (H)   Calcium 10.0         Pending Diagnostic Studies:     None         Medications:  Reconciled Home Medications:      Medication List      CONTINUE taking these medications    ciclopirox 0.77 % Crea  Commonly known as:  LOPROX  Apply topically 2 (two) times daily.     * ESBRIET 267 mg Cap  Generic drug:  pirfenidone  Take 1 capsule (267 mg total) by mouth 3 (three) times daily for 7 days, THEN 2 capsules (534 mg total) 3 (three) times daily for 14 days.  Start taking on:  5/7/2019     * pirfenidone 801  mg Tab  Commonly known as:  ESBRIET  Take 1 tablet (801 mg) by mouth 3 (three) times daily.     fluorouracil 5 % cream  Commonly known as:  EFUDEX  AAA BID x 4 weeks     LEVEMIR FLEXTOUCH U-100 INSULN 100 unit/mL (3 mL) Inpn pen  Generic drug:  insulin detemir U-100  Inject 30 Units into the skin every evening.     metFORMIN 500 MG tablet  Commonly known as:  GLUCOPHAGE  Take 1 tablet (500 mg total) by mouth 2 (two) times daily with meals.     oxyCODONE-acetaminophen  mg per tablet  Commonly known as:  PERCOCET  Take 1 tablet by mouth every 6 (six) hours as needed for Pain.         * This list has 2 medication(s) that are the same as other medications prescribed for you. Read the directions carefully, and ask your doctor or other care provider to review them with you.            STOP taking these medications    cephALEXin 500 MG capsule  Commonly known as:  KEFLEX     sulfamethoxazole-trimethoprim 800-160mg 800-160 mg Tab  Commonly known as:  BACTRIM DS        ASK your doctor about these medications    clindamycin 300 MG capsule  Commonly known as:  CLEOCIN  Take 1 capsule (300 mg total) by mouth 4 (four) times daily. for 5 days  Ask about: Should I take this medication?            Indwelling Lines/Drains at time of discharge:   Lines/Drains/Airways          None          Time spent on the discharge of patient: 27 minutes  Patient was seen and examined on the date of discharge and determined to be suitable for discharge.         Padilla Perry MD  Department of Hospital Medicine  [facility]

## 2019-06-19 ENCOUNTER — TELEPHONE (OUTPATIENT)
Dept: HEMATOLOGY/ONCOLOGY | Facility: CLINIC | Age: 74
End: 2019-06-19

## 2019-06-19 VITALS
HEIGHT: 69 IN | TEMPERATURE: 98 F | BODY MASS INDEX: 29.81 KG/M2 | OXYGEN SATURATION: 93 % | RESPIRATION RATE: 18 BRPM | DIASTOLIC BLOOD PRESSURE: 73 MMHG | SYSTOLIC BLOOD PRESSURE: 157 MMHG | WEIGHT: 201.25 LBS | HEART RATE: 62 BPM

## 2019-06-19 LAB
ANION GAP SERPL CALC-SCNC: 8 MMOL/L (ref 8–16)
BACTERIA SPEC AEROBE CULT: NO GROWTH
BASOPHILS # BLD AUTO: 0 K/UL (ref 0–0.2)
BASOPHILS NFR BLD: 0.6 % (ref 0–1.9)
BUN SERPL-MCNC: 16 MG/DL (ref 8–23)
CALCIUM SERPL-MCNC: 9.5 MG/DL (ref 8.7–10.5)
CHLORIDE SERPL-SCNC: 102 MMOL/L (ref 95–110)
CO2 SERPL-SCNC: 26 MMOL/L (ref 23–29)
CREAT SERPL-MCNC: 0.9 MG/DL (ref 0.5–1.4)
DIFFERENTIAL METHOD: ABNORMAL
EOSINOPHIL # BLD AUTO: 0.3 K/UL (ref 0–0.5)
EOSINOPHIL NFR BLD: 11.1 % (ref 0–8)
ERYTHROCYTE [DISTWIDTH] IN BLOOD BY AUTOMATED COUNT: 15 % (ref 11.5–14.5)
EST. GFR  (AFRICAN AMERICAN): >60 ML/MIN/1.73 M^2
EST. GFR  (NON AFRICAN AMERICAN): >60 ML/MIN/1.73 M^2
GLUCOSE SERPL-MCNC: 160 MG/DL (ref 70–110)
HCT VFR BLD AUTO: 32.3 % (ref 40–54)
HEPARIN INDUCED THROMBOCYTOPENIA: NORMAL
HGB BLD-MCNC: 11 G/DL (ref 14–18)
LYMPHOCYTES # BLD AUTO: 0.4 K/UL (ref 1–4.8)
LYMPHOCYTES NFR BLD: 14.6 % (ref 18–48)
MCH RBC QN AUTO: 28.2 PG (ref 27–31)
MCHC RBC AUTO-ENTMCNC: 34.2 G/DL (ref 32–36)
MCV RBC AUTO: 83 FL (ref 82–98)
MONOCYTES # BLD AUTO: 0.2 K/UL (ref 0.3–1)
MONOCYTES NFR BLD: 7.5 % (ref 4–15)
NEUTROPHILS # BLD AUTO: 1.6 K/UL (ref 1.8–7.7)
NEUTROPHILS NFR BLD: 66.2 % (ref 38–73)
PLATELET # BLD AUTO: 38 K/UL (ref 150–350)
PMV BLD AUTO: 8.1 FL (ref 9.2–12.9)
POCT GLUCOSE: 135 MG/DL (ref 70–110)
POCT GLUCOSE: 157 MG/DL (ref 70–110)
POCT GLUCOSE: 186 MG/DL (ref 70–110)
POTASSIUM SERPL-SCNC: 4.1 MMOL/L (ref 3.5–5.1)
RBC # BLD AUTO: 3.9 M/UL (ref 4.6–6.2)
SODIUM SERPL-SCNC: 136 MMOL/L (ref 136–145)
WBC # BLD AUTO: 2.5 K/UL (ref 3.9–12.7)

## 2019-06-19 PROCEDURE — 94761 N-INVAS EAR/PLS OXIMETRY MLT: CPT | Mod: HCNC

## 2019-06-19 PROCEDURE — 25000003 PHARM REV CODE 250: Mod: HCNC | Performed by: INTERNAL MEDICINE

## 2019-06-19 PROCEDURE — 36415 COLL VENOUS BLD VENIPUNCTURE: CPT | Mod: HCNC

## 2019-06-19 PROCEDURE — 85025 COMPLETE CBC W/AUTO DIFF WBC: CPT | Mod: HCNC

## 2019-06-19 PROCEDURE — 99900035 HC TECH TIME PER 15 MIN (STAT): Mod: HCNC

## 2019-06-19 PROCEDURE — 80048 BASIC METABOLIC PNL TOTAL CA: CPT | Mod: HCNC

## 2019-06-19 PROCEDURE — 99233 PR SUBSEQUENT HOSPITAL CARE,LEVL III: ICD-10-PCS | Mod: HCNC,,, | Performed by: INTERNAL MEDICINE

## 2019-06-19 PROCEDURE — 99233 SBSQ HOSP IP/OBS HIGH 50: CPT | Mod: HCNC,,, | Performed by: INTERNAL MEDICINE

## 2019-06-19 RX ORDER — MUPIROCIN 20 MG/G
OINTMENT TOPICAL 2 TIMES DAILY
Qty: 22 G | Refills: 0 | Status: SHIPPED | OUTPATIENT
Start: 2019-06-19 | End: 2019-06-26

## 2019-06-19 RX ORDER — CEPHALEXIN 500 MG/1
500 CAPSULE ORAL EVERY 8 HOURS
Qty: 15 CAPSULE | Refills: 0 | Status: SHIPPED | OUTPATIENT
Start: 2019-06-19 | End: 2019-07-10

## 2019-06-19 RX ORDER — OXYCODONE AND ACETAMINOPHEN 10; 325 MG/1; MG/1
1 TABLET ORAL EVERY 6 HOURS PRN
Qty: 120 TABLET | Refills: 0 | Status: SHIPPED | OUTPATIENT
Start: 2019-07-01 | End: 2019-07-28 | Stop reason: SDUPTHER

## 2019-06-19 RX ORDER — INSULIN DETEMIR 100 [IU]/ML
34 INJECTION, SOLUTION SUBCUTANEOUS NIGHTLY
Qty: 30.6 ML | Refills: 3
Start: 2019-06-19 | End: 2019-10-07 | Stop reason: SDUPTHER

## 2019-06-19 RX ORDER — OXYCODONE AND ACETAMINOPHEN 10; 325 MG/1; MG/1
1 TABLET ORAL EVERY 6 HOURS PRN
Qty: 120 TABLET | Refills: 0 | Status: SHIPPED | OUTPATIENT
Start: 2019-07-30 | End: 2019-07-28 | Stop reason: SDUPTHER

## 2019-06-19 RX ORDER — OXYCODONE AND ACETAMINOPHEN 10; 325 MG/1; MG/1
1 TABLET ORAL EVERY 6 HOURS PRN
Qty: 120 TABLET | Refills: 0 | Status: SHIPPED | OUTPATIENT
Start: 2019-08-28 | End: 2019-09-18 | Stop reason: SDUPTHER

## 2019-06-19 RX ORDER — PANTOPRAZOLE SODIUM 40 MG/1
40 TABLET, DELAYED RELEASE ORAL DAILY
Status: DISCONTINUED | OUTPATIENT
Start: 2019-06-19 | End: 2019-06-19 | Stop reason: HOSPADM

## 2019-06-19 RX ADMIN — OXYCODONE AND ACETAMINOPHEN 1 TABLET: 10; 325 TABLET ORAL at 07:06

## 2019-06-19 RX ADMIN — OXYCODONE AND ACETAMINOPHEN 1 TABLET: 10; 325 TABLET ORAL at 03:06

## 2019-06-19 RX ADMIN — INSULIN ASPART 8 UNITS: 100 INJECTION, SOLUTION INTRAVENOUS; SUBCUTANEOUS at 12:06

## 2019-06-19 RX ADMIN — CEPHALEXIN 500 MG: 500 CAPSULE ORAL at 05:06

## 2019-06-19 RX ADMIN — INSULIN ASPART 8 UNITS: 100 INJECTION, SOLUTION INTRAVENOUS; SUBCUTANEOUS at 08:06

## 2019-06-19 RX ADMIN — PANTOPRAZOLE SODIUM 40 MG: 40 TABLET, DELAYED RELEASE ORAL at 08:06

## 2019-06-19 RX ADMIN — INSULIN ASPART 2 UNITS: 100 INJECTION, SOLUTION INTRAVENOUS; SUBCUTANEOUS at 12:06

## 2019-06-19 RX ADMIN — CEPHALEXIN 500 MG: 500 CAPSULE ORAL at 02:06

## 2019-06-19 RX ADMIN — OXYCODONE AND ACETAMINOPHEN 1 TABLET: 10; 325 TABLET ORAL at 11:06

## 2019-06-19 NOTE — PROGRESS NOTES
06/19/19 0901   Patient Assessment/Suction   Level of Consciousness (AVPU) alert   PRE-TX-O2   O2 Device (Oxygen Therapy) room air   SpO2 96 %   Pulse Oximetry Type Intermittent   $ Pulse Oximetry - Multiple Charge Pulse Oximetry - Multiple   Pulse 67   Resp 18   Aerosol Therapy   $ Aerosol Therapy Charges PRN treatment not required   Respiratory Treatment Status (SVN) PRN treatment not required

## 2019-06-19 NOTE — PROGRESS NOTES
[facility]  Hematology/Oncology  Progress Note    Patient Name: Steve June Jr.  Admission Date: 6/15/2019  Hospital Length of Stay: 4 days  Code Status: Full Code   June 19   Subjective:     Interval History:    Patient with thrombocytopenia anemia longstanding history of cirrhosis.  He has no evidence of bleeding he is tolerating antibiotics for cellulitis of his lower extremities. He remains on an ulcerated lesion no further fever he is ambulating without difficulty  C/O gastritis this am no emesis no hematochezia.  He is tolerating insulin for diabetes and Keflex at the moment p.o. he is no longer on IV antibiotics    Medications:  Continuous Infusions:  Scheduled Meds:   cephALEXin  500 mg Oral Q8H    insulin aspart U-100  8 Units Subcutaneous TIDWM    insulin detemir U-100  36 Units Subcutaneous QHS     PRN Meds:albuterol-ipratropium, dextrose 50%, dextrose 50%, diphenhydrAMINE, glucagon (human recombinant), glucose, glucose, insulin aspart U-100, ondansetron, oxyCODONE-acetaminophen, sodium chloride 0.9%     Review of Systems     CONSTITUTIONAL: No fevers, chills, night sweats, wt. loss, appetite changes  SKIN:  Changes to his lower extremities consistent with chronic venous stasis ulcerative lesion  ENT: No headaches, head trauma, vision changes, or eye pain  LYMPH NODES: None noticed   CV: No chest pain, palpitations.   RESP: No dyspnea on exertion, cough, wheezing, or hemoptysis  GI: No nausea, emesis, diarrhea, constipation, melena, hematochezia, pain.   : No dysuria, hematuria, urgency, or frequency   HEME: No easy bruising, bleeding problems  PSYCHIATRIC: No depression, anxiety, psychosis, hallucinations.  NEURO: No seizures, memory loss, dizziness or difficulty sleeping  MSK: No arthralgias or joint swelling pain and left lower leg  Objective:     Vital Signs (Most Recent):  Temp: 97.8 °F (36.6 °C) (06/19/19 0302)  Pulse: 69 (06/19/19 0302)  Resp: 18 (06/19/19 0302)  BP:  (!) 161/72 (06/19/19 0302)  SpO2: (!) 91 % (06/19/19 0302) Vital Signs (24h Range):  Temp:  [97.4 °F (36.3 °C)-98.3 °F (36.8 °C)] 97.8 °F (36.6 °C)  Pulse:  [64-74] 69  Resp:  [14-20] 18  SpO2:  [91 %-96 %] 91 %  BP: (134-193)/(70-89) 161/72     Weight: 91.3 kg (201 lb 4.5 oz)  Body mass index is 29.72 kg/m².  Body surface area is 2.11 meters squared.      Intake/Output Summary (Last 24 hours) at 6/19/2019 0744  Last data filed at 6/19/2019 0600  Gross per 24 hour   Intake 1750 ml   Output 400 ml   Net 1350 ml       Physical Exam  Height / weight / VS reviewed  GENERAL.: Well-developed, well-nourished, in no acute distress  PSYCH: pleasant affect, no anxiety, no depression  HEENT: Normocephalic, lids intact, conjunctiva pink, sinuses nontender to palpation   NECK: Supple,trachea midline, no palpable abnormalities  LYMPHATICS: No cervical or axillary adenopathy  RESPIRATORY: CTAB, no wheezes, rubs or rhonchi  Good respiratory effort without any retractions or diaphragmatic movement  CARDIOVASCULAR: no JVD, S1 / S2 with RRR, no murmur, no rub  ABDOMEN: NTND, normal bowel sounds, no palpable HSM or mass  EXTREMITIES: No cyanosis, no clubbing, positive edema lower extremities  NEUROLOGICAL: alert and oriented x 3,   SKIN:  Ulcerated lesion wrapped surrounding erythema is much improved bilateral skin changes with chronic venous stasis  Significant Labs:     Lab Results   Component Value Date    WBC 2.50 (L) 06/19/2019    RBC 3.90 (L) 06/19/2019    HGB 11.0 (L) 06/19/2019    HCT 32.3 (L) 06/19/2019    MCV 83 06/19/2019    MCH 28.2 06/19/2019    MCHC 34.2 06/19/2019    RDW 15.0 (H) 06/19/2019    PLT 38 (LL) 06/19/2019    MPV 8.1 (L) 06/19/2019    GRAN 1.6 (L) 06/19/2019    GRAN 66.2 06/19/2019    LYMPH 0.4 (L) 06/19/2019    LYMPH 14.6 (L) 06/19/2019    MONO 0.2 (L) 06/19/2019    MONO 7.5 06/19/2019    EOS 0.3 06/19/2019    BASO 0.00 06/19/2019    EOSINOPHIL 11.1 (H) 06/19/2019    BASOPHIL 0.6 06/19/2019            Assessment/Plan:     Active Diagnoses:    Diagnosis Date Noted POA    PRINCIPAL PROBLEM:  Cellulitis of left lower extremity [L03.116] 06/07/2019 Yes    Blister of left lower leg [S80.822A] 06/15/2019 Yes    Venous insufficiency of both lower extremities [I87.2] 06/15/2019 Yes    Idiopathic pulmonary fibrosis [J84.112] 04/15/2019 Yes    DM type 2 with diabetic peripheral neuropathy [E11.42] 01/24/2019 Yes    Chronic diastolic congestive heart failure [I50.32] 09/18/2017 Yes    Hypertension associated with diabetes [E11.59, I10] 11/05/2015 Yes    Cirrhosis [K74.60] 11/05/2015 Yes    Thrombocytopenia [D69.6] 12/15/2011 Yes      Problems Resolved During this Admission:       Pancytopenia with cirrhosis currently tolerating Keflex for cellulitis of his left lower extremity.  He is ambulating well. He is no longer febrile his ANC is 1600 for which she does not qualify for Neupogen support.  If this level falls to less than a 1000 please refer back to clinic as an outpatient for such.  He is not in any need of EPO at this time as his hemoglobin is stable and he has no active evidence of bleeding.  His platelets are relatively close to his baseline hence he is okay to discharge home from a hematology standpoint    Daiana Cai MD  Hematology/Oncology  [facility]

## 2019-06-19 NOTE — PLAN OF CARE
06/19/19 1248   Final Note   Assessment Type Final Discharge Note   Anticipated Discharge Disposition Home   Hospital Follow Up  Appt(s) scheduled? Yes

## 2019-06-19 NOTE — TELEPHONE ENCOUNTER
----- Message from Cindy Cruz sent at 6/19/2019 12:47 PM CDT -----  Type:  Appointment Request    Caller is requesting a soon appointment.      Name of Caller:  Nissa Shelby with [facility]  When is the first available appointment?  NA  Symptoms:  Patient needs hospital follow up/cellulitis of left lower extremity  Best Call Back Number:  [phone]  Additional Information:  Please call patient back to schedule

## 2019-06-19 NOTE — PLAN OF CARE
Problem: Adult Inpatient Plan of Care  Goal: Plan of Care Review  Outcome: Ongoing (interventions implemented as appropriate)  Plan of care reviewed with patient. Medicated for pain twice this shift, moderate relief obtained. This am bg 135, LLE with dressing intact. Remains free from falls/injury. Instructed to call for assistance as needed during night. Verbalized understanding. Call light in reach.

## 2019-06-19 NOTE — TELEPHONE ENCOUNTER
Spoke to to scheduled hospital f/u as requested. Pt confirmed apt date and time scheduled. Will mail out apt letter to pt.

## 2019-06-19 NOTE — PLAN OF CARE
06/19/19 1250   Medicare Message   Important Message from Medicare regarding Discharge Appeal Rights Explained to patient/caregiver;Signed/date by patient/caregiver;Given to patient/caregiver   Date IMM was signed 06/19/19   Time IMM was signed 1250

## 2019-06-19 NOTE — NURSING
Discharge and medication instructions reviewed with pt and wife who verbalized understanding, PIV removed tolerated well, pt belongings packed by pt wife, pt off unit via w/c with staff, safety maintained.

## 2019-06-19 NOTE — DISCHARGE SUMMARY
Discharge Summary  Hospital Medicine    Admit Date: 6/15/2019    Date and Time: 6/19/201911:32 AM    Discharge Attending Physician: Bart Shukla MD    Primary Care Physician: Crispin Garcia MD    Diagnoses:  Active Hospital Problems    Diagnosis  POA    *Cellulitis of left lower extremity [L03.116]  Yes    Blister of left lower leg [S80.822A]  Yes    Venous insufficiency of both lower extremities [I87.2]  Yes    Idiopathic pulmonary fibrosis [J84.112]  Yes    DM type 2 with diabetic peripheral neuropathy [E11.42]  Yes    Chronic diastolic congestive heart failure [I50.32]  Yes    Hypertension associated with diabetes [E11.59, I10]  Yes    Cirrhosis [K74.60]  Yes    Thrombocytopenia [D69.6]  Yes     Discharged Condition: Good    Hospital Course:   Mr. June presented to ED today with increasing erythema and pain of left lower leg.  Associated with chronic pitting edema.  There's edema in right leg, too, but left has greater amount.  He was recently on our hospital service for cellulitis.  He was treated with IVAB, and the redness and pain decreased.  Discharged five days ago with prescriptions for Bactrim and cephalexin.  Yesterday, he noticed the symptoms were increasing again.  His primary care MD wants to consult with dermatology to get another opinion.  Patient has appt with one in early July.  Pt has liver cirrhosis and idiopathic pulmonary fibrosis. Patient was admitted to Hospitalist medicine service. Patient was started on IV antibiotics. Patient encouraged to elevate extremity. Routine wound care continued. Patient's symptoms improved and patient transitioned to PO antibiotics.  Patient was evaluated by Dr. Ochoa from Infectious Disease and Dr. Cai from Hematology.  Patient's platelet count was closely followed.  Fall precautions discussed with the patient.  Patient to follow with Dr. Cai as outpatient in future. Patient was discharged home in stable condition with following discharge plan  of care.     Consults: Dr. Cai, Dr. Ochoa    Significant Diagnostic Studies:   CT scan of the leg with contrast (06-07-19):  Moderate subcutaneous fat stranding and mild skin thickening predominantly of the distal left leg, compatible with cellulitis in the appropriate clinical setting.  No evidence for abscess, necrotizing soft tissue infection, or osteomyelitis.     Left leg venous Doppler ultrasound:  No evidence of deep venous thrombosis in the left lower extremity.    Microbiology Results (last 7 days)     Procedure Component Value Units Date/Time    Aerobic Culture [[id number]] Collected:  06/15/19 2300    Order Status:  Completed Specimen:  Body Fluid from Leg, Left Updated:  06/19/19 1126     Aerobic Bacterial Culture No growth    Narrative:       Blister on right lower leg    Gram Stain [[id number]] Collected:  06/15/19 2300    Order Status:  Completed Specimen:  Body Fluid from Leg, Left Updated:  06/16/19 1332     Gram Stain Result No WBC's      No organisms seen    Narrative:       Blister on right lower leg        Special Treatments/Procedures: None  Disposition: Home or Self Care    Medications:  Reconciled Home Medications:   Current Discharge Medication List      START taking these medications    Details   cephALEXin (KEFLEX) 500 MG capsule Take 1 capsule (500 mg total) by mouth every 8 (eight) hours.  Qty: 15 capsule, Refills: 0      mupirocin (BACTROBAN) 2 % ointment Apply topically 2 (two) times daily. Apply to left leg skin break and blister area for 7 days  Qty: 22 g, Refills: 0         CONTINUE these medications which have CHANGED    Details   LEVEMIR FLEXTOUCH U-100 INSULN 100 unit/mL (3 mL) InPn pen Inject 34 Units into the skin every evening.  Qty: 30.6 mL, Refills: 3    Associated Diagnoses: Type 2 diabetes mellitus with complication, with long-term current use of insulin         CONTINUE these medications which have NOT CHANGED    Details   ciclopirox (LOPROX) 0.77 % Crea Apply topically  2 (two) times daily.  Qty: 1 Tube, Refills: 3      fluorouracil (EFUDEX) 5 % cream AAA BID x 4 weeks  Qty: 40 g, Refills: 0      metFORMIN (GLUCOPHAGE) 500 MG tablet Take 1 tablet (500 mg total) by mouth 2 (two) times daily with meals.  Qty: 180 tablet, Refills: 3    Associated Diagnoses: DM type 2 with diabetic peripheral neuropathy      oxyCODONE-acetaminophen (PERCOCET)  mg per tablet Take 1 tablet by mouth every 6 (six) hours as needed for Pain.  Qty: 120 tablet, Refills: 0      pirfenidone (ESBRIET) 801 mg Tab Take 1 tablet (801 mg) by mouth 3 (three) times daily.  Qty: 90 tablet, Refills: 11    Associated Diagnoses: Pulmonary fibrosis determined by high resolution computed tomography; Idiopathic pulmonary fibrosis; Interstitial lung disease         STOP taking these medications       pirfenidone (ESBRIET) 267 mg Cap Comments:   Reason for Stopping:         clindamycin (CLEOCIN) 300 MG capsule Comments:   Reason for Stopping:             Discharge Procedure Orders   Diet Cardiac     Diet diabetic     Other restrictions (specify):   Order Comments: PLEASE OBSERVE FALL PRECAUTIONS.    Keep left leg elevated.     Call MD for:   Order Comments: For worsening symptoms, chest pain, shortness of breath, increased abdominal pain, high grade fever, stroke or stroke like symptoms, immediately go to the nearest Emergency Room or call 911 as soon as possible.     Change dressing (specify)   Order Comments: Dressing change: Wash left leg daily with soap and water and apply Bactroban as directed and cover with guaze.     Follow-up Information     Crispin Garcia MD In 1 week.    Specialty:  Family Medicine  Contact information:  [address] [facility]  [place] [address] [phone]             Daiana Cai MD In 2 weeks.    Specialty:  Hematology and Oncology  Contact information:  [address] [facility]  [address]  [place] [address] [phone]             Please follow up.    Contact information:  Check CBC in 7 days.                Time spent on the discharge of patient: 32 minutes  Patient was seen and examined on the date of discharge and determined to be suitable for discharge.

## 2019-06-20 ENCOUNTER — OUTSIDE PLACE OF SERVICE (OUTPATIENT)
Dept: INFECTIOUS DISEASES | Facility: CLINIC | Age: 74
End: 2019-06-20
Payer: MEDICARE

## 2019-06-20 ENCOUNTER — PATIENT OUTREACH (OUTPATIENT)
Dept: ADMINISTRATIVE | Facility: CLINIC | Age: 74
End: 2019-06-20

## 2019-06-20 NOTE — PATIENT INSTRUCTIONS
Discharge Instructions for Cellulitis  You have been diagnosed with cellulitis. This is an infection in the deepest layer of the skin. In some cases, the infection also affects the muscle. Cellulitis is caused by bacteria. The bacteria can enter the body through broken skin. This can happen with a cut, scratch, animal bite, or an insect bite that has been scratched. You may have been treated in the hospital with antibiotics and fluids. You will likely be given a prescription for antibiotics to take at home. This sheet will help you take care of yourself at home.  Home care  When you are home:  · Take the prescribed antibiotic medicine you are given as directed until it is gone. Take it even if you feel better. It treats the infection and stops it from returning. Not taking all the medicine can make future infections hard to treat.  · Keep the infected area clean.  · When possible, raise the infected area above the level of your heart. This helps keep swelling down.  · Talk with your healthcare provider if you are in pain. Ask what kind of over-the-counter medicine you can take for pain.  · Apply clean bandages as advised.  · Take your temperature once a day for a week.  · Wash your hands often to prevent spreading the infection.  In the future, wash your hands before and after you touch cuts, scratches, or bandages. This will help prevent infection.   When to call your healthcare provider  Call your healthcare provider immediately if you have any of the following:  · Difficulty or pain when moving the joints above or below the infected area  · Discharge or pus draining from the area  · Fever of 100.4°F (38°C) or higher, or as directed by your healthcare provider  · Pain that gets worse in or around the infected   · Redness that gets worse in or around the infected area, particularly if the area of redness expands to a wider area  · Shaking chills  · Swelling of the infected area  · Vomiting   Date Last Reviewed:  8/1/2016  © 6603-1897 [employer]. [address] [place] [address], [place] [address]. All rights reserved. This information is not intended as a substitute for professional medical care. Always follow your healthcare professional's instructions.

## 2019-06-20 NOTE — TELEPHONE ENCOUNTER
C3 nurse attempted to contact patient. No answer. The following message was left for the patient to return the call:  Good morning  I am a nurse calling on behalf of [facility] from the [facility].  This is a Transitional Care Call for Steve June Jr. When you have a moment please contact us at [phone] or [phone] Monday through Friday, between the hours of 8 am to 4 pm. We look forward to speaking with you. On behalf of [facility] have a nice day.    The patient has a scheduled HOSFU appointment with CORNEL Rae on 6/28/2019 AT 10:40 AM

## 2019-06-21 LAB
PA IGG BLD QL FC: NEGATIVE
PYRIDOXAL SERPL-MCNC: 8 UG/L (ref 5–50)
VIT B1 BLD-MCNC: 47 UG/L (ref 38–122)

## 2019-06-26 ENCOUNTER — OFFICE VISIT (OUTPATIENT)
Dept: PAIN MEDICINE | Facility: CLINIC | Age: 74
End: 2019-06-26
Payer: MEDICARE

## 2019-06-26 VITALS
DIASTOLIC BLOOD PRESSURE: 74 MMHG | HEIGHT: 69 IN | HEART RATE: 68 BPM | WEIGHT: 201 LBS | BODY MASS INDEX: 29.77 KG/M2 | SYSTOLIC BLOOD PRESSURE: 128 MMHG

## 2019-06-26 DIAGNOSIS — M47.819 FACET ARTHROPATHY: ICD-10-CM

## 2019-06-26 DIAGNOSIS — M50.30 DDD (DEGENERATIVE DISC DISEASE), CERVICAL: ICD-10-CM

## 2019-06-26 DIAGNOSIS — Z79.891 CHRONIC USE OF OPIATE FOR THERAPEUTIC PURPOSE: Primary | ICD-10-CM

## 2019-06-26 DIAGNOSIS — M17.10 PRIMARY OSTEOARTHRITIS OF KNEE, UNSPECIFIED LATERALITY: ICD-10-CM

## 2019-06-26 PROCEDURE — 3074F SYST BP LT 130 MM HG: CPT | Mod: HCNC,CPTII,S$GLB, | Performed by: PHYSICIAN ASSISTANT

## 2019-06-26 PROCEDURE — 99499 RISK ADDL DX/OHS AUDIT: ICD-10-PCS | Mod: HCNC,S$GLB,, | Performed by: PHYSICIAN ASSISTANT

## 2019-06-26 PROCEDURE — 3078F PR MOST RECENT DIASTOLIC BLOOD PRESSURE < 80 MM HG: ICD-10-PCS | Mod: HCNC,CPTII,S$GLB, | Performed by: PHYSICIAN ASSISTANT

## 2019-06-26 PROCEDURE — 99999 PR PBB SHADOW E&M-EST. PATIENT-LVL III: ICD-10-PCS | Mod: PBBFAC,HCNC,, | Performed by: PHYSICIAN ASSISTANT

## 2019-06-26 PROCEDURE — 80307 DRUG TEST PRSMV CHEM ANLYZR: CPT | Mod: HCNC

## 2019-06-26 PROCEDURE — 1101F PT FALLS ASSESS-DOCD LE1/YR: CPT | Mod: HCNC,CPTII,S$GLB, | Performed by: PHYSICIAN ASSISTANT

## 2019-06-26 PROCEDURE — 99214 PR OFFICE/OUTPT VISIT, EST, LEVL IV, 30-39 MIN: ICD-10-PCS | Mod: HCNC,S$GLB,, | Performed by: PHYSICIAN ASSISTANT

## 2019-06-26 PROCEDURE — 99214 OFFICE O/P EST MOD 30 MIN: CPT | Mod: HCNC,S$GLB,, | Performed by: PHYSICIAN ASSISTANT

## 2019-06-26 PROCEDURE — 99999 PR PBB SHADOW E&M-EST. PATIENT-LVL III: CPT | Mod: PBBFAC,HCNC,, | Performed by: PHYSICIAN ASSISTANT

## 2019-06-26 PROCEDURE — 3074F PR MOST RECENT SYSTOLIC BLOOD PRESSURE < 130 MM HG: ICD-10-PCS | Mod: HCNC,CPTII,S$GLB, | Performed by: PHYSICIAN ASSISTANT

## 2019-06-26 PROCEDURE — 3078F DIAST BP <80 MM HG: CPT | Mod: HCNC,CPTII,S$GLB, | Performed by: PHYSICIAN ASSISTANT

## 2019-06-26 PROCEDURE — 99499 UNLISTED E&M SERVICE: CPT | Mod: HCNC,S$GLB,, | Performed by: PHYSICIAN ASSISTANT

## 2019-06-26 PROCEDURE — 1101F PR PT FALLS ASSESS DOC 0-1 FALLS W/OUT INJ PAST YR: ICD-10-PCS | Mod: HCNC,CPTII,S$GLB, | Performed by: PHYSICIAN ASSISTANT

## 2019-06-26 RX ORDER — GABAPENTIN 600 MG/1
600 TABLET ORAL 3 TIMES DAILY
Qty: 90 TABLET | Refills: 2 | Status: SHIPPED | OUTPATIENT
Start: 2019-06-26 | End: 2019-07-28

## 2019-06-26 NOTE — PROGRESS NOTES
"Referring Physician: No ref. provider found    PCP: Crispin Garcia MD      CC: neck and left shoulder pain; knee pain    Interval history:  Steve June Jr. is a 74 y.o. male with  neck and left shoulder pain who presents today for f/u and medication refill.  He had a series of 2 Euflexxa injections that worsened his knee pain initially. Pain has now resolved. He has tried physical therapy which did not provide much benefit.   Steroid njections performed have only given him shortlived relief.  In the past he underwent visco supplementation injections for his left knee with benefit.  He continues to have neck pain. He has a history of cervical DDD.  However, he continues to have low platelets and we are unable to provide any  interventional procedures.  He takes oxycodone 10 mg every 6 hours as needed with mild to moderate benefit.  Does cause some drowsiness. Pain today is rated 4/10.    Prior HPI:   Steve June Jr. is a 74 y.o. male referred to us for lower back and knee pain.  He has significant history of pancytopenia, cirrhosis.  He presents with constant aching, sharp pain in his lower back as well as his left knee.  Pain worsens sitting, standing, bending, walking.  Pain improves with rest.  X-rays performed of the lumbar spine as well as knee shows left knee osteoarthritis.  He has tried physical therapy with minimal benefit.  He currently takes Norco 10 mg every 6 hours as needed with mild to moderate benefit.  He is unable to have any procedures due to his thrombocytopenia.  He also has ventral hernia, but surgical attention is currently not recommended due to his thrombocytopenia.  His main concern today is wishing to decrease his opioid medications.  He states being very "foggy" with his current medications.  He denies any increased sedation.  He denies any weakness.  No bowel bladder changes.    ROS:  CONSTITUTIONAL: No fevers, chills, night sweats, wt. loss, appetite changes  SKIN: no " rashes or itching  ENT: No headaches, head trauma, vision changes, or eye pain  LYMPH NODES: None noticed   CV: No chest pain, palpitations.   RESP: No shortness of breath, dyspnea on exertion, cough, wheezing, or hemoptysis  GI: No nausea, emesis, diarrhea, constipation, melena, hematochezia, pain.    : No dysuria, hematuria, urgency, or frequency   HEME: No easy bruising, bleeding problems  PSYCHIATRIC: No depression, anxiety, psychosis, hallucinations.  NEURO: No seizures, memory loss, dizziness or difficulty sleeping  MSK: + History of present illness      Past Medical History:   Diagnosis Date    Abnormal thyroid function test     Allergy     Seasonal    Anemia     Anemia due to blood loss 7/2/2014    Arthritis     Gaviria esophagus     Basal cell carcinoma     right forearm    Basal cell carcinoma 12/2011    lower post neck    Cancer     skin CA    Cataract     Cirrhosis     Diabetes mellitus     Diabetes mellitus, type 2     Encounter for blood transfusion     Esophageal varices in cirrhosis     grade II on 7/12 EGD    Gastritis     on 7/12 EGD    GERD (gastroesophageal reflux disease) 2/28/2015    Hard of hearing     Hiatal hernia     History of hepatitis C 8/10/2012    tx with harvoni x 41 days (started 10/22/15). SVR4     Hoarseness 2/28/2015    Hypercholesteremia     Hypersplenism     Hypertension     No meds    Pain management 12/10/2014    Petechial hemorrhage 11/25/2015    Lower extremities bilat     Portal hypertensive gastropathy     on 7/12 EGD    Thrombocytopenia     Type II or unspecified type diabetes mellitus with neurological manifestations, uncontrolled(250.62) 12/24/2013    Valvular heart disease     mild MR 12     Past Surgical History:   Procedure Laterality Date    ABLATION, RADIOFREQUENCY-left suprascapula Left 8/25/2017    [facility]    CATARACT EXTRACTION  1/10/13    left eye    CATARACT EXTRACTION      right eye     CHOLECYSTECTOMY      COLONOSCOPY      diagnostic block of the genicular branches to the left knee Left 12/15/2017    Performed by [facility] at [facility] OR    EGD (ESOPHAGOGASTRODUODENOSCOPY) N/A 3/13/2014    [facility]formed by [facility] at [facility] ENDO (4TH FLR)    EGD (ESOPHAGOGASTRODUODENOSCOPY) N/A 7/11/2013    Performed by Campos Walters MD at [facility] ENDO (4TH FLR)    ESOPHAGOGASTRODUODENOSCOPY (EGD) N/A 8/1/2014    Performed by Juan David Booker MD at [facility] ENDO (4TH FLR)    ESOPHAGOGASTRODUODENOSCOPY (EGD) N/A 7/3/2014    Performed by Juan [facility] Booker [facility] at [facility] ENDO (2ND FLR)    EYE SURGERY      Cataract surgery to right eye    INSERTION, IOL PROSTHESIS Left 1/10/2013    Performed by Domi Baker [facility] at [facility] OR 1ST FLR    KNEE ARTHROSCOPY W/ MENISCAL REPAIR      KNEE CARTILAGE SURGERY      left knee    KNEE SURGERY  12/2006    left    LARYNGOSCOPY Bilateral 12/5/2014    Performed by Anoop Bernstein [facility] at [facility] OR 2ND FLR    PHACOEMULSIFICATION, CATARACT Left 1/10/2013    Performed by [facility] at [facility] OR 1ST FLR    PHACOEMULSIFICATION, CATARACT Right 9/27/2012    Per[facility] by [facility]da Delgado [facility] at [facility] OR    SKIN LESION EXCISION      TONSILLECTOMY      UPPER GASTROINTESTINAL ENDOSCOPY       Family History   Problem Relation Age of Onset    Leukemia Mother     Cancer Mother         bone    Alcohol abuse Father     Cirrhosis Father         EtOH induced    No Known Problems Daughter     No Known Problems Son     No Known Problems Daughter     No Known Problems Daughter     No Known Problems Daughter     No Known Problems Sister     Amblyopia Neg Hx     Blindness Neg Hx     Cataracts Neg Hx     Diabetes Neg Hx     Glaucoma Neg Hx     Hypertension Neg Hx     Macular degeneration Neg Hx     Retinal detachment Neg Hx     Strabismus Neg Hx     Stroke Neg Hx     Thyroid disease Neg Hx     Psoriasis Neg Hx     Lupus Neg Hx      "Eczema Neg Hx     Acne Neg Hx     Melanoma Neg Hx      Social History     Socioeconomic History    Marital status:      Spouse name: Not on file    Number of children: 5    Years of education: Not on file    Highest education level: Not on file   Occupational History    Not on file   Social Needs    Financial resource strain: Not on file    Food insecurity:     Worry: Not on file     Inability: Not on file    Transportation needs:     Medical: Not on file     Non-medical: Not on file   Tobacco Use    Smoking status: Former Smoker     Packs/day: 1.00     Years: 25.00     Pack years: 25.00     Last attempt to quit: 2000     Years since quittin.8    Smokeless tobacco: Never Used   Substance and Sexual Activity    Alcohol use: Yes     Comment: rarely    Drug use: No    Sexual activity: Never   Lifestyle    Physical activity:     Days per week: Not on file     Minutes per session: Not on file    Stress: Not on file   Relationships    Social connections:     Talks on phone: Not on file     Gets together: Not on file     Attends Rest[employer] service: Not on file     Active member of club or organization: Not on file     Attends meetings of clubs or organizations: Not on file     Relationship status: Not on file   Other Topics Concern    Not on file   Social History Narrative    He is .  He has children.  He is currently retired.         Medications/Allergies: See med card    Vitals:    19 0948   BP: 128/74   Pulse: 68   Weight: 91.2 kg (201 lb)   Height: 5' 9" (1.753 m)   PainSc:   4   PainLoc: Knee     Physical exam:    GENERAL: A and O x3, the patient appears well groomed and is in no acute distress.  Skin: No rashes or obvious lesions  HEENT: normocephalic, atraumatic  CARDIOVASCULAR:  RRR  LUNGS: non labored breathing  ABDOMEN: soft, nontender, + sizaeable ventral hernia in epigastric region.    UPPER EXTREMITIES: Normal alignment, normal range of motion, no atrophy, no skin " changes,  hair growth and nail growth normal and equal bilaterally. No swelling, no tenderness.    LOWER EXTREMITIES:  Normal alignment, normal range of motion, no atrophy, no skin changes,  hair growth and nail growth normal and equal bilaterally. No swelling, no tenderness.    LUMBAR SPINE  Lumbar spine: ROM is full with flexion extension and oblique extension with moderate increased pain.    Erasmo's test causes no increased pain on either side.    Supine straight leg raise is negative bilaterally.    Internal and external rotation of the hip causes no increased pain on either side.  Myofascial exam: No tenderness to palpation across lumbar paraspinous muscles.      MENTAL STATUS: normal orientation, speech, language, and fund of knowledge for social situation.  Emotional state appropriate.    CRANIAL NERVES:  II:  PERRL bilaterally,   III,IV,VI: EOMI.    V:  Facial sensation equal bilaterally  VII:  Facial motor function normal.  VIII:  Hearing equal to finger rub bilaterally  IX/X: Gag normal, palate symmetric  XI:  Shoulder shrug equal, head turn equal  XII:  Tongue midline without fasciculations      MOTOR: Tone and bulk: normal bilateral upper and lower Strength: normal   Delt Bi Tri WE WF     R 5 5 5 5 5 5   L 5 5 5 5 5 5     IP ADD ABD Quad TA Gas HAM  R 5 5 5 5 5 5 5  L 5 5 5 5 5 5 5    SENSATION: Light touch and pinprick intact bilaterally  REFLEXES: normal, symmetric, nonbrisk.  Toes down, no clonus. No hoffmans.  GAIT: normal rise, base, steps, and arm swing.      Imaging:  Xray L-spine 4/2013   Alignment is otherwise normal.  Vertebral body heights and disc space heights are relatively well maintained.  Vertebral end plate osteophytes are present anteriorly   at multiple levels.  The facet joints and posterior elements are unremarkable       Xray Knee 12/2013  Degenerative change of the knees, left greater than right.    Assessment:  Steve June Jr. is a 74 y.o. male with neck, back and  left knee pain  1. Chronic use of opiate for therapeutic purpose    2. Primary osteoarthritis of knee, unspecified laterality    3. DDD (degenerative disc disease), cervical    4. Facet arthropathy      Plan:  1. I have stressed the importance of physical activity and exercise to improve overall health.  Continue PT exercises learned at home.  2.  Any interventional procedures will be deferred due to his low platelet count.  Patient expressed understanding.  3.  Monitor progress from left knee Euflexxa series  4. Percocet 10mg q 8 hrs as needed.  Hold for sedation.  UDS today.  5. Gabapentin 600 mg TID. #90 2 refills.   6.  Follow-up 3 months  All medication management was performed by Dr. Kapil Mario

## 2019-06-27 ENCOUNTER — DOCUMENTATION ONLY (OUTPATIENT)
Dept: FAMILY MEDICINE | Facility: CLINIC | Age: 74
End: 2019-06-27

## 2019-06-27 NOTE — PROGRESS NOTES
Pre-Visit Chart Review  For Appointment Scheduled on 07/01/2019    Health Maintenance Due   Topic Date Due    TETANUS VACCINE  05/13/1965    Colonoscopy  05/13/1997    Hemoglobin A1c  07/03/2019

## 2019-06-30 LAB
6MAM UR QL: NOT DETECTED
7AMINOCLONAZEPAM UR QL: NOT DETECTED
A-OH ALPRAZ UR QL: NOT DETECTED
ALPRAZ UR QL: NOT DETECTED
AMPHET UR QL SCN: NOT DETECTED
BARBITURATES UR QL: NOT DETECTED
BUPRENORPHINE UR QL: NOT DETECTED
BZE UR QL: NOT DETECTED
CARBOXYTHC UR QL: PRESENT
CARISOPRODOL UR QL: NOT DETECTED
CLONAZEPAM UR QL: NOT DETECTED
CODEINE UR QL: NOT DETECTED
CREAT UR-MCNC: 112.4 MG/DL (ref 20–400)
DIAZEPAM UR QL: NOT DETECTED
ETHYL GLUCURONIDE UR QL: PRESENT
FENTANYL UR QL: NOT DETECTED
HYDROCODONE UR QL: NOT DETECTED
HYDROMORPHONE UR QL: NOT DETECTED
LORAZEPAM UR QL: NOT DETECTED
MDA UR QL: NOT DETECTED
MDEA UR QL: NOT DETECTED
MDMA UR QL: NOT DETECTED
ME-PHENIDATE UR QL: NOT DETECTED
MEPERIDINE UR QL: NOT DETECTED
METHADONE UR QL: NOT DETECTED
METHAMPHET UR QL: NOT DETECTED
MIDAZOLAM UR QL SCN: NOT DETECTED
MORPHINE UR QL: NOT DETECTED
NORBUPRENORPHINE UR QL CFM: NOT DETECTED
NORDIAZEPAM UR QL: NOT DETECTED
NORFENTANYL UR QL: NOT DETECTED
NORHYDROCODONE UR QL CFM: NOT DETECTED
NOROXYCODONE UR QL CFM: PRESENT
NOROXYMORPHONE: NOT DETECTED
OXAZEPAM UR QL: NOT DETECTED
OXYCODONE UR QL: PRESENT
OXYMORPHONE UR QL: PRESENT
PATHOLOGY STUDY: NORMAL
PCP UR QL: NOT DETECTED
PHENTERMINE UR QL: NOT DETECTED
PROPOXYPH UR QL: NOT DETECTED
SERVICE CMNT-IMP: NORMAL
TAPENTADOL UR QL SCN: NOT DETECTED
TAPENTADOL-O-SULF: NOT DETECTED
TEMAZEPAM UR QL: NOT DETECTED
TRAMADOL UR QL: NOT DETECTED
ZOLPIDEM UR QL: NOT DETECTED

## 2019-07-01 ENCOUNTER — TELEPHONE (OUTPATIENT)
Dept: PAIN MEDICINE | Facility: CLINIC | Age: 74
End: 2019-07-01

## 2019-07-01 NOTE — TELEPHONE ENCOUNTER
Informed patient of UDS results and instructions per CORNEL Paul. Patient accepted and voiced understanding.

## 2019-07-01 NOTE — TELEPHONE ENCOUNTER
UDS positive for marijuana metabolites. Will retest at follow up and if positive in the future will no longer prescribe controlled substances.

## 2019-07-03 ENCOUNTER — HOSPITAL ENCOUNTER (OUTPATIENT)
Dept: RADIOLOGY | Facility: CLINIC | Age: 74
Discharge: HOME | End: 2019-07-03
Attending: NURSE PRACTITIONER
Payer: MEDICARE

## 2019-07-03 DIAGNOSIS — K74.60 CIRRHOSIS OF LIVER WITHOUT ASCITES, UNSPECIFIED HEPATIC CIRRHOSIS TYPE: ICD-10-CM

## 2019-07-03 PROCEDURE — 76700 US EXAM ABDOM COMPLETE: CPT | Mod: TC,HCNC,PO

## 2019-07-03 PROCEDURE — 76700 US EXAM ABDOM COMPLETE: CPT | Mod: 26,HCNC,, | Performed by: RADIOLOGY

## 2019-07-03 PROCEDURE — 76700 US ABDOMEN COMPLETE: ICD-10-PCS | Mod: 26,HCNC,, | Performed by: RADIOLOGY

## 2019-07-08 ENCOUNTER — HOSPITAL ENCOUNTER (OUTPATIENT)
Dept: RADIOLOGY | Facility: HOSPITAL | Age: 74
Discharge: HOME | End: 2019-07-08
Attending: FAMILY MEDICINE
Payer: MEDICARE

## 2019-07-08 DIAGNOSIS — N63.0 BREAST MASS IN MALE: ICD-10-CM

## 2019-07-08 DIAGNOSIS — D24.2 BENIGN NEOPLASM OF LEFT BREAST: ICD-10-CM

## 2019-07-08 PROCEDURE — 77062 BREAST TOMOSYNTHESIS BI: CPT | Mod: 26,HCNC,, | Performed by: RADIOLOGY

## 2019-07-08 PROCEDURE — 77066 MAMMO DIGITAL DIAGNOSTIC BILAT WITH TOMOSYNTHESIS_CAD: ICD-10-PCS | Mod: 26,HCNC,, | Performed by: RADIOLOGY

## 2019-07-08 PROCEDURE — 77062 BREAST TOMOSYNTHESIS BI: CPT | Mod: TC,HCNC

## 2019-07-08 PROCEDURE — 77066 DX MAMMO INCL CAD BI: CPT | Mod: 26,HCNC,, | Performed by: RADIOLOGY

## 2019-07-08 PROCEDURE — 77062 MAMMO DIGITAL DIAGNOSTIC BILAT WITH TOMOSYNTHESIS_CAD: ICD-10-PCS | Mod: 26,HCNC,, | Performed by: RADIOLOGY

## 2019-07-10 ENCOUNTER — OFFICE VISIT (OUTPATIENT)
Dept: FAMILY MEDICINE | Facility: CLINIC | Age: 74
End: 2019-07-10
Payer: MEDICARE

## 2019-07-10 VITALS
SYSTOLIC BLOOD PRESSURE: 132 MMHG | DIASTOLIC BLOOD PRESSURE: 76 MMHG | HEIGHT: 69 IN | OXYGEN SATURATION: 95 % | WEIGHT: 201.5 LBS | BODY MASS INDEX: 29.84 KG/M2 | TEMPERATURE: 98 F | HEART RATE: 78 BPM

## 2019-07-10 DIAGNOSIS — S81.811A LACERATION OF RIGHT LOWER LEG, INITIAL ENCOUNTER: ICD-10-CM

## 2019-07-10 DIAGNOSIS — L03.031 CELLULITIS AND ABSCESS OF TOE OF RIGHT FOOT: Primary | ICD-10-CM

## 2019-07-10 DIAGNOSIS — L02.611 CELLULITIS AND ABSCESS OF TOE OF RIGHT FOOT: Primary | ICD-10-CM

## 2019-07-10 PROCEDURE — 99999 PR PBB SHADOW E&M-EST. PATIENT-LVL III: CPT | Mod: PBBFAC,HCNC,, | Performed by: FAMILY MEDICINE

## 2019-07-10 PROCEDURE — 1101F PR PT FALLS ASSESS DOC 0-1 FALLS W/OUT INJ PAST YR: ICD-10-PCS | Mod: HCNC,CPTII,S$GLB, | Performed by: FAMILY MEDICINE

## 2019-07-10 PROCEDURE — 1101F PT FALLS ASSESS-DOCD LE1/YR: CPT | Mod: HCNC,CPTII,S$GLB, | Performed by: FAMILY MEDICINE

## 2019-07-10 PROCEDURE — 3075F SYST BP GE 130 - 139MM HG: CPT | Mod: HCNC,CPTII,S$GLB, | Performed by: FAMILY MEDICINE

## 2019-07-10 PROCEDURE — 99999 PR PBB SHADOW E&M-EST. PATIENT-LVL III: ICD-10-PCS | Mod: PBBFAC,HCNC,, | Performed by: FAMILY MEDICINE

## 2019-07-10 PROCEDURE — 3078F DIAST BP <80 MM HG: CPT | Mod: HCNC,CPTII,S$GLB, | Performed by: FAMILY MEDICINE

## 2019-07-10 PROCEDURE — 99214 PR OFFICE/OUTPT VISIT, EST, LEVL IV, 30-39 MIN: ICD-10-PCS | Mod: HCNC,S$GLB,, | Performed by: FAMILY MEDICINE

## 2019-07-10 PROCEDURE — 3078F PR MOST RECENT DIASTOLIC BLOOD PRESSURE < 80 MM HG: ICD-10-PCS | Mod: HCNC,CPTII,S$GLB, | Performed by: FAMILY MEDICINE

## 2019-07-10 PROCEDURE — 3075F PR MOST RECENT SYSTOLIC BLOOD PRESS GE 130-139MM HG: ICD-10-PCS | Mod: HCNC,CPTII,S$GLB, | Performed by: FAMILY MEDICINE

## 2019-07-10 PROCEDURE — 99214 OFFICE O/P EST MOD 30 MIN: CPT | Mod: HCNC,S$GLB,, | Performed by: FAMILY MEDICINE

## 2019-07-10 RX ORDER — MUPIROCIN 20 MG/G
OINTMENT TOPICAL 3 TIMES DAILY
Qty: 15 G | Refills: 1 | Status: SHIPPED | OUTPATIENT
Start: 2019-07-10 | End: 2019-07-20

## 2019-07-10 RX ORDER — SULFAMETHOXAZOLE AND TRIMETHOPRIM 800; 160 MG/1; MG/1
1 TABLET ORAL 2 TIMES DAILY
Qty: 20 TABLET | Refills: 0 | Status: SHIPPED | OUTPATIENT
Start: 2019-07-10 | End: 2019-07-20

## 2019-07-10 NOTE — PROGRESS NOTES
Subjective:       Patient ID: Steve June Jr. is a 74 y.o. male.    Chief Complaint: Leg Pain (right )    New to me patient here for UC visit.    Right foot has gotten sore and red  - similar to previous episodes of cellulitis - for past several days.  Then he hit right lower leg on edge of table and tore the skin.    Leg Pain    The incident occurred 2 days ago. The incident occurred at home. The injury mechanism was a direct blow. The pain is present in the right foot and right leg. The quality of the pain is described as aching. The pain is mild. Pertinent negatives include no numbness. Treatments tried: Neosporin and/or iodine.     Review of Systems   Constitutional: Negative for fever.   Respiratory: Negative for shortness of breath.    Cardiovascular: Negative for chest pain.   Gastrointestinal: Negative for abdominal pain and nausea.   Skin: Negative for rash.   Neurological: Negative for numbness.   All other systems reviewed and are negative.      Objective:      Physical Exam   Constitutional: He appears well-developed. No distress.   Cardiovascular: Normal rate and regular rhythm.   No murmur heard.  Pulmonary/Chest: Effort normal and breath sounds normal.   Musculoskeletal:        Legs:      Assessment:       1. Cellulitis and abscess of toe of right foot    2. Laceration of right lower leg, initial encounter        Plan:       Cellulitis and abscess of toe of right foot  -     sulfamethoxazole-trimethoprim 800-160mg (BACTRIM DS) 800-160 mg Tab; Take 1 tablet by mouth 2 (two) times daily. for 10 days  Dispense: 20 tablet; Refill: 0    Laceration of right lower leg, initial encounter  -     mupirocin (BACTROBAN) 2 % ointment; Apply topically 3 (three) times daily. for 10 days  Dispense: 15 g; Refill: 1

## 2019-07-11 ENCOUNTER — TELEPHONE (OUTPATIENT)
Dept: HEPATOLOGY | Facility: CLINIC | Age: 74
End: 2019-07-11

## 2019-07-11 NOTE — TELEPHONE ENCOUNTER
MA called patient to inform him that due to bad weather we are going to cancel his appt in [place] tomorrow 7/12/19 he is unable to reached left him DETAILED VM about the cancellation and sent him [facility] message.

## 2019-07-15 ENCOUNTER — TELEPHONE (OUTPATIENT)
Dept: HEPATOLOGY | Facility: CLINIC | Age: 74
End: 2019-07-15

## 2019-07-15 NOTE — TELEPHONE ENCOUNTER
MA called patient to reschedule his 7/12 appointment inform him that the next available appointment we have is not till 9/19 in [place] location. Patient said that is too far offer him main campus he said he don't want to come here. He said that don't schedule his appointment he will just see his local MD.

## 2019-07-16 NOTE — TELEPHONE ENCOUNTER
Attempted to call pt, no answer. Left VM stating that since labs and u/s were fine in 7/2019, will have him repeat again 1/2020 in [place]. Hopefully by that time Dr. June will have availability in [place]. Also [facility] my [facility].    Please schedule u/s and labs in 1/2020 in [place] and enter recall for him to see Dr. June in 1/2020 in [place]. Or can do video visit with me.

## 2019-07-21 DIAGNOSIS — E11.51 TYPE 2 DIABETES MELLITUS WITH DIABETIC PERIPHERAL ANGIOPATHY WITHOUT GANGRENE, WITHOUT LONG-TERM CURRENT USE OF INSULIN: ICD-10-CM

## 2019-07-22 DIAGNOSIS — E11.9 TYPE 2 DIABETES MELLITUS WITHOUT COMPLICATION: ICD-10-CM

## 2019-07-22 RX ORDER — INSULIN ASPART 100 [IU]/ML
INJECTION, SOLUTION INTRAVENOUS; SUBCUTANEOUS
Qty: 30 ML | Refills: 0 | Status: SHIPPED | OUTPATIENT
Start: 2019-07-22 | End: 2020-05-29

## 2019-07-23 ENCOUNTER — OFFICE VISIT (OUTPATIENT)
Dept: DERMATOLOGY | Facility: CLINIC | Age: 74
End: 2019-07-23
Payer: MEDICARE

## 2019-07-23 VITALS — HEIGHT: 69 IN | WEIGHT: 201.5 LBS | BODY MASS INDEX: 29.84 KG/M2

## 2019-07-23 DIAGNOSIS — D04.9 SQUAMOUS CELL CARCINOMA IN SITU (SCCIS) OF SKIN: Primary | ICD-10-CM

## 2019-07-23 DIAGNOSIS — L57.0 ACTINIC KERATOSES: ICD-10-CM

## 2019-07-23 DIAGNOSIS — L82.1 SEBORRHEIC KERATOSES: ICD-10-CM

## 2019-07-23 DIAGNOSIS — C44.219 BASAL CELL CARCINOMA, EAR, LEFT: ICD-10-CM

## 2019-07-23 DIAGNOSIS — Z85.828 HISTORY OF NONMELANOMA SKIN CANCER: ICD-10-CM

## 2019-07-23 PROCEDURE — 99213 PR OFFICE/OUTPT VISIT, EST, LEVL III, 20-29 MIN: ICD-10-PCS | Mod: 25,HCNC,S$GLB, | Performed by: DERMATOLOGY

## 2019-07-23 PROCEDURE — 1101F PR PT FALLS ASSESS DOC 0-1 FALLS W/OUT INJ PAST YR: ICD-10-PCS | Mod: HCNC,CPTII,S$GLB, | Performed by: DERMATOLOGY

## 2019-07-23 PROCEDURE — 1101F PT FALLS ASSESS-DOCD LE1/YR: CPT | Mod: HCNC,CPTII,S$GLB, | Performed by: DERMATOLOGY

## 2019-07-23 PROCEDURE — 99213 OFFICE O/P EST LOW 20 MIN: CPT | Mod: 25,HCNC,S$GLB, | Performed by: DERMATOLOGY

## 2019-07-23 PROCEDURE — 17003 DESTRUCT PREMALG LES 2-14: CPT | Mod: HCNC,S$GLB,, | Performed by: DERMATOLOGY

## 2019-07-23 PROCEDURE — 17000 DESTRUCT PREMALG LESION: CPT | Mod: HCNC,S$GLB,, | Performed by: DERMATOLOGY

## 2019-07-23 PROCEDURE — 99999 PR PBB SHADOW E&M-EST. PATIENT-LVL III: CPT | Mod: PBBFAC,HCNC,, | Performed by: DERMATOLOGY

## 2019-07-23 PROCEDURE — 99999 PR PBB SHADOW E&M-EST. PATIENT-LVL III: ICD-10-PCS | Mod: PBBFAC,HCNC,, | Performed by: DERMATOLOGY

## 2019-07-23 PROCEDURE — 17000 PR DESTRUCTION(LASER SURGERY,CRYOSURGERY,CHEMOSURGERY),PREMALIGNANT LESIONS,FIRST LESION: ICD-10-PCS | Mod: HCNC,S$GLB,, | Performed by: DERMATOLOGY

## 2019-07-23 PROCEDURE — 17003 DESTRUCTION, PREMALIGNANT LESIONS; SECOND THROUGH 14 LESIONS: ICD-10-PCS | Mod: HCNC,S$GLB,, | Performed by: DERMATOLOGY

## 2019-07-23 NOTE — Clinical Note
BCC left posterior helix, consult 04/2019 with plan to proceed surgically, never happened, can you please look into this? Thank you

## 2019-07-23 NOTE — PROGRESS NOTES
Subjective:       Patient ID:  Steve June Jr. is a 74 y.o. male who presents for   Chief Complaint   Patient presents with    Skin Check     TBSE     Patient last seen 4/2019  bx of several concerning lesions:  Planned efudex- states cannot afford $60 tube of efudex, did not fill  Had consult with Dr Blank, Mohs was discussed, did not happen, patient states he was never contacted    Patient with cirrhosis and chronic thrombocytopenia (30-40s)  No pacemaker or defibrillator    Notes recorded by Salma Aguilera MD on 5/6/2019 at 3:29 PM CDT  Ear BCC needs Mohs  Other 3 sites may need excision by Mohs as well, but treatment with efudex cream BID x 4 weeks may be attempted first with a FU at 6-8 weeks to assess progress  I recommend treatment by a Mohs surgeon who has the ability to ensure negative surgical margins before repairing the defect. Previously under the care of Dr Blank.   Please notify patient and place referral request once patient has been given diagnosis and opportunity to discuss treatment plan    FINAL PATHOLOGIC DIAGNOSIS  1. Skin, right temple, shave biopsy:  - SQUAMOUS CELL CARCINOMA IN SITU/ BOWEN'S DISEASE.  - THE TUMOR EXTENDS TO A LATERAL BIOPSY MARGIN.  MICROSCOPIC DESCRIPTION: Sections show epidermis with full-thickness atypia, parakeratosis and variable  epidermal maturation. Dermal involvement is not seen.  2. Skin, right preauricular cheek, shave biopsy:  - SQUAMOUS CELL CARCINOMA IN SITU.  - THE TUMOR FOCALLY EXTENDS TO THE DEEP AND LATERAL BIOPSY MARGINS.  MICROSCOPIC DESCRIPTION: Sections show epidermis with full-thickness atypia, parakeratosis and variable  epidermal maturation. Dermal involvement is not seen.  3. Skin, left preauricular cheek, shave biopsy:  - ACTINIC KERATOSIS WITH FOCAL TRANSITION TO SQUAMOUS CELL CARCINOMA IN SITU.  - MARGINS ARE NEGATIVE FOR FULL THICKNESS SQUAMOUS ATYPIA IN THE PLANES OF SECTION.  MICROSCOPIC DESCRIPTION: Sections show atypia  "within the lowermost epidermal layers associated with foci  showing full-thickness atypia. The underlying papillary dermis shows solar elastosis.  4. Skin, left posterior helix, shave biopsy:  - BASAL CELL CARCINOMA WITH MIXED NODULAR AND INFILTRATIVE GROWTH PATTERN.  - THE TUMOR EXTENDS TO THE DEEP AND LATERAL BIOPSY MARGINS.    Dr Odonnell's note on initial consult "However, prior to scheduling his surgery, I will discuss his thrombocytopenia with Dr. Ponce to see if he would have any concerns regarding this.  We will coordinate his care thereafter.  Addendum:   I discussed his circumstances with Dr. Ponce, did not feel that his current platelet count would represent a problem.  We will contact the patient to schedule his surgery and proceed as planned"            Review of Systems   Constitutional: Negative for fever, chills and fatigue.   Skin: Positive for wears hat. Negative for daily sunscreen use and activity-related sunscreen use.   Hematologic/Lymphatic: Bruises/bleeds easily.        Objective:    Physical Exam   Constitutional: He appears well-developed and well-nourished. No distress.   Neurological: He is alert and oriented to person, place, and time. He is not disoriented.   Psychiatric: He has a normal mood and affect.   Skin:   Areas Examined (abnormalities noted in diagram):   Scalp / Hair Palpated and Inspected  Head / Face Inspection Performed  Neck Inspection Performed  Chest / Axilla Inspection Performed  Abdomen Inspection Performed  Back Inspection Performed  RUE Inspected  LUE Inspection Performed  Nails and Digits Inspection Performed                       Diagram Legend     Erythematous scaling macule/papule c/w actinic keratosis       Vascular papule c/w angioma      Pigmented verrucoid papule/plaque c/w seborrheic keratosis      Yellow umbilicated papule c/w sebaceous hyperplasia      Irregularly shaped tan macule c/w lentigo     1-2 mm smooth white papules consistent with Milia    "   Movable subcutaneous cyst with punctum c/w epidermal inclusion cyst      Subcutaneous movable cyst c/w pilar cyst      Firm pink to brown papule c/w dermatofibroma      Pedunculated fleshy papule(s) c/w skin tag(s)      Evenly pigmented macule c/w junctional nevus     Mildly variegated pigmented, slightly irregular-bordered macule c/w mildly atypical nevus      Flesh colored to evenly pigmented papule c/w intradermal nevus       Pink pearly papule/plaque c/w basal cell carcinoma      Erythematous hyperkeratotic cursted plaque c/w SCC      Surgical scar with no sign of skin cancer recurrence      Open and closed comedones      Inflammatory papules and pustules      Verrucoid papule consistent consistent with wart     Erythematous eczematous patches and plaques     Dystrophic onycholytic nail with subungual debris c/w onychomycosis     Umbilicated papule    Erythematous-base heme-crusted tan verrucoid plaque consistent with inflamed seborrheic keratosis     Erythematous Silvery Scaling Plaque c/w Psoriasis     See annotation                  Assessment / Plan:        Squamous cell carcinoma in situ (SCCIS) of skin  3 sites biopsied 04/2019  Did not treat with efudex secondary to cost  tolak samples for daily application for 4 weeks  Reevaluate in 6 weeks    Basal cell carcinoma, ear, left  No procedure scheduled post Mohs consult with Dr Blank  Will contact his office- procedure scheduled in September (9/10)    History of nonmelanoma skin cancer  Area of previous NMSC (R sup helix) examined. Upper body skin examination performed today.    Actinic keratoses  Cryosurgery Procedure Note    Verbal consent from the patient is obtained and the patient is aware of the precancerous quality and need for treatment of these lesions. Liquid nitrogen cryosurgery is applied to the 9 actinic keratoses, as detailed in the physical exam, to produce a freeze injury. The patient is aware that blisters may form and is instructed on  wound care with gentle cleansing and use of vaseline ointment to keep moist until healed. The patient is supplied a handout on cryosurgery and is instructed to call if lesions do not completely resolve.    Seborrheic keratoses  These are benign inherited growths without a malignant potential. Reassurance given to patient. No treatment is necessary.     Severe actinic damage, compliance issue, financial strain with prescribed treatment       No follow-ups on file.     ADDENDUM 9/24/19  MD Salma Juares MD Helene,   Attached is the note from my visit with Mr. June. As you may remember, I had seen him previously regarding treatment of a biopsy-proven basal cell carcinoma on the left ear superior helix. At the time of his consultation, I examined the area in question and I discussed treatment options with him, and after discussing the alternatives, he was scheduled for treatment via Mohs surgery.   As you know, he subsequently canceled his appointment for surgery as he felt that the site had cleared   At his followup visit today for re-evaluation, there is, in fact, presently no evidence of residual tumor at the site. We again discussed treatment options, and given the current finding will defer further treatment pro tem.   He has agreed to followup with me again in 3 months, or sooner if there is any notable change to the site.   Please let me know if you have any questions.   Many thanks,   Lonnie

## 2019-07-24 ENCOUNTER — TELEPHONE (OUTPATIENT)
Dept: DERMATOLOGY | Facility: CLINIC | Age: 74
End: 2019-07-24

## 2019-07-24 NOTE — TELEPHONE ENCOUNTER
Call patient to let him know that his surgery date was on 9-10-19. I also mailed him his appointment paper and pre op instructions. Vicky

## 2019-07-24 NOTE — TELEPHONE ENCOUNTER
----- Message from Salma Aguilera MD sent at 7/23/2019  4:39 PM CDT -----  Regarding: scheduled mohs  Thank you Vicky  I'm sorry I could have seen that had I looked...  Patient does not seem aware, we'll make sure we remind him!    ----- Message -----  From: Suzanne Bueno, CST  Sent: 7/23/2019  10:02 AM  To: Salma Aguilera MD, Suzanne Bueno, CST    Morning Dr. Aguilera, I just checked to see and this patient is scheduled for Mohs on 9-10-19 Vicky  ----- Message -----  From: Salma Aguilera MD  Sent: 7/23/2019   9:54 AM  To: Abhay HARDWICK Staff    BCC left posterior helix, consult 04/2019 with plan to proceed surgically, never happened, can you please look into this? Thank you

## 2019-07-28 ENCOUNTER — OFFICE VISIT (OUTPATIENT)
Dept: URGENT CARE | Facility: CLINIC | Age: 74
End: 2019-07-28
Payer: MEDICARE

## 2019-07-28 VITALS
HEIGHT: 69 IN | TEMPERATURE: 97 F | BODY MASS INDEX: 29.68 KG/M2 | SYSTOLIC BLOOD PRESSURE: 130 MMHG | WEIGHT: 200.38 LBS | HEART RATE: 64 BPM | RESPIRATION RATE: 16 BRPM | DIASTOLIC BLOOD PRESSURE: 76 MMHG

## 2019-07-28 DIAGNOSIS — K12.1 STOMATITIS: Primary | ICD-10-CM

## 2019-07-28 PROCEDURE — 99214 OFFICE O/P EST MOD 30 MIN: CPT | Mod: S$GLB,,, | Performed by: NURSE PRACTITIONER

## 2019-07-28 PROCEDURE — 99214 PR OFFICE/OUTPT VISIT, EST, LEVL IV, 30-39 MIN: ICD-10-PCS | Mod: S$GLB,,, | Performed by: NURSE PRACTITIONER

## 2019-07-28 NOTE — PROGRESS NOTES
"Subjective:       Patient ID: Steve June Jr. is a 74 y.o. male.    Vitals:  height is 5' 9" (1.753 m) and weight is 90.9 kg (200 lb 6.4 oz). His oral temperature is 97.2 °F (36.2 °C). His blood pressure is 130/76 and his pulse is 64. His respiration is 16.     Chief Complaint: Mouth Lesions (X 4 days, white spots/sores on gums)    HPI    Constitution: Negative for fever.   HENT: Positive for mouth sores.    Neck: Negative for neck stiffness.   Cardiovascular: Negative for chest pain.   Respiratory: Negative for cough.    Gastrointestinal: Negative for abdominal pain.   Genitourinary: Negative for dysuria, frequency, urgency and hematuria.   Skin: Negative for rash.       Objective:      Physical Exam   Constitutional: He is oriented to person, place, and time. He appears well-developed and well-nourished.   HENT:   Multiple ulcerative lesions to gingiva   Eyes: Conjunctivae are normal.   Neck: Normal range of motion.   Cardiovascular: Normal rate.   Pulmonary/Chest: Effort normal.   Neurological: He is alert and oriented to person, place, and time.   Psychiatric: He has a normal mood and affect. His behavior is normal.   Nursing note and vitals reviewed.      Assessment:       1. Stomatitis        Plan:         Stomatitis    Other orders  -     nystatin ([employer] SOLUTION); Take 10 mLs by mouth 4 (four) times daily. for 5 days  Dispense: 1 Bottle; Refill: 0    Pt is a 73y/o with multiple medical problems including DM (states CBGs 200 recently) and states recently on multiple antibiotics in hospital presenting with complaint of mouth lesions. On exam there area several lesions/ulcerations, it does not appear to be Candida but with Hx will provide Nystatin/[place] solution. Advised PCP follow up in 2-3 days for re-evaluation.      "

## 2019-08-15 ENCOUNTER — OFFICE VISIT (OUTPATIENT)
Dept: PODIATRY | Facility: CLINIC | Age: 74
End: 2019-08-15
Payer: MEDICARE

## 2019-08-15 VITALS
HEART RATE: 72 BPM | BODY MASS INDEX: 29.59 KG/M2 | SYSTOLIC BLOOD PRESSURE: 134 MMHG | HEIGHT: 69 IN | WEIGHT: 199.75 LBS | DIASTOLIC BLOOD PRESSURE: 86 MMHG

## 2019-08-15 DIAGNOSIS — B35.1 ONYCHOMYCOSIS DUE TO DERMATOPHYTE: ICD-10-CM

## 2019-08-15 DIAGNOSIS — I73.9 PVD (PERIPHERAL VASCULAR DISEASE): Primary | ICD-10-CM

## 2019-08-15 DIAGNOSIS — L60.0 OC (ONYCHOCRYPTOSIS): ICD-10-CM

## 2019-08-15 DIAGNOSIS — E11.51 TYPE II DIABETES MELLITUS WITH PERIPHERAL CIRCULATORY DISORDER: ICD-10-CM

## 2019-08-15 DIAGNOSIS — E08.42 DIABETIC POLYNEUROPATHY ASSOCIATED WITH DIABETES MELLITUS DUE TO UNDERLYING CONDITION: ICD-10-CM

## 2019-08-15 DIAGNOSIS — L84 PRE-ULCERATIVE CALLUSES: ICD-10-CM

## 2019-08-15 PROCEDURE — 3079F PR MOST RECENT DIASTOLIC BLOOD PRESSURE 80-89 MM HG: ICD-10-PCS | Mod: HCNC,CPTII,S$GLB, | Performed by: PODIATRIST

## 2019-08-15 PROCEDURE — 99213 OFFICE O/P EST LOW 20 MIN: CPT | Mod: 25,HCNC,S$GLB, | Performed by: PODIATRIST

## 2019-08-15 PROCEDURE — 3045F PR MOST RECENT HEMOGLOBIN A1C LEVEL 7.0-9.0%: CPT | Mod: HCNC,CPTII,S$GLB, | Performed by: PODIATRIST

## 2019-08-15 PROCEDURE — 3079F DIAST BP 80-89 MM HG: CPT | Mod: HCNC,CPTII,S$GLB, | Performed by: PODIATRIST

## 2019-08-15 PROCEDURE — 3075F SYST BP GE 130 - 139MM HG: CPT | Mod: HCNC,CPTII,S$GLB, | Performed by: PODIATRIST

## 2019-08-15 PROCEDURE — 99999 PR PBB SHADOW E&M-EST. PATIENT-LVL III: ICD-10-PCS | Mod: PBBFAC,HCNC,, | Performed by: PODIATRIST

## 2019-08-15 PROCEDURE — 11057 PR TRIM BENIGN HYPERKERATOTIC SKIN LESION,>4: ICD-10-PCS | Mod: Q9,HCNC,S$GLB, | Performed by: PODIATRIST

## 2019-08-15 PROCEDURE — 1101F PT FALLS ASSESS-DOCD LE1/YR: CPT | Mod: HCNC,CPTII,S$GLB, | Performed by: PODIATRIST

## 2019-08-15 PROCEDURE — 99213 PR OFFICE/OUTPT VISIT, EST, LEVL III, 20-29 MIN: ICD-10-PCS | Mod: 25,HCNC,S$GLB, | Performed by: PODIATRIST

## 2019-08-15 PROCEDURE — 3075F PR MOST RECENT SYSTOLIC BLOOD PRESS GE 130-139MM HG: ICD-10-PCS | Mod: HCNC,CPTII,S$GLB, | Performed by: PODIATRIST

## 2019-08-15 PROCEDURE — 3045F PR MOST RECENT HEMOGLOBIN A1C LEVEL 7.0-9.0%: ICD-10-PCS | Mod: HCNC,CPTII,S$GLB, | Performed by: PODIATRIST

## 2019-08-15 PROCEDURE — 11721 DEBRIDE NAIL 6 OR MORE: CPT | Mod: 59,Q9,HCNC,S$GLB | Performed by: PODIATRIST

## 2019-08-15 PROCEDURE — 11057 PARNG/CUTG B9 HYPRKR LES >4: CPT | Mod: Q9,HCNC,S$GLB, | Performed by: PODIATRIST

## 2019-08-15 PROCEDURE — 99999 PR PBB SHADOW E&M-EST. PATIENT-LVL III: CPT | Mod: PBBFAC,HCNC,, | Performed by: PODIATRIST

## 2019-08-15 PROCEDURE — 11721 PR DEBRIDEMENT OF NAILS, 6 OR MORE: ICD-10-PCS | Mod: 59,Q9,HCNC,S$GLB | Performed by: PODIATRIST

## 2019-08-15 PROCEDURE — 1101F PR PT FALLS ASSESS DOC 0-1 FALLS W/OUT INJ PAST YR: ICD-10-PCS | Mod: HCNC,CPTII,S$GLB, | Performed by: PODIATRIST

## 2019-08-15 NOTE — PROGRESS NOTES
Subjective:      Patient ID: Steve June Jr. is a 74 y.o. male.    Chief Complaint: Diabetic Foot Exam (PCP-Crispin Garcia 7/10/19)    Diabetes, increased risk amputation needing evaluation/management/optomization of foot care.    CC thick calluses, burning pain with pressure.  Gradual onset, worsening over past several weeks-months, aggravated by increased weight bearing, shoe gear, pressure.   His daughter has been trimming his nails best she can but recently has not been able to, the calluses are hurting.  Denies trauma, signs infection, and surgery both feet.     CC#2 has pain with right fifth toe with ambulation and pressure, wears wide diabetic shoes and the pain is still present, seeking treatment options.          Review of Systems   Constitution: Negative for chills, diaphoresis, fever, malaise/fatigue and night sweats.   Cardiovascular: Positive for leg swelling. Negative for claudication, cyanosis and syncope.   Skin: Positive for nail changes and suspicious lesions. Negative for color change, dry skin, rash and unusual hair distribution.   Musculoskeletal: Negative for falls, joint pain, joint swelling, muscle cramps, muscle weakness and stiffness.   Gastrointestinal: Negative for constipation, diarrhea, nausea and vomiting.   Neurological: Positive for paresthesias and sensory change. Negative for brief paralysis, disturbances in coordination, focal weakness, numbness and tremors.           Objective:      Physical Exam   Constitutional: He is oriented to person, place, and time. He appears well-developed and well-nourished. He is cooperative.   Oriented to time, place, and person.   Cardiovascular:   Pulses:       Dorsalis pedis pulses are 1+ on the right side, and 1+ on the left side.        Posterior tibial pulses are 1+ on the right side, and 1+ on the left side.   Capillary fill time 3-5 seconds.  Feet are warm to touch proximal with distal cooling.      2+ edema to bilateral lower  extremities with varicosities noted.    No pedal hair noted bilateral.     Musculoskeletal:   Normal angle, base, station of gait. Decreased stride length, early heel off, moderately propulsive toe off bilateral.    All ten toes without clubbing, cyanosis, or signs of ischemia.      Ankle dorsiflexion decreased at <10 degrees bilateral with moderate increase with knee flexion bilateral.    Right fifth toe reducible contracture MTPJ and PIPJ with adductovarus rotation    Range of motion, stability, muscle strength, and muscle tone are age and health appropriate normal bilateral feet and legs.       Feet:   Right Foot:   Protective Sensation: 10 sites tested. 4 sites sensed.   Left Foot:   Protective Sensation: 10 sites tested. 6 sites sensed.   Lymphadenopathy:   Negative lymphadenopathy bilateral popliteal fossa and tarsal tunnel.     Neurological: He is alert and oriented to person, place, and time. He has normal strength. He is not disoriented. He displays no atrophy and no tremor. A sensory deficit is present. He exhibits normal muscle tone.   Decreased/absent vibratory sensation bilateral feet to 128Hz tuning fork.    Diminished/loss of protective sensation bilateral to 10 gram monofilament.    Paresthesias,  bilateral feet with no clearly identified trigger or source.     Skin: Skin is warm, dry and intact. No abrasion, no bruising, no burn, no ecchymosis, no laceration, no lesion, no petechiae and no rash noted. He is not diaphoretic. No cyanosis or erythema. No pallor. Nails show no clubbing.   Focal hyperkeratotic lesion consisting entirely of hyperkeratotic tissue without open skin, drainage, pus, fluctuance, malodor, or signs of infection plantar bilateral hallux and first and fifth mtpj bilateral. 6 total    Skin thin, atrophic, with decreased density and distribution of pedal hair bilateral, but without ulcers, masses, nodules or cords palpated bilateral feet and legs.    Hyperpigmentation both legs  consistent with stasis.    Toenails 1st, 2nd, 3rd, 4th, 5th  bilateral are hypertrophic thickened 2-3 mm, dystrophic, discolored tanish brown with tan, gray crumbly subungual debris.  Nails 1-5 left and 1-3 right are long 2-3 mm the other nails are well trimmed    Right fifth toe lateral ingrowing nail/callsu and pain to the lateral toe with contracture             Assessment:       Encounter Diagnoses   Name Primary?    PVD (peripheral vascular disease) Yes    Type II diabetes mellitus with peripheral circulatory disorder     Diabetic polyneuropathy associated with diabetes mellitus due to underlying condition     Pre-ulcerative calluses     Onychomycosis due to dermatophyte     OC (onychocryptosis)          Plan:       Steve was seen today for diabetic foot exam.    Diagnoses and all orders for this visit:    PVD (peripheral vascular disease)  -     US Lower Extrem Arteries Bilat with LEXIE (xpd); Future    Type II diabetes mellitus with peripheral circulatory disorder  -     US Lower Extrem Arteries Bilat with LEXIE (xpd); Future    Diabetic polyneuropathy associated with diabetes mellitus due to underlying condition    Pre-ulcerative calluses    Onychomycosis due to dermatophyte    OC (onychocryptosis)      I counseled the patient on his conditions, their implications and medical management.    Patient will stretch the tendo achilles complex three times daily as demonstrated in the office.  Literature was dispensed illustrating proper stretching technique.    Shoe inspection. Diabetic Foot Education. Patient reminded of the importance of good nutrition and blood sugar control to help prevent podiatric complications of diabetes. Patient instructed on proper foot hygeine. We discussed wearing proper shoe gear, daily foot inspections, never walking without protective shoe gear, never putting sharp instruments to feet    With patient's verbal consent the hyperkeratotic lesions x6 total both feet were trimmed to  healthy appearing skin using a # 15 scalpel. Patient relates relief of pain following the procedure. Patient tolerated the procedure well without complications. No anesthesia or hemostasis required. No blood loss.    With patient's verbal consent, nails were aggressively reduced and debrided x 7 to their soft tissue attachment mechanically  removing all offending nail and debris. Patient relates relief following the procedure. No anesthesia or hemostasis required. No blood loss.     Right fifth toe discussed and scheduled and ingrown nail procedure and flexor tenotomy to help with the pain, will get ART US first to ensure he has adequate blood flow to heal the procedure.    Continue pain management.    Return for procedure 1-2 weeks  Fausto Harvey DPM

## 2019-08-26 ENCOUNTER — TELEPHONE (OUTPATIENT)
Dept: FAMILY MEDICINE | Facility: CLINIC | Age: 74
End: 2019-08-26

## 2019-08-26 ENCOUNTER — HOSPITAL ENCOUNTER (EMERGENCY)
Facility: HOSPITAL | Age: 74
Discharge: HOME | End: 2019-08-26
Attending: EMERGENCY MEDICINE
Payer: MEDICARE

## 2019-08-26 VITALS
RESPIRATION RATE: 14 BRPM | OXYGEN SATURATION: 99 % | DIASTOLIC BLOOD PRESSURE: 78 MMHG | BODY MASS INDEX: 28.14 KG/M2 | WEIGHT: 190 LBS | SYSTOLIC BLOOD PRESSURE: 148 MMHG | TEMPERATURE: 99 F | HEIGHT: 69 IN | HEART RATE: 61 BPM

## 2019-08-26 DIAGNOSIS — D69.6 THROMBOCYTOPENIA: Primary | ICD-10-CM

## 2019-08-26 DIAGNOSIS — R73.9 HYPERGLYCEMIA: ICD-10-CM

## 2019-08-26 LAB
ALBUMIN SERPL BCP-MCNC: 4.2 G/DL (ref 3.5–5.2)
ALP SERPL-CCNC: 66 U/L (ref 55–135)
ALT SERPL W/O P-5'-P-CCNC: 21 U/L (ref 10–44)
AMYLASE SERPL-CCNC: 48 U/L (ref 20–110)
ANION GAP SERPL CALC-SCNC: 11 MMOL/L (ref 8–16)
AST SERPL-CCNC: 24 U/L (ref 10–40)
BACTERIA #/AREA URNS HPF: NEGATIVE /HPF
BASOPHILS # BLD AUTO: 0.02 K/UL (ref 0–0.2)
BASOPHILS NFR BLD: 0.5 % (ref 0–1.9)
BILIRUB SERPL-MCNC: 1.3 MG/DL (ref 0.1–1)
BILIRUB UR QL STRIP: NEGATIVE
BUN SERPL-MCNC: 16 MG/DL (ref 8–23)
CALCIUM SERPL-MCNC: 10 MG/DL (ref 8.7–10.5)
CHLORIDE SERPL-SCNC: 99 MMOL/L (ref 95–110)
CLARITY UR: CLEAR
CO2 SERPL-SCNC: 26 MMOL/L (ref 23–29)
COLOR UR: YELLOW
CREAT SERPL-MCNC: 0.8 MG/DL (ref 0.5–1.4)
DIFFERENTIAL METHOD: ABNORMAL
EOSINOPHIL # BLD AUTO: 0.2 K/UL (ref 0–0.5)
EOSINOPHIL NFR BLD: 5.7 % (ref 0–8)
ERYTHROCYTE [DISTWIDTH] IN BLOOD BY AUTOMATED COUNT: 14.6 % (ref 11.5–14.5)
EST. GFR  (AFRICAN AMERICAN): >60 ML/MIN/1.73 M^2
EST. GFR  (NON AFRICAN AMERICAN): >60 ML/MIN/1.73 M^2
GLUCOSE SERPL-MCNC: 225 MG/DL (ref 70–110)
GLUCOSE SERPL-MCNC: 273 MG/DL (ref 70–110)
GLUCOSE SERPL-MCNC: 394 MG/DL (ref 70–110)
GLUCOSE SERPL-MCNC: 421 MG/DL (ref 70–110)
GLUCOSE UR QL STRIP: ABNORMAL
HCT VFR BLD AUTO: 38.2 % (ref 40–54)
HGB BLD-MCNC: 12.5 G/DL (ref 14–18)
HGB UR QL STRIP: NEGATIVE
HYALINE CASTS #/AREA URNS LPF: 1 /LPF
IMM GRANULOCYTES # BLD AUTO: 0.02 K/UL (ref 0–0.04)
IMM GRANULOCYTES NFR BLD AUTO: 0.5 % (ref 0–0.5)
KETONES UR QL STRIP: NEGATIVE
LEUKOCYTE ESTERASE UR QL STRIP: NEGATIVE
LIPASE SERPL-CCNC: 36 U/L (ref 4–60)
LYMPHOCYTES # BLD AUTO: 0.5 K/UL (ref 1–4.8)
LYMPHOCYTES NFR BLD: 13.6 % (ref 18–48)
MAGNESIUM SERPL-MCNC: 1.7 MG/DL (ref 1.6–2.6)
MCH RBC QN AUTO: 28.1 PG (ref 27–31)
MCHC RBC AUTO-ENTMCNC: 32.7 G/DL (ref 32–36)
MCV RBC AUTO: 86 FL (ref 82–98)
MICROSCOPIC COMMENT: NORMAL
MONOCYTES # BLD AUTO: 0.3 K/UL (ref 0.3–1)
MONOCYTES NFR BLD: 9 % (ref 4–15)
NEUTROPHILS # BLD AUTO: 2.6 K/UL (ref 1.8–7.7)
NEUTROPHILS NFR BLD: 70.7 % (ref 38–73)
NITRITE UR QL STRIP: NEGATIVE
NRBC BLD-RTO: 0 /100 WBC
PH UR STRIP: 7 [PH] (ref 5–8)
PLATELET # BLD AUTO: 46 K/UL (ref 150–350)
PMV BLD AUTO: 12.6 FL (ref 9.2–12.9)
POTASSIUM SERPL-SCNC: 4.7 MMOL/L (ref 3.5–5.1)
PROT SERPL-MCNC: 7 G/DL (ref 6–8.4)
PROT UR QL STRIP: NEGATIVE
RBC # BLD AUTO: 4.45 M/UL (ref 4.6–6.2)
RBC #/AREA URNS HPF: 1 /HPF (ref 0–4)
SODIUM SERPL-SCNC: 136 MMOL/L (ref 136–145)
SP GR UR STRIP: 1.02 (ref 1–1.03)
SQUAMOUS #/AREA URNS HPF: 0 /HPF
URN SPEC COLLECT METH UR: 1
UROBILINOGEN UR STRIP-ACNC: NEGATIVE EU/DL
WBC # BLD AUTO: 3.68 K/UL (ref 3.9–12.7)
WBC #/AREA URNS HPF: 1 /HPF (ref 0–5)
YEAST URNS QL MICRO: NORMAL

## 2019-08-26 PROCEDURE — 93005 ELECTROCARDIOGRAM TRACING: CPT

## 2019-08-26 PROCEDURE — 82962 GLUCOSE BLOOD TEST: CPT

## 2019-08-26 PROCEDURE — 96372 THER/PROPH/DIAG INJ SC/IM: CPT

## 2019-08-26 PROCEDURE — 83690 ASSAY OF LIPASE: CPT

## 2019-08-26 PROCEDURE — 80053 COMPREHEN METABOLIC PANEL: CPT

## 2019-08-26 PROCEDURE — 82150 ASSAY OF AMYLASE: CPT

## 2019-08-26 PROCEDURE — 81001 URINALYSIS AUTO W/SCOPE: CPT

## 2019-08-26 PROCEDURE — 63600175 PHARM REV CODE 636 W HCPCS: Performed by: EMERGENCY MEDICINE

## 2019-08-26 PROCEDURE — 85025 COMPLETE CBC W/AUTO DIFF WBC: CPT

## 2019-08-26 PROCEDURE — 83735 ASSAY OF MAGNESIUM: CPT

## 2019-08-26 PROCEDURE — 99285 EMERGENCY DEPT VISIT HI MDM: CPT | Mod: 25

## 2019-08-26 PROCEDURE — 25000003 PHARM REV CODE 250: Performed by: EMERGENCY MEDICINE

## 2019-08-26 RX ORDER — SODIUM CHLORIDE 9 MG/ML
1000 INJECTION, SOLUTION INTRAVENOUS
Status: COMPLETED | OUTPATIENT
Start: 2019-08-26 | End: 2019-08-26

## 2019-08-26 RX ORDER — OXYCODONE AND ACETAMINOPHEN 5; 325 MG/1; MG/1
2 TABLET ORAL
Status: COMPLETED | OUTPATIENT
Start: 2019-08-26 | End: 2019-08-26

## 2019-08-26 RX ADMIN — SODIUM CHLORIDE 1000 ML: 0.9 INJECTION, SOLUTION INTRAVENOUS at 11:08

## 2019-08-26 RX ADMIN — OXYCODONE HYDROCHLORIDE AND ACETAMINOPHEN 2 TABLET: 5; 325 TABLET ORAL at 02:08

## 2019-08-26 NOTE — ED TRIAGE NOTES
Pt states has not been feeling well for the past week and experiencing body aches and an upset stomach

## 2019-08-26 NOTE — TELEPHONE ENCOUNTER
----- Message from Cindy Alfaro sent at 8/26/2019  7:22 AM CDT -----  Type:  Same Day Appointment Request    Caller is requesting a same day appointment.  Caller declined first available appointment listed below.      Name of Caller:  Lili lopez  When is the first available appointment?  08/28/19  Symptoms:  Stomach ache/belching, shakes,legs hurt form ankles to knees, blood sugar running a little high x 3 days,   Best Call Back Number:  [phone]  Additional Information:

## 2019-08-26 NOTE — ED PROVIDER NOTES
"Encounter Date: 8/26/2019       History     Chief Complaint   Patient presents with    Hyperglycemia     74-year-old male who has a history of insulin-dependent diabetes mellitus, pulmonary fibrosis and hepatitis-C, presents emergency room accompanied by his wife with complaints of having some abdominal discomfort over the last couple days has noticed that his blood sugar has been elevated over the last week.  The patient did not take his insulin or metformin today but only took Percocet for pain. No recent fever.  He has had nausea without vomiting. No diarrhea.  Appetite has been normal. He denies any dysuria or hematuria.        Review of patient's allergies indicates:   Allergen Reactions    Adhesive tape-silicones Other (See Comments)     pulls skin off    Doxycycline      Dizzy. "Just didn't feel right".     Past Medical History:   Diagnosis Date    Abnormal thyroid function test     Allergy     Seasonal    Anemia     Anemia due to blood loss 7/2/2014    Arthritis     Gaviria esophagus     Basal cell carcinoma     right forearm    Basal cell carcinoma 12/2011    lower post neck    Basal cell carcinoma 04/23/2019    left posterior helix    Cancer     skin CA    Cataract     Cirrhosis     Diabetes mellitus     Diabetes mellitus, type 2     Encounter for blood transfusion     Esophageal varices in cirrhosis     grade II on 7/12 EGD    Gastritis     on 7/12 EGD    GERD (gastroesophageal reflux disease) 2/28/2015    Hard of hearing     Hiatal hernia     History of hepatitis C 8/10/2012    tx with harvoni x 41 days (started 10/22/15). SVR4     Hoarseness 2/28/2015    Hypercholesteremia     Hypersplenism     Hypertension     No meds    Pain management 12/10/2014    Petechial hemorrhage 11/25/2015    Lower extremities bilat     Portal hypertensive gastropathy     on 7/12 EGD    Squamous cell carcinoma 04/23/2019    right preauricular cheek, right temple    Thrombocytopenia     Type II " or unspecified type diabetes mellitus with neurological manifestations, uncontrolled(250.62) 12/24/2013    Valvular heart disease     mild MR 12     Past Surgical History:   Procedure Laterality Date    ABLATION, RADIOFREQUENCY-left suprascapula Left 8/25/2017    [facility]    CATARACT EXTRACTION  1/10/13    left eye    CATARACT EXTRACTION      right eye    CHOLECYSTECTOMY      COLONOSCOPY      diagnostic block of the genicular branches to the left knee Left 12/15/2017    [facility] at [facility] OR    EGD (ESOPHAGOGASTRODUODENOSCOPY) N/A 3/13/2014    Performed by [facility] at [facility] ENDO (4TH FLR)    EGD (ESOPHAGOGASTRODUODENOSCOPY) N/A 7/11/2013    Performed by Campos Walters [facility] at [facility] ENDO (4TH FLR)    ESOPHAGOGASTRODUODENOSCOPY (EGD) N/A 8/1/2014    Performed by Juan David Booker MD at [facility] ENDO (4TH FLR)    ESOPHAGOGASTRODUODENOSCOPY (EGD) N/A 7/3/2014    Performed by Juan [facility] Booker [facility] at [facility] ENDO (2ND FLR)    EYE SURGERY      Cataract surgery to right eye    INSERTION, IOL PROSTHESIS Left 1/10/2013    Performed by Domi Baker [facility] at [facility] OR 1ST FLR    KNEE ARTHROSCOPY W/ MENISCAL REPAIR      KNEE CARTILAGE SURGERY      left knee    KNEE SURGERY  12/2006    left    LARYNGOSCOPY Bilateral 12/5/2014    Performed by Anoop Bernstein MD at [facility] OR 2ND FLR    PHACOEMULSIFICATION, CATARACT Left 1/10/2013    Performed by [facility] at [facility] OR 1ST FLR    PHACOEMULSIFICATION, CATARACT Right 9/27/2012    [facility]da [facility]gado [facility] at [facility] OR    SKIN LESION EXCISION      TONSILLECTOMY      UPPER GASTROINTESTINAL ENDOSCOPY       Family History   Problem Relation Age of Onset    Leukemia Mother     Cancer Mother         bone    Alcohol abuse Father     Cirrhosis Father         EtOH induced    No Known Problems Daughter     No Known Problems Son     No Known Problems Daughter     No  Known Problems Daughter     No Known Problems Daughter     No Known Problems Sister     Amblyopia Neg Hx     Blindness Neg Hx     Cataracts Neg Hx     Diabetes Neg Hx     Glaucoma Neg Hx     Hypertension Neg Hx     Macular degeneration Neg Hx     Retinal detachment Neg Hx     Strabismus Neg Hx     Stroke Neg Hx     Thyroid disease Neg Hx     Psoriasis Neg Hx     Lupus Neg Hx     Eczema Neg Hx     Acne Neg Hx     Melanoma Neg Hx      Social History     Tobacco Use    Smoking status: Former Smoker     Packs/day: 1.00     Years: 25.00     Pack years: 25.00     Last attempt to quit: 2000     Years since quittin.0    Smokeless tobacco: Never Used   Substance Use Topics    Alcohol use: Yes     Comment: rarely    Drug use: No     Review of Systems   Constitutional: Negative for appetite change, chills and fever.   HENT: Negative for congestion, ear pain, rhinorrhea, sinus pain and sore throat.    Eyes: Negative.  Negative for pain.   Respiratory: Negative for cough, choking, chest tightness, shortness of breath, wheezing and stridor.    Cardiovascular: Negative for chest pain and palpitations.   Gastrointestinal: Positive for abdominal pain and nausea. Negative for constipation, diarrhea and vomiting.   Genitourinary: Negative for dysuria, flank pain, frequency and hematuria.   Musculoskeletal: Positive for back pain.   Skin: Negative for pallor, rash and wound.   Neurological: Negative for tremors, syncope, weakness and headaches.       Physical Exam     Initial Vitals [19 1013]   BP Pulse Resp Temp SpO2   (!) 162/62 70 20 98.7 °F (37.1 °C) 97 %      MAP       --         Physical Exam    Constitutional: He appears well-developed and well-nourished. He is not diaphoretic. No distress.   HENT:   Head: Normocephalic and atraumatic.   Right Ear: External ear normal.   Left Ear: External ear normal.   Nose: Nose normal.   Mouth/Throat: Oropharynx is clear and moist.   Eyes: Conjunctivae and  EOM are normal. Pupils are equal, round, and reactive to light. Right eye exhibits no discharge. Left eye exhibits no discharge. No scleral icterus.   Neck: Normal range of motion. Neck supple. No tracheal deviation present. No JVD present.   Cardiovascular: Normal rate, regular rhythm, normal heart sounds and intact distal pulses. Exam reveals no gallop and no friction rub.    No murmur heard.  Pulmonary/Chest: No respiratory distress. He has no wheezes. He has no rhonchi. He has rales. He exhibits no tenderness.   Abdominal: Soft. Bowel sounds are normal. He exhibits no distension and no mass. There is no tenderness. There is no rebound and no guarding.   Umbilical hernia   Musculoskeletal: Normal range of motion. He exhibits no edema or tenderness.   Lymphadenopathy:     He has no cervical adenopathy.   Neurological: He is alert and oriented to person, place, and time. He has normal strength. No cranial nerve deficit or sensory deficit. GCS score is 15. GCS eye subscore is 4. GCS verbal subscore is 5. GCS motor subscore is 6.   Skin: Skin is warm and dry. Capillary refill takes less than 2 seconds. No rash noted. No erythema. No pallor.   Psychiatric: He has a normal mood and affect. His behavior is normal. Judgment and thought content normal.         ED Course   Procedures  Labs Reviewed   AMYLASE   CBC W/ AUTO DIFFERENTIAL   COMPREHENSIVE METABOLIC PANEL   LIPASE   MAGNESIUM   URINALYSIS, REFLEX TO URINE CULTURE          Imaging Results    None                       Attending Attestation:             Attending ED Notes:   A 74-year-old male who has a history of uncontrolled diabetes mellitus poorly due to fact he is noncompliant with his diet, was noted again have had elevated blood sugar the ED.  The patient was given IV insulin.  He has no evidence of any infectious source at this time.  His urinalysis clear.  His chest x-ray shows some chronic interstitial lung disease but no pneumonic infiltrate.  As a  result the patient will be is suggested to increase his Levemir from 30-40 units at night and continues metformin and NovoLog.  He is advised to follow up with the primary care physician to further monitor his blood sugar.  At the present time has no indications  that he requires emergent hospitalization.  The patient additionally is on have been thrombocytopenic but he admits that this is a chronic problem and in fact his platelet count is higher today than it normally runs.             Clinical Impression:       ICD-10-CM ICD-9-CM   1. Thrombocytopenia D69.6 287.5   2. Hyperglycemia R73.9 790.29                                Semaj Mccarthy Jr., MD  08/26/19 [id number]

## 2019-08-26 NOTE — DISCHARGE INSTRUCTIONS
Increased her Levemir to 40 units at night and continue other maintenance medication.  Call for a follow-up appointment with a primary care physician.  Closely adhered to your diabetic diet.

## 2019-09-09 ENCOUNTER — TELEPHONE (OUTPATIENT)
Dept: DERMATOLOGY | Facility: CLINIC | Age: 74
End: 2019-09-09

## 2019-09-09 NOTE — TELEPHONE ENCOUNTER
Returned pt's call. Informed he should keep appt with Dr Blank for left ear. Pt verbalized understanding. States he will call to reschedule it .

## 2019-09-09 NOTE — TELEPHONE ENCOUNTER
----- Message from Florencio Clancy sent at 9/9/2019  8:09 AM CDT -----  Type: Needs Medical Advice    Who Called:  Patient    Pharmacy name and phone #:    [id number] - [address] & [address] [address]  Phone: [phone] Fax: [phone]      Best Call Back Number: [phone]  Additional Information: Patient states that he would like a callback regarding we should reschedule his surgery on his ear or if he should continue using the cream that the doctor had given him as a sample.

## 2019-09-10 ENCOUNTER — OFFICE VISIT (OUTPATIENT)
Dept: DERMATOLOGY | Facility: CLINIC | Age: 74
End: 2019-09-10
Payer: MEDICARE

## 2019-09-10 DIAGNOSIS — C44.219 BASAL CELL CARCINOMA, EAR, LEFT: Primary | ICD-10-CM

## 2019-09-10 PROCEDURE — 1101F PR PT FALLS ASSESS DOC 0-1 FALLS W/OUT INJ PAST YR: ICD-10-PCS | Mod: HCNC,CPTII,S$GLB, | Performed by: DERMATOLOGY

## 2019-09-10 PROCEDURE — 99999 PR PBB SHADOW E&M-EST. PATIENT-LVL II: CPT | Mod: PBBFAC,HCNC,, | Performed by: DERMATOLOGY

## 2019-09-10 PROCEDURE — 99999 PR PBB SHADOW E&M-EST. PATIENT-LVL II: ICD-10-PCS | Mod: PBBFAC,HCNC,, | Performed by: DERMATOLOGY

## 2019-09-10 PROCEDURE — 99213 PR OFFICE/OUTPT VISIT, EST, LEVL III, 20-29 MIN: ICD-10-PCS | Mod: HCNC,S$GLB,, | Performed by: DERMATOLOGY

## 2019-09-10 PROCEDURE — 99213 OFFICE O/P EST LOW 20 MIN: CPT | Mod: HCNC,S$GLB,, | Performed by: DERMATOLOGY

## 2019-09-10 PROCEDURE — 1101F PT FALLS ASSESS-DOCD LE1/YR: CPT | Mod: HCNC,CPTII,S$GLB, | Performed by: DERMATOLOGY

## 2019-09-10 NOTE — PROGRESS NOTES
+++NOTE+++ has persistent thrombocytopenia     ALLERGIES:  Adhesive tape-silicones and Doxycycline    CHIEF COMPLAINT: followup basal cell carcinoma     HISTORY OF PRESENT ILLNESS:  Location: left ear superior helix  Timing: biopsy performed 5 months ago   Context: pathology showed basal cell carcinoma on the left superior ear  Also post biopsy other sites as noted below  Was to have used Efudex to sites of squamous cell carcinoma in situ; but couldn't afford Rx  Dr. Aguilera gave him Tolak samples which he used on all sites for about 2 weeks  Was to undergo Mohs' surgery to BCC on ear, but canceled appointment because the site appears to have healed  Quality: all sites are better now  He thinks the lesion on the ear has cleared    FINAL PATHOLOGIC DIAGNOSIS  1. Skin, right temple, shave biopsy:  - SQUAMOUS CELL CARCINOMA IN SITU/ BOWEN'S DISEASE.  - THE TUMOR EXTENDS TO A LATERAL BIOPSY MARGIN.  MICROSCOPIC DESCRIPTION: Sections show epidermis with full-thickness atypia, parakeratosis and variable  epidermal maturation. Dermal involvement is not seen.  2. Skin, right preauricular cheek, shave biopsy:  - SQUAMOUS CELL CARCINOMA IN SITU.  - THE TUMOR FOCALLY EXTENDS TO THE DEEP AND LATERAL BIOPSY MARGINS.  MICROSCOPIC DESCRIPTION: Sections show epidermis with full-thickness atypia, parakeratosis and variable  epidermal maturation. Dermal involvement is not seen.  3. Skin, left preauricular cheek, shave biopsy:  - ACTINIC KERATOSIS WITH FOCAL TRANSITION TO SQUAMOUS CELL CARCINOMA IN SITU.  - MARGINS ARE NEGATIVE FOR FULL THICKNESS SQUAMOUS ATYPIA IN THE PLANES OF SECTION.  MICROSCOPIC DESCRIPTION: Sections show atypia within the lowermost epidermal layers associated with foci  showing full-thickness atypia. The underlying papillary dermis shows solar elastosis.  4. Skin, left posterior helix, shave biopsy:  - BASAL CELL CARCINOMA WITH MIXED NODULAR AND INFILTRATIVE GROWTH PATTERN.  - THE TUMOR EXTENDS TO THE DEEP AND  LATERAL BIOPSY MARGINS.    REVIEW OF SYSTEMS:   Skin: as noted above  Hem/Lymph: +++NOTE+++ has thrombocytopenia  Allergy/Immuno: has allergies as noted above    PAST MEDICAL HISTORY:  Past Medical History:   Diagnosis Date    Abnormal thyroid function test     Allergy     Seasonal    Anemia     Anemia due to blood loss 7/2/2014    Arthritis     Gaviria esophagus     Basal cell carcinoma     right forearm    Basal cell carcinoma 12/2011    lower post neck    Basal cell carcinoma 04/23/2019    left posterior helix    Cancer     skin CA    Cataract     Cirrhosis     Diabetes mellitus     Diabetes mellitus, type 2     Encounter for blood transfusion     Esophageal varices in cirrhosis     grade II on 7/12 EGD    Gastritis     on 7/12 EGD    GERD (gastroesophageal reflux disease) 2/28/2015    Hard of hearing     Hiatal hernia     History of hepatitis C 8/10/2012    tx with harvoni x 41 days (started 10/22/15). SVR4     Hoarseness 2/28/2015    Hypercholesteremia     Hypersplenism     Hypertension     No meds    Pain management 12/10/2014    Petechial hemorrhage 11/25/2015    Lower extremities bilat     Portal hypertensive gastropathy     on 7/12 EGD    Squamous cell carcinoma 04/23/2019    right preauricular cheek, right temple    Thrombocytopenia     Type II or unspecified type diabetes mellitus with neurological manifestations, uncontrolled(250.62) 12/24/2013    Valvular heart disease     mild MR 12       PAST SURGICAL HISTORY:  Past Surgical History:   Procedure Laterality Date    ABLATION, RADIOFREQUENCY-left suprascapula Left 8/25/2017    [facility]    CATARACT EXTRACTION  1/10/13    left eye    CATARACT EXTRACTION      right eye    CHOLECYSTECTOMY      COLONOSCOPY      diagnostic block of the genicular branches to the left knee Left 12/15/2017    [facility] OR    EGD (ESOPHAGOGASTRODUODENOSCOPY) N/A  3/13/2014    Per[facility] by [facility] at [facility] ENDO (4TH FLR)    EGD (ESOPHAGOGASTRODUODENOSCOPY) N/A 2013    Performed by Campos Walters [facility] at [facility] ENDO (4TH FLR)    ESOPHAGOGASTRODUODENOSCOPY (EGD) N/A 2014    Performed by Juan David Booker [facility] at [facility] ENDO (4TH FLR)    ESOPHAGOGASTRODUODENOSCOPY (EGD) N/A 7/3/2014    Performed by Juan [facility] at [facility] ENDO (2ND FLR)    EYE SURGERY      Cataract surgery to right eye    INSERTION, IOL PROSTHESIS Left 1/10/2013    [facility]formed [facility] at [facility] OR 1ST FLR    KNEE ARTHROSCOPY W/ MENISCAL REPAIR      KNEE CARTILAGE SURGERY      left knee    KNEE SURGERY  2006    left    LARYNGOSCOPY Bilateral 2014    Performed [facility] Anoop [facility] at [facility] OR 2ND FLR    PHACOEMULSIFICATION, CATARACT Left 1/10/2013    [facility] at [facility] 1ST FLR    PHACOEMULSIFICATION, CATARACT Right 2012    Performed by [facility] at [facility] OR    SKIN LESION EXCISION      TONSILLECTOMY      UPPER GASTROINTESTINAL ENDOSCOPY         SOCIAL HISTORY:  Social History     Tobacco Use    Smoking status: Former Smoker     Packs/day: 1.00     Years: 25.00     Pack years: 25.00     Last attempt to quit: 2000     Years since quittin.0    Smokeless tobacco: Never Used   Substance Use Topics    Alcohol use: Yes     Comment: rarely    Drug use: No        PERTINENT MEDICATIONS:  See medications list.  Current Outpatient Medications on File Prior to Visit   Medication Sig Dispense Refill    LEVEMIR FLEXTOUCH U-100 INSULN 100 unit/mL (3 mL) InPn pen Inject 34 Units into the skin every evening. (Patient taking differently: Inject 30 Units into the skin every evening. ) 30.6 mL 3    metFORMIN (GLUCOPHAGE) 500 MG tablet Take 1 tablet (500 mg total) by mouth 2 (two) times daily with meals. 180 tablet 3    NOVOLOG FLEXPEN U-100 INSULIN 100 unit/mL (3 mL) InPn pen INJECT 8 UNITS UNDER THE SKIN THREE  TIMES DAILY WITH MEALS (Patient taking differently: INJECT 5 TO 8 UNITS UNDER THE SKIN THREE TIMES DAILY WITH MEALS) 30 mL 0    oxyCODONE-acetaminophen (PERCOCET)  mg per tablet Take 1 tablet by mouth every 6 (six) hours as needed for Pain. 120 tablet 0     No current facility-administered medications on file prior to visit.        EXAM:  Constitutional  General appearance: well-developed, well-nourished, well-kempt older white male    Eyes  Inspection of conjunctivae and lids reveals no abnormalities; sclerae anicteric  Neurologic/Psychiatric  Alert,  normal orientation to time, place, person  Normal mood and affect with no evidence of depression, anxiety, agitation  Skin: see photo(s)  Head: background marked solar damage to exposed areas of skin  The site of previous biopsy on his left ear is now pink and non-indurated, and shows no clear cut evidence of residual basal cell carcinoma on inspection/palpation today  Neck: examination reveals marked chronic solar damage    Photo(s) this visit:         Photo(s) from biopsy visit:      ASSESSMENT:   status post biopsy, basal cell carcinoma, left ear helical rim; now clinically clear post biopsy and Tolak use     PLAN:  The diagnosis of basal cell carcinoma and current clinical findings and  management options, and risks and benefits of the alternatives, including observation/non-treatment, radiation treatment, excision with vertical frozen section or paraffin-embedded section margin evaluation, and Mohs' Micrographic Surgery, Fresh Tissue Technique, were discussed at length with the patient. In particular, the discussion included, but was not limited to, the following:    One alternative at this point would be to defer further treatment and observe the lesion(s). With small skin cancers of this kind, it is possible that a biopsy can be sufficient to definitively treat a small skin cancer of this kind. Alternatively, some skin cancers are slow growing and do not  require immediate treatment. The potential advantage of this choice would be to avoid the need for possibly unnecessary additional surgery. Among the potential disadvantages of this would be the possibility of enlargement of the lesion, more extensive spread of the lesion or recurrence at a later date, which might necessitate a larger and more complex surgery.    Radiation treatment can be an effective treatment for this type of skin cancer. The usual course of treatment is every weekday for several weeks. Local irritation will result from treatment, although no systemic side effects are expected. The potential advantage of radiation treatment is that it avoids the need for surgery. Among the disadvantages of radiation treatment are the length of treatment, the local inflammatory response, the absence of pathologic confirmation of the removal of the skin cancer, a possible increased risk of additional skin cancer in the treated area in later years, and a somewhat increased risk of recurrence at a later date.     Excisional surgery can be an effective treatment for this type of skin cancer. This would involve excision of the lesion with margin evaluation by submitting the specimen to a pathologist for either immediate marginal assessment via frozen section processing, or delayed marginal assessment by fixed-tissue processing. The potential advantage of this technique is that it offers a way of treating the lesion with some degree of histologic confirmation of tumor removal. Among the disadvantages of this treatment are the possible need for re-excision if marginal involvement is identified, a somewhat greater likelihood of recurrence as compared to Mohs' surgery because of the less comprehensive margin evaluation inherent in the technique, and the general potential risks of surgery, including allergic reactions to the anesthetic and other materials used, infection, injury to nerves in the area with consequent loss of  sensation or muscle function, and scarring or distortion of surrounding structures.    Mohs' surgery is a very effective treatment for this type of skin cancer. The potential advantage of Mohs' surgery is that this technique offers the greatest possible certainty of knowing that the skin cancer has been completely removed, with the removal of the least amount of normal tissue. The potential disadvantages of Mohs' surgery include the duration of the surgery, the possible need for a separate surgery for reconstruction following tumor removal, and scarring as a result. In addition, general potential risks of surgery as noted above also apply to treatment via Mohs' surgery.    Sufficient time was available for questions, and all questions were answered to his satisfaction.     After discussing the clinical findings and the treatment alternatives, and the risks and benefits of the alternatives at length and in detail, and given the absence of any evidence of residual tumor to the biopsy site(s) at present, he and I have decided to postpone surgical treatment and to observe the site(s) for the present.    An appointment will be made for him to follow-up for re-evaluation in 3months. He is to call to be seen sooner if any changes or signs of recurrence, which were reviewed, should arise in the meantime.    --------------------------------------  Note: Some or all of this note may have been generated using voice recognition software. There may be voice recognition errors including grammatical and/or spelling errors found in the text. Attempts were made to correct these errors prior to signature.

## 2019-09-18 ENCOUNTER — TELEPHONE (OUTPATIENT)
Dept: PHARMACY | Facility: CLINIC | Age: 74
End: 2019-09-18

## 2019-09-18 DIAGNOSIS — G89.4 CHRONIC PAIN DISORDER: Primary | ICD-10-CM

## 2019-09-18 RX ORDER — OXYCODONE AND ACETAMINOPHEN 10; 325 MG/1; MG/1
1 TABLET ORAL EVERY 6 HOURS PRN
Qty: 120 TABLET | Refills: 0 | Status: ON HOLD | OUTPATIENT
Start: 2019-10-24 | End: 2019-11-09 | Stop reason: HOSPADM

## 2019-09-18 RX ORDER — OXYCODONE AND ACETAMINOPHEN 10; 325 MG/1; MG/1
1 TABLET ORAL EVERY 6 HOURS PRN
Qty: 120 TABLET | Refills: 0 | Status: SHIPPED | OUTPATIENT
Start: 2019-09-25 | End: 2019-10-24

## 2019-09-18 RX ORDER — OXYCODONE AND ACETAMINOPHEN 10; 325 MG/1; MG/1
1 TABLET ORAL EVERY 6 HOURS PRN
Qty: 120 TABLET | Refills: 0 | Status: SHIPPED | OUTPATIENT
Start: 2019-11-22 | End: 2019-12-21

## 2019-09-18 NOTE — TELEPHONE ENCOUNTER
Called to follow up with patient. He still does not want to take the Esbriet. He does not feel that his condition limits him enough to need to take anything for it. Informed patient that we would wait to hear back from him or his provider before contacting him again. Closing activities.     Jose Luis Booker, PharmD  Clinical Pharmacist  [facility]  P: [phone]

## 2019-09-19 ENCOUNTER — OFFICE VISIT (OUTPATIENT)
Dept: PAIN MEDICINE | Facility: CLINIC | Age: 74
End: 2019-09-19
Payer: MEDICARE

## 2019-09-19 VITALS
HEART RATE: 68 BPM | BODY MASS INDEX: 28.14 KG/M2 | SYSTOLIC BLOOD PRESSURE: 118 MMHG | HEIGHT: 69 IN | DIASTOLIC BLOOD PRESSURE: 72 MMHG | WEIGHT: 190 LBS

## 2019-09-19 DIAGNOSIS — M25.562 CHRONIC PAIN OF LEFT KNEE: ICD-10-CM

## 2019-09-19 DIAGNOSIS — M47.819 FACET ARTHROPATHY: ICD-10-CM

## 2019-09-19 DIAGNOSIS — M50.30 DDD (DEGENERATIVE DISC DISEASE), CERVICAL: ICD-10-CM

## 2019-09-19 DIAGNOSIS — G89.29 CHRONIC PAIN OF LEFT KNEE: ICD-10-CM

## 2019-09-19 DIAGNOSIS — Z79.891 CHRONIC USE OF OPIATE FOR THERAPEUTIC PURPOSE: Primary | ICD-10-CM

## 2019-09-19 PROCEDURE — 3078F DIAST BP <80 MM HG: CPT | Mod: HCNC,CPTII,S$GLB, | Performed by: PHYSICIAN ASSISTANT

## 2019-09-19 PROCEDURE — 3074F SYST BP LT 130 MM HG: CPT | Mod: HCNC,CPTII,S$GLB, | Performed by: PHYSICIAN ASSISTANT

## 2019-09-19 PROCEDURE — 99214 PR OFFICE/OUTPT VISIT, EST, LEVL IV, 30-39 MIN: ICD-10-PCS | Mod: HCNC,S$GLB,, | Performed by: PHYSICIAN ASSISTANT

## 2019-09-19 PROCEDURE — 99999 PR PBB SHADOW E&M-EST. PATIENT-LVL III: ICD-10-PCS | Mod: PBBFAC,HCNC,, | Performed by: PHYSICIAN ASSISTANT

## 2019-09-19 PROCEDURE — 3078F PR MOST RECENT DIASTOLIC BLOOD PRESSURE < 80 MM HG: ICD-10-PCS | Mod: HCNC,CPTII,S$GLB, | Performed by: PHYSICIAN ASSISTANT

## 2019-09-19 PROCEDURE — 99214 OFFICE O/P EST MOD 30 MIN: CPT | Mod: HCNC,S$GLB,, | Performed by: PHYSICIAN ASSISTANT

## 2019-09-19 PROCEDURE — 1101F PT FALLS ASSESS-DOCD LE1/YR: CPT | Mod: HCNC,CPTII,S$GLB, | Performed by: PHYSICIAN ASSISTANT

## 2019-09-19 PROCEDURE — 99999 PR PBB SHADOW E&M-EST. PATIENT-LVL III: CPT | Mod: PBBFAC,HCNC,, | Performed by: PHYSICIAN ASSISTANT

## 2019-09-19 PROCEDURE — 3074F PR MOST RECENT SYSTOLIC BLOOD PRESSURE < 130 MM HG: ICD-10-PCS | Mod: HCNC,CPTII,S$GLB, | Performed by: PHYSICIAN ASSISTANT

## 2019-09-19 PROCEDURE — 80307 DRUG TEST PRSMV CHEM ANLYZR: CPT | Mod: HCNC

## 2019-09-19 PROCEDURE — 1101F PR PT FALLS ASSESS DOC 0-1 FALLS W/OUT INJ PAST YR: ICD-10-PCS | Mod: HCNC,CPTII,S$GLB, | Performed by: PHYSICIAN ASSISTANT

## 2019-09-19 RX ORDER — GABAPENTIN 600 MG/1
600 TABLET ORAL 3 TIMES DAILY
Qty: 90 TABLET | Refills: 2 | Status: SHIPPED | OUTPATIENT
Start: 2019-09-19 | End: 2019-10-11

## 2019-09-19 NOTE — PROGRESS NOTES
"Referring Physician: No ref. provider found    PCP: Crispin Garcia MD      CC: neck and left shoulder pain; knee pain    Interval history:  Steve June Jr. is a 74 y.o. male with  neck and left shoulder pain who presents today for f/u and medication refill.  He had a series of 2 Euflexxa injections that worsened his knee pain initially. Pain has now resolved. He has tried physical therapy which did not provide much benefit.   Steroid njections performed have only given him shortlived relief.  In the past he underwent visco supplementation injections for his left knee with benefit.  He continues to have neck pain. He has a history of cervical DDD.  However, he continues to have low platelets and we are unable to provide any  interventional procedures.  He takes oxycodone 10 mg every 6 hours as needed with mild to moderate benefit. Previous UDS positive for THC. He ate marijuana cookies in [place]. Denies recent use. Does cause some drowsiness. Pain today is rated 6/10.    Prior HPI:   Steve June Jr. is a 74 y.o. male referred to us for lower back and knee pain.  He has significant history of pancytopenia, cirrhosis.  He presents with constant aching, sharp pain in his lower back as well as his left knee.  Pain worsens sitting, standing, bending, walking.  Pain improves with rest.  X-rays performed of the lumbar spine as well as knee shows left knee osteoarthritis.  He has tried physical therapy with minimal benefit.  He currently takes Norco 10 mg every 6 hours as needed with mild to moderate benefit.  He is unable to have any procedures due to his thrombocytopenia.  He also has ventral hernia, but surgical attention is currently not recommended due to his thrombocytopenia.  His main concern today is wishing to decrease his opioid medications.  He states being very "foggy" with his current medications.  He denies any increased sedation.  He denies any weakness.  No bowel bladder " changes.    ROS:  CONSTITUTIONAL: No fevers, chills, night sweats, wt. loss, appetite changes  SKIN: no rashes or itching  ENT: No headaches, head trauma, vision changes, or eye pain  LYMPH NODES: None noticed   CV: No chest pain, palpitations.   RESP: No shortness of breath, dyspnea on exertion, cough, wheezing, or hemoptysis  GI: No nausea, emesis, diarrhea, constipation, melena, hematochezia, pain.    : No dysuria, hematuria, urgency, or frequency   HEME: No easy bruising, bleeding problems  PSYCHIATRIC: No depression, anxiety, psychosis, hallucinations.  NEURO: No seizures, memory loss, dizziness or difficulty sleeping  MSK: + History of present illness      Past Medical History:   Diagnosis Date    Abnormal thyroid function test     Allergy     Seasonal    Anemia     Anemia due to blood loss 7/2/2014    Arthritis     Gaviria esophagus     Basal cell carcinoma     right forearm    Basal cell carcinoma 12/2011    lower post neck    Basal cell carcinoma 04/23/2019    left posterior helix    Cancer     skin CA    Cataract     Cirrhosis     Diabetes mellitus     Diabetes mellitus, type 2     Encounter for blood transfusion     Esophageal varices in cirrhosis     grade II on 7/12 EGD    Gastritis     on 7/12 EGD    GERD (gastroesophageal reflux disease) 2/28/2015    Hard of hearing     Hiatal hernia     History of hepatitis C 8/10/2012    tx with harvoni x 41 days (started 10/22/15). SVR4     Hoarseness 2/28/2015    Hypercholesteremia     Hypersplenism     Hypertension     No meds    Pain management 12/10/2014    Petechial hemorrhage 11/25/2015    Lower extremities bilat     Portal hypertensive gastropathy     on 7/12 EGD    Squamous cell carcinoma 04/23/2019    right preauricular cheek, right temple    Thrombocytopenia     Type II or unspecified type diabetes mellitus with neurological manifestations, uncontrolled(250.62) 12/24/2013    Valvular heart disease     mild MR 12     Past  Surgical History:   Procedure Laterality Date    ABLATION, RADIOFREQUENCY-left suprascapula Left 8/25/2017    [facility]    CATARACT EXTRACTION  1/10/13    left eye    CATARACT EXTRACTION      right eye    CHOLECYSTECTOMY      COLONOSCOPY      diagnostic block of the genicular branches to the left knee Left 12/15/2017    [facility] OR    EGD (ESOPHAGOGASTRODUODENOSCOPY) N/A 3/13/2014    [facility] (4TH FLR)    EGD (ESOPHAGOGASTRODUODENOSCOPY) N/A 7/11/2013    [facility] END[facility] (4TH FLR)    ESOPHAGOGASTRODUODENOSCOPY (EGD) N/A 8/1/2014    [facility] END[facility] (4TH FLR)    ESOPHAGOGASTRODUODENOSCOPY (EGD) N/A 7/3/2014    [facility] ENDO (2ND FLR)    EYE SURGERY      Cataract surgery to right eye    INSERTION, IOL PROSTHESIS Left 1/10/2013    [facility]LR    KNEE ARTHROSCOPY W/ MENISCAL REPAIR      KNEE CARTILAGE SURGERY      left knee    KNEE SURGERY  12/2006    left    LARYNGOSCOPY Bilateral 12/5/2014    [facility] 2[facility] FLR    PHACOEMULSIFICATION, CATARACT Left 1/10/2013    [facility]LR    PHACOEMULSIFICATION, CATARACT Right 9/27/2012    [facility] at [facility] OR    SKIN LESION EXCISION      TONSILLECTOMY      UPPER GASTROINTESTINAL ENDOSCOPY       Family History   Problem Relation Age of Onset    Leukemia Mother     Cancer Mother         bone    Alcohol abuse Father     Cirrhosis Father         EtOH induced    No Known Problems Daughter     No Known Problems Son     No Known Problems Daughter     No Known Problems Daughter     No Known Problems Daughter     No Known Problems Sister     Amblyopia Neg Hx     Blindness Neg Hx     Cataracts Neg Hx      "Diabetes Neg Hx     Glaucoma Neg Hx     Hypertension Neg Hx     Macular degeneration Neg Hx     Retinal detachment Neg Hx     Strabismus Neg Hx     Stroke Neg Hx     Thyroid disease Neg Hx     Psoriasis Neg Hx     Lupus Neg Hx     Eczema Neg Hx     Acne Neg Hx     Melanoma Neg Hx      Social History     Socioeconomic History    Marital status:      Spouse name: Not on file    Number of children: 5    Years of education: Not on file    Highest education level: Not on file   Occupational History    Not on file   Social Needs    Financial resource strain: Not on file    Food insecurity:     Worry: Not on file     Inability: Not on file    Transportation needs:     Medical: Not on file     Non-medical: Not on file   Tobacco Use    Smoking status: Former Smoker     Packs/day: 1.00     Years: 25.00     Pack years: 25.00     Last attempt to quit: 2000     Years since quittin.1    Smokeless tobacco: Never Used   Substance and Sexual Activity    Alcohol use: Yes     Comment: rarely    Drug use: No    Sexual activity: Never   Lifestyle    Physical activity:     Days per week: Not on file     Minutes per session: Not on file    Stress: Not on file   Relationships    Social connections:     Talks on phone: Not on file     Gets together: Not on file     Attends Taoist service: Not on file     Active member of club or organization: Not on file     Attends meetings of clubs or organizations: Not on file     Relationship status: Not on file   Other Topics Concern    Not on file   Social History Narrative    He is .  He has children.  He is currently retired.         Medications/Allergies: See med card    Vitals:    19 1112   BP: 118/72   Pulse: 68   Weight: 86.2 kg (190 lb)   Height: 5' 9" (1.753 m)   PainSc:   6   PainLoc: Neck     Physical exam:    GENERAL: A and O x3, the patient appears well groomed and is in no acute distress.  Skin: No rashes or obvious " lesions  HEENT: normocephalic, atraumatic  CARDIOVASCULAR:  RRR  LUNGS: non labored breathing  ABDOMEN: soft, nontender, + sizaeable ventral hernia in epigastric region.    UPPER EXTREMITIES: Normal alignment, normal range of motion, no atrophy, no skin changes,  hair growth and nail growth normal and equal bilaterally. No swelling, no tenderness.    LOWER EXTREMITIES:  Normal alignment, normal range of motion, no atrophy, no skin changes,  hair growth and nail growth normal and equal bilaterally. No swelling, no tenderness.    LUMBAR SPINE  Lumbar spine: ROM is full with flexion extension and oblique extension with moderate increased pain.    Erasmo's test causes no increased pain on either side.    Supine straight leg raise is negative bilaterally.    Internal and external rotation of the hip causes no increased pain on either side.  Myofascial exam: No tenderness to palpation across lumbar paraspinous muscles.      MENTAL STATUS: normal orientation, speech, language, and fund of knowledge for social situation.  Emotional state appropriate.    CRANIAL NERVES:  II:  PERRL bilaterally,   III,IV,VI: EOMI.    V:  Facial sensation equal bilaterally  VII:  Facial motor function normal.  VIII:  Hearing equal to finger rub bilaterally  IX/X: Gag normal, palate symmetric  XI:  Shoulder shrug equal, head turn equal  XII:  Tongue midline without fasciculations      MOTOR: Tone and bulk: normal bilateral upper and lower Strength: normal   Delt Bi Tri WE WF     R 5 5 5 5 5 5   L 5 5 5 5 5 5     IP ADD ABD Quad TA Gas HAM  R 5 5 5 5 5 5 5  L 5 5 5 5 5 5 5    SENSATION: Light touch and pinprick intact bilaterally  REFLEXES: normal, symmetric, nonbrisk.  Toes down, no clonus. No hoffmans.  GAIT: normal rise, base, steps, and arm swing.      Imaging:  Xray L-spine 4/2013   Alignment is otherwise normal.  Vertebral body heights and disc space heights are relatively well maintained.  Vertebral end plate osteophytes are present  anteriorly   at multiple levels.  The facet joints and posterior elements are unremarkable       Xray Knee 12/2013  Degenerative change of the knees, left greater than right.    Assessment:  Steve June Jr. is a 74 y.o. male with neck, back and left knee pain  1. Chronic use of opiate for therapeutic purpose    2. DDD (degenerative disc disease), cervical    3. Facet arthropathy    4. Chronic pain of left knee      Plan:  1. I have stressed the importance of physical activity and exercise to improve overall health.  Continue PT exercises learned at home.  2.  Any interventional procedures will be deferred due to his low platelet count.  Patient expressed understanding.  3.  Monitor progress from left knee Euflexxa series  4. Percocet 10mg q 8 hrs as needed.  Hold for sedation.  Repeat UDS today. If positive for THC he will no longer receive controlled substances  5. Gabapentin 600 mg TID. #90 2 refills.   6.  Follow-up 3 months  All medication management was performed by Dr. Kapil Mario

## 2019-09-23 LAB
6MAM UR QL: NOT DETECTED
7AMINOCLONAZEPAM UR QL: NOT DETECTED
A-OH ALPRAZ UR QL: NOT DETECTED
ALPRAZ UR QL: NOT DETECTED
AMPHET UR QL SCN: NOT DETECTED
BARBITURATES UR QL: NOT DETECTED
BUPRENORPHINE UR QL: NOT DETECTED
BZE UR QL: NOT DETECTED
CARBOXYTHC UR QL: NOT DETECTED
CARISOPRODOL UR QL: NOT DETECTED
CLONAZEPAM UR QL: NOT DETECTED
CODEINE UR QL: NOT DETECTED
CREAT UR-MCNC: 89 MG/DL (ref 20–400)
DIAZEPAM UR QL: NOT DETECTED
ETHYL GLUCURONIDE UR QL: PRESENT
FENTANYL UR QL: NOT DETECTED
HYDROCODONE UR QL: NOT DETECTED
HYDROMORPHONE UR QL: NOT DETECTED
LORAZEPAM UR QL: NOT DETECTED
MDA UR QL: NOT DETECTED
MDEA UR QL: NOT DETECTED
MDMA UR QL: NOT DETECTED
ME-PHENIDATE UR QL: NOT DETECTED
MEPERIDINE UR QL: NOT DETECTED
METHADONE UR QL: NOT DETECTED
METHAMPHET UR QL: NOT DETECTED
MIDAZOLAM UR QL SCN: NOT DETECTED
MORPHINE UR QL: NOT DETECTED
NORBUPRENORPHINE UR QL CFM: NOT DETECTED
NORDIAZEPAM UR QL: NOT DETECTED
NORFENTANYL UR QL: NOT DETECTED
NORHYDROCODONE UR QL CFM: NOT DETECTED
NOROXYCODONE UR QL CFM: PRESENT
NOROXYMORPHONE: PRESENT
OXAZEPAM UR QL: NOT DETECTED
OXYCODONE UR QL: PRESENT
OXYMORPHONE UR QL: NOT DETECTED
PATHOLOGY STUDY: NORMAL
PCP UR QL: NOT DETECTED
PHENTERMINE UR QL: NOT DETECTED
PROPOXYPH UR QL: NOT DETECTED
SERVICE CMNT-IMP: NORMAL
TAPENTADOL UR QL SCN: NOT DETECTED
TAPENTADOL-O-SULF: NOT DETECTED
TEMAZEPAM UR QL: NOT DETECTED
TRAMADOL UR QL: NOT DETECTED
ZOLPIDEM UR QL: NOT DETECTED

## 2019-09-24 ENCOUNTER — DOCUMENTATION ONLY (OUTPATIENT)
Dept: FAMILY MEDICINE | Facility: CLINIC | Age: 74
End: 2019-09-24

## 2019-09-24 NOTE — PROGRESS NOTES
Pre-Visit Chart Review  For Appointment Scheduled on 8/8/19    Health Maintenance Due   Topic Date Due    TETANUS VACCINE  05/13/1963    Colonoscopy  05/13/1997    Pneumococcal Vaccine (65+ High/Highest Risk) (2 of 2 - PPSV23) 03/21/2019    Hemoglobin A1c  07/03/2019

## 2019-09-25 ENCOUNTER — PATIENT OUTREACH (OUTPATIENT)
Dept: ADMINISTRATIVE | Facility: HOSPITAL | Age: 74
End: 2019-09-25

## 2019-09-25 NOTE — PROGRESS NOTES
Health Maintenance Due   Topic Date Due    TETANUS VACCINE  [birth year]13/1963    Naloxone Prescription  05[birth year]13/1963    Shingles Vaccine (1 of 2) 05/13/1997    Colonoscopy  01/10/2016    Pneumococcal Vaccine (65+ High/Highest Risk) (2 of 2 - PPSV23) 03/21/2019    Hemoglobin A1c  07/03/2019    Influenza Vaccine (1) 09/01/2019

## 2019-09-25 NOTE — LETTER
October 2, 2019    Steve June Jr.  [address]  [place] [address]             [facility]  [address]  [facility] [address]  Phone: [phone] [facility] is committed to your overall health.  To help you get the most out of each of your visits, we will review your information to make sure you are up to date on all of your recommended tests and/or procedures.       Dr. Crispin Garcia MD has found that your chart shows you may be due for a:     HEMOGLOBIN A1C (LAB)   COLORECTAL CANCER SCREENING   IMMUNIZATIONS     If you have had any of the above done at another facility, please bring the records or information with you so that your record at [facility] will be complete.  If you would like to schedule any of these, please contact the clinic at [phone].     If you are currently taking medication, please bring it with you to your appointment for review.     Also, if you have any type of Advanced Directives, please bring them with you to your office visit so we may scan them into your chart.     Thank You,     Your [facility],   MD Wilma Gaytan LPN Clinical Care Coordinator   [facility]   [address] [facility] [place] [address]   Phone [phone]   Fax [phone]

## 2019-09-26 ENCOUNTER — TELEPHONE (OUTPATIENT)
Dept: FAMILY MEDICINE | Facility: CLINIC | Age: 74
End: 2019-09-26

## 2019-09-26 RX ORDER — PEN NEEDLE, DIABETIC 30 GX3/16"
NEEDLE, DISPOSABLE MISCELLANEOUS
Qty: 100 EACH | Refills: 3 | Status: SHIPPED | OUTPATIENT
Start: 2019-09-26 | End: 2020-11-02 | Stop reason: CLARIF

## 2019-09-26 NOTE — TELEPHONE ENCOUNTER
Patient is calling for refill on his diabetic needles. Bd amauri ultra fine pen needles, unable to find them on patient's chart. Wants them sent to [facility] mail order pharmacy.

## 2019-09-26 NOTE — TELEPHONE ENCOUNTER
----- Message from Pooja Mix sent at 9/26/2019  8:14 AM CDT -----  Contact: Patient  Type: Needs Medical Advice    Who Called:  Patient  Pharmacy name and phone #:  [email or web address]  Best Call Back Number:   Additional Information: Patient is calling for refill on his diabetic needles. Bd amauri ultra fine pen needles, unable to find them on patient's chart. Wants them sent to [email or web address] mail order pharmacy.

## 2019-10-07 ENCOUNTER — TELEPHONE (OUTPATIENT)
Dept: FAMILY MEDICINE | Facility: CLINIC | Age: 74
End: 2019-10-07

## 2019-10-07 DIAGNOSIS — E11.8 TYPE 2 DIABETES MELLITUS WITH COMPLICATION, WITH LONG-TERM CURRENT USE OF INSULIN: ICD-10-CM

## 2019-10-07 DIAGNOSIS — Z79.4 TYPE 2 DIABETES MELLITUS WITH COMPLICATION, WITH LONG-TERM CURRENT USE OF INSULIN: ICD-10-CM

## 2019-10-07 RX ORDER — INSULIN DETEMIR 100 [IU]/ML
40 INJECTION, SOLUTION SUBCUTANEOUS NIGHTLY
Qty: 30.6 ML | Refills: 3
Start: 2019-10-07 | End: 2020-02-11

## 2019-10-07 NOTE — TELEPHONE ENCOUNTER
----- Message from Rebeca Barriga sent at 10/7/2019  7:43 AM CDT -----  Type: Needs Medical Advice    Who Called:  Wife - Lili June  Symptoms (please be specific):  diabetic  How long has patient had these symptoms:  na  Pharmacy name and phone #:    [id number] - [address] & [address] [address]  Phone: [phone] Fax: [phone]  Best Call Back Number: [phone]  Additional Information: patient is out of his diabetic medication because the dosage was raised/please call wife to discuss

## 2019-10-07 NOTE — TELEPHONE ENCOUNTER
Patient was at the hospital on 8/26/19 his insuline was increased from 34 to 40 needs new prescription. Rx sent to provider for advise

## 2019-10-07 NOTE — TELEPHONE ENCOUNTER
Patient was at the hospital on 8/26/19, his levemir was increased from 34 to 40 units. Patient run out medication and need a new prescription.    Last visit 7/10/19  Next visit 10/9/19  Labs and hospital 8/26/19

## 2019-10-08 ENCOUNTER — TELEPHONE (OUTPATIENT)
Dept: FAMILY MEDICINE | Facility: CLINIC | Age: 74
End: 2019-10-08

## 2019-10-08 NOTE — TELEPHONE ENCOUNTER
----- Message from Shanna Pritchard sent at 10/8/2019 11:55 AM CDT -----  Contact: Lili  Please call patient's wife, Lili called regarding patients prescriptions. Stated pharmacy has still not received them and wrong script were sent in? Very frustrated and confused. Please call back, thank you!    Call back at: [phone]

## 2019-10-10 ENCOUNTER — DOCUMENTATION ONLY (OUTPATIENT)
Dept: FAMILY MEDICINE | Facility: CLINIC | Age: 74
End: 2019-10-10

## 2019-10-10 ENCOUNTER — TELEPHONE (OUTPATIENT)
Dept: FAMILY MEDICINE | Facility: CLINIC | Age: 74
End: 2019-10-10

## 2019-10-10 NOTE — PROGRESS NOTES
Pre-Visit Chart Review  For Appointment Scheduled on 10/10/19    Health Maintenance Due   Topic Date Due    TETANUS VACCINE  05/13/1963    Colonoscopy  01/10/2016    Pneumococcal Vaccine (65+ High/Highest Risk) (2 of 2 - PPSV23) 03/21/2019    Hemoglobin A1c  07/03/2019

## 2019-10-10 NOTE — TELEPHONE ENCOUNTER
----- Message from Liz Rausch sent at 10/10/2019  4:14 PM CDT -----  Contact: patient  Type: Needs Medical Advice    Who Called:  Patient  Best Call Back Number: [phone] (home)   Additional Information: the patient would like a call back for a sooner appt to reschedule she missed a call

## 2019-10-11 ENCOUNTER — OFFICE VISIT (OUTPATIENT)
Dept: FAMILY MEDICINE | Facility: CLINIC | Age: 74
End: 2019-10-11
Payer: MEDICARE

## 2019-10-11 VITALS
SYSTOLIC BLOOD PRESSURE: 134 MMHG | DIASTOLIC BLOOD PRESSURE: 74 MMHG | OXYGEN SATURATION: 95 % | TEMPERATURE: 98 F | HEART RATE: 64 BPM | WEIGHT: 203.06 LBS | HEIGHT: 69 IN | BODY MASS INDEX: 30.08 KG/M2

## 2019-10-11 DIAGNOSIS — E11.42 DM TYPE 2 WITH DIABETIC PERIPHERAL NEUROPATHY: ICD-10-CM

## 2019-10-11 DIAGNOSIS — G89.29 CHRONIC PAIN OF LEFT KNEE: ICD-10-CM

## 2019-10-11 DIAGNOSIS — K21.9 GASTROESOPHAGEAL REFLUX DISEASE, ESOPHAGITIS PRESENCE NOT SPECIFIED: ICD-10-CM

## 2019-10-11 DIAGNOSIS — J01.00 ACUTE NON-RECURRENT MAXILLARY SINUSITIS: ICD-10-CM

## 2019-10-11 DIAGNOSIS — M25.562 CHRONIC PAIN OF LEFT KNEE: ICD-10-CM

## 2019-10-11 DIAGNOSIS — J84.10 PULMONARY FIBROSIS: Primary | ICD-10-CM

## 2019-10-11 PROCEDURE — 1101F PT FALLS ASSESS-DOCD LE1/YR: CPT | Mod: HCNC,CPTII,S$GLB, | Performed by: FAMILY MEDICINE

## 2019-10-11 PROCEDURE — 3078F PR MOST RECENT DIASTOLIC BLOOD PRESSURE < 80 MM HG: ICD-10-PCS | Mod: HCNC,CPTII,S$GLB, | Performed by: FAMILY MEDICINE

## 2019-10-11 PROCEDURE — 99999 PR PBB SHADOW E&M-EST. PATIENT-LVL IV: CPT | Mod: PBBFAC,HCNC,, | Performed by: FAMILY MEDICINE

## 2019-10-11 PROCEDURE — 3075F SYST BP GE 130 - 139MM HG: CPT | Mod: HCNC,CPTII,S$GLB, | Performed by: FAMILY MEDICINE

## 2019-10-11 PROCEDURE — 99999 PR PBB SHADOW E&M-EST. PATIENT-LVL IV: ICD-10-PCS | Mod: PBBFAC,HCNC,, | Performed by: FAMILY MEDICINE

## 2019-10-11 PROCEDURE — 99499 UNLISTED E&M SERVICE: CPT | Mod: S$GLB,,, | Performed by: FAMILY MEDICINE

## 2019-10-11 PROCEDURE — 1101F PR PT FALLS ASSESS DOC 0-1 FALLS W/OUT INJ PAST YR: ICD-10-PCS | Mod: HCNC,CPTII,S$GLB, | Performed by: FAMILY MEDICINE

## 2019-10-11 PROCEDURE — 99215 OFFICE O/P EST HI 40 MIN: CPT | Mod: HCNC,S$GLB,, | Performed by: FAMILY MEDICINE

## 2019-10-11 PROCEDURE — 99499 RISK ADDL DX/OHS AUDIT: ICD-10-PCS | Mod: S$GLB,,, | Performed by: FAMILY MEDICINE

## 2019-10-11 PROCEDURE — 3078F DIAST BP <80 MM HG: CPT | Mod: HCNC,CPTII,S$GLB, | Performed by: FAMILY MEDICINE

## 2019-10-11 PROCEDURE — 99215 PR OFFICE/OUTPT VISIT, EST, LEVL V, 40-54 MIN: ICD-10-PCS | Mod: HCNC,S$GLB,, | Performed by: FAMILY MEDICINE

## 2019-10-11 PROCEDURE — 3075F PR MOST RECENT SYSTOLIC BLOOD PRESS GE 130-139MM HG: ICD-10-PCS | Mod: HCNC,CPTII,S$GLB, | Performed by: FAMILY MEDICINE

## 2019-10-11 RX ORDER — METFORMIN HYDROCHLORIDE 500 MG/1
500 TABLET ORAL 2 TIMES DAILY WITH MEALS
Qty: 180 TABLET | Refills: 3 | Status: SHIPPED | OUTPATIENT
Start: 2019-10-11 | End: 2020-02-11

## 2019-10-11 RX ORDER — ALBUTEROL SULFATE 90 UG/1
2 AEROSOL, METERED RESPIRATORY (INHALATION) EVERY 6 HOURS PRN
Qty: 1 INHALER | Refills: 11 | Status: SHIPPED | OUTPATIENT
Start: 2019-10-11 | End: 2021-03-29 | Stop reason: SDUPTHER

## 2019-10-11 RX ORDER — PANTOPRAZOLE SODIUM 40 MG/1
40 TABLET, DELAYED RELEASE ORAL DAILY
Qty: 90 TABLET | Refills: 0 | Status: SHIPPED | OUTPATIENT
Start: 2019-10-11 | End: 2020-09-10 | Stop reason: SDUPTHER

## 2019-10-11 RX ORDER — GLIPIZIDE 10 MG/1
10 TABLET ORAL
Qty: 180 TABLET | Refills: 3 | Status: SHIPPED | OUTPATIENT
Start: 2019-10-11 | End: 2020-02-09 | Stop reason: CLARIF

## 2019-10-11 RX ORDER — AZITHROMYCIN 250 MG/1
TABLET, FILM COATED ORAL
Qty: 6 TABLET | Refills: 0 | Status: SHIPPED | OUTPATIENT
Start: 2019-10-11 | End: 2019-10-16

## 2019-10-11 NOTE — PROGRESS NOTES
Subjective:   Patient ID: Steve June Jr. is a 74 y.o. male     Chief Complaint:Follow-up (3 months)      Patient here with multiple complaints today.  Patient endorses rhinorrhea, congestion, cough as well as occasional sputum production which is green and blood-tinged.  Patient also with left knee pain which been going on for several months to years.  Patient also with poor blood sugar control patient is unable to afford his insulin.  Due to this patient has been lowering the dose of his insulin.    Review of Systems   Constitutional: Negative for chills and fever.   HENT: Negative for sore throat.    Eyes: Negative for visual disturbance.   Respiratory: Negative for shortness of breath.    Cardiovascular: Negative for chest pain.   Gastrointestinal: Negative for abdominal pain.   Endocrine: Negative for polyuria.   Genitourinary: Negative for dysuria.   Musculoskeletal: Negative for back pain.   Skin: Negative for color change.   Neurological: Negative for headaches.   Psychiatric/Behavioral: Negative for agitation and confusion.     Past Medical History:   Diagnosis Date    Abnormal thyroid function test     Allergy     Seasonal    Anemia     Anemia due to blood loss 7/2/2014    Arthritis     Gaviria esophagus     Basal cell carcinoma     right forearm    Basal cell carcinoma 12/2011    lower post neck    Basal cell carcinoma 04/23/2019    left posterior helix    Cancer     skin CA    Cataract     Cirrhosis     Diabetes mellitus     Diabetes mellitus, type 2     Encounter for blood transfusion     Esophageal varices in cirrhosis     grade II on 7/12 EGD    Gastritis     on 7/12 EGD    GERD (gastroesophageal reflux disease) 2/28/2015    Hard of hearing     Hiatal hernia     History of hepatitis C 8/10/2012    tx with harvoni x 41 days (started 10/22/15). SVR4     Hoarseness 2/28/2015    Hypercholesteremia     Hypersplenism     Hypertension     No meds    Pain management  12/10/2014    Petechial hemorrhage 11/25/2015    Lower extremities bilat     Portal hypertensive gastropathy     on 7/12 EGD    Squamous cell carcinoma 04/23/2019    right preauricular cheek, right temple    Thrombocytopenia     Type II or unspecified type diabetes mellitus with neurological manifestations, uncontrolled(250.62) 12/24/2013    Valvular heart disease     mild MR 12     Objective:     Vitals:    10/11/19 0925   BP: 134/74   Pulse: 64   Temp: 98.1 °F (36.7 °C)     Body mass index is 29.98 kg/m².  Physical Exam   Constitutional: No distress.   HENT:   Head: Normocephalic and atraumatic.   Eyes: EOM are normal.   Cardiovascular: Normal rate and normal heart sounds.   Pulmonary/Chest: Effort normal.   Abdominal: Bowel sounds are normal.   Musculoskeletal: Normal range of motion.   Neurological: He is alert.   Psychiatric: He has a normal mood and affect.     Assessment:     1. Pulmonary fibrosis    2. Acute non-recurrent maxillary sinusitis    3. DM type 2 with diabetic peripheral neuropathy    4. Gastroesophageal reflux disease, esophagitis presence not specified    5. Chronic pain of left knee      Plan:   Pulmonary fibrosis  -     albuterol (VENTOLIN HFA) 90 mcg/actuation inhaler; Inhale 2 puffs into the lungs every 6 (six) hours as needed for Wheezing. Rescue  Dispense: 1 Inhaler; Refill: 11    Acute non-recurrent maxillary sinusitis  -     azithromycin (Z-NAN) 250 MG tablet; Take 2 tablets by mouth on day 1; Take 1 tablet by mouth on days 2-5  Dispense: 6 tablet; Refill: 0  Will start patient on Z-Nan to to sinusitis concerning for bacterial infection.  Patient understands risks SA with medication except.    DM type 2 with diabetic peripheral neuropathy  -     metFORMIN (GLUCOPHAGE) 500 MG tablet; Take 1 tablet (500 mg total) by mouth 2 (two) times daily with meals.  Dispense: 180 tablet; Refill: 3  -     Hemoglobin A1c; Future; Expected date: 10/11/2019  -     glipiZIDE (GLUCOTROL) 10 MG tablet;  Take 1 tablet (10 mg total) by mouth 2 (two) times daily before meals.  Dispense: 180 tablet; Refill: 3  Have increased the dose patient's insulin 40 units and will start patient on glipizide due to reported blood sugars ranging from 200-300.  Also discussed with patient on his follow-up visit may consider starting Humulin 70 30 due to high cost of his Levemir and difficulty for the patient to afford it.  Patient states he has been lowering the dose of his insulin to preserve it due to cost.    Gastroesophageal reflux disease, esophagitis presence not specified  -     pantoprazole (PROTONIX) 40 MG tablet; Take 1 tablet (40 mg total) by mouth once daily.  Dispense: 90 tablet; Refill: 0  -     Comprehensive metabolic panel; Future; Expected date: 10/11/2019  Patient with severe symptoms of GERD not taking any medication other than Tums.  Will start patient on Protonix for 3 months and have patient follow up.    Chronic pain of left knee  -     Ambulatory Referral to Physical/Occupational Therapy  Patient with severe issues with his knee and declined arthroscopy.  Set patient with physical therapy to assist with this discomfort.    Time spent with patient: 45 minutes and over half of that time was spent on counseling an coordination of care.    Crispin Garcia MD  10/11/2019    Portions of this note have been dictated with CRISTOBAL Garcia

## 2019-10-24 ENCOUNTER — TELEPHONE (OUTPATIENT)
Dept: FAMILY MEDICINE | Facility: CLINIC | Age: 74
End: 2019-10-24

## 2019-10-24 NOTE — TELEPHONE ENCOUNTER
Attempted to contact patient regarding appointment on 11/14/19 at 1:20pm with Dr. Garcia. Provider will be out, need to R/S. LM to call back.

## 2019-10-31 ENCOUNTER — PATIENT OUTREACH (OUTPATIENT)
Dept: ADMINISTRATIVE | Facility: HOSPITAL | Age: 74
End: 2019-10-31

## 2019-10-31 NOTE — LETTER
November 15, 2019    Steve June Jr.  [address]  [place] [address]             [facility]  [address]  [facility] [address]  Phone: [phone] Dear Dudley [facility] is committed to your overall health and would like to ensure that you are up to date on all of your health maintenance testing.  Our records indicate that you are overdue for your colorectal cancer screening.  After reviewing your chart, it has been documented that a FIT KIT (colorectal cancer screening kit) was given at a clinic visit or  mailed to you,  but the lab has yet to receive the kit so that we may update your chart.   This is a friendly reminder to complete this test (the instructions will tell you how) and then return your sample in the postage-paid return envelope within 24 hours of collection.  Please remember to put the collection date on your sample!     If your test results are negative, you won't need testing again for another year.  If results show you need more testing, we will call you with next steps.     Sincerely,       Crispin Garcia MD and your [facility]             Hali MARTINEZ   Panel Care Coordinator   [facility]   [address] [place] [address]   Phone [phone]   Fax [phone]

## 2019-11-05 ENCOUNTER — OFFICE VISIT (OUTPATIENT)
Dept: OPTOMETRY | Facility: CLINIC | Age: 74
End: 2019-11-05
Payer: MEDICARE

## 2019-11-05 DIAGNOSIS — E11.9 DIABETES MELLITUS WITHOUT OPHTHALMIC MANIFESTATIONS: Primary | ICD-10-CM

## 2019-11-05 DIAGNOSIS — Z96.1 PSEUDOPHAKIA: ICD-10-CM

## 2019-11-05 DIAGNOSIS — H35.372 EPIRETINAL MEMBRANE (ERM) OF LEFT EYE: ICD-10-CM

## 2019-11-05 DIAGNOSIS — H52.7 REFRACTIVE ERROR: ICD-10-CM

## 2019-11-05 PROCEDURE — 92014 PR EYE EXAM, EST PATIENT,COMPREHESV: ICD-10-PCS | Mod: HCNC,S$GLB,, | Performed by: OPTOMETRIST

## 2019-11-05 PROCEDURE — 92015 PR REFRACTION: ICD-10-PCS | Mod: HCNC,S$GLB,, | Performed by: OPTOMETRIST

## 2019-11-05 PROCEDURE — 99999 PR PBB SHADOW E&M-EST. PATIENT-LVL II: ICD-10-PCS | Mod: PBBFAC,HCNC,, | Performed by: OPTOMETRIST

## 2019-11-05 PROCEDURE — 99999 PR PBB SHADOW E&M-EST. PATIENT-LVL II: CPT | Mod: PBBFAC,HCNC,, | Performed by: OPTOMETRIST

## 2019-11-05 PROCEDURE — 92014 COMPRE OPH EXAM EST PT 1/>: CPT | Mod: HCNC,S$GLB,, | Performed by: OPTOMETRIST

## 2019-11-05 PROCEDURE — 92015 DETERMINE REFRACTIVE STATE: CPT | Mod: HCNC,S$GLB,, | Performed by: OPTOMETRIST

## 2019-11-05 NOTE — PROGRESS NOTES
HPI     Diabetic Eye Exam      Additional comments: BSL runs between 200-300, uncontrolled  // pt not   consistent w/ DM meds due to memory problems              Blurred Vision      Additional comments: at both near & distance --- needs updated specs rx              Dry Eye      Additional comments: +ATS OU bid              Comments     Hemoglobin A1C       Date                     Value               Ref Range             Status                04/03/2019               7.9 (H)             4.0 - 5.6 %           Final                  01/24/2019               9.4 (H)             4.0 - 5.6 %           Final                 06/25/2018               9.0 (H)             4.0 - 5.6 %           Final                        Last edited by Jerry Barraza, OD on 11/5/2019  2:55 PM. (History)            Assessment /Plan     For exam results, see Encounter Report.    Diabetes mellitus without ophthalmic manifestations    Pseudophakia    Refractive error    Epiretinal membrane (ERM) of left eye      DM type 2 w/o ocular retinopathy OU. Improved sugars from previous. Discussed possible ocular affects of uncontrolled blood sugar with patient. Recommended continued strong blood sugar control and continued care with PCP. Monitor yearly.     S/p cataract extraction with good results. Dispensed updated spectacle Rx. Discussed various spectacle lens options. Discussed adaptation period to new specs.     ERM OS, not visually significant. Monitor yearly.      RTC in 1 year for comprehensive eye exam, or sooner prn.

## 2019-11-06 ENCOUNTER — HOSPITAL ENCOUNTER (EMERGENCY)
Facility: HOSPITAL | Age: 74
Discharge: ANOTHER HOSPITAL | End: 2019-11-06
Attending: EMERGENCY MEDICINE
Payer: MEDICARE

## 2019-11-06 ENCOUNTER — HOSPITAL ENCOUNTER (INPATIENT)
Facility: HOSPITAL | Age: 74
LOS: 3 days | Discharge: HOME | DRG: 441 | End: 2019-11-09
Attending: HOSPITALIST | Admitting: HOSPITALIST
Payer: MEDICARE

## 2019-11-06 VITALS
RESPIRATION RATE: 22 BRPM | HEART RATE: 77 BPM | OXYGEN SATURATION: 93 % | TEMPERATURE: 98 F | HEIGHT: 68 IN | WEIGHT: 200 LBS | BODY MASS INDEX: 30.31 KG/M2 | DIASTOLIC BLOOD PRESSURE: 86 MMHG | SYSTOLIC BLOOD PRESSURE: 185 MMHG

## 2019-11-06 DIAGNOSIS — D69.6 THROMBOCYTOPENIA: ICD-10-CM

## 2019-11-06 DIAGNOSIS — B18.2 HEPATIC CIRRHOSIS DUE TO CHRONIC HEPATITIS C INFECTION: Primary | ICD-10-CM

## 2019-11-06 DIAGNOSIS — I81 PORTAL VEIN THROMBOSIS: ICD-10-CM

## 2019-11-06 DIAGNOSIS — I49.9 IRREGULAR CARDIAC RHYTHM: ICD-10-CM

## 2019-11-06 DIAGNOSIS — K74.60 HEPATIC CIRRHOSIS DUE TO CHRONIC HEPATITIS C INFECTION: Primary | ICD-10-CM

## 2019-11-06 DIAGNOSIS — K74.60 HEPATIC CIRRHOSIS, UNSPECIFIED HEPATIC CIRRHOSIS TYPE, UNSPECIFIED WHETHER ASCITES PRESENT: ICD-10-CM

## 2019-11-06 DIAGNOSIS — K56.7 ILEUS: ICD-10-CM

## 2019-11-06 DIAGNOSIS — I81 PORTAL VEIN THROMBOSIS: Primary | ICD-10-CM

## 2019-11-06 PROBLEM — L03.116 CELLULITIS OF LEFT LOWER EXTREMITY: Status: RESOLVED | Noted: 2019-06-07 | Resolved: 2019-11-06

## 2019-11-06 PROBLEM — K59.03 DRUG-INDUCED CONSTIPATION: Status: ACTIVE | Noted: 2019-11-06

## 2019-11-06 PROBLEM — R06.02 SHORTNESS OF BREATH: Status: RESOLVED | Noted: 2018-12-05 | Resolved: 2019-11-06

## 2019-11-06 PROBLEM — K76.82 HEPATIC ENCEPHALOPATHY: Status: ACTIVE | Noted: 2019-11-06

## 2019-11-06 LAB
ALBUMIN SERPL BCP-MCNC: 4.1 G/DL (ref 3.5–5.2)
ALP SERPL-CCNC: 86 U/L (ref 55–135)
ALT SERPL W/O P-5'-P-CCNC: 23 U/L (ref 10–44)
ANION GAP SERPL CALC-SCNC: 11 MMOL/L (ref 8–16)
APTT BLDCRRT: 24 SEC (ref 21–32)
AST SERPL-CCNC: 32 U/L (ref 10–40)
BASOPHILS # BLD AUTO: 0.01 K/UL (ref 0–0.2)
BASOPHILS NFR BLD: 0.3 % (ref 0–1.9)
BILIRUB SERPL-MCNC: 1.1 MG/DL (ref 0.1–1)
BILIRUB UR QL STRIP: NEGATIVE
BUN SERPL-MCNC: 21 MG/DL (ref 8–23)
CALCIUM SERPL-MCNC: 9 MG/DL (ref 8.7–10.5)
CHLORIDE SERPL-SCNC: 102 MMOL/L (ref 95–110)
CLARITY UR: CLEAR
CO2 SERPL-SCNC: 26 MMOL/L (ref 23–29)
COLOR UR: YELLOW
CREAT SERPL-MCNC: 1.2 MG/DL (ref 0.5–1.4)
DIFFERENTIAL METHOD: ABNORMAL
EOSINOPHIL # BLD AUTO: 0.1 K/UL (ref 0–0.5)
EOSINOPHIL NFR BLD: 3.1 % (ref 0–8)
ERYTHROCYTE [DISTWIDTH] IN BLOOD BY AUTOMATED COUNT: 14.3 % (ref 11.5–14.5)
EST. GFR  (AFRICAN AMERICAN): >60 ML/MIN/1.73 M^2
EST. GFR  (NON AFRICAN AMERICAN): >60 ML/MIN/1.73 M^2
GLUCOSE SERPL-MCNC: 278 MG/DL (ref 70–110)
GLUCOSE UR QL STRIP: ABNORMAL
HCT VFR BLD AUTO: 37.3 % (ref 40–54)
HGB BLD-MCNC: 12.4 G/DL (ref 14–18)
HGB UR QL STRIP: NEGATIVE
IMM GRANULOCYTES # BLD AUTO: 0.03 K/UL (ref 0–0.04)
INR PPP: 1.2 (ref 0.8–1.2)
KETONES UR QL STRIP: NEGATIVE
LACTATE SERPL-SCNC: 1.2 MMOL/L (ref 0.5–2.2)
LEUKOCYTE ESTERASE UR QL STRIP: NEGATIVE
LIPASE SERPL-CCNC: 26 U/L (ref 4–60)
LYMPHOCYTES # BLD AUTO: 0.3 K/UL (ref 1–4.8)
LYMPHOCYTES NFR BLD: 6.6 % (ref 18–48)
MCH RBC QN AUTO: 28.4 PG (ref 27–31)
MCHC RBC AUTO-ENTMCNC: 33.2 G/DL (ref 32–36)
MCV RBC AUTO: 86 FL (ref 82–98)
MONOCYTES # BLD AUTO: 0.3 K/UL (ref 0.3–1)
MONOCYTES NFR BLD: 7.9 % (ref 4–15)
NEUTROPHILS # BLD AUTO: 3.2 K/UL (ref 1.8–7.7)
NEUTROPHILS NFR BLD: 81.3 % (ref 38–73)
NITRITE UR QL STRIP: NEGATIVE
NRBC BLD-RTO: 0 /100 WBC
PH UR STRIP: 6 [PH] (ref 5–8)
PLATELET # BLD AUTO: 32 K/UL (ref 150–350)
PLATELET BLD QL SMEAR: ABNORMAL
PMV BLD AUTO: 11.5 FL (ref 9.2–12.9)
POCT GLUCOSE: 198 MG/DL (ref 70–110)
POCT GLUCOSE: 221 MG/DL (ref 70–110)
POCT GLUCOSE: 227 MG/DL (ref 70–110)
POCT GLUCOSE: 276 MG/DL (ref 70–110)
POTASSIUM SERPL-SCNC: 4.2 MMOL/L (ref 3.5–5.1)
PROT SERPL-MCNC: 6.7 G/DL (ref 6–8.4)
PROT UR QL STRIP: ABNORMAL
PROTHROMBIN TIME: 11.8 SEC (ref 9–12.5)
RBC # BLD AUTO: 4.36 M/UL (ref 4.6–6.2)
SODIUM SERPL-SCNC: 139 MMOL/L (ref 136–145)
SP GR UR STRIP: 1.02 (ref 1–1.03)
URN SPEC COLLECT METH UR: ABNORMAL
UROBILINOGEN UR STRIP-ACNC: 1 EU/DL
WBC # BLD AUTO: 3.93 K/UL (ref 3.9–12.7)

## 2019-11-06 PROCEDURE — 99223 1ST HOSP IP/OBS HIGH 75: CPT | Mod: HCNC,GC,, | Performed by: INTERNAL MEDICINE

## 2019-11-06 PROCEDURE — 25500020 PHARM REV CODE 255: Mod: HCNC | Performed by: EMERGENCY MEDICINE

## 2019-11-06 PROCEDURE — 63600175 PHARM REV CODE 636 W HCPCS: Mod: HCNC | Performed by: STUDENT IN AN ORGANIZED HEALTH CARE EDUCATION/TRAINING PROGRAM

## 2019-11-06 PROCEDURE — 80053 COMPREHEN METABOLIC PANEL: CPT | Mod: HCNC

## 2019-11-06 PROCEDURE — 36415 COLL VENOUS BLD VENIPUNCTURE: CPT | Mod: HCNC

## 2019-11-06 PROCEDURE — 99291 CRITICAL CARE FIRST HOUR: CPT | Mod: 25,HCNC

## 2019-11-06 PROCEDURE — 85610 PROTHROMBIN TIME: CPT | Mod: HCNC

## 2019-11-06 PROCEDURE — 93005 ELECTROCARDIOGRAM TRACING: CPT | Mod: HCNC

## 2019-11-06 PROCEDURE — 99223 1ST HOSP IP/OBS HIGH 75: CPT | Mod: HCNC,AI,GC, | Performed by: HOSPITALIST

## 2019-11-06 PROCEDURE — 63600175 PHARM REV CODE 636 W HCPCS: Mod: HCNC | Performed by: EMERGENCY MEDICINE

## 2019-11-06 PROCEDURE — 99223 PR INITIAL HOSPITAL CARE,LEVL III: ICD-10-PCS | Mod: HCNC,AI,GC, | Performed by: HOSPITALIST

## 2019-11-06 PROCEDURE — 83690 ASSAY OF LIPASE: CPT | Mod: HCNC

## 2019-11-06 PROCEDURE — 96361 HYDRATE IV INFUSION ADD-ON: CPT | Mod: HCNC

## 2019-11-06 PROCEDURE — 85730 THROMBOPLASTIN TIME PARTIAL: CPT | Mod: HCNC

## 2019-11-06 PROCEDURE — 81003 URINALYSIS AUTO W/O SCOPE: CPT | Mod: HCNC

## 2019-11-06 PROCEDURE — 96375 TX/PRO/DX INJ NEW DRUG ADDON: CPT | Mod: HCNC

## 2019-11-06 PROCEDURE — C9399 UNCLASSIFIED DRUGS OR BIOLOG: HCPCS | Mod: HCNC | Performed by: STUDENT IN AN ORGANIZED HEALTH CARE EDUCATION/TRAINING PROGRAM

## 2019-11-06 PROCEDURE — 20600001 HC STEP DOWN PRIVATE ROOM: Mod: HCNC

## 2019-11-06 PROCEDURE — 85025 COMPLETE CBC W/AUTO DIFF WBC: CPT | Mod: HCNC

## 2019-11-06 PROCEDURE — 93010 EKG 12-LEAD: ICD-10-PCS | Mod: HCNC,,, | Performed by: INTERNAL MEDICINE

## 2019-11-06 PROCEDURE — 25000003 PHARM REV CODE 250: Mod: HCNC | Performed by: STUDENT IN AN ORGANIZED HEALTH CARE EDUCATION/TRAINING PROGRAM

## 2019-11-06 PROCEDURE — 99223 PR INITIAL HOSPITAL CARE,LEVL III: ICD-10-PCS | Mod: HCNC,GC,, | Performed by: INTERNAL MEDICINE

## 2019-11-06 PROCEDURE — 93010 ELECTROCARDIOGRAM REPORT: CPT | Mod: HCNC,,, | Performed by: INTERNAL MEDICINE

## 2019-11-06 PROCEDURE — 96365 THER/PROPH/DIAG IV INF INIT: CPT | Mod: HCNC,59

## 2019-11-06 PROCEDURE — 83605 ASSAY OF LACTIC ACID: CPT | Mod: HCNC

## 2019-11-06 RX ORDER — OXYCODONE AND ACETAMINOPHEN 10; 325 MG/1; MG/1
1 TABLET ORAL EVERY 6 HOURS PRN
Status: DISCONTINUED | OUTPATIENT
Start: 2019-11-06 | End: 2019-11-09 | Stop reason: HOSPADM

## 2019-11-06 RX ORDER — PROCHLORPERAZINE MALEATE 10 MG
10 TABLET ORAL EVERY 6 HOURS PRN
Status: DISCONTINUED | OUTPATIENT
Start: 2019-11-06 | End: 2019-11-09 | Stop reason: HOSPADM

## 2019-11-06 RX ORDER — PANTOPRAZOLE SODIUM 40 MG/1
40 TABLET, DELAYED RELEASE ORAL DAILY
Status: DISCONTINUED | OUTPATIENT
Start: 2019-11-06 | End: 2019-11-09 | Stop reason: HOSPADM

## 2019-11-06 RX ORDER — INSULIN ASPART 100 [IU]/ML
3 INJECTION, SOLUTION INTRAVENOUS; SUBCUTANEOUS
Status: DISCONTINUED | OUTPATIENT
Start: 2019-11-06 | End: 2019-11-08

## 2019-11-06 RX ORDER — ACETAMINOPHEN 325 MG/1
650 TABLET ORAL EVERY 8 HOURS PRN
Status: DISCONTINUED | OUTPATIENT
Start: 2019-11-06 | End: 2019-11-07

## 2019-11-06 RX ORDER — ONDANSETRON 2 MG/ML
4 INJECTION INTRAMUSCULAR; INTRAVENOUS
Status: COMPLETED | OUTPATIENT
Start: 2019-11-06 | End: 2019-11-06

## 2019-11-06 RX ORDER — ONDANSETRON 2 MG/ML
4 INJECTION INTRAMUSCULAR; INTRAVENOUS EVERY 8 HOURS PRN
Status: DISCONTINUED | OUTPATIENT
Start: 2019-11-06 | End: 2019-11-09 | Stop reason: HOSPADM

## 2019-11-06 RX ORDER — NADOLOL 40 MG/1
40 TABLET ORAL DAILY
Status: DISCONTINUED | OUTPATIENT
Start: 2019-11-07 | End: 2019-11-09 | Stop reason: HOSPADM

## 2019-11-06 RX ORDER — GLUCAGON 1 MG
1 KIT INJECTION
Status: DISCONTINUED | OUTPATIENT
Start: 2019-11-06 | End: 2019-11-09 | Stop reason: HOSPADM

## 2019-11-06 RX ORDER — LACTULOSE 10 G/15ML
15 SOLUTION ORAL 3 TIMES DAILY
Status: DISCONTINUED | OUTPATIENT
Start: 2019-11-06 | End: 2019-11-06

## 2019-11-06 RX ORDER — HYDROMORPHONE HYDROCHLORIDE 2 MG/ML
1 INJECTION, SOLUTION INTRAMUSCULAR; INTRAVENOUS; SUBCUTANEOUS
Status: COMPLETED | OUTPATIENT
Start: 2019-11-06 | End: 2019-11-06

## 2019-11-06 RX ORDER — IBUPROFEN 200 MG
16 TABLET ORAL
Status: DISCONTINUED | OUTPATIENT
Start: 2019-11-06 | End: 2019-11-09 | Stop reason: HOSPADM

## 2019-11-06 RX ORDER — LACTULOSE 10 G/15ML
30 SOLUTION ORAL 3 TIMES DAILY
Status: DISCONTINUED | OUTPATIENT
Start: 2019-11-06 | End: 2019-11-07

## 2019-11-06 RX ORDER — IBUPROFEN 200 MG
24 TABLET ORAL
Status: DISCONTINUED | OUTPATIENT
Start: 2019-11-06 | End: 2019-11-09 | Stop reason: HOSPADM

## 2019-11-06 RX ORDER — INSULIN ASPART 100 [IU]/ML
0-5 INJECTION, SOLUTION INTRAVENOUS; SUBCUTANEOUS
Status: DISCONTINUED | OUTPATIENT
Start: 2019-11-06 | End: 2019-11-09 | Stop reason: HOSPADM

## 2019-11-06 RX ADMIN — OXYCODONE HYDROCHLORIDE AND ACETAMINOPHEN 1 TABLET: 10; 325 TABLET ORAL at 12:11

## 2019-11-06 RX ADMIN — ACETAMINOPHEN 650 MG: 325 TABLET ORAL at 09:11

## 2019-11-06 RX ADMIN — PANTOPRAZOLE SODIUM 40 MG: 40 TABLET, DELAYED RELEASE ORAL at 12:11

## 2019-11-06 RX ADMIN — INSULIN ASPART 2 UNITS: 100 INJECTION, SOLUTION INTRAVENOUS; SUBCUTANEOUS at 03:11

## 2019-11-06 RX ADMIN — LACTULOSE 30 G: 20 SOLUTION ORAL at 10:11

## 2019-11-06 RX ADMIN — INSULIN ASPART 1 UNITS: 100 INJECTION, SOLUTION INTRAVENOUS; SUBCUTANEOUS at 08:11

## 2019-11-06 RX ADMIN — IOHEXOL 100 ML: 350 INJECTION, SOLUTION INTRAVENOUS at 03:11

## 2019-11-06 RX ADMIN — LACTULOSE 15 G: 20 SOLUTION ORAL at 12:11

## 2019-11-06 RX ADMIN — ONDANSETRON 4 MG: 2 INJECTION INTRAMUSCULAR; INTRAVENOUS at 04:11

## 2019-11-06 RX ADMIN — INSULIN DETEMIR 17 UNITS: 100 INJECTION, SOLUTION SUBCUTANEOUS at 10:11

## 2019-11-06 RX ADMIN — HYDROMORPHONE HYDROCHLORIDE 1 MG: 2 INJECTION, SOLUTION INTRAMUSCULAR; INTRAVENOUS; SUBCUTANEOUS at 05:11

## 2019-11-06 RX ADMIN — IOHEXOL 1000 ML: 12 SOLUTION ORAL at 02:11

## 2019-11-06 RX ADMIN — INSULIN ASPART 3 UNITS: 100 INJECTION, SOLUTION INTRAVENOUS; SUBCUTANEOUS at 06:11

## 2019-11-06 RX ADMIN — SODIUM CHLORIDE 1000 ML: 0.9 INJECTION, SOLUTION INTRAVENOUS at 01:11

## 2019-11-06 RX ADMIN — INSULIN DETEMIR 8 UNITS: 100 INJECTION, SOLUTION SUBCUTANEOUS at 12:11

## 2019-11-06 RX ADMIN — PROMETHAZINE HYDROCHLORIDE 12.5 MG: 25 INJECTION INTRAMUSCULAR; INTRAVENOUS at 07:11

## 2019-11-06 RX ADMIN — OXYCODONE HYDROCHLORIDE AND ACETAMINOPHEN 1 TABLET: 10; 325 TABLET ORAL at 08:11

## 2019-11-06 RX ADMIN — INSULIN ASPART 3 UNITS: 100 INJECTION, SOLUTION INTRAVENOUS; SUBCUTANEOUS at 12:11

## 2019-11-06 RX ADMIN — LACTULOSE 15 G: 20 SOLUTION ORAL at 03:11

## 2019-11-06 RX ADMIN — PROMETHAZINE HYDROCHLORIDE 12.5 MG: 25 INJECTION INTRAMUSCULAR; INTRAVENOUS at 01:11

## 2019-11-06 NOTE — ED NOTES
Pt. Resting with eyes closed, chest rise and fall symmetrical, respirations even and unlabored, NADN, family at bedside updated on disposition, no concerns at this time, will continue to monitor.

## 2019-11-06 NOTE — CONSULTS
Radiology Consult    Steve June Jr. is a 74 y.o. male with a history of cirrhosis presenting with abdominal pain and nausea, partial thrombus identified at the portal confluence.  Pt cannot be anticoagulated d/t variceal bleeding history and thrombocytopenia.  IR consulted for possible intervention.    Past Medical History:   Diagnosis Date    Abnormal thyroid function test     Allergy     Seasonal    Anemia     Anemia due to blood loss 7/2/2014    Arthritis     Gaviria esophagus     Basal cell carcinoma     right forearm    Basal cell carcinoma 12/2011    lower post neck    Basal cell carcinoma 04/23/2019    left posterior helix    Cancer     skin CA    Cataract     Cirrhosis     Diabetes mellitus     Diabetes mellitus, type 2     Encounter for blood transfusion     Esophageal varices in cirrhosis     grade II on 7/12 EGD    Gastritis     on 7/12 EGD    GERD (gastroesophageal reflux disease) 2/28/2015    Hard of hearing     Hiatal hernia     History of hepatitis C 8/10/2012    tx with harvoni x 41 days (started 10/22/15). SVR4     Hoarseness 2/28/2015    Hypercholesteremia     Hypersplenism     Hypertension     No meds    Pain management 12/10/2014    Petechial hemorrhage 11/25/2015    Lower extremities bilat     Portal hypertensive gastropathy     on 7/12 EGD    Squamous cell carcinoma 04/23/2019    right preauricular cheek, right temple    Thrombocytopenia     Type II or unspecified type diabetes mellitus with neurological manifestations, uncontrolled(250.62) 12/24/2013    Valvular heart disease     mild MR 12     Past Surgical History:   Procedure Laterality Date    CATARACT EXTRACTION  1/10/13    left eye    CATARACT EXTRACTION      right eye    CHOLECYSTECTOMY      COLONOSCOPY      EYE SURGERY      Cataract surgery to right eye    KNEE ARTHROSCOPY W/ MENISCAL REPAIR      KNEE CARTILAGE SURGERY      left knee    KNEE SURGERY  12/2006    left    SKIN LESION  "EXCISION      TONSILLECTOMY      UPPER GASTROINTESTINAL ENDOSCOPY         Discussed with primary team, Hospital Medicine.    Imaging reviewed with Radiology staff, Dr. Foley.     Procedure: Portal venous thrombectomy with possible TIPS    Scheduled Meds:    insulin aspart U-100  3 Units Subcutaneous TIDWM    insulin detemir U-100  17 Units Subcutaneous QHS    lactulose  15 g Oral TID    pantoprazole  40 mg Oral Daily     Continuous Infusions:   PRN Meds:acetaminophen, Dextrose 10% Bolus, Dextrose 10% Bolus, glucagon (human recombinant), glucose, glucose, insulin aspart U-100, ondansetron, oxyCODONE-acetaminophen, prochlorperazine    Allergies:   Review of patient's allergies indicates:   Allergen Reactions    Adhesive tape-silicones Other (See Comments)     pulls skin off    Doxycycline      Dizzy. "Just didn't feel right".       Labs:  Recent Labs   Lab 11/06/19  1118   INR 1.2       Recent Labs   Lab 11/06/19  0117   WBC 3.93   HGB 12.4*   HCT 37.3*   MCV 86   PLT 32*      Recent Labs   Lab 11/06/19  0117   *      K 4.2      CO2 26   BUN 21   CREATININE 1.2   CALCIUM 9.0   ALT 23   AST 32   ALBUMIN 4.1   BILITOT 1.1*     MELD-Na score: 11 at 11/6/2019 11:18 AM  MELD score: 11 at 11/6/2019 11:18 AM  Calculated from:  Serum Creatinine: 1.2 mg/dL at 11/6/2019  1:17 AM  Serum Sodium: 139 mmol/L (Rounded to 137 mmol/L) at 11/6/2019  1:17 AM  Total Bilirubin: 1.1 mg/dL at 11/6/2019  1:17 AM  INR(ratio): 1.2 at 11/6/2019 11:18 AM  Age: 74 years      Vitals (Most Recent):  Temp: 98.5 °F (36.9 °C) (11/06/19 0912)  Pulse: 74 (11/06/19 1127)  Resp: 18 (11/06/19 0912)  BP: (!) 148/78 (11/06/19 0912)  SpO2: 95 % (11/06/19 0912)    Plan:   Portal venous thrombus in pt with history of variceal bleeding and thrombocytopenia.  Without intervention, thrombosis will likely progress.  Pt may require a TIPS.  Hepatology consultation is recommended for further evaluation.    1. Obtain hepatology " consult.  2. NPO after midnight for possible procedure.  3. Possible PVT thrombectomy with possible TIPS placement tomorrow, pending discussion with hepatology.      Adalgisa Marin MD  Resident  Department of Radiology  Pager: [phone]

## 2019-11-06 NOTE — SUBJECTIVE & OBJECTIVE
Review of Systems   Constitutional: Negative for activity change, appetite change, chills, diaphoresis, fatigue, fever and unexpected weight change.   HENT: Negative for sore throat and trouble swallowing.    Eyes: Negative for visual disturbance.   Respiratory: Positive for cough and shortness of breath. Negative for chest tightness.    Cardiovascular: Negative for chest pain and leg swelling.   Gastrointestinal: Positive for abdominal pain and constipation. Negative for abdominal distention, anal bleeding, blood in stool, diarrhea, nausea and vomiting.   Genitourinary: Negative for dysuria and hematuria.   Musculoskeletal: Positive for arthralgias, back pain and myalgias.   Skin: Negative for rash.   Neurological: Negative for dizziness, weakness, light-headedness and headaches.   Psychiatric/Behavioral: Positive for confusion. Negative for agitation.       Past Medical History:   Diagnosis Date    Abnormal thyroid function test     Allergy     Seasonal    Anemia     Anemia due to blood loss 7/2/2014    Arthritis     Gaviria esophagus     Basal cell carcinoma     right forearm    Basal cell carcinoma 12/2011    lower post neck    Basal cell carcinoma 04/23/2019    left posterior helix    Cancer     skin CA    Cataract     Cirrhosis     Diabetes mellitus     Diabetes mellitus, type 2     Encounter for blood transfusion     Esophageal varices in cirrhosis     grade II on 7/12 EGD    Gastritis     on 7/12 EGD    GERD (gastroesophageal reflux disease) 2/28/2015    Hard of hearing     Hiatal hernia     History of hepatitis C 8/10/2012    tx with harvoni x 41 days (started 10/22/15). SVR4     Hoarseness 2/28/2015    Hypercholesteremia     Hypersplenism     Hypertension     No meds    Pain management 12/10/2014    Petechial hemorrhage 11/25/2015    Lower extremities bilat     Portal hypertensive gastropathy     on 7/12 EGD    Squamous cell carcinoma 04/23/2019    right preauricular cheek,  "right temple    Thrombocytopenia     Type II or unspecified type diabetes mellitus with neurological manifestations, uncontrolled(250.62) 2013    Valvular heart disease     mild MR 12       Past Surgical History:   Procedure Laterality Date    CATARACT EXTRACTION  1/10/13    left eye    CATARACT EXTRACTION      right eye    CHOLECYSTECTOMY      COLONOSCOPY      EYE SURGERY      Cataract surgery to right eye    KNEE ARTHROSCOPY W/ MENISCAL REPAIR      KNEE CARTILAGE SURGERY      left knee    KNEE SURGERY  2006    left    SKIN LESION EXCISION      TONSILLECTOMY      UPPER GASTROINTESTINAL ENDOSCOPY         Family history of liver disease: No    Review of patient's allergies indicates:   Allergen Reactions    Adhesive tape-silicones Other (See Comments)     pulls skin off    Doxycycline      Dizzy. "Just didn't feel right".       Tobacco Use    Smoking status: Former Smoker     Packs/day: 1.00     Years: 25.00     Pack years: 25.00     Last attempt to quit: 2000     Years since quittin.2    Smokeless tobacco: Never Used   Substance and Sexual Activity    Alcohol use: Yes     Comment: rarely    Drug use: No    Sexual activity: Never       Medications Prior to Admission   Medication Sig Dispense Refill Last Dose    albuterol (VENTOLIN HFA) 90 mcg/actuation inhaler Inhale 2 puffs into the lungs every 6 (six) hours as needed for Wheezing. Rescue 1 Inhaler 11 Taking    glipiZIDE (GLUCOTROL) 10 MG tablet Take 1 tablet (10 mg total) by mouth 2 (two) times daily before meals. (Patient not taking: Reported on 2019) 180 tablet 3 Not Taking    LEVEMIR FLEXTOUCH U-100 INSULN 100 unit/mL (3 mL) InPn pen Inject 40 Units into the skin every evening. 30.6 mL 3 Taking    metFORMIN (GLUCOPHAGE) 500 MG tablet Take 1 tablet (500 mg total) by mouth 2 (two) times daily with meals. (Patient not taking: Reported on 2019) 180 tablet 3 Not Taking    NOVOLOG FLEXPEN U-100 INSULIN 100 unit/mL " "(3 mL) InPn pen INJECT 8 UNITS UNDER THE SKIN THREE TIMES DAILY WITH MEALS (Patient taking differently: INJECT 5 TO 8 UNITS UNDER THE SKIN THREE TIMES DAILY WITH MEALS) 30 mL 0 Taking    oxyCODONE-acetaminophen (PERCOCET)  mg per tablet Take 1 tablet by mouth every 6 (six) hours as needed for Pain. 120 tablet 0 Taking    [START ON 11/22/2019] oxyCODONE-acetaminophen (PERCOCET)  mg per tablet Take 1 tablet by mouth every 6 (six) hours as needed for Pain. 120 tablet 0 Taking    pantoprazole (PROTONIX) 40 MG tablet Take 1 tablet (40 mg total) by mouth once daily. (Patient not taking: Reported on 11/5/2019) 90 tablet 0 Not Taking    pen needle, diabetic 32 gauge x 5/32" Ndle Use as directed 100 each 3 Taking       Objective:     Vital Signs (Most Recent):  Temp: 98.5 °F (36.9 °C) (11/06/19 0912)  Pulse: 75 (11/06/19 1524)  Resp: 18 (11/06/19 0912)  BP: (!) 148/78 (11/06/19 0912)  SpO2: 95 % (11/06/19 0912) Vital Signs (24h Range):  Temp:  [97.7 °F (36.5 °C)-98.5 °F (36.9 °C)] 98.5 °F (36.9 °C)  Pulse:  [74-81] 75  Resp:  [18-22] 18  SpO2:  [93 %-95 %] 95 %  BP: (137-185)/(74-93) 148/78     Weight: 94.1 kg (207 lb 7.2 oz) (11/06/19 1253)  Body mass index is 30.64 kg/m².    Physical Exam   Constitutional: No distress.   Pleasant elderly man in no distress.  Multiple family members present.   HENT:   Head: Normocephalic and atraumatic.   Mouth/Throat: Oropharynx is clear and moist. No oropharyngeal exudate.   Eyes: Conjunctivae are normal. No scleral icterus.   Neck: No JVD present.   Cardiovascular: Normal rate, regular rhythm, normal heart sounds and intact distal pulses.   Pulmonary/Chest: Effort normal and breath sounds normal. No respiratory distress.   Abdominal: Soft. Bowel sounds are normal. He exhibits no distension and no mass. There is no tenderness. There is no rebound and no guarding.   Soft, nontender.  No ascites. Reducible umbilical hernia.   Musculoskeletal: He exhibits no edema or " tenderness.   Lymphadenopathy:     He has no cervical adenopathy.   Neurological: He is alert.   Mild asterixis.  Oriented to self and year but not to date.   Skin: Skin is warm. Capillary refill takes less than 2 seconds. He is not diaphoretic.   Psychiatric: He has a normal mood and affect. His behavior is normal. Judgment and thought content normal.   Nursing note and vitals reviewed.      MELD-Na score: 11 at 11/6/2019 11:18 AM  MELD score: 11 at 11/6/2019 11:18 AM  Calculated from:  Serum Creatinine: 1.2 mg/dL at 11/6/2019  1:17 AM  Serum Sodium: 139 mmol/L (Rounded to 137 mmol/L) at 11/6/2019  1:17 AM  Total Bilirubin: 1.1 mg/dL at 11/6/2019  1:17 AM  INR(ratio): 1.2 at 11/6/2019 11:18 AM  Age: 74 years    Significant Labs:  Labs within the past month have been reviewed.    Significant Imaging:  Labs: Reviewed

## 2019-11-06 NOTE — SUBJECTIVE & OBJECTIVE
"Past Medical History:   Diagnosis Date    Abnormal thyroid function test     Allergy     Seasonal    Anemia     Anemia due to blood loss 7/2/2014    Arthritis     Gaviria esophagus     Basal cell carcinoma     right forearm    Basal cell carcinoma 12/2011    lower post neck    Basal cell carcinoma 04/23/2019    left posterior helix    Cancer     skin CA    Cataract     Cirrhosis     Diabetes mellitus     Diabetes mellitus, type 2     Encounter for blood transfusion     Esophageal varices in cirrhosis     grade II on 7/12 EGD    Gastritis     on 7/12 EGD    GERD (gastroesophageal reflux disease) 2/28/2015    Hard of hearing     Hiatal hernia     History of hepatitis C 8/10/2012    tx with harvoni x 41 days (started 10/22/15). SVR4     Hoarseness 2/28/2015    Hypercholesteremia     Hypersplenism     Hypertension     No meds    Pain management 12/10/2014    Petechial hemorrhage 11/25/2015    Lower extremities bilat     Portal hypertensive gastropathy     on 7/12 EGD    Squamous cell carcinoma 04/23/2019    right preauricular cheek, right temple    Thrombocytopenia     Type II or unspecified type diabetes mellitus with neurological manifestations, uncontrolled(250.62) 12/24/2013    Valvular heart disease     mild MR 12       Past Surgical History:   Procedure Laterality Date    CATARACT EXTRACTION  1/10/13    left eye    CATARACT EXTRACTION      right eye    CHOLECYSTECTOMY      COLONOSCOPY      EYE SURGERY      Cataract surgery to right eye    KNEE ARTHROSCOPY W/ MENISCAL REPAIR      KNEE CARTILAGE SURGERY      left knee    KNEE SURGERY  12/2006    left    SKIN LESION EXCISION      TONSILLECTOMY      UPPER GASTROINTESTINAL ENDOSCOPY         Review of patient's allergies indicates:   Allergen Reactions    Adhesive tape-silicones Other (See Comments)     pulls skin off    Doxycycline      Dizzy. "Just didn't feel right".       Current Facility-Administered Medications on File " "Prior to Encounter   Medication    [COMPLETED] hydromorphone (PF) injection 1 mg    [COMPLETED] iohexol (OMNIPAQUE 12) oral solution 1,000 mL    [COMPLETED] iohexol (OMNIPAQUE 350) injection 100 mL    [COMPLETED] ondansetron injection 4 mg    [COMPLETED] promethazine (PHENERGAN) 12.5 mg in dextrose 5 % 50 mL IVPB    [COMPLETED] promethazine (PHENERGAN) 12.5 mg in dextrose 5 % 50 mL IVPB    [COMPLETED] sodium chloride 0.9% bolus 1,000 mL     Current Outpatient Medications on File Prior to Encounter   Medication Sig    albuterol (VENTOLIN HFA) 90 mcg/actuation inhaler Inhale 2 puffs into the lungs every 6 (six) hours as needed for Wheezing. Rescue    glipiZIDE (GLUCOTROL) 10 MG tablet Take 1 tablet (10 mg total) by mouth 2 (two) times daily before meals. (Patient not taking: Reported on 11/5/2019)    LEVEMIR FLEXTOUCH U-100 INSULN 100 unit/mL (3 mL) InPn pen Inject 40 Units into the skin every evening.    metFORMIN (GLUCOPHAGE) 500 MG tablet Take 1 tablet (500 mg total) by mouth 2 (two) times daily with meals. (Patient not taking: Reported on 11/5/2019)    NOVOLOG FLEXPEN U-100 INSULIN 100 unit/mL (3 mL) InPn pen INJECT 8 UNITS UNDER THE SKIN THREE TIMES DAILY WITH MEALS (Patient taking differently: INJECT 5 TO 8 UNITS UNDER THE SKIN THREE TIMES DAILY WITH MEALS)    oxyCODONE-acetaminophen (PERCOCET)  mg per tablet Take 1 tablet by mouth every 6 (six) hours as needed for Pain.    [START ON 11/22/2019] oxyCODONE-acetaminophen (PERCOCET)  mg per tablet Take 1 tablet by mouth every 6 (six) hours as needed for Pain.    pantoprazole (PROTONIX) 40 MG tablet Take 1 tablet (40 mg total) by mouth once daily. (Patient not taking: Reported on 11/5/2019)    pen needle, diabetic 32 gauge x 5/32" Ndle Use as directed     Family History     Problem Relation (Age of Onset)    Alcohol abuse Father    Cancer Mother    Cirrhosis Father    Leukemia Mother    No Known Problems Daughter, Son, Daughter, Daughter, " Daughter, Sister        Tobacco Use    Smoking status: Former Smoker     Packs/day: 1.00     Years: 25.00     Pack years: 25.00     Last attempt to quit: 2000     Years since quittin.2    Smokeless tobacco: Never Used   Substance and Sexual Activity    Alcohol use: Yes     Comment: rarely    Drug use: No    Sexual activity: Never     Review of Systems   Constitutional: Negative for activity change, appetite change, chills, diaphoresis, fatigue, fever and unexpected weight change.   HENT: Negative for sore throat and trouble swallowing.    Eyes: Negative for visual disturbance.   Respiratory: Positive for cough and shortness of breath. Negative for chest tightness.    Cardiovascular: Negative for chest pain and leg swelling.   Gastrointestinal: Positive for abdominal pain and constipation. Negative for abdominal distention, anal bleeding, blood in stool, diarrhea, nausea and vomiting.   Genitourinary: Negative for dysuria and hematuria.   Musculoskeletal: Positive for arthralgias, back pain and myalgias.   Skin: Negative for rash.   Neurological: Negative for dizziness, weakness, light-headedness and headaches.   Psychiatric/Behavioral: Positive for confusion. Negative for agitation.     Objective:     Vital Signs (Most Recent):  Temp: 98.5 °F (36.9 °C) (19)  Pulse: 74 (19 1127)  Resp: 18 (19 09)  BP: (!) 148/78 (19 09)  SpO2: 95 % (19) Vital Signs (24h Range):  Temp:  [97.7 °F (36.5 °C)-98.5 °F (36.9 °C)] 98.5 °F (36.9 °C)  Pulse:  [74-81] 74  Resp:  [18-22] 18  SpO2:  [93 %-95 %] 95 %  BP: (137-185)/(74-93) 148/78        There is no height or weight on file to calculate BMI.    Physical Exam   Constitutional: No distress.   Elderly man lying flat in bed in no apparent distress   HENT:   Head: Normocephalic and atraumatic.   Mouth/Throat: Oropharynx is clear and moist. No oropharyngeal exudate.   Eyes: Conjunctivae are normal. No scleral icterus.   Neck: No  JVD present.   Cardiovascular: Normal rate, regular rhythm, normal heart sounds and intact distal pulses.   Pulmonary/Chest: Effort normal and breath sounds normal. No respiratory distress.   Abdominal: Soft. Bowel sounds are normal. He exhibits no distension and no mass. There is no tenderness. There is no rebound and no guarding.   Reducible umbilical hernia.   Musculoskeletal: He exhibits no edema or tenderness.   Lymphadenopathy:     He has no cervical adenopathy.   Neurological: He is alert.   Mild asterixis    Oriented to self, place, year   Skin: Skin is warm. Capillary refill takes less than 2 seconds. He is not diaphoretic.   Psychiatric: He has a normal mood and affect. His behavior is normal. Judgment and thought content normal.   Nursing note and vitals reviewed.          Significant Labs:   CBC:   Recent Labs   Lab 11/06/19  0117   WBC 3.93   HGB 12.4*   HCT 37.3*   PLT 32*     CMP:   Recent Labs   Lab 11/06/19  0117      K 4.2      CO2 26   *   BUN 21   CREATININE 1.2   CALCIUM 9.0   PROT 6.7   ALBUMIN 4.1   BILITOT 1.1*   ALKPHOS 86   AST 32   ALT 23   ANIONGAP 11   EGFRNONAA >60       Significant Imaging: I have reviewed all pertinent imaging results/findings within the past 24 hours.

## 2019-11-06 NOTE — CONSULTS
[facility]  Hepatology  Consult Note    Patient Name: Steve June Jr.  MRN: [id number]  Admission Date: 11/6/2019  Hospital Length of Stay: 0 days  Attending Provider: Tameka Lockhart MD   Primary Care Physician: Crispin Garcia MD  Principal Problem:<principal problem not specified>    Inpatient consult to Hepatology  Consult performed by: Ravindra Varghese MD  Consult ordered by: Crissy Rivera MD        Subjective:     Transplant status: No    HPI:  Mr June is a 74 year old man with history of HCV ESLD, HCV s/p Harvoni with SVR, variceal bleed s/p banding on beta-blocker, HTN, T2DM, BCC, who is presenting to the hospital with concern for abdominal pain; hepatology consulted due to concern for partial portal vein thrombosis.    Patient's confused initially on evaluation, history is scattered/tangential, family she can disagreement the background.  Family reports that at baseline he has diffuse pain including abdominal pain, back pain, hip pain, ankle pain for which he takes Percocet q.6 hours.  He notes that he has been on his Percocet for several years without any recent changes with exception of increased dosing while in the hospital.  Over the past 1 week he had run out of Colace and has had increasing constipation. His wife had gone to pick him up constipation medications however upon returning home he was noted to have worsening abdominal pain and back pain therefore was brought to the hospital.  At outside hospital CT abdomen pelvis was obtained which is notable for a partial portal vein thrombosis at the splenic confluence, therefore patient was transferred to [facility] for further evaluation.  He was seen by interventional Radiology who offered thrombectomy in TIPS if hepatology felt abdominal pain was secondary to the portal vein thrombosis.    Currently he reports that he is feeling better without significant abdominal pain at this time.  He endorses feeling constipated.  Family  reports that he has been having some increasing confusion over the past few days and has been more shaky (patient is not on lactulose and baseline the family reports he had been on a years prior ).  Denies nausea or vomiting.  Denies any melena or hematochezia. No drug use or alcohol use.  No smoking.  Otherwise on review he endorses dyspnea and cough which is productive.  Denies fevers chills or sweats. No sick contacts or travel.  Does endorse falls but no head trauma.          Review of Systems   Constitutional: Negative for activity change, appetite change, chills, diaphoresis, fatigue, fever and unexpected weight change.   HENT: Negative for sore throat and trouble swallowing.    Eyes: Negative for visual disturbance.   Respiratory: Positive for cough and shortness of breath. Negative for chest tightness.    Cardiovascular: Negative for chest pain and leg swelling.   Gastrointestinal: Positive for abdominal pain and constipation. Negative for abdominal distention, anal bleeding, blood in stool, diarrhea, nausea and vomiting.   Genitourinary: Negative for dysuria and hematuria.   Musculoskeletal: Positive for arthralgias, back pain and myalgias.   Skin: Negative for rash.   Neurological: Negative for dizziness, weakness, light-headedness and headaches.   Psychiatric/Behavioral: Positive for confusion. Negative for agitation.       Past Medical History:   Diagnosis Date    Abnormal thyroid function test     Allergy     Seasonal    Anemia     Anemia due to blood loss 7/2/2014    Arthritis     Gaviria esophagus     Basal cell carcinoma     right forearm    Basal cell carcinoma 12/2011    lower post neck    Basal cell carcinoma 04/23/2019    left posterior helix    Cancer     skin CA    Cataract     Cirrhosis     Diabetes mellitus     Diabetes mellitus, type 2     Encounter for blood transfusion     Esophageal varices in cirrhosis     grade II on 7/12 EGD    Gastritis     on 7/12 EGD    GERD  "(gastroesophageal reflux disease) 2015    Hard of hearing     Hiatal hernia     History of hepatitis C 8/10/2012    tx with harvoni x 41 days (started 10/22/15). SVR4     Hoarseness 2015    Hypercholesteremia     Hypersplenism     Hypertension     No meds    Pain management 12/10/2014    Petechial hemorrhage 2015    Lower extremities bilat     Portal hypertensive gastropathy     on  EGD    Squamous cell carcinoma 2019    right preauricular cheek, right temple    Thrombocytopenia     Type II or unspecified type diabetes mellitus with neurological manifestations, uncontrolled(250.62) 2013    Valvular heart disease     mild MR 12       Past Surgical History:   Procedure Laterality Date    CATARACT EXTRACTION  1/10/13    left eye    CATARACT EXTRACTION      right eye    CHOLECYSTECTOMY      COLONOSCOPY      EYE SURGERY      Cataract surgery to right eye    KNEE ARTHROSCOPY W/ MENISCAL REPAIR      KNEE CARTILAGE SURGERY      left knee    KNEE SURGERY  2006    left    SKIN LESION EXCISION      TONSILLECTOMY      UPPER GASTROINTESTINAL ENDOSCOPY         Family history of liver disease: No    Review of patient's allergies indicates:   Allergen Reactions    Adhesive tape-silicones Other (See Comments)     pulls skin off    Doxycycline      Dizzy. "Just didn't feel right".       Tobacco Use    Smoking status: Former Smoker     Packs/day: 1.00     Years: 25.00     Pack years: 25.00     Last attempt to quit: 2000     Years since quittin.2    Smokeless tobacco: Never Used   Substance and Sexual Activity    Alcohol use: Yes     Comment: rarely    Drug use: No    Sexual activity: Never       Medications Prior to Admission   Medication Sig Dispense Refill Last Dose    albuterol (VENTOLIN HFA) 90 mcg/actuation inhaler Inhale 2 puffs into the lungs every 6 (six) hours as needed for Wheezing. Rescue 1 Inhaler 11 Taking    glipiZIDE (GLUCOTROL) 10 MG tablet " "Take 1 tablet (10 mg total) by mouth 2 (two) times daily before meals. (Patient not taking: Reported on 11/5/2019) 180 tablet 3 Not Taking    LEVEMIR FLEXTOUCH U-100 INSULN 100 unit/mL (3 mL) InPn pen Inject 40 Units into the skin every evening. 30.6 mL 3 Taking    metFORMIN (GLUCOPHAGE) 500 MG tablet Take 1 tablet (500 mg total) by mouth 2 (two) times daily with meals. (Patient not taking: Reported on 11/5/2019) 180 tablet 3 Not Taking    NOVOLOG FLEXPEN U-100 INSULIN 100 unit/mL (3 mL) InPn pen INJECT 8 UNITS UNDER THE SKIN THREE TIMES DAILY WITH MEALS (Patient taking differently: INJECT 5 TO 8 UNITS UNDER THE SKIN THREE TIMES DAILY WITH MEALS) 30 mL 0 Taking    oxyCODONE-acetaminophen (PERCOCET)  mg per tablet Take 1 tablet by mouth every 6 (six) hours as needed for Pain. 120 tablet 0 Taking    [START ON 11/22/2019] oxyCODONE-acetaminophen (PERCOCET)  mg per tablet Take 1 tablet by mouth every 6 (six) hours as needed for Pain. 120 tablet 0 Taking    pantoprazole (PROTONIX) 40 MG tablet Take 1 tablet (40 mg total) by mouth once daily. (Patient not taking: Reported on 11/5/2019) 90 tablet 0 Not Taking    pen needle, diabetic 32 gauge x 5/32" Ndle Use as directed 100 each 3 Taking       Objective:     Vital Signs (Most Recent):  Temp: 98.5 °F (36.9 °C) (11/06/19 0912)  Pulse: 75 (11/06/19 1524)  Resp: 18 (11/06/19 0912)  BP: (!) 148/78 (11/06/19 0912)  SpO2: 95 % (11/06/19 0912) Vital Signs (24h Range):  Temp:  [97.7 °F (36.5 °C)-98.5 °F (36.9 °C)] 98.5 °F (36.9 °C)  Pulse:  [74-81] 75  Resp:  [18-22] 18  SpO2:  [93 %-95 %] 95 %  BP: (137-185)/(74-93) 148/78     Weight: 94.1 kg (207 lb 7.2 oz) (11/06/19 1253)  Body mass index is 30.64 kg/m².    Physical Exam   Constitutional: No distress.   Pleasant elderly man in no distress.  Multiple family members present.   HENT:   Head: Normocephalic and atraumatic.   Mouth/Throat: Oropharynx is clear and moist. No oropharyngeal exudate.   Eyes: Conjunctivae " are normal. No scleral icterus.   Neck: No JVD present.   Cardiovascular: Normal rate, regular rhythm, normal heart sounds and intact distal pulses.   Pulmonary/Chest: Effort normal and breath sounds normal. No respiratory distress.   Abdominal: Soft. Bowel sounds are normal. He exhibits no distension and no mass. There is no tenderness. There is no rebound and no guarding.   Soft, nontender.  No ascites. Reducible umbilical hernia.   Musculoskeletal: He exhibits no edema or tenderness.   Lymphadenopathy:     He has no cervical adenopathy.   Neurological: He is alert.   Mild asterixis.  Oriented to self and year but not to date.   Skin: Skin is warm. Capillary refill takes less than 2 seconds. He is not diaphoretic.   Psychiatric: He has a normal mood and affect. His behavior is normal. Judgment and thought content normal.   Nursing note and vitals reviewed.      MELD-Na score: 11 at 11/6/2019 11:18 AM  MELD score: 11 at 11/6/2019 11:18 AM  Calculated from:  Serum Creatinine: 1.2 mg/dL at 11/6/2019  1:17 AM  Serum Sodium: 139 mmol/L (Rounded to 137 mmol/L) at 11/6/2019  1:17 AM  Total Bilirubin: 1.1 mg/dL at 11/6/2019  1:17 AM  INR(ratio): 1.2 at 11/6/2019 11:18 AM  Age: 74 years    Significant Labs:  Labs within the past month have been reviewed.    Significant Imaging:  Labs: Reviewed    Assessment/Plan:     Portal vein thrombosis  Mr June is a 74 year old man with history of HCV ESLD, HCV s/p Harvoni with SVR, variceal bleed s/p banding on beta-blocker, HTN, T2DM, BCC, who is presenting to the hospital with concern for abdominal pain; hepatology consulted due to concern for partial portal vein thrombosis.    Do not feel the patient has partial portal vein thrombosis as a cause of his abdominal pain. Given his recent history of constipation this may be the cause of his abdominal pain. Labs are with liver function at baseline and is well compensated with a meld of 8.  He does have some mild encephalopathy which  is improving on re-evaluation which would increase his risk for TIPS.  Additionally as this is not felt to be the cause of this pain and may be an incidental finding, and is not a transplant candidate, would not recommend a high risk procedure.    Plan  - do not recommend thrombectomy/TIPS at this time.  - given thrombocytopenia and history of variceal bleed do not recommend anticoagulation  - recommend aggressive bowel regiment.  Continue lactulose on discharge.  - given confusion and history of falls and thrombocytopenia recommend CT head to rule out subdural  - check ammonia, if negative consider non hepatic etiologies for encephalopathy  - infectious evaluation  - check AFP  - variceal surveillance.  Patient will benefit from repeat EGD to evaluate esophageal varices given history of variceal bleed but he and family report that he is not interested repeat EGD due to his concerned that prior EGD affected his voice.  Continue beta-blocker.  - we will review his imaging in IR conference and contact him with recommendations were to change  - transplant candidacy:  Patient is not currently a transplant candidate        Thank you for your consult. I will sign off. Please contact us if you have any additional questions.    Ravindra Varghese MD  Hepatology  [facility]

## 2019-11-06 NOTE — HPI
Mr June is a 74 year old man with history of HCV ESLD, HCV s/p Harvoni with SVR, variceal bleed s/p banding on beta-blocker, HTN, T2DM, BCC, who is presenting to the hospital with concern for abdominal pain; hepatology consulted due to concern for partial portal vein thrombosis.    Patient's confused initially on evaluation, history is scattered/tangential, family she can disagreement the background.  Family reports that at baseline he has diffuse pain including abdominal pain, back pain, hip pain, ankle pain for which he takes Percocet q.6 hours.  He notes that he has been on his Percocet for several years without any recent changes with exception of increased dosing while in the hospital.  Over the past 1 week he had run out of Colace and has had increasing constipation. His wife had gone to pick him up constipation medications however upon returning home he was noted to have worsening abdominal pain and back pain therefore was brought to the hospital.  At outside hospital CT abdomen pelvis was obtained which is notable for a partial portal vein thrombosis at the splenic confluence, therefore [place] [facility] [place].  He was seen by interventional Radiology who offered thrombectomy in TIPS if hepatology felt abdominal pain was secondary to the portal vein thrombosis.    Currently he reports that he is feeling better without significant abdominal pain at this time.  He endorses feeling constipated.  Family reports that he has been having some increasing confusion over the past few days and has been more shaky (patient is not on lactulose and baseline the family reports he had been on a years prior ).  Denies nausea or vomiting.  Denies any melena or hematochezia. No drug use or alcohol use.  No smoking.  Otherwise on review he endorses dyspnea and cough which is productive.  Denies fevers chills or sweats. No sick contacts or travel.  Does endorse falls but no head trauma.

## 2019-11-06 NOTE — PLAN OF CARE
POC reviewed with pt and family.  Pt A & O x 4, adequate urine output via urinal and commode. Vital signs stable on room air. Pain controlled with prn pain meds.  Pt NSR on telemetry.  Blood sugar controlled with sliding scale and scheduled insulin.

## 2019-11-06 NOTE — PLAN OF CARE
Please call extension [phone] (if nobody answers, this will flip to a beeper, so put in your call back number) upon patient arrival to floor for Hospital Medicine admit team assignment and for additional admit orders for the patient.  Do not page the attending, staff physician associate with the patient on arrival (may not be in-house at the time of arrival).  Rather, always call [phone] to reach the triage physician for orders and team assignment.        Outside Transfer Acceptance Note     Patients name:      Transferring Physician or Mid-Level provider/Clinic giving report:   Dr Michael Horton at [facility] ED     Accepting Physician for admission to hospital: Bert Eng MD        Date of acceptance:  11/06/2019       Reason for transfer:  HLOC     Report from Physician/Mid-Level Provider:    HPI: 74M with h/o cirrhosis, esophageal varices, chronic pain disorder on long-term opiate therapy who stopped taking his stool softeners for the past week came to ED c/o acute onset of RUQ abdominal pain and vomiting.  No blood in vomit.  CT abdomen/pelvis w/ contrast showed portal vein thrombus with extension into SMV.  ED physician discussed case with Dr Horn with Vascular Surgery who recommended starting heparin drip, but due to his thrombocytopenia and history of esophageal varices he is uncomfortable with doing that.  Pending review of CT by IR staff per my request to see if amenable to thrombectomy in lieu of anticoagulation given high bleeding risk.  Abdominal pain has been mild so far and abdomen is non-surgical on exam.  ED physician informed of possible long wait for floor bed, but in event of change in clinical situation ED-ED transfer would be possible.      VS: T 97.7, R 22, -169/74-93    Labs: Hb 12.4, plt 32, lactate pending    Radiographs:     To Do List upon arrival:  If IR able to perform thrombectomy today can bring patient ED-ED to facilitate procedure.  Trend lactate, especially in event  of change in abdominal pain as concern exists for bowel necrosis due to SMV involvement.  Low threshold for General Surgery consult in event of elevated lactate and worsening abdominal pain.       Please call extension [phone] (if nobody answers, this will flip to a beeper, so put in your call back number) upon patient arrival to floor for Hospital Medicine admit team assignment and for additional admit orders for the patient.  Do not page the attending, staff physician associate with the patient on arrival (may not be in-house at the time of arrival).  Rather, always call [phone] to reach the triage physician for orders and team assignment.

## 2019-11-06 NOTE — ASSESSMENT & PLAN NOTE
Mr June is a 74 year old man with history of HCV ESLD, HCV s/p Harvoni with SVR, variceal bleed s/p banding on beta-blocker, HTN, T2DM, BCC, who is presenting to the hospital with concern for abdominal pain; hepatology consulted due to concern for partial portal vein thrombosis.    Do not feel the patient has partial portal vein thrombosis as a cause of his abdominal pain. Given his recent history of constipation this may be the cause of his abdominal pain. Labs are with liver function at baseline and is well compensated with a meld of 8.  He does have some mild encephalopathy which is improving on re-evaluation which would increase his risk for TIPS.    Plan  - do not recommend thrombectomy/TIPS at this time.  - given thrombocytopenia and history of variceal bleed do not recommend anticoagulation  - recommend aggressive bowel regiment.  Continue lactulose on discharge.  - given confusion and history of falls and thrombocytopenia recommend CT head to rule out subdural  - check ammonia, if negative consider non hepatic etiologies for encephalopathy  - infectious evaluation  - check AFP  - variceal surveillance.  Patient will benefit from repeat EGD to evaluate esophageal varices given history of variceal bleed but he and family report that he is not interested repeat EGD due to his concerned that prior EGD affected his voice.  Continue beta-blocker.  - we will review his imaging in IR conference and contact him with recommendations were to change  - transplant candidacy:  Patient is not currently a transplant candidate

## 2019-11-06 NOTE — HPI
"74M with HCV cirrhosis s/p harvoni tx, chronic thrombocytopenia, hx esophageal varices, DM2 on insulin, chronic pain disorder on long-term opiate therapy with associated constipation transferred from [facility] for IR evaluation of portal vein thrombosis with extension into [facility]. History primarily obtained from wife with some information from pt as he is unable to give specific details. Pt has been intermittently confused for the last month or so, demonstrating short-term memory problems and occasionally getting lost while driving, which is unusual for him. He says that for the last few weeks he has had intermittent diffuse body aches as well as malaise which he finds it hard to further describe. Pt reports that last night he experienced sudden onset n/v as well as epigastric pain and back pain, prompting him to go to the ED.     In the ED pt was mildly tachypneic and thrombocytopenic to 32K. CT A/P with contrast showed "moderate degree of stool in the colon as can be seen with constipation" and "intraluminal filling defect near the confluence of the splenic vein with the portal vein. Multiple collateral vessels in the upper abdominal quadrants." The ED physician contacted vascular surgery at [facility] over the phone for consultation and was advised to start a heparin gtt, which he felt uncomfortable doing with pt's thrombocytopenia and lack of vascular surgeons on call if pt bled. Pt was accepted for transfer to [facility] for IR evaluation in regards to candidacy for thrombectomy.    Pt and family report that pt has been out of his laxative for about 4 days or so and has not had a BM in 4 days. He chronically has constipation associated with this opiate use. Has been on lactulose in the past but does not take it daily; unclear why it was discontinued. Pt has had HE in the past but not in the last few years.  "

## 2019-11-06 NOTE — ED PROVIDER NOTES
"Encounter Date: 11/6/2019    SCRIBE #1 NOTE: IBernarda am scribing for, and in the presence of, Michael Horton MD.       History     Chief Complaint   Patient presents with    Vomiting     Started 1 hour pta, wife states constipated for 5-6 days     Time seen by provider: 12:58 AM on 11/06/2019    Steve June Jr. is a 74 y.o. male who presents to the ED with an onset of abdominal pain and vomiting for 1.5 hours. Patient endorses constipation for 6 days. He takes opioids on a regular basis but has not been taking his stool softener for 1 week. The patient denies any other symptoms at this time. Pertinent PMHx includes cirrhosis, hypersplenism, hiatal hernia, HTN, hypercholesteremia, DM, GERD, and anemia. Pertinent PSHx includes cholecystectomy. Known drug allergies include doxycycline.      The history is provided by the patient.     Review of patient's allergies indicates:   Allergen Reactions    Adhesive tape-silicones Other (See Comments)     pulls skin off    Doxycycline      Dizzy. "Just didn't feel right".     Past Medical History:   Diagnosis Date    Abnormal thyroid function test     Allergy     Seasonal    Anemia     Anemia due to blood loss 7/2/2014    Arthritis     Gaviria esophagus     Basal cell carcinoma     right forearm    Basal cell carcinoma 12/2011    lower post neck    Basal cell carcinoma 04/23/2019    left posterior helix    Cancer     skin CA    Cataract     Cirrhosis     Diabetes mellitus     Diabetes mellitus, type 2     Encounter for blood transfusion     Esophageal varices in cirrhosis     grade II on 7/12 EGD    Gastritis     on 7/12 EGD    GERD (gastroesophageal reflux disease) 2/28/2015    Hard of hearing     Hiatal hernia     History of hepatitis C 8/10/2012    tx with harvoni x 41 days (started 10/22/15). SVR4     Hoarseness 2/28/2015    Hypercholesteremia     Hypersplenism     Hypertension     No meds    Pain management 12/10/2014    " Petechial hemorrhage 2015    Lower extremities bilat     Portal hypertensive gastropathy     on  EGD    Squamous cell carcinoma 2019    right preauricular cheek, right temple    Thrombocytopenia     Type II or unspecified type diabetes mellitus with neurological manifestations, uncontrolled(250.62) 2013    Valvular heart disease     mild MR 12     Past Surgical History:   Procedure Laterality Date    CATARACT EXTRACTION  1/10/13    left eye    CATARACT EXTRACTION      right eye    CHOLECYSTECTOMY      COLONOSCOPY      EYE SURGERY      Cataract surgery to right eye    KNEE ARTHROSCOPY W/ MENISCAL REPAIR      KNEE CARTILAGE SURGERY      left knee    KNEE SURGERY  2006    left    SKIN LESION EXCISION      TONSILLECTOMY      UPPER GASTROINTESTINAL ENDOSCOPY       Family History   Problem Relation Age of Onset    Leukemia Mother     Cancer Mother         bone    Alcohol abuse Father     Cirrhosis Father         EtOH induced    No Known Problems Daughter     No Known Problems Son     No Known Problems Daughter     No Known Problems Daughter     No Known Problems Daughter     No Known Problems Sister     Amblyopia Neg Hx     Blindness Neg Hx     Cataracts Neg Hx     Diabetes Neg Hx     Glaucoma Neg Hx     Hypertension Neg Hx     Macular degeneration Neg Hx     Retinal detachment Neg Hx     Strabismus Neg Hx     Stroke Neg Hx     Thyroid disease Neg Hx     Psoriasis Neg Hx     Lupus Neg Hx     Eczema Neg Hx     Acne Neg Hx     Melanoma Neg Hx      Social History     Tobacco Use    Smoking status: Former Smoker     Packs/day: 1.00     Years: 25.00     Pack years: 25.00     Last attempt to quit: 2000     Years since quittin.2    Smokeless tobacco: Never Used   Substance Use Topics    Alcohol use: Yes     Comment: rarely    Drug use: No     Review of Systems   Constitutional: Negative for fever.   HENT: Negative for congestion.    Eyes: Negative  for visual disturbance.   Respiratory: Negative for wheezing.    Cardiovascular: Negative for chest pain.   Gastrointestinal: Positive for constipation, nausea and vomiting. Negative for abdominal pain.   Genitourinary: Negative for dysuria.   Musculoskeletal: Negative for joint swelling.   Skin: Negative for rash.   Neurological: Negative for syncope.   Hematological: Does not bruise/bleed easily.   Psychiatric/Behavioral: Negative for confusion.       Physical Exam     Initial Vitals [11/06/19 0051]   BP Pulse Resp Temp SpO2   (!) 137/93 81 (!) 22 97.7 °F (36.5 °C) 95 %      MAP       --         Physical Exam    Constitutional: He appears well-nourished.   HENT:   Head: Normocephalic and atraumatic.   Eyes: Conjunctivae and EOM are normal.   Neck: Normal range of motion. Neck supple. No thyroid mass present.   Cardiovascular: Normal rate, regular rhythm and normal heart sounds. Exam reveals no gallop and no friction rub.    No murmur heard.  Pulmonary/Chest: Breath sounds normal. He has no wheezes. He has no rhonchi. He has no rales.   Abdominal: Soft. Normal appearance and bowel sounds are normal. There is tenderness (mild ttp) in the left upper quadrant and left lower quadrant. A hernia (umbilical) is present.   Neurological: He is alert and oriented to person, place, and time. He has normal strength. No cranial nerve deficit or sensory deficit.   Skin: Skin is warm and dry. No rash noted. No erythema.   Psychiatric: He has a normal mood and affect. His speech is normal. Cognition and memory are normal.         ED Course   Procedures  Labs Reviewed - No data to display  EKG Readings: (Independently Interpreted)   Initial Reading: No STEMI.       Imaging Results    None          Medical Decision Making:   History:   Old Medical Records: I decided to obtain old medical records.  Independently Interpreted Test(s):   I have ordered and independently interpreted X-rays - see prior notes.  Clinical Tests:   Lab Tests:  Ordered and Reviewed  Radiological Study: Ordered and Reviewed  ED Management:  This patient was interviewed and assessed emergently.  Initial vital signs are significant for hypertension and mild tachypnea.  IV was established and he had good control of symptoms here with promethazine, Zofran, later Dilaudid.  Workup indicates chronic thrombocytopenia with [place] platelet count of 93266.  The patient has hyperglycemia without anion gap widening.  CT scan depicts findings consistent with ileus but there is a portal vein thrombus which appears acute.  Further management of this was discussed with the on-call vascular surgeon at [facility], Dr. Horn, who recommends systemic anticoagulation with heparin and transfer to [facility].  I do think this is appropriate considering the patient's history of bleeding soft feel varices, persistent thrombocytopenia and our hospital's absence of vascular surgery should life-threatening bleeding occur.  The case was additionally discussed with the accepting hospitalist, Dr. Eng, who accepted the patient.  This disposition and plan was discussed with the patient and wife who are in agreement.  Patient will be transferred per EMS guarded condition.            Scribe Attestation:   Scribe #1: I performed the above scribed service and the documentation accurately describes the services I performed. I attest to the accuracy of the note.    Attending Attestation:         Attending Critical Care:   Critical Care Times:   Direct Patient Care (initial evaluation, reassessments, and time considering the case)................................................................15 minutes.   Additional History from reviewing old medical records or taking additional history from the family, EMS, PCP, etc.......................5 minutes.   Ordering, Reviewing, and Interpreting Diagnostic  Studies...............................................................................................................5 minutes.   Documentation..................................................................................................................................................................................5 minutes.   Consultation with other Physicians. .................................................................................................................................................10 minutes.   Consultation with the patient's family directly relating to the patient's condition, care, and DNR status (when patient unable)......10 minutes.   Other..................................................................................................................................................................................................0 minutes.   ==============================================================  · Total Critical Care Time - exclusive of procedural time: 50 minutes.  ==============================================================  Critical care reasons: Portal vein thrombus, thrombocytopenia, ileus.   The following critical care procedures were done by me (see procedure notes): pulse oximetry.   Critical care was time spent personally by me on the following activities: obtaining history from patient or relative, examination of patient, review of old charts, ordering lab, x-rays, and/or EKG, development of treatment plan with patient or relative, evaluation of patient's response to treatment, ordering and performing treatments and interventions, discussion with consultants, discussions with primary provider, re-evaluation of patient's conition and interpretation of cardiac measurements.   Critical Care Condition: potentially life-threatening       I, Dr. Michael Horton, personally performed the services described in this documentation. All medical record entries made by the  tomy were at my direction and in my presence.  I have reviewed the chart and agree that the record reflects my personal performance and is accurate and complete. Michael Horton MD.  6:26 AM 11/06/2019                        Clinical Impression:       ICD-10-CM ICD-9-CM   1. Portal vein thrombosis I81 452   2. Ileus K56.7 560.1   3. Thrombocytopenia D69.6 287.5   4. Hepatic cirrhosis, unspecified hepatic cirrhosis type, unspecified whether ascites present K74.60 571.5         Disposition:   Disposition: Transferred  Condition: Serious                     Michael Horton MD  11/06/19 0627

## 2019-11-07 PROBLEM — R50.9 FEVER: Status: ACTIVE | Noted: 2019-11-07

## 2019-11-07 LAB
AFP SERPL-MCNC: 2.3 NG/ML (ref 0–8.4)
ALBUMIN SERPL BCP-MCNC: 3.2 G/DL (ref 3.5–5.2)
ALP SERPL-CCNC: 76 U/L (ref 55–135)
ALT SERPL W/O P-5'-P-CCNC: 22 U/L (ref 10–44)
ANION GAP SERPL CALC-SCNC: 10 MMOL/L (ref 8–16)
ANISOCYTOSIS BLD QL SMEAR: SLIGHT
AST SERPL-CCNC: 24 U/L (ref 10–40)
BASOPHILS # BLD AUTO: 0.01 K/UL (ref 0–0.2)
BASOPHILS NFR BLD: 0.4 % (ref 0–1.9)
BILIRUB SERPL-MCNC: 1.5 MG/DL (ref 0.1–1)
BUN SERPL-MCNC: 17 MG/DL (ref 8–23)
CALCIUM SERPL-MCNC: 7.8 MG/DL (ref 8.7–10.5)
CHLORIDE SERPL-SCNC: 104 MMOL/L (ref 95–110)
CO2 SERPL-SCNC: 23 MMOL/L (ref 23–29)
CREAT SERPL-MCNC: 1 MG/DL (ref 0.5–1.4)
DIFFERENTIAL METHOD: ABNORMAL
EOSINOPHIL # BLD AUTO: 0.1 K/UL (ref 0–0.5)
EOSINOPHIL NFR BLD: 4 % (ref 0–8)
ERYTHROCYTE [DISTWIDTH] IN BLOOD BY AUTOMATED COUNT: 14.4 % (ref 11.5–14.5)
EST. GFR  (AFRICAN AMERICAN): >60 ML/MIN/1.73 M^2
EST. GFR  (NON AFRICAN AMERICAN): >60 ML/MIN/1.73 M^2
GLUCOSE SERPL-MCNC: 216 MG/DL (ref 70–110)
HCT VFR BLD AUTO: 34.4 % (ref 40–54)
HGB BLD-MCNC: 11.1 G/DL (ref 14–18)
HYPOCHROMIA BLD QL SMEAR: ABNORMAL
IMM GRANULOCYTES # BLD AUTO: 0.01 K/UL (ref 0–0.04)
IMM GRANULOCYTES NFR BLD AUTO: 0.4 % (ref 0–0.5)
LYMPHOCYTES # BLD AUTO: 0.2 K/UL (ref 1–4.8)
LYMPHOCYTES NFR BLD: 10.7 % (ref 18–48)
MAGNESIUM SERPL-MCNC: 1.6 MG/DL (ref 1.6–2.6)
MCH RBC QN AUTO: 28.2 PG (ref 27–31)
MCHC RBC AUTO-ENTMCNC: 32.3 G/DL (ref 32–36)
MCV RBC AUTO: 87 FL (ref 82–98)
MONOCYTES # BLD AUTO: 0.3 K/UL (ref 0.3–1)
MONOCYTES NFR BLD: 13.4 % (ref 4–15)
NEUTROPHILS # BLD AUTO: 1.6 K/UL (ref 1.8–7.7)
NEUTROPHILS NFR BLD: 71.1 % (ref 38–73)
NRBC BLD-RTO: 0 /100 WBC
OVALOCYTES BLD QL SMEAR: ABNORMAL
PHOSPHATE SERPL-MCNC: 3.3 MG/DL (ref 2.7–4.5)
PLATELET # BLD AUTO: 31 K/UL (ref 150–350)
PMV BLD AUTO: 12.5 FL (ref 9.2–12.9)
POCT GLUCOSE: 187 MG/DL (ref 70–110)
POCT GLUCOSE: 190 MG/DL (ref 70–110)
POCT GLUCOSE: 202 MG/DL (ref 70–110)
POCT GLUCOSE: 223 MG/DL (ref 70–110)
POCT GLUCOSE: 224 MG/DL (ref 70–110)
POCT GLUCOSE: 257 MG/DL (ref 70–110)
POIKILOCYTOSIS BLD QL SMEAR: SLIGHT
POLYCHROMASIA BLD QL SMEAR: ABNORMAL
POTASSIUM SERPL-SCNC: 3.9 MMOL/L (ref 3.5–5.1)
PROT SERPL-MCNC: 5.5 G/DL (ref 6–8.4)
RBC # BLD AUTO: 3.94 M/UL (ref 4.6–6.2)
SODIUM SERPL-SCNC: 137 MMOL/L (ref 136–145)
WBC # BLD AUTO: 2.24 K/UL (ref 3.9–12.7)

## 2019-11-07 PROCEDURE — 99232 SBSQ HOSP IP/OBS MODERATE 35: CPT | Mod: HCNC,GC,, | Performed by: HOSPITALIST

## 2019-11-07 PROCEDURE — 85025 COMPLETE CBC W/AUTO DIFF WBC: CPT | Mod: HCNC

## 2019-11-07 PROCEDURE — 20600001 HC STEP DOWN PRIVATE ROOM: Mod: HCNC

## 2019-11-07 PROCEDURE — 87040 BLOOD CULTURE FOR BACTERIA: CPT | Mod: HCNC

## 2019-11-07 PROCEDURE — 82105 ALPHA-FETOPROTEIN SERUM: CPT | Mod: HCNC

## 2019-11-07 PROCEDURE — 36415 COLL VENOUS BLD VENIPUNCTURE: CPT | Mod: HCNC

## 2019-11-07 PROCEDURE — 80053 COMPREHEN METABOLIC PANEL: CPT | Mod: HCNC

## 2019-11-07 PROCEDURE — 94761 N-INVAS EAR/PLS OXIMETRY MLT: CPT | Mod: HCNC

## 2019-11-07 PROCEDURE — 99232 PR SUBSEQUENT HOSPITAL CARE,LEVL II: ICD-10-PCS | Mod: HCNC,GC,, | Performed by: HOSPITALIST

## 2019-11-07 PROCEDURE — 84100 ASSAY OF PHOSPHORUS: CPT | Mod: HCNC

## 2019-11-07 PROCEDURE — 25000003 PHARM REV CODE 250: Mod: HCNC | Performed by: STUDENT IN AN ORGANIZED HEALTH CARE EDUCATION/TRAINING PROGRAM

## 2019-11-07 PROCEDURE — 83735 ASSAY OF MAGNESIUM: CPT | Mod: HCNC

## 2019-11-07 RX ORDER — BISACODYL 10 MG
10 SUPPOSITORY, RECTAL RECTAL ONCE
Status: COMPLETED | OUTPATIENT
Start: 2019-11-07 | End: 2019-11-07

## 2019-11-07 RX ORDER — LACTULOSE 10 G/15ML
30 SOLUTION ORAL 4 TIMES DAILY
Status: DISCONTINUED | OUTPATIENT
Start: 2019-11-07 | End: 2019-11-09 | Stop reason: HOSPADM

## 2019-11-07 RX ORDER — ACETAMINOPHEN 325 MG/1
650 TABLET ORAL EVERY 8 HOURS PRN
Status: DISCONTINUED | OUTPATIENT
Start: 2019-11-07 | End: 2019-11-09 | Stop reason: HOSPADM

## 2019-11-07 RX ADMIN — LACTULOSE 30 G: 20 SOLUTION ORAL at 02:11

## 2019-11-07 RX ADMIN — INSULIN ASPART 3 UNITS: 100 INJECTION, SOLUTION INTRAVENOUS; SUBCUTANEOUS at 08:11

## 2019-11-07 RX ADMIN — INSULIN DETEMIR 17 UNITS: 100 INJECTION, SOLUTION SUBCUTANEOUS at 09:11

## 2019-11-07 RX ADMIN — INSULIN ASPART 1 UNITS: 100 INJECTION, SOLUTION INTRAVENOUS; SUBCUTANEOUS at 09:11

## 2019-11-07 RX ADMIN — INSULIN ASPART 3 UNITS: 100 INJECTION, SOLUTION INTRAVENOUS; SUBCUTANEOUS at 12:11

## 2019-11-07 RX ADMIN — OXYCODONE HYDROCHLORIDE AND ACETAMINOPHEN 1 TABLET: 10; 325 TABLET ORAL at 12:11

## 2019-11-07 RX ADMIN — BISACODYL 10 MG: 10 SUPPOSITORY RECTAL at 11:11

## 2019-11-07 RX ADMIN — INSULIN ASPART 2 UNITS: 100 INJECTION, SOLUTION INTRAVENOUS; SUBCUTANEOUS at 06:11

## 2019-11-07 RX ADMIN — INSULIN ASPART 3 UNITS: 100 INJECTION, SOLUTION INTRAVENOUS; SUBCUTANEOUS at 06:11

## 2019-11-07 RX ADMIN — LACTULOSE 30 G: 20 SOLUTION ORAL at 08:11

## 2019-11-07 RX ADMIN — ACETAMINOPHEN 650 MG: 325 TABLET ORAL at 11:11

## 2019-11-07 RX ADMIN — OXYCODONE HYDROCHLORIDE AND ACETAMINOPHEN 1 TABLET: 10; 325 TABLET ORAL at 11:11

## 2019-11-07 RX ADMIN — INSULIN ASPART 1 UNITS: 100 INJECTION, SOLUTION INTRAVENOUS; SUBCUTANEOUS at 12:11

## 2019-11-07 RX ADMIN — LACTULOSE 30 G: 20 SOLUTION ORAL at 06:11

## 2019-11-07 RX ADMIN — NADOLOL 40 MG: 40 TABLET ORAL at 08:11

## 2019-11-07 RX ADMIN — PANTOPRAZOLE SODIUM 40 MG: 40 TABLET, DELAYED RELEASE ORAL at 08:11

## 2019-11-07 RX ADMIN — OXYCODONE HYDROCHLORIDE AND ACETAMINOPHEN 1 TABLET: 10; 325 TABLET ORAL at 04:11

## 2019-11-07 RX ADMIN — OXYCODONE HYDROCHLORIDE AND ACETAMINOPHEN 1 TABLET: 10; 325 TABLET ORAL at 05:11

## 2019-11-07 NOTE — HOSPITAL COURSE
Transferred from [facility] 11/06 after presenting earlier that day for 1 month of confusion, several days of constipation with abdominal pain and malaise, and several hours of n/v with epigastric pain radiating to the back. Pt had been out of his laxatives for approximately 4 days prior to admission and not had a BM for those days. At [facility] pt had a CT A/P which revealed constipation, no ascites, and a nonocclusive portal venous thrombosis; pt's chronic thrombocytopenia precluded systemic anticoagulation so pt was transferred for IR evaluation in possible thrombectomy. IR consulted on admission, who recommended Hepatology consult. They felt pt's presentation more consistent with constipation and mild HE; recommended against thrombectomy or any therapy for partial PVT. Lactulose started with gradual resolution of pt's encephalopathy and abdominal pain. No recurrence of n/v. Had fever 11/06 PM and mildly elevated temp 11/07. Blood cultures, CXR not concerning for infection and pt without symptoms (e.g. purulent sputum production, dyspnea, body aches, chills, inflammation, rash) to suggest infection - suspected to be from portal venous thrombosis.     Discharged 11/09 with lactulose daily titrated to 2-3 BMs. Also wrote for nadolol for secondary variceal prophylaxis. Advised to f/u with PCP as well as Hepatology.

## 2019-11-07 NOTE — ASSESSMENT & PLAN NOTE
Hepatic Cirrhosis due to Chronic Hepatitis C Infection  Metabolic Encephalopathy    Chronic HCV cirrhosis s/p harvoni with SVR. Has been well-compensated for years but started to get confused about 1 month ago, since then has also continued to have constipation associated with chronic opiate use and run out of laxatives approximately 5 days prior to admission    Plan  - Treat HE with lactulose TID titrated to 2-3 BMs / day; if pt does not improve with BMs will consider alternative diagnoses as well.

## 2019-11-07 NOTE — ASSESSMENT & PLAN NOTE
"Pancytopenia    Chronic pancytopenia with prominent thrombocytopenia. Pt has been seen by Hematology/Oncology in 2015 and 2019; the pancytopenia was attributed to his cirrhosis and subsequent hypersplenism.    Pt reports that he had a nonproductive cough for the week preceding his admission but that it was spontaneously improving. Denies any sputum production, dyspnea, exertional limitation, hemoptysis.     CXR 11/07 read as "Mild interstitial edema versus pneumonia superimposed on chronic lung change." Blood cultures drawn 11/07. Admit UA with 2+ glucose, trace protein. No dysuria. CT A/P 11/06 mentions constipation but does not show bowel inflammation. No ascites. No new neck pain or meningeal signs.    Plan  - F/U blood cultures  - Monitor for recurrence of fever and determine whether antibiotics for CAP would be appropriate for this pt with recent nonproductive cough which is spontaneously improving and no dyspnea but possible radiographic evidence of pneumonia  - Consider portal venous thrombosis as possible cause of fever if infectious etiology ruled out  "

## 2019-11-07 NOTE — ASSESSMENT & PLAN NOTE
On levemir 30U qHS + novolog 8-10U TIDWM + metformin 500 BID.    Levemir 17U qHS + Aspart 3U TIDWM; gave 8U levemir 11/06 AM to allow pt to have some basal insulin as he had missed previous night's dose.

## 2019-11-07 NOTE — ASSESSMENT & PLAN NOTE
On chronic opiate prescriptions and takes colace at home. Ran out of colace 5 days ago and has not had a BM in about 5 days. Acutely presented with abdominal pain, n/v after several days of diffuse abdominal tenderness.    Treat with lactulose as in HE; pt will need this on discharge and should take this indefinitely

## 2019-11-07 NOTE — ASSESSMENT & PLAN NOTE
Discovered on CT A/P 11/06 at [facility] when pt presented with abdominal pain and n/v. Hepatology consulted who feel that pt's constipation is more likely the cause of pt's pain, rather than his partially occlusive portal venous thrombosis. Not a candidate for anticoagulation given chronic thrombocytopenia and likely a poor procedural candidate as well.    Plan  - Will not pursue thrombectomy at this time; monitor pt's abdominal pain for improvement, aggressive bowel regimen

## 2019-11-07 NOTE — SUBJECTIVE & OBJECTIVE
Interval History: Pt without abdominal pain today. Tolerating diet well with good appetite, no n/v. Still no BM. Intermittent chronic low back / neck pain, headache. Denies dyspnea, no cough today.    Review of Systems   Constitutional: Negative for activity change, appetite change, chills, diaphoresis, fatigue, fever and unexpected weight change.   HENT: Negative for sore throat and trouble swallowing.    Eyes: Negative for visual disturbance.   Respiratory: Negative for cough, chest tightness and shortness of breath.    Cardiovascular: Negative for chest pain and leg swelling.   Gastrointestinal: Positive for constipation. Negative for abdominal distention, abdominal pain, anal bleeding, blood in stool, diarrhea, nausea and vomiting.   Genitourinary: Negative for dysuria and hematuria.   Musculoskeletal: Positive for arthralgias, back pain, myalgias and neck pain. Negative for neck stiffness.   Skin: Negative for rash.   Neurological: Negative for dizziness, weakness, light-headedness and headaches.   Psychiatric/Behavioral: Positive for confusion. Negative for agitation.     Objective:     Vital Signs (Most Recent):  Temp: 99.5 °F (37.5 °C) (11/07/19 0744)  Pulse: 70 (11/07/19 0744)  Resp: (!) 26 (11/07/19 0744)  BP: (!) 149/74 (11/07/19 0744)  SpO2: (!) 94 % (11/07/19 0744) Vital Signs (24h Range):  Temp:  [98.5 °F (36.9 °C)-101.6 °F (38.7 °C)] 99.5 °F (37.5 °C)  Pulse:  [63-86] 70  Resp:  [18-26] 26  SpO2:  [93 %-95 %] 94 %  BP: (133-149)/(64-74) 149/74     Weight: 94.1 kg (207 lb 7.2 oz)  Body mass index is 30.64 kg/m².  No intake or output data in the 24 hours ending 11/07/19 1007   Physical Exam   Constitutional: No distress.   Elderly man lying flat in bed in no apparent distress   HENT:   Head: Normocephalic and atraumatic.   Mouth/Throat: Oropharynx is clear and moist. No oropharyngeal exudate.   Eyes: Conjunctivae are normal. No scleral icterus.   Neck: No JVD present.   Cardiovascular: Normal rate,  regular rhythm, normal heart sounds and intact distal pulses.   Pulmonary/Chest: Effort normal and breath sounds normal. No respiratory distress.   Abdominal: Soft. Bowel sounds are normal. He exhibits no distension and no mass. There is no tenderness. There is no rebound and no guarding.   Reducible umbilical hernia.   Musculoskeletal: He exhibits no edema or tenderness.   Lymphadenopathy:     He has no cervical adenopathy.   Neurological: He is alert.   Mild asterixis    Oriented to self, place, but not date   Skin: Skin is warm. Capillary refill takes less than 2 seconds. He is not diaphoretic.   Psychiatric: He has a normal mood and affect. His behavior is normal. Judgment and thought content normal.   Nursing note and vitals reviewed.      Significant Labs:   CBC:   Recent Labs   Lab 11/06/19 0117 11/07/19 0337   WBC 3.93 2.24*   HGB 12.4* 11.1*   HCT 37.3* 34.4*   PLT 32* 31*     CMP:   Recent Labs   Lab 11/06/19 0117 11/07/19 0338    137   K 4.2 3.9    104   CO2 26 23   * 216*   BUN 21 17   CREATININE 1.2 1.0   CALCIUM 9.0 7.8*   PROT 6.7 5.5*   ALBUMIN 4.1 3.2*   BILITOT 1.1* 1.5*   ALKPHOS 86 76   AST 32 24   ALT 23 22   ANIONGAP 11 10   EGFRNONAA >60 >60.0     Lipase:   Recent Labs   Lab 11/06/19 0117   LIPASE 26     Urine Studies:   Recent Labs   Lab 11/06/19  0245   COLORU Yellow   APPEARANCEUA Clear   PHUR 6.0   SPECGRAV 1.020   PROTEINUA Trace*   GLUCUA 2+*   KETONESU Negative   BILIRUBINUA Negative   OCCULTUA Negative   NITRITE Negative   UROBILINOGEN 1.0   LEUKOCYTESUR Negative       Significant Imaging: I have reviewed all pertinent imaging results/findings within the past 24 hours.

## 2019-11-07 NOTE — ASSESSMENT & PLAN NOTE
Not on any outpatient antihypertensives; mildly elevated while admitted. Will monitor and treat if persistently elevated

## 2019-11-07 NOTE — ASSESSMENT & PLAN NOTE
Per Hepatology pt would benefit from EGD to evaluate varices but family are not interested at this time. Not on BB outpatient; will start nadolol 11/07 AM

## 2019-11-07 NOTE — PLAN OF CARE
POC reviewed with pt and family.  Pt A & O x 4, adequate urine output via urinal and commode. Vital signs stable on room air. Pain controlled with prn pain meds.  Pt NSR on telemetry.  Blood sugar controlled with sliding scale and scheduled insulin.  Pt had a huge BM as stated by wife.  She stated was all over bathroom, she cleaned up and pt took a shower before I got into the room.    Pt accidentally removed IV while dressing.  Was putting in new IV, pt refused stating it hurt too bad.

## 2019-11-07 NOTE — ASSESSMENT & PLAN NOTE
Chronic Pain    On Percocet 10 Q6H at home, will continue while admitted. May benefit from neuropathic pain agent after discharge

## 2019-11-07 NOTE — PLAN OF CARE
CM met with patient and family to obtain discharge planning assessment.  Patient admitted with hepatic encephalopathy.  Planned discharge is home with family - Plan (A) and (B).    PCP:  Crispin Garcia MD     Payor:  Payor: [employer] MEDICARE / Plan: [employer] MEDICARE HMO / Product Type: Capitation /      Pharmacy:    [id number] - [facility] [address]  AT [address] [facility]  [address] [facility] [address]  Phone: [phone] Fax: [phone]       11/07/19 1202   Discharge Assessment   Assessment Type Discharge Planning Assessment   Confirmed/corrected address and phone number on facesheet? Yes   Assessment information obtained from? Patient   Expected Length of Stay (days) 2   Communicated expected length of stay with patient/caregiver yes   Prior to hospitilization cognitive status: Alert/Oriented   Prior to hospitalization functional status: Assistive Equipment   Current cognitive status: Alert/Oriented   Current Functional Status: Assistive Equipment   Lives With spouse   Able to Return to Prior Arrangements yes   Is patient able to care for self after discharge? Yes   Who are your caregiver(s) and their phone number(s)? Lili - spouse [phone]   Patient's perception of discharge disposition home or selfcare   Readmission Within the Last 30 Days no previous admission in last 30 days   Patient currently being followed by outpatient case management? No   Patient currently receives any other outside agency services? No   Equipment Currently Used at Home walker, rolling;cane, straight   Do you have any problems affording any of your prescribed medications? No   Is the patient taking medications as prescribed? yes   Does the patient have transportation home? Yes   Transportation Anticipated family or friend will provide   Does the patient receive services at the [facility]? No   Discharge Plan A Home with family   Discharge Plan B Home with family   DME Needed  Upon Discharge  none   Patient/Family in Agreement with Plan yes

## 2019-11-07 NOTE — ASSESSMENT & PLAN NOTE
Chronic, suspected cause is underlying cirrhosis    Transfuse to goal >10K as pt not actively bleeding; if active bleed goal >50K

## 2019-11-07 NOTE — H&P
"[facility] Hospital Medicine  History & Physical    Patient Name: Steve June Jr.  MRN: [id number]  Admission Date: 11/6/2019  Attending Physician: Tameka Lockhart MD   Primary Care Provider: Crispin Garcia MD    [facility] Medicine Team: [facility] HOSP MED V Crissy Rivera MD     Patient information was obtained from patient, past medical records and ER records.     Subjective:     Principal Problem:Hepatic encephalopathy    Chief Complaint:   Chief Complaint   Patient presents with    Abdominal Pain    Emesis        HPI: 74M with HCV cirrhosis s/p harvoni tx, chronic thrombocytopenia, hx esophageal varices, DM2 on insulin, chronic pain disorder on long-term opiate therapy with associated constipation transferred from [facility] for IR evaluation of portal vein thrombosis with extension into SMV. History primarily obtained from wife with some information from pt as he is unable to give specific details. Pt has been intermittently confused for the last month or so, demonstrating short-term memory problems and occasionally getting lost while driving, which is unusual for him. He says that for the last few weeks he has had intermittent diffuse body aches as well as malaise which he finds it hard to further describe. Pt reports that last night he experienced sudden onset n/v as well as epigastric pain and back pain, prompting him to go to the ED.     In the ED pt was mildly tachypneic and thrombocytopenic to 32K. CT A/P with contrast showed "moderate degree of stool in the colon as can be seen with constipation" and "intraluminal filling defect near the confluence of the splenic vein with the portal vein. Multiple collateral vessels in the upper abdominal quadrants." The ED physician contacted vascular surgery at [facility] over the phone for consultation and was advised to start a heparin gtt, which he felt uncomfortable doing with pt's thrombocytopenia and lack of vascular surgeons on call if " pt bled. Pt was accepted for transfer to [facility] for IR evaluation in regards to candidacy for thrombectomy.    Pt and family report that pt has been out of his laxative for about 4 days or so and has not had a BM in 4 days. He chronically has constipation associated with this opiate use. Has been on lactulose in the past but does not take it daily; unclear why it was discontinued. Pt has had HE in the past but not in the last few years.    Past Medical History:   Diagnosis Date    Abnormal thyroid function test     Allergy     Seasonal    Anemia     Anemia due to blood loss 7/2/2014    Arthritis     Gaviria esophagus     Basal cell carcinoma     right forearm    Basal cell carcinoma 12/2011    lower post neck    Basal cell carcinoma 04/23/2019    left posterior helix    Cancer     skin CA    Cataract     Cirrhosis     Diabetes mellitus     Diabetes mellitus, type 2     Encounter for blood transfusion     Esophageal varices in cirrhosis     grade II on 7/12 EGD    Gastritis     on 7/12 EGD    GERD (gastroesophageal reflux disease) 2/28/2015    Hard of hearing     Hiatal hernia     History of hepatitis C 8/10/2012    tx with harvoni x 41 days (started 10/22/15). SVR4     Hoarseness 2/28/2015    Hypercholesteremia     Hypersplenism     Hypertension     No meds    Pain management 12/10/2014    Petechial hemorrhage 11/25/2015    Lower extremities bilat     Portal hypertensive gastropathy     on 7/12 EGD    Squamous cell carcinoma 04/23/2019    right preauricular cheek, right temple    Thrombocytopenia     Type II or unspecified type diabetes mellitus with neurological manifestations, uncontrolled(250.62) 12/24/2013    Valvular heart disease     mild MR 12       Past Surgical History:   Procedure Laterality Date    CATARACT EXTRACTION  1/10/13    left eye    CATARACT EXTRACTION      right eye    CHOLECYSTECTOMY      COLONOSCOPY      EYE SURGERY      Cataract surgery to right eye  "   KNEE ARTHROSCOPY W/ MENISCAL REPAIR      KNEE CARTILAGE SURGERY      left knee    KNEE SURGERY  12/2006    left    SKIN LESION EXCISION      TONSILLECTOMY      UPPER GASTROINTESTINAL ENDOSCOPY         Review of patient's allergies indicates:   Allergen Reactions    Adhesive tape-silicones Other (See Comments)     pulls skin off    Doxycycline      Dizzy. "Just didn't feel right".       Current Facility-Administered Medications on File Prior to Encounter   Medication    [COMPLETED] hydromorphone (PF) injection 1 mg    [COMPLETED] iohexol (OMNIPAQUE 12) oral solution 1,000 mL    [COMPLETED] iohexol (OMNIPAQUE 350) injection 100 mL    [COMPLETED] ondansetron injection 4 mg    [COMPLETED] promethazine (PHENERGAN) 12.5 mg in dextrose 5 % 50 mL IVPB    [COMPLETED] promethazine (PHENERGAN) 12.5 mg in dextrose 5 % 50 mL IVPB    [COMPLETED] sodium chloride 0.9% bolus 1,000 mL     Current Outpatient Medications on File Prior to Encounter   Medication Sig    albuterol (VENTOLIN HFA) 90 mcg/actuation inhaler Inhale 2 puffs into the lungs every 6 (six) hours as needed for Wheezing. Rescue    glipiZIDE (GLUCOTROL) 10 MG tablet Take 1 tablet (10 mg total) by mouth 2 (two) times daily before meals. (Patient not taking: Reported on 11/5/2019)    LEVEMIR FLEXTOUCH U-100 INSULN 100 unit/mL (3 mL) InPn pen Inject 40 Units into the skin every evening.    metFORMIN (GLUCOPHAGE) 500 MG tablet Take 1 tablet (500 mg total) by mouth 2 (two) times daily with meals. (Patient not taking: Reported on 11/5/2019)    NOVOLOG FLEXPEN U-100 INSULIN 100 unit/mL (3 mL) InPn pen INJECT 8 UNITS UNDER THE SKIN THREE TIMES DAILY WITH MEALS (Patient taking differently: INJECT 5 TO 8 UNITS UNDER THE SKIN THREE TIMES DAILY WITH MEALS)    oxyCODONE-acetaminophen (PERCOCET)  mg per tablet Take 1 tablet by mouth every 6 (six) hours as needed for Pain.    [START ON 11/22/2019] oxyCODONE-acetaminophen (PERCOCET)  mg per tablet " "Take 1 tablet by mouth every 6 (six) hours as needed for Pain.    pantoprazole (PROTONIX) 40 MG tablet Take 1 tablet (40 mg total) by mouth once daily. (Patient not taking: Reported on 2019)    pen needle, diabetic 32 gauge x " Ndle Use as directed     Family History     Problem Relation (Age of Onset)    Alcohol abuse Father    Cancer Mother    Cirrhosis Father    Leukemia Mother    No Known Problems Daughter, Son, Daughter, Daughter, Daughter, Sister        Tobacco Use    Smoking status: Former Smoker     Packs/day: 1.00     Years: 25.00     Pack years: 25.00     Last attempt to quit: 2000     Years since quittin.2    Smokeless tobacco: Never Used   Substance and Sexual Activity    Alcohol use: Yes     Comment: rarely    Drug use: No    Sexual activity: Never     Review of Systems   Constitutional: Negative for activity change, appetite change, chills, diaphoresis, fatigue, fever and unexpected weight change.   HENT: Negative for sore throat and trouble swallowing.    Eyes: Negative for visual disturbance.   Respiratory: Positive for cough and shortness of breath. Negative for chest tightness.    Cardiovascular: Negative for chest pain and leg swelling.   Gastrointestinal: Positive for abdominal pain and constipation. Negative for abdominal distention, anal bleeding, blood in stool, diarrhea, nausea and vomiting.   Genitourinary: Negative for dysuria and hematuria.   Musculoskeletal: Positive for arthralgias, back pain and myalgias.   Skin: Negative for rash.   Neurological: Negative for dizziness, weakness, light-headedness and headaches.   Psychiatric/Behavioral: Positive for confusion. Negative for agitation.     Objective:     Vital Signs (Most Recent):  Temp: 98.5 °F (36.9 °C) (19)  Pulse: 74 (19 1127)  Resp: 18 (19)  BP: (!) 148/78 (19)  SpO2: 95 % (19) Vital Signs (24h Range):  Temp:  [97.7 °F (36.5 °C)-98.5 °F (36.9 °C)] 98.5 °F " (36.9 °C)  Pulse:  [74-81] 74  Resp:  [18-22] 18  SpO2:  [93 %-95 %] 95 %  BP: (137-185)/(74-93) 148/78        There is no height or weight on file to calculate BMI.    Physical Exam   Constitutional: No distress.   Elderly man lying flat in bed in no apparent distress   HENT:   Head: Normocephalic and atraumatic.   Mouth/Throat: Oropharynx is clear and moist. No oropharyngeal exudate.   Eyes: Conjunctivae are normal. No scleral icterus.   Neck: No JVD present.   Cardiovascular: Normal rate, regular rhythm, normal heart sounds and intact distal pulses.   Pulmonary/Chest: Effort normal and breath sounds normal. No respiratory distress.   Abdominal: Soft. Bowel sounds are normal. He exhibits no distension and no mass. There is no tenderness. There is no rebound and no guarding.   Reducible umbilical hernia.   Musculoskeletal: He exhibits no edema or tenderness.   Lymphadenopathy:     He has no cervical adenopathy.   Neurological: He is alert.   Mild asterixis    Oriented to self, place, year   Skin: Skin is warm. Capillary refill takes less than 2 seconds. He is not diaphoretic.   Psychiatric: He has a normal mood and affect. His behavior is normal. Judgment and thought content normal.   Nursing note and vitals reviewed.          Significant Labs:   CBC:   Recent Labs   Lab 11/06/19  0117   WBC 3.93   HGB 12.4*   HCT 37.3*   PLT 32*     CMP:   Recent Labs   Lab 11/06/19  0117      K 4.2      CO2 26   *   BUN 21   CREATININE 1.2   CALCIUM 9.0   PROT 6.7   ALBUMIN 4.1   BILITOT 1.1*   ALKPHOS 86   AST 32   ALT 23   ANIONGAP 11   EGFRNONAA >60       Significant Imaging: I have reviewed all pertinent imaging results/findings within the past 24 hours.    Assessment/Plan:     * Hepatic encephalopathy  Hepatic Cirrhosis due to Chronic Hepatitis C Infection  Metabolic Encephalopathy    Chronic HCV cirrhosis s/p harvoni with SVR. Has been well-compensated for years but started to get confused about 1 month  ago, since then has also continued to have constipation associated with chronic opiate use and run out of laxatives approximately 5 days prior to admission    Plan  - Treat HE with lactulose TID titrated to 2-3 BMs / day; if pt does not improve with BMs will consider alternative diagnoses as well.    Drug-induced constipation  On chronic opiate prescriptions and takes colace at home. Ran out of colace 5 days ago and has not had a BM in about 5 days. Acutely presented with abdominal pain, n/v after several days of diffuse abdominal tenderness.    Treat with lactulose as in HE; pt will need this on discharge and should take this indefinitely    Portal vein thrombosis  Discovered on CT A/P 11/06 at [facility] when pt presented with abdominal pain and n/v. Hepatology consulted who feel that pt's constipation is more likely the cause of pt's pain, rather than his partially occlusive portal venous thrombosis. Not a candidate for anticoagulation given chronic thrombocytopenia and likely a poor procedural candidate as well.    Plan  - Will not pursue thrombectomy at this time; monitor pt's abdominal pain for improvement, aggressive bowel regimen    Diabetic polyneuropathy associated with diabetes mellitus due to underlying condition  Chronic Pain    On Percocet 10 Q6H at home, will continue while admitted. May benefit from neuropathic pain agent after discharge    DM type 2 with diabetic peripheral neuropathy  On levemir 30U qHS + novolog 8-10U TIDWM + metformin 500 BID.    Levemir 17U qHS + Aspart 3U TIDWM; gave 8U levemir 11/06 AM to allow pt to have some basal insulin as he had missed previous night's dose.    Hypertension associated with diabetes  Not on any outpatient antihypertensives; mildly elevated while admitted. Starting nadolol for variceal secondary prophylaxis as well.    GERD (gastroesophageal reflux disease)  Continue protonix 40 mg daily    Esophageal varices with a history of bleeding  Per  Hepatology pt would benefit from EGD to evaluate varices but family are not interested at this time. Not on BB outpatient; will start nadolol 11/07 AM    Thrombocytopenia, acquired  Chronic, suspected cause is underlying cirrhosis    Transfuse to goal >10K as pt not actively bleeding; if active bleed goal >50K      VTE Risk Mitigation (From admission, onward)         Ordered     IP VTE HIGH RISK PATIENT  Once      11/06/19 1056     Place sequential compression device  Until discontinued      11/06/19 1056                   Crissy Rivera MD  Department of Hospital Medicine   [facility]

## 2019-11-07 NOTE — PLAN OF CARE
POC reviewed, pt verbalized understanding. Pt has intermittent confusion, Telesitter initiated for fall precaution. VSS on RA. Pain managed with PRN pain meds. Voids per toilet with assist, adequate UOP overnight. Pt on cardiac diet, no complaints of N/V. Slept between care. Frequent rounds made for safety, call light in reach. WCTM

## 2019-11-07 NOTE — PROGRESS NOTES
"[facility] Hospital Medicine  Progress Note    Patient Name: Steve June Jr.  MRN: [id number]  Patient Class: IP- Inpatient   Admission Date: 11/6/2019  Length of Stay: 1 days  Attending Physician: Tameka Lockhart MD  Primary Care Provider: Crispin Garcia MD    [facility] Medicine Team: [facility]OSP MED V Crissy Rivera MD    Subjective:     Principal Problem:Hepatic encephalopathy        HPI:  74M with HCV cirrhosis s/p harvoni tx, chronic thrombocytopenia, hx esophageal varices, DM2 on insulin, chronic pain disorder on long-term opiate therapy with associated constipation transferred from [facility] for IR evaluation of portal vein thrombosis with extension into [facility]. History primarily obtained from wife with some information from pt as he is unable to give specific details. Pt has been intermittently confused for the last month or so, demonstrating short-term memory problems and occasionally getting lost while driving, which is unusual for him. He says that for the last few weeks he has had intermittent diffuse body aches as well as malaise which he finds it hard to further describe. Pt reports that last night he experienced sudden onset n/v as well as epigastric pain and back pain, prompting him to go to the ED.     In the ED pt was mildly tachypneic and thrombocytopenic to 32K. CT A/P with contrast showed "moderate degree of stool in the colon as can be seen with constipation" and "intraluminal filling defect near the confluence of the splenic vein with the portal vein. Multiple collateral vessels in the upper abdominal quadrants." The ED physician contacted vascular surgery at [facility] over the phone for consultation and was advised to start a heparin gtt, which he felt uncomfortable doing with pt's thrombocytopenia and lack of vascular surgeons on call if pt bled. Pt was accepted for transfer to [facility] for IR evaluation in regards to candidacy for thrombectomy.    Pt and family " "report that pt has been out of his laxative for about 4 days or so and has not had a BM in 4 days. He chronically has constipation associated with this opiate use. Has been on lactulose in the past but does not take it daily; unclear why it was discontinued. Pt has had HE in the past but not in the last few years.    Overview/Hospital Course:  Transferred from [facility] 11/06 after presenting earlier that day for 1 month of confusion, several days of constipation with abdominal pain and malaise, and several hours of n/v with epigastric pain radiating to the back. Pt had been out of his laxatives for approximately 4 days prior to admission and not had a BM for those days. At [facility] pt had a CT A/P which revealed constipation and a nonocclusive portal venous thrombosis; pt's chronic thrombocytopenia precluded systemic anticoagulation so pt was transferred for IR evaluation in possible thrombectomy.    11/06: Pt transferred, IR consulted. IR recommended Hepatology consultation to determine whether thrombectomy would be appropriate / indicated; Hepatology recommended treating pt's mild HE and constipation with lactulose and recommended that PVT not be done as pt's chronic thrombocytopenia placed him at high risk and pt's abdominal pain was more likely related to his constipation. Lactulose was started. That evening pt had a fever to 101.6F.  11/07: CXR and blood cultures ordered to workup pt's fever; pt's UA on admission only had trace protein and 2+ glucose. CXR was read as "mild interstitial edema versus pneumonia superimposed on chronic lung change." Pt reported that he had a cough in the days preceding his admission but that his cough had been getting better. No sputum production, no dyspnea, no functional limitation from dyspnea. Mental status improving but still mildly confused. Lactulose dose increased as pt did not have BM yet and bisacodyl suppository ordered as well. Nadolol started for " secondary prophylaxis of variceal hemorrhages per Hepatology recommendations.    Interval History: Pt without abdominal pain today. Tolerating diet well with good appetite, no n/v. Still no BM. Intermittent chronic low back / neck pain, headache. Denies dyspnea, no cough today.    Review of Systems   Constitutional: Negative for activity change, appetite change, chills, diaphoresis, fatigue, fever and unexpected weight change.   HENT: Negative for sore throat and trouble swallowing.    Eyes: Negative for visual disturbance.   Respiratory: Negative for cough, chest tightness and shortness of breath.    Cardiovascular: Negative for chest pain and leg swelling.   Gastrointestinal: Positive for constipation. Negative for abdominal distention, abdominal pain, anal bleeding, blood in stool, diarrhea, nausea and vomiting.   Genitourinary: Negative for dysuria and hematuria.   Musculoskeletal: Positive for arthralgias, back pain, myalgias and neck pain. Negative for neck stiffness.   Skin: Negative for rash.   Neurological: Negative for dizziness, weakness, light-headedness and headaches.   Psychiatric/Behavioral: Positive for confusion. Negative for agitation.     Objective:     Vital Signs (Most Recent):  Temp: 99.5 °F (37.5 °C) (11/07/19 0744)  Pulse: 70 (11/07/19 0744)  Resp: (!) 26 (11/07/19 0744)  BP: (!) 149/74 (11/07/19 0744)  SpO2: (!) 94 % (11/07/19 0744) Vital Signs (24h Range):  Temp:  [98.5 °F (36.9 °C)-101.6 °F (38.7 °C)] 99.5 °F (37.5 °C)  Pulse:  [63-86] 70  Resp:  [18-26] 26  SpO2:  [93 %-95 %] 94 %  BP: (133-149)/(64-74) 149/74     Weight: 94.1 kg (207 lb 7.2 oz)  Body mass index is 30.64 kg/m².  No intake or output data in the 24 hours ending 11/07/19 [facility]   Physical Exam   Constitutional: No distress.   Elderly man lying flat in bed in no apparent distress   HENT:   Head: Normocephalic and atraumatic.   Mouth/Throat: Oropharynx is clear and moist. No oropharyngeal exudate.   Eyes: Conjunctivae are  normal. No scleral icterus.   Neck: No JVD present.   Cardiovascular: Normal rate, regular rhythm, normal heart sounds and intact distal pulses.   Pulmonary/Chest: Effort normal and breath sounds normal. No respiratory distress.   Abdominal: Soft. Bowel sounds are normal. He exhibits no distension and no mass. There is no tenderness. There is no rebound and no guarding.   Reducible umbilical hernia.   Musculoskeletal: He exhibits no edema or tenderness.   Lymphadenopathy:     He has no cervical adenopathy.   Neurological: He is alert.   Mild asterixis    Oriented to self, place, but not date   Skin: Skin is warm. Capillary refill takes less than 2 seconds. He is not diaphoretic.   Psychiatric: He has a normal mood and affect. His behavior is normal. Judgment and thought content normal.   Nursing note and vitals reviewed.      Significant Labs:   CBC:   Recent Labs   Lab 11/06/19 0117 11/07/19 0337   WBC 3.93 2.24*   HGB 12.4* 11.1*   HCT 37.3* 34.4*   PLT 32* 31*     CMP:   Recent Labs   Lab 11/06/19 0117 11/07/19 0338    137   K 4.2 3.9    104   CO2 26 23   * 216*   BUN 21 17   CREATININE 1.2 1.0   CALCIUM 9.0 7.8*   PROT 6.7 5.5*   ALBUMIN 4.1 3.2*   BILITOT 1.1* 1.5*   ALKPHOS 86 76   AST 32 24   ALT 23 22   ANIONGAP 11 10   EGFRNONAA >60 >60.0     Lipase:   Recent Labs   Lab 11/06/19 0117   LIPASE 26     Urine Studies:   Recent Labs   Lab 11/06/19  0245   COLORU Yellow   APPEARANCEUA Clear   PHUR 6.0   SPECGRAV 1.020   PROTEINUA Trace*   GLUCUA 2+*   KETONESU Negative   BILIRUBINUA Negative   OCCULTUA Negative   NITRITE Negative   UROBILINOGEN 1.0   LEUKOCYTESUR Negative       Significant Imaging: I have reviewed all pertinent imaging results/findings within the past 24 hours.      Assessment/Plan:      * Hepatic encephalopathy  Hepatic Cirrhosis due to Chronic Hepatitis C Infection  Metabolic Encephalopathy    Chronic HCV cirrhosis s/p harvoni with SVR. Has been well-compensated for  "years but started to get confused about 1 month ago, since then has also continued to have constipation associated with chronic opiate use and run out of laxatives approximately 5 days prior to admission    Plan  - Treat HE with lactulose TID titrated to 2-3 BMs / day  - Add bisacodyl suppository 11/07 mid-day, consider enema if no response  - Septic workup in light of fever and continued mild encephalopathy     Drug-induced constipation  On chronic opiate prescriptions and takes colace at home. Ran out of colace 5 days ago and has not had a BM in about 5 days. Acutely presented with abdominal pain, n/v after several days of diffuse abdominal tenderness.    Treat with lactulose as in HE; pt will need this on discharge and should take this indefinitely    Fever  Pancytopenia    Chronic pancytopenia with prominent thrombocytopenia. Pt has been seen by Hematology/Oncology in 2015 and 2019; the pancytopenia was attributed to his cirrhosis and subsequent hypersplenism.    Pt reports that he had a nonproductive cough for the week preceding his admission but that it was spontaneously improving. Denies any sputum production, dyspnea, exertional limitation, hemoptysis.     CXR 11/07 read as "Mild interstitial edema versus pneumonia superimposed on chronic lung change." Blood cultures drawn 11/07. Admit UA with 2+ glucose, trace protein. No dysuria. CT A/P 11/06 mentions constipation but does not show bowel inflammation. No ascites. No new neck pain or meningeal signs.    Plan  - F/U blood cultures  - Monitor for recurrence of fever and determine whether antibiotics for CAP would be appropriate for this pt with recent nonproductive cough which is spontaneously improving and no dyspnea but possible radiographic evidence of pneumonia  - Consider portal venous thrombosis as possible cause of fever if infectious etiology ruled out    Portal vein thrombosis  Discovered on CT A/P 11/06 at [facility] when pt presented with " abdominal pain and n/v. Hepatology consulted who feel that pt's constipation is more likely the cause of pt's pain, rather than his partially occlusive portal venous thrombosis. Not a candidate for anticoagulation given chronic thrombocytopenia and likely a poor procedural candidate as well.    Plan  - Will not pursue thrombectomy at this time; monitor pt's abdominal pain for improvement, aggressive bowel regimen    Diabetic polyneuropathy associated with diabetes mellitus due to underlying condition  Chronic Pain    On Percocet 10 Q6H at home, will continue while admitted. May benefit from neuropathic pain agent after discharge    DM type 2 with diabetic peripheral neuropathy  On levemir 30U qHS + novolog 8-10U TIDWM + metformin 500 BID.    Levemir 17U qHS + Aspart 3U TIDWM; gave 8U levemir 11/06 AM to allow pt to have some basal insulin as he had missed previous night's dose.    Hypertension associated with diabetes  Not on any outpatient antihypertensives; mildly elevated while admitted. Will monitor and treat if persistently elevated    GERD (gastroesophageal reflux disease)  Continue protonix 40 mg daily    Esophageal varices with a history of bleeding  Per Hepatology pt would benefit from EGD to evaluate varices but family are not interested at this time. Not on BB outpatient; will start nadolol 11/07 AM    Thrombocytopenia, acquired  Chronic, suspected cause is underlying cirrhosis    Transfuse to goal >10K as pt not actively bleeding; if active bleed goal >50K      VTE Risk Mitigation (From admission, onward)         Ordered     IP VTE HIGH RISK PATIENT  Once      11/06/19 1056     Place sequential compression device  Until discontinued      11/06/19 1056                      Crissy Rivera MD  Department of Hospital Medicine   [facility]

## 2019-11-07 NOTE — ASSESSMENT & PLAN NOTE
Hepatic Cirrhosis due to Chronic Hepatitis C Infection  Metabolic Encephalopathy    Chronic HCV cirrhosis s/p harvoni with SVR. Has been well-compensated for years but started to get confused about 1 month ago, since then has also continued to have constipation associated with chronic opiate use and run out of laxatives approximately 5 days prior to admission    Plan  - Treat HE with lactulose TID titrated to 2-3 BMs / day  - Add bisacodyl suppository 11/07 mid-day, consider enema if no response  - Septic workup in light of fever and continued mild encephalopathy

## 2019-11-08 ENCOUNTER — DOCUMENTATION ONLY (OUTPATIENT)
Dept: FAMILY MEDICINE | Facility: CLINIC | Age: 74
End: 2019-11-08

## 2019-11-08 LAB
ALBUMIN SERPL BCP-MCNC: 3.2 G/DL (ref 3.5–5.2)
ALP SERPL-CCNC: 80 U/L (ref 55–135)
ALT SERPL W/O P-5'-P-CCNC: 19 U/L (ref 10–44)
ANION GAP SERPL CALC-SCNC: 9 MMOL/L (ref 8–16)
ANISOCYTOSIS BLD QL SMEAR: SLIGHT
AST SERPL-CCNC: 20 U/L (ref 10–40)
BASOPHILS # BLD AUTO: 0.01 K/UL (ref 0–0.2)
BASOPHILS NFR BLD: 0.4 % (ref 0–1.9)
BILIRUB SERPL-MCNC: 1.3 MG/DL (ref 0.1–1)
BUN SERPL-MCNC: 19 MG/DL (ref 8–23)
CALCIUM SERPL-MCNC: 8.3 MG/DL (ref 8.7–10.5)
CHLORIDE SERPL-SCNC: 105 MMOL/L (ref 95–110)
CO2 SERPL-SCNC: 22 MMOL/L (ref 23–29)
CREAT SERPL-MCNC: 1.1 MG/DL (ref 0.5–1.4)
DIFFERENTIAL METHOD: ABNORMAL
EOSINOPHIL # BLD AUTO: 0.2 K/UL (ref 0–0.5)
EOSINOPHIL NFR BLD: 5.4 % (ref 0–8)
ERYTHROCYTE [DISTWIDTH] IN BLOOD BY AUTOMATED COUNT: 14.5 % (ref 11.5–14.5)
EST. GFR  (AFRICAN AMERICAN): >60 ML/MIN/1.73 M^2
EST. GFR  (NON AFRICAN AMERICAN): >60 ML/MIN/1.73 M^2
GLUCOSE SERPL-MCNC: 190 MG/DL (ref 70–110)
HCT VFR BLD AUTO: 35.7 % (ref 40–54)
HGB BLD-MCNC: 11.7 G/DL (ref 14–18)
IMM GRANULOCYTES # BLD AUTO: 0.02 K/UL (ref 0–0.04)
IMM GRANULOCYTES NFR BLD AUTO: 0.7 % (ref 0–0.5)
LYMPHOCYTES # BLD AUTO: 0.3 K/UL (ref 1–4.8)
LYMPHOCYTES NFR BLD: 10.9 % (ref 18–48)
MAGNESIUM SERPL-MCNC: 1.7 MG/DL (ref 1.6–2.6)
MCH RBC QN AUTO: 28.7 PG (ref 27–31)
MCHC RBC AUTO-ENTMCNC: 32.8 G/DL (ref 32–36)
MCV RBC AUTO: 88 FL (ref 82–98)
MONOCYTES # BLD AUTO: 0.4 K/UL (ref 0.3–1)
MONOCYTES NFR BLD: 15.2 % (ref 4–15)
NEUTROPHILS # BLD AUTO: 1.9 K/UL (ref 1.8–7.7)
NEUTROPHILS NFR BLD: 67.4 % (ref 38–73)
NRBC BLD-RTO: 0 /100 WBC
OVALOCYTES BLD QL SMEAR: ABNORMAL
PHOSPHATE SERPL-MCNC: 3.8 MG/DL (ref 2.7–4.5)
PLATELET # BLD AUTO: 34 K/UL (ref 150–350)
PLATELET BLD QL SMEAR: ABNORMAL
PMV BLD AUTO: 12 FL (ref 9.2–12.9)
POCT GLUCOSE: 156 MG/DL (ref 70–110)
POCT GLUCOSE: 158 MG/DL (ref 70–110)
POCT GLUCOSE: 223 MG/DL (ref 70–110)
POCT GLUCOSE: 252 MG/DL (ref 70–110)
POCT GLUCOSE: 258 MG/DL (ref 70–110)
POCT GLUCOSE: 284 MG/DL (ref 70–110)
POIKILOCYTOSIS BLD QL SMEAR: SLIGHT
POTASSIUM SERPL-SCNC: 3.8 MMOL/L (ref 3.5–5.1)
PROT SERPL-MCNC: 5.7 G/DL (ref 6–8.4)
RBC # BLD AUTO: 4.08 M/UL (ref 4.6–6.2)
SODIUM SERPL-SCNC: 136 MMOL/L (ref 136–145)
WBC # BLD AUTO: 2.76 K/UL (ref 3.9–12.7)

## 2019-11-08 PROCEDURE — 25000003 PHARM REV CODE 250: Mod: HCNC | Performed by: STUDENT IN AN ORGANIZED HEALTH CARE EDUCATION/TRAINING PROGRAM

## 2019-11-08 PROCEDURE — 80053 COMPREHEN METABOLIC PANEL: CPT | Mod: HCNC

## 2019-11-08 PROCEDURE — 36415 COLL VENOUS BLD VENIPUNCTURE: CPT | Mod: HCNC

## 2019-11-08 PROCEDURE — 63600175 PHARM REV CODE 636 W HCPCS: Mod: HCNC | Performed by: STUDENT IN AN ORGANIZED HEALTH CARE EDUCATION/TRAINING PROGRAM

## 2019-11-08 PROCEDURE — 83735 ASSAY OF MAGNESIUM: CPT | Mod: HCNC

## 2019-11-08 PROCEDURE — 25000003 PHARM REV CODE 250: Mod: HCNC | Performed by: HOSPITALIST

## 2019-11-08 PROCEDURE — 99231 PR SUBSEQUENT HOSPITAL CARE,LEVL I: ICD-10-PCS | Mod: HCNC,GC,, | Performed by: HOSPITALIST

## 2019-11-08 PROCEDURE — 84100 ASSAY OF PHOSPHORUS: CPT | Mod: HCNC

## 2019-11-08 PROCEDURE — 99231 SBSQ HOSP IP/OBS SF/LOW 25: CPT | Mod: HCNC,GC,, | Performed by: HOSPITALIST

## 2019-11-08 PROCEDURE — 85025 COMPLETE CBC W/AUTO DIFF WBC: CPT | Mod: HCNC

## 2019-11-08 PROCEDURE — 20600001 HC STEP DOWN PRIVATE ROOM: Mod: HCNC

## 2019-11-08 RX ORDER — SIMETHICONE 80 MG
1 TABLET,CHEWABLE ORAL 3 TIMES DAILY PRN
Status: DISCONTINUED | OUTPATIENT
Start: 2019-11-08 | End: 2019-11-09 | Stop reason: HOSPADM

## 2019-11-08 RX ORDER — INSULIN ASPART 100 [IU]/ML
5 INJECTION, SOLUTION INTRAVENOUS; SUBCUTANEOUS
Status: DISCONTINUED | OUTPATIENT
Start: 2019-11-08 | End: 2019-11-09 | Stop reason: HOSPADM

## 2019-11-08 RX ADMIN — NADOLOL 40 MG: 40 TABLET ORAL at 08:11

## 2019-11-08 RX ADMIN — OXYCODONE HYDROCHLORIDE AND ACETAMINOPHEN 1 TABLET: 10; 325 TABLET ORAL at 05:11

## 2019-11-08 RX ADMIN — LACTULOSE 30 G: 20 SOLUTION ORAL at 09:11

## 2019-11-08 RX ADMIN — INSULIN ASPART 5 UNITS: 100 INJECTION, SOLUTION INTRAVENOUS; SUBCUTANEOUS at 04:11

## 2019-11-08 RX ADMIN — INSULIN ASPART 1 UNITS: 100 INJECTION, SOLUTION INTRAVENOUS; SUBCUTANEOUS at 08:11

## 2019-11-08 RX ADMIN — PROCHLORPERAZINE MALEATE 10 MG: 10 TABLET ORAL at 05:11

## 2019-11-08 RX ADMIN — INSULIN ASPART 2 UNITS: 100 INJECTION, SOLUTION INTRAVENOUS; SUBCUTANEOUS at 04:11

## 2019-11-08 RX ADMIN — PANTOPRAZOLE SODIUM 40 MG: 40 TABLET, DELAYED RELEASE ORAL at 08:11

## 2019-11-08 RX ADMIN — INSULIN ASPART 1 UNITS: 100 INJECTION, SOLUTION INTRAVENOUS; SUBCUTANEOUS at 11:11

## 2019-11-08 RX ADMIN — OXYCODONE HYDROCHLORIDE AND ACETAMINOPHEN 1 TABLET: 10; 325 TABLET ORAL at 11:11

## 2019-11-08 RX ADMIN — INSULIN ASPART 3 UNITS: 100 INJECTION, SOLUTION INTRAVENOUS; SUBCUTANEOUS at 08:11

## 2019-11-08 RX ADMIN — OXYCODONE HYDROCHLORIDE AND ACETAMINOPHEN 1 TABLET: 10; 325 TABLET ORAL at 12:11

## 2019-11-08 RX ADMIN — SIMETHICONE CHEW TAB 80 MG 80 MG: 80 TABLET ORAL at 08:11

## 2019-11-08 RX ADMIN — LACTULOSE 10 G: 20 SOLUTION ORAL at 11:11

## 2019-11-08 RX ADMIN — INSULIN ASPART 3 UNITS: 100 INJECTION, SOLUTION INTRAVENOUS; SUBCUTANEOUS at 11:11

## 2019-11-08 RX ADMIN — LACTULOSE 30 G: 20 SOLUTION ORAL at 04:11

## 2019-11-08 NOTE — ASSESSMENT & PLAN NOTE
On levemir 30U qHS + novolog 8-10U TIDWM + metformin 500 BID.    Levemir 20U qHS + Aspart 5U TIDWM while admitted; diabetic diet

## 2019-11-08 NOTE — SUBJECTIVE & OBJECTIVE
Interval History: Reports abdominal pain again today. Early in AM describes it as constant soreness and reports that he has had this type of pain for the last week, since his constipation started. Later in the day mentions that it is getting better and less bothersome. Tolerating diet without n/v, having BMs with lactulose.    Review of Systems   Constitutional: Negative for activity change, appetite change, chills, diaphoresis, fatigue, fever and unexpected weight change.   HENT: Negative for sore throat and trouble swallowing.    Eyes: Negative for visual disturbance.   Respiratory: Negative for cough, chest tightness and shortness of breath.    Cardiovascular: Negative for chest pain and leg swelling.   Gastrointestinal: Positive for constipation. Negative for abdominal distention, abdominal pain, anal bleeding, blood in stool, diarrhea, nausea and vomiting.   Genitourinary: Negative for dysuria and hematuria.   Musculoskeletal: Positive for arthralgias, back pain, myalgias and neck pain. Negative for neck stiffness.   Skin: Negative for rash.   Neurological: Negative for dizziness, weakness, light-headedness and headaches.   Psychiatric/Behavioral: Positive for confusion. Negative for agitation.     Objective:     Vital Signs (Most Recent):  Temp: 97.2 °F (36.2 °C) (11/08/19 1206)  Pulse: (!) 51 (11/08/19 1206)  Resp: 16 (11/08/19 1206)  BP: (!) 144/65 (11/08/19 1206)  SpO2: 95 % (11/08/19 1206) Vital Signs (24h Range):  Temp:  [97.1 °F (36.2 °C)-99.3 °F (37.4 °C)] 97.2 °F (36.2 °C)  Pulse:  [50-68] 51  Resp:  [16-18] 16  SpO2:  [90 %-95 %] 95 %  BP: (142-157)/(65-74) 144/65     Weight: 94.1 kg (207 lb 7.2 oz)  Body mass index is 30.64 kg/m².    Intake/Output Summary (Last 24 hours) at 11/8/2019 1439  Last data filed at 11/8/2019 0600  Gross per 24 hour   Intake 480 ml   Output --   Net 480 ml      Physical Exam   Constitutional: No distress.   Elderly man lying flat in bed in no apparent distress   HENT:    Head: Normocephalic and atraumatic.   Mouth/Throat: Oropharynx is clear and moist. No oropharyngeal exudate.   Eyes: Conjunctivae are normal. No scleral icterus.   Neck: No JVD present.   Cardiovascular: Normal rate, regular rhythm, normal heart sounds and intact distal pulses.   Pulmonary/Chest: Effort normal and breath sounds normal. No respiratory distress.   Abdominal: Soft. Bowel sounds are normal. He exhibits no distension and no mass. There is no tenderness. There is no rebound and no guarding.   Reducible umbilical hernia.   Musculoskeletal: He exhibits no edema or tenderness.   Lymphadenopathy:     He has no cervical adenopathy.   Neurological: He is alert.   Mild asterixis    Oriented to self, place, but not date   Skin: Skin is warm. Capillary refill takes less than 2 seconds. He is not diaphoretic.   Psychiatric: He has a normal mood and affect. His behavior is normal. Judgment and thought content normal.   Nursing note and vitals reviewed.      Significant Labs:   CBC:   Recent Labs   Lab 11/07/19  0337 11/08/19  0450   WBC 2.24* 2.76*   HGB 11.1* 11.7*   HCT 34.4* 35.7*   PLT 31* 34*     CMP:   Recent Labs   Lab 11/07/19  0338 11/08/19  0450    136   K 3.9 3.8    105   CO2 23 22*   * 190*   BUN 17 19   CREATININE 1.0 1.1   CALCIUM 7.8* 8.3*   PROT 5.5* 5.7*   ALBUMIN 3.2* 3.2*   BILITOT 1.5* 1.3*   ALKPHOS 76 80   AST 24 20   ALT 22 19   ANIONGAP 10 9   EGFRNONAA >60.0 >60.0       Significant Imaging: I have reviewed all pertinent imaging results/findings within the past 24 hours.

## 2019-11-08 NOTE — ASSESSMENT & PLAN NOTE
Hepatic Cirrhosis due to Chronic Hepatitis C Infection  Metabolic Encephalopathy    Chronic HCV cirrhosis s/p harvoni with SVR. Has been well-compensated for years but started to get confused about 1 month ago, since then has also continued to have constipation associated with chronic opiate use and run out of laxatives approximately 5 days prior to admission.    Mental status improving with lactulose; pt without recurrence of n/v and tolerating diet well. Still has some abdominal soreness around hernia, which is freely reducible. Mild HE as evidenced by some confusion and mild asterixis.    Plan  - Treat HE with lactulose TID titrated to 2-3 BMs / day  - Add bisacodyl suppository 11/07 mid-day, consider enema if no response

## 2019-11-08 NOTE — PROGRESS NOTES
Pre-Visit Chart Review  For Appointment Scheduled on 11/14/19    Health Maintenance Due   Topic Date Due    TETANUS VACCINE  05/13/1963    Colonoscopy  01/10/2016    Pneumococcal Vaccine (65+ High/Highest Risk) (2 of 2 - PPSV23) 03/21/2019    Hemoglobin A1c  07/03/2019

## 2019-11-08 NOTE — PROGRESS NOTES
"[facility] Hospital Medicine  Progress Note    Patient Name: Steve June Jr.  MRN: [id number]  Patient Class: IP- Inpatient   Admission Date: 11/6/2019  Length of Stay: 2 days  Attending Physician: Tameka Lockhart MD  Primary Care Provider: Crispin Garcia MD    [facility] Medicine Team: [facility] HOSP MED V Crissy Rivera MD    Subjective:     Principal Problem:Hepatic encephalopathy        HPI:  74M with HCV cirrhosis s/p harvoni tx, chronic thrombocytopenia, hx esophageal varices, DM2 on insulin, chronic pain disorder on long-term opiate therapy with associated constipation transferred from [facility] for IR evaluation of portal vein thrombosis with extension into [facility]. History primarily obtained from wife with some information from pt as he is unable to give specific details. Pt has been intermittently confused for the last month or so, demonstrating short-term memory problems and occasionally getting lost while driving, which is unusual for him. He says that for the last few weeks he has had intermittent diffuse body aches as well as malaise which he finds it hard to further describe. Pt reports that last night he experienced sudden onset n/v as well as epigastric pain and back pain, prompting him to go to the ED.     In the ED pt was mildly tachypneic and thrombocytopenic to 32K. CT A/P with contrast showed "moderate degree of stool in the colon as can be seen with constipation" and "intraluminal filling defect near the confluence of the splenic vein with the portal vein. Multiple collateral vessels in the upper abdominal quadrants." The ED physician contacted vascular surgery at [facility] over the phone for consultation and was advised to start a heparin gtt, which he felt uncomfortable doing with pt's thrombocytopenia and lack of vascular surgeons on call if pt bled. Pt was accepted for transfer to [facility] for IR evaluation in regards to candidacy for thrombectomy.    Pt and family " "report that pt has been out of his laxative for about 4 days or so and has not had a BM in 4 days. He chronically has constipation associated with this opiate use. Has been on lactulose in the past but does not take it daily; unclear why it was discontinued. Pt has had HE in the past but not in the last few years.    Overview/Hospital Course:  Transferred from [facility] 11/06 after presenting earlier that day for 1 month of confusion, several days of constipation with abdominal pain and malaise, and several hours of n/v with epigastric pain radiating to the back. Pt had been out of his laxatives for approximately 4 days prior to admission and not had a BM for those days. At [facility] pt had a CT A/P which revealed constipation and a nonocclusive portal venous thrombosis; pt's chronic thrombocytopenia precluded systemic anticoagulation so pt was transferred for IR evaluation in possible thrombectomy.    11/06: Pt transferred, IR consulted. IR recommended Hepatology consultation to determine whether thrombectomy would be appropriate / indicated; Hepatology recommended treating pt's mild HE and constipation with lactulose and recommended that PVT not be done as pt's chronic thrombocytopenia placed him at high risk and pt's abdominal pain was more likely related to his constipation. Lactulose was started. That evening pt had a fever to 101.6F.  11/07: CXR and blood cultures ordered to workup pt's fever; pt's UA on admission only had trace protein and 2+ glucose. CXR was read as "mild interstitial edema versus pneumonia superimposed on chronic lung change." Pt reported that he had a cough in the days preceding his admission but that his cough had been getting better. No sputum production, no dyspnea, no functional limitation from dyspnea. Mental status improving but still mildly confused. Lactulose dose increased as pt did not have BM yet and bisacodyl suppository ordered as well. Nadolol started for " secondary prophylaxis of variceal hemorrhages per Hepatology recommendations.  11/08: Continues to have BMs with lactulose but reports some abdominal pain near hernia (easily reducible) which he describes as soreness. Pain improving throughout the day. Still mild asterixis and confusion on exam.    Interval History: Reports abdominal pain again today. Early in AM describes it as constant soreness and reports that he has had this type of pain for the last week, since his constipation started. Later in the day mentions that it is getting better and less bothersome. Tolerating diet without n/v, having BMs with lactulose. Wife reports that pt has had hernia since he was in his 20s and that he has had some intermittent abdominal pain in the past with abdominal distention and constipation but never has had incarceration or strangulation    Review of Systems   Constitutional: Negative for activity change, appetite change, chills, diaphoresis, fatigue, fever and unexpected weight change.   HENT: Negative for sore throat and trouble swallowing.    Eyes: Negative for visual disturbance.   Respiratory: Negative for cough, chest tightness and shortness of breath.    Cardiovascular: Negative for chest pain and leg swelling.   Gastrointestinal: Positive for constipation. Negative for abdominal distention, abdominal pain, anal bleeding, blood in stool, diarrhea, nausea and vomiting.   Genitourinary: Negative for dysuria and hematuria.   Musculoskeletal: Positive for arthralgias, back pain, myalgias and neck pain. Negative for neck stiffness.   Skin: Negative for rash.   Neurological: Negative for dizziness, weakness, light-headedness and headaches.   Psychiatric/Behavioral: Positive for confusion. Negative for agitation.     Objective:     Vital Signs (Most Recent):  Temp: 97.2 °F (36.2 °C) (11/08/19 1206)  Pulse: (!) 51 (11/08/19 1206)  Resp: 16 (11/08/19 1206)  BP: (!) 144/65 (11/08/19 1206)  SpO2: 95 % (11/08/19 1206) Vital  Signs (24h Range):  Temp:  [97.1 °F (36.2 °C)-99.3 °F (37.4 °C)] 97.2 °F (36.2 °C)  Pulse:  [50-68] 51  Resp:  [16-18] 16  SpO2:  [90 %-95 %] 95 %  BP: (142-157)/(65-74) 144/65     Weight: 94.1 kg (207 lb 7.2 oz)  Body mass index is 30.64 kg/m².    Intake/Output Summary (Last 24 hours) at 11/8/2019 1439  Last data filed at 11/8/2019 0600  Gross per 24 hour   Intake 480 ml   Output --   Net 480 ml      Physical Exam   Constitutional: No distress.   Elderly man lying flat in bed in no apparent distress   HENT:   Head: Normocephalic and atraumatic.   Mouth/Throat: Oropharynx is clear and moist. No oropharyngeal exudate.   Eyes: Conjunctivae are normal. No scleral icterus.   Neck: No JVD present.   Cardiovascular: Normal rate, regular rhythm, normal heart sounds and intact distal pulses.   Pulmonary/Chest: Effort normal and breath sounds normal. No respiratory distress.   Abdominal: Soft. Bowel sounds are normal. He exhibits no distension and no mass. There is no tenderness. There is no rebound and no guarding.   Reducible umbilical hernia.   Musculoskeletal: He exhibits no edema or tenderness.   Lymphadenopathy:     He has no cervical adenopathy.   Neurological: He is alert.   Mild asterixis    Oriented to self, place, but not date   Skin: Skin is warm. Capillary refill takes less than 2 seconds. He is not diaphoretic.   Psychiatric: He has a normal mood and affect. His behavior is normal. Judgment and thought content normal.   Nursing note and vitals reviewed.      Significant Labs:   CBC:   Recent Labs   Lab 11/07/19  0337 11/08/19  0450   WBC 2.24* 2.76*   HGB 11.1* 11.7*   HCT 34.4* 35.7*   PLT 31* 34*     CMP:   Recent Labs   Lab 11/07/19  0338 11/08/19  0450    136   K 3.9 3.8    105   CO2 23 22*   * 190*   BUN 17 19   CREATININE 1.0 1.1   CALCIUM 7.8* 8.3*   PROT 5.5* 5.7*   ALBUMIN 3.2* 3.2*   BILITOT 1.5* 1.3*   ALKPHOS 76 80   AST 24 20   ALT 22 19   ANIONGAP 10 9   EGFRNONAA >60.0 >60.0  "      Significant Imaging: I have reviewed all pertinent imaging results/findings within the past 24 hours.      Assessment/Plan:      * Hepatic encephalopathy  Hepatic Cirrhosis due to Chronic Hepatitis C Infection  Metabolic Encephalopathy    Chronic HCV cirrhosis s/p harvoni with SVR. Has been well-compensated for years but started to get confused about 1 month ago, since then has also continued to have constipation associated with chronic opiate use and run out of laxatives approximately 5 days prior to admission.    Mental status improving with lactulose; pt without recurrence of n/v and tolerating diet well. Still has some abdominal soreness around hernia, which is freely reducible. Mild HE as evidenced by some confusion and mild asterixis.    Plan  - Treat HE with lactulose TID titrated to 2-3 BMs / day  - Add bisacodyl suppository 11/07 mid-day, consider enema if no response    Drug-induced constipation  On chronic opiate prescriptions and takes colace at home. Ran out of colace 5 days ago and has not had a BM in about 5 days. Acutely presented with abdominal pain, n/v after several days of diffuse abdominal tenderness.    Treat with lactulose as in HE; pt will need this on discharge and should take this indefinitely    Fever  Pancytopenia    Chronic pancytopenia with prominent thrombocytopenia. Pt has been seen by Hematology/Oncology in 2015 and 2019; the pancytopenia was attributed to his cirrhosis and subsequent hypersplenism.    Pt reports that he had a nonproductive cough for the week preceding his admission but that it was spontaneously improving. Denies any sputum production, dyspnea, exertional limitation, hemoptysis.     CXR 11/07 read as "Mild interstitial edema versus pneumonia superimposed on chronic lung change." Blood cultures drawn 11/07. Admit UA with 2+ glucose, trace protein. No dysuria. CT A/P 11/06 mentions constipation but does not show bowel inflammation. No ascites. No new neck pain or " meningeal signs.    Plan  - F/U blood cultures (NGTD 11/08)  - Monitor for recurrence of fever and determine whether antibiotics for CAP would be appropriate for this pt with recent nonproductive cough which is spontaneously improving and no dyspnea but possible radiographic evidence of pneumonia  - Consider portal venous thrombosis as possible cause of fever if infectious etiology ruled out    Portal vein thrombosis  Discovered on CT A/P 11/06 at [facility] when pt presented with abdominal pain and n/v. Hepatology consulted who feel that pt's constipation is more likely the cause of pt's pain, rather than his partially occlusive portal venous thrombosis. Not a candidate for anticoagulation given chronic thrombocytopenia and likely a poor procedural candidate as well.    Plan  - Will not pursue thrombectomy at this time; monitor pt's abdominal pain for improvement, aggressive bowel regimen    Diabetic polyneuropathy associated with diabetes mellitus due to underlying condition  Chronic Pain    On Percocet 10 Q6H at home, will continue while admitted. May benefit from neuropathic pain agent after discharge    DM type 2 with diabetic peripheral neuropathy  On levemir 30U qHS + novolog 8-10U TIDWM + metformin 500 BID.    Levemir 20U qHS + Aspart 5U TIDWM while admitted; diabetic diet    Hypertension associated with diabetes  Not on any outpatient antihypertensives; mildly elevated while admitted. Will monitor and treat if persistently elevated    GERD (gastroesophageal reflux disease)  Continue protonix 40 mg daily    Esophageal varices with a history of bleeding  Per Hepatology pt would benefit from EGD to evaluate varices but family are not interested at this time. Not on BB outpatient; will start nadolol 11/07 AM    Thrombocytopenia, acquired  Chronic, suspected cause is underlying cirrhosis    Transfuse to goal >10K as pt not actively bleeding; if active bleed goal >50K      VTE Risk Mitigation (From  admission, onward)         Ordered     IP VTE HIGH RISK PATIENT  Once      11/06/19 1056     Place sequential compression device  Until discontinued      11/06/19 1056                      Crissy Rivera MD  Department of Hospital Medicine   [facility]

## 2019-11-08 NOTE — ASSESSMENT & PLAN NOTE
"Pancytopenia    Chronic pancytopenia with prominent thrombocytopenia. Pt has been seen by Hematology/Oncology in 2015 and 2019; the pancytopenia was attributed to his cirrhosis and subsequent hypersplenism.    Pt reports that he had a nonproductive cough for the week preceding his admission but that it was spontaneously improving. Denies any sputum production, dyspnea, exertional limitation, hemoptysis.     CXR 11/07 read as "Mild interstitial edema versus pneumonia superimposed on chronic lung change." Blood cultures drawn 11/07. Admit UA with 2+ glucose, trace protein. No dysuria. CT A/P 11/06 mentions constipation but does not show bowel inflammation. No ascites. No new neck pain or meningeal signs.    Plan  - F/U blood cultures (NGTD 11/08)  - Monitor for recurrence of fever and determine whether antibiotics for CAP would be appropriate for this pt with recent nonproductive cough which is spontaneously improving and no dyspnea but possible radiographic evidence of pneumonia  - Consider portal venous thrombosis as possible cause of fever if infectious etiology ruled out  "

## 2019-11-08 NOTE — PLAN OF CARE
Plan of care reviewed with pt/verbalized understanding, vss, patient ambulatory to bathroom independently, c/o of generalized pain controlled with prn medication, patient on cardiac diet tolerating well with no c/o of nausea/vomiting, call-light within reach, will continue to monitor.

## 2019-11-09 VITALS
WEIGHT: 207.44 LBS | DIASTOLIC BLOOD PRESSURE: 63 MMHG | BODY MASS INDEX: 30.72 KG/M2 | TEMPERATURE: 99 F | RESPIRATION RATE: 17 BRPM | SYSTOLIC BLOOD PRESSURE: 123 MMHG | HEART RATE: 55 BPM | HEIGHT: 69 IN | OXYGEN SATURATION: 95 %

## 2019-11-09 DIAGNOSIS — I85.10 SECONDARY ESOPHAGEAL VARICES WITHOUT BLEEDING: Primary | ICD-10-CM

## 2019-11-09 LAB
ALBUMIN SERPL BCP-MCNC: 3.1 G/DL (ref 3.5–5.2)
ALP SERPL-CCNC: 85 U/L (ref 55–135)
ALT SERPL W/O P-5'-P-CCNC: 19 U/L (ref 10–44)
ANION GAP SERPL CALC-SCNC: 8 MMOL/L (ref 8–16)
AST SERPL-CCNC: 22 U/L (ref 10–40)
BASOPHILS # BLD AUTO: 0.02 K/UL (ref 0–0.2)
BASOPHILS NFR BLD: 0.5 % (ref 0–1.9)
BILIRUB SERPL-MCNC: 0.9 MG/DL (ref 0.1–1)
BUN SERPL-MCNC: 14 MG/DL (ref 8–23)
CALCIUM SERPL-MCNC: 8.2 MG/DL (ref 8.7–10.5)
CHLORIDE SERPL-SCNC: 106 MMOL/L (ref 95–110)
CO2 SERPL-SCNC: 23 MMOL/L (ref 23–29)
CREAT SERPL-MCNC: 1.1 MG/DL (ref 0.5–1.4)
DIFFERENTIAL METHOD: ABNORMAL
EOSINOPHIL # BLD AUTO: 0.2 K/UL (ref 0–0.5)
EOSINOPHIL NFR BLD: 5.2 % (ref 0–8)
ERYTHROCYTE [DISTWIDTH] IN BLOOD BY AUTOMATED COUNT: 14.6 % (ref 11.5–14.5)
EST. GFR  (AFRICAN AMERICAN): >60 ML/MIN/1.73 M^2
EST. GFR  (NON AFRICAN AMERICAN): >60 ML/MIN/1.73 M^2
GLUCOSE SERPL-MCNC: 251 MG/DL (ref 70–110)
HCT VFR BLD AUTO: 35.9 % (ref 40–54)
HGB BLD-MCNC: 11.5 G/DL (ref 14–18)
IMM GRANULOCYTES # BLD AUTO: 0.04 K/UL (ref 0–0.04)
IMM GRANULOCYTES NFR BLD AUTO: 1 % (ref 0–0.5)
LYMPHOCYTES # BLD AUTO: 0.5 K/UL (ref 1–4.8)
LYMPHOCYTES NFR BLD: 13.1 % (ref 18–48)
MAGNESIUM SERPL-MCNC: 1.7 MG/DL (ref 1.6–2.6)
MCH RBC QN AUTO: 28.2 PG (ref 27–31)
MCHC RBC AUTO-ENTMCNC: 32 G/DL (ref 32–36)
MCV RBC AUTO: 88 FL (ref 82–98)
MONOCYTES # BLD AUTO: 0.5 K/UL (ref 0.3–1)
MONOCYTES NFR BLD: 13.4 % (ref 4–15)
NEUTROPHILS # BLD AUTO: 2.6 K/UL (ref 1.8–7.7)
NEUTROPHILS NFR BLD: 66.8 % (ref 38–73)
NRBC BLD-RTO: 0 /100 WBC
PHOSPHATE SERPL-MCNC: 3.6 MG/DL (ref 2.7–4.5)
PLATELET # BLD AUTO: 45 K/UL (ref 150–350)
PMV BLD AUTO: 11.8 FL (ref 9.2–12.9)
POCT GLUCOSE: 166 MG/DL (ref 70–110)
POCT GLUCOSE: 224 MG/DL (ref 70–110)
POTASSIUM SERPL-SCNC: 4 MMOL/L (ref 3.5–5.1)
PROT SERPL-MCNC: 5.6 G/DL (ref 6–8.4)
RBC # BLD AUTO: 4.08 M/UL (ref 4.6–6.2)
SODIUM SERPL-SCNC: 137 MMOL/L (ref 136–145)
WBC # BLD AUTO: 3.82 K/UL (ref 3.9–12.7)

## 2019-11-09 PROCEDURE — 25000003 PHARM REV CODE 250: Mod: HCNC | Performed by: STUDENT IN AN ORGANIZED HEALTH CARE EDUCATION/TRAINING PROGRAM

## 2019-11-09 PROCEDURE — 83735 ASSAY OF MAGNESIUM: CPT | Mod: HCNC

## 2019-11-09 PROCEDURE — 99238 PR HOSPITAL DISCHARGE DAY,<30 MIN: ICD-10-PCS | Mod: HCNC,GC,, | Performed by: HOSPITALIST

## 2019-11-09 PROCEDURE — 36415 COLL VENOUS BLD VENIPUNCTURE: CPT | Mod: HCNC

## 2019-11-09 PROCEDURE — 84100 ASSAY OF PHOSPHORUS: CPT | Mod: HCNC

## 2019-11-09 PROCEDURE — 80053 COMPREHEN METABOLIC PANEL: CPT | Mod: HCNC

## 2019-11-09 PROCEDURE — 85025 COMPLETE CBC W/AUTO DIFF WBC: CPT | Mod: HCNC

## 2019-11-09 PROCEDURE — 99238 HOSP IP/OBS DSCHRG MGMT 30/<: CPT | Mod: HCNC,GC,, | Performed by: HOSPITALIST

## 2019-11-09 RX ORDER — LACTULOSE 10 G/15ML
20 SOLUTION ORAL; RECTAL 4 TIMES DAILY
Qty: 3000 ML | Refills: 0 | Status: SHIPPED | OUTPATIENT
Start: 2019-11-09 | End: 2020-06-02

## 2019-11-09 RX ORDER — NADOLOL 20 MG/1
20 TABLET ORAL DAILY
Qty: 90 TABLET | Refills: 0 | Status: SHIPPED | OUTPATIENT
Start: 2019-11-09 | End: 2019-12-05

## 2019-11-09 RX ADMIN — OXYCODONE HYDROCHLORIDE AND ACETAMINOPHEN 1 TABLET: 10; 325 TABLET ORAL at 07:11

## 2019-11-09 RX ADMIN — INSULIN ASPART 5 UNITS: 100 INJECTION, SOLUTION INTRAVENOUS; SUBCUTANEOUS at 07:11

## 2019-11-09 RX ADMIN — NADOLOL 40 MG: 40 TABLET ORAL at 08:11

## 2019-11-09 RX ADMIN — INSULIN ASPART 1 UNITS: 100 INJECTION, SOLUTION INTRAVENOUS; SUBCUTANEOUS at 03:11

## 2019-11-09 RX ADMIN — PANTOPRAZOLE SODIUM 40 MG: 40 TABLET, DELAYED RELEASE ORAL at 08:11

## 2019-11-09 RX ADMIN — LACTULOSE 30 G: 20 SOLUTION ORAL at 08:11

## 2019-11-09 RX ADMIN — ACETAMINOPHEN 650 MG: 325 TABLET ORAL at 05:11

## 2019-11-09 NOTE — PLAN OF CARE
Patient to be discharged home.  The patient does not have any home needs.  Family to provide transportation home.      Future Appointments   Date Time Provider Department Center   11/12/2019  8:45 AM CHERELLE WHITESIDE [facility] [place]   11/14/2019 11:20 AM Crispin Garcia MD [facility] FAM MED [place]   11/21/2019  9:30 AM Fausto Harvey DPM [facility] POD [place]   12/12/2019  3:15 PM Lonnie Blank MD [facility] DERMSUR [place]   12/16/2019 11:00 AM Beverly Felix PA-C [facility] PAINMGT [place]   1/10/2020  8:00 AM [facility] OIC-US1 MASTER [facility] ULTR [facility]   1/10/2020  9:00 AM CHERELLE WHITESIDE [facility] [place]        11/09/19 1142   Final Note   Assessment Type Final Discharge Note   Anticipated Discharge Disposition Home   Hospital Follow Up  Appt(s) scheduled? No   Discharge plans and expectations educations in teach back method with documentation complete? Yes

## 2019-11-09 NOTE — PLAN OF CARE
Pt feels comfortable going home. Wife with pt. Understands changes in diet and also pain control/medication. Pt wife seemed slightly upset regarding treatment of blood clot while here and states (nothing was done) Pt has multiple follow up labs to be drawn and appointments with . Brenton anxious to go home.

## 2019-11-09 NOTE — DISCHARGE SUMMARY
"[facility] Hospital Medicine  Discharge Summary      Patient Name: Steve June Jr.  MRN: [id number]  Admission Date: 11/6/2019  Hospital Length of Stay: 3 days  Discharge Date and Time: 11/9/2019 11:51 AM  Attending Physician: No att. providers found   Discharging Provider: Crissy Rivera MD  Primary Care Provider: Crispin Garcia MD  Hospital Medicine Team: [facility] V Crissy Rivera MD    HPI:   74M with HCV cirrhosis s/p harvoni tx, chronic thrombocytopenia, hx esophageal varices, DM2 on insulin, chronic pain disorder on long-term opiate therapy with associated constipation transferred from [facility] for IR evaluation of portal vein thrombosis with extension into SMV. History primarily obtained from wife with some information from pt as he is unable to give specific details. Pt has been intermittently confused for the last month or so, demonstrating short-term memory problems and occasionally getting lost while driving, which is unusual for him. He says that for the last few weeks he has had intermittent diffuse body aches as well as malaise which he finds it hard to further describe. Pt reports that last night he experienced sudden onset n/v as well as epigastric pain and back pain, prompting him to go to the ED.     In the ED pt was mildly tachypneic and thrombocytopenic to 32K. CT A/P with contrast showed "moderate degree of stool in the colon as can be seen with constipation" and "intraluminal filling defect near the confluence of the splenic vein with the portal vein. Multiple collateral vessels in the upper abdominal quadrants." The ED physician contacted vascular surgery [place] [facility] [place] for consultation and was advised to start a heparin gtt, which he felt uncomfortable doing with pt's thrombocytopenia and lack of vascular surgeons on call if pt bled. Pt was accepted for transfer to [facility] for IR evaluation in regards to candidacy for thrombectomy.    Pt and " family report that pt has been out of his laxative for about 4 days or so and has not had a BM in 4 days. He chronically has constipation associated with this opiate use. Has been on lactulose in the past but does not take it daily; unclear why it was discontinued. Pt has had HE in the past but not in the last few years.          Hospital Course:   Transferred from [facility] 11/06 after presenting earlier that day for 1 month of confusion, several days of constipation with abdominal pain and malaise, and several hours of n/v with epigastric pain radiating to the back. Pt had been out of his laxatives for approximately 4 days prior to admission and not had a BM for those days. At [facility] pt had a CT A/P which revealed constipation, no ascites, and a nonocclusive portal venous thrombosis; pt's chronic thrombocytopenia precluded systemic anticoagulation so pt was transferred for IR evaluation in possible thrombectomy. IR consulted on admission, who recommended Hepatology consult. They felt pt's presentation more consistent with constipation and mild HE; recommended against thrombectomy or any therapy for partial PVT. Lactulose started with gradual resolution of pt's encephalopathy and abdominal pain. No recurrence of n/v. Had fever 11/06 PM and mildly elevated temp 11/07. Blood cultures, CXR not concerning for infection and pt without symptoms (e.g. purulent sputum production, dyspnea, body aches, chills, inflammation, rash) to suggest infection - suspected to be from portal venous thrombosis.     Discharged 11/09 with lactulose daily titrated to 2-3 BMs. Also wrote for nadolol for secondary variceal prophylaxis. Advised to f/u with PCP as well as Hepatology.     Consults:   Consults (From admission, onward)        Status Ordering Provider     Inpatient consult to Hepatology  Once     Provider:  (Not yet assigned)    Completed FOX CHRISTENSEN     Inpatient consult to Interventional Radiology  Once      Provider:  (Not yet assigned)    Completed FOX CHRISTENSEN        Interval History: Feels well today. Abdominal pain very slight; much better than presentation. Hernia not bothering him as much either. No n/v. Some loose stool as expected with lactulose. No confusion, A&O x4. Feels read to go home      Objective:      Vital Signs (Most Recent):  Temp: 98.5 °F (36.9 °C) (11/09/19 0851)  Pulse: (!) 55 (11/09/19 0851)  Resp: 17 (11/09/19 0851)  BP: 123/63 (11/09/19 0851)  SpO2: 95 % (11/09/19 0851) Vital Signs (24h Range):  Temp:  [98.1 °F (36.7 °C)-98.8 °F (37.1 °C)] 98.5 °F (36.9 °C)  Pulse:  [53-65] 55  Resp:  [16-17] 17  SpO2:  [95 %-96 %] 95 %  BP: (123-167)/(63-79) 123/63      Weight: 94.1 kg (207 lb 7.2 oz)  Body mass index is 30.64 kg/m².     Intake/Output Summary (Last 24 hours) at 11/9/2019 1508  Last data filed at 11/9/2019 0500      Gross per 24 hour   Intake 1420 ml   Output --   Net 1420 ml      Physical Exam   Constitutional: No distress.   Elderly man lying flat in bed in no apparent distress   HENT:   Head: Normocephalic and atraumatic.   Mouth/Throat: Oropharynx is clear and moist. No oropharyngeal exudate.   Eyes: Conjunctivae are normal. No scleral icterus.   Neck: No JVD present.   Cardiovascular: Normal rate, regular rhythm, normal heart sounds and intact distal pulses.   Pulmonary/Chest: Effort normal and breath sounds normal. No respiratory distress.   Abdominal: Soft. Bowel sounds are normal. He exhibits no distension and no mass. There is no tenderness. There is no rebound and no guarding.   Reducible umbilical hernia.   Musculoskeletal: He exhibits no edema or tenderness.   Lymphadenopathy:     He has no cervical adenopathy.   Neurological: He is alert.   A&Ox4   Skin: Skin is warm. Capillary refill takes less than 2 seconds. He is not diaphoretic.   Psychiatric: He has a normal mood and affect. His behavior is normal. Judgment and thought content normal.   Nursing note and vitals  reviewed.        Significant Labs:   CBC:        Recent Labs   Lab 11/08/19 0450 11/09/19 0323   WBC 2.76* 3.82*   HGB 11.7* 11.5*   HCT 35.7* 35.9*   PLT 34* 45*      CMP:        Recent Labs   Lab 11/08/19 0450 11/09/19 0323    137   K 3.8 4.0    106   CO2 22* 23   * 251*   BUN 19 14   CREATININE 1.1 1.1   CALCIUM 8.3* 8.2*   PROT 5.7* 5.6*   ALBUMIN 3.2* 3.1*   BILITOT 1.3* 0.9   ALKPHOS 80 85   AST 20 22   ALT 19 19   ANIONGAP 9 8   EGFRNONAA >60.0 >60.0         Significant Imaging: I have reviewed all pertinent imaging results/findings within the past 24 hours.      Final Active Diagnoses:    Diagnosis Date Noted POA    PRINCIPAL PROBLEM:  Hepatic encephalopathy [K72.90] 11/06/2019 Yes    Fever [R50.9] 11/07/2019 No    Portal vein thrombosis [I81] 11/06/2019 Yes    Diabetic polyneuropathy associated with diabetes mellitus due to underlying condition [E08.42] 05/02/2019 Yes    DM type 2 with diabetic peripheral neuropathy [E11.42] 01/24/2019 Yes    Hepatic cirrhosis due to chronic hepatitis C infection [B18.2, K74.60] 11/05/2015 Yes    Hypertension associated with diabetes [E11.59, I10] 11/05/2015 Yes    GERD (gastroesophageal reflux disease) [K21.9] 02/28/2015 Yes    Esophageal varices with a history of bleeding [K74.60, I85.10]  Yes    Thrombocytopenia, acquired [D69.6] 12/15/2011 Yes      Problems Resolved During this Admission:       Discharged Condition: good    Disposition: Home or Self Care    Follow Up:    Patient Instructions:   No discharge procedures on file.    Significant Diagnostic Studies: Labs:   BMP:   Recent Labs   Lab 11/08/19 0450 11/09/19 0323   * 251*    137   K 3.8 4.0    106   CO2 22* 23   BUN 19 14   CREATININE 1.1 1.1   CALCIUM 8.3* 8.2*   MG 1.7 1.7       Pending Diagnostic Studies:     Procedure Component Value Units Date/Time    IR Thrombectomy Veins Inc Thrombolysis and Fluoro [[id number]]     Order Status:  Sent Lab Status:  No  "result          Medications:  Reconciled Home Medications:      Medication List      START taking these medications    lactulose 10 gram/15 mL solution  Commonly known as:  CHRONULAC  Take 30 mLs (20 g total) by mouth 4 (four) times daily. Target 2-3 bowel movements daily; hold if more than 3 bowel movements in one day.        CHANGE how you take these medications    NovoLOG Flexpen U-100 Insulin 100 unit/mL (3 mL) Inpn pen  Generic drug:  insulin aspart U-100  INJECT 8 UNITS UNDER THE SKIN THREE TIMES DAILY WITH MEALS  What changed:  See the new instructions.     oxyCODONE-acetaminophen  mg per tablet  Commonly known as:  PERCOCET  Take 1 tablet by mouth every 6 (six) hours as needed for Pain.  Start taking on:  November 22, 2019  What changed:  Another medication with the same name was removed. Continue taking this medication, and follow the directions you see here.  Notes to patient:  Last dose was given at 7:30 this am. May take another dose around 1:30 pm as needed for pain        CONTINUE taking these medications    albuterol 90 mcg/actuation inhaler  Commonly known as:  VENTOLIN HFA  Inhale 2 puffs into the lungs every 6 (six) hours as needed for Wheezing. Rescue     glipiZIDE 10 MG tablet  Commonly known as:  GLUCOTROL  Take 1 tablet (10 mg total) by mouth 2 (two) times daily before meals.     LEVEMIR FLEXTOUCH U-100 INSULN 100 unit/mL (3 mL) Inpn pen  Generic drug:  insulin detemir U-100  Inject 40 Units into the skin every evening.     metFORMIN 500 MG tablet  Commonly known as:  GLUCOPHAGE  Take 1 tablet (500 mg total) by mouth 2 (two) times daily with meals.     pantoprazole 40 MG tablet  Commonly known as:  PROTONIX  Take 1 tablet (40 mg total) by mouth once daily.     pen needle, diabetic 32 gauge x 5/32" Ndle  Use as directed            Indwelling Lines/Drains at time of discharge:   Lines/Drains/Airways     None                 Time spent on the discharge of patient: 30 minutes  Patient was " seen and examined on the date of discharge and determined to be suitable for discharge.         Crissy Rivera MD  Department of Hospital Medicine  [facility]

## 2019-11-09 NOTE — PLAN OF CARE
Plan of care reviewed with the patient who demonstrated understanding. Pt. AAOx4. Pain managed with prn medications per MAR. VSS on room air. Patient ambulates to the bathroom independently. No falls this shift. Bed in lowest position, bed rails x2, call light within reach, frequent monitoring completed.

## 2019-11-12 ENCOUNTER — TELEPHONE (OUTPATIENT)
Dept: OPTOMETRY | Facility: CLINIC | Age: 74
End: 2019-11-12

## 2019-11-12 ENCOUNTER — NURSE TRIAGE (OUTPATIENT)
Dept: ADMINISTRATIVE | Facility: CLINIC | Age: 74
End: 2019-11-12

## 2019-11-12 ENCOUNTER — TELEPHONE (OUTPATIENT)
Dept: FAMILY MEDICINE | Facility: CLINIC | Age: 74
End: 2019-11-12

## 2019-11-12 ENCOUNTER — PATIENT OUTREACH (OUTPATIENT)
Dept: ADMINISTRATIVE | Facility: CLINIC | Age: 74
End: 2019-11-12

## 2019-11-12 LAB
BACTERIA BLD CULT: NORMAL
BACTERIA BLD CULT: NORMAL

## 2019-11-12 NOTE — TELEPHONE ENCOUNTER
Called and left message for pt stating that Elis who operates the optical shop in [place] is not in today. So we are unable to see if his glasses were in. However I did forward his message in to her and she should be able to call him tomorrow.       ----- Message from Ashanti Killian sent at 11/12/2019  3:16 PM CST -----  Contact: Lili June  [phone]    Patient's wife is checking to see if the lenses ordered for her 's glasses have arrived.

## 2019-11-12 NOTE — TELEPHONE ENCOUNTER
Called for TCC call. Wife c/o abd pain and fever 101 this am. Triage done with ER disposition. Wife is at work and refuses disposition. States will check when she gets home and if she feels he needs to go to hospital she will bring him. Message to pcp.   Reason for Disposition   Fever > 101 F (38.3 C) and over 60 years of age    Additional Information   Negative: Passed out (i.e., fainted, collapsed and was not responding)   Negative: Shock suspected (e.g., cold/pale/clammy skin, too weak to stand, low BP, rapid pulse)   Negative: Sounds like a life-threatening emergency to the triager   Negative: Chest pain   Negative: Pain is mainly in upper abdomen (if needed ask: 'is it mainly above the belly button?')   Negative: SEVERE abdominal pain (e.g., excruciating)   Negative: Vomiting red blood or black (coffee ground) material   Negative: Bloody, black, or tarry bowel movements   Negative: Unable to urinate (or only a few drops) and bladder feels very full   Negative: Pain in scrotum persists > 1 hour   Negative: Constant abdominal pain lasting > 2 hours   Negative: Vomiting bile (green color)   Negative: Patient sounds very sick or weak to the triager   Negative: Vomiting and abdomen looks much more swollen than usual   Negative: White of the eyes have turned yellow (i.e., jaundice)   Negative: Blood in urine (red, pink, or tea-colored)   Negative: Fever > 103 F (39.4 C)    Protocols used: ABDOMINAL PAIN - MALE-A-OH

## 2019-11-12 NOTE — PATIENT INSTRUCTIONS
Cirrhosis    The liver is found on the right side of your belly (abdomen). It is just below the rib cage. The liver has many important jobs. It removes toxins from the blood. It also helps your blood clot to stop bleeding. Cirrhosis happens when the liver is scarred or injured. This damage is permanent. It can cause your liver to stop working (liver failure).   The two most common causes of cirrhosis are long-term heavy alcohol use and having hepatitis B or C. Other things that can damage the liver include toxins, certain medicines, and certain viruses.  Common symptoms of cirrhosis include:  · Tiredness or weakness  · Loss of appetite  · Nausea and vomiting  · Easy bleeding and bruising  · Swelling of the belly (abdomen)  · Weight loss  · Yellowing of the eyes or skin (jaundice)  · Itching  · Confusion  Treatment helps ease symptoms and prevent more liver damage. You may also get treatment to fight the hepatitis virus. Quitting alcohol will help slow down the disease getting worse. It may also prevent more complications. If cirrhosis gets worse and becomes life threatening, you may need a liver transplant.   Home care  · Don't take medicines that can make liver damage worse. Your healthcare provider will tell you if any of the medicines you now take need to be changed. Talk with your provider before taking any medicine not prescribed. These include dietary supplements and herbs. Some of these may make liver damage worse.  · Talk with your healthcare provider about medicines that have acetaminophen or NSAIDs such as ibuprofen and naproxen. These can also harm your liver.   · Stop drinking alcohol. If you find it hard to stop drinking, seek professional help. Consider joining Alcoholics Anonymous or another type of treatment program for support.  · If you use IV drugs, you are at high risk for hepatitis B and C. Seek help to stop.   · Be sure to ask your healthcare provider about recommended vaccines. These include  vaccines for viruses that can cause liver disease.  Follow-up care  Follow up with your healthcare provider or as advised by our staff.  For more information and to learn about support groups for people with liver disease, contact:  · [employer], www.liverfoundation.org, 240.997.4481  · [employer], www.hepfi.org, 101.807.9323  When to seek medical advice  Call your healthcare provider right away if you have any of the following:  · Rapid weight gain with increased size of your belly (abdomen) or leg swelling  · Yellow color of your skin or eyes (jaundice) gets worse  · Excess bleeding from cuts or injuries  Date Last Reviewed: 6/22/2015  © 9947-4977 [employer]. [address] [place] [address], [place] [address]. All rights reserved. This information is not intended as a substitute for professional medical care. Always follow your healthcare professional's instructions.

## 2019-11-14 NOTE — PHYSICIAN QUERY
PT Name: Steve June Jr.  MR #: [id number]     PHYSICIAN QUERY -  ACUITY OF CONDITION CLARIFICATION      CDS/: Holly Patrida RN, CCDS              Contact information: [email or web address]  This form is a permanent document in the medical record.     Query Date: November 14, 2019    By submitting this query, we are merely seeking further clarification of documentation to reflect the severity of illness of your patient. Please utilize your independent clinical judgment when addressing the question(s) below.    The Medical record reflects the following:     Indicators   Supporting Clinical Findings Location in Medical Record   X Documentation of condition Hepatic Encephalopathy 11/6 h/p, 11/7- 11/8 prog notes,   11/9 d/c summary    Lab Value(s)      Radiology Findings     X Treatment                                 Medication - Treat HE with lactulose TID titrated to 2-3 BMs / day 11/6 h/p, 11/7-11/8 prog notes,    X Other Has been well-compensated for years but started to get confused about 1 month ago,  11/6 h/p     Provider, please specify the acuity/chronicity of _Hepatic Encephalopathy___:    [  ] Acute   [   ] Chronic   [  X ] Acute and/on chronic   [   ]  Clinically Undetermined       Please document in your progress notes daily for the duration of treatment until resolved, and include in your discharge summary.

## 2019-11-21 ENCOUNTER — PATIENT MESSAGE (OUTPATIENT)
Dept: FAMILY MEDICINE | Facility: CLINIC | Age: 74
End: 2019-11-21

## 2019-11-21 ENCOUNTER — OFFICE VISIT (OUTPATIENT)
Dept: PODIATRY | Facility: CLINIC | Age: 74
End: 2019-11-21
Payer: MEDICARE

## 2019-11-21 VITALS
HEART RATE: 73 BPM | SYSTOLIC BLOOD PRESSURE: 143 MMHG | WEIGHT: 207.44 LBS | DIASTOLIC BLOOD PRESSURE: 83 MMHG | BODY MASS INDEX: 30.72 KG/M2 | HEIGHT: 69 IN

## 2019-11-21 DIAGNOSIS — L84 PRE-ULCERATIVE CALLUSES: ICD-10-CM

## 2019-11-21 DIAGNOSIS — I73.9 PVD (PERIPHERAL VASCULAR DISEASE): Primary | ICD-10-CM

## 2019-11-21 DIAGNOSIS — L90.9 FAT PAD ATROPHY OF FOOT: ICD-10-CM

## 2019-11-21 DIAGNOSIS — E08.42 DIABETIC POLYNEUROPATHY ASSOCIATED WITH DIABETES MELLITUS DUE TO UNDERLYING CONDITION: ICD-10-CM

## 2019-11-21 DIAGNOSIS — E11.51 TYPE II DIABETES MELLITUS WITH PERIPHERAL CIRCULATORY DISORDER: ICD-10-CM

## 2019-11-21 PROCEDURE — 99499 NO LOS: ICD-10-PCS | Mod: HCNC,S$GLB,, | Performed by: PODIATRIST

## 2019-11-21 PROCEDURE — 11721 PR DEBRIDEMENT OF NAILS, 6 OR MORE: ICD-10-PCS | Mod: Q9,59,HCNC,S$GLB | Performed by: PODIATRIST

## 2019-11-21 PROCEDURE — 11721 DEBRIDE NAIL 6 OR MORE: CPT | Mod: Q9,59,HCNC,S$GLB | Performed by: PODIATRIST

## 2019-11-21 PROCEDURE — 99999 PR PBB SHADOW E&M-EST. PATIENT-LVL III: CPT | Mod: PBBFAC,HCNC,, | Performed by: PODIATRIST

## 2019-11-21 PROCEDURE — 99499 UNLISTED E&M SERVICE: CPT | Mod: HCNC,S$GLB,, | Performed by: PODIATRIST

## 2019-11-21 PROCEDURE — 11056 PARNG/CUTG B9 HYPRKR LES 2-4: CPT | Mod: Q9,HCNC,S$GLB, | Performed by: PODIATRIST

## 2019-11-21 PROCEDURE — 11056 PR TRIM BENIGN HYPERKERATOTIC SKIN LESION,2-4: ICD-10-PCS | Mod: Q9,HCNC,S$GLB, | Performed by: PODIATRIST

## 2019-11-21 PROCEDURE — 99999 PR PBB SHADOW E&M-EST. PATIENT-LVL III: ICD-10-PCS | Mod: PBBFAC,HCNC,, | Performed by: PODIATRIST

## 2019-11-21 NOTE — PROGRESS NOTES
Subjective:      Patient ID: Steve June Jr. is a 74 y.o. male.    Chief Complaint: Diabetic Foot Exam (Crispin Garcia 10/11/19) and Diabetes Mellitus (A1C 7.9 4/3/19)    Diabetes, increased risk amputation needing evaluation/management/optomization of foot care.    CC thick calluses, burning pain with pressure.  Gradual onset, worsening over past several weeks-months, aggravated by increased weight bearing, shoe gear, pressure.   His daughter has been trimming his nails best she can but recently has not been able to, the calluses are hurting.  Denies trauma, signs infection, and surgery both feet. No acute changes              Review of Systems   Constitution: Negative for chills, diaphoresis, fever, malaise/fatigue and night sweats.   Cardiovascular: Positive for leg swelling. Negative for claudication, cyanosis and syncope.   Skin: Positive for nail changes and suspicious lesions. Negative for color change, dry skin, rash and unusual hair distribution.   Musculoskeletal: Negative for falls, joint pain, joint swelling, muscle cramps, muscle weakness and stiffness.   Gastrointestinal: Negative for constipation, diarrhea, nausea and vomiting.   Neurological: Positive for paresthesias and sensory change. Negative for brief paralysis, disturbances in coordination, focal weakness, numbness and tremors.           Objective:      Physical Exam   Constitutional: He is oriented to person, place, and time. He appears well-developed and well-nourished. He is cooperative.   Oriented to time, place, and person.   Cardiovascular:   Pulses:       Dorsalis pedis pulses are 1+ on the right side, and 1+ on the left side.        Posterior tibial pulses are 1+ on the right side, and 1+ on the left side.   Capillary fill time 3-5 seconds.  Feet are warm to touch proximal with distal cooling.      2+ edema to bilateral lower extremities with varicosities noted.    No pedal hair noted bilateral.     Musculoskeletal:   Normal  angle, base, station of gait. Decreased stride length, early heel off, moderately propulsive toe off bilateral.    All ten toes without clubbing, cyanosis, or signs of ischemia.      Ankle dorsiflexion decreased at <10 degrees bilateral with moderate increase with knee flexion bilateral.    Right fifth toe reducible contracture MTPJ and PIPJ with adductovarus rotation    Range of motion, stability, muscle strength, and muscle tone are age and health appropriate normal bilateral feet and legs.       Feet:   Right Foot:   Protective Sensation: 10 sites tested. 4 sites sensed.   Left Foot:   Protective Sensation: 10 sites tested. 6 sites sensed.   Lymphadenopathy:   Negative lymphadenopathy bilateral popliteal fossa and tarsal tunnel.     Neurological: He is alert and oriented to person, place, and time. He has normal strength. He is not disoriented. He displays no atrophy and no tremor. A sensory deficit is present. He exhibits normal muscle tone.   Decreased/absent vibratory sensation bilateral feet to 128Hz tuning fork.    Diminished/loss of protective sensation bilateral to 10 gram monofilament.    Paresthesias,  bilateral feet with no clearly identified trigger or source.     Skin: Skin is warm, dry and intact. No abrasion, no bruising, no burn, no ecchymosis, no laceration, no lesion, no petechiae and no rash noted. He is not diaphoretic. No cyanosis or erythema. No pallor. Nails show no clubbing.   Focal hyperkeratotic lesion consisting entirely of hyperkeratotic tissue without open skin, drainage, pus, fluctuance, malodor, or signs of infection plantar bilateral hallux and first and fifth mtpj bilateral. 6 total    Skin thin, atrophic, with decreased density and distribution of pedal hair bilateral, but without ulcers, masses, nodules or cords palpated bilateral feet and legs.    Hyperpigmentation both legs consistent with stasis.    Toenails 1st, 2nd, 3rd, 4th, 5th  bilateral are hypertrophic thickened 2-3 mm,  dystrophic, discolored tanish brown with tan, gray crumbly subungual debris.  Nails 1-5 left and 1-3 right are long 2-3 mm the other nails are well trimmed                 Assessment:       Encounter Diagnoses   Name Primary?    PVD (peripheral vascular disease) Yes    Type II diabetes mellitus with peripheral circulatory disorder     Diabetic polyneuropathy associated with diabetes mellitus due to underlying condition     Pre-ulcerative calluses     Fat pad atrophy of foot          Plan:       Steve was seen today for diabetic foot exam and diabetes mellitus.    Diagnoses and all orders for this visit:    PVD (peripheral vascular disease)  -     DIABETIC SHOES FOR HOME USE    Type II diabetes mellitus with peripheral circulatory disorder  -     DIABETIC SHOES FOR HOME USE    Diabetic polyneuropathy associated with diabetes mellitus due to underlying condition  -     DIABETIC SHOES FOR HOME USE    Pre-ulcerative calluses  -     DIABETIC SHOES FOR HOME USE    Fat pad atrophy of foot  -     DIABETIC SHOES FOR HOME USE      I counseled the patient on his conditions, their implications and medical management.    Patient will stretch the tendo achilles complex three times daily as demonstrated in the office.  Literature was dispensed illustrating proper stretching technique.    Shoe inspection. Diabetic Foot Education. Patient reminded of the importance of good nutrition and blood sugar control to help prevent podiatric complications of diabetes. Patient instructed on proper foot hygeine. We discussed wearing proper shoe gear, daily foot inspections, never walking without protective shoe gear, never putting sharp instruments to feet    With patient's verbal consent the hyperkeratotic lesions x6 total both feet were trimmed to healthy appearing skin using a # 15 scalpel. Patient relates relief of pain following the procedure. Patient tolerated the procedure well without complications. No anesthesia or hemostasis  required. No blood loss.    With patient's verbal consent, nails were aggressively reduced and debrided x 7 to their soft tissue attachment mechanically  removing all offending nail and debris. Patient relates relief following the procedure. No anesthesia or hemostasis required. No blood loss.     Continue pain management.    Return 3 months routine care  Fausto Harvey DPM

## 2019-11-22 ENCOUNTER — TELEPHONE (OUTPATIENT)
Dept: FAMILY MEDICINE | Facility: CLINIC | Age: 74
End: 2019-11-22

## 2019-11-25 ENCOUNTER — TELEPHONE (OUTPATIENT)
Dept: PULMONOLOGY | Facility: CLINIC | Age: 74
End: 2019-11-25

## 2019-11-25 NOTE — TELEPHONE ENCOUNTER
Appointment scheduled for oxygen recertification. Patient stated insurance changes in January 2020

## 2019-11-27 ENCOUNTER — PATIENT MESSAGE (OUTPATIENT)
Dept: FAMILY MEDICINE | Facility: CLINIC | Age: 74
End: 2019-11-27

## 2019-12-03 ENCOUNTER — TELEPHONE (OUTPATIENT)
Dept: PULMONOLOGY | Facility: CLINIC | Age: 74
End: 2019-12-03

## 2019-12-03 NOTE — TELEPHONE ENCOUNTER
Left message to return call. Patient needs to schedule appointment with NP to recertify for oxygen before 1/17/2020

## 2019-12-04 ENCOUNTER — HOSPITAL ENCOUNTER (EMERGENCY)
Facility: HOSPITAL | Age: 74
Discharge: HOME | End: 2019-12-04
Attending: EMERGENCY MEDICINE
Payer: MEDICARE

## 2019-12-04 ENCOUNTER — DOCUMENTATION ONLY (OUTPATIENT)
Dept: FAMILY MEDICINE | Facility: CLINIC | Age: 74
End: 2019-12-04

## 2019-12-04 VITALS
DIASTOLIC BLOOD PRESSURE: 79 MMHG | OXYGEN SATURATION: 95 % | BODY MASS INDEX: 28.14 KG/M2 | TEMPERATURE: 98 F | SYSTOLIC BLOOD PRESSURE: 132 MMHG | WEIGHT: 190 LBS | RESPIRATION RATE: 18 BRPM | HEIGHT: 69 IN | HEART RATE: 70 BPM

## 2019-12-04 DIAGNOSIS — S09.90XA INJURY OF HEAD, INITIAL ENCOUNTER: ICD-10-CM

## 2019-12-04 DIAGNOSIS — S50.12XA CONTUSION OF LEFT FOREARM, INITIAL ENCOUNTER: Primary | ICD-10-CM

## 2019-12-04 DIAGNOSIS — T14.90XA TRAUMA: ICD-10-CM

## 2019-12-04 PROCEDURE — 25000003 PHARM REV CODE 250: Performed by: EMERGENCY MEDICINE

## 2019-12-04 PROCEDURE — 99285 EMERGENCY DEPT VISIT HI MDM: CPT | Mod: 25

## 2019-12-04 RX ORDER — MUPIROCIN 20 MG/G
OINTMENT TOPICAL ONCE
Status: COMPLETED | OUTPATIENT
Start: 2019-12-04 | End: 2019-12-04

## 2019-12-04 RX ADMIN — MUPIROCIN: 20 OINTMENT TOPICAL at 05:12

## 2019-12-04 NOTE — PROGRESS NOTES
Pre-Visit Chart Review  For Appointment Scheduled on 12/5/19    Health Maintenance Due   Topic Date Due    TETANUS VACCINE  05/13/1963    Colonoscopy  01/10/2016    Pneumococcal Vaccine (65+ High/Highest Risk) (2 of 2 - PPSV23) 03/21/2019    Hemoglobin A1c  07/03/2019    Foot Exam  01/24/2020

## 2019-12-05 ENCOUNTER — OFFICE VISIT (OUTPATIENT)
Dept: FAMILY MEDICINE | Facility: CLINIC | Age: 74
End: 2019-12-05
Payer: MEDICARE

## 2019-12-05 ENCOUNTER — LAB VISIT (OUTPATIENT)
Dept: LAB | Facility: HOSPITAL | Age: 74
End: 2019-12-05
Attending: FAMILY MEDICINE
Payer: MEDICARE

## 2019-12-05 VITALS
HEIGHT: 69 IN | WEIGHT: 201.06 LBS | HEART RATE: 70 BPM | TEMPERATURE: 98 F | BODY MASS INDEX: 29.78 KG/M2 | SYSTOLIC BLOOD PRESSURE: 120 MMHG | DIASTOLIC BLOOD PRESSURE: 70 MMHG | OXYGEN SATURATION: 93 %

## 2019-12-05 DIAGNOSIS — K74.60 HEPATIC CIRRHOSIS, UNSPECIFIED HEPATIC CIRRHOSIS TYPE, UNSPECIFIED WHETHER ASCITES PRESENT: ICD-10-CM

## 2019-12-05 DIAGNOSIS — Z79.4 TYPE 2 DIABETES MELLITUS WITH COMPLICATION, WITH LONG-TERM CURRENT USE OF INSULIN: ICD-10-CM

## 2019-12-05 DIAGNOSIS — E11.8 TYPE 2 DIABETES MELLITUS WITH COMPLICATION, WITH LONG-TERM CURRENT USE OF INSULIN: ICD-10-CM

## 2019-12-05 DIAGNOSIS — K76.82 HEPATIC ENCEPHALOPATHY: ICD-10-CM

## 2019-12-05 DIAGNOSIS — I15.2 HYPERTENSION ASSOCIATED WITH DIABETES: ICD-10-CM

## 2019-12-05 DIAGNOSIS — S41.102A ARM WOUND, LEFT, INITIAL ENCOUNTER: ICD-10-CM

## 2019-12-05 DIAGNOSIS — E11.59 HYPERTENSION ASSOCIATED WITH DIABETES: ICD-10-CM

## 2019-12-05 DIAGNOSIS — E11.8 TYPE 2 DIABETES MELLITUS WITH COMPLICATION, WITH LONG-TERM CURRENT USE OF INSULIN: Primary | ICD-10-CM

## 2019-12-05 DIAGNOSIS — Z79.4 TYPE 2 DIABETES MELLITUS WITH COMPLICATION, WITH LONG-TERM CURRENT USE OF INSULIN: Primary | ICD-10-CM

## 2019-12-05 PROCEDURE — 99999 PR PBB SHADOW E&M-EST. PATIENT-LVL IV: ICD-10-PCS | Mod: PBBFAC,HCNC,, | Performed by: FAMILY MEDICINE

## 2019-12-05 PROCEDURE — 3078F DIAST BP <80 MM HG: CPT | Mod: HCNC,CPTII,S$GLB, | Performed by: FAMILY MEDICINE

## 2019-12-05 PROCEDURE — 99214 PR OFFICE/OUTPT VISIT, EST, LEVL IV, 30-39 MIN: ICD-10-PCS | Mod: HCNC,S$GLB,, | Performed by: FAMILY MEDICINE

## 2019-12-05 PROCEDURE — 3074F PR MOST RECENT SYSTOLIC BLOOD PRESSURE < 130 MM HG: ICD-10-PCS | Mod: HCNC,CPTII,S$GLB, | Performed by: FAMILY MEDICINE

## 2019-12-05 PROCEDURE — 83036 HEMOGLOBIN GLYCOSYLATED A1C: CPT | Mod: HCNC

## 2019-12-05 PROCEDURE — 1159F MED LIST DOCD IN RCRD: CPT | Mod: HCNC,S$GLB,, | Performed by: FAMILY MEDICINE

## 2019-12-05 PROCEDURE — 80053 COMPREHEN METABOLIC PANEL: CPT | Mod: HCNC

## 2019-12-05 PROCEDURE — 3074F SYST BP LT 130 MM HG: CPT | Mod: HCNC,CPTII,S$GLB, | Performed by: FAMILY MEDICINE

## 2019-12-05 PROCEDURE — 36415 COLL VENOUS BLD VENIPUNCTURE: CPT | Mod: HCNC,PO

## 2019-12-05 PROCEDURE — 1125F PR PAIN SEVERITY QUANTIFIED, PAIN PRESENT: ICD-10-PCS | Mod: HCNC,S$GLB,, | Performed by: FAMILY MEDICINE

## 2019-12-05 PROCEDURE — 80061 LIPID PANEL: CPT | Mod: HCNC

## 2019-12-05 PROCEDURE — 1125F AMNT PAIN NOTED PAIN PRSNT: CPT | Mod: HCNC,S$GLB,, | Performed by: FAMILY MEDICINE

## 2019-12-05 PROCEDURE — 1101F PR PT FALLS ASSESS DOC 0-1 FALLS W/OUT INJ PAST YR: ICD-10-PCS | Mod: HCNC,CPTII,S$GLB, | Performed by: FAMILY MEDICINE

## 2019-12-05 PROCEDURE — 1159F PR MEDICATION LIST DOCUMENTED IN MEDICAL RECORD: ICD-10-PCS | Mod: HCNC,S$GLB,, | Performed by: FAMILY MEDICINE

## 2019-12-05 PROCEDURE — 99999 PR PBB SHADOW E&M-EST. PATIENT-LVL IV: CPT | Mod: PBBFAC,HCNC,, | Performed by: FAMILY MEDICINE

## 2019-12-05 PROCEDURE — 82140 ASSAY OF AMMONIA: CPT | Mod: HCNC

## 2019-12-05 PROCEDURE — 3078F PR MOST RECENT DIASTOLIC BLOOD PRESSURE < 80 MM HG: ICD-10-PCS | Mod: HCNC,CPTII,S$GLB, | Performed by: FAMILY MEDICINE

## 2019-12-05 PROCEDURE — 1101F PT FALLS ASSESS-DOCD LE1/YR: CPT | Mod: HCNC,CPTII,S$GLB, | Performed by: FAMILY MEDICINE

## 2019-12-05 PROCEDURE — 99214 OFFICE O/P EST MOD 30 MIN: CPT | Mod: HCNC,S$GLB,, | Performed by: FAMILY MEDICINE

## 2019-12-05 RX ORDER — PROPRANOLOL HYDROCHLORIDE 60 MG/1
60 CAPSULE, EXTENDED RELEASE ORAL DAILY
Qty: 90 CAPSULE | Refills: 3 | Status: SHIPPED | OUTPATIENT
Start: 2019-12-05 | End: 2020-09-17

## 2019-12-05 NOTE — PROGRESS NOTES
Subjective:   Patient ID: Steve June Jr. is a 74 y.o. male     Chief Complaint:Hospital Follow Up and Fall      Patient for checkup hospital follow-up.  Patient with wound to his left arm after a fall.  Currently changing dressings daily and using Neosporin.    Review of Systems   Respiratory: Negative for shortness of breath.    Cardiovascular: Negative for chest pain.   Gastrointestinal: Negative for abdominal pain.   Genitourinary: Negative for dysuria.     Past Medical History:   Diagnosis Date    Abnormal thyroid function test     Allergy     Seasonal    Anemia     Anemia due to blood loss 7/2/2014    Arthritis     Gaviria esophagus     Basal cell carcinoma     right forearm    Basal cell carcinoma 12/2011    lower post neck    Basal cell carcinoma 04/23/2019    left posterior helix    Cancer     skin CA    Cataract     Cirrhosis     Diabetes mellitus     Diabetes mellitus, type 2     Encounter for blood transfusion     Esophageal varices in cirrhosis     grade II on 7/12 EGD    Gastritis     on 7/12 EGD    GERD (gastroesophageal reflux disease) 2/28/2015    Hard of hearing     Hiatal hernia     History of hepatitis C 8/10/2012    tx with harvoni x 41 days (started 10/22/15). SVR4     Hoarseness 2/28/2015    Hypercholesteremia     Hypersplenism     Hypertension     No meds    Pain management 12/10/2014    Petechial hemorrhage 11/25/2015    Lower extremities bilat     Portal hypertensive gastropathy     on 7/12 EGD    Squamous cell carcinoma 04/23/2019    right preauricular cheek, right temple    Thrombocytopenia     Type II or unspecified type diabetes mellitus with neurological manifestations, uncontrolled(250.62) 12/24/2013    Valvular heart disease     mild MR 12     Objective:     Vitals:    12/05/19 1449   BP: 120/70   Pulse: 70   Temp: 98 °F (36.7 °C)     Body mass index is 29.69 kg/m².  Physical Exam   Neck: Neck supple. No tracheal deviation present.    Cardiovascular: Normal rate, regular rhythm and normal heart sounds.   Pulmonary/Chest: Effort normal. No respiratory distress.   Musculoskeletal: Normal range of motion. He exhibits no edema.     Assessment:     1. Type 2 diabetes mellitus with complication, with long-term current use of insulin    2. Hepatic cirrhosis, unspecified hepatic cirrhosis type, unspecified whether ascites present    3. Arm wound, left, initial encounter    4. Hepatic encephalopathy    5. Hypertension associated with diabetes      Plan:   Type 2 diabetes mellitus with complication, with long-term current use of insulin  -     Lipid panel; Future; Expected date: 12/05/2019  -     Comprehensive metabolic panel; Future; Expected date: 12/05/2019  -     Hemoglobin A1c; Future; Expected date: 12/05/2019    Hepatic cirrhosis, unspecified hepatic cirrhosis type, unspecified whether ascites present  -     Ammonia; Future; Expected date: 12/05/2019  -     propranolol (INDERAL LA) 60 MG 24 hr capsule; Take 1 capsule (60 mg total) by mouth once daily.  Dispense: 90 capsule; Refill: 3    Arm wound, left, initial encounter  -     Ambulatory referral to Home Health    Hepatic encephalopathy  -     Ambulatory referral to Home Health    Hypertension associated with diabetes  -     propranolol (INDERAL LA) 60 MG 24 hr capsule; Take 1 capsule (60 mg total) by mouth once daily.  Dispense: 90 capsule; Refill: 3      Time spent with patient: 15 minutes and over half of that time was spent on counseling an coordination of care.    Crispin Garcia MD  12/05/2019    Portions of this note have been dictated with CRISTOBAL Garcia

## 2019-12-06 ENCOUNTER — DOCUMENTATION ONLY (OUTPATIENT)
Dept: FAMILY MEDICINE | Facility: CLINIC | Age: 74
End: 2019-12-06

## 2019-12-06 ENCOUNTER — TELEPHONE (OUTPATIENT)
Dept: FAMILY MEDICINE | Facility: CLINIC | Age: 74
End: 2019-12-06

## 2019-12-06 DIAGNOSIS — E11.42 DM TYPE 2 WITH DIABETIC PERIPHERAL NEUROPATHY: Primary | ICD-10-CM

## 2019-12-06 DIAGNOSIS — E11.42 DM TYPE 2 WITH DIABETIC PERIPHERAL NEUROPATHY: ICD-10-CM

## 2019-12-06 DIAGNOSIS — Z79.4 TYPE 2 DIABETES MELLITUS WITH COMPLICATION, WITH LONG-TERM CURRENT USE OF INSULIN: Primary | ICD-10-CM

## 2019-12-06 DIAGNOSIS — E11.8 TYPE 2 DIABETES MELLITUS WITH COMPLICATION, WITH LONG-TERM CURRENT USE OF INSULIN: Primary | ICD-10-CM

## 2019-12-06 DIAGNOSIS — E11.8 TYPE 2 DIABETES MELLITUS WITH COMPLICATION, WITH LONG-TERM CURRENT USE OF INSULIN: ICD-10-CM

## 2019-12-06 DIAGNOSIS — Z79.4 TYPE 2 DIABETES MELLITUS WITH COMPLICATION, WITH LONG-TERM CURRENT USE OF INSULIN: ICD-10-CM

## 2019-12-06 LAB
ALBUMIN SERPL BCP-MCNC: 3.8 G/DL (ref 3.5–5.2)
ALP SERPL-CCNC: 100 U/L (ref 55–135)
ALT SERPL W/O P-5'-P-CCNC: 16 U/L (ref 10–44)
AMMONIA PLAS-SCNC: 42 UMOL/L (ref 10–50)
ANION GAP SERPL CALC-SCNC: 9 MMOL/L (ref 8–16)
AST SERPL-CCNC: 20 U/L (ref 10–40)
BILIRUB SERPL-MCNC: 1 MG/DL (ref 0.1–1)
BUN SERPL-MCNC: 22 MG/DL (ref 8–23)
CALCIUM SERPL-MCNC: 9.5 MG/DL (ref 8.7–10.5)
CHLORIDE SERPL-SCNC: 100 MMOL/L (ref 95–110)
CHOLEST SERPL-MCNC: 144 MG/DL (ref 120–199)
CHOLEST/HDLC SERPL: 3.2 {RATIO} (ref 2–5)
CO2 SERPL-SCNC: 26 MMOL/L (ref 23–29)
CREAT SERPL-MCNC: 1.2 MG/DL (ref 0.5–1.4)
EST. GFR  (AFRICAN AMERICAN): >60 ML/MIN/1.73 M^2
EST. GFR  (NON AFRICAN AMERICAN): >60 ML/MIN/1.73 M^2
ESTIMATED AVG GLUCOSE: 226 MG/DL (ref 68–131)
GLUCOSE SERPL-MCNC: 404 MG/DL (ref 70–110)
HBA1C MFR BLD HPLC: 9.5 % (ref 4–5.6)
HDLC SERPL-MCNC: 45 MG/DL (ref 40–75)
HDLC SERPL: 31.3 % (ref 20–50)
LDLC SERPL CALC-MCNC: 81.8 MG/DL (ref 63–159)
NONHDLC SERPL-MCNC: 99 MG/DL
POTASSIUM SERPL-SCNC: 4.6 MMOL/L (ref 3.5–5.1)
PROT SERPL-MCNC: 6.8 G/DL (ref 6–8.4)
SODIUM SERPL-SCNC: 135 MMOL/L (ref 136–145)
TRIGL SERPL-MCNC: 86 MG/DL (ref 30–150)

## 2019-12-06 PROCEDURE — G0180 MD CERTIFICATION HHA PATIENT: HCPCS | Mod: ,,, | Performed by: FAMILY MEDICINE

## 2019-12-06 PROCEDURE — G0180 PR HOME HEALTH MD CERTIFICATION: ICD-10-PCS | Mod: ,,, | Performed by: FAMILY MEDICINE

## 2019-12-06 NOTE — TELEPHONE ENCOUNTER
Advised pt's wife Lili message from provider. Understanding verbalized. Will send all the infos over to portal per Ms. Pearson's request.    Pt's wife is requesting new Rx for Levemir to reflect his new dosage of 50 units nightly.

## 2019-12-06 NOTE — TELEPHONE ENCOUNTER
YAIR  Home health nurse with [facility] went to visit patient, patient Blood sugar is 403 nurse stated he was drinking Gatorade (21Grms of sugar), patient told he doesn't remember if he took his insuline or no, or any of the medications for diabetes. Patient is asymptomatic.   Nurse need to report the case, patient is aware if he experience   any symptoms he needs to go to ER.

## 2019-12-06 NOTE — PROGRESS NOTES
Please also have patient resume his metformin 500 mg twice daily and ensure that he is drinking 6-8 cups of water daily.

## 2019-12-06 NOTE — TELEPHONE ENCOUNTER
Crispin Garcia MD at 12/6/2019  8:40 AM     Status: Signed      Please also have patient resume his metformin 500 mg twice daily and ensure that he is drinking 6-8 cups of water daily.

## 2019-12-09 NOTE — TELEPHONE ENCOUNTER
Order for diabetic education placed. Please assist and recommend patient attend with spouse or family member who can assist him

## 2019-12-09 NOTE — TELEPHONE ENCOUNTER
Diabetic Education appt has been scheduled for 01/08/20. Pt and his spouse has been notified regarding this matter via phone as well as portal.

## 2019-12-11 ENCOUNTER — EXTERNAL HOME HEALTH (OUTPATIENT)
Dept: HOME HEALTH SERVICES | Facility: HOSPITAL | Age: 74
End: 2019-12-11
Payer: MEDICARE

## 2019-12-11 ENCOUNTER — TELEPHONE (OUTPATIENT)
Dept: HOME HEALTH SERVICES | Facility: HOSPITAL | Age: 74
End: 2019-12-11

## 2019-12-16 ENCOUNTER — OFFICE VISIT (OUTPATIENT)
Dept: PAIN MEDICINE | Facility: CLINIC | Age: 74
End: 2019-12-16
Payer: MEDICARE

## 2019-12-16 ENCOUNTER — TELEPHONE (OUTPATIENT)
Dept: DERMATOLOGY | Facility: CLINIC | Age: 74
End: 2019-12-16

## 2019-12-16 VITALS
DIASTOLIC BLOOD PRESSURE: 78 MMHG | SYSTOLIC BLOOD PRESSURE: 141 MMHG | WEIGHT: 190 LBS | HEART RATE: 64 BPM | HEIGHT: 69 IN | BODY MASS INDEX: 28.14 KG/M2

## 2019-12-16 DIAGNOSIS — G89.4 CHRONIC PAIN DISORDER: Primary | ICD-10-CM

## 2019-12-16 DIAGNOSIS — M25.562 CHRONIC PAIN OF LEFT KNEE: ICD-10-CM

## 2019-12-16 DIAGNOSIS — M50.30 DDD (DEGENERATIVE DISC DISEASE), CERVICAL: ICD-10-CM

## 2019-12-16 DIAGNOSIS — G89.29 CHRONIC PAIN OF LEFT KNEE: ICD-10-CM

## 2019-12-16 DIAGNOSIS — M47.819 FACET ARTHROPATHY: ICD-10-CM

## 2019-12-16 PROCEDURE — 3077F SYST BP >= 140 MM HG: CPT | Mod: HCNC,CPTII,S$GLB, | Performed by: PHYSICIAN ASSISTANT

## 2019-12-16 PROCEDURE — 99214 PR OFFICE/OUTPT VISIT, EST, LEVL IV, 30-39 MIN: ICD-10-PCS | Mod: HCNC,S$GLB,, | Performed by: PHYSICIAN ASSISTANT

## 2019-12-16 PROCEDURE — 3077F PR MOST RECENT SYSTOLIC BLOOD PRESSURE >= 140 MM HG: ICD-10-PCS | Mod: HCNC,CPTII,S$GLB, | Performed by: PHYSICIAN ASSISTANT

## 2019-12-16 PROCEDURE — 1101F PT FALLS ASSESS-DOCD LE1/YR: CPT | Mod: HCNC,CPTII,S$GLB, | Performed by: PHYSICIAN ASSISTANT

## 2019-12-16 PROCEDURE — 1125F AMNT PAIN NOTED PAIN PRSNT: CPT | Mod: HCNC,S$GLB,, | Performed by: PHYSICIAN ASSISTANT

## 2019-12-16 PROCEDURE — 99214 OFFICE O/P EST MOD 30 MIN: CPT | Mod: HCNC,S$GLB,, | Performed by: PHYSICIAN ASSISTANT

## 2019-12-16 PROCEDURE — 1159F PR MEDICATION LIST DOCUMENTED IN MEDICAL RECORD: ICD-10-PCS | Mod: HCNC,S$GLB,, | Performed by: PHYSICIAN ASSISTANT

## 2019-12-16 PROCEDURE — 1125F PR PAIN SEVERITY QUANTIFIED, PAIN PRESENT: ICD-10-PCS | Mod: HCNC,S$GLB,, | Performed by: PHYSICIAN ASSISTANT

## 2019-12-16 PROCEDURE — 3078F PR MOST RECENT DIASTOLIC BLOOD PRESSURE < 80 MM HG: ICD-10-PCS | Mod: HCNC,CPTII,S$GLB, | Performed by: PHYSICIAN ASSISTANT

## 2019-12-16 PROCEDURE — 99999 PR PBB SHADOW E&M-EST. PATIENT-LVL IV: CPT | Mod: PBBFAC,HCNC,, | Performed by: PHYSICIAN ASSISTANT

## 2019-12-16 PROCEDURE — 99999 PR PBB SHADOW E&M-EST. PATIENT-LVL IV: ICD-10-PCS | Mod: PBBFAC,HCNC,, | Performed by: PHYSICIAN ASSISTANT

## 2019-12-16 PROCEDURE — 3078F DIAST BP <80 MM HG: CPT | Mod: HCNC,CPTII,S$GLB, | Performed by: PHYSICIAN ASSISTANT

## 2019-12-16 PROCEDURE — 1101F PR PT FALLS ASSESS DOC 0-1 FALLS W/OUT INJ PAST YR: ICD-10-PCS | Mod: HCNC,CPTII,S$GLB, | Performed by: PHYSICIAN ASSISTANT

## 2019-12-16 PROCEDURE — 1159F MED LIST DOCD IN RCRD: CPT | Mod: HCNC,S$GLB,, | Performed by: PHYSICIAN ASSISTANT

## 2019-12-16 RX ORDER — OXYCODONE AND ACETAMINOPHEN 10; 325 MG/1; MG/1
1 TABLET ORAL EVERY 6 HOURS PRN
Qty: 120 TABLET | Refills: 0 | Status: SHIPPED | OUTPATIENT
Start: 2019-12-21 | End: 2020-01-19

## 2019-12-16 RX ORDER — OXYCODONE AND ACETAMINOPHEN 10; 325 MG/1; MG/1
1 TABLET ORAL EVERY 6 HOURS PRN
Qty: 120 TABLET | Refills: 0 | Status: SHIPPED | OUTPATIENT
Start: 2020-01-19 | End: 2020-01-22 | Stop reason: SDUPTHER

## 2019-12-16 RX ORDER — OXYCODONE AND ACETAMINOPHEN 10; 325 MG/1; MG/1
1 TABLET ORAL EVERY 6 HOURS PRN
Qty: 120 TABLET | Refills: 0 | Status: SHIPPED | OUTPATIENT
Start: 2020-02-17 | End: 2020-03-09 | Stop reason: SDUPTHER

## 2019-12-16 NOTE — TELEPHONE ENCOUNTER
----- Message from Lonnie Blank MD sent at 12/13/2019  1:13 PM CST -----  Yes please.   ----- Message -----  From: Suzanne Bueno CST  Sent: 12/13/2019   8:02 AM CST  To: Suzanne Bueno, NEGAR, Lonnie Blank MD    Patient had an appointment on 12-12-19 but canceled. Do you still need to see him?

## 2019-12-16 NOTE — PROGRESS NOTES
"Referring Physician: No ref. provider found    PCP: Crispin Garcia MD      CC: neck and left shoulder pain; knee pain    Interval history:  Steve June Jr. is a 74 y.o. male with  neck and left shoulder pain who presents today for f/u and medication refill.  He had a series of 2 Euflexxa injections that worsened his knee pain initially. Pain has now resolved. He has tried physical therapy which did not provide much benefit.   Steroid njections performed have only given him shortlived relief.  In the past he underwent visco supplementation injections for his left knee with benefit.  He continues to have neck pain. He has a history of cervical DDD.  However, he continues to have low platelets and we are unable to provide any  interventional procedures.  He takes oxycodone 10 mg every 6 hours as needed with mild to moderate benefit. In the past UDS was positive for THC. He ate marijuana cookies in [place]. Denies recent use. Previous UDS consistent. Oxycodone does cause some drowsiness. Pain today is rated 6/10.    Prior HPI:   Steve June Jr. is a 74 y.o. male referred to us for lower back and knee pain.  He has significant history of pancytopenia, cirrhosis.  He presents with constant aching, sharp pain in his lower back as well as his left knee.  Pain worsens sitting, standing, bending, walking.  Pain improves with rest.  X-rays performed of the lumbar spine as well as knee shows left knee osteoarthritis.  He has tried physical therapy with minimal benefit.  He currently takes Norco 10 mg every 6 hours as needed with mild to moderate benefit.  He is unable to have any procedures due to his thrombocytopenia.  He also has ventral hernia, but surgical attention is currently not recommended due to his thrombocytopenia.  His main concern today is wishing to decrease his opioid medications.  He states being very "foggy" with his current medications.  He denies any increased sedation.  He denies any " weakness.  No bowel bladder changes.    ROS:  CONSTITUTIONAL: No fevers, chills, night sweats, wt. loss, appetite changes  SKIN: no rashes or itching  ENT: No headaches, head trauma, vision changes, or eye pain  LYMPH NODES: None noticed   CV: No chest pain, palpitations.   RESP: No shortness of breath, dyspnea on exertion, cough, wheezing, or hemoptysis  GI: No nausea, emesis, diarrhea, constipation, melena, hematochezia, pain.    : No dysuria, hematuria, urgency, or frequency   HEME: No easy bruising, bleeding problems  PSYCHIATRIC: No depression, anxiety, psychosis, hallucinations.  NEURO: No seizures, memory loss, dizziness or difficulty sleeping  MSK: + History of present illness      Past Medical History:   Diagnosis Date    Abnormal thyroid function test     Allergy     Seasonal    Anemia     Anemia due to blood loss 7/2/2014    Arthritis     Gaviria esophagus     Basal cell carcinoma     right forearm    Basal cell carcinoma 12/2011    lower post neck    Basal cell carcinoma 04/23/2019    left posterior helix    Cancer     skin CA    Cataract     Cirrhosis     Diabetes mellitus     Diabetes mellitus, type 2     Encounter for blood transfusion     Esophageal varices in cirrhosis     grade II on 7/12 EGD    Gastritis     on 7/12 EGD    GERD (gastroesophageal reflux disease) 2/28/2015    Hard of hearing     Hiatal hernia     History of hepatitis C 8/10/2012    tx with harvoni x 41 days (started 10/22/15). SVR4     Hoarseness 2/28/2015    Hypercholesteremia     Hypersplenism     Hypertension     No meds    Pain management 12/10/2014    Petechial hemorrhage 11/25/2015    Lower extremities bilat     Portal hypertensive gastropathy     on 7/12 EGD    Squamous cell carcinoma 04/23/2019    right preauricular cheek, right temple    Thrombocytopenia     Type II or unspecified type diabetes mellitus with neurological manifestations, uncontrolled(250.62) 12/24/2013    Valvular heart  disease     mild MR 12     Past Surgical History:   Procedure Laterality Date    CATARACT EXTRACTION  1/10/13    left eye    CATARACT EXTRACTION      right eye    CHOLECYSTECTOMY      COLONOSCOPY      EYE SURGERY      Cataract surgery to right eye    KNEE ARTHROSCOPY W/ MENISCAL REPAIR      KNEE CARTILAGE SURGERY      left knee    KNEE SURGERY  2006    left    SKIN LESION EXCISION      TONSILLECTOMY      UPPER GASTROINTESTINAL ENDOSCOPY       Family History   Problem Relation Age of Onset    Leukemia Mother     Cancer Mother         bone    Alcohol abuse Father     Cirrhosis Father         EtOH induced    No Known Problems Daughter     No Known Problems Son     No Known Problems Daughter     No Known Problems Daughter     No Known Problems Daughter     No Known Problems Sister     Amblyopia Neg Hx     Blindness Neg Hx     Cataracts Neg Hx     Diabetes Neg Hx     Glaucoma Neg Hx     Hypertension Neg Hx     Macular degeneration Neg Hx     Retinal detachment Neg Hx     Strabismus Neg Hx     Stroke Neg Hx     Thyroid disease Neg Hx     Psoriasis Neg Hx     Lupus Neg Hx     Eczema Neg Hx     Acne Neg Hx     Melanoma Neg Hx      Social History     Socioeconomic History    Marital status:      Spouse name: Not on file    Number of children: 5    Years of education: Not on file    Highest education level: Not on file   Occupational History    Not on file   Social Needs    Financial resource strain: Not on file    Food insecurity:     Worry: Not on file     Inability: Not on file    Transportation needs:     Medical: Not on file     Non-medical: Not on file   Tobacco Use    Smoking status: Former Smoker     Packs/day: 1.00     Years: 25.00     Pack years: 25.00     Last attempt to quit: 2000     Years since quittin.3    Smokeless tobacco: Never Used   Substance and Sexual Activity    Alcohol use: Yes     Comment: rarely    Drug use: No    Sexual activity:  "Never   Lifestyle    Physical activity:     Days per week: Not on file     Minutes per session: Not on file    Stress: To some extent   Relationships    Social connections:     Talks on phone: Not on file     Gets together: Not on file     Attends Tenriism service: Not on file     Active member of club or organization: Not on file     Attends meetings of clubs or organizations: Not on file     Relationship status: Not on file   Other Topics Concern    Not on file   Social History Narrative    He is .  He has children.  He is currently retired.         Medications/Allergies: See med card    Vitals:    12/16/19 1105   BP: (!) 141/78   Pulse: 64   Weight: 86.2 kg (190 lb)   Height: 5' 9" (1.753 m)   PainSc:   6   PainLoc: Back     Physical exam:    GENERAL: A and O x3, the patient appears well groomed and is in no acute distress.  Skin: No rashes or obvious lesions  HEENT: normocephalic, atraumatic  CARDIOVASCULAR:  RRR  LUNGS: non labored breathing  ABDOMEN: soft, nontender, + sizaeable ventral hernia in epigastric region.    UPPER EXTREMITIES: Normal alignment, normal range of motion, no atrophy, no skin changes,  hair growth and nail growth normal and equal bilaterally. No swelling, no tenderness.    LOWER EXTREMITIES:  Normal alignment, normal range of motion, no atrophy, no skin changes,  hair growth and nail growth normal and equal bilaterally. No swelling, no tenderness.    LUMBAR SPINE  Lumbar spine: ROM is full with flexion extension and oblique extension with moderate increased pain.    Erasmo's test causes no increased pain on either side.    Supine straight leg raise is negative bilaterally.    Internal and external rotation of the hip causes no increased pain on either side.  Myofascial exam: No tenderness to palpation across lumbar paraspinous muscles.      MENTAL STATUS: normal orientation, speech, language, and fund of knowledge for social situation.  Emotional state appropriate.    CRANIAL " NERVES:  II:  PERRL bilaterally,   III,IV,VI: EOMI.    V:  Facial sensation equal bilaterally  VII:  Facial motor function normal.  VIII:  Hearing equal to finger rub bilaterally  IX/X: Gag normal, palate symmetric  XI:  Shoulder shrug equal, head turn equal  XII:  Tongue midline without fasciculations      MOTOR: Tone and bulk: normal bilateral upper and lower Strength: normal   Delt Bi Tri WE WF     R 5 5 5 5 5 5   L 5 5 5 5 5 5     IP ADD ABD Quad TA Gas HAM  R 5 5 5 5 5 5 5  L 5 5 5 5 5 5 5    SENSATION: Light touch and pinprick intact bilaterally  REFLEXES: normal, symmetric, nonbrisk.  Toes down, no clonus. No hoffmans.  GAIT: normal rise, base, steps, and arm swing.      Imaging:  Xray L-spine 4/2013   Alignment is otherwise normal.  Vertebral body heights and disc space heights are relatively well maintained.  Vertebral end plate osteophytes are present anteriorly   at multiple levels.  The facet joints and posterior elements are unremarkable       Xray Knee 12/2013  Degenerative change of the knees, left greater than right.    Assessment:  Steve June Jr. is a 74 y.o. male with neck, back and left knee pain  1. Chronic pain disorder    2. DDD (degenerative disc disease), cervical    3. Facet arthropathy    4. Chronic pain of left knee      Plan:  1. I have stressed the importance of physical activity and exercise to improve overall health.  Continue PT exercises learned at home.  2.  Any interventional procedures will be deferred due to his low platelet count.    3.  Monitor progress from left knee Euflexxa series  4. Percocet 10mg q 8 hrs as needed.  Hold for sedation.  reviewed. Previous UDS consistent.   5. Mr June is not certain he is taking Gabapentin 600 mg TID. He will discuss with his wife and contact us  6.  Follow-up 3 months  All medication management was performed by Dr. Kapil Mario

## 2019-12-26 ENCOUNTER — TELEPHONE (OUTPATIENT)
Dept: DERMATOLOGY | Facility: CLINIC | Age: 74
End: 2019-12-26

## 2020-01-06 ENCOUNTER — PATIENT MESSAGE (OUTPATIENT)
Dept: PODIATRY | Facility: CLINIC | Age: 75
End: 2020-01-06

## 2020-01-07 ENCOUNTER — OFFICE VISIT (OUTPATIENT)
Dept: PULMONOLOGY | Facility: CLINIC | Age: 75
End: 2020-01-07
Payer: MEDICARE

## 2020-01-07 VITALS
BODY MASS INDEX: 29.95 KG/M2 | HEIGHT: 69 IN | HEART RATE: 83 BPM | OXYGEN SATURATION: 94 % | DIASTOLIC BLOOD PRESSURE: 73 MMHG | WEIGHT: 202.19 LBS | SYSTOLIC BLOOD PRESSURE: 147 MMHG

## 2020-01-07 DIAGNOSIS — J84.112 IDIOPATHIC PULMONARY FIBROSIS: Primary | ICD-10-CM

## 2020-01-07 DIAGNOSIS — R41.3 SHORT-TERM MEMORY LOSS: ICD-10-CM

## 2020-01-07 DIAGNOSIS — J84.10 PULMONARY FIBROSIS DETERMINED BY HIGH RESOLUTION COMPUTED TOMOGRAPHY: ICD-10-CM

## 2020-01-07 DIAGNOSIS — J84.9 INTERSTITIAL LUNG DISEASE: ICD-10-CM

## 2020-01-07 PROCEDURE — 1159F MED LIST DOCD IN RCRD: CPT | Mod: S$GLB,,, | Performed by: NURSE PRACTITIONER

## 2020-01-07 PROCEDURE — 1125F PR PAIN SEVERITY QUANTIFIED, PAIN PRESENT: ICD-10-PCS | Mod: S$GLB,,, | Performed by: NURSE PRACTITIONER

## 2020-01-07 PROCEDURE — 3078F DIAST BP <80 MM HG: CPT | Mod: CPTII,S$GLB,, | Performed by: NURSE PRACTITIONER

## 2020-01-07 PROCEDURE — 1101F PR PT FALLS ASSESS DOC 0-1 FALLS W/OUT INJ PAST YR: ICD-10-PCS | Mod: CPTII,S$GLB,, | Performed by: NURSE PRACTITIONER

## 2020-01-07 PROCEDURE — 99214 OFFICE O/P EST MOD 30 MIN: CPT | Mod: S$GLB,,, | Performed by: NURSE PRACTITIONER

## 2020-01-07 PROCEDURE — 1159F PR MEDICATION LIST DOCUMENTED IN MEDICAL RECORD: ICD-10-PCS | Mod: S$GLB,,, | Performed by: NURSE PRACTITIONER

## 2020-01-07 PROCEDURE — 1101F PT FALLS ASSESS-DOCD LE1/YR: CPT | Mod: CPTII,S$GLB,, | Performed by: NURSE PRACTITIONER

## 2020-01-07 PROCEDURE — 99999 PR PBB SHADOW E&M-EST. PATIENT-LVL IV: ICD-10-PCS | Mod: PBBFAC,,, | Performed by: NURSE PRACTITIONER

## 2020-01-07 PROCEDURE — 3078F PR MOST RECENT DIASTOLIC BLOOD PRESSURE < 80 MM HG: ICD-10-PCS | Mod: CPTII,S$GLB,, | Performed by: NURSE PRACTITIONER

## 2020-01-07 PROCEDURE — 3077F PR MOST RECENT SYSTOLIC BLOOD PRESSURE >= 140 MM HG: ICD-10-PCS | Mod: CPTII,S$GLB,, | Performed by: NURSE PRACTITIONER

## 2020-01-07 PROCEDURE — 99214 PR OFFICE/OUTPT VISIT, EST, LEVL IV, 30-39 MIN: ICD-10-PCS | Mod: S$GLB,,, | Performed by: NURSE PRACTITIONER

## 2020-01-07 PROCEDURE — 99999 PR PBB SHADOW E&M-EST. PATIENT-LVL IV: CPT | Mod: PBBFAC,,, | Performed by: NURSE PRACTITIONER

## 2020-01-07 PROCEDURE — 1125F AMNT PAIN NOTED PAIN PRSNT: CPT | Mod: S$GLB,,, | Performed by: NURSE PRACTITIONER

## 2020-01-07 PROCEDURE — 3077F SYST BP >= 140 MM HG: CPT | Mod: CPTII,S$GLB,, | Performed by: NURSE PRACTITIONER

## 2020-01-07 RX ORDER — PIRFENIDONE 267 MG/1
CAPSULE ORAL
Qty: 105 CAPSULE | Refills: 0 | Status: SHIPPED | OUTPATIENT
Start: 2020-01-07 | End: 2020-01-28

## 2020-01-07 RX ORDER — PIRFENIDONE 801 MG/1
1 TABLET, FILM COATED ORAL 3 TIMES DAILY
Qty: 90 TABLET | Refills: 11 | Status: SHIPPED | OUTPATIENT
Start: 2020-01-07 | End: 2020-05-29

## 2020-01-07 NOTE — PROGRESS NOTES
1/7/2020    Steve June Jr.  Office Note    Chief Complaint   Patient presents with    Follow-up     oxygen recert    Pulmonary Fibrosis    Shortness of Breath    Memory Loss       HPI:   1/7/2020- Was not able to get insurance clearance for Esbriet ordered in May. States breathing in becoming more difficult. Not able to ambulate in home with out stopping to rest. Wearing supplemental oxygen occasionally due to discomfort with nasal cannula and facemask, set at 4 L NC. Recently treated by PCP for confusion dx related to blood sugar. States confused daily, forgets if he takes his medications. Does not have POC. States did not receive phone calls from [facility] about Esbriet.  Pulmonary fibrosis dx from Honey combing on CT, no previous Amiodarone therapy, no rheumatoid arthritis, cause of Pulmonary fibrosis unknown dx as idiopathic    4/15/2019- Took OFEV for 1 day, caused vomiting and abnormal dreams. Stopped.   Using supplemental oxygen as needed, complaint of facial rash- itches, dry skin. Behind ears where nasal cannula rubbed.    1/14/19- cough onset 1 weeks, worse at night, unable to sleep due to coughing. Productive mucous occasionally green in color  Post nasal drip. Six minute walk did not show desaturation with ambulating.   Resent prednisone dose for 1 day, increased BS to 400s, stopped.   Hx: cirrhosis.       12/13/18- States no benefit from Trelegy. SOB with any level of exertion including talking. Feels like can not catch breath, relieved by rest. States not using Albuterol inhaler.      12/6/18- off smokes 30 yrs after ppd for 20yrs.  Miserable sob last 3 wks.  Onset not recalled but noted.  Pt  54 yr, does chore bout house  With no yd wk.  Pt notes marrero activity.  No cough.  No near death sensation. No chest pain.  Appetite good.     Exam not bad, xray with very small lungs and increasd markings last yr so.     Check blood again.  If blood test suggest blood clot- need  urgent ct chest to screen for blood clot.  Would do regular ct chest if no blood clot concern.      Otherwise need heart and breathing test.       Inhaler no help.  Try trelegy once daily       Pt was in ER recently with below hpi:Time seen by provider: 10:56 AM on 11/24/2018     Steve June Jr. is a 73 y.o. male with DMII, HTN GERD, cirrhosis and anemia who presents to the ED with an onset of worsening SOB with associated cough. He was seen in his PCP's office over a month ago on 10/14 by CORNEL Arevalo for the same complaints and was prescribed a 6-day tapering course of Azithromycin 250 mg for acute bacterial bronchitis. The patient states that he did not take the antibiotics. His SOB is worsened with exertion and worse with laying flat. He reports being propped up with 1 pillow at nighttime. The patient has endorsed chills for the past couple of weeks. He was noted to have blood in his urine 1 week ago and has had none since. The patient denies prior similar symptoms, being on home Oxygen, being diagnosed with CHF or recent PNA, history of cardiac, thyroid or prostate problems, fevers, chest pain or any other symptoms at this time. No cardiac SHx noted. Doxycycline drug allergy noted.   The history is provided by the patient and the spouse (wife).       ED Course as of Nov 26 1117   Sat Nov 24, 2018   1234 73-year-old male past medical history of diabetes, hypertension, cirrhosis and anemia presents today with shortness of breath. Patient reports worsening shortness of breath times 10 days.  Dyspnea on exertion.  Reports intermittent cough.  Denies fevers but subjective chills. The exam remarkable for chronically ill-appearing otherwise with some coarse breath sounds on the right.  [BD]   1330 Chest x-ray with diffuse interstitial opacities.  Given symptoms of cough, chills and worsening shortness of breath will treat for pneumonia.  Per prior note patient was given antibiotics for pneumonia last month  but patient reports he did not take them.  Labs with mild hyperglycemia will give 5 units of insulin and some fluids.  Patient with known thrombocytopenia being worked up as outpatient improved from prior labs.  Patient told of x-ray findings and will follow PCP closely for further outpatient management.  Patient has follow-up with liver doctor for cirrhosis this month.  Patient and family member understand and agree with discharge instructions and strict return precautions         The chief compliant  problem varies with instablilty at time    PFSH:  Past Medical History:   Diagnosis Date    Abnormal thyroid function test     Allergy     Seasonal    Anemia     Anemia due to blood loss 7/2/2014    Arthritis     Gaviria esophagus     Basal cell carcinoma     right forearm    Basal cell carcinoma 12/2011    lower post neck    Basal cell carcinoma 04/23/2019    left posterior helix    Cancer     skin CA    Cataract     Cirrhosis     Diabetes mellitus     Diabetes mellitus, type 2     Encounter for blood transfusion     Esophageal varices in cirrhosis     grade II on 7/12 EGD    Gastritis     on 7/12 EGD    GERD (gastroesophageal reflux disease) 2/28/2015    Hard of hearing     Hiatal hernia     History of hepatitis C 8/10/2012    tx with harvoni x 41 days (started 10/22/15). SVR4     Hoarseness 2/28/2015    Hypercholesteremia     Hypersplenism     Hypertension     No meds    Pain management 12/10/2014    Petechial hemorrhage 11/25/2015    Lower extremities bilat     Portal hypertensive gastropathy     on 7/12 EGD    Squamous cell carcinoma 04/23/2019    right preauricular cheek, right temple    Thrombocytopenia     Type II or unspecified type diabetes mellitus with neurological manifestations, uncontrolled(250.62) 12/24/2013    Valvular heart disease     mild MR 12         Past Surgical History:   Procedure Laterality Date    CATARACT EXTRACTION  1/10/13    left eye    CATARACT  "EXTRACTION      right eye    CHOLECYSTECTOMY      COLONOSCOPY      EYE SURGERY      Cataract surgery to right eye    KNEE ARTHROSCOPY W/ MENISCAL REPAIR      KNEE CARTILAGE SURGERY      left knee    KNEE SURGERY  2006    left    SKIN LESION EXCISION      TONSILLECTOMY      UPPER GASTROINTESTINAL ENDOSCOPY       Social History     Tobacco Use    Smoking status: Former Smoker     Packs/day: 1.00     Years: 25.00     Pack years: 25.00     Last attempt to quit: 2000     Years since quittin.4    Smokeless tobacco: Never Used   Substance Use Topics    Alcohol use: Yes     Comment: rarely    Drug use: No     Family History   Problem Relation Age of Onset    Leukemia Mother     Cancer Mother         bone    Alcohol abuse Father     Cirrhosis Father         EtOH induced    No Known Problems Daughter     No Known Problems Son     No Known Problems Daughter     No Known Problems Daughter     No Known Problems Daughter     No Known Problems Sister     Amblyopia Neg Hx     Blindness Neg Hx     Cataracts Neg Hx     Diabetes Neg Hx     Glaucoma Neg Hx     Hypertension Neg Hx     Macular degeneration Neg Hx     Retinal detachment Neg Hx     Strabismus Neg Hx     Stroke Neg Hx     Thyroid disease Neg Hx     Psoriasis Neg Hx     Lupus Neg Hx     Eczema Neg Hx     Acne Neg Hx     Melanoma Neg Hx      Review of patient's allergies indicates:   Allergen Reactions    Adhesive tape-silicones Other (See Comments)     pulls skin off    Doxycycline      Dizzy. "Just didn't feel right".     I have reviewed past medical, family, and social history. I have reviewed previous nurse notes.    Performance Status:The patient's activity level is housebound activities.          Review of Systems   Constitutional: Negative for activity change, appetite change, chills, diaphoresis,  fever and unexpected weight change.  HENT: Negative for dental problem, rhinorrhea, sinus pressure, sinus pain, " "sneezing, postnasal drip, sore throat, trouble swallowing and voice change.    Respiratory: Negative for apnea, stridor, shortness of breath and cough, chest tightness, wheezing  Cardiovascular: Negative for chest pain, palpitations and leg swelling.   Gastrointestinal: Negative for abdominal distention, abdominal pain, constipation and nausea.   Musculoskeletal: Negative for gait problem, myalgias and neck pain.   Skin: Positive for rash across forehead, ears, bilateral arms, states sores not healing  Allergic/Immunologic: Negative for environmental allergies and food allergies.   Neurological: Negative for dizziness, speech difficulty, weakness, light-headedness, numbness and headaches.   Hematological: Negative for adenopathy. Does not bruise/bleed easily.   Psychiatric/Behavioral: Negative for dysphoric mood and sleep disturbance. The patient is not nervous/anxious.           Exam:Comprehensive exam done. BP (!) 147/73 (BP Location: Left arm, Patient Position: Sitting)   Pulse 83   Ht 5' 9" (1.753 m)   Wt 91.7 kg (202 lb 2.6 oz)   SpO2 (!) 94% Comment: on room air  BMI 29.85 kg/m²   Exam included Vitals as listed  Constitutional: He is oriented to person, place, and time. He appears well-developed. No distress.   Nose: Nose normal.   Mouth/Throat: Uvula is midline, oropharynx is clear and moist and mucous membranes are normal. No dental caries. No oropharyngeal exudate, posterior oropharyngeal edema, posterior oropharyngeal erythema or tonsillar abscesses.  Mallapatti (M) score 3  Eyes: Pupils are equal, round, and reactive to light.   Neck: No JVD present. No thyromegaly present.   Cardiovascular: Normal rate, regular rhythm and normal heart sounds. Exam reveals no gallop and no friction rub.   No murmur heard.  Pulmonary/Chest: Effort normal and breath sounds abnormal: bilateral rales, worse on right lower lung field. No accessory muscle usage or stridor. No apnea and no tachypnea. No respiratory " distress, decreased breath sounds, wheezes, rhonchi, or tenderness.   Abdominal: Soft. He exhibits no mass. There is no tenderness. No hepatosplenomegaly, and normoactive bowel sounds. Positive for umbilical hernia  Musculoskeletal: Normal range of motion. exhibits no edema.   Lymphadenopathy:     He has no cervical adenopathy.     He has no axillary adenopathy.   Neurological:  alert and oriented to person, place, and time. not disoriented.   Skin: Skin is warm and dry. Capillary refill takes less 2 sec. No cyanosis or erythema. No pallor. Nails show clubbing. psoriatic rash bilateral plaques.  Psychiatric: normal mood and affect. behavior is normal. Judgment and thought content normal.       Radiographs (ct chest and cxr) reviewed: results reviewed progressively sm lung last 2 yrs, increased markings on cxr and CT Honey combing pattern seen.   CT Abdomen Pelvis With Contrast 11/06/2019   3. Patchy ground-glass disease in the lung bases which could reflect mild pulmonary edema or pneumonitis.  Background mild pulmonary fibrosis.  4. Fat containing umbilical hernia.  5. Cardiomegaly and coronary artery disease.      CT Chest Without Contrast Date: 01/07/2019   FINDINGS:  There is patchy subpleural interlobular septal thickening and intervening ground-glass opacity in both lungs.  Honeycombing in the upper lobes and right lower lobe.  Some peripheral traction bronchiectasis.  No significant air trapping.  No pleural effusion or pneumothorax.  Normal sized heart with coronary artery disease and calcification of the mitral annulus.  Wall thickening distal 3rd of the esophagus.  No mediastinal adenopathy.  Spleen 20 cm in length.  Pancreas atrophy.  Nodular liver contour.  Collateral vessels in the upper abdominal quadrants.  Osteopenia     1. Some of the pulmonary ground-glass opacities have resolved.  Background mild interstitial lung disease persists, pattern favoring UIP type.  2. Sequela of cirrhosis and portal  hypertension.  3. Wall thickening distal esophagus is nonspecific and could relate to reflux, esophagitis or mass.  Esophagram could add further characterization.  4. Coronary artery disease   Electronically signed by: Bert Don       CTA CHEST NON CORONARY 12/18/2018   No evidence for pulmonary thromboembolic disease.  Pulmonary parenchymal findings most compatible with a chronic interstitial lung process with considerations including idiopathic pulmonary fibrosis/usual interstitial pneumonia and nonspecific interstitial pneumonia.  Possible superimposed acute airspace disease within the left upper lobe may represent pneumonia in the appropriate clinical setting.  Hepatic cirrhosis.  Splenomegaly.  Small hiatal hernia.  Distal esophageal thickening may represent reflux esophagitis.       XR CHEST PA AND LATERAL   Electronically signed by: Crispin Vences MD Date: 12/05/2018   Little change relative to October 15, 2018 and November 24, 2018.  Coarse chronic appearing areas of likely chronic interstitial infiltrates are evident bilaterally.  There is volume loss on the right.       Labs reviewed   Lab Results   Component Value Date    WBC 3.82 (L) 11/09/2019    RBC 4.08 (L) 11/09/2019    HGB 11.5 (L) 11/09/2019    HCT 35.9 (L) 11/09/2019    MCV 88 11/09/2019    MCH 28.2 11/09/2019    MCHC 32.0 11/09/2019    RDW 14.6 (H) 11/09/2019    PLT 45 (L) 11/09/2019    MPV 11.8 11/09/2019    GRAN 2.6 11/09/2019    GRAN 66.8 11/09/2019    LYMPH 0.5 (L) 11/09/2019    LYMPH 13.1 (L) 11/09/2019    MONO 0.5 11/09/2019    MONO 13.4 11/09/2019    EOS 0.2 11/09/2019    BASO 0.02 11/09/2019    EOSINOPHIL 5.2 11/09/2019    BASOPHIL 0.5 11/09/2019     Results for ARNIE HALEY JR. (MRN [id number]) as of 1/7/2020 13:48   Ref. Range 12/5/2019 15:29   CO2 Latest Ref Range: 23 - 29 mmol/L 26     PFT results reviewed  Pulmonary Functions Testing Results:  spirometry bronchodilator, lung volume by gas dilution, diffusion capacity with  done December 6, 2018.  The FEV1 FVC ratio 77%.  There is no airflow obstruction.  Both the vital capacity FEV1 reduced to about 56% of predicted.  There was no improvement   following bronchodilator.  Total lung capacity was only 46% of predicted.  There is no air trapping measured by gas dilution.  Maximal voluntary ventilation was well preserved.  Diffusion was decreased but did improved to 66% predicted when corrected for    lung volume.   Patient has restrictive process.    Diffusion is low.  Clinical correlation recommended.     6 min walk study was done December 6, 2018.  Baseline room air saturation was 94%.  Patient's O2 sats fell to 89% by 3 min of walking.  O2 sat however did not fall any lower.  On room air O2 sat remained 89 and low 90s throughout the remainder of the   test.  Patient walked 170 m or 33% of the reference distance.       Ambulation in office January 7, 2020, desaturation to 89%, not low enough to qualify for supplemental oxygen.    Esbriet Medication Taper  Days 1 to 7: 267 mg 3 times daily (total dose: 801 mg/day)  Days 8 to 14: 534 mg 3 times daily (total dose: 1,602 mg/day)  Day 15 and thereafter: 801 mg 3 times daily (total dose: 2,403 mg/day). Maximum dose: 2,403 mg/day.    Mini mental status exam 1/7/2020= 23 shows mild cognitive imparement    Plan:  Clinical impression is resonably certain and repeated evaluation prn +/- follow up will be needed as below.    Steve was seen today for follow-up, pulmonary fibrosis, shortness of breath and memory loss.    Diagnoses and all orders for this visit:    Idiopathic pulmonary fibrosis  -     Six Minute Walk Test to qualify for Home Oxygen; Future  -     Spirometry without bronchodilator; Future  -     pirfenidone (ESBRIET) 267 mg Cap; Take 1 capsule (267 mg total) by mouth 3 (three) times daily for 7 days, THEN 2 capsules (534 mg total) 3 (three) times daily for 14 days.  -     pirfenidone (ESBRIET) 801 mg Tab; Take 1 capsule by mouth 3  (three) times daily.    Pulmonary fibrosis determined by high resolution computed tomography  -     pirfenidone (ESBRIET) 267 mg Cap; Take 1 capsule (267 mg total) by mouth 3 (three) times daily for 7 days, THEN 2 capsules (534 mg total) 3 (three) times daily for 14 days.  -     pirfenidone (ESBRIET) 801 mg Tab; Take 1 capsule by mouth 3 (three) times daily.    Interstitial lung disease  -     pirfenidone (ESBRIET) 267 mg Cap; Take 1 capsule (267 mg total) by mouth 3 (three) times daily for 7 days, THEN 2 capsules (534 mg total) 3 (three) times daily for 14 days.  -     pirfenidone (ESBRIET) 801 mg Tab; Take 1 capsule by mouth 3 (three) times daily.    Short-term memory loss     - refer to primary care provider for further evaluation and treatment        Follow up in about 3 months (around 4/7/2020), or if symptoms worsen or fail to improve.    Discussed with patient above for education the following:      Patient Instructions   Mini mental status exam shows mild cognitive impairment. recommend talking to primary care for further evaluation for memory loss. Need to put medications in pre filled container. Use a marker board on wall to document when you take insulin to prevent you from forgetting to take it or take it twice.     Esbriet has not been filled, will reorder through new insurance to treat pulmonary fibrosis  Esbriet Medication Taper  Days 1 to 7: 267 mg 3 times daily (total dose: 801 mg/day)  Days 8 to 14: 534 mg 3 times daily (total dose: 1,602 mg/day)  Day 15 and thereafter: 801 mg 3 times daily (total dose: 2,403 mg/day). Maximum dose: 2,403 mg/day.    Test at hospital to qualify for supplemental oxygen

## 2020-01-07 NOTE — PATIENT INSTRUCTIONS
Mini mental status exam shows mild cognitive impairment. recommend talking to primary care for further evaluation for memory loss. Need to put medications in pre filled container. Use a marker board on wall to document when you take insulin to prevent you from forgetting to take it or take it twice.     Esbriet has not been filled, will reorder through new insurance to treat pulmonary fibrosis  Esbriet Medication Taper  Days 1 to 7: 267 mg 3 times daily (total dose: 801 mg/day)  Days 8 to 14: 534 mg 3 times daily (total dose: 1,602 mg/day)  Day 15 and thereafter: 801 mg 3 times daily (total dose: 2,403 mg/day). Maximum dose: 2,403 mg/day.    Test at hospital to qualify for supplemental oxygen

## 2020-01-08 ENCOUNTER — TELEPHONE (OUTPATIENT)
Dept: PHARMACY | Facility: CLINIC | Age: 75
End: 2020-01-08

## 2020-01-08 NOTE — TELEPHONE ENCOUNTER
Informed Spouse Lili  that [facility] received prescription for Esbriet 267mg and 801mg and benefits investigation is required.  [facility] will be back in touch once insurance determination is received.

## 2020-01-14 ENCOUNTER — TELEPHONE (OUTPATIENT)
Dept: PULMONOLOGY | Facility: CLINIC | Age: 75
End: 2020-01-14

## 2020-01-14 DIAGNOSIS — J84.112 IDIOPATHIC PULMONARY FIBROSIS: Primary | ICD-10-CM

## 2020-01-14 NOTE — TELEPHONE ENCOUNTER
Spoke to Pt's wife, Lili, informed her of results  ----- Message from Edna Olvera NP sent at 1/14/2020  8:38 AM CST -----  Spirometry test shows lung function to be stable, no dramatic loss seen.

## 2020-01-16 ENCOUNTER — PATIENT MESSAGE (OUTPATIENT)
Dept: FAMILY MEDICINE | Facility: CLINIC | Age: 75
End: 2020-01-16

## 2020-01-22 ENCOUNTER — PATIENT MESSAGE (OUTPATIENT)
Dept: PAIN MEDICINE | Facility: CLINIC | Age: 75
End: 2020-01-22

## 2020-01-22 ENCOUNTER — TELEPHONE (OUTPATIENT)
Dept: PAIN MEDICINE | Facility: CLINIC | Age: 75
End: 2020-01-22

## 2020-01-22 ENCOUNTER — PATIENT MESSAGE (OUTPATIENT)
Dept: FAMILY MEDICINE | Facility: CLINIC | Age: 75
End: 2020-01-22

## 2020-01-22 DIAGNOSIS — G89.4 CHRONIC PAIN DISORDER: ICD-10-CM

## 2020-01-22 RX ORDER — OXYCODONE AND ACETAMINOPHEN 10; 325 MG/1; MG/1
1 TABLET ORAL EVERY 6 HOURS PRN
Qty: 120 TABLET | Refills: 0 | Status: SHIPPED | OUTPATIENT
Start: 2020-01-22 | End: 2020-02-20

## 2020-01-22 NOTE — TELEPHONE ENCOUNTER
Pt's wife stated they need a PA for the pain meds. I advised her to call the pharmacy and have them send us the info and I will work on the PA. Pt agreed

## 2020-01-22 NOTE — TELEPHONE ENCOUNTER
See portals message rescheduled appt for 2/4/2020, please advise.  Let me know if you want to see him sooner or he needs a referral to neuro or any other specialist.

## 2020-01-24 ENCOUNTER — PATIENT OUTREACH (OUTPATIENT)
Dept: ADMINISTRATIVE | Facility: OTHER | Age: 75
End: 2020-01-24

## 2020-01-24 NOTE — PROGRESS NOTES
Chart reviewed. Care Everywhere updates requested. Immunizations reconciled. HM updated.  Updated patient history with diagnosis of colon polyps.

## 2020-01-27 ENCOUNTER — OFFICE VISIT (OUTPATIENT)
Dept: PODIATRY | Facility: CLINIC | Age: 75
End: 2020-01-27
Payer: MEDICARE

## 2020-01-27 ENCOUNTER — TELEPHONE (OUTPATIENT)
Dept: PODIATRY | Facility: CLINIC | Age: 75
End: 2020-01-27

## 2020-01-27 VITALS — HEIGHT: 69 IN | BODY MASS INDEX: 30.12 KG/M2 | WEIGHT: 203.38 LBS

## 2020-01-27 DIAGNOSIS — E08.42 DIABETIC POLYNEUROPATHY ASSOCIATED WITH DIABETES MELLITUS DUE TO UNDERLYING CONDITION: ICD-10-CM

## 2020-01-27 DIAGNOSIS — L84 PRE-ULCERATIVE CALLUSES: ICD-10-CM

## 2020-01-27 DIAGNOSIS — L90.9 FAT PAD ATROPHY OF FOOT: ICD-10-CM

## 2020-01-27 DIAGNOSIS — I73.9 PVD (PERIPHERAL VASCULAR DISEASE): Primary | ICD-10-CM

## 2020-01-27 DIAGNOSIS — B35.1 ONYCHOMYCOSIS DUE TO DERMATOPHYTE: ICD-10-CM

## 2020-01-27 DIAGNOSIS — E11.51 TYPE II DIABETES MELLITUS WITH PERIPHERAL CIRCULATORY DISORDER: ICD-10-CM

## 2020-01-27 PROCEDURE — 11056 PARNG/CUTG B9 HYPRKR LES 2-4: CPT | Mod: Q9,S$GLB,, | Performed by: PODIATRIST

## 2020-01-27 PROCEDURE — 11721 DEBRIDE NAIL 6 OR MORE: CPT | Mod: Q9,59,S$GLB, | Performed by: PODIATRIST

## 2020-01-27 PROCEDURE — 1125F AMNT PAIN NOTED PAIN PRSNT: CPT | Mod: S$GLB,,, | Performed by: PODIATRIST

## 2020-01-27 PROCEDURE — 1101F PR PT FALLS ASSESS DOC 0-1 FALLS W/OUT INJ PAST YR: ICD-10-PCS | Mod: CPTII,S$GLB,, | Performed by: PODIATRIST

## 2020-01-27 PROCEDURE — 3046F HEMOGLOBIN A1C LEVEL >9.0%: CPT | Mod: CPTII,S$GLB,, | Performed by: PODIATRIST

## 2020-01-27 PROCEDURE — 99999 PR PBB SHADOW E&M-EST. PATIENT-LVL III: ICD-10-PCS | Mod: PBBFAC,,, | Performed by: PODIATRIST

## 2020-01-27 PROCEDURE — 1125F PR PAIN SEVERITY QUANTIFIED, PAIN PRESENT: ICD-10-PCS | Mod: S$GLB,,, | Performed by: PODIATRIST

## 2020-01-27 PROCEDURE — 11721 PR DEBRIDEMENT OF NAILS, 6 OR MORE: ICD-10-PCS | Mod: Q9,59,S$GLB, | Performed by: PODIATRIST

## 2020-01-27 PROCEDURE — 99213 OFFICE O/P EST LOW 20 MIN: CPT | Mod: 25,S$GLB,, | Performed by: PODIATRIST

## 2020-01-27 PROCEDURE — 11056 PR TRIM BENIGN HYPERKERATOTIC SKIN LESION,2-4: ICD-10-PCS | Mod: Q9,S$GLB,, | Performed by: PODIATRIST

## 2020-01-27 PROCEDURE — 99999 PR PBB SHADOW E&M-EST. PATIENT-LVL III: CPT | Mod: PBBFAC,,, | Performed by: PODIATRIST

## 2020-01-27 PROCEDURE — 1101F PT FALLS ASSESS-DOCD LE1/YR: CPT | Mod: CPTII,S$GLB,, | Performed by: PODIATRIST

## 2020-01-27 PROCEDURE — 1159F MED LIST DOCD IN RCRD: CPT | Mod: S$GLB,,, | Performed by: PODIATRIST

## 2020-01-27 PROCEDURE — 3046F PR MOST RECENT HEMOGLOBIN A1C LEVEL > 9.0%: ICD-10-PCS | Mod: CPTII,S$GLB,, | Performed by: PODIATRIST

## 2020-01-27 PROCEDURE — 99213 PR OFFICE/OUTPT VISIT, EST, LEVL III, 20-29 MIN: ICD-10-PCS | Mod: 25,S$GLB,, | Performed by: PODIATRIST

## 2020-01-27 PROCEDURE — 1159F PR MEDICATION LIST DOCUMENTED IN MEDICAL RECORD: ICD-10-PCS | Mod: S$GLB,,, | Performed by: PODIATRIST

## 2020-01-27 NOTE — PROGRESS NOTES
Subjective:      Patient ID: Steve June Jr. is a 74 y.o. male.    Chief Complaint: Foot Pain (allyssa) and Diabetes Mellitus (PCP:  Dr Garcia 12/5/19; HgbA1c: 12/5/19  9.5)    Diabetes, increased risk amputation needing evaluation/management/optomization of foot care.    CC thick calluses, burning pain with pressure.  Gradual onset, worsening over past several weeks-months, aggravated by increased weight bearing, shoe gear, pressure.  The calluses are hurting and the neuropathy pain is getting worse and seeking more treatment options.  Denies trauma, signs infection, and surgery both feet. No acute changes Needs ne diabetic shoes              Review of Systems   Constitution: Negative for chills, diaphoresis, fever, malaise/fatigue and night sweats.   Cardiovascular: Positive for leg swelling. Negative for claudication, cyanosis and syncope.   Skin: Positive for nail changes and suspicious lesions. Negative for color change, dry skin, rash and unusual hair distribution.   Musculoskeletal: Negative for falls, joint pain, joint swelling, muscle cramps, muscle weakness and stiffness.   Gastrointestinal: Negative for constipation, diarrhea, nausea and vomiting.   Neurological: Positive for paresthesias and sensory change. Negative for brief paralysis, disturbances in coordination, focal weakness, numbness and tremors.           Objective:      Physical Exam   Constitutional: He is oriented to person, place, and time. He appears well-developed and well-nourished. He is cooperative.   Oriented to time, place, and person.   Cardiovascular:   Pulses:       Dorsalis pedis pulses are 1+ on the right side, and 1+ on the left side.        Posterior tibial pulses are 1+ on the right side, and 1+ on the left side.   Capillary fill time 3-5 seconds.  Feet are warm to touch proximal with distal cooling.      2+ edema to bilateral lower extremities with varicosities noted.    No pedal hair noted bilateral.     Musculoskeletal:    Normal angle, base, station of gait. Decreased stride length, early heel off, moderately propulsive toe off bilateral.    All ten toes without clubbing, cyanosis, or signs of ischemia.      Ankle dorsiflexion decreased at <10 degrees bilateral with moderate increase with knee flexion bilateral.    Right fifth toe reducible contracture MTPJ and PIPJ with adductovarus rotation    Range of motion, stability, muscle strength, and muscle tone are age and health appropriate normal bilateral feet and legs.       Feet:   Right Foot:   Protective Sensation: 10 sites tested. 4 sites sensed.   Left Foot:   Protective Sensation: 10 sites tested. 6 sites sensed.   Lymphadenopathy:   Negative lymphadenopathy bilateral popliteal fossa and tarsal tunnel.     Neurological: He is alert and oriented to person, place, and time. He has normal strength. He is not disoriented. He displays no atrophy and no tremor. A sensory deficit is present. He exhibits normal muscle tone.   Decreased/absent vibratory sensation bilateral feet to 128Hz tuning fork.    Diminished/loss of protective sensation bilateral to 10 gram monofilament.    Paresthesias,  bilateral feet with no clearly identified trigger or source.     Skin: Skin is warm, dry and intact. No abrasion, no bruising, no burn, no ecchymosis, no laceration, no lesion, no petechiae and no rash noted. He is not diaphoretic. No cyanosis or erythema. No pallor. Nails show no clubbing.   Focal hyperkeratotic lesion consisting entirely of hyperkeratotic tissue without open skin, drainage, pus, fluctuance, malodor, or signs of infection plantar bilateral hallux and first mtpj bilateral. 4 total    Skin thin, atrophic, with decreased density and distribution of pedal hair bilateral, but without ulcers, masses, nodules or cords palpated bilateral feet and legs.    Hyperpigmentation both legs consistent with stasis.    Toenails 1st, 2nd, 3rd, 4th, 5th  bilateral are hypertrophic thickened 2-3 mm,  dystrophic, discolored tanish brown with tan, gray crumbly subungual debris.  Nails 1-5 left and 1-3 right are long 2-3 mm the other nails are well trimmed                 Assessment:       Encounter Diagnoses   Name Primary?    PVD (peripheral vascular disease) Yes    Type II diabetes mellitus with peripheral circulatory disorder     Diabetic polyneuropathy associated with diabetes mellitus due to underlying condition     Pre-ulcerative calluses     Fat pad atrophy of foot     Onychomycosis due to dermatophyte          Plan:       Steve was seen today for foot pain and diabetes mellitus.    Diagnoses and all orders for this visit:    PVD (peripheral vascular disease)  -     DIABETIC SHOES FOR HOME USE    Type II diabetes mellitus with peripheral circulatory disorder  -     DIABETIC SHOES FOR HOME USE    Diabetic polyneuropathy associated with diabetes mellitus due to underlying condition  -     DIABETIC SHOES FOR HOME USE    Pre-ulcerative calluses  -     DIABETIC SHOES FOR HOME USE    Fat pad atrophy of foot  -     DIABETIC SHOES FOR HOME USE    Onychomycosis due to dermatophyte  -     DIABETIC SHOES FOR HOME USE      I counseled the patient on his conditions, their implications and medical management.    Patient will stretch the tendo achilles complex three times daily as demonstrated in the office.  Literature was dispensed illustrating proper stretching technique.    Shoe inspection. Diabetic Foot Education. Patient reminded of the importance of good nutrition and blood sugar control to help prevent podiatric complications of diabetes. Patient instructed on proper foot hygeine. We discussed wearing proper shoe gear, daily foot inspections, never walking without protective shoe gear, never putting sharp instruments to feet    With patient's verbal consent the hyperkeratotic lesions x4 total both feet were trimmed to healthy appearing skin using a # 15 scalpel. Patient relates relief of pain following the  procedure. Patient tolerated the procedure well without complications. No anesthesia or hemostasis required. No blood loss.    With patient's verbal consent, nails were aggressively reduced and debrided x 10 to their soft tissue attachment mechanically  removing all offending nail and debris. Patient relates relief following the procedure. No anesthesia or hemostasis required. No blood loss.     Diabetic shoe prescription dispensed    Prescribed compound analgesic pain cream containing Flurbiprofen, Amitryptylline, Gabapentin, Lidociane and Prilocaine with addition of Ketamine transdermal gel for additional relief.    Continue pain management.    Return 3 months routine care  Fausto Harvey DPM

## 2020-01-31 ENCOUNTER — TELEPHONE (OUTPATIENT)
Dept: DERMATOLOGY | Facility: CLINIC | Age: 75
End: 2020-01-31

## 2020-02-03 ENCOUNTER — DOCUMENTATION ONLY (OUTPATIENT)
Dept: FAMILY MEDICINE | Facility: CLINIC | Age: 75
End: 2020-02-03

## 2020-02-03 NOTE — PROGRESS NOTES
Pre-Visit Chart Review  For Appointment Scheduled on 2/4/2020    Health Maintenance Due   Topic Date Due    TETANUS VACCINE  05/13/1963    Colonoscopy  01/10/2016    Pneumococcal Vaccine (65+ High/Highest Risk) (2 of 2 - PPSV23) 03/21/2019    Foot Exam  01/24/2020    Urine Microalbumin  04/03/2020

## 2020-02-04 ENCOUNTER — LAB VISIT (OUTPATIENT)
Dept: LAB | Facility: HOSPITAL | Age: 75
End: 2020-02-04
Attending: FAMILY MEDICINE
Payer: MEDICARE

## 2020-02-04 ENCOUNTER — OFFICE VISIT (OUTPATIENT)
Dept: FAMILY MEDICINE | Facility: CLINIC | Age: 75
End: 2020-02-04
Payer: MEDICARE

## 2020-02-04 ENCOUNTER — TELEPHONE (OUTPATIENT)
Dept: FAMILY MEDICINE | Facility: CLINIC | Age: 75
End: 2020-02-04

## 2020-02-04 VITALS
WEIGHT: 199.31 LBS | BODY MASS INDEX: 29.52 KG/M2 | HEIGHT: 69 IN | DIASTOLIC BLOOD PRESSURE: 62 MMHG | TEMPERATURE: 98 F | SYSTOLIC BLOOD PRESSURE: 100 MMHG | HEART RATE: 66 BPM | OXYGEN SATURATION: 94 %

## 2020-02-04 DIAGNOSIS — R73.9 HYPERGLYCEMIA: Primary | ICD-10-CM

## 2020-02-04 DIAGNOSIS — I77.9 BILATERAL CAROTID ARTERY DISEASE, UNSPECIFIED TYPE: ICD-10-CM

## 2020-02-04 DIAGNOSIS — R73.9 HYPERGLYCEMIA: ICD-10-CM

## 2020-02-04 DIAGNOSIS — I15.2 HYPERTENSION ASSOCIATED WITH DIABETES: ICD-10-CM

## 2020-02-04 DIAGNOSIS — R41.3 MEMORY DIFFICULTY: ICD-10-CM

## 2020-02-04 DIAGNOSIS — E11.59 HYPERTENSION ASSOCIATED WITH DIABETES: ICD-10-CM

## 2020-02-04 PROCEDURE — 99499 UNLISTED E&M SERVICE: CPT | Mod: S$GLB,,, | Performed by: FAMILY MEDICINE

## 2020-02-04 PROCEDURE — 3078F DIAST BP <80 MM HG: CPT | Mod: CPTII,S$GLB,, | Performed by: FAMILY MEDICINE

## 2020-02-04 PROCEDURE — 99214 PR OFFICE/OUTPT VISIT, EST, LEVL IV, 30-39 MIN: ICD-10-PCS | Mod: S$GLB,,, | Performed by: FAMILY MEDICINE

## 2020-02-04 PROCEDURE — 3078F PR MOST RECENT DIASTOLIC BLOOD PRESSURE < 80 MM HG: ICD-10-PCS | Mod: CPTII,S$GLB,, | Performed by: FAMILY MEDICINE

## 2020-02-04 PROCEDURE — 3046F HEMOGLOBIN A1C LEVEL >9.0%: CPT | Mod: CPTII,S$GLB,, | Performed by: FAMILY MEDICINE

## 2020-02-04 PROCEDURE — 99499 RISK ADDL DX/OHS AUDIT: ICD-10-PCS | Mod: S$GLB,,, | Performed by: FAMILY MEDICINE

## 2020-02-04 PROCEDURE — 1159F MED LIST DOCD IN RCRD: CPT | Mod: S$GLB,,, | Performed by: FAMILY MEDICINE

## 2020-02-04 PROCEDURE — 3046F PR MOST RECENT HEMOGLOBIN A1C LEVEL > 9.0%: ICD-10-PCS | Mod: CPTII,S$GLB,, | Performed by: FAMILY MEDICINE

## 2020-02-04 PROCEDURE — 99214 OFFICE O/P EST MOD 30 MIN: CPT | Mod: S$GLB,,, | Performed by: FAMILY MEDICINE

## 2020-02-04 PROCEDURE — 3074F PR MOST RECENT SYSTOLIC BLOOD PRESSURE < 130 MM HG: ICD-10-PCS | Mod: CPTII,S$GLB,, | Performed by: FAMILY MEDICINE

## 2020-02-04 PROCEDURE — 83036 HEMOGLOBIN GLYCOSYLATED A1C: CPT

## 2020-02-04 PROCEDURE — 1126F AMNT PAIN NOTED NONE PRSNT: CPT | Mod: S$GLB,,, | Performed by: FAMILY MEDICINE

## 2020-02-04 PROCEDURE — 99999 PR PBB SHADOW E&M-EST. PATIENT-LVL V: CPT | Mod: PBBFAC,,, | Performed by: FAMILY MEDICINE

## 2020-02-04 PROCEDURE — 99999 PR PBB SHADOW E&M-EST. PATIENT-LVL V: ICD-10-PCS | Mod: PBBFAC,,, | Performed by: FAMILY MEDICINE

## 2020-02-04 PROCEDURE — 1126F PR PAIN SEVERITY QUANTIFIED, NO PAIN PRESENT: ICD-10-PCS | Mod: S$GLB,,, | Performed by: FAMILY MEDICINE

## 2020-02-04 PROCEDURE — 1159F PR MEDICATION LIST DOCUMENTED IN MEDICAL RECORD: ICD-10-PCS | Mod: S$GLB,,, | Performed by: FAMILY MEDICINE

## 2020-02-04 PROCEDURE — 36415 COLL VENOUS BLD VENIPUNCTURE: CPT | Mod: PO

## 2020-02-04 PROCEDURE — 3074F SYST BP LT 130 MM HG: CPT | Mod: CPTII,S$GLB,, | Performed by: FAMILY MEDICINE

## 2020-02-04 PROCEDURE — 1101F PR PT FALLS ASSESS DOC 0-1 FALLS W/OUT INJ PAST YR: ICD-10-PCS | Mod: CPTII,S$GLB,, | Performed by: FAMILY MEDICINE

## 2020-02-04 PROCEDURE — 1101F PT FALLS ASSESS-DOCD LE1/YR: CPT | Mod: CPTII,S$GLB,, | Performed by: FAMILY MEDICINE

## 2020-02-04 PROCEDURE — 80053 COMPREHEN METABOLIC PANEL: CPT

## 2020-02-04 RX ORDER — LANCETS
EACH MISCELLANEOUS
Qty: 300 EACH | Refills: 11 | Status: SHIPPED | OUTPATIENT
Start: 2020-02-04 | End: 2020-11-02 | Stop reason: CLARIF

## 2020-02-04 RX ORDER — INSULIN PUMP SYRINGE, 3 ML
EACH MISCELLANEOUS
Qty: 1 EACH | Refills: 0 | Status: SHIPPED | OUTPATIENT
Start: 2020-02-04 | End: 2020-11-02 | Stop reason: CLARIF

## 2020-02-04 NOTE — TELEPHONE ENCOUNTER
----- Message from Billie Iniguez sent at 2/4/2020  3:17 PM CST -----  Good Afternoon,      Wilson wanted to advised that he needed a script sent in through [facility] for his Delphos.     Good 32 princess 5/32 inch needle 100 pk

## 2020-02-04 NOTE — PROGRESS NOTES
Subjective:   Patient ID: Steve June Jr. is a 74 y.o. male     Chief Complaint:memory problems      Patient multiple issues today.  Patient with very poorly controlled blood sugar reports having blood sugars in the 500s.  Patient also reports not remembering whether he has taken his medication or not.  This occurs he states on a daily basis.  His wife is here to corroborate this issues.  This been going on for the past few months.  Some suspicion this may be due to opioid pain medications.  Patient does endorse if he does not take his pain medication regularly he has serious withdrawal issues.    Review of Systems   Respiratory: Negative for shortness of breath.    Cardiovascular: Negative for chest pain.   Gastrointestinal: Negative for abdominal pain.   Genitourinary: Negative for dysuria.     Past Medical History:   Diagnosis Date    Abnormal thyroid function test     Allergy     Seasonal    Anemia     Anemia due to blood loss 7/2/2014    Arthritis     Gaviria esophagus     Basal cell carcinoma     right forearm    Basal cell carcinoma 12/2011    lower post neck    Basal cell carcinoma 04/23/2019    left posterior helix    Cancer     skin CA    Cataract     Cirrhosis     Diabetes mellitus     Diabetes mellitus, type 2     Encounter for blood transfusion     Esophageal varices in cirrhosis     grade II on 7/12 EGD    Gastritis     on 7/12 EGD    GERD (gastroesophageal reflux disease) 2/28/2015    Hard of hearing     Hiatal hernia     History of hepatitis C 8/10/2012    tx with harvoni x 41 days (started 10/22/15). SVR4     Hoarseness 2/28/2015    Hx of colonic polyps 01/10/2011    per colonoscopy report in media    Hypercholesteremia     Hypersplenism     Hypertension     No meds    Pain management 12/10/2014    Petechial hemorrhage 11/25/2015    Lower extremities bilat     Portal hypertensive gastropathy     on 7/12 EGD    Squamous cell carcinoma 04/23/2019    right  preauricular cheek, right temple    Thrombocytopenia     Type II or unspecified type diabetes mellitus with neurological manifestations, uncontrolled(250.62) 12/24/2013    Valvular heart disease     mild MR 12     Objective:     Vitals:    02/04/20 1424   BP: 100/62   Pulse: 66   Temp: 98.2 °F (36.8 °C)     Body mass index is 29.43 kg/m².  Physical Exam   Neck: Neck supple. No tracheal deviation present.   Cardiovascular: Normal rate, regular rhythm and normal heart sounds.   Pulmonary/Chest: Effort normal. No respiratory distress.   Musculoskeletal: Normal range of motion. He exhibits no edema.     Assessment:     1. Hyperglycemia    2. Memory difficulty    3. Hypertension associated with diabetes    4. Bilateral carotid artery disease, unspecified type      Plan:   Hyperglycemia  -     Hemoglobin A1c; Future; Expected date: 02/04/2020  -     Comprehensive metabolic panel; Future; Expected date: 02/04/2020  -     blood-glucose meter kit; To check BG 3 times daily, to use with insurance preferred meter  Dispense: 1 each; Refill: 0  -     lancets Misc; To check BG 3 times daily, to use with insurance preferred meter  Dispense: 300 each; Refill: 11  -     blood sugar diagnostic Strp; To check BG 3 times daily, to use with insurance preferred meter  Dispense: 300 strip; Refill: 11  Will check A1c and then consider referral to Endocrinology if still poorly controlled.  Do suspect will be worse.  Do suspect this is due to patient's inability to remember taking his medications.  Counseled patient on possible need for jail care if unable to get the care he needs at home.    Memory difficulty  -     Ambulatory referral/consult to Neurology; Future; Expected date: 02/11/2020  Highly suspicious for medication induced memory difficulties as patient is on elevated doses of opioids.  Counseled patient how I would recommend weaning these.  Discussed briefly Suboxone therapy.  Patient will follow up with pain management to  have this addressed further.    Hypertension associated with diabetes  Patient hypertension which is well controlled today.  Will continue monitor regularly.  Bilateral carotid artery disease, unspecified type  Patient with history of carotid artery disease not on appropriate anti-platelet or statin therapy.  Will likely need to address the future but patient is currently not compliant with all of his medications.  Would hesitate to start new medication at this time with his serious issues with memory and noncompliance.  Will continue to address.        Time spent with patient: 30 minutes and over half of that time was spent on counseling an coordination of care.    Crispin Garcia MD  02/04/2020    Portions of this note have been dictated with CRISTOBAL Garcia

## 2020-02-05 ENCOUNTER — TELEPHONE (OUTPATIENT)
Dept: FAMILY MEDICINE | Facility: CLINIC | Age: 75
End: 2020-02-05

## 2020-02-05 DIAGNOSIS — E11.8 TYPE 2 DIABETES MELLITUS WITH COMPLICATION, WITH LONG-TERM CURRENT USE OF INSULIN: Primary | ICD-10-CM

## 2020-02-05 DIAGNOSIS — Z79.4 TYPE 2 DIABETES MELLITUS WITH COMPLICATION, WITH LONG-TERM CURRENT USE OF INSULIN: Primary | ICD-10-CM

## 2020-02-05 LAB
ALBUMIN SERPL BCP-MCNC: 3.9 G/DL (ref 3.5–5.2)
ALP SERPL-CCNC: 95 U/L (ref 55–135)
ALT SERPL W/O P-5'-P-CCNC: 14 U/L (ref 10–44)
ANION GAP SERPL CALC-SCNC: 10 MMOL/L (ref 8–16)
AST SERPL-CCNC: 22 U/L (ref 10–40)
BILIRUB SERPL-MCNC: 1.3 MG/DL (ref 0.1–1)
BUN SERPL-MCNC: 22 MG/DL (ref 8–23)
CALCIUM SERPL-MCNC: 9.5 MG/DL (ref 8.7–10.5)
CHLORIDE SERPL-SCNC: 100 MMOL/L (ref 95–110)
CO2 SERPL-SCNC: 24 MMOL/L (ref 23–29)
CREAT SERPL-MCNC: 1.2 MG/DL (ref 0.5–1.4)
EST. GFR  (AFRICAN AMERICAN): >60 ML/MIN/1.73 M^2
EST. GFR  (NON AFRICAN AMERICAN): >60 ML/MIN/1.73 M^2
ESTIMATED AVG GLUCOSE: 280 MG/DL (ref 68–131)
GLUCOSE SERPL-MCNC: 275 MG/DL (ref 70–110)
HBA1C MFR BLD HPLC: 11.4 % (ref 4–5.6)
POTASSIUM SERPL-SCNC: 5 MMOL/L (ref 3.5–5.1)
PROT SERPL-MCNC: 6.9 G/DL (ref 6–8.4)
SODIUM SERPL-SCNC: 134 MMOL/L (ref 136–145)

## 2020-02-05 NOTE — TELEPHONE ENCOUNTER
Let patient's wife is not any appt soon, gave her main campus phone number if she can schedule appt in another facility,   Maybe and external referral?

## 2020-02-05 NOTE — PROGRESS NOTES
Patient, Steve June Jr. (MRN #[id number]), presented with a recent Platelet count less than 150 K/uL consistent with the definition of thrombocytopenia (ICD10 - D69.6).    Platelets   Date Value Ref Range Status   11/09/2019 45 (L) 150 - 350 K/uL Final     The patient's thrombocytopenia was monitored, evaluated, addressed and/or treated. This addendum to the medical record is made on 02/05/2020.

## 2020-02-06 ENCOUNTER — PATIENT OUTREACH (OUTPATIENT)
Dept: ADMINISTRATIVE | Facility: OTHER | Age: 75
End: 2020-02-06

## 2020-02-06 ENCOUNTER — TELEPHONE (OUTPATIENT)
Dept: PODIATRY | Facility: CLINIC | Age: 75
End: 2020-02-06

## 2020-02-06 NOTE — TELEPHONE ENCOUNTER
----- Message from Maykel Mckinney sent at 2/6/2020  4:06 PM CST -----  Contact: Billie Gold want to speak with a nurse regarding clarification please call back at [phone]

## 2020-02-06 NOTE — TELEPHONE ENCOUNTER
Stated that she got a duplicate order and wanted to make sure that the patient is only supposed to get one set of shoes/inserts - informed her one set.

## 2020-02-09 ENCOUNTER — HOSPITAL ENCOUNTER (EMERGENCY)
Facility: HOSPITAL | Age: 75
Discharge: HOME | End: 2020-02-09
Attending: EMERGENCY MEDICINE
Payer: MEDICARE

## 2020-02-09 VITALS
TEMPERATURE: 98 F | HEIGHT: 69 IN | BODY MASS INDEX: 28.14 KG/M2 | WEIGHT: 190 LBS | DIASTOLIC BLOOD PRESSURE: 84 MMHG | OXYGEN SATURATION: 95 % | HEART RATE: 80 BPM | RESPIRATION RATE: 16 BRPM | SYSTOLIC BLOOD PRESSURE: 208 MMHG
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
## 2020-02-09 DIAGNOSIS — S09.90XA INJURY OF HEAD, INITIAL ENCOUNTER: ICD-10-CM

## 2020-02-09 DIAGNOSIS — S16.1XXA STRAIN OF NECK MUSCLE, INITIAL ENCOUNTER: ICD-10-CM

## 2020-02-09 DIAGNOSIS — M25.511 ACUTE PAIN OF RIGHT SHOULDER: ICD-10-CM

## 2020-02-09 LAB — POCT GLUCOSE: 375 MG/DL (ref 70–110)

## 2020-02-09 PROCEDURE — 82962 GLUCOSE BLOOD TEST: CPT

## 2020-02-09 PROCEDURE — 99284 EMERGENCY DEPT VISIT MOD MDM: CPT | Mod: 25
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
## 2020-02-10 NOTE — ED NOTES
At D/C, Steve June Jr. is AA & O x 3, his skin is warm and dry, follow up care discussed at length with patient/family to include meds and follow-up with MD; patient/family given discharge instructions along with prescriptions, as indicated, and care sheets.
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
## 2020-02-11 ENCOUNTER — OFFICE VISIT (OUTPATIENT)
Dept: ENDOCRINOLOGY | Facility: CLINIC | Age: 75
End: 2020-02-11
Payer: MEDICARE

## 2020-02-11 VITALS
WEIGHT: 199.19 LBS | HEART RATE: 75 BPM | BODY MASS INDEX: 29.5 KG/M2 | TEMPERATURE: 98 F | DIASTOLIC BLOOD PRESSURE: 70 MMHG | SYSTOLIC BLOOD PRESSURE: 112 MMHG | HEIGHT: 69 IN

## 2020-02-11 DIAGNOSIS — I15.2 HYPERTENSION ASSOCIATED WITH DIABETES: ICD-10-CM

## 2020-02-11 DIAGNOSIS — E66.3 OVERWEIGHT: ICD-10-CM

## 2020-02-11 DIAGNOSIS — E11.42 DM TYPE 2 WITH DIABETIC PERIPHERAL NEUROPATHY: ICD-10-CM

## 2020-02-11 DIAGNOSIS — E11.59 HYPERTENSION ASSOCIATED WITH DIABETES: ICD-10-CM

## 2020-02-11 DIAGNOSIS — Z79.4 TYPE 2 DIABETES MELLITUS WITH COMPLICATION, WITH LONG-TERM CURRENT USE OF INSULIN: Primary | ICD-10-CM

## 2020-02-11 DIAGNOSIS — E11.8 TYPE 2 DIABETES MELLITUS WITH COMPLICATION, WITH LONG-TERM CURRENT USE OF INSULIN: Primary | ICD-10-CM

## 2020-02-11 PROCEDURE — 99999 PR PBB SHADOW E&M-EST. PATIENT-LVL IV: CPT | Mod: PBBFAC,,, | Performed by: INTERNAL MEDICINE

## 2020-02-11 PROCEDURE — 99999 PR PBB SHADOW E&M-EST. PATIENT-LVL IV: ICD-10-PCS | Mod: PBBFAC,,, | Performed by: INTERNAL MEDICINE

## 2020-02-11 PROCEDURE — 99214 PR OFFICE/OUTPT VISIT, EST, LEVL IV, 30-39 MIN: ICD-10-PCS | Mod: S$GLB,,, | Performed by: INTERNAL MEDICINE

## 2020-02-11 PROCEDURE — 99214 OFFICE O/P EST MOD 30 MIN: CPT | Mod: S$GLB,,, | Performed by: INTERNAL MEDICINE

## 2020-02-11 RX ORDER — METFORMIN HYDROCHLORIDE 500 MG/1
2000 TABLET, EXTENDED RELEASE ORAL DAILY
Qty: 360 TABLET | Refills: 3 | Status: SHIPPED | OUTPATIENT
Start: 2020-02-11 | End: 2020-11-02 | Stop reason: CLARIF

## 2020-02-11 RX ORDER — PEN NEEDLE, DIABETIC 29 G X1/2"
1 NEEDLE, DISPOSABLE MISCELLANEOUS DAILY
Qty: 100 EACH | Refills: 3 | Status: SHIPPED | OUTPATIENT
Start: 2020-02-11 | End: 2020-11-02 | Stop reason: CLARIF

## 2020-02-11 NOTE — PATIENT INSTRUCTIONS
For the diabetes:   Increase levemir to 50 units at bedtime.   Increase metformin: Take 2 tablets per day for 1 week, then 3 tablets per day for 1 week, then up to 4 tablets per day (max dose is 4 tablets per day).    Will try for Ozempic. Start at 0.25 mg per week for 4 weeks, then increase to 0.5 mg per week after that.   - If not covered well, let me know, there are backup options including:      Prandin, a pill taken with meals (best 10-30 minutes before a meal), makes your body release extra insulin during that time similar to the novolog but it's a pill instead of a shot.

## 2020-02-11 NOTE — ASSESSMENT & PLAN NOTE
bmi 29 today   - complicated by HTN, DM2   - GLP-1 as above   - offered DM education, pt/family declined   - monitor weight

## 2020-02-11 NOTE — ASSESSMENT & PLAN NOTE
a1c high, well above goal   - glucose usually elevated   - on levemir, usually taking 40 units lately. Metformin 500 mg once a day. Skips novolog most of the time.   - discussed with patient and family, what do they think they'd be able to do. Prefer something not as frequent as novolog with meals, pt forgets often during the day   - increase insulin to 50 units qHS.   - increase metformin towards max dose (switch to XR so he can take once a day but get the full dose).   - will try for weekly GLP-1. Backup plan is prandin, with a pill organizer to help pt remember if he used it or not.   - continue to monitor glucose, keep a log   - adjust regimen further if needed

## 2020-02-11 NOTE — PROGRESS NOTES
Subjective:      Chief Complaint: Diabetes    HPI: Steve June Jr. is a 74 y.o. male who is here for a follow-up evaluation for diabetes. Last seen 2018, though this is his first visit with me.    With regards to the diabetes:  Diagnosed with T2DM since about 5 years ago.    Known complications:     Retinopathy? No    Nephropathy? Yes    Neuropathy? Yes    CAD? No    CVA? No    Current regimen:   Metformin 500 mg/day.   Levemir 40-50 units qHS   Novolog 8-10    Missed doses? Misses novolog most of the time.     # times a day testin/day  Log reviewed: No    Pre breakfast: 280-300s  qHS: 400-500s    Hypoglycemic events? None     Lifestyle modification:   DM education - last visit: not lately   Exercise: not much    Current symptoms: constipation.   vision changes and nausea  Pt denies:   polyuria, polydipsia, vomit and diarrhea    Diabetes Management Status    Statin: Not taking  ACE/ARB: Not taking    Screening or Prevention Patient's value Goal Complete/Controlled?   HgA1C Testing and Control   Lab Results   Component Value Date    HGBA1C 11.4 (H) 2020      q6months/Less than 7.5% No   Lipid profile : 2019 Annually Yes   LDL control Lab Results   Component Value Date    LDLCALC 81.8 2019    Annually/Less than 100 mg/dl  Yes   Nephropathy screening Lab Results   Component Value Date    MICALBCREAT 103.2 (H) 2019     Lab Results   Component Value Date    CREATININE 1.2 2020     Lab Results   Component Value Date    PROTEINUA Trace (A) 2019    Annually Yes   Blood pressure BP Readings from Last 1 Encounters:   20 112/70    Less than 140/90 No   Dilated retinal exam : 2019 Annually Yes   Foot exam   : 2019 Annually No     Reviewed past medical, family, social history and updated as appropriate.    Review of Systems   Constitutional: Negative for unexpected weight change.   HENT: Negative for trouble swallowing.    Eyes: Positive for visual disturbance.    Respiratory: Negative for shortness of breath.    Cardiovascular: Negative for palpitations.   Gastrointestinal: Positive for constipation and nausea. Negative for diarrhea.   Endocrine: Negative for polydipsia and polyuria.   Genitourinary: Negative for frequency.   Musculoskeletal: Positive for back pain.   Neurological: Negative for light-headedness.   Hematological: Bruises/bleeds easily.     Objective:     Vitals:    02/11/20 1457   BP: 112/70   Pulse: 75   Temp: 97.8 °F (36.6 °C)     BP Readings from Last 5 Encounters:   02/11/20 112/70   02/09/20 (!) 208/84   02/04/20 100/62   01/07/20 (!) 147/73   12/16/19 (!) 141/78     Physical Exam   Constitutional: No distress.   Neck: No thyromegaly present.   Cardiovascular: Normal heart sounds.   Pulmonary/Chest: Effort normal.   Musculoskeletal: He exhibits no edema.   Skin:   Bruises on arms. Venous stasis changes in legs   Vitals reviewed.      Wt Readings from Last 10 Encounters:   02/11/20 1457 90.4 kg (199 lb 3 oz)   02/09/20 1734 86.2 kg (190 lb)   02/04/20 1424 90.4 kg (199 lb 4.7 oz)   01/27/20 0920 92.3 kg (203 lb 6 oz)   01/07/20 1328 91.7 kg (202 lb 2.6 oz)   12/16/19 1105 86.2 kg (190 lb)   12/05/19 1449 91.2 kg (201 lb 1 oz)   12/04/19 1425 86.2 kg (190 lb)   11/21/19 0925 94.1 kg (207 lb 7.3 oz)   11/06/19 1253 94.1 kg (207 lb 7.2 oz)       Lab Results   Component Value Date    HGBA1C 11.4 (H) 02/04/2020     Lab Results   Component Value Date    CHOL 144 12/05/2019    HDL 45 12/05/2019    LDLCALC 81.8 12/05/2019    TRIG 86 12/05/2019    CHOLHDL 31.3 12/05/2019     Lab Results   Component Value Date     (L) 02/04/2020    K 5.0 02/04/2020     02/04/2020    CO2 24 02/04/2020     (H) 02/04/2020    BUN 22 02/04/2020    CREATININE 1.2 02/04/2020    CALCIUM 9.5 02/04/2020    PROT 6.9 02/04/2020    ALBUMIN 3.9 02/04/2020    BILITOT 1.3 (H) 02/04/2020    ALKPHOS 95 02/04/2020    AST 22 02/04/2020    ALT 14 02/04/2020    ANIONGAP 10  02/04/2020    ESTGFRAFRICA >60.0 02/04/2020    EGFRNONAA >60.0 02/04/2020    TSH 2.990 07/03/2018      Lab Results   Component Value Date    YESIBCREAT 103.2 (H) 04/03/2019       Assessment/Plan:     Type 2 diabetes mellitus with complication, with long-term current use of insulin  a1c high, well above goal   - glucose usually elevated   - on levemir, usually taking 40 units lately. Metformin 500 mg once a day. Skips novolog most of the time.   - discussed with patient and family, what do they think they'd be able to do. Prefer something not as frequent as novolog with meals, pt forgets often during the day   - increase insulin to 50 units qHS.   - increase metformin towards max dose (switch to XR so he can take once a day but get the full dose).   - will try for weekly GLP-1. Backup plan is prandin, with a pill organizer to help pt remember if he used it or not.   - continue to monitor glucose, keep a log   - adjust regimen further if needed      Hypertension associated with diabetes  bp controlled today   - continue same meds   - monitor BP      Overweight  bmi 29 today   - complicated by HTN, DM2   - GLP-1 as above   - offered DM education, pt/family declined   - monitor weight        Follow up in about 3 months (around 5/11/2020).

## 2020-02-11 NOTE — LETTER
February 11, 2020      Crispin Garcia MD  [address]  [place] [address]           [place] [facility]  [address]  [facility] [address]  Phone: [phone]          Patient: Steve June Jr.   MR Number: [id number]   YOB: 1945   Date of Visit: 2/11/2020       Dear Dr. Crispin Garcia:    Thank you for referring Steve June to me for evaluation. Attached you will find relevant portions of my assessment and plan of care.    If you have questions, please do not hesitate to call me. I look forward to following Steve June along with you.    Sincerely,    Chase Hodge MD    Enclosure  CC:  No Recipients    If you would like to receive this communication electronically, please contact [email or web address] or [phone] to request more information on [employer] access.    For providers and/or their staff who would like to refer a patient to [facility], please contact us through our one-stop-shop provider referral line, [facility], at [phone].    If you feel you have received this communication in error or would no longer like to receive these types of communications, please e-mail [email or web address]

## 2020-02-12 ENCOUNTER — HOSPITAL ENCOUNTER (EMERGENCY)
Facility: HOSPITAL | Age: 75
Discharge: HOME | End: 2020-02-12
Attending: EMERGENCY MEDICINE
Payer: MEDICARE

## 2020-02-12 VITALS
DIASTOLIC BLOOD PRESSURE: 88 MMHG | TEMPERATURE: 98 F | RESPIRATION RATE: 16 BRPM | BODY MASS INDEX: 31.1 KG/M2 | HEART RATE: 81 BPM | WEIGHT: 210 LBS | HEIGHT: 69 IN | OXYGEN SATURATION: 96 % | SYSTOLIC BLOOD PRESSURE: 141 MMHG
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
## 2020-02-12 DIAGNOSIS — M54.2 NECK PAIN: Primary | ICD-10-CM

## 2020-02-12 LAB — GLUCOSE SERPL-MCNC: 301 MG/DL (ref 70–110)

## 2020-02-12 PROCEDURE — 82962 GLUCOSE BLOOD TEST: CPT

## 2020-02-12 PROCEDURE — 99283 EMERGENCY DEPT VISIT LOW MDM: CPT

## 2020-02-12 RX ORDER — METHOCARBAMOL 500 MG/1
500 TABLET, FILM COATED ORAL 3 TIMES DAILY
Qty: 30 TABLET | Refills: 0 | Status: SHIPPED | OUTPATIENT
Start: 2020-02-12 | End: 2020-02-17
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
## 2020-02-19 NOTE — TELEPHONE ENCOUNTER
Financial Assistance for Chelita approved from 02/19/20 to 02/19/21    Source: Patient [facility] [place]    ID: [id number]  BIN: [id number]  PCN: [id number]  GRP: [id number]    Amount: $5,000 max

## 2020-02-21 ENCOUNTER — TELEPHONE (OUTPATIENT)
Dept: PHARMACY | Facility: CLINIC | Age: 75
End: 2020-02-21

## 2020-02-21 NOTE — TELEPHONE ENCOUNTER
Initial Esbriet consult completed with patient's wife, Lili on 20. $0.00 copay. Patient plans to start Esbriet once received. Confirmed 2 patient identifiers - name and . Therapy Appropriate.    Esbriet will be shipped to arrive at patient's home once necessity is confirmed. Patient received a shipment of the titration dosing in May 2019, but never started treatment. Patient's wife is checking to see if they still have this bottle on-hand. Will reach out to [facility] to follow-up regarding delivery on .       Patient was counseled on the administration directions:  - Titration:  · Take 1 tablet by mouth three times daily for 1 week, then   · take 2 tablets by mouth three times daily for 1 week, then   · take 3 tablets by mouth three times daily thereafter  -Do not chew, crush, or break the tablets.   -Discussed the importance of staying well hydrated while on therapy.       Patient was counseled on the following possible side effects, which include, but are not limited to:  - nausea, skin reactions, abdominal pain, upper respiratory tract infection, diarrhea, fatigue, headache, dyspepsia/ gastro-esophageal reflux disease, dizziness, anorexia/ weight loss, sinusitis, insomnia, arthralgia  · Counseled on loperamide dosing for diarrhea: take 2 tablets with the first episode, then 1 tablet after each following episode for a max of 7 episodes a day.   · Inform provider if regularly experiencing 4+ episodes daily.     Montioring:  · LFTs: monthly for the first 6 months  · Reviewed signs of liver dysfunction including, but not limited to, jaundice and/or white/markie colored stools.       DDIs: medication list reviewed, no updates needed       Patient was given patient education handouts on how to handle hazardous agents and specific recommendations- do's and don'ts.       Compliance stressed. Patient will report questions or concerns to myself or practitioner. Patient verbalizes understanding.     Consultation  included: indication; goals of treatment; administration; storage and handling; side effects; how to handle side effects; the importance of compliance; how to handle missed doses; the importance of laboratory monitoring; the importance of keeping all follow up appointments. Patient understands to report any medication changes to [facility] and provider. All questions answered and addressed to patients satisfaction. [facility] will contact patient in 3 weeks to coordinate next refill.    Neal King, AnaD  Clinical Pharmacist  [facility]  P: [phone]

## 2020-02-28 ENCOUNTER — PATIENT OUTREACH (OUTPATIENT)
Dept: ADMINISTRATIVE | Facility: HOSPITAL | Age: 75
End: 2020-02-28

## 2020-02-28 DIAGNOSIS — E11.9 DIABETES MELLITUS WITHOUT COMPLICATION: Primary | ICD-10-CM

## 2020-02-28 NOTE — PROGRESS NOTES
Chart review completed 02/28/2020.  Care Everywhere updates requested and reviewed.  Immunizations reconciled. Media reviewed.       WOG orders placed. URINE MICRO    A1C- Pt followed by endo. Repeat a1c, PCP F/U, endo F/U scheduled.       Health Maintenance Due   Topic Date Due    TETANUS VACCINE  05/13/1963    Shingles Vaccine (1 of 2) 05/13/1997    Colonoscopy  01/10/2016    Pneumococcal Vaccine (65+ High/Highest Risk) (2 of 2 - PPSV23) 03/21/2019    Urine Microalbumin  04/03/2020

## 2020-03-08 ENCOUNTER — PATIENT OUTREACH (OUTPATIENT)
Dept: ADMINISTRATIVE | Facility: OTHER | Age: 75
End: 2020-03-08

## 2020-03-09 ENCOUNTER — OFFICE VISIT (OUTPATIENT)
Dept: PAIN MEDICINE | Facility: CLINIC | Age: 75
End: 2020-03-09
Payer: MEDICARE

## 2020-03-09 VITALS
WEIGHT: 210 LBS | HEART RATE: 59 BPM | HEIGHT: 69 IN | SYSTOLIC BLOOD PRESSURE: 144 MMHG | DIASTOLIC BLOOD PRESSURE: 76 MMHG | BODY MASS INDEX: 31.1 KG/M2

## 2020-03-09 DIAGNOSIS — M50.30 DDD (DEGENERATIVE DISC DISEASE), CERVICAL: Primary | ICD-10-CM

## 2020-03-09 DIAGNOSIS — G89.4 CHRONIC PAIN DISORDER: ICD-10-CM

## 2020-03-09 DIAGNOSIS — M47.819 FACET ARTHROPATHY: ICD-10-CM

## 2020-03-09 DIAGNOSIS — M17.10 PRIMARY OSTEOARTHRITIS OF KNEE, UNSPECIFIED LATERALITY: ICD-10-CM

## 2020-03-09 PROCEDURE — 99999 PR PBB SHADOW E&M-EST. PATIENT-LVL IV: CPT | Mod: PBBFAC,,, | Performed by: PHYSICIAN ASSISTANT

## 2020-03-09 PROCEDURE — 1159F MED LIST DOCD IN RCRD: CPT | Mod: S$GLB,,, | Performed by: PHYSICIAN ASSISTANT

## 2020-03-09 PROCEDURE — 1101F PT FALLS ASSESS-DOCD LE1/YR: CPT | Mod: CPTII,S$GLB,, | Performed by: PHYSICIAN ASSISTANT

## 2020-03-09 PROCEDURE — 99214 OFFICE O/P EST MOD 30 MIN: CPT | Mod: S$GLB,,, | Performed by: PHYSICIAN ASSISTANT

## 2020-03-09 PROCEDURE — 3077F SYST BP >= 140 MM HG: CPT | Mod: CPTII,S$GLB,, | Performed by: PHYSICIAN ASSISTANT

## 2020-03-09 PROCEDURE — 99214 PR OFFICE/OUTPT VISIT, EST, LEVL IV, 30-39 MIN: ICD-10-PCS | Mod: S$GLB,,, | Performed by: PHYSICIAN ASSISTANT

## 2020-03-09 PROCEDURE — 3078F DIAST BP <80 MM HG: CPT | Mod: CPTII,S$GLB,, | Performed by: PHYSICIAN ASSISTANT

## 2020-03-09 PROCEDURE — 1125F AMNT PAIN NOTED PAIN PRSNT: CPT | Mod: S$GLB,,, | Performed by: PHYSICIAN ASSISTANT

## 2020-03-09 PROCEDURE — 99999 PR PBB SHADOW E&M-EST. PATIENT-LVL IV: ICD-10-PCS | Mod: PBBFAC,,, | Performed by: PHYSICIAN ASSISTANT

## 2020-03-09 PROCEDURE — 1101F PR PT FALLS ASSESS DOC 0-1 FALLS W/OUT INJ PAST YR: ICD-10-PCS | Mod: CPTII,S$GLB,, | Performed by: PHYSICIAN ASSISTANT

## 2020-03-09 PROCEDURE — 3077F PR MOST RECENT SYSTOLIC BLOOD PRESSURE >= 140 MM HG: ICD-10-PCS | Mod: CPTII,S$GLB,, | Performed by: PHYSICIAN ASSISTANT

## 2020-03-09 PROCEDURE — 1159F PR MEDICATION LIST DOCUMENTED IN MEDICAL RECORD: ICD-10-PCS | Mod: S$GLB,,, | Performed by: PHYSICIAN ASSISTANT

## 2020-03-09 PROCEDURE — 3078F PR MOST RECENT DIASTOLIC BLOOD PRESSURE < 80 MM HG: ICD-10-PCS | Mod: CPTII,S$GLB,, | Performed by: PHYSICIAN ASSISTANT

## 2020-03-09 PROCEDURE — 1125F PR PAIN SEVERITY QUANTIFIED, PAIN PRESENT: ICD-10-PCS | Mod: S$GLB,,, | Performed by: PHYSICIAN ASSISTANT

## 2020-03-09 RX ORDER — OXYCODONE AND ACETAMINOPHEN 10; 325 MG/1; MG/1
1 TABLET ORAL EVERY 6 HOURS PRN
Qty: 120 TABLET | Refills: 0 | Status: SHIPPED | OUTPATIENT
Start: 2020-03-22 | End: 2020-04-20

## 2020-03-09 RX ORDER — OXYCODONE AND ACETAMINOPHEN 10; 325 MG/1; MG/1
1 TABLET ORAL EVERY 6 HOURS PRN
Qty: 120 TABLET | Refills: 0 | Status: SHIPPED | OUTPATIENT
Start: 2020-05-19 | End: 2020-06-17

## 2020-03-09 RX ORDER — OXYCODONE AND ACETAMINOPHEN 10; 325 MG/1; MG/1
1 TABLET ORAL EVERY 6 HOURS PRN
Qty: 120 TABLET | Refills: 0 | Status: SHIPPED | OUTPATIENT
Start: 2020-04-20 | End: 2020-05-19

## 2020-03-09 NOTE — TELEPHONE ENCOUNTER
Pharmacy never rcvd Rx. Called in Levemir Rx written on 10/07/19. Pt advised that Rx has been called in. Understanding verbalized.    details… detailed exam no joint swelling/normal strength/no joint warmth/no calf tenderness/ROM intact/no joint erythema

## 2020-03-09 NOTE — PROGRESS NOTES
"Referring Physician: No ref. provider found    PCP: Crispin Garcia MD      CC: neck and left shoulder pain; knee pain    Interval history:  Steve June Jr. is a 74 y.o. male with  neck and left shoulder pain who presents today for f/u and medication refill.  He had a series of 2 Euflexxa injections that worsened his knee pain initially. Pain has now resolved. He has tried physical therapy which did not provide much benefit.   Steroid njections performed have only given him shortlived relief.  In the past he underwent visco supplementation injections for his left knee with benefit.  He continues to have neck pain. He has a history of cervical DDD.  However, he continues to have low platelets and we are unable to provide any  interventional procedures.  He takes oxycodone 10 mg every 6 hours as needed with mild to moderate benefit. In the past UDS was positive for THC. He ate marijuana cookies in [place]. Denies recent use. Previous UDS consistent. Oxycodone does cause some drowsiness. Pain today is rated 6/10.    Prior HPI:   Steve June Jr. is a 74 y.o. male referred to us for lower back and knee pain.  He has significant history of pancytopenia, cirrhosis.  He presents with constant aching, sharp pain in his lower back as well as his left knee.  Pain worsens sitting, standing, bending, walking.  Pain improves with rest.  X-rays performed of the lumbar spine as well as knee shows left knee osteoarthritis.  He has tried physical therapy with minimal benefit.  He currently takes Norco 10 mg every 6 hours as needed with mild to moderate benefit.  He is unable to have any procedures due to his thrombocytopenia.  He also has ventral hernia, but surgical attention is currently not recommended due to his thrombocytopenia.  His main concern today is wishing to decrease his opioid medications.  He states being very "foggy" with his current medications.  He denies any increased sedation.  He denies any " weakness.  No bowel bladder changes.    ROS:  CONSTITUTIONAL: No fevers, chills, night sweats, wt. loss, appetite changes  SKIN: no rashes or itching  ENT: No headaches, head trauma, vision changes, or eye pain  LYMPH NODES: None noticed   CV: No chest pain, palpitations.   RESP: No shortness of breath, dyspnea on exertion, cough, wheezing, or hemoptysis  GI: No nausea, emesis, diarrhea, constipation, melena, hematochezia, pain.    : No dysuria, hematuria, urgency, or frequency   HEME: No easy bruising, bleeding problems  PSYCHIATRIC: No depression, anxiety, psychosis, hallucinations.  NEURO: No seizures, memory loss, dizziness or difficulty sleeping  MSK: + History of present illness      Past Medical History:   Diagnosis Date    Abnormal thyroid function test     Allergy     Seasonal    Anemia     Anemia due to blood loss 7/2/2014    Arthritis     Gaviria esophagus     Basal cell carcinoma     right forearm    Basal cell carcinoma 12/2011    lower post neck    Basal cell carcinoma 04/23/2019    left posterior helix    Cancer     skin CA    Cataract     Cirrhosis     Diabetes mellitus     Diabetes mellitus, type 2     Encounter for blood transfusion     Esophageal varices in cirrhosis     grade II on 7/12 EGD    Gastritis     on 7/12 EGD    GERD (gastroesophageal reflux disease) 2/28/2015    Hard of hearing     Hiatal hernia     History of hepatitis C 8/10/2012    tx with harvoni x 41 days (started 10/22/15). SVR4     Hoarseness 2/28/2015    Hx of colonic polyps 01/10/2011    per colonoscopy report in media    Hypercholesteremia     Hypersplenism     Hypertension     No meds    Pain management 12/10/2014    Petechial hemorrhage 11/25/2015    Lower extremities bilat     Portal hypertensive gastropathy     on 7/12 EGD    Squamous cell carcinoma 04/23/2019    right preauricular cheek, right temple    Thrombocytopenia     Type II or unspecified type diabetes mellitus with neurological  manifestations, uncontrolled(250.62) 2013    Valvular heart disease     mild MR 12     Past Surgical History:   Procedure Laterality Date    CATARACT EXTRACTION  1/10/13    left eye    CATARACT EXTRACTION      right eye    CHOLECYSTECTOMY      COLONOSCOPY      EYE SURGERY      Cataract surgery to right eye    KNEE ARTHROSCOPY W/ MENISCAL REPAIR      KNEE CARTILAGE SURGERY      left knee    KNEE SURGERY  2006    left    SKIN LESION EXCISION      TONSILLECTOMY      UPPER GASTROINTESTINAL ENDOSCOPY       Family History   Problem Relation Age of Onset    Leukemia Mother     Cancer Mother         bone    Alcohol abuse Father     Cirrhosis Father         EtOH induced    No Known Problems Daughter     No Known Problems Son     No Known Problems Daughter     No Known Problems Daughter     No Known Problems Daughter     No Known Problems Sister     Amblyopia Neg Hx     Blindness Neg Hx     Cataracts Neg Hx     Diabetes Neg Hx     Glaucoma Neg Hx     Hypertension Neg Hx     Macular degeneration Neg Hx     Retinal detachment Neg Hx     Strabismus Neg Hx     Stroke Neg Hx     Thyroid disease Neg Hx     Psoriasis Neg Hx     Lupus Neg Hx     Eczema Neg Hx     Acne Neg Hx     Melanoma Neg Hx      Social History     Socioeconomic History    Marital status:      Spouse name: Not on file    Number of children: 5    Years of education: Not on file    Highest education level: Not on file   Occupational History    Not on file   Social Needs    Financial resource strain: Not on file    Food insecurity:     Worry: Not on file     Inability: Not on file    Transportation needs:     Medical: Not on file     Non-medical: Not on file   Tobacco Use    Smoking status: Former Smoker     Packs/day: 1.00     Years: 25.00     Pack years: 25.00     Last attempt to quit: 2000     Years since quittin.5    Smokeless tobacco: Never Used   Substance and Sexual Activity    Alcohol  "use: Yes     Frequency: 2-4 times a month     Comment: rarely    Drug use: No    Sexual activity: Never   Lifestyle    Physical activity:     Days per week: Not on file     Minutes per session: Not on file    Stress: To some extent   Relationships    Social connections:     Talks on phone: Not on file     Gets together: Not on file     Attends Jewish service: Not on file     Active member of club or organization: Not on file     Attends meetings of clubs or organizations: Not on file     Relationship status: Not on file   Other Topics Concern    Not on file   Social History Narrative    He is .  He has children.  He is currently retired.         Medications/Allergies: See med card    Vitals:    03/09/20 1111   BP: (!) 144/76   Pulse: (!) 59   Weight: 95.3 kg (210 lb)   Height: 5' 9" (1.753 m)   PainSc:   4   PainLoc: Neck     Physical exam:    GENERAL: A and O x3, the patient appears well groomed and is in no acute distress.  Skin: No rashes or obvious lesions  HEENT: normocephalic, atraumatic  CARDIOVASCULAR:  RRR  LUNGS: non labored breathing  ABDOMEN: soft, nontender, + sizaeable ventral hernia in epigastric region.    UPPER EXTREMITIES: Normal alignment, normal range of motion, no atrophy, no skin changes,  hair growth and nail growth normal and equal bilaterally. No swelling, no tenderness.    LOWER EXTREMITIES:  Normal alignment, normal range of motion, no atrophy, no skin changes,  hair growth and nail growth normal and equal bilaterally. No swelling, no tenderness.    LUMBAR SPINE  Lumbar spine: ROM is full with flexion extension and oblique extension with moderate increased pain.    Erasmo's test causes no increased pain on either side.    Supine straight leg raise is negative bilaterally.    Internal and external rotation of the hip causes no increased pain on either side.  Myofascial exam: No tenderness to palpation across lumbar paraspinous muscles.      MENTAL STATUS: normal " orientation, speech, language, and fund of knowledge for social situation.  Emotional state appropriate.    CRANIAL NERVES:  II:  PERRL bilaterally,   III,IV,VI: EOMI.    V:  Facial sensation equal bilaterally  VII:  Facial motor function normal.  VIII:  Hearing equal to finger rub bilaterally  IX/X: Gag normal, palate symmetric  XI:  Shoulder shrug equal, head turn equal  XII:  Tongue midline without fasciculations      MOTOR: Tone and bulk: normal bilateral upper and lower Strength: normal   Delt Bi Tri WE WF     R 5 5 5 5 5 5   L 5 5 5 5 5 5     IP ADD ABD Quad TA Gas HAM  R 5 5 5 5 5 5 5  L 5 5 5 5 5 5 5    SENSATION: Light touch and pinprick intact bilaterally  REFLEXES: normal, symmetric, nonbrisk.  Toes down, no clonus. No hoffmans.  GAIT: normal rise, base, steps, and arm swing.      Imaging:  Xray L-spine 4/2013   Alignment is otherwise normal.  Vertebral body heights and disc space heights are relatively well maintained.  Vertebral end plate osteophytes are present anteriorly   at multiple levels.  The facet joints and posterior elements are unremarkable       Xray Knee 12/2013  Degenerative change of the knees, left greater than right.    Assessment:  Steve June Jr. is a 74 y.o. male with neck, back and left knee pain  1. DDD (degenerative disc disease), cervical    2. Chronic pain disorder    3. Facet arthropathy    4. Primary osteoarthritis of knee, unspecified laterality      Plan:  1. I have stressed the importance of physical activity and exercise to improve overall health.  Continue PT exercises learned at home.  2.  Any interventional procedures will be deferred due to his low platelet count.    3.  Monitor progress from left knee Euflexxa series  4. Percocet 10mg q 8 hrs as needed.  Hold for sedation.  reviewed. Previous UDS consistent.   5.  Follow-up 3 months  All medication management was performed by Dr. Kapil Mario

## 2020-03-16 ENCOUNTER — PATIENT OUTREACH (OUTPATIENT)
Dept: ADMINISTRATIVE | Facility: OTHER | Age: 75
End: 2020-03-16

## 2020-03-25 ENCOUNTER — TELEPHONE (OUTPATIENT)
Dept: PHARMACY | Facility: CLINIC | Age: 75
End: 2020-03-25

## 2020-03-25 ENCOUNTER — TELEPHONE (OUTPATIENT)
Dept: PULMONOLOGY | Facility: CLINIC | Age: 75
End: 2020-03-25

## 2020-03-25 NOTE — TELEPHONE ENCOUNTER
Attempted to call patient to discuss problem with medication. Unable to reach patient x 2 phone calls.----- Message from Jose Luis Booker PharmD sent at 3/25/2020  2:23 PM CDT -----  Regarding: Esbriet discontinuation  Good afternoon,    Mr. June is not taking his Esbriet. His wife states that he doesn't like the way that the medication makes him feel. She refused any further shipments at this time. Please let us know if we can be of further assistance.     Regards,    Jose Luis Booker, PharmD  Clinical Pharmacist  [facility]  P: [phone]

## 2020-03-25 NOTE — TELEPHONE ENCOUNTER
Follow up call for Esbreit. Patient's wife reached, verified patient name and . Wife states that patient is no longer taking medication, as he does not like the way that it makes him feel. Will message provider to notify of patient not taking medication.     Jose Luis Booker, PharmD  Clinical Pharmacist  [facility]  P: [phone]

## 2020-05-01 ENCOUNTER — PATIENT OUTREACH (OUTPATIENT)
Dept: ADMINISTRATIVE | Facility: HOSPITAL | Age: 75
End: 2020-05-01

## 2020-05-01 NOTE — PROGRESS NOTES
Chart review completed 05/01/2020.  Care Everywhere updates requested and reviewed.  Immunizations reconciled. Media reviewed.

## 2020-05-05 ENCOUNTER — PATIENT MESSAGE (OUTPATIENT)
Dept: ADMINISTRATIVE | Facility: HOSPITAL | Age: 75
End: 2020-05-05

## 2020-05-27 ENCOUNTER — PATIENT OUTREACH (OUTPATIENT)
Dept: ADMINISTRATIVE | Facility: OTHER | Age: 75
End: 2020-05-27

## 2020-05-28 ENCOUNTER — PATIENT MESSAGE (OUTPATIENT)
Dept: FAMILY MEDICINE | Facility: CLINIC | Age: 75
End: 2020-05-28

## 2020-05-29 ENCOUNTER — TELEPHONE (OUTPATIENT)
Dept: FAMILY MEDICINE | Facility: CLINIC | Age: 75
End: 2020-05-29

## 2020-05-29 ENCOUNTER — OFFICE VISIT (OUTPATIENT)
Dept: FAMILY MEDICINE | Facility: CLINIC | Age: 75
End: 2020-05-29
Payer: MEDICARE

## 2020-05-29 ENCOUNTER — HOSPITAL ENCOUNTER (OUTPATIENT)
Dept: RADIOLOGY | Facility: HOSPITAL | Age: 75
Discharge: HOME | End: 2020-05-29
Attending: NURSE PRACTITIONER
Payer: MEDICARE

## 2020-05-29 VITALS
HEART RATE: 73 BPM | TEMPERATURE: 97 F | BODY MASS INDEX: 29.06 KG/M2 | DIASTOLIC BLOOD PRESSURE: 62 MMHG | OXYGEN SATURATION: 95 % | SYSTOLIC BLOOD PRESSURE: 128 MMHG | HEIGHT: 69 IN | RESPIRATION RATE: 17 BRPM | WEIGHT: 196.19 LBS

## 2020-05-29 DIAGNOSIS — L03.116 BILATERAL LOWER LEG CELLULITIS: ICD-10-CM

## 2020-05-29 DIAGNOSIS — I83.813 VARICOSE VEINS OF BILATERAL LOWER EXTREMITIES WITH PAIN: ICD-10-CM

## 2020-05-29 DIAGNOSIS — L03.115 BILATERAL LOWER LEG CELLULITIS: ICD-10-CM

## 2020-05-29 DIAGNOSIS — L03.116 BILATERAL LOWER LEG CELLULITIS: Primary | ICD-10-CM

## 2020-05-29 DIAGNOSIS — G89.4 CHRONIC PAIN DISORDER: Primary | ICD-10-CM

## 2020-05-29 DIAGNOSIS — L03.115 BILATERAL LOWER LEG CELLULITIS: Primary | ICD-10-CM

## 2020-05-29 PROBLEM — E11.65 UNCONTROLLED TYPE 2 DIABETES MELLITUS WITH HYPERGLYCEMIA: Status: ACTIVE | Noted: 2020-05-29

## 2020-05-29 PROBLEM — R73.9 HYPERGLYCEMIA: Status: ACTIVE | Noted: 2020-05-29

## 2020-05-29 PROBLEM — E87.1 HYPONATREMIA: Status: ACTIVE | Noted: 2020-05-29

## 2020-05-29 PROCEDURE — 99999 PR PBB SHADOW E&M-EST. PATIENT-LVL IV: ICD-10-PCS | Mod: PBBFAC,,, | Performed by: NURSE PRACTITIONER

## 2020-05-29 PROCEDURE — 1101F PT FALLS ASSESS-DOCD LE1/YR: CPT | Mod: CPTII,S$GLB,, | Performed by: NURSE PRACTITIONER

## 2020-05-29 PROCEDURE — 93970 EXTREMITY STUDY: CPT | Mod: 26,,, | Performed by: RADIOLOGY

## 2020-05-29 PROCEDURE — 1101F PR PT FALLS ASSESS DOC 0-1 FALLS W/OUT INJ PAST YR: ICD-10-PCS | Mod: CPTII,S$GLB,, | Performed by: NURSE PRACTITIONER

## 2020-05-29 PROCEDURE — 93925 US ARTERIAL LOWER EXTREMITY BILAT WITH ABI (XPD): ICD-10-PCS | Mod: 26,,, | Performed by: RADIOLOGY

## 2020-05-29 PROCEDURE — 93922 UPR/L XTREMITY ART 2 LEVELS: CPT | Mod: 26,,, | Performed by: RADIOLOGY

## 2020-05-29 PROCEDURE — 1159F PR MEDICATION LIST DOCUMENTED IN MEDICAL RECORD: ICD-10-PCS | Mod: S$GLB,,, | Performed by: NURSE PRACTITIONER

## 2020-05-29 PROCEDURE — 93970 EXTREMITY STUDY: CPT | Mod: TC

## 2020-05-29 PROCEDURE — 3078F PR MOST RECENT DIASTOLIC BLOOD PRESSURE < 80 MM HG: ICD-10-PCS | Mod: CPTII,S$GLB,, | Performed by: NURSE PRACTITIONER

## 2020-05-29 PROCEDURE — 1125F AMNT PAIN NOTED PAIN PRSNT: CPT | Mod: S$GLB,,, | Performed by: NURSE PRACTITIONER

## 2020-05-29 PROCEDURE — 99214 PR OFFICE/OUTPT VISIT, EST, LEVL IV, 30-39 MIN: ICD-10-PCS | Mod: S$GLB,,, | Performed by: NURSE PRACTITIONER

## 2020-05-29 PROCEDURE — 99214 OFFICE O/P EST MOD 30 MIN: CPT | Mod: S$GLB,,, | Performed by: NURSE PRACTITIONER

## 2020-05-29 PROCEDURE — 3078F DIAST BP <80 MM HG: CPT | Mod: CPTII,S$GLB,, | Performed by: NURSE PRACTITIONER

## 2020-05-29 PROCEDURE — 1125F PR PAIN SEVERITY QUANTIFIED, PAIN PRESENT: ICD-10-PCS | Mod: S$GLB,,, | Performed by: NURSE PRACTITIONER

## 2020-05-29 PROCEDURE — 99999 PR PBB SHADOW E&M-EST. PATIENT-LVL IV: CPT | Mod: PBBFAC,,, | Performed by: NURSE PRACTITIONER

## 2020-05-29 PROCEDURE — 93970 US LOWER EXTREMITY VEINS BILATERAL: ICD-10-PCS | Mod: 26,,, | Performed by: RADIOLOGY

## 2020-05-29 PROCEDURE — 3074F PR MOST RECENT SYSTOLIC BLOOD PRESSURE < 130 MM HG: ICD-10-PCS | Mod: CPTII,S$GLB,, | Performed by: NURSE PRACTITIONER

## 2020-05-29 PROCEDURE — 3074F SYST BP LT 130 MM HG: CPT | Mod: CPTII,S$GLB,, | Performed by: NURSE PRACTITIONER

## 2020-05-29 PROCEDURE — 1159F MED LIST DOCD IN RCRD: CPT | Mod: S$GLB,,, | Performed by: NURSE PRACTITIONER

## 2020-05-29 PROCEDURE — 93925 LOWER EXTREMITY STUDY: CPT | Mod: 26,,, | Performed by: RADIOLOGY

## 2020-05-29 PROCEDURE — 93922 UPR/L XTREMITY ART 2 LEVELS: CPT | Mod: TC

## 2020-05-29 PROCEDURE — 93922 US ARTERIAL LOWER EXTREMITY BILAT WITH ABI (XPD): ICD-10-PCS | Mod: 26,,, | Performed by: RADIOLOGY

## 2020-05-29 RX ORDER — OXYCODONE AND ACETAMINOPHEN 10; 325 MG/1; MG/1
1 TABLET ORAL EVERY 6 HOURS PRN
Qty: 120 TABLET | Refills: 0 | Status: SHIPPED | OUTPATIENT
Start: 2020-06-17 | End: 2020-06-24 | Stop reason: SDUPTHER

## 2020-05-29 RX ORDER — OXYCODONE AND ACETAMINOPHEN 10; 325 MG/1; MG/1
1 TABLET ORAL EVERY 6 HOURS PRN
Qty: 120 TABLET | Refills: 0 | Status: SHIPPED | OUTPATIENT
Start: 2020-07-16 | End: 2020-06-24 | Stop reason: SDUPTHER

## 2020-05-29 RX ORDER — OXYCODONE AND ACETAMINOPHEN 10; 325 MG/1; MG/1
1 TABLET ORAL EVERY 6 HOURS PRN
Qty: 120 TABLET | Refills: 0 | Status: SHIPPED | OUTPATIENT
Start: 2020-08-14 | End: 2020-09-02 | Stop reason: SDUPTHER

## 2020-05-29 RX ORDER — CEPHALEXIN 500 MG/1
500 CAPSULE ORAL EVERY 12 HOURS
Qty: 20 CAPSULE | Refills: 0 | Status: SHIPPED | OUTPATIENT
Start: 2020-05-29 | End: 2020-06-08

## 2020-05-29 NOTE — PROGRESS NOTES
"Subjective:       Patient ID: Steve June Jr. is a 75 y.o. male presents to the clinic with right foot sore. He has a history of BLE cellulitis with hospitalization in 2015.     Chief Complaint: Foot Pain (right foot sore)            Leg Pain    The incident occurred 12 to 24 hours ago. The incident occurred at home. There was no injury mechanism. The pain is present in the left leg, left foot, right leg and right foot. The quality of the pain is described as aching. The pain is at a severity of 5/10. The pain is moderate. The pain has been constant since onset. Associated symptoms include tingling. Pertinent negatives include no inability to bear weight, loss of motion, loss of sensation, muscle weakness or numbness. Associated symptoms comments: Redness, itchiness and tenderness. Right foot blister. . He reports no foreign bodies present. Nothing aggravates the symptoms.       Vitals:    05/29/20 1017   BP: 128/62   Pulse: 73   Resp: 17   Temp: 97 °F (36.1 °C)   TempSrc: Oral   SpO2: 95%   Weight: 89 kg (196 lb 3.4 oz)   Height: 5' 9" (1.753 m)   PainSc:   2   PainLoc: Foot     Body mass index is 28.98 kg/m².    Past Medical History:   Diagnosis Date    Abnormal thyroid function test     Allergy     Seasonal    Anemia     Anemia due to blood loss 7/2/2014    Arthritis     Gaviria esophagus     Basal cell carcinoma     right forearm    Basal cell carcinoma 12/2011    lower post neck    Basal cell carcinoma 04/23/2019    left posterior helix    Cancer     skin CA    Cataract     Cirrhosis     Diabetes mellitus     Diabetes mellitus, type 2     Encounter for blood transfusion     Esophageal varices in cirrhosis     grade II on 7/12 EGD    Gastritis     on 7/12 EGD    GERD (gastroesophageal reflux disease) 2/28/2015    Hard of hearing     Hiatal hernia     History of hepatitis C 8/10/2012    tx with harvoni x 41 days (started 10/22/15). SVR4     Hoarseness 2/28/2015    Hx of colonic polyps " 01/10/2011    per colonoscopy report in media    Hypercholesteremia     Hypersplenism     Hypertension     No meds    Pain management 12/10/2014    Petechial hemorrhage 2015    Lower extremities bilat     Portal hypertensive gastropathy     on  EGD    Squamous cell carcinoma 2019    right preauricular cheek, right temple    Thrombocytopenia     Type II or unspecified type diabetes mellitus with neurological manifestations, uncontrolled(250.62) 2013    Valvular heart disease     mild MR 12     Past Surgical History:   Procedure Laterality Date    CATARACT EXTRACTION  1/10/13    left eye    CATARACT EXTRACTION      right eye    CHOLECYSTECTOMY      COLONOSCOPY      EYE SURGERY      Cataract surgery to right eye    KNEE ARTHROSCOPY W/ MENISCAL REPAIR      KNEE CARTILAGE SURGERY      left knee    KNEE SURGERY  2006    left    SKIN LESION EXCISION      TONSILLECTOMY      UPPER GASTROINTESTINAL ENDOSCOPY       Social History     Socioeconomic History    Marital status:      Spouse name: Not on file    Number of children: 5    Years of education: Not on file    Highest education level: Not on file   Occupational History    Not on file   Social Needs    Financial resource strain: Not very hard    Food insecurity:     Worry: Never true     Inability: Never true    Transportation needs:     Medical: No     Non-medical: No   Tobacco Use    Smoking status: Former Smoker     Packs/day: 1.00     Years: 25.00     Pack years: 25.00     Last attempt to quit: 2000     Years since quittin.8    Smokeless tobacco: Never Used   Substance and Sexual Activity    Alcohol use: Yes     Frequency: 2-3 times a week     Drinks per session: 1 or 2     Binge frequency: Less than monthly     Comment: rarely    Drug use: No    Sexual activity: Never   Lifestyle    Physical activity:     Days per week: 0 days     Minutes per session: Not on file    Stress: Only a little  "  Relationships    Social connections:     Talks on phone: Three times a week     Gets together: Never     Attends [employer] service: Not on file     Active member of club or organization: No     Attends meetings of clubs or organizations: Never     Relationship status:    Other Topics Concern    Not on file   Social History Narrative    He is .  He has children.  He is currently retired.       Review of patient's allergies indicates:   Allergen Reactions    Adhesive tape-silicones Other (See Comments)     pulls skin off    Doxycycline      Dizzy. "Just didn't feel right".       Current Outpatient Medications:     albuterol (VENTOLIN HFA) 90 mcg/actuation inhaler, Inhale 2 puffs into the lungs every 6 (six) hours as needed for Wheezing. Rescue, Disp: 1 Inhaler, Rfl: 11    blood sugar diagnostic Strp, To check BG 3 times daily, to use with insurance preferred meter, Disp: 300 strip, Rfl: 11    blood-glucose meter kit, To check BG 3 times daily, to use with insurance preferred meter, Disp: 1 each, Rfl: 0    insulin detemir U-100 (LEVEMIR FLEXTOUCH U-100 INSULN) 100 unit/mL (3 mL) SubQ InPn pen, Inject 50 Units into the skin every evening. Increase as directed, max daily dose 60 units/day, Disp: 45 mL, Rfl: 3    lactulose (CHRONULAC) 10 gram/15 mL solution, Take 30 mLs (20 g total) by mouth 4 (four) times daily. Target 2-3 bowel movements daily; hold if more than 3 bowel movements in one day., Disp: 3000 mL, Rfl: 0    lancets Misc, To check BG 3 times daily, to use with insurance preferred meter, Disp: 300 each, Rfl: 11    metFORMIN (GLUCOPHAGE-XR) 500 MG XR 24hr tablet, Take 4 tablets (2,000 mg total) by mouth once daily., Disp: 360 tablet, Rfl: 3    NOVOLOG FLEXPEN U-100 INSULIN 100 unit/mL (3 mL) InPn pen, INJECT 8 UNITS UNDER THE SKIN THREE TIMES DAILY WITH MEALS (Patient taking differently: INJECT 5 TO 8 UNITS UNDER THE SKIN THREE TIMES DAILY WITH MEALS), Disp: 30 mL, Rfl: 0    " "oxyCODONE-acetaminophen (PERCOCET)  mg per tablet, Take 1 tablet by mouth every 6 (six) hours as needed for Pain., Disp: 120 tablet, Rfl: 0    pantoprazole (PROTONIX) 40 MG tablet, Take 1 tablet (40 mg total) by mouth once daily., Disp: 90 tablet, Rfl: 0    pen needle, diabetic (MAXICOMFORT II PEN NEEDLE) 31 gauge x 1/4" Ndle, 1 each by Misc.(Non-Drug; Combo Route) route once daily., Disp: 100 each, Rfl: 3    pen needle, diabetic 32 gauge x 5/32" Ndle, Use as directed, Disp: 100 each, Rfl: 3    pirfenidone (ESBRIET) 801 mg Tab, Take 1 capsule by mouth 3 (three) times daily., Disp: 90 tablet, Rfl: 11    propranolol (INDERAL LA) 60 MG 24 hr capsule, Take 1 capsule (60 mg total) by mouth once daily., Disp: 90 capsule, Rfl: 3    cephALEXin (KEFLEX) 500 MG capsule, Take 1 capsule (500 mg total) by mouth every 12 (twelve) hours. for 10 days, Disp: 20 capsule, Rfl: 0    [START ON 6/17/2020] oxyCODONE-acetaminophen (PERCOCET)  mg per tablet, Take 1 tablet by mouth every 6 (six) hours as needed for Pain., Disp: 120 tablet, Rfl: 0    [START ON 7/16/2020] oxyCODONE-acetaminophen (PERCOCET)  mg per tablet, Take 1 tablet by mouth every 6 (six) hours as needed for Pain., Disp: 120 tablet, Rfl: 0    [START ON 8/14/2020] oxyCODONE-acetaminophen (PERCOCET)  mg per tablet, Take 1 tablet by mouth every 6 (six) hours as needed for Pain., Disp: 120 tablet, Rfl: 0    Review of Systems   Constitutional: Negative for activity change, appetite change, chills, fatigue and fever.   HENT: Negative for congestion, postnasal drip, rhinorrhea and trouble swallowing.    Eyes: Negative for pain and discharge.   Respiratory: Negative for chest tightness, shortness of breath and wheezing.    Cardiovascular: Negative for chest pain and palpitations.   Gastrointestinal: Negative for abdominal distention, abdominal pain, blood in stool, constipation, diarrhea, nausea and vomiting.   Endocrine: Negative for polydipsia, " polyphagia and polyuria.   Genitourinary: Negative for difficulty urinating, dysuria, frequency and urgency.   Musculoskeletal: Positive for arthralgias and joint swelling. Negative for gait problem and myalgias.   Skin: Positive for color change and wound. Negative for pallor and rash.   Neurological: Positive for tingling. Negative for dizziness, weakness, light-headedness, numbness and headaches.   Hematological: Negative for adenopathy.   Psychiatric/Behavioral: Negative for agitation and confusion. The patient is not nervous/anxious.        Objective:      Physical Exam   Constitutional: He is oriented to person, place, and time. He appears well-developed and well-nourished. No distress.   HENT:   Head: Normocephalic and atraumatic.   Right Ear: External ear normal.   Left Ear: External ear normal.   Nose: Nose normal.   Mouth/Throat: Oropharynx is clear and moist. No oropharyngeal exudate.   Eyes: Pupils are equal, round, and reactive to light. Conjunctivae and EOM are normal. Right eye exhibits no discharge. Left eye exhibits no discharge. No scleral icterus.   Neck: Normal range of motion. Neck supple. No JVD present. No tracheal deviation present. No thyromegaly present.   Cardiovascular: Normal rate, regular rhythm, normal heart sounds and intact distal pulses. Exam reveals no gallop and no friction rub.   No murmur heard.  Carotid pulses are 2+ on the right side, and 2+ on the left side.       Radial pulses are 2+ on the right side, and 2+ on the left side.        Posterior tibial pulses are 2+ on the right side, and 2+ on the left side.    Pulmonary/Chest: Effort normal and breath sounds normal. No stridor. No respiratory distress. He has no wheezes. He has no rales. He exhibits no tenderness.   Abdominal: Soft. Bowel sounds are normal. He exhibits no distension and no mass. There is no tenderness. There is no rebound and no guarding. No hernia.   Musculoskeletal: Normal range of motion. He exhibits  edema and tenderness. He exhibits no deformity.   BLE nonpitting edema   Lymphadenopathy:     He has no cervical adenopathy.   Neurological: He is alert and oriented to person, place, and time. He displays normal reflexes. No cranial nerve deficit or sensory deficit. He exhibits normal muscle tone. Coordination normal.   Skin: Skin is warm and dry. Capillary refill takes less than 2 seconds. No rash noted. He is not diaphoretic. There is erythema. No pallor.   Yellowing of skin seen on knees bilaterally. Redden spot to right foot. BLE erythema.   Psychiatric: He has a normal mood and affect. His behavior is normal. Judgment and thought content normal.   Nursing note and vitals reviewed.      Assessment:       1. Bilateral lower leg cellulitis    2. Varicose veins of bilateral lower extremities with pain         Plan:     Steve was seen today for foot pain. Poor circulation to BLE.     Diagnoses and all orders for this visit:    Bilateral lower leg cellulitis  -     US Lower Extrem Arteries Bilat with LEXIE (xpd); Future  -     CBC auto differential; Future  -     Comprehensive metabolic panel; Future  -     US Lower Extremity Veins Bilateral; Future  -     cephALEXin (KEFLEX) 500 MG capsule; Take 1 capsule (500 mg total) by mouth every 12 (twelve) hours. for 10 days    Varicose veins of bilateral lower extremities with pain   -     US Lower Extremity Veins Bilateral; Future      Follow up in about 3 days (around 6/1/2020) for [facility] BLE.

## 2020-05-29 NOTE — TELEPHONE ENCOUNTER
Received verbal order from Mrs. Madan Marks, NP to inform patient to go to ER due to glucose level being 676. Call placed to patient, spoke to patient's wife (Meg) who states patient was at the hospital having an ultrasound performed. States once he is done with ultrasound they will head to the ED.

## 2020-05-29 NOTE — TELEPHONE ENCOUNTER
Spoke to patients wife ,lat her know we scheduled labs and US at the hospital for her  , she stated they probably would not go today . I expressed that  wanted it done today and she stated she will see . 05/29/20

## 2020-06-02 ENCOUNTER — PATIENT OUTREACH (OUTPATIENT)
Dept: ADMINISTRATIVE | Facility: OTHER | Age: 75
End: 2020-06-02

## 2020-06-02 ENCOUNTER — OFFICE VISIT (OUTPATIENT)
Dept: FAMILY MEDICINE | Facility: CLINIC | Age: 75
End: 2020-06-02
Payer: MEDICARE

## 2020-06-02 ENCOUNTER — TELEPHONE (OUTPATIENT)
Dept: FAMILY MEDICINE | Facility: CLINIC | Age: 75
End: 2020-06-02

## 2020-06-02 VITALS
HEIGHT: 69 IN | TEMPERATURE: 97 F | HEART RATE: 74 BPM | WEIGHT: 198.63 LBS | DIASTOLIC BLOOD PRESSURE: 70 MMHG | RESPIRATION RATE: 18 BRPM | BODY MASS INDEX: 29.42 KG/M2 | SYSTOLIC BLOOD PRESSURE: 132 MMHG | OXYGEN SATURATION: 95 %

## 2020-06-02 DIAGNOSIS — K74.60 HEPATIC CIRRHOSIS, UNSPECIFIED HEPATIC CIRRHOSIS TYPE, UNSPECIFIED WHETHER ASCITES PRESENT: ICD-10-CM

## 2020-06-02 DIAGNOSIS — E08.42 DIABETIC POLYNEUROPATHY ASSOCIATED WITH DIABETES MELLITUS DUE TO UNDERLYING CONDITION: ICD-10-CM

## 2020-06-02 DIAGNOSIS — E11.8 TYPE 2 DIABETES MELLITUS WITH COMPLICATION, WITH LONG-TERM CURRENT USE OF INSULIN: ICD-10-CM

## 2020-06-02 DIAGNOSIS — R73.9 HYPERGLYCEMIA: Primary | ICD-10-CM

## 2020-06-02 DIAGNOSIS — M67.80 TENDONOSIS: ICD-10-CM

## 2020-06-02 DIAGNOSIS — L03.115 BILATERAL LOWER LEG CELLULITIS: ICD-10-CM

## 2020-06-02 DIAGNOSIS — L03.116 BILATERAL LOWER LEG CELLULITIS: ICD-10-CM

## 2020-06-02 DIAGNOSIS — Z79.4 TYPE 2 DIABETES MELLITUS WITH COMPLICATION, WITH LONG-TERM CURRENT USE OF INSULIN: ICD-10-CM

## 2020-06-02 LAB — GLUCOSE SERPL-MCNC: 257 MG/DL (ref 70–110)

## 2020-06-02 PROCEDURE — 3046F HEMOGLOBIN A1C LEVEL >9.0%: CPT | Mod: CPTII,S$GLB,, | Performed by: NURSE PRACTITIONER

## 2020-06-02 PROCEDURE — 3078F DIAST BP <80 MM HG: CPT | Mod: CPTII,S$GLB,, | Performed by: NURSE PRACTITIONER

## 2020-06-02 PROCEDURE — 3046F PR MOST RECENT HEMOGLOBIN A1C LEVEL > 9.0%: ICD-10-PCS | Mod: CPTII,S$GLB,, | Performed by: NURSE PRACTITIONER

## 2020-06-02 PROCEDURE — 1125F AMNT PAIN NOTED PAIN PRSNT: CPT | Mod: S$GLB,,, | Performed by: NURSE PRACTITIONER

## 2020-06-02 PROCEDURE — 99999 PR PBB SHADOW E&M-EST. PATIENT-LVL IV: ICD-10-PCS | Mod: PBBFAC,,, | Performed by: NURSE PRACTITIONER

## 2020-06-02 PROCEDURE — 3075F SYST BP GE 130 - 139MM HG: CPT | Mod: CPTII,S$GLB,, | Performed by: NURSE PRACTITIONER

## 2020-06-02 PROCEDURE — 99499 RISK ADDL DX/OHS AUDIT: ICD-10-PCS | Mod: S$GLB,,, | Performed by: NURSE PRACTITIONER

## 2020-06-02 PROCEDURE — 99214 PR OFFICE/OUTPT VISIT, EST, LEVL IV, 30-39 MIN: ICD-10-PCS | Mod: S$GLB,,, | Performed by: NURSE PRACTITIONER

## 2020-06-02 PROCEDURE — 82962 GLUCOSE BLOOD TEST: CPT | Mod: S$GLB,,, | Performed by: NURSE PRACTITIONER

## 2020-06-02 PROCEDURE — 82962 POCT GLUCOSE, HAND-HELD DEVICE: ICD-10-PCS | Mod: S$GLB,,, | Performed by: NURSE PRACTITIONER

## 2020-06-02 PROCEDURE — 99999 PR PBB SHADOW E&M-EST. PATIENT-LVL IV: CPT | Mod: PBBFAC,,, | Performed by: NURSE PRACTITIONER

## 2020-06-02 PROCEDURE — 1101F PT FALLS ASSESS-DOCD LE1/YR: CPT | Mod: CPTII,S$GLB,, | Performed by: NURSE PRACTITIONER

## 2020-06-02 PROCEDURE — 1159F MED LIST DOCD IN RCRD: CPT | Mod: S$GLB,,, | Performed by: NURSE PRACTITIONER

## 2020-06-02 PROCEDURE — 1125F PR PAIN SEVERITY QUANTIFIED, PAIN PRESENT: ICD-10-PCS | Mod: S$GLB,,, | Performed by: NURSE PRACTITIONER

## 2020-06-02 PROCEDURE — 99499 UNLISTED E&M SERVICE: CPT | Mod: S$GLB,,, | Performed by: NURSE PRACTITIONER

## 2020-06-02 PROCEDURE — 1101F PR PT FALLS ASSESS DOC 0-1 FALLS W/OUT INJ PAST YR: ICD-10-PCS | Mod: CPTII,S$GLB,, | Performed by: NURSE PRACTITIONER

## 2020-06-02 PROCEDURE — 1159F PR MEDICATION LIST DOCUMENTED IN MEDICAL RECORD: ICD-10-PCS | Mod: S$GLB,,, | Performed by: NURSE PRACTITIONER

## 2020-06-02 PROCEDURE — 99214 OFFICE O/P EST MOD 30 MIN: CPT | Mod: S$GLB,,, | Performed by: NURSE PRACTITIONER

## 2020-06-02 PROCEDURE — 3078F PR MOST RECENT DIASTOLIC BLOOD PRESSURE < 80 MM HG: ICD-10-PCS | Mod: CPTII,S$GLB,, | Performed by: NURSE PRACTITIONER

## 2020-06-02 PROCEDURE — 3075F PR MOST RECENT SYSTOLIC BLOOD PRESS GE 130-139MM HG: ICD-10-PCS | Mod: CPTII,S$GLB,, | Performed by: NURSE PRACTITIONER

## 2020-06-02 RX ORDER — INSULIN LISPRO 100 [IU]/ML
INJECTION, SOLUTION INTRAVENOUS; SUBCUTANEOUS
Qty: 30 ML | Refills: 3 | Status: SHIPPED | OUTPATIENT
Start: 2020-06-02 | End: 2021-05-11 | Stop reason: ALTCHOICE

## 2020-06-02 RX ORDER — LACTULOSE 10 G/15ML
20 SOLUTION ORAL 2 TIMES DAILY
Qty: 1800 ML | Refills: 11 | Status: SHIPPED | OUTPATIENT
Start: 2020-06-02 | End: 2021-06-02

## 2020-06-02 NOTE — PROGRESS NOTES
"Subjective:       Patient ID: Steve June Jr. is a 75 y.o. male presents to clinic for BLE cellulitis follow up. Patient accompanied by his wife to this appointment. He has a history of BLE cellulitis with hospitalization in 2015. He was recently sent to the emergency department for elevated blood sugar of 676. He was started on a insulin drip and admitted into hospital. Last two POCT glucose reading at hospital shows 484 and 413. He declined hospital admission. Wife states that urinalysis was performed at emergency department and was positive for nitrates, concerned about possible UTI. His diabetes medication regimen was recently changed by endocrine due to wife being at work and patient forgets to take insulin. Next follow up with endocrine 6/11/2020. He is concerned about right hand swelling that started four days ago.     Chief Complaint: Follow-up                    Vitals:    06/02/20 1103   BP: 132/70   Pulse: 74   Resp: 18   Temp: 97.3 °F (36.3 °C)   TempSrc: Oral   SpO2: 95%   Weight: 90.1 kg (198 lb 10.2 oz)   Height: 5' 9" (1.753 m)   PainSc:   4   PainLoc: Generalized     Body mass index is 29.33 kg/m².    Past Medical History:   Diagnosis Date    Abnormal thyroid function test     Allergy     Seasonal    Anemia     Anemia due to blood loss 7/2/2014    Arthritis     Gaviria esophagus     Basal cell carcinoma     right forearm    Basal cell carcinoma 12/2011    lower post neck    Basal cell carcinoma 04/23/2019    left posterior helix    Cancer     skin CA    Cataract     Cirrhosis     Diabetes mellitus     Diabetes mellitus, type 2     Encounter for blood transfusion     Esophageal varices in cirrhosis     grade II on 7/12 EGD    Gastritis     on 7/12 EGD    GERD (gastroesophageal reflux disease) 2/28/2015    Hard of hearing     Hiatal hernia     History of hepatitis C 8/10/2012    tx with harvoni x 41 days (started 10/22/15). SVR4     Hoarseness 2/28/2015    Hx of colonic " polyps 01/10/2011    per colonoscopy report in media    Hypercholesteremia     Hypersplenism     Hypertension     No meds    Pain management 12/10/2014    Petechial hemorrhage 2015    Lower extremities bilat     Portal hypertensive gastropathy     on  EGD    Squamous cell carcinoma 2019    right preauricular cheek, right temple    Thrombocytopenia     Type II or unspecified type diabetes mellitus with neurological manifestations, uncontrolled(250.62) 2013    Valvular heart disease     mild MR 12     Past Surgical History:   Procedure Laterality Date    CATARACT EXTRACTION  1/10/13    left eye    CATARACT EXTRACTION      right eye    CHOLECYSTECTOMY      COLONOSCOPY      EYE SURGERY      Cataract surgery to right eye    KNEE ARTHROSCOPY W/ MENISCAL REPAIR      KNEE CARTILAGE SURGERY      left knee    KNEE SURGERY  2006    left    SKIN LESION EXCISION      TONSILLECTOMY      UPPER GASTROINTESTINAL ENDOSCOPY       Social History     Socioeconomic History    Marital status:      Spouse name: Not on file    Number of children: 5    Years of education: Not on file    Highest education level: Not on file   Occupational History    Not on file   Social Needs    Financial resource strain: Not very hard    Food insecurity:     Worry: Never true     Inability: Never true    Transportation needs:     Medical: No     Non-medical: No   Tobacco Use    Smoking status: Former Smoker     Packs/day: 1.00     Years: 25.00     Pack years: 25.00     Last attempt to quit: 2000     Years since quittin.8    Smokeless tobacco: Never Used   Substance and Sexual Activity    Alcohol use: Yes     Frequency: 2-3 times a week     Drinks per session: 1 or 2     Binge frequency: Less than monthly     Comment: rarely    Drug use: No    Sexual activity: Never   Lifestyle    Physical activity:     Days per week: 0 days     Minutes per session: Not on file    Stress: Only a  "little   Relationships    Social connections:     Talks on phone: Three times a week     Gets together: Never     Attends Mu-ism service: Not on file     Active member of club or organization: No     Attends meetings of clubs or organizations: Never     Relationship status:    Other Topics Concern    Not on file   Social History Narrative    He is .  He has children.  He is currently retired.       Review of patient's allergies indicates:   Allergen Reactions    Adhesive tape-silicones Other (See Comments)     pulls skin off    Doxycycline      Dizzy. "Just didn't feel right".       Current Outpatient Medications:     albuterol (VENTOLIN HFA) 90 mcg/actuation inhaler, Inhale 2 puffs into the lungs every 6 (six) hours as needed for Wheezing. Rescue, Disp: 1 Inhaler, Rfl: 11    blood sugar diagnostic Strp, To check BG 3 times daily, to use with insurance preferred meter, Disp: 300 strip, Rfl: 11    blood-glucose meter kit, To check BG 3 times daily, to use with insurance preferred meter, Disp: 1 each, Rfl: 0    cephALEXin (KEFLEX) 500 MG capsule, Take 1 capsule (500 mg total) by mouth every 12 (twelve) hours. for 10 days, Disp: 20 capsule, Rfl: 0    insulin detemir U-100 (LEVEMIR FLEXTOUCH U-100 INSULN) 100 unit/mL (3 mL) SubQ InPn pen, Inject 50 Units into the skin every evening. Increase as directed, max daily dose 60 units/day, Disp: 45 mL, Rfl: 3    lancets Misc, To check BG 3 times daily, to use with insurance preferred meter, Disp: 300 each, Rfl: 11    metFORMIN (GLUCOPHAGE-XR) 500 MG XR 24hr tablet, Take 4 tablets (2,000 mg total) by mouth once daily., Disp: 360 tablet, Rfl: 3    oxyCODONE-acetaminophen (PERCOCET)  mg per tablet, Take 1 tablet by mouth every 6 (six) hours as needed for Pain., Disp: 120 tablet, Rfl: 0    [START ON 6/17/2020] oxyCODONE-acetaminophen (PERCOCET)  mg per tablet, Take 1 tablet by mouth every 6 (six) hours as needed for Pain., Disp: 120 " "tablet, Rfl: 0    [START ON 7/16/2020] oxyCODONE-acetaminophen (PERCOCET)  mg per tablet, Take 1 tablet by mouth every 6 (six) hours as needed for Pain., Disp: 120 tablet, Rfl: 0    [START ON 8/14/2020] oxyCODONE-acetaminophen (PERCOCET)  mg per tablet, Take 1 tablet by mouth every 6 (six) hours as needed for Pain., Disp: 120 tablet, Rfl: 0    pantoprazole (PROTONIX) 40 MG tablet, Take 1 tablet (40 mg total) by mouth once daily., Disp: 90 tablet, Rfl: 0    pen needle, diabetic (MAXICOMFORT II PEN NEEDLE) 31 gauge x 1/4" Ndle, 1 each by Misc.(Non-Drug; Combo Route) route once daily., Disp: 100 each, Rfl: 3    pen needle, diabetic 32 gauge x 5/32" Ndle, Use as directed, Disp: 100 each, Rfl: 3    propranolol (INDERAL LA) 60 MG 24 hr capsule, Take 1 capsule (60 mg total) by mouth once daily., Disp: 90 capsule, Rfl: 3    insulin lispro (HUMALOG KWIKPEN INSULIN) 100 unit/mL pen, 5 unit ac breakfast 15 units ac dinner, Disp: 30 mL, Rfl: 3    lactulose (CHRONULAC) 20 gram/30 mL Soln, Take 30 mLs (20 g total) by mouth 2 (two) times daily., Disp: 1800 mL, Rfl: 11    Review of Systems   Constitutional: Negative for activity change, appetite change, chills, diaphoresis, fatigue, fever and unexpected weight change.   HENT: Negative for congestion, ear discharge, ear pain, hearing loss, postnasal drip, rhinorrhea, sinus pressure, sinus pain, sore throat, trouble swallowing and voice change.    Eyes: Negative for photophobia, pain, discharge and visual disturbance.   Respiratory: Negative for apnea, cough, chest tightness, shortness of breath and wheezing.    Cardiovascular: Negative for chest pain, palpitations and leg swelling.   Gastrointestinal: Negative for abdominal distention, abdominal pain, constipation, diarrhea, nausea and vomiting.   Endocrine: Negative for polydipsia, polyphagia and polyuria.   Genitourinary: Negative for decreased urine volume, difficulty urinating, dysuria, flank pain, frequency " and urgency.   Musculoskeletal: Negative for arthralgias, back pain, gait problem, joint swelling and neck pain.   Skin: Positive for color change. Negative for pallor, rash and wound.   Neurological: Negative for dizziness, facial asymmetry, speech difficulty, weakness, light-headedness, numbness and headaches.   Hematological: Negative for adenopathy.   Psychiatric/Behavioral: Negative for agitation, behavioral problems, confusion, decreased concentration, hallucinations and sleep disturbance. The patient is not nervous/anxious.        Objective:      Physical Exam   Constitutional: He is oriented to person, place, and time. He appears well-developed and well-nourished. No distress.   HENT:   Head: Normocephalic and atraumatic.   Right Ear: External ear normal.   Left Ear: External ear normal.   Nose: Nose normal.   Mouth/Throat: Oropharynx is clear and moist. No oropharyngeal exudate.   Eyes: Pupils are equal, round, and reactive to light. Conjunctivae and EOM are normal. Right eye exhibits no discharge. Left eye exhibits no discharge. No scleral icterus.   Neck: Normal range of motion. Neck supple. No JVD present. No tracheal deviation present. No thyromegaly present.   Cardiovascular: Normal rate, regular rhythm, normal heart sounds and intact distal pulses. Exam reveals no gallop and no friction rub.   No murmur heard.  Carotid pulses are 2+ on the right side, and 2+ on the left side.       Radial pulses are 2+ on the right side, and 2+ on the left side.        Posterior tibial pulses are 2+ on the right side, and 2+ on the left side.    Pulmonary/Chest: Effort normal and breath sounds normal. No stridor. No respiratory distress. He has no wheezes. He has no rales. He exhibits no tenderness.   Abdominal: Soft. Bowel sounds are normal. He exhibits no distension and no mass. There is no tenderness. There is no rebound and no guarding. No hernia.   Musculoskeletal: Normal range of motion. He exhibits no edema,  tenderness or deformity.   Right hand:  No edema seen. Grasp strong. No redness. Sensation felt. Cap refill 2 secs.    Lymphadenopathy:     He has no cervical adenopathy.   Neurological: He is alert and oriented to person, place, and time. He displays normal reflexes. No cranial nerve deficit or sensory deficit. He exhibits normal muscle tone. Coordination normal.   Skin: Skin is warm and dry. Capillary refill takes less than 2 seconds. No rash noted. He is not diaphoretic. No erythema. No pallor.   Psychiatric: He has a normal mood and affect. His behavior is normal. Judgment and thought content normal.   Nursing note and vitals reviewed.      Assessment:       1. Hyperglycemia    2. Bilateral lower leg cellulitis    3. Hepatic cirrhosis, unspecified hepatic cirrhosis type, unspecified whether ascites present    4. Tendonosis    5. Type 2 diabetes mellitus with complication, with long-term current use of insulin    6. Diabetic polyneuropathy associated with diabetes mellitus due to underlying condition        Plan:     Steve was seen today for follow-up.    Diagnoses and all orders for this visit:    Hyperglycemia  -     POCT Glucose, Hand-Held Device. Blood sugar results 257  -     insulin lispro (HUMALOG KWIKPEN INSULIN) 100 unit/mL pen; 5 unit ac breakfast 15 units ac dinner for insulin coverage  -     lactulose (CHRONULAC) 20 gram/30 mL Soln; Take 30 mLs (20 g total) by mouth 2 (two) times daily.        - Keep upcoming Endocrinology appointment next week.   Bilateral lower leg cellulitis        - Resolved        Hepatic cirrhosis, unspecified hepatic cirrhosis type, unspecified whether ascites present  -     lactulose (CHRONULAC) 20 gram/30 mL Soln; Take 30 mLs (20 g total) by mouth 2 (two) times daily.  -     Ferritin; Future    Tendonosis       - Wear copper compression gloves.   Type 2 diabetes mellitus with complication, with long-term current use of insulin  -     POCT Glucose, Hand-Held Device  -      insulin lispro (HUMALOG KWIKPEN INSULIN) 100 unit/mL pen; 5 unit ac breakfast 15 units ac dinner  -     Ambulatory referral/consult to Outpatient Case Management    Diabetic polyneuropathy associated with diabetes mellitus due to underlying condition    Patient is forgetful about taking medication. His wife helps him with his medications and she works during the day. Medication changes were to his treatment which includes coverage for blood sugar spikes that is during the time his wife is at home.   Patient education provided.    All questions and concerns addressed  Patient verbalizes understanding  Follow up in about 4 weeks (around 6/30/2020) for with PCP.

## 2020-06-02 NOTE — TELEPHONE ENCOUNTER
----- Message from Zaira Moody MA sent at 6/2/2020 12:25 PM CDT -----  Good afternoon,     Mrs. Marks saw this pt today and he will need a 1 month follow up per her request. Please help get the patient scheduled. Thank you.

## 2020-06-06 NOTE — ED PROVIDER NOTES
CC:   Chief Complaint   Patient presents with   • Follow-up     tonsil check       Visit: Subsequent    SUBJECTIVE:  Neil Lemus is a 8 year old male here for follow up evaluation of chronic tonsillitis and tonsil hypertrophy.  He was seen in April via a video visit, and then returns today for follow-up visit.  No fevers, or chills, or fatigue. Saw Dr. Fajarod via a video encounter. Had rapid strep done (negative), as well as lab work up for CMV, mono which was also negative.  He had strep in Nov 2019, and was treated with augmentin (rash developed on day 7), and zithromax.  Antibiotic naive other than that. He does snore, but parents do not know if he has any apneas.  He has significant restless sleep at night and it is sometimes hard to arouse in the morning.  He has normal energy during the day.  He did see Mana De Paz in Allergy in Dec and skin prick PCN challenge was negative. He is using Zyrtec daily, and nasal saline frequently.      REVIEW OF SYSTEMS:   --General: Pt denies problems with fever, night sweats, weight changes, appetite changes and sleep problems  --HEET: as above  --Respiratory: Pt denies problems with coughing, wheezing, changes in voice,  nor shortness of breath    --Cardiovascular: Pt denies problems with chest pain, palpitations or other cardiac complaints  --Gastrointestinal: Pt denies problems with diarrhea, constipation, abdominal pain or other complaints  --Genitourinary: Pt denies problems with urinary pain, bleeding and voiding problems  --Musculoskeletal: Pt denies problems with pain, swelling, weakness or limitation of motion  --Neurologic:Pt denies problems with syncope, seizures, paralysis, involuntary movements or gait  --Psychiatric: Pt denies problems with sleep, anxiety, or depression  --Hematologic/Lymphatic/Immunologic: Pt. denies hematological, lymphatic and immunological problems  --Endocrine: Pt. denies thyroid and diabetic problems  --Skin: No problems with  "Encounter Date: 12/4/2019       History     Chief Complaint   Patient presents with    Fall     Patient had mechanical fall yesterday.  He complains of left arm bruising and skin tears.  He does have left knee pain. Patient reports he did hit his head.  Patient does have a history of chronic thrombocytopenia secondary to cirrhosis.  Patient does have a history of patent encephalopathy.  Patient and wife report mental status been normal.  They feel there is no exacerbation of encephalopathy.  There was no syncope.  No seizure-like activity.        Review of patient's allergies indicates:   Allergen Reactions    Adhesive tape-silicones Other (See Comments)     pulls skin off    Doxycycline      Dizzy. "Just didn't feel right".     Past Medical History:   Diagnosis Date    Abnormal thyroid function test     Allergy     Seasonal    Anemia     Anemia due to blood loss 7/2/2014    Arthritis     Gaviria esophagus     Basal cell carcinoma     right forearm    Basal cell carcinoma 12/2011    lower post neck    Basal cell carcinoma 04/23/2019    left posterior helix    Cancer     skin CA    Cataract     Cirrhosis     Diabetes mellitus     Diabetes mellitus, type 2     Encounter for blood transfusion     Esophageal varices in cirrhosis     grade II on 7/12 EGD    Gastritis     on 7/12 EGD    GERD (gastroesophageal reflux disease) 2/28/2015    Hard of hearing     Hiatal hernia     History of hepatitis C 8/10/2012    tx with harvoni x 41 days (started 10/22/15). SVR4     Hoarseness 2/28/2015    Hypercholesteremia     Hypersplenism     Hypertension     No meds    Pain management 12/10/2014    Petechial hemorrhage 11/25/2015    Lower extremities bilat     Portal hypertensive gastropathy     on 7/12 EGD    Squamous cell carcinoma 04/23/2019    right preauricular cheek, right temple    Thrombocytopenia     Type II or unspecified type diabetes mellitus with neurological manifestations, " scalp lesions. No rash    Patient Active Problem List   Diagnosis   (none) - all problems resolved or deleted       ALLERGIES:   Allergen Reactions   • Amoxicillin-Pot Clavulanate RASH     Social History     Socioeconomic History   • Marital status: Other     Spouse name: Not on file   • Number of children: Not on file   • Years of education: Not on file   • Highest education level: Not on file   Occupational History   • Not on file   Social Needs   • Financial resource strain: Not on file   • Food insecurity:     Worry: Not on file     Inability: Not on file   • Transportation needs:     Medical: Not on file     Non-medical: Not on file   Tobacco Use   • Smoking status: Not on file   Substance and Sexual Activity   • Alcohol use: Not on file   • Drug use: Not on file   • Sexual activity: Not on file   Lifestyle   • Physical activity:     Days per week: Not on file     Minutes per session: Not on file   • Stress: Not on file   Relationships   • Social connections:     Talks on phone: Not on file     Gets together: Not on file     Attends [employer] service: Not on file     Active member of club or organization: Not on file     Attends meetings of clubs or organizations: Not on file     Relationship status: Not on file   • Intimate partner violence:     Fear of current or ex partner: Not on file     Emotionally abused: Not on file     Physically abused: Not on file     Forced sexual activity: Not on file   Other Topics Concern   • Not on file   Social History Narrative   • Not on file       Current Outpatient Medications   Medication Sig Dispense Refill   • sodium fluoride (LURIDE) 1.1 (0.5 F) MG per chewable tablet CSW ONE T QD  1   • cetirizine (ZYRTEC) 5 MG/5ML solution        No current facility-administered medications for this visit.          Family History:  No significant family history related to Head and neck complaints.    PHYSICAL EXAMINATION:     --General appearance: Well developed, well nourished, in no  uncontrolled(250.62) 2013    Valvular heart disease     mild MR 12     Past Surgical History:   Procedure Laterality Date    CATARACT EXTRACTION  1/10/13    left eye    CATARACT EXTRACTION      right eye    CHOLECYSTECTOMY      COLONOSCOPY      EYE SURGERY      Cataract surgery to right eye    KNEE ARTHROSCOPY W/ MENISCAL REPAIR      KNEE CARTILAGE SURGERY      left knee    KNEE SURGERY  2006    left    SKIN LESION EXCISION      TONSILLECTOMY      UPPER GASTROINTESTINAL ENDOSCOPY       Family History   Problem Relation Age of Onset    Leukemia Mother     Cancer Mother         bone    Alcohol abuse Father     Cirrhosis Father         EtOH induced    No Known Problems Daughter     No Known Problems Son     No Known Problems Daughter     No Known Problems Daughter     No Known Problems Daughter     No Known Problems Sister     Amblyopia Neg Hx     Blindness Neg Hx     Cataracts Neg Hx     Diabetes Neg Hx     Glaucoma Neg Hx     Hypertension Neg Hx     Macular degeneration Neg Hx     Retinal detachment Neg Hx     Strabismus Neg Hx     Stroke Neg Hx     Thyroid disease Neg Hx     Psoriasis Neg Hx     Lupus Neg Hx     Eczema Neg Hx     Acne Neg Hx     Melanoma Neg Hx      Social History     Tobacco Use    Smoking status: Former Smoker     Packs/day: 1.00     Years: 25.00     Pack years: 25.00     Last attempt to quit: 2000     Years since quittin.3    Smokeless tobacco: Never Used   Substance Use Topics    Alcohol use: Yes     Comment: rarely    Drug use: No     Review of Systems   Constitutional: Negative for chills and fever.   HENT: Negative for sore throat.    Eyes: Negative for photophobia and visual disturbance.   Respiratory: Negative for shortness of breath.    Cardiovascular: Negative for chest pain.   Gastrointestinal: Negative for abdominal pain and vomiting.   Genitourinary: Negative for dysuria.   Musculoskeletal: Negative for neck pain.   Skin:  Negative for rash.   Neurological: Negative for weakness and headaches.   Psychiatric/Behavioral: Negative for confusion.       Physical Exam     Initial Vitals [12/04/19 1425]   BP Pulse Resp Temp SpO2   139/85 72 16 98.7 °F (37.1 °C) 95 %      MAP       --         Physical Exam    Nursing note and vitals reviewed.  Constitutional: He is not diaphoretic. No distress.   HENT:   Head: Normocephalic and atraumatic.   No hematomas or physical exam evidence of direct trauma   Eyes: Conjunctivae are normal.   Neck: Normal range of motion.   Cardiovascular: Regular rhythm.   Pulmonary/Chest: Breath sounds normal.   Abdominal: Soft. There is no tenderness.   Musculoskeletal:   There is ecchymoses of multiple superficial skin tears to left arm.  There is some tenderness with movement of left hand and elbow.  No deformity.  Arm and legs are neurovascular intact.  Good range of motion left knee.  There is some patella ecchymoses withcrepitance   Neurological: He is alert and oriented to person, place, and time. He has normal strength. No cranial nerve deficit or sensory deficit.   Skin: No rash noted.   Psychiatric: He has a normal mood and affect.         ED Course   Procedures  Labs Reviewed - No data to display       Imaging Results    None          Medical Decision Making:   History:   Old Medical Records: I decided to obtain old medical records.  Clinical Tests:   Radiological Study: Reviewed  ED Management:  Patient presents with mechanical fall yesterday.  No altered mental status or signs of encephalopathy currently.  Patient is very alert. No bony abnormality.  Given history of minor head trauma associated with significant history of thrombocytopenia head CT obtain.  No intracranial bleeding.  Patient has foreign body to left forearm.  This is apparently old.  There is no puncture wound to left ulnar area.                                 Clinical Impression:       ICD-10-CM ICD-9-CM   1. Contusion of left forearm,  apparent distress  --Ability to communicate: Appropriate, voice strong  --Head and scalp: No visible scalp lesions  --Eyes: No redness, swelling or drainage.  --Ears:     R ear: EAC patent, tympanic membrane intact, no middle ear effusion    L ear: EAC patent, tympanic membrane intact, no middle ear effusion  --Oral Cavity/Oral Pharynx: MMM, no obvious lesions. Tonsils 3+ with exudates,  --Nose: Nares patent, no rhinorrhea or epistaxis.   Respiratory: Good respiratory effort, no stridor or stertor  CV: No JVD  --Neck: Trachea midline  --Skin: No pigmented lesions on face, neck  --Psychiatric: Oriented to time, place and person  --Lymphatic: No visible cervical adenopathy     Labs reviewed:  WHITE BLOOD CELL COUNT 4.0 - 10.0 10*3/uL 6.5     RED BLOOD CELL COUNT 3.90 - 5.70 10*6/uL 4.47     HEMOGLOBIN 11.5 - 13.5 g/dL 13.1  12.4 R   HEMATOCRIT 34.0 - 40.0 % 37.5     MEAN CORPUSCULAR VOLUME 78.0 - 87.0 fL 83.9     MEAN CORPUSCULAR HGB 27.0 - 34.0 pg 29.3     MEAN CORPUSCULAR HGB CONCENTRATION 31.0 - 37.0 % 34.9     PLATELET COUNT 150 - 400 10*3/uL 310     MEAN PLATELET VOLUME 8.6 - 12.4 fL 9.7     RED CELL DISTRIBUTION WIDTH 11.3 - 14.8 % 12.4     NEUTROPHIL PERCENT 34.0 - 73.5 % 39.2     NEUTROPHIL ABSOLUTE # 1.4 - 6.5 10*3/uL 2.6     LYMPH PERCENT 20.5 - 51.1 % 48.0     LYMPHOCYTE ABSOLUTE # 1.2 - 3.4 10*3/uL 3.1     MONOCYTE PERCENT 4.3 - 12.9 % 7.7     MONOCYTE ABSOLUTE # 0.2 - 0.9 10*3/uL 0.5     EOSINOPHIL % 0.0 - 10.0 % 4.6     EOSINOPHIL ABSOLUTE # 0.0 - 0.5 10*3/uL 0.3     BASOPHIL % 0.0 - 1.2 % 0.5     BASOPHIL ABSOLUTE # 0.0 - 0.1 10*3/uL 0.0     DIFFERENTIAL TYPE  AUTO DIFF       FINAL REPORT Microbiology results    Comment: [4/13/2020 7:32 AM Maria D Chappell]   SOURCE   THT-Throat   RESULT   NO BETA STREPTOCOCCI ISOLATED         Ref Range & Units 10d ago 5mo ago   STREP SCREEN, RAPID NEGATIVE NEGATIVE  Positive R   Comment: STREP SCREEN IS NEGATIVE. ORDER PLATED STREP CULTURE     Resulting Agency  [facility]      INFECTIOUS MONONUCLEOSIS SCRN NEGATIVE NEGATIVE       Ref Range & Units 7d ago   EBV VCA IGG AB 0 - 0.8 AI <0.2    EBV VCA IGM AB 0 - 0.8 AI <0.2      CYTOMEGALOVIRUS IGG <0.91 ISR 0.17    Comment         ASSESSMENT:  Chronic tonsillitis  Tonsil, possible adenoid hypertrophy  Snoring/sleep disordered breathing    PLAN:  - We discussed indications for surgical intervention for chronic tonsillitis and the current guidelines, namely >7 infections in 1 year, 5+ infections per year for 2 years, or 3 or more infections per year for 3+ years.  -He is stable after his empiric course of antibiotics.  Given that his tonsils continue to be enlarged, and he has restless sleep at night, I did discuss possible surgical intervention with adenotonsillectomy.  I explained the risk benefits of this procedure including pain, infection, bleeding, return to the operating room, dehydration.  Given his tonsillar hypertrophy, his father would like to proceed.    -I discussed preop considerations with the patient at length, including the need for preoperative COVID-19 testing and need for self-isolation thereafter until the time of surgery.  .  He will also get preoperative labs, and a preoperative physical with his primary care physician.    - Discussed with the patient's parent treatment plan as above, and they are agreeable.  All questions and concerns were addressed and answered.     PENELOPE Carroll MD   initial encounter S50.12XA 923.10   2. Trauma T14.90XA 959.9   3. Injury of head, initial encounter S09.90XA 959.01                             Rene Gomez MD  12/04/19 1603

## 2020-06-07 NOTE — PATIENT INSTRUCTIONS
Using a Blood Sugar Log    You have diabetes. This means your body has trouble regulating a sugar called glucose. To help manage your diabetes, youll need to check your blood sugar level as directed by your healthcare provider. Keeping a log of your blood sugar levels will help you track your blood sugar readings. Its a simple and easy way to see how well you are controlling your diabetes.  Checking your blood sugar level  You can check your blood sugar level with a blood glucose meter. Youll first prick the side of your finger with a tiny lancet to draw a tiny drop of blood onto the test strip. Some glucose meters let you use another place on your body to test. But these other places should not be used in some cases as they may be inaccurate. Follow the instructions for your glucose meter. And talk with your healthcare provider before doing the test on other places.  The strip goes into the meter first, then a drop of blood is placed on the tip of the strip. The meter then shows a reading that tells you the level of your blood sugar. Your readings should be in your target range as often as possible. This means not too high or too low. Staying in this range helps lower your risk for complications. Your healthcare provider will help you figure out the target range that is best for you.  Tracking your readings  Every time you check your blood sugar, use your log to keep track of your readings. Your meter will also probably have a memory feature that your healthcare provider can check at your next visit. You may be advised by your healthcare provider to check your blood sugar in the morning, at bedtime, and before and after meals. Be sure to write down all of your numbers. Also use your log to record things that might have affected your blood sugar. Some examples include being sick, certain medicines, being physically active, feeling stressed, or skipping meals.   Lessons learned from your readings  Tracking your  blood sugar readings helps you see patterns. These patterns tell you how your actions affect your blood sugar. For instance, you may have higher numbers after eating certain foods or lower numbers after exercise. They just help you understand how to stay in your target range more often, so that your diabetes remains in good control.  Sharing your log with your healthcare team  Bring your blood sugar log and glucose meter with you to all of your healthcare appointments. This can help your healthcare team make changes to your treatment plan, if needed. This may involve making changes in what you eat, what medicines you take, or how much you exercise.  To learn more  The resources below can help you learn more:  · American Diabetes Association 627-346-3129 www.diabetes.org  · [employer] 192-435-7591 www.lighthouse.org  · National [facility] [place] 995-773-1770 www.nei.nih.gov  · [employer] 141-954-9569 www.hormone.org  Date Last Reviewed: 5/1/2016  © 5454-0324 [employer]. [address] [place] [address]. All rights reserved. This information is not intended as a substitute for professional medical care. Always follow your healthcare professional's instructions.        Step-by-Step  Checking Your Blood Sugar    Date Last Reviewed: 5/1/2016  © 0700-3452 [employer]. [address] [place] [address]. All rights reserved. This information is not intended as a substitute for professional medical care. Always follow your healthcare professional's instructions.        Cirrhosis    The liver is found on the right side of your belly (abdomen). It is just below the rib cage. The liver has many important jobs. It removes toxins from the blood. It also helps your blood clot to stop bleeding. Cirrhosis happens when the liver is scarred or injured. This damage is permanent. It can cause your liver to stop working (liver failure).   The two most common causes of  cirrhosis are long-term heavy alcohol use and having hepatitis B or C. Other things that can damage the liver include toxins, certain medicines, and certain viruses.  Common symptoms of cirrhosis include:  · Tiredness or weakness  · Loss of appetite  · Nausea and vomiting  · Easy bleeding and bruising  · Swelling of the belly (abdomen)  · Weight loss  · Yellowing of the eyes or skin (jaundice)  · Itching  · Confusion  Treatment helps ease symptoms and prevent more liver damage. You may also get treatment to fight the hepatitis virus. Quitting alcohol will help slow down the disease getting worse. It may also prevent more complications. If cirrhosis gets worse and becomes life threatening, you may need a liver transplant.   Home care  · Don't take medicines that can make liver damage worse. Your healthcare provider will tell you if any of the medicines you now take need to be changed. Talk with your provider before taking any medicine not prescribed. These include dietary supplements and herbs. Some of these may make liver damage worse.  · Talk with your healthcare provider about medicines that have acetaminophen or NSAIDs such as ibuprofen and naproxen. These can also harm your liver.   · Stop drinking alcohol. If you find it hard to stop drinking, seek professional help. Consider joining Alcoholics Anonymous or another type of treatment program for support.  · If you use IV drugs, you are at high risk for hepatitis B and C. Seek help to stop.   · Be sure to ask your healthcare provider about recommended vaccines. These include vaccines for viruses that can cause liver disease.  Follow-up care  Follow up with your healthcare provider or as advised by our staff.  For more information and to learn about support groups for people with liver disease, contact:  · American [employer] Foundation, www.liverfoundation.org, 973.913.2536  · Hep[employer] International, www.hepfi.org, 665.448.8956  When to seek medical  advice  Call your healthcare provider right away if you have any of the following:  · Rapid weight gain with increased size of your belly (abdomen) or leg swelling  · Yellow color of your skin or eyes (jaundice) gets worse  · Excess bleeding from cuts or injuries  Date Last Reviewed: 6/22/2015  © 0386-7985 [employer]. [address] [place] [address]. All rights reserved. This information is not intended as a substitute for professional medical care. Always follow your healthcare professional's instructions.        Treating Cirrhosis  Cirrhosis is a condition where the liver is damaged. Scar tissue slowly replaces healthy tissue. Treatment can control or slow liver scarring. Follow your healthcare providers instructions closely to get the most out of your treatment. And ask your family and friends for support.  Making a treatment plan  You and your healthcare provider will decide on a treatment plan thats best for you. The plan may include one or more of the following:  · Not drinking alcohol. Heavy alcohol use can damage the liver. Once the liver is damaged, even a small amount of alcohol can cause problems. You can slow down the cirrhosis getting worse if you stop all alcohol use.   · Taking medicines. You may need to take these to treat some causes of cirrhosis. These include infection or a bile duct blockage. You may also need medicine to help your blood clot. And you may need medicine if your immune system is attacking the liver or bile ducts. You should not take certain other medicines if you have cirrhosis. These include NSAIDs such as ibuprofen and naproxen.   · Treating symptoms. Cirrhosis can cause swelling in your stomach and legs. A low-salt diet can help ease this symptom. So can taking water pills (diuretics).  · Eating healthy foods  · Losing extra weight. This is especially important if you are overweight, or have diabetes, high blood pressure, or high cholesterol and  triglycerides. Controlling these condition can slow down the cirrhosis getting worse. Exercise can help you lose weight.   · Removing iron from your blood. Sometimes the amount of iron in your liver is higher than normal. Your doctor may remove extra iron from your blood.  Severe cases of cirrhosis may need special treatments. Your healthcare provider can discuss them with you.  Vaccines  Be sure to ask your healthcare provider about recommended vaccines. These include vaccines for viruses than can cause liver disease.  Don't drink alcohol  Alcohol use can destroy liver cells. If you have problems quitting alcohol, ask your healthcare provider to get the support you need. Your healthcare provider may be able to suggest local groups that can help you stop drinking.   Date Last Reviewed: 6/1/2016  © 7688-6075 [employer]. [address] [place] [address], [place] [address]. All rights reserved. This information is not intended as a substitute for professional medical care. Always follow your healthcare professional's instructions.

## 2020-06-09 ENCOUNTER — SSC ENCOUNTER (OUTPATIENT)
Dept: ADMINISTRATIVE | Facility: OTHER | Age: 75
End: 2020-06-09

## 2020-06-09 NOTE — PROGRESS NOTES
Please note the following patient's information was forwarded to [facility] ([facility]) for case management and/or  on 6/9/2020.    Please contact Ext. [phone] with any questions.    Thank you,    Clover Bolaños, [facility]  Outpatient Case Mgmnt  [phone]

## 2020-06-10 ENCOUNTER — PATIENT OUTREACH (OUTPATIENT)
Dept: ADMINISTRATIVE | Facility: OTHER | Age: 75
End: 2020-06-10

## 2020-06-11 ENCOUNTER — TELEPHONE (OUTPATIENT)
Dept: NEUROLOGY | Facility: CLINIC | Age: 75
End: 2020-06-11

## 2020-06-12 ENCOUNTER — OFFICE VISIT (OUTPATIENT)
Dept: PAIN MEDICINE | Facility: CLINIC | Age: 75
End: 2020-06-12
Payer: MEDICARE

## 2020-06-12 VITALS
SYSTOLIC BLOOD PRESSURE: 138 MMHG | HEART RATE: 68 BPM | HEIGHT: 69 IN | DIASTOLIC BLOOD PRESSURE: 79 MMHG | BODY MASS INDEX: 29.33 KG/M2 | WEIGHT: 198 LBS

## 2020-06-12 DIAGNOSIS — M17.10 PRIMARY OSTEOARTHRITIS OF KNEE, UNSPECIFIED LATERALITY: ICD-10-CM

## 2020-06-12 DIAGNOSIS — M25.562 CHRONIC PAIN OF LEFT KNEE: ICD-10-CM

## 2020-06-12 DIAGNOSIS — M47.819 FACET ARTHROPATHY: ICD-10-CM

## 2020-06-12 DIAGNOSIS — M50.30 DDD (DEGENERATIVE DISC DISEASE), CERVICAL: Primary | ICD-10-CM

## 2020-06-12 DIAGNOSIS — G89.29 CHRONIC PAIN OF LEFT KNEE: ICD-10-CM

## 2020-06-12 DIAGNOSIS — G89.4 CHRONIC PAIN DISORDER: ICD-10-CM

## 2020-06-12 PROCEDURE — 1125F PR PAIN SEVERITY QUANTIFIED, PAIN PRESENT: ICD-10-PCS | Mod: S$GLB,,, | Performed by: PHYSICIAN ASSISTANT

## 2020-06-12 PROCEDURE — 99214 PR OFFICE/OUTPT VISIT, EST, LEVL IV, 30-39 MIN: ICD-10-PCS | Mod: S$GLB,,, | Performed by: PHYSICIAN ASSISTANT

## 2020-06-12 PROCEDURE — 99499 UNLISTED E&M SERVICE: CPT | Mod: S$GLB,,, | Performed by: PHYSICIAN ASSISTANT

## 2020-06-12 PROCEDURE — 99214 OFFICE O/P EST MOD 30 MIN: CPT | Mod: S$GLB,,, | Performed by: PHYSICIAN ASSISTANT

## 2020-06-12 PROCEDURE — 3078F PR MOST RECENT DIASTOLIC BLOOD PRESSURE < 80 MM HG: ICD-10-PCS | Mod: CPTII,S$GLB,, | Performed by: PHYSICIAN ASSISTANT

## 2020-06-12 PROCEDURE — 1101F PT FALLS ASSESS-DOCD LE1/YR: CPT | Mod: CPTII,S$GLB,, | Performed by: PHYSICIAN ASSISTANT

## 2020-06-12 PROCEDURE — 3075F SYST BP GE 130 - 139MM HG: CPT | Mod: CPTII,S$GLB,, | Performed by: PHYSICIAN ASSISTANT

## 2020-06-12 PROCEDURE — 3075F PR MOST RECENT SYSTOLIC BLOOD PRESS GE 130-139MM HG: ICD-10-PCS | Mod: CPTII,S$GLB,, | Performed by: PHYSICIAN ASSISTANT

## 2020-06-12 PROCEDURE — 3078F DIAST BP <80 MM HG: CPT | Mod: CPTII,S$GLB,, | Performed by: PHYSICIAN ASSISTANT

## 2020-06-12 PROCEDURE — 1159F MED LIST DOCD IN RCRD: CPT | Mod: S$GLB,,, | Performed by: PHYSICIAN ASSISTANT

## 2020-06-12 PROCEDURE — 1159F PR MEDICATION LIST DOCUMENTED IN MEDICAL RECORD: ICD-10-PCS | Mod: S$GLB,,, | Performed by: PHYSICIAN ASSISTANT

## 2020-06-12 PROCEDURE — 1125F AMNT PAIN NOTED PAIN PRSNT: CPT | Mod: S$GLB,,, | Performed by: PHYSICIAN ASSISTANT

## 2020-06-12 PROCEDURE — 1101F PR PT FALLS ASSESS DOC 0-1 FALLS W/OUT INJ PAST YR: ICD-10-PCS | Mod: CPTII,S$GLB,, | Performed by: PHYSICIAN ASSISTANT

## 2020-06-12 PROCEDURE — 99999 PR PBB SHADOW E&M-EST. PATIENT-LVL IV: ICD-10-PCS | Mod: PBBFAC,,, | Performed by: PHYSICIAN ASSISTANT

## 2020-06-12 PROCEDURE — 99499 RISK ADDL DX/OHS AUDIT: ICD-10-PCS | Mod: S$GLB,,, | Performed by: PHYSICIAN ASSISTANT

## 2020-06-12 PROCEDURE — 99999 PR PBB SHADOW E&M-EST. PATIENT-LVL IV: CPT | Mod: PBBFAC,,, | Performed by: PHYSICIAN ASSISTANT

## 2020-06-12 NOTE — PROGRESS NOTES
"Referring Physician: No ref. provider found    PCP: Crispin Garcia MD      CC: neck and left shoulder pain; knee pain    Interval history:  Steve June Jr. is a 75 y.o. male with  neck and left shoulder pain who presents today for f/u and medication refill.  He had a series of 2 Euflexxa injections that worsened his knee pain initially. Pain has now resolved. He has tried physical therapy which did not provide much benefit.   Steroid njections performed have only given him shortlived relief.  In the past he underwent visco supplementation injections for his left knee with benefit.  He continues to have neck pain. He has a history of cervical DDD.  However, he continues to have low platelets and we are unable to provide any  interventional procedures.  He takes oxycodone 10 mg every 6 hours as needed with mild to moderate benefit. In the past UDS was positive for THC. He ate marijuana cookies in [place]. Denies recent use. Previous UDS consistent. Oxycodone does cause some drowsiness. Pain today is rated 8/10.    Prior HPI:   Steve June Jr. is a 75 y.o. male referred to us for lower back and knee pain.  He has significant history of pancytopenia, cirrhosis.  He presents with constant aching, sharp pain in his lower back as well as his left knee.  Pain worsens sitting, standing, bending, walking.  Pain improves with rest.  X-rays performed of the lumbar spine as well as knee shows left knee osteoarthritis.  He has tried physical therapy with minimal benefit.  He currently takes Norco 10 mg every 6 hours as needed with mild to moderate benefit.  He is unable to have any procedures due to his thrombocytopenia.  He also has ventral hernia, but surgical attention is currently not recommended due to his thrombocytopenia.  His main concern today is wishing to decrease his opioid medications.  He states being very "foggy" with his current medications.  He denies any increased sedation.  He denies any " weakness.  No bowel bladder changes.    ROS:  CONSTITUTIONAL: No fevers, chills, night sweats, wt. loss, appetite changes  SKIN: no rashes or itching  ENT: No headaches, head trauma, vision changes, or eye pain  LYMPH NODES: None noticed   CV: No chest pain, palpitations.   RESP: No shortness of breath, dyspnea on exertion, cough, wheezing, or hemoptysis  GI: No nausea, emesis, diarrhea, constipation, melena, hematochezia, pain.    : No dysuria, hematuria, urgency, or frequency   HEME: No easy bruising, bleeding problems  PSYCHIATRIC: No depression, anxiety, psychosis, hallucinations.  NEURO: No seizures, memory loss, dizziness or difficulty sleeping  MSK: + History of present illness      Past Medical History:   Diagnosis Date    Abnormal thyroid function test     Allergy     Seasonal    Anemia     Anemia due to blood loss 7/2/2014    Arthritis     Gaviria esophagus     Basal cell carcinoma     right forearm    Basal cell carcinoma 12/2011    lower post neck    Basal cell carcinoma 04/23/2019    left posterior helix    Cancer     skin CA    Cataract     Cirrhosis     Diabetes mellitus     Diabetes mellitus, type 2     Encounter for blood transfusion     Esophageal varices in cirrhosis     grade II on 7/12 EGD    Gastritis     on 7/12 EGD    GERD (gastroesophageal reflux disease) 2/28/2015    Hard of hearing     Hiatal hernia     History of hepatitis C 8/10/2012    tx with harvoni x 41 days (started 10/22/15). SVR4     Hoarseness 2/28/2015    Hx of colonic polyps 01/10/2011    per colonoscopy report in media    Hypercholesteremia     Hypersplenism     Hypertension     No meds    Pain management 12/10/2014    Petechial hemorrhage 11/25/2015    Lower extremities bilat     Portal hypertensive gastropathy     on 7/12 EGD    Squamous cell carcinoma 04/23/2019    right preauricular cheek, right temple    Thrombocytopenia     Type II or unspecified type diabetes mellitus with neurological  manifestations, uncontrolled(250.62) 2013    Valvular heart disease     mild MR 12     Past Surgical History:   Procedure Laterality Date    CATARACT EXTRACTION  1/10/13    left eye    CATARACT EXTRACTION      right eye    CHOLECYSTECTOMY      COLONOSCOPY      EYE SURGERY      Cataract surgery to right eye    KNEE ARTHROSCOPY W/ MENISCAL REPAIR      KNEE CARTILAGE SURGERY      left knee    KNEE SURGERY  2006    left    SKIN LESION EXCISION      TONSILLECTOMY      UPPER GASTROINTESTINAL ENDOSCOPY       Family History   Problem Relation Age of Onset    Leukemia Mother     Cancer Mother         bone    Alcohol abuse Father     Cirrhosis Father         EtOH induced    No Known Problems Daughter     No Known Problems Son     No Known Problems Daughter     No Known Problems Daughter     No Known Problems Daughter     No Known Problems Sister     Amblyopia Neg Hx     Blindness Neg Hx     Cataracts Neg Hx     Diabetes Neg Hx     Glaucoma Neg Hx     Hypertension Neg Hx     Macular degeneration Neg Hx     Retinal detachment Neg Hx     Strabismus Neg Hx     Stroke Neg Hx     Thyroid disease Neg Hx     Psoriasis Neg Hx     Lupus Neg Hx     Eczema Neg Hx     Acne Neg Hx     Melanoma Neg Hx      Social History     Socioeconomic History    Marital status:      Spouse name: Not on file    Number of children: 5    Years of education: Not on file    Highest education level: Not on file   Occupational History    Not on file   Social Needs    Financial resource strain: Not very hard    Food insecurity:     Worry: Never true     Inability: Never true    Transportation needs:     Medical: No     Non-medical: No   Tobacco Use    Smoking status: Former Smoker     Packs/day: 1.00     Years: 25.00     Pack years: 25.00     Last attempt to quit: 2000     Years since quittin.8    Smokeless tobacco: Never Used   Substance and Sexual Activity    Alcohol use: Yes      "Frequency: 2-3 times a week     Drinks per session: 1 or 2     Binge frequency: Less than monthly     Comment: rarely    Drug use: No    Sexual activity: Never   Lifestyle    Physical activity:     Days per week: 0 days     Minutes per session: Not on file    Stress: Only a little   Relationships    Social connections:     Talks on phone: Three times a week     Gets together: Never     Attends Church service: Not on file     Active member of club or organization: No     Attends meetings of clubs or organizations: Never     Relationship status:    Other Topics Concern    Not on file   Social History Narrative    He is .  He has children.  He is currently retired.         Medications/Allergies: See med card    Vitals:    06/12/20 1304   BP: 138/79   Pulse: 68   Weight: 89.8 kg (198 lb)   Height: 5' 9" (1.753 m)   PainSc:   8   PainLoc: Neck     Physical exam:    GENERAL: A and O x3, the patient appears well groomed and is in no acute distress.  Skin: No rashes or obvious lesions  HEENT: normocephalic, atraumatic  CARDIOVASCULAR:  RRR  LUNGS: non labored breathing  ABDOMEN: soft, nontender  UPPER EXTREMITIES: Normal alignment, normal range of motion, no atrophy, no skin changes,  hair growth and nail growth normal and equal bilaterally. No swelling, no tenderness.    LOWER EXTREMITIES:  Normal alignment, normal range of motion, no atrophy, no skin changes,  hair growth and nail growth normal and equal bilaterally. No swelling, no tenderness.    LUMBAR SPINE  Lumbar spine: ROM is full with flexion extension and oblique extension with moderate increased pain.    Erasmo's test causes no increased pain on either side.    Supine straight leg raise is negative bilaterally.    Internal and external rotation of the hip causes no increased pain on either side.  Myofascial exam: No tenderness to palpation across lumbar paraspinous muscles.      MENTAL STATUS: normal orientation, speech, language, and fund " of knowledge for social situation.  Emotional state appropriate.    CRANIAL NERVES:  II:  PERRL bilaterally,   III,IV,VI: EOMI.    V:  Facial sensation equal bilaterally  VII:  Facial motor function normal.  VIII:  Hearing equal to finger rub bilaterally  IX/X: Gag normal, palate symmetric  XI:  Shoulder shrug equal, head turn equal  XII:  Tongue midline without fasciculations      MOTOR: Tone and bulk: normal bilateral upper and lower Strength: normal   Delt Bi Tri WE WF     R 5 5 5 5 5 5   L 5 5 5 5 5 5     IP ADD ABD Quad TA Gas HAM  R 5 5 5 5 5 5 5  L 5 5 5 5 5 5 5    SENSATION: Light touch and pinprick intact bilaterally  REFLEXES: normal, symmetric, nonbrisk.  Toes down, no clonus. No hoffmans.  GAIT: normal rise, base, steps, and arm swing.      Imaging:  Xray L-spine 4/2013   Alignment is otherwise normal.  Vertebral body heights and disc space heights are relatively well maintained.  Vertebral end plate osteophytes are present anteriorly   at multiple levels.  The facet joints and posterior elements are unremarkable       Xray Knee 12/2013  Degenerative change of the knees, left greater than right.    Assessment:  Steve June Jr. is a 75 y.o. male with neck, back and left knee pain  1. DDD (degenerative disc disease), cervical    2. Facet arthropathy    3. Primary osteoarthritis of knee, unspecified laterality    4. Chronic pain of left knee    5. Chronic pain disorder         Plan:  1. I have stressed the importance of physical activity and exercise to improve overall health.  Continue PT exercises learned at home.  2.  Any interventional procedures will be deferred due to his low platelet count.    3.  Monitor progress from left knee Euflexxa series  4. Percocet 10mg q 8 hrs as needed.  Hold for sedation.  reviewed. Previous UDS consistent. UDS next clinic visit   5.  Follow-up 3 months  All medication management was performed by Dr. Kapil Mario

## 2020-06-24 ENCOUNTER — OFFICE VISIT (OUTPATIENT)
Dept: PODIATRY | Facility: CLINIC | Age: 75
End: 2020-06-24
Payer: MEDICARE

## 2020-06-24 VITALS
BODY MASS INDEX: 29.1 KG/M2 | DIASTOLIC BLOOD PRESSURE: 78 MMHG | HEART RATE: 68 BPM | WEIGHT: 196.5 LBS | SYSTOLIC BLOOD PRESSURE: 144 MMHG | HEIGHT: 69 IN | TEMPERATURE: 98 F

## 2020-06-24 DIAGNOSIS — L90.9 FAT PAD ATROPHY OF FOOT: ICD-10-CM

## 2020-06-24 DIAGNOSIS — E11.51 TYPE II DIABETES MELLITUS WITH PERIPHERAL CIRCULATORY DISORDER: ICD-10-CM

## 2020-06-24 DIAGNOSIS — E08.42 DIABETIC POLYNEUROPATHY ASSOCIATED WITH DIABETES MELLITUS DUE TO UNDERLYING CONDITION: ICD-10-CM

## 2020-06-24 DIAGNOSIS — R26.2 DIFFICULTY IN WALKING, NOT ELSEWHERE CLASSIFIED: ICD-10-CM

## 2020-06-24 DIAGNOSIS — L60.2 ONYCHOGRYPHOSIS: ICD-10-CM

## 2020-06-24 DIAGNOSIS — B35.3 TINEA PEDIS OF BOTH FEET: ICD-10-CM

## 2020-06-24 DIAGNOSIS — I73.9 PVD (PERIPHERAL VASCULAR DISEASE): Primary | ICD-10-CM

## 2020-06-24 DIAGNOSIS — L84 PRE-ULCERATIVE CALLUSES: ICD-10-CM

## 2020-06-24 PROCEDURE — 3078F DIAST BP <80 MM HG: CPT | Mod: S$GLB,,, | Performed by: PODIATRIST

## 2020-06-24 PROCEDURE — 3077F SYST BP >= 140 MM HG: CPT | Mod: S$GLB,,, | Performed by: PODIATRIST

## 2020-06-24 PROCEDURE — 1125F AMNT PAIN NOTED PAIN PRSNT: CPT | Mod: S$GLB,,, | Performed by: PODIATRIST

## 2020-06-24 PROCEDURE — 3046F HEMOGLOBIN A1C LEVEL >9.0%: CPT | Mod: S$GLB,,, | Performed by: PODIATRIST

## 2020-06-24 PROCEDURE — 11056 ROUTINE FOOT CARE: ICD-10-PCS | Mod: Q8,S$GLB,, | Performed by: PODIATRIST

## 2020-06-24 PROCEDURE — 3288F FALL RISK ASSESSMENT DOCD: CPT | Mod: S$GLB,,, | Performed by: PODIATRIST

## 2020-06-24 PROCEDURE — 3078F PR MOST RECENT DIASTOLIC BLOOD PRESSURE < 80 MM HG: ICD-10-PCS | Mod: S$GLB,,, | Performed by: PODIATRIST

## 2020-06-24 PROCEDURE — 1125F PR PAIN SEVERITY QUANTIFIED, PAIN PRESENT: ICD-10-PCS | Mod: S$GLB,,, | Performed by: PODIATRIST

## 2020-06-24 PROCEDURE — 3288F PR FALLS RISK ASSESSMENT DOCUMENTED: ICD-10-PCS | Mod: S$GLB,,, | Performed by: PODIATRIST

## 2020-06-24 PROCEDURE — 3077F PR MOST RECENT SYSTOLIC BLOOD PRESSURE >= 140 MM HG: ICD-10-PCS | Mod: S$GLB,,, | Performed by: PODIATRIST

## 2020-06-24 PROCEDURE — 1100F PTFALLS ASSESS-DOCD GE2>/YR: CPT | Mod: S$GLB,,, | Performed by: PODIATRIST

## 2020-06-24 PROCEDURE — 11056 PARNG/CUTG B9 HYPRKR LES 2-4: CPT | Mod: Q8,S$GLB,, | Performed by: PODIATRIST

## 2020-06-24 PROCEDURE — 11721 ROUTINE FOOT CARE: ICD-10-PCS | Mod: 59,Q8,S$GLB, | Performed by: PODIATRIST

## 2020-06-24 PROCEDURE — 1100F PR PT FALLS ASSESS DOC 2+ FALLS/FALL W/INJURY/YR: ICD-10-PCS | Mod: S$GLB,,, | Performed by: PODIATRIST

## 2020-06-24 PROCEDURE — 99203 OFFICE O/P NEW LOW 30 MIN: CPT | Mod: 25,S$GLB,, | Performed by: PODIATRIST

## 2020-06-24 PROCEDURE — 11721 DEBRIDE NAIL 6 OR MORE: CPT | Mod: 59,Q8,S$GLB, | Performed by: PODIATRIST

## 2020-06-24 PROCEDURE — 3046F PR MOST RECENT HEMOGLOBIN A1C LEVEL > 9.0%: ICD-10-PCS | Mod: S$GLB,,, | Performed by: PODIATRIST

## 2020-06-24 PROCEDURE — 99203 PR OFFICE/OUTPT VISIT, NEW, LEVL III, 30-44 MIN: ICD-10-PCS | Mod: 25,S$GLB,, | Performed by: PODIATRIST

## 2020-06-24 PROCEDURE — 1159F MED LIST DOCD IN RCRD: CPT | Mod: S$GLB,,, | Performed by: PODIATRIST

## 2020-06-24 PROCEDURE — 1159F PR MEDICATION LIST DOCUMENTED IN MEDICAL RECORD: ICD-10-PCS | Mod: S$GLB,,, | Performed by: PODIATRIST

## 2020-06-24 NOTE — PROGRESS NOTES
[address] [facility] [address]  [place] [address]  Phone: [phone]   [phone]    Patient's PCP:Crispin Garcia MD  Referring Provider: No ref. provider found    Subjective:      Chief Complaint:: Callouses (bilateral) and Toe Pain (bilateral 5th toe)    HPI  Steve June Jr. is a 75 y.o. male who presents with a complaint of bilateral callus and bilateral 5th toe pain lasting for about 4-6 months. Current symptoms include aching pains and sensitive/tender.  Aggravating factors are walking/standing, pressure. Symptoms have gotten worse. Treatment to date have included periodic trimming by daughter and callus trim by daughter. Patients rates pain 7-8/10 on pain scale. Patient also presents with complaints of long, thick toenails and callus both feet.  Gradual onset, worsening over past several weeks, aggravated by increased weight bearing, shoe gear, pressure.  Periodic debridement helps symptoms.    Systemic Doctor: Dr. Crispin Garcia MD / Madan Marks NP  Date Last Seen: 02/04/2020 / 05/29/20  Blood Sugar: 340 (estimate)  Hemoglobin A1c: 11.4 (02/04/20)    Vitals:    06/24/20 1509   BP: (!) 144/78   Pulse: 68   Temp: 97.6 °F (36.4 °C)     Shoe Size: 9.5    Past Surgical History:   Procedure Laterality Date    CATARACT EXTRACTION  1/10/13    left eye    CATARACT EXTRACTION      right eye    CHOLECYSTECTOMY      COLONOSCOPY      EYE SURGERY      Cataract surgery to right eye    KNEE ARTHROSCOPY W/ MENISCAL REPAIR      KNEE CARTILAGE SURGERY      left knee    KNEE SURGERY  12/2006    left    SKIN LESION EXCISION      TONSILLECTOMY      UPPER GASTROINTESTINAL ENDOSCOPY       Past Medical History:   Diagnosis Date    Abnormal thyroid function test     Allergy     Seasonal    Anemia     Anemia due to blood loss 7/2/2014    Arthritis     Gaviria esophagus     Basal cell carcinoma     right forearm    Basal cell carcinoma 12/2011    lower post neck    Basal cell carcinoma  04/23/2019    left posterior helix    Cancer     skin CA    Cataract     Cirrhosis     Diabetes mellitus     Diabetes mellitus, type 2     Encounter for blood transfusion     Esophageal varices in cirrhosis     grade II on 7/12 EGD    Gastritis     on 7/12 EGD    GERD (gastroesophageal reflux disease) 2/28/2015    Hard of hearing     Hiatal hernia     History of hepatitis C 8/10/2012    tx with harvoni x 41 days (started 10/22/15). SVR4     Hoarseness 2/28/2015    Hx of colonic polyps 01/10/2011    per colonoscopy report in media    Hypercholesteremia     Hypersplenism     Hypertension     No meds    Pain management 12/10/2014    Petechial hemorrhage 11/25/2015    Lower extremities bilat     Portal hypertensive gastropathy     on 7/12 EGD    Squamous cell carcinoma 04/23/2019    right preauricular cheek, right temple    Thrombocytopenia     Type II or unspecified type diabetes mellitus with neurological manifestations, uncontrolled(250.62) 12/24/2013    Valvular heart disease     mild MR 12     Family History   Problem Relation Age of Onset    Leukemia Mother     Cancer Mother         bone    Alcohol abuse Father     Cirrhosis Father         EtOH induced    No Known Problems Daughter     No Known Problems Son     No Known Problems Daughter     No Known Problems Daughter     No Known Problems Daughter     No Known Problems Sister     Amblyopia Neg Hx     Blindness Neg Hx     Cataracts Neg Hx     Diabetes Neg Hx     Glaucoma Neg Hx     Hypertension Neg Hx     Macular degeneration Neg Hx     Retinal detachment Neg Hx     Strabismus Neg Hx     Stroke Neg Hx     Thyroid disease Neg Hx     Psoriasis Neg Hx     Lupus Neg Hx     Eczema Neg Hx     Acne Neg Hx     Melanoma Neg Hx         Social History:   Marital Status:   Alcohol History:  reports current alcohol use.  Tobacco History:  reports that he quit smoking about 19 years ago. He has a 25.00 pack-year smoking  "history. He has never used smokeless tobacco.  Drug History:  reports no history of drug use.    Review of patient's allergies indicates:   Allergen Reactions    Adhesive tape-silicones Other (See Comments)     pulls skin off    Doxycycline      Dizzy. "Just didn't feel right".       Current Outpatient Medications   Medication Sig Dispense Refill    albuterol (VENTOLIN HFA) 90 mcg/actuation inhaler Inhale 2 puffs into the lungs every 6 (six) hours as needed for Wheezing. Rescue 1 Inhaler 11    blood sugar diagnostic Strp To check BG 3 times daily, to use with insurance preferred meter 300 strip 11    blood-glucose meter kit To check BG 3 times daily, to use with insurance preferred meter 1 each 0    insulin detemir U-100 (LEVEMIR FLEXTOUCH U-100 INSULN) 100 unit/mL (3 mL) SubQ InPn pen Inject 50 Units into the skin every evening. Increase as directed, max daily dose 60 units/day 45 mL 3    insulin lispro (HUMALOG KWIKPEN INSULIN) 100 unit/mL pen 5 unit ac breakfast 15 units ac dinner 30 mL 3    lactulose (CHRONULAC) 20 gram/30 mL Soln Take 30 mLs (20 g total) by mouth 2 (two) times daily. 1800 mL 11    lancets Misc To check BG 3 times daily, to use with insurance preferred meter 300 each 11    metFORMIN (GLUCOPHAGE-XR) 500 MG XR 24hr tablet Take 4 tablets (2,000 mg total) by mouth once daily. 360 tablet 3    [START ON 8/14/2020] oxyCODONE-acetaminophen (PERCOCET)  mg per tablet Take 1 tablet by mouth every 6 (six) hours as needed for Pain. 120 tablet 0    pantoprazole (PROTONIX) 40 MG tablet Take 1 tablet (40 mg total) by mouth once daily. 90 tablet 0    pen needle, diabetic (MAXICOMFORT II PEN NEEDLE) 31 gauge x 1/4" Ndle 1 each by Misc.(Non-Drug; Combo Route) route once daily. 100 each 3    pen needle, diabetic 32 gauge x 5/32" Ndle Use as directed 100 each 3    propranolol (INDERAL LA) 60 MG 24 hr capsule Take 1 capsule (60 mg total) by mouth once daily. 90 capsule 3     No current " facility-administered medications for this visit.        Review of Systems   Constitutional: Positive for fatigue. Negative for chills, fever and unexpected weight change.   HENT: Negative for hearing loss and trouble swallowing.    Eyes: Negative for photophobia and visual disturbance.   Respiratory: Negative for cough, shortness of breath and wheezing.    Cardiovascular: Negative for chest pain, palpitations and leg swelling.   Gastrointestinal: Negative for abdominal pain and nausea.   Genitourinary: Negative for dysuria and frequency.   Musculoskeletal: Positive for arthralgias, joint swelling, myalgias and neck pain. Negative for back pain.   Skin: Negative for rash.   Neurological: Positive for weakness and numbness. Negative for tremors, seizures and headaches.   Hematological: Does not bruise/bleed easily.         Objective:        Physical Exam:   Foot Exam  Physical Exam   Musculoskeletal:        Feet:      Physical examination: General: Pt. is well-developed, well-nourished, appears stated age, in no acute distress, alert and oriented x 3.    Vascular: Dorsalis pedis pulses are 1/4 bilaterally and posterior tibial pulses are diminished Bilaterally. Toes are cool to touch. Feet are warm proximally.    There is decreased digital hair. Skin is atrophic, slightly hyperpigmented, and mildly edematous. Capillary refill greater than 5 seconds all toes/distal feet    Neurologic: [place] 5.07 monofilament is decreased bilateral feet. Sharp/dull sensation absent Bilateral feet.    Vibratory sensation absent bilateral    Musculoskeletal: adequate joint range of motion without pain, limitation, nor crepitation Bilateral feet and ankle joints. Muscle strength is 5/5 in all groups bilaterally.    Lymphatics: no lymphangitic streaking bilaterally.      Dry scale with superficial flakes over an erythematous base bilateral feet in moccasin distribution without ulceration, drainage, pus, tracking, fluctuance,  "malodor, or cardinal signs infection.    Toenails 1st, 2nd, 3rd, 4th, 5th  bilateral are hypertrophic, dystrophic, discolored tanish brown with tan, gray crumbly subungual debris.  Tender to distal nail plate pressure, without periungual skin abnormality of each.      Imaging:            Assessment:       1. PVD (peripheral vascular disease)    2. Type II diabetes mellitus with peripheral circulatory disorder    3. Diabetic polyneuropathy associated with diabetes mellitus due to underlying condition    4. Fat pad atrophy of foot    5. Tinea pedis of both feet    6. Pre-ulcerative calluses    7. Difficulty in walking, not elsewhere classified    8. Onychogryphosis      Plan:   PVD (peripheral vascular disease)    Type II diabetes mellitus with peripheral circulatory disorder    Diabetic polyneuropathy associated with diabetes mellitus due to underlying condition    Fat pad atrophy of foot    Tinea pedis of both feet    Pre-ulcerative calluses    Difficulty in walking, not elsewhere classified    Onychogryphosis      No follow-ups on file.    Routine Foot Care    Date/Time: 6/24/2020 2:40 PM  Performed by: Selena Felix DPM  Authorized by: Selena Felix DPM     Time out: Immediately prior to procedure a "time out" was called to verify the correct patient, procedure, equipment, support staff and site/side marked as required.    Consent Done?:  Not Needed  Hyperkeratotic Skin Lesions?: Yes    Number of trimmed lesions:  2  Location(s):  Right 1st Metatarsal Head and Left 5th Metatarsal Head    Nail Care Type:  Debride  Location(s): All  (Left 1st Toe, Left 3rd Toe, Left 2nd Toe, Left 4th Toe, Left 5th Toe, Right 1st Toe, Right 2nd Toe, Right 3rd Toe, Right 4th Toe and Right 5th Toe)  Patient tolerance:  Patient tolerated the procedure well with no immediate complications     Instruments Used: Nail Nipper   Manually reduced with electric .         Counseling:     I provided patient education verbally regarding: "   Patient diagnosis, treatment options, as well as alternatives, risks, and benefits.     This note was created using Dragon voice recognition software that occasionally misinterpreted phrases or words.     Counseled patient on the aspects of diabetes and how it pertains to the feet.  I explained the importance of proper diabetic foot care and how it is essential for the health of their feet.      Shoe inspection. Patient instructed on proper foot hygeine. We discussed wearing proper shoe gear, daily foot inspections, never walking without protective shoe gear, never putting sharp instruments to feet, routine podiatric nail visits every 2-3 months.              Answers for HPI/ROS submitted by the patient on 6/23/2020   Chronicity: chronic  Onset: more than 1 year ago  Frequency: constantly  Progression since onset: gradually worsening  Aggravating factors: standing, stress, twisting, walking  Treatments tried: acetaminophen, heat, ice, oral narcotics  Improvement on treatment: no relief

## 2020-07-01 ENCOUNTER — PATIENT OUTREACH (OUTPATIENT)
Dept: ADMINISTRATIVE | Facility: OTHER | Age: 75
End: 2020-07-01

## 2020-07-01 NOTE — PROGRESS NOTES
Chart was reviewed for overdue Proactive [facility] Encounters (NICHOLE)  topics  Updates were requested from care everywhere  Health Maintenance has been updated

## 2020-07-28 ENCOUNTER — PATIENT OUTREACH (OUTPATIENT)
Dept: ADMINISTRATIVE | Facility: OTHER | Age: 75
End: 2020-07-28

## 2020-07-31 ENCOUNTER — OFFICE VISIT (OUTPATIENT)
Dept: FAMILY MEDICINE | Facility: CLINIC | Age: 75
End: 2020-07-31
Payer: MEDICARE

## 2020-07-31 VITALS
TEMPERATURE: 97 F | DIASTOLIC BLOOD PRESSURE: 64 MMHG | HEIGHT: 69 IN | OXYGEN SATURATION: 93 % | BODY MASS INDEX: 28.93 KG/M2 | HEART RATE: 78 BPM | SYSTOLIC BLOOD PRESSURE: 116 MMHG | WEIGHT: 195.31 LBS | RESPIRATION RATE: 17 BRPM

## 2020-07-31 DIAGNOSIS — E11.59 HYPERTENSION ASSOCIATED WITH DIABETES: ICD-10-CM

## 2020-07-31 DIAGNOSIS — E11.8 TYPE 2 DIABETES MELLITUS WITH COMPLICATION, WITH LONG-TERM CURRENT USE OF INSULIN: ICD-10-CM

## 2020-07-31 DIAGNOSIS — J02.9 PHARYNGITIS, UNSPECIFIED ETIOLOGY: Primary | ICD-10-CM

## 2020-07-31 DIAGNOSIS — Z79.4 TYPE 2 DIABETES MELLITUS WITH COMPLICATION, WITH LONG-TERM CURRENT USE OF INSULIN: ICD-10-CM

## 2020-07-31 DIAGNOSIS — I15.2 HYPERTENSION ASSOCIATED WITH DIABETES: ICD-10-CM

## 2020-07-31 DIAGNOSIS — B34.9 ACUTE VIRAL SYNDROME: ICD-10-CM

## 2020-07-31 LAB — GROUP A STREP, MOLECULAR: NEGATIVE

## 2020-07-31 PROCEDURE — 3074F PR MOST RECENT SYSTOLIC BLOOD PRESSURE < 130 MM HG: ICD-10-PCS | Mod: CPTII,S$GLB,, | Performed by: NURSE PRACTITIONER

## 2020-07-31 PROCEDURE — 3046F PR MOST RECENT HEMOGLOBIN A1C LEVEL > 9.0%: ICD-10-PCS | Mod: CPTII,S$GLB,, | Performed by: NURSE PRACTITIONER

## 2020-07-31 PROCEDURE — 3008F BODY MASS INDEX DOCD: CPT | Mod: CPTII,S$GLB,, | Performed by: NURSE PRACTITIONER

## 2020-07-31 PROCEDURE — 1101F PR PT FALLS ASSESS DOC 0-1 FALLS W/OUT INJ PAST YR: ICD-10-PCS | Mod: CPTII,S$GLB,, | Performed by: NURSE PRACTITIONER

## 2020-07-31 PROCEDURE — 99999 PR PBB SHADOW E&M-EST. PATIENT-LVL IV: CPT | Mod: PBBFAC,,, | Performed by: NURSE PRACTITIONER

## 2020-07-31 PROCEDURE — 3046F HEMOGLOBIN A1C LEVEL >9.0%: CPT | Mod: CPTII,S$GLB,, | Performed by: NURSE PRACTITIONER

## 2020-07-31 PROCEDURE — 87651 STREP A DNA AMP PROBE: CPT | Mod: PO

## 2020-07-31 PROCEDURE — 3008F PR BODY MASS INDEX (BMI) DOCUMENTED: ICD-10-PCS | Mod: CPTII,S$GLB,, | Performed by: NURSE PRACTITIONER

## 2020-07-31 PROCEDURE — 1125F AMNT PAIN NOTED PAIN PRSNT: CPT | Mod: S$GLB,,, | Performed by: NURSE PRACTITIONER

## 2020-07-31 PROCEDURE — 99214 PR OFFICE/OUTPT VISIT, EST, LEVL IV, 30-39 MIN: ICD-10-PCS | Mod: S$GLB,,, | Performed by: NURSE PRACTITIONER

## 2020-07-31 PROCEDURE — 1159F PR MEDICATION LIST DOCUMENTED IN MEDICAL RECORD: ICD-10-PCS | Mod: S$GLB,,, | Performed by: NURSE PRACTITIONER

## 2020-07-31 PROCEDURE — 1159F MED LIST DOCD IN RCRD: CPT | Mod: S$GLB,,, | Performed by: NURSE PRACTITIONER

## 2020-07-31 PROCEDURE — 3078F DIAST BP <80 MM HG: CPT | Mod: CPTII,S$GLB,, | Performed by: NURSE PRACTITIONER

## 2020-07-31 PROCEDURE — 99214 OFFICE O/P EST MOD 30 MIN: CPT | Mod: S$GLB,,, | Performed by: NURSE PRACTITIONER

## 2020-07-31 PROCEDURE — 99999 PR PBB SHADOW E&M-EST. PATIENT-LVL IV: ICD-10-PCS | Mod: PBBFAC,,, | Performed by: NURSE PRACTITIONER

## 2020-07-31 PROCEDURE — 3074F SYST BP LT 130 MM HG: CPT | Mod: CPTII,S$GLB,, | Performed by: NURSE PRACTITIONER

## 2020-07-31 PROCEDURE — 3078F PR MOST RECENT DIASTOLIC BLOOD PRESSURE < 80 MM HG: ICD-10-PCS | Mod: CPTII,S$GLB,, | Performed by: NURSE PRACTITIONER

## 2020-07-31 PROCEDURE — 1101F PT FALLS ASSESS-DOCD LE1/YR: CPT | Mod: CPTII,S$GLB,, | Performed by: NURSE PRACTITIONER

## 2020-07-31 PROCEDURE — 1125F PR PAIN SEVERITY QUANTIFIED, PAIN PRESENT: ICD-10-PCS | Mod: S$GLB,,, | Performed by: NURSE PRACTITIONER

## 2020-07-31 RX ORDER — LEVOCETIRIZINE DIHYDROCHLORIDE 5 MG/1
5 TABLET, FILM COATED ORAL NIGHTLY
Qty: 30 TABLET | Refills: 0 | Status: SHIPPED | OUTPATIENT
Start: 2020-07-31 | End: 2020-07-31

## 2020-07-31 RX ORDER — AMOXICILLIN 500 MG/1
500 TABLET, FILM COATED ORAL EVERY 12 HOURS
Qty: 20 TABLET | Refills: 0 | Status: SHIPPED | OUTPATIENT
Start: 2020-07-31 | End: 2020-08-10

## 2020-07-31 NOTE — PATIENT INSTRUCTIONS
Strep Throat  Strep throat is a throat infection caused by a bacteria called group A Streptococcus bacteria (group A strep). The bacteria live in the nose and throat. Strep throat is contagious and spreads easily from person to person through airborne droplets when an infected person coughs, sneezes, or talks. Good hand washing is important to help prevent the spread of this illness.  Children diagnosed with strep throat should not attend school or  until they have been taking antibiotics and had no fever for 24 hours.  Strep throat mainly affects school-aged children between 5 and 15 years of age, but can affect adults too. When it isn't treated, it can lead to serious problems including rheumatic fever (an inflammation of the joints and heart) and kidney damage.    How is strep throat spread?  Strep throat can be easily spread from an infected person's saliva by:  · Drinking and eating after them  · Sharing a straw, cup, toothbrushes, and eating utensils  When to go to the emergency room (ER)  Call 911 if your child has trouble breathing or swallowing. Call your healthcare provider about other symptoms of strep throat, such as:  · Throat pain, especially when swallowing  · Red, swollen tonsils  · Swollen lymph glands  · Stomachache; sometimes, vomiting in younger children  · Pus in the back of the throat  What to expect in the ER  · Your child will be examined and the healthcare provider will ask about his or her medical history.  · The child's tonsils will be examined. A sample of fluid may be taken from the back of the throat using a soft swab. The sample can be checked right away for the bacteria that cause strep throat. Another sample may also be sent to a lab for testing.  · An antibiotic is usually prescribed to kill the bacteria. Be sure your child takes all the medicine, even if he or she starts to feel better. (Note that antibiotics will not help a viral throat infection.)  · If swallowing is  very painful, painkilling medicine may also be prescribed.  When to call your healthcare provider  Call your healthcare provider if your otherwise healthy child has finished the treatment for strep throat and has:  · Joint pain or swelling  · Shortness of breath  · Signs of dehydration (no tears when crying and not urinating for more than 8 hours)  · Ear pain or pressure  · Headaches  · Rash  · Fever (see Fever and children, below)  Fever and children  Always use a digital thermometer to check your childs temperature. Never use a mercury thermometer.  For infants and toddlers, be sure to use a rectal thermometer correctly. A rectal thermometer may accidentally poke a hole in (perforate) the rectum. It may also pass on germs from the stool. Always follow the product makers directions for proper use. If you dont feel comfortable taking a rectal temperature, use another method. When you talk to your childs healthcare provider, tell him or her which method you used to take your childs temperature.  Here are guidelines for fever temperature. Ear temperatures arent accurate before 6 months of age. Dont take an oral temperature until your child is at least 4 years old.  Infant under 3 months old:  · Ask your childs healthcare provider how you should take the temperature.  · Rectal or forehead (temporal artery) temperature of 100.4°F (38°C) or higher, or as directed by the provider  · Armpit temperature of 99°F (37.2°C) or higher, or as directed by the provider  Child age 3 to 36 months:  · Rectal, forehead (temporal artery), or ear temperature of 102°F (38.9°C) or higher, or as directed by the provider  · Armpit temperature of 101°F (38.3°C) or higher, or as directed by the provider  Child of any age:  · Repeated temperature of 104°F (40°C) or higher, or as directed by the provider  · Fever that lasts more than 24 hours in a child under 2 years old. Or a fever that lasts for 3 days in a child 2 years or older.    Easing strep throat symptoms  These tips can help ease your child's symptoms:  · Offer easy-to-swallow foods, such as soup, applesauce, popsicles, cold drinks, milk shakes, and yogurt.  · Provide a soft diet and avoid spicy or acidic foods.  · Use a cool-mist humidifier in the child's bedroom.  · Gargle with saltwater (for older children and adults only). Mix 1/4 teaspoon salt in 1 cup (8 oz) of warm water.   Date Last Reviewed: 1/1/2017  © 3114-4586 [employer]. [address] [place] [address], [place] [address]. All rights reserved. This information is not intended as a substitute for professional medical care. Always follow your healthcare professional's instructions.

## 2020-07-31 NOTE — PROGRESS NOTES
Patient ID: Steve June Jr. is a 75 y.o. male.    Chief Complaint:   Sore Throat (w/ difficulty swallowing)    Sore Throat   This is a new problem. The current episode started in the past 7 days. The problem has been gradually worsening. The pain is worse on the right side. There has been no fever. The pain is moderate. Associated symptoms include coughing and a hoarse voice. Pertinent negatives include no abdominal pain, congestion, diarrhea, drooling, ear discharge, ear pain, headaches, shortness of breath, stridor, swollen glands, trouble swallowing or vomiting. He has had exposure to strep. He has tried nothing for the symptoms.      Patient is new to me. Reviewed past medical and social history.    Past Medical History:   Diagnosis Date    Abnormal thyroid function test     Allergy     Seasonal    Anemia     Anemia due to blood loss 7/2/2014    Arthritis     Gaviria esophagus     Basal cell carcinoma     right forearm    Basal cell carcinoma 12/2011    lower post neck    Basal cell carcinoma 04/23/2019    left posterior helix    Cancer     skin CA    Cataract     Cirrhosis     Diabetes mellitus     Diabetes mellitus, type 2     Encounter for blood transfusion     Esophageal varices in cirrhosis     grade II on 7/12 EGD    Gastritis     on 7/12 EGD    GERD (gastroesophageal reflux disease) 2/28/2015    Hard of hearing     Hiatal hernia     History of hepatitis C 8/10/2012    tx with harvoni x 41 days (started 10/22/15). SVR4     Hoarseness 2/28/2015    Hx of colonic polyps 01/10/2011    per colonoscopy report in media    Hypercholesteremia     Hypersplenism     Hypertension     No meds    Pain management 12/10/2014    Petechial hemorrhage 11/25/2015    Lower extremities bilat     Portal hypertensive gastropathy     on 7/12 EGD    Squamous cell carcinoma 04/23/2019    right preauricular cheek, right temple    Thrombocytopenia     Type II or unspecified type diabetes  mellitus with neurological manifestations, uncontrolled(250.62) 12/24/2013    Valvular heart disease     mild MR 12     Past Surgical History:   Procedure Laterality Date    CATARACT EXTRACTION  1/10/13    left eye    CATARACT EXTRACTION      right eye    CHOLECYSTECTOMY      COLONOSCOPY      EYE SURGERY      Cataract surgery to right eye    KNEE ARTHROSCOPY W/ MENISCAL REPAIR      KNEE CARTILAGE SURGERY      left knee    KNEE SURGERY  12/2006    left    SKIN LESION EXCISION      TONSILLECTOMY      UPPER GASTROINTESTINAL ENDOSCOPY       Current Outpatient Medications on File Prior to Visit   Medication Sig Dispense Refill    albuterol (VENTOLIN HFA) 90 mcg/actuation inhaler Inhale 2 puffs into the lungs every 6 (six) hours as needed for Wheezing. Rescue 1 Inhaler 11    blood sugar diagnostic Strp To check BG 3 times daily, to use with insurance preferred meter 300 strip 11    blood-glucose meter kit To check BG 3 times daily, to use with insurance preferred meter 1 each 0    insulin detemir U-100 (LEVEMIR FLEXTOUCH U-100 INSULN) 100 unit/mL (3 mL) SubQ InPn pen Inject 50 Units into the skin every evening. Increase as directed, max daily dose 60 units/day 45 mL 3    insulin lispro (HUMALOG KWIKPEN INSULIN) 100 unit/mL pen 5 unit ac breakfast 15 units ac dinner 30 mL 3    lactulose (CHRONULAC) 20 gram/30 mL Soln Take 30 mLs (20 g total) by mouth 2 (two) times daily. 1800 mL 11    lancets Misc To check BG 3 times daily, to use with insurance preferred meter 300 each 11    metFORMIN (GLUCOPHAGE-XR) 500 MG XR 24hr tablet Take 4 tablets (2,000 mg total) by mouth once daily. 360 tablet 3    [START ON 8/14/2020] oxyCODONE-acetaminophen (PERCOCET)  mg per tablet Take 1 tablet by mouth every 6 (six) hours as needed for Pain. 120 tablet 0    pantoprazole (PROTONIX) 40 MG tablet Take 1 tablet (40 mg total) by mouth once daily. 90 tablet 0    pen needle, diabetic (MAXICOMFORT II PEN NEEDLE) 31 gauge  "x 1/4" Ndle 1 each by Misc.(Non-Drug; Combo Route) route once daily. 100 each 3    pen needle, diabetic 32 gauge x 5/32" Ndle Use as directed 100 each 3    propranolol (INDERAL LA) 60 MG 24 hr capsule Take 1 capsule (60 mg total) by mouth once daily. 90 capsule 3     No current facility-administered medications on file prior to visit.        Review of Systems   Constitutional: Negative for appetite change, chills, fever and unexpected weight change.   HENT: Positive for hoarse voice and sore throat. Negative for congestion, drooling, ear discharge, ear pain, sinus pain, sneezing and trouble swallowing.    Eyes: Negative for pain, discharge and visual disturbance.   Respiratory: Positive for cough. Negative for shortness of breath, wheezing and stridor.    Cardiovascular: Negative for chest pain and palpitations.   Gastrointestinal: Negative for abdominal pain, blood in stool, diarrhea, nausea and vomiting.   Endocrine: Negative for polydipsia, polyphagia and polyuria.   Genitourinary: Negative for difficulty urinating, hematuria, penile pain, testicular pain and urgency.   Musculoskeletal: Negative for gait problem.   Skin: Negative for rash and wound.   Neurological: Negative for dizziness, seizures, numbness and headaches.   Psychiatric/Behavioral: Negative for confusion, sleep disturbance and suicidal ideas.   All other systems reviewed and are negative.      Objective:      Physical Exam  Vitals signs reviewed.   Constitutional:       Appearance: Normal appearance. He is well-developed.   HENT:      Head: Normocephalic and atraumatic.      Mouth/Throat:      Mouth: Mucous membranes are moist.      Tongue: No lesions. Tongue does not deviate from midline.      Palate: No mass and lesions.      Pharynx: Uvula midline. Oropharyngeal exudate and posterior oropharyngeal erythema present.      Tonsils: 1+ on the right. 1+ on the left.   Eyes:      Conjunctiva/sclera: Conjunctivae normal.      Pupils: Pupils are " equal, round, and reactive to light.   Neck:      Musculoskeletal: Normal range of motion.      Thyroid: No thyromegaly.      Vascular: No JVD.      Trachea: No tracheal deviation.   Cardiovascular:      Rate and Rhythm: Normal rate and regular rhythm.      Heart sounds: Normal heart sounds.   Pulmonary:      Effort: Pulmonary effort is normal.      Breath sounds: Normal breath sounds.   Abdominal:      General: Bowel sounds are normal. There is no distension.      Palpations: Abdomen is soft.      Tenderness: There is no abdominal tenderness.   Musculoskeletal: Normal range of motion.   Skin:     General: Skin is warm and dry.      Capillary Refill: Capillary refill takes less than 2 seconds.   Neurological:      General: No focal deficit present.      Mental Status: He is alert and oriented to person, place, and time.   Psychiatric:         Mood and Affect: Mood normal.         Assessment:       1. Pharyngitis, unspecified etiology    2. Acute viral syndrome    3. Type 2 diabetes mellitus with complication, with long-term current use of insulin    4. Hypertension associated with diabetes    5. BMI 28.0-28.9,adult        Plan:       Steve was seen today for sore throat.    Diagnoses and all orders for this visit:    Pharyngitis, unspecified etiology  -     Group A Strep, Molecular  -     amoxicillin (AMOXIL) 500 MG Tab; Take 1 tablet (500 mg total) by mouth every 12 (twelve) hours. for 10 days    Acute viral syndrome  -     levocetirizine (XYZAL) 5 MG tablet; Take 1 tablet (5 mg total) by mouth every evening.    Type 2 diabetes mellitus with complication, with long-term current use of insulin    Hypertension associated with diabetes    BMI 28.0-28.9,adult         Patient Instructions       Strep Throat  Strep throat is a throat infection caused by a bacteria called group A Streptococcus bacteria (group A strep). The bacteria live in the nose and throat. Strep throat is contagious and spreads easily from person to  person through airborne droplets when an infected person coughs, sneezes, or talks. Good hand washing is important to help prevent the spread of this illness.  Children diagnosed with strep throat should not attend school or  until they have been taking antibiotics and had no fever for 24 hours.  Strep throat mainly affects school-aged children between 5 and 15 years of age, but can affect adults too. When it isn't treated, it can lead to serious problems including rheumatic fever (an inflammation of the joints and heart) and kidney damage.    How is strep throat spread?  Strep throat can be easily spread from an infected person's saliva by:  · Drinking and eating after them  · Sharing a straw, cup, toothbrushes, and eating utensils  When to go to the emergency room (ER)  Call 911 if your child has trouble breathing or swallowing. Call your healthcare provider about other symptoms of strep throat, such as:  · Throat pain, especially when swallowing  · Red, swollen tonsils  · Swollen lymph glands  · Stomachache; sometimes, vomiting in younger children  · Pus in the back of the throat  What to expect in the ER  · Your child will be examined and the healthcare provider will ask about his or her medical history.  · The child's tonsils will be examined. A sample of fluid may be taken from the back of the throat using a soft swab. The sample can be checked right away for the bacteria that cause strep throat. Another sample may also be sent to a lab for testing.  · An antibiotic is usually prescribed to kill the bacteria. Be sure your child takes all the medicine, even if he or she starts to feel better. (Note that antibiotics will not help a viral throat infection.)  · If swallowing is very painful, painkilling medicine may also be prescribed.  When to call your healthcare provider  Call your healthcare provider if your otherwise healthy child has finished the treatment for strep throat and has:  · Joint pain or  swelling  · Shortness of breath  · Signs of dehydration (no tears when crying and not urinating for more than 8 hours)  · Ear pain or pressure  · Headaches  · Rash  · Fever (see Fever and children, below)  Fever and children  Always use a digital thermometer to check your childs temperature. Never use a mercury thermometer.  For infants and toddlers, be sure to use a rectal thermometer correctly. A rectal thermometer may accidentally poke a hole in (perforate) the rectum. It may also pass on germs from the stool. Always follow the product makers directions for proper use. If you dont feel comfortable taking a rectal temperature, use another method. When you talk to your childs healthcare provider, tell him or her which method you used to take your childs temperature.  Here are guidelines for fever temperature. Ear temperatures arent accurate before 6 months of age. Dont take an oral temperature until your child is at least 4 years old.  Infant under 3 months old:  · Ask your childs healthcare provider how you should take the temperature.  · Rectal or forehead (temporal artery) temperature of 100.4°F (38°C) or higher, or as directed by the provider  · Armpit temperature of 99°F (37.2°C) or higher, or as directed by the provider  Child age 3 to 36 months:  · Rectal, forehead (temporal artery), or ear temperature of 102°F (38.9°C) or higher, or as directed by the provider  · Armpit temperature of 101°F (38.3°C) or higher, or as directed by the provider  Child of any age:  · Repeated temperature of 104°F (40°C) or higher, or as directed by the provider  · Fever that lasts more than 24 hours in a child under 2 years old. Or a fever that lasts for 3 days in a child 2 years or older.   Easing strep throat symptoms  These tips can help ease your child's symptoms:  · Offer easy-to-swallow foods, such as soup, applesauce, popsicles, cold drinks, milk shakes, and yogurt.  · Provide a soft diet and avoid spicy or acidic  foods.  · Use a cool-mist humidifier in the child's bedroom.  · Gargle with saltwater (for older children and adults only). Mix 1/4 teaspoon salt in 1 cup (8 oz) of warm water.   Date Last Reviewed: 1/1/2017  © 6442-6664 [employer]. [address] [place] [address], [place] [address]. All rights reserved. This information is not intended as a substitute for professional medical care. Always follow your healthcare professional's instructions.

## 2020-08-04 ENCOUNTER — OFFICE VISIT (OUTPATIENT)
Dept: ENDOCRINOLOGY | Facility: CLINIC | Age: 75
End: 2020-08-04
Payer: MEDICARE

## 2020-08-04 VITALS
HEART RATE: 80 BPM | DIASTOLIC BLOOD PRESSURE: 68 MMHG | HEIGHT: 69 IN | SYSTOLIC BLOOD PRESSURE: 120 MMHG | BODY MASS INDEX: 29.34 KG/M2 | WEIGHT: 198.06 LBS | TEMPERATURE: 98 F

## 2020-08-04 DIAGNOSIS — E11.8 TYPE 2 DIABETES MELLITUS WITH COMPLICATION, WITH LONG-TERM CURRENT USE OF INSULIN: ICD-10-CM

## 2020-08-04 DIAGNOSIS — Z79.4 TYPE 2 DIABETES MELLITUS WITH COMPLICATION, WITH LONG-TERM CURRENT USE OF INSULIN: ICD-10-CM

## 2020-08-04 DIAGNOSIS — I15.2 HYPERTENSION ASSOCIATED WITH DIABETES: ICD-10-CM

## 2020-08-04 DIAGNOSIS — E66.3 OVERWEIGHT: ICD-10-CM

## 2020-08-04 DIAGNOSIS — E11.59 HYPERTENSION ASSOCIATED WITH DIABETES: ICD-10-CM

## 2020-08-04 PROCEDURE — 99999 PR PBB SHADOW E&M-EST. PATIENT-LVL V: ICD-10-PCS | Mod: PBBFAC,,, | Performed by: INTERNAL MEDICINE

## 2020-08-04 PROCEDURE — 99999 PR PBB SHADOW E&M-EST. PATIENT-LVL V: CPT | Mod: PBBFAC,,, | Performed by: INTERNAL MEDICINE

## 2020-08-04 PROCEDURE — 99214 PR OFFICE/OUTPT VISIT, EST, LEVL IV, 30-39 MIN: ICD-10-PCS | Mod: S$GLB,,, | Performed by: INTERNAL MEDICINE

## 2020-08-04 PROCEDURE — 99214 OFFICE O/P EST MOD 30 MIN: CPT | Mod: S$GLB,,, | Performed by: INTERNAL MEDICINE

## 2020-08-04 RX ORDER — HUMAN INSULIN 100 [IU]/ML
45 INJECTION, SUSPENSION SUBCUTANEOUS 2 TIMES DAILY WITH MEALS
Qty: 30 ML | Refills: 11 | Status: SHIPPED | OUTPATIENT
Start: 2020-08-04 | End: 2020-11-02 | Stop reason: CLARIF

## 2020-08-04 NOTE — PROGRESS NOTES
Subjective:      Chief Complaint: Diabetes    HPI: Steve June Jr. is a 75 y.o. male who is here for a follow-up evaluation for diabetes. Last seen 2/11/2020    With regards to the diabetes:  Diagnosed with T2DM since about 5 years ago.     Known complications:     Retinopathy? No    Nephropathy? Yes    Neuropathy? Yes    CAD? No    CVA? No     Current regimen:   Metformin 500 mg 1-2 times a day.   Levemir 50 units qHS   Humalog 15 units with breakfast and dinner    Missed doses? Yes, missed novolog at lunch.    Prior treatments:    ozempic -> nausea    # times a day testing: few/day  Log reviewed: No    Pre breakfast: 260-350s  Pre dinner: 300-400    Hypoglycemic events? None     Current symptoms: some diarrhea, stomach issues.    Pt denies:   polyuria, polydipsia, vision changes, nausea, vomit and diarrhea    Diabetes Management Status    Statin: Not taking  ACE/ARB: Not taking    Screening or Prevention Patient's value Goal Complete/Controlled?   HgA1C Testing and Control   Lab Results   Component Value Date    HGBA1C 11.4 (H) 02/04/2020      q6months/Less than 7.5% No   Lipid profile : 12/05/2019 Annually Yes   LDL control Lab Results   Component Value Date    LDLCALC 81.8 12/05/2019    Annually/Less than 100 mg/dl  Yes   Nephropathy screening Lab Results   Component Value Date    MICALBCREAT 103.2 (H) 04/03/2019     Lab Results   Component Value Date    CREATININE 1.2 05/29/2020     Lab Results   Component Value Date    PROTEINUA Negative 05/29/2020    Annually Yes   Blood pressure BP Readings from Last 1 Encounters:   08/04/20 120/68    Less than 140/90 Yes   Dilated retinal exam : 11/05/2019 Annually Yes   Foot exam   : 01/27/2020 Annually Yes     Reviewed past medical, family, social history and updated as appropriate.    Review of Systems   Constitutional: Negative for unexpected weight change.   HENT: Negative for trouble swallowing.    Eyes: Positive for visual disturbance.   Respiratory: Positive  for shortness of breath.    Cardiovascular: Negative for palpitations.   Gastrointestinal: Positive for abdominal pain and diarrhea. Negative for constipation and nausea.   Endocrine: Negative for polydipsia and polyuria.   Genitourinary: Negative for frequency.   Musculoskeletal: Positive for back pain.   Neurological: Negative for light-headedness.   Hematological: Bruises/bleeds easily.     Objective:     Vitals:    08/04/20 1620   BP: 120/68   Pulse: 80   Temp: 98.1 °F (36.7 °C)     BP Readings from Last 5 Encounters:   08/04/20 120/68   07/31/20 116/64   06/24/20 (!) 144/78   06/12/20 138/79   06/02/20 132/70     Physical Exam  Vitals signs reviewed.   Constitutional:       General: He is not in acute distress.  Neck:      Thyroid: No thyromegaly.   Cardiovascular:      Rate and Rhythm: Normal rate.      Heart sounds: Murmur present.   Pulmonary:      Effort: Pulmonary effort is normal.   Skin:     Comments: Bruises on arms. Venous stasis changes in legs       Wt Readings from Last 10 Encounters:   08/04/20 1620 89.8 kg (198 lb 1.3 oz)   07/31/20 1433 88.6 kg (195 lb 5.2 oz)   06/24/20 1509 89.1 kg (196 lb 8 oz)   06/12/20 1304 89.8 kg (198 lb)   06/02/20 1103 90.1 kg (198 lb 10.2 oz)   05/29/20 1604 86.2 kg (190 lb)   05/29/20 1017 89 kg (196 lb 3.4 oz)   03/09/20 1111 95.3 kg (210 lb)   02/12/20 1638 95.3 kg (210 lb)   02/11/20 1457 90.4 kg (199 lb 3 oz)     Lab Results   Component Value Date    HGBA1C 11.4 (H) 02/04/2020     Lab Results   Component Value Date    CHOL 144 12/05/2019    HDL 45 12/05/2019    LDLCALC 81.8 12/05/2019    TRIG 86 12/05/2019    CHOLHDL 31.3 12/05/2019     Lab Results   Component Value Date     (L) 05/29/2020    K 4.6 05/29/2020    CL 98 05/29/2020    CO2 23 05/29/2020     () 05/29/2020    BUN 21 05/29/2020    CREATININE 1.2 05/29/2020    CALCIUM 9.1 05/29/2020    PROT 7.0 05/29/2020    ALBUMIN 4.1 05/29/2020    BILITOT 0.9 05/29/2020    ALKPHOS 93 05/29/2020    AST 16  05/29/2020    ALT 18 05/29/2020    ANIONGAP 9 05/29/2020    ESTGFRAFRICA >60 05/29/2020    EGFRNONAA 60 05/29/2020    TSH 2.990 07/03/2018      Lab Results   Component Value Date    MICALBCREAT 103.2 (H) 04/03/2019     Assessment/Plan:     Type 2 diabetes mellitus with complication, with long-term current use of insulin  a1c high, well above goal   - glucose usually elevated   - on levemir, 50 units qHS, metformin 1-2 per day, humalog with a few meals but missing lunch often   - lately having issues with insurance coverage of these medications   - discussed OTC/[facility] options. Discussed downside of long acting insulin there being BID dosing. Would bring him up to 5 shots per day in theory if he took each time he was recommended to   - with pt often missing the lunchtime dose anyway, and sometimes not using the humalog, discussed the potential to switch to 70/30. So by taking those 2 injections he would get BID basal insulin and the same/more humalog than he currently takes.   - start at 45 units BID (currently getting around 80 units per day but sugar always high so increasing).   - discussed titration based on glucose results   - continue metformin  - adjust regimen further if needed      Hypertension associated with diabetes  bp controlled today   - continue same meds   - monitor BP      Overweight  bmi 29 today   - complicated by HTN, DM2   - attempted GLP-1, not covered   - offered DM education, pt/family declined   - monitor weight        Follow up in about 2 months (around 10/4/2020) for lab review, further monitoring.      Chase Hodge MD  Endocrinology

## 2020-08-04 NOTE — PATIENT INSTRUCTIONS
For the diabetes:   Continue metformin.    STOP levemir, humalog/novolog  START 70/30 insulin (novolin-70/30, from [employer], over the counter. Can use [employer] for a coupon if needed).    Take 45 units before breakfast and before dinner.    Check sugar before breakfast and before dinner    Goal AM glucose    Goal dinner glucose 100-180s.     - after a week:   if AM glucose is above goal, increase the dinner insulin dose by 2 units.   If Dinner glucose is above goal, increase the breakfast insulin dose by 2 units.   - Then repeat. You can continue to adjust the doses once a week as needed to get sugars under control (if you're really far from target you can go up by 4 units at the start but once he gets down to 200 or so, just change by 2 units at a time).

## 2020-08-05 NOTE — ASSESSMENT & PLAN NOTE
a1c high, well above goal   - glucose usually elevated   - on levemir, 50 units qHS, metformin 1-2 per day, humalog with a few meals but missing lunch often   - lately having issues with insurance coverage of these medications   - discussed OTC/[facility] options. Discussed downside of long acting insulin there being BID dosing. Would bring him up to 5 shots per day in theory if he took each time he was recommended to   - with pt often missing the lunchtime dose anyway, and sometimes not using the humalog, discussed the potential to switch to 70/30. So by taking those 2 injections he would get BID basal insulin and the same/more humalog than he currently takes.   - start at 45 units BID (currently getting around 80 units per day but sugar always high so increasing).   - discussed titration based on glucose results   - continue metformin  - adjust regimen further if needed

## 2020-08-05 NOTE — ASSESSMENT & PLAN NOTE
bmi 29 today   - complicated by HTN, DM2   - attempted GLP-1, not covered   - offered DM education, pt/family declined   - monitor weight

## 2020-08-30 ENCOUNTER — PATIENT OUTREACH (OUTPATIENT)
Dept: ADMINISTRATIVE | Facility: OTHER | Age: 75
End: 2020-08-30

## 2020-08-30 NOTE — PROGRESS NOTES
Health Maintenance Due   Topic Date Due    TETANUS VACCINE  05/13/1963    Shingles Vaccine (1 of 2) 05/13/1997    Colorectal Cancer Screening  01/10/2016    Pneumococcal Vaccine (65+ High/Highest Risk) (2 of 2 - PPSV23) 03/21/2019     Updates were requested from care everywhere.  Chart was reviewed for overdue Proactive [facility] Encounters ([facility]IC[facility]) topics (CRS, Breast Cancer Screening, Eye exam)  Health Maintenance has been updated.  LINKS immunization registry triggered.  Immunizations were reconciled.

## 2020-09-02 ENCOUNTER — APPOINTMENT (OUTPATIENT)
Dept: NEUROLOGY | Facility: CLINIC | Age: 75
End: 2020-09-02
Payer: MEDICARE

## 2020-09-02 ENCOUNTER — OFFICE VISIT (OUTPATIENT)
Dept: PAIN MEDICINE | Facility: CLINIC | Age: 75
End: 2020-09-02
Payer: MEDICARE

## 2020-09-02 ENCOUNTER — OFFICE VISIT (OUTPATIENT)
Dept: NEUROLOGY | Facility: CLINIC | Age: 75
End: 2020-09-02
Payer: MEDICARE

## 2020-09-02 VITALS
HEART RATE: 70 BPM | SYSTOLIC BLOOD PRESSURE: 131 MMHG | WEIGHT: 198 LBS | HEIGHT: 69 IN | BODY MASS INDEX: 29.33 KG/M2 | DIASTOLIC BLOOD PRESSURE: 80 MMHG

## 2020-09-02 VITALS
HEIGHT: 69 IN | BODY MASS INDEX: 28.54 KG/M2 | WEIGHT: 192.69 LBS | HEART RATE: 75 BPM | SYSTOLIC BLOOD PRESSURE: 133 MMHG | TEMPERATURE: 97 F | RESPIRATION RATE: 20 BRPM | DIASTOLIC BLOOD PRESSURE: 78 MMHG

## 2020-09-02 DIAGNOSIS — G89.29 CHRONIC PAIN OF LEFT KNEE: ICD-10-CM

## 2020-09-02 DIAGNOSIS — M25.562 CHRONIC PAIN OF LEFT KNEE: ICD-10-CM

## 2020-09-02 DIAGNOSIS — F11.90 CHRONIC, CONTINUOUS USE OF OPIOIDS: ICD-10-CM

## 2020-09-02 DIAGNOSIS — M17.10 PRIMARY OSTEOARTHRITIS OF KNEE, UNSPECIFIED LATERALITY: ICD-10-CM

## 2020-09-02 DIAGNOSIS — M47.892 OTHER SPONDYLOSIS, CERVICAL REGION: ICD-10-CM

## 2020-09-02 DIAGNOSIS — E11.65 TYPE 2 DIABETES MELLITUS WITH HYPERGLYCEMIA, UNSPECIFIED WHETHER LONG TERM INSULIN USE: ICD-10-CM

## 2020-09-02 DIAGNOSIS — R41.3 OTHER AMNESIA: ICD-10-CM

## 2020-09-02 DIAGNOSIS — M47.819 FACET ARTHROPATHY: ICD-10-CM

## 2020-09-02 DIAGNOSIS — M50.30 DDD (DEGENERATIVE DISC DISEASE), CERVICAL: Primary | ICD-10-CM

## 2020-09-02 DIAGNOSIS — R41.3 MEMORY LOSS: Primary | ICD-10-CM

## 2020-09-02 DIAGNOSIS — G89.4 CHRONIC PAIN DISORDER: ICD-10-CM

## 2020-09-02 PROCEDURE — 3008F PR BODY MASS INDEX (BMI) DOCUMENTED: ICD-10-PCS | Mod: CPTII,S$GLB,, | Performed by: PSYCHIATRY & NEUROLOGY

## 2020-09-02 PROCEDURE — 1159F PR MEDICATION LIST DOCUMENTED IN MEDICAL RECORD: ICD-10-PCS | Mod: S$GLB,,, | Performed by: PHYSICIAN ASSISTANT

## 2020-09-02 PROCEDURE — 99999 PR PBB SHADOW E&M-EST. PATIENT-LVL V: ICD-10-PCS | Mod: PBBFAC,,, | Performed by: PSYCHIATRY & NEUROLOGY

## 2020-09-02 PROCEDURE — 1126F PR PAIN SEVERITY QUANTIFIED, NO PAIN PRESENT: ICD-10-PCS | Mod: S$GLB,,, | Performed by: PHYSICIAN ASSISTANT

## 2020-09-02 PROCEDURE — 3079F PR MOST RECENT DIASTOLIC BLOOD PRESSURE 80-89 MM HG: ICD-10-PCS | Mod: CPTII,S$GLB,, | Performed by: PHYSICIAN ASSISTANT

## 2020-09-02 PROCEDURE — 3075F SYST BP GE 130 - 139MM HG: CPT | Mod: CPTII,S$GLB,, | Performed by: PHYSICIAN ASSISTANT

## 2020-09-02 PROCEDURE — 99204 OFFICE O/P NEW MOD 45 MIN: CPT | Mod: S$GLB,,, | Performed by: PSYCHIATRY & NEUROLOGY

## 2020-09-02 PROCEDURE — 99214 OFFICE O/P EST MOD 30 MIN: CPT | Mod: S$GLB,,, | Performed by: PHYSICIAN ASSISTANT

## 2020-09-02 PROCEDURE — 3046F HEMOGLOBIN A1C LEVEL >9.0%: CPT | Mod: CPTII,S$GLB,, | Performed by: PSYCHIATRY & NEUROLOGY

## 2020-09-02 PROCEDURE — 3072F PR LOW RISK FOR RETINOPATHY: ICD-10-PCS | Mod: S$GLB,,, | Performed by: PSYCHIATRY & NEUROLOGY

## 2020-09-02 PROCEDURE — 1126F PR PAIN SEVERITY QUANTIFIED, NO PAIN PRESENT: ICD-10-PCS | Mod: S$GLB,,, | Performed by: PSYCHIATRY & NEUROLOGY

## 2020-09-02 PROCEDURE — 1159F PR MEDICATION LIST DOCUMENTED IN MEDICAL RECORD: ICD-10-PCS | Mod: S$GLB,,, | Performed by: PSYCHIATRY & NEUROLOGY

## 2020-09-02 PROCEDURE — 1126F AMNT PAIN NOTED NONE PRSNT: CPT | Mod: S$GLB,,, | Performed by: PHYSICIAN ASSISTANT

## 2020-09-02 PROCEDURE — 99204 PR OFFICE/OUTPT VISIT, NEW, LEVL IV, 45-59 MIN: ICD-10-PCS | Mod: S$GLB,,, | Performed by: PSYCHIATRY & NEUROLOGY

## 2020-09-02 PROCEDURE — 3078F PR MOST RECENT DIASTOLIC BLOOD PRESSURE < 80 MM HG: ICD-10-PCS | Mod: CPTII,S$GLB,, | Performed by: PSYCHIATRY & NEUROLOGY

## 2020-09-02 PROCEDURE — 1159F MED LIST DOCD IN RCRD: CPT | Mod: S$GLB,,, | Performed by: PSYCHIATRY & NEUROLOGY

## 2020-09-02 PROCEDURE — 1101F PT FALLS ASSESS-DOCD LE1/YR: CPT | Mod: CPTII,S$GLB,, | Performed by: PSYCHIATRY & NEUROLOGY

## 2020-09-02 PROCEDURE — 99214 PR OFFICE/OUTPT VISIT, EST, LEVL IV, 30-39 MIN: ICD-10-PCS | Mod: S$GLB,,, | Performed by: PHYSICIAN ASSISTANT

## 2020-09-02 PROCEDURE — 1101F PR PT FALLS ASSESS DOC 0-1 FALLS W/OUT INJ PAST YR: ICD-10-PCS | Mod: CPTII,S$GLB,, | Performed by: PSYCHIATRY & NEUROLOGY

## 2020-09-02 PROCEDURE — 1157F ADVNC CARE PLAN IN RCRD: CPT | Mod: S$GLB,,, | Performed by: PSYCHIATRY & NEUROLOGY

## 2020-09-02 PROCEDURE — 3072F LOW RISK FOR RETINOPATHY: CPT | Mod: S$GLB,,, | Performed by: PSYCHIATRY & NEUROLOGY

## 2020-09-02 PROCEDURE — 3008F PR BODY MASS INDEX (BMI) DOCUMENTED: ICD-10-PCS | Mod: CPTII,S$GLB,, | Performed by: PHYSICIAN ASSISTANT

## 2020-09-02 PROCEDURE — 1101F PR PT FALLS ASSESS DOC 0-1 FALLS W/OUT INJ PAST YR: ICD-10-PCS | Mod: CPTII,S$GLB,, | Performed by: PHYSICIAN ASSISTANT

## 2020-09-02 PROCEDURE — 3288F FALL RISK ASSESSMENT DOCD: CPT | Mod: CPTII,S$GLB,, | Performed by: PSYCHIATRY & NEUROLOGY

## 2020-09-02 PROCEDURE — 3075F PR MOST RECENT SYSTOLIC BLOOD PRESS GE 130-139MM HG: ICD-10-PCS | Mod: CPTII,S$GLB,, | Performed by: PHYSICIAN ASSISTANT

## 2020-09-02 PROCEDURE — 99999 PR PBB SHADOW E&M-EST. PATIENT-LVL IV: CPT | Mod: PBBFAC,,, | Performed by: PHYSICIAN ASSISTANT

## 2020-09-02 PROCEDURE — 1126F AMNT PAIN NOTED NONE PRSNT: CPT | Mod: S$GLB,,, | Performed by: PSYCHIATRY & NEUROLOGY

## 2020-09-02 PROCEDURE — 99999 PR PBB SHADOW E&M-EST. PATIENT-LVL IV: ICD-10-PCS | Mod: PBBFAC,,, | Performed by: PHYSICIAN ASSISTANT

## 2020-09-02 PROCEDURE — 99999 PR PBB SHADOW E&M-EST. PATIENT-LVL V: CPT | Mod: PBBFAC,,, | Performed by: PSYCHIATRY & NEUROLOGY

## 2020-09-02 PROCEDURE — 1157F PR ADVANCE CARE PLAN OR EQUIV PRESENT IN MEDICAL RECORD: ICD-10-PCS | Mod: S$GLB,,, | Performed by: PSYCHIATRY & NEUROLOGY

## 2020-09-02 PROCEDURE — 3075F PR MOST RECENT SYSTOLIC BLOOD PRESS GE 130-139MM HG: ICD-10-PCS | Mod: CPTII,S$GLB,, | Performed by: PSYCHIATRY & NEUROLOGY

## 2020-09-02 PROCEDURE — 3079F DIAST BP 80-89 MM HG: CPT | Mod: CPTII,S$GLB,, | Performed by: PHYSICIAN ASSISTANT

## 2020-09-02 PROCEDURE — 3075F SYST BP GE 130 - 139MM HG: CPT | Mod: CPTII,S$GLB,, | Performed by: PSYCHIATRY & NEUROLOGY

## 2020-09-02 PROCEDURE — 3078F DIAST BP <80 MM HG: CPT | Mod: CPTII,S$GLB,, | Performed by: PSYCHIATRY & NEUROLOGY

## 2020-09-02 PROCEDURE — 3008F BODY MASS INDEX DOCD: CPT | Mod: CPTII,S$GLB,, | Performed by: PHYSICIAN ASSISTANT

## 2020-09-02 PROCEDURE — 3288F PR FALLS RISK ASSESSMENT DOCUMENTED: ICD-10-PCS | Mod: CPTII,S$GLB,, | Performed by: PSYCHIATRY & NEUROLOGY

## 2020-09-02 PROCEDURE — 80307 DRUG TEST PRSMV CHEM ANLYZR: CPT

## 2020-09-02 PROCEDURE — 1101F PT FALLS ASSESS-DOCD LE1/YR: CPT | Mod: CPTII,S$GLB,, | Performed by: PHYSICIAN ASSISTANT

## 2020-09-02 PROCEDURE — 3008F BODY MASS INDEX DOCD: CPT | Mod: CPTII,S$GLB,, | Performed by: PSYCHIATRY & NEUROLOGY

## 2020-09-02 PROCEDURE — 1159F MED LIST DOCD IN RCRD: CPT | Mod: S$GLB,,, | Performed by: PHYSICIAN ASSISTANT

## 2020-09-02 PROCEDURE — 3046F PR MOST RECENT HEMOGLOBIN A1C LEVEL > 9.0%: ICD-10-PCS | Mod: CPTII,S$GLB,, | Performed by: PSYCHIATRY & NEUROLOGY

## 2020-09-02 RX ORDER — OXYCODONE AND ACETAMINOPHEN 10; 325 MG/1; MG/1
1 TABLET ORAL EVERY 6 HOURS PRN
Qty: 120 TABLET | Refills: 0 | Status: SHIPPED | OUTPATIENT
Start: 2020-09-12 | End: 2020-09-22 | Stop reason: SDUPTHER

## 2020-09-02 RX ORDER — OXYCODONE AND ACETAMINOPHEN 10; 325 MG/1; MG/1
1 TABLET ORAL EVERY 6 HOURS PRN
Qty: 120 TABLET | Refills: 0 | Status: SHIPPED | OUTPATIENT
Start: 2020-10-11 | End: 2020-09-22 | Stop reason: SDUPTHER

## 2020-09-02 RX ORDER — OXYCODONE AND ACETAMINOPHEN 10; 325 MG/1; MG/1
1 TABLET ORAL EVERY 6 HOURS PRN
Qty: 120 TABLET | Refills: 0 | Status: SHIPPED | OUTPATIENT
Start: 2020-11-09 | End: 2020-11-18 | Stop reason: SDUPTHER

## 2020-09-02 NOTE — PROGRESS NOTES
Date of service:  9/2/2020    Chief complaint:  Memory Loss    History of present illness:  The patient is a 75 y.o. male referred for evaluation of memory loss.  He does not notice much of an issue.  His wife supplies most of the history as a result. This issue began about a year ago. It involves short-term memory more than long-term memory.  He may have some difficulties with executive function.  There are also some issues with multiple step processes. He has some problems with ADLs. He has gotten lost in familiar areas. There are no hallucinations. There are some personality changes with the patient being more easily upset.  He does not endorse depression.    Past Medical History:   Diagnosis Date    Abnormal thyroid function test     Allergy     Seasonal    Anemia     Anemia due to blood loss 7/2/2014    Arthritis     Gaviria esophagus     Basal cell carcinoma     right forearm    Basal cell carcinoma 12/2011    lower post neck    Basal cell carcinoma 04/23/2019    left posterior helix    Cancer     skin CA    Cataract     Cirrhosis     Diabetes mellitus     Diabetes mellitus, type 2     Encounter for blood transfusion     Esophageal varices in cirrhosis     grade II on 7/12 EGD    Gastritis     on 7/12 EGD    GERD (gastroesophageal reflux disease) 2/28/2015    Hard of hearing     Hiatal hernia     History of hepatitis C 8/10/2012    tx with harvoni x 41 days (started 10/22/15). SVR4     Hoarseness 2/28/2015    Hx of colonic polyps 01/10/2011    per colonoscopy report in media    Hypercholesteremia     Hypersplenism     Hypertension     No meds    Pain management 12/10/2014    Petechial hemorrhage 11/25/2015    Lower extremities bilat     Portal hypertensive gastropathy     on 7/12 EGD    Squamous cell carcinoma 04/23/2019    right preauricular cheek, right temple    Thrombocytopenia     Type II or unspecified type diabetes mellitus with neurological manifestations,  uncontrolled(250.62) 2013    Valvular heart disease     mild MR 12       Past Surgical History:   Procedure Laterality Date    CATARACT EXTRACTION  1/10/13    left eye    CATARACT EXTRACTION      right eye    CHOLECYSTECTOMY      COLONOSCOPY      EYE SURGERY      Cataract surgery to right eye    KNEE ARTHROSCOPY W/ MENISCAL REPAIR      KNEE CARTILAGE SURGERY      left knee    KNEE SURGERY  2006    left    SKIN LESION EXCISION      TONSILLECTOMY      UPPER GASTROINTESTINAL ENDOSCOPY         Family History   Problem Relation Age of Onset    Leukemia Mother     Cancer Mother         bone    Alcohol abuse Father     Cirrhosis Father         EtOH induced    No Known Problems Daughter     No Known Problems Son     No Known Problems Daughter     No Known Problems Daughter     No Known Problems Daughter     No Known Problems Sister     Amblyopia Neg Hx     Blindness Neg Hx     Cataracts Neg Hx     Diabetes Neg Hx     Glaucoma Neg Hx     Hypertension Neg Hx     Macular degeneration Neg Hx     Retinal detachment Neg Hx     Strabismus Neg Hx     Stroke Neg Hx     Thyroid disease Neg Hx     Psoriasis Neg Hx     Lupus Neg Hx     Eczema Neg Hx     Acne Neg Hx     Melanoma Neg Hx        Social History     Socioeconomic History    Marital status:      Spouse name: Not on file    Number of children: 5    Years of education: Not on file    Highest education level: Not on file   Occupational History    Not on file   Social Needs    Financial resource strain: Not very hard    Food insecurity     Worry: Never true     Inability: Never true    Transportation needs     Medical: No     Non-medical: No   Tobacco Use    Smoking status: Former Smoker     Packs/day: 1.00     Years: 25.00     Pack years: 25.00     Quit date: 2000     Years since quittin.0    Smokeless tobacco: Never Used   Substance and Sexual Activity    Alcohol use: Yes     Frequency: 2-3 times a week  "    Drinks per session: 1 or 2     Binge frequency: Less than monthly     Comment: rarely    Drug use: No    Sexual activity: Never   Lifestyle    Physical activity     Days per week: 0 days     Minutes per session: Not on file    Stress: Only a little   Relationships    Social connections     Talks on phone: Three times a week     Gets together: Never     Attends Sikh service: Not on file     Active member of club or organization: No     Attends meetings of clubs or organizations: Never     Relationship status:    Other Topics Concern    Not on file   Social History Narrative    He is .  He has children.  He is currently retired.        Review of patient's allergies indicates:   Allergen Reactions    Adhesive tape-silicones Other (See Comments)     pulls skin off    Doxycycline      Dizzy. "Just didn't feel right".       Review of Systems   General/Constitutional:  No unintentional weight loss, + change in appetite  Eyes/Vision:  No change in vision, No double vision  ENT:  No frequent nose bleeds, No ringing in the ears  Respiratory:  + cough, + wheezing  Cardiovascular:  No chest pain, No palpitations  Gastrointestinal:  No jaundice, No nausea/vomiting  Genitourinary:  No incontinence, No burning with urination  Hematologic/Lymphatic:  + easy bruising/bleeding, No night sweats  Neurological:  No numbness, No weakness  Endocrine:  + fatigue, No heat/cold intolerance  Allergy/Immunologic:  No fevers, No chills  Musculoskeletal:  + muscle pain, + joint pain   Psychiatric:  No thoughts of harming self/others, No depression  Integumentary:  No rashes, + sores that do not heal    Physical exam:  /78   Pulse 75   Temp 97.1 °F (36.2 °C)   Resp 20   Ht 5' 9" (1.753 m)   Wt 87.4 kg (192 lb 10.9 oz)   BMI 28.45 kg/m²   General: Well developed, well nourished.  No acute distress.  ENT: Mucus membranes moist.  Atraumatic external nose and ears.  Lymphatic: No apparent " lymphadenopathy.  Cardiovascular: Regular rate and rhythm.  Pulmonary: No increased work of breathing.  Abdomen/GI: No guarding.  Musculoskeletal: No clubbing or cyanosis.    Neurological exam:  Mental status: Awake and alert.  Oriented x3.  Speech fluent and generally appropriate.  Recent and remote memory appear to be limited.  Fund of knowledge limited.  MMSE = 23/30.  Cranial nerves: Pupils equal round and reactive to light, extraocular movements intact, facial strength and sensation intact bilaterally, palate and tongue midline, hearing impaired bilaterally.  Motor: 5 out of 5 strength throughout the upper and lower extremities bilaterally. Normal bulk and tone.  Sensation: Intact to light touch and temperature bilaterally.  DTR: 1+ at the knees and biceps bilaterally.  Coordination: Finger-nose-finger testing intact bilaterally.  Gait: Nonfocal gait.      Data base:  Notes of the referring physician were reviewed.  Briefly summarized, these discuss a number of issues including memory loss and DM.    Caregiver name: Meg  Relationship to the patient: Wife  Does the patient have a living will? no  Does the patient have a designated healthcare POA? no  Does the patient have a designated general POA? no    Have educational materials/resources been provided? no      Activities of Daily Living    Bathing: Independent  Dressing: Independent  Grooming: Independent  Mouth Care: Independent  Toileting: Independent  Transferring Bed/Chair: Independent  Walking: Independent  Climbing: Independent  Eating: Independent      Instrumental Activities of Daily Living    Shopping: Dependent  Cooking: Dependent  Managing Medications: Dependent  Using the phone and looking up numbers: Independent  Doing Housework: Independent  Doing Laundry: Independent  Driving or using public transportation: Needs Help  Managing finances: Dependent    Functional Assessment Staging  4   Decreased ability to perform complex tasks, e.g. planning  dinner for guests, handling personal finances (such as forgetting to pay bills), difficulty marketing, etc.*         Depression Patient Health Questionnaire 9/2/2020 9/2/2020 6/12/2020 3/9/2020 12/16/2019 9/19/2019 6/26/2019   Over the last two weeks how often have you been bothered by little interest or pleasure in doing things 0 0 0 1 1 1 0   Over the last two weeks how often have you been bothered by feeling down, depressed or hopeless 2 0 0 0 1 0 0   PHQ-2 Total Score 2 0 0 1 2 1 0          Assessment and plan:  The patient is a 75 y.o. male referred for evaluation of memory loss. I feel that the differential diagnosis includes neurodegenerative causes of memory loss and general medical issues/medication effects. I think a reasonable workup would feature labs and an MRI of th brain.  We will start him on Aricept.  I have asked that he work with his PCP on streamlining his medication list.  I have asked specifically that he minimize analgesic use.  He needs to manage his DM significantly better.  He needs to use his oxygen.  He is to work with his PCP on vascular health, including BP management, lipid profile optimization, glycemic monitoring, diet, exercise, and so forth. We will plan on seeing the patient back in a few weeks.

## 2020-09-07 LAB
6MAM UR QL: NOT DETECTED
7AMINOCLONAZEPAM UR QL: NOT DETECTED
A-OH ALPRAZ UR QL: NOT DETECTED
ALPRAZ UR QL: NOT DETECTED
AMPHET UR QL SCN: NOT DETECTED
ANNOTATION COMMENT IMP: NORMAL
ANNOTATION COMMENT IMP: NORMAL
BARBITURATES UR QL: NOT DETECTED
BUPRENORPHINE UR QL: NOT DETECTED
BZE UR QL: NOT DETECTED
CARBOXYTHC UR QL: NOT DETECTED
CARISOPRODOL UR QL: NOT DETECTED
CLONAZEPAM UR QL: NOT DETECTED
CODEINE UR QL: NOT DETECTED
CREAT UR-MCNC: 167 MG/DL (ref 20–400)
DIAZEPAM UR QL: NOT DETECTED
ETHYL GLUCURONIDE UR QL: PRESENT
FENTANYL UR QL: NOT DETECTED
HYDROCODONE UR QL: NOT DETECTED
HYDROMORPHONE UR QL: NOT DETECTED
LORAZEPAM UR QL: NOT DETECTED
MDA UR QL: NOT DETECTED
MDEA UR QL: NOT DETECTED
MDMA UR QL: NOT DETECTED
ME-PHENIDATE UR QL: NOT DETECTED
MEPERIDINE UR QL: NOT DETECTED
METHADONE UR QL: NOT DETECTED
METHAMPHET UR QL: NOT DETECTED
MIDAZOLAM UR QL SCN: NOT DETECTED
MORPHINE UR QL: NOT DETECTED
NORBUPRENORPHINE UR QL CFM: NOT DETECTED
NORDIAZEPAM UR QL: NOT DETECTED
NORFENTANYL UR QL: NOT DETECTED
NORHYDROCODONE UR QL CFM: NOT DETECTED
NOROXYCODONE UR QL CFM: PRESENT
NOROXYMORPHONE: PRESENT
OXAZEPAM UR QL: NOT DETECTED
OXYCODONE UR QL: PRESENT
OXYMORPHONE UR QL: PRESENT
PATHOLOGY STUDY: NORMAL
PCP UR QL: NOT DETECTED
PHENTERMINE UR QL: NOT DETECTED
PROPOXYPH UR QL: NOT DETECTED
SERVICE CMNT-IMP: NORMAL
TAPENTADOL UR QL SCN: NOT DETECTED
TAPENTADOL-O-SULF: NOT DETECTED
TEMAZEPAM UR QL: NOT DETECTED
TRAMADOL UR QL: NOT DETECTED
ZOLPIDEM UR QL: NOT DETECTED

## 2020-09-10 ENCOUNTER — LAB VISIT (OUTPATIENT)
Dept: LAB | Facility: HOSPITAL | Age: 75
End: 2020-09-10
Attending: PSYCHIATRY & NEUROLOGY
Payer: MEDICARE

## 2020-09-10 ENCOUNTER — OFFICE VISIT (OUTPATIENT)
Dept: FAMILY MEDICINE | Facility: CLINIC | Age: 75
End: 2020-09-10
Payer: MEDICARE

## 2020-09-10 ENCOUNTER — PES CALL (OUTPATIENT)
Dept: ADMINISTRATIVE | Facility: CLINIC | Age: 75
End: 2020-09-10

## 2020-09-10 VITALS
DIASTOLIC BLOOD PRESSURE: 62 MMHG | SYSTOLIC BLOOD PRESSURE: 122 MMHG | HEART RATE: 66 BPM | HEIGHT: 69 IN | WEIGHT: 194.44 LBS | TEMPERATURE: 98 F | BODY MASS INDEX: 28.8 KG/M2 | OXYGEN SATURATION: 96 %

## 2020-09-10 DIAGNOSIS — L97.919 ULCER OF RIGHT LOWER LEG, WITH UNSPECIFIED SEVERITY: ICD-10-CM

## 2020-09-10 DIAGNOSIS — R11.0 NAUSEA: ICD-10-CM

## 2020-09-10 DIAGNOSIS — E11.42 DM TYPE 2 WITH DIABETIC PERIPHERAL NEUROPATHY: ICD-10-CM

## 2020-09-10 DIAGNOSIS — L03.116 CELLULITIS AND ABSCESS OF LEFT LEG: Primary | ICD-10-CM

## 2020-09-10 DIAGNOSIS — Z79.4 TYPE 2 DIABETES MELLITUS WITH COMPLICATION, WITH LONG-TERM CURRENT USE OF INSULIN: ICD-10-CM

## 2020-09-10 DIAGNOSIS — L97.929 SKIN ULCER OF LEFT LOWER LEG, UNSPECIFIED ULCER STAGE: ICD-10-CM

## 2020-09-10 DIAGNOSIS — E11.8 TYPE 2 DIABETES MELLITUS WITH COMPLICATION, WITH LONG-TERM CURRENT USE OF INSULIN: ICD-10-CM

## 2020-09-10 DIAGNOSIS — K21.9 GASTROESOPHAGEAL REFLUX DISEASE, ESOPHAGITIS PRESENCE NOT SPECIFIED: ICD-10-CM

## 2020-09-10 DIAGNOSIS — R41.3 MEMORY LOSS: ICD-10-CM

## 2020-09-10 DIAGNOSIS — L02.416 CELLULITIS AND ABSCESS OF LEFT LEG: Primary | ICD-10-CM

## 2020-09-10 LAB
BASOPHILS # BLD AUTO: 0.02 K/UL (ref 0–0.2)
BASOPHILS # BLD AUTO: 0.02 K/UL (ref 0–0.2)
BASOPHILS NFR BLD: 0.4 % (ref 0–1.9)
BASOPHILS NFR BLD: 0.4 % (ref 0–1.9)
DIFFERENTIAL METHOD: ABNORMAL
DIFFERENTIAL METHOD: ABNORMAL
EOSINOPHIL # BLD AUTO: 0.2 K/UL (ref 0–0.5)
EOSINOPHIL # BLD AUTO: 0.2 K/UL (ref 0–0.5)
EOSINOPHIL NFR BLD: 3.9 % (ref 0–8)
EOSINOPHIL NFR BLD: 3.9 % (ref 0–8)
ERYTHROCYTE [DISTWIDTH] IN BLOOD BY AUTOMATED COUNT: 14 % (ref 11.5–14.5)
ERYTHROCYTE [DISTWIDTH] IN BLOOD BY AUTOMATED COUNT: 14 % (ref 11.5–14.5)
GLUCOSE SERPL-MCNC: 215 MG/DL (ref 70–110)
HCT VFR BLD AUTO: 37.9 % (ref 40–54)
HCT VFR BLD AUTO: 37.9 % (ref 40–54)
HGB BLD-MCNC: 12.6 G/DL (ref 14–18)
HGB BLD-MCNC: 12.6 G/DL (ref 14–18)
LYMPHOCYTES # BLD AUTO: 0.4 K/UL (ref 1–4.8)
LYMPHOCYTES # BLD AUTO: 0.4 K/UL (ref 1–4.8)
LYMPHOCYTES NFR BLD: 8.9 % (ref 18–48)
LYMPHOCYTES NFR BLD: 8.9 % (ref 18–48)
MCH RBC QN AUTO: 27.9 PG (ref 27–31)
MCH RBC QN AUTO: 27.9 PG (ref 27–31)
MCHC RBC AUTO-ENTMCNC: 33.2 G/DL (ref 32–36)
MCHC RBC AUTO-ENTMCNC: 33.2 G/DL (ref 32–36)
MCV RBC AUTO: 84 FL (ref 82–98)
MCV RBC AUTO: 84 FL (ref 82–98)
MONOCYTES # BLD AUTO: 0.5 K/UL (ref 0.3–1)
MONOCYTES # BLD AUTO: 0.5 K/UL (ref 0.3–1)
MONOCYTES NFR BLD: 10.7 % (ref 4–15)
MONOCYTES NFR BLD: 10.7 % (ref 4–15)
NEUTROPHILS # BLD AUTO: 3.5 K/UL (ref 1.8–7.7)
NEUTROPHILS # BLD AUTO: 3.5 K/UL (ref 1.8–7.7)
NEUTROPHILS NFR BLD: 76.1 % (ref 38–73)
NEUTROPHILS NFR BLD: 76.1 % (ref 38–73)
PLATELET # BLD AUTO: 46 K/UL (ref 150–350)
PLATELET # BLD AUTO: 46 K/UL (ref 150–350)
PMV BLD AUTO: 10.7 FL (ref 9.2–12.9)
PMV BLD AUTO: 10.7 FL (ref 9.2–12.9)
RBC # BLD AUTO: 4.52 M/UL (ref 4.6–6.2)
RBC # BLD AUTO: 4.52 M/UL (ref 4.6–6.2)
WBC # BLD AUTO: 4.59 K/UL (ref 3.9–12.7)
WBC # BLD AUTO: 4.59 K/UL (ref 3.9–12.7)

## 2020-09-10 PROCEDURE — 99214 OFFICE O/P EST MOD 30 MIN: CPT | Mod: S$GLB,,, | Performed by: PHYSICIAN ASSISTANT

## 2020-09-10 PROCEDURE — 82746 ASSAY OF FOLIC ACID SERUM: CPT

## 2020-09-10 PROCEDURE — 82140 ASSAY OF AMMONIA: CPT

## 2020-09-10 PROCEDURE — 1101F PR PT FALLS ASSESS DOC 0-1 FALLS W/OUT INJ PAST YR: ICD-10-PCS | Mod: CPTII,S$GLB,, | Performed by: PHYSICIAN ASSISTANT

## 2020-09-10 PROCEDURE — 3074F PR MOST RECENT SYSTOLIC BLOOD PRESSURE < 130 MM HG: ICD-10-PCS | Mod: CPTII,S$GLB,, | Performed by: PHYSICIAN ASSISTANT

## 2020-09-10 PROCEDURE — 3046F PR MOST RECENT HEMOGLOBIN A1C LEVEL > 9.0%: ICD-10-PCS | Mod: CPTII,S$GLB,, | Performed by: PHYSICIAN ASSISTANT

## 2020-09-10 PROCEDURE — 83690 ASSAY OF LIPASE: CPT

## 2020-09-10 PROCEDURE — 3074F SYST BP LT 130 MM HG: CPT | Mod: CPTII,S$GLB,, | Performed by: PHYSICIAN ASSISTANT

## 2020-09-10 PROCEDURE — 3078F DIAST BP <80 MM HG: CPT | Mod: CPTII,S$GLB,, | Performed by: PHYSICIAN ASSISTANT

## 2020-09-10 PROCEDURE — 3008F PR BODY MASS INDEX (BMI) DOCUMENTED: ICD-10-PCS | Mod: CPTII,S$GLB,, | Performed by: PHYSICIAN ASSISTANT

## 2020-09-10 PROCEDURE — 99499 UNLISTED E&M SERVICE: CPT | Mod: S$GLB,,, | Performed by: PHYSICIAN ASSISTANT

## 2020-09-10 PROCEDURE — 82607 VITAMIN B-12: CPT

## 2020-09-10 PROCEDURE — 3046F HEMOGLOBIN A1C LEVEL >9.0%: CPT | Mod: CPTII,S$GLB,, | Performed by: PHYSICIAN ASSISTANT

## 2020-09-10 PROCEDURE — 1159F MED LIST DOCD IN RCRD: CPT | Mod: S$GLB,,, | Performed by: PHYSICIAN ASSISTANT

## 2020-09-10 PROCEDURE — 82962 POCT GLUCOSE, HAND-HELD DEVICE: ICD-10-PCS | Mod: S$GLB,,, | Performed by: PHYSICIAN ASSISTANT

## 2020-09-10 PROCEDURE — 1125F PR PAIN SEVERITY QUANTIFIED, PAIN PRESENT: ICD-10-PCS | Mod: S$GLB,,, | Performed by: PHYSICIAN ASSISTANT

## 2020-09-10 PROCEDURE — 99499 RISK ADDL DX/OHS AUDIT: ICD-10-PCS | Mod: S$GLB,,, | Performed by: PHYSICIAN ASSISTANT

## 2020-09-10 PROCEDURE — 3008F BODY MASS INDEX DOCD: CPT | Mod: CPTII,S$GLB,, | Performed by: PHYSICIAN ASSISTANT

## 2020-09-10 PROCEDURE — 83036 HEMOGLOBIN GLYCOSYLATED A1C: CPT

## 2020-09-10 PROCEDURE — 36415 COLL VENOUS BLD VENIPUNCTURE: CPT | Mod: PO

## 2020-09-10 PROCEDURE — 99999 PR PBB SHADOW E&M-EST. PATIENT-LVL V: CPT | Mod: PBBFAC,,, | Performed by: PHYSICIAN ASSISTANT

## 2020-09-10 PROCEDURE — 80053 COMPREHEN METABOLIC PANEL: CPT

## 2020-09-10 PROCEDURE — 3078F PR MOST RECENT DIASTOLIC BLOOD PRESSURE < 80 MM HG: ICD-10-PCS | Mod: CPTII,S$GLB,, | Performed by: PHYSICIAN ASSISTANT

## 2020-09-10 PROCEDURE — 99999 PR PBB SHADOW E&M-EST. PATIENT-LVL V: ICD-10-PCS | Mod: PBBFAC,,, | Performed by: PHYSICIAN ASSISTANT

## 2020-09-10 PROCEDURE — 1125F AMNT PAIN NOTED PAIN PRSNT: CPT | Mod: S$GLB,,, | Performed by: PHYSICIAN ASSISTANT

## 2020-09-10 PROCEDURE — 84443 ASSAY THYROID STIM HORMONE: CPT

## 2020-09-10 PROCEDURE — 86592 SYPHILIS TEST NON-TREP QUAL: CPT

## 2020-09-10 PROCEDURE — 1159F PR MEDICATION LIST DOCUMENTED IN MEDICAL RECORD: ICD-10-PCS | Mod: S$GLB,,, | Performed by: PHYSICIAN ASSISTANT

## 2020-09-10 PROCEDURE — 85025 COMPLETE CBC W/AUTO DIFF WBC: CPT | Mod: PO

## 2020-09-10 PROCEDURE — 84425 ASSAY OF VITAMIN B-1: CPT

## 2020-09-10 PROCEDURE — 99214 PR OFFICE/OUTPT VISIT, EST, LEVL IV, 30-39 MIN: ICD-10-PCS | Mod: S$GLB,,, | Performed by: PHYSICIAN ASSISTANT

## 2020-09-10 PROCEDURE — 82962 GLUCOSE BLOOD TEST: CPT | Mod: S$GLB,,, | Performed by: PHYSICIAN ASSISTANT

## 2020-09-10 PROCEDURE — 1101F PT FALLS ASSESS-DOCD LE1/YR: CPT | Mod: CPTII,S$GLB,, | Performed by: PHYSICIAN ASSISTANT

## 2020-09-10 RX ORDER — SULFAMETHOXAZOLE AND TRIMETHOPRIM 800; 160 MG/1; MG/1
1 TABLET ORAL 2 TIMES DAILY
Qty: 14 TABLET | Refills: 0 | Status: SHIPPED | OUTPATIENT
Start: 2020-09-10 | End: 2020-09-17 | Stop reason: SDUPTHER

## 2020-09-10 RX ORDER — ONDANSETRON 8 MG/1
8 TABLET, ORALLY DISINTEGRATING ORAL ONCE
Qty: 1 TABLET | Refills: 0 | Status: SHIPPED | OUTPATIENT
Start: 2020-09-10 | End: 2020-09-10

## 2020-09-10 RX ORDER — PANTOPRAZOLE SODIUM 40 MG/1
40 TABLET, DELAYED RELEASE ORAL DAILY
Qty: 90 TABLET | Refills: 1 | Status: SHIPPED | OUTPATIENT
Start: 2020-09-10 | End: 2020-11-02 | Stop reason: CLARIF

## 2020-09-10 NOTE — PROGRESS NOTES
Subjective:       Patient ID: Steve June Jr. is a 75 y.o. male.    Chief Complaint: Leg Pain (Discoloration )    Patient is new to me.  He has peripheral vascular disease and bilateral lower extremities, type 2 diabetes which is very uncontrolled, history of cellulitis in both bilateral lower extremities and history of nonhealing foot ulcers.  He presents today with complains of swelling and pain to the left lower extremity.  He reports significant redness in the leg over the past 1 week.  The he reports nonhealing wounds on both of his legs.  Patient has chronic abdominal discomfort and nausea.  He states that over the past several days his nausea has been increased.  He states that he is tolerating normal diet without vomiting.  He denies any bowel changes.  He states that he is urinating normally.  He has not had a fever  Patients patient medical/surgical, social and family histories have been reviewed       Review of Systems   Constitutional: Negative for activity change, appetite change, chills, diaphoresis, fatigue and fever.   Eyes: Negative for visual disturbance.   Respiratory: Negative for cough, chest tightness, shortness of breath and wheezing.    Cardiovascular: Negative for chest pain, palpitations and leg swelling.   Gastrointestinal: Positive for abdominal pain and nausea. Negative for abdominal distention, anal bleeding, blood in stool, constipation, diarrhea, rectal pain and vomiting.   Endocrine: Negative for polydipsia and polyuria.   Genitourinary: Negative for difficulty urinating, enuresis, hematuria and urgency.   Musculoskeletal: Negative for arthralgias, gait problem and joint swelling.   Skin: Positive for color change and wound.   Neurological: Negative for dizziness, weakness, light-headedness and headaches.       Objective:      Physical Exam  Constitutional:       General: He is not in acute distress.     Appearance: He is well-developed.   HENT:      Head: Normocephalic and  atraumatic.   Cardiovascular:      Rate and Rhythm: Normal rate and regular rhythm.      Heart sounds: Normal heart sounds.   Pulmonary:      Effort: Pulmonary effort is normal.      Breath sounds: Normal breath sounds.   Musculoskeletal:      Right lower leg: No edema.      Left lower leg: No edema.   Skin:     General: Skin is warm and dry.      Capillary Refill: Capillary refill takes less than 2 seconds.          Neurological:      Mental Status: He is alert.   Psychiatric:         Behavior: Behavior is cooperative.         Assessment:       1. Cellulitis and abscess of left leg    2. Skin ulcer of left lower leg, unspecified ulcer stage    3. Ulcer of right lower leg, with unspecified severity    4. Type 2 diabetes mellitus with complication, with long-term current use of insulin    5. Nausea    6. Gastroesophageal reflux disease, esophagitis presence not specified        Plan:       Steve was seen today for leg pain.    Diagnoses and all orders for this visit:    Cellulitis and abscess of left leg  -     sulfamethoxazole-trimethoprim 800-160mg (BACTRIM DS) 800-160 mg Tab; Take 1 tablet by mouth 2 (two) times daily.    Skin ulcer of left lower leg, unspecified ulcer stage  Ulcer of right lower leg, with unspecified severity    Ambulatory referral/consult to Podiatry; Future    Type 2 diabetes mellitus with complication, with long-term current use of insulin  -     POCT Glucose, Hand-Held Device    Nausea  -     POCT Glucose, Hand-Held Device  -     CBC auto differential; Future  -     Comprehensive metabolic panel; Future  -     Lipase; Future  -     ondansetron (ZOFRAN-ODT) 8 MG TbDL; Take 1 tablet (8 mg total) by mouth once. for 1 dose    Gastroesophageal reflux disease, esophagitis presence not specified  -     pantoprazole (PROTONIX) 40 MG tablet; Take 1 tablet (40 mg total) by mouth once daily.         follow up with me in 1 week

## 2020-09-11 ENCOUNTER — HOSPITAL ENCOUNTER (OUTPATIENT)
Dept: RADIOLOGY | Facility: HOSPITAL | Age: 75
Discharge: HOME | End: 2020-09-11
Attending: PSYCHIATRY & NEUROLOGY
Payer: MEDICARE

## 2020-09-11 DIAGNOSIS — R41.3 OTHER AMNESIA: ICD-10-CM

## 2020-09-11 DIAGNOSIS — R41.3 MEMORY LOSS: ICD-10-CM

## 2020-09-11 LAB
ALBUMIN SERPL BCP-MCNC: 3.9 G/DL (ref 3.5–5.2)
ALBUMIN SERPL BCP-MCNC: 3.9 G/DL (ref 3.5–5.2)
ALP SERPL-CCNC: 96 U/L (ref 55–135)
ALP SERPL-CCNC: 96 U/L (ref 55–135)
ALT SERPL W/O P-5'-P-CCNC: 17 U/L (ref 10–44)
ALT SERPL W/O P-5'-P-CCNC: 17 U/L (ref 10–44)
AMMONIA PLAS-SCNC: 50 UMOL/L (ref 10–50)
ANION GAP SERPL CALC-SCNC: 9 MMOL/L (ref 8–16)
ANION GAP SERPL CALC-SCNC: 9 MMOL/L (ref 8–16)
AST SERPL-CCNC: 20 U/L (ref 10–40)
AST SERPL-CCNC: 20 U/L (ref 10–40)
BILIRUB SERPL-MCNC: 1.6 MG/DL (ref 0.1–1)
BILIRUB SERPL-MCNC: 1.6 MG/DL (ref 0.1–1)
BUN SERPL-MCNC: 17 MG/DL (ref 8–23)
BUN SERPL-MCNC: 17 MG/DL (ref 8–23)
CALCIUM SERPL-MCNC: 8.8 MG/DL (ref 8.7–10.5)
CALCIUM SERPL-MCNC: 8.8 MG/DL (ref 8.7–10.5)
CHLORIDE SERPL-SCNC: 100 MMOL/L (ref 95–110)
CHLORIDE SERPL-SCNC: 100 MMOL/L (ref 95–110)
CO2 SERPL-SCNC: 25 MMOL/L (ref 23–29)
CO2 SERPL-SCNC: 25 MMOL/L (ref 23–29)
CREAT SERPL-MCNC: 1 MG/DL (ref 0.5–1.4)
CREAT SERPL-MCNC: 1 MG/DL (ref 0.5–1.4)
EST. GFR  (AFRICAN AMERICAN): >60 ML/MIN/1.73 M^2
EST. GFR  (AFRICAN AMERICAN): >60 ML/MIN/1.73 M^2
EST. GFR  (NON AFRICAN AMERICAN): >60 ML/MIN/1.73 M^2
EST. GFR  (NON AFRICAN AMERICAN): >60 ML/MIN/1.73 M^2
ESTIMATED AVG GLUCOSE: 298 MG/DL (ref 68–131)
FOLATE SERPL-MCNC: 9 NG/ML (ref 4–24)
GLUCOSE SERPL-MCNC: 227 MG/DL (ref 70–110)
GLUCOSE SERPL-MCNC: 227 MG/DL (ref 70–110)
HBA1C MFR BLD HPLC: 12 % (ref 4–5.6)
LIPASE SERPL-CCNC: 11 U/L (ref 4–60)
POTASSIUM SERPL-SCNC: 4 MMOL/L (ref 3.5–5.1)
POTASSIUM SERPL-SCNC: 4 MMOL/L (ref 3.5–5.1)
PROT SERPL-MCNC: 6.7 G/DL (ref 6–8.4)
PROT SERPL-MCNC: 6.7 G/DL (ref 6–8.4)
RPR SER QL: NORMAL
SODIUM SERPL-SCNC: 134 MMOL/L (ref 136–145)
SODIUM SERPL-SCNC: 134 MMOL/L (ref 136–145)
TSH SERPL DL<=0.005 MIU/L-ACNC: 2.81 UIU/ML (ref 0.4–4)
VIT B12 SERPL-MCNC: 827 PG/ML (ref 210–950)

## 2020-09-11 PROCEDURE — 70551 MRI BRAIN WITHOUT CONTRAST: ICD-10-PCS | Mod: 26,,, | Performed by: RADIOLOGY

## 2020-09-11 PROCEDURE — 70551 MRI BRAIN STEM W/O DYE: CPT | Mod: TC

## 2020-09-11 PROCEDURE — 70551 MRI BRAIN STEM W/O DYE: CPT | Mod: 26,,, | Performed by: RADIOLOGY

## 2020-09-16 LAB
LEFT EYE DM RETINOPATHY: NEGATIVE
RIGHT EYE DM RETINOPATHY: NEGATIVE

## 2020-09-17 ENCOUNTER — TELEPHONE (OUTPATIENT)
Dept: VASCULAR SURGERY | Facility: CLINIC | Age: 75
End: 2020-09-17

## 2020-09-17 ENCOUNTER — OFFICE VISIT (OUTPATIENT)
Dept: FAMILY MEDICINE | Facility: CLINIC | Age: 75
End: 2020-09-17
Payer: MEDICARE

## 2020-09-17 ENCOUNTER — TELEPHONE (OUTPATIENT)
Dept: FAMILY MEDICINE | Facility: CLINIC | Age: 75
End: 2020-09-17

## 2020-09-17 VITALS
HEIGHT: 69 IN | OXYGEN SATURATION: 95 % | TEMPERATURE: 98 F | DIASTOLIC BLOOD PRESSURE: 62 MMHG | SYSTOLIC BLOOD PRESSURE: 126 MMHG | WEIGHT: 196.19 LBS | HEART RATE: 67 BPM | BODY MASS INDEX: 29.06 KG/M2

## 2020-09-17 DIAGNOSIS — I73.9 PVD (PERIPHERAL VASCULAR DISEASE): ICD-10-CM

## 2020-09-17 DIAGNOSIS — K74.60 HEPATIC CIRRHOSIS DUE TO CHRONIC HEPATITIS C INFECTION: ICD-10-CM

## 2020-09-17 DIAGNOSIS — L03.119 CELLULITIS OF LOWER EXTREMITY, UNSPECIFIED LATERALITY: ICD-10-CM

## 2020-09-17 DIAGNOSIS — D61.818 PANCYTOPENIA: ICD-10-CM

## 2020-09-17 DIAGNOSIS — D69.2 SENILE PURPURA: ICD-10-CM

## 2020-09-17 DIAGNOSIS — L97.919 ULCER OF RIGHT LOWER LEG, WITH UNSPECIFIED SEVERITY: ICD-10-CM

## 2020-09-17 DIAGNOSIS — R11.0 NAUSEA: ICD-10-CM

## 2020-09-17 DIAGNOSIS — B18.2 HEPATIC CIRRHOSIS DUE TO CHRONIC HEPATITIS C INFECTION: ICD-10-CM

## 2020-09-17 DIAGNOSIS — L97.929 SKIN ULCER OF LEFT LOWER LEG, UNSPECIFIED ULCER STAGE: Primary | ICD-10-CM

## 2020-09-17 PROCEDURE — 3008F PR BODY MASS INDEX (BMI) DOCUMENTED: ICD-10-PCS | Mod: CPTII,S$GLB,, | Performed by: PHYSICIAN ASSISTANT

## 2020-09-17 PROCEDURE — 1101F PT FALLS ASSESS-DOCD LE1/YR: CPT | Mod: CPTII,S$GLB,, | Performed by: PHYSICIAN ASSISTANT

## 2020-09-17 PROCEDURE — 99499 UNLISTED E&M SERVICE: CPT | Mod: S$GLB,,, | Performed by: PHYSICIAN ASSISTANT

## 2020-09-17 PROCEDURE — 3074F PR MOST RECENT SYSTOLIC BLOOD PRESSURE < 130 MM HG: ICD-10-PCS | Mod: CPTII,S$GLB,, | Performed by: PHYSICIAN ASSISTANT

## 2020-09-17 PROCEDURE — 3074F SYST BP LT 130 MM HG: CPT | Mod: CPTII,S$GLB,, | Performed by: PHYSICIAN ASSISTANT

## 2020-09-17 PROCEDURE — 99214 OFFICE O/P EST MOD 30 MIN: CPT | Mod: S$GLB,,, | Performed by: PHYSICIAN ASSISTANT

## 2020-09-17 PROCEDURE — 1159F MED LIST DOCD IN RCRD: CPT | Mod: S$GLB,,, | Performed by: PHYSICIAN ASSISTANT

## 2020-09-17 PROCEDURE — 99999 PR PBB SHADOW E&M-EST. PATIENT-LVL V: CPT | Mod: PBBFAC,,, | Performed by: PHYSICIAN ASSISTANT

## 2020-09-17 PROCEDURE — 1125F AMNT PAIN NOTED PAIN PRSNT: CPT | Mod: S$GLB,,, | Performed by: PHYSICIAN ASSISTANT

## 2020-09-17 PROCEDURE — 3078F PR MOST RECENT DIASTOLIC BLOOD PRESSURE < 80 MM HG: ICD-10-PCS | Mod: CPTII,S$GLB,, | Performed by: PHYSICIAN ASSISTANT

## 2020-09-17 PROCEDURE — 1101F PR PT FALLS ASSESS DOC 0-1 FALLS W/OUT INJ PAST YR: ICD-10-PCS | Mod: CPTII,S$GLB,, | Performed by: PHYSICIAN ASSISTANT

## 2020-09-17 PROCEDURE — 1159F PR MEDICATION LIST DOCUMENTED IN MEDICAL RECORD: ICD-10-PCS | Mod: S$GLB,,, | Performed by: PHYSICIAN ASSISTANT

## 2020-09-17 PROCEDURE — 3078F DIAST BP <80 MM HG: CPT | Mod: CPTII,S$GLB,, | Performed by: PHYSICIAN ASSISTANT

## 2020-09-17 PROCEDURE — 99999 PR PBB SHADOW E&M-EST. PATIENT-LVL V: ICD-10-PCS | Mod: PBBFAC,,, | Performed by: PHYSICIAN ASSISTANT

## 2020-09-17 PROCEDURE — 99499 RISK ADDL DX/OHS AUDIT: ICD-10-PCS | Mod: S$GLB,,, | Performed by: PHYSICIAN ASSISTANT

## 2020-09-17 PROCEDURE — 99214 PR OFFICE/OUTPT VISIT, EST, LEVL IV, 30-39 MIN: ICD-10-PCS | Mod: S$GLB,,, | Performed by: PHYSICIAN ASSISTANT

## 2020-09-17 PROCEDURE — 3008F BODY MASS INDEX DOCD: CPT | Mod: CPTII,S$GLB,, | Performed by: PHYSICIAN ASSISTANT

## 2020-09-17 PROCEDURE — 1125F PR PAIN SEVERITY QUANTIFIED, PAIN PRESENT: ICD-10-PCS | Mod: S$GLB,,, | Performed by: PHYSICIAN ASSISTANT

## 2020-09-17 RX ORDER — ONDANSETRON 8 MG/1
8 TABLET, ORALLY DISINTEGRATING ORAL EVERY 6 HOURS PRN
Qty: 20 TABLET | Refills: 0 | Status: SHIPPED | OUTPATIENT
Start: 2020-09-17 | End: 2020-11-02 | Stop reason: CLARIF

## 2020-09-17 RX ORDER — SULFAMETHOXAZOLE AND TRIMETHOPRIM 800; 160 MG/1; MG/1
1 TABLET ORAL 2 TIMES DAILY
Qty: 14 TABLET | Refills: 0 | Status: SHIPPED | OUTPATIENT
Start: 2020-09-17 | End: 2020-10-27

## 2020-09-17 NOTE — TELEPHONE ENCOUNTER
----- Message from Maria De Jesus Sanchez sent at 9/17/2020  9:02 AM CDT -----  Pt was seen today by saul and needs a 4 week f/u  No time available  Please call him @ [phone]  Thanks !    
appt scheduled  
(4) walks frequently

## 2020-09-17 NOTE — TELEPHONE ENCOUNTER
Called patient again schedule his follow up visit wife accepted 9/24 mailed appt reminder to patient.

## 2020-09-17 NOTE — TELEPHONE ENCOUNTER
Pt was seen this morning by CORNEL Jones.  Pt has not seen hepatology in 2 years.   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  Thanks

## 2020-09-17 NOTE — TELEPHONE ENCOUNTER
----- Message from Maria De Jesus Sanchez sent at 9/17/2020  8:59 AM CDT -----  Pt was seen today by saul and needs an appt for PVD (peripheral vascular disease  First available appt is in December and pt cannot wait that long  Please call him @ [phone]  Thanks !

## 2020-09-17 NOTE — PROGRESS NOTES
Subjective:       Patient ID: Steve June Jr. is a 75 y.o. male.    Chief Complaint: Follow-up (1 wk )    Patient with liver cirrhosis secondary to chronic hepatitis C, type 2 diabetes, peripheral vascular disease, and diabetic neuropathy presents for follow-up of lower extremity wounds and cellulitis.  He was seen 1 week ago for the same.  He was started on Bactrim for cellulitis.  He reports minimal improvement in the legs.  Wounds have not healed.  Wound care appointment was canceled due to her cane and closure of clinic.  Patient continues to have abdominal pain and nausea.  He is not taking lactulose as prescribed.  He has not seen hepatology in approximately 2 years.  Last labs showed bilirubin 1.6.  Alk-phos AST ALT were within normal range.  Patients patient medical/surgical, social and family histories have been reviewed       Review of Systems   Constitutional: Negative for activity change, appetite change, chills, diaphoresis, fatigue, fever and unexpected weight change.   Respiratory: Negative for cough, chest tightness and shortness of breath.    Cardiovascular: Negative for chest pain, palpitations and leg swelling.   Gastrointestinal: Positive for abdominal distention and abdominal pain. Negative for anal bleeding, blood in stool, constipation, diarrhea, nausea and vomiting.   Endocrine: Negative for polydipsia and polyuria.   Genitourinary: Negative for difficulty urinating, frequency, hematuria and urgency.   Musculoskeletal: Negative for arthralgias.   Skin: Positive for color change and wound. Negative for rash.   Neurological: Negative for dizziness, weakness, light-headedness and headaches.   Psychiatric/Behavioral: Negative for dysphoric mood and sleep disturbance. The patient is not nervous/anxious.        Objective:      Physical Exam  Constitutional:       General: He is not in acute distress.     Appearance: He is well-developed. He is ill-appearing (chronically ). He is not  toxic-appearing.   HENT:      Head: Normocephalic and atraumatic.   Cardiovascular:      Rate and Rhythm: Normal rate and regular rhythm.      Heart sounds: Normal heart sounds.   Pulmonary:      Effort: Pulmonary effort is normal.      Breath sounds: Normal breath sounds.   Abdominal:      General: Abdomen is protuberant. Bowel sounds are normal. There is distension.      Palpations: Abdomen is soft.      Tenderness: There is generalized abdominal tenderness.   Musculoskeletal:      Right lower leg: No edema.      Left lower leg: No edema.   Skin:     General: Skin is warm and dry.      Capillary Refill: Capillary refill takes less than 2 seconds.      Comments: 3 ulcerations of the left shin measuring 5 mm   1 right knee wound   Dusky erythema to bilateral lower extremities      Neurological:      Mental Status: He is alert.   Psychiatric:         Behavior: Behavior is cooperative.         Assessment:       1. Skin ulcer of left lower leg, unspecified ulcer stage    2. Ulcer of right lower leg, with unspecified severity    3. Cellulitis of lower extremity, unspecified laterality    4. PVD (peripheral vascular disease)    5. 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 Pancytopenia    7. Senile purpura    8. Nausea and vomiting, intractability of vomiting not specified, unspecified vomiting type    9. Nausea        Plan:       Steve was seen today for follow-up.    Diagnoses and all orders for this visit:    Skin ulcer of left lower leg, unspecified ulcer stage    Ulcer of right lower leg, with unspecified severity    Cellulitis of lower extremity, unspecified laterality  -     sulfamethoxazole-trimethoprim 800-160mg (BACTRIM DS) 800-160 mg Tab; Take 1 tablet by mouth 2 (two) times daily.  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          Follow up for sooner appt with podiatry for wounds ,  4 weeks pcp.

## 2020-09-17 NOTE — TELEPHONE ENCOUNTER
Called patient spoke to patient wife to schedule his follow up visit. She said wait a minute and hang up the call.

## 2020-09-18 LAB — VIT B1 BLD-MCNC: 41 UG/L (ref 38–122)

## 2020-09-21 ENCOUNTER — PATIENT OUTREACH (OUTPATIENT)
Dept: ADMINISTRATIVE | Facility: HOSPITAL | Age: 75
End: 2020-09-21

## 2020-09-22 ENCOUNTER — OFFICE VISIT (OUTPATIENT)
Dept: PODIATRY | Facility: CLINIC | Age: 75
End: 2020-09-22
Payer: MEDICARE

## 2020-09-22 VITALS
TEMPERATURE: 97 F | WEIGHT: 196 LBS | HEIGHT: 69 IN | RESPIRATION RATE: 16 BRPM | HEART RATE: 66 BPM | DIASTOLIC BLOOD PRESSURE: 72 MMHG | BODY MASS INDEX: 29.03 KG/M2 | SYSTOLIC BLOOD PRESSURE: 126 MMHG

## 2020-09-22 DIAGNOSIS — S80.812A LEG ABRASION, INFECTED, LEFT, INITIAL ENCOUNTER: ICD-10-CM

## 2020-09-22 DIAGNOSIS — L60.0 OC (ONYCHOCRYPTOSIS): ICD-10-CM

## 2020-09-22 DIAGNOSIS — L60.2 ONYCHOGRYPHOSIS: ICD-10-CM

## 2020-09-22 DIAGNOSIS — L90.9 FAT PAD ATROPHY OF FOOT: ICD-10-CM

## 2020-09-22 DIAGNOSIS — I73.9 PVD (PERIPHERAL VASCULAR DISEASE): Primary | ICD-10-CM

## 2020-09-22 DIAGNOSIS — L84 PRE-ULCERATIVE CALLUSES: ICD-10-CM

## 2020-09-22 DIAGNOSIS — B35.3 TINEA PEDIS OF BOTH FEET: ICD-10-CM

## 2020-09-22 DIAGNOSIS — B35.1 ONYCHOMYCOSIS DUE TO DERMATOPHYTE: ICD-10-CM

## 2020-09-22 DIAGNOSIS — L97.929 SKIN ULCER OF LEFT LOWER LEG, UNSPECIFIED ULCER STAGE: ICD-10-CM

## 2020-09-22 DIAGNOSIS — L08.9 LEG ABRASION, INFECTED, LEFT, INITIAL ENCOUNTER: ICD-10-CM

## 2020-09-22 DIAGNOSIS — E08.42 DIABETIC POLYNEUROPATHY ASSOCIATED WITH DIABETES MELLITUS DUE TO UNDERLYING CONDITION: ICD-10-CM

## 2020-09-22 DIAGNOSIS — E11.51 TYPE II DIABETES MELLITUS WITH PERIPHERAL CIRCULATORY DISORDER: ICD-10-CM

## 2020-09-22 DIAGNOSIS — R26.2 DIFFICULTY IN WALKING, NOT ELSEWHERE CLASSIFIED: ICD-10-CM

## 2020-09-22 DIAGNOSIS — S80.811A LEG ABRASION, RIGHT, INITIAL ENCOUNTER: ICD-10-CM

## 2020-09-22 PROCEDURE — 11056 ROUTINE FOOT CARE: ICD-10-PCS | Mod: Q8,S$GLB,, | Performed by: PODIATRIST

## 2020-09-22 PROCEDURE — 99213 OFFICE O/P EST LOW 20 MIN: CPT | Mod: 25,S$GLB,, | Performed by: PODIATRIST

## 2020-09-22 PROCEDURE — 3074F PR MOST RECENT SYSTOLIC BLOOD PRESSURE < 130 MM HG: ICD-10-PCS | Mod: S$GLB,,, | Performed by: PODIATRIST

## 2020-09-22 PROCEDURE — 1125F AMNT PAIN NOTED PAIN PRSNT: CPT | Mod: S$GLB,,, | Performed by: PODIATRIST

## 2020-09-22 PROCEDURE — 1101F PT FALLS ASSESS-DOCD LE1/YR: CPT | Mod: S$GLB,,, | Performed by: PODIATRIST

## 2020-09-22 PROCEDURE — 3078F DIAST BP <80 MM HG: CPT | Mod: S$GLB,,, | Performed by: PODIATRIST

## 2020-09-22 PROCEDURE — 11056 PARNG/CUTG B9 HYPRKR LES 2-4: CPT | Mod: Q8,S$GLB,, | Performed by: PODIATRIST

## 2020-09-22 PROCEDURE — 11721 ROUTINE FOOT CARE: ICD-10-PCS | Mod: 59,Q8,S$GLB, | Performed by: PODIATRIST

## 2020-09-22 PROCEDURE — 3074F SYST BP LT 130 MM HG: CPT | Mod: S$GLB,,, | Performed by: PODIATRIST

## 2020-09-22 PROCEDURE — 3008F PR BODY MASS INDEX (BMI) DOCUMENTED: ICD-10-PCS | Mod: S$GLB,,, | Performed by: PODIATRIST

## 2020-09-22 PROCEDURE — 97597 WOUND DEBRIDEMENT: ICD-10-PCS | Mod: 59,S$GLB,, | Performed by: PODIATRIST

## 2020-09-22 PROCEDURE — 1159F PR MEDICATION LIST DOCUMENTED IN MEDICAL RECORD: ICD-10-PCS | Mod: S$GLB,,, | Performed by: PODIATRIST

## 2020-09-22 PROCEDURE — 1159F MED LIST DOCD IN RCRD: CPT | Mod: S$GLB,,, | Performed by: PODIATRIST

## 2020-09-22 PROCEDURE — 3008F BODY MASS INDEX DOCD: CPT | Mod: S$GLB,,, | Performed by: PODIATRIST

## 2020-09-22 PROCEDURE — 1101F PR PT FALLS ASSESS DOC 0-1 FALLS W/OUT INJ PAST YR: ICD-10-PCS | Mod: S$GLB,,, | Performed by: PODIATRIST

## 2020-09-22 PROCEDURE — 97597 DBRDMT OPN WND 1ST 20 CM/<: CPT | Mod: 59,S$GLB,, | Performed by: PODIATRIST

## 2020-09-22 PROCEDURE — 1125F PR PAIN SEVERITY QUANTIFIED, PAIN PRESENT: ICD-10-PCS | Mod: S$GLB,,, | Performed by: PODIATRIST

## 2020-09-22 PROCEDURE — 3046F HEMOGLOBIN A1C LEVEL >9.0%: CPT | Mod: S$GLB,,, | Performed by: PODIATRIST

## 2020-09-22 PROCEDURE — 99213 PR OFFICE/OUTPT VISIT, EST, LEVL III, 20-29 MIN: ICD-10-PCS | Mod: 25,S$GLB,, | Performed by: PODIATRIST

## 2020-09-22 PROCEDURE — 11721 DEBRIDE NAIL 6 OR MORE: CPT | Mod: 59,Q8,S$GLB, | Performed by: PODIATRIST

## 2020-09-22 PROCEDURE — 3046F PR MOST RECENT HEMOGLOBIN A1C LEVEL > 9.0%: ICD-10-PCS | Mod: S$GLB,,, | Performed by: PODIATRIST

## 2020-09-22 PROCEDURE — 11042 DBRDMT SUBQ TIS 1ST 20SQCM/<: CPT | Mod: 59,S$GLB,, | Performed by: PODIATRIST

## 2020-09-22 PROCEDURE — 3078F PR MOST RECENT DIASTOLIC BLOOD PRESSURE < 80 MM HG: ICD-10-PCS | Mod: S$GLB,,, | Performed by: PODIATRIST

## 2020-09-22 PROCEDURE — 11042 WOUND DEBRIDEMENT: ICD-10-PCS | Mod: 59,S$GLB,, | Performed by: PODIATRIST

## 2020-09-22 NOTE — LETTER
September 22, 2020      CORNEL Pickering  [address]  [place] [address]           [facility] Podiat[facility]  [address]  [place] [address]  Phone: [phone]  Fax: [phone]          Patient: Steve June Jr.   MR Number: [id number]   YOB: 1945   Date of Visit: 9/22/2020       Dear Cindy Jones:    Thank you for referring Steve June to me for evaluation. Attached you will find relevant portions of my assessment and plan of care.    If you have questions, please do not hesitate to call me. I look forward to following Steve June along with you.    Sincerely,    Selena Felix DPM    Enclosure  CC:  No Recipients    If you would like to receive this communication electronically, please contact [email or web address] or [phone] to request more information on "[employer] Link access.    For providers and/or their staff who would like to refer a patient to [facility], please contact us through our one-stop-shop provider referral line, [facility], at [phone].    If you feel you have received this communication in error or would no longer like to receive these types of communications, please e-mail [email or web address]

## 2020-09-22 NOTE — PROGRESS NOTES
[address] [facility] [address]  [place] [address]  Phone: [phone]   [phone]    Patient's PCP:Crispin Garcia MD  Referring Provider: Cindy Jones    Subjective:      Chief Complaint:: Follow-up (ulcer bilateral) and Routine Foot Care (Q8)    HPI  Steve June Jr. is a 75 y.o. male with for follow up bilateral ulcers, bilateral callus, bilateral 5th toe pain and bilateral nail trim lasting for about 4-6 months. Current symptoms include aching pains and sensitive/tender.  Aggravating factors are walking/standing, pressure. Symptoms have gotten worse. Treatment to date have included periodic trimming by daughter and callus trim by daughter. Patients rates pain 7-8/10 on pain scale. Patient also presents with complaints of long, thick toenails and callus both feet.  Gradual onset, worsening over past several weeks, aggravated by increased weight bearing, shoe gear, pressure.  Periodic debridement helps symptoms.  Patient has been recently on 2 courses of antibiotics for his cellulitis in his legs due to his wound.  His primary care physician placed patient on these antibiotics.  And had patient follow with me for continued wound care.     Systemic Doctor: Dr. Crispin Gracia MD / CORNEL Whitmore  Date Last Seen: 02/04/2020 / 09/17/2020  Blood Sugar: 350-450 (estimate)  Hemoglobin A1c: 11.4 (02/04/20)    Vitals:    09/22/20 0956   BP: 126/72   Pulse: 66   Resp: 16   Temp: 97.2 °F (36.2 °C)     Shoe Size:     Past Surgical History:   Procedure Laterality Date    CATARACT EXTRACTION  1/10/13    left eye    CATARACT EXTRACTION      right eye    CHOLECYSTECTOMY      COLONOSCOPY      EYE SURGERY      Cataract surgery to right eye    KNEE ARTHROSCOPY W/ MENISCAL REPAIR      KNEE CARTILAGE SURGERY      left knee    KNEE SURGERY  12/2006    left    SKIN LESION EXCISION      TONSILLECTOMY      UPPER GASTROINTESTINAL ENDOSCOPY       Past Medical History:   Diagnosis Date    Abnormal  thyroid function test     Allergy     Seasonal    Anemia     Anemia due to blood loss 7/2/2014    Arthritis     Gaviria esophagus     Basal cell carcinoma     right forearm    Basal cell carcinoma 12/2011    lower post neck    Basal cell carcinoma 04/23/2019    left posterior helix    Cancer     skin CA    Cataract     Cirrhosis     Diabetes mellitus     Diabetes mellitus, type 2     Encounter for blood transfusion     Esophageal varices in cirrhosis     grade II on 7/12 EGD    Gastritis     on 7/12 EGD    GERD (gastroesophageal reflux disease) 2/28/2015    Hard of hearing     Hiatal hernia     History of hepatitis C 8/10/2012    tx with harvoni x 41 days (started 10/22/15). SVR4     Hoarseness 2/28/2015    Hx of colonic polyps 01/10/2011    per colonoscopy report in media    Hypercholesteremia     Hypersplenism     Hypertension     No meds    Pain management 12/10/2014    Petechial hemorrhage 11/25/2015    Lower extremities bilat     Portal hypertensive gastropathy     on 7/12 EGD    Squamous cell carcinoma 04/23/2019    right preauricular cheek, right temple    Thrombocytopenia     Type II or unspecified type diabetes mellitus with neurological manifestations, uncontrolled(250.62) 12/24/2013    Valvular heart disease     mild MR 12     Family History   Problem Relation Age of Onset    Leukemia Mother     Cancer Mother         bone    Alcohol abuse Father     Cirrhosis Father         EtOH induced    No Known Problems Daughter     No Known Problems Son     No Known Problems Daughter     No Known Problems Daughter     No Known Problems Daughter     No Known Problems Sister     Amblyopia Neg Hx     Blindness Neg Hx     Cataracts Neg Hx     Diabetes Neg Hx     Glaucoma Neg Hx     Hypertension Neg Hx     Macular degeneration Neg Hx     Retinal detachment Neg Hx     Strabismus Neg Hx     Stroke Neg Hx     Thyroid disease Neg Hx     Psoriasis Neg Hx     Lupus Neg Hx   "   Eczema Neg Hx     Acne Neg Hx     Melanoma Neg Hx         Social History:   Marital Status:   Alcohol History:  reports current alcohol use.  Tobacco History:  reports that he quit smoking about 20 years ago. He has a 25.00 pack-year smoking history. He has never used smokeless tobacco.  Drug History:  reports no history of drug use.    Review of patient's allergies indicates:   Allergen Reactions    Adhesive tape-silicones Other (See Comments)     pulls skin off    Doxycycline      Dizzy. "Just didn't feel right".       Current Outpatient Medications   Medication Sig Dispense Refill    albuterol (VENTOLIN HFA) 90 mcg/actuation inhaler Inhale 2 puffs into the lungs every 6 (six) hours as needed for Wheezing. Rescue 1 Inhaler 11    blood sugar diagnostic Strp To check BG 3 times daily, to use with insurance preferred meter 300 strip 11    blood-glucose meter kit To check BG 3 times daily, to use with insurance preferred meter 1 each 0    insulin lispro (HUMALOG KWIKPEN INSULIN) 100 unit/mL pen 5 unit ac breakfast 15 units ac dinner 30 mL 3    insulin NPH/Reg human (NOVOLIN 70-30 FLEXPEN U-100) 100 unit/mL (70-30) InPn pen Inject 45 Units into the skin 2 (two) times daily with meals. 30 mL 11    lactulose (CHRONULAC) 20 gram/30 mL Soln Take 30 mLs (20 g total) by mouth 2 (two) times daily. 1800 mL 11    lancets Misc To check BG 3 times daily, to use with insurance preferred meter 300 each 11    metFORMIN (GLUCOPHAGE-XR) 500 MG XR 24hr tablet Take 4 tablets (2,000 mg total) by mouth once daily. 360 tablet 3    ondansetron (ZOFRAN-ODT) 8 MG TbDL Take 1 tablet (8 mg total) by mouth every 6 (six) hours as needed. 20 tablet 0    [START ON 11/9/2020] oxyCODONE-acetaminophen (PERCOCET)  mg per tablet Take 1 tablet by mouth every 6 (six) hours as needed for Pain. 120 tablet 0    pantoprazole (PROTONIX) 40 MG tablet Take 1 tablet (40 mg total) by mouth once daily. 90 tablet 1    pen needle, " "diabetic (MAXICOMFORT II PEN NEEDLE) 31 gauge x 1/4" Ndle 1 each by Misc.(Non-Drug; Combo Route) route once daily. 100 each 3    pen needle, diabetic 32 gauge x 5/32" Ndle Use as directed 100 each 3    sulfamethoxazole-trimethoprim 800-160mg (BACTRIM DS) 800-160 mg Tab Take 1 tablet by mouth 2 (two) times daily. 14 tablet 0     No current facility-administered medications for this visit.        Review of Systems      Objective:        Physical Exam:   Foot Exam  Physical Exam   Musculoskeletal:        Legs:         Feet:            Physical examination: General: Pt. is well-developed, well-nourished, appears stated age, in no acute distress, alert and oriented x 3.     Vascular: Dorsalis pedis pulses are 1/4 bilaterally and posterior tibial pulses are diminished Bilaterally. Toes are cool to touch. Feet are warm proximally.     There is decreased digital hair. Skin is atrophic, slightly hyperpigmented, and mildly edematous. Capillary refill greater than 5 seconds all toes/distal feet     Neurologic: Eidson-Theodore 5.07 monofilament is decreased bilateral feet. Sharp/dull sensation absent Bilateral feet.     Vibratory sensation absent bilateral     Musculoskeletal: adequate joint range of motion without pain, limitation, nor crepitation Bilateral feet and ankle joints. Muscle strength is 5/5 in all groups bilaterally.     Lymphatics: no lymphangitic streaking bilaterally.       Dry scale with superficial flakes over an erythematous base bilateral feet in moccasin distribution without ulceration, drainage, pus, tracking, fluctuance, malodor, or cardinal signs infection.    Toenails 1st, 2nd, 3rd, 4th, 5th  bilateral are hypertrophic, dystrophic, discolored tanish brown with tan, gray crumbly subungual debris.  Tender to distal nail plate pressure, without periungual skin abnormality of each.     Imaging:            Assessment:       1. PVD (peripheral vascular disease)    2. Skin ulcer of left lower leg, " "unspecified ulcer stage    3. Type II diabetes mellitus with peripheral circulatory disorder    4. Onychomycosis due to dermatophyte    5. Diabetic polyneuropathy associated with diabetes mellitus due to underlying condition    6. Fat pad atrophy of foot    7. Tinea pedis of both feet    8. Pre-ulcerative calluses    9. OC (onychocryptosis)    10. Onychogryphosis    11. Difficulty in walking, not elsewhere classified    12. Leg abrasion, infected, left, initial encounter    13. Leg abrasion, right, initial encounter      Plan:   PVD (peripheral vascular disease)  -     Routine Foot Care    Skin ulcer of left lower leg, unspecified ulcer stage  -     Ambulatory referral/consult to Podiatry    Type II diabetes mellitus with peripheral circulatory disorder  -     Routine Foot Care    Onychomycosis due to dermatophyte    Diabetic polyneuropathy associated with diabetes mellitus due to underlying condition  -     Routine Foot Care    Fat pad atrophy of foot    Tinea pedis of both feet    Pre-ulcerative calluses    OC (onychocryptosis)  -     Routine Foot Care    Onychogryphosis    Difficulty in walking, not elsewhere classified  -     Routine Foot Care    Leg abrasion, infected, left, initial encounter    Leg abrasion, right, initial encounter    Other orders  -     Wound Debridement      No follow-ups on file.    Routine Foot Care    Date/Time: 9/22/2020 9:40 AM  Performed by: Selena Felix DPM  Authorized by: Selena Felix DPM     Time out: Immediately prior to procedure a "time out" was called to verify the correct patient, procedure, equipment, support staff and site/side marked as required.    Consent Done?:  Not Needed  Hyperkeratotic Skin Lesions?: Yes    Number of trimmed lesions:  2  Location(s):  Left 5th Metatarsal Head and Right 1st Metatarsal Head    Nail Care Type:  Debride  Location(s): All  (Left 1st Toe, Left 3rd Toe, Left 2nd Toe, Left 4th Toe, Left 5th Toe, Right 1st Toe, Right 2nd Toe, Right 3rd " "Toe, Right 4th Toe and Right 5th Toe)  Patient tolerance:  Patient tolerated the procedure well with no immediate complications     Instruments Used: Nail Nipper   Manually reduced with electric .       Wound Debridement    Date/Time: 9/22/2020 9:40 AM  Performed by: Selena Felix DPM  Authorized by: Selena Felix DPM     Time out: Immediately prior to procedure a "time out" was called to verify the correct patient, procedure, equipment, support staff and site/side marked as required.    Consent Done?:  Yes (Verbal)    Preparation: Patient was prepped and draped in usual sterile fashion    Local anesthesia used?: No      Wound Details:    Location:  Right leg    Type of Debridement:  Excisional       Length (cm):  1       Area (sq cm):  1       Width (cm):  1       Percent Debrided (%):  100       Depth (cm):  0.1       Total Area Debrided (sq cm):  1    Depth of debridement:  Subcutaneous tissue    Tissue debrided:  Subcutaneous, Epidermis and Dermis    Devitalized tissue debrided:  Fibrin, Necrotic/Eschar, Slough, Biofilm and Callus    Instruments:  Forceps, Blade and Curette    Additional wounds:  1    2nd Wound Details:     Location:  Left leg    Type of Debridement:  Excisional       Length (cm):  1       Area (sq cm):  0.4       Width (cm):  0.4       Percent Debrided (%):  100       Depth (cm):  0.1       Total Area Debrided (sq cm):  0.4    Depth of debridement:  Epidermis/Dermis    Tissue debrided:  Epidermis and Dermis    Devitalized tissue debrided:  Slough    Instruments:  Blade and Forceps    Bleeding:  Minimal  Hemostasis Achieved: Yes    Method Used:  Pressure  Patient tolerance:  Patient tolerated the procedure well with no immediate complications     - None    Counseling:     I provided patient education verbally regarding:   Patient diagnosis, treatment options, as well as alternatives, risks, and benefits.     This note was created using Dragon voice recognition software that occasionally " misinterpreted phrases or words.     Dressings ordered through [facility] for patient's wounds    Follow-up: Patient is to return to the clinic in 2 week(s) for follow-up but should call the office immediately if any signs of infection, such as fever, chills, sweats, increased redness or pain.       Recommendations:  Check feet daily.  Keep dressing clean, dry, and intact  Do not get wound areas wet, rinsed with sterile saline if wounds get wet  Daily dressing changes using promogran, and mepilex

## 2020-10-02 ENCOUNTER — LAB VISIT (OUTPATIENT)
Dept: LAB | Facility: HOSPITAL | Age: 75
End: 2020-10-02
Attending: INTERNAL MEDICINE
Payer: MEDICARE

## 2020-10-02 ENCOUNTER — PATIENT OUTREACH (OUTPATIENT)
Dept: ADMINISTRATIVE | Facility: OTHER | Age: 75
End: 2020-10-02

## 2020-10-02 DIAGNOSIS — Z79.4 TYPE 2 DIABETES MELLITUS WITH COMPLICATION, WITH LONG-TERM CURRENT USE OF INSULIN: ICD-10-CM

## 2020-10-02 DIAGNOSIS — K74.60 HEPATIC CIRRHOSIS, UNSPECIFIED HEPATIC CIRRHOSIS TYPE, UNSPECIFIED WHETHER ASCITES PRESENT: ICD-10-CM

## 2020-10-02 DIAGNOSIS — E11.8 TYPE 2 DIABETES MELLITUS WITH COMPLICATION, WITH LONG-TERM CURRENT USE OF INSULIN: ICD-10-CM

## 2020-10-02 PROCEDURE — 36415 COLL VENOUS BLD VENIPUNCTURE: CPT | Mod: PO

## 2020-10-02 PROCEDURE — 82728 ASSAY OF FERRITIN: CPT

## 2020-10-02 PROCEDURE — 83036 HEMOGLOBIN GLYCOSYLATED A1C: CPT

## 2020-10-02 PROCEDURE — 80048 BASIC METABOLIC PNL TOTAL CA: CPT

## 2020-10-03 LAB
ANION GAP SERPL CALC-SCNC: 15 MMOL/L (ref 8–16)
BUN SERPL-MCNC: 22 MG/DL (ref 8–23)
CALCIUM SERPL-MCNC: 8.9 MG/DL (ref 8.7–10.5)
CHLORIDE SERPL-SCNC: 99 MMOL/L (ref 95–110)
CO2 SERPL-SCNC: 22 MMOL/L (ref 23–29)
CREAT SERPL-MCNC: 1.3 MG/DL (ref 0.5–1.4)
EST. GFR  (AFRICAN AMERICAN): >60 ML/MIN/1.73 M^2
EST. GFR  (NON AFRICAN AMERICAN): 54.1 ML/MIN/1.73 M^2
ESTIMATED AVG GLUCOSE: 298 MG/DL (ref 68–131)
FERRITIN SERPL-MCNC: 63 NG/ML (ref 20–300)
GLUCOSE SERPL-MCNC: 352 MG/DL (ref 70–110)
HBA1C MFR BLD HPLC: 12 % (ref 4–5.6)
POTASSIUM SERPL-SCNC: 4.4 MMOL/L (ref 3.5–5.1)
SODIUM SERPL-SCNC: 136 MMOL/L (ref 136–145)

## 2020-10-06 ENCOUNTER — OFFICE VISIT (OUTPATIENT)
Dept: PODIATRY | Facility: CLINIC | Age: 75
End: 2020-10-06
Payer: MEDICARE

## 2020-10-06 ENCOUNTER — OFFICE VISIT (OUTPATIENT)
Dept: ENDOCRINOLOGY | Facility: CLINIC | Age: 75
End: 2020-10-06
Payer: MEDICARE

## 2020-10-06 VITALS
WEIGHT: 195.56 LBS | BODY MASS INDEX: 28.96 KG/M2 | TEMPERATURE: 98 F | SYSTOLIC BLOOD PRESSURE: 136 MMHG | HEART RATE: 80 BPM | HEIGHT: 69 IN | DIASTOLIC BLOOD PRESSURE: 70 MMHG

## 2020-10-06 VITALS — HEIGHT: 69 IN | TEMPERATURE: 97 F | WEIGHT: 197 LBS | BODY MASS INDEX: 29.18 KG/M2 | RESPIRATION RATE: 18 BRPM

## 2020-10-06 DIAGNOSIS — I15.2 HYPERTENSION ASSOCIATED WITH DIABETES: ICD-10-CM

## 2020-10-06 DIAGNOSIS — L90.9 FAT PAD ATROPHY OF FOOT: ICD-10-CM

## 2020-10-06 DIAGNOSIS — R26.2 DIFFICULTY IN WALKING, NOT ELSEWHERE CLASSIFIED: ICD-10-CM

## 2020-10-06 DIAGNOSIS — E66.3 OVERWEIGHT: ICD-10-CM

## 2020-10-06 DIAGNOSIS — E11.59 HYPERTENSION ASSOCIATED WITH DIABETES: ICD-10-CM

## 2020-10-06 DIAGNOSIS — E11.8 TYPE 2 DIABETES MELLITUS WITH COMPLICATION, WITH LONG-TERM CURRENT USE OF INSULIN: Primary | ICD-10-CM

## 2020-10-06 DIAGNOSIS — E11.51 TYPE II DIABETES MELLITUS WITH PERIPHERAL CIRCULATORY DISORDER: ICD-10-CM

## 2020-10-06 DIAGNOSIS — Z79.4 TYPE 2 DIABETES MELLITUS WITH COMPLICATION, WITH LONG-TERM CURRENT USE OF INSULIN: Primary | ICD-10-CM

## 2020-10-06 DIAGNOSIS — I73.9 PVD (PERIPHERAL VASCULAR DISEASE): ICD-10-CM

## 2020-10-06 DIAGNOSIS — L97.929 SKIN ULCER OF LEFT LOWER LEG, UNSPECIFIED ULCER STAGE: Primary | ICD-10-CM

## 2020-10-06 PROCEDURE — 99214 OFFICE O/P EST MOD 30 MIN: CPT | Mod: S$GLB,,, | Performed by: INTERNAL MEDICINE

## 2020-10-06 PROCEDURE — 99999 PR PBB SHADOW E&M-EST. PATIENT-LVL V: CPT | Mod: PBBFAC,,, | Performed by: INTERNAL MEDICINE

## 2020-10-06 PROCEDURE — 11042 WOUND DEBRIDEMENT: ICD-10-PCS | Mod: S$GLB,,, | Performed by: PODIATRIST

## 2020-10-06 PROCEDURE — 11042 DBRDMT SUBQ TIS 1ST 20SQCM/<: CPT | Mod: S$GLB,,, | Performed by: PODIATRIST

## 2020-10-06 PROCEDURE — 99999 PR PBB SHADOW E&M-EST. PATIENT-LVL V: ICD-10-PCS | Mod: PBBFAC,,, | Performed by: INTERNAL MEDICINE

## 2020-10-06 PROCEDURE — 99499 NO LOS: ICD-10-PCS | Mod: S$GLB,,, | Performed by: PODIATRIST

## 2020-10-06 PROCEDURE — 99499 UNLISTED E&M SERVICE: CPT | Mod: S$GLB,,, | Performed by: PODIATRIST

## 2020-10-06 PROCEDURE — 99214 PR OFFICE/OUTPT VISIT, EST, LEVL IV, 30-39 MIN: ICD-10-PCS | Mod: S$GLB,,, | Performed by: INTERNAL MEDICINE

## 2020-10-06 RX ORDER — GLIMEPIRIDE 4 MG/1
4 TABLET ORAL
Qty: 30 TABLET | Refills: 11 | Status: SHIPPED | OUTPATIENT
Start: 2020-10-06 | End: 2020-11-02 | Stop reason: CLARIF

## 2020-10-06 NOTE — PATIENT INSTRUCTIONS
Start Amaryl 4 mg in the morning  Increase levemir to 60 units in the evening.  Continue humalog 40 units with meals    If sugar is over 300, use 45 units.  If sugar 100s, use 35 units.      When you run out of the levemir and humalog, use the Novolin-70/30   - for 70/30, take 55 units before breakfast and 55 units before dinner (10-20 minutes before meals is best).

## 2020-10-06 NOTE — PROGRESS NOTES
Subjective:      Chief Complaint: Diabetes    HPI: Steve June Jr. is a 75 y.o. male who is here for a follow-up evaluation for diabetes. Last seen 2020    With regards to the diabetes:  Diagnosed with T2DM since about 5 years ago.     Known complications:     Retinopathy? No    Nephropathy? Yes    Neuropathy? Yes    CAD? No    CVA? No     Current regimen:   Levemir 50 units qHS   Humalog 20-40 units with breakfast and dinner. More like 40 units lately.     Missed doses? Yes, missed novolog at lunch.     Prior treatments:    ozempic -> nausea   70/30 insulin, 45 units BID but stopped   Metformin 500 mg 1-2 times a day.    Self-Monitored blood glucose.  # times a day testing: few per week  Log reviewed: No    High all the time  AM: 300-360s  Evenins mostly, sometimes too high to measure    Hypoglycemic events? None    Current symptoms:   polyuria, polydipsia and nausea  Pt denies:   polyuria, vomit and diarrhea    Diabetes Management Status    Statin: Not taking  ACE/ARB: Not taking    Screening or Prevention Patient's value Goal Complete/Controlled?   HgA1C Testing and Control   Lab Results   Component Value Date    HGBA1C 12.0 (H) 10/02/2020      q6months/Less than 7.5% No   Lipid profile : 2019 Annually Yes   LDL control Lab Results   Component Value Date    LDLCALC 81.8 2019    Annually/Less than 100 mg/dl  Yes   Nephropathy screening Lab Results   Component Value Date    MICALBCREAT 109.3 (H) 10/02/2020     Lab Results   Component Value Date    CREATININE 1.3 10/02/2020     Lab Results   Component Value Date    PROTEINUA Negative 2020    Annually Yes   Blood pressure BP Readings from Last 1 Encounters:   10/06/20 136/70    Less than 140/90 Yes   Dilated retinal exam : 2020 Annually Yes   Foot exam   : 2020 Annually Yes     Reviewed past medical, family, social history and updated as appropriate.    Review of Systems   Constitutional: Negative for unexpected weight  change.   HENT: Negative for trouble swallowing.    Eyes: Positive for visual disturbance.   Respiratory: Positive for shortness of breath.    Cardiovascular: Negative for palpitations.   Gastrointestinal: Positive for nausea. Negative for abdominal pain, constipation and diarrhea.   Endocrine: Positive for polydipsia. Negative for polyuria.   Genitourinary: Negative for frequency.   Musculoskeletal: Positive for back pain.        Foot pains   Skin: Positive for wound (on legs).   Neurological: Positive for numbness. Negative for light-headedness.   Hematological: Bruises/bleeds easily.     Objective:     Vitals:    10/06/20 1109   BP: 136/70   Pulse: 80   Temp: 97.5 °F (36.4 °C)     BP Readings from Last 5 Encounters:   10/06/20 136/70   09/22/20 126/72   09/17/20 126/62   09/10/20 122/62   09/02/20 133/78     Physical Exam  Vitals signs reviewed.   Constitutional:       General: He is not in acute distress.  Neck:      Thyroid: No thyromegaly.   Cardiovascular:      Rate and Rhythm: Normal rate.      Heart sounds: Murmur present.   Pulmonary:      Effort: Pulmonary effort is normal.   Skin:     Comments: Bruises on arms. Venous stasis changes in legs. Bandages on left leg x3       Wt Readings from Last 10 Encounters:   10/06/20 1109 88.7 kg (195 lb 8.8 oz)   10/06/20 0950 89.4 kg (197 lb)   09/22/20 0956 88.9 kg (196 lb)   09/17/20 0824 89 kg (196 lb 3.4 oz)   09/10/20 1139 88.2 kg (194 lb 7.1 oz)   09/02/20 1445 87.4 kg (192 lb 10.9 oz)   09/02/20 1304 89.8 kg (198 lb)   08/04/20 1620 89.8 kg (198 lb 1.3 oz)   07/31/20 1433 88.6 kg (195 lb 5.2 oz)   06/24/20 1509 89.1 kg (196 lb 8 oz)       Lab Results   Component Value Date    HGBA1C 12.0 (H) 10/02/2020     Lab Results   Component Value Date    CHOL 144 12/05/2019    HDL 45 12/05/2019    LDLCALC 81.8 12/05/2019    TRIG 86 12/05/2019    CHOLHDL 31.3 12/05/2019     Lab Results   Component Value Date     10/02/2020    K 4.4 10/02/2020    CL 99 10/02/2020     CO2 22 (L) 10/02/2020     (H) 10/02/2020    BUN 22 10/02/2020    CREATININE 1.3 10/02/2020    CALCIUM 8.9 10/02/2020    PROT 6.7 09/10/2020    PROT 6.7 09/10/2020    ALBUMIN 3.9 09/10/2020    ALBUMIN 3.9 09/10/2020    BILITOT 1.6 (H) 09/10/2020    BILITOT 1.6 (H) 09/10/2020    ALKPHOS 96 09/10/2020    ALKPHOS 96 09/10/2020    AST 20 09/10/2020    AST 20 09/10/2020    ALT 17 09/10/2020    ALT 17 09/10/2020    ANIONGAP 15 10/02/2020    ESTGFRAFRICA >60.0 10/02/2020    EGFRNONAA 54.1 (A) 10/02/2020    TSH 2.808 09/10/2020      Lab Results   Component Value Date    MICALBCREAT 109.3 (H) 10/02/2020       Assessment/Plan:     Type 2 diabetes mellitus with complication, with long-term current use of insulin  a1c high, well above goal   - glucose usually elevated   - last time recommended 70/30, pt back to levemir and humalog to use up supplies.   - increase doses   - discussed dose to use when out and going to the 70/30 mixed insulin. Reviewed titration   - start Amaryl once a day as well (worried about missed doses often, pill may help in those cases a bit but not ideal still)   - DM education when able   - f/u with new labs in a few months      Hypertension associated with diabetes  bp controlled today   - continue same meds   - monitor BP      Overweight  bmi 28.8 today   - complicated by HTN, DM2   - attempted GLP-1, not covered   - encourage DM education again, will try for referral this time   - monitor weight        Follow up in 3 months (on 1/6/2021) for lab review, further monitoring.      Chase Hodge MD  Endocrinology

## 2020-10-06 NOTE — PROGRESS NOTES
[address] [facility] [address]  [place] [address]  Phone: [phone]   [phone]    Patient's PCP:Crispin Garcia MD  Referring Provider: No ref. provider found    Subjective:      Chief Complaint:: Follow-up (bilateral skin ulcer)    LYNN June Jr. is a 75 y.o. male who presents with a complaint of  Bilateral skin ulcer dressing daily until he ran out of bandaging. Patient states he has no complaint of pain today and finished course of antibiotics. Also states that wound care supplies were ordered through [employer] but were not cover by insureance   Patient has a new ulceration on the back of his right leg.  He states he hit his leg on something, but was not sure what.      Additionally, patient's wife states that patient has not been compliant with dressing changes.  He removed the dressings from last visit immediately and did not replace them.       Systemic Doctor: Dr. Crispin Garcia MD / CORNEL Whitmore  Date Last Seen: 02/04/2020 / 09/17/2020  Blood Sugar: 350-450 (estimate)  Hemoglobin A1c: 11.4 (02/04/20)    Vitals:    10/06/20 0950   Resp: 18   Temp: 97.2 °F (36.2 °C)     Shoe Size:     Past Surgical History:   Procedure Laterality Date    CATARACT EXTRACTION  1/10/13    left eye    CATARACT EXTRACTION      right eye    CHOLECYSTECTOMY      COLONOSCOPY      EYE SURGERY      Cataract surgery to right eye    KNEE ARTHROSCOPY W/ MENISCAL REPAIR      KNEE CARTILAGE SURGERY      left knee    KNEE SURGERY  12/2006    left    SKIN LESION EXCISION      TONSILLECTOMY      UPPER GASTROINTESTINAL ENDOSCOPY       Past Medical History:   Diagnosis Date    Abnormal thyroid function test     Allergy     Seasonal    Anemia     Anemia due to blood loss 7/2/2014    Arthritis     Gaviria esophagus     Basal cell carcinoma     right forearm    Basal cell carcinoma 12/2011    lower post neck    Basal cell carcinoma 04/23/2019    left posterior helix    Cancer     skin CA     Cataract     Cirrhosis     Diabetes mellitus     Diabetes mellitus, type 2     Encounter for blood transfusion     Esophageal varices in cirrhosis     grade II on 7/12 EGD    Gastritis     on 7/12 EGD    GERD (gastroesophageal reflux disease) 2/28/2015    Hard of hearing     Hiatal hernia     History of hepatitis C 8/10/2012    tx with harvoni x 41 days (started 10/22/15). SVR4     Hoarseness 2/28/2015    Hx of colonic polyps 01/10/2011    per colonoscopy report in media    Hypercholesteremia     Hypersplenism     Hypertension     No meds    Pain management 12/10/2014    Petechial hemorrhage 11/25/2015    Lower extremities bilat     Portal hypertensive gastropathy     on 7/12 EGD    Squamous cell carcinoma 04/23/2019    right preauricular cheek, right temple    Thrombocytopenia     Type II or unspecified type diabetes mellitus with neurological manifestations, uncontrolled(250.62) 12/24/2013    Valvular heart disease     mild MR 12     Family History   Problem Relation Age of Onset    Leukemia Mother     Cancer Mother         bone    Alcohol abuse Father     Cirrhosis Father         EtOH induced    No Known Problems Daughter     No Known Problems Son     No Known Problems Daughter     No Known Problems Daughter     No Known Problems Daughter     No Known Problems Sister     Amblyopia Neg Hx     Blindness Neg Hx     Cataracts Neg Hx     Diabetes Neg Hx     Glaucoma Neg Hx     Hypertension Neg Hx     Macular degeneration Neg Hx     Retinal detachment Neg Hx     Strabismus Neg Hx     Stroke Neg Hx     Thyroid disease Neg Hx     Psoriasis Neg Hx     Lupus Neg Hx     Eczema Neg Hx     Acne Neg Hx     Melanoma Neg Hx         Social History:   Marital Status:   Alcohol History:  reports current alcohol use.  Tobacco History:  reports that he quit smoking about 20 years ago. He has a 25.00 pack-year smoking history. He has never used smokeless tobacco.  Drug History:   "reports no history of drug use.    Review of patient's allergies indicates:   Allergen Reactions    Adhesive tape-silicones Other (See Comments)     pulls skin off    Doxycycline      Dizzy. "Just didn't feel right".       Current Outpatient Medications   Medication Sig Dispense Refill    albuterol (VENTOLIN HFA) 90 mcg/actuation inhaler Inhale 2 puffs into the lungs every 6 (six) hours as needed for Wheezing. Rescue 1 Inhaler 11    blood sugar diagnostic Strp To check BG 3 times daily, to use with insurance preferred meter 300 strip 11    blood-glucose meter kit To check BG 3 times daily, to use with insurance preferred meter 1 each 0    insulin lispro (HUMALOG KWIKPEN INSULIN) 100 unit/mL pen 5 unit ac breakfast 15 units ac dinner 30 mL 3    insulin NPH/Reg human (NOVOLIN 70-30 FLEXPEN U-100) 100 unit/mL (70-30) InPn pen Inject 45 Units into the skin 2 (two) times daily with meals. 30 mL 11    lactulose (CHRONULAC) 20 gram/30 mL Soln Take 30 mLs (20 g total) by mouth 2 (two) times daily. 1800 mL 11    lancets Misc To check BG 3 times daily, to use with insurance preferred meter 300 each 11    metFORMIN (GLUCOPHAGE-XR) 500 MG XR 24hr tablet Take 4 tablets (2,000 mg total) by mouth once daily. 360 tablet 3    ondansetron (ZOFRAN-ODT) 8 MG TbDL Take 1 tablet (8 mg total) by mouth every 6 (six) hours as needed. 20 tablet 0    [START ON 11/9/2020] oxyCODONE-acetaminophen (PERCOCET)  mg per tablet Take 1 tablet by mouth every 6 (six) hours as needed for Pain. 120 tablet 0    pantoprazole (PROTONIX) 40 MG tablet Take 1 tablet (40 mg total) by mouth once daily. 90 tablet 1    pen needle, diabetic (MAXICOMFORT II PEN NEEDLE) 31 gauge x 1/4" Ndle 1 each by Misc.(Non-Drug; Combo Route) route once daily. 100 each 3    pen needle, diabetic 32 gauge x 5/32" Ndle Use as directed 100 each 3    sulfamethoxazole-trimethoprim 800-160mg (BACTRIM DS) 800-160 mg Tab Take 1 tablet by mouth 2 (two) times daily. " "(Patient not taking: Reported on 10/6/2020) 14 tablet 0     No current facility-administered medications for this visit.        Review of Systems      Objective:        Physical Exam:   Foot Exam  Physical Exam   Musculoskeletal:        Legs:         Feet:        Imaging:            Assessment:       1. Skin ulcer of left lower leg, unspecified ulcer stage    2. PVD (peripheral vascular disease)    3. Type II diabetes mellitus with peripheral circulatory disorder    4. Fat pad atrophy of foot    5. Difficulty in walking, not elsewhere classified      Plan:   Skin ulcer of left lower leg, unspecified ulcer stage  -     Ambulatory referral/consult to Home Health; Future; Expected date: 10/13/2020    PVD (peripheral vascular disease)    Type II diabetes mellitus with peripheral circulatory disorder    Fat pad atrophy of foot    Difficulty in walking, not elsewhere classified    Other orders  -     Wound Debridement      Follow up in about 1 week (around 10/13/2020) for right lower leg ulcers.    Wound Debridement    Date/Time: 10/6/2020 9:20 AM  Performed by: Selena Felix DPM  Authorized by: Selena Felix DPM     Time out: Immediately prior to procedure a "time out" was called to verify the correct patient, procedure, equipment, support staff and site/side marked as required.    Consent Done?:  Yes (Verbal)    Preparation: Patient was prepped and draped in usual sterile fashion    Local anesthesia used?: No      Wound Details:    Location:  Right leg    Type of Debridement:  Excisional       Length (cm):  1       Area (sq cm):  1       Width (cm):  1       Percent Debrided (%):  100       Depth (cm):  0.2       Total Area Debrided (sq cm):  1    Depth of debridement:  Subcutaneous tissue    Tissue debrided:  Epidermis, Dermis and Subcutaneous    Devitalized tissue debrided:  Biofilm, Necrotic/Eschar, Slough and Fibrin    Instruments:  Forceps, Blade and Curette  Patient tolerance:  Patient tolerated the procedure " well with no immediate complications     - None    Counseling:     I provided patient education verbally regarding:   Patient diagnosis, treatment options, as well as alternatives, risks, and benefits.     This note was created using Dragon voice recognition software that occasionally misinterpreted phrases or words.

## 2020-10-07 NOTE — TELEPHONE ENCOUNTER
(This message whas been forwarded by this nurse as Maria De Jesus waldo does not have chart access in her current position.)      Lexi Peterson, RODNEY  to Maria De Jesus Sanchez          10:56 AM  Dr. Acosta does not treat cellulitis or venous stasis ulcers. He only treats the arterial problems, not venous. The pt's lower extremity arterial ultrasounds did not show any significant arterial blockage. His recommendation was to refer pt to either Dr. Renzo Dutton  (Vascular Surgeon in [place]) or Vascular Medicine at [facility].

## 2020-10-07 NOTE — ASSESSMENT & PLAN NOTE
a1c high, well above goal   - glucose usually elevated   - last time recommended 70/30, pt back to levemir and humalog to use up supplies.   - increase doses   - discussed dose to use when out and going to the 70/30 mixed insulin. Reviewed titration   - start Amaryl once a day as well (worried about missed doses often, pill may help in those cases a bit but not ideal still)   - DM education when able   - f/u with new labs in a few months

## 2020-10-07 NOTE — ASSESSMENT & PLAN NOTE
bmi 28.8 today   - complicated by HTN, DM2   - attempted GLP-1, not covered   - encourage DM education again, will try for referral this time   - monitor weight

## 2020-10-13 ENCOUNTER — PATIENT OUTREACH (OUTPATIENT)
Dept: ADMINISTRATIVE | Facility: HOSPITAL | Age: 75
End: 2020-10-13

## 2020-10-13 NOTE — PROGRESS NOTES
Chart review completed 10/13/2020.  Care Everywhere updates requested and reviewed.  Immunizations reconciled. Media reports reviewed.  Duplicate HM overrides and  orders removed.  Overdue HM topic chart audit and/or requested.  Overdue lab testing linked to upcoming lab appointments if applies.    Portal message sent for overdue HM    Health Maintenance Due   Topic Date Due    TETANUS VACCINE  1963    Shingles Vaccine (1 of 2) 1997    Colorectal Cancer Screening  01/10/2016    Pneumococcal Vaccine (65+ High/Highest Risk) (2 of 2 - PPSV23) 2019    Influenza Vaccine (1) 2020

## 2020-10-23 ENCOUNTER — HOSPITAL ENCOUNTER (EMERGENCY)
Facility: HOSPITAL | Age: 75
Discharge: HOME | End: 2020-10-23
Attending: EMERGENCY MEDICINE
Payer: MEDICARE

## 2020-10-23 VITALS
TEMPERATURE: 98 F | HEART RATE: 69 BPM | SYSTOLIC BLOOD PRESSURE: 145 MMHG | DIASTOLIC BLOOD PRESSURE: 67 MMHG | RESPIRATION RATE: 18 BRPM | OXYGEN SATURATION: 95 %

## 2020-10-23 DIAGNOSIS — S51.819A SKIN TEAR OF FOREARM WITHOUT COMPLICATION, INITIAL ENCOUNTER: Primary | ICD-10-CM

## 2020-10-23 PROCEDURE — 63600175 PHARM REV CODE 636 W HCPCS: Performed by: EMERGENCY MEDICINE

## 2020-10-23 PROCEDURE — 90471 IMMUNIZATION ADMIN: CPT | Performed by: EMERGENCY MEDICINE

## 2020-10-23 PROCEDURE — 99284 EMERGENCY DEPT VISIT MOD MDM: CPT | Mod: 25

## 2020-10-23 PROCEDURE — 90715 TDAP VACCINE 7 YRS/> IM: CPT | Performed by: EMERGENCY MEDICINE

## 2020-10-23 PROCEDURE — 25000003 PHARM REV CODE 250: Performed by: EMERGENCY MEDICINE

## 2020-10-23 RX ORDER — CLINDAMYCIN HYDROCHLORIDE 150 MG/1
300 CAPSULE ORAL 4 TIMES DAILY
Qty: 24 CAPSULE | Refills: 0 | Status: SHIPPED | OUTPATIENT
Start: 2020-10-23 | End: 2020-10-27

## 2020-10-23 RX ADMIN — CLOSTRIDIUM TETANI TOXOID ANTIGEN (FORMALDEHYDE INACTIVATED), CORYNEBACTERIUM DIPHTHERIAE TOXOID ANTIGEN (FORMALDEHYDE INACTIVATED), BORDETELLA PERTUSSIS TOXOID ANTIGEN (GLUTARALDEHYDE INACTIVATED), BORDETELLA PERTUSSIS FILAMENTOUS HEMAGGLUTININ ANTIGEN (FORMALDEHYDE INACTIVATED), BORDETELLA PERTUSSIS PERTACTIN ANTIGEN, AND BORDETELLA PERTUSSIS FIMBRIAE 2/3 ANTIGEN 0.5 ML: 5; 2; 2.5; 5; 3; 5 INJECTION, SUSPENSION INTRAMUSCULAR at 11:10

## 2020-10-23 RX ADMIN — BACITRACIN, NEOMYCIN, POLYMYXIN B 1 EACH: 400; 3.5; 5 OINTMENT TOPICAL at 11:10

## 2020-10-23 NOTE — ED NOTES
AAOx4, skin warm/dry, respirations even/unlabored.  5 cm skin tear noted to right forearm; no active bleeding.

## 2020-10-23 NOTE — ED PROVIDER NOTES
"Encounter Date: 10/23/2020    SCRIBE #1 NOTE: IRavindra, am scribing for, and in the presence of, Bert Iyer MD.       History     Chief Complaint   Patient presents with    Laceration     Hit right arm on door; Has skin tear to right FA       Time seen by provider: 11:28 AM on 10/23/2020    Steve June Jr. is a 75 y.o. male with a PMHx of cirrhosis, thrombocytopenia, HTN, DM 2, and anemia who presents to the ED with an onset of a laceration of the right forearm since yesterday evening. The patient reports that he brushed his arm up against the side of his house causing the skin to tear. He claims that he has thin skin and has a hx of tears. The laceration has associated bruising and redness.The patient denies bone pain or any other symptoms at this time. PSHx of skin lesion excision      The history is provided by the patient and the spouse.     Review of patient's allergies indicates:   Allergen Reactions    Adhesive tape-silicones Other (See Comments)     pulls skin off    Doxycycline      Dizzy. "Just didn't feel right".     Past Medical History:   Diagnosis Date    Abnormal thyroid function test     Allergy     Seasonal    Anemia     Anemia due to blood loss 7/2/2014    Arthritis     Gaviria esophagus     Basal cell carcinoma     right forearm    Basal cell carcinoma 12/2011    lower post neck    Basal cell carcinoma 04/23/2019    left posterior helix    Cancer     skin CA    Cataract     Cirrhosis     Diabetes mellitus     Diabetes mellitus, type 2     Encounter for blood transfusion     Esophageal varices in cirrhosis     grade II on 7/12 EGD    Gastritis     on 7/12 EGD    GERD (gastroesophageal reflux disease) 2/28/2015    Hard of hearing     Hiatal hernia     History of hepatitis C 8/10/2012    tx with harvoni x 41 days (started 10/22/15). SVR4     Hoarseness 2/28/2015    Hx of colonic polyps 01/10/2011    per colonoscopy report in media    " Hypercholesteremia     Hypersplenism     Hypertension     No meds    Pain management 12/10/2014    Petechial hemorrhage 2015    Lower extremities bilat     Portal hypertensive gastropathy     on  EGD    Squamous cell carcinoma 2019    right preauricular cheek, right temple    Thrombocytopenia     Type II or unspecified type diabetes mellitus with neurological manifestations, uncontrolled(250.62) 2013    Valvular heart disease     mild MR 12     Past Surgical History:   Procedure Laterality Date    CATARACT EXTRACTION  1/10/13    left eye    CATARACT EXTRACTION      right eye    CHOLECYSTECTOMY      COLONOSCOPY      EYE SURGERY      Cataract surgery to right eye    KNEE ARTHROSCOPY W/ MENISCAL REPAIR      KNEE CARTILAGE SURGERY      left knee    KNEE SURGERY  2006    left    SKIN LESION EXCISION      TONSILLECTOMY      UPPER GASTROINTESTINAL ENDOSCOPY       Family History   Problem Relation Age of Onset    Leukemia Mother     Cancer Mother         bone    Alcohol abuse Father     Cirrhosis Father         EtOH induced    No Known Problems Daughter     No Known Problems Son     No Known Problems Daughter     No Known Problems Daughter     No Known Problems Daughter     No Known Problems Sister     Amblyopia Neg Hx     Blindness Neg Hx     Cataracts Neg Hx     Diabetes Neg Hx     Glaucoma Neg Hx     Hypertension Neg Hx     Macular degeneration Neg Hx     Retinal detachment Neg Hx     Strabismus Neg Hx     Stroke Neg Hx     Thyroid disease Neg Hx     Psoriasis Neg Hx     Lupus Neg Hx     Eczema Neg Hx     Acne Neg Hx     Melanoma Neg Hx      Social History     Tobacco Use    Smoking status: Former Smoker     Packs/day: 1.00     Years: 25.00     Pack years: 25.00     Quit date: 2000     Years since quittin.2    Smokeless tobacco: Never Used   Substance Use Topics    Alcohol use: Yes     Frequency: 2-3 times a week     Drinks per session: 1  or 2     Binge frequency: Less than monthly     Comment: rarely    Drug use: No     Review of Systems   Constitutional: Negative for fever.   HENT: Negative for sore throat.    Respiratory: Negative for shortness of breath.    Cardiovascular: Negative for chest pain.   Gastrointestinal: Negative for nausea.   Genitourinary: Negative for dysuria.   Musculoskeletal: Negative for arthralgias, back pain and myalgias.   Skin: Positive for color change and wound. Negative for rash.        +bruising   Neurological: Negative for weakness.   Hematological: Does not bruise/bleed easily.       Physical Exam     Initial Vitals [10/23/20 1123]   BP Pulse Resp Temp SpO2   (!) 176/76 79 18 98 °F (36.7 °C) 95 %      MAP       --         Physical Exam    Nursing note and vitals reviewed.  Constitutional: He appears well-developed and well-nourished. He is not diaphoretic. No distress.   HENT:   Head: Normocephalic and atraumatic.   Mouth/Throat: Oropharynx is clear and moist.   Eyes: Conjunctivae are normal.   Neck: Neck supple.   Cardiovascular: Normal rate, regular rhythm, normal heart sounds and intact distal pulses. Exam reveals no gallop and no friction rub.    No murmur heard.  Pulses:       Radial pulses are 2+ on the right side and 2+ on the left side.   Pulmonary/Chest: Breath sounds normal. He has no wheezes. He has no rhonchi. He has no rales.   Abdominal: Soft. He exhibits no distension. There is no abdominal tenderness.   Musculoskeletal: Normal range of motion.      Comments: No significant tenderness. Full range of motion.   Neurological: He is alert and oriented to person, place, and time.   5/5 strength. Normal sensation.   Skin: Ecchymosis and laceration noted. No rash noted.   4 cm crescent shaped, superficial skin tear to the right forearm with surrounding ecchymosis. No foreign bodies or active bleeding.         ED Course   Procedures  Labs Reviewed - No data to display       Imaging Results    None           Medical Decision Making:   History:   Old Medical Records: I decided to obtain old medical records.            Scribe Attestation:   Scribe #1: I performed the above scribed service and the documentation accurately describes the services I performed. I attest to the accuracy of the note.    I, Dr. eBrt Iyer, personally performed the services described in this documentation. All medical record entries made by the scribe were at my direction and in my presence.  I have reviewed the chart and agree that the record reflects my personal performance and is accurate and complete. Bert Iyer MD.  4:12 PM 10/23/2020    Steve June Jr. is a 75 y.o. male presenting with skin avulsion to right forearm.  No laceration amenable to repair.  He has no skeletal tenderness and I doubt fracture or dislocation.  He has full active range of motion without pain.  There is no sign of infection.  Short course of prophylactic antibiotics prescribed given his other medical conditions.  Wound care initiated here with tetanus vaccine updated.  Follow-up with PCP for reassessment.  Return precautions reviewed.                  Clinical Impression:     ICD-10-CM ICD-9-CM   1. Skin tear of forearm without complication, initial encounter  S51.819A 881.00                          ED Disposition Condition    Discharge Stable        ED Prescriptions     Medication Sig Dispense Start Date End Date Auth. Provider    clindamycin (CLEOCIN) 150 MG capsule Take 2 capsules (300 mg total) by mouth 4 (four) times daily. for 3 days 24 capsule 10/23/2020 10/26/2020 Bert Iyer MD        Follow-up Information     Follow up With Specialties Details Why Contact Info    Crispin Garcia MD Family Medicine  Next week [address] [facility] [address]  [phone]                                         Bert Iyer MD  10/23/20 9755

## 2020-10-23 NOTE — ED NOTES
Dressed skin tear with triple-antibiotic ointment, Telfa non-adherent dressing, gauze 4x4s and stretch gauze.  One dressing change kit provided to patient.

## 2020-10-23 NOTE — DISCHARGE INSTRUCTIONS
Short course of antibiotic to prevent infection prescribed.    Follow-up with PCP next week to reassessed.  Keep area clean and watch for any sign of infection such as redness, increased swelling, or increased pain.

## 2020-10-27 ENCOUNTER — OFFICE VISIT (OUTPATIENT)
Dept: FAMILY MEDICINE | Facility: CLINIC | Age: 75
End: 2020-10-27
Payer: MEDICARE

## 2020-10-27 VITALS
DIASTOLIC BLOOD PRESSURE: 76 MMHG | HEIGHT: 69 IN | TEMPERATURE: 98 F | OXYGEN SATURATION: 96 % | WEIGHT: 191.38 LBS | HEART RATE: 72 BPM | BODY MASS INDEX: 28.35 KG/M2 | SYSTOLIC BLOOD PRESSURE: 122 MMHG

## 2020-10-27 DIAGNOSIS — Z79.4 TYPE 2 DIABETES MELLITUS WITH COMPLICATION, WITH LONG-TERM CURRENT USE OF INSULIN: ICD-10-CM

## 2020-10-27 DIAGNOSIS — E11.8 TYPE 2 DIABETES MELLITUS WITH COMPLICATION, WITH LONG-TERM CURRENT USE OF INSULIN: ICD-10-CM

## 2020-10-27 DIAGNOSIS — S51.801A OPEN WOUND OF RIGHT FOREARM, INITIAL ENCOUNTER: Primary | ICD-10-CM

## 2020-10-27 DIAGNOSIS — L98.9 SKIN LESION: ICD-10-CM

## 2020-10-27 PROCEDURE — 99999 PR PBB SHADOW E&M-EST. PATIENT-LVL IV: CPT | Mod: PBBFAC,,, | Performed by: FAMILY MEDICINE

## 2020-10-27 PROCEDURE — 3074F SYST BP LT 130 MM HG: CPT | Mod: CPTII,S$GLB,, | Performed by: FAMILY MEDICINE

## 2020-10-27 PROCEDURE — 3078F DIAST BP <80 MM HG: CPT | Mod: CPTII,S$GLB,, | Performed by: FAMILY MEDICINE

## 2020-10-27 PROCEDURE — 99213 OFFICE O/P EST LOW 20 MIN: CPT | Mod: S$GLB,,, | Performed by: FAMILY MEDICINE

## 2020-10-27 PROCEDURE — 99213 PR OFFICE/OUTPT VISIT, EST, LEVL III, 20-29 MIN: ICD-10-PCS | Mod: S$GLB,,, | Performed by: FAMILY MEDICINE

## 2020-10-27 PROCEDURE — 99999 PR PBB SHADOW E&M-EST. PATIENT-LVL IV: ICD-10-PCS | Mod: PBBFAC,,, | Performed by: FAMILY MEDICINE

## 2020-10-27 PROCEDURE — 3046F PR MOST RECENT HEMOGLOBIN A1C LEVEL > 9.0%: ICD-10-PCS | Mod: CPTII,S$GLB,, | Performed by: FAMILY MEDICINE

## 2020-10-27 PROCEDURE — 3046F HEMOGLOBIN A1C LEVEL >9.0%: CPT | Mod: CPTII,S$GLB,, | Performed by: FAMILY MEDICINE

## 2020-10-27 PROCEDURE — 3078F PR MOST RECENT DIASTOLIC BLOOD PRESSURE < 80 MM HG: ICD-10-PCS | Mod: CPTII,S$GLB,, | Performed by: FAMILY MEDICINE

## 2020-10-27 PROCEDURE — 3074F PR MOST RECENT SYSTOLIC BLOOD PRESSURE < 130 MM HG: ICD-10-PCS | Mod: CPTII,S$GLB,, | Performed by: FAMILY MEDICINE

## 2020-10-27 NOTE — PROGRESS NOTES
Subjective:   Patient ID: Steve June Jr. is a 75 y.o. male     Chief Complaint:Follow-up      Pt here for chekcup. lacertaion to right forearm. Denies any fever or redness. Some swelling and pain.     Review of Systems   Constitutional: Negative for chills and fever.   HENT: Negative for sore throat and trouble swallowing.    Respiratory: Negative for cough and shortness of breath.    Cardiovascular: Negative for chest pain and leg swelling.   Gastrointestinal: Negative for abdominal distention and abdominal pain.   Genitourinary: Negative for dysuria and flank pain.   Musculoskeletal: Negative for arthralgias and back pain.   Skin: Negative for color change and pallor.   Neurological: Negative for weakness and headaches.   Psychiatric/Behavioral: Negative for agitation and confusion.     Past Medical History:   Diagnosis Date    Abnormal thyroid function test     Allergy     Seasonal    Anemia     Anemia due to blood loss 7/2/2014    Arthritis     Gaviria esophagus     Basal cell carcinoma     right forearm    Basal cell carcinoma 12/2011    lower post neck    Basal cell carcinoma 04/23/2019    left posterior helix    Cancer     skin CA    Cataract     Cirrhosis     Diabetes mellitus     Diabetes mellitus, type 2     Encounter for blood transfusion     Esophageal varices in cirrhosis     grade II on 7/12 EGD    Gastritis     on 7/12 EGD    GERD (gastroesophageal reflux disease) 2/28/2015    Hard of hearing     Hiatal hernia     History of hepatitis C 8/10/2012    tx with harvoni x 41 days (started 10/22/15). SVR4     Hoarseness 2/28/2015    Hx of colonic polyps 01/10/2011    per colonoscopy report in media    Hypercholesteremia     Hypersplenism     Hypertension     No meds    Pain management 12/10/2014    Petechial hemorrhage 11/25/2015    Lower extremities bilat     Portal hypertensive gastropathy     on 7/12 EGD    Squamous cell carcinoma 04/23/2019    right preauricular  cheek, right temple    Thrombocytopenia     Type II or unspecified type diabetes mellitus with neurological manifestations, uncontrolled(250.62) 12/24/2013    Valvular heart disease     mild MR 12     Past Surgical History:   Procedure Laterality Date    CATARACT EXTRACTION  1/10/13    left eye    CATARACT EXTRACTION      right eye    CHOLECYSTECTOMY      COLONOSCOPY      EYE SURGERY      Cataract surgery to right eye    KNEE ARTHROSCOPY W/ MENISCAL REPAIR      KNEE CARTILAGE SURGERY      left knee    KNEE SURGERY  12/2006    left    SKIN LESION EXCISION      TONSILLECTOMY      UPPER GASTROINTESTINAL ENDOSCOPY       Objective:     Vitals:    10/27/20 0952   BP: 122/76   Pulse: 72   Temp: 97.9 °F (36.6 °C)     Body mass index is 28.26 kg/m².  Physical Exam  Vitals signs and nursing note reviewed.   Constitutional:       Appearance: He is well-developed.   HENT:      Head: Normocephalic and atraumatic.   Eyes:      General: No scleral icterus.     Conjunctiva/sclera: Conjunctivae normal.   Neck:      Musculoskeletal: Normal range of motion and neck supple.   Cardiovascular:      Heart sounds: No murmur.   Pulmonary:      Effort: Pulmonary effort is normal. No respiratory distress.   Musculoskeletal: Normal range of motion.         General: No deformity.   Skin:     Coloration: Skin is not pale.      Findings: No rash.   Neurological:      Mental Status: He is alert and oriented to person, place, and time.   Psychiatric:         Behavior: Behavior normal.         Thought Content: Thought content normal.         Judgment: Judgment normal.       Assessment:     1. Open wound of right forearm, initial encounter    2. Skin lesion    3. Type 2 diabetes mellitus with complication, with long-term current use of insulin      Plan:   Open wound of right forearm, initial encounter  -     Ambulatory referral/consult to Wound Clinic; Future; Expected date: 11/03/2020    Skin lesion  -     Ambulatory referral/consult  to Dermatology; Future; Expected date: 11/03/2020    Type 2 diabetes mellitus with complication, with long-term current use of insulin  Blood sugar poorly controlled. Counseled on diet and patient very non compliant. Follows with endo. Advised nutrition f/u  Other orders  -     (In Office Administered) Pneumococcal Polysaccharide Vaccine (23 Valent) (SQ/IM)            Crispin Garcia MD  10/27/2020    Portions of this note have been dictated with CRISTOBAL Day.

## 2020-11-02 ENCOUNTER — HOSPITAL ENCOUNTER (EMERGENCY)
Facility: HOSPITAL | Age: 75
Discharge: HOME | End: 2020-11-02
Attending: EMERGENCY MEDICINE
Payer: MEDICARE

## 2020-11-02 VITALS
HEART RATE: 86 BPM | RESPIRATION RATE: 22 BRPM | WEIGHT: 190 LBS | BODY MASS INDEX: 28.14 KG/M2 | SYSTOLIC BLOOD PRESSURE: 137 MMHG | DIASTOLIC BLOOD PRESSURE: 79 MMHG | TEMPERATURE: 99 F | OXYGEN SATURATION: 98 % | HEIGHT: 69 IN

## 2020-11-02 DIAGNOSIS — S49.92XA SHOULDER INJURY, LEFT, INITIAL ENCOUNTER: ICD-10-CM

## 2020-11-02 DIAGNOSIS — S06.0X0A CONCUSSION WITHOUT LOSS OF CONSCIOUSNESS, INITIAL ENCOUNTER: Primary | ICD-10-CM
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
## 2020-11-02 DIAGNOSIS — S29.9XXA RIB INJURY: ICD-10-CM

## 2020-11-02 DIAGNOSIS — S20.212A CONTUSION OF RIB ON LEFT SIDE, INITIAL ENCOUNTER: ICD-10-CM

## 2020-11-02 DIAGNOSIS — T14.8XXA HEMATOMA: ICD-10-CM

## 2020-11-02 PROCEDURE — 96374 THER/PROPH/DIAG INJ IV PUSH: CPT

## 2020-11-02 PROCEDURE — 99285 EMERGENCY DEPT VISIT HI MDM: CPT | Mod: 25

## 2020-11-02 PROCEDURE — 96375 TX/PRO/DX INJ NEW DRUG ADDON: CPT

## 2020-11-02 PROCEDURE — 63600175 PHARM REV CODE 636 W HCPCS: Performed by: EMERGENCY MEDICINE

## 2020-11-02 RX ORDER — MORPHINE SULFATE 4 MG/ML
4 INJECTION, SOLUTION INTRAMUSCULAR; INTRAVENOUS
Status: COMPLETED | OUTPATIENT
Start: 2020-11-02 | End: 2020-11-02

## 2020-11-02 RX ORDER — TRAMADOL HYDROCHLORIDE 50 MG/1
50 TABLET ORAL EVERY 6 HOURS PRN
Qty: 10 TABLET | Refills: 0 | Status: SHIPPED | OUTPATIENT
Start: 2020-11-02 | End: 2021-01-13

## 2020-11-02 RX ORDER — ONDANSETRON 4 MG/1
4 TABLET, ORALLY DISINTEGRATING ORAL EVERY 8 HOURS PRN
Qty: 12 TABLET | Refills: 0 | Status: SHIPPED | OUTPATIENT
Start: 2020-11-02 | End: 2021-06-24 | Stop reason: SDUPTHER

## 2020-11-02 RX ORDER — ONDANSETRON 2 MG/ML
4 INJECTION INTRAMUSCULAR; INTRAVENOUS
Status: COMPLETED | OUTPATIENT
Start: 2020-11-02 | End: 2020-11-02

## 2020-11-02 RX ADMIN — MORPHINE SULFATE 4 MG: 4 INJECTION INTRAVENOUS at 04:11

## 2020-11-02 RX ADMIN — ONDANSETRON 4 MG: 2 INJECTION INTRAMUSCULAR; INTRAVENOUS at 04:11

## 2020-11-02 NOTE — ED PROVIDER NOTES
"The Encounter Date: 11/2/2020    SCRIBE #1 NOTE: I, Kaitlin Otero, am scribing for, and in the presence of, Yung Murguia MD.       History     Chief Complaint   Patient presents with    Fall     back / neck / rib pain     Head Injury     hematoma to back of head        Time seen by provider: 3:31 PM on 11/02/2020    Steve June Jr. is a 75 y.o. male with a PMHx of DM type 2, HTN, anemia, arthritis and cirrhosis who presents to the ED via EMS with an onset of a wound to the back of the head, and tailbone, neck, left shoulder, and left rib pain s/p falling backwards from a step ladder shortly PTA. Patient was able to get up and call EMS himself. Patient denies alcohol use today. EMS reports patient's sugar was 410. Wife endorses tetanus is UTD. The patient denies abdominal pain, LOC or any other symptoms at this time. PSHx includes left knee arthroscopy.      The history is provided by the patient, the EMS personnel and the spouse.     Review of patient's allergies indicates:   Allergen Reactions    Adhesive tape-silicones Other (See Comments)     pulls skin off    Doxycycline      Dizzy. "Just didn't feel right".     Past Medical History:   Diagnosis Date    Abnormal thyroid function test     Allergy     Seasonal    Anemia     Anemia due to blood loss 7/2/2014    Arthritis     Gaviria esophagus     Basal cell carcinoma     right forearm    Basal cell carcinoma 12/2011    lower post neck    Basal cell carcinoma 04/23/2019    left posterior helix    Cancer     skin CA    Cataract     Cirrhosis     Diabetes mellitus     Diabetes mellitus, type 2     Encounter for blood transfusion     Esophageal varices in cirrhosis     grade II on 7/12 EGD    Gastritis     on 7/12 EGD    GERD (gastroesophageal reflux disease) 2/28/2015    Hard of hearing     Hiatal hernia     History of hepatitis C 8/10/2012    tx with harvoni x 41 days (started 10/22/15). 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 neurological manifestations, uncontrolled(250.62) 2013    Valvular heart disease     mild MR 12     Past Surgical History:   Procedure Laterality Date    CATARACT EXTRACTION  1/10/13    left eye    CATARACT EXTRACTION      right eye    CHOLECYSTECTOMY      COLONOSCOPY      EYE SURGERY      Cataract surgery to right eye    KNEE ARTHROSCOPY W/ MENISCAL REPAIR      KNEE CARTILAGE SURGERY      left knee    KNEE SURGERY  2006    left    SKIN LESION EXCISION      TONSILLECTOMY      UPPER GASTROINTESTINAL ENDOSCOPY       Family History   Problem Relation Age of Onset    Leukemia Mother     Cancer Mother         bone    Alcohol abuse Father     Cirrhosis Father         EtOH induced    No Known Problems Daughter     No Known Problems Son     No Known Problems Daughter     No Known Problems Daughter     No Known Problems Daughter     No Known Problems Sister     Amblyopia Neg Hx     Blindness Neg Hx     Cataracts Neg Hx     Diabetes Neg Hx     Glaucoma Neg Hx     Hypertension Neg Hx     Macular degeneration Neg Hx     Retinal detachment Neg Hx     Strabismus Neg Hx     Stroke Neg Hx     Thyroid disease Neg Hx     Psoriasis Neg Hx     Lupus Neg Hx     Eczema Neg Hx     Acne Neg Hx     Melanoma Neg Hx      Social History     Tobacco Use    Smoking status: Former Smoker     Packs/day: 1.00     Years: 25.00     Pack years: 25.00     Quit date: 2000     Years since quittin.2    Smokeless tobacco: Never Used   Substance Use Topics    Alcohol  use: Yes     Frequency: 2-3 times a week     Drinks per session: 1 or 2     Binge frequency: Less than monthly     Comment: rarely    Drug use: No     Review of Systems   Constitutional: Negative for fever.   HENT: Negative for sore throat.    Respiratory: Negative for shortness of breath.    Cardiovascular: Negative for chest pain.   Gastrointestinal: Negative for abdominal pain and nausea.   Genitourinary: Negative for dysuria.   Musculoskeletal: Positive for arthralgias and neck pain. Negative for back pain.        + tailbone pain   Skin: Positive for wound. Negative for rash.   Neurological: Negative for syncope and weakness.   Hematological: Does not bruise/bleed easily.       Physical Exam     Initial Vitals [11/02/20 1526]   BP Pulse Resp Temp SpO2   (!) 196/81 89 18 99.3 °F (37.4 °C) (!) 94 %      MAP       --         Physical Exam    Nursing note and vitals reviewed.  Constitutional: He appears well-developed and well-nourished. He is not diaphoretic. No distress.   HENT:   Head: Head is with abrasion.   Hematoma with abrasion noted to the left occipital region.   Eyes: EOM are normal. Pupils are equal, round, and reactive to light.   Neck: Normal range of motion. Neck supple.   Cardiovascular: Normal rate, regular rhythm, normal heart sounds and intact distal pulses. Exam reveals no gallop and no friction rub.    No murmur heard.  Pulmonary/Chest: Breath sounds normal. No respiratory distress. He has no wheezes. He has no rhonchi. He has no rales.   Left rib tenderness.   Abdominal: Soft. Bowel sounds are normal. There is no abdominal tenderness. There is no rebound and no guarding.   Musculoskeletal: Normal range of motion.      Left shoulder: He exhibits tenderness.      Cervical back: He exhibits tenderness.      Comments: Tenderness to left anterior shoulder.   Neurological: He is alert and oriented to person, place, and time. GCS eye subscore is 4. GCS verbal subscore is 5. GCS motor subscore is 6.    Skin: Skin is warm.   Psychiatric: He has a normal mood and affect. His behavior is normal. Judgment and thought content normal.         ED Course   Procedures  Labs Reviewed - No data to display       Imaging Results          X-Ray Ribs 2 View Left (Final result)  Result time 11/02/20 16:48:56    Final result by Renzo Israel Jr., MD (11/02/20 16:48:56)                 Impression:      Negative left rib detail views.      Electronically signed by: Renzo Israel MD  Date:    11/02/2020  Time:    16:48             Narrative:    EXAMINATION:  XR RIBS 2 VIEW LEFT    CLINICAL HISTORY:  Unspecified injury of thorax, initial encounter    TECHNIQUE:  Two views of the left ribs were performed.    COMPARISON:  None.    FINDINGS:  A fracture of the left ribs is not identified.  Underlying pleural effusion or pneumothorax is not seen.                               X-Ray Shoulder Trauma Left (Final result)  Result time 11/02/20 16:39:57    Final result by Renzo Israel Jr., MD (11/02/20 16:39:57)                 Impression:      Negative x-rays of the left shoulder.      Electronically signed by: Renzo Israel MD  Date:    11/02/2020  Time:    16:39             Narrative:    EXAMINATION:  XR SHOULDER TRAUMA 3 VIEW LEFT    CLINICAL HISTORY:  Unspecified injury of left shoulder and upper arm, initial encounter    TECHNIQUE:  Three views of the left shoulder were performed.    COMPARISON  None    FINDINGS:  Dislocation is not seen.  Fracture of the humerus, scapular clavicle is not noted.  The acromioclavicular and glenohumeral joints are intact.                               X-Ray Sacrum And Coccyx (Final result)  Result time 11/02/20 16:41:09    Final result by Renzo Israel Jr., MD (11/02/20 16:41:09)                 Impression:      Osteoporosis otherwise negative plain x-rays of the sacrum.      Electronically signed by: Renzo Israel MD  Date:    11/02/2020  Time:    16:41             Narrative:     EXAMINATION:  XR SACRUM AND COCCYX    CLINICAL HISTORY:  Unspecified fall, initial encounter    TECHNIQUE:  three views of the sacrum and coccyx were performed.    COMPARISON:  None    FINDINGS:  There does appear to be osteoporosis of the pelvis.  On these images a fracture of the sacrum is not seen.  The sacroiliac joints are sharp and symmetric.  Abnormal angulation is not identified at the coccyx.                               X-Ray Chest AP Portable (Final result)  Result time 11/02/20 16:39:19    Final result by Renzo Israel Jr., MD (11/02/20 16:39:19)                 Impression:      Negative portable chest x-ray      Electronically signed by: Renzo Israel MD  Date:    11/02/2020  Time:    16:39             Narrative:    EXAMINATION:  XR CHEST AP PORTABLE    CLINICAL HISTORY:  fall;    TECHNIQUE:  Single frontal view of the chest was performed.    COMPARISON:  Chest x-ray of November 7, 2019.    FINDINGS:  The mediastinal and cardiac size and contours are normal.  The diaphragms of pleura are clear.  Intrapulmonary mass or infiltrate is not seen.  There is no pneumothorax or pleural effusion.                               CT Cervical Spine Without Contrast (Final result)  Result time 11/02/20 16:10:31    Final result by Shilo Kim MD (11/02/20 16:10:31)                 Impression:      No acute cervical spine injury.      Electronically signed by: Shilo Kim MD  Date:    11/02/2020  Time:    16:10             Narrative:    EXAMINATION:  CT CERVICAL SPINE WITHOUT CONTRAST    CLINICAL HISTORY:  Neck trauma (Age => 67y);    TECHNIQUE:  Low dose axial images, sagittal and coronal reformations were performed though the cervical spine.  Contrast was not administered.    COMPARISON:  02/09/2020    FINDINGS:  The bones are diffusely osteopenic.  There is no fracture or traumatic malalignment.  There is minimal anterolisthesis of C4 on C5 and C5 on C6.  Multilevel degenerative disc changes  and facet arthropathy are present ranging from mild to moderate.  There is no prevertebral soft tissue swelling.  There is mild right and moderate left neuroforaminal narrowing at C3-4.                               CT Head Without Contrast (Final result)  Result time 11/02/20 16:06:47    Final result by Shilo Kim MD (11/02/20 16:06:47)                 Impression:      Left parietal scalp hematoma.  No acute intracranial injury.      Electronically signed by: Shilo Kim MD  Date:    11/02/2020  Time:    16:06             Narrative:    EXAMINATION:  CT HEAD WITHOUT CONTRAST    CLINICAL HISTORY:  Head trauma, mod-severe;    TECHNIQUE:  Low dose axial images were obtained through the head.  Coronal and sagittal reformations were also performed. Contrast was not administered.    COMPARISON:  None.    FINDINGS:  There is a moderate left posterior parietal scalp hematoma.  The calvarium is intact.  There is no intracranial hemorrhage.  Moderate generalized cerebral volume loss is present with compensatory ventricular enlargement.  There is mild chronic white matter change.                                 Medical Decision Making:   History:   Old Medical Records: I decided to obtain old medical records.  Initial Assessment:   75-year-old male presented for evaluation of injuries status post fall.  Differential Diagnosis:   Initial differential diagnosis included but not limited to fracture, intracranial hemorrhage, and contusion.  Independently Interpreted Test(s):   I have ordered and independently interpreted X-rays - see prior notes.  Clinical Tests:   Radiological Study: Ordered and Reviewed  ED Management:  The patient was emergently evaluated in the emergency department, his evaluation was significant for an elderly male with polytrauma noted.  The patient's CT scans of his head and cervical spine showed no acute abnormalities per Radiology.  The patient's x-rays showed no acute processes per  Radiology.  The patient's diagnosis is likely a scalp hematoma, concussion, and multiple contusions status post fall.  The patient was treated with parental pain medication and parental Zofran.  The patient was ambulatory in the emergency department after this.  The patient is stable for discharge to home.  He will be discharged home with p.o. Ultram and ODT Zofran.  He is to follow up with his PCP and is referred to the [facility] for follow-up.  They are encouraged to return for worsening symptoms or change in mental status or any other concerns.            Scribe Attestation:   Scribe #1: I performed the above scribed service and the documentation accurately describes the services I performed. I attest to the accuracy of the note.           I, Dr. Yung Murguia, personally performed the services described in this documentation. All medical record entries made by the scribe were at my direction and in my presence.  I have reviewed the chart and agree that the record reflects my personal performance and is accurate and complete. Yung Murguia MD.  7:02 PM 11/02/2020              Clinical Impression:     ICD-10-CM ICD-9-CM   1. Concussion without loss of consciousness, initial encounter  S06.0X0A 850.0   2. Shoulder injury, left, initial encounter  S49.92XA 959.2   3. Rib injury  S29.9XXA 959.11   4. Fall  W19.XXXA E888.9   5. Hematoma  T14.8XXA 924.9   6. Contusion of rib on left side, initial encounter  S20.212A 922.1                          ED Disposition Condition    Discharge Stable        ED Prescriptions     Medication Sig Dispense Start Date End Date Auth. Provider    traMADoL (ULTRAM) 50 mg tablet Take 1 tablet (50 mg total) by mouth every 6 (six) hours as needed for Pain. 10 tablet 11/2/2020  Yung Murguia MD    ondansetron (ZOFRAN-ODT) 4 MG TbDL Take 1 tablet (4 mg total) by mouth every 8 (eight) hours as needed (nausea/vomiting). 12 tablet 11/2/2020  Yung Murguia MD        Follow-up  Information     Follow up With Specialties Details Why Contact Info    Crispin Garcia MD Family Medicine Schedule an appointment as soon as possible for a visit   [address] [facility] [address]  [phone]      [facility]  Schedule an appointment as soon as possible for a visit   [address] [place] [address]  [phone]                                         Yung Murguia MD  11/02/20 [id number]

## 2020-11-02 NOTE — ED NOTES
Pt to radiology via stretcher @ this time. C-collar per EMS protocol remains in place prior to xray results pending.

## 2020-11-03 ENCOUNTER — TELEPHONE (OUTPATIENT)
Dept: FAMILY MEDICINE | Facility: CLINIC | Age: 75
End: 2020-11-03

## 2020-11-03 NOTE — TELEPHONE ENCOUNTER
Pt calling in regards to referrals due to ER visit. Appointment scheduled for ER follow up 11/4/2020 at 8:40. Pt verbalized understanding.

## 2020-11-03 NOTE — TELEPHONE ENCOUNTER
----- Message from Annmarie Mendoza, Patient Care Assistant sent at 11/3/2020  4:50 PM CST -----  Regarding: advice  Contact: patient  Type: Needs Medical Advice  Who Called:  patient   Symptoms (please be specific):  pt fell and unable to move   How long has patient had these symptoms:  11/02/20  Pharmacy name and phone #:    Best Call Back Number: [phone] (home)   Additional Information: patient would like a callback regarding home health and wound care. Thanks!

## 2020-11-04 ENCOUNTER — OFFICE VISIT (OUTPATIENT)
Dept: FAMILY MEDICINE | Facility: CLINIC | Age: 75
End: 2020-11-04
Payer: MEDICARE

## 2020-11-04 ENCOUNTER — PATIENT OUTREACH (OUTPATIENT)
Dept: ADMINISTRATIVE | Facility: OTHER | Age: 75
End: 2020-11-04

## 2020-11-04 VITALS
SYSTOLIC BLOOD PRESSURE: 128 MMHG | BODY MASS INDEX: 28.41 KG/M2 | TEMPERATURE: 98 F | HEART RATE: 74 BPM | WEIGHT: 191.81 LBS | OXYGEN SATURATION: 95 % | HEIGHT: 69 IN | DIASTOLIC BLOOD PRESSURE: 76 MMHG

## 2020-11-04 DIAGNOSIS — S01.00XD OPEN WOUND OF SCALP, UNSPECIFIED OPEN WOUND TYPE, SUBSEQUENT ENCOUNTER: ICD-10-CM

## 2020-11-04 DIAGNOSIS — L97.921 ULCERS OF BOTH LOWER LEGS, LIMITED TO BREAKDOWN OF SKIN: ICD-10-CM

## 2020-11-04 DIAGNOSIS — S00.03XA HEMATOMA OF SCALP, INITIAL ENCOUNTER: ICD-10-CM

## 2020-11-04 DIAGNOSIS — S06.0X0D CONCUSSION WITHOUT LOSS OF CONSCIOUSNESS, SUBSEQUENT ENCOUNTER: ICD-10-CM

## 2020-11-04 DIAGNOSIS — S51.801D OPEN WOUND OF RIGHT FOREARM, SUBSEQUENT ENCOUNTER: Primary | ICD-10-CM

## 2020-11-04 DIAGNOSIS — L97.911 ULCERS OF BOTH LOWER LEGS, LIMITED TO BREAKDOWN OF SKIN: ICD-10-CM

## 2020-11-04 PROCEDURE — 3288F PR FALLS RISK ASSESSMENT DOCUMENTED: ICD-10-PCS | Mod: CPTII,S$GLB,, | Performed by: PHYSICIAN ASSISTANT

## 2020-11-04 PROCEDURE — 3078F PR MOST RECENT DIASTOLIC BLOOD PRESSURE < 80 MM HG: ICD-10-PCS | Mod: CPTII,S$GLB,, | Performed by: PHYSICIAN ASSISTANT

## 2020-11-04 PROCEDURE — 3078F DIAST BP <80 MM HG: CPT | Mod: CPTII,S$GLB,, | Performed by: PHYSICIAN ASSISTANT

## 2020-11-04 PROCEDURE — 1159F PR MEDICATION LIST DOCUMENTED IN MEDICAL RECORD: ICD-10-PCS | Mod: S$GLB,,, | Performed by: PHYSICIAN ASSISTANT

## 2020-11-04 PROCEDURE — 3288F FALL RISK ASSESSMENT DOCD: CPT | Mod: CPTII,S$GLB,, | Performed by: PHYSICIAN ASSISTANT

## 2020-11-04 PROCEDURE — 3008F BODY MASS INDEX DOCD: CPT | Mod: CPTII,S$GLB,, | Performed by: PHYSICIAN ASSISTANT

## 2020-11-04 PROCEDURE — 99999 PR PBB SHADOW E&M-EST. PATIENT-LVL V: ICD-10-PCS | Mod: PBBFAC,,, | Performed by: PHYSICIAN ASSISTANT

## 2020-11-04 PROCEDURE — 1100F PR PT FALLS ASSESS DOC 2+ FALLS/FALL W/INJURY/YR: ICD-10-PCS | Mod: CPTII,S$GLB,, | Performed by: PHYSICIAN ASSISTANT

## 2020-11-04 PROCEDURE — 99214 OFFICE O/P EST MOD 30 MIN: CPT | Mod: S$GLB,,, | Performed by: PHYSICIAN ASSISTANT

## 2020-11-04 PROCEDURE — 99499 RISK ADDL DX/OHS AUDIT: ICD-10-PCS | Mod: S$GLB,,, | Performed by: PHYSICIAN ASSISTANT

## 2020-11-04 PROCEDURE — 1125F AMNT PAIN NOTED PAIN PRSNT: CPT | Mod: S$GLB,,, | Performed by: PHYSICIAN ASSISTANT

## 2020-11-04 PROCEDURE — 3074F SYST BP LT 130 MM HG: CPT | Mod: CPTII,S$GLB,, | Performed by: PHYSICIAN ASSISTANT

## 2020-11-04 PROCEDURE — 1125F PR PAIN SEVERITY QUANTIFIED, PAIN PRESENT: ICD-10-PCS | Mod: S$GLB,,, | Performed by: PHYSICIAN ASSISTANT

## 2020-11-04 PROCEDURE — 99999 PR PBB SHADOW E&M-EST. PATIENT-LVL V: CPT | Mod: PBBFAC,,, | Performed by: PHYSICIAN ASSISTANT

## 2020-11-04 PROCEDURE — 99499 UNLISTED E&M SERVICE: CPT | Mod: S$GLB,,, | Performed by: PHYSICIAN ASSISTANT

## 2020-11-04 PROCEDURE — 3008F PR BODY MASS INDEX (BMI) DOCUMENTED: ICD-10-PCS | Mod: CPTII,S$GLB,, | Performed by: PHYSICIAN ASSISTANT

## 2020-11-04 PROCEDURE — 3074F PR MOST RECENT SYSTOLIC BLOOD PRESSURE < 130 MM HG: ICD-10-PCS | Mod: CPTII,S$GLB,, | Performed by: PHYSICIAN ASSISTANT

## 2020-11-04 PROCEDURE — 99214 PR OFFICE/OUTPT VISIT, EST, LEVL IV, 30-39 MIN: ICD-10-PCS | Mod: S$GLB,,, | Performed by: PHYSICIAN ASSISTANT

## 2020-11-04 PROCEDURE — 1100F PTFALLS ASSESS-DOCD GE2>/YR: CPT | Mod: CPTII,S$GLB,, | Performed by: PHYSICIAN ASSISTANT

## 2020-11-04 PROCEDURE — 1159F MED LIST DOCD IN RCRD: CPT | Mod: S$GLB,,, | Performed by: PHYSICIAN ASSISTANT

## 2020-11-04 RX ORDER — LIDOCAINE AND PRILOCAINE 25; 25 MG/G; MG/G
CREAM TOPICAL
Qty: 30 G | Refills: 1 | Status: SHIPPED | OUTPATIENT
Start: 2020-11-04 | End: 2021-02-24

## 2020-11-04 NOTE — PATIENT INSTRUCTIONS
After a Concussion     Awaken to check alertness as often as the health care provider suggests.     If you or someone close to you has had a mild concussion (a head injury), watch closely for signs of problems during the first 48 hours after the injury. Follow the doctors advice about recovering at home. Use the tips on this handout as a guide.  Call 911 or your emergency number if the person with the concussion will not fully wake up or has seizures or convulsions.   The first 48 hours  Dont take medicine unless approved by your healthcare provider. Try placing a cold, damp cloth on the head to help relieve a headache.  · Ask the doctor before using any medicines.  · Don't drink alcohol or take sedatives or medicines that make you sleepy.  · Don't return to sports or any activity that could cause you to hit your head until all symptoms are gone and you have been cleared by your doctor. A second head injury before fully recovering from the first one can lead to serious brain injury.  · Avoid doing activities that require a lot of concentration or a lot of attention. This will allow your brain to rest and heal more quickly.  · Return to regular physical and mental activity as directed and approved by your healthcare provider.  Tips about sleeping  For the first day or two, it may be best not to sleep for long periods of time without being checked for alertness. Follow the doctors instructions.  ? Wake every ____ hours for the next ____ hours. Ask questions to check for alertness.  ? OK to sleep through the night.  Note: A person should not be left alone after a concussion. If no adult can stay with the injured person, let the doctor know.  When to call the doctor  If you notice any of the following, call the doctor or healthcare provider:  · Vomiting (some vomiting is common, but tell the doctor about any vomiting)  · Clear or bloody drainage from the nose or ear  · Constant drowsiness or difficulty in waking  up  · Confusion or memory loss  · Blurred vision or any vision changes  · Inability to walk or talk normally  · Increased weakness or problems with coordination  · Constant, unrelieved headache that becomes more severe  · Changes in behavior or personality  · High-pitched crying in infants   Date Last Reviewed: 8/17/2015  © 0633-4899 [employer]. [address] [place] [address], [place] [address]. All rights reserved. This information is not intended as a substitute for professional medical care. Always follow your healthcare professional's instructions.        Wound Care  Taking proper care of your wound will help it heal. Your healthcare provider may show you how to clean and dress the wound. He or she will also explain how to tell if the wound is healing normally. If you are unsure of how to take care of the wound, be sure to clarify what dressing to use and how often you should change the bandages. Here are the basic steps.     A wound that's not healing normally may be dark in color or have white streaks.   Wash your hands  Tips for washing your hands include:  · Use liquid soap and lather for 2 minutes. Scrub between your fingers and under your nails.  · Rinse with warm water, keeping your fingers pointing down.  · Use a paper towel to dry your hands and to turn off the faucet.  Remove the used dressing  Here are suggestions for removing the dressing:  · If dressing changes cause you pain, be sure to take your pain medicine as prescribed by your healthcare provider 30 minutes before dressing changes.  · Set up your supplies.  · Put on disposable gloves if youre dressing a wound for someone else or your wound is infected.  · Loosen the tape by pulling gently toward the wound.  · Gently take off the old dressing. If the dressing is stuck to the wound, moisten it with saline (if available) or clean water.  · If you have a drain or tube in the wound, be careful not to pull on it.  · Remove the dressing 1  layer at a time and put it in a plastic bag.  · Remove your gloves.  Inspect and dress the wound  Check the wound carefully:  · Each time you change the dressing, inspect the wound carefully to be sure its healing normally by making sure your wound appears to be pink and moist, and is free of infection.    · Wash your hands again. Put on a new pair of gloves.  · Clean and dress the wound as directed by your healthcare provider or nurse. Do not put anything in the wound that is not prescribed or directed by your healthcare provider. If you have a drain or tube, be careful not to pull on it. Make sure to secure the drain or tube as well.  · Put all supplies in a plastic bag. Seal the bag and put it in the trash.  · Be sure to wash your hands again.  Call your healthcare provider  Call your healthcare provider if you see any of the following signs of a problem:  · Bleeding that soaks the dressing  · Pink fluid weeping from the wound  · Increased drainage or drainage that is yellow, yellow-green, or foul-smelling  · Increased swelling or pain, or redness or swelling in the skin around the wound  · A change in the color of the wound, or if streaks develop in a direction away from the wound  · The area between any stitches opens up  · An increase in the size of the wound  · A fever of 100.4°F (38°C) or higher, or as directed by your healthcare provider  · Chills, increased fatigue, or a loss of appetite      Date Last Reviewed: 7/30/2015  © 1633-7425 [employer]. [address] [place] [address], [place] [address]. All rights reserved. This information is not intended as a substitute for professional medical care. Always follow your healthcare professional's instructions.        Hematoma  A hematoma is a collection of blood trapped outside of a blood vessel. It is what we think of as a bruise or a contusion. It is usually seen under the skin as a black and blue spot on your arm or leg, or a bump on your head after an  injury. It can be almost anywhere on or in your body. It can also occur in an internal organ where it can be more serious.  A hematoma is caused by an injury with damage to small blood vessels. This causes blood to leak into the tissues. Blood forms a pocket under the skin that swells and looks like a purplish patch.  Gradually the blood in the hematoma is absorbed back into the body. The swelling and pain of the hematoma will go away. This takes from 1 to 4 weeks, depending on the size of the hematoma. The skin over the hematoma may turn bluish then brown and yellow as the blood is dissolved and absorbed. Usually, this only takes a couple of weeks but can last months.  Home care  · Limit motion of the joints near the hematoma. If the hematoma is large and painful, avoid sports and other vigorous physical activity until the swelling and pain goes away.  · Apply an ice pack (ice cubes in a plastic bag, wrapped in a thin towel or a frozen bag of peas) over the injured area for 20 minutes every 1 to 2 hours the first day. Continue with ice packs 3 to 4 times a day for the next 2 days. Continue the use of ice packs for relief of pain and swelling as needed.  · If you need anything for pain, you can take acetaminophen, unless you were given a different pain medicine to use. Talk with your doctor before using this medicine if you have chronic liver or kidney disease. Also talk with your doctor if you have had a stomach ulcer or digestive tract bleeding, or are taking blood-thinner medicines.  Follow-up care  Follow up with your healthcare provider, or as advised. If X-rays or a CT scan were done, you will be notified if there is a change in the reading, especially if it affects treatment.  When to seek medical advice  Call your healthcare provider right away f any of the following occur:  · Redness around the hematoma  · Increase in pain or warmth in the hematoma  · Increase in size of the hematoma  · Fever of 100.4ºF  (38ºC) or higher, or as directed by your healthcare provider  · If the hematoma is on the arm or leg, watch for:  ¨ Increased swelling or pain in the extremity  ¨ Numbness or tingling or blue color of the hand or foot  Date Last Reviewed: 11/5/2015  © 7061-3679 [employer]. [address] [place] [address]. All rights reserved. This information is not intended as a substitute for professional medical care. Always follow your healthcare professional's instructions.

## 2020-11-04 NOTE — PROGRESS NOTES
"Subjective:       Patient ID: Steve June Jr. is a 75 y.o. male.    Chief Complaint: ED Follow up    Patient presents for ER follow up.  He was seen in ER 2 days ago for injuries sustained in a fall.  He fell backwards off of a step ladder while working outside on his house.  He hit his head and sustained a hematoma and concussion without LOC.  He is having pain in the left neck and ribs.  CT and Xrays from ER showed no acute fractures or dislocation.   Several days prior to his fall he was seen in ER and then here in clinic for an open wound to the right forearm.  He was referred to wound care and was awaiting the appointment.  He states that the wound "opened back up" during his fall.  He was also seeing podiatry for Lower extremity wounds.  He is currently followed by pain management and is taking percocet 10 every 6 hours for pain. He states that is new pain related to his fall is not controlled - left neck and ribs.  Regarding his concussion he is resting.  He has had no sleeping problems, confusion, nausea, vomiting or mood changes.    Patients patient medical/surgical, social and family histories have been reviewed        Review of Systems   Constitutional: Negative for fatigue.   Eyes: Negative for visual disturbance.   Respiratory: Negative for cough, chest tightness, shortness of breath and wheezing.    Cardiovascular: Negative for chest pain, palpitations and leg swelling.   Musculoskeletal: Positive for arthralgias, back pain, myalgias and neck pain. Negative for gait problem, joint swelling and neck stiffness.   Skin: Positive for wound.   Neurological: Positive for headaches. Negative for dizziness, tremors, seizures, syncope, facial asymmetry, speech difficulty, weakness, light-headedness and numbness.   Psychiatric/Behavioral: Negative for dysphoric mood, self-injury, sleep disturbance and suicidal ideas. The patient is not nervous/anxious.        Objective:      Physical Exam  Constitutional:  "      Appearance: Normal appearance.   Pulmonary:      Effort: Pulmonary effort is normal.   Chest:      Chest wall: Tenderness (left ribs laterally ) present. No deformity or swelling.   Musculoskeletal:      Cervical back: He exhibits tenderness (left cervical paraspinous ). He exhibits normal range of motion.   Skin:     Findings: Wound present.   Neurological:      Mental Status: He is alert.                 Assessment:       1. Open wound of right forearm, subsequent encounter    2. Hematoma of scalp, initial encounter    3. Ulcers of both lower legs, limited to breakdown of skin    4. Open wound of scalp, unspecified open wound type, subsequent encounter    5. Concussion without loss of consciousness, subsequent encounter        Plan:       Steve was seen today for ed follow up.    Diagnoses and all orders for this visit:    Open wound of right forearm, subsequent encounter  -     Ambulatory referral/consult to Wound Clinic; Future    Hematoma of scalp, initial encounter  Steve June Jr. was given a handout which discussed their disease process, precautions, and instructions for follow-up and therapy.    Ulcers of both lower legs, limited to breakdown of skin  -     Ambulatory referral/consult to Wound Clinic; Future    Open wound of scalp, unspecified open wound type, subsequent encounter  -     Ambulatory referral/consult to Wound Clinic; Future    Concussion without loss of consciousness, subsequent encounter  Steve June Jr. was given a handout which discussed their disease process, precautions, and instructions for follow-up and therapy.  Wounds were cleaned with saline and bactorban applied     Patients wife is requesting referral to home health.  Discussed first going to wound care clinic for evaluation and management for wounds at this stage.

## 2020-11-05 ENCOUNTER — OFFICE VISIT (OUTPATIENT)
Dept: DERMATOLOGY | Facility: CLINIC | Age: 75
End: 2020-11-05
Payer: MEDICARE

## 2020-11-05 DIAGNOSIS — Z85.828 HISTORY OF SKIN CANCER: ICD-10-CM

## 2020-11-05 DIAGNOSIS — S41.111S LACERATION OF RIGHT UPPER EXTREMITY, SEQUELA: ICD-10-CM

## 2020-11-05 DIAGNOSIS — L98.9 SKIN LESION: ICD-10-CM

## 2020-11-05 DIAGNOSIS — Z85.828 HISTORY OF BASAL CELL CARCINOMA (BCC): ICD-10-CM

## 2020-11-05 DIAGNOSIS — D48.5 NEOPLASM OF UNCERTAIN BEHAVIOR OF SKIN: Primary | ICD-10-CM

## 2020-11-05 DIAGNOSIS — L57.0 ACTINIC KERATOSIS: ICD-10-CM

## 2020-11-05 PROCEDURE — 1126F PR PAIN SEVERITY QUANTIFIED, NO PAIN PRESENT: ICD-10-PCS | Mod: S$GLB,,, | Performed by: STUDENT IN AN ORGANIZED HEALTH CARE EDUCATION/TRAINING PROGRAM

## 2020-11-05 PROCEDURE — 11103 TANGNTL BX SKIN EA SEP/ADDL: CPT | Mod: S$GLB,,, | Performed by: STUDENT IN AN ORGANIZED HEALTH CARE EDUCATION/TRAINING PROGRAM

## 2020-11-05 PROCEDURE — 3288F FALL RISK ASSESSMENT DOCD: CPT | Mod: CPTII,S$GLB,, | Performed by: STUDENT IN AN ORGANIZED HEALTH CARE EDUCATION/TRAINING PROGRAM

## 2020-11-05 PROCEDURE — 1159F PR MEDICATION LIST DOCUMENTED IN MEDICAL RECORD: ICD-10-PCS | Mod: S$GLB,,, | Performed by: STUDENT IN AN ORGANIZED HEALTH CARE EDUCATION/TRAINING PROGRAM

## 2020-11-05 PROCEDURE — 99213 PR OFFICE/OUTPT VISIT, EST, LEVL III, 20-29 MIN: ICD-10-PCS | Mod: 25,S$GLB,, | Performed by: STUDENT IN AN ORGANIZED HEALTH CARE EDUCATION/TRAINING PROGRAM

## 2020-11-05 PROCEDURE — 99999 PR PBB SHADOW E&M-EST. PATIENT-LVL III: ICD-10-PCS | Mod: PBBFAC,,, | Performed by: STUDENT IN AN ORGANIZED HEALTH CARE EDUCATION/TRAINING PROGRAM

## 2020-11-05 PROCEDURE — 1100F PR PT FALLS ASSESS DOC 2+ FALLS/FALL W/INJURY/YR: ICD-10-PCS | Mod: CPTII,S$GLB,, | Performed by: STUDENT IN AN ORGANIZED HEALTH CARE EDUCATION/TRAINING PROGRAM

## 2020-11-05 PROCEDURE — 99213 OFFICE O/P EST LOW 20 MIN: CPT | Mod: 25,S$GLB,, | Performed by: STUDENT IN AN ORGANIZED HEALTH CARE EDUCATION/TRAINING PROGRAM

## 2020-11-05 PROCEDURE — 17000 DESTRUCT PREMALG LESION: CPT | Mod: 59,S$GLB,, | Performed by: STUDENT IN AN ORGANIZED HEALTH CARE EDUCATION/TRAINING PROGRAM

## 2020-11-05 PROCEDURE — 88305 TISSUE EXAM BY PATHOLOGIST: CPT | Mod: 26,,, | Performed by: DERMATOLOGY

## 2020-11-05 PROCEDURE — 11102 TANGNTL BX SKIN SINGLE LES: CPT | Mod: S$GLB,,, | Performed by: STUDENT IN AN ORGANIZED HEALTH CARE EDUCATION/TRAINING PROGRAM

## 2020-11-05 PROCEDURE — 3288F PR FALLS RISK ASSESSMENT DOCUMENTED: ICD-10-PCS | Mod: CPTII,S$GLB,, | Performed by: STUDENT IN AN ORGANIZED HEALTH CARE EDUCATION/TRAINING PROGRAM

## 2020-11-05 PROCEDURE — 1126F AMNT PAIN NOTED NONE PRSNT: CPT | Mod: S$GLB,,, | Performed by: STUDENT IN AN ORGANIZED HEALTH CARE EDUCATION/TRAINING PROGRAM

## 2020-11-05 PROCEDURE — 17000 PR DESTRUCTION(LASER SURGERY,CRYOSURGERY,CHEMOSURGERY),PREMALIGNANT LESIONS,FIRST LESION: ICD-10-PCS | Mod: 59,S$GLB,, | Performed by: STUDENT IN AN ORGANIZED HEALTH CARE EDUCATION/TRAINING PROGRAM

## 2020-11-05 PROCEDURE — 99999 PR PBB SHADOW E&M-EST. PATIENT-LVL III: CPT | Mod: PBBFAC,,, | Performed by: STUDENT IN AN ORGANIZED HEALTH CARE EDUCATION/TRAINING PROGRAM

## 2020-11-05 PROCEDURE — 1100F PTFALLS ASSESS-DOCD GE2>/YR: CPT | Mod: CPTII,S$GLB,, | Performed by: STUDENT IN AN ORGANIZED HEALTH CARE EDUCATION/TRAINING PROGRAM

## 2020-11-05 PROCEDURE — 17003 DESTRUCTION, PREMALIGNANT LESIONS; SECOND THROUGH 14 LESIONS: ICD-10-PCS | Mod: 59,S$GLB,, | Performed by: STUDENT IN AN ORGANIZED HEALTH CARE EDUCATION/TRAINING PROGRAM

## 2020-11-05 PROCEDURE — 11102 PR TANGENTIAL BIOPSY, SKIN, SINGLE LESION: ICD-10-PCS | Mod: S$GLB,,, | Performed by: STUDENT IN AN ORGANIZED HEALTH CARE EDUCATION/TRAINING PROGRAM

## 2020-11-05 PROCEDURE — 88305 TISSUE EXAM BY PATHOLOGIST: ICD-10-PCS | Mod: 26,,, | Performed by: DERMATOLOGY

## 2020-11-05 PROCEDURE — 11103 PR TANGENTIAL BIOPSY, SKIN, EA ADDTL LESION: ICD-10-PCS | Mod: S$GLB,,, | Performed by: STUDENT IN AN ORGANIZED HEALTH CARE EDUCATION/TRAINING PROGRAM

## 2020-11-05 PROCEDURE — 88305 TISSUE EXAM BY PATHOLOGIST: CPT | Performed by: DERMATOLOGY

## 2020-11-05 PROCEDURE — 17003 DESTRUCT PREMALG LES 2-14: CPT | Mod: 59,S$GLB,, | Performed by: STUDENT IN AN ORGANIZED HEALTH CARE EDUCATION/TRAINING PROGRAM

## 2020-11-05 PROCEDURE — 1159F MED LIST DOCD IN RCRD: CPT | Mod: S$GLB,,, | Performed by: STUDENT IN AN ORGANIZED HEALTH CARE EDUCATION/TRAINING PROGRAM

## 2020-11-05 RX ORDER — MUPIROCIN 20 MG/G
OINTMENT TOPICAL
Qty: 30 G | Refills: 0 | Status: SHIPPED | OUTPATIENT
Start: 2020-11-05 | End: 2021-02-24

## 2020-11-05 RX ORDER — FLUOROURACIL 50 MG/G
CREAM TOPICAL
Qty: 40 G | Refills: 0 | Status: SHIPPED | OUTPATIENT
Start: 2020-11-05 | End: 2021-02-24

## 2020-11-05 NOTE — LETTER
November 5, 2020      Crispin Garcia MD  [address] [facility] [address]           [address] [address]  Phone: [phone]          Patient: Steve June Jr.   MR Number: [id number]   YOB: 1945   Date of Visit: 11/5/2020       Dear Dr. Crispin Garcia:    Thank you for referring Steve June to me for evaluation. Attached you will find relevant portions of my assessment and plan of care.    If you have questions, please do not hesitate to call me. I look forward to following Steve June along with you.    Sincerely,    Elo Messer MD    Enclosure  CC:  No Recipients    If you would like to receive this communication electronically, please contact [email or web address] or [phone] to request more information on [facility] access.    For providers and/or their staff who would like to refer a patient to [facility], please contact us through our one-stop-shop provider referral line, [facility], at [phone].    If you feel you have received this communication in error or would no longer like to receive these types of communications, please e-mail [email or web address]

## 2020-11-05 NOTE — PATIENT INSTRUCTIONS
Shave Biopsy Wound Care    Your doctor has performed a shave biopsy today.  A band aid and vaseline ointment has been placed over the site.  This should remain in place for 24 hours.  It is recommended that you keep the area dry for the first 24 hours.  After 24 hours, you may remove the band aid and wash the area with warm soap and water and apply Vaseline jelly.  Many patients prefer to use Neosporin or Bacitracin ointment.  This is acceptable; however, know that you can develop an allergy to this medication even if you have used it safely for years.  It is important to keep the area moist.  Letting it dry out and get air slows healing time, and will worsen the scar.  Band aid is optional after first 24 hours.      If you notice increasing redness, tenderness, pain, or yellow drainage at the biopsy site, please notify your doctor.  These are signs of an infection.    If your biopsy site is bleeding, apply firm pressure for 15 minutes straight.  Repeat for another 15 minutes, if it is still bleeding.   If the surgical site continues to bleed, then please contact your doctor.    CRYOSURGERY      Your doctor has used a method called cryosurgery to treat your skin condition. Cryosurgery refers to the use of very cold substances to treat a variety of skin conditions such as warts, pre-skin cancers, molluscum contagiosum, sun spots, and several benign growths. The substance we use in cryosurgery is liquid nitrogen and is so cold (-195 degrees Celsius) that is burns when administered.     Following treatment in the office, the skin may immediately burn and become red. You may find the area around the lesion is affected as well. It is sometimes necessary to treat not only the lesion, but a small area of the surrounding normal skin to achieve a good response.     A blister, and even a blood filled blister, may form after treatment.   This is a normal response. If the blister is painful, it is acceptable to sterilize a  needle and with rubbing alcohol and gently pop the blister. It is important that you gently wash the area with soap and warm water as the blister fluid may contain wart virus if a wart was treated. Do no remove the roof of the blister.     The area treated can take anywhere from 1-3 weeks to heal. Healing time depends on the kind skin lesion treated, the location, and how aggressively the lesion was treated. It is recommended that the areas treated are covered with Vaseline or bacitracin ointment and a band-aid. If a band-aid is not practical, just ointment applied several times per day will do. Keeping these areas moist will speed the healing time.    Treatment with liquid nitrogen can leave a scar. In dark skin, it may be a light or dark scar, in light skin it may be a white or pink scar. These will generally fade with time, but may never go away completely.     If you have any concerns after your treatment, please feel free to call the office.         Use mupirocin on right arm once daily under a bandage.   Use efudex over his forehead twice daily. Wait a week before starting

## 2020-11-05 NOTE — PROGRESS NOTES
Subjective:       Patient ID:  Steve June Jr. is a 75 y.o. male who presents for   Chief Complaint   Patient presents with    Skin Check     UBSE     LOV: 9/10/19 Dr. Blank    Patient complains of lesion(s)  Location: left forearm  Duration: months  Symptoms: tender, bleeding, not healing  Relieving factors/Previous treatments: none    Patient recently fell off a later. He bumped his head. He also has a large cut on his right arm where he bumped himself and skin peeled off. Not treating it. Some pain and occasional bleeding.   Of note patient has cirrhosis and chronic thrombocytopenia. No pacemaker and defibrillator.     4/2019 diagnosed with right temple (SCCiS), right preauricular cheek (SCCiS), left preauricular cheek (AK with SCCiS)--> all sccis treated with tolak cream x 2 weeks, left posterior helix (BCC)- saw Dr. blank for mohs, there was no clinically evident tumor remaining and patient deferred mohs.     Hx of Aks treated with cryo in the past and one PDT session in (2012?), efudex filed tx 2016  Hx of BCC left posterior neck s/p excision 2011,  BCC of the R proximal forearm (1/4/2012) involving margins - this was not excised 2/2 chronic severe thrombocytopenia. He was scheduled for radiation but did not end up going -  Hx bx proven scc in situ right forearm with efudex bid x 7 weeks (could not afford aldara) 2014              Review of Systems   Constitutional: Negative for fever, chills and fatigue.   Respiratory: Negative for cough and shortness of breath.    Gastrointestinal: Negative for nausea and vomiting.   Skin: Positive for wears hat. Negative for rash, daily sunscreen use and activity-related sunscreen use.   Hematologic/Lymphatic: Bruises/bleeds easily.        Objective:    Physical Exam   Constitutional: He appears well-developed and well-nourished. No distress.   Neurological: He is alert and oriented to person, place, and time. He is not disoriented.   Psychiatric: He has a  normal mood and affect.   Skin:   Areas Examined (abnormalities noted in diagram):   Scalp / Hair Palpated and Inspected  Head / Face Inspection Performed  Neck Inspection Performed  Chest / Axilla Inspection Performed  Abdomen Inspection Performed  Back Inspection Performed  RUE Inspected  LUE Inspection Performed  Nails and Digits Inspection Performed                   Diagram Legend     Erythematous scaling macule/papule c/w actinic keratosis       Vascular papule c/w angioma      Pigmented verrucoid papule/plaque c/w seborrheic keratosis      Yellow umbilicated papule c/w sebaceous hyperplasia      Irregularly shaped tan macule c/w lentigo     1-2 mm smooth white papules consistent with Milia      Movable subcutaneous cyst with punctum c/w epidermal inclusion cyst      Subcutaneous movable cyst c/w pilar cyst      Firm pink to brown papule c/w dermatofibroma      Pedunculated fleshy papule(s) c/w skin tag(s)      Evenly pigmented macule c/w junctional nevus     Mildly variegated pigmented, slightly irregular-bordered macule c/w mildly atypical nevus      Flesh colored to evenly pigmented papule c/w intradermal nevus       Pink pearly papule/plaque c/w basal cell carcinoma      Erythematous hyperkeratotic cursted plaque c/w SCC      Surgical scar with no sign of skin cancer recurrence      Open and closed comedones      Inflammatory papules and pustules      Verrucoid papule consistent consistent with wart     Erythematous eczematous patches and plaques     Dystrophic onycholytic nail with subungual debris c/w onychomycosis     Umbilicated papule    Erythematous-base heme-crusted tan verrucoid plaque consistent with inflamed seborrheic keratosis     Erythematous Silvery Scaling Plaque c/w Psoriasis     See annotation                  Assessment / Plan:      Pathology Orders:     Normal Orders This Visit    Specimen to Pathology, Dermatology     Comments:    Number of  Specimens:->2  ------------------------->-------------------------  Spec 1 Procedure:->Biopsy  Spec 1 Clinical Impression:->eroded plaque r/o scc  Spec 1 Source:->left forearm  ------------------------->-------------------------  Spec 2 Procedure:->Biopsy  Spec 2 Clinical Impression:->r/o scc  Spec 2 Source:->left dorsal hand    Questions:    Procedure Type: Dermatology and skin neoplasms    Number of Specimens: 2    ------------------------: -------------------------    Spec 1 Procedure: Biopsy    Spec 1 Clinical Impression: eroded plaque r/o scc    Spec 1 Source: left forearm    ------------------------: -------------------------    Spec 2 Procedure: Biopsy    Spec 2 Clinical Impression: r/o scc    Spec 2 Source: left dorsal hand        Neoplasm of uncertain behavior of skin x 2   -     Specimen to Pathology, Dermatology  -Shave biopsy procedure note:    Shave biopsy performed after verbal consent including risk of infection, scar, recurrence, need for additional treatment of site. Area prepped with alcohol, anesthetized with approximately 1.0cc of 1% lidocaine with epinephrine. Lesional tissue shaved with razor blade. Hemostasis achieved with application of aluminum chloride followed by hyfrecation. No complications. Dressing applied. Wound care explained.      Laceration of right upper extremity, sequela  -     mupirocin (BACTROBAN) 2 % ointment; AA of right arm once daily  Dispense: 30 g; Refill: 0  - apply daily and keep arm covered, suspect repeated trauma and thrombocytopenia is making wound healing difficult. No signs of infection today    Actinic keratosis  -     fluorouraciL (EFUDEX) 5 % cream; Use on AA BID x 2 weeks  Dispense: 40 g; Refill: 0  -Cryosurgery Procedure Note    Verbal consent from the patient is obtained and the patient is aware of the precancerous quality and need for treatment of these lesions. Liquid nitrogen cryosurgery is applied to the 7 actinic keratoses, as detailed in the physical  exam, to produce a freeze injury. The patient is aware that blisters may form and is instructed on wound care with gentle cleansing and use of vaseline ointment to keep moist until healed. The patient is supplied a handout on cryosurgery and is instructed to call if lesions do not completely resolve.  - patient has been AKs on forehead, bilateral temples and left arm. He  Has difficult tolerating the cryotherapy procedure due to pain. Given thrombocytopenia areas that blister may be hard for him to treat. Only treated the largest lesions today and recommended efudex cream BID on forehead and temples, reviewed photographs of expected outcome with both patient and his caregiver. Avoid getting in his eyes.      History of skin cancer  -Area(s) of previous NMSC evaluated with no signs of recurrence.    Upper body skin examination performed today including at least 6 points as noted in physical examination. Suspicious lesions noted.  - SCCis on face x 3 treated with tolak BID x 2 weeks. Will monitor for recurrence    History of basal cell carcinoma (BCC)  -left helix  - saw Dr. Blank and together they made decision to monitor for recurrence  - no evidence of recurrence at visit today.           Follow up in about 3 months (around 2/5/2021).

## 2020-11-09 LAB
FINAL PATHOLOGIC DIAGNOSIS: NORMAL
GROSS: NORMAL
MICROSCOPIC EXAM: NORMAL

## 2020-11-09 NOTE — PROGRESS NOTES
Final Pathologic Diagnosis 1. Skin, left forearm, shave biopsy:   -SQUAMOUS CELL CARCINOMA, INVASIVE, MODERATELY DIFFERENTIATED, EXTENDING TO   THE DEEP AND PERIPHERAL MARGINS     ** Spoke with patient's daughter. Recommend that lesion is further excised. Referral to Dr. Walsh who can perform mohs on left dorsal hand and excision on left forearm given bleeding risk.     2.  Skin, left dorsal hand, shave biopsy:   -SQUAMOUS CELL CARCINOMA,  SUPERFICIALLY INVASIVE, EXTENDING TO THE BASE OF   THE SPECIMEN   Comment: Interp By Mer Medina M.D., Signed on 11/09/2020 at 08:55    ** recommend mohs with Dr. Walsh. Please place referral to patient to see Dr. Walsh. Patient's daughter expressed understanding and agrees to travel to [place].

## 2020-11-18 ENCOUNTER — OFFICE VISIT (OUTPATIENT)
Dept: PAIN MEDICINE | Facility: CLINIC | Age: 75
End: 2020-11-18
Payer: MEDICARE

## 2020-11-18 VITALS
WEIGHT: 191 LBS | SYSTOLIC BLOOD PRESSURE: 133 MMHG | BODY MASS INDEX: 28.29 KG/M2 | HEIGHT: 69 IN | HEART RATE: 75 BPM | DIASTOLIC BLOOD PRESSURE: 74 MMHG

## 2020-11-18 DIAGNOSIS — M50.30 DDD (DEGENERATIVE DISC DISEASE), CERVICAL: Primary | ICD-10-CM

## 2020-11-18 DIAGNOSIS — G89.4 CHRONIC PAIN DISORDER: ICD-10-CM

## 2020-11-18 DIAGNOSIS — G89.29 CHRONIC PAIN OF LEFT KNEE: ICD-10-CM

## 2020-11-18 DIAGNOSIS — M47.819 FACET ARTHROPATHY: ICD-10-CM

## 2020-11-18 DIAGNOSIS — M25.562 CHRONIC PAIN OF LEFT KNEE: ICD-10-CM

## 2020-11-18 PROCEDURE — 1101F PR PT FALLS ASSESS DOC 0-1 FALLS W/OUT INJ PAST YR: ICD-10-PCS | Mod: CPTII,S$GLB,, | Performed by: PHYSICIAN ASSISTANT

## 2020-11-18 PROCEDURE — 3075F PR MOST RECENT SYSTOLIC BLOOD PRESS GE 130-139MM HG: ICD-10-PCS | Mod: CPTII,S$GLB,, | Performed by: PHYSICIAN ASSISTANT

## 2020-11-18 PROCEDURE — 3072F LOW RISK FOR RETINOPATHY: CPT | Mod: S$GLB,,, | Performed by: PHYSICIAN ASSISTANT

## 2020-11-18 PROCEDURE — 1101F PT FALLS ASSESS-DOCD LE1/YR: CPT | Mod: CPTII,S$GLB,, | Performed by: PHYSICIAN ASSISTANT

## 2020-11-18 PROCEDURE — 3008F PR BODY MASS INDEX (BMI) DOCUMENTED: ICD-10-PCS | Mod: CPTII,S$GLB,, | Performed by: PHYSICIAN ASSISTANT

## 2020-11-18 PROCEDURE — 3078F DIAST BP <80 MM HG: CPT | Mod: CPTII,S$GLB,, | Performed by: PHYSICIAN ASSISTANT

## 2020-11-18 PROCEDURE — 1157F PR ADVANCE CARE PLAN OR EQUIV PRESENT IN MEDICAL RECORD: ICD-10-PCS | Mod: S$GLB,,, | Performed by: PHYSICIAN ASSISTANT

## 2020-11-18 PROCEDURE — 1159F MED LIST DOCD IN RCRD: CPT | Mod: S$GLB,,, | Performed by: PHYSICIAN ASSISTANT

## 2020-11-18 PROCEDURE — 3078F PR MOST RECENT DIASTOLIC BLOOD PRESSURE < 80 MM HG: ICD-10-PCS | Mod: CPTII,S$GLB,, | Performed by: PHYSICIAN ASSISTANT

## 2020-11-18 PROCEDURE — 1159F PR MEDICATION LIST DOCUMENTED IN MEDICAL RECORD: ICD-10-PCS | Mod: S$GLB,,, | Performed by: PHYSICIAN ASSISTANT

## 2020-11-18 PROCEDURE — 3075F SYST BP GE 130 - 139MM HG: CPT | Mod: CPTII,S$GLB,, | Performed by: PHYSICIAN ASSISTANT

## 2020-11-18 PROCEDURE — 99214 OFFICE O/P EST MOD 30 MIN: CPT | Mod: S$GLB,,, | Performed by: PHYSICIAN ASSISTANT

## 2020-11-18 PROCEDURE — 99999 PR PBB SHADOW E&M-EST. PATIENT-LVL III: ICD-10-PCS | Mod: PBBFAC,,, | Performed by: PHYSICIAN ASSISTANT

## 2020-11-18 PROCEDURE — 3072F PR LOW RISK FOR RETINOPATHY: ICD-10-PCS | Mod: S$GLB,,, | Performed by: PHYSICIAN ASSISTANT

## 2020-11-18 PROCEDURE — 3008F BODY MASS INDEX DOCD: CPT | Mod: CPTII,S$GLB,, | Performed by: PHYSICIAN ASSISTANT

## 2020-11-18 PROCEDURE — 1125F PR PAIN SEVERITY QUANTIFIED, PAIN PRESENT: ICD-10-PCS | Mod: S$GLB,,, | Performed by: PHYSICIAN ASSISTANT

## 2020-11-18 PROCEDURE — 99999 PR PBB SHADOW E&M-EST. PATIENT-LVL III: CPT | Mod: PBBFAC,,, | Performed by: PHYSICIAN ASSISTANT

## 2020-11-18 PROCEDURE — 3288F PR FALLS RISK ASSESSMENT DOCUMENTED: ICD-10-PCS | Mod: CPTII,S$GLB,, | Performed by: PHYSICIAN ASSISTANT

## 2020-11-18 PROCEDURE — 1125F AMNT PAIN NOTED PAIN PRSNT: CPT | Mod: S$GLB,,, | Performed by: PHYSICIAN ASSISTANT

## 2020-11-18 PROCEDURE — 3288F FALL RISK ASSESSMENT DOCD: CPT | Mod: CPTII,S$GLB,, | Performed by: PHYSICIAN ASSISTANT

## 2020-11-18 PROCEDURE — 99214 PR OFFICE/OUTPT VISIT, EST, LEVL IV, 30-39 MIN: ICD-10-PCS | Mod: S$GLB,,, | Performed by: PHYSICIAN ASSISTANT

## 2020-11-18 PROCEDURE — 1157F ADVNC CARE PLAN IN RCRD: CPT | Mod: S$GLB,,, | Performed by: PHYSICIAN ASSISTANT

## 2020-11-18 RX ORDER — OXYCODONE AND ACETAMINOPHEN 10; 325 MG/1; MG/1
1 TABLET ORAL EVERY 6 HOURS PRN
Qty: 120 TABLET | Refills: 0 | Status: SHIPPED | OUTPATIENT
Start: 2021-01-06 | End: 2021-02-01 | Stop reason: SDUPTHER

## 2020-11-18 RX ORDER — OXYCODONE AND ACETAMINOPHEN 10; 325 MG/1; MG/1
1 TABLET ORAL EVERY 6 HOURS PRN
Qty: 120 TABLET | Refills: 0 | Status: SHIPPED | OUTPATIENT
Start: 2020-12-08 | End: 2021-01-06

## 2020-11-18 NOTE — TELEPHONE ENCOUNTER
"Referring Physician: No ref. provider found    PCP: Crispin Garcia MD      CC: neck and left shoulder pain; knee pain    Interval history:  Steve June Jr. is a 75 y.o. male with  neck and left shoulder pain who presents today for f/u and medication refill.  He had a series of 2 Euflexxa injections that worsened his knee pain initially.  He has tried physical therapy which did not provide much benefit.   Steroid injections performed have only given him short lived relief.  In the past he underwent visco supplementation injections for his left knee with benefit.  He continues to have neck pain. He has a history of cervical DDD.  However, he continues to have low platelets and we are unable to provide any  interventional procedures.  He takes oxycodone 10 mg every 6 hours as needed with mild to moderate benefit. In the past UDS was positive for THC. He ate marijuana cookies in [place]. Denies recent use. Previous UDS consistent. Oxycodone does cause some drowsiness. Pain today is rated 0/10.    Prior HPI:   Steve June Jr. is a 75 y.o. male referred to us for lower back and knee pain.  He has significant history of pancytopenia, cirrhosis.  He presents with constant aching, sharp pain in his lower back as well as his left knee.  Pain worsens sitting, standing, bending, walking.  Pain improves with rest.  X-rays performed of the lumbar spine as well as knee shows left knee osteoarthritis.  He has tried physical therapy with minimal benefit.  He currently takes Norco 10 mg every 6 hours as needed with mild to moderate benefit.  He is unable to have any procedures due to his thrombocytopenia.  He also has ventral hernia, but surgical attention is currently not recommended due to his thrombocytopenia.  His main concern today is wishing to decrease his opioid medications.  He states being very "foggy" with his current medications.  He denies any increased sedation.  He denies any weakness.  No bowel bladder " changes.    ROS:  CONSTITUTIONAL: No fevers, chills, night sweats, wt. loss, appetite changes  SKIN: no rashes or itching  ENT: No headaches, head trauma, vision changes, or eye pain  LYMPH NODES: None noticed   CV: No chest pain, palpitations.   RESP: No shortness of breath, dyspnea on exertion, cough, wheezing, or hemoptysis  GI: No nausea, emesis, diarrhea, constipation, melena, hematochezia, pain.    : No dysuria, hematuria, urgency, or frequency   HEME: No easy bruising, bleeding problems  PSYCHIATRIC: No depression, anxiety, psychosis, hallucinations.  NEURO: No seizures, memory loss, dizziness or difficulty sleeping  MSK: + History of present illness      Past Medical History:   Diagnosis Date    Abnormal thyroid function test     Allergy     Seasonal    Anemia     Anemia due to blood loss 7/2/2014    Arthritis     Gaviria esophagus     Basal cell carcinoma     right forearm    Basal cell carcinoma 12/2011    lower post neck    Basal cell carcinoma 04/23/2019    left posterior helix    Cancer     skin CA    Cataract     Cirrhosis     Diabetes mellitus     Diabetes mellitus, type 2     Encounter for blood transfusion     Esophageal varices in cirrhosis     grade II on 7/12 EGD    Gastritis     on 7/12 EGD    GERD (gastroesophageal reflux disease) 2/28/2015    Hard of hearing     Hiatal hernia     History of hepatitis C 8/10/2012    tx with harvoni x 41 days (started 10/22/15). SVR4     Hoarseness 2/28/2015    Hx of colonic polyps 01/10/2011    per colonoscopy report in media    Hypercholesteremia     Hypersplenism     Hypertension     No meds    Pain management 12/10/2014    Petechial hemorrhage 11/25/2015    Lower extremities bilat     Portal hypertensive gastropathy     on 7/12 EGD    Squamous cell carcinoma 04/23/2019    right preauricular cheek, right temple    Thrombocytopenia     Type II or unspecified type diabetes mellitus with neurological manifestations,  uncontrolled(250.62) 2013    Valvular heart disease     mild MR 12     Past Surgical History:   Procedure Laterality Date    CATARACT EXTRACTION  1/10/13    left eye    CATARACT EXTRACTION      right eye    CHOLECYSTECTOMY      COLONOSCOPY      EYE SURGERY      Cataract surgery to right eye    KNEE ARTHROSCOPY W/ MENISCAL REPAIR      KNEE CARTILAGE SURGERY      left knee    KNEE SURGERY  2006    left    SKIN LESION EXCISION      TONSILLECTOMY      UPPER GASTROINTESTINAL ENDOSCOPY       Family History   Problem Relation Age of Onset    Leukemia Mother     Cancer Mother         bone    Alcohol abuse Father     Cirrhosis Father         EtOH induced    No Known Problems Daughter     No Known Problems Son     No Known Problems Daughter     No Known Problems Daughter     No Known Problems Daughter     No Known Problems Sister     Amblyopia Neg Hx     Blindness Neg Hx     Cataracts Neg Hx     Diabetes Neg Hx     Glaucoma Neg Hx     Hypertension Neg Hx     Macular degeneration Neg Hx     Retinal detachment Neg Hx     Strabismus Neg Hx     Stroke Neg Hx     Thyroid disease Neg Hx     Psoriasis Neg Hx     Lupus Neg Hx     Eczema Neg Hx     Acne Neg Hx     Melanoma Neg Hx      Social History     Socioeconomic History    Marital status:      Spouse name: Not on file    Number of children: 5    Years of education: Not on file    Highest education level: Not on file   Occupational History    Not on file   Social Needs    Financial resource strain: Not very hard    Food insecurity     Worry: Never true     Inability: Never true    Transportation needs     Medical: No     Non-medical: No   Tobacco Use    Smoking status: Former Smoker     Packs/day: 1.00     Years: 25.00     Pack years: 25.00     Quit date: 2000     Years since quittin.2    Smokeless tobacco: Never Used   Substance and Sexual Activity    Alcohol use: Yes     Frequency: 2-3 times a week      "Drinks per session: 1 or 2     Binge frequency: Less than monthly     Comment: rarely    Drug use: No    Sexual activity: Never   Lifestyle    Physical activity     Days per week: 0 days     Minutes per session: Not on file    Stress: Only a little   Relationships    Social connections     Talks on phone: Three times a week     Gets together: Never     Attends [employer] service: Not on file     Active member of club or organization: No     Attends meetings of clubs or organizations: Never     Relationship status:    Other Topics Concern    Not on file   Social History Narrative    He is .  He has children.  He is currently retired.         Medications/Allergies: See med card    Vitals:    09/02/20 1304   BP: 131/80   Pulse: 70   Weight: 89.8 kg (198 lb)   Height: 5' 9" (1.753 m)   PainSc: 0-No pain   PainLoc: Neck     Physical exam:    GENERAL: A and O x3, the patient appears well groomed and is in no acute distress.  Skin: No rashes or obvious lesions  HEENT: normocephalic, atraumatic  CARDIOVASCULAR:  RRR  LUNGS: non labored breathing  ABDOMEN: soft, nontender  UPPER EXTREMITIES: Normal alignment, normal range of motion, no atrophy, no skin changes,  hair growth and nail growth normal and equal bilaterally. No swelling, no tenderness.    LOWER EXTREMITIES:  Normal alignment, normal range of motion, no atrophy, no skin changes,  hair growth and nail growth normal and equal bilaterally. No swelling, no tenderness.    LUMBAR SPINE  Lumbar spine: ROM is full with flexion extension and oblique extension with moderate increased pain.    Erasmo's test causes no increased pain on either side.    Supine straight leg raise is negative bilaterally.    Internal and external rotation of the hip causes no increased pain on either side.  Myofascial exam: No tenderness to palpation across lumbar paraspinous muscles.      MENTAL STATUS: normal orientation, speech, language, and fund of knowledge for social " situation.  Emotional state appropriate.    CRANIAL NERVES:  II:  PERRL bilaterally,   III,IV,VI: EOMI.    V:  Facial sensation equal bilaterally  VII:  Facial motor function normal.  VIII:  Hearing equal to finger rub bilaterally  IX/X: Gag normal, palate symmetric  XI:  Shoulder shrug equal, head turn equal  XII:  Tongue midline without fasciculations      MOTOR: Tone and bulk: normal bilateral upper and lower Strength: normal   Delt Bi Tri WE WF     R 5 5 5 5 5 5   L 5 5 5 5 5 5     IP ADD ABD Quad TA Gas HAM  R 5 5 5 5 5 5 5  L 5 5 5 5 5 5 5    SENSATION: Light touch and pinprick intact bilaterally  REFLEXES: normal, symmetric, nonbrisk.  Toes down, no clonus. No hoffmans.  GAIT: normal rise, base, steps, and arm swing.      Imaging:  Xray L-spine 4/2013   Alignment is otherwise normal.  Vertebral body heights and disc space heights are relatively well maintained.  Vertebral end plate osteophytes are present anteriorly   at multiple levels.  The facet joints and posterior elements are unremarkable       Xray Knee 12/2013  Degenerative change of the knees, left greater than right.    Assessment:  Steve June Jr. is a 75 y.o. male with neck, back and left knee pain  No diagnosis found.     Plan:  1. I have stressed the importance of physical activity and exercise to improve overall health.  Continue PT exercises learned at home.  2.  Any interventional procedures will be deferred due to his low platelet count.    3.  Monitor progress from left knee Euflexxa series  4. Percocet 10mg q 8 hrs as needed.  Hold for sedation.  reviewed. Previous UDS consistent. UDS Today  5.  Follow-up 3 months  All medication management was performed by Dr. Kapil Mario

## 2020-11-18 NOTE — PROGRESS NOTES
Referring Physician: No ref. provider found    PCP: Crispin Garcia MD      CC: neck and left shoulder pain; knee pain    Interval history:  Steve June Jr. is a 75 y.o. male with  neck and left shoulder pain who presents today for f/u and medication refill.  He fell off of a ladder 2 weeks ago and has a rib contusion and a concussion. He is improvement slowly.  He was evaluated in the ED.  He had a series of 2 Euflexxa injections that worsened his knee pain initially.  He has tried physical therapy which did not provide much benefit.   Steroid injections performed have only given him short lived relief.  In the past he underwent visco supplementation injections for his left knee with benefit.  He continues to have neck pain. He has a history of cervical DDD.  However, he continues to have low platelets and we are unable to provide any  interventional procedures.  He takes oxycodone 10 mg every 6 hours as needed with mild to moderate benefit. In the past UDS was positive for THC. He ate marijuana cookies in [place]. Denies recent use. Previous UDS consistent. Oxycodone does cause some drowsiness. Pain today is rated 8/10.    Prior HPI:   Steve June Jr. is a 75 y.o. male referred to us for lower back and knee pain.  He has significant history of pancytopenia, cirrhosis.  He presents with constant aching, sharp pain in his lower back as well as his left knee.  Pain worsens sitting, standing, bending, walking.  Pain improves with rest.  X-rays performed of the lumbar spine as well as knee shows left knee osteoarthritis.  He has tried physical therapy with minimal benefit.  He currently takes Norco 10 mg every 6 hours as needed with mild to moderate benefit.  He is unable to have any procedures due to his thrombocytopenia.  He also has ventral hernia, but surgical attention is currently not recommended due to his thrombocytopenia.  His main concern today is wishing to decrease his opioid medications.  He  "states being very "foggy" with his current medications.  He denies any increased sedation.  He denies any weakness.  No bowel bladder changes.    ROS:  CONSTITUTIONAL: No fevers, chills, night sweats, wt. loss, appetite changes  SKIN: no rashes or itching  ENT: No headaches, head trauma, vision changes, or eye pain  LYMPH NODES: None noticed   CV: No chest pain, palpitations.   RESP: No shortness of breath, dyspnea on exertion, cough, wheezing, or hemoptysis  GI: No nausea, emesis, diarrhea, constipation, melena, hematochezia, pain.    : No dysuria, hematuria, urgency, or frequency   HEME: No easy bruising, bleeding problems  PSYCHIATRIC: No depression, anxiety, psychosis, hallucinations.  NEURO: No seizures, memory loss, dizziness or difficulty sleeping  MSK: + History of present illness      Past Medical History:   Diagnosis Date    Abnormal thyroid function test     Allergy     Seasonal    Anemia     Anemia due to blood loss 7/2/2014    Arthritis     Gaviria esophagus     Basal cell carcinoma     right forearm    Basal cell carcinoma 12/2011    lower post neck    Basal cell carcinoma 04/23/2019    left posterior helix    Cancer     skin CA    Cataract     Cirrhosis     Diabetes mellitus     Diabetes mellitus, type 2     Encounter for blood transfusion     Esophageal varices in cirrhosis     grade II on 7/12 EGD    Gastritis     on 7/12 EGD    GERD (gastroesophageal reflux disease) 2/28/2015    Hard of hearing     Hiatal hernia     History of hepatitis C 8/10/2012    tx with harvoni x 41 days (started 10/22/15). SVR4     Hoarseness 2/28/2015    Hx of colonic polyps 01/10/2011    per colonoscopy report in media    Hypercholesteremia     Hypersplenism     Hypertension     No meds    Pain management 12/10/2014    Petechial hemorrhage 11/25/2015    Lower extremities bilat     Portal hypertensive gastropathy     on 7/12 EGD    Squamous cell carcinoma 04/23/2019    right preauricular " cheek, right temple    Thrombocytopenia     Type II or unspecified type diabetes mellitus with neurological manifestations, uncontrolled(250.62) 2013    Valvular heart disease     mild MR 12     Past Surgical History:   Procedure Laterality Date    CATARACT EXTRACTION  1/10/13    left eye    CATARACT EXTRACTION      right eye    CHOLECYSTECTOMY      COLONOSCOPY      EYE SURGERY      Cataract surgery to right eye    KNEE ARTHROSCOPY W/ MENISCAL REPAIR      KNEE CARTILAGE SURGERY      left knee    KNEE SURGERY  2006    left    SKIN LESION EXCISION      TONSILLECTOMY      UPPER GASTROINTESTINAL ENDOSCOPY       Family History   Problem Relation Age of Onset    Leukemia Mother     Cancer Mother         bone    Alcohol abuse Father     Cirrhosis Father         EtOH induced    No Known Problems Daughter     No Known Problems Son     No Known Problems Daughter     No Known Problems Daughter     No Known Problems Daughter     No Known Problems Sister     Amblyopia Neg Hx     Blindness Neg Hx     Cataracts Neg Hx     Diabetes Neg Hx     Glaucoma Neg Hx     Hypertension Neg Hx     Macular degeneration Neg Hx     Retinal detachment Neg Hx     Strabismus Neg Hx     Stroke Neg Hx     Thyroid disease Neg Hx     Psoriasis Neg Hx     Lupus Neg Hx     Eczema Neg Hx     Acne Neg Hx     Melanoma Neg Hx      Social History     Socioeconomic History    Marital status:      Spouse name: Not on file    Number of children: 5    Years of education: Not on file    Highest education level: Not on file   Occupational History    Not on file   Social Needs    Financial resource strain: Not very hard    Food insecurity     Worry: Never true     Inability: Never true    Transportation needs     Medical: No     Non-medical: No   Tobacco Use    Smoking status: Former Smoker     Packs/day: 1.00     Years: 25.00     Pack years: 25.00     Quit date: 2000     Years since quittin.2  "   Smokeless tobacco: Never Used   Substance and Sexual Activity    Alcohol use: Yes     Frequency: 2-3 times a week     Drinks per session: 1 or 2     Binge frequency: Less than monthly     Comment: rarely    Drug use: No    Sexual activity: Never   Lifestyle    Physical activity     Days per week: 0 days     Minutes per session: Not on file    Stress: Only a little   Relationships    Social connections     Talks on phone: Three times a week     Gets together: Never     Attends [employer] service: Not on file     Active member of club or organization: No     Attends meetings of clubs or organizations: Never     Relationship status:    Other Topics Concern    Not on file   Social History Narrative    He is .  He has children.  He is currently retired.         Medications/Allergies: See med card    Vitals:    11/18/20 1452   BP: 133/74   Pulse: 75   Weight: 86.6 kg (191 lb)   Height: 5' 9" (1.753 m)   PainSc:   8   PainLoc: Neck     Physical exam:    GENERAL: A and O x3, the patient appears well groomed and is in no acute distress.  Skin: No rashes or obvious lesions  HEENT: normocephalic, atraumatic  CARDIOVASCULAR:  RRR  LUNGS: non labored breathing  ABDOMEN:Tenderness to ribs on left   UPPER EXTREMITIES: Normal alignment, normal range of motion, no atrophy, no skin changes,  hair growth and nail growth normal and equal bilaterally. No swelling, no tenderness.    LOWER EXTREMITIES:  Normal alignment, normal range of motion, no atrophy, no skin changes,  hair growth and nail growth normal and equal bilaterally. No swelling, no tenderness.    LUMBAR SPINE  Lumbar spine: ROM is full with flexion extension and oblique extension with moderate increased pain.    Erasmo's test causes no increased pain on either side.    Supine straight leg raise is negative bilaterally.    Internal and external rotation of the hip causes no increased pain on either side.  Myofascial exam: moderate tenderness to " palpation across lumbar paraspinous muscles.      MENTAL STATUS: normal orientation, speech, language, and fund of knowledge for social situation.  Emotional state appropriate.    CRANIAL NERVES:  II:  PERRL bilaterally,   III,IV,VI: EOMI.    V:  Facial sensation equal bilaterally  VII:  Facial motor function normal.  VIII:  Hearing equal to finger rub bilaterally  IX/X: Gag normal, palate symmetric  XI:  Shoulder shrug equal, head turn equal  XII:  Tongue midline without fasciculations      MOTOR: Tone and bulk: normal bilateral upper and lower Strength: normal   Delt Bi Tri WE WF     R 5 5 5 5 5 5   L 5 5 5 5 5 5     IP ADD ABD Quad TA Gas HAM  R 5 5 5 5 5 5 5  L 5 5 5 5 5 5 5    SENSATION: Light touch and pinprick intact bilaterally  REFLEXES: normal, symmetric, nonbrisk.  Toes down, no clonus. No hoffmans.  GAIT: normal rise, base, steps, and arm swing.      Imaging:  Xray L-spine 4/2013   Alignment is otherwise normal.  Vertebral body heights and disc space heights are relatively well maintained.  Vertebral end plate osteophytes are present anteriorly   at multiple levels.  The facet joints and posterior elements are unremarkable       Xray Knee 12/2013  Degenerative change of the knees, left greater than right.    Assessment:  Steve June Jr. is a 75 y.o. male with neck, back and left knee pain  1. DDD (degenerative disc disease), cervical    2. Facet arthropathy    3. Chronic pain of left knee         Plan:  1. I have stressed the importance of physical activity and exercise to improve overall health.  Continue PT exercises learned at home.  2.  Any interventional procedures will be deferred due to his low platelet count.    3.  Monitor progress from left knee Euflexxa series  4. Percocet 10mg q 8 hrs as needed.  Hold for sedation.  reviewed. Previous UDS consistent. UDS LCV consistent  5.  Follow-up 3 months  All medication management was performed by Dr. Kapil Mario

## 2020-11-19 ENCOUNTER — OFFICE VISIT (OUTPATIENT)
Dept: WOUND CARE | Facility: HOSPITAL | Age: 75
End: 2020-11-19
Attending: INTERNAL MEDICINE
Payer: MEDICARE

## 2020-11-19 VITALS
SYSTOLIC BLOOD PRESSURE: 134 MMHG | RESPIRATION RATE: 16 BRPM | TEMPERATURE: 98 F | DIASTOLIC BLOOD PRESSURE: 80 MMHG | HEART RATE: 74 BPM

## 2020-11-19 DIAGNOSIS — L97.911 ULCERS OF BOTH LOWER LEGS, LIMITED TO BREAKDOWN OF SKIN: ICD-10-CM

## 2020-11-19 DIAGNOSIS — S01.00XD OPEN WOUND OF SCALP, UNSPECIFIED OPEN WOUND TYPE, SUBSEQUENT ENCOUNTER: ICD-10-CM

## 2020-11-19 DIAGNOSIS — S51.801D OPEN WOUND OF RIGHT FOREARM, SUBSEQUENT ENCOUNTER: ICD-10-CM

## 2020-11-19 DIAGNOSIS — S00.93XA TRAUMATIC HEMATOMA OF HEAD, INITIAL ENCOUNTER: Primary | ICD-10-CM

## 2020-11-19 DIAGNOSIS — L97.921 ULCERS OF BOTH LOWER LEGS, LIMITED TO BREAKDOWN OF SKIN: ICD-10-CM

## 2020-11-19 PROCEDURE — 11042 DBRDMT SUBQ TIS 1ST 20SQCM/<: CPT | Performed by: INTERNAL MEDICINE

## 2020-11-19 PROCEDURE — 99213 OFFICE O/P EST LOW 20 MIN: CPT | Mod: 25 | Performed by: INTERNAL MEDICINE

## 2020-11-19 NOTE — PROGRESS NOTES
[facility]  Wound Care  History and Physical    Subjective:        Steve June Jr. is a 75 y.o. male who presents with a Chief Complaint of Wound Care      HPI: 74 yo male with uncontrolled DM who fell at home 2 weeks ago and sustained a large hematoma to his scalp and skin tear to his right forearm. The swelling and pain to his scalp is improving however he has a necrotic wound on the surface. He has cirrhosis and TCP and a left forearm skin cancer that is to be resected soon.     Location: right forearm, scalp     Duration: 2-3 weeks.     Context: injury     Pain: none     History:  Past Medical History:   Diagnosis Date    Abnormal thyroid function test     Allergy     Seasonal    Anemia     Anemia due to blood loss 7/2/2014    Arthritis     Gaviria esophagus     Basal cell carcinoma     right forearm    Basal cell carcinoma 12/2011    lower post neck    Basal cell carcinoma 04/23/2019    left posterior helix    Cancer     skin CA    Cataract     Cirrhosis     Diabetes mellitus     Diabetes mellitus, type 2     Encounter for blood transfusion     Esophageal varices in cirrhosis     grade II on 7/12 EGD    Gastritis     on 7/12 EGD    GERD (gastroesophageal reflux disease) 2/28/2015    Hard of hearing     Hiatal hernia     History of hepatitis C 8/10/2012    tx with harvoni x 41 days (started 10/22/15). SVR4     Hoarseness 2/28/2015    Hx of colonic polyps 01/10/2011    per colonoscopy report in media    Hypercholesteremia     Hypersplenism     Hypertension     No meds    Pain management 12/10/2014    Petechial hemorrhage 11/25/2015    Lower extremities bilat     Portal hypertensive gastropathy     on 7/12 EGD    Squamous cell carcinoma 04/23/2019    right preauricular cheek, right temple    Thrombocytopenia     Type II or unspecified type diabetes mellitus with neurological manifestations, uncontrolled(250.62) 12/24/2013    Valvular heart disease     mild MR  12     Past Surgical History:   Procedure Laterality Date    CATARACT EXTRACTION  1/10/13    left eye    CATARACT EXTRACTION      right eye    CHOLECYSTECTOMY      COLONOSCOPY      EYE SURGERY      Cataract surgery to right eye    KNEE ARTHROSCOPY W/ MENISCAL REPAIR      KNEE CARTILAGE SURGERY      left knee    KNEE SURGERY  12/2006    left    SKIN LESION EXCISION      TONSILLECTOMY      UPPER GASTROINTESTINAL ENDOSCOPY       Family History   Problem Relation Age of Onset    Leukemia Mother     Cancer Mother         bone    Alcohol abuse Father     Cirrhosis Father         EtOH induced    No Known Problems Daughter     No Known Problems Son     No Known Problems Daughter     No Known Problems Daughter     No Known Problems Daughter     No Known Problems Sister     Amblyopia Neg Hx     Blindness Neg Hx     Cataracts Neg Hx     Diabetes Neg Hx     Glaucoma Neg Hx     Hypertension Neg Hx     Macular degeneration Neg Hx     Retinal detachment Neg Hx     Strabismus Neg Hx     Stroke Neg Hx     Thyroid disease Neg Hx     Psoriasis Neg Hx     Lupus Neg Hx     Eczema Neg Hx     Acne Neg Hx     Melanoma Neg Hx       reports that he quit smoking about 20 years ago. He has a 25.00 pack-year smoking history. He has never used smokeless tobacco. He reports current alcohol use. He reports that he does not use drugs.  Past Surgical History:   Procedure Laterality Date    CATARACT EXTRACTION  1/10/13    left eye    CATARACT EXTRACTION      right eye    CHOLECYSTECTOMY      COLONOSCOPY      EYE SURGERY      Cataract surgery to right eye    KNEE ARTHROSCOPY W/ MENISCAL REPAIR      KNEE CARTILAGE SURGERY      left knee    KNEE SURGERY  12/2006    left    SKIN LESION EXCISION      TONSILLECTOMY      UPPER GASTROINTESTINAL ENDOSCOPY       has a current medication list which includes the following prescription(s): albuterol, fluorouracil, insulin lispro, lactulose, lidocaine-prilocaine,  mupirocin, ondansetron, oxycodone-acetaminophen, oxycodone-acetaminophen, and tramadol.    Allergies:  Adhesive tape-silicones and Doxycycline    Objective:      Physical Exam:   Physical Exam       Alert and oriented, NAD   Appears appropriate for stated age   Right forarm skin tear is much smaller according to pictures, covered with dried hematoma which was debrided to healthy base   Left scalp necrotic hematoma , after removing necrotic center, large gelatinous hematoma was removed exposing cavity. No bone exposure, no purulence or tenderness     Review of Systems:  ROS      BMI:  There is no height or weight on file to calculate BMI.  Vitals:    11/19/20 1007   BP: 134/80   Pulse: 74   Resp: 16   Temp: 97.8 °F (36.6 °C)      Assessment        Problem List Items Addressed This Visit     None            Plan   Return to clinic as ordered. Take medications and supplements as ordered.     Necrotic hematoma on scalp   Right forearm skin tear     -  Debrided right forearm skin tear down to healthy tissue, apply mepilex   - removed necrotic center of hematoma with scalpel  and evacuated remaining hematoma   - Pack with AMD rope   - RTC in 2 weeks.      -

## 2020-11-27 ENCOUNTER — PES CALL (OUTPATIENT)
Dept: ADMINISTRATIVE | Facility: CLINIC | Age: 75
End: 2020-11-27

## 2020-11-27 ENCOUNTER — TELEPHONE (OUTPATIENT)
Dept: ENDOCRINOLOGY | Facility: CLINIC | Age: 75
End: 2020-11-27

## 2020-11-27 NOTE — TELEPHONE ENCOUNTER
"Spoke with pt wife, Lili.  Pt recently fell and hit head but no LOC.  Pt dx with concussion and had hematoma on forehead that was lacerated and contained clots.  Pt wife stated pt took all his medications in the middle of the night last night which is unusual behavior for him and pt c/o h/a that comes and goes.  Pt also has been sleeping more that usual per wife.  PT was c/o stomach pain and BG was elevated per wife, but she states this is nothing new as he "sneaks" sweets and foods he is not supposed to eat all the time.  Advised wife to give him his insulin and if BG is elevated or behavior continues, she is to bring pt to ED immediately for evaluation.Wife verbalized understanding.  "

## 2020-11-30 ENCOUNTER — HOSPITAL ENCOUNTER (EMERGENCY)
Facility: HOSPITAL | Age: 75
Discharge: HOME | End: 2020-11-30
Attending: EMERGENCY MEDICINE
Payer: MEDICARE

## 2020-11-30 VITALS
DIASTOLIC BLOOD PRESSURE: 82 MMHG | RESPIRATION RATE: 18 BRPM | HEIGHT: 69 IN | TEMPERATURE: 98 F | HEART RATE: 64 BPM | WEIGHT: 190 LBS | BODY MASS INDEX: 28.14 KG/M2 | OXYGEN SATURATION: 97 % | SYSTOLIC BLOOD PRESSURE: 169 MMHG

## 2020-11-30 DIAGNOSIS — R79.89 INCREASED AMMONIA LEVEL: ICD-10-CM

## 2020-11-30 DIAGNOSIS — Z91.119 NONCOMPLIANCE OF PATIENT WITH DIETARY REGIMEN: ICD-10-CM

## 2020-11-30 DIAGNOSIS — R41.0 CONFUSION: ICD-10-CM

## 2020-11-30 DIAGNOSIS — E11.65 HYPERGLYCEMIA DUE TO DIABETES MELLITUS: Primary | ICD-10-CM

## 2020-11-30 LAB
ALBUMIN SERPL BCP-MCNC: 3.8 G/DL (ref 3.5–5.2)
ALP SERPL-CCNC: 119 U/L (ref 55–135)
ALT SERPL W/O P-5'-P-CCNC: 30 U/L (ref 10–44)
AMMONIA PLAS-SCNC: 61 UMOL/L (ref 10–50)
ANION GAP SERPL CALC-SCNC: 12 MMOL/L (ref 8–16)
AST SERPL-CCNC: 41 U/L (ref 10–40)
B-OH-BUTYR BLD STRIP-SCNC: 0.2 MMOL/L (ref 0–0.5)
BACTERIA #/AREA URNS HPF: NORMAL /HPF
BASOPHILS # BLD AUTO: 0.01 K/UL (ref 0–0.2)
BASOPHILS NFR BLD: 0.3 % (ref 0–1.9)
BILIRUB SERPL-MCNC: 1.1 MG/DL (ref 0.1–1)
BILIRUB UR QL STRIP: NEGATIVE
BUN SERPL-MCNC: 19 MG/DL (ref 8–23)
CALCIUM SERPL-MCNC: 8.5 MG/DL (ref 8.7–10.5)
CHLORIDE SERPL-SCNC: 98 MMOL/L (ref 95–110)
CLARITY UR: CLEAR
CO2 SERPL-SCNC: 24 MMOL/L (ref 23–29)
COLOR UR: YELLOW
CREAT SERPL-MCNC: 1.1 MG/DL (ref 0.5–1.4)
DIFFERENTIAL METHOD: ABNORMAL
EOSINOPHIL # BLD AUTO: 0.1 K/UL (ref 0–0.5)
EOSINOPHIL NFR BLD: 2.3 % (ref 0–8)
ERYTHROCYTE [DISTWIDTH] IN BLOOD BY AUTOMATED COUNT: 14.6 % (ref 11.5–14.5)
EST. GFR  (AFRICAN AMERICAN): >60 ML/MIN/1.73 M^2
EST. GFR  (NON AFRICAN AMERICAN): >60 ML/MIN/1.73 M^2
GLUCOSE SERPL-MCNC: 416 MG/DL (ref 70–110)
GLUCOSE UR QL STRIP: ABNORMAL
HCT VFR BLD AUTO: 39.9 % (ref 40–54)
HGB BLD-MCNC: 13.1 G/DL (ref 14–18)
HGB UR QL STRIP: NEGATIVE
IMM GRANULOCYTES # BLD AUTO: 0.03 K/UL (ref 0–0.04)
IMM GRANULOCYTES NFR BLD AUTO: 0.8 % (ref 0–0.5)
INR PPP: 1 (ref 0.8–1.2)
KETONES UR QL STRIP: NEGATIVE
LEUKOCYTE ESTERASE UR QL STRIP: NEGATIVE
LYMPHOCYTES # BLD AUTO: 0.6 K/UL (ref 1–4.8)
LYMPHOCYTES NFR BLD: 14.7 % (ref 18–48)
MCH RBC QN AUTO: 27.9 PG (ref 27–31)
MCHC RBC AUTO-ENTMCNC: 32.8 G/DL (ref 32–36)
MCV RBC AUTO: 85 FL (ref 82–98)
MICROSCOPIC COMMENT: NORMAL
MONOCYTES # BLD AUTO: 0.4 K/UL (ref 0.3–1)
MONOCYTES NFR BLD: 9.6 % (ref 4–15)
NEUTROPHILS # BLD AUTO: 2.9 K/UL (ref 1.8–7.7)
NEUTROPHILS NFR BLD: 72.3 % (ref 38–73)
NITRITE UR QL STRIP: NEGATIVE
NRBC BLD-RTO: 0 /100 WBC
PH UR STRIP: 6 [PH] (ref 5–8)
PLATELET # BLD AUTO: 43 K/UL (ref 150–350)
PMV BLD AUTO: 11.8 FL (ref 9.2–12.9)
POCT GLUCOSE: 215 MG/DL (ref 70–110)
POCT GLUCOSE: 249 MG/DL (ref 70–110)
POCT GLUCOSE: 399 MG/DL (ref 70–110)
POCT GLUCOSE: 403 MG/DL (ref 70–110)
POCT GLUCOSE: 452 MG/DL (ref 70–110)
POCT GLUCOSE: 454 MG/DL (ref 70–110)
POTASSIUM SERPL-SCNC: 4.1 MMOL/L (ref 3.5–5.1)
PROT SERPL-MCNC: 6.6 G/DL (ref 6–8.4)
PROT UR QL STRIP: NEGATIVE
PROTHROMBIN TIME: 11.4 SEC (ref 9–12.5)
RBC # BLD AUTO: 4.69 M/UL (ref 4.6–6.2)
SODIUM SERPL-SCNC: 134 MMOL/L (ref 136–145)
SP GR UR STRIP: 1.01 (ref 1–1.03)
URATE CRY URNS QL MICRO: NORMAL
URN SPEC COLLECT METH UR: ABNORMAL
UROBILINOGEN UR STRIP-ACNC: 1 EU/DL
WBC # BLD AUTO: 3.95 K/UL (ref 3.9–12.7)
YEAST URNS QL MICRO: NORMAL

## 2020-11-30 PROCEDURE — 99285 EMERGENCY DEPT VISIT HI MDM: CPT | Mod: 25

## 2020-11-30 PROCEDURE — 96361 HYDRATE IV INFUSION ADD-ON: CPT

## 2020-11-30 PROCEDURE — 85610 PROTHROMBIN TIME: CPT

## 2020-11-30 PROCEDURE — 96372 THER/PROPH/DIAG INJ SC/IM: CPT

## 2020-11-30 PROCEDURE — 82962 GLUCOSE BLOOD TEST: CPT | Mod: 91

## 2020-11-30 PROCEDURE — 36415 COLL VENOUS BLD VENIPUNCTURE: CPT

## 2020-11-30 PROCEDURE — 82010 KETONE BODYS QUAN: CPT

## 2020-11-30 PROCEDURE — 81000 URINALYSIS NONAUTO W/SCOPE: CPT

## 2020-11-30 PROCEDURE — 93010 EKG 12-LEAD: ICD-10-PCS | Mod: ,,, | Performed by: SPECIALIST

## 2020-11-30 PROCEDURE — 96376 TX/PRO/DX INJ SAME DRUG ADON: CPT

## 2020-11-30 PROCEDURE — 80053 COMPREHEN METABOLIC PANEL: CPT

## 2020-11-30 PROCEDURE — 93005 ELECTROCARDIOGRAM TRACING: CPT

## 2020-11-30 PROCEDURE — 63600175 PHARM REV CODE 636 W HCPCS: Performed by: EMERGENCY MEDICINE

## 2020-11-30 PROCEDURE — 93010 ELECTROCARDIOGRAM REPORT: CPT | Mod: ,,, | Performed by: SPECIALIST

## 2020-11-30 PROCEDURE — 96374 THER/PROPH/DIAG INJ IV PUSH: CPT

## 2020-11-30 PROCEDURE — 82140 ASSAY OF AMMONIA: CPT

## 2020-11-30 PROCEDURE — 85025 COMPLETE CBC W/AUTO DIFF WBC: CPT

## 2020-11-30 RX ORDER — LACTULOSE 10 G/15ML
10 SOLUTION ORAL; RECTAL 2 TIMES DAILY
Qty: 236 ML | Refills: 0 | Status: SHIPPED | OUTPATIENT
Start: 2020-11-30 | End: 2020-12-03

## 2020-11-30 RX ADMIN — INSULIN HUMAN 8 UNITS: 100 INJECTION, SOLUTION PARENTERAL at 09:11

## 2020-11-30 RX ADMIN — INSULIN HUMAN 10 UNITS: 100 INJECTION, SOLUTION PARENTERAL at 11:11

## 2020-11-30 RX ADMIN — SODIUM CHLORIDE, SODIUM LACTATE, POTASSIUM CHLORIDE, AND CALCIUM CHLORIDE 1000 ML: .6; .31; .03; .02 INJECTION, SOLUTION INTRAVENOUS at 07:11

## 2020-11-30 NOTE — ED NOTES
ERP aware of high BS, pt wife states BS has been elevated above 400 at home, will get repeat BS on [facility].

## 2020-11-30 NOTE — ED PROVIDER NOTES
"Encounter Date: 11/30/2020    SCRIBE #1 NOTE: I, Kapil Barrera, am scribing for, and in the presence of, Dr. Barry.       History     Chief Complaint   Patient presents with    Weakness     mild confusion / off balance     Hyperglycemia     Time seen by provider: 7:19 AM on 11/30/2020      Steve June Jr. is a 75 y.o. male with a PMHx of cirrhosis, T2DM, HTN, and hx of hep C who presents to the ED for generalized weakness that started within the last 2 days. Wife states that the patient has been seeing home health. Wife states that 2 days ago he started to have some confusion, stating "He was acting me what the year was." She also states that the patient had a fall secondary to a lost of balance and gait complications 1 day ago and believes he possibly could've hit his head. She denies the patient having LOC, complaining of neck pain or back pain. Wife admits that in recent weeks the patient has had an increase in falls. He states that he has had some fatigue in the recent weeks. Wife does admit that in the recent weeks the patients blood sugar has been uncontrolled stating, "Its been running high and it's probably because of his diet as he is eating a lot of sugar."  Patient denies headache, SOB, chest pain, abdominal pain, numbness, unilateral weakness, or any other complaint at this time. The patient has a PSHx of tonsillectomy, upper GI endoscopy, and knee arthroscopy.    The history is provided by the patient and the spouse.     Review of patient's allergies indicates:   Allergen Reactions    Adhesive tape-silicones Other (See Comments)     pulls skin off    Doxycycline      Dizzy. "Just didn't feel right".     Past Medical History:   Diagnosis Date    Abnormal thyroid function test     Allergy     Seasonal    Anemia     Anemia due to blood loss 7/2/2014    Arthritis     Gaviria esophagus     Basal cell carcinoma     right forearm    Basal cell carcinoma 12/2011    lower post neck    Basal " cell carcinoma 04/23/2019    left posterior helix    Cancer     skin CA    Cataract     Cirrhosis     Diabetes mellitus     Diabetes mellitus, type 2     Encounter for blood transfusion     Esophageal varices in cirrhosis     grade II on 7/12 EGD    Gastritis     on 7/12 EGD    GERD (gastroesophageal reflux disease) 2/28/2015    Hard of hearing     Hiatal hernia     History of hepatitis C 8/10/2012    tx with harvoni x 41 days (started 10/22/15). SVR4     Hoarseness 2/28/2015    Hx of colonic polyps 01/10/2011    per colonoscopy report in media    Hypercholesteremia     Hypersplenism     Hypertension     No meds    Pain management 12/10/2014    Petechial hemorrhage 11/25/2015    Lower extremities bilat     Portal hypertensive gastropathy     on 7/12 EGD    Squamous cell carcinoma 04/23/2019    right preauricular cheek, right temple    Thrombocytopenia     Type II or unspecified type diabetes mellitus with neurological manifestations, uncontrolled(250.62) 12/24/2013    Valvular heart disease     mild MR 12     Past Surgical History:   Procedure Laterality Date    CATARACT EXTRACTION  1/10/13    left eye    CATARACT EXTRACTION      right eye    CHOLECYSTECTOMY      COLONOSCOPY      EYE SURGERY      Cataract surgery to right eye    KNEE ARTHROSCOPY W/ MENISCAL REPAIR      KNEE CARTILAGE SURGERY      left knee    KNEE SURGERY  12/2006    left    SKIN LESION EXCISION      TONSILLECTOMY      UPPER GASTROINTESTINAL ENDOSCOPY       Family History   Problem Relation Age of Onset    Leukemia Mother     Cancer Mother         bone    Alcohol abuse Father     Cirrhosis Father         EtOH induced    No Known Problems Daughter     No Known Problems Son     No Known Problems Daughter     No Known Problems Daughter     No Known Problems Daughter     No Known Problems Sister     Amblyopia Neg Hx     Blindness Neg Hx     Cataracts Neg Hx     Diabetes Neg Hx     Glaucoma Neg Hx      Hypertension Neg Hx     Macular degeneration Neg Hx     Retinal detachment Neg Hx     Strabismus Neg Hx     Stroke Neg Hx     Thyroid disease Neg Hx     Psoriasis Neg Hx     Lupus Neg Hx     Eczema Neg Hx     Acne Neg Hx     Melanoma Neg Hx      Social History     Tobacco Use    Smoking status: Former Smoker     Packs/day: 1.00     Years: 25.00     Pack years: 25.00     Quit date: 2000     Years since quittin.3    Smokeless tobacco: Never Used   Substance Use Topics    Alcohol use: Yes     Frequency: 2-3 times a week     Drinks per session: 1 or 2     Binge frequency: Less than monthly     Comment: rarely    Drug use: No     Review of Systems   Constitutional: Positive for fatigue. Negative for activity change, appetite change, chills and fever.   HENT: Negative for congestion, rhinorrhea and sore throat.    Eyes: Negative for redness and visual disturbance.   Respiratory: Negative for cough, chest tightness and shortness of breath.    Cardiovascular: Negative for chest pain.   Gastrointestinal: Negative for abdominal pain, diarrhea, nausea and vomiting.   Genitourinary: Negative for dysuria and frequency.   Musculoskeletal: Positive for gait problem. Negative for back pain, neck pain and neck stiffness.   Skin: Negative for rash.   Neurological: Negative for dizziness, syncope, weakness, numbness and headaches.   Psychiatric/Behavioral: Positive for confusion.       Physical Exam     Initial Vitals [20 0657]   BP Pulse Resp Temp SpO2   (!) 126/92 75 18 98.4 °F (36.9 °C) 97 %      MAP       --         Physical Exam    Nursing note and vitals reviewed.  Constitutional: He appears well-developed and well-nourished.  Non-toxic appearance. No distress.   HENT:   Head: Normocephalic and atraumatic.   Mouth/Throat: Mucous membranes are normal.   Eyes: EOM are normal. Pupils are equal, round, and reactive to light.   Neck: Normal range of motion. Neck supple. No neck rigidity. No JVD present.    Cardiovascular: Normal rate, regular rhythm and intact distal pulses. Exam reveals no gallop and no friction rub.    Murmur heard.   Systolic murmur is present.  Pulmonary/Chest: Breath sounds normal. He has no wheezes. He has no rhonchi. He has no rales.   Abdominal: Soft. Bowel sounds are normal. He exhibits no distension. There is no abdominal tenderness. There is no rebound and no guarding. A hernia is present. Hernia confirmed positive in the ventral area (soft and non-tender).   Musculoskeletal: Normal range of motion.   Neurological: He is alert and oriented to person, place, and time. He has normal strength and normal reflexes. No cranial nerve deficit or sensory deficit. He exhibits normal muscle tone. Coordination normal. GCS eye subscore is 4. GCS verbal subscore is 5. GCS motor subscore is 6.   Alert to person and place, but not president or year. No asterixis.   Skin: Skin is warm and dry.   Healing scab on his occiput from fall 1 month ago.   Psychiatric: He has a normal mood and affect. His speech is normal and behavior is normal. He is not actively hallucinating.         ED Course   Procedures  Labs Reviewed   CBC W/ AUTO DIFFERENTIAL - Abnormal; Notable for the following components:       Result Value    Hemoglobin 13.1 (*)     Hematocrit 39.9 (*)     RDW 14.6 (*)     Platelets 43 (*)     Immature Granulocytes 0.8 (*)     Lymph # 0.6 (*)     Lymph % 14.7 (*)     All other components within normal limits   COMPREHENSIVE METABOLIC PANEL - Abnormal; Notable for the following components:    Sodium 134 (*)     Glucose 416 (*)     Calcium 8.5 (*)     Total Bilirubin 1.1 (*)     AST 41 (*)     All other components within normal limits   AMMONIA - Abnormal; Notable for the following components:    Ammonia 61 (*)     All other components within normal limits   URINALYSIS, REFLEX TO URINE CULTURE - Abnormal; Notable for the following components:    Glucose, UA 4+ (*)     All other components within normal  limits    Narrative:     Specimen Source->Urine   POCT GLUCOSE - Abnormal; Notable for the following components:    POCT Glucose 454 (*)     All other components within normal limits   POCT GLUCOSE - Abnormal; Notable for the following components:    POCT Glucose 452 (*)     All other components within normal limits   POCT GLUCOSE - Abnormal; Notable for the following components:    POCT Glucose 403 (*)     All other components within normal limits   POCT GLUCOSE - Abnormal; Notable for the following components:    POCT Glucose 399 (*)     All other components within normal limits   POCT GLUCOSE - Abnormal; Notable for the following components:    POCT Glucose 249 (*)     All other components within normal limits   POCT GLUCOSE - Abnormal; Notable for the following components:    POCT Glucose 215 (*)     All other components within normal limits   PROTIME-INR   BETA - HYDROXYBUTYRATE, SERUM   URINALYSIS MICROSCOPIC    Narrative:     Specimen Source->Urine     EKG Readings: (Independently Interpreted)   NSR at 71 bpm.LVH with repole abnormalities.Normal T waves      ECG Results          EKG 12-lead (In process)  Result time 11/30/20 10:49:52    In process by Interface, Lab In [facility] (11/30/20 10:49:52)                 Narrative:    Test Reason : R41.0,    Vent. Rate : 071 BPM     Atrial Rate : 071 BPM     P-R Int : 198 ms          QRS Dur : 096 ms      QT Int : 410 ms       P-R-T Axes : 025 -27 071 degrees     QTc Int : 445 ms    Normal sinus rhythm  LVH with repolarization abnormality  Abnormal ECG  When compared with ECG of 29-MAY-2020 16:56,  No significant change was found    Referred By: [email or web address]   SELF           Confirmed By:                             Imaging Results          CT Head Without Contrast (Final result)  Result time 11/30/20 08:06:02    Final result by Ra Fuller MD (11/30/20 08:06:02)                 Impression:      1. There is no acute abnormality or definite change compared to the  prior study.  There is volume loss and nonspecific white matter change without intracranial hemorrhage, mass, mass effect or obvious acute edema or ischemia.  It should be noted that MRI is more sensitive in the detection of subtle or acute nonhemorrhagic ischemic disease.  2. Atherosclerosis      Electronically signed by: Ra Fuller MD  Date:    11/30/2020  Time:    08:06             Narrative:    EXAMINATION:  CT HEAD WITHOUT CONTRAST    CLINICAL HISTORY:  Altered mental status;    TECHNIQUE:  Routine unenhanced axial images were obtained through the head.  Sagittal and coronal reformatted images were created.  The study is reviewed in bone and soft tissue windows.    COMPARISON:  Head CT dated 11/02/2020    FINDINGS:  Intracranial contents: There is no acute abnormality or definite change in the appearance of the brain compared to the prior study.  There is generalized volume loss and mild nonspecific white matter change.  There is no intracranial hemorrhage.  There is no mass or mass effect.  The gray-white interface appears preserved without obvious acute infarction.  There is no abnormal extra-axial fluid collection.  There is no midline shift.  The basilar cisterns are open.  The cerebellar tonsils are normal position.  Sellar structures are grossly normal.  There is moderate to marked calcified atherosclerosis in the vessels at the base of the brain.    Extracranial contents, calvarium, soft tissues: There has been significant interval decrease in size of the posterior left parietal scalp contusion.  The calvarium remains normal without fracture.  The included paranasal sinuses and mastoid air cells are clear.                                 Medical Decision Making:   History:   Old Medical Records: I decided to obtain old medical records.  Initial Assessment:   75-year-old man with a history of cirrhosis, diabetes, anemia presents emergency department for intermittent confusion and falls.  He was  seen in the emergency department approximately a month ago for a fall with a negative head CT.  His blood sugar has been elevated uncontrolled secondary to dietary noncompliance as wife states patient has been eating a large amount of sweets at home including eating pie right before coming to the ED. Blood sugars elevated at 454 but no evidence of diabetic ketoacidosis.  He does not have a fever leukocytosis to suspect infection.  He has anemia is controlled with a hemoglobin of 13.1.  He does not have any renal insufficiency or grave electrolyte abnormalities.  His ammonia is minimally elevated and I do not think this represents hepatic encephalopathy however will prescribe a short dose of lactulose so that it does not continue to increase.  CT head without contrast was performed showed no acute abnormalities or definite change compared to prior study.  Patient given insulin with appropriate reduction in his blood glucose to 215.  Patient was reassessed and felt much improved.  He was able that ambulate without any indication of impending fall.  I have discussed this with his wife.  She went home and removed the sweets from the house.  I have stressed dietary compliance with the patient.  His elevated blood sugars may be leading to his falls and intermittent confusion.  Return precautions discussed for worsening symptoms.  He is discharged improved in no acute distress.  Independently Interpreted Test(s):   I have ordered and independently interpreted EKG Reading(s) - see summary below  Clinical Tests:   Lab Tests: Ordered and Reviewed  Radiological Study: Ordered and Reviewed  Medical Tests: Ordered and Reviewed            Scribe Attestation:   Scribe #1: I performed the above scribed service and the documentation accurately describes the services I performed. I attest to the accuracy of the note.     I, Jhonny Lombardi, personally performed the services described in this documentation. All medical record  entries made by the scribe were at my direction and in my presence.  I have reviewed the chart and agree that the record reflects my personal performance and is accurate and complete. Se Barry MD.                  Clinical Impression:     ICD-10-CM ICD-9-CM   1. Hyperglycemia due to diabetes mellitus  E11.65 250.02   2. Confusion  R41.0 298.9   3. Noncompliance of patient with dietary regimen  Z91.11 V15.81   4. Increased ammonia level  R79.89 790.6                      Disposition:   Disposition: Discharged  Condition: Stable     ED Disposition Condition    Discharge Stable        ED Prescriptions     Medication Sig Dispense Start Date End Date Auth. Provider    lactulose (CHRONULAC) 10 gram/15 mL solution Take 15 mLs (10 g total) by mouth 2 (two) times daily. for 3 days 236 mL 11/30/2020 12/3/2020 Se Barry MD        Follow-up Information     Follow up With Specialties Details Why Contact Info    Crispin Garcia MD Family Medicine Schedule an appointment as soon as possible for a visit in 2 days  [address] [facility] [address]  [phone]      [facility] Emergency Medicine  As needed, If symptoms worsen [address] [place] [address] [id number] [phone]                                       Se Barry MD  12/01/20 0637

## 2020-12-01 ENCOUNTER — TELEPHONE (OUTPATIENT)
Dept: ENDOCRINOLOGY | Facility: CLINIC | Age: 75
End: 2020-12-01

## 2020-12-01 DIAGNOSIS — E11.8 TYPE 2 DIABETES MELLITUS WITH COMPLICATION, WITH LONG-TERM CURRENT USE OF INSULIN: Primary | ICD-10-CM

## 2020-12-01 DIAGNOSIS — Z79.4 TYPE 2 DIABETES MELLITUS WITH COMPLICATION, WITH LONG-TERM CURRENT USE OF INSULIN: Primary | ICD-10-CM

## 2020-12-01 NOTE — TELEPHONE ENCOUNTER
Recently in hospital  Yesterday, glucose up to 450.    Got 28 units of insulin, glucose dropped to 215 before discharge.    High sugars likely due to:   1. Noncompliance with dietary recommendations   2. Not taking insulin as directed.   3. Both.    At visit August, recommended starting 70/30 insulin. Not done, pt and family continued levemir and humalog, often missing doses.  So at last visit in October, recommended again 70/30 since they reported being more able to do BID medications than 4 meds per day. They wanted to stick with the levemir and humalog, recommended dose increases.    Can't tell them anything helpful with a 1x elevated sugar, please contact patient and let him know to take insulin consistently. And for now, increase doses.    Take 65 units levemir in the evening.  Continue 40 units humalog with meals. Continue the same scale as before (if sugar over 300, use 45 units humalog, if sugar under 200 use 25 units humalog instead).    Check sugar 3 times a day and keep a log of glucose as well as insulin doses used. They can send in the log after a week or two for additional dose recommendations.    When they do run out of levemir and humalog, start Novolin-70/30. 60 units twice a day, before breakfast and before dinner.    Hasn't had appointment with DM education in the last year.

## 2020-12-01 NOTE — TELEPHONE ENCOUNTER
----- Message from Dionne Gonzáles RN sent at 12/1/2020  4:15 PM CST -----  Regarding: FW: high blood sugar  Contact: Elmira Camacho, Racquel  Spoke with the wife last week and this is an ongoing problem as the pt continues to eat sugary foods behind wife's back.  Spoke with  nurse last week about this exact issue.  Please advise...  ----- Message -----  From: Maykel Mckinney  Sent: 12/1/2020  10:49 AM CST  To: Ayesha GUTIERREZ Staff  Subject: high blood sugar                                 Placed call to Racquel holguin want to speak with a nurse regarding patient high blood sugar 358, decline on call nurse please call back at Racquel Cell Phone [phone] or Elmira Camacho Phone Number [phone]

## 2020-12-02 ENCOUNTER — TELEPHONE (OUTPATIENT)
Dept: FAMILY MEDICINE | Facility: CLINIC | Age: 75
End: 2020-12-02

## 2020-12-02 NOTE — TELEPHONE ENCOUNTER
Called nurse with [facility] back and informed her she needs to contact pt PCP.  Gave her the information.  She stated she did not realize we were the Endocrinologist.

## 2020-12-02 NOTE — TELEPHONE ENCOUNTER
----- Message from Shilpi Disla sent at 12/2/2020  2:14 PM CST -----  Regarding: Advice  Contact: racquel  Type: Needs Medical Advice    Who Called:  Racquel with [facility]  Symptoms (please be specific):  n/a  How long has patient had these symptoms:  n/a  Pharmacy name and phone #:  n/a  Best Call Back Number: [phone]    Additional Information: pt fell last sat night, went to er Monday for blood sugar. Skin care changed on right forearm, put antibiotic cream and band aid. Please call to advise to change orders or give orders if need be.   [facility] [phone]

## 2020-12-02 NOTE — TELEPHONE ENCOUNTER
----- Message from Bettina Davila sent at 12/2/2020 12:39 PM CST -----  Contact: Wilfred [facility]field[facility] [phone]  Provider called to add that the pt feel on Sat and now have a wound on arm and the nurse provider ointment and a band aid. Nurse asked for an order be sent if the doctor want her to continue or if there was something different.      [facility] Fax # [phone]

## 2020-12-03 ENCOUNTER — TELEPHONE (OUTPATIENT)
Dept: DERMATOLOGY | Facility: CLINIC | Age: 75
End: 2020-12-03

## 2020-12-03 NOTE — TELEPHONE ENCOUNTER
----- Message from Lien Wellington MA sent at 12/3/2020  1:08 PM CST -----  Regarding: FW: Pt alvina Pearson    ----- Message -----  From: Marilia Cottrell  Sent: 12/3/2020  12:30 PM CST  To: Jillian MONAAHN Staff  Subject: Pt alvina Pearson                                    Reason: Calling to get a sooner appt if possible. Please call     Communication: [phone]      
Spoke with patient's wife and she rescheduled her husbands mohs surgery to an earlier date on 12/29 at 1230 pm from 1/13. She confirmed date, time, and location.  
1 or 2

## 2020-12-05 ENCOUNTER — PATIENT OUTREACH (OUTPATIENT)
Dept: ADMINISTRATIVE | Facility: OTHER | Age: 75
End: 2020-12-05

## 2020-12-10 ENCOUNTER — TELEPHONE (OUTPATIENT)
Dept: FAMILY MEDICINE | Facility: CLINIC | Age: 75
End: 2020-12-10

## 2020-12-11 DIAGNOSIS — E11.9 TYPE 2 DIABETES MELLITUS WITHOUT COMPLICATION: ICD-10-CM

## 2020-12-24 ENCOUNTER — TELEPHONE (OUTPATIENT)
Dept: DERMATOLOGY | Facility: CLINIC | Age: 75
End: 2020-12-24

## 2020-12-24 NOTE — TELEPHONE ENCOUNTER
Called pt spoke to wife to confirm Mohs procedure for 12/29 at 1230 pm. Did Covid 19 screening, negative. Ok to proceed with Mohs as planned.

## 2020-12-29 ENCOUNTER — PROCEDURE VISIT (OUTPATIENT)
Dept: DERMATOLOGY | Facility: CLINIC | Age: 75
End: 2020-12-29
Payer: MEDICARE

## 2020-12-29 VITALS
HEART RATE: 66 BPM | BODY MASS INDEX: 28.14 KG/M2 | HEIGHT: 69 IN | SYSTOLIC BLOOD PRESSURE: 141 MMHG | DIASTOLIC BLOOD PRESSURE: 79 MMHG | WEIGHT: 190 LBS

## 2020-12-29 DIAGNOSIS — C44.629 SQUAMOUS CELL CARCINOMA OF LEFT HAND: ICD-10-CM

## 2020-12-29 DIAGNOSIS — C44.629 SQUAMOUS CELL CARCINOMA OF LEFT UPPER EXTREMITY: Primary | ICD-10-CM

## 2020-12-29 PROCEDURE — 17314 MOHS ADDL STAGE T/A/L: CPT | Mod: S$GLB,,, | Performed by: DERMATOLOGY

## 2020-12-29 PROCEDURE — 99499 NO LOS: ICD-10-PCS | Mod: S$GLB,,, | Performed by: DERMATOLOGY

## 2020-12-29 PROCEDURE — 17311: ICD-10-PCS | Mod: S$GLB,,, | Performed by: DERMATOLOGY

## 2020-12-29 PROCEDURE — 17313 MOHS 1 STAGE T/A/L: CPT | Mod: S$GLB,,, | Performed by: DERMATOLOGY

## 2020-12-29 PROCEDURE — 12041 INTMD RPR N-HF/GENIT 2.5CM/<: CPT | Mod: 59,S$GLB,, | Performed by: DERMATOLOGY

## 2020-12-29 PROCEDURE — 13121 CMPLX RPR S/A/L 2.6-7.5 CM: CPT | Mod: S$GLB,,, | Performed by: DERMATOLOGY

## 2020-12-29 PROCEDURE — 13121 PR RECMPL WND SCALP,EXTR 2.6-7.5 CM: ICD-10-PCS | Mod: S$GLB,,, | Performed by: DERMATOLOGY

## 2020-12-29 PROCEDURE — 12041 PR LAYR CLOS WND REST BODY <2.5 CM: ICD-10-PCS | Mod: 59,S$GLB,, | Performed by: DERMATOLOGY

## 2020-12-29 PROCEDURE — 17313: ICD-10-PCS | Mod: S$GLB,,, | Performed by: DERMATOLOGY

## 2020-12-29 PROCEDURE — 99499 UNLISTED E&M SERVICE: CPT | Mod: S$GLB,,, | Performed by: DERMATOLOGY

## 2020-12-29 PROCEDURE — 17314: ICD-10-PCS | Mod: S$GLB,,, | Performed by: DERMATOLOGY

## 2020-12-29 PROCEDURE — 17311 MOHS 1 STAGE H/N/HF/G: CPT | Mod: S$GLB,,, | Performed by: DERMATOLOGY

## 2020-12-29 NOTE — PROGRESS NOTES
PROCEDURE: Mohs' Micrographic Surgery    INDICATION: Tumors with aggressive clinical behavior (rapidly growing, greater than 1 cm in diameter). Tumor with ill-defined borders. Tumor size >2 cm on the trunk or extremities.    REFERRING PROVIDER: Elo Messer MD    CASE NUMBER:     ANESTHETIC: 11.5 cc 0.5% Lidocaine with Epi 1:200,000 mixed 1:1 with 0.5% Bupivacaine    SURGICAL PREP: Hibiclens    SURGEON: Rachel Walsh MD    ASSISTANTS: Carolina Sen PA-C, Lien Wellington MA and Joshua Messer, [facility]    PREOPERATIVE DIAGNOSIS: squamous cell carcinoma    POSTOPERATIVE DIAGNOSIS: squamous cell carcinoma    PATHOLOGIC DIAGNOSIS: squamous cell carcinoma- invasive, moderately differentiated, well differentiated    HISTOLOGY OF SPECIMENS IN FIRST STAGE:   Tumor Type: Tumor seen. Invasive squamous cell carcinoma: Proliferation of squamous cells exhibiting atypia and infiltrating within the dermis.  Well-differentiated squamous cell carcinoma: Proliferation of squamous cells exhibiting atypia and infiltrating within the dermis.   Depth of Invasion: epidermis and dermis  Perineural Invasion: No    HISTOLOGY OF SPECIMENS IN SUBSEQUENT STAGES:  · Tumor Type: No tumor seen.    STAGES OF MOHS' SURGERY PERFORMED: 2    TUMOR-FREE PLANE ACHIEVED: Yes    HEMOSTASIS: electrocoagulation and 4-0 vicryl suture ties     SPECIMENS: 7 (5 in stage A and 2 in stage B)    LOCATION: left forearm. Location verified with Dr. Messer's clinical photograph. Patient also verified location.    INITIAL LESION SIZE: 2.0 x 2.5 cm    FINAL DEFECT SIZE: 3.4 x 3.4 cm    WOUND REPAIR/DISPOSITION: The patient tolerated Mohs' Micrographic Surgery for a squamous cell carcinoma very well. When the tumor was completely removed, a repair of the surgical defect was undertaken.       PROCEDURE: Complex Linear Repair    INDICATION: Status post Mohs' Micrographic Surgery for squamous cell carcinoma.    CASE NUMBER:     SURGEON: Rachel Walsh  "MD    ASSISTANTS: Carolina Sen PA-C and Lien Wellington MA    ANESTHETIC: 5.5 cc 1% Lidocaine with Epinephrine 1:100,000    SURGICAL PREP: Hibiclens, prepped by Lien Wellington MA    LOCATION: left forearm    DEFECT SIZE: 3.4 x 3.4 cm    WOUND REPAIR/DISPOSITION:  After the patient's carcinoma had been completely removed with Mohs' Micrographic Surgery, a repair of the surgical defect was undertaken. The patient was returned to the operating suite where the area of left forearm was prepped, draped, and anesthetized in the usual sterile fashion. The wound was widely undermined in all directions. Then, electrocoagulation was used to obtain meticulous hemostasis. 3-0 Vicryl and 4-0 Vicryl buried vertical mattress sutures were placed into the subcutaneous and dermal plane to close the wound and conrad the cutaneous wound edge. Bilateral dog ears were identified and were removed by a standard Burow's triangle technique. The cutaneous wound edges were closed using interrupted 4-0 Prolene suture.    The patient tolerated the procedure well.    The area was cleaned and dressed appropriately and the patient was given wound care instructions, as well as appointment for follow-up evaluation and suture removal in 14 days. Patient already has at home Percocet 10 mg prn pain.    LENGTH OF REPAIR: 8.3 cm    Vitals:    12/29/20 1144 12/29/20 1506   BP: 114/70 (!) 141/79   BP Location: Right arm    Patient Position: Sitting    BP Method: Small (Automatic)    Pulse: 71 66   Weight: 86.2 kg (190 lb)    Height: 5' 9" (1.753 m)        PROCEDURE: Mohs' Micrographic Surgery    INDICATION: Tumor with ill-defined borders.    REFERRING PROVIDER: Elo Messer MD    CASE NUMBER:     ANESTHETIC: 2 cc 0.5% Lidocaine with Epi 1:200,000 mixed 1:1 with 0.5% Bupivacaine    SURGICAL PREP: Hibiclens    SURGEON: Rachel Walsh MD    ASSISTANTS: Carolina Sen PA-C and Lien Wellington MA    PREOPERATIVE DIAGNOSIS: squamous cell " carcinoma    POSTOPERATIVE DIAGNOSIS: squamous cell carcinoma    PATHOLOGIC DIAGNOSIS: squamous cell carcinoma- superficially invasive    HISTOLOGY OF SPECIMENS IN FIRST STAGE:   Tumor Type: No tumor seen.    STAGES OF MOHS' SURGERY PERFORMED: 1    TUMOR-FREE PLANE ACHIEVED: Yes    HEMOSTASIS: electrocoagulation     SPECIMENS: 2    LOCATION: left dorsal hand. Location verified with Dr. Messer's clinical photograph. Patient also verified location.    INITIAL LESION SIZE: 0.3 x 0.3 cm    FINAL DEFECT SIZE: 0.6 x 0.7 cm    WOUND REPAIR/DISPOSITION: The patient tolerated Mohs' Micrographic Surgery for a squamous cell carcinoma very well. When the tumor was completely removed, a repair of the surgical defect was undertaken.      PROCEDURE: Intermediate Linear Repair    INDICATION: Status post Mohs' Micrographic Surgery for squamous cell carcinoma.    CASE NUMBER:     SURGEON: Rachel Walsh MD    ASSISTANTS: Carolina Sen PA-C and Lien Wellington MA    ANESTHETIC: 1 cc 1% Lidocaine with Epinephrine 1:100,000    SURGICAL PREP: Hibiclens, prepped by Lien Wellington MA    LOCATION: left dorsal hand    DEFECT SIZE: 0.6 x 0.7 cm    WOUND REPAIR/DISPOSITION:  After the patient's carcinoma had been completely removed with Mohs' Micrographic Surgery, a repair of the surgical defect was undertaken. The patient was returned to the operating suite where the area of left dorsal hand was prepped, draped, and anesthetized in the usual sterile fashion. The wound was widely undermined in all directions. Then, electrocoagulation was used to obtain meticulous hemostasis. 4-0 Vicryl buried vertical mattress sutures were placed into the subcutaneous and dermal plane to close the wound and conrad the cutaneous wound edge. Bilateral dog ears were identified and were removed by a standard Burow's triangle technique. The cutaneous wound edges were closed using interrupted 4-0 Prolene suture.    The patient tolerated the procedure well.    The  "area was cleaned and dressed appropriately and the patient was given wound care instructions, as well as appointment for follow-up evaluation and suture removal in 14 days. Patient already has at home Percocet 10 mg prn pain.    LENGTH OF REPAIR: 0.7 cm    Vitals:    12/29/20 1144 12/29/20 1506   BP: 114/70 (!) 141/79   BP Location: Right arm    Patient Position: Sitting    BP Method: Small (Automatic)    Pulse: 71 66   Weight: 86.2 kg (190 lb)    Height: 5' 9" (1.753 m)            "

## 2021-01-04 ENCOUNTER — LAB VISIT (OUTPATIENT)
Dept: LAB | Facility: HOSPITAL | Age: 76
End: 2021-01-04
Attending: INTERNAL MEDICINE
Payer: MEDICARE

## 2021-01-04 ENCOUNTER — PATIENT MESSAGE (OUTPATIENT)
Dept: ADMINISTRATIVE | Facility: HOSPITAL | Age: 76
End: 2021-01-04

## 2021-01-04 DIAGNOSIS — E11.8 TYPE 2 DIABETES MELLITUS WITH COMPLICATION, WITH LONG-TERM CURRENT USE OF INSULIN: ICD-10-CM

## 2021-01-04 DIAGNOSIS — Z79.4 TYPE 2 DIABETES MELLITUS WITH COMPLICATION, WITH LONG-TERM CURRENT USE OF INSULIN: ICD-10-CM

## 2021-01-04 PROCEDURE — 80048 BASIC METABOLIC PNL TOTAL CA: CPT

## 2021-01-04 PROCEDURE — 36415 COLL VENOUS BLD VENIPUNCTURE: CPT | Mod: PO

## 2021-01-04 PROCEDURE — 83036 HEMOGLOBIN GLYCOSYLATED A1C: CPT

## 2021-01-05 ENCOUNTER — TELEPHONE (OUTPATIENT)
Dept: FAMILY MEDICINE | Facility: CLINIC | Age: 76
End: 2021-01-05

## 2021-01-05 ENCOUNTER — PATIENT OUTREACH (OUTPATIENT)
Dept: ADMINISTRATIVE | Facility: OTHER | Age: 76
End: 2021-01-05

## 2021-01-05 LAB
ANION GAP SERPL CALC-SCNC: 12 MMOL/L (ref 8–16)
BUN SERPL-MCNC: 18 MG/DL (ref 8–23)
CALCIUM SERPL-MCNC: 9.2 MG/DL (ref 8.7–10.5)
CHLORIDE SERPL-SCNC: 98 MMOL/L (ref 95–110)
CO2 SERPL-SCNC: 22 MMOL/L (ref 23–29)
CREAT SERPL-MCNC: 1.1 MG/DL (ref 0.5–1.4)
EST. GFR  (AFRICAN AMERICAN): >60 ML/MIN/1.73 M^2
EST. GFR  (NON AFRICAN AMERICAN): >60 ML/MIN/1.73 M^2
ESTIMATED AVG GLUCOSE: 315 MG/DL (ref 68–131)
GLUCOSE SERPL-MCNC: 568 MG/DL (ref 70–110)
HBA1C MFR BLD HPLC: 12.6 % (ref 4–5.6)
POTASSIUM SERPL-SCNC: 4.5 MMOL/L (ref 3.5–5.1)
SODIUM SERPL-SCNC: 132 MMOL/L (ref 136–145)

## 2021-01-07 ENCOUNTER — OFFICE VISIT (OUTPATIENT)
Dept: ENDOCRINOLOGY | Facility: CLINIC | Age: 76
End: 2021-01-07
Payer: MEDICARE

## 2021-01-07 VITALS
HEIGHT: 69 IN | HEART RATE: 70 BPM | DIASTOLIC BLOOD PRESSURE: 62 MMHG | WEIGHT: 191.81 LBS | BODY MASS INDEX: 28.41 KG/M2 | SYSTOLIC BLOOD PRESSURE: 118 MMHG | TEMPERATURE: 98 F | OXYGEN SATURATION: 96 % | RESPIRATION RATE: 20 BRPM

## 2021-01-07 DIAGNOSIS — E66.3 OVERWEIGHT: ICD-10-CM

## 2021-01-07 DIAGNOSIS — Z79.4 TYPE 2 DIABETES MELLITUS WITH COMPLICATION, WITH LONG-TERM CURRENT USE OF INSULIN: Primary | ICD-10-CM

## 2021-01-07 DIAGNOSIS — E11.59 HYPERTENSION ASSOCIATED WITH DIABETES: ICD-10-CM

## 2021-01-07 DIAGNOSIS — I15.2 HYPERTENSION ASSOCIATED WITH DIABETES: ICD-10-CM

## 2021-01-07 DIAGNOSIS — E11.8 TYPE 2 DIABETES MELLITUS WITH COMPLICATION, WITH LONG-TERM CURRENT USE OF INSULIN: Primary | ICD-10-CM

## 2021-01-07 PROCEDURE — 3008F PR BODY MASS INDEX (BMI) DOCUMENTED: ICD-10-PCS | Mod: CPTII,S$GLB,, | Performed by: INTERNAL MEDICINE

## 2021-01-07 PROCEDURE — 3288F PR FALLS RISK ASSESSMENT DOCUMENTED: ICD-10-PCS | Mod: CPTII,S$GLB,, | Performed by: INTERNAL MEDICINE

## 2021-01-07 PROCEDURE — 1101F PR PT FALLS ASSESS DOC 0-1 FALLS W/OUT INJ PAST YR: ICD-10-PCS | Mod: CPTII,S$GLB,, | Performed by: INTERNAL MEDICINE

## 2021-01-07 PROCEDURE — 1125F PR PAIN SEVERITY QUANTIFIED, PAIN PRESENT: ICD-10-PCS | Mod: S$GLB,,, | Performed by: INTERNAL MEDICINE

## 2021-01-07 PROCEDURE — 1101F PT FALLS ASSESS-DOCD LE1/YR: CPT | Mod: CPTII,S$GLB,, | Performed by: INTERNAL MEDICINE

## 2021-01-07 PROCEDURE — 1125F AMNT PAIN NOTED PAIN PRSNT: CPT | Mod: S$GLB,,, | Performed by: INTERNAL MEDICINE

## 2021-01-07 PROCEDURE — 1157F PR ADVANCE CARE PLAN OR EQUIV PRESENT IN MEDICAL RECORD: ICD-10-PCS | Mod: S$GLB,,, | Performed by: INTERNAL MEDICINE

## 2021-01-07 PROCEDURE — 1157F ADVNC CARE PLAN IN RCRD: CPT | Mod: S$GLB,,, | Performed by: INTERNAL MEDICINE

## 2021-01-07 PROCEDURE — 3008F BODY MASS INDEX DOCD: CPT | Mod: CPTII,S$GLB,, | Performed by: INTERNAL MEDICINE

## 2021-01-07 PROCEDURE — 99215 OFFICE O/P EST HI 40 MIN: CPT | Mod: S$GLB,,, | Performed by: INTERNAL MEDICINE

## 2021-01-07 PROCEDURE — 99215 PR OFFICE/OUTPT VISIT, EST, LEVL V, 40-54 MIN: ICD-10-PCS | Mod: S$GLB,,, | Performed by: INTERNAL MEDICINE

## 2021-01-07 PROCEDURE — 99999 PR PBB SHADOW E&M-EST. PATIENT-LVL IV: ICD-10-PCS | Mod: PBBFAC,,, | Performed by: INTERNAL MEDICINE

## 2021-01-07 PROCEDURE — 99999 PR PBB SHADOW E&M-EST. PATIENT-LVL IV: CPT | Mod: PBBFAC,,, | Performed by: INTERNAL MEDICINE

## 2021-01-07 PROCEDURE — 3288F FALL RISK ASSESSMENT DOCD: CPT | Mod: CPTII,S$GLB,, | Performed by: INTERNAL MEDICINE

## 2021-01-07 RX ORDER — HUMAN INSULIN 100 [IU]/ML
50 INJECTION, SUSPENSION SUBCUTANEOUS 2 TIMES DAILY
COMMUNITY
Start: 2020-12-15 | End: 2021-01-07 | Stop reason: SDUPTHER

## 2021-01-07 RX ORDER — HUMAN INSULIN 100 [IU]/ML
55 INJECTION, SUSPENSION SUBCUTANEOUS 2 TIMES DAILY
Qty: 45 ML | Refills: 11 | Status: SHIPPED | OUTPATIENT
Start: 2021-01-07 | End: 2021-04-27

## 2021-01-07 RX ORDER — GLIMEPIRIDE 4 MG/1
8 TABLET ORAL
Qty: 60 TABLET | Refills: 5 | Status: SHIPPED | OUTPATIENT
Start: 2021-01-07 | End: 2021-09-21 | Stop reason: SDUPTHER

## 2021-01-08 ENCOUNTER — TELEPHONE (OUTPATIENT)
Dept: ENDOCRINOLOGY | Facility: CLINIC | Age: 76
End: 2021-01-08

## 2021-01-11 ENCOUNTER — TELEPHONE (OUTPATIENT)
Dept: ENDOCRINOLOGY | Facility: CLINIC | Age: 76
End: 2021-01-11

## 2021-01-12 ENCOUNTER — OFFICE VISIT (OUTPATIENT)
Dept: DERMATOLOGY | Facility: CLINIC | Age: 76
End: 2021-01-12
Payer: MEDICARE

## 2021-01-12 ENCOUNTER — PATIENT OUTREACH (OUTPATIENT)
Dept: ADMINISTRATIVE | Facility: HOSPITAL | Age: 76
End: 2021-01-12

## 2021-01-12 DIAGNOSIS — Z09 POSTOP CHECK: Primary | ICD-10-CM

## 2021-01-12 PROCEDURE — 1157F ADVNC CARE PLAN IN RCRD: CPT | Mod: S$GLB,,, | Performed by: DERMATOLOGY

## 2021-01-12 PROCEDURE — 99024 PR POST-OP FOLLOW-UP VISIT: ICD-10-PCS | Mod: S$GLB,,, | Performed by: DERMATOLOGY

## 2021-01-12 PROCEDURE — 99024 POSTOP FOLLOW-UP VISIT: CPT | Mod: S$GLB,,, | Performed by: DERMATOLOGY

## 2021-01-12 PROCEDURE — 1157F PR ADVANCE CARE PLAN OR EQUIV PRESENT IN MEDICAL RECORD: ICD-10-PCS | Mod: S$GLB,,, | Performed by: DERMATOLOGY

## 2021-01-13 ENCOUNTER — OFFICE VISIT (OUTPATIENT)
Dept: PODIATRY | Facility: CLINIC | Age: 76
End: 2021-01-13
Payer: MEDICARE

## 2021-01-13 VITALS
WEIGHT: 199 LBS | OXYGEN SATURATION: 94 % | HEIGHT: 69 IN | RESPIRATION RATE: 16 BRPM | HEART RATE: 76 BPM | BODY MASS INDEX: 29.47 KG/M2

## 2021-01-13 DIAGNOSIS — I73.9 PVD (PERIPHERAL VASCULAR DISEASE): ICD-10-CM

## 2021-01-13 DIAGNOSIS — L84 CORN OR CALLUS: ICD-10-CM

## 2021-01-13 DIAGNOSIS — B35.1 ONYCHOMYCOSIS DUE TO DERMATOPHYTE: ICD-10-CM

## 2021-01-13 DIAGNOSIS — E11.51 TYPE II DIABETES MELLITUS WITH PERIPHERAL CIRCULATORY DISORDER: Primary | ICD-10-CM

## 2021-01-13 DIAGNOSIS — B35.3 TINEA PEDIS OF BOTH FEET: ICD-10-CM

## 2021-01-13 DIAGNOSIS — L60.0 OC (ONYCHOCRYPTOSIS): ICD-10-CM

## 2021-01-13 DIAGNOSIS — L84 PRE-ULCERATIVE CALLUSES: ICD-10-CM

## 2021-01-13 DIAGNOSIS — L90.9 FAT PAD ATROPHY OF FOOT: ICD-10-CM

## 2021-01-13 DIAGNOSIS — R26.2 DIFFICULTY IN WALKING, NOT ELSEWHERE CLASSIFIED: ICD-10-CM

## 2021-01-13 DIAGNOSIS — L60.2 ONYCHOGRYPHOSIS: ICD-10-CM

## 2021-01-13 DIAGNOSIS — E08.42 DIABETIC POLYNEUROPATHY ASSOCIATED WITH DIABETES MELLITUS DUE TO UNDERLYING CONDITION: ICD-10-CM

## 2021-01-13 PROCEDURE — 1125F AMNT PAIN NOTED PAIN PRSNT: CPT | Mod: S$GLB,,, | Performed by: PODIATRIST

## 2021-01-13 PROCEDURE — 99499 UNLISTED E&M SERVICE: CPT | Mod: S$GLB,,, | Performed by: PODIATRIST

## 2021-01-13 PROCEDURE — 11056 PARNG/CUTG B9 HYPRKR LES 2-4: CPT | Mod: Q8,S$GLB,, | Performed by: PODIATRIST

## 2021-01-13 PROCEDURE — 1125F PR PAIN SEVERITY QUANTIFIED, PAIN PRESENT: ICD-10-PCS | Mod: S$GLB,,, | Performed by: PODIATRIST

## 2021-01-13 PROCEDURE — 3008F PR BODY MASS INDEX (BMI) DOCUMENTED: ICD-10-PCS | Mod: CPTII,S$GLB,, | Performed by: PODIATRIST

## 2021-01-13 PROCEDURE — 3288F FALL RISK ASSESSMENT DOCD: CPT | Mod: CPTII,S$GLB,, | Performed by: PODIATRIST

## 2021-01-13 PROCEDURE — 1100F PR PT FALLS ASSESS DOC 2+ FALLS/FALL W/INJURY/YR: ICD-10-PCS | Mod: CPTII,S$GLB,, | Performed by: PODIATRIST

## 2021-01-13 PROCEDURE — 1100F PTFALLS ASSESS-DOCD GE2>/YR: CPT | Mod: CPTII,S$GLB,, | Performed by: PODIATRIST

## 2021-01-13 PROCEDURE — 11056 ROUTINE FOOT CARE: ICD-10-PCS | Mod: Q8,S$GLB,, | Performed by: PODIATRIST

## 2021-01-13 PROCEDURE — 99499 NO LOS: ICD-10-PCS | Mod: S$GLB,,, | Performed by: PODIATRIST

## 2021-01-13 PROCEDURE — 3288F PR FALLS RISK ASSESSMENT DOCUMENTED: ICD-10-PCS | Mod: CPTII,S$GLB,, | Performed by: PODIATRIST

## 2021-01-13 PROCEDURE — 11721 DEBRIDE NAIL 6 OR MORE: CPT | Mod: 59,Q8,S$GLB, | Performed by: PODIATRIST

## 2021-01-13 PROCEDURE — 1157F PR ADVANCE CARE PLAN OR EQUIV PRESENT IN MEDICAL RECORD: ICD-10-PCS | Mod: S$GLB,,, | Performed by: PODIATRIST

## 2021-01-13 PROCEDURE — 3008F BODY MASS INDEX DOCD: CPT | Mod: CPTII,S$GLB,, | Performed by: PODIATRIST

## 2021-01-13 PROCEDURE — 11721 ROUTINE FOOT CARE: ICD-10-PCS | Mod: 59,Q8,S$GLB, | Performed by: PODIATRIST

## 2021-01-13 PROCEDURE — 1157F ADVNC CARE PLAN IN RCRD: CPT | Mod: S$GLB,,, | Performed by: PODIATRIST

## 2021-01-26 ENCOUNTER — OFFICE VISIT (OUTPATIENT)
Dept: FAMILY MEDICINE | Facility: CLINIC | Age: 76
End: 2021-01-26
Payer: MEDICARE

## 2021-01-26 VITALS
HEART RATE: 75 BPM | TEMPERATURE: 98 F | RESPIRATION RATE: 18 BRPM | OXYGEN SATURATION: 96 % | DIASTOLIC BLOOD PRESSURE: 72 MMHG | WEIGHT: 197.06 LBS | BODY MASS INDEX: 29.19 KG/M2 | SYSTOLIC BLOOD PRESSURE: 130 MMHG | HEIGHT: 69 IN

## 2021-01-26 DIAGNOSIS — E11.42 DM TYPE 2 WITH DIABETIC PERIPHERAL NEUROPATHY: Primary | ICD-10-CM

## 2021-01-26 PROCEDURE — 3078F DIAST BP <80 MM HG: CPT | Mod: CPTII,S$GLB,, | Performed by: FAMILY MEDICINE

## 2021-01-26 PROCEDURE — 1157F PR ADVANCE CARE PLAN OR EQUIV PRESENT IN MEDICAL RECORD: ICD-10-PCS | Mod: S$GLB,,, | Performed by: FAMILY MEDICINE

## 2021-01-26 PROCEDURE — 3075F PR MOST RECENT SYSTOLIC BLOOD PRESS GE 130-139MM HG: ICD-10-PCS | Mod: CPTII,S$GLB,, | Performed by: FAMILY MEDICINE

## 2021-01-26 PROCEDURE — 3046F PR MOST RECENT HEMOGLOBIN A1C LEVEL > 9.0%: ICD-10-PCS | Mod: CPTII,S$GLB,, | Performed by: FAMILY MEDICINE

## 2021-01-26 PROCEDURE — 3008F BODY MASS INDEX DOCD: CPT | Mod: CPTII,S$GLB,, | Performed by: FAMILY MEDICINE

## 2021-01-26 PROCEDURE — 3288F PR FALLS RISK ASSESSMENT DOCUMENTED: ICD-10-PCS | Mod: CPTII,S$GLB,, | Performed by: FAMILY MEDICINE

## 2021-01-26 PROCEDURE — 1100F PR PT FALLS ASSESS DOC 2+ FALLS/FALL W/INJURY/YR: ICD-10-PCS | Mod: CPTII,S$GLB,, | Performed by: FAMILY MEDICINE

## 2021-01-26 PROCEDURE — 3008F PR BODY MASS INDEX (BMI) DOCUMENTED: ICD-10-PCS | Mod: CPTII,S$GLB,, | Performed by: FAMILY MEDICINE

## 2021-01-26 PROCEDURE — 99213 OFFICE O/P EST LOW 20 MIN: CPT | Mod: S$GLB,,, | Performed by: FAMILY MEDICINE

## 2021-01-26 PROCEDURE — 3288F FALL RISK ASSESSMENT DOCD: CPT | Mod: CPTII,S$GLB,, | Performed by: FAMILY MEDICINE

## 2021-01-26 PROCEDURE — 1100F PTFALLS ASSESS-DOCD GE2>/YR: CPT | Mod: CPTII,S$GLB,, | Performed by: FAMILY MEDICINE

## 2021-01-26 PROCEDURE — 99999 PR PBB SHADOW E&M-EST. PATIENT-LVL IV: CPT | Mod: PBBFAC,,, | Performed by: FAMILY MEDICINE

## 2021-01-26 PROCEDURE — 99999 PR PBB SHADOW E&M-EST. PATIENT-LVL IV: ICD-10-PCS | Mod: PBBFAC,,, | Performed by: FAMILY MEDICINE

## 2021-01-26 PROCEDURE — 3046F HEMOGLOBIN A1C LEVEL >9.0%: CPT | Mod: CPTII,S$GLB,, | Performed by: FAMILY MEDICINE

## 2021-01-26 PROCEDURE — 1157F ADVNC CARE PLAN IN RCRD: CPT | Mod: S$GLB,,, | Performed by: FAMILY MEDICINE

## 2021-01-26 PROCEDURE — 99213 PR OFFICE/OUTPT VISIT, EST, LEVL III, 20-29 MIN: ICD-10-PCS | Mod: S$GLB,,, | Performed by: FAMILY MEDICINE

## 2021-01-26 PROCEDURE — 3078F PR MOST RECENT DIASTOLIC BLOOD PRESSURE < 80 MM HG: ICD-10-PCS | Mod: CPTII,S$GLB,, | Performed by: FAMILY MEDICINE

## 2021-01-26 PROCEDURE — 3075F SYST BP GE 130 - 139MM HG: CPT | Mod: CPTII,S$GLB,, | Performed by: FAMILY MEDICINE

## 2021-02-01 DIAGNOSIS — G89.4 CHRONIC PAIN DISORDER: ICD-10-CM

## 2021-02-01 RX ORDER — OXYCODONE AND ACETAMINOPHEN 10; 325 MG/1; MG/1
1 TABLET ORAL EVERY 6 HOURS PRN
Qty: 120 TABLET | Refills: 0 | Status: SHIPPED | OUTPATIENT
Start: 2021-04-04 | End: 2021-04-26 | Stop reason: SDUPTHER

## 2021-02-01 RX ORDER — OXYCODONE AND ACETAMINOPHEN 10; 325 MG/1; MG/1
1 TABLET ORAL EVERY 6 HOURS PRN
Qty: 120 TABLET | Refills: 0 | Status: SHIPPED | OUTPATIENT
Start: 2021-03-06 | End: 2021-04-04

## 2021-02-01 RX ORDER — OXYCODONE AND ACETAMINOPHEN 10; 325 MG/1; MG/1
1 TABLET ORAL EVERY 6 HOURS PRN
Qty: 120 TABLET | Refills: 0 | Status: SHIPPED | OUTPATIENT
Start: 2021-02-04 | End: 2021-03-05

## 2021-02-02 ENCOUNTER — OFFICE VISIT (OUTPATIENT)
Dept: PAIN MEDICINE | Facility: CLINIC | Age: 76
End: 2021-02-02
Payer: MEDICARE

## 2021-02-02 VITALS
HEART RATE: 66 BPM | SYSTOLIC BLOOD PRESSURE: 154 MMHG | HEIGHT: 69 IN | DIASTOLIC BLOOD PRESSURE: 78 MMHG | BODY MASS INDEX: 29.18 KG/M2 | WEIGHT: 197 LBS

## 2021-02-02 DIAGNOSIS — M47.892 OTHER SPONDYLOSIS, CERVICAL REGION: ICD-10-CM

## 2021-02-02 DIAGNOSIS — F11.90 CHRONIC, CONTINUOUS USE OF OPIOIDS: Primary | ICD-10-CM

## 2021-02-02 DIAGNOSIS — M17.10 PRIMARY OSTEOARTHRITIS OF KNEE, UNSPECIFIED LATERALITY: ICD-10-CM

## 2021-02-02 DIAGNOSIS — M50.30 DDD (DEGENERATIVE DISC DISEASE), CERVICAL: ICD-10-CM

## 2021-02-02 PROCEDURE — 3008F BODY MASS INDEX DOCD: CPT | Mod: CPTII,S$GLB,, | Performed by: PHYSICIAN ASSISTANT

## 2021-02-02 PROCEDURE — 3077F SYST BP >= 140 MM HG: CPT | Mod: CPTII,S$GLB,, | Performed by: PHYSICIAN ASSISTANT

## 2021-02-02 PROCEDURE — 1159F PR MEDICATION LIST DOCUMENTED IN MEDICAL RECORD: ICD-10-PCS | Mod: S$GLB,,, | Performed by: PHYSICIAN ASSISTANT

## 2021-02-02 PROCEDURE — 1157F ADVNC CARE PLAN IN RCRD: CPT | Mod: S$GLB,,, | Performed by: PHYSICIAN ASSISTANT

## 2021-02-02 PROCEDURE — 3008F PR BODY MASS INDEX (BMI) DOCUMENTED: ICD-10-PCS | Mod: CPTII,S$GLB,, | Performed by: PHYSICIAN ASSISTANT

## 2021-02-02 PROCEDURE — 3078F PR MOST RECENT DIASTOLIC BLOOD PRESSURE < 80 MM HG: ICD-10-PCS | Mod: CPTII,S$GLB,, | Performed by: PHYSICIAN ASSISTANT

## 2021-02-02 PROCEDURE — 1157F PR ADVANCE CARE PLAN OR EQUIV PRESENT IN MEDICAL RECORD: ICD-10-PCS | Mod: S$GLB,,, | Performed by: PHYSICIAN ASSISTANT

## 2021-02-02 PROCEDURE — 3077F PR MOST RECENT SYSTOLIC BLOOD PRESSURE >= 140 MM HG: ICD-10-PCS | Mod: CPTII,S$GLB,, | Performed by: PHYSICIAN ASSISTANT

## 2021-02-02 PROCEDURE — 1101F PT FALLS ASSESS-DOCD LE1/YR: CPT | Mod: CPTII,S$GLB,, | Performed by: PHYSICIAN ASSISTANT

## 2021-02-02 PROCEDURE — 3288F FALL RISK ASSESSMENT DOCD: CPT | Mod: CPTII,S$GLB,, | Performed by: PHYSICIAN ASSISTANT

## 2021-02-02 PROCEDURE — 99999 PR PBB SHADOW E&M-EST. PATIENT-LVL III: CPT | Mod: PBBFAC,,, | Performed by: PHYSICIAN ASSISTANT

## 2021-02-02 PROCEDURE — 3288F PR FALLS RISK ASSESSMENT DOCUMENTED: ICD-10-PCS | Mod: CPTII,S$GLB,, | Performed by: PHYSICIAN ASSISTANT

## 2021-02-02 PROCEDURE — 1159F MED LIST DOCD IN RCRD: CPT | Mod: S$GLB,,, | Performed by: PHYSICIAN ASSISTANT

## 2021-02-02 PROCEDURE — 1125F PR PAIN SEVERITY QUANTIFIED, PAIN PRESENT: ICD-10-PCS | Mod: S$GLB,,, | Performed by: PHYSICIAN ASSISTANT

## 2021-02-02 PROCEDURE — 99214 OFFICE O/P EST MOD 30 MIN: CPT | Mod: S$GLB,,, | Performed by: PHYSICIAN ASSISTANT

## 2021-02-02 PROCEDURE — 99999 PR PBB SHADOW E&M-EST. PATIENT-LVL III: ICD-10-PCS | Mod: PBBFAC,,, | Performed by: PHYSICIAN ASSISTANT

## 2021-02-02 PROCEDURE — 80307 DRUG TEST PRSMV CHEM ANLYZR: CPT

## 2021-02-02 PROCEDURE — 1125F AMNT PAIN NOTED PAIN PRSNT: CPT | Mod: S$GLB,,, | Performed by: PHYSICIAN ASSISTANT

## 2021-02-02 PROCEDURE — 3078F DIAST BP <80 MM HG: CPT | Mod: CPTII,S$GLB,, | Performed by: PHYSICIAN ASSISTANT

## 2021-02-02 PROCEDURE — 1101F PR PT FALLS ASSESS DOC 0-1 FALLS W/OUT INJ PAST YR: ICD-10-PCS | Mod: CPTII,S$GLB,, | Performed by: PHYSICIAN ASSISTANT

## 2021-02-02 PROCEDURE — 99214 PR OFFICE/OUTPT VISIT, EST, LEVL IV, 30-39 MIN: ICD-10-PCS | Mod: S$GLB,,, | Performed by: PHYSICIAN ASSISTANT

## 2021-02-08 LAB

## 2021-02-22 ENCOUNTER — PATIENT MESSAGE (OUTPATIENT)
Dept: FAMILY MEDICINE | Facility: CLINIC | Age: 76
End: 2021-02-22

## 2021-02-22 ENCOUNTER — OFFICE VISIT (OUTPATIENT)
Dept: URGENT CARE | Facility: CLINIC | Age: 76
End: 2021-02-22
Payer: MEDICARE

## 2021-02-22 VITALS
RESPIRATION RATE: 16 BRPM | DIASTOLIC BLOOD PRESSURE: 78 MMHG | BODY MASS INDEX: 29.18 KG/M2 | WEIGHT: 197 LBS | HEART RATE: 68 BPM | TEMPERATURE: 98 F | HEIGHT: 69 IN | SYSTOLIC BLOOD PRESSURE: 128 MMHG | OXYGEN SATURATION: 95 %

## 2021-02-22 DIAGNOSIS — M19.012 OSTEOARTHRITIS OF LEFT SHOULDER, UNSPECIFIED OSTEOARTHRITIS TYPE: Primary | ICD-10-CM

## 2021-02-22 DIAGNOSIS — R07.89 LEFT-SIDED CHEST WALL PAIN: ICD-10-CM

## 2021-02-22 DIAGNOSIS — M25.512 ACUTE PAIN OF LEFT SHOULDER: ICD-10-CM

## 2021-02-22 PROCEDURE — 99214 PR OFFICE/OUTPT VISIT, EST, LEVL IV, 30-39 MIN: ICD-10-PCS | Mod: 25,S$GLB,, | Performed by: NURSE PRACTITIONER

## 2021-02-22 PROCEDURE — 99214 OFFICE O/P EST MOD 30 MIN: CPT | Mod: 25,S$GLB,, | Performed by: NURSE PRACTITIONER

## 2021-02-22 PROCEDURE — 3008F BODY MASS INDEX DOCD: CPT | Mod: CPTII,S$GLB,, | Performed by: NURSE PRACTITIONER

## 2021-02-22 PROCEDURE — 3008F PR BODY MASS INDEX (BMI) DOCUMENTED: ICD-10-PCS | Mod: CPTII,S$GLB,, | Performed by: NURSE PRACTITIONER

## 2021-02-22 PROCEDURE — 1157F PR ADVANCE CARE PLAN OR EQUIV PRESENT IN MEDICAL RECORD: ICD-10-PCS | Mod: S$GLB,,, | Performed by: NURSE PRACTITIONER

## 2021-02-22 PROCEDURE — 1157F ADVNC CARE PLAN IN RCRD: CPT | Mod: S$GLB,,, | Performed by: NURSE PRACTITIONER

## 2021-02-22 PROCEDURE — 93000 ELECTROCARDIOGRAM COMPLETE: CPT | Mod: S$GLB,,, | Performed by: NURSE PRACTITIONER

## 2021-02-22 PROCEDURE — 93000 PR ELECTROCARDIOGRAM, COMPLETE: ICD-10-PCS | Mod: S$GLB,,, | Performed by: NURSE PRACTITIONER

## 2021-02-22 RX ORDER — DICLOFENAC SODIUM 50 MG/1
50 TABLET, DELAYED RELEASE ORAL 3 TIMES DAILY PRN
Qty: 30 TABLET | Refills: 0 | Status: SHIPPED | OUTPATIENT
Start: 2021-02-22 | End: 2021-04-15

## 2021-03-02 ENCOUNTER — PATIENT OUTREACH (OUTPATIENT)
Dept: ADMINISTRATIVE | Facility: HOSPITAL | Age: 76
End: 2021-03-02

## 2021-03-03 ENCOUNTER — TELEPHONE (OUTPATIENT)
Dept: INTERNAL MEDICINE | Facility: CLINIC | Age: 76
End: 2021-03-03

## 2021-03-25 ENCOUNTER — PATIENT OUTREACH (OUTPATIENT)
Dept: ADMINISTRATIVE | Facility: OTHER | Age: 76
End: 2021-03-25

## 2021-03-29 ENCOUNTER — OFFICE VISIT (OUTPATIENT)
Dept: PULMONOLOGY | Facility: CLINIC | Age: 76
End: 2021-03-29
Payer: MEDICARE

## 2021-03-29 ENCOUNTER — TELEPHONE (OUTPATIENT)
Dept: PULMONOLOGY | Facility: CLINIC | Age: 76
End: 2021-03-29

## 2021-03-29 VITALS
SYSTOLIC BLOOD PRESSURE: 112 MMHG | HEIGHT: 69 IN | BODY MASS INDEX: 28.2 KG/M2 | WEIGHT: 190.38 LBS | DIASTOLIC BLOOD PRESSURE: 71 MMHG | HEART RATE: 69 BPM | OXYGEN SATURATION: 97 %

## 2021-03-29 DIAGNOSIS — G47.30 SLEEP APNEA, UNSPECIFIED TYPE: ICD-10-CM

## 2021-03-29 DIAGNOSIS — J84.10 PULMONARY FIBROSIS: Primary | ICD-10-CM

## 2021-03-29 DIAGNOSIS — Z01.818 OTHER SPECIFIED PRE-OPERATIVE EXAMINATION: Primary | ICD-10-CM

## 2021-03-29 PROCEDURE — 99499 RISK ADDL DX/OHS AUDIT: ICD-10-PCS | Mod: S$GLB,,, | Performed by: NURSE PRACTITIONER

## 2021-03-29 PROCEDURE — 99214 OFFICE O/P EST MOD 30 MIN: CPT | Mod: S$GLB,,, | Performed by: NURSE PRACTITIONER

## 2021-03-29 PROCEDURE — 99999 PR PBB SHADOW E&M-EST. PATIENT-LVL IV: ICD-10-PCS | Mod: PBBFAC,,, | Performed by: NURSE PRACTITIONER

## 2021-03-29 PROCEDURE — 3074F PR MOST RECENT SYSTOLIC BLOOD PRESSURE < 130 MM HG: ICD-10-PCS | Mod: CPTII,S$GLB,, | Performed by: NURSE PRACTITIONER

## 2021-03-29 PROCEDURE — 1157F PR ADVANCE CARE PLAN OR EQUIV PRESENT IN MEDICAL RECORD: ICD-10-PCS | Mod: S$GLB,,, | Performed by: NURSE PRACTITIONER

## 2021-03-29 PROCEDURE — 3078F DIAST BP <80 MM HG: CPT | Mod: CPTII,S$GLB,, | Performed by: NURSE PRACTITIONER

## 2021-03-29 PROCEDURE — 1159F MED LIST DOCD IN RCRD: CPT | Mod: S$GLB,,, | Performed by: NURSE PRACTITIONER

## 2021-03-29 PROCEDURE — 99214 PR OFFICE/OUTPT VISIT, EST, LEVL IV, 30-39 MIN: ICD-10-PCS | Mod: S$GLB,,, | Performed by: NURSE PRACTITIONER

## 2021-03-29 PROCEDURE — 3074F SYST BP LT 130 MM HG: CPT | Mod: CPTII,S$GLB,, | Performed by: NURSE PRACTITIONER

## 2021-03-29 PROCEDURE — 3008F BODY MASS INDEX DOCD: CPT | Mod: CPTII,S$GLB,, | Performed by: NURSE PRACTITIONER

## 2021-03-29 PROCEDURE — 99999 PR PBB SHADOW E&M-EST. PATIENT-LVL IV: CPT | Mod: PBBFAC,,, | Performed by: NURSE PRACTITIONER

## 2021-03-29 PROCEDURE — 3288F PR FALLS RISK ASSESSMENT DOCUMENTED: ICD-10-PCS | Mod: CPTII,S$GLB,, | Performed by: NURSE PRACTITIONER

## 2021-03-29 PROCEDURE — 1159F PR MEDICATION LIST DOCUMENTED IN MEDICAL RECORD: ICD-10-PCS | Mod: S$GLB,,, | Performed by: NURSE PRACTITIONER

## 2021-03-29 PROCEDURE — 1100F PR PT FALLS ASSESS DOC 2+ FALLS/FALL W/INJURY/YR: ICD-10-PCS | Mod: CPTII,S$GLB,, | Performed by: NURSE PRACTITIONER

## 2021-03-29 PROCEDURE — 99499 UNLISTED E&M SERVICE: CPT | Mod: S$GLB,,, | Performed by: NURSE PRACTITIONER

## 2021-03-29 PROCEDURE — 3078F PR MOST RECENT DIASTOLIC BLOOD PRESSURE < 80 MM HG: ICD-10-PCS | Mod: CPTII,S$GLB,, | Performed by: NURSE PRACTITIONER

## 2021-03-29 PROCEDURE — 1125F PR PAIN SEVERITY QUANTIFIED, PAIN PRESENT: ICD-10-PCS | Mod: S$GLB,,, | Performed by: NURSE PRACTITIONER

## 2021-03-29 PROCEDURE — 1157F ADVNC CARE PLAN IN RCRD: CPT | Mod: S$GLB,,, | Performed by: NURSE PRACTITIONER

## 2021-03-29 PROCEDURE — 3008F PR BODY MASS INDEX (BMI) DOCUMENTED: ICD-10-PCS | Mod: CPTII,S$GLB,, | Performed by: NURSE PRACTITIONER

## 2021-03-29 PROCEDURE — 3288F FALL RISK ASSESSMENT DOCD: CPT | Mod: CPTII,S$GLB,, | Performed by: NURSE PRACTITIONER

## 2021-03-29 PROCEDURE — 1125F AMNT PAIN NOTED PAIN PRSNT: CPT | Mod: S$GLB,,, | Performed by: NURSE PRACTITIONER

## 2021-03-29 PROCEDURE — 1100F PTFALLS ASSESS-DOCD GE2>/YR: CPT | Mod: CPTII,S$GLB,, | Performed by: NURSE PRACTITIONER

## 2021-03-29 RX ORDER — BUDESONIDE 0.5 MG/2ML
0.5 INHALANT ORAL 2 TIMES DAILY
Qty: 120 ML | Refills: 2 | Status: SHIPPED | OUTPATIENT
Start: 2021-03-29 | End: 2022-03-29

## 2021-03-29 RX ORDER — ALBUTEROL SULFATE 90 UG/1
2 AEROSOL, METERED RESPIRATORY (INHALATION) EVERY 4 HOURS PRN
Qty: 18 G | Refills: 11 | Status: SHIPPED | OUTPATIENT
Start: 2021-03-29

## 2021-04-05 ENCOUNTER — HOSPITAL ENCOUNTER (EMERGENCY)
Facility: HOSPITAL | Age: 76
Discharge: HOME | End: 2021-04-05
Attending: EMERGENCY MEDICINE
Payer: MEDICARE

## 2021-04-05 VITALS
BODY MASS INDEX: 26.66 KG/M2 | TEMPERATURE: 98 F | DIASTOLIC BLOOD PRESSURE: 74 MMHG | HEART RATE: 71 BPM | SYSTOLIC BLOOD PRESSURE: 160 MMHG | WEIGHT: 180 LBS | OXYGEN SATURATION: 96 % | HEIGHT: 69 IN | RESPIRATION RATE: 16 BRPM

## 2021-04-05 DIAGNOSIS — R07.89 ATYPICAL CHEST PAIN: ICD-10-CM

## 2021-04-05 DIAGNOSIS — R73.9 HYPERGLYCEMIA: Primary | ICD-10-CM

## 2021-04-05 DIAGNOSIS — M54.6 LEFT-SIDED THORACIC BACK PAIN, UNSPECIFIED CHRONICITY: ICD-10-CM

## 2021-04-05 DIAGNOSIS — R07.9 CHEST PAIN: ICD-10-CM

## 2021-04-05 LAB
ALBUMIN SERPL BCP-MCNC: 3.7 G/DL (ref 3.5–5.2)
ALP SERPL-CCNC: 98 U/L (ref 55–135)
ALT SERPL W/O P-5'-P-CCNC: 19 U/L (ref 10–44)
ANION GAP SERPL CALC-SCNC: 11 MMOL/L (ref 8–16)
AST SERPL-CCNC: 23 U/L (ref 10–40)
BASOPHILS # BLD AUTO: 0.02 K/UL (ref 0–0.2)
BASOPHILS NFR BLD: 0.6 % (ref 0–1.9)
BILIRUB SERPL-MCNC: 0.9 MG/DL (ref 0.1–1)
BNP SERPL-MCNC: 61 PG/ML (ref 0–99)
BUN SERPL-MCNC: 19 MG/DL (ref 8–23)
CALCIUM SERPL-MCNC: 8.7 MG/DL (ref 8.7–10.5)
CHLORIDE SERPL-SCNC: 95 MMOL/L (ref 95–110)
CO2 SERPL-SCNC: 22 MMOL/L (ref 23–29)
CREAT SERPL-MCNC: 1.2 MG/DL (ref 0.5–1.4)
D DIMER PPP IA.FEU-MCNC: 0.99 MG/L FEU
DIFFERENTIAL METHOD: ABNORMAL
EOSINOPHIL # BLD AUTO: 0.1 K/UL (ref 0–0.5)
EOSINOPHIL NFR BLD: 2.6 % (ref 0–8)
ERYTHROCYTE [DISTWIDTH] IN BLOOD BY AUTOMATED COUNT: 13.9 % (ref 11.5–14.5)
EST. GFR  (AFRICAN AMERICAN): >60 ML/MIN/1.73 M^2
EST. GFR  (NON AFRICAN AMERICAN): 60 ML/MIN/1.73 M^2
GLUCOSE SERPL-MCNC: 746 MG/DL (ref 70–110)
HCT VFR BLD AUTO: 35.1 % (ref 40–54)
HGB BLD-MCNC: 12.1 G/DL (ref 14–18)
IMM GRANULOCYTES # BLD AUTO: 0.03 K/UL (ref 0–0.04)
IMM GRANULOCYTES NFR BLD AUTO: 0.9 % (ref 0–0.5)
LIPASE SERPL-CCNC: 34 U/L (ref 4–60)
LYMPHOCYTES # BLD AUTO: 0.5 K/UL (ref 1–4.8)
LYMPHOCYTES NFR BLD: 15.5 % (ref 18–48)
MCH RBC QN AUTO: 28.9 PG (ref 27–31)
MCHC RBC AUTO-ENTMCNC: 34.5 G/DL (ref 32–36)
MCV RBC AUTO: 84 FL (ref 82–98)
MONOCYTES # BLD AUTO: 0.3 K/UL (ref 0.3–1)
MONOCYTES NFR BLD: 9.6 % (ref 4–15)
NEUTROPHILS # BLD AUTO: 2.4 K/UL (ref 1.8–7.7)
NEUTROPHILS NFR BLD: 70.8 % (ref 38–73)
NRBC BLD-RTO: 0 /100 WBC
PLATELET # BLD AUTO: 42 K/UL (ref 150–450)
PMV BLD AUTO: 10.7 FL (ref 9.2–12.9)
POCT GLUCOSE: 465 MG/DL (ref 70–110)
POCT GLUCOSE: >500 MG/DL (ref 70–110)
POTASSIUM SERPL-SCNC: 5.2 MMOL/L (ref 3.5–5.1)
PROT SERPL-MCNC: 6.4 G/DL (ref 6–8.4)
RBC # BLD AUTO: 4.19 M/UL (ref 4.6–6.2)
SODIUM SERPL-SCNC: 128 MMOL/L (ref 136–145)
TROPONIN I SERPL DL<=0.01 NG/ML-MCNC: 0.01 NG/ML (ref 0–0.03)
WBC # BLD AUTO: 3.42 K/UL (ref 3.9–12.7)

## 2021-04-05 PROCEDURE — 93010 ELECTROCARDIOGRAM REPORT: CPT | Mod: ,,, | Performed by: INTERNAL MEDICINE

## 2021-04-05 PROCEDURE — 85379 FIBRIN DEGRADATION QUANT: CPT | Performed by: EMERGENCY MEDICINE

## 2021-04-05 PROCEDURE — 25000003 PHARM REV CODE 250: Performed by: EMERGENCY MEDICINE

## 2021-04-05 PROCEDURE — 85025 COMPLETE CBC W/AUTO DIFF WBC: CPT | Performed by: EMERGENCY MEDICINE

## 2021-04-05 PROCEDURE — 93005 ELECTROCARDIOGRAM TRACING: CPT

## 2021-04-05 PROCEDURE — 25500020 PHARM REV CODE 255

## 2021-04-05 PROCEDURE — 83690 ASSAY OF LIPASE: CPT | Performed by: EMERGENCY MEDICINE

## 2021-04-05 PROCEDURE — 82962 GLUCOSE BLOOD TEST: CPT | Mod: 91

## 2021-04-05 PROCEDURE — 63600175 PHARM REV CODE 636 W HCPCS: Performed by: EMERGENCY MEDICINE

## 2021-04-05 PROCEDURE — 99285 EMERGENCY DEPT VISIT HI MDM: CPT | Mod: 25

## 2021-04-05 PROCEDURE — 96361 HYDRATE IV INFUSION ADD-ON: CPT

## 2021-04-05 PROCEDURE — 84484 ASSAY OF TROPONIN QUANT: CPT | Performed by: EMERGENCY MEDICINE

## 2021-04-05 PROCEDURE — 93010 EKG 12-LEAD: ICD-10-PCS | Mod: ,,, | Performed by: INTERNAL MEDICINE

## 2021-04-05 PROCEDURE — 36415 COLL VENOUS BLD VENIPUNCTURE: CPT | Performed by: EMERGENCY MEDICINE

## 2021-04-05 PROCEDURE — 83880 ASSAY OF NATRIURETIC PEPTIDE: CPT | Performed by: EMERGENCY MEDICINE

## 2021-04-05 PROCEDURE — 96360 HYDRATION IV INFUSION INIT: CPT | Mod: 59

## 2021-04-05 PROCEDURE — 80053 COMPREHEN METABOLIC PANEL: CPT | Performed by: EMERGENCY MEDICINE

## 2021-04-05 PROCEDURE — 96374 THER/PROPH/DIAG INJ IV PUSH: CPT

## 2021-04-05 RX ORDER — METHOCARBAMOL 500 MG/1
1000 TABLET, FILM COATED ORAL 3 TIMES DAILY PRN
Qty: 20 TABLET | Refills: 0 | Status: SHIPPED | OUTPATIENT
Start: 2021-04-05 | End: 2021-04-10

## 2021-04-05 RX ORDER — MORPHINE SULFATE 4 MG/ML
4 INJECTION, SOLUTION INTRAMUSCULAR; INTRAVENOUS
Status: COMPLETED | OUTPATIENT
Start: 2021-04-05 | End: 2021-04-05

## 2021-04-05 RX ORDER — METHOCARBAMOL 500 MG/1
1000 TABLET, FILM COATED ORAL
Status: COMPLETED | OUTPATIENT
Start: 2021-04-05 | End: 2021-04-05

## 2021-04-05 RX ADMIN — METHOCARBAMOL 1000 MG: 500 TABLET ORAL at 09:04

## 2021-04-05 RX ADMIN — SODIUM CHLORIDE 1000 ML: 0.9 INJECTION, SOLUTION INTRAVENOUS at 04:04

## 2021-04-05 RX ADMIN — SODIUM CHLORIDE 1000 ML: 0.9 INJECTION, SOLUTION INTRAVENOUS at 07:04

## 2021-04-05 RX ADMIN — IOHEXOL 100 ML: 350 INJECTION, SOLUTION INTRAVENOUS at 06:04

## 2021-04-05 RX ADMIN — MORPHINE SULFATE 4 MG: 4 INJECTION, SOLUTION INTRAMUSCULAR; INTRAVENOUS at 04:04

## 2021-04-09 ENCOUNTER — HOSPITAL ENCOUNTER (OUTPATIENT)
Dept: PULMONOLOGY | Facility: HOSPITAL | Age: 76
Discharge: HOME | End: 2021-04-09
Attending: NURSE PRACTITIONER
Payer: MEDICARE

## 2021-04-09 DIAGNOSIS — J84.10 PULMONARY FIBROSIS: Primary | ICD-10-CM

## 2021-04-09 PROCEDURE — 94618 PULMONARY STRESS TESTING: CPT

## 2021-04-09 PROCEDURE — 94010 BREATHING CAPACITY TEST: CPT

## 2021-04-09 PROCEDURE — 94618 PULMONARY STRESS TESTING: ICD-10-PCS | Mod: 26,,, | Performed by: INTERNAL MEDICINE

## 2021-04-09 PROCEDURE — 94010 BREATHING CAPACITY TEST: CPT | Mod: 26,59,, | Performed by: INTERNAL MEDICINE

## 2021-04-09 PROCEDURE — 94618 PULMONARY STRESS TESTING: CPT | Mod: 26,,, | Performed by: INTERNAL MEDICINE

## 2021-04-09 PROCEDURE — 94010 BREATHING CAPACITY TEST: ICD-10-PCS | Mod: 26,59,, | Performed by: INTERNAL MEDICINE

## 2021-04-09 PROCEDURE — 94727 PR PULM FUNCTION TEST BY GAS: ICD-10-PCS | Mod: 26,,, | Performed by: INTERNAL MEDICINE

## 2021-04-09 PROCEDURE — 94727 GAS DIL/WSHOT DETER LNG VOL: CPT

## 2021-04-09 PROCEDURE — 94727 GAS DIL/WSHOT DETER LNG VOL: CPT | Mod: 26,,, | Performed by: INTERNAL MEDICINE

## 2021-04-13 ENCOUNTER — TELEPHONE (OUTPATIENT)
Dept: PULMONOLOGY | Facility: CLINIC | Age: 76
End: 2021-04-13

## 2021-04-13 DIAGNOSIS — J84.10 PULMONARY FIBROSIS: Primary | ICD-10-CM

## 2021-04-13 LAB
BR6MWT: NORMAL
BRPFT: NORMAL
ERVN2 LLN: -16449.01
ERVN2 PRE REF: 44.7 %
ERVN2 PRE: 0.44 L
ERVN2 REF: 0.99
FEF 25 75 LLN: 0.91
FEF 25 75 PRE REF: 123.5 %
FEF 25 75 REF: 2.21
FEV1 FVC LLN: 62
FEV1 FVC PRE REF: 114.9 %
FEV1 FVC REF: 76
FEV1 LLN: 2.11
FEV1 PRE REF: 67.5 %
FEV1 REF: 2.97
FRCN2 LLN: 2.67
FRCN2 PRE REF: 90.6 %
FRCN2 REF: 3.66
FVC LLN: 2.9
FVC PRE REF: 58.4 %
FVC REF: 3.95
PEF LLN: 5.56
PEF PRE REF: 48 %
PEF REF: 7.8
PRE FEF 25 75: 2.73 L/S
PRE FET 100: 2.58 SEC
PRE FEV1 FVC: 86.92 %
PRE FEV1: 2.01 L
PRE FRC N2: 3.32 L
PRE FVC: 2.31 L
PRE PEF: 3.75 L/S
RVN2 LLN: 1.99
RVN2 PRE REF: 107.1 %
RVN2 PRE: 2.86 L
RVN2 REF: 2.67
RVN2TLCN2 LLN: 33.45
RVN2TLCN2 PRE REF: 130.4 %
RVN2TLCN2 PRE: 55.33 %
RVN2TLCN2 REF: 42.43
TLCN2 LLN: 5.75
TLCN2 PRE REF: 74.9 %
TLCN2 PRE: 5.17 L
TLCN2 REF: 6.9
VCMAXN2 LLN: 2.9
VCMAXN2 PRE REF: 58.4 %
VCMAXN2 PRE: 2.31 L
VCMAXN2 REF: 3.95

## 2021-04-15 ENCOUNTER — OFFICE VISIT (OUTPATIENT)
Dept: FAMILY MEDICINE | Facility: CLINIC | Age: 76
End: 2021-04-15
Payer: MEDICARE

## 2021-04-15 ENCOUNTER — LAB VISIT (OUTPATIENT)
Dept: LAB | Facility: HOSPITAL | Age: 76
End: 2021-04-15
Attending: PHYSICIAN ASSISTANT
Payer: MEDICARE

## 2021-04-15 VITALS
HEART RATE: 100 BPM | WEIGHT: 190.69 LBS | BODY MASS INDEX: 28.24 KG/M2 | HEIGHT: 69 IN | OXYGEN SATURATION: 95 % | DIASTOLIC BLOOD PRESSURE: 74 MMHG | SYSTOLIC BLOOD PRESSURE: 132 MMHG | TEMPERATURE: 97 F

## 2021-04-15 DIAGNOSIS — R07.9 LEFT-SIDED CHEST PAIN: ICD-10-CM

## 2021-04-15 DIAGNOSIS — L98.9 SKIN LESIONS: ICD-10-CM

## 2021-04-15 DIAGNOSIS — R07.89 CHEST WALL TENDERNESS: ICD-10-CM

## 2021-04-15 DIAGNOSIS — I50.32 CHRONIC DIASTOLIC HEART FAILURE: ICD-10-CM

## 2021-04-15 DIAGNOSIS — G89.29 CHRONIC LEFT-SIDED THORACIC BACK PAIN: Primary | ICD-10-CM

## 2021-04-15 DIAGNOSIS — D69.2 SENILE PURPURA: ICD-10-CM

## 2021-04-15 DIAGNOSIS — D61.818 PANCYTOPENIA: ICD-10-CM

## 2021-04-15 DIAGNOSIS — I87.2 STASIS DERMATITIS OF BOTH LEGS: ICD-10-CM

## 2021-04-15 DIAGNOSIS — M54.6 CHRONIC LEFT-SIDED THORACIC BACK PAIN: Primary | ICD-10-CM

## 2021-04-15 DIAGNOSIS — E11.42 DM TYPE 2 WITH DIABETIC PERIPHERAL NEUROPATHY: ICD-10-CM

## 2021-04-15 PROBLEM — S80.822A BLISTER OF LEFT LOWER LEG: Status: RESOLVED | Noted: 2019-06-15 | Resolved: 2021-04-15

## 2021-04-15 PROBLEM — L03.90 CELLULITIS: Status: RESOLVED | Noted: 2018-06-25 | Resolved: 2021-04-15

## 2021-04-15 PROBLEM — R50.9 FEVER: Status: RESOLVED | Noted: 2019-11-07 | Resolved: 2021-04-15

## 2021-04-15 LAB
ANION GAP SERPL CALC-SCNC: 10 MMOL/L (ref 8–16)
BASOPHILS # BLD AUTO: 0.03 K/UL (ref 0–0.2)
BASOPHILS NFR BLD: 0.7 % (ref 0–1.9)
BUN SERPL-MCNC: 20 MG/DL (ref 8–23)
CALCIUM SERPL-MCNC: 9.2 MG/DL (ref 8.7–10.5)
CHLORIDE SERPL-SCNC: 100 MMOL/L (ref 95–110)
CO2 SERPL-SCNC: 24 MMOL/L (ref 23–29)
CREAT SERPL-MCNC: 1 MG/DL (ref 0.5–1.4)
DIFFERENTIAL METHOD: ABNORMAL
EOSINOPHIL # BLD AUTO: 0.2 K/UL (ref 0–0.5)
EOSINOPHIL NFR BLD: 3.8 % (ref 0–8)
ERYTHROCYTE [DISTWIDTH] IN BLOOD BY AUTOMATED COUNT: 14 % (ref 11.5–14.5)
EST. GFR  (AFRICAN AMERICAN): >60 ML/MIN/1.73 M^2
EST. GFR  (NON AFRICAN AMERICAN): >60 ML/MIN/1.73 M^2
ESTIMATED AVG GLUCOSE: 326 MG/DL (ref 68–131)
GLUCOSE SERPL-MCNC: 340 MG/DL (ref 70–110)
HBA1C MFR BLD: 13 % (ref 4–5.6)
HCT VFR BLD AUTO: 38.9 % (ref 40–54)
HGB BLD-MCNC: 12.9 G/DL (ref 14–18)
IMM GRANULOCYTES # BLD AUTO: 0.02 K/UL (ref 0–0.04)
IMM GRANULOCYTES NFR BLD AUTO: 0.5 % (ref 0–0.5)
LYMPHOCYTES # BLD AUTO: 0.7 K/UL (ref 1–4.8)
LYMPHOCYTES NFR BLD: 15.7 % (ref 18–48)
MCH RBC QN AUTO: 28.3 PG (ref 27–31)
MCHC RBC AUTO-ENTMCNC: 33.2 G/DL (ref 32–36)
MCV RBC AUTO: 85 FL (ref 82–98)
MONOCYTES # BLD AUTO: 0.4 K/UL (ref 0.3–1)
MONOCYTES NFR BLD: 10.2 % (ref 4–15)
NEUTROPHILS # BLD AUTO: 2.9 K/UL (ref 1.8–7.7)
NEUTROPHILS NFR BLD: 69.1 % (ref 38–73)
NRBC BLD-RTO: 0 /100 WBC
PLATELET # BLD AUTO: 62 K/UL (ref 150–450)
PMV BLD AUTO: 11.6 FL (ref 9.2–12.9)
POTASSIUM SERPL-SCNC: 4.6 MMOL/L (ref 3.5–5.1)
RBC # BLD AUTO: 4.56 M/UL (ref 4.6–6.2)
SODIUM SERPL-SCNC: 134 MMOL/L (ref 136–145)
WBC # BLD AUTO: 4.2 K/UL (ref 3.9–12.7)

## 2021-04-15 PROCEDURE — 1159F PR MEDICATION LIST DOCUMENTED IN MEDICAL RECORD: ICD-10-PCS | Mod: S$GLB,,, | Performed by: PHYSICIAN ASSISTANT

## 2021-04-15 PROCEDURE — 1159F MED LIST DOCD IN RCRD: CPT | Mod: S$GLB,,, | Performed by: PHYSICIAN ASSISTANT

## 2021-04-15 PROCEDURE — 85025 COMPLETE CBC W/AUTO DIFF WBC: CPT | Performed by: PHYSICIAN ASSISTANT

## 2021-04-15 PROCEDURE — 1157F ADVNC CARE PLAN IN RCRD: CPT | Mod: S$GLB,,, | Performed by: PHYSICIAN ASSISTANT

## 2021-04-15 PROCEDURE — 99499 RISK ADDL DX/OHS AUDIT: ICD-10-PCS | Mod: S$GLB,,, | Performed by: PHYSICIAN ASSISTANT

## 2021-04-15 PROCEDURE — 83036 HEMOGLOBIN GLYCOSYLATED A1C: CPT | Performed by: PHYSICIAN ASSISTANT

## 2021-04-15 PROCEDURE — 3288F FALL RISK ASSESSMENT DOCD: CPT | Mod: CPTII,S$GLB,, | Performed by: PHYSICIAN ASSISTANT

## 2021-04-15 PROCEDURE — 1101F PT FALLS ASSESS-DOCD LE1/YR: CPT | Mod: CPTII,S$GLB,, | Performed by: PHYSICIAN ASSISTANT

## 2021-04-15 PROCEDURE — 36415 COLL VENOUS BLD VENIPUNCTURE: CPT | Mod: PO | Performed by: PHYSICIAN ASSISTANT

## 2021-04-15 PROCEDURE — 1125F PR PAIN SEVERITY QUANTIFIED, PAIN PRESENT: ICD-10-PCS | Mod: S$GLB,,, | Performed by: PHYSICIAN ASSISTANT

## 2021-04-15 PROCEDURE — 3008F PR BODY MASS INDEX (BMI) DOCUMENTED: ICD-10-PCS | Mod: CPTII,S$GLB,, | Performed by: PHYSICIAN ASSISTANT

## 2021-04-15 PROCEDURE — 1157F PR ADVANCE CARE PLAN OR EQUIV PRESENT IN MEDICAL RECORD: ICD-10-PCS | Mod: S$GLB,,, | Performed by: PHYSICIAN ASSISTANT

## 2021-04-15 PROCEDURE — 3288F PR FALLS RISK ASSESSMENT DOCUMENTED: ICD-10-PCS | Mod: CPTII,S$GLB,, | Performed by: PHYSICIAN ASSISTANT

## 2021-04-15 PROCEDURE — 80048 BASIC METABOLIC PNL TOTAL CA: CPT | Performed by: PHYSICIAN ASSISTANT

## 2021-04-15 PROCEDURE — 1101F PR PT FALLS ASSESS DOC 0-1 FALLS W/OUT INJ PAST YR: ICD-10-PCS | Mod: CPTII,S$GLB,, | Performed by: PHYSICIAN ASSISTANT

## 2021-04-15 PROCEDURE — 99999 PR PBB SHADOW E&M-EST. PATIENT-LVL V: CPT | Mod: PBBFAC,,, | Performed by: PHYSICIAN ASSISTANT

## 2021-04-15 PROCEDURE — 99214 OFFICE O/P EST MOD 30 MIN: CPT | Mod: S$GLB,,, | Performed by: PHYSICIAN ASSISTANT

## 2021-04-15 PROCEDURE — 99214 PR OFFICE/OUTPT VISIT, EST, LEVL IV, 30-39 MIN: ICD-10-PCS | Mod: S$GLB,,, | Performed by: PHYSICIAN ASSISTANT

## 2021-04-15 PROCEDURE — 1125F AMNT PAIN NOTED PAIN PRSNT: CPT | Mod: S$GLB,,, | Performed by: PHYSICIAN ASSISTANT

## 2021-04-15 PROCEDURE — 99499 UNLISTED E&M SERVICE: CPT | Mod: S$GLB,,, | Performed by: PHYSICIAN ASSISTANT

## 2021-04-15 PROCEDURE — 3008F BODY MASS INDEX DOCD: CPT | Mod: CPTII,S$GLB,, | Performed by: PHYSICIAN ASSISTANT

## 2021-04-15 PROCEDURE — 99999 PR PBB SHADOW E&M-EST. PATIENT-LVL V: ICD-10-PCS | Mod: PBBFAC,,, | Performed by: PHYSICIAN ASSISTANT

## 2021-04-15 RX ORDER — GABAPENTIN 100 MG/1
100 CAPSULE ORAL 3 TIMES DAILY
Qty: 90 CAPSULE | Refills: 2 | Status: SHIPPED | OUTPATIENT
Start: 2021-04-15 | End: 2021-06-24 | Stop reason: SDUPTHER

## 2021-04-15 RX ORDER — MUPIROCIN 20 MG/G
OINTMENT TOPICAL 3 TIMES DAILY
Qty: 30 G | Refills: 0 | Status: SHIPPED | OUTPATIENT
Start: 2021-04-15 | End: 2021-04-27

## 2021-04-16 DIAGNOSIS — Z79.4 TYPE 2 DIABETES MELLITUS WITH COMPLICATION, WITH LONG-TERM CURRENT USE OF INSULIN: Primary | ICD-10-CM

## 2021-04-16 DIAGNOSIS — E11.8 TYPE 2 DIABETES MELLITUS WITH COMPLICATION, WITH LONG-TERM CURRENT USE OF INSULIN: Primary | ICD-10-CM

## 2021-04-26 ENCOUNTER — TELEPHONE (OUTPATIENT)
Dept: FAMILY MEDICINE | Facility: CLINIC | Age: 76
End: 2021-04-26

## 2021-04-26 ENCOUNTER — PATIENT OUTREACH (OUTPATIENT)
Dept: ADMINISTRATIVE | Facility: OTHER | Age: 76
End: 2021-04-26

## 2021-04-26 ENCOUNTER — PATIENT MESSAGE (OUTPATIENT)
Dept: FAMILY MEDICINE | Facility: CLINIC | Age: 76
End: 2021-04-26

## 2021-04-26 ENCOUNTER — TELEPHONE (OUTPATIENT)
Dept: ENDOCRINOLOGY | Facility: CLINIC | Age: 76
End: 2021-04-26

## 2021-04-26 DIAGNOSIS — G89.4 CHRONIC PAIN DISORDER: ICD-10-CM

## 2021-04-26 RX ORDER — OXYCODONE AND ACETAMINOPHEN 10; 325 MG/1; MG/1
1 TABLET ORAL EVERY 6 HOURS PRN
Qty: 120 TABLET | Refills: 0 | Status: SHIPPED | OUTPATIENT
Start: 2021-06-01 | End: 2021-06-30

## 2021-04-26 RX ORDER — OXYCODONE AND ACETAMINOPHEN 10; 325 MG/1; MG/1
1 TABLET ORAL EVERY 6 HOURS PRN
Qty: 120 TABLET | Refills: 0 | Status: SHIPPED | OUTPATIENT
Start: 2021-05-03 | End: 2021-06-01

## 2021-04-26 RX ORDER — OXYCODONE AND ACETAMINOPHEN 10; 325 MG/1; MG/1
1 TABLET ORAL EVERY 6 HOURS PRN
Qty: 120 TABLET | Refills: 0 | Status: SHIPPED | OUTPATIENT
Start: 2021-06-30 | End: 2021-04-26 | Stop reason: SDUPTHER

## 2021-04-26 RX ORDER — OXYCODONE AND ACETAMINOPHEN 10; 325 MG/1; MG/1
1 TABLET ORAL EVERY 6 HOURS PRN
Qty: 120 TABLET | Refills: 0 | Status: SHIPPED | OUTPATIENT
Start: 2021-06-30 | End: 2021-07-26 | Stop reason: SDUPTHER

## 2021-04-27 ENCOUNTER — OFFICE VISIT (OUTPATIENT)
Dept: FAMILY MEDICINE | Facility: CLINIC | Age: 76
End: 2021-04-27
Payer: MEDICARE

## 2021-04-27 ENCOUNTER — HOSPITAL ENCOUNTER (OUTPATIENT)
Facility: HOSPITAL | Age: 76
Discharge: LEFT BEFORE DISCHARGE | End: 2021-04-28
Attending: EMERGENCY MEDICINE | Admitting: INTERNAL MEDICINE
Payer: MEDICARE

## 2021-04-27 ENCOUNTER — OFFICE VISIT (OUTPATIENT)
Dept: PAIN MEDICINE | Facility: CLINIC | Age: 76
End: 2021-04-27
Payer: MEDICARE

## 2021-04-27 VITALS
OXYGEN SATURATION: 96 % | BODY MASS INDEX: 29.88 KG/M2 | DIASTOLIC BLOOD PRESSURE: 76 MMHG | HEIGHT: 69 IN | SYSTOLIC BLOOD PRESSURE: 130 MMHG | WEIGHT: 201.75 LBS | HEART RATE: 81 BPM

## 2021-04-27 VITALS
BODY MASS INDEX: 28.14 KG/M2 | SYSTOLIC BLOOD PRESSURE: 144 MMHG | DIASTOLIC BLOOD PRESSURE: 84 MMHG | WEIGHT: 190 LBS | HEART RATE: 79 BPM | HEIGHT: 69 IN

## 2021-04-27 DIAGNOSIS — I50.32 CHRONIC DIASTOLIC HEART FAILURE: ICD-10-CM

## 2021-04-27 DIAGNOSIS — R60.9 EDEMA, UNSPECIFIED TYPE: ICD-10-CM

## 2021-04-27 DIAGNOSIS — M17.10 PRIMARY OSTEOARTHRITIS OF KNEE, UNSPECIFIED LATERALITY: Primary | ICD-10-CM

## 2021-04-27 DIAGNOSIS — M50.30 DDD (DEGENERATIVE DISC DISEASE), CERVICAL: ICD-10-CM

## 2021-04-27 DIAGNOSIS — L03.90 CELLULITIS: ICD-10-CM

## 2021-04-27 DIAGNOSIS — I15.2 HYPERTENSION ASSOCIATED WITH DIABETES: ICD-10-CM

## 2021-04-27 DIAGNOSIS — R06.02 SHORTNESS OF BREATH: ICD-10-CM

## 2021-04-27 DIAGNOSIS — R07.9 CHEST PAIN: ICD-10-CM

## 2021-04-27 DIAGNOSIS — I87.2 VENOUS INSUFFICIENCY OF BOTH LOWER EXTREMITIES: Primary | ICD-10-CM

## 2021-04-27 DIAGNOSIS — M47.892 OTHER SPONDYLOSIS, CERVICAL REGION: ICD-10-CM

## 2021-04-27 DIAGNOSIS — E11.59 HYPERTENSION ASSOCIATED WITH DIABETES: ICD-10-CM

## 2021-04-27 DIAGNOSIS — E11.51 TYPE 2 DIABETES MELLITUS WITH DIABETIC PERIPHERAL ANGIOPATHY WITHOUT GANGRENE, WITHOUT LONG-TERM CURRENT USE OF INSULIN: Primary | ICD-10-CM

## 2021-04-27 DIAGNOSIS — I87.2 VENOUS INSUFFICIENCY OF BOTH LOWER EXTREMITIES: ICD-10-CM

## 2021-04-27 DIAGNOSIS — R60.0 LOWER EXTREMITY EDEMA: ICD-10-CM

## 2021-04-27 LAB
ALBUMIN SERPL BCP-MCNC: 3.9 G/DL (ref 3.5–5.2)
ALP SERPL-CCNC: 119 U/L (ref 55–135)
ALT SERPL W/O P-5'-P-CCNC: 28 U/L (ref 10–44)
ANION GAP SERPL CALC-SCNC: 14 MMOL/L (ref 8–16)
AST SERPL-CCNC: 32 U/L (ref 10–40)
BACTERIA #/AREA URNS HPF: NORMAL /HPF
BASOPHILS # BLD AUTO: 0.02 K/UL (ref 0–0.2)
BASOPHILS NFR BLD: 0.6 % (ref 0–1.9)
BILIRUB SERPL-MCNC: 1.1 MG/DL (ref 0.1–1)
BILIRUB UR QL STRIP: NEGATIVE
BNP SERPL-MCNC: 65 PG/ML (ref 0–99)
BUN SERPL-MCNC: 15 MG/DL (ref 8–23)
CALCIUM SERPL-MCNC: 8.7 MG/DL (ref 8.7–10.5)
CHLORIDE SERPL-SCNC: 102 MMOL/L (ref 95–110)
CLARITY UR: CLEAR
CO2 SERPL-SCNC: 19 MMOL/L (ref 23–29)
COLOR UR: YELLOW
CREAT SERPL-MCNC: 1.1 MG/DL (ref 0.5–1.4)
DIFFERENTIAL METHOD: ABNORMAL
EOSINOPHIL # BLD AUTO: 0.1 K/UL (ref 0–0.5)
EOSINOPHIL NFR BLD: 2.3 % (ref 0–8)
ERYTHROCYTE [DISTWIDTH] IN BLOOD BY AUTOMATED COUNT: 14.3 % (ref 11.5–14.5)
EST. GFR  (AFRICAN AMERICAN): >60 ML/MIN/1.73 M^2
EST. GFR  (NON AFRICAN AMERICAN): >60 ML/MIN/1.73 M^2
GLUCOSE SERPL-MCNC: 470 MG/DL (ref 70–110)
GLUCOSE UR QL STRIP: ABNORMAL
HCT VFR BLD AUTO: 37.2 % (ref 40–54)
HGB BLD-MCNC: 12.3 G/DL (ref 14–18)
HGB UR QL STRIP: NEGATIVE
IMM GRANULOCYTES # BLD AUTO: 0.03 K/UL (ref 0–0.04)
IMM GRANULOCYTES NFR BLD AUTO: 0.9 % (ref 0–0.5)
INR PPP: 1.1 (ref 0.8–1.2)
KETONES UR QL STRIP: NEGATIVE
LEUKOCYTE ESTERASE UR QL STRIP: NEGATIVE
LYMPHOCYTES # BLD AUTO: 0.5 K/UL (ref 1–4.8)
LYMPHOCYTES NFR BLD: 13 % (ref 18–48)
MCH RBC QN AUTO: 28.6 PG (ref 27–31)
MCHC RBC AUTO-ENTMCNC: 33.1 G/DL (ref 32–36)
MCV RBC AUTO: 87 FL (ref 82–98)
MICROSCOPIC COMMENT: NORMAL
MONOCYTES # BLD AUTO: 0.4 K/UL (ref 0.3–1)
MONOCYTES NFR BLD: 11.3 % (ref 4–15)
NEUTROPHILS # BLD AUTO: 2.5 K/UL (ref 1.8–7.7)
NEUTROPHILS NFR BLD: 71.9 % (ref 38–73)
NITRITE UR QL STRIP: NEGATIVE
NRBC BLD-RTO: 0 /100 WBC
PH UR STRIP: 6 [PH] (ref 5–8)
PLATELET # BLD AUTO: 42 K/UL (ref 150–450)
PMV BLD AUTO: 11.5 FL (ref 9.2–12.9)
POCT GLUCOSE: 393 MG/DL (ref 70–110)
POCT GLUCOSE: 413 MG/DL (ref 70–110)
POCT GLUCOSE: 432 MG/DL (ref 70–110)
POTASSIUM SERPL-SCNC: 4.6 MMOL/L (ref 3.5–5.1)
PROT SERPL-MCNC: 7.1 G/DL (ref 6–8.4)
PROT UR QL STRIP: NEGATIVE
PROTHROMBIN TIME: 11.3 SEC (ref 9–12.5)
RBC # BLD AUTO: 4.3 M/UL (ref 4.6–6.2)
RBC #/AREA URNS HPF: 1 /HPF (ref 0–4)
SARS-COV-2 RDRP RESP QL NAA+PROBE: NEGATIVE
SODIUM SERPL-SCNC: 135 MMOL/L (ref 136–145)
SP GR UR STRIP: 1.02 (ref 1–1.03)
SQUAMOUS #/AREA URNS HPF: 1 /HPF
TROPONIN I SERPL DL<=0.01 NG/ML-MCNC: 0.01 NG/ML (ref 0–0.03)
URN SPEC COLLECT METH UR: ABNORMAL
UROBILINOGEN UR STRIP-ACNC: 1 EU/DL
WBC # BLD AUTO: 3.45 K/UL (ref 3.9–12.7)
WBC #/AREA URNS HPF: 0 /HPF (ref 0–5)
YEAST URNS QL MICRO: NORMAL

## 2021-04-27 PROCEDURE — 3046F HEMOGLOBIN A1C LEVEL >9.0%: CPT | Mod: CPTII,S$GLB,, | Performed by: NURSE PRACTITIONER

## 2021-04-27 PROCEDURE — 93010 EKG 12-LEAD: ICD-10-PCS | Mod: ,,, | Performed by: SPECIALIST

## 2021-04-27 PROCEDURE — 85610 PROTHROMBIN TIME: CPT | Performed by: EMERGENCY MEDICINE

## 2021-04-27 PROCEDURE — 93005 ELECTROCARDIOGRAM TRACING: CPT

## 2021-04-27 PROCEDURE — 3008F PR BODY MASS INDEX (BMI) DOCUMENTED: ICD-10-PCS | Mod: CPTII,S$GLB,, | Performed by: PHYSICIAN ASSISTANT

## 2021-04-27 PROCEDURE — 3288F PR FALLS RISK ASSESSMENT DOCUMENTED: ICD-10-PCS | Mod: CPTII,S$GLB,, | Performed by: PHYSICIAN ASSISTANT

## 2021-04-27 PROCEDURE — 99215 PR OFFICE/OUTPT VISIT, EST, LEVL V, 40-54 MIN: ICD-10-PCS | Mod: S$GLB,,, | Performed by: NURSE PRACTITIONER

## 2021-04-27 PROCEDURE — 25000242 PHARM REV CODE 250 ALT 637 W/ HCPCS: Performed by: NURSE PRACTITIONER

## 2021-04-27 PROCEDURE — 3288F FALL RISK ASSESSMENT DOCD: CPT | Mod: CPTII,S$GLB,, | Performed by: NURSE PRACTITIONER

## 2021-04-27 PROCEDURE — 1157F PR ADVANCE CARE PLAN OR EQUIV PRESENT IN MEDICAL RECORD: ICD-10-PCS | Mod: S$GLB,,, | Performed by: NURSE PRACTITIONER

## 2021-04-27 PROCEDURE — 80053 COMPREHEN METABOLIC PANEL: CPT | Performed by: EMERGENCY MEDICINE

## 2021-04-27 PROCEDURE — 99999 PR PBB SHADOW E&M-EST. PATIENT-LVL III: ICD-10-PCS | Mod: PBBFAC,,, | Performed by: PHYSICIAN ASSISTANT

## 2021-04-27 PROCEDURE — 63600175 PHARM REV CODE 636 W HCPCS: Performed by: NURSE PRACTITIONER

## 2021-04-27 PROCEDURE — 1101F PT FALLS ASSESS-DOCD LE1/YR: CPT | Mod: CPTII,S$GLB,, | Performed by: NURSE PRACTITIONER

## 2021-04-27 PROCEDURE — 93010 ELECTROCARDIOGRAM REPORT: CPT | Mod: ,,, | Performed by: SPECIALIST

## 2021-04-27 PROCEDURE — 36415 COLL VENOUS BLD VENIPUNCTURE: CPT | Performed by: EMERGENCY MEDICINE

## 2021-04-27 PROCEDURE — 82962 GLUCOSE BLOOD TEST: CPT

## 2021-04-27 PROCEDURE — 1125F PR PAIN SEVERITY QUANTIFIED, PAIN PRESENT: ICD-10-PCS | Mod: S$GLB,,, | Performed by: PHYSICIAN ASSISTANT

## 2021-04-27 PROCEDURE — 25000003 PHARM REV CODE 250: Performed by: EMERGENCY MEDICINE

## 2021-04-27 PROCEDURE — 83880 ASSAY OF NATRIURETIC PEPTIDE: CPT | Performed by: EMERGENCY MEDICINE

## 2021-04-27 PROCEDURE — 1157F ADVNC CARE PLAN IN RCRD: CPT | Mod: S$GLB,,, | Performed by: NURSE PRACTITIONER

## 2021-04-27 PROCEDURE — 99999 PR PBB SHADOW E&M-EST. PATIENT-LVL IV: CPT | Mod: PBBFAC,,, | Performed by: NURSE PRACTITIONER

## 2021-04-27 PROCEDURE — 1157F ADVNC CARE PLAN IN RCRD: CPT | Mod: S$GLB,,, | Performed by: PHYSICIAN ASSISTANT

## 2021-04-27 PROCEDURE — 3288F FALL RISK ASSESSMENT DOCD: CPT | Mod: CPTII,S$GLB,, | Performed by: PHYSICIAN ASSISTANT

## 2021-04-27 PROCEDURE — 1159F PR MEDICATION LIST DOCUMENTED IN MEDICAL RECORD: ICD-10-PCS | Mod: S$GLB,,, | Performed by: PHYSICIAN ASSISTANT

## 2021-04-27 PROCEDURE — 94760 N-INVAS EAR/PLS OXIMETRY 1: CPT

## 2021-04-27 PROCEDURE — 1125F AMNT PAIN NOTED PAIN PRSNT: CPT | Mod: S$GLB,,, | Performed by: PHYSICIAN ASSISTANT

## 2021-04-27 PROCEDURE — 99214 OFFICE O/P EST MOD 30 MIN: CPT | Mod: S$GLB,,, | Performed by: PHYSICIAN ASSISTANT

## 2021-04-27 PROCEDURE — G0378 HOSPITAL OBSERVATION PER HR: HCPCS

## 2021-04-27 PROCEDURE — 99999 PR PBB SHADOW E&M-EST. PATIENT-LVL III: CPT | Mod: PBBFAC,,, | Performed by: PHYSICIAN ASSISTANT

## 2021-04-27 PROCEDURE — 85025 COMPLETE CBC W/AUTO DIFF WBC: CPT | Performed by: EMERGENCY MEDICINE

## 2021-04-27 PROCEDURE — 84484 ASSAY OF TROPONIN QUANT: CPT | Performed by: EMERGENCY MEDICINE

## 2021-04-27 PROCEDURE — 3288F PR FALLS RISK ASSESSMENT DOCUMENTED: ICD-10-PCS | Mod: CPTII,S$GLB,, | Performed by: NURSE PRACTITIONER

## 2021-04-27 PROCEDURE — 1101F PT FALLS ASSESS-DOCD LE1/YR: CPT | Mod: CPTII,S$GLB,, | Performed by: PHYSICIAN ASSISTANT

## 2021-04-27 PROCEDURE — 3008F BODY MASS INDEX DOCD: CPT | Mod: CPTII,S$GLB,, | Performed by: NURSE PRACTITIONER

## 2021-04-27 PROCEDURE — U0002 COVID-19 LAB TEST NON-CDC: HCPCS | Performed by: EMERGENCY MEDICINE

## 2021-04-27 PROCEDURE — 1101F PR PT FALLS ASSESS DOC 0-1 FALLS W/OUT INJ PAST YR: ICD-10-PCS | Mod: CPTII,S$GLB,, | Performed by: PHYSICIAN ASSISTANT

## 2021-04-27 PROCEDURE — 1101F PR PT FALLS ASSESS DOC 0-1 FALLS W/OUT INJ PAST YR: ICD-10-PCS | Mod: CPTII,S$GLB,, | Performed by: NURSE PRACTITIONER

## 2021-04-27 PROCEDURE — 81000 URINALYSIS NONAUTO W/SCOPE: CPT | Performed by: NURSE PRACTITIONER

## 2021-04-27 PROCEDURE — 99999 PR PBB SHADOW E&M-EST. PATIENT-LVL IV: ICD-10-PCS | Mod: PBBFAC,,, | Performed by: NURSE PRACTITIONER

## 2021-04-27 PROCEDURE — 1159F MED LIST DOCD IN RCRD: CPT | Mod: S$GLB,,, | Performed by: NURSE PRACTITIONER

## 2021-04-27 PROCEDURE — 63600175 PHARM REV CODE 636 W HCPCS: Performed by: EMERGENCY MEDICINE

## 2021-04-27 PROCEDURE — 3008F PR BODY MASS INDEX (BMI) DOCUMENTED: ICD-10-PCS | Mod: CPTII,S$GLB,, | Performed by: NURSE PRACTITIONER

## 2021-04-27 PROCEDURE — 1159F PR MEDICATION LIST DOCUMENTED IN MEDICAL RECORD: ICD-10-PCS | Mod: S$GLB,,, | Performed by: NURSE PRACTITIONER

## 2021-04-27 PROCEDURE — 3046F PR MOST RECENT HEMOGLOBIN A1C LEVEL > 9.0%: ICD-10-PCS | Mod: CPTII,S$GLB,, | Performed by: NURSE PRACTITIONER

## 2021-04-27 PROCEDURE — 96374 THER/PROPH/DIAG INJ IV PUSH: CPT

## 2021-04-27 PROCEDURE — 1125F PR PAIN SEVERITY QUANTIFIED, PAIN PRESENT: ICD-10-PCS | Mod: S$GLB,,, | Performed by: NURSE PRACTITIONER

## 2021-04-27 PROCEDURE — 96372 THER/PROPH/DIAG INJ SC/IM: CPT

## 2021-04-27 PROCEDURE — 1157F PR ADVANCE CARE PLAN OR EQUIV PRESENT IN MEDICAL RECORD: ICD-10-PCS | Mod: S$GLB,,, | Performed by: PHYSICIAN ASSISTANT

## 2021-04-27 PROCEDURE — 94640 AIRWAY INHALATION TREATMENT: CPT

## 2021-04-27 PROCEDURE — 99214 PR OFFICE/OUTPT VISIT, EST, LEVL IV, 30-39 MIN: ICD-10-PCS | Mod: S$GLB,,, | Performed by: PHYSICIAN ASSISTANT

## 2021-04-27 PROCEDURE — 99215 OFFICE O/P EST HI 40 MIN: CPT | Mod: S$GLB,,, | Performed by: NURSE PRACTITIONER

## 2021-04-27 PROCEDURE — 96372 THER/PROPH/DIAG INJ SC/IM: CPT | Mod: 59

## 2021-04-27 PROCEDURE — 25000003 PHARM REV CODE 250: Performed by: NURSE PRACTITIONER

## 2021-04-27 PROCEDURE — 3008F BODY MASS INDEX DOCD: CPT | Mod: CPTII,S$GLB,, | Performed by: PHYSICIAN ASSISTANT

## 2021-04-27 PROCEDURE — 99285 EMERGENCY DEPT VISIT HI MDM: CPT | Mod: 25

## 2021-04-27 PROCEDURE — 1125F AMNT PAIN NOTED PAIN PRSNT: CPT | Mod: S$GLB,,, | Performed by: NURSE PRACTITIONER

## 2021-04-27 PROCEDURE — 1159F MED LIST DOCD IN RCRD: CPT | Mod: S$GLB,,, | Performed by: PHYSICIAN ASSISTANT

## 2021-04-27 RX ORDER — SODIUM CHLORIDE 0.9 % (FLUSH) 0.9 %
10 SYRINGE (ML) INJECTION
Status: DISCONTINUED | OUTPATIENT
Start: 2021-04-27 | End: 2021-04-28 | Stop reason: HOSPADM

## 2021-04-27 RX ORDER — IBUPROFEN 200 MG
16 TABLET ORAL
Status: DISCONTINUED | OUTPATIENT
Start: 2021-04-27 | End: 2021-04-28 | Stop reason: HOSPADM

## 2021-04-27 RX ORDER — BUDESONIDE 0.5 MG/2ML
0.5 INHALANT ORAL 2 TIMES DAILY
Status: DISCONTINUED | OUTPATIENT
Start: 2021-04-27 | End: 2021-04-28 | Stop reason: HOSPADM

## 2021-04-27 RX ORDER — ACETAMINOPHEN 325 MG/1
650 TABLET ORAL EVERY 8 HOURS PRN
Status: DISCONTINUED | OUTPATIENT
Start: 2021-04-27 | End: 2021-04-28 | Stop reason: HOSPADM

## 2021-04-27 RX ORDER — ONDANSETRON 2 MG/ML
4 INJECTION INTRAMUSCULAR; INTRAVENOUS EVERY 8 HOURS PRN
Status: DISCONTINUED | OUTPATIENT
Start: 2021-04-27 | End: 2021-04-28 | Stop reason: HOSPADM

## 2021-04-27 RX ORDER — OXYCODONE AND ACETAMINOPHEN 10; 325 MG/1; MG/1
1 TABLET ORAL EVERY 6 HOURS PRN
Status: DISCONTINUED | OUTPATIENT
Start: 2021-04-27 | End: 2021-04-28 | Stop reason: HOSPADM

## 2021-04-27 RX ORDER — GLUCAGON 1 MG
1 KIT INJECTION
Status: DISCONTINUED | OUTPATIENT
Start: 2021-04-27 | End: 2021-04-28 | Stop reason: HOSPADM

## 2021-04-27 RX ORDER — IBUPROFEN 200 MG
24 TABLET ORAL
Status: DISCONTINUED | OUTPATIENT
Start: 2021-04-27 | End: 2021-04-28 | Stop reason: HOSPADM

## 2021-04-27 RX ORDER — INSULIN ASPART 100 [IU]/ML
0-5 INJECTION, SOLUTION INTRAVENOUS; SUBCUTANEOUS
Status: DISCONTINUED | OUTPATIENT
Start: 2021-04-27 | End: 2021-04-27

## 2021-04-27 RX ORDER — LANOLIN ALCOHOL/MO/W.PET/CERES
800 CREAM (GRAM) TOPICAL
Status: DISCONTINUED | OUTPATIENT
Start: 2021-04-27 | End: 2021-04-28 | Stop reason: HOSPADM

## 2021-04-27 RX ORDER — GABAPENTIN 100 MG/1
100 CAPSULE ORAL 3 TIMES DAILY
Status: DISCONTINUED | OUTPATIENT
Start: 2021-04-27 | End: 2021-04-28 | Stop reason: HOSPADM

## 2021-04-27 RX ORDER — ACETAMINOPHEN 500 MG
500 TABLET ORAL
Status: COMPLETED | OUTPATIENT
Start: 2021-04-27 | End: 2021-04-27

## 2021-04-27 RX ORDER — INSULIN ASPART 100 [IU]/ML
1-10 INJECTION, SOLUTION INTRAVENOUS; SUBCUTANEOUS
Status: DISCONTINUED | OUTPATIENT
Start: 2021-04-27 | End: 2021-04-28 | Stop reason: HOSPADM

## 2021-04-27 RX ADMIN — GABAPENTIN 100 MG: 100 CAPSULE ORAL at 09:04

## 2021-04-27 RX ADMIN — INSULIN ASPART 5 UNITS: 100 INJECTION, SOLUTION INTRAVENOUS; SUBCUTANEOUS at 07:04

## 2021-04-27 RX ADMIN — INSULIN HUMAN 6 UNITS: 100 INJECTION, SOLUTION PARENTERAL at 08:04

## 2021-04-27 RX ADMIN — OXYCODONE AND ACETAMINOPHEN 1 TABLET: 10; 325 TABLET ORAL at 09:04

## 2021-04-27 RX ADMIN — ACETAMINOPHEN 500 MG: 500 TABLET ORAL at 07:04

## 2021-04-27 RX ADMIN — INSULIN ASPART 5 UNITS: 100 INJECTION, SOLUTION INTRAVENOUS; SUBCUTANEOUS at 11:04

## 2021-04-27 RX ADMIN — BUDESONIDE 0.5 MG: 0.5 SUSPENSION RESPIRATORY (INHALATION) at 10:04

## 2021-04-28 VITALS
BODY MASS INDEX: 30.21 KG/M2 | RESPIRATION RATE: 16 BRPM | HEIGHT: 69 IN | DIASTOLIC BLOOD PRESSURE: 66 MMHG | OXYGEN SATURATION: 98 % | TEMPERATURE: 97 F | WEIGHT: 204 LBS | SYSTOLIC BLOOD PRESSURE: 136 MMHG | HEART RATE: 78 BPM

## 2021-04-28 LAB
ALBUMIN SERPL BCP-MCNC: 3.3 G/DL (ref 3.5–5.2)
ALP SERPL-CCNC: 110 U/L (ref 55–135)
ALT SERPL W/O P-5'-P-CCNC: 22 U/L (ref 10–44)
AMMONIA PLAS-SCNC: 36 UMOL/L (ref 10–50)
ANION GAP SERPL CALC-SCNC: 8 MMOL/L (ref 8–16)
AORTIC ROOT ANNULUS: 3.66 CM
AORTIC VALVE CUSP SEPERATION: 2.16 CM
AST SERPL-CCNC: 22 U/L (ref 10–40)
AV INDEX (PROSTH): 0.54
AV MEAN GRADIENT: 8 MMHG
AV PEAK GRADIENT: 16 MMHG
AV VALVE AREA: 1.74 CM2
AV VELOCITY RATIO: 0.5
BASOPHILS # BLD AUTO: 0.01 K/UL (ref 0–0.2)
BASOPHILS NFR BLD: 0.4 % (ref 0–1.9)
BILIRUB SERPL-MCNC: 1 MG/DL (ref 0.1–1)
BSA FOR ECHO PROCEDURE: 2.12 M2
BUN SERPL-MCNC: 19 MG/DL (ref 8–23)
CALCIUM SERPL-MCNC: 8.2 MG/DL (ref 8.7–10.5)
CHLORIDE SERPL-SCNC: 103 MMOL/L (ref 95–110)
CHOLEST SERPL-MCNC: 135 MG/DL (ref 120–199)
CHOLEST/HDLC SERPL: 3.1 {RATIO} (ref 2–5)
CO2 SERPL-SCNC: 24 MMOL/L (ref 23–29)
CREAT SERPL-MCNC: 0.9 MG/DL (ref 0.5–1.4)
CV ECHO LV RWT: 0.44 CM
DIFFERENTIAL METHOD: ABNORMAL
DOP CALC AO PEAK VEL: 1.97 M/S
DOP CALC AO VTI: 44.44 CM
DOP CALC LVOT AREA: 3.2 CM2
DOP CALC LVOT DIAMETER: 2.03 CM
DOP CALC LVOT PEAK VEL: 0.98 M/S
DOP CALC LVOT STROKE VOLUME: 77.38 CM3
DOP CALCLVOT PEAK VEL VTI: 23.92 CM
E WAVE DECELERATION TIME: 310.14 MSEC
E/A RATIO: 0.75
E/E' RATIO: 24.36 M/S
ECHO LV POSTERIOR WALL: 1.1 CM (ref 0.6–1.1)
EJECTION FRACTION: 67 %
EOSINOPHIL # BLD AUTO: 0.1 K/UL (ref 0–0.5)
EOSINOPHIL NFR BLD: 3.5 % (ref 0–8)
ERYTHROCYTE [DISTWIDTH] IN BLOOD BY AUTOMATED COUNT: 14.1 % (ref 11.5–14.5)
EST. GFR  (AFRICAN AMERICAN): >60 ML/MIN/1.73 M^2
EST. GFR  (NON AFRICAN AMERICAN): >60 ML/MIN/1.73 M^2
ESTIMATED AVG GLUCOSE: 303 MG/DL (ref 68–131)
FRACTIONAL SHORTENING: 38 % (ref 28–44)
GLUCOSE SERPL-MCNC: 337 MG/DL (ref 70–110)
HBA1C MFR BLD: 12.2 % (ref 4–5.6)
HCT VFR BLD AUTO: 34 % (ref 40–54)
HDLC SERPL-MCNC: 44 MG/DL (ref 40–75)
HDLC SERPL: 32.6 % (ref 20–50)
HGB BLD-MCNC: 11 G/DL (ref 14–18)
IMM GRANULOCYTES # BLD AUTO: 0.02 K/UL (ref 0–0.04)
IMM GRANULOCYTES NFR BLD AUTO: 0.8 % (ref 0–0.5)
INTERVENTRICULAR SEPTUM: 1.12 CM (ref 0.6–1.1)
LA MAJOR: 5.48 CM
LA MINOR: 4.35 CM
LA WIDTH: 4.65 CM
LDLC SERPL CALC-MCNC: 77 MG/DL (ref 63–159)
LEFT ATRIUM SIZE: 4.52 CM
LEFT ATRIUM VOLUME INDEX: 41.7 ML/M2
LEFT ATRIUM VOLUME: 86.65 CM3
LEFT INTERNAL DIMENSION IN SYSTOLE: 3.1 CM (ref 2.1–4)
LEFT VENTRICLE DIASTOLIC VOLUME INDEX: 55.98 ML/M2
LEFT VENTRICLE DIASTOLIC VOLUME: 116.43 ML
LEFT VENTRICLE MASS INDEX: 100 G/M2
LEFT VENTRICLE SYSTOLIC VOLUME INDEX: 18.2 ML/M2
LEFT VENTRICLE SYSTOLIC VOLUME: 37.91 ML
LEFT VENTRICULAR INTERNAL DIMENSION IN DIASTOLE: 4.97 CM (ref 3.5–6)
LEFT VENTRICULAR MASS: 207.71 G
LV LATERAL E/E' RATIO: 22.33 M/S
LV SEPTAL E/E' RATIO: 26.8 M/S
LYMPHOCYTES # BLD AUTO: 0.4 K/UL (ref 1–4.8)
LYMPHOCYTES NFR BLD: 14.2 % (ref 18–48)
MAGNESIUM SERPL-MCNC: 1.6 MG/DL (ref 1.6–2.6)
MCH RBC QN AUTO: 28.3 PG (ref 27–31)
MCHC RBC AUTO-ENTMCNC: 32.4 G/DL (ref 32–36)
MCV RBC AUTO: 87 FL (ref 82–98)
MONOCYTES # BLD AUTO: 0.3 K/UL (ref 0.3–1)
MONOCYTES NFR BLD: 10.6 % (ref 4–15)
MV MEAN GRADIENT: 1 MMHG
MV PEAK A VEL: 1.78 M/S
MV PEAK E VEL: 1.34 M/S
MV PEAK GRADIENT: 11 MMHG
MV STENOSIS PRESSURE HALF TIME: 91.01 MS
MV VALVE AREA P 1/2 METHOD: 2.42 CM2
NEUTROPHILS # BLD AUTO: 1.8 K/UL (ref 1.8–7.7)
NEUTROPHILS NFR BLD: 70.5 % (ref 38–73)
NONHDLC SERPL-MCNC: 91 MG/DL
NRBC BLD-RTO: 0 /100 WBC
PHOSPHATE SERPL-MCNC: 3 MG/DL (ref 2.7–4.5)
PISA MRMAX VEL: 0.05 M/S
PISA RADIUS: 0.31 CM
PISA TR MAX VEL: 2.8 M/S
PISA TR VN NYQUIST: 0 M/S
PLATELET # BLD AUTO: 38 K/UL (ref 150–450)
PLATELET BLD QL SMEAR: ABNORMAL
PMV BLD AUTO: 11.1 FL (ref 9.2–12.9)
POCT GLUCOSE: 298 MG/DL (ref 70–110)
POCT GLUCOSE: 305 MG/DL (ref 70–110)
POCT GLUCOSE: 305 MG/DL (ref 70–110)
POCT GLUCOSE: 382 MG/DL (ref 70–110)
POTASSIUM SERPL-SCNC: 4.2 MMOL/L (ref 3.5–5.1)
PROT SERPL-MCNC: 5.9 G/DL (ref 6–8.4)
PV PEAK VELOCITY: 0.87 CM/S
RA MAJOR: 4.65 CM
RA PRESSURE: 3 MMHG
RA WIDTH: 2.96 CM
RBC # BLD AUTO: 3.89 M/UL (ref 4.6–6.2)
RIGHT VENTRICULAR END-DIASTOLIC DIMENSION: 2.94 CM
RV TISSUE DOPPLER FREE WALL SYSTOLIC VELOCITY 1 (APICAL 4 CHAMBER VIEW): 13.41 CM/S
SODIUM SERPL-SCNC: 135 MMOL/L (ref 136–145)
STJ: 3.46 CM
TDI LATERAL: 0.06 M/S
TDI SEPTAL: 0.05 M/S
TDI: 0.06 M/S
TR MAX PG: 31 MMHG
TRICUSPID ANNULAR PLANE SYSTOLIC EXCURSION: 2.23 CM
TRIGL SERPL-MCNC: 70 MG/DL (ref 30–150)
TSH SERPL DL<=0.005 MIU/L-ACNC: 3.43 UIU/ML (ref 0.4–4)
TV REST PULMONARY ARTERY PRESSURE: 34 MMHG
WBC # BLD AUTO: 2.54 K/UL (ref 3.9–12.7)

## 2021-04-28 PROCEDURE — 63600175 PHARM REV CODE 636 W HCPCS: Performed by: NURSE PRACTITIONER

## 2021-04-28 PROCEDURE — 96372 THER/PROPH/DIAG INJ SC/IM: CPT | Mod: 59

## 2021-04-28 PROCEDURE — 36415 COLL VENOUS BLD VENIPUNCTURE: CPT | Performed by: INTERNAL MEDICINE

## 2021-04-28 PROCEDURE — 94761 N-INVAS EAR/PLS OXIMETRY MLT: CPT

## 2021-04-28 PROCEDURE — 99900035 HC TECH TIME PER 15 MIN (STAT)

## 2021-04-28 PROCEDURE — G0378 HOSPITAL OBSERVATION PER HR: HCPCS

## 2021-04-28 PROCEDURE — 97161 PT EVAL LOW COMPLEX 20 MIN: CPT

## 2021-04-28 PROCEDURE — 85025 COMPLETE CBC W/AUTO DIFF WBC: CPT | Performed by: NURSE PRACTITIONER

## 2021-04-28 PROCEDURE — 96375 TX/PRO/DX INJ NEW DRUG ADDON: CPT | Mod: 59

## 2021-04-28 PROCEDURE — 84100 ASSAY OF PHOSPHORUS: CPT | Performed by: NURSE PRACTITIONER

## 2021-04-28 PROCEDURE — 84443 ASSAY THYROID STIM HORMONE: CPT | Performed by: NURSE PRACTITIONER

## 2021-04-28 PROCEDURE — 80061 LIPID PANEL: CPT | Performed by: NURSE PRACTITIONER

## 2021-04-28 PROCEDURE — 63600175 PHARM REV CODE 636 W HCPCS: Performed by: INTERNAL MEDICINE

## 2021-04-28 PROCEDURE — 83036 HEMOGLOBIN GLYCOSYLATED A1C: CPT | Performed by: INTERNAL MEDICINE

## 2021-04-28 PROCEDURE — 80053 COMPREHEN METABOLIC PANEL: CPT | Performed by: NURSE PRACTITIONER

## 2021-04-28 PROCEDURE — 36415 COLL VENOUS BLD VENIPUNCTURE: CPT | Performed by: NURSE PRACTITIONER

## 2021-04-28 PROCEDURE — 94760 N-INVAS EAR/PLS OXIMETRY 1: CPT

## 2021-04-28 PROCEDURE — 25000003 PHARM REV CODE 250: Performed by: NURSE PRACTITIONER

## 2021-04-28 PROCEDURE — 83735 ASSAY OF MAGNESIUM: CPT | Performed by: NURSE PRACTITIONER

## 2021-04-28 PROCEDURE — 94640 AIRWAY INHALATION TREATMENT: CPT

## 2021-04-28 PROCEDURE — 25000242 PHARM REV CODE 250 ALT 637 W/ HCPCS: Performed by: NURSE PRACTITIONER

## 2021-04-28 PROCEDURE — 82140 ASSAY OF AMMONIA: CPT | Performed by: NURSE PRACTITIONER

## 2021-04-28 RX ORDER — INSULIN ASPART 100 [IU]/ML
20 INJECTION, SOLUTION INTRAVENOUS; SUBCUTANEOUS 2 TIMES DAILY WITH MEALS
Status: DISCONTINUED | OUTPATIENT
Start: 2021-04-28 | End: 2021-04-28

## 2021-04-28 RX ORDER — MAGNESIUM SULFATE HEPTAHYDRATE 40 MG/ML
2 INJECTION, SOLUTION INTRAVENOUS ONCE
Status: COMPLETED | OUTPATIENT
Start: 2021-04-28 | End: 2021-04-28

## 2021-04-28 RX ORDER — INSULIN ASPART 100 [IU]/ML
10 INJECTION, SOLUTION INTRAVENOUS; SUBCUTANEOUS
Status: DISCONTINUED | OUTPATIENT
Start: 2021-04-28 | End: 2021-04-28 | Stop reason: HOSPADM

## 2021-04-28 RX ADMIN — INSULIN ASPART 10 UNITS: 100 INJECTION, SOLUTION INTRAVENOUS; SUBCUTANEOUS at 04:04

## 2021-04-28 RX ADMIN — MAGNESIUM SULFATE 2 G: 2 INJECTION INTRAVENOUS at 12:04

## 2021-04-28 RX ADMIN — BUDESONIDE 0.5 MG: 0.5 SUSPENSION RESPIRATORY (INHALATION) at 11:04

## 2021-04-28 RX ADMIN — GABAPENTIN 100 MG: 100 CAPSULE ORAL at 08:04

## 2021-04-28 RX ADMIN — CEFTRIAXONE 1 G: 1 INJECTION, SOLUTION INTRAVENOUS at 03:04

## 2021-04-28 RX ADMIN — OXYCODONE AND ACETAMINOPHEN 1 TABLET: 10; 325 TABLET ORAL at 03:04

## 2021-04-28 RX ADMIN — INSULIN ASPART 8 UNITS: 100 INJECTION, SOLUTION INTRAVENOUS; SUBCUTANEOUS at 05:04

## 2021-04-28 RX ADMIN — INSULIN ASPART 8 UNITS: 100 INJECTION, SOLUTION INTRAVENOUS; SUBCUTANEOUS at 06:04

## 2021-04-28 RX ADMIN — OXYCODONE AND ACETAMINOPHEN 1 TABLET: 10; 325 TABLET ORAL at 09:04

## 2021-04-28 RX ADMIN — GABAPENTIN 100 MG: 100 CAPSULE ORAL at 03:04

## 2021-04-28 RX ADMIN — INSULIN ASPART 6 UNITS: 100 INJECTION, SOLUTION INTRAVENOUS; SUBCUTANEOUS at 12:04

## 2021-04-28 RX ADMIN — INSULIN ASPART 20 UNITS: 100 INJECTION, SOLUTION INTRAVENOUS; SUBCUTANEOUS at 08:04

## 2021-04-29 ENCOUNTER — PATIENT MESSAGE (OUTPATIENT)
Dept: FAMILY MEDICINE | Facility: CLINIC | Age: 76
End: 2021-04-29

## 2021-04-29 ENCOUNTER — TELEPHONE (OUTPATIENT)
Dept: FAMILY MEDICINE | Facility: CLINIC | Age: 76
End: 2021-04-29

## 2021-04-29 DIAGNOSIS — R60.9 EDEMA, UNSPECIFIED TYPE: Primary | ICD-10-CM

## 2021-04-29 DIAGNOSIS — I87.2 VENOUS INSUFFICIENCY OF BOTH LOWER EXTREMITIES: ICD-10-CM

## 2021-04-30 ENCOUNTER — TELEPHONE (OUTPATIENT)
Dept: MEDSURG UNIT | Facility: HOSPITAL | Age: 76
End: 2021-04-30

## 2021-05-03 ENCOUNTER — CLINICAL SUPPORT (OUTPATIENT)
Dept: REHABILITATION | Facility: HOSPITAL | Age: 76
End: 2021-05-03
Payer: MEDICARE

## 2021-05-03 ENCOUNTER — TELEPHONE (OUTPATIENT)
Dept: FAMILY MEDICINE | Facility: CLINIC | Age: 76
End: 2021-05-03

## 2021-05-03 DIAGNOSIS — R60.0 LOWER EXTREMITY EDEMA: Primary | ICD-10-CM

## 2021-05-03 DIAGNOSIS — I87.2 VENOUS INSUFFICIENCY OF BOTH LOWER EXTREMITIES: ICD-10-CM

## 2021-05-03 DIAGNOSIS — Z91.89 AT HIGH RISK FOR IMPAIRED SKIN INTEGRITY: ICD-10-CM

## 2021-05-03 DIAGNOSIS — R60.9 EDEMA, UNSPECIFIED TYPE: ICD-10-CM

## 2021-05-03 DIAGNOSIS — R52 PAIN: ICD-10-CM

## 2021-05-03 DIAGNOSIS — Z74.09 DECREASED FUNCTIONAL MOBILITY AND ENDURANCE: ICD-10-CM

## 2021-05-03 PROCEDURE — 97162 PT EVAL MOD COMPLEX 30 MIN: CPT | Mod: PN

## 2021-05-04 ENCOUNTER — TELEPHONE (OUTPATIENT)
Dept: FAMILY MEDICINE | Facility: CLINIC | Age: 76
End: 2021-05-04

## 2021-05-04 RX ORDER — SULFAMETHOXAZOLE AND TRIMETHOPRIM 800; 160 MG/1; MG/1
1 TABLET ORAL 2 TIMES DAILY
Qty: 14 TABLET | Refills: 0 | Status: SHIPPED | OUTPATIENT
Start: 2021-05-04 | End: 2021-05-12 | Stop reason: ALTCHOICE

## 2021-05-05 ENCOUNTER — OFFICE VISIT (OUTPATIENT)
Dept: FAMILY MEDICINE | Facility: CLINIC | Age: 76
End: 2021-05-05
Payer: MEDICARE

## 2021-05-05 VITALS
OXYGEN SATURATION: 91 % | TEMPERATURE: 98 F | DIASTOLIC BLOOD PRESSURE: 83 MMHG | WEIGHT: 196.88 LBS | SYSTOLIC BLOOD PRESSURE: 121 MMHG | HEART RATE: 80 BPM | BODY MASS INDEX: 29.16 KG/M2 | HEIGHT: 69 IN

## 2021-05-05 DIAGNOSIS — D61.818 PANCYTOPENIA: ICD-10-CM

## 2021-05-05 DIAGNOSIS — I87.2 STASIS DERMATITIS OF BOTH LEGS: ICD-10-CM

## 2021-05-05 DIAGNOSIS — E11.59 HYPERTENSION ASSOCIATED WITH DIABETES: ICD-10-CM

## 2021-05-05 DIAGNOSIS — I73.9 PVD (PERIPHERAL VASCULAR DISEASE): Primary | ICD-10-CM

## 2021-05-05 DIAGNOSIS — I15.2 HYPERTENSION ASSOCIATED WITH DIABETES: ICD-10-CM

## 2021-05-05 DIAGNOSIS — E11.8 TYPE 2 DIABETES MELLITUS WITH COMPLICATION, WITH LONG-TERM CURRENT USE OF INSULIN: ICD-10-CM

## 2021-05-05 DIAGNOSIS — L03.90 CELLULITIS, UNSPECIFIED CELLULITIS SITE: ICD-10-CM

## 2021-05-05 DIAGNOSIS — R52 PAIN MANAGEMENT: ICD-10-CM

## 2021-05-05 DIAGNOSIS — Z79.4 TYPE 2 DIABETES MELLITUS WITH COMPLICATION, WITH LONG-TERM CURRENT USE OF INSULIN: ICD-10-CM

## 2021-05-05 PROCEDURE — 99495 TRANSJ CARE MGMT MOD F2F 14D: CPT | Mod: S$GLB,,, | Performed by: FAMILY MEDICINE

## 2021-05-05 PROCEDURE — 1157F ADVNC CARE PLAN IN RCRD: CPT | Mod: S$GLB,,, | Performed by: FAMILY MEDICINE

## 2021-05-05 PROCEDURE — 1157F PR ADVANCE CARE PLAN OR EQUIV PRESENT IN MEDICAL RECORD: ICD-10-PCS | Mod: S$GLB,,, | Performed by: FAMILY MEDICINE

## 2021-05-05 PROCEDURE — 99495 TCM SERVICES (MODERATE COMPLEXITY): ICD-10-PCS | Mod: S$GLB,,, | Performed by: FAMILY MEDICINE

## 2021-05-05 PROCEDURE — 1101F PT FALLS ASSESS-DOCD LE1/YR: CPT | Mod: CPTII,S$GLB,, | Performed by: FAMILY MEDICINE

## 2021-05-05 PROCEDURE — 1125F AMNT PAIN NOTED PAIN PRSNT: CPT | Mod: S$GLB,,, | Performed by: FAMILY MEDICINE

## 2021-05-05 PROCEDURE — 3008F PR BODY MASS INDEX (BMI) DOCUMENTED: ICD-10-PCS | Mod: CPTII,S$GLB,, | Performed by: FAMILY MEDICINE

## 2021-05-05 PROCEDURE — 1125F PR PAIN SEVERITY QUANTIFIED, PAIN PRESENT: ICD-10-PCS | Mod: S$GLB,,, | Performed by: FAMILY MEDICINE

## 2021-05-05 PROCEDURE — 99499 UNLISTED E&M SERVICE: CPT | Mod: S$GLB,,, | Performed by: FAMILY MEDICINE

## 2021-05-05 PROCEDURE — 3288F FALL RISK ASSESSMENT DOCD: CPT | Mod: CPTII,S$GLB,, | Performed by: FAMILY MEDICINE

## 2021-05-05 PROCEDURE — 3008F BODY MASS INDEX DOCD: CPT | Mod: CPTII,S$GLB,, | Performed by: FAMILY MEDICINE

## 2021-05-05 PROCEDURE — 3288F PR FALLS RISK ASSESSMENT DOCUMENTED: ICD-10-PCS | Mod: CPTII,S$GLB,, | Performed by: FAMILY MEDICINE

## 2021-05-05 PROCEDURE — 1101F PR PT FALLS ASSESS DOC 0-1 FALLS W/OUT INJ PAST YR: ICD-10-PCS | Mod: CPTII,S$GLB,, | Performed by: FAMILY MEDICINE

## 2021-05-05 PROCEDURE — 99999 PR PBB SHADOW E&M-EST. PATIENT-LVL V: ICD-10-PCS | Mod: PBBFAC,,, | Performed by: FAMILY MEDICINE

## 2021-05-05 PROCEDURE — 99499 RISK ADDL DX/OHS AUDIT: ICD-10-PCS | Mod: S$GLB,,, | Performed by: FAMILY MEDICINE

## 2021-05-05 PROCEDURE — 99999 PR PBB SHADOW E&M-EST. PATIENT-LVL V: CPT | Mod: PBBFAC,,, | Performed by: FAMILY MEDICINE

## 2021-05-05 RX ORDER — INSULIN HUMAN 100 [IU]/ML
INJECTION, SUSPENSION SUBCUTANEOUS
COMMUNITY
Start: 2021-04-30 | End: 2021-05-11 | Stop reason: SDUPTHER

## 2021-05-06 ENCOUNTER — TELEPHONE (OUTPATIENT)
Dept: FAMILY MEDICINE | Facility: CLINIC | Age: 76
End: 2021-05-06

## 2021-05-11 ENCOUNTER — OFFICE VISIT (OUTPATIENT)
Dept: ENDOCRINOLOGY | Facility: CLINIC | Age: 76
End: 2021-05-11
Payer: MEDICARE

## 2021-05-11 ENCOUNTER — TELEPHONE (OUTPATIENT)
Dept: FAMILY MEDICINE | Facility: CLINIC | Age: 76
End: 2021-05-11

## 2021-05-11 VITALS
HEIGHT: 69 IN | HEART RATE: 81 BPM | DIASTOLIC BLOOD PRESSURE: 81 MMHG | BODY MASS INDEX: 28.41 KG/M2 | WEIGHT: 191.81 LBS | RESPIRATION RATE: 19 BRPM | OXYGEN SATURATION: 95 % | SYSTOLIC BLOOD PRESSURE: 138 MMHG | TEMPERATURE: 98 F

## 2021-05-11 DIAGNOSIS — I15.2 HYPERTENSION ASSOCIATED WITH DIABETES: ICD-10-CM

## 2021-05-11 DIAGNOSIS — E66.3 OVERWEIGHT: ICD-10-CM

## 2021-05-11 DIAGNOSIS — E11.59 HYPERTENSION ASSOCIATED WITH DIABETES: ICD-10-CM

## 2021-05-11 DIAGNOSIS — E11.8 TYPE 2 DIABETES MELLITUS WITH COMPLICATION, WITH LONG-TERM CURRENT USE OF INSULIN: Primary | ICD-10-CM

## 2021-05-11 DIAGNOSIS — Z79.4 TYPE 2 DIABETES MELLITUS WITH COMPLICATION, WITH LONG-TERM CURRENT USE OF INSULIN: Primary | ICD-10-CM

## 2021-05-11 PROCEDURE — 99999 PR PBB SHADOW E&M-EST. PATIENT-LVL V: CPT | Mod: PBBFAC,,, | Performed by: INTERNAL MEDICINE

## 2021-05-11 PROCEDURE — 3008F BODY MASS INDEX DOCD: CPT | Mod: CPTII,S$GLB,, | Performed by: INTERNAL MEDICINE

## 2021-05-11 PROCEDURE — 3046F HEMOGLOBIN A1C LEVEL >9.0%: CPT | Mod: CPTII,S$GLB,, | Performed by: INTERNAL MEDICINE

## 2021-05-11 PROCEDURE — 99999 PR PBB SHADOW E&M-EST. PATIENT-LVL V: ICD-10-PCS | Mod: PBBFAC,,, | Performed by: INTERNAL MEDICINE

## 2021-05-11 PROCEDURE — 3288F PR FALLS RISK ASSESSMENT DOCUMENTED: ICD-10-PCS | Mod: CPTII,S$GLB,, | Performed by: INTERNAL MEDICINE

## 2021-05-11 PROCEDURE — 1157F PR ADVANCE CARE PLAN OR EQUIV PRESENT IN MEDICAL RECORD: ICD-10-PCS | Mod: S$GLB,,, | Performed by: INTERNAL MEDICINE

## 2021-05-11 PROCEDURE — 1126F PR PAIN SEVERITY QUANTIFIED, NO PAIN PRESENT: ICD-10-PCS | Mod: S$GLB,,, | Performed by: INTERNAL MEDICINE

## 2021-05-11 PROCEDURE — 99214 PR OFFICE/OUTPT VISIT, EST, LEVL IV, 30-39 MIN: ICD-10-PCS | Mod: S$GLB,,, | Performed by: INTERNAL MEDICINE

## 2021-05-11 PROCEDURE — 1126F AMNT PAIN NOTED NONE PRSNT: CPT | Mod: S$GLB,,, | Performed by: INTERNAL MEDICINE

## 2021-05-11 PROCEDURE — 3046F PR MOST RECENT HEMOGLOBIN A1C LEVEL > 9.0%: ICD-10-PCS | Mod: CPTII,S$GLB,, | Performed by: INTERNAL MEDICINE

## 2021-05-11 PROCEDURE — 99214 OFFICE O/P EST MOD 30 MIN: CPT | Mod: S$GLB,,, | Performed by: INTERNAL MEDICINE

## 2021-05-11 PROCEDURE — 1101F PT FALLS ASSESS-DOCD LE1/YR: CPT | Mod: CPTII,S$GLB,, | Performed by: INTERNAL MEDICINE

## 2021-05-11 PROCEDURE — 3288F FALL RISK ASSESSMENT DOCD: CPT | Mod: CPTII,S$GLB,, | Performed by: INTERNAL MEDICINE

## 2021-05-11 PROCEDURE — 3008F PR BODY MASS INDEX (BMI) DOCUMENTED: ICD-10-PCS | Mod: CPTII,S$GLB,, | Performed by: INTERNAL MEDICINE

## 2021-05-11 PROCEDURE — 1157F ADVNC CARE PLAN IN RCRD: CPT | Mod: S$GLB,,, | Performed by: INTERNAL MEDICINE

## 2021-05-11 PROCEDURE — 1101F PR PT FALLS ASSESS DOC 0-1 FALLS W/OUT INJ PAST YR: ICD-10-PCS | Mod: CPTII,S$GLB,, | Performed by: INTERNAL MEDICINE

## 2021-05-11 RX ORDER — INSULIN HUMAN 100 [IU]/ML
45 INJECTION, SUSPENSION SUBCUTANEOUS 2 TIMES DAILY WITH MEALS
Qty: 45 ML | Refills: 11 | Status: SHIPPED | OUTPATIENT
Start: 2021-05-11 | End: 2022-01-27 | Stop reason: SDUPTHER

## 2021-05-11 RX ORDER — LANCETS
EACH MISCELLANEOUS
Qty: 100 EACH | Refills: 5 | Status: SHIPPED | OUTPATIENT
Start: 2021-05-11

## 2021-05-12 ENCOUNTER — OFFICE VISIT (OUTPATIENT)
Dept: FAMILY MEDICINE | Facility: CLINIC | Age: 76
End: 2021-05-12
Payer: MEDICARE

## 2021-05-12 ENCOUNTER — OFFICE VISIT (OUTPATIENT)
Dept: PODIATRY | Facility: CLINIC | Age: 76
End: 2021-05-12
Payer: MEDICARE

## 2021-05-12 ENCOUNTER — TELEPHONE (OUTPATIENT)
Dept: FAMILY MEDICINE | Facility: CLINIC | Age: 76
End: 2021-05-12

## 2021-05-12 VITALS
HEIGHT: 69 IN | OXYGEN SATURATION: 95 % | WEIGHT: 192 LBS | TEMPERATURE: 98 F | HEART RATE: 86 BPM | SYSTOLIC BLOOD PRESSURE: 136 MMHG | BODY MASS INDEX: 28.44 KG/M2 | DIASTOLIC BLOOD PRESSURE: 84 MMHG

## 2021-05-12 VITALS — WEIGHT: 192 LBS | HEIGHT: 69 IN | BODY MASS INDEX: 28.44 KG/M2 | RESPIRATION RATE: 16 BRPM

## 2021-05-12 DIAGNOSIS — E11.42 DIABETIC POLYNEUROPATHY ASSOCIATED WITH TYPE 2 DIABETES MELLITUS: ICD-10-CM

## 2021-05-12 DIAGNOSIS — I73.9 PVD (PERIPHERAL VASCULAR DISEASE): ICD-10-CM

## 2021-05-12 DIAGNOSIS — B35.1 ONYCHOMYCOSIS DUE TO DERMATOPHYTE: ICD-10-CM

## 2021-05-12 DIAGNOSIS — E11.51 TYPE II DIABETES MELLITUS WITH PERIPHERAL CIRCULATORY DISORDER: Primary | ICD-10-CM

## 2021-05-12 DIAGNOSIS — L60.0 OC (ONYCHOCRYPTOSIS): ICD-10-CM

## 2021-05-12 DIAGNOSIS — I73.9 PVD (PERIPHERAL VASCULAR DISEASE): Primary | ICD-10-CM

## 2021-05-12 DIAGNOSIS — R60.0 PERIPHERAL EDEMA: ICD-10-CM

## 2021-05-12 DIAGNOSIS — E11.42 DM TYPE 2 WITH DIABETIC PERIPHERAL NEUROPATHY: ICD-10-CM

## 2021-05-12 DIAGNOSIS — B35.3 TINEA PEDIS OF BOTH FEET: ICD-10-CM

## 2021-05-12 DIAGNOSIS — R26.2 DIFFICULTY IN WALKING, NOT ELSEWHERE CLASSIFIED: ICD-10-CM

## 2021-05-12 DIAGNOSIS — L84 PRE-ULCERATIVE CALLUSES: ICD-10-CM

## 2021-05-12 DIAGNOSIS — R52 PAIN: ICD-10-CM

## 2021-05-12 DIAGNOSIS — I87.2 STASIS DERMATITIS OF BOTH LEGS: ICD-10-CM

## 2021-05-12 DIAGNOSIS — L84 CORN OR CALLUS: ICD-10-CM

## 2021-05-12 DIAGNOSIS — E08.42 DIABETIC POLYNEUROPATHY ASSOCIATED WITH DIABETES MELLITUS DUE TO UNDERLYING CONDITION: ICD-10-CM

## 2021-05-12 DIAGNOSIS — L90.9 FAT PAD ATROPHY OF FOOT: ICD-10-CM

## 2021-05-12 DIAGNOSIS — L60.2 ONYCHOGRYPHOSIS: ICD-10-CM

## 2021-05-12 PROCEDURE — 1157F ADVNC CARE PLAN IN RCRD: CPT | Mod: S$GLB,,, | Performed by: PHYSICIAN ASSISTANT

## 2021-05-12 PROCEDURE — 3288F FALL RISK ASSESSMENT DOCD: CPT | Mod: CPTII,S$GLB,, | Performed by: PODIATRIST

## 2021-05-12 PROCEDURE — 1157F ADVNC CARE PLAN IN RCRD: CPT | Mod: S$GLB,,, | Performed by: PODIATRIST

## 2021-05-12 PROCEDURE — 3008F PR BODY MASS INDEX (BMI) DOCUMENTED: ICD-10-PCS | Mod: CPTII,S$GLB,, | Performed by: PODIATRIST

## 2021-05-12 PROCEDURE — 1101F PR PT FALLS ASSESS DOC 0-1 FALLS W/OUT INJ PAST YR: ICD-10-PCS | Mod: CPTII,S$GLB,, | Performed by: PHYSICIAN ASSISTANT

## 2021-05-12 PROCEDURE — 1157F PR ADVANCE CARE PLAN OR EQUIV PRESENT IN MEDICAL RECORD: ICD-10-PCS | Mod: S$GLB,,, | Performed by: PODIATRIST

## 2021-05-12 PROCEDURE — 3008F PR BODY MASS INDEX (BMI) DOCUMENTED: ICD-10-PCS | Mod: CPTII,S$GLB,, | Performed by: PHYSICIAN ASSISTANT

## 2021-05-12 PROCEDURE — 3288F FALL RISK ASSESSMENT DOCD: CPT | Mod: CPTII,S$GLB,, | Performed by: PHYSICIAN ASSISTANT

## 2021-05-12 PROCEDURE — 1125F PR PAIN SEVERITY QUANTIFIED, PAIN PRESENT: ICD-10-PCS | Mod: S$GLB,,, | Performed by: PHYSICIAN ASSISTANT

## 2021-05-12 PROCEDURE — 1159F MED LIST DOCD IN RCRD: CPT | Mod: S$GLB,,, | Performed by: PHYSICIAN ASSISTANT

## 2021-05-12 PROCEDURE — 3288F PR FALLS RISK ASSESSMENT DOCUMENTED: ICD-10-PCS | Mod: CPTII,S$GLB,, | Performed by: PODIATRIST

## 2021-05-12 PROCEDURE — 99999 PR PBB SHADOW E&M-EST. PATIENT-LVL IV: CPT | Mod: PBBFAC,,, | Performed by: PHYSICIAN ASSISTANT

## 2021-05-12 PROCEDURE — 3046F HEMOGLOBIN A1C LEVEL >9.0%: CPT | Mod: CPTII,S$GLB,, | Performed by: PODIATRIST

## 2021-05-12 PROCEDURE — 99213 OFFICE O/P EST LOW 20 MIN: CPT | Mod: 25,S$GLB,, | Performed by: PODIATRIST

## 2021-05-12 PROCEDURE — 1159F PR MEDICATION LIST DOCUMENTED IN MEDICAL RECORD: ICD-10-PCS | Mod: S$GLB,,, | Performed by: PODIATRIST

## 2021-05-12 PROCEDURE — 1125F AMNT PAIN NOTED PAIN PRSNT: CPT | Mod: S$GLB,,, | Performed by: PHYSICIAN ASSISTANT

## 2021-05-12 PROCEDURE — 3008F BODY MASS INDEX DOCD: CPT | Mod: CPTII,S$GLB,, | Performed by: PODIATRIST

## 2021-05-12 PROCEDURE — 3288F PR FALLS RISK ASSESSMENT DOCUMENTED: ICD-10-PCS | Mod: CPTII,S$GLB,, | Performed by: PHYSICIAN ASSISTANT

## 2021-05-12 PROCEDURE — 11056 PARNG/CUTG B9 HYPRKR LES 2-4: CPT | Mod: Q8,S$GLB,, | Performed by: PODIATRIST

## 2021-05-12 PROCEDURE — 3046F PR MOST RECENT HEMOGLOBIN A1C LEVEL > 9.0%: ICD-10-PCS | Mod: CPTII,S$GLB,, | Performed by: PODIATRIST

## 2021-05-12 PROCEDURE — 1101F PR PT FALLS ASSESS DOC 0-1 FALLS W/OUT INJ PAST YR: ICD-10-PCS | Mod: CPTII,S$GLB,, | Performed by: PODIATRIST

## 2021-05-12 PROCEDURE — 1101F PT FALLS ASSESS-DOCD LE1/YR: CPT | Mod: CPTII,S$GLB,, | Performed by: PODIATRIST

## 2021-05-12 PROCEDURE — 11721 ROUTINE FOOT CARE: ICD-10-PCS | Mod: 59,Q8,S$GLB, | Performed by: PODIATRIST

## 2021-05-12 PROCEDURE — 3008F BODY MASS INDEX DOCD: CPT | Mod: CPTII,S$GLB,, | Performed by: PHYSICIAN ASSISTANT

## 2021-05-12 PROCEDURE — 1101F PT FALLS ASSESS-DOCD LE1/YR: CPT | Mod: CPTII,S$GLB,, | Performed by: PHYSICIAN ASSISTANT

## 2021-05-12 PROCEDURE — 1157F PR ADVANCE CARE PLAN OR EQUIV PRESENT IN MEDICAL RECORD: ICD-10-PCS | Mod: S$GLB,,, | Performed by: PHYSICIAN ASSISTANT

## 2021-05-12 PROCEDURE — 99999 PR PBB SHADOW E&M-EST. PATIENT-LVL IV: ICD-10-PCS | Mod: PBBFAC,,, | Performed by: PHYSICIAN ASSISTANT

## 2021-05-12 PROCEDURE — 11721 DEBRIDE NAIL 6 OR MORE: CPT | Mod: 59,Q8,S$GLB, | Performed by: PODIATRIST

## 2021-05-12 PROCEDURE — 1159F MED LIST DOCD IN RCRD: CPT | Mod: S$GLB,,, | Performed by: PODIATRIST

## 2021-05-12 PROCEDURE — 1159F PR MEDICATION LIST DOCUMENTED IN MEDICAL RECORD: ICD-10-PCS | Mod: S$GLB,,, | Performed by: PHYSICIAN ASSISTANT

## 2021-05-12 PROCEDURE — 99213 OFFICE O/P EST LOW 20 MIN: CPT | Mod: S$GLB,,, | Performed by: PHYSICIAN ASSISTANT

## 2021-05-12 PROCEDURE — 11056 ROUTINE FOOT CARE: ICD-10-PCS | Mod: Q8,S$GLB,, | Performed by: PODIATRIST

## 2021-05-12 PROCEDURE — 99213 PR OFFICE/OUTPT VISIT, EST, LEVL III, 20-29 MIN: ICD-10-PCS | Mod: S$GLB,,, | Performed by: PHYSICIAN ASSISTANT

## 2021-05-12 PROCEDURE — 99213 PR OFFICE/OUTPT VISIT, EST, LEVL III, 20-29 MIN: ICD-10-PCS | Mod: 25,S$GLB,, | Performed by: PODIATRIST

## 2021-05-15 PROCEDURE — G0180 MD CERTIFICATION HHA PATIENT: HCPCS | Mod: ,,, | Performed by: FAMILY MEDICINE

## 2021-05-15 PROCEDURE — G0180 PR HOME HEALTH MD CERTIFICATION: ICD-10-PCS | Mod: ,,, | Performed by: FAMILY MEDICINE

## 2021-05-20 ENCOUNTER — TELEPHONE (OUTPATIENT)
Dept: FAMILY MEDICINE | Facility: CLINIC | Age: 76
End: 2021-05-20

## 2021-05-25 ENCOUNTER — HOSPITAL ENCOUNTER (EMERGENCY)
Facility: HOSPITAL | Age: 76
Discharge: HOME | End: 2021-05-25
Attending: EMERGENCY MEDICINE
Payer: MEDICARE

## 2021-05-25 VITALS
HEART RATE: 81 BPM | RESPIRATION RATE: 22 BRPM | DIASTOLIC BLOOD PRESSURE: 69 MMHG | SYSTOLIC BLOOD PRESSURE: 147 MMHG | OXYGEN SATURATION: 96 %

## 2021-05-25 VITALS
RESPIRATION RATE: 19 BRPM | BODY MASS INDEX: 28.14 KG/M2 | SYSTOLIC BLOOD PRESSURE: 110 MMHG | DIASTOLIC BLOOD PRESSURE: 72 MMHG | TEMPERATURE: 98 F | WEIGHT: 190 LBS | HEART RATE: 85 BPM | OXYGEN SATURATION: 95 % | HEIGHT: 69 IN

## 2021-05-25 DIAGNOSIS — D69.6 THROMBOCYTOPENIA: ICD-10-CM

## 2021-05-25 DIAGNOSIS — R33.8 ACUTE URINARY RETENTION: Primary | ICD-10-CM

## 2021-05-25 DIAGNOSIS — R73.9 HYPERGLYCEMIA: ICD-10-CM

## 2021-05-25 DIAGNOSIS — T83.9XXA FOLEY CATHETER PROBLEM, INITIAL ENCOUNTER: Primary | ICD-10-CM

## 2021-05-25 LAB
ALBUMIN SERPL BCP-MCNC: 4.5 G/DL (ref 3.5–5.2)
ALP SERPL-CCNC: 104 U/L (ref 55–135)
ALT SERPL W/O P-5'-P-CCNC: 35 U/L (ref 10–44)
ANION GAP SERPL CALC-SCNC: 12 MMOL/L (ref 8–16)
AST SERPL-CCNC: 49 U/L (ref 10–40)
BACTERIA #/AREA URNS HPF: NEGATIVE /HPF
BASOPHILS # BLD AUTO: 0.01 K/UL (ref 0–0.2)
BASOPHILS NFR BLD: 0.1 % (ref 0–1.9)
BILIRUB SERPL-MCNC: 2.1 MG/DL (ref 0.1–1)
BILIRUB UR QL STRIP: NEGATIVE
BUN SERPL-MCNC: 23 MG/DL (ref 8–23)
CALCIUM SERPL-MCNC: 8.8 MG/DL (ref 8.7–10.5)
CHLORIDE SERPL-SCNC: 98 MMOL/L (ref 95–110)
CLARITY UR: CLEAR
CO2 SERPL-SCNC: 23 MMOL/L (ref 23–29)
COLOR UR: YELLOW
CREAT SERPL-MCNC: 0.8 MG/DL (ref 0.5–1.4)
DIFFERENTIAL METHOD: ABNORMAL
EOSINOPHIL # BLD AUTO: 0 K/UL (ref 0–0.5)
EOSINOPHIL NFR BLD: 0.6 % (ref 0–8)
ERYTHROCYTE [DISTWIDTH] IN BLOOD BY AUTOMATED COUNT: 14.8 % (ref 11.5–14.5)
EST. GFR  (AFRICAN AMERICAN): >60 ML/MIN/1.73 M^2
EST. GFR  (NON AFRICAN AMERICAN): >60 ML/MIN/1.73 M^2
GLUCOSE SERPL-MCNC: 269 MG/DL (ref 70–110)
GLUCOSE UR QL STRIP: ABNORMAL
HCT VFR BLD AUTO: 39.3 % (ref 40–54)
HGB BLD-MCNC: 13.4 G/DL (ref 14–18)
HGB UR QL STRIP: ABNORMAL
HYALINE CASTS #/AREA URNS LPF: 1 /LPF
IMM GRANULOCYTES # BLD AUTO: 0.03 K/UL (ref 0–0.04)
IMM GRANULOCYTES NFR BLD AUTO: 0.4 % (ref 0–0.5)
KETONES UR QL STRIP: NEGATIVE
LEUKOCYTE ESTERASE UR QL STRIP: NEGATIVE
LYMPHOCYTES # BLD AUTO: 0.4 K/UL (ref 1–4.8)
LYMPHOCYTES NFR BLD: 5.7 % (ref 18–48)
MCH RBC QN AUTO: 28.2 PG (ref 27–31)
MCHC RBC AUTO-ENTMCNC: 34.1 G/DL (ref 32–36)
MCV RBC AUTO: 83 FL (ref 82–98)
MICROSCOPIC COMMENT: ABNORMAL
MONOCYTES # BLD AUTO: 0.5 K/UL (ref 0.3–1)
MONOCYTES NFR BLD: 6.7 % (ref 4–15)
NEUTROPHILS # BLD AUTO: 6 K/UL (ref 1.8–7.7)
NEUTROPHILS NFR BLD: 86.5 % (ref 38–73)
NITRITE UR QL STRIP: NEGATIVE
NRBC BLD-RTO: 0 /100 WBC
PH UR STRIP: 6 [PH] (ref 5–8)
PLATELET # BLD AUTO: 57 K/UL (ref 150–450)
PMV BLD AUTO: 10.3 FL (ref 9.2–12.9)
POTASSIUM SERPL-SCNC: 4.4 MMOL/L (ref 3.5–5.1)
PROT SERPL-MCNC: 7.5 G/DL (ref 6–8.4)
PROT UR QL STRIP: ABNORMAL
RBC # BLD AUTO: 4.75 M/UL (ref 4.6–6.2)
RBC #/AREA URNS HPF: 5 /HPF (ref 0–4)
SODIUM SERPL-SCNC: 133 MMOL/L (ref 136–145)
SP GR UR STRIP: 1.02 (ref 1–1.03)
SQUAMOUS #/AREA URNS HPF: 2 /HPF
URN SPEC COLLECT METH UR: ABNORMAL
UROBILINOGEN UR STRIP-ACNC: ABNORMAL EU/DL
WBC # BLD AUTO: 6.89 K/UL (ref 3.9–12.7)
WBC #/AREA URNS HPF: 2 /HPF (ref 0–5)
YEAST URNS QL MICRO: ABNORMAL

## 2021-05-25 PROCEDURE — 96374 THER/PROPH/DIAG INJ IV PUSH: CPT | Mod: 59

## 2021-05-25 PROCEDURE — 99282 EMERGENCY DEPT VISIT SF MDM: CPT | Mod: 25

## 2021-05-25 PROCEDURE — 51702 INSERT TEMP BLADDER CATH: CPT

## 2021-05-25 PROCEDURE — 63600175 PHARM REV CODE 636 W HCPCS: Performed by: EMERGENCY MEDICINE

## 2021-05-25 PROCEDURE — 96375 TX/PRO/DX INJ NEW DRUG ADDON: CPT | Mod: 59

## 2021-05-25 PROCEDURE — 85025 COMPLETE CBC W/AUTO DIFF WBC: CPT | Performed by: EMERGENCY MEDICINE

## 2021-05-25 PROCEDURE — 36415 COLL VENOUS BLD VENIPUNCTURE: CPT | Performed by: EMERGENCY MEDICINE

## 2021-05-25 PROCEDURE — 80053 COMPREHEN METABOLIC PANEL: CPT | Performed by: EMERGENCY MEDICINE

## 2021-05-25 PROCEDURE — 99284 EMERGENCY DEPT VISIT MOD MDM: CPT | Mod: 25

## 2021-05-25 PROCEDURE — 81001 URINALYSIS AUTO W/SCOPE: CPT | Performed by: EMERGENCY MEDICINE

## 2021-05-25 RX ORDER — MORPHINE SULFATE 4 MG/ML
4 INJECTION, SOLUTION INTRAMUSCULAR; INTRAVENOUS
Status: COMPLETED | OUTPATIENT
Start: 2021-05-25 | End: 2021-05-25

## 2021-05-25 RX ORDER — ONDANSETRON 2 MG/ML
4 INJECTION INTRAMUSCULAR; INTRAVENOUS
Status: COMPLETED | OUTPATIENT
Start: 2021-05-25 | End: 2021-05-25

## 2021-05-25 RX ORDER — TAMSULOSIN HYDROCHLORIDE 0.4 MG/1
0.4 CAPSULE ORAL DAILY
Qty: 10 CAPSULE | Refills: 0 | Status: SHIPPED | OUTPATIENT
Start: 2021-05-25 | End: 2021-06-24

## 2021-05-25 RX ORDER — LORAZEPAM 2 MG/ML
1 INJECTION INTRAMUSCULAR
Status: COMPLETED | OUTPATIENT
Start: 2021-05-25 | End: 2021-05-25

## 2021-05-25 RX ADMIN — MORPHINE SULFATE 4 MG: 4 INJECTION INTRAVENOUS at 04:05

## 2021-05-25 RX ADMIN — LORAZEPAM 1 MG: 2 INJECTION INTRAMUSCULAR; INTRAVENOUS at 04:05

## 2021-05-25 RX ADMIN — ONDANSETRON 4 MG: 2 INJECTION INTRAMUSCULAR; INTRAVENOUS at 04:05

## 2021-05-26 ENCOUNTER — TELEPHONE (OUTPATIENT)
Dept: UROLOGY | Facility: CLINIC | Age: 76
End: 2021-05-26

## 2021-05-26 ENCOUNTER — TELEPHONE (OUTPATIENT)
Dept: FAMILY MEDICINE | Facility: CLINIC | Age: 76
End: 2021-05-26

## 2021-05-28 ENCOUNTER — OFFICE VISIT (OUTPATIENT)
Dept: FAMILY MEDICINE | Facility: CLINIC | Age: 76
End: 2021-05-28
Payer: MEDICARE

## 2021-05-28 VITALS
HEART RATE: 76 BPM | SYSTOLIC BLOOD PRESSURE: 110 MMHG | WEIGHT: 195.13 LBS | TEMPERATURE: 98 F | DIASTOLIC BLOOD PRESSURE: 60 MMHG | HEIGHT: 69 IN | OXYGEN SATURATION: 96 % | BODY MASS INDEX: 28.9 KG/M2

## 2021-05-28 DIAGNOSIS — I15.2 HYPERTENSION ASSOCIATED WITH DIABETES: Primary | ICD-10-CM

## 2021-05-28 DIAGNOSIS — E11.65 UNCONTROLLED TYPE 2 DIABETES MELLITUS WITH HYPERGLYCEMIA: ICD-10-CM

## 2021-05-28 DIAGNOSIS — E11.59 HYPERTENSION ASSOCIATED WITH DIABETES: Primary | ICD-10-CM

## 2021-05-28 DIAGNOSIS — F51.01 PRIMARY INSOMNIA: ICD-10-CM

## 2021-05-28 PROCEDURE — 3046F HEMOGLOBIN A1C LEVEL >9.0%: CPT | Mod: CPTII,S$GLB,, | Performed by: FAMILY MEDICINE

## 2021-05-28 PROCEDURE — 1125F AMNT PAIN NOTED PAIN PRSNT: CPT | Mod: S$GLB,,, | Performed by: FAMILY MEDICINE

## 2021-05-28 PROCEDURE — 99999 PR PBB SHADOW E&M-EST. PATIENT-LVL III: ICD-10-PCS | Mod: PBBFAC,,, | Performed by: FAMILY MEDICINE

## 2021-05-28 PROCEDURE — 1125F PR PAIN SEVERITY QUANTIFIED, PAIN PRESENT: ICD-10-PCS | Mod: S$GLB,,, | Performed by: FAMILY MEDICINE

## 2021-05-28 PROCEDURE — 99213 OFFICE O/P EST LOW 20 MIN: CPT | Mod: S$GLB,,, | Performed by: FAMILY MEDICINE

## 2021-05-28 PROCEDURE — 3074F SYST BP LT 130 MM HG: CPT | Mod: CPTII,S$GLB,, | Performed by: FAMILY MEDICINE

## 2021-05-28 PROCEDURE — 3078F DIAST BP <80 MM HG: CPT | Mod: CPTII,S$GLB,, | Performed by: FAMILY MEDICINE

## 2021-05-28 PROCEDURE — 1157F PR ADVANCE CARE PLAN OR EQUIV PRESENT IN MEDICAL RECORD: ICD-10-PCS | Mod: S$GLB,,, | Performed by: FAMILY MEDICINE

## 2021-05-28 PROCEDURE — 3008F PR BODY MASS INDEX (BMI) DOCUMENTED: ICD-10-PCS | Mod: CPTII,S$GLB,, | Performed by: FAMILY MEDICINE

## 2021-05-28 PROCEDURE — 99213 PR OFFICE/OUTPT VISIT, EST, LEVL III, 20-29 MIN: ICD-10-PCS | Mod: S$GLB,,, | Performed by: FAMILY MEDICINE

## 2021-05-28 PROCEDURE — 3008F BODY MASS INDEX DOCD: CPT | Mod: CPTII,S$GLB,, | Performed by: FAMILY MEDICINE

## 2021-05-28 PROCEDURE — 3046F PR MOST RECENT HEMOGLOBIN A1C LEVEL > 9.0%: ICD-10-PCS | Mod: CPTII,S$GLB,, | Performed by: FAMILY MEDICINE

## 2021-05-28 PROCEDURE — 3074F PR MOST RECENT SYSTOLIC BLOOD PRESSURE < 130 MM HG: ICD-10-PCS | Mod: CPTII,S$GLB,, | Performed by: FAMILY MEDICINE

## 2021-05-28 PROCEDURE — 1157F ADVNC CARE PLAN IN RCRD: CPT | Mod: S$GLB,,, | Performed by: FAMILY MEDICINE

## 2021-05-28 PROCEDURE — 99999 PR PBB SHADOW E&M-EST. PATIENT-LVL III: CPT | Mod: PBBFAC,,, | Performed by: FAMILY MEDICINE

## 2021-05-28 PROCEDURE — 3078F PR MOST RECENT DIASTOLIC BLOOD PRESSURE < 80 MM HG: ICD-10-PCS | Mod: CPTII,S$GLB,, | Performed by: FAMILY MEDICINE

## 2021-06-01 ENCOUNTER — EXTERNAL HOME HEALTH (OUTPATIENT)
Dept: HOME HEALTH SERVICES | Facility: HOSPITAL | Age: 76
End: 2021-06-01

## 2021-06-03 ENCOUNTER — EXTERNAL HOME HEALTH (OUTPATIENT)
Dept: HOME HEALTH SERVICES | Facility: HOSPITAL | Age: 76
End: 2021-06-03
Payer: MEDICARE

## 2021-06-04 ENCOUNTER — TELEPHONE (OUTPATIENT)
Dept: FAMILY MEDICINE | Facility: CLINIC | Age: 76
End: 2021-06-04

## 2021-06-10 ENCOUNTER — DOCUMENTATION ONLY (OUTPATIENT)
Dept: REHABILITATION | Facility: HOSPITAL | Age: 76
End: 2021-06-10

## 2021-06-10 DIAGNOSIS — R52 PAIN: ICD-10-CM

## 2021-06-10 DIAGNOSIS — Z74.09 DECREASED FUNCTIONAL MOBILITY AND ENDURANCE: ICD-10-CM

## 2021-06-10 DIAGNOSIS — R60.0 LOWER EXTREMITY EDEMA: ICD-10-CM

## 2021-06-10 DIAGNOSIS — Z91.89 AT HIGH RISK FOR IMPAIRED SKIN INTEGRITY: Primary | ICD-10-CM

## 2021-06-11 ENCOUNTER — DOCUMENT SCAN (OUTPATIENT)
Dept: HOME HEALTH SERVICES | Facility: HOSPITAL | Age: 76
End: 2021-06-11
Payer: MEDICARE

## 2021-06-12 ENCOUNTER — PATIENT OUTREACH (OUTPATIENT)
Dept: ADMINISTRATIVE | Facility: HOSPITAL | Age: 76
End: 2021-06-12

## 2021-06-14 ENCOUNTER — TELEPHONE (OUTPATIENT)
Dept: PULMONOLOGY | Facility: CLINIC | Age: 76
End: 2021-06-14

## 2021-06-22 ENCOUNTER — PATIENT OUTREACH (OUTPATIENT)
Dept: ADMINISTRATIVE | Facility: OTHER | Age: 76
End: 2021-06-22

## 2021-06-23 ENCOUNTER — TELEPHONE (OUTPATIENT)
Dept: FAMILY MEDICINE | Facility: CLINIC | Age: 76
End: 2021-06-23

## 2021-06-24 ENCOUNTER — TELEPHONE (OUTPATIENT)
Dept: FAMILY MEDICINE | Facility: CLINIC | Age: 76
End: 2021-06-24

## 2021-06-24 ENCOUNTER — OFFICE VISIT (OUTPATIENT)
Dept: FAMILY MEDICINE | Facility: CLINIC | Age: 76
End: 2021-06-24
Payer: MEDICARE

## 2021-06-24 VITALS
SYSTOLIC BLOOD PRESSURE: 118 MMHG | DIASTOLIC BLOOD PRESSURE: 68 MMHG | HEIGHT: 69 IN | HEART RATE: 79 BPM | BODY MASS INDEX: 27 KG/M2 | OXYGEN SATURATION: 96 % | WEIGHT: 182.31 LBS

## 2021-06-24 DIAGNOSIS — I73.9 PVD (PERIPHERAL VASCULAR DISEASE): Primary | ICD-10-CM

## 2021-06-24 DIAGNOSIS — Z76.0 MEDICATION REFILL: ICD-10-CM

## 2021-06-24 DIAGNOSIS — M62.838 MUSCLE SPASM: ICD-10-CM

## 2021-06-24 DIAGNOSIS — G89.29 CHRONIC LEFT-SIDED THORACIC BACK PAIN: ICD-10-CM

## 2021-06-24 DIAGNOSIS — M54.6 CHRONIC LEFT-SIDED THORACIC BACK PAIN: ICD-10-CM

## 2021-06-24 DIAGNOSIS — L03.119 CELLULITIS OF LOWER EXTREMITY, UNSPECIFIED LATERALITY: ICD-10-CM

## 2021-06-24 PROCEDURE — 1170F FXNL STATUS ASSESSED: CPT | Mod: S$GLB,,, | Performed by: NURSE PRACTITIONER

## 2021-06-24 PROCEDURE — 1101F PR PT FALLS ASSESS DOC 0-1 FALLS W/OUT INJ PAST YR: ICD-10-PCS | Mod: CPTII,S$GLB,, | Performed by: NURSE PRACTITIONER

## 2021-06-24 PROCEDURE — 1125F PR PAIN SEVERITY QUANTIFIED, PAIN PRESENT: ICD-10-PCS | Mod: S$GLB,,, | Performed by: NURSE PRACTITIONER

## 2021-06-24 PROCEDURE — 3288F PR FALLS RISK ASSESSMENT DOCUMENTED: ICD-10-PCS | Mod: CPTII,S$GLB,, | Performed by: NURSE PRACTITIONER

## 2021-06-24 PROCEDURE — 99214 OFFICE O/P EST MOD 30 MIN: CPT | Mod: S$GLB,,, | Performed by: NURSE PRACTITIONER

## 2021-06-24 PROCEDURE — 1101F PT FALLS ASSESS-DOCD LE1/YR: CPT | Mod: CPTII,S$GLB,, | Performed by: NURSE PRACTITIONER

## 2021-06-24 PROCEDURE — 1125F AMNT PAIN NOTED PAIN PRSNT: CPT | Mod: S$GLB,,, | Performed by: NURSE PRACTITIONER

## 2021-06-24 PROCEDURE — 1157F ADVNC CARE PLAN IN RCRD: CPT | Mod: S$GLB,,, | Performed by: NURSE PRACTITIONER

## 2021-06-24 PROCEDURE — 1159F PR MEDICATION LIST DOCUMENTED IN MEDICAL RECORD: ICD-10-PCS | Mod: S$GLB,,, | Performed by: NURSE PRACTITIONER

## 2021-06-24 PROCEDURE — 1159F MED LIST DOCD IN RCRD: CPT | Mod: S$GLB,,, | Performed by: NURSE PRACTITIONER

## 2021-06-24 PROCEDURE — 99214 PR OFFICE/OUTPT VISIT, EST, LEVL IV, 30-39 MIN: ICD-10-PCS | Mod: S$GLB,,, | Performed by: NURSE PRACTITIONER

## 2021-06-24 PROCEDURE — 3288F FALL RISK ASSESSMENT DOCD: CPT | Mod: CPTII,S$GLB,, | Performed by: NURSE PRACTITIONER

## 2021-06-24 PROCEDURE — 3008F PR BODY MASS INDEX (BMI) DOCUMENTED: ICD-10-PCS | Mod: CPTII,S$GLB,, | Performed by: NURSE PRACTITIONER

## 2021-06-24 PROCEDURE — 99999 PR PBB SHADOW E&M-EST. PATIENT-LVL IV: ICD-10-PCS | Mod: PBBFAC,,, | Performed by: NURSE PRACTITIONER

## 2021-06-24 PROCEDURE — 1157F PR ADVANCE CARE PLAN OR EQUIV PRESENT IN MEDICAL RECORD: ICD-10-PCS | Mod: S$GLB,,, | Performed by: NURSE PRACTITIONER

## 2021-06-24 PROCEDURE — 99999 PR PBB SHADOW E&M-EST. PATIENT-LVL IV: CPT | Mod: PBBFAC,,, | Performed by: NURSE PRACTITIONER

## 2021-06-24 PROCEDURE — 3008F BODY MASS INDEX DOCD: CPT | Mod: CPTII,S$GLB,, | Performed by: NURSE PRACTITIONER

## 2021-06-24 PROCEDURE — 1170F PR FUNCTIONAL STATUS ASSESSED: ICD-10-PCS | Mod: S$GLB,,, | Performed by: NURSE PRACTITIONER

## 2021-06-24 RX ORDER — SULFAMETHOXAZOLE AND TRIMETHOPRIM 800; 160 MG/1; MG/1
1 TABLET ORAL 2 TIMES DAILY
Qty: 14 TABLET | Refills: 0 | Status: SHIPPED | OUTPATIENT
Start: 2021-06-24 | End: 2021-07-01

## 2021-06-24 RX ORDER — METHOCARBAMOL 500 MG/1
500 TABLET, FILM COATED ORAL 3 TIMES DAILY
Qty: 30 TABLET | Refills: 0 | Status: SHIPPED | OUTPATIENT
Start: 2021-06-24 | End: 2021-07-04

## 2021-06-24 RX ORDER — GABAPENTIN 100 MG/1
100 CAPSULE ORAL 3 TIMES DAILY
Qty: 90 CAPSULE | Refills: 2 | Status: SHIPPED | OUTPATIENT
Start: 2021-06-24 | End: 2022-03-22

## 2021-06-24 RX ORDER — ONDANSETRON 4 MG/1
4 TABLET, ORALLY DISINTEGRATING ORAL EVERY 8 HOURS PRN
Qty: 12 TABLET | Refills: 0 | Status: SHIPPED | OUTPATIENT
Start: 2021-06-24

## 2021-06-24 RX ORDER — ONDANSETRON 4 MG/1
4 TABLET, ORALLY DISINTEGRATING ORAL EVERY 8 HOURS PRN
Qty: 12 TABLET | Refills: 0 | Status: SHIPPED | OUTPATIENT
Start: 2021-06-24 | End: 2021-06-24

## 2021-07-14 ENCOUNTER — OFFICE VISIT (OUTPATIENT)
Dept: PODIATRY | Facility: CLINIC | Age: 76
End: 2021-07-14
Payer: MEDICARE

## 2021-07-14 VITALS
HEIGHT: 69 IN | BODY MASS INDEX: 26.96 KG/M2 | SYSTOLIC BLOOD PRESSURE: 122 MMHG | WEIGHT: 182 LBS | RESPIRATION RATE: 16 BRPM | DIASTOLIC BLOOD PRESSURE: 66 MMHG | OXYGEN SATURATION: 95 % | HEART RATE: 74 BPM

## 2021-07-14 DIAGNOSIS — R26.2 DIFFICULTY IN WALKING, NOT ELSEWHERE CLASSIFIED: ICD-10-CM

## 2021-07-14 DIAGNOSIS — B35.3 TINEA PEDIS OF BOTH FEET: ICD-10-CM

## 2021-07-14 DIAGNOSIS — L60.2 ONYCHOGRYPHOSIS: ICD-10-CM

## 2021-07-14 DIAGNOSIS — L90.9 FAT PAD ATROPHY OF FOOT: ICD-10-CM

## 2021-07-14 DIAGNOSIS — E11.42 DM TYPE 2 WITH DIABETIC PERIPHERAL NEUROPATHY: ICD-10-CM

## 2021-07-14 DIAGNOSIS — E08.42 DIABETIC POLYNEUROPATHY ASSOCIATED WITH DIABETES MELLITUS DUE TO UNDERLYING CONDITION: ICD-10-CM

## 2021-07-14 DIAGNOSIS — L84 PRE-ULCERATIVE CALLUSES: ICD-10-CM

## 2021-07-14 DIAGNOSIS — B35.1 ONYCHOMYCOSIS DUE TO DERMATOPHYTE: ICD-10-CM

## 2021-07-14 DIAGNOSIS — L84 CORN OR CALLUS: ICD-10-CM

## 2021-07-14 DIAGNOSIS — E11.51 TYPE II DIABETES MELLITUS WITH PERIPHERAL CIRCULATORY DISORDER: Primary | ICD-10-CM

## 2021-07-14 DIAGNOSIS — I73.9 PVD (PERIPHERAL VASCULAR DISEASE): ICD-10-CM

## 2021-07-14 PROCEDURE — 1100F PR PT FALLS ASSESS DOC 2+ FALLS/FALL W/INJURY/YR: ICD-10-PCS | Mod: CPTII,S$GLB,, | Performed by: PODIATRIST

## 2021-07-14 PROCEDURE — 3008F PR BODY MASS INDEX (BMI) DOCUMENTED: ICD-10-PCS | Mod: CPTII,S$GLB,, | Performed by: PODIATRIST

## 2021-07-14 PROCEDURE — 99213 OFFICE O/P EST LOW 20 MIN: CPT | Mod: 25,S$GLB,, | Performed by: PODIATRIST

## 2021-07-14 PROCEDURE — 1159F MED LIST DOCD IN RCRD: CPT | Mod: S$GLB,,, | Performed by: PODIATRIST

## 2021-07-14 PROCEDURE — 1125F PR PAIN SEVERITY QUANTIFIED, PAIN PRESENT: ICD-10-PCS | Mod: S$GLB,,, | Performed by: PODIATRIST

## 2021-07-14 PROCEDURE — 1157F ADVNC CARE PLAN IN RCRD: CPT | Mod: S$GLB,,, | Performed by: PODIATRIST

## 2021-07-14 PROCEDURE — 11721 DEBRIDE NAIL 6 OR MORE: CPT | Mod: Q8,59,S$GLB, | Performed by: PODIATRIST

## 2021-07-14 PROCEDURE — 1157F PR ADVANCE CARE PLAN OR EQUIV PRESENT IN MEDICAL RECORD: ICD-10-PCS | Mod: S$GLB,,, | Performed by: PODIATRIST

## 2021-07-14 PROCEDURE — 3288F FALL RISK ASSESSMENT DOCD: CPT | Mod: CPTII,S$GLB,, | Performed by: PODIATRIST

## 2021-07-14 PROCEDURE — 11056 ROUTINE FOOT CARE: ICD-10-PCS | Mod: Q8,S$GLB,, | Performed by: PODIATRIST

## 2021-07-14 PROCEDURE — 1100F PTFALLS ASSESS-DOCD GE2>/YR: CPT | Mod: CPTII,S$GLB,, | Performed by: PODIATRIST

## 2021-07-14 PROCEDURE — 3288F PR FALLS RISK ASSESSMENT DOCUMENTED: ICD-10-PCS | Mod: CPTII,S$GLB,, | Performed by: PODIATRIST

## 2021-07-14 PROCEDURE — 3008F BODY MASS INDEX DOCD: CPT | Mod: CPTII,S$GLB,, | Performed by: PODIATRIST

## 2021-07-14 PROCEDURE — 3046F PR MOST RECENT HEMOGLOBIN A1C LEVEL > 9.0%: ICD-10-PCS | Mod: CPTII,S$GLB,, | Performed by: PODIATRIST

## 2021-07-14 PROCEDURE — 99213 PR OFFICE/OUTPT VISIT, EST, LEVL III, 20-29 MIN: ICD-10-PCS | Mod: 25,S$GLB,, | Performed by: PODIATRIST

## 2021-07-14 PROCEDURE — 3046F HEMOGLOBIN A1C LEVEL >9.0%: CPT | Mod: CPTII,S$GLB,, | Performed by: PODIATRIST

## 2021-07-14 PROCEDURE — 1125F AMNT PAIN NOTED PAIN PRSNT: CPT | Mod: S$GLB,,, | Performed by: PODIATRIST

## 2021-07-14 PROCEDURE — 1159F PR MEDICATION LIST DOCUMENTED IN MEDICAL RECORD: ICD-10-PCS | Mod: S$GLB,,, | Performed by: PODIATRIST

## 2021-07-14 PROCEDURE — 11056 PARNG/CUTG B9 HYPRKR LES 2-4: CPT | Mod: Q8,S$GLB,, | Performed by: PODIATRIST

## 2021-07-14 PROCEDURE — 11721 ROUTINE FOOT CARE: ICD-10-PCS | Mod: Q8,59,S$GLB, | Performed by: PODIATRIST

## 2021-07-23 ENCOUNTER — PATIENT OUTREACH (OUTPATIENT)
Dept: ADMINISTRATIVE | Facility: OTHER | Age: 76
End: 2021-07-23

## 2021-07-26 ENCOUNTER — OFFICE VISIT (OUTPATIENT)
Dept: PAIN MEDICINE | Facility: CLINIC | Age: 76
End: 2021-07-26
Payer: MEDICARE

## 2021-07-26 ENCOUNTER — TELEPHONE (OUTPATIENT)
Dept: FAMILY MEDICINE | Facility: CLINIC | Age: 76
End: 2021-07-26

## 2021-07-26 VITALS
HEART RATE: 77 BPM | HEIGHT: 69 IN | SYSTOLIC BLOOD PRESSURE: 140 MMHG | WEIGHT: 182 LBS | BODY MASS INDEX: 26.96 KG/M2 | DIASTOLIC BLOOD PRESSURE: 86 MMHG

## 2021-07-26 DIAGNOSIS — G89.4 CHRONIC PAIN DISORDER: ICD-10-CM

## 2021-07-26 DIAGNOSIS — M50.30 DDD (DEGENERATIVE DISC DISEASE), CERVICAL: ICD-10-CM

## 2021-07-26 DIAGNOSIS — M17.10 PRIMARY OSTEOARTHRITIS OF KNEE, UNSPECIFIED LATERALITY: Primary | ICD-10-CM

## 2021-07-26 DIAGNOSIS — M47.892 OTHER SPONDYLOSIS, CERVICAL REGION: ICD-10-CM

## 2021-07-26 PROCEDURE — 99499 UNLISTED E&M SERVICE: CPT | Mod: S$GLB,,, | Performed by: PHYSICIAN ASSISTANT

## 2021-07-26 PROCEDURE — 1101F PT FALLS ASSESS-DOCD LE1/YR: CPT | Mod: CPTII,S$GLB,, | Performed by: PHYSICIAN ASSISTANT

## 2021-07-26 PROCEDURE — 99999 PR PBB SHADOW E&M-EST. PATIENT-LVL III: CPT | Mod: PBBFAC,,, | Performed by: PHYSICIAN ASSISTANT

## 2021-07-26 PROCEDURE — 1157F ADVNC CARE PLAN IN RCRD: CPT | Mod: CPTII,S$GLB,, | Performed by: PHYSICIAN ASSISTANT

## 2021-07-26 PROCEDURE — 3077F PR MOST RECENT SYSTOLIC BLOOD PRESSURE >= 140 MM HG: ICD-10-PCS | Mod: CPTII,S$GLB,, | Performed by: PHYSICIAN ASSISTANT

## 2021-07-26 PROCEDURE — 1125F PR PAIN SEVERITY QUANTIFIED, PAIN PRESENT: ICD-10-PCS | Mod: CPTII,S$GLB,, | Performed by: PHYSICIAN ASSISTANT

## 2021-07-26 PROCEDURE — 3288F PR FALLS RISK ASSESSMENT DOCUMENTED: ICD-10-PCS | Mod: CPTII,S$GLB,, | Performed by: PHYSICIAN ASSISTANT

## 2021-07-26 PROCEDURE — 99214 PR OFFICE/OUTPT VISIT, EST, LEVL IV, 30-39 MIN: ICD-10-PCS | Mod: S$GLB,,, | Performed by: PHYSICIAN ASSISTANT

## 2021-07-26 PROCEDURE — 3046F PR MOST RECENT HEMOGLOBIN A1C LEVEL > 9.0%: ICD-10-PCS | Mod: CPTII,S$GLB,, | Performed by: PHYSICIAN ASSISTANT

## 2021-07-26 PROCEDURE — 3008F PR BODY MASS INDEX (BMI) DOCUMENTED: ICD-10-PCS | Mod: CPTII,S$GLB,, | Performed by: PHYSICIAN ASSISTANT

## 2021-07-26 PROCEDURE — 3077F SYST BP >= 140 MM HG: CPT | Mod: CPTII,S$GLB,, | Performed by: PHYSICIAN ASSISTANT

## 2021-07-26 PROCEDURE — 3046F HEMOGLOBIN A1C LEVEL >9.0%: CPT | Mod: CPTII,S$GLB,, | Performed by: PHYSICIAN ASSISTANT

## 2021-07-26 PROCEDURE — 3079F PR MOST RECENT DIASTOLIC BLOOD PRESSURE 80-89 MM HG: ICD-10-PCS | Mod: CPTII,S$GLB,, | Performed by: PHYSICIAN ASSISTANT

## 2021-07-26 PROCEDURE — 1159F MED LIST DOCD IN RCRD: CPT | Mod: CPTII,S$GLB,, | Performed by: PHYSICIAN ASSISTANT

## 2021-07-26 PROCEDURE — 3008F BODY MASS INDEX DOCD: CPT | Mod: CPTII,S$GLB,, | Performed by: PHYSICIAN ASSISTANT

## 2021-07-26 PROCEDURE — 3079F DIAST BP 80-89 MM HG: CPT | Mod: CPTII,S$GLB,, | Performed by: PHYSICIAN ASSISTANT

## 2021-07-26 PROCEDURE — 99214 OFFICE O/P EST MOD 30 MIN: CPT | Mod: S$GLB,,, | Performed by: PHYSICIAN ASSISTANT

## 2021-07-26 PROCEDURE — 1101F PR PT FALLS ASSESS DOC 0-1 FALLS W/OUT INJ PAST YR: ICD-10-PCS | Mod: CPTII,S$GLB,, | Performed by: PHYSICIAN ASSISTANT

## 2021-07-26 PROCEDURE — 1159F PR MEDICATION LIST DOCUMENTED IN MEDICAL RECORD: ICD-10-PCS | Mod: CPTII,S$GLB,, | Performed by: PHYSICIAN ASSISTANT

## 2021-07-26 PROCEDURE — 99999 PR PBB SHADOW E&M-EST. PATIENT-LVL III: ICD-10-PCS | Mod: PBBFAC,,, | Performed by: PHYSICIAN ASSISTANT

## 2021-07-26 PROCEDURE — 1125F AMNT PAIN NOTED PAIN PRSNT: CPT | Mod: CPTII,S$GLB,, | Performed by: PHYSICIAN ASSISTANT

## 2021-07-26 PROCEDURE — 99499 RISK ADDL DX/OHS AUDIT: ICD-10-PCS | Mod: S$GLB,,, | Performed by: PHYSICIAN ASSISTANT

## 2021-07-26 PROCEDURE — 1157F PR ADVANCE CARE PLAN OR EQUIV PRESENT IN MEDICAL RECORD: ICD-10-PCS | Mod: CPTII,S$GLB,, | Performed by: PHYSICIAN ASSISTANT

## 2021-07-26 PROCEDURE — 3288F FALL RISK ASSESSMENT DOCD: CPT | Mod: CPTII,S$GLB,, | Performed by: PHYSICIAN ASSISTANT

## 2021-07-26 RX ORDER — OXYCODONE AND ACETAMINOPHEN 10; 325 MG/1; MG/1
1 TABLET ORAL EVERY 6 HOURS PRN
Qty: 120 TABLET | Refills: 0 | Status: ON HOLD | OUTPATIENT
Start: 2021-07-29 | End: 2021-09-02 | Stop reason: HOSPADM

## 2021-07-26 RX ORDER — OXYCODONE AND ACETAMINOPHEN 10; 325 MG/1; MG/1
1 TABLET ORAL EVERY 6 HOURS PRN
Qty: 120 TABLET | Refills: 0 | Status: SHIPPED | OUTPATIENT
Start: 2021-07-29 | End: 2021-07-26 | Stop reason: SDUPTHER

## 2021-07-26 RX ORDER — OXYCODONE AND ACETAMINOPHEN 10; 325 MG/1; MG/1
1 TABLET ORAL EVERY 6 HOURS PRN
Qty: 120 TABLET | Refills: 0 | Status: ON HOLD | OUTPATIENT
Start: 2021-09-25 | End: 2021-09-02 | Stop reason: HOSPADM

## 2021-07-26 RX ORDER — OXYCODONE AND ACETAMINOPHEN 10; 325 MG/1; MG/1
1 TABLET ORAL EVERY 6 HOURS PRN
Qty: 120 TABLET | Refills: 0 | Status: SHIPPED | OUTPATIENT
Start: 2021-06-30 | End: 2021-07-29

## 2021-07-26 RX ORDER — METHOCARBAMOL 500 MG/1
500 TABLET, FILM COATED ORAL 3 TIMES DAILY PRN
Qty: 90 TABLET | Refills: 2 | Status: ON HOLD | OUTPATIENT
Start: 2021-07-26 | End: 2021-09-02 | Stop reason: HOSPADM

## 2021-07-26 RX ORDER — OXYCODONE AND ACETAMINOPHEN 10; 325 MG/1; MG/1
1 TABLET ORAL EVERY 6 HOURS PRN
Qty: 120 TABLET | Refills: 0 | Status: SHIPPED | OUTPATIENT
Start: 2021-08-27 | End: 2021-07-26 | Stop reason: SDUPTHER

## 2021-07-26 RX ORDER — OXYCODONE AND ACETAMINOPHEN 10; 325 MG/1; MG/1
1 TABLET ORAL EVERY 6 HOURS PRN
Qty: 120 TABLET | Refills: 0 | Status: SHIPPED | OUTPATIENT
Start: 2021-08-27 | End: 2021-09-25

## 2021-07-29 ENCOUNTER — OFFICE VISIT (OUTPATIENT)
Dept: FAMILY MEDICINE | Facility: CLINIC | Age: 76
End: 2021-07-29
Payer: MEDICARE

## 2021-07-29 VITALS
BODY MASS INDEX: 26.91 KG/M2 | WEIGHT: 181.69 LBS | DIASTOLIC BLOOD PRESSURE: 68 MMHG | SYSTOLIC BLOOD PRESSURE: 112 MMHG | OXYGEN SATURATION: 94 % | HEIGHT: 69 IN | TEMPERATURE: 98 F | HEART RATE: 78 BPM

## 2021-07-29 DIAGNOSIS — S81.801A WOUND OF RIGHT LOWER EXTREMITY, INITIAL ENCOUNTER: ICD-10-CM

## 2021-07-29 DIAGNOSIS — M54.9 DORSALGIA: ICD-10-CM

## 2021-07-29 DIAGNOSIS — Z91.199 NONCOMPLIANCE: ICD-10-CM

## 2021-07-29 DIAGNOSIS — L03.90 CELLULITIS, UNSPECIFIED CELLULITIS SITE: Primary | ICD-10-CM

## 2021-07-29 DIAGNOSIS — Z02.2 ENCOUNTER FOR EXAMINATION FOR ADMISSION TO NURSING HOME: ICD-10-CM

## 2021-07-29 PROCEDURE — 99214 PR OFFICE/OUTPT VISIT, EST, LEVL IV, 30-39 MIN: ICD-10-PCS | Mod: S$GLB,,, | Performed by: FAMILY MEDICINE

## 2021-07-29 PROCEDURE — 99999 PR PBB SHADOW E&M-EST. PATIENT-LVL IV: ICD-10-PCS | Mod: PBBFAC,,, | Performed by: FAMILY MEDICINE

## 2021-07-29 PROCEDURE — 1157F PR ADVANCE CARE PLAN OR EQUIV PRESENT IN MEDICAL RECORD: ICD-10-PCS | Mod: CPTII,S$GLB,, | Performed by: FAMILY MEDICINE

## 2021-07-29 PROCEDURE — 3008F PR BODY MASS INDEX (BMI) DOCUMENTED: ICD-10-PCS | Mod: CPTII,S$GLB,, | Performed by: FAMILY MEDICINE

## 2021-07-29 PROCEDURE — 3074F SYST BP LT 130 MM HG: CPT | Mod: CPTII,S$GLB,, | Performed by: FAMILY MEDICINE

## 2021-07-29 PROCEDURE — 99999 PR PBB SHADOW E&M-EST. PATIENT-LVL IV: CPT | Mod: PBBFAC,,, | Performed by: FAMILY MEDICINE

## 2021-07-29 PROCEDURE — 3288F FALL RISK ASSESSMENT DOCD: CPT | Mod: CPTII,S$GLB,, | Performed by: FAMILY MEDICINE

## 2021-07-29 PROCEDURE — 1157F ADVNC CARE PLAN IN RCRD: CPT | Mod: CPTII,S$GLB,, | Performed by: FAMILY MEDICINE

## 2021-07-29 PROCEDURE — 1159F PR MEDICATION LIST DOCUMENTED IN MEDICAL RECORD: ICD-10-PCS | Mod: CPTII,S$GLB,, | Performed by: FAMILY MEDICINE

## 2021-07-29 PROCEDURE — 3078F DIAST BP <80 MM HG: CPT | Mod: CPTII,S$GLB,, | Performed by: FAMILY MEDICINE

## 2021-07-29 PROCEDURE — 3046F PR MOST RECENT HEMOGLOBIN A1C LEVEL > 9.0%: ICD-10-PCS | Mod: CPTII,S$GLB,, | Performed by: FAMILY MEDICINE

## 2021-07-29 PROCEDURE — 1125F PR PAIN SEVERITY QUANTIFIED, PAIN PRESENT: ICD-10-PCS | Mod: CPTII,S$GLB,, | Performed by: FAMILY MEDICINE

## 2021-07-29 PROCEDURE — 3074F PR MOST RECENT SYSTOLIC BLOOD PRESSURE < 130 MM HG: ICD-10-PCS | Mod: CPTII,S$GLB,, | Performed by: FAMILY MEDICINE

## 2021-07-29 PROCEDURE — 3046F HEMOGLOBIN A1C LEVEL >9.0%: CPT | Mod: CPTII,S$GLB,, | Performed by: FAMILY MEDICINE

## 2021-07-29 PROCEDURE — 99214 OFFICE O/P EST MOD 30 MIN: CPT | Mod: S$GLB,,, | Performed by: FAMILY MEDICINE

## 2021-07-29 PROCEDURE — 3288F PR FALLS RISK ASSESSMENT DOCUMENTED: ICD-10-PCS | Mod: CPTII,S$GLB,, | Performed by: FAMILY MEDICINE

## 2021-07-29 PROCEDURE — 1101F PT FALLS ASSESS-DOCD LE1/YR: CPT | Mod: CPTII,S$GLB,, | Performed by: FAMILY MEDICINE

## 2021-07-29 PROCEDURE — 1101F PR PT FALLS ASSESS DOC 0-1 FALLS W/OUT INJ PAST YR: ICD-10-PCS | Mod: CPTII,S$GLB,, | Performed by: FAMILY MEDICINE

## 2021-07-29 PROCEDURE — 3078F PR MOST RECENT DIASTOLIC BLOOD PRESSURE < 80 MM HG: ICD-10-PCS | Mod: CPTII,S$GLB,, | Performed by: FAMILY MEDICINE

## 2021-07-29 PROCEDURE — 3008F BODY MASS INDEX DOCD: CPT | Mod: CPTII,S$GLB,, | Performed by: FAMILY MEDICINE

## 2021-07-29 PROCEDURE — 1159F MED LIST DOCD IN RCRD: CPT | Mod: CPTII,S$GLB,, | Performed by: FAMILY MEDICINE

## 2021-07-29 PROCEDURE — 1125F AMNT PAIN NOTED PAIN PRSNT: CPT | Mod: CPTII,S$GLB,, | Performed by: FAMILY MEDICINE

## 2021-07-29 RX ORDER — CLINDAMYCIN HYDROCHLORIDE 300 MG/1
600 CAPSULE ORAL 3 TIMES DAILY
Qty: 60 CAPSULE | Refills: 0 | Status: SHIPPED | OUTPATIENT
Start: 2021-07-29 | End: 2021-08-08

## 2021-08-04 ENCOUNTER — PATIENT MESSAGE (OUTPATIENT)
Dept: ADMINISTRATIVE | Facility: HOSPITAL | Age: 76
End: 2021-08-04

## 2021-08-16 ENCOUNTER — TELEPHONE (OUTPATIENT)
Dept: ADMINISTRATIVE | Facility: HOSPITAL | Age: 76
End: 2021-08-16

## 2021-08-25 ENCOUNTER — HOSPITAL ENCOUNTER (INPATIENT)
Facility: HOSPITAL | Age: 76
LOS: 8 days | Discharge: NURSING, REHAB OR LONG-TERM CARE | DRG: 522 | End: 2021-09-02
Attending: EMERGENCY MEDICINE | Admitting: STUDENT IN AN ORGANIZED HEALTH CARE EDUCATION/TRAINING PROGRAM
Payer: MEDICARE

## 2021-08-25 DIAGNOSIS — Z01.810 PREOP CARDIOVASCULAR EXAM: ICD-10-CM

## 2021-08-25 DIAGNOSIS — R07.9 CHEST PAIN: ICD-10-CM

## 2021-08-25 DIAGNOSIS — S72.001A CLOSED FRACTURE OF NECK OF RIGHT FEMUR: Primary | ICD-10-CM

## 2021-08-25 DIAGNOSIS — E11.42 DM TYPE 2 WITH DIABETIC PERIPHERAL NEUROPATHY: ICD-10-CM

## 2021-08-25 DIAGNOSIS — S72.001A CLOSED FRACTURE OF NECK OF RIGHT FEMUR, INITIAL ENCOUNTER: ICD-10-CM

## 2021-08-25 DIAGNOSIS — Z91.89 AT HIGH RISK FOR PRESSURE INJURY OF SKIN: ICD-10-CM

## 2021-08-25 PROBLEM — S72.91XA RIGHT FEMORAL FRACTURE: Status: ACTIVE | Noted: 2021-08-25

## 2021-08-25 PROBLEM — S72.91XA RIGHT FEMORAL FRACTURE: Status: RESOLVED | Noted: 2021-08-25 | Resolved: 2021-08-25

## 2021-08-25 PROBLEM — F11.20 OPIOID DEPENDENCE: Status: ACTIVE | Noted: 2021-08-25

## 2021-08-25 LAB
ABO + RH BLD: NORMAL
ALBUMIN SERPL BCP-MCNC: 3.9 G/DL (ref 3.5–5.2)
ALP SERPL-CCNC: 97 U/L (ref 55–135)
ALT SERPL W/O P-5'-P-CCNC: 21 U/L (ref 10–44)
ANION GAP SERPL CALC-SCNC: 12 MMOL/L (ref 8–16)
APTT BLDCRRT: 25.1 SEC (ref 21–32)
AST SERPL-CCNC: 22 U/L (ref 10–40)
BASOPHILS # BLD AUTO: 0.01 K/UL (ref 0–0.2)
BASOPHILS NFR BLD: 0.1 % (ref 0–1.9)
BILIRUB SERPL-MCNC: 2.2 MG/DL (ref 0.1–1)
BLD GP AB SCN CELLS X3 SERPL QL: NORMAL
BUN SERPL-MCNC: 17 MG/DL (ref 8–23)
CALCIUM SERPL-MCNC: 9.6 MG/DL (ref 8.7–10.5)
CHLORIDE SERPL-SCNC: 102 MMOL/L (ref 95–110)
CO2 SERPL-SCNC: 21 MMOL/L (ref 23–29)
CREAT SERPL-MCNC: 0.9 MG/DL (ref 0.5–1.4)
DIFFERENTIAL METHOD: ABNORMAL
EOSINOPHIL # BLD AUTO: 0 K/UL (ref 0–0.5)
EOSINOPHIL NFR BLD: 0.6 % (ref 0–8)
ERYTHROCYTE [DISTWIDTH] IN BLOOD BY AUTOMATED COUNT: 14.5 % (ref 11.5–14.5)
EST. GFR  (AFRICAN AMERICAN): >60 ML/MIN/1.73 M^2
EST. GFR  (NON AFRICAN AMERICAN): >60 ML/MIN/1.73 M^2
FERRITIN SERPL-MCNC: 139 NG/ML (ref 20–300)
GLUCOSE SERPL-MCNC: 385 MG/DL (ref 70–110)
HCT VFR BLD AUTO: 38.2 % (ref 40–54)
HGB BLD-MCNC: 13.1 G/DL (ref 14–18)
IMM GRANULOCYTES # BLD AUTO: 0.04 K/UL (ref 0–0.04)
IMM GRANULOCYTES NFR BLD AUTO: 0.6 % (ref 0–0.5)
INR PPP: 1.2 (ref 0.8–1.2)
IRON SERPL-MCNC: 154 UG/DL (ref 45–160)
LYMPHOCYTES # BLD AUTO: 0.5 K/UL (ref 1–4.8)
LYMPHOCYTES NFR BLD: 7.1 % (ref 18–48)
MCH RBC QN AUTO: 28.9 PG (ref 27–31)
MCHC RBC AUTO-ENTMCNC: 34.3 G/DL (ref 32–36)
MCV RBC AUTO: 84 FL (ref 82–98)
MONOCYTES # BLD AUTO: 0.5 K/UL (ref 0.3–1)
MONOCYTES NFR BLD: 7.4 % (ref 4–15)
NEUTROPHILS # BLD AUTO: 5.9 K/UL (ref 1.8–7.7)
NEUTROPHILS NFR BLD: 84.2 % (ref 38–73)
NRBC BLD-RTO: 0 /100 WBC
PLATELET # BLD AUTO: 51 K/UL (ref 150–450)
PMV BLD AUTO: 11.7 FL (ref 9.2–12.9)
POCT GLUCOSE: 265 MG/DL (ref 70–110)
POCT GLUCOSE: 324 MG/DL (ref 70–110)
POTASSIUM SERPL-SCNC: 4.1 MMOL/L (ref 3.5–5.1)
PROT SERPL-MCNC: 6.6 G/DL (ref 6–8.4)
PROTHROMBIN TIME: 12.4 SEC (ref 9–12.5)
RBC # BLD AUTO: 4.54 M/UL (ref 4.6–6.2)
SARS-COV-2 RDRP RESP QL NAA+PROBE: NEGATIVE
SATURATED IRON: 33 % (ref 20–50)
SODIUM SERPL-SCNC: 135 MMOL/L (ref 136–145)
TOTAL IRON BINDING CAPACITY: 469 UG/DL (ref 250–450)
TRANSFERRIN SERPL-MCNC: 317 MG/DL (ref 200–375)
WBC # BLD AUTO: 7 K/UL (ref 3.9–12.7)

## 2021-08-25 PROCEDURE — 85610 PROTHROMBIN TIME: CPT | Performed by: STUDENT IN AN ORGANIZED HEALTH CARE EDUCATION/TRAINING PROGRAM

## 2021-08-25 PROCEDURE — 82728 ASSAY OF FERRITIN: CPT | Performed by: STUDENT IN AN ORGANIZED HEALTH CARE EDUCATION/TRAINING PROGRAM

## 2021-08-25 PROCEDURE — 96372 THER/PROPH/DIAG INJ SC/IM: CPT

## 2021-08-25 PROCEDURE — 25000003 PHARM REV CODE 250: Performed by: STUDENT IN AN ORGANIZED HEALTH CARE EDUCATION/TRAINING PROGRAM

## 2021-08-25 PROCEDURE — 86900 BLOOD TYPING SEROLOGIC ABO: CPT | Performed by: STUDENT IN AN ORGANIZED HEALTH CARE EDUCATION/TRAINING PROGRAM

## 2021-08-25 PROCEDURE — 96374 THER/PROPH/DIAG INJ IV PUSH: CPT

## 2021-08-25 PROCEDURE — 96375 TX/PRO/DX INJ NEW DRUG ADDON: CPT

## 2021-08-25 PROCEDURE — 80053 COMPREHEN METABOLIC PANEL: CPT | Performed by: EMERGENCY MEDICINE

## 2021-08-25 PROCEDURE — 99223 1ST HOSP IP/OBS HIGH 75: CPT | Mod: AI,,, | Performed by: STUDENT IN AN ORGANIZED HEALTH CARE EDUCATION/TRAINING PROGRAM

## 2021-08-25 PROCEDURE — 99223 PR INITIAL HOSPITAL CARE,LEVL III: ICD-10-PCS | Mod: AI,,, | Performed by: STUDENT IN AN ORGANIZED HEALTH CARE EDUCATION/TRAINING PROGRAM

## 2021-08-25 PROCEDURE — 36415 COLL VENOUS BLD VENIPUNCTURE: CPT | Performed by: EMERGENCY MEDICINE

## 2021-08-25 PROCEDURE — 85730 THROMBOPLASTIN TIME PARTIAL: CPT | Performed by: STUDENT IN AN ORGANIZED HEALTH CARE EDUCATION/TRAINING PROGRAM

## 2021-08-25 PROCEDURE — 85025 COMPLETE CBC W/AUTO DIFF WBC: CPT | Performed by: EMERGENCY MEDICINE

## 2021-08-25 PROCEDURE — 12000002 HC ACUTE/MED SURGE SEMI-PRIVATE ROOM

## 2021-08-25 PROCEDURE — 93005 ELECTROCARDIOGRAM TRACING: CPT

## 2021-08-25 PROCEDURE — 63600175 PHARM REV CODE 636 W HCPCS: Performed by: EMERGENCY MEDICINE

## 2021-08-25 PROCEDURE — 93010 EKG 12-LEAD: ICD-10-PCS | Mod: ,,, | Performed by: INTERNAL MEDICINE

## 2021-08-25 PROCEDURE — 99285 EMERGENCY DEPT VISIT HI MDM: CPT | Mod: 25

## 2021-08-25 PROCEDURE — 36415 COLL VENOUS BLD VENIPUNCTURE: CPT | Performed by: STUDENT IN AN ORGANIZED HEALTH CARE EDUCATION/TRAINING PROGRAM

## 2021-08-25 PROCEDURE — 63600175 PHARM REV CODE 636 W HCPCS: Performed by: STUDENT IN AN ORGANIZED HEALTH CARE EDUCATION/TRAINING PROGRAM

## 2021-08-25 PROCEDURE — 25000003 PHARM REV CODE 250: Performed by: EMERGENCY MEDICINE

## 2021-08-25 PROCEDURE — U0002 COVID-19 LAB TEST NON-CDC: HCPCS | Performed by: EMERGENCY MEDICINE

## 2021-08-25 PROCEDURE — 82962 GLUCOSE BLOOD TEST: CPT

## 2021-08-25 PROCEDURE — 84466 ASSAY OF TRANSFERRIN: CPT | Performed by: STUDENT IN AN ORGANIZED HEALTH CARE EDUCATION/TRAINING PROGRAM

## 2021-08-25 PROCEDURE — 93010 ELECTROCARDIOGRAM REPORT: CPT | Mod: ,,, | Performed by: INTERNAL MEDICINE

## 2021-08-25 PROCEDURE — C9399 UNCLASSIFIED DRUGS OR BIOLOG: HCPCS | Performed by: STUDENT IN AN ORGANIZED HEALTH CARE EDUCATION/TRAINING PROGRAM

## 2021-08-25 RX ORDER — ONDANSETRON 4 MG/1
4 TABLET, ORALLY DISINTEGRATING ORAL EVERY 8 HOURS PRN
Status: DISCONTINUED | OUTPATIENT
Start: 2021-08-25 | End: 2021-09-02 | Stop reason: HOSPADM

## 2021-08-25 RX ORDER — OXYCODONE AND ACETAMINOPHEN 10; 325 MG/1; MG/1
1 TABLET ORAL EVERY 6 HOURS PRN
Status: DISCONTINUED | OUTPATIENT
Start: 2021-08-25 | End: 2021-09-02 | Stop reason: HOSPADM

## 2021-08-25 RX ORDER — INSULIN ASPART 100 [IU]/ML
1-10 INJECTION, SOLUTION INTRAVENOUS; SUBCUTANEOUS
Status: DISCONTINUED | OUTPATIENT
Start: 2021-08-25 | End: 2021-09-02 | Stop reason: HOSPADM

## 2021-08-25 RX ORDER — SODIUM,POTASSIUM PHOSPHATES 280-250MG
2 POWDER IN PACKET (EA) ORAL
Status: DISCONTINUED | OUTPATIENT
Start: 2021-08-25 | End: 2021-09-02 | Stop reason: HOSPADM

## 2021-08-25 RX ORDER — ONDANSETRON 2 MG/ML
8 INJECTION INTRAMUSCULAR; INTRAVENOUS EVERY 6 HOURS PRN
Status: DISCONTINUED | OUTPATIENT
Start: 2021-08-25 | End: 2021-09-02 | Stop reason: HOSPADM

## 2021-08-25 RX ORDER — IBUPROFEN 200 MG
24 TABLET ORAL
Status: DISCONTINUED | OUTPATIENT
Start: 2021-08-25 | End: 2021-09-02 | Stop reason: HOSPADM

## 2021-08-25 RX ORDER — HYDROCODONE BITARTRATE AND ACETAMINOPHEN 500; 5 MG/1; MG/1
TABLET ORAL
Status: DISCONTINUED | OUTPATIENT
Start: 2021-08-25 | End: 2021-09-02 | Stop reason: HOSPADM

## 2021-08-25 RX ORDER — LANOLIN ALCOHOL/MO/W.PET/CERES
800 CREAM (GRAM) TOPICAL
Status: DISCONTINUED | OUTPATIENT
Start: 2021-08-25 | End: 2021-09-02 | Stop reason: HOSPADM

## 2021-08-25 RX ORDER — GABAPENTIN 100 MG/1
100 CAPSULE ORAL 3 TIMES DAILY
Status: DISCONTINUED | OUTPATIENT
Start: 2021-08-25 | End: 2021-09-02 | Stop reason: HOSPADM

## 2021-08-25 RX ORDER — SODIUM CHLORIDE 0.9 % (FLUSH) 0.9 %
3 SYRINGE (ML) INJECTION EVERY 6 HOURS PRN
Status: DISCONTINUED | OUTPATIENT
Start: 2021-08-25 | End: 2021-09-02 | Stop reason: HOSPADM

## 2021-08-25 RX ORDER — POLYETHYLENE GLYCOL 3350 17 G/17G
17 POWDER, FOR SOLUTION ORAL DAILY
Status: DISCONTINUED | OUTPATIENT
Start: 2021-08-26 | End: 2021-09-02 | Stop reason: HOSPADM

## 2021-08-25 RX ORDER — CLONIDINE HYDROCHLORIDE 0.1 MG/1
0.1 TABLET ORAL
Status: DISCONTINUED | OUTPATIENT
Start: 2021-08-25 | End: 2021-09-02 | Stop reason: HOSPADM

## 2021-08-25 RX ORDER — GLUCAGON 1 MG
1 KIT INJECTION
Status: DISCONTINUED | OUTPATIENT
Start: 2021-08-25 | End: 2021-09-02 | Stop reason: HOSPADM

## 2021-08-25 RX ORDER — IBUPROFEN 200 MG
16 TABLET ORAL
Status: DISCONTINUED | OUTPATIENT
Start: 2021-08-25 | End: 2021-09-02 | Stop reason: HOSPADM

## 2021-08-25 RX ORDER — ATORVASTATIN CALCIUM 40 MG/1
40 TABLET, FILM COATED ORAL DAILY
Status: DISCONTINUED | OUTPATIENT
Start: 2021-08-26 | End: 2021-09-02 | Stop reason: HOSPADM

## 2021-08-25 RX ORDER — MORPHINE SULFATE 2 MG/ML
6 INJECTION, SOLUTION INTRAMUSCULAR; INTRAVENOUS
Status: COMPLETED | OUTPATIENT
Start: 2021-08-25 | End: 2021-08-25

## 2021-08-25 RX ORDER — PANTOPRAZOLE SODIUM 20 MG/1
20 TABLET, DELAYED RELEASE ORAL DAILY
COMMUNITY
End: 2021-11-01

## 2021-08-25 RX ORDER — MUPIROCIN 20 MG/G
OINTMENT TOPICAL 2 TIMES DAILY
Status: COMPLETED | OUTPATIENT
Start: 2021-08-25 | End: 2021-08-30

## 2021-08-25 RX ORDER — TALC
6 POWDER (GRAM) TOPICAL NIGHTLY PRN
Status: DISCONTINUED | OUTPATIENT
Start: 2021-08-25 | End: 2021-09-02 | Stop reason: HOSPADM

## 2021-08-25 RX ORDER — METHOCARBAMOL 500 MG/1
500 TABLET, FILM COATED ORAL 3 TIMES DAILY PRN
Status: DISCONTINUED | OUTPATIENT
Start: 2021-08-25 | End: 2021-09-02 | Stop reason: HOSPADM

## 2021-08-25 RX ORDER — HYDROMORPHONE HYDROCHLORIDE 1 MG/ML
0.5 INJECTION, SOLUTION INTRAMUSCULAR; INTRAVENOUS; SUBCUTANEOUS
Status: DISCONTINUED | OUTPATIENT
Start: 2021-08-25 | End: 2021-08-27

## 2021-08-25 RX ORDER — HYDROMORPHONE HYDROCHLORIDE 2 MG/ML
1 INJECTION, SOLUTION INTRAMUSCULAR; INTRAVENOUS; SUBCUTANEOUS
Status: COMPLETED | OUTPATIENT
Start: 2021-08-25 | End: 2021-08-25

## 2021-08-25 RX ADMIN — HYDROMORPHONE HYDROCHLORIDE 0.5 MG: 1 INJECTION, SOLUTION INTRAMUSCULAR; INTRAVENOUS; SUBCUTANEOUS at 05:08

## 2021-08-25 RX ADMIN — MELATONIN TAB 3 MG 6 MG: 3 TAB at 11:08

## 2021-08-25 RX ADMIN — GABAPENTIN 100 MG: 100 CAPSULE ORAL at 08:08

## 2021-08-25 RX ADMIN — HYDROMORPHONE HYDROCHLORIDE 0.5 MG: 1 INJECTION, SOLUTION INTRAMUSCULAR; INTRAVENOUS; SUBCUTANEOUS at 02:08

## 2021-08-25 RX ADMIN — GABAPENTIN 100 MG: 100 CAPSULE ORAL at 02:08

## 2021-08-25 RX ADMIN — OXYCODONE AND ACETAMINOPHEN 1 TABLET: 10; 325 TABLET ORAL at 03:08

## 2021-08-25 RX ADMIN — MORPHINE SULFATE 6 MG: 2 INJECTION, SOLUTION INTRAMUSCULAR; INTRAVENOUS at 09:08

## 2021-08-25 RX ADMIN — HYDROMORPHONE HYDROCHLORIDE 1 MG: 2 INJECTION, SOLUTION INTRAMUSCULAR; INTRAVENOUS; SUBCUTANEOUS at 10:08

## 2021-08-25 RX ADMIN — MUPIROCIN: 20 OINTMENT TOPICAL at 08:08

## 2021-08-25 RX ADMIN — INSULIN DETEMIR 40 UNITS: 100 INJECTION, SOLUTION SUBCUTANEOUS at 11:08

## 2021-08-25 RX ADMIN — HYDROMORPHONE HYDROCHLORIDE 0.5 MG: 1 INJECTION, SOLUTION INTRAMUSCULAR; INTRAVENOUS; SUBCUTANEOUS at 11:08

## 2021-08-25 RX ADMIN — INSULIN ASPART 6 UNITS: 100 INJECTION, SOLUTION INTRAVENOUS; SUBCUTANEOUS at 05:08

## 2021-08-25 RX ADMIN — OXYCODONE AND ACETAMINOPHEN 1 TABLET: 10; 325 TABLET ORAL at 11:08

## 2021-08-26 ENCOUNTER — ANESTHESIA (OUTPATIENT)
Dept: SURGERY | Facility: HOSPITAL | Age: 76
DRG: 522 | End: 2021-08-26
Payer: MEDICARE

## 2021-08-26 ENCOUNTER — ANESTHESIA EVENT (OUTPATIENT)
Dept: SURGERY | Facility: HOSPITAL | Age: 76
DRG: 522 | End: 2021-08-26
Payer: MEDICARE

## 2021-08-26 LAB
ALBUMIN SERPL BCP-MCNC: 3.5 G/DL (ref 3.5–5.2)
ALP SERPL-CCNC: 87 U/L (ref 55–135)
ALT SERPL W/O P-5'-P-CCNC: 19 U/L (ref 10–44)
ANION GAP SERPL CALC-SCNC: 8 MMOL/L (ref 8–16)
AST SERPL-CCNC: 22 U/L (ref 10–40)
BASOPHILS # BLD AUTO: 0.02 K/UL (ref 0–0.2)
BASOPHILS NFR BLD: 0.4 % (ref 0–1.9)
BILIRUB SERPL-MCNC: 1.8 MG/DL (ref 0.1–1)
BUN SERPL-MCNC: 22 MG/DL (ref 8–23)
CALCIUM SERPL-MCNC: 9.2 MG/DL (ref 8.7–10.5)
CHLORIDE SERPL-SCNC: 103 MMOL/L (ref 95–110)
CO2 SERPL-SCNC: 27 MMOL/L (ref 23–29)
CREAT SERPL-MCNC: 0.8 MG/DL (ref 0.5–1.4)
DIFFERENTIAL METHOD: ABNORMAL
EOSINOPHIL # BLD AUTO: 0.1 K/UL (ref 0–0.5)
EOSINOPHIL NFR BLD: 2.5 % (ref 0–8)
ERYTHROCYTE [DISTWIDTH] IN BLOOD BY AUTOMATED COUNT: 14.6 % (ref 11.5–14.5)
EST. GFR  (AFRICAN AMERICAN): >60 ML/MIN/1.73 M^2
EST. GFR  (NON AFRICAN AMERICAN): >60 ML/MIN/1.73 M^2
GLUCOSE SERPL-MCNC: 170 MG/DL (ref 70–110)
HCT VFR BLD AUTO: 35.4 % (ref 40–54)
HGB BLD-MCNC: 11.8 G/DL (ref 14–18)
IMM GRANULOCYTES # BLD AUTO: 0.04 K/UL (ref 0–0.04)
IMM GRANULOCYTES NFR BLD AUTO: 0.7 % (ref 0–0.5)
LYMPHOCYTES # BLD AUTO: 0.7 K/UL (ref 1–4.8)
LYMPHOCYTES NFR BLD: 11.7 % (ref 18–48)
MAGNESIUM SERPL-MCNC: 1.7 MG/DL (ref 1.6–2.6)
MCH RBC QN AUTO: 28.4 PG (ref 27–31)
MCHC RBC AUTO-ENTMCNC: 33.3 G/DL (ref 32–36)
MCV RBC AUTO: 85 FL (ref 82–98)
MONOCYTES # BLD AUTO: 0.5 K/UL (ref 0.3–1)
MONOCYTES NFR BLD: 8 % (ref 4–15)
NEUTROPHILS # BLD AUTO: 4.3 K/UL (ref 1.8–7.7)
NEUTROPHILS NFR BLD: 76.7 % (ref 38–73)
NRBC BLD-RTO: 0 /100 WBC
PHOSPHATE SERPL-MCNC: 4.2 MG/DL (ref 2.7–4.5)
PLATELET # BLD AUTO: 55 K/UL (ref 150–450)
PMV BLD AUTO: 11 FL (ref 9.2–12.9)
POCT GLUCOSE: 168 MG/DL (ref 70–110)
POCT GLUCOSE: 187 MG/DL (ref 70–110)
POCT GLUCOSE: 209 MG/DL (ref 70–110)
POCT GLUCOSE: 259 MG/DL (ref 70–110)
POTASSIUM SERPL-SCNC: 4 MMOL/L (ref 3.5–5.1)
PROT SERPL-MCNC: 6 G/DL (ref 6–8.4)
RBC # BLD AUTO: 4.16 M/UL (ref 4.6–6.2)
SODIUM SERPL-SCNC: 138 MMOL/L (ref 136–145)
WBC # BLD AUTO: 5.64 K/UL (ref 3.9–12.7)

## 2021-08-26 PROCEDURE — P9035 PLATELET PHERES LEUKOREDUCED: HCPCS | Performed by: STUDENT IN AN ORGANIZED HEALTH CARE EDUCATION/TRAINING PROGRAM

## 2021-08-26 PROCEDURE — 80053 COMPREHEN METABOLIC PANEL: CPT | Performed by: STUDENT IN AN ORGANIZED HEALTH CARE EDUCATION/TRAINING PROGRAM

## 2021-08-26 PROCEDURE — 84100 ASSAY OF PHOSPHORUS: CPT | Performed by: STUDENT IN AN ORGANIZED HEALTH CARE EDUCATION/TRAINING PROGRAM

## 2021-08-26 PROCEDURE — 36415 COLL VENOUS BLD VENIPUNCTURE: CPT | Performed by: STUDENT IN AN ORGANIZED HEALTH CARE EDUCATION/TRAINING PROGRAM

## 2021-08-26 PROCEDURE — 83735 ASSAY OF MAGNESIUM: CPT | Performed by: STUDENT IN AN ORGANIZED HEALTH CARE EDUCATION/TRAINING PROGRAM

## 2021-08-26 PROCEDURE — 36430 TRANSFUSION BLD/BLD COMPNT: CPT

## 2021-08-26 PROCEDURE — 99233 PR SUBSEQUENT HOSPITAL CARE,LEVL III: ICD-10-PCS | Mod: ,,, | Performed by: STUDENT IN AN ORGANIZED HEALTH CARE EDUCATION/TRAINING PROGRAM

## 2021-08-26 PROCEDURE — 25000003 PHARM REV CODE 250: Performed by: STUDENT IN AN ORGANIZED HEALTH CARE EDUCATION/TRAINING PROGRAM

## 2021-08-26 PROCEDURE — 25000003 PHARM REV CODE 250: Performed by: EMERGENCY MEDICINE

## 2021-08-26 PROCEDURE — 99233 SBSQ HOSP IP/OBS HIGH 50: CPT | Mod: ,,, | Performed by: STUDENT IN AN ORGANIZED HEALTH CARE EDUCATION/TRAINING PROGRAM

## 2021-08-26 PROCEDURE — 12000002 HC ACUTE/MED SURGE SEMI-PRIVATE ROOM

## 2021-08-26 PROCEDURE — 85025 COMPLETE CBC W/AUTO DIFF WBC: CPT | Performed by: STUDENT IN AN ORGANIZED HEALTH CARE EDUCATION/TRAINING PROGRAM

## 2021-08-26 PROCEDURE — 63600175 PHARM REV CODE 636 W HCPCS: Performed by: STUDENT IN AN ORGANIZED HEALTH CARE EDUCATION/TRAINING PROGRAM

## 2021-08-26 RX ORDER — SODIUM CHLORIDE, SODIUM LACTATE, POTASSIUM CHLORIDE, CALCIUM CHLORIDE 600; 310; 30; 20 MG/100ML; MG/100ML; MG/100ML; MG/100ML
INJECTION, SOLUTION INTRAVENOUS CONTINUOUS
Status: DISCONTINUED | OUTPATIENT
Start: 2021-08-26 | End: 2021-08-28

## 2021-08-26 RX ADMIN — HYDROMORPHONE HYDROCHLORIDE 0.5 MG: 1 INJECTION, SOLUTION INTRAMUSCULAR; INTRAVENOUS; SUBCUTANEOUS at 11:08

## 2021-08-26 RX ADMIN — SODIUM CHLORIDE, SODIUM LACTATE, POTASSIUM CHLORIDE, AND CALCIUM CHLORIDE: .6; .31; .03; .02 INJECTION, SOLUTION INTRAVENOUS at 07:08

## 2021-08-26 RX ADMIN — OXYCODONE AND ACETAMINOPHEN 1 TABLET: 10; 325 TABLET ORAL at 10:08

## 2021-08-26 RX ADMIN — ATORVASTATIN CALCIUM 40 MG: 40 TABLET, FILM COATED ORAL at 08:08

## 2021-08-26 RX ADMIN — HYDROMORPHONE HYDROCHLORIDE 0.5 MG: 1 INJECTION, SOLUTION INTRAMUSCULAR; INTRAVENOUS; SUBCUTANEOUS at 04:08

## 2021-08-26 RX ADMIN — MUPIROCIN: 20 OINTMENT TOPICAL at 08:08

## 2021-08-26 RX ADMIN — POLYETHYLENE GLYCOL (3350) 17 G: 17 POWDER, FOR SOLUTION ORAL at 04:08

## 2021-08-26 RX ADMIN — METHOCARBAMOL 500 MG: 500 TABLET ORAL at 10:08

## 2021-08-26 RX ADMIN — OXYCODONE AND ACETAMINOPHEN 1 TABLET: 10; 325 TABLET ORAL at 02:08

## 2021-08-26 RX ADMIN — OXYCODONE AND ACETAMINOPHEN 1 TABLET: 10; 325 TABLET ORAL at 06:08

## 2021-08-26 RX ADMIN — METHOCARBAMOL 500 MG: 500 TABLET ORAL at 03:08

## 2021-08-26 RX ADMIN — SODIUM CHLORIDE, SODIUM LACTATE, POTASSIUM CHLORIDE, AND CALCIUM CHLORIDE: .6; .31; .03; .02 INJECTION, SOLUTION INTRAVENOUS at 10:08

## 2021-08-26 RX ADMIN — GABAPENTIN 100 MG: 100 CAPSULE ORAL at 08:08

## 2021-08-26 RX ADMIN — INSULIN DETEMIR 10 UNITS: 100 INJECTION, SOLUTION SUBCUTANEOUS at 08:08

## 2021-08-26 RX ADMIN — GABAPENTIN 100 MG: 100 CAPSULE ORAL at 02:08

## 2021-08-26 RX ADMIN — HYDROMORPHONE HYDROCHLORIDE 0.5 MG: 1 INJECTION, SOLUTION INTRAMUSCULAR; INTRAVENOUS; SUBCUTANEOUS at 07:08

## 2021-08-26 RX ADMIN — INSULIN ASPART 4 UNITS: 100 INJECTION, SOLUTION INTRAVENOUS; SUBCUTANEOUS at 04:08

## 2021-08-26 RX ADMIN — INSULIN ASPART 3 UNITS: 100 INJECTION, SOLUTION INTRAVENOUS; SUBCUTANEOUS at 08:08

## 2021-08-26 RX ADMIN — MELATONIN TAB 3 MG 6 MG: 3 TAB at 08:08

## 2021-08-26 RX ADMIN — HYDROMORPHONE HYDROCHLORIDE 0.5 MG: 1 INJECTION, SOLUTION INTRAMUSCULAR; INTRAVENOUS; SUBCUTANEOUS at 08:08

## 2021-08-27 LAB
ALBUMIN SERPL BCP-MCNC: 3.4 G/DL (ref 3.5–5.2)
ALP SERPL-CCNC: 88 U/L (ref 55–135)
ALT SERPL W/O P-5'-P-CCNC: 18 U/L (ref 10–44)
ANION GAP SERPL CALC-SCNC: 12 MMOL/L (ref 8–16)
AST SERPL-CCNC: 22 U/L (ref 10–40)
BASOPHILS # BLD AUTO: 0.01 K/UL (ref 0–0.2)
BASOPHILS NFR BLD: 0.2 % (ref 0–1.9)
BILIRUB SERPL-MCNC: 1.8 MG/DL (ref 0.1–1)
BLD PROD TYP BPU: NORMAL
BLOOD UNIT EXPIRATION DATE: NORMAL
BLOOD UNIT TYPE CODE: 6200
BLOOD UNIT TYPE CODE: 6200
BLOOD UNIT TYPE CODE: 7300
BLOOD UNIT TYPE: NORMAL
BUN SERPL-MCNC: 20 MG/DL (ref 8–23)
CALCIUM SERPL-MCNC: 8.9 MG/DL (ref 8.7–10.5)
CHLORIDE SERPL-SCNC: 102 MMOL/L (ref 95–110)
CO2 SERPL-SCNC: 22 MMOL/L (ref 23–29)
CODING SYSTEM: NORMAL
CREAT SERPL-MCNC: 0.7 MG/DL (ref 0.5–1.4)
DIFFERENTIAL METHOD: ABNORMAL
DISPENSE STATUS: NORMAL
EOSINOPHIL # BLD AUTO: 0.1 K/UL (ref 0–0.5)
EOSINOPHIL NFR BLD: 3.1 % (ref 0–8)
ERYTHROCYTE [DISTWIDTH] IN BLOOD BY AUTOMATED COUNT: 14.3 % (ref 11.5–14.5)
EST. GFR  (AFRICAN AMERICAN): >60 ML/MIN/1.73 M^2
EST. GFR  (NON AFRICAN AMERICAN): >60 ML/MIN/1.73 M^2
GLUCOSE SERPL-MCNC: 206 MG/DL (ref 70–110)
HCT VFR BLD AUTO: 36.5 % (ref 40–54)
HGB BLD-MCNC: 12.2 G/DL (ref 14–18)
IMM GRANULOCYTES # BLD AUTO: 0.02 K/UL (ref 0–0.04)
IMM GRANULOCYTES NFR BLD AUTO: 0.5 % (ref 0–0.5)
LYMPHOCYTES # BLD AUTO: 0.4 K/UL (ref 1–4.8)
LYMPHOCYTES NFR BLD: 9.3 % (ref 18–48)
MAGNESIUM SERPL-MCNC: 1.6 MG/DL (ref 1.6–2.6)
MCH RBC QN AUTO: 28.6 PG (ref 27–31)
MCHC RBC AUTO-ENTMCNC: 33.4 G/DL (ref 32–36)
MCV RBC AUTO: 86 FL (ref 82–98)
MONOCYTES # BLD AUTO: 0.4 K/UL (ref 0.3–1)
MONOCYTES NFR BLD: 10.3 % (ref 4–15)
NEUTROPHILS # BLD AUTO: 3.2 K/UL (ref 1.8–7.7)
NEUTROPHILS NFR BLD: 76.6 % (ref 38–73)
NRBC BLD-RTO: 0 /100 WBC
PHOSPHATE SERPL-MCNC: 3.9 MG/DL (ref 2.7–4.5)
PLATELET # BLD AUTO: 40 K/UL (ref 150–450)
PMV BLD AUTO: 11.2 FL (ref 9.2–12.9)
POCT GLUCOSE: 246 MG/DL (ref 70–110)
POTASSIUM SERPL-SCNC: 3.9 MMOL/L (ref 3.5–5.1)
PROT SERPL-MCNC: 6 G/DL (ref 6–8.4)
RBC # BLD AUTO: 4.27 M/UL (ref 4.6–6.2)
SODIUM SERPL-SCNC: 136 MMOL/L (ref 136–145)
UNIT NUMBER: NORMAL
WBC # BLD AUTO: 4.19 K/UL (ref 3.9–12.7)

## 2021-08-27 PROCEDURE — 25000003 PHARM REV CODE 250: Performed by: ORTHOPAEDIC SURGERY

## 2021-08-27 PROCEDURE — 12000002 HC ACUTE/MED SURGE SEMI-PRIVATE ROOM

## 2021-08-27 PROCEDURE — 25000003 PHARM REV CODE 250: Performed by: STUDENT IN AN ORGANIZED HEALTH CARE EDUCATION/TRAINING PROGRAM

## 2021-08-27 PROCEDURE — 37000008 HC ANESTHESIA 1ST 15 MINUTES: Performed by: ORTHOPAEDIC SURGERY

## 2021-08-27 PROCEDURE — 27201423 OPTIME MED/SURG SUP & DEVICES STERILE SUPPLY: Performed by: ORTHOPAEDIC SURGERY

## 2021-08-27 PROCEDURE — 63600175 PHARM REV CODE 636 W HCPCS: Performed by: ANESTHESIOLOGY

## 2021-08-27 PROCEDURE — D9220A PRA ANESTHESIA: ICD-10-PCS | Mod: CRNA,,, | Performed by: NURSE ANESTHETIST, CERTIFIED REGISTERED

## 2021-08-27 PROCEDURE — 84100 ASSAY OF PHOSPHORUS: CPT | Performed by: STUDENT IN AN ORGANIZED HEALTH CARE EDUCATION/TRAINING PROGRAM

## 2021-08-27 PROCEDURE — 85025 COMPLETE CBC W/AUTO DIFF WBC: CPT | Performed by: STUDENT IN AN ORGANIZED HEALTH CARE EDUCATION/TRAINING PROGRAM

## 2021-08-27 PROCEDURE — 63600175 PHARM REV CODE 636 W HCPCS: Performed by: ORTHOPAEDIC SURGERY

## 2021-08-27 PROCEDURE — 99233 PR SUBSEQUENT HOSPITAL CARE,LEVL III: ICD-10-PCS | Mod: ,,, | Performed by: STUDENT IN AN ORGANIZED HEALTH CARE EDUCATION/TRAINING PROGRAM

## 2021-08-27 PROCEDURE — 25000003 PHARM REV CODE 250: Performed by: EMERGENCY MEDICINE

## 2021-08-27 PROCEDURE — 63600175 PHARM REV CODE 636 W HCPCS: Performed by: NURSE ANESTHETIST, CERTIFIED REGISTERED

## 2021-08-27 PROCEDURE — C1713 ANCHOR/SCREW BN/BN,TIS/BN: HCPCS | Performed by: ORTHOPAEDIC SURGERY

## 2021-08-27 PROCEDURE — P9045 ALBUMIN (HUMAN), 5%, 250 ML: HCPCS | Performed by: NURSE ANESTHETIST, CERTIFIED REGISTERED

## 2021-08-27 PROCEDURE — 83735 ASSAY OF MAGNESIUM: CPT | Performed by: STUDENT IN AN ORGANIZED HEALTH CARE EDUCATION/TRAINING PROGRAM

## 2021-08-27 PROCEDURE — D9220A PRA ANESTHESIA: Mod: ANES,,, | Performed by: ANESTHESIOLOGY

## 2021-08-27 PROCEDURE — 71000033 HC RECOVERY, INTIAL HOUR: Performed by: ORTHOPAEDIC SURGERY

## 2021-08-27 PROCEDURE — D9220A PRA ANESTHESIA: Mod: CRNA,,, | Performed by: NURSE ANESTHETIST, CERTIFIED REGISTERED

## 2021-08-27 PROCEDURE — 99233 SBSQ HOSP IP/OBS HIGH 50: CPT | Mod: ,,, | Performed by: STUDENT IN AN ORGANIZED HEALTH CARE EDUCATION/TRAINING PROGRAM

## 2021-08-27 PROCEDURE — D9220A PRA ANESTHESIA: ICD-10-PCS | Mod: ANES,,, | Performed by: ANESTHESIOLOGY

## 2021-08-27 PROCEDURE — 36415 COLL VENOUS BLD VENIPUNCTURE: CPT | Performed by: STUDENT IN AN ORGANIZED HEALTH CARE EDUCATION/TRAINING PROGRAM

## 2021-08-27 PROCEDURE — 94761 N-INVAS EAR/PLS OXIMETRY MLT: CPT

## 2021-08-27 PROCEDURE — 36000711: Performed by: ORTHOPAEDIC SURGERY

## 2021-08-27 PROCEDURE — 36000710: Performed by: ORTHOPAEDIC SURGERY

## 2021-08-27 PROCEDURE — 99900103 DSU ONLY-NO CHARGE-INITIAL HR (STAT): Performed by: ORTHOPAEDIC SURGERY

## 2021-08-27 PROCEDURE — 27000221 HC OXYGEN, UP TO 24 HOURS

## 2021-08-27 PROCEDURE — P9035 PLATELET PHERES LEUKOREDUCED: HCPCS | Performed by: STUDENT IN AN ORGANIZED HEALTH CARE EDUCATION/TRAINING PROGRAM

## 2021-08-27 PROCEDURE — 63600175 PHARM REV CODE 636 W HCPCS: Performed by: STUDENT IN AN ORGANIZED HEALTH CARE EDUCATION/TRAINING PROGRAM

## 2021-08-27 PROCEDURE — 80053 COMPREHEN METABOLIC PANEL: CPT | Performed by: STUDENT IN AN ORGANIZED HEALTH CARE EDUCATION/TRAINING PROGRAM

## 2021-08-27 PROCEDURE — 25000003 PHARM REV CODE 250: Performed by: NURSE ANESTHETIST, CERTIFIED REGISTERED

## 2021-08-27 PROCEDURE — C1776 JOINT DEVICE (IMPLANTABLE): HCPCS | Performed by: ORTHOPAEDIC SURGERY

## 2021-08-27 PROCEDURE — 71000039 HC RECOVERY, EACH ADD'L HOUR: Performed by: ORTHOPAEDIC SURGERY

## 2021-08-27 PROCEDURE — 37000009 HC ANESTHESIA EA ADD 15 MINS: Performed by: ORTHOPAEDIC SURGERY

## 2021-08-27 DEVICE — STEM FEM HIP ACCOLADE #5 132 D: Type: IMPLANTABLE DEVICE | Site: HIP | Status: FUNCTIONAL

## 2021-08-27 DEVICE — INSERT ADM X3 28MM CUP OD 48MM: Type: IMPLANTABLE DEVICE | Site: HIP | Status: FUNCTIONAL

## 2021-08-27 DEVICE — LINER ACET SZ E 42MM COCR: Type: IMPLANTABLE DEVICE | Site: HIP | Status: FUNCTIONAL

## 2021-08-27 DEVICE — SCREW TRIDENT II LP HEX 6.5X25: Type: IMPLANTABLE DEVICE | Site: HIP | Status: FUNCTIONAL

## 2021-08-27 DEVICE — HEAD FEMORAL V40 TAPER +4X28MM: Type: IMPLANTABLE DEVICE | Site: HIP | Status: FUNCTIONAL

## 2021-08-27 DEVICE — SHELL TRIDENT II ACET E 52MM: Type: IMPLANTABLE DEVICE | Site: HIP | Status: FUNCTIONAL

## 2021-08-27 RX ORDER — DIPHENHYDRAMINE HYDROCHLORIDE 50 MG/ML
25 INJECTION INTRAMUSCULAR; INTRAVENOUS EVERY 6 HOURS PRN
Status: DISCONTINUED | OUTPATIENT
Start: 2021-08-27 | End: 2021-08-27 | Stop reason: HOSPADM

## 2021-08-27 RX ORDER — HYDROMORPHONE HYDROCHLORIDE 2 MG/ML
0.2 INJECTION, SOLUTION INTRAMUSCULAR; INTRAVENOUS; SUBCUTANEOUS EVERY 5 MIN PRN
Status: DISCONTINUED | OUTPATIENT
Start: 2021-08-27 | End: 2021-08-27 | Stop reason: HOSPADM

## 2021-08-27 RX ORDER — FENTANYL CITRATE 50 UG/ML
INJECTION, SOLUTION INTRAMUSCULAR; INTRAVENOUS
Status: DISCONTINUED | OUTPATIENT
Start: 2021-08-27 | End: 2021-08-27

## 2021-08-27 RX ORDER — ONDANSETRON 2 MG/ML
INJECTION INTRAMUSCULAR; INTRAVENOUS
Status: DISCONTINUED | OUTPATIENT
Start: 2021-08-27 | End: 2021-08-27

## 2021-08-27 RX ORDER — OXYCODONE HYDROCHLORIDE 5 MG/1
5 TABLET ORAL
Status: DISCONTINUED | OUTPATIENT
Start: 2021-08-27 | End: 2021-08-27 | Stop reason: HOSPADM

## 2021-08-27 RX ORDER — ROCURONIUM BROMIDE 10 MG/ML
INJECTION, SOLUTION INTRAVENOUS
Status: DISCONTINUED | OUTPATIENT
Start: 2021-08-27 | End: 2021-08-27

## 2021-08-27 RX ORDER — ONDANSETRON 2 MG/ML
4 INJECTION INTRAMUSCULAR; INTRAVENOUS DAILY PRN
Status: DISCONTINUED | OUTPATIENT
Start: 2021-08-27 | End: 2021-08-27 | Stop reason: HOSPADM

## 2021-08-27 RX ORDER — HYDROMORPHONE HYDROCHLORIDE 1 MG/ML
1 INJECTION, SOLUTION INTRAMUSCULAR; INTRAVENOUS; SUBCUTANEOUS
Status: DISCONTINUED | OUTPATIENT
Start: 2021-08-27 | End: 2021-09-01

## 2021-08-27 RX ORDER — LIDOCAINE HYDROCHLORIDE 20 MG/ML
INJECTION INTRAVENOUS
Status: DISCONTINUED | OUTPATIENT
Start: 2021-08-27 | End: 2021-08-27

## 2021-08-27 RX ORDER — NEOSTIGMINE METHYLSULFATE 1 MG/ML
INJECTION, SOLUTION INTRAVENOUS
Status: DISCONTINUED | OUTPATIENT
Start: 2021-08-27 | End: 2021-08-27

## 2021-08-27 RX ORDER — HYDROMORPHONE HYDROCHLORIDE 1 MG/ML
1 INJECTION, SOLUTION INTRAMUSCULAR; INTRAVENOUS; SUBCUTANEOUS ONCE
Status: COMPLETED | OUTPATIENT
Start: 2021-08-27 | End: 2021-08-27

## 2021-08-27 RX ORDER — LORAZEPAM 0.5 MG/1
0.5 TABLET ORAL EVERY 12 HOURS PRN
Status: DISCONTINUED | OUTPATIENT
Start: 2021-08-27 | End: 2021-09-02 | Stop reason: HOSPADM

## 2021-08-27 RX ORDER — SODIUM CHLORIDE 0.9 % (FLUSH) 0.9 %
3 SYRINGE (ML) INJECTION
Status: DISCONTINUED | OUTPATIENT
Start: 2021-08-27 | End: 2021-08-27 | Stop reason: HOSPADM

## 2021-08-27 RX ORDER — SUCCINYLCHOLINE CHLORIDE 20 MG/ML
INJECTION INTRAMUSCULAR; INTRAVENOUS
Status: DISCONTINUED | OUTPATIENT
Start: 2021-08-27 | End: 2021-08-27

## 2021-08-27 RX ORDER — VANCOMYCIN HYDROCHLORIDE 1 G/20ML
INJECTION, POWDER, LYOPHILIZED, FOR SOLUTION INTRAVENOUS
Status: DISCONTINUED | OUTPATIENT
Start: 2021-08-27 | End: 2021-08-27 | Stop reason: HOSPADM

## 2021-08-27 RX ORDER — CEFAZOLIN SODIUM 2 G/50ML
SOLUTION INTRAVENOUS
Status: DISPENSED
Start: 2021-08-27 | End: 2021-08-28

## 2021-08-27 RX ORDER — ASPIRIN 325 MG
325 TABLET ORAL 2 TIMES DAILY
Status: DISCONTINUED | OUTPATIENT
Start: 2021-08-27 | End: 2021-09-02 | Stop reason: HOSPADM

## 2021-08-27 RX ORDER — TRANEXAMIC ACID 100 MG/ML
INJECTION, SOLUTION INTRAVENOUS
Status: DISCONTINUED | OUTPATIENT
Start: 2021-08-27 | End: 2021-08-27 | Stop reason: HOSPADM

## 2021-08-27 RX ORDER — FENTANYL CITRATE 50 UG/ML
25 INJECTION, SOLUTION INTRAMUSCULAR; INTRAVENOUS EVERY 5 MIN PRN
Status: COMPLETED | OUTPATIENT
Start: 2021-08-27 | End: 2021-08-27

## 2021-08-27 RX ORDER — ALBUMIN HUMAN 50 G/1000ML
SOLUTION INTRAVENOUS CONTINUOUS PRN
Status: DISCONTINUED | OUTPATIENT
Start: 2021-08-27 | End: 2021-08-27

## 2021-08-27 RX ORDER — MEPERIDINE HYDROCHLORIDE 50 MG/ML
12.5 INJECTION INTRAMUSCULAR; INTRAVENOUS; SUBCUTANEOUS ONCE AS NEEDED
Status: DISCONTINUED | OUTPATIENT
Start: 2021-08-27 | End: 2021-08-27 | Stop reason: HOSPADM

## 2021-08-27 RX ORDER — SODIUM CHLORIDE 0.9 % (FLUSH) 0.9 %
3 SYRINGE (ML) INJECTION EVERY 8 HOURS
Status: DISCONTINUED | OUTPATIENT
Start: 2021-08-27 | End: 2021-08-27 | Stop reason: HOSPADM

## 2021-08-27 RX ORDER — PROPOFOL 10 MG/ML
VIAL (ML) INTRAVENOUS
Status: DISCONTINUED | OUTPATIENT
Start: 2021-08-27 | End: 2021-08-27

## 2021-08-27 RX ADMIN — INSULIN ASPART 2 UNITS: 100 INJECTION, SOLUTION INTRAVENOUS; SUBCUTANEOUS at 09:08

## 2021-08-27 RX ADMIN — GABAPENTIN 100 MG: 100 CAPSULE ORAL at 09:08

## 2021-08-27 RX ADMIN — ASPIRIN 325 MG ORAL TABLET 325 MG: 325 PILL ORAL at 07:08

## 2021-08-27 RX ADMIN — HYDROMORPHONE HYDROCHLORIDE 0.2 MG: 2 INJECTION, SOLUTION INTRAMUSCULAR; INTRAVENOUS; SUBCUTANEOUS at 03:08

## 2021-08-27 RX ADMIN — NEOSTIGMINE METHYLSULFATE 3 MG: 1 INJECTION INTRAVENOUS at 02:08

## 2021-08-27 RX ADMIN — LIDOCAINE HYDROCHLORIDE 75 MG: 20 INJECTION, SOLUTION INTRAVENOUS at 01:08

## 2021-08-27 RX ADMIN — ONDANSETRON 8 MG: 2 INJECTION, SOLUTION INTRAMUSCULAR; INTRAVENOUS at 02:08

## 2021-08-27 RX ADMIN — OXYCODONE AND ACETAMINOPHEN 1 TABLET: 10; 325 TABLET ORAL at 11:08

## 2021-08-27 RX ADMIN — ROCURONIUM BROMIDE 5 MG: 10 INJECTION, SOLUTION INTRAVENOUS at 01:08

## 2021-08-27 RX ADMIN — PROPOFOL 80 MG: 10 INJECTION, EMULSION INTRAVENOUS at 01:08

## 2021-08-27 RX ADMIN — HYDROMORPHONE HYDROCHLORIDE 1 MG: 1 INJECTION, SOLUTION INTRAMUSCULAR; INTRAVENOUS; SUBCUTANEOUS at 04:08

## 2021-08-27 RX ADMIN — ALBUMIN (HUMAN): 12.5 SOLUTION INTRAVENOUS at 01:08

## 2021-08-27 RX ADMIN — HYDROMORPHONE HYDROCHLORIDE 0.2 MG: 2 INJECTION, SOLUTION INTRAMUSCULAR; INTRAVENOUS; SUBCUTANEOUS at 04:08

## 2021-08-27 RX ADMIN — FENTANYL CITRATE 25 MCG: 50 INJECTION INTRAMUSCULAR; INTRAVENOUS at 02:08

## 2021-08-27 RX ADMIN — FENTANYL CITRATE 75 MCG: 50 INJECTION, SOLUTION INTRAMUSCULAR; INTRAVENOUS at 01:08

## 2021-08-27 RX ADMIN — ATORVASTATIN CALCIUM 40 MG: 40 TABLET, FILM COATED ORAL at 09:08

## 2021-08-27 RX ADMIN — FENTANYL CITRATE 25 MCG: 50 INJECTION, SOLUTION INTRAMUSCULAR; INTRAVENOUS at 02:08

## 2021-08-27 RX ADMIN — ROCURONIUM BROMIDE 30 MG: 10 INJECTION, SOLUTION INTRAVENOUS at 01:08

## 2021-08-27 RX ADMIN — FENTANYL CITRATE 25 MCG: 50 INJECTION INTRAMUSCULAR; INTRAVENOUS at 03:08

## 2021-08-27 RX ADMIN — POLYETHYLENE GLYCOL (3350) 17 G: 17 POWDER, FOR SOLUTION ORAL at 07:08

## 2021-08-27 RX ADMIN — FENTANYL CITRATE 25 MCG: 50 INJECTION, SOLUTION INTRAMUSCULAR; INTRAVENOUS at 12:08

## 2021-08-27 RX ADMIN — INSULIN DETEMIR 10 UNITS: 100 INJECTION, SOLUTION SUBCUTANEOUS at 09:08

## 2021-08-27 RX ADMIN — ONDANSETRON 4 MG: 4 TABLET, ORALLY DISINTEGRATING ORAL at 11:08

## 2021-08-27 RX ADMIN — OXYCODONE AND ACETAMINOPHEN 1 TABLET: 10; 325 TABLET ORAL at 09:08

## 2021-08-27 RX ADMIN — HYDROMORPHONE HYDROCHLORIDE 1 MG: 1 INJECTION, SOLUTION INTRAMUSCULAR; INTRAVENOUS; SUBCUTANEOUS at 07:08

## 2021-08-27 RX ADMIN — MUPIROCIN: 20 OINTMENT TOPICAL at 11:08

## 2021-08-27 RX ADMIN — MELATONIN TAB 3 MG 6 MG: 3 TAB at 11:08

## 2021-08-27 RX ADMIN — GLYCOPYRROLATE 0.4 MG: 0.2 INJECTION, SOLUTION INTRAMUSCULAR; INTRAVENOUS at 02:08

## 2021-08-27 RX ADMIN — HYDROMORPHONE HYDROCHLORIDE 0.5 MG: 1 INJECTION, SOLUTION INTRAMUSCULAR; INTRAVENOUS; SUBCUTANEOUS at 02:08

## 2021-08-27 RX ADMIN — SUCCINYLCHOLINE CHLORIDE 140 MG: 20 INJECTION, SOLUTION INTRAMUSCULAR; INTRAVENOUS; PARENTERAL at 01:08

## 2021-08-27 RX ADMIN — FENTANYL CITRATE 50 MCG: 50 INJECTION, SOLUTION INTRAMUSCULAR; INTRAVENOUS at 01:08

## 2021-08-27 RX ADMIN — MUPIROCIN: 20 OINTMENT TOPICAL at 09:08

## 2021-08-27 RX ADMIN — HYDROMORPHONE HYDROCHLORIDE 1 MG: 1 INJECTION, SOLUTION INTRAMUSCULAR; INTRAVENOUS; SUBCUTANEOUS at 08:08

## 2021-08-27 RX ADMIN — ONDANSETRON 4 MG: 2 INJECTION INTRAMUSCULAR; INTRAVENOUS at 03:08

## 2021-08-28 PROBLEM — Z79.01 CHRONIC ANTICOAGULATION: Status: RESOLVED | Noted: 2021-08-28 | Resolved: 2021-08-28

## 2021-08-28 PROBLEM — Z79.01 CHRONIC ANTICOAGULATION: Status: ACTIVE | Noted: 2021-08-28

## 2021-08-28 LAB
ALBUMIN SERPL BCP-MCNC: 3.5 G/DL (ref 3.5–5.2)
ALP SERPL-CCNC: 84 U/L (ref 55–135)
ALT SERPL W/O P-5'-P-CCNC: 18 U/L (ref 10–44)
ANION GAP SERPL CALC-SCNC: 13 MMOL/L (ref 8–16)
AST SERPL-CCNC: 29 U/L (ref 10–40)
BASOPHILS # BLD AUTO: 0.02 K/UL (ref 0–0.2)
BASOPHILS NFR BLD: 0.3 % (ref 0–1.9)
BILIRUB SERPL-MCNC: 2.2 MG/DL (ref 0.1–1)
BILIRUB UR QL STRIP: ABNORMAL
BUN SERPL-MCNC: 15 MG/DL (ref 8–23)
CALCIUM SERPL-MCNC: 8.6 MG/DL (ref 8.7–10.5)
CHLORIDE SERPL-SCNC: 99 MMOL/L (ref 95–110)
CLARITY UR: CLEAR
CO2 SERPL-SCNC: 20 MMOL/L (ref 23–29)
COLOR UR: YELLOW
CREAT SERPL-MCNC: 0.7 MG/DL (ref 0.5–1.4)
DIFFERENTIAL METHOD: ABNORMAL
EOSINOPHIL # BLD AUTO: 0.1 K/UL (ref 0–0.5)
EOSINOPHIL NFR BLD: 1.3 % (ref 0–8)
ERYTHROCYTE [DISTWIDTH] IN BLOOD BY AUTOMATED COUNT: 14 % (ref 11.5–14.5)
EST. GFR  (AFRICAN AMERICAN): >60 ML/MIN/1.73 M^2
EST. GFR  (NON AFRICAN AMERICAN): >60 ML/MIN/1.73 M^2
GLUCOSE SERPL-MCNC: 208 MG/DL (ref 70–110)
GLUCOSE UR QL STRIP: ABNORMAL
HCT VFR BLD AUTO: 32.3 % (ref 40–54)
HGB BLD-MCNC: 10.8 G/DL (ref 14–18)
HGB UR QL STRIP: NEGATIVE
IMM GRANULOCYTES # BLD AUTO: 0.04 K/UL (ref 0–0.04)
IMM GRANULOCYTES NFR BLD AUTO: 0.7 % (ref 0–0.5)
KETONES UR QL STRIP: ABNORMAL
LEUKOCYTE ESTERASE UR QL STRIP: NEGATIVE
LYMPHOCYTES # BLD AUTO: 0.5 K/UL (ref 1–4.8)
LYMPHOCYTES NFR BLD: 7.9 % (ref 18–48)
MAGNESIUM SERPL-MCNC: 1.7 MG/DL (ref 1.6–2.6)
MCH RBC QN AUTO: 28.8 PG (ref 27–31)
MCHC RBC AUTO-ENTMCNC: 33.4 G/DL (ref 32–36)
MCV RBC AUTO: 86 FL (ref 82–98)
MONOCYTES # BLD AUTO: 0.7 K/UL (ref 0.3–1)
MONOCYTES NFR BLD: 11.9 % (ref 4–15)
NEUTROPHILS # BLD AUTO: 4.7 K/UL (ref 1.8–7.7)
NEUTROPHILS NFR BLD: 77.9 % (ref 38–73)
NITRITE UR QL STRIP: NEGATIVE
NRBC BLD-RTO: 0 /100 WBC
PH UR STRIP: 6 [PH] (ref 5–8)
PHOSPHATE SERPL-MCNC: 3.4 MG/DL (ref 2.7–4.5)
PLATELET # BLD AUTO: 57 K/UL (ref 150–450)
PLATELET BLD QL SMEAR: ABNORMAL
PMV BLD AUTO: 10.3 FL (ref 9.2–12.9)
POCT GLUCOSE: 199 MG/DL (ref 70–110)
POCT GLUCOSE: 216 MG/DL (ref 70–110)
POCT GLUCOSE: 221 MG/DL (ref 70–110)
POCT GLUCOSE: 236 MG/DL (ref 70–110)
POTASSIUM SERPL-SCNC: 4.1 MMOL/L (ref 3.5–5.1)
PROCALCITONIN SERPL IA-MCNC: 0.14 NG/ML
PROT SERPL-MCNC: 6 G/DL (ref 6–8.4)
PROT UR QL STRIP: NEGATIVE
RBC # BLD AUTO: 3.75 M/UL (ref 4.6–6.2)
SODIUM SERPL-SCNC: 132 MMOL/L (ref 136–145)
SP GR UR STRIP: >=1.03 (ref 1–1.03)
URN SPEC COLLECT METH UR: ABNORMAL
UROBILINOGEN UR STRIP-ACNC: ABNORMAL EU/DL
WBC # BLD AUTO: 5.97 K/UL (ref 3.9–12.7)

## 2021-08-28 PROCEDURE — 25000003 PHARM REV CODE 250: Performed by: ORTHOPAEDIC SURGERY

## 2021-08-28 PROCEDURE — 84100 ASSAY OF PHOSPHORUS: CPT | Performed by: ORTHOPAEDIC SURGERY

## 2021-08-28 PROCEDURE — 94761 N-INVAS EAR/PLS OXIMETRY MLT: CPT

## 2021-08-28 PROCEDURE — 99233 PR SUBSEQUENT HOSPITAL CARE,LEVL III: ICD-10-PCS | Mod: ,,, | Performed by: STUDENT IN AN ORGANIZED HEALTH CARE EDUCATION/TRAINING PROGRAM

## 2021-08-28 PROCEDURE — 27000221 HC OXYGEN, UP TO 24 HOURS

## 2021-08-28 PROCEDURE — 84145 PROCALCITONIN (PCT): CPT | Performed by: STUDENT IN AN ORGANIZED HEALTH CARE EDUCATION/TRAINING PROGRAM

## 2021-08-28 PROCEDURE — 63600175 PHARM REV CODE 636 W HCPCS: Performed by: STUDENT IN AN ORGANIZED HEALTH CARE EDUCATION/TRAINING PROGRAM

## 2021-08-28 PROCEDURE — 63600175 PHARM REV CODE 636 W HCPCS: Performed by: ORTHOPAEDIC SURGERY

## 2021-08-28 PROCEDURE — 87040 BLOOD CULTURE FOR BACTERIA: CPT | Performed by: STUDENT IN AN ORGANIZED HEALTH CARE EDUCATION/TRAINING PROGRAM

## 2021-08-28 PROCEDURE — 99231 PR SUBSEQUENT HOSPITAL CARE,LEVL I: ICD-10-PCS | Mod: ,,, | Performed by: INTERNAL MEDICINE

## 2021-08-28 PROCEDURE — 99231 SBSQ HOSP IP/OBS SF/LOW 25: CPT | Mod: ,,, | Performed by: INTERNAL MEDICINE

## 2021-08-28 PROCEDURE — 36415 COLL VENOUS BLD VENIPUNCTURE: CPT | Performed by: ORTHOPAEDIC SURGERY

## 2021-08-28 PROCEDURE — 85025 COMPLETE CBC W/AUTO DIFF WBC: CPT | Performed by: ORTHOPAEDIC SURGERY

## 2021-08-28 PROCEDURE — 81003 URINALYSIS AUTO W/O SCOPE: CPT | Performed by: STUDENT IN AN ORGANIZED HEALTH CARE EDUCATION/TRAINING PROGRAM

## 2021-08-28 PROCEDURE — 25000003 PHARM REV CODE 250: Performed by: STUDENT IN AN ORGANIZED HEALTH CARE EDUCATION/TRAINING PROGRAM

## 2021-08-28 PROCEDURE — 12000002 HC ACUTE/MED SURGE SEMI-PRIVATE ROOM

## 2021-08-28 PROCEDURE — 80053 COMPREHEN METABOLIC PANEL: CPT | Performed by: ORTHOPAEDIC SURGERY

## 2021-08-28 PROCEDURE — 99233 SBSQ HOSP IP/OBS HIGH 50: CPT | Mod: ,,, | Performed by: STUDENT IN AN ORGANIZED HEALTH CARE EDUCATION/TRAINING PROGRAM

## 2021-08-28 PROCEDURE — 83735 ASSAY OF MAGNESIUM: CPT | Performed by: ORTHOPAEDIC SURGERY

## 2021-08-28 RX ADMIN — INSULIN ASPART 2 UNITS: 100 INJECTION, SOLUTION INTRAVENOUS; SUBCUTANEOUS at 05:08

## 2021-08-28 RX ADMIN — INSULIN ASPART 4 UNITS: 100 INJECTION, SOLUTION INTRAVENOUS; SUBCUTANEOUS at 12:08

## 2021-08-28 RX ADMIN — HYDROMORPHONE HYDROCHLORIDE 1 MG: 1 INJECTION, SOLUTION INTRAMUSCULAR; INTRAVENOUS; SUBCUTANEOUS at 05:08

## 2021-08-28 RX ADMIN — METHOCARBAMOL 500 MG: 500 TABLET ORAL at 03:08

## 2021-08-28 RX ADMIN — INSULIN DETEMIR 10 UNITS: 100 INJECTION, SOLUTION SUBCUTANEOUS at 08:08

## 2021-08-28 RX ADMIN — LORAZEPAM 0.5 MG: 0.5 TABLET ORAL at 03:08

## 2021-08-28 RX ADMIN — ASPIRIN 325 MG ORAL TABLET 325 MG: 325 PILL ORAL at 10:08

## 2021-08-28 RX ADMIN — POLYETHYLENE GLYCOL (3350) 17 G: 17 POWDER, FOR SOLUTION ORAL at 04:08

## 2021-08-28 RX ADMIN — HYDROMORPHONE HYDROCHLORIDE 1 MG: 1 INJECTION, SOLUTION INTRAMUSCULAR; INTRAVENOUS; SUBCUTANEOUS at 01:08

## 2021-08-28 RX ADMIN — HYDROMORPHONE HYDROCHLORIDE 1 MG: 1 INJECTION, SOLUTION INTRAMUSCULAR; INTRAVENOUS; SUBCUTANEOUS at 08:08

## 2021-08-28 RX ADMIN — GABAPENTIN 100 MG: 100 CAPSULE ORAL at 10:08

## 2021-08-28 RX ADMIN — INSULIN ASPART 4 UNITS: 100 INJECTION, SOLUTION INTRAVENOUS; SUBCUTANEOUS at 04:08

## 2021-08-28 RX ADMIN — GABAPENTIN 100 MG: 100 CAPSULE ORAL at 02:08

## 2021-08-28 RX ADMIN — METHOCARBAMOL 500 MG: 500 TABLET ORAL at 08:08

## 2021-08-28 RX ADMIN — ASPIRIN 325 MG ORAL TABLET 325 MG: 325 PILL ORAL at 08:08

## 2021-08-28 RX ADMIN — OXYCODONE AND ACETAMINOPHEN 1 TABLET: 10; 325 TABLET ORAL at 08:08

## 2021-08-28 RX ADMIN — MUPIROCIN: 20 OINTMENT TOPICAL at 10:08

## 2021-08-28 RX ADMIN — MUPIROCIN: 20 OINTMENT TOPICAL at 08:08

## 2021-08-28 RX ADMIN — CEFTRIAXONE 1 G: 1 INJECTION, SOLUTION INTRAVENOUS at 01:08

## 2021-08-28 RX ADMIN — OXYCODONE AND ACETAMINOPHEN 1 TABLET: 10; 325 TABLET ORAL at 04:08

## 2021-08-28 RX ADMIN — INSULIN DETEMIR 10 UNITS: 100 INJECTION, SOLUTION SUBCUTANEOUS at 10:08

## 2021-08-28 RX ADMIN — GABAPENTIN 100 MG: 100 CAPSULE ORAL at 08:08

## 2021-08-28 RX ADMIN — AZITHROMYCIN MONOHYDRATE 500 MG: 500 INJECTION, POWDER, LYOPHILIZED, FOR SOLUTION INTRAVENOUS at 01:08

## 2021-08-28 RX ADMIN — ATORVASTATIN CALCIUM 40 MG: 40 TABLET, FILM COATED ORAL at 10:08

## 2021-08-29 LAB
ALBUMIN SERPL BCP-MCNC: 3.1 G/DL (ref 3.5–5.2)
ALP SERPL-CCNC: 91 U/L (ref 55–135)
ALT SERPL W/O P-5'-P-CCNC: 15 U/L (ref 10–44)
ANION GAP SERPL CALC-SCNC: 12 MMOL/L (ref 8–16)
AST SERPL-CCNC: 24 U/L (ref 10–40)
BASOPHILS # BLD AUTO: 0.02 K/UL (ref 0–0.2)
BASOPHILS NFR BLD: 0.3 % (ref 0–1.9)
BILIRUB SERPL-MCNC: 2.2 MG/DL (ref 0.1–1)
BUN SERPL-MCNC: 16 MG/DL (ref 8–23)
CALCIUM SERPL-MCNC: 8.8 MG/DL (ref 8.7–10.5)
CHLORIDE SERPL-SCNC: 97 MMOL/L (ref 95–110)
CO2 SERPL-SCNC: 24 MMOL/L (ref 23–29)
CREAT SERPL-MCNC: 0.8 MG/DL (ref 0.5–1.4)
DIFFERENTIAL METHOD: ABNORMAL
EOSINOPHIL # BLD AUTO: 0.1 K/UL (ref 0–0.5)
EOSINOPHIL NFR BLD: 1.6 % (ref 0–8)
ERYTHROCYTE [DISTWIDTH] IN BLOOD BY AUTOMATED COUNT: 14.4 % (ref 11.5–14.5)
EST. GFR  (AFRICAN AMERICAN): >60 ML/MIN/1.73 M^2
EST. GFR  (NON AFRICAN AMERICAN): >60 ML/MIN/1.73 M^2
GLUCOSE SERPL-MCNC: 235 MG/DL (ref 70–110)
HCT VFR BLD AUTO: 31.1 % (ref 40–54)
HGB BLD-MCNC: 10.6 G/DL (ref 14–18)
IMM GRANULOCYTES # BLD AUTO: 0.05 K/UL (ref 0–0.04)
IMM GRANULOCYTES NFR BLD AUTO: 0.7 % (ref 0–0.5)
LYMPHOCYTES # BLD AUTO: 0.4 K/UL (ref 1–4.8)
LYMPHOCYTES NFR BLD: 5.7 % (ref 18–48)
MAGNESIUM SERPL-MCNC: 1.7 MG/DL (ref 1.6–2.6)
MCH RBC QN AUTO: 29.1 PG (ref 27–31)
MCHC RBC AUTO-ENTMCNC: 34.1 G/DL (ref 32–36)
MCV RBC AUTO: 85 FL (ref 82–98)
MONOCYTES # BLD AUTO: 1 K/UL (ref 0.3–1)
MONOCYTES NFR BLD: 14.1 % (ref 4–15)
NEUTROPHILS # BLD AUTO: 5.3 K/UL (ref 1.8–7.7)
NEUTROPHILS NFR BLD: 77.6 % (ref 38–73)
NRBC BLD-RTO: 0 /100 WBC
PHOSPHATE SERPL-MCNC: 2.9 MG/DL (ref 2.7–4.5)
PLATELET # BLD AUTO: 66 K/UL (ref 150–450)
PMV BLD AUTO: 11.6 FL (ref 9.2–12.9)
POCT GLUCOSE: 211 MG/DL (ref 70–110)
POCT GLUCOSE: 223 MG/DL (ref 70–110)
POCT GLUCOSE: 232 MG/DL (ref 70–110)
POCT GLUCOSE: 282 MG/DL (ref 70–110)
POTASSIUM SERPL-SCNC: 3.8 MMOL/L (ref 3.5–5.1)
PROT SERPL-MCNC: 5.9 G/DL (ref 6–8.4)
RBC # BLD AUTO: 3.64 M/UL (ref 4.6–6.2)
SODIUM SERPL-SCNC: 133 MMOL/L (ref 136–145)
WBC # BLD AUTO: 6.83 K/UL (ref 3.9–12.7)

## 2021-08-29 PROCEDURE — 12000002 HC ACUTE/MED SURGE SEMI-PRIVATE ROOM

## 2021-08-29 PROCEDURE — 25000003 PHARM REV CODE 250: Performed by: ORTHOPAEDIC SURGERY

## 2021-08-29 PROCEDURE — 63600175 PHARM REV CODE 636 W HCPCS: Performed by: ORTHOPAEDIC SURGERY

## 2021-08-29 PROCEDURE — 25000003 PHARM REV CODE 250: Performed by: STUDENT IN AN ORGANIZED HEALTH CARE EDUCATION/TRAINING PROGRAM

## 2021-08-29 PROCEDURE — 27000221 HC OXYGEN, UP TO 24 HOURS

## 2021-08-29 PROCEDURE — 63600175 PHARM REV CODE 636 W HCPCS: Performed by: STUDENT IN AN ORGANIZED HEALTH CARE EDUCATION/TRAINING PROGRAM

## 2021-08-29 PROCEDURE — 85025 COMPLETE CBC W/AUTO DIFF WBC: CPT | Performed by: ORTHOPAEDIC SURGERY

## 2021-08-29 PROCEDURE — 84100 ASSAY OF PHOSPHORUS: CPT | Performed by: ORTHOPAEDIC SURGERY

## 2021-08-29 PROCEDURE — 94761 N-INVAS EAR/PLS OXIMETRY MLT: CPT

## 2021-08-29 PROCEDURE — 80053 COMPREHEN METABOLIC PANEL: CPT | Performed by: ORTHOPAEDIC SURGERY

## 2021-08-29 PROCEDURE — 83735 ASSAY OF MAGNESIUM: CPT | Performed by: ORTHOPAEDIC SURGERY

## 2021-08-29 PROCEDURE — 36415 COLL VENOUS BLD VENIPUNCTURE: CPT | Performed by: ORTHOPAEDIC SURGERY

## 2021-08-29 RX ADMIN — MUPIROCIN: 20 OINTMENT TOPICAL at 09:08

## 2021-08-29 RX ADMIN — INSULIN ASPART 2 UNITS: 100 INJECTION, SOLUTION INTRAVENOUS; SUBCUTANEOUS at 09:08

## 2021-08-29 RX ADMIN — POLYETHYLENE GLYCOL (3350) 17 G: 17 POWDER, FOR SOLUTION ORAL at 04:08

## 2021-08-29 RX ADMIN — MELATONIN TAB 3 MG 6 MG: 3 TAB at 12:08

## 2021-08-29 RX ADMIN — GABAPENTIN 100 MG: 100 CAPSULE ORAL at 09:08

## 2021-08-29 RX ADMIN — LORAZEPAM 0.5 MG: 0.5 TABLET ORAL at 12:08

## 2021-08-29 RX ADMIN — HYDROMORPHONE HYDROCHLORIDE 1 MG: 1 INJECTION, SOLUTION INTRAMUSCULAR; INTRAVENOUS; SUBCUTANEOUS at 12:08

## 2021-08-29 RX ADMIN — INSULIN ASPART 4 UNITS: 100 INJECTION, SOLUTION INTRAVENOUS; SUBCUTANEOUS at 05:08

## 2021-08-29 RX ADMIN — INSULIN ASPART 6 UNITS: 100 INJECTION, SOLUTION INTRAVENOUS; SUBCUTANEOUS at 12:08

## 2021-08-29 RX ADMIN — INSULIN DETEMIR 10 UNITS: 100 INJECTION, SOLUTION SUBCUTANEOUS at 09:08

## 2021-08-29 RX ADMIN — MELATONIN TAB 3 MG 6 MG: 3 TAB at 09:08

## 2021-08-29 RX ADMIN — ATORVASTATIN CALCIUM 40 MG: 40 TABLET, FILM COATED ORAL at 09:08

## 2021-08-29 RX ADMIN — Medication 800 MG: at 05:08

## 2021-08-29 RX ADMIN — ASPIRIN 325 MG ORAL TABLET 325 MG: 325 PILL ORAL at 09:08

## 2021-08-29 RX ADMIN — HYDROMORPHONE HYDROCHLORIDE 1 MG: 1 INJECTION, SOLUTION INTRAMUSCULAR; INTRAVENOUS; SUBCUTANEOUS at 10:08

## 2021-08-29 RX ADMIN — AZITHROMYCIN MONOHYDRATE 500 MG: 500 INJECTION, POWDER, LYOPHILIZED, FOR SOLUTION INTRAVENOUS at 12:08

## 2021-08-29 RX ADMIN — POTASSIUM BICARBONATE 50 MEQ: 977.5 TABLET, EFFERVESCENT ORAL at 05:08

## 2021-08-29 RX ADMIN — OXYCODONE AND ACETAMINOPHEN 1 TABLET: 10; 325 TABLET ORAL at 02:08

## 2021-08-29 RX ADMIN — METHOCARBAMOL 500 MG: 500 TABLET ORAL at 10:08

## 2021-08-29 RX ADMIN — OXYCODONE AND ACETAMINOPHEN 1 TABLET: 10; 325 TABLET ORAL at 05:08

## 2021-08-29 RX ADMIN — OXYCODONE AND ACETAMINOPHEN 1 TABLET: 10; 325 TABLET ORAL at 09:08

## 2021-08-29 RX ADMIN — LORAZEPAM 0.5 MG: 0.5 TABLET ORAL at 09:08

## 2021-08-29 RX ADMIN — HYDROMORPHONE HYDROCHLORIDE 1 MG: 1 INJECTION, SOLUTION INTRAMUSCULAR; INTRAVENOUS; SUBCUTANEOUS at 05:08

## 2021-08-29 RX ADMIN — CEFTRIAXONE 1 G: 1 INJECTION, SOLUTION INTRAVENOUS at 12:08

## 2021-08-29 RX ADMIN — Medication 800 MG: at 01:08

## 2021-08-29 RX ADMIN — GABAPENTIN 100 MG: 100 CAPSULE ORAL at 02:08

## 2021-08-30 LAB
ALBUMIN SERPL BCP-MCNC: 2.9 G/DL (ref 3.5–5.2)
ALP SERPL-CCNC: 118 U/L (ref 55–135)
ALT SERPL W/O P-5'-P-CCNC: 19 U/L (ref 10–44)
ANION GAP SERPL CALC-SCNC: 11 MMOL/L (ref 8–16)
AST SERPL-CCNC: 26 U/L (ref 10–40)
BASOPHILS # BLD AUTO: 0.02 K/UL (ref 0–0.2)
BASOPHILS NFR BLD: 0.4 % (ref 0–1.9)
BILIRUB SERPL-MCNC: 1.7 MG/DL (ref 0.1–1)
BUN SERPL-MCNC: 18 MG/DL (ref 8–23)
CALCIUM SERPL-MCNC: 8.9 MG/DL (ref 8.7–10.5)
CHLORIDE SERPL-SCNC: 98 MMOL/L (ref 95–110)
CO2 SERPL-SCNC: 25 MMOL/L (ref 23–29)
CREAT SERPL-MCNC: 0.7 MG/DL (ref 0.5–1.4)
DIFFERENTIAL METHOD: ABNORMAL
EOSINOPHIL # BLD AUTO: 0.2 K/UL (ref 0–0.5)
EOSINOPHIL NFR BLD: 3.4 % (ref 0–8)
ERYTHROCYTE [DISTWIDTH] IN BLOOD BY AUTOMATED COUNT: 14.6 % (ref 11.5–14.5)
EST. GFR  (AFRICAN AMERICAN): >60 ML/MIN/1.73 M^2
EST. GFR  (NON AFRICAN AMERICAN): >60 ML/MIN/1.73 M^2
GLUCOSE SERPL-MCNC: 199 MG/DL (ref 70–110)
HCT VFR BLD AUTO: 31.4 % (ref 40–54)
HGB BLD-MCNC: 10.2 G/DL (ref 14–18)
IMM GRANULOCYTES # BLD AUTO: 0.08 K/UL (ref 0–0.04)
IMM GRANULOCYTES NFR BLD AUTO: 1.6 % (ref 0–0.5)
LYMPHOCYTES # BLD AUTO: 0.5 K/UL (ref 1–4.8)
LYMPHOCYTES NFR BLD: 9.8 % (ref 18–48)
MAGNESIUM SERPL-MCNC: 1.9 MG/DL (ref 1.6–2.6)
MCH RBC QN AUTO: 27.9 PG (ref 27–31)
MCHC RBC AUTO-ENTMCNC: 32.5 G/DL (ref 32–36)
MCV RBC AUTO: 86 FL (ref 82–98)
MONOCYTES # BLD AUTO: 0.5 K/UL (ref 0.3–1)
MONOCYTES NFR BLD: 10.6 % (ref 4–15)
NEUTROPHILS # BLD AUTO: 3.7 K/UL (ref 1.8–7.7)
NEUTROPHILS NFR BLD: 74.2 % (ref 38–73)
NRBC BLD-RTO: 0 /100 WBC
PHOSPHATE SERPL-MCNC: 3 MG/DL (ref 2.7–4.5)
PLATELET # BLD AUTO: 59 K/UL (ref 150–450)
PMV BLD AUTO: 11.6 FL (ref 9.2–12.9)
POCT GLUCOSE: 202 MG/DL (ref 70–110)
POCT GLUCOSE: 259 MG/DL (ref 70–110)
POCT GLUCOSE: 287 MG/DL (ref 70–110)
POCT GLUCOSE: 336 MG/DL (ref 70–110)
POTASSIUM SERPL-SCNC: 3.7 MMOL/L (ref 3.5–5.1)
PROT SERPL-MCNC: 5.8 G/DL (ref 6–8.4)
RBC # BLD AUTO: 3.65 M/UL (ref 4.6–6.2)
SODIUM SERPL-SCNC: 134 MMOL/L (ref 136–145)
WBC # BLD AUTO: 4.99 K/UL (ref 3.9–12.7)

## 2021-08-30 PROCEDURE — 25000003 PHARM REV CODE 250: Performed by: ORTHOPAEDIC SURGERY

## 2021-08-30 PROCEDURE — 97165 OT EVAL LOW COMPLEX 30 MIN: CPT

## 2021-08-30 PROCEDURE — C9399 UNCLASSIFIED DRUGS OR BIOLOG: HCPCS | Performed by: ORTHOPAEDIC SURGERY

## 2021-08-30 PROCEDURE — 27000221 HC OXYGEN, UP TO 24 HOURS

## 2021-08-30 PROCEDURE — 12000002 HC ACUTE/MED SURGE SEMI-PRIVATE ROOM

## 2021-08-30 PROCEDURE — 83735 ASSAY OF MAGNESIUM: CPT | Performed by: ORTHOPAEDIC SURGERY

## 2021-08-30 PROCEDURE — 63600175 PHARM REV CODE 636 W HCPCS: Performed by: STUDENT IN AN ORGANIZED HEALTH CARE EDUCATION/TRAINING PROGRAM

## 2021-08-30 PROCEDURE — 84100 ASSAY OF PHOSPHORUS: CPT | Performed by: ORTHOPAEDIC SURGERY

## 2021-08-30 PROCEDURE — 36415 COLL VENOUS BLD VENIPUNCTURE: CPT | Performed by: ORTHOPAEDIC SURGERY

## 2021-08-30 PROCEDURE — 97162 PT EVAL MOD COMPLEX 30 MIN: CPT

## 2021-08-30 PROCEDURE — 94761 N-INVAS EAR/PLS OXIMETRY MLT: CPT

## 2021-08-30 PROCEDURE — 97110 THERAPEUTIC EXERCISES: CPT

## 2021-08-30 PROCEDURE — 80053 COMPREHEN METABOLIC PANEL: CPT | Performed by: ORTHOPAEDIC SURGERY

## 2021-08-30 PROCEDURE — 63600175 PHARM REV CODE 636 W HCPCS: Performed by: ORTHOPAEDIC SURGERY

## 2021-08-30 PROCEDURE — 85025 COMPLETE CBC W/AUTO DIFF WBC: CPT | Performed by: ORTHOPAEDIC SURGERY

## 2021-08-30 PROCEDURE — 25000003 PHARM REV CODE 250: Performed by: STUDENT IN AN ORGANIZED HEALTH CARE EDUCATION/TRAINING PROGRAM

## 2021-08-30 RX ADMIN — INSULIN DETEMIR 10 UNITS: 100 INJECTION, SOLUTION SUBCUTANEOUS at 09:08

## 2021-08-30 RX ADMIN — ASPIRIN 325 MG ORAL TABLET 325 MG: 325 PILL ORAL at 08:08

## 2021-08-30 RX ADMIN — INSULIN ASPART 3 UNITS: 100 INJECTION, SOLUTION INTRAVENOUS; SUBCUTANEOUS at 08:08

## 2021-08-30 RX ADMIN — ATORVASTATIN CALCIUM 40 MG: 40 TABLET, FILM COATED ORAL at 09:08

## 2021-08-30 RX ADMIN — GABAPENTIN 100 MG: 100 CAPSULE ORAL at 02:08

## 2021-08-30 RX ADMIN — INSULIN DETEMIR 10 UNITS: 100 INJECTION, SOLUTION SUBCUTANEOUS at 08:08

## 2021-08-30 RX ADMIN — CEFTRIAXONE 1 G: 1 INJECTION, SOLUTION INTRAVENOUS at 02:08

## 2021-08-30 RX ADMIN — HYDROMORPHONE HYDROCHLORIDE 1 MG: 1 INJECTION, SOLUTION INTRAMUSCULAR; INTRAVENOUS; SUBCUTANEOUS at 03:08

## 2021-08-30 RX ADMIN — MELATONIN TAB 3 MG 6 MG: 3 TAB at 08:08

## 2021-08-30 RX ADMIN — LORAZEPAM 0.5 MG: 0.5 TABLET ORAL at 08:08

## 2021-08-30 RX ADMIN — AZITHROMYCIN MONOHYDRATE 500 MG: 500 INJECTION, POWDER, LYOPHILIZED, FOR SOLUTION INTRAVENOUS at 02:08

## 2021-08-30 RX ADMIN — OXYCODONE AND ACETAMINOPHEN 1 TABLET: 10; 325 TABLET ORAL at 01:08

## 2021-08-30 RX ADMIN — INSULIN ASPART 8 UNITS: 100 INJECTION, SOLUTION INTRAVENOUS; SUBCUTANEOUS at 05:08

## 2021-08-30 RX ADMIN — POLYETHYLENE GLYCOL (3350) 17 G: 17 POWDER, FOR SOLUTION ORAL at 05:08

## 2021-08-30 RX ADMIN — OXYCODONE AND ACETAMINOPHEN 1 TABLET: 10; 325 TABLET ORAL at 05:08

## 2021-08-30 RX ADMIN — METHOCARBAMOL 500 MG: 500 TABLET ORAL at 08:08

## 2021-08-30 RX ADMIN — GABAPENTIN 100 MG: 100 CAPSULE ORAL at 09:08

## 2021-08-30 RX ADMIN — ASPIRIN 325 MG ORAL TABLET 325 MG: 325 PILL ORAL at 09:08

## 2021-08-30 RX ADMIN — OXYCODONE AND ACETAMINOPHEN 1 TABLET: 10; 325 TABLET ORAL at 11:08

## 2021-08-30 RX ADMIN — GABAPENTIN 100 MG: 100 CAPSULE ORAL at 08:08

## 2021-08-30 RX ADMIN — MUPIROCIN: 20 OINTMENT TOPICAL at 09:08

## 2021-08-31 LAB
POCT GLUCOSE: 167 MG/DL (ref 70–110)
POCT GLUCOSE: 215 MG/DL (ref 70–110)
POCT GLUCOSE: 223 MG/DL (ref 70–110)
POCT GLUCOSE: 239 MG/DL (ref 70–110)

## 2021-08-31 PROCEDURE — 97530 THERAPEUTIC ACTIVITIES: CPT

## 2021-08-31 PROCEDURE — 97110 THERAPEUTIC EXERCISES: CPT

## 2021-08-31 PROCEDURE — 94761 N-INVAS EAR/PLS OXIMETRY MLT: CPT

## 2021-08-31 PROCEDURE — 30200315 PPD INTRADERMAL TEST REV CODE 302: Performed by: NURSE PRACTITIONER

## 2021-08-31 PROCEDURE — 25000003 PHARM REV CODE 250: Performed by: ORTHOPAEDIC SURGERY

## 2021-08-31 PROCEDURE — 63600175 PHARM REV CODE 636 W HCPCS: Performed by: ORTHOPAEDIC SURGERY

## 2021-08-31 PROCEDURE — 86580 TB INTRADERMAL TEST: CPT | Performed by: NURSE PRACTITIONER

## 2021-08-31 PROCEDURE — 12000002 HC ACUTE/MED SURGE SEMI-PRIVATE ROOM

## 2021-08-31 RX ADMIN — GABAPENTIN 100 MG: 100 CAPSULE ORAL at 02:08

## 2021-08-31 RX ADMIN — ASPIRIN 325 MG ORAL TABLET 325 MG: 325 PILL ORAL at 08:08

## 2021-08-31 RX ADMIN — OXYCODONE AND ACETAMINOPHEN 1 TABLET: 10; 325 TABLET ORAL at 02:08

## 2021-08-31 RX ADMIN — HYDROMORPHONE HYDROCHLORIDE 1 MG: 1 INJECTION, SOLUTION INTRAMUSCULAR; INTRAVENOUS; SUBCUTANEOUS at 10:08

## 2021-08-31 RX ADMIN — POLYETHYLENE GLYCOL (3350) 17 G: 17 POWDER, FOR SOLUTION ORAL at 05:08

## 2021-08-31 RX ADMIN — HYDROMORPHONE HYDROCHLORIDE 1 MG: 1 INJECTION, SOLUTION INTRAMUSCULAR; INTRAVENOUS; SUBCUTANEOUS at 12:08

## 2021-08-31 RX ADMIN — GABAPENTIN 100 MG: 100 CAPSULE ORAL at 08:08

## 2021-08-31 RX ADMIN — INSULIN DETEMIR 10 UNITS: 100 INJECTION, SOLUTION SUBCUTANEOUS at 08:08

## 2021-08-31 RX ADMIN — METHOCARBAMOL 500 MG: 500 TABLET ORAL at 08:08

## 2021-08-31 RX ADMIN — TUBERCULIN PURIFIED PROTEIN DERIVATIVE 5 UNITS: 5 INJECTION, SOLUTION INTRADERMAL at 07:08

## 2021-08-31 RX ADMIN — INSULIN ASPART 4 UNITS: 100 INJECTION, SOLUTION INTRAVENOUS; SUBCUTANEOUS at 11:08

## 2021-08-31 RX ADMIN — OXYCODONE AND ACETAMINOPHEN 1 TABLET: 10; 325 TABLET ORAL at 08:08

## 2021-08-31 RX ADMIN — INSULIN ASPART 2 UNITS: 100 INJECTION, SOLUTION INTRAVENOUS; SUBCUTANEOUS at 08:08

## 2021-08-31 RX ADMIN — MELATONIN TAB 3 MG 6 MG: 3 TAB at 08:08

## 2021-08-31 RX ADMIN — HYDROMORPHONE HYDROCHLORIDE 1 MG: 1 INJECTION, SOLUTION INTRAMUSCULAR; INTRAVENOUS; SUBCUTANEOUS at 05:08

## 2021-08-31 RX ADMIN — INSULIN ASPART 4 UNITS: 100 INJECTION, SOLUTION INTRAVENOUS; SUBCUTANEOUS at 05:08

## 2021-08-31 RX ADMIN — OXYCODONE AND ACETAMINOPHEN 1 TABLET: 10; 325 TABLET ORAL at 07:08

## 2021-08-31 RX ADMIN — ATORVASTATIN CALCIUM 40 MG: 40 TABLET, FILM COATED ORAL at 08:08

## 2021-09-01 LAB
ALBUMIN SERPL BCP-MCNC: 2.8 G/DL (ref 3.5–5.2)
ALP SERPL-CCNC: 174 U/L (ref 55–135)
ALT SERPL W/O P-5'-P-CCNC: 20 U/L (ref 10–44)
ANION GAP SERPL CALC-SCNC: 13 MMOL/L (ref 8–16)
AST SERPL-CCNC: 27 U/L (ref 10–40)
BASOPHILS # BLD AUTO: 0.03 K/UL (ref 0–0.2)
BASOPHILS NFR BLD: 0.7 % (ref 0–1.9)
BILIRUB SERPL-MCNC: 1.9 MG/DL (ref 0.1–1)
BUN SERPL-MCNC: 19 MG/DL (ref 8–23)
CALCIUM SERPL-MCNC: 8.7 MG/DL (ref 8.7–10.5)
CHLORIDE SERPL-SCNC: 100 MMOL/L (ref 95–110)
CO2 SERPL-SCNC: 23 MMOL/L (ref 23–29)
CREAT SERPL-MCNC: 0.7 MG/DL (ref 0.5–1.4)
DIFFERENTIAL METHOD: ABNORMAL
EOSINOPHIL # BLD AUTO: 0.2 K/UL (ref 0–0.5)
EOSINOPHIL NFR BLD: 4.2 % (ref 0–8)
ERYTHROCYTE [DISTWIDTH] IN BLOOD BY AUTOMATED COUNT: 14.3 % (ref 11.5–14.5)
EST. GFR  (AFRICAN AMERICAN): >60 ML/MIN/1.73 M^2
EST. GFR  (NON AFRICAN AMERICAN): >60 ML/MIN/1.73 M^2
GLUCOSE SERPL-MCNC: 226 MG/DL (ref 70–110)
HCT VFR BLD AUTO: 30.9 % (ref 40–54)
HGB BLD-MCNC: 10.5 G/DL (ref 14–18)
IMM GRANULOCYTES # BLD AUTO: 0.05 K/UL (ref 0–0.04)
IMM GRANULOCYTES NFR BLD AUTO: 1.2 % (ref 0–0.5)
LYMPHOCYTES # BLD AUTO: 0.5 K/UL (ref 1–4.8)
LYMPHOCYTES NFR BLD: 10.6 % (ref 18–48)
MAGNESIUM SERPL-MCNC: 1.7 MG/DL (ref 1.6–2.6)
MCH RBC QN AUTO: 28.5 PG (ref 27–31)
MCHC RBC AUTO-ENTMCNC: 34 G/DL (ref 32–36)
MCV RBC AUTO: 84 FL (ref 82–98)
MONOCYTES # BLD AUTO: 0.6 K/UL (ref 0.3–1)
MONOCYTES NFR BLD: 13.2 % (ref 4–15)
NEUTROPHILS # BLD AUTO: 3 K/UL (ref 1.8–7.7)
NEUTROPHILS NFR BLD: 70.1 % (ref 38–73)
NRBC BLD-RTO: 0 /100 WBC
PHOSPHATE SERPL-MCNC: 3.6 MG/DL (ref 2.7–4.5)
PLATELET # BLD AUTO: 69 K/UL (ref 150–450)
PMV BLD AUTO: 10.8 FL (ref 9.2–12.9)
POCT GLUCOSE: 224 MG/DL (ref 70–110)
POCT GLUCOSE: 230 MG/DL (ref 70–110)
POCT GLUCOSE: 246 MG/DL (ref 70–110)
POTASSIUM SERPL-SCNC: 4.1 MMOL/L (ref 3.5–5.1)
PROT SERPL-MCNC: 5.6 G/DL (ref 6–8.4)
RBC # BLD AUTO: 3.68 M/UL (ref 4.6–6.2)
SODIUM SERPL-SCNC: 136 MMOL/L (ref 136–145)
WBC # BLD AUTO: 4.24 K/UL (ref 3.9–12.7)

## 2021-09-01 PROCEDURE — 94761 N-INVAS EAR/PLS OXIMETRY MLT: CPT

## 2021-09-01 PROCEDURE — 85025 COMPLETE CBC W/AUTO DIFF WBC: CPT | Performed by: ORTHOPAEDIC SURGERY

## 2021-09-01 PROCEDURE — 12000002 HC ACUTE/MED SURGE SEMI-PRIVATE ROOM

## 2021-09-01 PROCEDURE — 36415 COLL VENOUS BLD VENIPUNCTURE: CPT | Performed by: ORTHOPAEDIC SURGERY

## 2021-09-01 PROCEDURE — 97535 SELF CARE MNGMENT TRAINING: CPT | Mod: CO

## 2021-09-01 PROCEDURE — 97530 THERAPEUTIC ACTIVITIES: CPT

## 2021-09-01 PROCEDURE — 25000003 PHARM REV CODE 250: Performed by: ORTHOPAEDIC SURGERY

## 2021-09-01 PROCEDURE — 84100 ASSAY OF PHOSPHORUS: CPT | Performed by: ORTHOPAEDIC SURGERY

## 2021-09-01 PROCEDURE — 83735 ASSAY OF MAGNESIUM: CPT | Performed by: ORTHOPAEDIC SURGERY

## 2021-09-01 PROCEDURE — 80053 COMPREHEN METABOLIC PANEL: CPT | Performed by: ORTHOPAEDIC SURGERY

## 2021-09-01 RX ADMIN — OXYCODONE AND ACETAMINOPHEN 1 TABLET: 10; 325 TABLET ORAL at 02:09

## 2021-09-01 RX ADMIN — OXYCODONE AND ACETAMINOPHEN 1 TABLET: 10; 325 TABLET ORAL at 08:09

## 2021-09-01 RX ADMIN — INSULIN ASPART 2 UNITS: 100 INJECTION, SOLUTION INTRAVENOUS; SUBCUTANEOUS at 09:09

## 2021-09-01 RX ADMIN — GABAPENTIN 100 MG: 100 CAPSULE ORAL at 08:09

## 2021-09-01 RX ADMIN — GABAPENTIN 100 MG: 100 CAPSULE ORAL at 10:09

## 2021-09-01 RX ADMIN — ASPIRIN 325 MG ORAL TABLET 325 MG: 325 PILL ORAL at 10:09

## 2021-09-01 RX ADMIN — MELATONIN TAB 3 MG 6 MG: 3 TAB at 08:09

## 2021-09-01 RX ADMIN — ATORVASTATIN CALCIUM 40 MG: 40 TABLET, FILM COATED ORAL at 10:09

## 2021-09-01 RX ADMIN — LORAZEPAM 0.5 MG: 0.5 TABLET ORAL at 04:09

## 2021-09-01 RX ADMIN — INSULIN DETEMIR 10 UNITS: 100 INJECTION, SOLUTION SUBCUTANEOUS at 09:09

## 2021-09-01 RX ADMIN — INSULIN DETEMIR 10 UNITS: 100 INJECTION, SOLUTION SUBCUTANEOUS at 10:09

## 2021-09-01 RX ADMIN — METHOCARBAMOL 500 MG: 500 TABLET ORAL at 04:09

## 2021-09-01 RX ADMIN — GABAPENTIN 100 MG: 100 CAPSULE ORAL at 02:09

## 2021-09-01 RX ADMIN — INSULIN ASPART 4 UNITS: 100 INJECTION, SOLUTION INTRAVENOUS; SUBCUTANEOUS at 06:09

## 2021-09-01 RX ADMIN — ASPIRIN 325 MG ORAL TABLET 325 MG: 325 PILL ORAL at 08:09

## 2021-09-01 RX ADMIN — POLYETHYLENE GLYCOL (3350) 17 G: 17 POWDER, FOR SOLUTION ORAL at 05:09

## 2021-09-02 VITALS
TEMPERATURE: 98 F | DIASTOLIC BLOOD PRESSURE: 83 MMHG | WEIGHT: 176.38 LBS | HEIGHT: 69 IN | OXYGEN SATURATION: 96 % | HEART RATE: 83 BPM | BODY MASS INDEX: 26.12 KG/M2 | SYSTOLIC BLOOD PRESSURE: 153 MMHG | RESPIRATION RATE: 16 BRPM

## 2021-09-02 LAB
ALBUMIN SERPL BCP-MCNC: 2.7 G/DL (ref 3.5–5.2)
ALP SERPL-CCNC: 164 U/L (ref 55–135)
ALT SERPL W/O P-5'-P-CCNC: 20 U/L (ref 10–44)
ANION GAP SERPL CALC-SCNC: 12 MMOL/L (ref 8–16)
AST SERPL-CCNC: 26 U/L (ref 10–40)
BACTERIA BLD CULT: NORMAL
BACTERIA BLD CULT: NORMAL
BASOPHILS # BLD AUTO: 0.03 K/UL (ref 0–0.2)
BASOPHILS NFR BLD: 0.7 % (ref 0–1.9)
BILIRUB SERPL-MCNC: 1.8 MG/DL (ref 0.1–1)
BUN SERPL-MCNC: 16 MG/DL (ref 8–23)
CALCIUM SERPL-MCNC: 8.7 MG/DL (ref 8.7–10.5)
CHLORIDE SERPL-SCNC: 101 MMOL/L (ref 95–110)
CO2 SERPL-SCNC: 23 MMOL/L (ref 23–29)
CREAT SERPL-MCNC: 0.7 MG/DL (ref 0.5–1.4)
DIFFERENTIAL METHOD: ABNORMAL
EOSINOPHIL # BLD AUTO: 0.2 K/UL (ref 0–0.5)
EOSINOPHIL NFR BLD: 4.6 % (ref 0–8)
ERYTHROCYTE [DISTWIDTH] IN BLOOD BY AUTOMATED COUNT: 14.3 % (ref 11.5–14.5)
EST. GFR  (AFRICAN AMERICAN): >60 ML/MIN/1.73 M^2
EST. GFR  (NON AFRICAN AMERICAN): >60 ML/MIN/1.73 M^2
GLUCOSE SERPL-MCNC: 193 MG/DL (ref 70–110)
HCT VFR BLD AUTO: 30.5 % (ref 40–54)
HGB BLD-MCNC: 10.4 G/DL (ref 14–18)
IMM GRANULOCYTES # BLD AUTO: 0.04 K/UL (ref 0–0.04)
IMM GRANULOCYTES NFR BLD AUTO: 0.9 % (ref 0–0.5)
LYMPHOCYTES # BLD AUTO: 0.4 K/UL (ref 1–4.8)
LYMPHOCYTES NFR BLD: 9.3 % (ref 18–48)
MAGNESIUM SERPL-MCNC: 1.7 MG/DL (ref 1.6–2.6)
MCH RBC QN AUTO: 28.5 PG (ref 27–31)
MCHC RBC AUTO-ENTMCNC: 34.1 G/DL (ref 32–36)
MCV RBC AUTO: 84 FL (ref 82–98)
MONOCYTES # BLD AUTO: 0.5 K/UL (ref 0.3–1)
MONOCYTES NFR BLD: 12.3 % (ref 4–15)
NEUTROPHILS # BLD AUTO: 3.2 K/UL (ref 1.8–7.7)
NEUTROPHILS NFR BLD: 72.2 % (ref 38–73)
NRBC BLD-RTO: 0 /100 WBC
PHOSPHATE SERPL-MCNC: 3.4 MG/DL (ref 2.7–4.5)
PLATELET # BLD AUTO: 64 K/UL (ref 150–450)
PMV BLD AUTO: 10.2 FL (ref 9.2–12.9)
POCT GLUCOSE: 173 MG/DL (ref 70–110)
POCT GLUCOSE: 180 MG/DL (ref 70–110)
POTASSIUM SERPL-SCNC: 4.1 MMOL/L (ref 3.5–5.1)
PROT SERPL-MCNC: 5.6 G/DL (ref 6–8.4)
RBC # BLD AUTO: 3.65 M/UL (ref 4.6–6.2)
SARS-COV-2 RDRP RESP QL NAA+PROBE: NEGATIVE
SODIUM SERPL-SCNC: 136 MMOL/L (ref 136–145)
WBC # BLD AUTO: 4.39 K/UL (ref 3.9–12.7)

## 2021-09-02 PROCEDURE — U0002 COVID-19 LAB TEST NON-CDC: HCPCS | Performed by: HOSPITALIST

## 2021-09-02 PROCEDURE — 25000003 PHARM REV CODE 250: Performed by: ORTHOPAEDIC SURGERY

## 2021-09-02 PROCEDURE — 85025 COMPLETE CBC W/AUTO DIFF WBC: CPT | Performed by: ORTHOPAEDIC SURGERY

## 2021-09-02 PROCEDURE — 94761 N-INVAS EAR/PLS OXIMETRY MLT: CPT

## 2021-09-02 PROCEDURE — 83735 ASSAY OF MAGNESIUM: CPT | Performed by: ORTHOPAEDIC SURGERY

## 2021-09-02 PROCEDURE — 63600175 PHARM REV CODE 636 W HCPCS: Performed by: NURSE PRACTITIONER

## 2021-09-02 PROCEDURE — 25000003 PHARM REV CODE 250: Performed by: NURSE PRACTITIONER

## 2021-09-02 PROCEDURE — 36415 COLL VENOUS BLD VENIPUNCTURE: CPT | Performed by: ORTHOPAEDIC SURGERY

## 2021-09-02 PROCEDURE — 27000221 HC OXYGEN, UP TO 24 HOURS

## 2021-09-02 PROCEDURE — 84100 ASSAY OF PHOSPHORUS: CPT | Performed by: ORTHOPAEDIC SURGERY

## 2021-09-02 PROCEDURE — 80053 COMPREHEN METABOLIC PANEL: CPT | Performed by: ORTHOPAEDIC SURGERY

## 2021-09-02 RX ORDER — ASPIRIN 325 MG
325 TABLET ORAL 2 TIMES DAILY
Qty: 60 TABLET | Refills: 0
Start: 2021-09-02 | End: 2021-10-02

## 2021-09-02 RX ORDER — ATORVASTATIN CALCIUM 40 MG/1
40 TABLET, FILM COATED ORAL DAILY
Qty: 90 TABLET | Refills: 3
Start: 2021-09-02 | End: 2022-03-22

## 2021-09-02 RX ORDER — OXYCODONE AND ACETAMINOPHEN 10; 325 MG/1; MG/1
1 TABLET ORAL EVERY 6 HOURS PRN
Qty: 12 TABLET | Refills: 0 | Status: CANCELLED | OUTPATIENT
Start: 2021-09-02 | End: 2021-09-05

## 2021-09-02 RX ORDER — HYDROMORPHONE HYDROCHLORIDE 1 MG/ML
1 INJECTION, SOLUTION INTRAMUSCULAR; INTRAVENOUS; SUBCUTANEOUS ONCE
Status: COMPLETED | OUTPATIENT
Start: 2021-09-02 | End: 2021-09-02

## 2021-09-02 RX ORDER — HYDROMORPHONE HYDROCHLORIDE 1 MG/ML
1 INJECTION, SOLUTION INTRAMUSCULAR; INTRAVENOUS; SUBCUTANEOUS ONCE AS NEEDED
Status: COMPLETED | OUTPATIENT
Start: 2021-09-02 | End: 2021-09-02

## 2021-09-02 RX ORDER — OXYCODONE AND ACETAMINOPHEN 10; 325 MG/1; MG/1
1 TABLET ORAL EVERY 6 HOURS PRN
Qty: 15 TABLET | Refills: 0 | Status: SHIPPED | OUTPATIENT
Start: 2021-09-02 | End: 2021-10-26 | Stop reason: SDUPTHER

## 2021-09-02 RX ORDER — NALOXONE HCL 0.4 MG/ML
0.4 VIAL (ML) INJECTION
Status: DISCONTINUED | OUTPATIENT
Start: 2021-09-02 | End: 2021-09-02 | Stop reason: HOSPADM

## 2021-09-02 RX ORDER — OXYCODONE HYDROCHLORIDE 15 MG/1
15 TABLET ORAL EVERY 6 HOURS PRN
Qty: 12 TABLET | Refills: 0 | Status: SHIPPED | OUTPATIENT
Start: 2021-09-02 | End: 2022-02-08

## 2021-09-02 RX ADMIN — INSULIN ASPART 2 UNITS: 100 INJECTION, SOLUTION INTRAVENOUS; SUBCUTANEOUS at 05:09

## 2021-09-02 RX ADMIN — OXYCODONE 15 MG: 5 TABLET ORAL at 12:09

## 2021-09-02 RX ADMIN — GABAPENTIN 100 MG: 100 CAPSULE ORAL at 04:09

## 2021-09-02 RX ADMIN — INSULIN DETEMIR 10 UNITS: 100 INJECTION, SOLUTION SUBCUTANEOUS at 10:09

## 2021-09-02 RX ADMIN — GABAPENTIN 100 MG: 100 CAPSULE ORAL at 10:09

## 2021-09-02 RX ADMIN — HYDROMORPHONE HYDROCHLORIDE 1 MG: 1 INJECTION, SOLUTION INTRAMUSCULAR; INTRAVENOUS; SUBCUTANEOUS at 12:09

## 2021-09-02 RX ADMIN — LORAZEPAM 0.5 MG: 0.5 TABLET ORAL at 12:09

## 2021-09-02 RX ADMIN — HYDROMORPHONE HYDROCHLORIDE 1 MG: 1 INJECTION, SOLUTION INTRAMUSCULAR; INTRAVENOUS; SUBCUTANEOUS at 05:09

## 2021-09-02 RX ADMIN — ATORVASTATIN CALCIUM 40 MG: 40 TABLET, FILM COATED ORAL at 10:09

## 2021-09-02 RX ADMIN — OXYCODONE AND ACETAMINOPHEN 1 TABLET: 10; 325 TABLET ORAL at 04:09

## 2021-09-02 RX ADMIN — OXYCODONE AND ACETAMINOPHEN 1 TABLET: 10; 325 TABLET ORAL at 03:09

## 2021-09-02 RX ADMIN — METHOCARBAMOL 500 MG: 500 TABLET ORAL at 12:09

## 2021-09-02 RX ADMIN — ASPIRIN 325 MG ORAL TABLET 325 MG: 325 PILL ORAL at 10:09

## 2021-09-02 RX ADMIN — POLYETHYLENE GLYCOL (3350) 17 G: 17 POWDER, FOR SOLUTION ORAL at 04:09

## 2021-09-03 ENCOUNTER — TELEPHONE (OUTPATIENT)
Dept: MEDSURG UNIT | Facility: HOSPITAL | Age: 76
End: 2021-09-03

## 2021-09-07 ENCOUNTER — PATIENT MESSAGE (OUTPATIENT)
Dept: ORTHOPEDICS | Facility: CLINIC | Age: 76
End: 2021-09-07

## 2021-09-12 ENCOUNTER — PATIENT OUTREACH (OUTPATIENT)
Dept: ADMINISTRATIVE | Facility: HOSPITAL | Age: 76
End: 2021-09-12

## 2021-09-13 ENCOUNTER — PATIENT OUTREACH (OUTPATIENT)
Dept: ADMINISTRATIVE | Facility: OTHER | Age: 76
End: 2021-09-13

## 2021-09-15 ENCOUNTER — TELEPHONE (OUTPATIENT)
Dept: ADMINISTRATIVE | Facility: HOSPITAL | Age: 76
End: 2021-09-15

## 2021-09-21 ENCOUNTER — OFFICE VISIT (OUTPATIENT)
Dept: FAMILY MEDICINE | Facility: CLINIC | Age: 76
End: 2021-09-21
Payer: MEDICARE

## 2021-09-21 ENCOUNTER — TELEPHONE (OUTPATIENT)
Dept: FAMILY MEDICINE | Facility: CLINIC | Age: 76
End: 2021-09-21

## 2021-09-21 ENCOUNTER — LAB VISIT (OUTPATIENT)
Dept: LAB | Facility: HOSPITAL | Age: 76
End: 2021-09-21
Attending: FAMILY MEDICINE
Payer: MEDICARE

## 2021-09-21 VITALS
OXYGEN SATURATION: 96 % | DIASTOLIC BLOOD PRESSURE: 62 MMHG | HEART RATE: 77 BPM | BODY MASS INDEX: 26.32 KG/M2 | SYSTOLIC BLOOD PRESSURE: 108 MMHG | RESPIRATION RATE: 18 BRPM | WEIGHT: 177.69 LBS | TEMPERATURE: 98 F | HEIGHT: 69 IN

## 2021-09-21 DIAGNOSIS — E11.8 TYPE 2 DIABETES MELLITUS WITH COMPLICATION, WITH LONG-TERM CURRENT USE OF INSULIN: Primary | ICD-10-CM

## 2021-09-21 DIAGNOSIS — E11.8 TYPE 2 DIABETES MELLITUS WITH COMPLICATION, WITH LONG-TERM CURRENT USE OF INSULIN: ICD-10-CM

## 2021-09-21 DIAGNOSIS — Z79.4 TYPE 2 DIABETES MELLITUS WITH COMPLICATION, WITH LONG-TERM CURRENT USE OF INSULIN: ICD-10-CM

## 2021-09-21 DIAGNOSIS — Z79.4 TYPE 2 DIABETES MELLITUS WITH COMPLICATION, WITH LONG-TERM CURRENT USE OF INSULIN: Primary | ICD-10-CM

## 2021-09-21 DIAGNOSIS — R41.3 MEMORY DEFICIT: ICD-10-CM

## 2021-09-21 LAB
BASOPHILS # BLD AUTO: 0.02 K/UL (ref 0–0.2)
BASOPHILS NFR BLD: 0.4 % (ref 0–1.9)
DIFFERENTIAL METHOD: ABNORMAL
EOSINOPHIL # BLD AUTO: 0.3 K/UL (ref 0–0.5)
EOSINOPHIL NFR BLD: 4.9 % (ref 0–8)
ERYTHROCYTE [DISTWIDTH] IN BLOOD BY AUTOMATED COUNT: 15.4 % (ref 11.5–14.5)
HCT VFR BLD AUTO: 34.8 % (ref 40–54)
HGB BLD-MCNC: 11.4 G/DL (ref 14–18)
IMM GRANULOCYTES # BLD AUTO: 0.03 K/UL (ref 0–0.04)
IMM GRANULOCYTES NFR BLD AUTO: 0.5 % (ref 0–0.5)
LYMPHOCYTES # BLD AUTO: 0.6 K/UL (ref 1–4.8)
LYMPHOCYTES NFR BLD: 10.5 % (ref 18–48)
MCH RBC QN AUTO: 27.9 PG (ref 27–31)
MCHC RBC AUTO-ENTMCNC: 32.8 G/DL (ref 32–36)
MCV RBC AUTO: 85 FL (ref 82–98)
MONOCYTES # BLD AUTO: 0.5 K/UL (ref 0.3–1)
MONOCYTES NFR BLD: 8.7 % (ref 4–15)
NEUTROPHILS # BLD AUTO: 4.1 K/UL (ref 1.8–7.7)
NEUTROPHILS NFR BLD: 75 % (ref 38–73)
NRBC BLD-RTO: 0 /100 WBC
PLATELET # BLD AUTO: 81 K/UL (ref 150–450)
PMV BLD AUTO: 11 FL (ref 9.2–12.9)
RBC # BLD AUTO: 4.08 M/UL (ref 4.6–6.2)
WBC # BLD AUTO: 5.52 K/UL (ref 3.9–12.7)

## 2021-09-21 PROCEDURE — 99999 PR PBB SHADOW E&M-EST. PATIENT-LVL V: ICD-10-PCS | Mod: PBBFAC,,, | Performed by: FAMILY MEDICINE

## 2021-09-21 PROCEDURE — 3046F HEMOGLOBIN A1C LEVEL >9.0%: CPT | Mod: CPTII,S$GLB,, | Performed by: FAMILY MEDICINE

## 2021-09-21 PROCEDURE — 1101F PT FALLS ASSESS-DOCD LE1/YR: CPT | Mod: CPTII,S$GLB,, | Performed by: FAMILY MEDICINE

## 2021-09-21 PROCEDURE — 3046F PR MOST RECENT HEMOGLOBIN A1C LEVEL > 9.0%: ICD-10-PCS | Mod: CPTII,S$GLB,, | Performed by: FAMILY MEDICINE

## 2021-09-21 PROCEDURE — 3078F DIAST BP <80 MM HG: CPT | Mod: CPTII,S$GLB,, | Performed by: FAMILY MEDICINE

## 2021-09-21 PROCEDURE — 1125F PR PAIN SEVERITY QUANTIFIED, PAIN PRESENT: ICD-10-PCS | Mod: CPTII,S$GLB,, | Performed by: FAMILY MEDICINE

## 2021-09-21 PROCEDURE — 1101F PR PT FALLS ASSESS DOC 0-1 FALLS W/OUT INJ PAST YR: ICD-10-PCS | Mod: CPTII,S$GLB,, | Performed by: FAMILY MEDICINE

## 2021-09-21 PROCEDURE — 1111F PR DISCHARGE MEDS RECONCILED W/ CURRENT OUTPATIENT MED LIST: ICD-10-PCS | Mod: CPTII,S$GLB,, | Performed by: FAMILY MEDICINE

## 2021-09-21 PROCEDURE — 1159F MED LIST DOCD IN RCRD: CPT | Mod: CPTII,S$GLB,, | Performed by: FAMILY MEDICINE

## 2021-09-21 PROCEDURE — 3008F BODY MASS INDEX DOCD: CPT | Mod: CPTII,S$GLB,, | Performed by: FAMILY MEDICINE

## 2021-09-21 PROCEDURE — 99999 PR PBB SHADOW E&M-EST. PATIENT-LVL V: CPT | Mod: PBBFAC,,, | Performed by: FAMILY MEDICINE

## 2021-09-21 PROCEDURE — 1125F AMNT PAIN NOTED PAIN PRSNT: CPT | Mod: CPTII,S$GLB,, | Performed by: FAMILY MEDICINE

## 2021-09-21 PROCEDURE — 99214 PR OFFICE/OUTPT VISIT, EST, LEVL IV, 30-39 MIN: ICD-10-PCS | Mod: S$GLB,,, | Performed by: FAMILY MEDICINE

## 2021-09-21 PROCEDURE — 1157F ADVNC CARE PLAN IN RCRD: CPT | Mod: CPTII,S$GLB,, | Performed by: FAMILY MEDICINE

## 2021-09-21 PROCEDURE — 3078F PR MOST RECENT DIASTOLIC BLOOD PRESSURE < 80 MM HG: ICD-10-PCS | Mod: CPTII,S$GLB,, | Performed by: FAMILY MEDICINE

## 2021-09-21 PROCEDURE — 99214 OFFICE O/P EST MOD 30 MIN: CPT | Mod: S$GLB,,, | Performed by: FAMILY MEDICINE

## 2021-09-21 PROCEDURE — 3074F PR MOST RECENT SYSTOLIC BLOOD PRESSURE < 130 MM HG: ICD-10-PCS | Mod: CPTII,S$GLB,, | Performed by: FAMILY MEDICINE

## 2021-09-21 PROCEDURE — 1159F PR MEDICATION LIST DOCUMENTED IN MEDICAL RECORD: ICD-10-PCS | Mod: CPTII,S$GLB,, | Performed by: FAMILY MEDICINE

## 2021-09-21 PROCEDURE — 85025 COMPLETE CBC W/AUTO DIFF WBC: CPT | Performed by: FAMILY MEDICINE

## 2021-09-21 PROCEDURE — 1111F DSCHRG MED/CURRENT MED MERGE: CPT | Mod: CPTII,S$GLB,, | Performed by: FAMILY MEDICINE

## 2021-09-21 PROCEDURE — 3008F PR BODY MASS INDEX (BMI) DOCUMENTED: ICD-10-PCS | Mod: CPTII,S$GLB,, | Performed by: FAMILY MEDICINE

## 2021-09-21 PROCEDURE — 1157F PR ADVANCE CARE PLAN OR EQUIV PRESENT IN MEDICAL RECORD: ICD-10-PCS | Mod: CPTII,S$GLB,, | Performed by: FAMILY MEDICINE

## 2021-09-21 PROCEDURE — 3288F PR FALLS RISK ASSESSMENT DOCUMENTED: ICD-10-PCS | Mod: CPTII,S$GLB,, | Performed by: FAMILY MEDICINE

## 2021-09-21 PROCEDURE — 3074F SYST BP LT 130 MM HG: CPT | Mod: CPTII,S$GLB,, | Performed by: FAMILY MEDICINE

## 2021-09-21 PROCEDURE — 3288F FALL RISK ASSESSMENT DOCD: CPT | Mod: CPTII,S$GLB,, | Performed by: FAMILY MEDICINE

## 2021-09-21 PROCEDURE — 83036 HEMOGLOBIN GLYCOSYLATED A1C: CPT | Performed by: FAMILY MEDICINE

## 2021-09-21 PROCEDURE — 80053 COMPREHEN METABOLIC PANEL: CPT | Performed by: FAMILY MEDICINE

## 2021-09-21 PROCEDURE — 36415 COLL VENOUS BLD VENIPUNCTURE: CPT | Mod: PO | Performed by: FAMILY MEDICINE

## 2021-09-21 RX ORDER — GLIMEPIRIDE 4 MG/1
8 TABLET ORAL
Qty: 180 TABLET | Refills: 3 | Status: SHIPPED | OUTPATIENT
Start: 2021-09-21 | End: 2022-02-08 | Stop reason: SDUPTHER

## 2021-09-22 ENCOUNTER — TELEPHONE (OUTPATIENT)
Dept: FAMILY MEDICINE | Facility: CLINIC | Age: 76
End: 2021-09-22

## 2021-09-22 LAB
ALBUMIN SERPL BCP-MCNC: 3.5 G/DL (ref 3.5–5.2)
ALP SERPL-CCNC: 198 U/L (ref 55–135)
ALT SERPL W/O P-5'-P-CCNC: 23 U/L (ref 10–44)
ANION GAP SERPL CALC-SCNC: 14 MMOL/L (ref 8–16)
AST SERPL-CCNC: 38 U/L (ref 10–40)
BILIRUB SERPL-MCNC: 1.1 MG/DL (ref 0.1–1)
BUN SERPL-MCNC: 15 MG/DL (ref 8–23)
CALCIUM SERPL-MCNC: 9.5 MG/DL (ref 8.7–10.5)
CHLORIDE SERPL-SCNC: 103 MMOL/L (ref 95–110)
CO2 SERPL-SCNC: 23 MMOL/L (ref 23–29)
CREAT SERPL-MCNC: 0.7 MG/DL (ref 0.5–1.4)
EST. GFR  (AFRICAN AMERICAN): >60 ML/MIN/1.73 M^2
EST. GFR  (NON AFRICAN AMERICAN): >60 ML/MIN/1.73 M^2
ESTIMATED AVG GLUCOSE: 169 MG/DL (ref 68–131)
GLUCOSE SERPL-MCNC: 68 MG/DL (ref 70–110)
HBA1C MFR BLD: 7.5 % (ref 4–5.6)
POTASSIUM SERPL-SCNC: 4.1 MMOL/L (ref 3.5–5.1)
PROT SERPL-MCNC: 6.6 G/DL (ref 6–8.4)
SODIUM SERPL-SCNC: 140 MMOL/L (ref 136–145)

## 2021-10-07 ENCOUNTER — PATIENT MESSAGE (OUTPATIENT)
Dept: ADMINISTRATIVE | Facility: HOSPITAL | Age: 76
End: 2021-10-07

## 2021-10-13 DIAGNOSIS — E11.9 TYPE 2 DIABETES MELLITUS WITHOUT COMPLICATION, UNSPECIFIED WHETHER LONG TERM INSULIN USE: ICD-10-CM

## 2021-10-18 ENCOUNTER — PATIENT MESSAGE (OUTPATIENT)
Dept: ADMINISTRATIVE | Facility: HOSPITAL | Age: 76
End: 2021-10-18
Payer: MEDICARE

## 2021-10-20 DIAGNOSIS — E11.9 TYPE 2 DIABETES MELLITUS WITHOUT COMPLICATION, UNSPECIFIED WHETHER LONG TERM INSULIN USE: ICD-10-CM

## 2021-10-25 ENCOUNTER — PATIENT OUTREACH (OUTPATIENT)
Dept: ADMINISTRATIVE | Facility: OTHER | Age: 76
End: 2021-10-25
Payer: MEDICARE

## 2021-10-26 ENCOUNTER — OFFICE VISIT (OUTPATIENT)
Dept: PAIN MEDICINE | Facility: CLINIC | Age: 76
End: 2021-10-26
Payer: MEDICARE

## 2021-10-26 VITALS
HEIGHT: 69 IN | HEART RATE: 82 BPM | WEIGHT: 177 LBS | SYSTOLIC BLOOD PRESSURE: 125 MMHG | BODY MASS INDEX: 26.22 KG/M2 | DIASTOLIC BLOOD PRESSURE: 76 MMHG

## 2021-10-26 DIAGNOSIS — M47.892 OTHER SPONDYLOSIS, CERVICAL REGION: ICD-10-CM

## 2021-10-26 DIAGNOSIS — M17.10 PRIMARY OSTEOARTHRITIS OF KNEE, UNSPECIFIED LATERALITY: Primary | ICD-10-CM

## 2021-10-26 DIAGNOSIS — G89.4 CHRONIC PAIN DISORDER: ICD-10-CM

## 2021-10-26 DIAGNOSIS — M50.30 DDD (DEGENERATIVE DISC DISEASE), CERVICAL: ICD-10-CM

## 2021-10-26 PROCEDURE — 1160F PR REVIEW ALL MEDS BY PRESCRIBER/CLIN PHARMACIST DOCUMENTED: ICD-10-PCS | Mod: CPTII,S$GLB,, | Performed by: PHYSICIAN ASSISTANT

## 2021-10-26 PROCEDURE — 1157F ADVNC CARE PLAN IN RCRD: CPT | Mod: CPTII,S$GLB,, | Performed by: PHYSICIAN ASSISTANT

## 2021-10-26 PROCEDURE — 1125F AMNT PAIN NOTED PAIN PRSNT: CPT | Mod: CPTII,S$GLB,, | Performed by: PHYSICIAN ASSISTANT

## 2021-10-26 PROCEDURE — 1125F PR PAIN SEVERITY QUANTIFIED, PAIN PRESENT: ICD-10-PCS | Mod: CPTII,S$GLB,, | Performed by: PHYSICIAN ASSISTANT

## 2021-10-26 PROCEDURE — 1160F RVW MEDS BY RX/DR IN RCRD: CPT | Mod: CPTII,S$GLB,, | Performed by: PHYSICIAN ASSISTANT

## 2021-10-26 PROCEDURE — 3074F SYST BP LT 130 MM HG: CPT | Mod: CPTII,S$GLB,, | Performed by: PHYSICIAN ASSISTANT

## 2021-10-26 PROCEDURE — 3074F PR MOST RECENT SYSTOLIC BLOOD PRESSURE < 130 MM HG: ICD-10-PCS | Mod: CPTII,S$GLB,, | Performed by: PHYSICIAN ASSISTANT

## 2021-10-26 PROCEDURE — 99499 UNLISTED E&M SERVICE: CPT | Mod: S$GLB,,, | Performed by: PHYSICIAN ASSISTANT

## 2021-10-26 PROCEDURE — 3066F NEPHROPATHY DOC TX: CPT | Mod: CPTII,S$GLB,, | Performed by: PHYSICIAN ASSISTANT

## 2021-10-26 PROCEDURE — 99999 PR PBB SHADOW E&M-EST. PATIENT-LVL III: CPT | Mod: PBBFAC,,, | Performed by: PHYSICIAN ASSISTANT

## 2021-10-26 PROCEDURE — 1157F PR ADVANCE CARE PLAN OR EQUIV PRESENT IN MEDICAL RECORD: ICD-10-PCS | Mod: CPTII,S$GLB,, | Performed by: PHYSICIAN ASSISTANT

## 2021-10-26 PROCEDURE — 1101F PT FALLS ASSESS-DOCD LE1/YR: CPT | Mod: CPTII,S$GLB,, | Performed by: PHYSICIAN ASSISTANT

## 2021-10-26 PROCEDURE — 1159F PR MEDICATION LIST DOCUMENTED IN MEDICAL RECORD: ICD-10-PCS | Mod: CPTII,S$GLB,, | Performed by: PHYSICIAN ASSISTANT

## 2021-10-26 PROCEDURE — 3288F PR FALLS RISK ASSESSMENT DOCUMENTED: ICD-10-PCS | Mod: CPTII,S$GLB,, | Performed by: PHYSICIAN ASSISTANT

## 2021-10-26 PROCEDURE — 99214 OFFICE O/P EST MOD 30 MIN: CPT | Mod: S$GLB,,, | Performed by: PHYSICIAN ASSISTANT

## 2021-10-26 PROCEDURE — 3078F PR MOST RECENT DIASTOLIC BLOOD PRESSURE < 80 MM HG: ICD-10-PCS | Mod: CPTII,S$GLB,, | Performed by: PHYSICIAN ASSISTANT

## 2021-10-26 PROCEDURE — 99214 PR OFFICE/OUTPT VISIT, EST, LEVL IV, 30-39 MIN: ICD-10-PCS | Mod: S$GLB,,, | Performed by: PHYSICIAN ASSISTANT

## 2021-10-26 PROCEDURE — 3061F NEG MICROALBUMINURIA REV: CPT | Mod: CPTII,S$GLB,, | Performed by: PHYSICIAN ASSISTANT

## 2021-10-26 PROCEDURE — 3061F PR NEG MICROALBUMINURIA RESULT DOCUMENTED/REVIEW: ICD-10-PCS | Mod: CPTII,S$GLB,, | Performed by: PHYSICIAN ASSISTANT

## 2021-10-26 PROCEDURE — 3066F PR DOCUMENTATION OF TREATMENT FOR NEPHROPATHY: ICD-10-PCS | Mod: CPTII,S$GLB,, | Performed by: PHYSICIAN ASSISTANT

## 2021-10-26 PROCEDURE — 3051F PR MOST RECENT HEMOGLOBIN A1C LEVEL 7.0 - < 8.0%: ICD-10-PCS | Mod: CPTII,S$GLB,, | Performed by: PHYSICIAN ASSISTANT

## 2021-10-26 PROCEDURE — 3288F FALL RISK ASSESSMENT DOCD: CPT | Mod: CPTII,S$GLB,, | Performed by: PHYSICIAN ASSISTANT

## 2021-10-26 PROCEDURE — 3008F PR BODY MASS INDEX (BMI) DOCUMENTED: ICD-10-PCS | Mod: CPTII,S$GLB,, | Performed by: PHYSICIAN ASSISTANT

## 2021-10-26 PROCEDURE — 99999 PR PBB SHADOW E&M-EST. PATIENT-LVL III: ICD-10-PCS | Mod: PBBFAC,,, | Performed by: PHYSICIAN ASSISTANT

## 2021-10-26 PROCEDURE — 99499 RISK ADDL DX/OHS AUDIT: ICD-10-PCS | Mod: S$GLB,,, | Performed by: PHYSICIAN ASSISTANT

## 2021-10-26 PROCEDURE — 3078F DIAST BP <80 MM HG: CPT | Mod: CPTII,S$GLB,, | Performed by: PHYSICIAN ASSISTANT

## 2021-10-26 PROCEDURE — 1159F MED LIST DOCD IN RCRD: CPT | Mod: CPTII,S$GLB,, | Performed by: PHYSICIAN ASSISTANT

## 2021-10-26 PROCEDURE — 1101F PR PT FALLS ASSESS DOC 0-1 FALLS W/OUT INJ PAST YR: ICD-10-PCS | Mod: CPTII,S$GLB,, | Performed by: PHYSICIAN ASSISTANT

## 2021-10-26 PROCEDURE — 3008F BODY MASS INDEX DOCD: CPT | Mod: CPTII,S$GLB,, | Performed by: PHYSICIAN ASSISTANT

## 2021-10-26 PROCEDURE — 3051F HG A1C>EQUAL 7.0%<8.0%: CPT | Mod: CPTII,S$GLB,, | Performed by: PHYSICIAN ASSISTANT

## 2021-10-26 RX ORDER — OXYCODONE AND ACETAMINOPHEN 10; 325 MG/1; MG/1
1 TABLET ORAL EVERY 6 HOURS PRN
Qty: 120 TABLET | Refills: 0 | Status: SHIPPED | OUTPATIENT
Start: 2021-10-26 | End: 2021-10-27

## 2021-10-26 RX ORDER — OXYCODONE AND ACETAMINOPHEN 10; 325 MG/1; MG/1
1 TABLET ORAL EVERY 6 HOURS PRN
Qty: 120 TABLET | Refills: 0 | Status: SHIPPED | OUTPATIENT
Start: 2021-11-24 | End: 2021-10-27

## 2021-10-26 RX ORDER — OXYCODONE AND ACETAMINOPHEN 10; 325 MG/1; MG/1
1 TABLET ORAL EVERY 6 HOURS PRN
Qty: 15 TABLET | Refills: 0 | Status: SHIPPED | OUTPATIENT
Start: 2021-12-23 | End: 2022-01-21

## 2021-10-27 ENCOUNTER — OFFICE VISIT (OUTPATIENT)
Dept: PODIATRY | Facility: CLINIC | Age: 76
End: 2021-10-27
Payer: MEDICARE

## 2021-10-27 VITALS — WEIGHT: 172 LBS | BODY MASS INDEX: 25.48 KG/M2 | HEIGHT: 69 IN

## 2021-10-27 DIAGNOSIS — B35.1 ONYCHOMYCOSIS DUE TO DERMATOPHYTE: ICD-10-CM

## 2021-10-27 DIAGNOSIS — L90.9 FAT PAD ATROPHY OF FOOT: ICD-10-CM

## 2021-10-27 DIAGNOSIS — L60.2 ONYCHOGRYPHOSIS: ICD-10-CM

## 2021-10-27 DIAGNOSIS — L84 PRE-ULCERATIVE CALLUSES: ICD-10-CM

## 2021-10-27 DIAGNOSIS — I73.9 PVD (PERIPHERAL VASCULAR DISEASE): ICD-10-CM

## 2021-10-27 DIAGNOSIS — E11.51 TYPE II DIABETES MELLITUS WITH PERIPHERAL CIRCULATORY DISORDER: Primary | ICD-10-CM

## 2021-10-27 DIAGNOSIS — L84 PRE-ULCERATIVE CORN OR CALLOUS: ICD-10-CM

## 2021-10-27 DIAGNOSIS — E11.65 UNCONTROLLED TYPE 2 DIABETES MELLITUS WITH HYPERGLYCEMIA: ICD-10-CM

## 2021-10-27 DIAGNOSIS — I73.9 PERIPHERAL VASCULAR DISEASE: ICD-10-CM

## 2021-10-27 DIAGNOSIS — L84 CORN OR CALLUS: ICD-10-CM

## 2021-10-27 DIAGNOSIS — E08.42 DIABETIC POLYNEUROPATHY ASSOCIATED WITH DIABETES MELLITUS DUE TO UNDERLYING CONDITION: ICD-10-CM

## 2021-10-27 DIAGNOSIS — R26.2 DIFFICULTY IN WALKING, NOT ELSEWHERE CLASSIFIED: ICD-10-CM

## 2021-10-27 DIAGNOSIS — B35.3 TINEA PEDIS OF BOTH FEET: ICD-10-CM

## 2021-10-27 DIAGNOSIS — E11.42 DM TYPE 2 WITH DIABETIC PERIPHERAL NEUROPATHY: ICD-10-CM

## 2021-10-27 DIAGNOSIS — R20.2 PARESTHESIA OF BOTH LOWER EXTREMITIES: ICD-10-CM

## 2021-10-27 PROCEDURE — 1100F PR PT FALLS ASSESS DOC 2+ FALLS/FALL W/INJURY/YR: ICD-10-PCS | Mod: CPTII,S$GLB,, | Performed by: PODIATRIST

## 2021-10-27 PROCEDURE — 11056 PARNG/CUTG B9 HYPRKR LES 2-4: CPT | Mod: Q8,S$GLB,, | Performed by: PODIATRIST

## 2021-10-27 PROCEDURE — 3008F PR BODY MASS INDEX (BMI) DOCUMENTED: ICD-10-PCS | Mod: CPTII,S$GLB,, | Performed by: PODIATRIST

## 2021-10-27 PROCEDURE — 3051F HG A1C>EQUAL 7.0%<8.0%: CPT | Mod: CPTII,S$GLB,, | Performed by: PODIATRIST

## 2021-10-27 PROCEDURE — 3066F NEPHROPATHY DOC TX: CPT | Mod: CPTII,S$GLB,, | Performed by: PODIATRIST

## 2021-10-27 PROCEDURE — 3061F PR NEG MICROALBUMINURIA RESULT DOCUMENTED/REVIEW: ICD-10-PCS | Mod: CPTII,S$GLB,, | Performed by: PODIATRIST

## 2021-10-27 PROCEDURE — 11721 ROUTINE FOOT CARE: ICD-10-PCS | Mod: 59,Q8,S$GLB, | Performed by: PODIATRIST

## 2021-10-27 PROCEDURE — 3061F NEG MICROALBUMINURIA REV: CPT | Mod: CPTII,S$GLB,, | Performed by: PODIATRIST

## 2021-10-27 PROCEDURE — 99214 PR OFFICE/OUTPT VISIT, EST, LEVL IV, 30-39 MIN: ICD-10-PCS | Mod: 25,S$GLB,, | Performed by: PODIATRIST

## 2021-10-27 PROCEDURE — 1100F PTFALLS ASSESS-DOCD GE2>/YR: CPT | Mod: CPTII,S$GLB,, | Performed by: PODIATRIST

## 2021-10-27 PROCEDURE — 3008F BODY MASS INDEX DOCD: CPT | Mod: CPTII,S$GLB,, | Performed by: PODIATRIST

## 2021-10-27 PROCEDURE — 3288F PR FALLS RISK ASSESSMENT DOCUMENTED: ICD-10-PCS | Mod: CPTII,S$GLB,, | Performed by: PODIATRIST

## 2021-10-27 PROCEDURE — 1157F PR ADVANCE CARE PLAN OR EQUIV PRESENT IN MEDICAL RECORD: ICD-10-PCS | Mod: CPTII,S$GLB,, | Performed by: PODIATRIST

## 2021-10-27 PROCEDURE — 3051F PR MOST RECENT HEMOGLOBIN A1C LEVEL 7.0 - < 8.0%: ICD-10-PCS | Mod: CPTII,S$GLB,, | Performed by: PODIATRIST

## 2021-10-27 PROCEDURE — 99214 OFFICE O/P EST MOD 30 MIN: CPT | Mod: 25,S$GLB,, | Performed by: PODIATRIST

## 2021-10-27 PROCEDURE — 3066F PR DOCUMENTATION OF TREATMENT FOR NEPHROPATHY: ICD-10-PCS | Mod: CPTII,S$GLB,, | Performed by: PODIATRIST

## 2021-10-27 PROCEDURE — 11056 ROUTINE FOOT CARE: ICD-10-PCS | Mod: Q8,S$GLB,, | Performed by: PODIATRIST

## 2021-10-27 PROCEDURE — 1159F PR MEDICATION LIST DOCUMENTED IN MEDICAL RECORD: ICD-10-PCS | Mod: CPTII,S$GLB,, | Performed by: PODIATRIST

## 2021-10-27 PROCEDURE — 11721 DEBRIDE NAIL 6 OR MORE: CPT | Mod: 59,Q8,S$GLB, | Performed by: PODIATRIST

## 2021-10-27 PROCEDURE — 1157F ADVNC CARE PLAN IN RCRD: CPT | Mod: CPTII,S$GLB,, | Performed by: PODIATRIST

## 2021-10-27 PROCEDURE — 3288F FALL RISK ASSESSMENT DOCD: CPT | Mod: CPTII,S$GLB,, | Performed by: PODIATRIST

## 2021-10-27 PROCEDURE — 1159F MED LIST DOCD IN RCRD: CPT | Mod: CPTII,S$GLB,, | Performed by: PODIATRIST

## 2021-10-31 DIAGNOSIS — K21.9 GASTROESOPHAGEAL REFLUX DISEASE: ICD-10-CM

## 2021-11-01 RX ORDER — PANTOPRAZOLE SODIUM 40 MG/1
TABLET, DELAYED RELEASE ORAL
Qty: 90 TABLET | Refills: 0 | Status: SHIPPED | OUTPATIENT
Start: 2021-11-01 | End: 2022-02-08 | Stop reason: SDUPTHER

## 2021-11-05 ENCOUNTER — PES CALL (OUTPATIENT)
Dept: ADMINISTRATIVE | Facility: CLINIC | Age: 76
End: 2021-11-05
Payer: MEDICARE

## 2021-11-09 ENCOUNTER — OFFICE VISIT (OUTPATIENT)
Dept: FAMILY MEDICINE | Facility: CLINIC | Age: 76
End: 2021-11-09
Payer: MEDICARE

## 2021-11-09 VITALS
TEMPERATURE: 98 F | HEART RATE: 86 BPM | BODY MASS INDEX: 26.29 KG/M2 | SYSTOLIC BLOOD PRESSURE: 118 MMHG | OXYGEN SATURATION: 95 % | WEIGHT: 177.5 LBS | HEIGHT: 69 IN | DIASTOLIC BLOOD PRESSURE: 64 MMHG

## 2021-11-09 DIAGNOSIS — E11.65 UNCONTROLLED TYPE 2 DIABETES MELLITUS WITH HYPERGLYCEMIA: ICD-10-CM

## 2021-11-09 DIAGNOSIS — M54.50 CHRONIC BILATERAL LOW BACK PAIN WITHOUT SCIATICA: ICD-10-CM

## 2021-11-09 DIAGNOSIS — G89.29 CHRONIC BILATERAL LOW BACK PAIN WITHOUT SCIATICA: ICD-10-CM

## 2021-11-09 DIAGNOSIS — L98.9 SKIN LESION: Primary | ICD-10-CM

## 2021-11-09 PROCEDURE — 3078F DIAST BP <80 MM HG: CPT | Mod: CPTII,S$GLB,, | Performed by: FAMILY MEDICINE

## 2021-11-09 PROCEDURE — 3074F PR MOST RECENT SYSTOLIC BLOOD PRESSURE < 130 MM HG: ICD-10-PCS | Mod: CPTII,S$GLB,, | Performed by: FAMILY MEDICINE

## 2021-11-09 PROCEDURE — 99999 PR PBB SHADOW E&M-EST. PATIENT-LVL III: ICD-10-PCS | Mod: PBBFAC,,, | Performed by: FAMILY MEDICINE

## 2021-11-09 PROCEDURE — 3078F PR MOST RECENT DIASTOLIC BLOOD PRESSURE < 80 MM HG: ICD-10-PCS | Mod: CPTII,S$GLB,, | Performed by: FAMILY MEDICINE

## 2021-11-09 PROCEDURE — 1159F MED LIST DOCD IN RCRD: CPT | Mod: CPTII,S$GLB,, | Performed by: FAMILY MEDICINE

## 2021-11-09 PROCEDURE — 3074F SYST BP LT 130 MM HG: CPT | Mod: CPTII,S$GLB,, | Performed by: FAMILY MEDICINE

## 2021-11-09 PROCEDURE — 3066F NEPHROPATHY DOC TX: CPT | Mod: CPTII,S$GLB,, | Performed by: FAMILY MEDICINE

## 2021-11-09 PROCEDURE — 1125F AMNT PAIN NOTED PAIN PRSNT: CPT | Mod: CPTII,S$GLB,, | Performed by: FAMILY MEDICINE

## 2021-11-09 PROCEDURE — 99214 OFFICE O/P EST MOD 30 MIN: CPT | Mod: S$GLB,,, | Performed by: FAMILY MEDICINE

## 2021-11-09 PROCEDURE — 1100F PTFALLS ASSESS-DOCD GE2>/YR: CPT | Mod: CPTII,S$GLB,, | Performed by: FAMILY MEDICINE

## 2021-11-09 PROCEDURE — 3061F NEG MICROALBUMINURIA REV: CPT | Mod: CPTII,S$GLB,, | Performed by: FAMILY MEDICINE

## 2021-11-09 PROCEDURE — 3061F PR NEG MICROALBUMINURIA RESULT DOCUMENTED/REVIEW: ICD-10-PCS | Mod: CPTII,S$GLB,, | Performed by: FAMILY MEDICINE

## 2021-11-09 PROCEDURE — 3288F FALL RISK ASSESSMENT DOCD: CPT | Mod: CPTII,S$GLB,, | Performed by: FAMILY MEDICINE

## 2021-11-09 PROCEDURE — 99999 PR PBB SHADOW E&M-EST. PATIENT-LVL III: CPT | Mod: PBBFAC,,, | Performed by: FAMILY MEDICINE

## 2021-11-09 PROCEDURE — 3008F PR BODY MASS INDEX (BMI) DOCUMENTED: ICD-10-PCS | Mod: CPTII,S$GLB,, | Performed by: FAMILY MEDICINE

## 2021-11-09 PROCEDURE — 3288F PR FALLS RISK ASSESSMENT DOCUMENTED: ICD-10-PCS | Mod: CPTII,S$GLB,, | Performed by: FAMILY MEDICINE

## 2021-11-09 PROCEDURE — 3008F BODY MASS INDEX DOCD: CPT | Mod: CPTII,S$GLB,, | Performed by: FAMILY MEDICINE

## 2021-11-09 PROCEDURE — 99214 PR OFFICE/OUTPT VISIT, EST, LEVL IV, 30-39 MIN: ICD-10-PCS | Mod: S$GLB,,, | Performed by: FAMILY MEDICINE

## 2021-11-09 PROCEDURE — 3051F HG A1C>EQUAL 7.0%<8.0%: CPT | Mod: CPTII,S$GLB,, | Performed by: FAMILY MEDICINE

## 2021-11-09 PROCEDURE — 3066F PR DOCUMENTATION OF TREATMENT FOR NEPHROPATHY: ICD-10-PCS | Mod: CPTII,S$GLB,, | Performed by: FAMILY MEDICINE

## 2021-11-09 PROCEDURE — 1157F ADVNC CARE PLAN IN RCRD: CPT | Mod: CPTII,S$GLB,, | Performed by: FAMILY MEDICINE

## 2021-11-09 PROCEDURE — 1157F PR ADVANCE CARE PLAN OR EQUIV PRESENT IN MEDICAL RECORD: ICD-10-PCS | Mod: CPTII,S$GLB,, | Performed by: FAMILY MEDICINE

## 2021-11-09 PROCEDURE — 1125F PR PAIN SEVERITY QUANTIFIED, PAIN PRESENT: ICD-10-PCS | Mod: CPTII,S$GLB,, | Performed by: FAMILY MEDICINE

## 2021-11-09 PROCEDURE — 1100F PR PT FALLS ASSESS DOC 2+ FALLS/FALL W/INJURY/YR: ICD-10-PCS | Mod: CPTII,S$GLB,, | Performed by: FAMILY MEDICINE

## 2021-11-09 PROCEDURE — 3051F PR MOST RECENT HEMOGLOBIN A1C LEVEL 7.0 - < 8.0%: ICD-10-PCS | Mod: CPTII,S$GLB,, | Performed by: FAMILY MEDICINE

## 2021-11-09 PROCEDURE — 1159F PR MEDICATION LIST DOCUMENTED IN MEDICAL RECORD: ICD-10-PCS | Mod: CPTII,S$GLB,, | Performed by: FAMILY MEDICINE

## 2021-11-10 ENCOUNTER — HOSPITAL ENCOUNTER (EMERGENCY)
Facility: HOSPITAL | Age: 76
Discharge: LEFT BEFORE DISCHARGE | End: 2021-11-10
Payer: MEDICARE

## 2021-11-10 VITALS
OXYGEN SATURATION: 92 % | HEART RATE: 92 BPM | SYSTOLIC BLOOD PRESSURE: 123 MMHG | TEMPERATURE: 99 F | WEIGHT: 180 LBS | DIASTOLIC BLOOD PRESSURE: 66 MMHG | BODY MASS INDEX: 26.58 KG/M2 | RESPIRATION RATE: 20 BRPM

## 2021-11-10 DIAGNOSIS — R06.02 SHORTNESS OF BREATH: ICD-10-CM

## 2021-11-10 LAB
ALBUMIN SERPL BCP-MCNC: 3.7 G/DL (ref 3.5–5.2)
ALP SERPL-CCNC: 108 U/L (ref 55–135)
ALT SERPL W/O P-5'-P-CCNC: 21 U/L (ref 10–44)
ANION GAP SERPL CALC-SCNC: 10 MMOL/L (ref 8–16)
AST SERPL-CCNC: 28 U/L (ref 10–40)
BASOPHILS # BLD AUTO: 0.02 K/UL (ref 0–0.2)
BASOPHILS NFR BLD: 0.4 % (ref 0–1.9)
BILIRUB SERPL-MCNC: 1.3 MG/DL (ref 0.1–1)
BUN SERPL-MCNC: 15 MG/DL (ref 8–23)
CALCIUM SERPL-MCNC: 9.3 MG/DL (ref 8.7–10.5)
CHLORIDE SERPL-SCNC: 103 MMOL/L (ref 95–110)
CO2 SERPL-SCNC: 24 MMOL/L (ref 23–29)
CREAT SERPL-MCNC: 0.8 MG/DL (ref 0.5–1.4)
DIFFERENTIAL METHOD: ABNORMAL
EOSINOPHIL # BLD AUTO: 0.1 K/UL (ref 0–0.5)
EOSINOPHIL NFR BLD: 2.1 % (ref 0–8)
ERYTHROCYTE [DISTWIDTH] IN BLOOD BY AUTOMATED COUNT: 14.1 % (ref 11.5–14.5)
EST. GFR  (AFRICAN AMERICAN): >60 ML/MIN/1.73 M^2
EST. GFR  (NON AFRICAN AMERICAN): >60 ML/MIN/1.73 M^2
GLUCOSE SERPL-MCNC: 157 MG/DL (ref 70–110)
HCT VFR BLD AUTO: 36 % (ref 40–54)
HGB BLD-MCNC: 11.7 G/DL (ref 14–18)
IMM GRANULOCYTES # BLD AUTO: 0.03 K/UL (ref 0–0.04)
IMM GRANULOCYTES NFR BLD AUTO: 0.5 % (ref 0–0.5)
INR PPP: 1.1 (ref 0.8–1.2)
LYMPHOCYTES # BLD AUTO: 0.6 K/UL (ref 1–4.8)
LYMPHOCYTES NFR BLD: 10.3 % (ref 18–48)
MCH RBC QN AUTO: 25.9 PG (ref 27–31)
MCHC RBC AUTO-ENTMCNC: 32.5 G/DL (ref 32–36)
MCV RBC AUTO: 80 FL (ref 82–98)
MONOCYTES # BLD AUTO: 0.5 K/UL (ref 0.3–1)
MONOCYTES NFR BLD: 8.3 % (ref 4–15)
NEUTROPHILS # BLD AUTO: 4.4 K/UL (ref 1.8–7.7)
NEUTROPHILS NFR BLD: 78.4 % (ref 38–73)
NRBC BLD-RTO: 0 /100 WBC
PLATELET # BLD AUTO: 63 K/UL (ref 150–450)
PMV BLD AUTO: 9.6 FL (ref 9.2–12.9)
POTASSIUM SERPL-SCNC: 3.9 MMOL/L (ref 3.5–5.1)
PROT SERPL-MCNC: 6.6 G/DL (ref 6–8.4)
PROTHROMBIN TIME: 11.9 SEC (ref 9–12.5)
RBC # BLD AUTO: 4.52 M/UL (ref 4.6–6.2)
SODIUM SERPL-SCNC: 137 MMOL/L (ref 136–145)
TROPONIN I SERPL DL<=0.01 NG/ML-MCNC: 0.03 NG/ML (ref 0–0.03)
WBC # BLD AUTO: 5.64 K/UL (ref 3.9–12.7)

## 2021-11-10 PROCEDURE — 85025 COMPLETE CBC W/AUTO DIFF WBC: CPT | Performed by: PHYSICIAN ASSISTANT

## 2021-11-10 PROCEDURE — 36415 COLL VENOUS BLD VENIPUNCTURE: CPT | Performed by: PHYSICIAN ASSISTANT

## 2021-11-10 PROCEDURE — 93010 ELECTROCARDIOGRAM REPORT: CPT | Mod: ,,, | Performed by: GENERAL PRACTICE

## 2021-11-10 PROCEDURE — 93005 ELECTROCARDIOGRAM TRACING: CPT

## 2021-11-10 PROCEDURE — 99284 EMERGENCY DEPT VISIT MOD MDM: CPT | Mod: 25

## 2021-11-10 PROCEDURE — 80053 COMPREHEN METABOLIC PANEL: CPT | Performed by: PHYSICIAN ASSISTANT

## 2021-11-10 PROCEDURE — 85610 PROTHROMBIN TIME: CPT | Performed by: PHYSICIAN ASSISTANT

## 2021-11-10 PROCEDURE — 84484 ASSAY OF TROPONIN QUANT: CPT | Performed by: PHYSICIAN ASSISTANT

## 2021-11-10 PROCEDURE — 93010 EKG 12-LEAD: ICD-10-PCS | Mod: ,,, | Performed by: GENERAL PRACTICE

## 2021-12-06 NOTE — TELEPHONE ENCOUNTER
----- Message from Nico Barlow sent at 2/6/2018  8:57 AM CST -----  Contact: pt  Pt is calling to see If the doctor will call the company so he is able to get his shoes  Call Back#[phone]  Thanks    
----- Message from Vera Bertrand sent at 2/6/2018  9:31 AM CST -----  Returning your call.  Please call patient at [phone].  
Spoke with staff at [facility].  Informed her that the paperwork was faxed on 2/1 to the fax # that was given - [phone], and was informed that this # is not correct.  Fax to [phone] or [phone].  Informed her that the papers will be refaxed in AM. (paperwork at the [facility])  
Tried to call patient.  Would ring, then disconnect.  Will try again.  
The patient is a 66y Female complaining of back pain general.

## 2021-12-24 ENCOUNTER — PATIENT MESSAGE (OUTPATIENT)
Dept: PAIN MEDICINE | Facility: CLINIC | Age: 76
End: 2021-12-24
Payer: MEDICARE

## 2021-12-27 RX ORDER — OXYCODONE AND ACETAMINOPHEN 10; 325 MG/1; MG/1
1 TABLET ORAL EVERY 6 HOURS PRN
Qty: 120 TABLET | Refills: 0 | Status: SHIPPED | OUTPATIENT
Start: 2021-12-27 | End: 2022-01-26 | Stop reason: SDUPTHER

## 2021-12-29 ENCOUNTER — TELEPHONE (OUTPATIENT)
Dept: PULMONOLOGY | Facility: CLINIC | Age: 76
End: 2021-12-29
Payer: MEDICARE

## 2022-01-05 ENCOUNTER — PATIENT OUTREACH (OUTPATIENT)
Dept: ADMINISTRATIVE | Facility: OTHER | Age: 77
End: 2022-01-05
Payer: MEDICARE

## 2022-01-05 ENCOUNTER — OFFICE VISIT (OUTPATIENT)
Dept: DERMATOLOGY | Facility: CLINIC | Age: 77
End: 2022-01-05
Payer: MEDICARE

## 2022-01-05 DIAGNOSIS — L98.1 NEUROTIC EXCORIATIONS: ICD-10-CM

## 2022-01-05 DIAGNOSIS — D48.5 NEOPLASM OF UNCERTAIN BEHAVIOR OF SKIN: Primary | ICD-10-CM

## 2022-01-05 DIAGNOSIS — L90.5 SCAR: ICD-10-CM

## 2022-01-05 DIAGNOSIS — L57.0 ACTINIC KERATOSIS: ICD-10-CM

## 2022-01-05 DIAGNOSIS — Z85.828 HISTORY OF SKIN CANCER: ICD-10-CM

## 2022-01-05 DIAGNOSIS — L01.00 IMPETIGO: ICD-10-CM

## 2022-01-05 PROCEDURE — 88305 TISSUE EXAM BY PATHOLOGIST: ICD-10-PCS | Mod: 26,,, | Performed by: PATHOLOGY

## 2022-01-05 PROCEDURE — 1126F PR PAIN SEVERITY QUANTIFIED, NO PAIN PRESENT: ICD-10-PCS | Mod: CPTII,S$GLB,, | Performed by: STUDENT IN AN ORGANIZED HEALTH CARE EDUCATION/TRAINING PROGRAM

## 2022-01-05 PROCEDURE — 3288F FALL RISK ASSESSMENT DOCD: CPT | Mod: CPTII,S$GLB,, | Performed by: STUDENT IN AN ORGANIZED HEALTH CARE EDUCATION/TRAINING PROGRAM

## 2022-01-05 PROCEDURE — 1157F ADVNC CARE PLAN IN RCRD: CPT | Mod: CPTII,S$GLB,, | Performed by: STUDENT IN AN ORGANIZED HEALTH CARE EDUCATION/TRAINING PROGRAM

## 2022-01-05 PROCEDURE — 88341 IMHCHEM/IMCYTCHM EA ADD ANTB: CPT | Mod: 26,,, | Performed by: PATHOLOGY

## 2022-01-05 PROCEDURE — 3288F PR FALLS RISK ASSESSMENT DOCUMENTED: ICD-10-PCS | Mod: CPTII,S$GLB,, | Performed by: STUDENT IN AN ORGANIZED HEALTH CARE EDUCATION/TRAINING PROGRAM

## 2022-01-05 PROCEDURE — 1101F PR PT FALLS ASSESS DOC 0-1 FALLS W/OUT INJ PAST YR: ICD-10-PCS | Mod: CPTII,S$GLB,, | Performed by: STUDENT IN AN ORGANIZED HEALTH CARE EDUCATION/TRAINING PROGRAM

## 2022-01-05 PROCEDURE — 99999 PR PBB SHADOW E&M-EST. PATIENT-LVL III: CPT | Mod: PBBFAC,,, | Performed by: STUDENT IN AN ORGANIZED HEALTH CARE EDUCATION/TRAINING PROGRAM

## 2022-01-05 PROCEDURE — 88342 IMHCHEM/IMCYTCHM 1ST ANTB: CPT | Mod: 26,,, | Performed by: PATHOLOGY

## 2022-01-05 PROCEDURE — 88342 IMHCHEM/IMCYTCHM 1ST ANTB: CPT | Performed by: PATHOLOGY

## 2022-01-05 PROCEDURE — 11103 TANGNTL BX SKIN EA SEP/ADDL: CPT | Mod: S$GLB,,, | Performed by: STUDENT IN AN ORGANIZED HEALTH CARE EDUCATION/TRAINING PROGRAM

## 2022-01-05 PROCEDURE — 88342 CHG IMMUNOCYTOCHEMISTRY: ICD-10-PCS | Mod: 26,,, | Performed by: PATHOLOGY

## 2022-01-05 PROCEDURE — 11102 PR TANGENTIAL BIOPSY, SKIN, SINGLE LESION: ICD-10-PCS | Mod: S$GLB,,, | Performed by: STUDENT IN AN ORGANIZED HEALTH CARE EDUCATION/TRAINING PROGRAM

## 2022-01-05 PROCEDURE — 1160F RVW MEDS BY RX/DR IN RCRD: CPT | Mod: CPTII,S$GLB,, | Performed by: STUDENT IN AN ORGANIZED HEALTH CARE EDUCATION/TRAINING PROGRAM

## 2022-01-05 PROCEDURE — 99999 PR PBB SHADOW E&M-EST. PATIENT-LVL III: ICD-10-PCS | Mod: PBBFAC,,, | Performed by: STUDENT IN AN ORGANIZED HEALTH CARE EDUCATION/TRAINING PROGRAM

## 2022-01-05 PROCEDURE — 99213 PR OFFICE/OUTPT VISIT, EST, LEVL III, 20-29 MIN: ICD-10-PCS | Mod: 25,S$GLB,, | Performed by: STUDENT IN AN ORGANIZED HEALTH CARE EDUCATION/TRAINING PROGRAM

## 2022-01-05 PROCEDURE — 1157F PR ADVANCE CARE PLAN OR EQUIV PRESENT IN MEDICAL RECORD: ICD-10-PCS | Mod: CPTII,S$GLB,, | Performed by: STUDENT IN AN ORGANIZED HEALTH CARE EDUCATION/TRAINING PROGRAM

## 2022-01-05 PROCEDURE — 88305 TISSUE EXAM BY PATHOLOGIST: CPT | Mod: 59 | Performed by: PATHOLOGY

## 2022-01-05 PROCEDURE — 1159F PR MEDICATION LIST DOCUMENTED IN MEDICAL RECORD: ICD-10-PCS | Mod: CPTII,S$GLB,, | Performed by: STUDENT IN AN ORGANIZED HEALTH CARE EDUCATION/TRAINING PROGRAM

## 2022-01-05 PROCEDURE — 88341 IMHCHEM/IMCYTCHM EA ADD ANTB: CPT | Performed by: PATHOLOGY

## 2022-01-05 PROCEDURE — 11103 PR TANGENTIAL BIOPSY, SKIN, EA ADDTL LESION: ICD-10-PCS | Mod: S$GLB,,, | Performed by: STUDENT IN AN ORGANIZED HEALTH CARE EDUCATION/TRAINING PROGRAM

## 2022-01-05 PROCEDURE — 99213 OFFICE O/P EST LOW 20 MIN: CPT | Mod: 25,S$GLB,, | Performed by: STUDENT IN AN ORGANIZED HEALTH CARE EDUCATION/TRAINING PROGRAM

## 2022-01-05 PROCEDURE — 1126F AMNT PAIN NOTED NONE PRSNT: CPT | Mod: CPTII,S$GLB,, | Performed by: STUDENT IN AN ORGANIZED HEALTH CARE EDUCATION/TRAINING PROGRAM

## 2022-01-05 PROCEDURE — 1159F MED LIST DOCD IN RCRD: CPT | Mod: CPTII,S$GLB,, | Performed by: STUDENT IN AN ORGANIZED HEALTH CARE EDUCATION/TRAINING PROGRAM

## 2022-01-05 PROCEDURE — 1101F PT FALLS ASSESS-DOCD LE1/YR: CPT | Mod: CPTII,S$GLB,, | Performed by: STUDENT IN AN ORGANIZED HEALTH CARE EDUCATION/TRAINING PROGRAM

## 2022-01-05 PROCEDURE — 11102 TANGNTL BX SKIN SINGLE LES: CPT | Mod: S$GLB,,, | Performed by: STUDENT IN AN ORGANIZED HEALTH CARE EDUCATION/TRAINING PROGRAM

## 2022-01-05 PROCEDURE — 88341 PR IHC OR ICC EACH ADD'L SINGLE ANTIBODY  STAINPR: ICD-10-PCS | Mod: 26,,, | Performed by: PATHOLOGY

## 2022-01-05 PROCEDURE — 1160F PR REVIEW ALL MEDS BY PRESCRIBER/CLIN PHARMACIST DOCUMENTED: ICD-10-PCS | Mod: CPTII,S$GLB,, | Performed by: STUDENT IN AN ORGANIZED HEALTH CARE EDUCATION/TRAINING PROGRAM

## 2022-01-05 PROCEDURE — 88305 TISSUE EXAM BY PATHOLOGIST: CPT | Mod: 26,,, | Performed by: PATHOLOGY

## 2022-01-05 RX ORDER — CEPHALEXIN 500 MG/1
500 CAPSULE ORAL 4 TIMES DAILY
Qty: 28 CAPSULE | Refills: 0 | Status: SHIPPED | OUTPATIENT
Start: 2022-01-05 | End: 2022-01-12

## 2022-01-05 RX ORDER — MUPIROCIN 20 MG/G
OINTMENT TOPICAL 3 TIMES DAILY
Qty: 22 G | Refills: 0 | Status: SHIPPED | OUTPATIENT
Start: 2022-01-05

## 2022-01-05 NOTE — PROGRESS NOTES
Chart was reviewed for overdue Proactive [facility] Encounters (NICHOLE)  topics  Updates were requested from care everywhere  Health Maintenance has been updated  [place] immunization registry triggered

## 2022-01-05 NOTE — PATIENT INSTRUCTIONS
Shave Biopsy Wound Care    Your doctor has performed a shave biopsy today.  A band aid and vaseline ointment has been placed over the site.  This should remain in place for 24 hours.  It is recommended that you keep the area dry for the first 24 hours.  After 24 hours, you may remove the band aid and wash the area with warm soap and water and apply Vaseline jelly.  Many patients prefer to use Neosporin or Bacitracin ointment.  This is acceptable; however, know that you can develop an allergy to this medication even if you have used it safely for years.  It is important to keep the area moist.  Letting it dry out and get air slows healing time, and will worsen the scar.  Band aid is optional after first 24 hours.      If you notice increasing redness, tenderness, pain, or yellow drainage at the biopsy site, please notify your doctor.  These are signs of an infection.    If your biopsy site is bleeding, apply firm pressure for 15 minutes straight.  Repeat for another 15 minutes, if it is still bleeding.   If the surgical site continues to bleed, then please contact your doctor.      [facility] [place] [address]/ [phone] FAX/ [email or web address]

## 2022-01-05 NOTE — PROGRESS NOTES
Subjective:       Patient ID:  Steve June Jr. is a 76 y.o. male who presents for   Chief Complaint   Patient presents with    Skin Check    Spot     face     LOV: 11/5/20    Patient here today for lesion to right neck and face x several month. Red and bleed. No treatment.    Patient last seen Nov 2020 for a SCC on the left forearm and left dorsal hand- removed by Dr. Walsh.   Final Pathologic Diagnosis 1. Skin, left forearm, shave biopsy:   -SQUAMOUS CELL CARCINOMA, INVASIVE, MODERATELY DIFFERENTIATED, EXTENDING TO   THE DEEP AND PERIPHERAL MARGINS         2.  Skin, left dorsal hand, shave biopsy:   -SQUAMOUS CELL CARCINOMA,  SUPERFICIALLY INVASIVE, EXTENDING TO THE BASE OF   THE SPECIMEN     4/2019 diagnosed with right temple (SCCiS), right preauricular cheek (SCCiS), left preauricular cheek (AK with SCCiS)--> all sccis treated with tolak cream x 2 weeks, left posterior helix (BCC)- saw Dr. tellez for mohs, there was no clinically evident tumor remaining and patient deferred mohs.      Hx of Aks treated with cryo in the past and one PDT session in (2012?), efudex filed tx 2016  Hx of BCC left posterior neck s/p excision 2011,  BCC of the R proximal forearm (1/4/2012) involving margins - this was not excised 2/2 chronic severe thrombocytopenia. He was scheduled for radiation but did not end up going -  Hx bx proven scc in situ right forearm with efudex bid x 7 weeks (could not afford aldara) 2014         Review of Systems   Constitutional: Negative for fever, chills and fatigue.   Respiratory: Negative for cough and shortness of breath.    Gastrointestinal: Negative for nausea and vomiting.   Skin: Positive for wears hat. Negative for rash, daily sunscreen use and activity-related sunscreen use.   Hematologic/Lymphatic: Bruises/bleeds easily.        Objective:    Physical Exam   Constitutional: He appears well-developed and well-nourished. No distress.   Neurological: He is alert and oriented to  person, place, and time. He is not disoriented.   Psychiatric: He has a normal mood and affect.   Skin:   Areas Examined (abnormalities noted in diagram):   Scalp / Hair Palpated and Inspected  Head / Face Inspection Performed  Neck Inspection Performed  Chest / Axilla Inspection Performed  Abdomen Inspection Performed  Back Inspection Performed  RUE Inspected  LUE Inspection Performed  Nails and Digits Inspection Performed                   Diagram Legend     Erythematous scaling macule/papule c/w actinic keratosis       Vascular papule c/w angioma      Pigmented verrucoid papule/plaque c/w seborrheic keratosis      Yellow umbilicated papule c/w sebaceous hyperplasia      Irregularly shaped tan macule c/w lentigo     1-2 mm smooth white papules consistent with Milia      Movable subcutaneous cyst with punctum c/w epidermal inclusion cyst      Subcutaneous movable cyst c/w pilar cyst      Firm pink to brown papule c/w dermatofibroma      Pedunculated fleshy papule(s) c/w skin tag(s)      Evenly pigmented macule c/w junctional nevus     Mildly variegated pigmented, slightly irregular-bordered macule c/w mildly atypical nevus      Flesh colored to evenly pigmented papule c/w intradermal nevus       Pink pearly papule/plaque c/w basal cell carcinoma      Erythematous hyperkeratotic cursted plaque c/w SCC      Surgical scar with no sign of skin cancer recurrence      Open and closed comedones      Inflammatory papules and pustules      Verrucoid papule consistent consistent with wart     Erythematous eczematous patches and plaques     Dystrophic onycholytic nail with subungual debris c/w onychomycosis     Umbilicated papule    Erythematous-base heme-crusted tan verrucoid plaque consistent with inflamed seborrheic keratosis     Erythematous Silvery Scaling Plaque c/w Psoriasis     See annotation              Assessment / Plan:      Pathology Orders:     Normal Orders This Visit    Specimen to Pathology, Dermatology      Questions:    Procedure Type: Dermatology and skin neoplasms    Number of Specimens: 2    ------------------------: -------------------------    Spec 1 Procedure: Biopsy    Spec 1 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 SCC vs. BCC vs. PN    Spec 2 Source: left forehead    Release to patient:         Neoplasm of uncertain behavior of skin x2  -     Specimen to Pathology, Dermatology  Shave biopsy procedure note:    Shave biopsy performed after verbal consent including risk of infection, scar, recurrence, need for additional treatment of site. Area prepped with alcohol, anesthetized with approximately 1.0cc of 1% lidocaine with epinephrine. Lesional tissue shaved with razor blade. Hemostasis achieved with application of aluminum chloride followed by hyfrecation. No complications. Dressing applied. Wound care explained.    Impetigo  -     Ambulatory referral/consult to Dermatology  -     cephALEXin (KEFLEX) 500 MG capsule; Take 1 capsule (500 mg total) by mouth 4 (four) times daily. for 7 days  Dispense: 28 capsule; Refill: 0  -     mupirocin (BACTROBAN) 2 % ointment; Apply topically 3 (three) times daily.  Dispense: 22 g; Refill: 0  - concern for impetiginization at sites of chronic excoriations    History of skin cancer  Scar  Area(s) of previous NMSC evaluated with no signs of recurrence.    Upper body skin examination performed today including at least 9 points as noted in physical examination. Suspicious lesions noted.  Patient instructed in importance in daily broad spectrum sun protection of at least spf 30. Mineral sunscreen ingredients preferred (Zinc +/- Titanium) and can be found OTC.   Patient encouraged to wear hat for all outdoor exposure.   Also discussed sun avoidance and use of protective clothing.    Actinic keratosis  Patient  has multiple precancerous AKs on his arms, scalp, face. Discussed treatment w/ LN2, efudex. However he declines treatment. He and his wife are concerned that he will pick and scratch the spots and that they will not heal if treated. Discussed risks of progression to skin cancer, counseled on warning signs/symptoms.     Neurotic excoriations  - discussed importance of not picking/scratching, multiple linear excoriations on left upper back, right neck, scalp  - keep area covered w/ vaseline or mupirocin and telfa       3 months to recheck erosions  No follow-ups on file.

## 2022-01-13 LAB
FINAL PATHOLOGIC DIAGNOSIS: NORMAL
GROSS: NORMAL
Lab: NORMAL
MICROSCOPIC EXAM: NORMAL

## 2022-01-17 NOTE — PROGRESS NOTES
Final Pathologic Diagnosis 1. Skin, left lateral cheek, shave biopsy:   - INVASIVE SQUAMOUS CELL CARCINOMA, WELL-DIFFERENTIATED.   - THE TUMOR EXTENDS TO THE DEEP AND LATERAL BIOPSY MARGINS.   This lesion is skin cancer. You will be contacted regarding treatment.     **I recommend treatment by a Mohs surgeon who has the ability to ensure negative surgical margins before repairing the defect. He has seen by Dr. Walsh and Dr. Blank. Please find out who he would like to be referred to before placing the referral.   Please notify patient and place referral request once patient has been given diagnosis and opportunity to discuss treatment plan    2. 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   - THE TUMOR EXTENDS TO THE DEEP AND LATERAL BIOPSY MARGINS.   This lesion is skin cancer. You will be contacted regarding treatment.   Comment: Interp By Tayo Reed M.D., Signed on 01/13/2022 at 14:38    ** Biopsy consistent with an atypical fibroxanthoma. He will need to have this lesion treated by a Mohs surgeon. Please find out if he would like a referral to Dr. Blank or Dr. Walsh and place the referral. He will need a TBSE at his f/u in 3 months.

## 2022-01-19 ENCOUNTER — TELEPHONE (OUTPATIENT)
Dept: NEUROLOGY | Facility: CLINIC | Age: 77
End: 2022-01-19
Payer: MEDICARE

## 2022-01-19 NOTE — TELEPHONE ENCOUNTER
Spoke to pt's wife--states they couldn't find the pt's wallet and called the  to see if pt could still be seen with insurance info (all info the same) and they were advised by  that if more than 10 minutes late they would not be seen. Wife states they were still in [place] so she requested to reschedule. Rescheduled appt for 2/9/22.

## 2022-01-19 NOTE — TELEPHONE ENCOUNTER
----- Message from Jhony Garner sent at 1/19/2022  2:45 PM CST -----  Regarding: reschedule today  Type:  Sooner Apoointment Request    Caller is requesting a sooner appointment.      Name of Caller:  wife // [place]    When is the first available appointment?  Dept book for memory    Symptoms:  memory    Best Call Back Number:  [phone]    Additional Information:  pt still in [place] at home. [facility] since will miss nurse will need to reschedule.

## 2022-01-20 ENCOUNTER — PATIENT MESSAGE (OUTPATIENT)
Dept: DERMATOLOGY | Facility: CLINIC | Age: 77
End: 2022-01-20
Payer: MEDICARE

## 2022-01-25 NOTE — PATIENT INSTRUCTIONS
Your Diabetes Foot Care Program    Every day you depend on your feet to keep you moving. But when you have diabetes, your feet need special care. Even a small foot problem can become very serious. So dont take your feet for granted. By working with your diabetes healthcare team, you can learn how to protect your feet and keep them healthy.  Evaluating your feet  An evaluation helps your healthcare provider check the condition of your feet. The evaluation includes a review of your diabetes history and overall health. It may also include a foot exam, X-rays, or other tests. These can help show problems beneath the skin that you cant see or feel.  Medical history  You will be asked about your overall health and any history of foot problems. Youll also discuss your diabetes history, such as whether your blood sugar level has changed over time. It also includes questions about sensations of pain, tingling, pins and needles, or numbness. Your healthcare provider will also want to know if you have high blood pressure and heart disease, or if you smoke. Be sure to mention any medicines (including over-the-counter), supplements, or herbal remedies you take.  Foot exam  A foot exam checks the condition of different parts of your foot. First, your skin and nails are examined for any signs of infection. Blood flow is checked by feeling for the pulses in each foot. You may also have tests to study the nerves in the foot. These include using a small filament (wire) to see how sensitive your feet are. In certain cases, you will be asked to walk a short distance to check for bone, joint, and muscle problems.  Diagnostic tests  If needed, your healthcare provider will suggest certain tests to learn more about your feet. These include:  · Doppler tests to measure blood flow in the feet and lower leg.  · X-rays, which can show bone or joint problems.  · Other imaging tests, such as an MRI (magnetic resonance imaging), bone scan,  and CT (computed tomography) scan. These can help show bone infections.  · Other tests, such as vascular tests, which study the blood flow in your feet and legs. You may also have nerve studies to learn how sensitive your feet are.  Creating a foot care program  Based on the evaluation, your healthcare provider will create a foot care program for you. Your program may be as simple as starting a daily self-care routine and changing the types of shoes your wear. It may also involve treating minor foot problems, such as a corn or blister. In some cases, surgery will be needed to treat an infection or mechanical problems, such as hammer toes.  Preventing problems  When you have diabetes, its easier to prevent problems than to treat them later on. So see your healthcare team for regular checkups and foot care. Your healthcare team can also help you learn more about caring for your feet at home. For example, you may be told to avoid walking barefoot. Or you may be told that special footwear is needed to protect your feet.  Have regular checkups  Foot problems can develop quickly. So be sure to follow your healthcare teams schedule for regular checkups. During office visits, take off your shoes and socks as soon as you get in the exam room. Ask your healthcare provider to examine your feet for problems. This will make it easier to find and treat small skin irritations before they get worse. Regular checkups can also help keep track of the blood flow and feeling in your feet. If you have neuropathy (lack of feeling in your feet), you will need to have checkups more often.  Learn about self-care  The more you know about diabetes and your feet, the easier it will be to prevent problems. Members of your healthcare team can teach you how to inspect your feet and teach you to look for warning signs. They can also give you other foot care tips. During office visits, be sure to ask any questions you have.  Date Last Reviewed:  7/1/2016 © 2000-2017 [employer]. [address] [place] [address], [place] [address]. All rights reserved. This information is not intended as a substitute for professional medical care. Always follow your healthcare professional's instructions.          Nail Fungal Infection  A nail fungal infection changes the way fingernails and toenails look. They may thicken, discolor, change shape, or split. This condition is hard to treat because nails grow slowly and have limited blood supply. The infection often comes back after treatment.  There are 2 types of medicines used to treat this condition:  · Topical anti-fungal medicines. These are applied to the surface of the skin and nail area. These medicines are not very effective because they cant get deep into the nail.  · Oral antifungal medicines. These medicines work better because they go into the nail from the inside out. But the infection may still come back. It may take 9 to 12 months for your nail to look normal again. This means you are cured. You can repeat treatment if needed. Most people take these medicines without any problems. It is rare to stop therapy because of side effects. But your healthcare provider may give you some monitoring tests. Talk about possible side effects with your provider before starting treatment.  If medicines fail, the nail can be removed surgically or chemically. These methods physically remove the fungus from the body. This helps medical treatment be more effective.  Home care  · Use medicines exactly as directed for as long as directed. Treating a fungal infection can take longer than other kinds of infections.  · Smoking is a risk factor for fungal infection. This is one more reason to quit.  · Wear absorbent socks, and shoes that let your feet breathe. Sweaty feet increase your risk of fungal infection. They also make an existing infection harder to treat.  · Use footwear when in damp public places like swimming pools,  gyms, and shower rooms. This will help you avoid the fungus that grows there.  · Don't share nail clippers or scissors with others.  Follow-up care  Follow up with your healthcare provider, or as advised.  When to seek medical advice  Call your healthcare provider right away if any of these occur:  · Skin by the nail becomes red, swollen, painful, or drains pus (a creamy yellow or white liquid)  · Side effects from oral anti-fungal medicines  Date Last Reviewed: 8/1/2016  © 6961-2384 [employer]. [address] [place] [id number]. All rights reserved. This information is not intended as a substitute for professional medical care. Always follow your healthcare professional's instructions.

## 2022-01-26 ENCOUNTER — OFFICE VISIT (OUTPATIENT)
Dept: PAIN MEDICINE | Facility: CLINIC | Age: 77
End: 2022-01-26
Payer: COMMERCIAL

## 2022-01-26 ENCOUNTER — OFFICE VISIT (OUTPATIENT)
Dept: PODIATRY | Facility: CLINIC | Age: 77
End: 2022-01-26
Payer: MEDICARE

## 2022-01-26 ENCOUNTER — PATIENT MESSAGE (OUTPATIENT)
Dept: PAIN MEDICINE | Facility: CLINIC | Age: 77
End: 2022-01-26

## 2022-01-26 VITALS — BODY MASS INDEX: 26.66 KG/M2 | HEART RATE: 88 BPM | OXYGEN SATURATION: 98 % | HEIGHT: 69 IN | WEIGHT: 180 LBS

## 2022-01-26 VITALS
BODY MASS INDEX: 26.66 KG/M2 | DIASTOLIC BLOOD PRESSURE: 86 MMHG | HEIGHT: 69 IN | WEIGHT: 180 LBS | HEART RATE: 80 BPM | SYSTOLIC BLOOD PRESSURE: 149 MMHG

## 2022-01-26 DIAGNOSIS — B35.3 TINEA PEDIS OF BOTH FEET: ICD-10-CM

## 2022-01-26 DIAGNOSIS — L60.2 ONYCHOGRYPHOSIS: ICD-10-CM

## 2022-01-26 DIAGNOSIS — E11.65 UNCONTROLLED TYPE 2 DIABETES MELLITUS WITH HYPERGLYCEMIA: ICD-10-CM

## 2022-01-26 DIAGNOSIS — E11.42 DM TYPE 2 WITH DIABETIC PERIPHERAL NEUROPATHY: ICD-10-CM

## 2022-01-26 DIAGNOSIS — M47.892 OTHER SPONDYLOSIS, CERVICAL REGION: ICD-10-CM

## 2022-01-26 DIAGNOSIS — L84 CORN OR CALLUS: ICD-10-CM

## 2022-01-26 DIAGNOSIS — M50.30 DDD (DEGENERATIVE DISC DISEASE), CERVICAL: ICD-10-CM

## 2022-01-26 DIAGNOSIS — M25.562 CHRONIC PAIN OF LEFT KNEE: ICD-10-CM

## 2022-01-26 DIAGNOSIS — I73.9 PVD (PERIPHERAL VASCULAR DISEASE): ICD-10-CM

## 2022-01-26 DIAGNOSIS — B35.1 ONYCHOMYCOSIS DUE TO DERMATOPHYTE: ICD-10-CM

## 2022-01-26 DIAGNOSIS — E11.51 TYPE II DIABETES MELLITUS WITH PERIPHERAL CIRCULATORY DISORDER: Primary | ICD-10-CM

## 2022-01-26 DIAGNOSIS — M17.10 PRIMARY OSTEOARTHRITIS OF KNEE, UNSPECIFIED LATERALITY: ICD-10-CM

## 2022-01-26 DIAGNOSIS — F11.90 CHRONIC, CONTINUOUS USE OF OPIOIDS: Primary | ICD-10-CM

## 2022-01-26 DIAGNOSIS — I73.9 PERIPHERAL VASCULAR DISEASE: ICD-10-CM

## 2022-01-26 DIAGNOSIS — L60.0 OC (ONYCHOCRYPTOSIS): ICD-10-CM

## 2022-01-26 DIAGNOSIS — L84 PRE-ULCERATIVE CORN OR CALLOUS: ICD-10-CM

## 2022-01-26 DIAGNOSIS — L90.9 FAT PAD ATROPHY OF FOOT: ICD-10-CM

## 2022-01-26 DIAGNOSIS — G89.29 CHRONIC PAIN OF LEFT KNEE: ICD-10-CM

## 2022-01-26 DIAGNOSIS — L84 PRE-ULCERATIVE CALLUSES: ICD-10-CM

## 2022-01-26 DIAGNOSIS — R26.2 DIFFICULTY IN WALKING, NOT ELSEWHERE CLASSIFIED: ICD-10-CM

## 2022-01-26 DIAGNOSIS — E08.42 DIABETIC POLYNEUROPATHY ASSOCIATED WITH DIABETES MELLITUS DUE TO UNDERLYING CONDITION: ICD-10-CM

## 2022-01-26 DIAGNOSIS — R20.2 PARESTHESIA OF BOTH LOWER EXTREMITIES: ICD-10-CM

## 2022-01-26 PROCEDURE — 1157F ADVNC CARE PLAN IN RCRD: CPT | Mod: CPTII,S$GLB,, | Performed by: PODIATRIST

## 2022-01-26 PROCEDURE — 99214 PR OFFICE/OUTPT VISIT, EST, LEVL IV, 30-39 MIN: ICD-10-PCS | Mod: 25,S$GLB,, | Performed by: PODIATRIST

## 2022-01-26 PROCEDURE — 1159F MED LIST DOCD IN RCRD: CPT | Mod: CPTII,S$GLB,, | Performed by: PHYSICIAN ASSISTANT

## 2022-01-26 PROCEDURE — 3288F FALL RISK ASSESSMENT DOCD: CPT | Mod: CPTII,S$GLB,, | Performed by: PHYSICIAN ASSISTANT

## 2022-01-26 PROCEDURE — 1100F PR PT FALLS ASSESS DOC 2+ FALLS/FALL W/INJURY/YR: ICD-10-PCS | Mod: CPTII,S$GLB,, | Performed by: PODIATRIST

## 2022-01-26 PROCEDURE — 3008F PR BODY MASS INDEX (BMI) DOCUMENTED: ICD-10-PCS | Mod: CPTII,S$GLB,, | Performed by: PHYSICIAN ASSISTANT

## 2022-01-26 PROCEDURE — 1126F PR PAIN SEVERITY QUANTIFIED, NO PAIN PRESENT: ICD-10-PCS | Mod: CPTII,S$GLB,, | Performed by: PODIATRIST

## 2022-01-26 PROCEDURE — 99214 PR OFFICE/OUTPT VISIT, EST, LEVL IV, 30-39 MIN: ICD-10-PCS | Mod: S$GLB,,, | Performed by: PHYSICIAN ASSISTANT

## 2022-01-26 PROCEDURE — 1126F AMNT PAIN NOTED NONE PRSNT: CPT | Mod: CPTII,S$GLB,, | Performed by: PODIATRIST

## 2022-01-26 PROCEDURE — 1125F PR PAIN SEVERITY QUANTIFIED, PAIN PRESENT: ICD-10-PCS | Mod: CPTII,S$GLB,, | Performed by: PHYSICIAN ASSISTANT

## 2022-01-26 PROCEDURE — 3079F DIAST BP 80-89 MM HG: CPT | Mod: CPTII,S$GLB,, | Performed by: PHYSICIAN ASSISTANT

## 2022-01-26 PROCEDURE — 99499 UNLISTED E&M SERVICE: CPT | Mod: S$GLB,,, | Performed by: PHYSICIAN ASSISTANT

## 2022-01-26 PROCEDURE — 1101F PT FALLS ASSESS-DOCD LE1/YR: CPT | Mod: CPTII,S$GLB,, | Performed by: PHYSICIAN ASSISTANT

## 2022-01-26 PROCEDURE — 3008F BODY MASS INDEX DOCD: CPT | Mod: CPTII,S$GLB,, | Performed by: PHYSICIAN ASSISTANT

## 2022-01-26 PROCEDURE — 1159F PR MEDICATION LIST DOCUMENTED IN MEDICAL RECORD: ICD-10-PCS | Mod: CPTII,S$GLB,, | Performed by: PHYSICIAN ASSISTANT

## 2022-01-26 PROCEDURE — 11056 ROUTINE FOOT CARE: ICD-10-PCS | Mod: Q8,S$GLB,, | Performed by: PODIATRIST

## 2022-01-26 PROCEDURE — 3079F PR MOST RECENT DIASTOLIC BLOOD PRESSURE 80-89 MM HG: ICD-10-PCS | Mod: CPTII,S$GLB,, | Performed by: PHYSICIAN ASSISTANT

## 2022-01-26 PROCEDURE — 3008F PR BODY MASS INDEX (BMI) DOCUMENTED: ICD-10-PCS | Mod: CPTII,S$GLB,, | Performed by: PODIATRIST

## 2022-01-26 PROCEDURE — 11056 PARNG/CUTG B9 HYPRKR LES 2-4: CPT | Mod: Q8,S$GLB,, | Performed by: PODIATRIST

## 2022-01-26 PROCEDURE — 11721 ROUTINE FOOT CARE: ICD-10-PCS | Mod: Q8,59,S$GLB, | Performed by: PODIATRIST

## 2022-01-26 PROCEDURE — 3288F PR FALLS RISK ASSESSMENT DOCUMENTED: ICD-10-PCS | Mod: CPTII,S$GLB,, | Performed by: PODIATRIST

## 2022-01-26 PROCEDURE — 99999 PR PBB SHADOW E&M-EST. PATIENT-LVL IV: CPT | Mod: PBBFAC,,, | Performed by: PHYSICIAN ASSISTANT

## 2022-01-26 PROCEDURE — 1100F PTFALLS ASSESS-DOCD GE2>/YR: CPT | Mod: CPTII,S$GLB,, | Performed by: PODIATRIST

## 2022-01-26 PROCEDURE — 3077F SYST BP >= 140 MM HG: CPT | Mod: CPTII,S$GLB,, | Performed by: PHYSICIAN ASSISTANT

## 2022-01-26 PROCEDURE — 1160F PR REVIEW ALL MEDS BY PRESCRIBER/CLIN PHARMACIST DOCUMENTED: ICD-10-PCS | Mod: CPTII,S$GLB,, | Performed by: PODIATRIST

## 2022-01-26 PROCEDURE — 1160F RVW MEDS BY RX/DR IN RCRD: CPT | Mod: CPTII,S$GLB,, | Performed by: PODIATRIST

## 2022-01-26 PROCEDURE — 3288F PR FALLS RISK ASSESSMENT DOCUMENTED: ICD-10-PCS | Mod: CPTII,S$GLB,, | Performed by: PHYSICIAN ASSISTANT

## 2022-01-26 PROCEDURE — 3051F PR MOST RECENT HEMOGLOBIN A1C LEVEL 7.0 - < 8.0%: ICD-10-PCS | Mod: CPTII,S$GLB,, | Performed by: PODIATRIST

## 2022-01-26 PROCEDURE — 1157F PR ADVANCE CARE PLAN OR EQUIV PRESENT IN MEDICAL RECORD: ICD-10-PCS | Mod: CPTII,S$GLB,, | Performed by: PODIATRIST

## 2022-01-26 PROCEDURE — 1157F ADVNC CARE PLAN IN RCRD: CPT | Mod: CPTII,S$GLB,, | Performed by: PHYSICIAN ASSISTANT

## 2022-01-26 PROCEDURE — 99999 PR PBB SHADOW E&M-EST. PATIENT-LVL IV: ICD-10-PCS | Mod: PBBFAC,,, | Performed by: PHYSICIAN ASSISTANT

## 2022-01-26 PROCEDURE — 99499 RISK ADDL DX/OHS AUDIT: ICD-10-PCS | Mod: S$GLB,,, | Performed by: PHYSICIAN ASSISTANT

## 2022-01-26 PROCEDURE — 99214 OFFICE O/P EST MOD 30 MIN: CPT | Mod: 25,S$GLB,, | Performed by: PODIATRIST

## 2022-01-26 PROCEDURE — 99499 RISK ADDL DX/OHS AUDIT: ICD-10-PCS | Mod: S$GLB,,, | Performed by: PODIATRIST

## 2022-01-26 PROCEDURE — 99499 UNLISTED E&M SERVICE: CPT | Mod: S$GLB,,, | Performed by: PODIATRIST

## 2022-01-26 PROCEDURE — 3051F HG A1C>EQUAL 7.0%<8.0%: CPT | Mod: CPTII,S$GLB,, | Performed by: PODIATRIST

## 2022-01-26 PROCEDURE — 80307 DRUG TEST PRSMV CHEM ANLYZR: CPT | Performed by: PHYSICIAN ASSISTANT

## 2022-01-26 PROCEDURE — 3077F PR MOST RECENT SYSTOLIC BLOOD PRESSURE >= 140 MM HG: ICD-10-PCS | Mod: CPTII,S$GLB,, | Performed by: PHYSICIAN ASSISTANT

## 2022-01-26 PROCEDURE — 1159F PR MEDICATION LIST DOCUMENTED IN MEDICAL RECORD: ICD-10-PCS | Mod: CPTII,S$GLB,, | Performed by: PODIATRIST

## 2022-01-26 PROCEDURE — 1157F PR ADVANCE CARE PLAN OR EQUIV PRESENT IN MEDICAL RECORD: ICD-10-PCS | Mod: CPTII,S$GLB,, | Performed by: PHYSICIAN ASSISTANT

## 2022-01-26 PROCEDURE — 99214 OFFICE O/P EST MOD 30 MIN: CPT | Mod: S$GLB,,, | Performed by: PHYSICIAN ASSISTANT

## 2022-01-26 PROCEDURE — 1159F MED LIST DOCD IN RCRD: CPT | Mod: CPTII,S$GLB,, | Performed by: PODIATRIST

## 2022-01-26 PROCEDURE — 11721 DEBRIDE NAIL 6 OR MORE: CPT | Mod: Q8,59,S$GLB, | Performed by: PODIATRIST

## 2022-01-26 PROCEDURE — 1125F AMNT PAIN NOTED PAIN PRSNT: CPT | Mod: CPTII,S$GLB,, | Performed by: PHYSICIAN ASSISTANT

## 2022-01-26 PROCEDURE — 1101F PR PT FALLS ASSESS DOC 0-1 FALLS W/OUT INJ PAST YR: ICD-10-PCS | Mod: CPTII,S$GLB,, | Performed by: PHYSICIAN ASSISTANT

## 2022-01-26 PROCEDURE — 3008F BODY MASS INDEX DOCD: CPT | Mod: CPTII,S$GLB,, | Performed by: PODIATRIST

## 2022-01-26 PROCEDURE — 3288F FALL RISK ASSESSMENT DOCD: CPT | Mod: CPTII,S$GLB,, | Performed by: PODIATRIST

## 2022-01-26 RX ORDER — OXYCODONE AND ACETAMINOPHEN 10; 325 MG/1; MG/1
1 TABLET ORAL EVERY 6 HOURS PRN
Qty: 120 TABLET | Refills: 0 | Status: SHIPPED | OUTPATIENT
Start: 2022-03-25 | End: 2022-04-23

## 2022-01-26 RX ORDER — OXYCODONE AND ACETAMINOPHEN 10; 325 MG/1; MG/1
1 TABLET ORAL EVERY 6 HOURS PRN
Qty: 120 TABLET | Refills: 0 | Status: SHIPPED | OUTPATIENT
Start: 2022-02-24 | End: 2022-01-26 | Stop reason: SDUPTHER

## 2022-01-26 RX ORDER — CLOTRIMAZOLE AND BETAMETHASONE DIPROPIONATE 10; .64 MG/G; MG/G
CREAM TOPICAL 2 TIMES DAILY
Qty: 45 G | Refills: 2 | Status: SHIPPED | OUTPATIENT
Start: 2022-01-26 | End: 2022-02-25

## 2022-01-26 RX ORDER — OXYCODONE AND ACETAMINOPHEN 10; 325 MG/1; MG/1
1 TABLET ORAL EVERY 6 HOURS PRN
Qty: 120 TABLET | Refills: 0 | Status: SHIPPED | OUTPATIENT
Start: 2022-03-25 | End: 2022-01-26 | Stop reason: SDUPTHER

## 2022-01-26 RX ORDER — OXYCODONE AND ACETAMINOPHEN 10; 325 MG/1; MG/1
1 TABLET ORAL EVERY 6 HOURS PRN
Qty: 120 TABLET | Refills: 0 | Status: SHIPPED | OUTPATIENT
Start: 2022-01-26 | End: 2022-02-24

## 2022-01-26 RX ORDER — OXYCODONE AND ACETAMINOPHEN 10; 325 MG/1; MG/1
1 TABLET ORAL EVERY 6 HOURS PRN
Qty: 120 TABLET | Refills: 0 | Status: SHIPPED | OUTPATIENT
Start: 2022-01-26 | End: 2022-01-26 | Stop reason: SDUPTHER

## 2022-01-26 RX ORDER — OXYCODONE AND ACETAMINOPHEN 10; 325 MG/1; MG/1
1 TABLET ORAL EVERY 6 HOURS PRN
Qty: 120 TABLET | Refills: 0 | Status: SHIPPED | OUTPATIENT
Start: 2022-02-24 | End: 2022-03-22 | Stop reason: SDUPTHER

## 2022-01-26 NOTE — PROGRESS NOTES
"Referring Physician: No ref. provider found    PCP: Crispin Garcia MD      CC: neck and left shoulder pain; knee pain    Interval history:  Steve June Jr. is a 76 y.o. male with neck and left shoulder pain who presents today for f/u and medication refill. He is s/p right hip arthroplasty s/p a fall in August 2021. He is completed PT.   Knee pain is unchanged since LCV. He had a series of 2 Euflexxa injections that worsened his knee pain initially.  He has tried physical therapy for knee which did not provide much benefit.   Steroid injections performed have only given him short lived relief.   He continues to have neck pain. He has a history of cervical DDD.  However, he continues to have low platelets and we are unable to provide any  interventional procedures.  He takes oxycodone 10 mg every 6 hours as needed with mild to moderate benefit. In the past UDS was positive for THC. He ate marijuana cookies in [place]. Denies recent use. Previous UDS consistent. Oxycodone does cause some drowsiness. He is having MOHs surgery for skin cancer on face.  Pain today is rated 8/10.    Prior HPI:   Steve June Jr. is a 76 y.o. male referred to us for lower back and knee pain.  He has significant history of pancytopenia, cirrhosis.  He presents with constant aching, sharp pain in his lower back as well as his left knee.  Pain worsens sitting, standing, bending, walking.  Pain improves with rest.  X-rays performed of the lumbar spine as well as knee shows left knee osteoarthritis.  He has tried physical therapy with minimal benefit.  He currently takes Norco 10 mg every 6 hours as needed with mild to moderate benefit.  He is unable to have any procedures due to his thrombocytopenia.  He also has ventral hernia, but surgical attention is currently not recommended due to his thrombocytopenia.  His main concern today is wishing to decrease his opioid medications.  He states being very "foggy" with his current " medications.  He denies any increased sedation.  He denies any weakness.  No bowel bladder changes.    ROS:  CONSTITUTIONAL: No fevers, chills, night sweats, wt. loss, appetite changes  SKIN: no rashes or itching  ENT: No headaches, head trauma, vision changes, or eye pain  LYMPH NODES: None noticed   CV: No chest pain, palpitations.   RESP: No shortness of breath, dyspnea on exertion, cough, wheezing, or hemoptysis  GI: No nausea, emesis, diarrhea, constipation, melena, hematochezia, pain.    : No dysuria, hematuria, urgency, or frequency   HEME: No easy bruising, bleeding problems  PSYCHIATRIC: No depression, anxiety, psychosis, hallucinations.  NEURO: No seizures, memory loss, dizziness or difficulty sleeping  MSK: + History of present illness      Past Medical History:   Diagnosis Date    Abnormal thyroid function test     Allergy     Seasonal    Anemia     Anemia due to blood loss 7/2/2014    Arthritis     Gaviria esophagus     Basal cell carcinoma     right forearm    Basal cell carcinoma 12/2011    lower post neck    Basal cell carcinoma 04/23/2019    left posterior helix    Cancer     skin CA    Cataract     Cirrhosis     Diabetes mellitus     Diabetes mellitus, type 2     Encounter for blood transfusion     Esophageal varices in cirrhosis     grade II on 7/12 EGD    Gastritis     on 7/12 EGD    GERD (gastroesophageal reflux disease) 2/28/2015    Hard of hearing     Hiatal hernia     History of hepatitis C 8/10/2012    tx with harvoni x 41 days (started 10/22/15). SVR4     Hoarseness 2/28/2015    Hx of colonic polyps 01/10/2011    per colonoscopy report in media    Hypercholesteremia     Hypersplenism     Hypertension     No meds    Pain management 12/10/2014    Petechial hemorrhage 11/25/2015    Lower extremities bilat     Portal hypertensive gastropathy     on 7/12 EGD    Squamous cell carcinoma 04/23/2019    right preauricular cheek, right temple    Thrombocytopenia      Type II or unspecified type diabetes mellitus with neurological manifestations, uncontrolled(250.62) 2013    Valvular heart disease     mild MR 12     Past Surgical History:   Procedure Laterality Date    CATARACT EXTRACTION  1/10/13    left eye    CATARACT EXTRACTION      right eye    CHOLECYSTECTOMY      COLONOSCOPY      EYE SURGERY      Cataract surgery to right eye    HIP ARTHROPLASTY Right 2021    Procedure: ARTHROPLASTY, HIP, [place], peg board, 1st assist;  Surgeon: Corey Vargas MD;  [facility];  Service: Orthopedics;  Laterality: Right;    KNEE ARTHROSCOPY W/ MENISCAL REPAIR      KNEE CARTILAGE SURGERY      left knee    KNEE SURGERY  2006    left    SKIN LESION EXCISION      TONSILLECTOMY      UPPER GASTROINTESTINAL ENDOSCOPY       Family History   Problem Relation Age of Onset    Leukemia Mother     Cancer Mother         bone    Alcohol abuse Father     Cirrhosis Father         EtOH induced    No Known Problems Daughter     No Known Problems Son     No Known Problems Daughter     No Known Problems Daughter     No Known Problems Daughter     No Known Problems Sister     Amblyopia Neg Hx     Blindness Neg Hx     Cataracts Neg Hx     Diabetes Neg Hx     Glaucoma Neg Hx     Hypertension Neg Hx     Macular degeneration Neg Hx     Retinal detachment Neg Hx     Strabismus Neg Hx     Stroke Neg Hx     Thyroid disease Neg Hx     Psoriasis Neg Hx     Lupus Neg Hx     Eczema Neg Hx     Acne Neg Hx     Melanoma Neg Hx      Social History     Socioeconomic History    Marital status:     Number of children: 5   Tobacco Use    Smoking status: Former Smoker     Packs/day: 1.00     Years: 25.00     Pack years: 25.00     Quit date: 2000     Years since quittin.4    Smokeless tobacco: Never Used   Substance and Sexual Activity    Alcohol use: Yes     Comment: rarely    Drug use: No    Sexual activity: Never   Social History  "Narrative    He is .  He has children.  He is currently retired.         Medications/Allergies: See med card    Vitals:    01/26/22 0810   BP: (!) 149/86   Pulse: 80   Weight: 81.6 kg (180 lb)   Height: 5' 9" (1.753 m)   PainSc:   8   PainLoc: Neck     Physical exam:    GENERAL: A and O x3, the patient appears well groomed and is in no acute distress.  Skin: No rashes or obvious lesions  HEENT: normocephalic, atraumatic  CARDIOVASCULAR:  RRR  LUNGS: non labored breathing  ABDOMEN:soft, non tender  UPPER EXTREMITIES: Normal alignment, normal range of motion, no atrophy, no skin changes,  hair growth and nail growth normal and equal bilaterally. No swelling, no tenderness.    LOWER EXTREMITIES:  Normal alignment, normal range of motion, +skin changes bilaterally  LUMBAR SPINE  Lumbar spine: ROM is full with flexion extension and oblique extension with moderate increased pain.    Erasmo's test causes no increased pain on either side.    Supine straight leg raise is negative bilaterally.    Internal and external rotation of the hip causes no increased pain on either side.  Myofascial exam: severe tenderness to palpation across lumbar paraspinous muscles.      MENTAL STATUS: normal orientation, speech, language, and fund of knowledge for social situation.  Emotional state appropriate.    CRANIAL NERVES:  II:  PERRL bilaterally,   III,IV,VI: EOMI.    V:  Facial sensation equal bilaterally  VII:  Facial motor function normal.  VIII:  Hearing equal to finger rub bilaterally  IX/X: Gag normal, palate symmetric  XI:  Shoulder shrug equal, head turn equal  XII:  Tongue midline without fasciculations      MOTOR: Tone and bulk: normal bilateral upper and lower Strength: normal   Delt Bi Tri WE WF     R 5 5 5 5 5 5   L 5 5 5 5 5 5     IP ADD ABD Quad TA Gas HAM  R 5 5 5 5 5 5 5  L 5 5 5 5 5 5 5    SENSATION: Light touch and pinprick intact bilaterally  REFLEXES: normal, symmetric, nonbrisk.  Toes down, no clonus. No " maricruz.  GAIT: normal rise, base, steps, and arm swing.      Imaging:  Xray L-spine 4/2013   Alignment is otherwise normal.  Vertebral body heights and disc space heights are relatively well maintained.  Vertebral end plate osteophytes are present anteriorly   at multiple levels.  The facet joints and posterior elements are unremarkable       Xray Knee 12/2013  Degenerative change of the knees, left greater than right.    Assessment:  Steve June Jr. is a 76 y.o. male with neck, back and left knee pain  1. Chronic, continuous use of opioids    2. Primary osteoarthritis of knee, unspecified laterality    3. DDD (degenerative disc disease), cervical    4. Other spondylosis, cervical region    5. Chronic pain of left knee         Plan:  1. I have stressed the importance of physical activity and exercise to improve overall health.   2. Any interventional procedures will be deferred due to his low platelet count.    3. Percocet 10mg q 8 hrs as needed.  Hold for sedation.  reviewed. Previous UDS consistent. UDS today  4. Continue f/u with dermatology.    5. Follow-up 3 months  All medication management was performed by Dr. Kapil Mario

## 2022-01-26 NOTE — PROGRESS NOTES
"  [address] [facility] [address]  [place] [address]  Phone: [phone]   [phone]    Patient's PCP:Crispin Garcia MD  Referring Provider: No ref. provider found    Subjective:      Chief Complaint:: Nail Care, Callouses, Routine Foot Care, and Tinea Pedis    HPI  Steve June Jr. is a 76 y.o. male who presents with complaints of long, thick toenails and callus both feet.  Gradual onset, worsening over past several weeks, aggravated by increased weight bearing, shoe gear, pressure.  Periodic debridement helps symptoms.    Today, patient presents with dry skin bilateral feet.  No treatment thus far.    This patient has documented high risk feet requiring routine maintenance secondary to diabetes mellitis and those secondary complications of diabetes, as mentioned.      Systemic Doctor: Crispin Garcia   Date Last Seen: 11-9-21   Blood Sugar: *didn't take**  Hemoglobin A1c: Hemoglobin A1C: 7.5    Vitals:    01/26/22 1014   Pulse: 88   SpO2: 98%   Weight: 81.6 kg (180 lb)   Height: 5' 9" (1.753 m)   PainSc: 0-No pain      Shoe Size: 10    Past Surgical History:   Procedure Laterality Date    CATARACT EXTRACTION  1/10/13    left eye    CATARACT EXTRACTION      right eye    CHOLECYSTECTOMY      COLONOSCOPY      EYE SURGERY      Cataract surgery to right eye    HIP ARTHROPLASTY Right 8/27/2021    Procedure: ARTHROPLASTY, HIP, [place], peg board, 1st assist;  Surgeon: Corey Vargas MD;  Location: [facility];  Service: Orthopedics;  Laterality: Right;    KNEE ARTHROSCOPY W/ MENISCAL REPAIR      KNEE CARTILAGE SURGERY      left knee    KNEE SURGERY  12/2006    left    SKIN LESION EXCISION  2021    TONSILLECTOMY      UPPER GASTROINTESTINAL ENDOSCOPY       Past Medical History:   Diagnosis Date    Abnormal thyroid function test     Allergy     Seasonal    Anemia     Anemia due to blood loss 7/2/2014    Arthritis     Gaviria esophagus     Basal cell carcinoma     right forearm    Basal " cell carcinoma 12/2011    lower post neck    Basal cell carcinoma 04/23/2019    left posterior helix    Cancer     skin CA    Cataract     Cirrhosis     Diabetes mellitus     Diabetes mellitus, type 2     Encounter for blood transfusion     Esophageal varices in cirrhosis     grade II on 7/12 EGD    Gastritis     on 7/12 EGD    GERD (gastroesophageal reflux disease) 2/28/2015    Hard of hearing     Hiatal hernia     History of hepatitis C 8/10/2012    tx with harvoni x 41 days (started 10/22/15). SVR4     Hoarseness 2/28/2015    Hx of colonic polyps 01/10/2011    per colonoscopy report in media    Hypercholesteremia     Hypersplenism     Hypertension     No meds    Pain management 12/10/2014    Petechial hemorrhage 11/25/2015    Lower extremities bilat     Portal hypertensive gastropathy     on 7/12 EGD    Squamous cell carcinoma 04/23/2019    right preauricular cheek, right temple    Thrombocytopenia     Type II or unspecified type diabetes mellitus with neurological manifestations, uncontrolled(250.62) 12/24/2013    Valvular heart disease     mild MR 12     Family History   Problem Relation Age of Onset    Leukemia Mother     Cancer Mother         bone    Alcohol abuse Father     Cirrhosis Father         EtOH induced    No Known Problems Daughter     No Known Problems Son     No Known Problems Daughter     No Known Problems Daughter     No Known Problems Daughter     No Known Problems Sister     Amblyopia Neg Hx     Blindness Neg Hx     Cataracts Neg Hx     Diabetes Neg Hx     Glaucoma Neg Hx     Hypertension Neg Hx     Macular degeneration Neg Hx     Retinal detachment Neg Hx     Strabismus Neg Hx     Stroke Neg Hx     Thyroid disease Neg Hx     Psoriasis Neg Hx     Lupus Neg Hx     Eczema Neg Hx     Acne Neg Hx     Melanoma Neg Hx         Social History:   Marital Status:   Alcohol History:  reports current alcohol use.  Tobacco History:  reports that he  "quit smoking about 21 years ago. He has a 25.00 pack-year smoking history. He has never used smokeless tobacco.  Drug History:  reports no history of drug use.    Review of patient's allergies indicates:   Allergen Reactions    Adhesive tape-silicones Other (See Comments)     pulls skin off    Doxycycline      Dizzy. "Just didn't feel right".       Current Outpatient Medications   Medication Sig Dispense Refill    albuterol (VENTOLIN HFA) 90 mcg/actuation inhaler Inhale 2 puffs into the lungs every 4 (four) hours as needed for Wheezing or Shortness of Breath. Rescue 18 g 11    atorvastatin (LIPITOR) 40 MG tablet Take 1 tablet (40 mg total) by mouth once daily. 90 tablet 3    blood sugar diagnostic Strp To check BG 2 times daily, to use with insurance preferred meter 100 strip 5    budesonide (PULMICORT) 0.5 mg/2 mL nebulizer solution Take 2 mLs (0.5 mg total) by nebulization 2 (two) times daily. Controller 120 mL 2    gabapentin (NEURONTIN) 100 MG capsule Take 1 capsule (100 mg total) by mouth 3 (three) times daily. 90 capsule 2    glimepiride (AMARYL) 4 MG tablet Take 2 tablets (8 mg total) by mouth daily with breakfast. 180 tablet 3    HUMULIN 70/30 U-100 KWIKPEN 100 unit/mL (70-30) InPn pen Inject 45 Units into the skin 2 (two) times daily with meals. Adjust dose as directed, max daily dose 140 units/day 45 mL 11    lancets Misc To check BG 2 times daily, to use with insurance preferred meter 100 each 5    mupirocin (BACTROBAN) 2 % ointment Apply topically 3 (three) times daily. 22 g 0    ondansetron (ZOFRAN-ODT) 4 MG TbDL Take 1 tablet (4 mg total) by mouth every 8 (eight) hours as needed (nausea/vomiting). 12 tablet 0    oxyCODONE (ROXICODONE) 15 MG Tab Take 1 tablet (15 mg total) by mouth every 6 (six) hours as needed for Pain (Breakthrough pain). 12 tablet 0    oxyCODONE-acetaminophen (PERCOCET)  mg per tablet Take 1 tablet by mouth every 6 (six) hours as needed for Pain. 120 tablet 0    " "[START ON 2/24/2022] oxyCODONE-acetaminophen (PERCOCET)  mg per tablet Take 1 tablet by mouth every 6 (six) hours as needed for Pain. 120 tablet 0    [START ON 3/25/2022] oxyCODONE-acetaminophen (PERCOCET)  mg per tablet Take 1 tablet by mouth every 6 (six) hours as needed for Pain. 120 tablet 0    pantoprazole (PROTONIX) 40 MG tablet TAKE 1 TABLET(40 MG) BY MOUTH EVERY DAY 90 tablet 0    pen needle, diabetic (NOVOFINE PLUS) 32 gauge x 1/6" Ndle Use with insulin twice a day. 200 each 11    aspirin 325 MG tablet Take 1 tablet (325 mg total) by mouth 2 (two) times daily. 60 tablet 0    clotrimazole-betamethasone 1-0.05% (LOTRISONE) cream Apply topically 2 (two) times daily. 45 g 2     No current facility-administered medications for this visit.       Review of Systems   Constitutional: Negative for chills, fatigue, fever and unexpected weight change.   HENT: Negative for hearing loss and trouble swallowing.    Eyes: Negative for photophobia and visual disturbance.   Respiratory: Negative for cough, shortness of breath and wheezing.    Cardiovascular: Negative for chest pain, palpitations and leg swelling.   Gastrointestinal: Negative for abdominal pain and nausea.   Genitourinary: Negative for dysuria and frequency.   Musculoskeletal: Positive for arthralgias and back pain. Negative for gait problem, joint swelling and myalgias.   Skin: Negative for rash and wound.   Neurological: Positive for numbness and headaches. Negative for seizures and weakness.   Hematological: Bruises/bleeds easily.         Objective:        Physical Exam:   Foot Exam  Physical Exam  Musculoskeletal:        Feet:             Physical examination: General: Pt. is well-developed, well-nourished, appears stated age, in no acute distress, alert and oriented x 3.     Vascular: Dorsalis pedis pulses are 1/4 bilaterally and posterior tibial pulses are diminished Bilaterally. Toes are cool to touch. Feet are warm " proximally.        There is decreased digital hair. Skin is atrophic, slightly hyperpigmented, and mildly edematous. Capillary refill greater than 5 seconds all toes/distal feet     Neurologic: [place] 5.07 monofilament is decreased bilateral feet. Sharp/dull sensation absent Bilateral feet.     Vibratory sensation absent bilateral     Musculoskeletal: adequate joint range of motion without pain, limitation, nor crepitation Bilateral feet and ankle joints. Muscle strength is 5/5 in all groups bilaterally.     Lymphatics: no lymphangitic streaking bilaterally.       Dry scale with superficial flakes without ulceration, drainage, pus, tracking, fluctuance, malodor, or cardinal signs infection.    Toenails 1st, 2nd, 3rd, 4th, 5th  bilateral are hypertrophic, dystrophic, discolored tanish brown with tan, gray crumbly subungual debris.  Tender to distal nail plate pressure, without periungual skin abnormality of each.          .Imaging:            Assessment:       1. Type II diabetes mellitus with peripheral circulatory disorder    2. DM type 2 with diabetic peripheral neuropathy    3. PVD (peripheral vascular disease)    4. Fat pad atrophy of foot    5. Onychomycosis due to dermatophyte    6. Difficulty in walking, not elsewhere classified    7. Tinea pedis of both feet    8. Pre-ulcerative calluses    9. Diabetic polyneuropathy associated with diabetes mellitus due to underlying condition    10. Peripheral vascular disease    11. Uncontrolled type 2 diabetes mellitus with hyperglycemia    12. Corn or callus    13. Paresthesia of both lower extremities    14. Onychogryphosis    15. Pre-ulcerative corn or callous    16. OC (onychocryptosis)      Plan:   Type II diabetes mellitus with peripheral circulatory disorder  -     Routine Foot Care    DM type 2 with diabetic peripheral neuropathy  -     Routine Foot Care    PVD (peripheral vascular disease)  -     Routine Foot Care    Fat pad atrophy of foot  -      "Routine Foot Care    Onychomycosis due to dermatophyte  -     Routine Foot Care    Difficulty in walking, not elsewhere classified  -     Routine Foot Care    Tinea pedis of both feet  -     clotrimazole-betamethasone 1-0.05% (LOTRISONE) cream; Apply topically 2 (two) times daily.  Dispense: 45 g; Refill: 2  -     Routine Foot Care    Pre-ulcerative calluses  -     Routine Foot Care    Diabetic polyneuropathy associated with diabetes mellitus due to underlying condition  -     Routine Foot Care    Peripheral vascular disease  -     Routine Foot Care    Uncontrolled type 2 diabetes mellitus with hyperglycemia  -     Routine Foot Care    [place] or callus  -     Routine Foot Care    Paresthesia of both lower extremities  -     Routine Foot Care    Onychogryphosis  -     Routine Foot Care    Pre-ulcerative corn or callous    OC (onychocryptosis)      No follow-ups on file.    Routine Foot Care    Date/Time: 1/26/2022 9:40 AM  Performed by: Selena Felix DPM  Authorized by: Selena Felix DPM     Time out: Immediately prior to procedure a "time out" was called to verify the correct patient, procedure, equipment, support staff and site/side marked as required.    Consent Done?:  Not Needed  Hyperkeratotic Skin Lesions?: Yes    Number of trimmed lesions:  2  Location(s):  Right 1st Metatarsal Head and Left 3rd Metatarsal Head    Nail Care Type:  Debride  Location(s): All  (Left 1st Toe, Left 3rd Toe, Left 2nd Toe, Left 4th Toe, Left 5th Toe, Right 1st Toe, Right 2nd Toe, Right 3rd Toe, Right 4th Toe and Right 5th Toe)  Patient tolerance:  Patient tolerated the procedure well with no immediate complications     Instruments Used: Nail Nipper   Manually reduced with electric .                 Counseling:     I provided patient education verbally regarding:   Patient diagnosis, treatment options, as well as alternatives, risks, and benefits.     This note was created using Dragon voice recognition software that " occasionally misinterpreted phrases or words.       Athlete's foot counseling: Counseled patient that it is important to keep the feet dry. Use absorbent cotton socks and change them if they become sweaty. Or wear an open-toe shoe or sandal. Wash the feet at least once a day with soap and water. Apply the antifungal cream as prescribed. Recommend spray antiperspirant to the feet. Some antifungal creams are available without a prescription. It may take a week before the rash starts to improve. It can take about 3 to 4 weeks to completely clear. Continue the medicine until the rash is all gone. Use over-the-counter antifungal powders or sprays on your feet after exposure to high-risk environments, such as public showers, gyms, and locker rooms. This can help prevent future infections. Wearing appropriate shoes in these situations can help.    Fungal infection of toenails explained. Treatment options including no treatment, periodic debridement, topical medications, oral medications, and removal of the nail were discussed, as well as success rates and risks of recurrence. We agreed on periodic debridement     Fungal infection of skin explained. Treatment options including no treatment, topical medications, oral medications were discussed, as well as success rates and risks of recurrence. We agreed on topical medication   Lotrisone prescribed    Counseling/Education:  I provided patient education verbally regarding:   The aspects of diabetes and how it pertains to the feet. I explained the importance of proper diabetic foot care and how it is essential for the health of their feet.    I discussed the importance of knowing their Hemoglobin A1c and that the level needs to be as close to 6 as possible. I discussed the increase complications of high blood sugar including stroke, blindness, heart attack, kidney failure and loss of limb secondary to neuropathy and PVD.     With neuropathy, beware of any breaks in the skin or  redness. These areas are not recognized early due to the numbness.    I discussed Diabetes, lower back issues, metabolic disorders, systemic causes, chemotherapy, vitamin deficiency, heavy metal exposure, as some of the causes. I also explained that as much as 40% of the time we can not find a cause. I discussed different treatments available to control the symptoms but which may not cure the problem.       Counseled patient on the aspects of diabetes and how it pertains to the feet.  I explained the importance of proper diabetic foot care and how it is essential for the health of their feet.      Shoe inspection. Patient instructed on proper foot hygeine. We discussed wearing proper shoe gear, daily foot inspections, never walking without protective shoe gear, never putting sharp instruments to feet, routine podiatric nail visits every 2-3 months.      Patient should call the office immediately if any signs of infection, such as fever, chills, sweats, increased redness or pain.    Patient was instructed to call the clinic or go to the emergency department if their symptoms do not improve, worsens, or if new symptoms develop.  Patient was advised that if any increased swelling, pain, or numbness arise to go immediately to the ED. Patient knows to call any time if an emergency arises. Shared decision making occurred and patient verbalized understanding in agreement with this plan.     I counseled the patient on their conditions, their implications and medical management.     >50% of this > 60 minute visit was spent face to face educating/counseling the patient    I spent a total of 60 minutes on the day of the visit.This includes face to face time and non-face to face time preparing to see the patient (eg, review of tests), obtaining and/or reviewing separately obtained history, documenting clinical information in the electronic or other health record, independently interpreting results and communicating results to  the patient/family/caregiver, or care coordinator.

## 2022-01-26 NOTE — TELEPHONE ENCOUNTER
Pt is requesting medication be switched. Preferred pharmacy out of stock. I have added the switched pharmacy. [facility]. Thank you.

## 2022-01-26 NOTE — TELEPHONE ENCOUNTER
----- Message from Marla Salazar sent at 1/26/2022 10:52 AM CST -----  Regarding: advice  Contact: wife  Type: Needs Medical Advice  Who Called:  wife - Meg June  Symptoms (please be specific):    How long has patient had these symptoms:    Pharmacy name and phone #:    Best Call Back Number: [phone]  Additional Information: The patient state the pharmacy  [facility] doesn't have the rx oxycodone in stock. Patient's wife requesting rx sent to - [facility]. Or the patient's wife can  the written rx.

## 2022-01-26 NOTE — TELEPHONE ENCOUNTER
----- Message from Shilo Talbot sent at 1/26/2022 12:13 PM CST -----  Contact: pt's wife Lili at [phone]  Type: Needs Medical Advice  Who Called:  pt's wife Lili  Best Call Back Number: [phone]  Additional Information: pt's wife Lili is calling the office to find out if her  script for oxyCODONE-acetaminophen (PERCOCET)  mg per tablet  was called in at another pharmacy but no one has returned her call. Please call back and advise.

## 2022-01-26 NOTE — TELEPHONE ENCOUNTER
Request was sent back for fill at new pharmacy awaiting a call. Spoke with wife explained that we will call back once rx is sent to new pharmacy.

## 2022-01-27 DIAGNOSIS — E11.8 TYPE 2 DIABETES MELLITUS WITH COMPLICATION, WITH LONG-TERM CURRENT USE OF INSULIN: ICD-10-CM

## 2022-01-27 DIAGNOSIS — Z79.4 TYPE 2 DIABETES MELLITUS WITH COMPLICATION, WITH LONG-TERM CURRENT USE OF INSULIN: ICD-10-CM

## 2022-01-27 RX ORDER — INSULIN HUMAN 100 [IU]/ML
45 INJECTION, SUSPENSION SUBCUTANEOUS 2 TIMES DAILY WITH MEALS
Qty: 45 ML | Refills: 5 | Status: SHIPPED | OUTPATIENT
Start: 2022-01-27

## 2022-02-03 DIAGNOSIS — D48.5 ATYPICAL FIBROXANTHOMA OF SKIN: Primary | ICD-10-CM

## 2022-02-04 LAB
6MAM UR QL: NOT DETECTED
7AMINOCLONAZEPAM UR QL: NOT DETECTED
A-OH ALPRAZ UR QL: NOT DETECTED
ALPHA-OH-MIDAZOLAM: NOT DETECTED
ALPRAZ UR QL: NOT DETECTED
AMPHET UR QL SCN: NOT DETECTED
ANNOTATION COMMENT IMP: NORMAL
ANNOTATION COMMENT IMP: NORMAL
BARBITURATES UR QL: NOT DETECTED
BUPRENORPHINE UR QL: NOT DETECTED
BZE UR QL: NOT DETECTED
CARBOXYTHC UR QL: NOT DETECTED
CARISOPRODOL UR QL: NOT DETECTED
CLONAZEPAM UR QL: NOT DETECTED
CODEINE UR QL: NOT DETECTED
CREAT UR-MCNC: 170.5 MG/DL (ref 20–400)
DIAZEPAM UR QL: NOT DETECTED
ETHYL GLUCURONIDE UR QL: PRESENT
FENTANYL UR QL: NOT DETECTED
GABAPENTIN: NOT DETECTED
HYDROCODONE UR QL: NOT DETECTED
HYDROMORPHONE UR QL: NOT DETECTED
LORAZEPAM UR QL: NOT DETECTED
MDA UR QL: NOT DETECTED
MDEA UR QL: NOT DETECTED
MDMA UR QL: NOT DETECTED
ME-PHENIDATE UR QL: NOT DETECTED
METHADONE UR QL: NOT DETECTED
METHAMPHET UR QL: NOT DETECTED
MIDAZOLAM UR QL SCN: NOT DETECTED
MORPHINE UR QL: NOT DETECTED
NALOXONE: NOT DETECTED
NORBUPRENORPHINE UR QL CFM: NOT DETECTED
NORDIAZEPAM UR QL: NOT DETECTED
NORFENTANYL UR QL: NOT DETECTED
NORHYDROCODONE UR QL CFM: NOT DETECTED
NORMEPERIDINE UR QL CFM: NOT DETECTED
NOROXYCODONE UR QL CFM: PRESENT
NOROXYMORPHONE UR QL SCN: PRESENT
OXAZEPAM UR QL: NOT DETECTED
OXYCODONE UR QL: PRESENT
OXYMORPHONE UR QL: PRESENT
PATHOLOGY STUDY: NORMAL
PCP UR QL: NOT DETECTED
PHENTERMINE UR QL: NOT DETECTED
PREGABALIN: NOT DETECTED
SERVICE CMNT-IMP: NORMAL
TAPENTADOL UR QL SCN: NOT DETECTED
TAPENTADOL UR QL SCN: NOT DETECTED
TEMAZEPAM UR QL: NOT DETECTED
TRAMADOL UR QL: NOT DETECTED
ZOLPIDEM METABOLITE: NOT DETECTED
ZOLPIDEM UR QL: NOT DETECTED

## 2022-02-08 ENCOUNTER — OFFICE VISIT (OUTPATIENT)
Dept: FAMILY MEDICINE | Facility: CLINIC | Age: 77
End: 2022-02-08
Payer: MEDICARE

## 2022-02-08 ENCOUNTER — PATIENT OUTREACH (OUTPATIENT)
Dept: ADMINISTRATIVE | Facility: OTHER | Age: 77
End: 2022-02-08
Payer: MEDICARE

## 2022-02-08 VITALS
HEART RATE: 87 BPM | DIASTOLIC BLOOD PRESSURE: 64 MMHG | HEIGHT: 69 IN | WEIGHT: 175.69 LBS | SYSTOLIC BLOOD PRESSURE: 120 MMHG | OXYGEN SATURATION: 96 % | BODY MASS INDEX: 26.02 KG/M2

## 2022-02-08 DIAGNOSIS — F33.9 DEPRESSION, RECURRENT: Primary | ICD-10-CM

## 2022-02-08 DIAGNOSIS — I50.32 CHRONIC DIASTOLIC (CONGESTIVE) HEART FAILURE: ICD-10-CM

## 2022-02-08 DIAGNOSIS — K21.9 GASTROESOPHAGEAL REFLUX DISEASE: ICD-10-CM

## 2022-02-08 DIAGNOSIS — Z79.4 TYPE 2 DIABETES MELLITUS WITH COMPLICATION, WITH LONG-TERM CURRENT USE OF INSULIN: ICD-10-CM

## 2022-02-08 DIAGNOSIS — J84.112 IDIOPATHIC PULMONARY FIBROSIS: ICD-10-CM

## 2022-02-08 DIAGNOSIS — D69.6 THROMBOCYTOPENIA, UNSPECIFIED: ICD-10-CM

## 2022-02-08 DIAGNOSIS — I85.10 SECONDARY ESOPHAGEAL VARICES WITHOUT BLEEDING: ICD-10-CM

## 2022-02-08 DIAGNOSIS — D61.818 OTHER PANCYTOPENIA: ICD-10-CM

## 2022-02-08 DIAGNOSIS — E11.8 TYPE 2 DIABETES MELLITUS WITH COMPLICATION, WITH LONG-TERM CURRENT USE OF INSULIN: ICD-10-CM

## 2022-02-08 PROCEDURE — 1157F PR ADVANCE CARE PLAN OR EQUIV PRESENT IN MEDICAL RECORD: ICD-10-PCS | Mod: CPTII,S$GLB,, | Performed by: FAMILY MEDICINE

## 2022-02-08 PROCEDURE — 3008F PR BODY MASS INDEX (BMI) DOCUMENTED: ICD-10-PCS | Mod: CPTII,S$GLB,, | Performed by: FAMILY MEDICINE

## 2022-02-08 PROCEDURE — 3288F FALL RISK ASSESSMENT DOCD: CPT | Mod: CPTII,S$GLB,, | Performed by: FAMILY MEDICINE

## 2022-02-08 PROCEDURE — 3008F BODY MASS INDEX DOCD: CPT | Mod: CPTII,S$GLB,, | Performed by: FAMILY MEDICINE

## 2022-02-08 PROCEDURE — 99499 RISK ADDL DX/OHS AUDIT: ICD-10-PCS | Mod: S$GLB,,, | Performed by: FAMILY MEDICINE

## 2022-02-08 PROCEDURE — 3288F PR FALLS RISK ASSESSMENT DOCUMENTED: ICD-10-PCS | Mod: CPTII,S$GLB,, | Performed by: FAMILY MEDICINE

## 2022-02-08 PROCEDURE — 3051F HG A1C>EQUAL 7.0%<8.0%: CPT | Mod: CPTII,S$GLB,, | Performed by: FAMILY MEDICINE

## 2022-02-08 PROCEDURE — 1126F PR PAIN SEVERITY QUANTIFIED, NO PAIN PRESENT: ICD-10-PCS | Mod: CPTII,S$GLB,, | Performed by: FAMILY MEDICINE

## 2022-02-08 PROCEDURE — 3078F PR MOST RECENT DIASTOLIC BLOOD PRESSURE < 80 MM HG: ICD-10-PCS | Mod: CPTII,S$GLB,, | Performed by: FAMILY MEDICINE

## 2022-02-08 PROCEDURE — 1101F PR PT FALLS ASSESS DOC 0-1 FALLS W/OUT INJ PAST YR: ICD-10-PCS | Mod: CPTII,S$GLB,, | Performed by: FAMILY MEDICINE

## 2022-02-08 PROCEDURE — 99214 OFFICE O/P EST MOD 30 MIN: CPT | Mod: S$GLB,,, | Performed by: FAMILY MEDICINE

## 2022-02-08 PROCEDURE — 1101F PT FALLS ASSESS-DOCD LE1/YR: CPT | Mod: CPTII,S$GLB,, | Performed by: FAMILY MEDICINE

## 2022-02-08 PROCEDURE — 99499 UNLISTED E&M SERVICE: CPT | Mod: S$GLB,,, | Performed by: FAMILY MEDICINE

## 2022-02-08 PROCEDURE — 1159F PR MEDICATION LIST DOCUMENTED IN MEDICAL RECORD: ICD-10-PCS | Mod: CPTII,S$GLB,, | Performed by: FAMILY MEDICINE

## 2022-02-08 PROCEDURE — 99999 PR PBB SHADOW E&M-EST. PATIENT-LVL III: CPT | Mod: PBBFAC,,, | Performed by: FAMILY MEDICINE

## 2022-02-08 PROCEDURE — 3051F PR MOST RECENT HEMOGLOBIN A1C LEVEL 7.0 - < 8.0%: ICD-10-PCS | Mod: CPTII,S$GLB,, | Performed by: FAMILY MEDICINE

## 2022-02-08 PROCEDURE — 99214 PR OFFICE/OUTPT VISIT, EST, LEVL IV, 30-39 MIN: ICD-10-PCS | Mod: S$GLB,,, | Performed by: FAMILY MEDICINE

## 2022-02-08 PROCEDURE — 1159F MED LIST DOCD IN RCRD: CPT | Mod: CPTII,S$GLB,, | Performed by: FAMILY MEDICINE

## 2022-02-08 PROCEDURE — 1157F ADVNC CARE PLAN IN RCRD: CPT | Mod: CPTII,S$GLB,, | Performed by: FAMILY MEDICINE

## 2022-02-08 PROCEDURE — 99999 PR PBB SHADOW E&M-EST. PATIENT-LVL III: ICD-10-PCS | Mod: PBBFAC,,, | Performed by: FAMILY MEDICINE

## 2022-02-08 PROCEDURE — 3074F PR MOST RECENT SYSTOLIC BLOOD PRESSURE < 130 MM HG: ICD-10-PCS | Mod: CPTII,S$GLB,, | Performed by: FAMILY MEDICINE

## 2022-02-08 PROCEDURE — 3074F SYST BP LT 130 MM HG: CPT | Mod: CPTII,S$GLB,, | Performed by: FAMILY MEDICINE

## 2022-02-08 PROCEDURE — 3078F DIAST BP <80 MM HG: CPT | Mod: CPTII,S$GLB,, | Performed by: FAMILY MEDICINE

## 2022-02-08 PROCEDURE — 1126F AMNT PAIN NOTED NONE PRSNT: CPT | Mod: CPTII,S$GLB,, | Performed by: FAMILY MEDICINE

## 2022-02-08 RX ORDER — GLIMEPIRIDE 4 MG/1
8 TABLET ORAL
Qty: 180 TABLET | Refills: 3 | Status: SHIPPED | OUTPATIENT
Start: 2022-02-08 | End: 2023-02-08

## 2022-02-08 RX ORDER — PANTOPRAZOLE SODIUM 40 MG/1
40 TABLET, DELAYED RELEASE ORAL DAILY
Qty: 90 TABLET | Refills: 3 | Status: SHIPPED | OUTPATIENT
Start: 2022-02-08

## 2022-02-08 NOTE — PROGRESS NOTES
Health Maintenance Due   Topic Date Due    Shingles Vaccine (1 of 2) Never done    Pneumococcal Vaccines (Age 65+) (2 of 4 - PPSV23) 03/21/2019    Colorectal Cancer Screening  01/10/2021    Influenza Vaccine (1) 09/01/2021    Eye Exam  09/16/2021    COVID-19 Vaccine (3 - Booster for Pfizer series) 09/30/2021    Hemoglobin A1c  12/21/2021     Updates were requested from care everywhere.  Chart was reviewed for overdue Proactive [facility] Encounters ([facility]) topics (CRS, Breast Cancer Screening, Eye exam)  Health Maintenance has been updated.  LINKS immunization registry triggered.  Immunizations were reconciled.

## 2022-02-08 NOTE — PATIENT INSTRUCTIONS
If you are due for any health screening(s) below please notify me so we can arrange them to be ordered and scheduled to maintain your health.     Health Maintenance   Topic Date Due    Eye Exam  09/16/2021    Hemoglobin A1c  03/21/2022    Lipid Panel  04/28/2022    Foot Exam  10/27/2022    TETANUS VACCINE  10/23/2030    Hepatitis C Screening  Completed    Abdominal Aortic Aneurysm Screening  Completed          Colon Cancer Screening    Of cancers affecting both men and women, colorectal cancer is the third leading cancer killer in the [place]. But it doesnt have to be. Screening can prevent colorectal cancer or find it at an early stage when treatment often leads to a cure.    A colonoscopy is the preferred test for detecting colon cancer. It is needed only once every 10 years if results are negative. While sedated, a flexible, lighted tube with a tiny camera is inserted into the rectum and advanced through the colon to look for cancers. An alternative screening test that is used at home and returned to the lab may also be used. It detects hidden blood in bowel movements which could indicate cancer in the colon. If results are positive, you will need a colonoscopy to determine if the blood is a sign of cancer. This type of follow up (diagnostic) colonoscopy usually requires additional copays as required by your insurance provider. Please contact your PCP if you have any questions.    Although you are still overdue for this important screening, due to the COVID-19 pandemic, we recommend scheduling it in the near future.         Diabetic Retinal Eye Exam    Diabetes is the #1 cause of blindness in the [place] - early detection before signs or symptoms develop can prevent debilitating blindness.    Once-a-year screening is recommended for all diabetic patients. This exam can prevent and treat diabetes complications in the eye before developing symptoms. This can be done with a special camera is used to take  photographs of the back of your eye without having to dilate them, or you can see an eye doctor for a full dilated exam.    Although you are still overdue for this important screening, due to the COVID-19 pandemic, we recommend scheduling it in the near future.

## 2022-02-08 NOTE — PROGRESS NOTES
Subjective:   Patient ID: Steve June Jr. is a 76 y.o. male     Chief Complaint:Diabetes      Here for checkup. Doing well    Review of Systems   Constitutional: Negative for chills and fever.   HENT: Negative for sore throat and trouble swallowing.    Respiratory: Negative for cough and shortness of breath.    Cardiovascular: Negative for chest pain and leg swelling.   Gastrointestinal: Negative for abdominal distention and abdominal pain.   Genitourinary: Negative for dysuria and flank pain.   Musculoskeletal: Negative for arthralgias and back pain.   Skin: Negative for color change and pallor.   Neurological: Negative for weakness and headaches.   Psychiatric/Behavioral: Negative for agitation and confusion.     Past Medical History:   Diagnosis Date    Abnormal thyroid function test     Allergy     Seasonal    Anemia     Anemia due to blood loss 7/2/2014    Arthritis     Gaviria esophagus     Basal cell carcinoma     right forearm    Basal cell carcinoma 12/2011    lower post neck    Basal cell carcinoma 04/23/2019    left posterior helix    Cancer     skin CA    Cataract     Cirrhosis     Diabetes mellitus     Diabetes mellitus, type 2     Encounter for blood transfusion     Esophageal varices in cirrhosis     grade II on 7/12 EGD    Gastritis     on 7/12 EGD    GERD (gastroesophageal reflux disease) 2/28/2015    Hard of hearing     Hiatal hernia     History of hepatitis C 8/10/2012    tx with harvoni x 41 days (started 10/22/15). SVR4     Hoarseness 2/28/2015    Hx of colonic polyps 01/10/2011    per colonoscopy report in media    Hypercholesteremia     Hypersplenism     Hypertension     No meds    Pain management 12/10/2014    Petechial hemorrhage 11/25/2015    Lower extremities bilat     Portal hypertensive gastropathy     on 7/12 EGD    Squamous cell carcinoma 04/23/2019    right preauricular cheek, right temple    Thrombocytopenia     Type II or unspecified type  diabetes mellitus with neurological manifestations, uncontrolled(250.62) 12/24/2013    Valvular heart disease     mild MR 12     Past Surgical History:   Procedure Laterality Date    CATARACT EXTRACTION  1/10/13    left eye    CATARACT EXTRACTION      right eye    CHOLECYSTECTOMY      COLONOSCOPY      EYE SURGERY      Cataract surgery to right eye    HIP ARTHROPLASTY Right 8/27/2021    Procedure: ARTHROPLASTY, HIP, [place], peg board, 1st assist;  Surgeon: Corey Vargas MD;  Location[facility];  Service: Orthopedics;  Laterality: Right;    KNEE ARTHROSCOPY W/ MENISCAL REPAIR      KNEE CARTILAGE SURGERY      left knee    KNEE SURGERY  12/2006    left    SKIN LESION EXCISION  2021    TONSILLECTOMY      UPPER GASTROINTESTINAL ENDOSCOPY       Objective:     Vitals:    02/08/22 1314   BP: 120/64   Pulse: 87     Body mass index is 25.95 kg/m².  Physical Exam  Vitals and nursing note reviewed.   Constitutional:       Appearance: He is well-developed and well-nourished.   HENT:      Head: Normocephalic and atraumatic.   Eyes:      General: No scleral icterus.     Conjunctiva/sclera: Conjunctivae normal.   Cardiovascular:      Heart sounds: No murmur heard.      Pulmonary:      Effort: Pulmonary effort is normal. No respiratory distress.   Musculoskeletal:         General: No deformity. Normal range of motion.      Cervical back: Normal range of motion and neck supple.   Skin:     Coloration: Skin is not pale.      Findings: No rash.   Neurological:      Mental Status: He is alert and oriented to person, place, and time.   Psychiatric:         Mood and Affect: Mood and affect normal.         Behavior: Behavior normal.         Thought Content: Thought content normal.         Judgment: Judgment normal.       Assessment:     1. Depression, recurrent    2. Gastroesophageal reflux disease    3. Type 2 diabetes mellitus with complication, with long-term current use of insulin    4. Secondary esophageal  varices without bleeding    5. Other pancytopenia    6. Thrombocytopenia, unspecified    7. Chronic diastolic (congestive) heart failure    8. Idiopathic pulmonary fibrosis      Plan:   Depression, recurrent  stable  Gastroesophageal reflux disease  -     pantoprazole (PROTONIX) 40 MG tablet; Take 1 tablet (40 mg total) by mouth once daily.  Dispense: 90 tablet; Refill: 3    Type 2 diabetes mellitus with complication, with long-term current use of insulin  -     glimepiride (AMARYL) 4 MG tablet; Take 2 tablets (8 mg total) by mouth daily with breakfast.  Dispense: 180 tablet; Refill: 3  -     Ambulatory referral/consult to Optometry; Future; Expected date: 02/15/2022  -     Hemoglobin A1C; Future; Expected date: 02/08/2022  -     Comprehensive Metabolic Panel; Future; Expected date: 02/08/2022    Secondary esophageal varices without bleeding  Stable, follows with GI  Other pancytopenia  Review of bloodwork from 9/2021 shows normal WBC with anemia/thrombocytopenia  Thrombocytopenia, unspecified  Review bloodwork from 9/2021 shows stable platelets  Chronic diastolic (congestive) heart failure  Stable, follows with cardiology  Idiopathic pulmonary fibrosis  Stable, follow with pulcristobal Garcia MD  02/08/2022    Portions of this note have been dictated with CRISTOBAL Day.

## 2022-02-09 ENCOUNTER — OFFICE VISIT (OUTPATIENT)
Dept: NEUROLOGY | Facility: CLINIC | Age: 77
End: 2022-02-09
Payer: MEDICARE

## 2022-02-09 VITALS
RESPIRATION RATE: 18 BRPM | WEIGHT: 173.75 LBS | SYSTOLIC BLOOD PRESSURE: 124 MMHG | BODY MASS INDEX: 25.65 KG/M2 | HEART RATE: 84 BPM | DIASTOLIC BLOOD PRESSURE: 81 MMHG

## 2022-02-09 DIAGNOSIS — R41.3 MEMORY DEFICIT: ICD-10-CM

## 2022-02-09 PROCEDURE — 3288F PR FALLS RISK ASSESSMENT DOCUMENTED: ICD-10-PCS | Mod: CPTII,S$GLB,, | Performed by: PSYCHIATRY & NEUROLOGY

## 2022-02-09 PROCEDURE — 1100F PR PT FALLS ASSESS DOC 2+ FALLS/FALL W/INJURY/YR: ICD-10-PCS | Mod: CPTII,S$GLB,, | Performed by: PSYCHIATRY & NEUROLOGY

## 2022-02-09 PROCEDURE — 1125F AMNT PAIN NOTED PAIN PRSNT: CPT | Mod: CPTII,S$GLB,, | Performed by: PSYCHIATRY & NEUROLOGY

## 2022-02-09 PROCEDURE — 3008F BODY MASS INDEX DOCD: CPT | Mod: CPTII,S$GLB,, | Performed by: PSYCHIATRY & NEUROLOGY

## 2022-02-09 PROCEDURE — 3074F PR MOST RECENT SYSTOLIC BLOOD PRESSURE < 130 MM HG: ICD-10-PCS | Mod: CPTII,S$GLB,, | Performed by: PSYCHIATRY & NEUROLOGY

## 2022-02-09 PROCEDURE — 3288F FALL RISK ASSESSMENT DOCD: CPT | Mod: CPTII,S$GLB,, | Performed by: PSYCHIATRY & NEUROLOGY

## 2022-02-09 PROCEDURE — 99483 ASSMT & CARE PLN PT COG IMP: CPT | Mod: S$GLB,,, | Performed by: PSYCHIATRY & NEUROLOGY

## 2022-02-09 PROCEDURE — 1157F ADVNC CARE PLAN IN RCRD: CPT | Mod: CPTII,S$GLB,, | Performed by: PSYCHIATRY & NEUROLOGY

## 2022-02-09 PROCEDURE — 1159F MED LIST DOCD IN RCRD: CPT | Mod: CPTII,S$GLB,, | Performed by: PSYCHIATRY & NEUROLOGY

## 2022-02-09 PROCEDURE — 99499 UNLISTED E&M SERVICE: CPT | Mod: S$GLB,,, | Performed by: PSYCHIATRY & NEUROLOGY

## 2022-02-09 PROCEDURE — 1125F PR PAIN SEVERITY QUANTIFIED, PAIN PRESENT: ICD-10-PCS | Mod: CPTII,S$GLB,, | Performed by: PSYCHIATRY & NEUROLOGY

## 2022-02-09 PROCEDURE — 3079F PR MOST RECENT DIASTOLIC BLOOD PRESSURE 80-89 MM HG: ICD-10-PCS | Mod: CPTII,S$GLB,, | Performed by: PSYCHIATRY & NEUROLOGY

## 2022-02-09 PROCEDURE — 1159F PR MEDICATION LIST DOCUMENTED IN MEDICAL RECORD: ICD-10-PCS | Mod: CPTII,S$GLB,, | Performed by: PSYCHIATRY & NEUROLOGY

## 2022-02-09 PROCEDURE — 3074F SYST BP LT 130 MM HG: CPT | Mod: CPTII,S$GLB,, | Performed by: PSYCHIATRY & NEUROLOGY

## 2022-02-09 PROCEDURE — 99499 NO LOS: ICD-10-PCS | Mod: S$GLB,,, | Performed by: PSYCHIATRY & NEUROLOGY

## 2022-02-09 PROCEDURE — 3008F PR BODY MASS INDEX (BMI) DOCUMENTED: ICD-10-PCS | Mod: CPTII,S$GLB,, | Performed by: PSYCHIATRY & NEUROLOGY

## 2022-02-09 PROCEDURE — 99999 PR PBB SHADOW E&M-EST. PATIENT-LVL IV: ICD-10-PCS | Mod: PBBFAC,,, | Performed by: PSYCHIATRY & NEUROLOGY

## 2022-02-09 PROCEDURE — 1100F PTFALLS ASSESS-DOCD GE2>/YR: CPT | Mod: CPTII,S$GLB,, | Performed by: PSYCHIATRY & NEUROLOGY

## 2022-02-09 PROCEDURE — 3079F DIAST BP 80-89 MM HG: CPT | Mod: CPTII,S$GLB,, | Performed by: PSYCHIATRY & NEUROLOGY

## 2022-02-09 PROCEDURE — 99483 PR ASSMT/CARE PLANNING, PT W/COGN IMPAIRMENT: ICD-10-PCS | Mod: S$GLB,,, | Performed by: PSYCHIATRY & NEUROLOGY

## 2022-02-09 PROCEDURE — 1157F PR ADVANCE CARE PLAN OR EQUIV PRESENT IN MEDICAL RECORD: ICD-10-PCS | Mod: CPTII,S$GLB,, | Performed by: PSYCHIATRY & NEUROLOGY

## 2022-02-09 PROCEDURE — 99999 PR PBB SHADOW E&M-EST. PATIENT-LVL IV: CPT | Mod: PBBFAC,,, | Performed by: PSYCHIATRY & NEUROLOGY

## 2022-02-09 RX ORDER — DONEPEZIL HYDROCHLORIDE 10 MG/1
TABLET, FILM COATED ORAL
Qty: 90 TABLET | Refills: 3 | Status: SHIPPED | OUTPATIENT
Start: 2022-02-09

## 2022-02-09 NOTE — PROGRESS NOTES
Date of service:  2/9/2022    Chief complaint:  Memory Loss    Interval history:  The patient is a 76 y.o. male who I saw once previously for memory loss.  This was about a year and a half ago.  He and his wife report no significant changes in his memory since that time.  He reports that his mood is generally good, though he does have some frustration around his limited mobility and increased dependence on others after his hip surgery.    History of present illness:  The patient is a 76 y.o. male referred for evaluation of memory loss.  He does not notice much of an issue.  His wife supplies most of the history as a result. This issue began about a year ago. It involves short-term memory more than long-term memory.  He may have some difficulties with executive function.  There are also some issues with multiple step processes. He has some problems with ADLs. He has gotten lost in familiar areas. There are no hallucinations. There are some personality changes with the patient being more easily upset.  He does not endorse depression.    Past Medical History:   Diagnosis Date    Abnormal thyroid function test     Allergy     Seasonal    Anemia     Anemia due to blood loss 7/2/2014    Arthritis     Gaviria esophagus     Basal cell carcinoma     right forearm    Basal cell carcinoma 12/2011    lower post neck    Basal cell carcinoma 04/23/2019    left posterior helix    Cancer     skin CA    Cataract     Cirrhosis     Diabetes mellitus     Diabetes mellitus, type 2     Encounter for blood transfusion     Esophageal varices in cirrhosis     grade II on 7/12 EGD    Gastritis     on 7/12 EGD    GERD (gastroesophageal reflux disease) 2/28/2015    Hard of hearing     Hiatal hernia     History of hepatitis C 8/10/2012    tx with harvoni x 41 days (started 10/22/15). SVR4     Hoarseness 2/28/2015    Hx of colonic polyps 01/10/2011    per colonoscopy report in media    Hypercholesteremia      Hypersplenism     Hypertension     No meds    Pain management 12/10/2014    Petechial hemorrhage 11/25/2015    Lower extremities bilat     Portal hypertensive gastropathy     on 7/12 EGD    Squamous cell carcinoma 04/23/2019    right preauricular cheek, right temple    Thrombocytopenia     Type II or unspecified type diabetes mellitus with neurological manifestations, uncontrolled(250.62) 12/24/2013    Valvular heart disease     mild MR 12       Past Surgical History:   Procedure Laterality Date    CATARACT EXTRACTION  1/10/13    left eye    CATARACT EXTRACTION      right eye    CHOLECYSTECTOMY      COLONOSCOPY      EYE SURGERY      Cataract surgery to right eye    HIP ARTHROPLASTY Right 8/27/2021    Procedure: ARTHROPLASTY, HIP, [place], peg board, 1st assist;  Surgeon: Corey Vargas MD;  Location: [facility] OR;  Service: Orthopedics;  Laterality: Right;    KNEE ARTHROSCOPY W/ MENISCAL REPAIR      KNEE CARTILAGE SURGERY      left knee    KNEE SURGERY  12/2006    left    SKIN LESION EXCISION  2021    TONSILLECTOMY      UPPER GASTROINTESTINAL ENDOSCOPY         Family History   Problem Relation Age of Onset    Leukemia Mother     Cancer Mother         bone    Alcohol abuse Father     Cirrhosis Father         EtOH induced    No Known Problems Daughter     No Known Problems Son     No Known Problems Daughter     No Known Problems Daughter     No Known Problems Daughter     No Known Problems Sister     Amblyopia Neg Hx     Blindness Neg Hx     Cataracts Neg Hx     Diabetes Neg Hx     Glaucoma Neg Hx     Hypertension Neg Hx     Macular degeneration Neg Hx     Retinal detachment Neg Hx     Strabismus Neg Hx     Stroke Neg Hx     Thyroid disease Neg Hx     Psoriasis Neg Hx     Lupus Neg Hx     Eczema Neg Hx     Acne Neg Hx     Melanoma Neg Hx        Social History     Socioeconomic History    Marital status:     Number of children: 5   Tobacco Use    Smoking  "status: Former Smoker     Packs/day: 1.00     Years: 25.00     Pack years: 25.00     Quit date: 2000     Years since quittin.5    Smokeless tobacco: Never Used   Substance and Sexual Activity    Alcohol use: Yes     Comment: rarely    Drug use: No    Sexual activity: Never   Social History Narrative    He is .  He has children.  He is currently retired.        Review of patient's allergies indicates:   Allergen Reactions    Adhesive tape-silicones Other (See Comments)     pulls skin off    Doxycycline      Dizzy. "Just didn't feel right".       Review of Systems  A comprehensive ROS was previously performed.  It is not significantly changed except as noted above.     Physical exam:  /81 (BP Location: Right arm, Patient Position: Sitting, BP Method: Medium (Automatic))   Pulse 84   Resp 18   Wt 78.8 kg (173 lb 11.6 oz)   BMI 25.65 kg/m²   General: Well developed, well nourished.  No acute distress.  ENT: Mucus membranes moist.  Atraumatic external nose and ears.  Lymphatic: No apparent lymphadenopathy.  Cardiovascular: Regular rate and rhythm.  Pulmonary: No increased work of breathing.  Abdomen/GI: No guarding.  Musculoskeletal: No clubbing or cyanosis.    Neurological exam:  Mental status: Awake and alert.  Oriented x3.  Speech fluent and generally appropriate.  Recent and remote memory appear to be limited.  Fund of knowledge limited.  MMSE = 16/30 (previously 23/30).  Cranial nerves: Pupils equal round and reactive to light, extraocular movements intact, facial strength and sensation intact bilaterally, palate and tongue midline, hearing impaired bilaterally.  Motor: 5 out of 5 strength throughout the upper and lower extremities bilaterally. Normal bulk and tone.  Sensation: Intact to light touch and temperature bilaterally.  DTR: 1+ at the knees and biceps bilaterally.  Coordination: Finger-nose-finger testing intact bilaterally.  Gait:  Using wheelchair today.      Data " "base:  Notes of the referring physician were reviewed.  Briefly summarized, these discuss a number of issues including memory loss and DM.    MRI brain (2020):  "Limited exam, sufficient to exclude acute infarct or hydrocephalus.  There is mild cerebral involutional change and minimal white matter disease."      MMSE 2/9/2022   What is the (year), (season), (date), (day), (month)? 0   Where are we (state), (country), (town or city), (hospital), (floor)? 4   Name 3 common objects (eg. "apple", "table", "sin"). Take 1 second to say each. Then ask the patient to repeat all 3. Give 1 point for each correct answer. Then repeat them until he/she learns all 3. Count trials and record. 3   Serial 7's backwards. Stop after 5 answers. (100,93,86,79,72) or alternatively  spell "WORLD" backwards. (D..L..R..O..W). The score is the number of letters in correct order. 0   Ask for the 3 common objects named earlier in the exam. Give 1 point for each correct answer. 0   Name a "pencil" and "watch." 2   Repeat the following: "No ifs, ands, or buts." 1   Follow a 3-stage command: "Take a paper in your right hand, fold it in half, & put it on the floor." 3   Read and obey the following: (see paper exam) 1   Write a sentence. 1   Copy the following design: (see paper exam) 1   Total MMSE Score 16   Some recent data might be hidden         Caregiver name: Lili  Relationship to the patient: Spouse  Does the patient have a living will? no  Does the patient have a designated healthcare POA? no  Does the patient have a designated general POA? no    Have educational materials/resources been provided? yes      Activities of Daily Living    Bathing: Independent  Dressing: Needs Help  Grooming: Independent  Mouth Care: Independent  Toileting: Independent  Transferring Bed/Chair: Needs Help  Walking: Needs Help  Climbing: Needs Help  Eating: Independent      Instrumental Activities of Daily Living    Shopping: Dependent  Cooking: " Dependent  Managing Medications: Dependent  Using the phone and looking up numbers: Independent  Doing Housework: Needs Help  Doing Laundry: Dependent  Driving or using public transportation: Dependent  Managing finances: Dependent    Functional Assessment Staging  4   Decreased ability to perform complex tasks, e.g. planning dinner for guests, handling personal finances (such as forgetting to pay bills), difficulty marketing, etc.*         Depression Patient Health Questionnaire 2/9/2022 1/26/2022 10/26/2021 7/26/2021 6/24/2021 4/27/2021 2/2/2021   Over the last two weeks how often have you been bothered by little interest or pleasure in doing things 0 3 0 1 0 0 0   Over the last two weeks how often have you been bothered by feeling down, depressed or hopeless 0 2 0 0 0 0 0   PHQ-2 Total Score 0 5 0 1 0 0 0   Over the last two weeks how often have you been bothered by trouble falling or staying asleep, or sleeping too much - 0 - - - - -   Over the last two weeks how often have you been bothered by feeling tired or having little energy - 3 - - - - -   Over the last two weeks how often have you been bothered by a poor appetite or overeating - 3 - - - - -   Over the last two weeks how often have you been bothered by feeling bad about yourself - or that you are a failure or have let yourself or your family down - 2 - - - - -   Over the last two weeks how often have you been bothered by trouble concentrating on things, such as reading the newspaper or watching television - 1 - - - - -   Over the last two weeks how often have you been bothered by moving or speaking so slowly that other people could have noticed. Or the opposite - being so fidgety or restless that you have been moving around a lot more than usual. - 3 - - - - -   Over the last two weeks how often have you been bothered by thoughts that you would be better off dead, or of hurting yourself - 0 - - - - -   If you checked off any problems, how difficult have  these problems made it for you to do your work, take care of things at home or get along with other people? - Very difficult - - - - -   Total Score - 17 - - - - -   Interpretation - Moderately Severe - - - - -        Assessment and plan:  The patient is a 76 y.o. male referred for evaluation of memory loss. I feel that the differential diagnosis includes neurodegenerative causes of memory loss and general medical issues/medication effects. I think a reasonable workup would feature serologies.  We will start him on Aricept.  He needs to manage his DM significantly better.  He needs to use his oxygen.  He is to work with his PCP on vascular health, including BP management, lipid profile optimization, glycemic monitoring, diet, exercise, and so forth. We will plan on seeing the patient back in a few weeks.    Cognition and function were assessed and the patient's functional assessment staging test (FAST) score is 4. Patient is felt to have decision making capacity. PHQ-2 score was 0. Medications were reconciled and reviewed for high-risk medications. The patient's behavior and psychiatric health were reviewed and addressed. The patient and family were counseled on safety in the home and operation of vehicles. Discussed caregiver needs and social support. Advance Care Plan was reviewed. Written care plan and support information provided to the patient or caregiver and information was provided.

## 2022-02-14 ENCOUNTER — PROCEDURE VISIT (OUTPATIENT)
Dept: DERMATOLOGY | Facility: CLINIC | Age: 77
End: 2022-02-14
Payer: MEDICARE

## 2022-02-14 VITALS
SYSTOLIC BLOOD PRESSURE: 135 MMHG | WEIGHT: 173 LBS | BODY MASS INDEX: 25.62 KG/M2 | HEART RATE: 94 BPM | HEIGHT: 69 IN | DIASTOLIC BLOOD PRESSURE: 83 MMHG

## 2022-02-14 DIAGNOSIS — C44.329 SQUAMOUS CELL CARCINOMA OF SKIN OF LEFT CHEEK: ICD-10-CM

## 2022-02-14 DIAGNOSIS — D48.5 ATYPICAL FIBROXANTHOMA OF SKIN: Primary | ICD-10-CM

## 2022-02-14 PROCEDURE — 17311: ICD-10-PCS | Mod: S$GLB,,, | Performed by: DERMATOLOGY

## 2022-02-14 PROCEDURE — 17311 MOHS 1 STAGE H/N/HF/G: CPT | Mod: S$GLB,,, | Performed by: DERMATOLOGY

## 2022-02-14 PROCEDURE — 13132 CMPLX RPR F/C/C/M/N/AX/G/H/F: CPT | Mod: 51,S$GLB,, | Performed by: DERMATOLOGY

## 2022-02-14 PROCEDURE — 13132 PR RECMPL WND HEAD,FAC,HAND 2.6-7.5 CM: ICD-10-PCS | Mod: 51,S$GLB,, | Performed by: DERMATOLOGY

## 2022-02-14 PROCEDURE — 99499 UNLISTED E&M SERVICE: CPT | Mod: S$GLB,,, | Performed by: DERMATOLOGY

## 2022-02-14 PROCEDURE — 99499 NO LOS: ICD-10-PCS | Mod: S$GLB,,, | Performed by: DERMATOLOGY

## 2022-02-14 NOTE — PROGRESS NOTES
PROCEDURE: Mohs' Micrographic Surgery    INDICATION: Location in non-mask areas of face where maximum conservation of tumor-free tissue is needed. Biopsy-proven skin cancer of cosmetically and functionally important areas, including head, neck, genital, hand, foot, or areas known for having difficulty in healing, such as the lower anterior legs. Tumor with ill-defined borders.    REFERRING PROVIDER: Elo Messer MD    CASE NUMBER:     ANESTHETIC: 3.5 cc 0.5% Lidocaine with Epi 1:200,000 mixed 1:1 with 0.5% Bupivacaine    SURGICAL PREP: Hibiclens    SURGEON: Rachel Walsh MD    ASSISTANTS: Carolina Sen PA-C and Joshua Messer Surg Tech    PREOPERATIVE DIAGNOSIS: squamous cell carcinoma- invasive, well differentiated    POSTOPERATIVE DIAGNOSIS: squamous cell carcinoma    PATHOLOGIC DIAGNOSIS: squamous cell carcinoma- invasive, well differentiated    HISTOLOGY OF SPECIMENS IN FIRST STAGE:   Tumor Type: No tumor seen.    STAGES OF MOHS' SURGERY PERFORMED: 1    TUMOR-FREE PLANE ACHIEVED: Yes    HEMOSTASIS: electrocoagulation     SPECIMENS: 2    LOCATION: left lateral cheek. Location verified with Dr. Messer's clinical photograph. Patient and wife also verified location with hand held mirror.    INITIAL LESION SIZE: 0.7 x 1.0 cm    FINAL DEFECT SIZE: 1.3 x 1.4 cm    WOUND REPAIR/DISPOSITION: The patient tolerated Mohs' Micrographic Surgery for a squamous cell carcinoma very well. When the tumor was completely removed, a repair of the surgical defect was undertaken.       PROCEDURE: Complex Linear Repair    INDICATION: Status post Mohs' Micrographic Surgery for squamous cell carcinoma.    CASE NUMBER:     SURGEON: Rachel Walsh MD    ASSISTANTS: Carolina Sen PA-C and Joshua Messer Surg Tech    ANESTHETIC: 2 cc 1% Lidocaine with Epinephrine 1:100,000    SURGICAL PREP: Hibiclens, prepped by Joshua Messer Surg Tech    LOCATION: left lateral cheek    DEFECT SIZE: 1.3 x 1.4 cm    WOUND REPAIR/DISPOSITION:  After  "the patient's carcinoma had been completely removed with Mohs' Micrographic Surgery, a repair of the surgical defect was undertaken. The patient was returned to the operating suite where the area of left lateral cheek was prepped, draped, and anesthetized in the usual sterile fashion. The wound was widely undermined in all directions. The wound was undermined to a distance at least the maximum width of the defect as measured perpendicular to the closure line along at least one entire edge of the defect, in this case 2 cm. Then, electrocoagulation was used to obtain meticulous hemostasis. 5-0 Vicryl buried vertical mattress sutures were placed into the subcutaneous and dermal plane to close the wound and conrad the cutaneous wound edge. Bilateral dog ears were identified and were removed by a standard Burow's triangle technique. The cutaneous wound edges were closed using interrupted 5-0 Prolene suture.    The patient tolerated the procedure well.    The area was cleaned and dressed appropriately and the patient was given wound care instructions, as well as appointment for follow-up evaluation and suture removal in 7 days.    LENGTH OF REPAIR: 2.5 cm    Vitals:    02/14/22 0835 02/14/22 1148   BP: 126/82 135/83   Pulse: 79 94   Weight: 78.5 kg (173 lb)    Height: 5' 9" (1.753 m)          PROCEDURE: Mohs' Micrographic Surgery    INDICATION: Location in non-mask areas of face where maximum conservation of tumor-free tissue is needed. Biopsy-proven skin cancer of cosmetically and functionally important areas, including head, neck, genital, hand, foot, or areas known for having difficulty in healing, such as the lower anterior legs. Tumor with ill-defined borders. Tumor with aggressive histopathology.    REFERRING PROVIDER: Elo Messer MD    CASE NUMBER:     ANESTHETIC: 2.5 cc 0.5% Lidocaine with Epi 1:200,000 mixed 1:1 with 0.5% Bupivacaine    SURGICAL PREP: Hibiclens    SURGEON: Rachel Walsh, " MD    ASSISTANTS: Carolina Sen PA-C and Joshua Messer Surg Tech    PREOPERATIVE DIAGNOSIS: atypical fibroxanthoma    POSTOPERATIVE DIAGNOSIS: atypical fibroxanthoma    PATHOLOGIC DIAGNOSIS: atypical fibroxanthoma    HISTOLOGY OF SPECIMENS IN FIRST STAGE:   Tumor Type: No tumor seen.    STAGES OF MOHS' SURGERY PERFORMED: 1    TUMOR-FREE PLANE ACHIEVED: Yes    HEMOSTASIS: electrocoagulation     SPECIMENS: 2    LOCATION: left forehead (superolateral).  Patient and wife verified location today, also verified with photo in Epic.     INITIAL LESION SIZE: 0.5 x 0.5 cm    FINAL DEFECT SIZE: 0.7 x 1.0 cm    WOUND REPAIR/DISPOSITION: The patient tolerated Mohs' Micrographic Surgery for an atypical fibroxanthoma very well. When the tumor was completely removed, a repair of the surgical defect was undertaken.      PROCEDURE: Complex Linear Repair    INDICATION: Status post Mohs' Micrographic Surgery for atypical fibroxanthoma.    CASE NUMBER:     SURGEON: Rachel Walsh MD    ASSISTANTS: Carolina Sen PA-C and Joshua Messer Surg Tech    ANESTHETIC: 1 cc 1% Lidocaine with Epinephrine 1:100,000    SURGICAL PREP: Hibiclens, prepped by Joshua Messer Surg Tech    LOCATION: left forehead (superolateral)    DEFECT SIZE: 0.7 x 1.0 cm    WOUND REPAIR/DISPOSITION:  After the patient's carcinoma had been completely removed with Mohs' Micrographic Surgery, a repair of the surgical defect was undertaken. The patient was returned to the operating suite where the area of left forehead was prepped, draped, and anesthetized in the usual sterile fashion. The wound was widely undermined in all directions. The wound was undermined to a distance at least the maximum width of the defect as measured perpendicular to the closure line along at least one entire edge of the defect, in this case 1.5 cm. Then, electrocoagulation was used to obtain meticulous hemostasis. 5-0 Vicryl buried vertical mattress sutures were placed into the subcutaneous and  "dermal plane to close the wound and conrad the cutaneous wound edge. Bilateral dog ears were identified and were removed by a standard Burow's triangle technique. The cutaneous wound edges were closed using interrupted 5-0 Prolene suture.    The patient tolerated the procedure well.    The area was cleaned and dressed appropriately and the patient was given wound care instructions, as well as appointment for follow-up evaluation and suture removal in 7 days.    LENGTH OF REPAIR: 1.8 cm    Vitals:    02/14/22 0835 02/14/22 1148   BP: 126/82 135/83   Pulse: 79 94   Weight: 78.5 kg (173 lb)    Height: 5' 9" (1.753 m)          "

## 2022-02-21 ENCOUNTER — CLINICAL SUPPORT (OUTPATIENT)
Dept: DERMATOLOGY | Facility: CLINIC | Age: 77
End: 2022-02-21
Payer: MEDICARE

## 2022-02-21 DIAGNOSIS — Z48.02 VISIT FOR SUTURE REMOVAL: Primary | ICD-10-CM

## 2022-02-21 PROCEDURE — 99024 POSTOP FOLLOW-UP VISIT: CPT | Mod: S$GLB,,, | Performed by: STUDENT IN AN ORGANIZED HEALTH CARE EDUCATION/TRAINING PROGRAM

## 2022-02-21 PROCEDURE — 99024 PR POST-OP FOLLOW-UP VISIT: ICD-10-PCS | Mod: S$GLB,,, | Performed by: STUDENT IN AN ORGANIZED HEALTH CARE EDUCATION/TRAINING PROGRAM

## 2022-03-09 NOTE — ED NOTES
Patient verbalizes readiness for discharge.   Detail Level: Generalized Detail Level: Detailed Detail Level: Simple Detail Level: Zone

## 2022-03-14 ENCOUNTER — PES CALL (OUTPATIENT)
Dept: ADMINISTRATIVE | Facility: CLINIC | Age: 77
End: 2022-03-14
Payer: MEDICARE

## 2022-03-21 ENCOUNTER — PATIENT MESSAGE (OUTPATIENT)
Dept: PULMONOLOGY | Facility: CLINIC | Age: 77
End: 2022-03-21
Payer: MEDICARE

## 2022-03-22 ENCOUNTER — HOSPITAL ENCOUNTER (INPATIENT)
Facility: HOSPITAL | Age: 77
LOS: 7 days | Discharge: HOSPICE | DRG: 189 | End: 2022-03-29
Attending: EMERGENCY MEDICINE | Admitting: INTERNAL MEDICINE
Payer: MEDICARE

## 2022-03-22 DIAGNOSIS — R05.9 COUGH: ICD-10-CM

## 2022-03-22 DIAGNOSIS — J18.9 PNEUMONIA DUE TO INFECTIOUS ORGANISM: ICD-10-CM

## 2022-03-22 DIAGNOSIS — K21.9 GASTROESOPHAGEAL REFLUX DISEASE, UNSPECIFIED WHETHER ESOPHAGITIS PRESENT: ICD-10-CM

## 2022-03-22 DIAGNOSIS — R07.9 CHEST PAIN: ICD-10-CM

## 2022-03-22 DIAGNOSIS — F03.90 DEMENTIA WITHOUT BEHAVIORAL DISTURBANCE, UNSPECIFIED DEMENTIA TYPE: ICD-10-CM

## 2022-03-22 DIAGNOSIS — G89.29 CHRONIC BILATERAL LOW BACK PAIN WITHOUT SCIATICA: ICD-10-CM

## 2022-03-22 DIAGNOSIS — E11.8 TYPE 2 DIABETES MELLITUS WITH COMPLICATION, WITH LONG-TERM CURRENT USE OF INSULIN: ICD-10-CM

## 2022-03-22 DIAGNOSIS — G89.29 CHRONIC PAIN OF LEFT KNEE: ICD-10-CM

## 2022-03-22 DIAGNOSIS — Z79.4 TYPE 2 DIABETES MELLITUS WITH COMPLICATION, WITH LONG-TERM CURRENT USE OF INSULIN: ICD-10-CM

## 2022-03-22 DIAGNOSIS — B18.2 HEPATIC CIRRHOSIS DUE TO CHRONIC HEPATITIS C INFECTION: ICD-10-CM

## 2022-03-22 DIAGNOSIS — Z71.89 GOALS OF CARE, COUNSELING/DISCUSSION: ICD-10-CM

## 2022-03-22 DIAGNOSIS — J96.21 ACUTE ON CHRONIC RESPIRATORY FAILURE WITH HYPOXIA: ICD-10-CM

## 2022-03-22 DIAGNOSIS — M54.50 CHRONIC BILATERAL LOW BACK PAIN WITHOUT SCIATICA: ICD-10-CM

## 2022-03-22 DIAGNOSIS — J18.9 COMMUNITY ACQUIRED PNEUMONIA, BILATERAL: Primary | ICD-10-CM

## 2022-03-22 DIAGNOSIS — R09.02 HYPOXIA: ICD-10-CM

## 2022-03-22 DIAGNOSIS — K74.60 HEPATIC CIRRHOSIS DUE TO CHRONIC HEPATITIS C INFECTION: ICD-10-CM

## 2022-03-22 DIAGNOSIS — J84.112 ACUTE EXACERBATION OF IDIOPATHIC PULMONARY FIBROSIS: ICD-10-CM

## 2022-03-22 DIAGNOSIS — J84.9 INTERSTITIAL LUNG DISEASE: ICD-10-CM

## 2022-03-22 DIAGNOSIS — M25.562 CHRONIC PAIN OF LEFT KNEE: ICD-10-CM

## 2022-03-22 DIAGNOSIS — J96.20 ACUTE ON CHRONIC RESPIRATORY FAILURE, UNSPECIFIED WHETHER WITH HYPOXIA OR HYPERCAPNIA: ICD-10-CM

## 2022-03-22 LAB
ALBUMIN SERPL BCP-MCNC: 3.4 G/DL (ref 3.5–5.2)
ALP SERPL-CCNC: 115 U/L (ref 55–135)
ALT SERPL W/O P-5'-P-CCNC: 13 U/L (ref 10–44)
AMMONIA PLAS-SCNC: 21 UMOL/L (ref 10–50)
ANION GAP SERPL CALC-SCNC: 13 MMOL/L (ref 8–16)
AST SERPL-CCNC: 24 U/L (ref 10–40)
BACTERIA #/AREA URNS HPF: NORMAL /HPF
BASOPHILS # BLD AUTO: 0.01 K/UL (ref 0–0.2)
BASOPHILS NFR BLD: 0.1 % (ref 0–1.9)
BILIRUB SERPL-MCNC: 2.7 MG/DL (ref 0.1–1)
BILIRUB UR QL STRIP: ABNORMAL
BNP SERPL-MCNC: 149 PG/ML (ref 0–99)
BNP SERPL-MCNC: 149 PG/ML (ref 0–99)
BUN SERPL-MCNC: 21 MG/DL (ref 8–23)
CALCIUM SERPL-MCNC: 9.1 MG/DL (ref 8.7–10.5)
CHLORIDE SERPL-SCNC: 101 MMOL/L (ref 95–110)
CLARITY UR: CLEAR
CO2 SERPL-SCNC: 22 MMOL/L (ref 23–29)
COLOR UR: YELLOW
CREAT SERPL-MCNC: 0.8 MG/DL (ref 0.5–1.4)
DIFFERENTIAL METHOD: ABNORMAL
EOSINOPHIL # BLD AUTO: 0.1 K/UL (ref 0–0.5)
EOSINOPHIL NFR BLD: 1.7 % (ref 0–8)
ERYTHROCYTE [DISTWIDTH] IN BLOOD BY AUTOMATED COUNT: 15.9 % (ref 11.5–14.5)
EST. GFR  (AFRICAN AMERICAN): >60 ML/MIN/1.73 M^2
EST. GFR  (NON AFRICAN AMERICAN): >60 ML/MIN/1.73 M^2
GLUCOSE SERPL-MCNC: 240 MG/DL (ref 70–110)
GLUCOSE UR QL STRIP: ABNORMAL
HCT VFR BLD AUTO: 36.2 % (ref 40–54)
HGB BLD-MCNC: 11.9 G/DL (ref 14–18)
HGB UR QL STRIP: NEGATIVE
IMM GRANULOCYTES # BLD AUTO: 0.07 K/UL (ref 0–0.04)
IMM GRANULOCYTES NFR BLD AUTO: 0.8 % (ref 0–0.5)
INFLUENZA A, MOLECULAR: NEGATIVE
INFLUENZA B, MOLECULAR: NEGATIVE
KETONES UR QL STRIP: ABNORMAL
LACTATE SERPL-SCNC: 1.5 MMOL/L (ref 0.5–2.2)
LACTATE SERPL-SCNC: 2 MMOL/L (ref 0.5–2.2)
LEUKOCYTE ESTERASE UR QL STRIP: NEGATIVE
LYMPHOCYTES # BLD AUTO: 0.5 K/UL (ref 1–4.8)
LYMPHOCYTES NFR BLD: 5.9 % (ref 18–48)
MCH RBC QN AUTO: 25.9 PG (ref 27–31)
MCHC RBC AUTO-ENTMCNC: 32.9 G/DL (ref 32–36)
MCV RBC AUTO: 79 FL (ref 82–98)
MICROSCOPIC COMMENT: NORMAL
MONOCYTES # BLD AUTO: 0.5 K/UL (ref 0.3–1)
MONOCYTES NFR BLD: 6.2 % (ref 4–15)
NEUTROPHILS # BLD AUTO: 7.1 K/UL (ref 1.8–7.7)
NEUTROPHILS NFR BLD: 85.3 % (ref 38–73)
NITRITE UR QL STRIP: POSITIVE
NRBC BLD-RTO: 0 /100 WBC
PH UR STRIP: 5 [PH] (ref 5–8)
PLATELET # BLD AUTO: 97 K/UL (ref 150–450)
PMV BLD AUTO: 10.4 FL (ref 9.2–12.9)
POCT GLUCOSE: 177 MG/DL (ref 70–110)
POTASSIUM SERPL-SCNC: 4.2 MMOL/L (ref 3.5–5.1)
PROCALCITONIN SERPL IA-MCNC: 0.11 NG/ML
PROT SERPL-MCNC: 6.8 G/DL (ref 6–8.4)
PROT UR QL STRIP: ABNORMAL
RBC # BLD AUTO: 4.59 M/UL (ref 4.6–6.2)
SARS-COV-2 RDRP RESP QL NAA+PROBE: NEGATIVE
SODIUM SERPL-SCNC: 136 MMOL/L (ref 136–145)
SP GR UR STRIP: >=1.03 (ref 1–1.03)
SPECIMEN SOURCE: NORMAL
URN SPEC COLLECT METH UR: ABNORMAL
UROBILINOGEN UR STRIP-ACNC: ABNORMAL EU/DL
WBC # BLD AUTO: 8.34 K/UL (ref 3.9–12.7)

## 2022-03-22 PROCEDURE — 94640 AIRWAY INHALATION TREATMENT: CPT

## 2022-03-22 PROCEDURE — 12000002 HC ACUTE/MED SURGE SEMI-PRIVATE ROOM

## 2022-03-22 PROCEDURE — 82140 ASSAY OF AMMONIA: CPT | Performed by: EMERGENCY MEDICINE

## 2022-03-22 PROCEDURE — 83605 ASSAY OF LACTIC ACID: CPT | Performed by: EMERGENCY MEDICINE

## 2022-03-22 PROCEDURE — U0002 COVID-19 LAB TEST NON-CDC: HCPCS | Performed by: EMERGENCY MEDICINE

## 2022-03-22 PROCEDURE — 85025 COMPLETE CBC W/AUTO DIFF WBC: CPT | Performed by: EMERGENCY MEDICINE

## 2022-03-22 PROCEDURE — 25000003 PHARM REV CODE 250: Performed by: INTERNAL MEDICINE

## 2022-03-22 PROCEDURE — 99291 CRITICAL CARE FIRST HOUR: CPT | Mod: 25

## 2022-03-22 PROCEDURE — 63600175 PHARM REV CODE 636 W HCPCS: Performed by: EMERGENCY MEDICINE

## 2022-03-22 PROCEDURE — 94761 N-INVAS EAR/PLS OXIMETRY MLT: CPT

## 2022-03-22 PROCEDURE — 83605 ASSAY OF LACTIC ACID: CPT | Mod: 91 | Performed by: INTERNAL MEDICINE

## 2022-03-22 PROCEDURE — 25000242 PHARM REV CODE 250 ALT 637 W/ HCPCS: Performed by: EMERGENCY MEDICINE

## 2022-03-22 PROCEDURE — A4216 STERILE WATER/SALINE, 10 ML: HCPCS | Performed by: INTERNAL MEDICINE

## 2022-03-22 PROCEDURE — 96365 THER/PROPH/DIAG IV INF INIT: CPT

## 2022-03-22 PROCEDURE — 80053 COMPREHEN METABOLIC PANEL: CPT | Performed by: EMERGENCY MEDICINE

## 2022-03-22 PROCEDURE — 36415 COLL VENOUS BLD VENIPUNCTURE: CPT | Performed by: EMERGENCY MEDICINE

## 2022-03-22 PROCEDURE — 63600175 PHARM REV CODE 636 W HCPCS: Performed by: INTERNAL MEDICINE

## 2022-03-22 PROCEDURE — 83880 ASSAY OF NATRIURETIC PEPTIDE: CPT | Performed by: EMERGENCY MEDICINE

## 2022-03-22 PROCEDURE — 84145 PROCALCITONIN (PCT): CPT | Performed by: EMERGENCY MEDICINE

## 2022-03-22 PROCEDURE — 87502 INFLUENZA DNA AMP PROBE: CPT | Performed by: EMERGENCY MEDICINE

## 2022-03-22 PROCEDURE — 94760 N-INVAS EAR/PLS OXIMETRY 1: CPT

## 2022-03-22 PROCEDURE — 87502 INFLUENZA DNA AMP PROBE: CPT

## 2022-03-22 PROCEDURE — 36415 COLL VENOUS BLD VENIPUNCTURE: CPT | Performed by: INTERNAL MEDICINE

## 2022-03-22 PROCEDURE — 25000242 PHARM REV CODE 250 ALT 637 W/ HCPCS: Performed by: INTERNAL MEDICINE

## 2022-03-22 PROCEDURE — 81000 URINALYSIS NONAUTO W/SCOPE: CPT | Performed by: EMERGENCY MEDICINE

## 2022-03-22 PROCEDURE — C9399 UNCLASSIFIED DRUGS OR BIOLOG: HCPCS | Performed by: INTERNAL MEDICINE

## 2022-03-22 PROCEDURE — 96367 TX/PROPH/DG ADDL SEQ IV INF: CPT

## 2022-03-22 PROCEDURE — 87040 BLOOD CULTURE FOR BACTERIA: CPT | Mod: 59 | Performed by: EMERGENCY MEDICINE

## 2022-03-22 PROCEDURE — 25000003 PHARM REV CODE 250: Performed by: EMERGENCY MEDICINE

## 2022-03-22 RX ORDER — PANTOPRAZOLE SODIUM 40 MG/1
40 TABLET, DELAYED RELEASE ORAL DAILY
Status: DISCONTINUED | OUTPATIENT
Start: 2022-03-23 | End: 2022-03-29 | Stop reason: HOSPADM

## 2022-03-22 RX ORDER — AZITHROMYCIN 250 MG/1
250 TABLET, FILM COATED ORAL DAILY
Status: DISCONTINUED | OUTPATIENT
Start: 2022-03-23 | End: 2022-03-29

## 2022-03-22 RX ORDER — GLUCAGON 1 MG
1 KIT INJECTION
Status: DISCONTINUED | OUTPATIENT
Start: 2022-03-22 | End: 2022-03-22 | Stop reason: SDUPTHER

## 2022-03-22 RX ORDER — ACETAMINOPHEN 500 MG
1000 TABLET ORAL
Status: ACTIVE | OUTPATIENT
Start: 2022-03-22 | End: 2022-03-23

## 2022-03-22 RX ORDER — SODIUM,POTASSIUM PHOSPHATES 280-250MG
2 POWDER IN PACKET (EA) ORAL
Status: DISCONTINUED | OUTPATIENT
Start: 2022-03-22 | End: 2022-03-29 | Stop reason: HOSPADM

## 2022-03-22 RX ORDER — TALC
6 POWDER (GRAM) TOPICAL NIGHTLY PRN
Status: DISCONTINUED | OUTPATIENT
Start: 2022-03-22 | End: 2022-03-29 | Stop reason: HOSPADM

## 2022-03-22 RX ORDER — IBUPROFEN 200 MG
16 TABLET ORAL
Status: DISCONTINUED | OUTPATIENT
Start: 2022-03-22 | End: 2022-03-22 | Stop reason: SDUPTHER

## 2022-03-22 RX ORDER — IBUPROFEN 200 MG
24 TABLET ORAL
Status: DISCONTINUED | OUTPATIENT
Start: 2022-03-22 | End: 2022-03-22 | Stop reason: SDUPTHER

## 2022-03-22 RX ORDER — GLUCAGON 1 MG
1 KIT INJECTION
Status: DISCONTINUED | OUTPATIENT
Start: 2022-03-22 | End: 2022-03-29 | Stop reason: HOSPADM

## 2022-03-22 RX ORDER — AMOXICILLIN 250 MG
1 CAPSULE ORAL 2 TIMES DAILY
Status: DISCONTINUED | OUTPATIENT
Start: 2022-03-22 | End: 2022-03-29 | Stop reason: HOSPADM

## 2022-03-22 RX ORDER — NALOXONE HCL 0.4 MG/ML
0.02 VIAL (ML) INJECTION
Status: DISCONTINUED | OUTPATIENT
Start: 2022-03-22 | End: 2022-03-29 | Stop reason: HOSPADM

## 2022-03-22 RX ORDER — ONDANSETRON 2 MG/ML
4 INJECTION INTRAMUSCULAR; INTRAVENOUS EVERY 8 HOURS PRN
Status: DISCONTINUED | OUTPATIENT
Start: 2022-03-22 | End: 2022-03-29 | Stop reason: HOSPADM

## 2022-03-22 RX ORDER — IPRATROPIUM BROMIDE AND ALBUTEROL SULFATE 2.5; .5 MG/3ML; MG/3ML
3 SOLUTION RESPIRATORY (INHALATION)
Status: COMPLETED | OUTPATIENT
Start: 2022-03-22 | End: 2022-03-22

## 2022-03-22 RX ORDER — LANOLIN ALCOHOL/MO/W.PET/CERES
800 CREAM (GRAM) TOPICAL
Status: DISCONTINUED | OUTPATIENT
Start: 2022-03-22 | End: 2022-03-29 | Stop reason: HOSPADM

## 2022-03-22 RX ORDER — IBUPROFEN 200 MG
24 TABLET ORAL
Status: DISCONTINUED | OUTPATIENT
Start: 2022-03-22 | End: 2022-03-29 | Stop reason: HOSPADM

## 2022-03-22 RX ORDER — BUDESONIDE 0.5 MG/2ML
0.5 INHALANT ORAL 2 TIMES DAILY
Status: DISCONTINUED | OUTPATIENT
Start: 2022-03-22 | End: 2022-03-29 | Stop reason: HOSPADM

## 2022-03-22 RX ORDER — IBUPROFEN 200 MG
16 TABLET ORAL
Status: DISCONTINUED | OUTPATIENT
Start: 2022-03-22 | End: 2022-03-29 | Stop reason: HOSPADM

## 2022-03-22 RX ORDER — INSULIN ASPART 100 [IU]/ML
1-10 INJECTION, SOLUTION INTRAVENOUS; SUBCUTANEOUS
Status: DISCONTINUED | OUTPATIENT
Start: 2022-03-22 | End: 2022-03-29 | Stop reason: HOSPADM

## 2022-03-22 RX ORDER — SODIUM CHLORIDE 0.9 % (FLUSH) 0.9 %
10 SYRINGE (ML) INJECTION EVERY 8 HOURS
Status: DISCONTINUED | OUTPATIENT
Start: 2022-03-22 | End: 2022-03-29 | Stop reason: HOSPADM

## 2022-03-22 RX ORDER — DONEPEZIL HYDROCHLORIDE 5 MG/1
10 TABLET, FILM COATED ORAL DAILY
Status: DISCONTINUED | OUTPATIENT
Start: 2022-03-22 | End: 2022-03-29 | Stop reason: HOSPADM

## 2022-03-22 RX ORDER — IPRATROPIUM BROMIDE AND ALBUTEROL SULFATE 2.5; .5 MG/3ML; MG/3ML
3 SOLUTION RESPIRATORY (INHALATION) EVERY 6 HOURS PRN
Status: DISCONTINUED | OUTPATIENT
Start: 2022-03-22 | End: 2022-03-29 | Stop reason: HOSPADM

## 2022-03-22 RX ORDER — OXYCODONE AND ACETAMINOPHEN 10; 325 MG/1; MG/1
1 TABLET ORAL EVERY 6 HOURS PRN
Status: DISCONTINUED | OUTPATIENT
Start: 2022-03-22 | End: 2022-03-29 | Stop reason: HOSPADM

## 2022-03-22 RX ADMIN — BUDESONIDE 0.5 MG: 0.5 SUSPENSION RESPIRATORY (INHALATION) at 07:03

## 2022-03-22 RX ADMIN — Medication 10 ML: at 10:03

## 2022-03-22 RX ADMIN — OXYCODONE AND ACETAMINOPHEN 1 TABLET: 10; 325 TABLET ORAL at 10:03

## 2022-03-22 RX ADMIN — INSULIN ASPART 1 UNITS: 100 INJECTION, SOLUTION INTRAVENOUS; SUBCUTANEOUS at 10:03

## 2022-03-22 RX ADMIN — INSULIN DETEMIR 15 UNITS: 100 INJECTION, SOLUTION SUBCUTANEOUS at 10:03

## 2022-03-22 RX ADMIN — IPRATROPIUM BROMIDE AND ALBUTEROL SULFATE 3 ML: 2.5; .5 SOLUTION RESPIRATORY (INHALATION) at 02:03

## 2022-03-22 RX ADMIN — OXYCODONE AND ACETAMINOPHEN 1 TABLET: 10; 325 TABLET ORAL at 04:03

## 2022-03-22 RX ADMIN — MELATONIN TAB 3 MG 6 MG: 3 TAB at 10:03

## 2022-03-22 RX ADMIN — AZITHROMYCIN MONOHYDRATE 500 MG: 500 INJECTION, POWDER, LYOPHILIZED, FOR SOLUTION INTRAVENOUS at 04:03

## 2022-03-22 RX ADMIN — DONEPEZIL HYDROCHLORIDE 10 MG: 5 TABLET, FILM COATED ORAL at 06:03

## 2022-03-22 RX ADMIN — CEFTRIAXONE 1 G: 1 INJECTION, SOLUTION INTRAVENOUS at 02:03

## 2022-03-22 NOTE — ASSESSMENT & PLAN NOTE
Patient with Hypoxic Respiratory failure which is Acute on chronic.  he is on home oxygen at 3 LPM. Supplemental oxygen was provided and noted-  .   Signs/symptoms of respiratory failure include- tachypnea, increased work of breathing, respiratory distress and use of accessory muscles. Contributing diagnoses includes - Pneumonia Labs and images were reviewed. Patient Has not had a recent ABG.

## 2022-03-22 NOTE — HPI
Steve June Jr. is a 75 y.o. male with a PMHx venous insufficiency, DM2, GERD, HTN, CHF, cirrhosis, chronic hepatitis C, pulmonary fibrosis, and HLD, hypertension valvular heart disease mild mitral regurg, chronic diastolic heart failure, interstitial lung disease who presents to the ED with   chief complain of worsening cough and shortness of breath over the past few days.  Patient has dementia unable to provide history but denies any other symptoms at this point.  Patient's wife was at bedside states that patient has been having generalized weakness for the last month or to and has been completely bed-bound for the last 1 week.  His breathing worsened in the last few days.  Patient does not want wears oxygen secondary to dementia.  Wife states his oxygen saturations were in the 70s.  Patient has not had any hospitalization for hypoxia or respiratory failure in the last 1 year.

## 2022-03-22 NOTE — ED PROVIDER NOTES
"Encounter Date: 3/22/2022       History     Chief Complaint   Patient presents with    Shortness of Breath     X days - worsening - pt has dementia and does not wear O2 as prescribed      Patient is a 76-year-old male with a past medical history of dementia type 2 diabetes anemia cirrhosis esophageal varices GERD hypertension valvular heart disease mild mitral regurg, chronic diastolic heart failure, interstitial lung disease who presents the emergency room for evaluation of increasing cough and shortness of breath over the past few days.  Patient does not want wears oxygen secondary to dementia.  Wife states his oxygen saturations were in the 70s.  EMS agrees with this and placed 1 oxygen his sats increased to low 90s.  Wife denies any fevers vomiting diarrhea        Review of patient's allergies indicates:   Allergen Reactions    Adhesive tape-silicones Other (See Comments)     pulls skin off    Doxycycline      Dizzy. "Just didn't feel right".     Past Medical History:   Diagnosis Date    Abnormal thyroid function test     Allergy     Seasonal    Anemia     Anemia due to blood loss 7/2/2014    Arthritis     Gaviria esophagus     Basal cell carcinoma     right forearm    Basal cell carcinoma 12/2011    lower post neck    Basal cell carcinoma 04/23/2019    left posterior helix    Cancer     skin CA    Cataract     Cirrhosis     Diabetes mellitus     Diabetes mellitus, type 2     Encounter for blood transfusion     Esophageal varices in cirrhosis     grade II on 7/12 EGD    Gastritis     on 7/12 EGD    GERD (gastroesophageal reflux disease) 2/28/2015    Hard of hearing     Hiatal hernia     History of hepatitis C 8/10/2012    tx with harvoni x 41 days (started 10/22/15). SVR4     Hoarseness 2/28/2015    Hx of colonic polyps 01/10/2011    per colonoscopy report in media    Hypercholesteremia     Hypersplenism     Hypertension     No meds    Pain management 12/10/2014    Petechial " hemorrhage 2015    Lower extremities bilat     Portal hypertensive gastropathy     on  EGD    Squamous cell carcinoma 2019    right preauricular cheek, right temple    Thrombocytopenia     Type II or unspecified type diabetes mellitus with neurological manifestations, uncontrolled(250.62) 2013    Valvular heart disease     mild MR 12     Past Surgical History:   Procedure Laterality Date    CATARACT EXTRACTION  1/10/13    left eye    CATARACT EXTRACTION      right eye    CHOLECYSTECTOMY      COLONOSCOPY      EYE SURGERY      Cataract surgery to right eye    HIP ARTHROPLASTY Right 2021    Procedure: ARTHROPLASTY, HIP, [place], peg board, 1st assist;  Surgeon: Corey Vargas MD;  Location: [facility];  Service: Orthopedics;  Laterality: Right;    KNEE ARTHROSCOPY W/ MENISCAL REPAIR      KNEE CARTILAGE SURGERY      left knee    KNEE SURGERY  2006    left    SKIN LESION EXCISION      TONSILLECTOMY      UPPER GASTROINTESTINAL ENDOSCOPY       Family History   Problem Relation Age of Onset    Leukemia Mother     Cancer Mother         bone    Alcohol abuse Father     Cirrhosis Father         EtOH induced    No Known Problems Daughter     No Known Problems Son     No Known Problems Daughter     No Known Problems Daughter     No Known Problems Daughter     No Known Problems Sister     Amblyopia Neg Hx     Blindness Neg Hx     Cataracts Neg Hx     Diabetes Neg Hx     Glaucoma Neg Hx     Hypertension Neg Hx     Macular degeneration Neg Hx     Retinal detachment Neg Hx     Strabismus Neg Hx     Stroke Neg Hx     Thyroid disease Neg Hx     Psoriasis Neg Hx     Lupus Neg Hx     Eczema Neg Hx     Acne Neg Hx     Melanoma Neg Hx      Social History     Tobacco Use    Smoking status: Former Smoker     Packs/day: 1.00     Years: 25.00     Pack years: 25.00     Quit date: 2000     Years since quittin.6    Smokeless tobacco: Never Used    Substance Use Topics    Alcohol use: Yes     Comment: rarely    Drug use: No     Review of Systems   Unable to perform ROS: Dementia   Constitutional: Positive for appetite change (Decreased). Negative for fatigue and fever.   HENT: Negative for sore throat.    Respiratory: Positive for cough and shortness of breath. Negative for chest tightness.    Cardiovascular: Negative for chest pain and leg swelling.   Gastrointestinal: Negative for abdominal pain, diarrhea, nausea and vomiting.   Genitourinary: Negative for difficulty urinating, dysuria and flank pain.   Musculoskeletal: Positive for arthralgias and myalgias.   Skin: Negative for rash.   Neurological: Negative for weakness, light-headedness, numbness and headaches.   Psychiatric/Behavioral: Positive for agitation and confusion.   All other systems reviewed and are negative.      Physical Exam     Initial Vitals   BP Pulse Resp Temp SpO2   03/22/22 1250 03/22/22 1250 03/22/22 1250 03/22/22 1348 03/22/22 1250   121/69 85 (!) 23 98.2 °F (36.8 °C) 98 %      MAP       --                Physical Exam    Nursing note and vitals reviewed.  Constitutional: He is not diaphoretic. No distress.   Elderly male lying in bed no acute distress   HENT:   Head: Normocephalic and atraumatic.   Slightly dry mucous membranes   Eyes: Conjunctivae and EOM are normal. Pupils are equal, round, and reactive to light.   Neck: Neck supple. No thyromegaly present.   Cardiovascular: Normal rate, regular rhythm and normal heart sounds.   2+ bilateral dorsalis pedis pulse   Pulmonary/Chest: He has no wheezes. He has no rhonchi. He has rales (At the bases bilaterally). He exhibits no tenderness.   Abdominal: Abdomen is soft. There is no abdominal tenderness.   Musculoskeletal:         General: No edema.      Cervical back: Neck supple.     Neurological: He is alert. He has normal strength. No sensory deficit. GCS score is 15. GCS eye subscore is 4. GCS verbal subscore is 5. GCS motor  subscore is 6.   Mild dementia.  Patient is aware of where he is in the situation   Skin: Capillary refill takes less than 2 seconds.   Small pressure sores over the top part of the thoracic spine.    Hyperpigmented bilateral lower extremities.         ED Course   Critical Care    Date/Time: 3/22/2022 11:41 PM  Performed by: Kapil Krishnamurthy MD  Authorized by: Kapil Krishnamurthy MD   Direct patient critical care time: 15 minutes  Additional history critical care time: 10 minutes  Ordering / reviewing critical care time: 10 minutes  Documentation critical care time: 10 minutes  Consulting other physicians critical care time: 10 minutes  Consult with family critical care time: 10 minutes  Total critical care time (exclusive of procedural time) : 65 minutes  Critical care was necessary to treat or prevent imminent or life-threatening deterioration of the following conditions: respiratory failure.  Critical care was time spent personally by me on the following activities: development of treatment plan with patient or surrogate, discussions with consultants, evaluation of patient's response to treatment, examination of patient, obtaining history from patient or surrogate, ordering and performing treatments and interventions, ordering and review of laboratory studies, ordering and review of radiographic studies, pulse oximetry and re-evaluation of patient's condition.        Labs Reviewed   CBC W/ AUTO DIFFERENTIAL - Abnormal; Notable for the following components:       Result Value    RBC 4.59 (*)     Hemoglobin 11.9 (*)     Hematocrit 36.2 (*)     MCV 79 (*)     MCH 25.9 (*)     RDW 15.9 (*)     Platelets 97 (*)     Immature Granulocytes 0.8 (*)     Immature Grans (Abs) 0.07 (*)     Lymph # 0.5 (*)     Gran % 85.3 (*)     Lymph % 5.9 (*)     All other components within normal limits   COMPREHENSIVE METABOLIC PANEL - Abnormal; Notable for the following components:    CO2 22 (*)     Glucose 240 (*)     Albumin 3.4 (*)      Total Bilirubin 2.7 (*)     All other components within normal limits   B-TYPE NATRIURETIC PEPTIDE - Abnormal; Notable for the following components:     (*)     All other components within normal limits   B-TYPE NATRIURETIC PEPTIDE - Abnormal; Notable for the following components:     (*)     All other components within normal limits   URINALYSIS, REFLEX TO URINE CULTURE - Abnormal; Notable for the following components:    Specific Gravity, UA >=1.030 (*)     Protein, UA Trace (*)     Glucose, UA 1+ (*)     Ketones, UA 1+ (*)     Bilirubin (UA) 2+ (*)     Nitrite, UA Positive (*)     Urobilinogen, UA 4.0-6.0 (*)     All other components within normal limits    Narrative:     Specimen Source->Urine   INFLUENZA A & B BY MOLECULAR   AMMONIA   LACTIC ACID, PLASMA   PROCALCITONIN   SARS-COV-2 RNA AMPLIFICATION, QUAL   URINALYSIS MICROSCOPIC    Narrative:     Specimen Source->Urine   POCT GLUCOSE, HAND-HELD DEVICE   POCT GLUCOSE MONITORING CONTINUOUS          Imaging Results          X-Ray Chest AP Portable (Final result)  Result time 03/22/22 13:45:44    Final result by Shilo Kim MD (03/22/22 13:45:44)                 Impression:      Moderate chronic interstitial disease, with possible superimposed acute infiltrate.      Electronically signed by: Shilo Kim MD  Date:    03/22/2022  Time:    13:45             Narrative:    EXAMINATION:  XR CHEST AP PORTABLE    CLINICAL HISTORY:  Cough, unspecified    TECHNIQUE:  Single frontal view of the chest was performed.    COMPARISON:  08/31/2021    FINDINGS:  The heart is mildly enlarged.  There are are moderate interstitial changes throughout both lungs corresponding to known underlying chronic interstitial lung disease.  There is also increased ground-glass density of the lung fields for which superimposed acute infiltrate is possible.                                 Medications   cefTRIAXone injection 1 g (has no administration in time range)    azithromycin tablet 250 mg (has no administration in time range)   acetaminophen tablet 1,000 mg (1,000 mg Oral Not Given 3/22/22 1640)   budesonide nebulizer solution 0.5 mg (0.5 mg Nebulization Given 3/22/22 1924)   oxyCODONE-acetaminophen  mg per tablet 1 tablet (1 tablet Oral Given 3/22/22 2200)   pantoprazole EC tablet 40 mg (has no administration in time range)   donepeziL tablet 10 mg (10 mg Oral Given 3/22/22 1800)   albuterol-ipratropium 2.5 mg-0.5 mg/3 mL nebulizer solution 3 mL (has no administration in time range)   glucose chewable tablet 16 g (has no administration in time range)   glucose chewable tablet 24 g (has no administration in time range)   dextrose 50% injection 12.5 g (has no administration in time range)   insulin aspart U-100 pen 1-10 Units (1 Units Subcutaneous Given 3/22/22 2202)   insulin detemir U-100 pen 15 Units (15 Units Subcutaneous Given 3/22/22 2201)   sodium chloride 0.9% flush 10 mL (10 mLs Intravenous Given 3/22/22 2202)   melatonin tablet 6 mg (6 mg Oral Given 3/22/22 2200)   senna-docusate 8.6-50 mg per tablet 1 tablet (1 tablet Oral Not Given 3/22/22 2100)   naloxone 0.4 mg/mL injection 0.02 mg (has no administration in time range)   potassium bicarbonate disintegrating tablet 50 mEq (has no administration in time range)   potassium bicarbonate disintegrating tablet 35 mEq (has no administration in time range)   potassium bicarbonate disintegrating tablet 60 mEq (has no administration in time range)   magnesium oxide tablet 800 mg (has no administration in time range)   magnesium oxide tablet 800 mg (has no administration in time range)   potassium, sodium phosphates 280-160-250 mg packet 2 packet (has no administration in time range)   potassium, sodium phosphates 280-160-250 mg packet 2 packet (has no administration in time range)   potassium, sodium phosphates 280-160-250 mg packet 2 packet (has no administration in time range)   dextrose 50% injection 25 g (has  no administration in time range)   glucagon (human recombinant) injection 1 mg (has no administration in time range)   ondansetron injection 4 mg (has no administration in time range)   cefTRIAXone (ROCEPHIN) 1 g/50 mL D5W IVPB (0 g Intravenous Stopped 3/22/22 1525)   azithromycin 500 mg in dextrose 5 % 250 mL IVPB (ready to mix system) (0 mg Intravenous Stopped 3/22/22 1700)   albuterol-ipratropium 2.5 mg-0.5 mg/3 mL nebulizer solution 3 mL (3 mLs Nebulization Given 3/22/22 1421)                 ED Course as of 03/22/22 2341   Tue Mar 22, 2022   1415 Anticipate admission given that the patient was hypoxic to the 70s without oxygen at home.  Patient is hypoxic to the upper 80s on 2 L here.  Given his pneumonia I think he needs to be admitted for IV antibiotics [JS]      ED Course User Index  [JS] Kapil Krishnamurthy MD             Clinical Impression:   Final diagnoses:  [R05.9] Cough  [J18.9] Community acquired pneumonia, bilateral (Primary)  [J84.9] Interstitial lung disease  [R09.02] Hypoxia  [F03.90] Dementia without behavioral disturbance, unspecified dementia type          ED Disposition Condition    Admit               Kapil Krishnamurthy MD  03/22/22 2172

## 2022-03-22 NOTE — PHARMACY MED REC
"Admission Medication History     The home medication history was taken by Mer Chase CPhT.    Medication history obtained from Patient's Wife    You may go to "Admission" then "Reconcile Home Medications" tabs to review and/or act upon these items.      The home medication list has been updated by the Pharmacy department.    Please read ALL comments highlighted in yellow.    Please address this information as you see fit.     Feel free to contact us if you have any questions or require assistance.      The medications listed below were removed from the home medication list.  Please reorder if appropriate:  Patient reports no longer taking the following medication(s):   Aspirin 81mg   Atorvastatin 40mg   Gabapentin 100mg      Mer Chase CPhT.  [phone]      .          "

## 2022-03-22 NOTE — CARE UPDATE
03/22/22 1421   Patient Assessment/Suction   Level of Consciousness (AVPU) alert   Respiratory Effort Short of breath   Expansion/Accessory Muscles/Retractions no use of accessory muscles;no retractions;expansion symmetric   All Lung Fields Breath Sounds diminished   Rhythm/Pattern, Respiratory unlabored;shortness of breath   Cough Frequency infrequent   Cough Type nonproductive   PRE-TX-O2   O2 Device (Oxygen Therapy) nasal cannula   Flow (L/min) 4   SpO2 95 %   Pulse Oximetry Type Continuous   $ Pulse Oximetry - Multiple Charge Pulse Oximetry - Multiple   Pulse 80   Resp 18   Positioning HOB elevated 45 degrees   Aerosol Therapy   $ Aerosol Therapy Charges Aerosol Treatment   Respiratory Treatment Status (SVN) given   Treatment Route (SVN) mask   Patient Position (SVN) HOB elevated   Post Treatment Assessment (SVN) breath sounds unchanged   Signs of Intolerance (SVN) none   Breath Sounds Post-Respiratory Treatment   Throughout All Fields Post-Treatment All Fields   Throughout All Fields Post-Treatment no change   Post-treatment Heart Rate (beats/min) 81   Post-treatment Resp Rate (breaths/min) 18

## 2022-03-22 NOTE — SUBJECTIVE & OBJECTIVE
Past Medical History:   Diagnosis Date    Abnormal thyroid function test     Allergy     Seasonal    Anemia     Anemia due to blood loss 7/2/2014    Arthritis     Gaviria esophagus     Basal cell carcinoma     right forearm    Basal cell carcinoma 12/2011    lower post neck    Basal cell carcinoma 04/23/2019    left posterior helix    Cancer     skin CA    Cataract     Cirrhosis     Diabetes mellitus     Diabetes mellitus, type 2     Encounter for blood transfusion     Esophageal varices in cirrhosis     grade II on 7/12 EGD    Gastritis     on 7/12 EGD    GERD (gastroesophageal reflux disease) 2/28/2015    Hard of hearing     Hiatal hernia     History of hepatitis C 8/10/2012    tx with harvoni x 41 days (started 10/22/15). SVR4     Hoarseness 2/28/2015    Hx of colonic polyps 01/10/2011    per colonoscopy report in media    Hypercholesteremia     Hypersplenism     Hypertension     No meds    Pain management 12/10/2014    Petechial hemorrhage 11/25/2015    Lower extremities bilat     Portal hypertensive gastropathy     on 7/12 EGD    Squamous cell carcinoma 04/23/2019    right preauricular cheek, right temple    Thrombocytopenia     Type II or unspecified type diabetes mellitus with neurological manifestations, uncontrolled(250.62) 12/24/2013    Valvular heart disease     mild MR 12       Past Surgical History:   Procedure Laterality Date    CATARACT EXTRACTION  1/10/13    left eye    CATARACT EXTRACTION      right eye    CHOLECYSTECTOMY      COLONOSCOPY      EYE SURGERY      Cataract surgery to right eye    HIP ARTHROPLASTY Right 8/27/2021    Procedure: ARTHROPLASTY, HIP, [place], peg board, 1st assist;  Surgeon: Corey Vargas MD;  Location: [facility];  Service: Orthopedics;  Laterality: Right;    KNEE ARTHROSCOPY W/ MENISCAL REPAIR      KNEE CARTILAGE SURGERY      left knee    KNEE SURGERY  12/2006    left    SKIN LESION EXCISION  2021    TONSILLECTOMY      UPPER GASTROINTESTINAL ENDOSCOPY         Review  "of patient's allergies indicates:   Allergen Reactions    Adhesive tape-silicones Other (See Comments)     pulls skin off    Doxycycline      Dizzy. "Just didn't feel right".       No current facility-administered medications on file prior to encounter.     Current Outpatient Medications on File Prior to Encounter   Medication Sig    albuterol (VENTOLIN HFA) 90 mcg/actuation inhaler Inhale 2 puffs into the lungs every 4 (four) hours as needed for Wheezing or Shortness of Breath. Rescue    budesonide (PULMICORT) 0.5 mg/2 mL nebulizer solution Take 2 mLs (0.5 mg total) by nebulization 2 (two) times daily. Controller (Patient taking differently: Take 0.5 mg by nebulization as needed. Controller)    donepeziL (ARICEPT) 10 MG tablet 1/2 tab PO daily x1 week, then 1 tab PO daily thereafter (Patient taking differently: Take 10 mg by mouth once daily.)    glimepiride (AMARYL) 4 MG tablet Take 2 tablets (8 mg total) by mouth daily with breakfast.    HUMULIN 70/30 U-100 KWIKPEN 100 unit/mL (70-30) InPn pen Inject 45 Units into the skin 2 (two) times daily with meals. Adjust dose as directed, max daily dose 140 units/day (Patient taking differently: Inject 45 Units into the skin 2 (two) times daily as needed. Adjust dose as directed, max daily dose 140 units/day)    ondansetron (ZOFRAN-ODT) 4 MG TbDL Take 1 tablet (4 mg total) by mouth every 8 (eight) hours as needed (nausea/vomiting).    [START ON 3/25/2022] oxyCODONE-acetaminophen (PERCOCET)  mg per tablet Take 1 tablet by mouth every 6 (six) hours as needed for Pain.    pantoprazole (PROTONIX) 40 MG tablet Take 1 tablet (40 mg total) by mouth once daily.    aspirin 325 MG tablet Take 1 tablet (325 mg total) by mouth 2 (two) times daily.    blood sugar diagnostic Strp To check BG 2 times daily, to use with insurance preferred meter    lancets Misc To check BG 2 times daily, to use with insurance preferred meter    mupirocin (BACTROBAN) 2 % ointment Apply topically 3 " "(three) times daily.    pen needle, diabetic (NOVOFINE PLUS) 32 gauge x 1/6" Ndle Use with insulin twice a day.    [DISCONTINUED] atorvastatin (LIPITOR) 40 MG tablet Take 1 tablet (40 mg total) by mouth once daily. (Patient not taking: Reported on 2022)    [DISCONTINUED] gabapentin (NEURONTIN) 100 MG capsule Take 1 capsule (100 mg total) by mouth 3 (three) times daily.    [DISCONTINUED] oxyCODONE-acetaminophen (PERCOCET)  mg per tablet Take 1 tablet by mouth every 6 (six) hours as needed for Pain.     Family History       Problem Relation (Age of Onset)    Alcohol abuse Father    Cancer Mother    Cirrhosis Father    Leukemia Mother    No Known Problems Daughter, Son, Daughter, Daughter, Daughter, Sister          Tobacco Use    Smoking status: Former Smoker     Packs/day: 1.00     Years: 25.00     Pack years: 25.00     Quit date: 2000     Years since quittin.6    Smokeless tobacco: Never Used   Substance and Sexual Activity    Alcohol use: Yes     Comment: rarely    Drug use: No    Sexual activity: Never     Review of Systems   Unable to perform ROS: Dementia   Constitutional:  Positive for chills. Negative for fever.   Respiratory:  Positive for shortness of breath.    Cardiovascular:  Negative for chest pain.   Musculoskeletal:  Positive for arthralgias and back pain.   Neurological:  Positive for weakness (generalized).   Objective:     Vital Signs (Most Recent):  Temp: 98.2 °F (36.8 °C) (22 1348)  Pulse: 80 (22 1421)  Resp: 18 (22 1421)  BP: 124/65 (22 1305)  SpO2: 95 % (22 1421) Vital Signs (24h Range):  Temp:  [98.2 °F (36.8 °C)] 98.2 °F (36.8 °C)  Pulse:  [80-87] 80  Resp:  [18-23] 18  SpO2:  [91 %-98 %] 95 %  BP: (121-124)/(65-69) 124/65     Weight: 78.5 kg (173 lb 1 oz)  Body mass index is 25.56 kg/m².    Physical Exam  Vitals and nursing note reviewed.   Constitutional:       General: He is not in acute distress.     Appearance: He is well-developed. He is " not ill-appearing or diaphoretic.   HENT:      Head: Normocephalic and atraumatic.      Right Ear: External ear normal.      Left Ear: External ear normal.      Nose: Nose normal.      Mouth/Throat:      Mouth: Mucous membranes are moist.   Eyes:      General: No scleral icterus.        Right eye: No discharge.         Left eye: No discharge.      Conjunctiva/sclera: Conjunctivae normal.      Pupils: Pupils are equal, round, and reactive to light.   Neck:      Thyroid: No thyromegaly.   Cardiovascular:      Rate and Rhythm: Normal rate and regular rhythm.      Heart sounds: Normal heart sounds. No murmur heard.  Pulmonary:      Effort: Pulmonary effort is normal. No respiratory distress.      Breath sounds: No stridor. Rales present. No wheezing.   Abdominal:      General: Bowel sounds are normal. There is no distension.      Palpations: Abdomen is soft.      Tenderness: There is no abdominal tenderness.   Musculoskeletal:         General: No tenderness.      Cervical back: Normal range of motion and neck supple.   Lymphadenopathy:      Cervical: No cervical adenopathy.   Skin:     General: Skin is warm and dry.      Capillary Refill: Capillary refill takes less than 2 seconds.      Findings: No erythema or rash.   Neurological:      General: No focal deficit present.      Mental Status: He is alert. Mental status is at baseline. He is disoriented.      Cranial Nerves: No cranial nerve deficit.      Sensory: No sensory deficit.      Motor: Weakness present. No abnormal muscle tone.      Coordination: Coordination normal.   Psychiatric:         Mood and Affect: Mood normal.         Behavior: Behavior normal.         CRANIAL NERVES     CN III, IV, VI   Pupils are equal, round, and reactive to light.     Significant Labs: All pertinent labs within the past 24 hours have been reviewed.  CBC:   Recent Labs   Lab 03/22/22  1341   WBC 8.34   HGB 11.9*   HCT 36.2*   PLT 97*     CMP:   Recent Labs   Lab 03/22/22  1341   NA  136   K 4.2      CO2 22*   *   BUN 21   CREATININE 0.8   CALCIUM 9.1   PROT 6.8   ALBUMIN 3.4*   BILITOT 2.7*   ALKPHOS 115   AST 24   ALT 13   ANIONGAP 13   EGFRNONAA >60       Significant Imaging: I have reviewed all pertinent imaging results/findings within the past 24 hours.

## 2022-03-22 NOTE — ASSESSMENT & PLAN NOTE
The chest x-ray shows consolidation Moderate chronic interstitial disease, with possible superimposed acute infiltrate  Will cont empiric treatment with IV ceftriaxone and IV azithromycin.  We will repeat CBC daily  Differential diagnosis:  Congestive heart failure, this can be supported by the  cough, and cardiovascular risk factors. However, the mildly elevated BNP supports a pulmonary process as well as the consolidation seen on chest x-ray.   On exam, the patient lacks JVD and lower extremity edema that would be suspected in right sided heart failure.   Pulmonary embolism,less likely.   BNP  Recent Labs   Lab 03/22/22  1341   *  149*       However, the patient lacks the risk factors of hypercoagulability and inactivity. The progression of the  symptoms over the course of a week does not support a diagnosis of a PE. On exam there  is no evidence of a deep venous thrombosis.      FEN/IVF: cardiac diet. No IVF

## 2022-03-22 NOTE — ASSESSMENT & PLAN NOTE
Patient's FSGs are controlled on current medication regimen.  Last A1c reviewed-   Lab Results   Component Value Date    HGBA1C 7.5 (H) 09/21/2021     Most recent fingerstick glucose reviewed- No results for input(s): POCTGLUCOSE in the last 24 hours.  Current correctional scale  Low  Maintain anti-hyperglycemic dose as follows-   Antihyperglycemics (From admission, onward)            None        Hold Oral hypoglycemics while patient is in the hospital.\

## 2022-03-22 NOTE — H&P
[facility] Medicine  History & Physical    Patient Name: Steve June Jr.  MRN: [id number]  Patient Class: IP- Inpatient  Admission Date: 3/22/2022  Attending Physician: Kapil Krishnamurthy MD   Primary Care Provider: Crispin Garcia MD         Patient information was obtained from patient, spouse/SO and ER records.     Subjective:     Principal Problem:Acute on chronic respiratory failure    Chief Complaint:   Chief Complaint   Patient presents with    Shortness of Breath     X days - worsening - pt has dementia and does not wear O2 as prescribed         HPI: Steve June Jr. is a 75 y.o. male with a PMHx venous insufficiency, DM2, GERD, HTN, CHF, cirrhosis, chronic hepatitis C, pulmonary fibrosis, and HLD, hypertension valvular heart disease mild mitral regurg, chronic diastolic heart failure, interstitial lung disease who presents to the ED with   chief complain of worsening cough and shortness of breath over the past few days.  Patient has dementia unable to provide history but denies any other symptoms at this point.  Patient's wife was at bedside states that patient has been having generalized weakness for the last month or to and has been completely bed-bound for the last 1 week.  His breathing worsened in the last few days.  Patient does not want wears oxygen secondary to dementia.  Wife states his oxygen saturations were in the 70s.  Patient has not had any hospitalization for hypoxia or respiratory failure in the last 1 year.       Past Medical History:   Diagnosis Date    Abnormal thyroid function test     Allergy     Seasonal    Anemia     Anemia due to blood loss 7/2/2014    Arthritis     Gaviria esophagus     Basal cell carcinoma     right forearm    Basal cell carcinoma 12/2011    lower post neck    Basal cell carcinoma 04/23/2019    left posterior helix    Cancer     skin CA    Cataract     Cirrhosis     Diabetes mellitus     Diabetes mellitus, type 2   "   Encounter for blood transfusion     Esophageal varices in cirrhosis     grade II on 7/12 EGD    Gastritis     on 7/12 EGD    GERD (gastroesophageal reflux disease) 2/28/2015    Hard of hearing     Hiatal hernia     History of hepatitis C 8/10/2012    tx with harvoni x 41 days (started 10/22/15). SVR4     Hoarseness 2/28/2015    Hx of colonic polyps 01/10/2011    per colonoscopy report in media    Hypercholesteremia     Hypersplenism     Hypertension     No meds    Pain management 12/10/2014    Petechial hemorrhage 11/25/2015    Lower extremities bilat     Portal hypertensive gastropathy     on 7/12 EGD    Squamous cell carcinoma 04/23/2019    right preauricular cheek, right temple    Thrombocytopenia     Type II or unspecified type diabetes mellitus with neurological manifestations, uncontrolled(250.62) 12/24/2013    Valvular heart disease     mild MR 12       Past Surgical History:   Procedure Laterality Date    CATARACT EXTRACTION  1/10/13    left eye    CATARACT EXTRACTION      right eye    CHOLECYSTECTOMY      COLONOSCOPY      EYE SURGERY      Cataract surgery to right eye    HIP ARTHROPLASTY Right 8/27/2021    Procedure: ARTHROPLASTY, HIP, Neema, peg board, 1st assist;  Surgeon: Corey Vargas MD;  Location: [facility];  Service: Orthopedics;  Laterality: Right;    KNEE ARTHROSCOPY W/ MENISCAL REPAIR      KNEE CARTILAGE SURGERY      left knee    KNEE SURGERY  12/2006    left    SKIN LESION EXCISION  2021    TONSILLECTOMY      UPPER GASTROINTESTINAL ENDOSCOPY         Review of patient's allergies indicates:   Allergen Reactions    Adhesive tape-silicones Other (See Comments)     pulls skin off    Doxycycline      Dizzy. "Just didn't feel right".       No current facility-administered medications on file prior to encounter.     Current Outpatient Medications on File Prior to Encounter   Medication Sig    albuterol (VENTOLIN HFA) 90 mcg/actuation inhaler Inhale 2 puffs " "into the lungs every 4 (four) hours as needed for Wheezing or Shortness of Breath. Rescue    budesonide (PULMICORT) 0.5 mg/2 mL nebulizer solution Take 2 mLs (0.5 mg total) by nebulization 2 (two) times daily. Controller (Patient taking differently: Take 0.5 mg by nebulization as needed. Controller)    donepeziL (ARICEPT) 10 MG tablet 1/2 tab PO daily x1 week, then 1 tab PO daily thereafter (Patient taking differently: Take 10 mg by mouth once daily.)    glimepiride (AMARYL) 4 MG tablet Take 2 tablets (8 mg total) by mouth daily with breakfast.    HUMULIN 70/30 U-100 KWIKPEN 100 unit/mL (70-30) InPn pen Inject 45 Units into the skin 2 (two) times daily with meals. Adjust dose as directed, max daily dose 140 units/day (Patient taking differently: Inject 45 Units into the skin 2 (two) times daily as needed. Adjust dose as directed, max daily dose 140 units/day)    ondansetron (ZOFRAN-ODT) 4 MG TbDL Take 1 tablet (4 mg total) by mouth every 8 (eight) hours as needed (nausea/vomiting).    [START ON 3/25/2022] oxyCODONE-acetaminophen (PERCOCET)  mg per tablet Take 1 tablet by mouth every 6 (six) hours as needed for Pain.    pantoprazole (PROTONIX) 40 MG tablet Take 1 tablet (40 mg total) by mouth once daily.    aspirin 325 MG tablet Take 1 tablet (325 mg total) by mouth 2 (two) times daily.    blood sugar diagnostic Strp To check BG 2 times daily, to use with insurance preferred meter    lancets Misc To check BG 2 times daily, to use with insurance preferred meter    mupirocin (BACTROBAN) 2 % ointment Apply topically 3 (three) times daily.    pen needle, diabetic (NOVOFINE PLUS) 32 gauge x 1/6" Ndle Use with insulin twice a day.    [DISCONTINUED] atorvastatin (LIPITOR) 40 MG tablet Take 1 tablet (40 mg total) by mouth once daily. (Patient not taking: Reported on 2/8/2022)    [DISCONTINUED] gabapentin (NEURONTIN) 100 MG capsule Take 1 capsule (100 mg total) by mouth 3 (three) times daily.    " [DISCONTINUED] oxyCODONE-acetaminophen (PERCOCET)  mg per tablet Take 1 tablet by mouth every 6 (six) hours as needed for Pain.     Family History       Problem Relation (Age of Onset)    Alcohol abuse Father    Cancer Mother    Cirrhosis Father    Leukemia Mother    No Known Problems Daughter, Son, Daughter, Daughter, Daughter, Sister          Tobacco Use    Smoking status: Former Smoker     Packs/day: 1.00     Years: 25.00     Pack years: 25.00     Quit date: 2000     Years since quittin.6    Smokeless tobacco: Never Used   Substance and Sexual Activity    Alcohol use: Yes     Comment: rarely    Drug use: No    Sexual activity: Never     Review of Systems   Unable to perform ROS: Dementia   Constitutional:  Positive for chills. Negative for fever.   Respiratory:  Positive for shortness of breath.    Cardiovascular:  Negative for chest pain.   Musculoskeletal:  Positive for arthralgias and back pain.   Neurological:  Positive for weakness (generalized).   Objective:     Vital Signs (Most Recent):  Temp: 98.2 °F (36.8 °C) (22 1348)  Pulse: 80 (22 1421)  Resp: 18 (22 1421)  BP: 124/65 (22 1305)  SpO2: 95 % (22 1421) Vital Signs (24h Range):  Temp:  [98.2 °F (36.8 °C)] 98.2 °F (36.8 °C)  Pulse:  [80-87] 80  Resp:  [18-23] 18  SpO2:  [91 %-98 %] 95 %  BP: (121-124)/(65-69) 124/65     Weight: 78.5 kg (173 lb 1 oz)  Body mass index is 25.56 kg/m².    Physical Exam  Vitals and nursing note reviewed.   Constitutional:       General: He is not in acute distress.     Appearance: He is well-developed. He is not ill-appearing or diaphoretic.   HENT:      Head: Normocephalic and atraumatic.      Right Ear: External ear normal.      Left Ear: External ear normal.      Nose: Nose normal.      Mouth/Throat:      Mouth: Mucous membranes are moist.   Eyes:      General: No scleral icterus.        Right eye: No discharge.         Left eye: No discharge.      Conjunctiva/sclera:  Conjunctivae normal.      Pupils: Pupils are equal, round, and reactive to light.   Neck:      Thyroid: No thyromegaly.   Cardiovascular:      Rate and Rhythm: Normal rate and regular rhythm.      Heart sounds: Normal heart sounds. No murmur heard.  Pulmonary:      Effort: Pulmonary effort is normal. No respiratory distress.      Breath sounds: No stridor. Rales present. No wheezing.   Abdominal:      General: Bowel sounds are normal. There is no distension.      Palpations: Abdomen is soft.      Tenderness: There is no abdominal tenderness.   Musculoskeletal:         General: No tenderness.      Cervical back: Normal range of motion and neck supple.   Lymphadenopathy:      Cervical: No cervical adenopathy.   Skin:     General: Skin is warm and dry.      Capillary Refill: Capillary refill takes less than 2 seconds.      Findings: No erythema or rash.   Neurological:      General: No focal deficit present.      Mental Status: He is alert. Mental status is at baseline. He is disoriented.      Cranial Nerves: No cranial nerve deficit.      Sensory: No sensory deficit.      Motor: Weakness present. No abnormal muscle tone.      Coordination: Coordination normal.   Psychiatric:         Mood and Affect: Mood normal.         Behavior: Behavior normal.         CRANIAL NERVES     CN III, IV, VI   Pupils are equal, round, and reactive to light.     Significant Labs: All pertinent labs within the past 24 hours have been reviewed.  CBC:   Recent Labs   Lab 03/22/22  1341   WBC 8.34   HGB 11.9*   HCT 36.2*   PLT 97*     CMP:   Recent Labs   Lab 03/22/22  1341      K 4.2      CO2 22*   *   BUN 21   CREATININE 0.8   CALCIUM 9.1   PROT 6.8   ALBUMIN 3.4*   BILITOT 2.7*   ALKPHOS 115   AST 24   ALT 13   ANIONGAP 13   EGFRNONAA >60       Significant Imaging: I have reviewed all pertinent imaging results/findings within the past 24 hours.    Assessment/Plan:     * Acute on chronic respiratory failure  Patient with  Hypoxic Respiratory failure which is Acute on chronic.  he is on home oxygen at 3 LPM. Supplemental oxygen was provided and noted-  .   Signs/symptoms of respiratory failure include- tachypnea, increased work of breathing, respiratory distress and use of accessory muscles. Contributing diagnoses includes - Pneumonia Labs and images were reviewed. Patient Has not had a recent ABG.     Pneumonia  The chest x-ray shows consolidation Moderate chronic interstitial disease, with possible superimposed acute infiltrate  Will cont empiric treatment with IV ceftriaxone and IV azithromycin.  We will repeat CBC daily  Differential diagnosis:  Congestive heart failure, this can be supported by the  cough, and cardiovascular risk factors. However, the mildly elevated BNP supports a pulmonary process as well as the consolidation seen on chest x-ray.   On exam, the patient lacks JVD and lower extremity edema that would be suspected in right sided heart failure.   Pulmonary embolism,less likely.   BNP  Recent Labs   Lab 03/22/22  1341   *  149*       However, the patient lacks the risk factors of hypercoagulability and inactivity. The progression of the  symptoms over the course of a week does not support a diagnosis of a PE. On exam there  is no evidence of a deep venous thrombosis.      FEN/IVF: cardiac diet. No IVF        Interstitial lung disease  Hx noted  On home oxygen 3 L      Stasis dermatitis of both legs  Hx noted      Hypertension associated with diabetes  Will resume home meds      Hepatic cirrhosis due to chronic hepatitis C infection  Hx noted      Chronic low back pain  Hx noted      GERD (gastroesophageal reflux disease)  Hx noted      Type 2 diabetes mellitus with complication, with long-term current use of insulin  Patient's FSGs are controlled on current medication regimen.  Last A1c reviewed-   Lab Results   Component Value Date    HGBA1C 7.5 (H) 09/21/2021     Most recent fingerstick glucose reviewed-  No results for input(s): POCTGLUCOSE in the last 24 hours.  Current correctional scale  Low  Maintain anti-hyperglycemic dose as follows-   Antihyperglycemics (From admission, onward)            None        Hold Oral hypoglycemics while patient is in the hospital.\      PVD (peripheral vascular disease)  Hx noted      Chronic pain of left knee  chronic        VTE Risk Mitigation (From admission, onward)    None             Yamini Perea MD  Department of Hospital Medicine   [facility] - Emergency Dept

## 2022-03-23 LAB
ANION GAP SERPL CALC-SCNC: 10 MMOL/L (ref 8–16)
BASOPHILS # BLD AUTO: 0.01 K/UL (ref 0–0.2)
BASOPHILS NFR BLD: 0.2 % (ref 0–1.9)
BUN SERPL-MCNC: 19 MG/DL (ref 8–23)
CALCIUM SERPL-MCNC: 8.8 MG/DL (ref 8.7–10.5)
CHLORIDE SERPL-SCNC: 103 MMOL/L (ref 95–110)
CO2 SERPL-SCNC: 23 MMOL/L (ref 23–29)
CREAT SERPL-MCNC: 0.7 MG/DL (ref 0.5–1.4)
DIFFERENTIAL METHOD: ABNORMAL
EOSINOPHIL # BLD AUTO: 0.3 K/UL (ref 0–0.5)
EOSINOPHIL NFR BLD: 4.1 % (ref 0–8)
ERYTHROCYTE [DISTWIDTH] IN BLOOD BY AUTOMATED COUNT: 15.7 % (ref 11.5–14.5)
EST. GFR  (AFRICAN AMERICAN): >60 ML/MIN/1.73 M^2
EST. GFR  (NON AFRICAN AMERICAN): >60 ML/MIN/1.73 M^2
ESTIMATED AVG GLUCOSE: 160 MG/DL (ref 68–131)
GLUCOSE SERPL-MCNC: 118 MG/DL (ref 70–110)
GLUCOSE SERPL-MCNC: 125 MG/DL (ref 70–110)
GLUCOSE SERPL-MCNC: 128 MG/DL (ref 70–110)
HBA1C MFR BLD: 7.2 % (ref 4–5.6)
HCT VFR BLD AUTO: 33.5 % (ref 40–54)
HGB BLD-MCNC: 11 G/DL (ref 14–18)
IMM GRANULOCYTES # BLD AUTO: 0.04 K/UL (ref 0–0.04)
IMM GRANULOCYTES NFR BLD AUTO: 0.6 % (ref 0–0.5)
LYMPHOCYTES # BLD AUTO: 0.6 K/UL (ref 1–4.8)
LYMPHOCYTES NFR BLD: 8.8 % (ref 18–48)
MCH RBC QN AUTO: 26.1 PG (ref 27–31)
MCHC RBC AUTO-ENTMCNC: 32.8 G/DL (ref 32–36)
MCV RBC AUTO: 80 FL (ref 82–98)
MONOCYTES # BLD AUTO: 0.6 K/UL (ref 0.3–1)
MONOCYTES NFR BLD: 10 % (ref 4–15)
NEUTROPHILS # BLD AUTO: 4.8 K/UL (ref 1.8–7.7)
NEUTROPHILS NFR BLD: 76.3 % (ref 38–73)
NRBC BLD-RTO: 0 /100 WBC
PLATELET # BLD AUTO: 82 K/UL (ref 150–450)
PMV BLD AUTO: 10.9 FL (ref 9.2–12.9)
POCT GLUCOSE: 252 MG/DL (ref 70–110)
POTASSIUM SERPL-SCNC: 3.8 MMOL/L (ref 3.5–5.1)
RBC # BLD AUTO: 4.21 M/UL (ref 4.6–6.2)
SODIUM SERPL-SCNC: 136 MMOL/L (ref 136–145)
WBC # BLD AUTO: 6.33 K/UL (ref 3.9–12.7)

## 2022-03-23 PROCEDURE — 36415 COLL VENOUS BLD VENIPUNCTURE: CPT | Performed by: INTERNAL MEDICINE

## 2022-03-23 PROCEDURE — 94640 AIRWAY INHALATION TREATMENT: CPT

## 2022-03-23 PROCEDURE — 63600175 PHARM REV CODE 636 W HCPCS: Performed by: INTERNAL MEDICINE

## 2022-03-23 PROCEDURE — 83036 HEMOGLOBIN GLYCOSYLATED A1C: CPT | Performed by: INTERNAL MEDICINE

## 2022-03-23 PROCEDURE — 97530 THERAPEUTIC ACTIVITIES: CPT

## 2022-03-23 PROCEDURE — 63700000 PHARM REV CODE 250 ALT 637 W/O HCPCS: Performed by: INTERNAL MEDICINE

## 2022-03-23 PROCEDURE — 97166 OT EVAL MOD COMPLEX 45 MIN: CPT

## 2022-03-23 PROCEDURE — A4216 STERILE WATER/SALINE, 10 ML: HCPCS | Performed by: INTERNAL MEDICINE

## 2022-03-23 PROCEDURE — 99223 PR INITIAL HOSPITAL CARE,LEVL III: ICD-10-PCS | Mod: ,,, | Performed by: INTERNAL MEDICINE

## 2022-03-23 PROCEDURE — 97161 PT EVAL LOW COMPLEX 20 MIN: CPT

## 2022-03-23 PROCEDURE — 85025 COMPLETE CBC W/AUTO DIFF WBC: CPT | Performed by: INTERNAL MEDICINE

## 2022-03-23 PROCEDURE — 97535 SELF CARE MNGMENT TRAINING: CPT

## 2022-03-23 PROCEDURE — 25000003 PHARM REV CODE 250: Performed by: INTERNAL MEDICINE

## 2022-03-23 PROCEDURE — 99223 1ST HOSP IP/OBS HIGH 75: CPT | Mod: ,,, | Performed by: INTERNAL MEDICINE

## 2022-03-23 PROCEDURE — 94761 N-INVAS EAR/PLS OXIMETRY MLT: CPT

## 2022-03-23 PROCEDURE — 27000221 HC OXYGEN, UP TO 24 HOURS

## 2022-03-23 PROCEDURE — 25000242 PHARM REV CODE 250 ALT 637 W/ HCPCS: Performed by: INTERNAL MEDICINE

## 2022-03-23 PROCEDURE — 80048 BASIC METABOLIC PNL TOTAL CA: CPT | Performed by: INTERNAL MEDICINE

## 2022-03-23 PROCEDURE — 12000002 HC ACUTE/MED SURGE SEMI-PRIVATE ROOM

## 2022-03-23 RX ORDER — HYDROXYZINE 50 MG/ML
50 INJECTION, SOLUTION INTRAMUSCULAR ONCE
Status: COMPLETED | OUTPATIENT
Start: 2022-03-23 | End: 2022-03-23

## 2022-03-23 RX ORDER — FUROSEMIDE 10 MG/ML
20 INJECTION INTRAMUSCULAR; INTRAVENOUS ONCE
Status: COMPLETED | OUTPATIENT
Start: 2022-03-23 | End: 2022-03-23

## 2022-03-23 RX ORDER — ZIPRASIDONE MESYLATE 20 MG/ML
10 INJECTION, POWDER, LYOPHILIZED, FOR SOLUTION INTRAMUSCULAR ONCE
Status: COMPLETED | OUTPATIENT
Start: 2022-03-23 | End: 2022-03-23

## 2022-03-23 RX ORDER — HALOPERIDOL 5 MG/ML
2 INJECTION INTRAMUSCULAR EVERY 4 HOURS PRN
Status: DISCONTINUED | OUTPATIENT
Start: 2022-03-23 | End: 2022-03-29 | Stop reason: HOSPADM

## 2022-03-23 RX ADMIN — CEFTRIAXONE 1 G: 1 INJECTION, SOLUTION INTRAVENOUS at 03:03

## 2022-03-23 RX ADMIN — OXYCODONE AND ACETAMINOPHEN 1 TABLET: 10; 325 TABLET ORAL at 08:03

## 2022-03-23 RX ADMIN — DOCUSATE SODIUM AND SENNOSIDES 1 TABLET: 8.6; 5 TABLET, FILM COATED ORAL at 08:03

## 2022-03-23 RX ADMIN — Medication 10 ML: at 06:03

## 2022-03-23 RX ADMIN — Medication 10 ML: at 02:03

## 2022-03-23 RX ADMIN — BUDESONIDE 0.5 MG: 0.5 SUSPENSION RESPIRATORY (INHALATION) at 07:03

## 2022-03-23 RX ADMIN — INSULIN DETEMIR 15 UNITS: 100 INJECTION, SOLUTION SUBCUTANEOUS at 08:03

## 2022-03-23 RX ADMIN — Medication 10 ML: at 09:03

## 2022-03-23 RX ADMIN — INSULIN ASPART 3 UNITS: 100 INJECTION, SOLUTION INTRAVENOUS; SUBCUTANEOUS at 08:03

## 2022-03-23 RX ADMIN — AZITHROMYCIN MONOHYDRATE 250 MG: 250 TABLET ORAL at 08:03

## 2022-03-23 RX ADMIN — HYDROXYZINE HYDROCHLORIDE 50 MG: 50 INJECTION, SOLUTION INTRAMUSCULAR at 08:03

## 2022-03-23 RX ADMIN — ZIPRASIDONE MESYLATE 10 MG: 20 INJECTION, POWDER, LYOPHILIZED, FOR SOLUTION INTRAMUSCULAR at 08:03

## 2022-03-23 RX ADMIN — OXYCODONE AND ACETAMINOPHEN 1 TABLET: 10; 325 TABLET ORAL at 11:03

## 2022-03-23 RX ADMIN — PANTOPRAZOLE SODIUM 40 MG: 40 TABLET, DELAYED RELEASE ORAL at 08:03

## 2022-03-23 RX ADMIN — FUROSEMIDE 20 MG: 10 INJECTION, SOLUTION INTRAMUSCULAR; INTRAVENOUS at 04:03

## 2022-03-23 RX ADMIN — OXYCODONE AND ACETAMINOPHEN 1 TABLET: 10; 325 TABLET ORAL at 04:03

## 2022-03-23 RX ADMIN — DONEPEZIL HYDROCHLORIDE 10 MG: 5 TABLET, FILM COATED ORAL at 08:03

## 2022-03-23 NOTE — PLAN OF CARE
[facility]  Initial Discharge Assessment       Primary Care Provider: Crispin Garcia MD    Admission Diagnosis: Cough [R05.9]  Interstitial lung disease [J84.9]  Hypoxia [R09.02]  Chest pain [R07.9]  Dementia without behavioral disturbance, unspecified dementia type [F03.90]  Community acquired pneumonia, bilateral [J18.9]    Admission Date: 3/22/2022  Expected Discharge Date:       met with patient at bedside to complete assessment. Demographics, PCP and insurance verified.  spoke with patient's wife Lili via phone to assist with assessment. Patient has 02 and rollator at home. No hospitalization last 30 days. No current HH. No dialysis. Case management to assist with any additional needs upon discharge. No further needs to discuss at this time.    Discharge Barriers Identified: None    Payor: [employer] MEDICARE / Plan: [employer] 65 / Product Type: Medicare Advantage /     Extended Emergency Contact Information  Primary Emergency Contact: Lili June  Address: [address] [place] [address] [id number] [place] [address]  Home Phone: [phone]  Mobile Phone: [phone]  Relation: Spouse  Preferred language: English   needed? No    Discharge Plan A: Home with family  Discharge Plan B: Home Health      [id number] - [facility] [address] & [facility]  [address] [facility] [address]  Phone: [phone] Fax: [phone]    [facility] [address] [facility] [place] - [address]  [address]  [facility] [address]  Phone: [phone] Fax: [phone]    [id number] - [place] - [address] AT [facility] [address] [facility] [address]  [address]  [facility] [address]  Phone: [phone] Fax: [phone]      Initial Assessment (most recent)     Adult Discharge Assessment - 03/23/22 1125        Discharge Assessment    Assessment Type  Discharge Planning Assessment     Confirmed/corrected address, phone number and insurance Yes     Confirmed Demographics Correct on Facesheet     Source of Information patient;family     If unable to respond/provide information was family/caregiver contacted? Yes     Contact Name/Number Lili June (Spouse)   [phone] (Mobile)     Lives With spouse     Do you expect to return to your current living situation? Yes     Do you have help at home or someone to help you manage your care at home? Yes     Who are your caregiver(s) and their phone number(s)? ZesabasLili olivares (Spouse)   [phone] (Mobile)     Prior to hospitilization cognitive status: Alert/Oriented     Current cognitive status: Alert/Oriented     Walking or Climbing Stairs Difficulty ambulation difficulty, requires equipment;stair climbing difficulty, requires equipment     Dressing/Bathing Difficulty bathing difficulty, assistance 1 person;dressing difficulty, assistance 1 person     Home Accessibility wheelchair accessible     Equipment Currently Used at Home oxygen;rollator     Readmission within 30 days? No     Do you currently have service(s) that help you manage your care at home? No     Do you take prescription medications? Yes     Do you have prescription coverage? Yes     Do you have any problems affording any of your prescribed medications? No     Is the patient taking medications as prescribed? yes     Who is going to help you get home at discharge? ShayleeLili (Spouse)   [phone] (Mobile)     How do you get to doctors appointments? family or friend will provide     Are you on dialysis? No     Do you take coumadin? No     Discharge Plan A Home with family     Discharge Plan B Home Health     DME Needed Upon Discharge  none     Discharge Plan discussed with: Patient;Spouse/sig other     Name(s) and Number(s) ZejoseLili (Spouse)   [phone] (Mobile)     Discharge Barriers Identified None        Relationship/Environment     Name(s) of Who Lives With Patient Lili June (Spouse)   [phone] (Mobile)

## 2022-03-23 NOTE — SUBJECTIVE & OBJECTIVE
Interval History:  Patient continues to remove his nasal cannula has been hypoxemic off the nasal cannula.  Afebrile heart rate within normal limit vital stable    Review of Systems   Unable to perform ROS: Dementia   Constitutional:  Positive for chills. Negative for fever.   Respiratory:  Positive for shortness of breath.    Cardiovascular:  Negative for chest pain.   Musculoskeletal:  Positive for arthralgias and back pain.   Neurological:  Positive for weakness (generalized).   Objective:     Vital Signs (Most Recent):  Temp: 97.8 °F (36.6 °C) (03/23/22 0730)  Pulse: 68 (03/23/22 0742)  Resp: 16 (03/23/22 0742)  BP: (!) 118/59 (03/23/22 0730)  SpO2: (!) 91 % (03/23/22 0742)   Vital Signs (24h Range):  Temp:  [97 °F (36.1 °C)-98.6 °F (37 °C)] 97.8 °F (36.6 °C)  Pulse:  [68-97] 68  Resp:  [16-23] 16  SpO2:  [87 %-98 %] 91 %  BP: (118-156)/(58-69) 118/59     Weight: 77.8 kg (171 lb 8.3 oz)  Body mass index is 25.33 kg/m².    Intake/Output Summary (Last 24 hours) at 3/23/2022 1031  Last data filed at 3/23/2022 0758  Gross per 24 hour   Intake 480 ml   Output 300 ml   Net 180 ml      Physical Exam  Vitals and nursing note reviewed.   Constitutional:       General: He is not in acute distress.     Appearance: He is well-developed. He is not ill-appearing or diaphoretic.   HENT:      Head: Normocephalic and atraumatic.      Right Ear: External ear normal.      Left Ear: External ear normal.      Nose: Nose normal.      Mouth/Throat:      Mouth: Mucous membranes are moist.   Eyes:      General: No scleral icterus.        Right eye: No discharge.         Left eye: No discharge.      Conjunctiva/sclera: Conjunctivae normal.      Pupils: Pupils are equal, round, and reactive to light.   Neck:      Thyroid: No thyromegaly.   Cardiovascular:      Rate and Rhythm: Normal rate and regular rhythm.      Heart sounds: Normal heart sounds. No murmur heard.  Pulmonary:      Effort: Pulmonary effort is normal. No respiratory  distress.      Breath sounds: No stridor. Rales present. No wheezing.   Abdominal:      General: Bowel sounds are normal. There is no distension.      Palpations: Abdomen is soft.      Tenderness: There is no abdominal tenderness.   Musculoskeletal:         General: No tenderness.      Cervical back: Normal range of motion and neck supple.   Lymphadenopathy:      Cervical: No cervical adenopathy.   Skin:     General: Skin is warm and dry.      Capillary Refill: Capillary refill takes less than 2 seconds.      Findings: No erythema or rash.   Neurological:      General: No focal deficit present.      Mental Status: He is alert. Mental status is at baseline. He is disoriented.      Cranial Nerves: No cranial nerve deficit.      Sensory: No sensory deficit.      Motor: Weakness present. No abnormal muscle tone.      Coordination: Coordination normal.   Psychiatric:         Mood and Affect: Mood normal.         Behavior: Behavior normal.       Significant Labs: All pertinent labs within the past 24 hours have been reviewed.  BMP:   Recent Labs   Lab 03/23/22  0455   *      K 3.8      CO2 23   BUN 19   CREATININE 0.7   CALCIUM 8.8     CBC:   Recent Labs   Lab 03/22/22  1341 03/23/22  0455   WBC 8.34 6.33   HGB 11.9* 11.0*   HCT 36.2* 33.5*   PLT 97* 82*       Significant Imaging: I have reviewed all pertinent imaging results/findings within the past 24 hours.

## 2022-03-23 NOTE — PLAN OF CARE
Problem: Occupational Therapy  Goal: Occupational Therapy Goal  Description: Goals to be met by: 4/13/2022     Patient will increase functional independence with ADLs by performing:    LE Dressing with Stand-by Assistance and Assistive Devices as needed.  Grooming while seated with Supervision.  Toileting from bedside commode with Supervision for hygiene and clothing management.   Supine to sit with Stand-by Assistance.  Toilet transfer to bedside commode with Stand-by Assistance.    Outcome: Ongoing, Progressing

## 2022-03-23 NOTE — NURSING
Telesitter ordered for patient to ensure patient safety due to confusion and refusal to wear nasal cannula (O2 sat in 70s on room air). Patient placed on wait list for [employer]. Will continue to monitor closely.

## 2022-03-23 NOTE — CONSULTS
03/23/2022      Admit Date: 3/22/2022  Steve June Jr.  New Patient Consult    Chief Complaint   Patient presents with    Shortness of Breath     X days - worsening - pt has dementia and does not wear O2 as prescribed        History of Present Illness:  Pt is a 75 yo male with DM2, anemia, cirrhosis with HCV, pulmonary fibrosis, chronic resp failure on home O2, FHpEF, HTN and HLD who presented to ED 3/22 with cough and sob for several days.  Pt is followed in clinic by Dr. Camarena and Edna Olvera dating back to 2018- had tried ofev and esbriet in past. He uses 4L oxygen and the dx is presumed to be IPF. Pt has never undergone lung biopsy.  Pt has dementia and is a poor historian. He has no complaints and has behavioral issues at times causing him to yell at staff. Wife Lili at bedside helps give history. Pt was in his usual state of health (ambulatory around house) until about 3-4 days ago noticed him to be so short of breath he was having difficulty with minimal movement and stayed confined to the bed. Pt has also had a dry cough. He would gasp for air with minimal activity. He has been dx with pulmonary fibrosis for several years and has had home O2 but did not wear it. He broke his hip 6 mos ago and has been recovering since that. Pt is a past smoker, quit 30 yrs ago. He worked as an  in past but denies significant pulmonary exposures.   Denies fevers, chills, sick contacts, chest pains, swelling.     PFSH:  Past Medical History:   Diagnosis Date    Abnormal thyroid function test     Allergy     Seasonal    Anemia     Anemia due to blood loss 7/2/2014    Arthritis     Gaviria esophagus     Basal cell carcinoma     right forearm    Basal cell carcinoma 12/2011    lower post neck    Basal cell carcinoma 04/23/2019    left posterior helix    Cancer     skin CA    Cataract     Cirrhosis     Diabetes mellitus     Diabetes mellitus, type 2     Encounter for blood transfusion      Esophageal varices in cirrhosis     grade II on  EGD    Gastritis     on  EGD    GERD (gastroesophageal reflux disease) 2015    Hard of hearing     Hiatal hernia     History of hepatitis C 8/10/2012    tx with harvoni x 41 days (started 10/22/15). SVR4     Hoarseness 2015    Hx of colonic polyps 01/10/2011    per colonoscopy report in media    Hypercholesteremia     Hypersplenism     Hypertension     No meds    Pain management 12/10/2014    Petechial hemorrhage 2015    Lower extremities bilat     Portal hypertensive gastropathy     on  EGD    Squamous cell carcinoma 2019    right preauricular cheek, right temple    Thrombocytopenia     Type II or unspecified type diabetes mellitus with neurological manifestations, uncontrolled(250.62) 2013    Valvular heart disease     mild MR 12     Past Surgical History:   Procedure Laterality Date    CATARACT EXTRACTION  1/10/13    left eye    CATARACT EXTRACTION      right eye    CHOLECYSTECTOMY      COLONOSCOPY      EYE SURGERY      Cataract surgery to right eye    HIP ARTHROPLASTY Right 2021    Procedure: ARTHROPLASTY, HIP, Garrett, peg board, 1st assist;  Surgeon: Corey Vargas MD;  [facility];  Service: Orthopedics;  Laterality: Right;    KNEE ARTHROSCOPY W/ MENISCAL REPAIR      KNEE CARTILAGE SURGERY      left knee    KNEE SURGERY  2006    left    SKIN LESION EXCISION      TONSILLECTOMY      UPPER GASTROINTESTINAL ENDOSCOPY       Social History     Tobacco Use    Smoking status: Former Smoker     Packs/day: 1.00     Years: 25.00     Pack years: 25.00     Quit date: 2000     Years since quittin.6    Smokeless tobacco: Never Used   Substance Use Topics    Alcohol use: Yes     Comment: rarely    Drug use: No     Family History   Problem Relation Age of Onset    Leukemia Mother     Cancer Mother         bone    Alcohol abuse Father     Cirrhosis Father         EtOH  "induced    No Known Problems Daughter     No Known Problems Son     No Known Problems Daughter     No Known Problems Daughter     No Known Problems Daughter     No Known Problems Sister     Amblyopia Neg Hx     Blindness Neg Hx     Cataracts Neg Hx     Diabetes Neg Hx     Glaucoma Neg Hx     Hypertension Neg Hx     Macular degeneration Neg Hx     Retinal detachment Neg Hx     Strabismus Neg Hx     Stroke Neg Hx     Thyroid disease Neg Hx     Psoriasis Neg Hx     Lupus Neg Hx     Eczema Neg Hx     Acne Neg Hx     Melanoma Neg Hx      Review of patient's allergies indicates:   Allergen Reactions    Adhesive tape-silicones Other (See Comments)     pulls skin off    Doxycycline      Dizzy. "Just didn't feel right".       Performance Status:Performance Status:The patient's activity level is bedbound.    Review of Systems:  a review of eleven systems covering constitutional, Psych, Eye, HEENT, Respiratory, Cardiac, GI, , Musculoskeletal, Endocrine, Dermatologicwas negative except the above mentioned abnormalities and for any pertinent findings as listed below:  Decreased appetite  10# wt loss/2 mos   Wound on upper back, keeps scraping at it  Heart murmur- long time      Exam:Comprehensive exam done. /80 (BP Location: Left arm, Patient Position: Lying)   Pulse 79   Temp 98.7 °F (37.1 °C) (Oral)   Resp 17   Ht 5' 9" (1.753 m)   Wt 77.8 kg (171 lb 8.3 oz)   SpO2 (!) 93%   BMI 25.33 kg/m²   Exam included Vitals as listed, and patient's appearance and affect and alertness and mood, oral exam for yeast and hygiene and pharynx lesions and Mallapatti (M) score, neck with inspection for jvd and masses and thyroid abnormalities and lymph nodes (supraclavicular and infraclavicular nodes also examined and noted if abn), chest exam included symmetry and effort and fremitus and percussion and auscultation, cardiac exam included rhythm and gallops and murmur and rubs and jvd and edema, abdominal " exam for mass and hepatosplenomegaly and tenderness and hernias and bowel sounds, Musculoskeletal exam with muscle tone and posture and mobility/gait and  strenght, and skin for rashes and cyanosis and pallor and turgor, extremity for clubbing.  Findings were normal except as listed below:  Awake, alert, confused, pleasant  Oropharynx clear, M3  HR regular w/ loud systolic murmur  Bilateral fine rales, equal bilaterally with normal effort  abd soft nontender, BS+  Upper left back excoriated round open lesions w background of erythema  bilat lower ext w/ chronic discoloration, no edema    Radiographs reviewed: view by direct vision   CXR 3/22- increased interstitial markings bilaterally, worsened compared to prior cxr from 8/2021  CTA chest 4/5/21- ild with diffuse distribution, peripheral predominance with reticulations, honeycombing    TTE 2017-   CONCLUSIONS     1 - No wall motion abnormalities.     2 - Hyperdynamic left ventricular systolic function (EF 65-70%).     3 - Normal left ventricular diastolic function.     4 - Normal right ventricular systolic function .     5 - The estimated PA systolic pressure is 24 mmHg.     6 - Suggestion for improve diastolic function from Echo in 3/2012.     7 - Difficult apical windows.     Labs     Recent Labs   Lab 03/23/22  0455   WBC 6.33   HGB 11.0*   HCT 33.5*   PLT 82*   HGBA1C 7.2*     Recent Labs   Lab 03/22/22  1341 03/22/22  1454 03/22/22  1454 03/22/22 2007 03/23/22  0455     --   --   --  136   K 4.2  --   --   --  3.8     --   --   --  103   CO2 22*  --   --   --  23   BUN 21  --   --   --  19   CREATININE 0.8  --   --   --  0.7   *  --   --   --  125*   CALCIUM 9.1  --   --   --  8.8   AST 24  --   --   --   --    ALT 13  --   --   --   --    ALKPHOS 115  --   --   --   --    BILITOT 2.7*  --   --   --   --    PROT 6.8  --   --   --   --    ALBUMIN 3.4*  --   --   --   --    PROCAL  --  0.11  --   --   --    LACTATE  --  2.0   < > 1.5  --     *  149*  --   --   --   --     < > = values in this interval not displayed.   No results for input(s): PH, PCO2, PO2, HCO3 in the last 24 hours.  Microbiology Results (last 7 days)     Procedure Component Value Units Date/Time    Blood Culture #1 [[id number]] Collected: 03/22/22 1454    Order Status: Completed Specimen: Blood from Antecubital, Right Updated: 03/23/22 0515     Blood Culture, Routine No Growth to date    Blood Culture #2 [[id number]] Collected: 03/22/22 1454    Order Status: Completed Specimen: Blood from Peripheral, Left Arm Updated: 03/23/22 0515     Blood Culture, Routine No Growth to date    Narrative:      drawn by nurys    Influenza A & B by Molecular [[id number]] Collected: 03/22/22 1407    Order Status: Completed Specimen: Nasopharyngeal Swab Updated: 03/22/22 1501     Influenza A, Molecular Negative     Influenza B, Molecular Negative     Flu A & B Source Nasal swab          Impression:  Active Hospital Problems    Diagnosis  POA    *Acute on chronic respiratory failure [J96.20]  Yes    Pneumonia [J18.9]  Yes    Interstitial lung disease [J84.9]  Yes    Stasis dermatitis of both legs [I87.2]  Yes    Hypertension associated with diabetes [E11.59, I15.2]  Yes    Hepatic cirrhosis due to chronic hepatitis C infection [B18.2, K74.60]  Yes    Chronic low back pain [M54.50, G89.29]  Yes    GERD (gastroesophageal reflux disease) [K21.9]  Yes    Type 2 diabetes mellitus with complication, with long-term current use of insulin [E11.8, Z79.4]  Not Applicable    PVD (peripheral vascular disease) [I73.9]  Yes     Decreased pulses. No claudication      Chronic pain of left knee [M25.562, G89.29]  Yes     Pain contract with Dr. Pereira Feb 2014.         Resolved Hospital Problems   No resolved problems to display.           Plan:     - continue supplemental O2 to keep sats 89-92%  - presentation not consistent w/ pneumonia- check procal and consider deescalating abx  - follow up echo  -  lasix 20mg x1 now  - CXR repeat in am  - if not clearing consider empiric steroids, may have ILD flare        Maribell Campo MD  Pulmonary & Critical Care Medicine

## 2022-03-23 NOTE — CARE UPDATE
03/22/22 1924   Patient Assessment/Suction   Level of Consciousness (AVPU) alert   Respiratory Effort Normal;Unlabored   Expansion/Accessory Muscles/Retractions expansion symmetric;no retractions;no use of accessory muscles   Cough Frequency no cough   PRE-TX-O2   O2 Device (Oxygen Therapy) nasal cannula   Flow (L/min) 4   Oxygen Concentration (%) 36   SpO2 (!) 92 %   Pulse Oximetry Type Intermittent   $ Pulse Oximetry - Single Charge Pulse Oximetry - Single   Pulse 76   Resp (!) 21   Aerosol Therapy   $ Aerosol Therapy Charges Aerosol Treatment   Respiratory Treatment Status (SVN) given   Treatment Route (SVN) mask   Patient Position (SVN) HOB elevated   Signs of Intolerance (SVN) none   Breath Sounds Post-Respiratory Treatment   Throughout All Fields Post-Treatment All Fields   Throughout All Fields Post-Treatment no change   Post-treatment Heart Rate (beats/min) 78   Post-treatment Resp Rate (breaths/min) 20   Ready to Wean/Extubation Screen   FIO2<=50 (chart decimal) 0.36

## 2022-03-23 NOTE — PT/OT/SLP EVAL
"Physical Therapy Evaluation    Patient Name:  Steve June Jr.   MRN:  [id number]    Recommendations:     Discharge Recommendations:  nursing facility, skilled   Discharge Equipment Recommendations:  (TBD at next level of care)   Barriers to discharge: Decreased caregiver support    Assessment:     Steve June Jr. is a 76 y.o. male admitted with a medical diagnosis of Acute on chronic respiratory failure.  He presents with the following impairments/functional limitations:  weakness, impaired endurance, impaired functional mobilty, gait instability, impaired balance, decreased upper extremity function, decreased lower extremity function, decreased safety awareness, pain, impaired skin, impaired cardiopulmonary response to activity. Patient is agreeable to participation with PT evaluation requesting to stand to use the urinal. Spouse present and assisted with history. Patient with a history of dementia and is oriented to person only. His wife works full time and patient is alone during the day. She does endorse he has increased weakness the past month and has been bed bound since Friday. Normally he is ambulatory with either RW or SPC. He had a fall in August resulting in a hip fracture with surgical fixation and was discharged to SNF. Today he requires ModA for supine to sit transfer. Patient with frequent hacking cough. After coughing spell patient will remove O2 to "blow nose", but then state he can't breathe. PT provided education that O2 sats drop when O2 is removed, but patient is not receptive to education and becomes increasingly frustrated. He requires Tesha for sit to stand transfer with RW x2 trials and used the urinal on second trial of standing. He returned to bed requiring MaxA x2 to scoot up in bed. SpO2 of 84% post-evaluation on supplemental O2 with recovery to 92% with PLB. Bed alarm on, RN notified, and spouse present.      Rehab Prognosis: Good; patient would benefit from acute skilled PT " "services to address these deficits and reach maximum level of function.    Recent Surgery: * No surgery found *      Plan:     During this hospitalization, patient to be seen 6 x/week to address the identified rehab impairments via gait training, therapeutic activities, therapeutic exercises and progress toward the following goals:    · Plan of Care Expires:  04/23/22    Subjective     Chief Complaint: can't breathe (when patient keeps removing O2)  Patient/Family Comments/goals: stand to use urinal   Pain/Comfort:  · Pain Rating 1:  (not rated)  · Location - Side 1: Bilateral  · Location 1: foot ("neuropathy")  · Pain Addressed 1: Nurse notified    Patients cultural, spiritual, Orthodoxy conflicts given the current situation:      Living Environment:  Patient lives with spouse in a H with no ALEX. His wife works full time and patient is alone during the day.   Prior to admission, patients level of function was ambulatory with RW or SPC prior to Friday. He had a fall in August resulting in a hip fracture with surgical fixation and was discharged to SNF and then had OPPT.  Equipment used at home: oxygen, walker, rolling, rollator, cane, straight.  DME owned (not currently used): none.  Upon discharge, patient will have assistance from SNF.    Objective:     Communicated with DONAL Rodríguez prior to session.  Patient found HOB elevated with bed alarm, oxygen, telemetry  upon PT entry to room.    General Precautions: Standard, fall   Orthopedic Precautions:N/A   Braces: N/A  Respiratory Status: Nasal cannula, flow 4 L/min    Exams:  · RLE Strength: 2/5  · LLE Strength: 2/5    Functional Mobility:  · Bed Mobility:     · Scooting: maximal assistance and of 2 persons  · Supine to Sit: moderate assistance  · Sit to Supine: moderate assistance  · Transfers:     · Sit to Stand:  minimum assistance with rolling walker    Therapeutic Activities and Exercises:   Patient was educated on the importance of OOB activity and " functional mobility to negate negative effects of prolonged bed rest during hospitalization, safe transfers and ambulation, O2 use and PLB, SNF, and D/C planning     Patient requires CGA for standing to use urinal     AM-PAC 6 CLICK MOBILITY  Total Score:10     Patient left HOB elevated with all lines intact, call button in reach, bed alarm on, RN notified and spouse present.    GOALS:   Multidisciplinary Problems     Physical Therapy Goals        Problem: Physical Therapy    Goal Priority Disciplines Outcome Goal Variances Interventions   Physical Therapy Goal     PT, PT/OT      Description: Goals to be met by: 22     Patient will increase functional independence with mobility by performin. Supine to sit with Stand-by Assistance  2. Sit to stand transfer with Stand-by Assistance  3. Bed to chair transfer with Stand-by Assistance using Rolling Walker  4. Gait  x 100 feet with Stand-by Assistance using Rolling Walker.   5. Lower extremity exercise program x20 reps per handout, with supervision                     History:     Past Medical History:   Diagnosis Date    Abnormal thyroid function test     Allergy     Seasonal    Anemia     Anemia due to blood loss 2014    Arthritis     Gaviria esophagus     Basal cell carcinoma     right forearm    Basal cell carcinoma 2011    lower post neck    Basal cell carcinoma 2019    left posterior helix    Cancer     skin CA    Cataract     Cirrhosis     Diabetes mellitus     Diabetes mellitus, type 2     Encounter for blood transfusion     Esophageal varices in cirrhosis     grade II on  EGD    Gastritis     on  EGD    GERD (gastroesophageal reflux disease) 2015    Hard of hearing     Hiatal hernia     History of hepatitis C 8/10/2012    tx with harvoni x 41 days (started 10/22/15). SVR4     Hoarseness 2015    Hx of colonic polyps 01/10/2011    per colonoscopy report in media    Hypercholesteremia      Hypersplenism     Hypertension     No meds    Pain management 12/10/2014    Petechial hemorrhage 11/25/2015    Lower extremities bilat     Portal hypertensive gastropathy     on 7/12 EGD    Squamous cell carcinoma 04/23/2019    right preauricular cheek, right temple    Thrombocytopenia     Type II or unspecified type diabetes mellitus with neurological manifestations, uncontrolled(250.62) 12/24/2013    Valvular heart disease     mild MR 12       Past Surgical History:   Procedure Laterality Date    CATARACT EXTRACTION  1/10/13    left eye    CATARACT EXTRACTION      right eye    CHOLECYSTECTOMY      COLONOSCOPY      EYE SURGERY      Cataract surgery to right eye    HIP ARTHROPLASTY Right 8/27/2021    Procedure: ARTHROPLASTY, HIP, Neema, peg board, 1st assist;  Surgeon: Corey Vargas MD;  Location: [facility];  Service: Orthopedics;  Laterality: Right;    KNEE ARTHROSCOPY W/ MENISCAL REPAIR      KNEE CARTILAGE SURGERY      left knee    KNEE SURGERY  12/2006    left    SKIN LESION EXCISION  2021    TONSILLECTOMY      UPPER GASTROINTESTINAL ENDOSCOPY         Time Tracking:     PT Received On: 03/23/22  PT Start Time: 0942     PT Stop Time: 1017  PT Total Time (min): 35 min     Billable Minutes: Evaluation 10 and Therapeutic Activity 25 03/23/2022

## 2022-03-23 NOTE — PT/OT/SLP EVAL
Occupational Therapy   Evaluation    Name: Steve June Jr.  MRN: [id number]  Admitting Diagnosis:  Acute on chronic respiratory failure  Recent Surgery: * No surgery found *      Recommendations:     Discharge Recommendations: nursing facility, skilled  Discharge Equipment Recommendations:   (TBD)  Barriers to discharge:  None    Assessment:     Steve June Jr. is a 76 y.o. male with a medical diagnosis of Acute on chronic respiratory failure.  He presents with poor activity tolerance due to fatigue and decreasing O2 sats with activity. Prior to activity, patient's SpO2 was 88 on 4L. Patient decreased to 80 after sitting EOB requiring Mod A. Patient was able to increase to 90 with PLB after ~5 min after increasing O2 from 4L to 5L. However, patient's frequent coughing decreased his SpO2 down to upper 70s. Patient was unable to exceed a SpO2 of 90 due to frequent coughing and removing his NC to wipe his nose. Patient stood from bed requiring Mod A using RW, noting decrease in SpO2 from high 80s to low 80s. Patient has dementia at baseline and underwent a hip replacement 6 months. Patient would benefit from SNF placement to maximize independence with ADLs. Performance deficits affecting function: weakness, impaired endurance, impaired self care skills, impaired functional mobilty, gait instability, impaired balance, decreased lower extremity function, impaired cognition.      Rehab Prognosis: Fair; patient would benefit from acute skilled OT services to address these deficits and reach maximum level of function.       Plan:     Patient to be seen 4 x/week to address the above listed problems via self-care/home management, therapeutic activities, therapeutic exercises  · Plan of Care Expires: 04/13/22  · Plan of Care Reviewed with: patient, spouse, family    Subjective     Chief Complaint: fatigue and HA pain  Patient/Family Comments/goals: none    Occupational Profile:  Living Environment: Patient lives with  spouse.   Previous level of function: Patient required assistance with LB dressing; patient was mod I with grooming, toileting, bathing, and feeding. Typically, patient was ambulatory with AD. However, patient has been bedbound for the past week.  Equipment Used at Home:  oxygen, walker, rolling, rollator, cane, straight  Assistance upon Discharge: Patient will receive assistance from family.     Pain/Comfort:  · Pain Rating 1:  (not rated)  · Location - Orientation 1: generalized  · Location 1:  (headache pain)  · Pain Addressed 1: Reposition, Distraction, Cessation of Activity  · Pain Rating Post-Intervention 1:  (not rated)    Patients cultural, spiritual, Methodist conflicts given the current situation:      Objective:     Communicated with: nurse Rodríguez prior to session.  Patient found HOB elevated with bed alarm, oxygen, telemetry upon OT entry to room.    General Precautions: Standard, fall   Orthopedic Precautions:N/A   Braces: N/A  Respiratory Status: Nasal cannula, flow 4 L/min    Occupational Performance:    Bed Mobility:    · Patient completed Scooting/Bridging with moderate assistance  · Patient completed Supine to Sit with moderate assistance  · Patient completed Sit to Supine with moderate assistance    Functional Mobility/Transfers:  · Patient completed Sit <> Stand Transfer with moderate assistance  with  rolling walker     Activities of Daily Living:  · Grooming: stand by assistance with all grooming while seated EOB  · Lower Body Dressing: maximal assistance to don/doff socks while seated EOB  · Toileting: moderate assistance with managing clothes    Cognitive/Visual Perceptual:  Cognitive/Psychosocial Skills:     -       Oriented to: Person   -       Follows Commands/attention:Follows two-step commands  -       Communication: clear/fluent  -       Safety awareness/insight to disability: impaired   -       Mood/Affect/Coping skills/emotional control: Cooperative  Visual/Perceptual:      -Intact      Physical Exam:  Postural examination/scapula alignment:    -       Rounded shoulders  -       Forward head  Upper Extremity Range of Motion:     -       Right Upper Extremity: WFL  -       Left Upper Extremity: WFL  Upper Extremity Strength:    -       Right Upper Extremity: WFL  -       Left Upper Extremity: WFL   Strength:    -       Right Upper Extremity: WFL  -       Left Upper Extremity: WFL  Fine Motor Coordination:    -       Intact  Gross motor coordination:   WFL in BUE    AMPAC 6 Click ADL:  AMPA Total Score: 17    Treatment & Education:  OT ed pt on OT role & POC as well as discharge recommendations.  OT ed patient on safety with walker use for functional mobility with cues for hand placement & sequencing.   OT educated patient on energy conservation techniques to reduce demand on cardiovascular system with performance of ADL tasks.     Education:    Patient left HOB elevated with all lines intact, call button in reach, bed alarm on and nurse and family present    GOALS:   Multidisciplinary Problems     Occupational Therapy Goals        Problem: Occupational Therapy    Goal Priority Disciplines Outcome Interventions   Occupational Therapy Goal     OT, PT/OT Ongoing, Progressing    Description: Goals to be met by: 4/13/2022     Patient will increase functional independence with ADLs by performing:    LE Dressing with Stand-by Assistance and Assistive Devices as needed.  Grooming while seated with Supervision.  Toileting from bedside commode with Supervision for hygiene and clothing management.   Supine to sit with Stand-by Assistance.  Toilet transfer to bedside commode with Stand-by Assistance.                     History:     Past Medical History:   Diagnosis Date    Abnormal thyroid function test     Allergy     Seasonal    Anemia     Anemia due to blood loss 7/2/2014    Arthritis     Gaviria esophagus     Basal cell carcinoma     right forearm    Basal cell carcinoma 12/2011    lower  post neck    Basal cell carcinoma 04/23/2019    left posterior helix    Cancer     skin CA    Cataract     Cirrhosis     Diabetes mellitus     Diabetes mellitus, type 2     Encounter for blood transfusion     Esophageal varices in cirrhosis     grade II on 7/12 EGD    Gastritis     on 7/12 EGD    GERD (gastroesophageal reflux disease) 2/28/2015    Hard of hearing     Hiatal hernia     History of hepatitis C 8/10/2012    tx with harvoni x 41 days (started 10/22/15). SVR4     Hoarseness 2/28/2015    Hx of colonic polyps 01/10/2011    per colonoscopy report in media    Hypercholesteremia     Hypersplenism     Hypertension     No meds    Pain management 12/10/2014    Petechial hemorrhage 11/25/2015    Lower extremities bilat     Portal hypertensive gastropathy     on 7/12 EGD    Squamous cell carcinoma 04/23/2019    right preauricular cheek, right temple    Thrombocytopenia     Type II or unspecified type diabetes mellitus with neurological manifestations, uncontrolled(250.62) 12/24/2013    Valvular heart disease     mild MR 12       Past Surgical History:   Procedure Laterality Date    CATARACT EXTRACTION  1/10/13    left eye    CATARACT EXTRACTION      right eye    CHOLECYSTECTOMY      COLONOSCOPY      EYE SURGERY      Cataract surgery to right eye    HIP ARTHROPLASTY Right 8/27/2021    Procedure: ARTHROPLASTY, HIP, [place], peg board, 1st assist;  Surgeon: Corey Vargas MD;  Location: [facility];  Service: Orthopedics;  Laterality: Right;    KNEE ARTHROSCOPY W/ MENISCAL REPAIR      KNEE CARTILAGE SURGERY      left knee    KNEE SURGERY  12/2006    left    SKIN LESION EXCISION  2021    TONSILLECTOMY      UPPER GASTROINTESTINAL ENDOSCOPY         Time Tracking:     OT Date of Treatment: 03/23/22  OT Start Time: 1040  OT Stop Time: 1113  OT Total Time (min): 33 min    Billable Minutes:Evaluation 10  Self Care/Home Management 23    3/23/2022

## 2022-03-23 NOTE — PROGRESS NOTES
[facility] Hospital Medicine  Progress Note    Patient Name: Steve June Jr.  MRN: [id number]  Patient Class: IP- Inpatient   Admission Date: 3/22/2022  Length of Stay: 1 days  Attending Physician: Yamini Perea MD  Primary Care Provider: Crispin Garcia MD        Subjective:     Principal Problem:Acute on chronic respiratory failure        HPI:  Steve June Jr. is a 75 y.o. male with a PMHx venous insufficiency, DM2, GERD, HTN, CHF, cirrhosis, chronic hepatitis C, pulmonary fibrosis, and HLD, hypertension valvular heart disease mild mitral regurg, chronic diastolic heart failure, interstitial lung disease who presents to the ED with   chief complain of worsening cough and shortness of breath over the past few days.  Patient has dementia unable to provide history but denies any other symptoms at this point.  Patient's wife was at bedside states that patient has been having generalized weakness for the last month or to and has been completely bed-bound for the last 1 week.  His breathing worsened in the last few days.  Patient does not want wears oxygen secondary to dementia.  Wife states his oxygen saturations were in the 70s.  Patient has not had any hospitalization for hypoxia or respiratory failure in the last 1 year.       Overview/Hospital Course:  No notes on file    Interval History:  Patient continues to remove his nasal cannula has been hypoxemic off the nasal cannula.  Afebrile heart rate within normal limit vital stable    Review of Systems   Unable to perform ROS: Dementia   Constitutional:  Positive for chills. Negative for fever.   Respiratory:  Positive for shortness of breath.    Cardiovascular:  Negative for chest pain.   Musculoskeletal:  Positive for arthralgias and back pain.   Neurological:  Positive for weakness (generalized).   Objective:     Vital Signs (Most Recent):  Temp: 97.8 °F (36.6 °C) (03/23/22 0730)  Pulse: 68 (03/23/22 0742)  Resp: 16 (03/23/22  0742)  BP: (!) 118/59 (03/23/22 0730)  SpO2: (!) 91 % (03/23/22 0742)   Vital Signs (24h Range):  Temp:  [97 °F (36.1 °C)-98.6 °F (37 °C)] 97.8 °F (36.6 °C)  Pulse:  [68-97] 68  Resp:  [16-23] 16  SpO2:  [87 %-98 %] 91 %  BP: (118-156)/(58-69) 118/59     Weight: 77.8 kg (171 lb 8.3 oz)  Body mass index is 25.33 kg/m².    Intake/Output Summary (Last 24 hours) at 3/23/2022 1031  Last data filed at 3/23/2022 0758  Gross per 24 hour   Intake 480 ml   Output 300 ml   Net 180 ml      Physical Exam  Vitals and nursing note reviewed.   Constitutional:       General: He is not in acute distress.     Appearance: He is well-developed. He is not ill-appearing or diaphoretic.   HENT:      Head: Normocephalic and atraumatic.      Right Ear: External ear normal.      Left Ear: External ear normal.      Nose: Nose normal.      Mouth/Throat:      Mouth: Mucous membranes are moist.   Eyes:      General: No scleral icterus.        Right eye: No discharge.         Left eye: No discharge.      Conjunctiva/sclera: Conjunctivae normal.      Pupils: Pupils are equal, round, and reactive to light.   Neck:      Thyroid: No thyromegaly.   Cardiovascular:      Rate and Rhythm: Normal rate and regular rhythm.      Heart sounds: Normal heart sounds. No murmur heard.  Pulmonary:      Effort: Pulmonary effort is normal. No respiratory distress.      Breath sounds: No stridor. Rales present. No wheezing.   Abdominal:      General: Bowel sounds are normal. There is no distension.      Palpations: Abdomen is soft.      Tenderness: There is no abdominal tenderness.   Musculoskeletal:         General: No tenderness.      Cervical back: Normal range of motion and neck supple.   Lymphadenopathy:      Cervical: No cervical adenopathy.   Skin:     General: Skin is warm and dry.      Capillary Refill: Capillary refill takes less than 2 seconds.      Findings: No erythema or rash.   Neurological:      General: No focal deficit present.      Mental Status: He  is alert. Mental status is at baseline. He is disoriented.      Cranial Nerves: No cranial nerve deficit.      Sensory: No sensory deficit.      Motor: Weakness present. No abnormal muscle tone.      Coordination: Coordination normal.   Psychiatric:         Mood and Affect: Mood normal.         Behavior: Behavior normal.       Significant Labs: All pertinent labs within the past 24 hours have been reviewed.  BMP:   Recent Labs   Lab 03/23/22  0455   *      K 3.8      CO2 23   BUN 19   CREATININE 0.7   CALCIUM 8.8     CBC:   Recent Labs   Lab 03/22/22  1341 03/23/22  0455   WBC 8.34 6.33   HGB 11.9* 11.0*   HCT 36.2* 33.5*   PLT 97* 82*       Significant Imaging: I have reviewed all pertinent imaging results/findings within the past 24 hours.      Assessment/Plan:      * Acute on chronic respiratory failure  Patient with Hypoxic Respiratory failure which is Acute on chronic.  he is on home oxygen at 3 LPM. Supplemental oxygen was provided and noted-  .   Signs/symptoms of respiratory failure include- tachypnea, increased work of breathing, respiratory distress and use of accessory muscles. Contributing diagnoses includes - Pneumonia Labs and images were reviewed. Patient Has not had a recent ABG.     Pneumonia  The chest x-ray shows consolidation Moderate chronic interstitial disease, with possible superimposed acute infiltrate  Will cont empiric treatment with IV ceftriaxone and IV azithromycin.  We will repeat CBC daily  Differential diagnosis:  Congestive heart failure, this can be supported by the  cough, and cardiovascular risk factors. However, the mildly elevated BNP supports a pulmonary process as well as the consolidation seen on chest x-ray.   On exam, the patient lacks JVD and lower extremity edema that would be suspected in right sided heart failure.   Pulmonary embolism,less likely.   BNP  Recent Labs   Lab 03/22/22  1341   *  149*       However, the patient lacks the risk  factors of hypercoagulability and inactivity. The progression of the  symptoms over the course of a week does not support a diagnosis of a PE. On exam there  is no evidence of a deep venous thrombosis.      FEN/IVF: cardiac diet. No IVF        Interstitial lung disease  Hx noted  On home oxygen 3 L      Stasis dermatitis of both legs  Hx noted      Hypertension associated with diabetes  Will resume home meds      Hepatic cirrhosis due to chronic hepatitis C infection  Hx noted      Chronic low back pain  Hx noted      GERD (gastroesophageal reflux disease)  Hx noted      Type 2 diabetes mellitus with complication, with long-term current use of insulin  Patient's FSGs are controlled on current medication regimen.  Last A1c reviewed-   Lab Results   Component Value Date    HGBA1C 7.5 (H) 09/21/2021     Most recent fingerstick glucose reviewed- No results for input(s): POCTGLUCOSE in the last 24 hours.  Current correctional scale  Low  Maintain anti-hyperglycemic dose as follows-   Antihyperglycemics (From admission, onward)            None        Hold Oral hypoglycemics while patient is in the hospital.\      PVD (peripheral vascular disease)  Hx noted      Chronic pain of left knee  chronic        VTE Risk Mitigation (From admission, onward)    None          Discharge Planning   PATO:      Code Status: Full Code   Is the patient medically ready for discharge?:     Reason for patient still in hospital (select all that apply): Patient trending condition and Treatment                     Yamini Perea MD  Department of Hospital Medicine   [facility]

## 2022-03-23 NOTE — CONSULTS
Consult noted; chart reviewed. POA/LW noted on chart. Pt is a FULL CODE. Disoriented. Family @ the bedside.     Wife, alexandria, has POA. Reports pt came from home and lives with her. She works full-time during the day so pt cares for himself. However, has become increasingly weak within the last month and needs additional support. Introduced Palliative care as an extra layer of support for our patients with chronic illnesses. Wife agreeable to meet with Dr. Aldridge tomorrow @ 1:00pm @ the bedside. PC Team will continue to follow.

## 2022-03-24 PROBLEM — Z71.89 GOALS OF CARE, COUNSELING/DISCUSSION: Status: ACTIVE | Noted: 2022-03-24

## 2022-03-24 LAB
ANION GAP SERPL CALC-SCNC: 13 MMOL/L (ref 8–16)
AORTIC ROOT ANNULUS: 2.46 CM
AORTIC VALVE CUSP SEPERATION: 1.95 CM
ASCENDING AORTA: 3.93 CM
AV INDEX (PROSTH): 0.52
AV MEAN GRADIENT: 13 MMHG
AV PEAK GRADIENT: 25 MMHG
AV VALVE AREA: 1.98 CM2
AV VELOCITY RATIO: 0.52
BASOPHILS # BLD AUTO: 0.03 K/UL (ref 0–0.2)
BASOPHILS NFR BLD: 0.5 % (ref 0–1.9)
BSA FOR ECHO PROCEDURE: 1.94 M2
BUN SERPL-MCNC: 20 MG/DL (ref 8–23)
CALCIUM SERPL-MCNC: 8.8 MG/DL (ref 8.7–10.5)
CHLORIDE SERPL-SCNC: 101 MMOL/L (ref 95–110)
CO2 SERPL-SCNC: 23 MMOL/L (ref 23–29)
CREAT SERPL-MCNC: 0.8 MG/DL (ref 0.5–1.4)
CV ECHO LV RWT: 0.35 CM
D DIMER PPP IA.FEU-MCNC: 1.7 MG/L FEU
DIFFERENTIAL METHOD: ABNORMAL
DOP CALC AO PEAK VEL: 2.48 M/S
DOP CALC AO VTI: 47.57 CM
DOP CALC LVOT AREA: 3.8 CM2
DOP CALC LVOT DIAMETER: 2.21 CM
DOP CALC LVOT PEAK VEL: 1.28 M/S
DOP CALC LVOT STROKE VOLUME: 94.36 CM3
DOP CALC MV VTI: 40.31 CM
DOP CALCLVOT PEAK VEL VTI: 24.61 CM
E WAVE DECELERATION TIME: 282.27 MSEC
E/A RATIO: 0.66
E/E' RATIO: 18.18 M/S
ECHO LV POSTERIOR WALL: 0.8 CM (ref 0.6–1.1)
EJECTION FRACTION: 65 %
EOSINOPHIL # BLD AUTO: 0.3 K/UL (ref 0–0.5)
EOSINOPHIL NFR BLD: 4.1 % (ref 0–8)
ERYTHROCYTE [DISTWIDTH] IN BLOOD BY AUTOMATED COUNT: 15.8 % (ref 11.5–14.5)
EST. GFR  (AFRICAN AMERICAN): >60 ML/MIN/1.73 M^2
EST. GFR  (NON AFRICAN AMERICAN): >60 ML/MIN/1.73 M^2
FRACTIONAL SHORTENING: 23 % (ref 28–44)
GLUCOSE SERPL-MCNC: 130 MG/DL (ref 70–110)
GLUCOSE SERPL-MCNC: 580 MG/DL (ref 70–110)
HCT VFR BLD AUTO: 36.9 % (ref 40–54)
HGB BLD-MCNC: 11.8 G/DL (ref 14–18)
IMM GRANULOCYTES # BLD AUTO: 0.04 K/UL (ref 0–0.04)
IMM GRANULOCYTES NFR BLD AUTO: 0.6 % (ref 0–0.5)
INTERVENTRICULAR SEPTUM: 0.79 CM (ref 0.6–1.1)
IVRT: 102.76 MSEC
LA MAJOR: 5.23 CM
LA MINOR: 5.04 CM
LA WIDTH: 3.13 CM
LEFT ATRIUM SIZE: 4.86 CM
LEFT ATRIUM VOLUME INDEX MOD: 33.7 ML/M2
LEFT ATRIUM VOLUME INDEX: 34.4 ML/M2
LEFT ATRIUM VOLUME MOD: 65.05 CM3
LEFT ATRIUM VOLUME: 66.37 CM3
LEFT INTERNAL DIMENSION IN SYSTOLE: 3.5 CM (ref 2.1–4)
LEFT VENTRICLE DIASTOLIC VOLUME INDEX: 49.2 ML/M2
LEFT VENTRICLE DIASTOLIC VOLUME: 94.96 ML
LEFT VENTRICLE MASS INDEX: 59 G/M2
LEFT VENTRICLE SYSTOLIC VOLUME INDEX: 26.4 ML/M2
LEFT VENTRICLE SYSTOLIC VOLUME: 50.92 ML
LEFT VENTRICULAR INTERNAL DIMENSION IN DIASTOLE: 4.55 CM (ref 3.5–6)
LEFT VENTRICULAR MASS: 114.82 G
LV LATERAL E/E' RATIO: 14.29 M/S
LV SEPTAL E/E' RATIO: 25 M/S
LYMPHOCYTES # BLD AUTO: 0.4 K/UL (ref 1–4.8)
LYMPHOCYTES NFR BLD: 6.2 % (ref 18–48)
MCH RBC QN AUTO: 25.8 PG (ref 27–31)
MCHC RBC AUTO-ENTMCNC: 32 G/DL (ref 32–36)
MCV RBC AUTO: 81 FL (ref 82–98)
MONOCYTES # BLD AUTO: 0.6 K/UL (ref 0.3–1)
MONOCYTES NFR BLD: 8.9 % (ref 4–15)
MV A" WAVE DURATION": 14.84 MSEC
MV MEAN GRADIENT: 1 MMHG
MV PEAK A VEL: 1.51 M/S
MV PEAK E VEL: 1 M/S
MV PEAK GRADIENT: 13 MMHG
MV STENOSIS PRESSURE HALF TIME: 81.86 MS
MV VALVE AREA BY CONTINUITY EQUATION: 2.34 CM2
MV VALVE AREA P 1/2 METHOD: 2.69 CM2
NEUTROPHILS # BLD AUTO: 5.3 K/UL (ref 1.8–7.7)
NEUTROPHILS NFR BLD: 79.7 % (ref 38–73)
NRBC BLD-RTO: 0 /100 WBC
PISA MRMAX VEL: 0.05 M/S
PISA TR MAX VEL: 3.42 M/S
PLATELET # BLD AUTO: 87 K/UL (ref 150–450)
PMV BLD AUTO: 11.7 FL (ref 9.2–12.9)
POCT GLUCOSE: 116 MG/DL (ref 70–110)
POCT GLUCOSE: >500 MG/DL (ref 70–110)
POCT GLUCOSE: >500 MG/DL (ref 70–110)
POTASSIUM SERPL-SCNC: 3 MMOL/L (ref 3.5–5.1)
PULM VEIN S/D RATIO: 1.35
PV PEAK D VEL: 0.49 M/S
PV PEAK S VEL: 0.66 M/S
PV PEAK VELOCITY: 0.91 CM/S
RA MAJOR: 4.28 CM
RA PRESSURE: 3 MMHG
RA WIDTH: 3.32 CM
RBC # BLD AUTO: 4.57 M/UL (ref 4.6–6.2)
RIGHT VENTRICULAR END-DIASTOLIC DIMENSION: 3.24 CM
RV TISSUE DOPPLER FREE WALL SYSTOLIC VELOCITY 1 (APICAL 4 CHAMBER VIEW): 17.67 CM/S
SINUS: 3.41 CM
SODIUM SERPL-SCNC: 137 MMOL/L (ref 136–145)
STJ: 3.96 CM
TDI LATERAL: 0.07 M/S
TDI SEPTAL: 0.04 M/S
TDI: 0.06 M/S
TR MAX PG: 47 MMHG
TRICUSPID ANNULAR PLANE SYSTOLIC EXCURSION: 2.54 CM
TV REST PULMONARY ARTERY PRESSURE: 50 MMHG
WBC # BLD AUTO: 6.61 K/UL (ref 3.9–12.7)

## 2022-03-24 PROCEDURE — 63600175 PHARM REV CODE 636 W HCPCS: Performed by: INTERNAL MEDICINE

## 2022-03-24 PROCEDURE — 99233 SBSQ HOSP IP/OBS HIGH 50: CPT | Mod: ,,, | Performed by: INTERNAL MEDICINE

## 2022-03-24 PROCEDURE — A4216 STERILE WATER/SALINE, 10 ML: HCPCS | Performed by: INTERNAL MEDICINE

## 2022-03-24 PROCEDURE — 25000003 PHARM REV CODE 250: Performed by: INTERNAL MEDICINE

## 2022-03-24 PROCEDURE — 27000221 HC OXYGEN, UP TO 24 HOURS

## 2022-03-24 PROCEDURE — 82947 ASSAY GLUCOSE BLOOD QUANT: CPT | Performed by: INTERNAL MEDICINE

## 2022-03-24 PROCEDURE — 94640 AIRWAY INHALATION TREATMENT: CPT

## 2022-03-24 PROCEDURE — 36415 COLL VENOUS BLD VENIPUNCTURE: CPT | Performed by: INTERNAL MEDICINE

## 2022-03-24 PROCEDURE — 99223 1ST HOSP IP/OBS HIGH 75: CPT | Mod: 95,,, | Performed by: FAMILY MEDICINE

## 2022-03-24 PROCEDURE — 85025 COMPLETE CBC W/AUTO DIFF WBC: CPT | Performed by: INTERNAL MEDICINE

## 2022-03-24 PROCEDURE — 63600175 PHARM REV CODE 636 W HCPCS: Performed by: NURSE PRACTITIONER

## 2022-03-24 PROCEDURE — 12000002 HC ACUTE/MED SURGE SEMI-PRIVATE ROOM

## 2022-03-24 PROCEDURE — 80048 BASIC METABOLIC PNL TOTAL CA: CPT | Performed by: INTERNAL MEDICINE

## 2022-03-24 PROCEDURE — 85379 FIBRIN DEGRADATION QUANT: CPT | Performed by: INTERNAL MEDICINE

## 2022-03-24 PROCEDURE — 25000242 PHARM REV CODE 250 ALT 637 W/ HCPCS: Performed by: INTERNAL MEDICINE

## 2022-03-24 PROCEDURE — 99233 PR SUBSEQUENT HOSPITAL CARE,LEVL III: ICD-10-PCS | Mod: ,,, | Performed by: INTERNAL MEDICINE

## 2022-03-24 PROCEDURE — 63700000 PHARM REV CODE 250 ALT 637 W/O HCPCS: Performed by: INTERNAL MEDICINE

## 2022-03-24 PROCEDURE — 99223 PR INITIAL HOSPITAL CARE,LEVL III: ICD-10-PCS | Mod: 95,,, | Performed by: FAMILY MEDICINE

## 2022-03-24 PROCEDURE — 94761 N-INVAS EAR/PLS OXIMETRY MLT: CPT

## 2022-03-24 RX ORDER — POTASSIUM CHLORIDE 7.46 G/1000ML
40 INJECTION, SOLUTION INTRAVENOUS
Status: DISCONTINUED | OUTPATIENT
Start: 2022-03-24 | End: 2022-03-29 | Stop reason: HOSPADM

## 2022-03-24 RX ORDER — INSULIN ASPART 100 [IU]/ML
10 INJECTION, SOLUTION INTRAVENOUS; SUBCUTANEOUS ONCE
Status: COMPLETED | OUTPATIENT
Start: 2022-03-24 | End: 2022-03-24

## 2022-03-24 RX ORDER — MAGNESIUM SULFATE HEPTAHYDRATE 40 MG/ML
4 INJECTION, SOLUTION INTRAVENOUS
Status: DISCONTINUED | OUTPATIENT
Start: 2022-03-24 | End: 2022-03-29 | Stop reason: HOSPADM

## 2022-03-24 RX ORDER — POTASSIUM CHLORIDE 7.45 MG/ML
60 INJECTION INTRAVENOUS
Status: DISCONTINUED | OUTPATIENT
Start: 2022-03-24 | End: 2022-03-29 | Stop reason: HOSPADM

## 2022-03-24 RX ORDER — MAGNESIUM SULFATE HEPTAHYDRATE 40 MG/ML
2 INJECTION, SOLUTION INTRAVENOUS
Status: DISCONTINUED | OUTPATIENT
Start: 2022-03-24 | End: 2022-03-29 | Stop reason: HOSPADM

## 2022-03-24 RX ORDER — POTASSIUM CHLORIDE 7.45 MG/ML
80 INJECTION INTRAVENOUS
Status: DISCONTINUED | OUTPATIENT
Start: 2022-03-24 | End: 2022-03-29 | Stop reason: HOSPADM

## 2022-03-24 RX ORDER — INSULIN ASPART 100 [IU]/ML
5 INJECTION, SOLUTION INTRAVENOUS; SUBCUTANEOUS ONCE
Status: COMPLETED | OUTPATIENT
Start: 2022-03-24 | End: 2022-03-24

## 2022-03-24 RX ADMIN — POTASSIUM CHLORIDE 60 MEQ: 7.46 INJECTION, SOLUTION INTRAVENOUS at 02:03

## 2022-03-24 RX ADMIN — CEFTRIAXONE 1 G: 1 INJECTION, SOLUTION INTRAVENOUS at 06:03

## 2022-03-24 RX ADMIN — POTASSIUM CHLORIDE 10 MEQ: 7.46 INJECTION, SOLUTION INTRAVENOUS at 03:03

## 2022-03-24 RX ADMIN — IPRATROPIUM BROMIDE AND ALBUTEROL SULFATE 3 ML: 2.5; .5 SOLUTION RESPIRATORY (INHALATION) at 08:03

## 2022-03-24 RX ADMIN — POTASSIUM CHLORIDE 10 MEQ: 7.46 INJECTION, SOLUTION INTRAVENOUS at 06:03

## 2022-03-24 RX ADMIN — PANTOPRAZOLE SODIUM 40 MG: 40 TABLET, DELAYED RELEASE ORAL at 08:03

## 2022-03-24 RX ADMIN — Medication 10 ML: at 06:03

## 2022-03-24 RX ADMIN — POTASSIUM CHLORIDE 10 MEQ: 7.46 INJECTION, SOLUTION INTRAVENOUS at 04:03

## 2022-03-24 RX ADMIN — MELATONIN TAB 3 MG 6 MG: 3 TAB at 09:03

## 2022-03-24 RX ADMIN — INSULIN ASPART 5 UNITS: 100 INJECTION, SOLUTION INTRAVENOUS; SUBCUTANEOUS at 09:03

## 2022-03-24 RX ADMIN — Medication 10 ML: at 09:03

## 2022-03-24 RX ADMIN — DONEPEZIL HYDROCHLORIDE 10 MG: 5 TABLET, FILM COATED ORAL at 08:03

## 2022-03-24 RX ADMIN — AZITHROMYCIN MONOHYDRATE 250 MG: 250 TABLET ORAL at 08:03

## 2022-03-24 RX ADMIN — BUDESONIDE 0.5 MG: 0.5 SUSPENSION RESPIRATORY (INHALATION) at 08:03

## 2022-03-24 RX ADMIN — BUDESONIDE 0.5 MG: 0.5 SUSPENSION RESPIRATORY (INHALATION) at 09:03

## 2022-03-24 RX ADMIN — METHYLPREDNISOLONE SODIUM SUCCINATE 80 MG: 40 INJECTION, POWDER, FOR SOLUTION INTRAMUSCULAR; INTRAVENOUS at 06:03

## 2022-03-24 RX ADMIN — INSULIN ASPART 10 UNITS: 100 INJECTION, SOLUTION INTRAVENOUS; SUBCUTANEOUS at 10:03

## 2022-03-24 RX ADMIN — POTASSIUM CHLORIDE 10 MEQ: 7.46 INJECTION, SOLUTION INTRAVENOUS at 01:03

## 2022-03-24 RX ADMIN — DOCUSATE SODIUM AND SENNOSIDES 1 TABLET: 8.6; 5 TABLET, FILM COATED ORAL at 08:03

## 2022-03-24 RX ADMIN — METHYLPREDNISOLONE SODIUM SUCCINATE 80 MG: 40 INJECTION, POWDER, FOR SOLUTION INTRAMUSCULAR; INTRAVENOUS at 01:03

## 2022-03-24 RX ADMIN — OXYCODONE AND ACETAMINOPHEN 1 TABLET: 10; 325 TABLET ORAL at 01:03

## 2022-03-24 RX ADMIN — POTASSIUM CHLORIDE 10 MEQ: 7.46 INJECTION, SOLUTION INTRAVENOUS at 12:03

## 2022-03-24 RX ADMIN — POTASSIUM CHLORIDE 60 MEQ: 7.46 INJECTION, SOLUTION INTRAVENOUS at 11:03

## 2022-03-24 RX ADMIN — Medication 10 ML: at 01:03

## 2022-03-24 RX ADMIN — DOCUSATE SODIUM AND SENNOSIDES 1 TABLET: 8.6; 5 TABLET, FILM COATED ORAL at 09:03

## 2022-03-24 RX ADMIN — INSULIN DETEMIR 15 UNITS: 100 INJECTION, SOLUTION SUBCUTANEOUS at 09:03

## 2022-03-24 RX ADMIN — OXYCODONE AND ACETAMINOPHEN 1 TABLET: 10; 325 TABLET ORAL at 09:03

## 2022-03-24 NOTE — PT/OT/SLP PROGRESS
Occupational Therapy      Patient Name:  Steve June Jr.   MRN:  [id number]    Patient not seen today secondary to Other (Comment) (discussion with daughter, Maury, out side pt's room to introduce role of OT in palliative care/hospice. Very receptive and asks for PATEL to come back tomorrow for demo/family training.). Will follow-up 3/25/22.    3/24/2022

## 2022-03-24 NOTE — SUBJECTIVE & OBJECTIVE
Past Medical History:   Diagnosis Date    Abnormal thyroid function test     Allergy     Seasonal    Anemia     Anemia due to blood loss 7/2/2014    Arthritis     Gaviria esophagus     Basal cell carcinoma     right forearm    Basal cell carcinoma 12/2011    lower post neck    Basal cell carcinoma 04/23/2019    left posterior helix    Cancer     skin CA    Cataract     Cirrhosis     Diabetes mellitus     Diabetes mellitus, type 2     Encounter for blood transfusion     Esophageal varices in cirrhosis     grade II on 7/12 EGD    Gastritis     on 7/12 EGD    GERD (gastroesophageal reflux disease) 2/28/2015    Hard of hearing     Hiatal hernia     History of hepatitis C 8/10/2012    tx with harvoni x 41 days (started 10/22/15). SVR4     Hoarseness 2/28/2015    Hx of colonic polyps 01/10/2011    per colonoscopy report in media    Hypercholesteremia     Hypersplenism     Hypertension     No meds    Pain management 12/10/2014    Petechial hemorrhage 11/25/2015    Lower extremities bilat     Portal hypertensive gastropathy     on 7/12 EGD    Squamous cell carcinoma 04/23/2019    right preauricular cheek, right temple    Thrombocytopenia     Type II or unspecified type diabetes mellitus with neurological manifestations, uncontrolled(250.62) 12/24/2013    Valvular heart disease     mild MR 12       Past Surgical History:   Procedure Laterality Date    CATARACT EXTRACTION  1/10/13    left eye    CATARACT EXTRACTION      right eye    CHOLECYSTECTOMY      COLONOSCOPY      EYE SURGERY      Cataract surgery to right eye    HIP ARTHROPLASTY Right 8/27/2021    Procedure: ARTHROPLASTY, HIP, [place], peg board, 1st assist;  Surgeon: Corey Vargas MD;  Location: [facility];  Service: Orthopedics;  Laterality: Right;    KNEE ARTHROSCOPY W/ MENISCAL REPAIR      KNEE CARTILAGE SURGERY      left knee    KNEE SURGERY  12/2006    left    SKIN LESION EXCISION  2021    TONSILLECTOMY      UPPER GASTROINTESTINAL ENDOSCOPY    "      Review of patient's allergies indicates:   Allergen Reactions    Adhesive tape-silicones Other (See Comments)     pulls skin off    Doxycycline      Dizzy. "Just didn't feel right".       Medications:  Continuous Infusions:  Scheduled Meds:   azithromycin  250 mg Oral Daily    budesonide  0.5 mg Nebulization BID    cefTRIAXone  1 g Intravenous Q24H    donepeziL  10 mg Oral Daily    insulin detemir U-100  15 Units Subcutaneous QHS    methylPREDNISolone sodium succinate injection  80 mg Intravenous Q6H    pantoprazole  40 mg Oral Daily    senna-docusate 8.6-50 mg  1 tablet Oral BID    sodium chloride 0.9%  10 mL Intravenous Q8H     PRN Meds:albuterol-ipratropium, calcium gluconate IVPB, calcium gluconate IVPB, calcium gluconate IVPB, dextrose 50%, dextrose 50%, glucagon (human recombinant), glucose, glucose, haloperidol lactate, insulin aspart U-100, magnesium oxide, magnesium oxide, magnesium sulfate IVPB, magnesium sulfate IVPB, melatonin, naloxone, ondansetron, oxyCODONE-acetaminophen, potassium bicarbonate, potassium bicarbonate, potassium bicarbonate, potassium chloride **AND** potassium chloride **AND** potassium chloride, potassium, sodium phosphates, potassium, sodium phosphates, potassium, sodium phosphates    Family History       Problem Relation (Age of Onset)    Alcohol abuse Father    Cancer Mother    Cirrhosis Father    Leukemia Mother    No Known Problems Daughter, Son, Daughter, Daughter, Daughter, Sister          Tobacco Use    Smoking status: Former Smoker     Packs/day: 1.00     Years: 25.00     Pack years: 25.00     Quit date: 2000     Years since quittin.6    Smokeless tobacco: Never Used   Substance and Sexual Activity    Alcohol use: Yes     Comment: rarely    Drug use: No    Sexual activity: Never       Review of Systems   Unable to perform ROS: Dementia   Objective:     Vital Signs (Most Recent):  Temp: 99.2 °F (37.3 °C) (22 1232)  Pulse: 76 (22 1232)  Resp: 18 " (03/24/22 1320)  BP: (!) 145/69 (03/24/22 1232)  SpO2: 98 % (03/24/22 1232)   Vital Signs (24h Range):  Temp:  [97.2 °F (36.2 °C)-99.2 °F (37.3 °C)] 99.2 °F (37.3 °C)  Pulse:  [76-94] 76  Resp:  [16-24] 18  SpO2:  [84 %-98 %] 98 %  BP: (129-155)/(60-80) 145/69     Weight: 77.6 kg (171 lb)  Body mass index is 25.25 kg/m².    Physical Exam  Vitals and nursing note reviewed.   Constitutional:       General: He is not in acute distress.     Appearance: He is well-developed. He is ill-appearing.      Comments: Calm ill appearing elderly male with nasal canula. Non distressed. Answering in one word sentences. Confused. Alert but not oriented.    HENT:      Head: Normocephalic and atraumatic.      Nose: Nose normal.   Eyes:      Conjunctiva/sclera: Conjunctivae normal.   Pulmonary:      Effort: Pulmonary effort is normal. No respiratory distress.   Abdominal:      General: There is no distension.      Palpations: Abdomen is soft.      Tenderness: There is no abdominal tenderness.   Neurological:      Mental Status: He is alert.      Cranial Nerves: No cranial nerve deficit.       Review of Symptoms      Symptom Assessment (ESAS 0-10 Scale)  Unable to complete assessment due to Dementia     CAM / Delirium:  Positive    Pain Assessment in Advanced Demential Scale (PAINAD)   Breathing - Independent of vocalization:  0  Negative vocalization:  0  Facial expression:  0  Body language:  0  Consolability:  0  Total:  0    ECOG Performance Status rdGrdrrdarddrderd:rd rd3rd Living Arrangements:  Lives with spouse    Psychosocial/Cultural:   Has 5 children. 3 with present partner.  Retired restaurant owner        Advance Care Planning   Advance Directives:   Living Will: Yes        Copy on chart: Yes    LaPOST: No    Do Not Resuscitate Status: Yes    Medical Power of : Yes      Decision Making:  Patient answered questions       Significant Labs: All pertinent labs within the past 24 hours have been reviewed.  CBC:   Recent Labs    Lab 03/24/22  0910   WBC 6.61   HGB 11.8*   HCT 36.9*   MCV 81*   PLT 87*     BMP:  Recent Labs   Lab 03/24/22  0910   *      K 3.0*      CO2 23   BUN 20   CREATININE 0.8   CALCIUM 8.8     LFT:  Lab Results   Component Value Date    AST 24 03/22/2022    GGT 34 03/13/2012    ALKPHOS 115 03/22/2022    BILITOT 2.7 (H) 03/22/2022     Albumin:   Albumin   Date Value Ref Range Status   03/22/2022 3.4 (L) 3.5 - 5.2 g/dL Final     Protein:   Total Protein   Date Value Ref Range Status   03/22/2022 6.8 6.0 - 8.4 g/dL Final     Lactic acid:   Lab Results   Component Value Date    LACTATE 1.5 03/22/2022    LACTATE 2.0 03/22/2022       Significant Imaging: I have reviewed all pertinent imaging results/findings within the past 24 hours.

## 2022-03-24 NOTE — PLAN OF CARE
Problem: Adult Inpatient Plan of Care  Goal: Plan of Care Review  Outcome: Ongoing, Progressing  Goal: Patient-Specific Goal (Individualized)  Outcome: Ongoing, Progressing  Goal: Absence of Hospital-Acquired Illness or Injury  Outcome: Ongoing, Progressing  Goal: Optimal Comfort and Wellbeing  Outcome: Ongoing, Progressing  Goal: Readiness for Transition of Care  Outcome: Ongoing, Progressing     Problem: Diabetes Comorbidity  Goal: Blood Glucose Level Within Targeted Range  Outcome: Ongoing, Progressing     Problem: Fluid Imbalance (Pneumonia)  Goal: Fluid Balance  Outcome: Ongoing, Progressing     Problem: Infection (Pneumonia)  Goal: Resolution of Infection Signs and Symptoms  Outcome: Ongoing, Progressing     Problem: Respiratory Compromise (Pneumonia)  Goal: Effective Oxygenation and Ventilation  Outcome: Ongoing, Progressing     Problem: Skin Injury Risk Increased  Goal: Skin Health and Integrity  Outcome: Ongoing, Progressing     Problem: Coping Ineffective  Goal: Effective Coping  Outcome: Ongoing, Progressing     Problem: Fall Injury Risk  Goal: Absence of Fall and Fall-Related Injury  Outcome: Ongoing, Progressing    Pt has been a/o x2. Family at bedside. Pt needed something to help calm them down for the night. Has been in bed for the evening for the most part. No s/s of distress. Pt will not keep telebox on and keeps taking o2 off. Keep encouraging pt to keep o2 on. Has a cough when o2 is off for periods of time. Call light within reach bed in lowest position. All safety measures in ace. Will cont to monitor

## 2022-03-24 NOTE — PROGRESS NOTES
Progress Note  PULMONARY    Admit Date: 3/22/2022   03/24/2022      SUBJECTIVE:     3/24- wife and daughter at bedside. Pt calm now but per their report he had a rough night with agitation, confusion and had to be given medicine to sedate him. He c/o pain R arm where IV potassium is infusing. resp status worsened now req 10L HFNC. The family is meeting w/ palliative at 1      OBJECTIVE:     Vitals (Most recent):  Vitals:    03/24/22 1232   BP: (!) 145/69   Pulse: 76   Resp: 18   Temp: 99.2 °F (37.3 °C)       Vitals (24 hour range):  Temp:  [97.2 °F (36.2 °C)-99.2 °F (37.3 °C)]   Pulse:  [76-94]   Resp:  [16-24]   BP: (129-155)/(60-80)   SpO2:  [84 %-98 %]       Intake/Output Summary (Last 24 hours) at 3/24/2022 1315  Last data filed at 3/24/2022 0750  Gross per 24 hour   Intake --   Output 300 ml   Net -300 ml          Physical Exam:  The patient's neuro status (alertness,orientation,cognitive function,motor skills,), pharyngeal exam (oral lesions, hygiene, abn dentition,), Neck (jvd,mass,thyroid,nodes in neck and above/below clavicle),RESPIRATORY(symmetry,effort,fremitus,percussion,auscultation),  Cor(rhythm,heart tones including gallops,perfusion,edema)ABD(distention,hepatic&splenomegaly,tenderness,masses), Skin(rash,cyanosis),Psyc(affect,judgement,).  Exam negative except for these pertinent findings:    A&Ox2 (doesn't know month/year), calm, sleepy  HR regular w/ systolic murmur  Bilateral fine rales  Abdomen soft nontender  R arm tender, IV site looks good no edema/bruising  Chronic discoloration bilat legs, no edema    Radiographs reviewed: view by direct vision   CXR 3/24- unchanged    Labs     Recent Labs   Lab 03/24/22  0910   WBC 6.61   HGB 11.8*   HCT 36.9*   PLT 87*     Recent Labs   Lab 03/24/22  0910      K 3.0*      CO2 23   BUN 20   CREATININE 0.8   *   CALCIUM 8.8   No results for input(s): PH, PCO2, PO2, HCO3 in the last 24 hours.  Microbiology Results (last 7 days)     Procedure  Component Value Units Date/Time    Blood Culture #1 [[id number]] Collected: 03/22/22 1454    Order Status: Completed Specimen: Blood from Antecubital, Right Updated: 03/23/22 2312     Blood Culture, Routine No Growth to date      No Growth to date    Blood Culture #2 [[id number]] Collected: 03/22/22 1454    Order Status: Completed Specimen: Blood from Peripheral, Left Arm Updated: 03/23/22 2312     Blood Culture, Routine No Growth to date      No Growth to date    Narrative:      drawn by nurys    Influenza A & B by Molecular [[id number]] Collected: 03/22/22 1407    Order Status: Completed Specimen: Nasopharyngeal Swab Updated: 03/22/22 1501     Influenza A, Molecular Negative     Influenza B, Molecular Negative     Flu A & B Source Nasal swab          Impression:  Active Hospital Problems    Diagnosis  POA    *Acute on chronic respiratory failure [J96.20]  Yes    Dementia without behavioral disturbance [F03.90]  Yes    Community acquired pneumonia, bilateral [J18.9]  Yes    Acute exacerbation of idiopathic pulmonary fibrosis [J84.112]  Yes    Interstitial lung disease [J84.9]  Yes    Stasis dermatitis of both legs [I87.2]  Yes    Hypertension associated with diabetes [E11.59, I15.2]  Yes    Hepatic cirrhosis due to chronic hepatitis C infection [B18.2, K74.60]  Yes    Chronic low back pain [M54.50, G89.29]  Yes    GERD (gastroesophageal reflux disease) [K21.9]  Yes    Type 2 diabetes mellitus with complication, with long-term current use of insulin [E11.8, Z79.4]  Not Applicable    PVD (peripheral vascular disease) [I73.9]  Yes     Decreased pulses. No claudication      Chronic pain of left knee [M25.562, G89.29]  Yes     Pain contract with Dr. Pereira Feb 2014.         Resolved Hospital Problems   No resolved problems to display.               Plan:         - continue supplemental O2 to keep sats 89-92%  - continue antibiotics for now but doubt pneumonia  - f/u echo read  - empiric solumedrol medium dose-  80mg q6h. Discussed w/ family risk of side effects and will monitor pt clinically  - CXR repeat in am  - d/w daughter and spouse at bedside.  Changing code status to DNR per their wishes  - palliative meeting later  - d/w Dr. Luis Carlos Campo MD  Pulmonary & Critical Care Medicine                                    .

## 2022-03-24 NOTE — CARE UPDATE
Prn given for SOB       03/24/22 0801   Patient Assessment/Suction   Level of Consciousness (AVPU) alert   Respiratory Effort Mild;Labored   Expansion/Accessory Muscles/Retractions expansion symmetric   All Lung Fields Breath Sounds diminished;coarse   Cough Frequency frequent   Cough Type productive   PRE-TX-O2   O2 Device (Oxygen Therapy) nasal cannula   $ Is the patient on Low Flow Oxygen? Yes   Flow (L/min) 4   SpO2 (!) 84 %   Pulse Oximetry Type Intermittent   $ Pulse Oximetry - Multiple Charge Pulse Oximetry - Multiple   Pulse 85   Resp (!) 24   Aerosol Therapy   $ Aerosol Therapy Charges Aerosol Treatment   Respiratory Treatment Status (SVN) given   Treatment Route (SVN) mask;oxygen   Patient Position (SVN) HOB elevated   Post Treatment Assessment (SVN) breath sounds unchanged   Signs of Intolerance (SVN) none   Breath Sounds Post-Respiratory Treatment   Throughout All Fields Post-Treatment All Fields   Throughout All Fields Post-Treatment coarse   Post-treatment Heart Rate (beats/min) 84   Post-treatment Resp Rate (breaths/min) 24

## 2022-03-24 NOTE — CARE UPDATE
03/23/22 1924   Patient Assessment/Suction   Level of Consciousness (AVPU) alert   Respiratory Effort Normal;Unlabored   Expansion/Accessory Muscles/Retractions expansion symmetric;no retractions   All Lung Fields Breath Sounds diminished   Cough Frequency no cough   PRE-TX-O2   O2 Device (Oxygen Therapy) nasal cannula   Flow (L/min) 4   Oxygen Concentration (%) 36   SpO2 (!) 93 %   Pulse Oximetry Type Intermittent   Pulse 94   Resp 20   Aerosol Therapy   $ Aerosol Therapy Charges Aerosol Treatment   Respiratory Treatment Status (SVN) given   Treatment Route (SVN) mask;oxygen   Patient Position (SVN) HOB elevated   Signs of Intolerance (SVN) none   Breath Sounds Post-Respiratory Treatment   Throughout All Fields Post-Treatment All Fields   Throughout All Fields Post-Treatment no change   Post-treatment Heart Rate (beats/min) 90   Post-treatment Resp Rate (breaths/min) 18   Ready to Wean/Extubation Screen   FIO2<=50 (chart decimal) 0.36

## 2022-03-24 NOTE — CARE UPDATE
Prn tx given for SOB   03/24/22 0801   Patient Assessment/Suction   Level of Consciousness (AVPU) alert   Respiratory Effort Mild;Labored   Expansion/Accessory Muscles/Retractions expansion symmetric   All Lung Fields Breath Sounds diminished;coarse   Cough Frequency frequent   Cough Type productive   PRE-TX-O2   O2 Device (Oxygen Therapy) nasal cannula   $ Is the patient on Low Flow Oxygen? Yes   Flow (L/min) 4   SpO2 (!) 92 %   Pulse Oximetry Type Intermittent   $ Pulse Oximetry - Multiple Charge Pulse Oximetry - Multiple   Pulse 85   Resp (!) 24   Aerosol Therapy   $ Aerosol Therapy Charges Aerosol Treatment   Respiratory Treatment Status (SVN) given   Treatment Route (SVN) mask;oxygen   Patient Position (SVN) HOB elevated   Post Treatment Assessment (SVN) breath sounds unchanged   Signs of Intolerance (SVN) none   Breath Sounds Post-Respiratory Treatment   Throughout All Fields Post-Treatment All Fields   Throughout All Fields Post-Treatment coarse   Post-treatment Heart Rate (beats/min) 84   Post-treatment Resp Rate (breaths/min) 24

## 2022-03-24 NOTE — ASSESSMENT & PLAN NOTE
Patient with Hypoxic Respiratory failure which is Acute on chronic.  he is on home oxygen at 3 LPM. Supplemental oxygen was provided and noted- Oxygen Concentration (%):  [36] 36.   Worsened    resp status worsened now req 10L HFNC.   DNR     Signs/symptoms of respiratory failure include- tachypnea, increased work of breathing, respiratory distress and use of accessory muscles. Contributing diagnoses includes - Pneumonia Labs and images were reviewed. Patient Has not had a recent ABG.

## 2022-03-24 NOTE — PT/OT/SLP PROGRESS
Occupational Therapy      Patient Name:  Steve June Jr.   MRN:  [id number]    Patient not seen today secondary to Patient fatigue, Other (Comment) (AM attempt: pt sleeping and family request to let him rest. Will attempt this afternoon post palliative care meeting.)    3/24/2022

## 2022-03-24 NOTE — PROGRESS NOTES
[facility] Hospital Medicine  Progress Note    Patient Name: Steve June Jr.  MRN: [id number]  Patient Class: IP- Inpatient   Admission Date: 3/22/2022  Length of Stay: 2 days  Attending Physician: Yamini Perea MD  Primary Care Provider: Crispin Garcia MD        Subjective:     Principal Problem:Acute on chronic respiratory failure        HPI:  Steve June Jr. is a 75 y.o. male with a PMHx venous insufficiency, DM2, GERD, HTN, CHF, cirrhosis, chronic hepatitis C, pulmonary fibrosis, and HLD, hypertension valvular heart disease mild mitral regurg, chronic diastolic heart failure, interstitial lung disease who presents to the ED with   chief complain of worsening cough and shortness of breath over the past few days.  Patient has dementia unable to provide history but denies any other symptoms at this point.  Patient's wife was at bedside states that patient has been having generalized weakness for the last month or to and has been completely bed-bound for the last 1 week.  His breathing worsened in the last few days.  Patient does not want wears oxygen secondary to dementia.  Wife states his oxygen saturations were in the 70s.  Patient has not had any hospitalization for hypoxia or respiratory failure in the last 1 year.       Overview/Hospital Course:  No notes on file    Interval History: discussed with wife and daughter at bedside Pt had episodes of  agitation, confusion and was  given medicine to sedate him.His resp status worsened escalated to 10L HFNC.  Pt made DNR.     Review of Systems   Unable to perform ROS: Dementia   Objective:     Vital Signs (Most Recent):  Temp: 98.5 °F (36.9 °C) (03/24/22 1553)  Pulse: 74 (03/24/22 1553)  Resp: 18 (03/24/22 1553)  BP: 139/66 (03/24/22 1553)  SpO2: 96 % (03/24/22 1554) Vital Signs (24h Range):  Temp:  [97.2 °F (36.2 °C)-99.2 °F (37.3 °C)] 98.5 °F (36.9 °C)  Pulse:  [74-94] 74  Resp:  [16-24] 18  SpO2:  [84 %-98 %] 96 %  BP:  (129-155)/(60-80) 139/66     Weight: 77.6 kg (171 lb)  Body mass index is 25.25 kg/m².    Intake/Output Summary (Last 24 hours) at 3/24/2022 1604  Last data filed at 3/24/2022 1322  Gross per 24 hour   Intake 10 ml   Output 300 ml   Net -290 ml      Physical Exam  Vitals and nursing note reviewed.   Constitutional:       General: He is not in acute distress.     Appearance: He is well-developed. He is not ill-appearing or diaphoretic.   HENT:      Head: Normocephalic and atraumatic.      Right Ear: External ear normal.      Left Ear: External ear normal.      Nose: Nose normal.      Mouth/Throat:      Mouth: Mucous membranes are moist.   Eyes:      General: No scleral icterus.        Right eye: No discharge.         Left eye: No discharge.      Conjunctiva/sclera: Conjunctivae normal.      Pupils: Pupils are equal, round, and reactive to light.   Neck:      Thyroid: No thyromegaly.   Cardiovascular:      Rate and Rhythm: Normal rate and regular rhythm.      Heart sounds: Normal heart sounds. No murmur heard.  Pulmonary:      Effort: Pulmonary effort is normal. No respiratory distress.      Breath sounds: No stridor. Rales present. No wheezing.   Abdominal:      General: Bowel sounds are normal. There is no distension.      Palpations: Abdomen is soft.      Tenderness: There is no abdominal tenderness.   Musculoskeletal:         General: No tenderness.      Cervical back: Normal range of motion and neck supple.   Lymphadenopathy:      Cervical: No cervical adenopathy.   Skin:     General: Skin is warm and dry.      Capillary Refill: Capillary refill takes less than 2 seconds.      Findings: No erythema or rash.   Neurological:      General: No focal deficit present.      Mental Status: He is alert. Mental status is at baseline. He is disoriented.      Cranial Nerves: No cranial nerve deficit.      Sensory: No sensory deficit.      Motor: Weakness present. No abnormal muscle tone.      Coordination: Coordination  normal.   Psychiatric:         Mood and Affect: Mood normal.         Behavior: Behavior normal.       Significant Labs: All pertinent labs within the past 24 hours have been reviewed.  BMP:   Recent Labs   Lab 03/24/22  0910   *      K 3.0*      CO2 23   BUN 20   CREATININE 0.8   CALCIUM 8.8     CBC:   Recent Labs   Lab 03/23/22  0455 03/24/22  0910   WBC 6.33 6.61   HGB 11.0* 11.8*   HCT 33.5* 36.9*   PLT 82* 87*       Significant Imaging: I have reviewed all pertinent imaging results/findings within the past 24 hours.      Assessment/Plan:      * Acute on chronic respiratory failure  Patient with Hypoxic Respiratory failure which is Acute on chronic.  he is on home oxygen at 3 LPM. Supplemental oxygen was provided and noted- Oxygen Concentration (%):  [36] 36.   Worsened    resp status worsened now req 10L HFNC.   DNR     Signs/symptoms of respiratory failure include- tachypnea, increased work of breathing, respiratory distress and use of accessory muscles. Contributing diagnoses includes - Pneumonia Labs and images were reviewed. Patient Has not had a recent ABG.     Community acquired pneumonia, bilateral  The chest x-ray shows consolidation Moderate chronic interstitial disease, with possible superimposed acute infiltrate  Will cont empiric treatment with IV ceftriaxone and IV azithromycin.  We will repeat CBC daily  Differential diagnosis:  Congestive heart failure, this can be supported by the  cough, and cardiovascular risk factors. However, the mildly elevated BNP supports a pulmonary process as well as the consolidation seen on chest x-ray.   On exam, the patient lacks JVD and lower extremity edema that would be suspected in right sided heart failure.   Pulmonary embolism,less likely.   BNP  Recent Labs   Lab 03/22/22  1341   *  149*       However, the patient lacks the risk factors of hypercoagulability and inactivity. The progression of the  symptoms over the course of a week  does not support a diagnosis of a PE. On exam there  is no evidence of a deep venous thrombosis.      FEN/IVF: cardiac diet. No IVF        Acute exacerbation of idiopathic pulmonary fibrosis   resp status worsened now req 10L HFNC.  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  Last A1c reviewed-   Lab Results   Component Value Date    HGBA1C 7.5 (H) 09/21/2021     Most recent fingerstick glucose reviewed- No results for input(s): POCTGLUCOSE in the last 24 hours.  Current correctional scale  Low  Maintain anti-hyperglycemic dose as follows-   Antihyperglycemics (From admission, onward)            None        Hold Oral hypoglycemics while patient is in the hospital.\      PVD (peripheral vascular disease)  Hx noted      Chronic pain of left knee  chronic        VTE Risk Mitigation (From admission, onward)    None          Discharge Planning   PATO:      Code Status: DNR   Is the patient medically ready for discharge?:     Reason for patient still in hospital (select all that apply): Patient trending condition and Treatment  Discharge Plan A: Home with family                  Yamini Perea MD  Department of Hospital Medicine   [facility]

## 2022-03-24 NOTE — PLAN OF CARE
Pt lying in bed no s/s of distress breathing unlabored. Pt has avasys for additional safety measures. Pt reoriented to room and keep on o2. Call light in reach fall precautions in place.

## 2022-03-24 NOTE — PT/OT/SLP PROGRESS
Physical Therapy      Patient Name:  Steve June Jr.   MRN:  [id number]    Patient not seen today secondary to patient sleeping in a.m. with family requesting PT treatment deferred to afternoon. In p.m. patient reports fatigue and RUE pain (family attributes to the potassium patient is receiving through IV) and declines OOB activity, but is agreeable to LE therex in bed. He did perform 10 reps of bilateral LE ankle pumps. However, palliative care MD present for family meeting. Will follow-up 03/25/22.

## 2022-03-24 NOTE — PHYSICIAN QUERY
PT Name: Steve June Jr.  MR #: [id number]    DOCUMENTATION CLARIFICATION      CDS/: Migue Cadet RN               Contact information:    [email or web address]        This form is a permanent document in the medical record.     Query Date: March 24, 2022    By submitting this query, we are merely seeking further clarification of documentation. Please utilize your independent clinical judgment when addressing the question(s) below.    The Medical Record contains the following:   Indicators   Supporting Clinical Findings Location in Medical Record   x Hypertension associated with diabetes documented Hypertension associated with diabetes  Will resume home meds  Type 2 diabetes mellitus with complication, with long-term current use of insulin    3/22 H&P, Hospital medicine  (HM)   x Chronic condition(s) PMHx venous insufficiency, DM2, GERD, HTN, CHF, cirrhosis, chronic hepatitis C, pulmonary fibrosis, and HLD, hypertension valvular heart disease mild mitral regurg, chronic diastolic heart failure, interstitial lung disease   3/22 H&P, HM, PN   x Vital signs Pulse:  [80-87] 80; BP: (121-124)/(65-69) 124/65  Pulse:  [68-97] 68;  BP: (118-156)/(58-69) 118/59 3/22 H&P HM  3/23 HM, PN      Treatment/Medication     x Other 3/22 glucose: 240  3/23 glucose: 125  3/24 glucose: 130   Labs      Provider, please clarify the relationship between the Hypertension and Diabetes Mellitus  [   ] Hypertension is a manifestation of Diabetes Mellitus (Secondary Hypertension)   [ x  ]  Hypertension is not a manifestation of Diabetes Mellitus (Essential Hypertension)   [   ] Other Clarification (please specify): ____________   [  ] Clinically Undetermined       Please document in your progress notes daily for the duration of treatment, until resolved, and include in your discharge summary.

## 2022-03-24 NOTE — ASSESSMENT & PLAN NOTE
Advance Care Planning     3/24/2022    Introduced palliative medicine and its services. Wife and daughter bedside. Patient at baseline per family speaking in short sentences, mostly nodding head inappropriately to questions. He is lacking capacity. Has AD and wife present is POA. Both wife and daughter do not feel he has a good quality of life and are worried about escalating services to the ICU if required to support his life. They are most worried about his agitation if he goes to the ICU with his dementia. For this reason it is agreed that care not be escalated and transition to comfort care occur I this event. Otherwise pending survival of this hospitalization wife is desiring him to come home on hospice. He did not fare well in a facility previously and feel they are honoring his wishes. Code status reviewed briefly and already DNR.  Plan to consult home hospice.     Summary:  1) No escalation to ICU. If further needs that cannot be met on floor plan to support life and he is struggling plan to transition to comfort care.  2) When stable to transport home with hospice

## 2022-03-24 NOTE — CONSULTS
[facility]  Palliative Medicine  Consult Note    Patient Name: Steve June Jr.  MRN: [id number]  Admission Date: 3/22/2022  Hospital Length of Stay: 2 days  Code Status: DNR   Attending Provider: Yamini Perea MD  Consulting Provider: Sulaiman Aldridge MD  Primary Care Physician: Cripsin Garcia MD  Principal Problem:Acute on chronic respiratory failure        Consults  Assessment/Plan:     Goals of care, counseling/discussion  Advance Care Planning     3/24/2022    Introduced palliative medicine and its services. Wife and daughter bedside. Patient at baseline per family speaking in short sentences, mostly nodding head inappropriately to questions. He is lacking capacity. Has AD and wife present is POA. Both wife and daughter do not feel he has a good quality of life and are worried about escalating services to the ICU if required to support his life. They are most worried about his agitation if he goes to the ICU with his dementia. For this reason it is agreed that care not be escalated and transition to comfort care occur I this event. Otherwise pending survival of this hospitalization wife is desiring him to come home on hospice. He did not fare well in a facility previously and feel they are honoring his wishes. Code status reviewed briefly and already DNR.  Plan to consult home hospice.     Summary:  1) No escalation to ICU. If further needs that cannot be met on floor plan to support life and he is struggling plan to transition to comfort care.  2) When stable to transport home with hospice        Thank you for your consult.     Subjective:     HPI:   Per record and family:  Steve June Jr. is a 75 y.o. male with a PMHx venous insufficiency, DM2, GERD, HTN, CHF, cirrhosis, chronic hepatitis C, pulmonary fibrosis, and HLD, hypertension valvular heart disease mild mitral regurg, chronic diastolic heart failure, interstitial lung disease who presents to the ED with   chief  complain of worsening cough and shortness of breath over the past few days.  Patient's wife was at bedside states that patient has been having generalized weakness for the last month or to and has been completely bed-bound for the last 1 week. Patient does not want wears oxygen secondary to dementia. He is followed by Dr. Migue licona in pulm clinic for the past few years and thankfully has not had any hospitalizations until a fall resulting in a hip fracture 6 months ago. He has been very debilitated since then ambulating very short distances with a walker. Sleeping and eating little for the past month. Pulmonology following while in the hospital for presumed fibrosis flair increasing to 10 L today NC. Palliative medicine consulted for [facility].  His hospital course has been complicated additionally by agitation causing him to become combative. This has been a long standing and worsening result of dementia at home as well.       Introduced palliative medicine and its services. Wife and daughter bedside. Patient at baseline per family speaking in short sentences, mostly nodding head inappropriately to questions. He is lacking capacity. Has AD and wife present is POA. Both wife and daughter do not feel he has a good quality of life and are worried about escalating services to the ICU if required to support his life. They are most worried about his agitation if he goes to the ICU with his dementia. For this reason it is agreed that care not be escalated and transition to comfort care occur I this event. Otherwise pending survival of this hospitalization wife is desiring him to come home on hospice. He did not fare well in a facility previously and feel they are honoring his wishes. Code status reviewed briefly and already DNR.  Plan to consult home hospice.     Summary:  1) No escalation to ICU. If further needs that cannot be met on floor plan to support life and he is struggling plan to transition to comfort care.  2) When  stable to transport home with hospice                   Past Medical History:   Diagnosis Date    Abnormal thyroid function test     Allergy     Seasonal    Anemia     Anemia due to blood loss 7/2/2014    Arthritis     Gaviria esophagus     Basal cell carcinoma     right forearm    Basal cell carcinoma 12/2011    lower post neck    Basal cell carcinoma 04/23/2019    left posterior helix    Cancer     skin CA    Cataract     Cirrhosis     Diabetes mellitus     Diabetes mellitus, type 2     Encounter for blood transfusion     Esophageal varices in cirrhosis     grade II on 7/12 EGD    Gastritis     on 7/12 EGD    GERD (gastroesophageal reflux disease) 2/28/2015    Hard of hearing     Hiatal hernia     History of hepatitis C 8/10/2012    tx with harvoni x 41 days (started 10/22/15). SVR4     Hoarseness 2/28/2015    Hx of colonic polyps 01/10/2011    per colonoscopy report in media    Hypercholesteremia     Hypersplenism     Hypertension     No meds    Pain management 12/10/2014    Petechial hemorrhage 11/25/2015    Lower extremities bilat     Portal hypertensive gastropathy     on 7/12 EGD    Squamous cell carcinoma 04/23/2019    right preauricular cheek, right temple    Thrombocytopenia     Type II or unspecified type diabetes mellitus with neurological manifestations, uncontrolled(250.62) 12/24/2013    Valvular heart disease     mild MR 12       Past Surgical History:   Procedure Laterality Date    CATARACT EXTRACTION  1/10/13    left eye    CATARACT EXTRACTION      right eye    CHOLECYSTECTOMY      COLONOSCOPY      EYE SURGERY      Cataract surgery to right eye    HIP ARTHROPLASTY Right 8/27/2021    Procedure: ARTHROPLASTY, HIP, Neema, peg board, 1st assist;  Surgeon: Corey Vargas MD;  Location: [facility];  Service: Orthopedics;  Laterality: Right;    KNEE ARTHROSCOPY W/ MENISCAL REPAIR      KNEE CARTILAGE SURGERY      left knee    KNEE SURGERY  12/2006    left  "   SKIN LESION EXCISION      TONSILLECTOMY      UPPER GASTROINTESTINAL ENDOSCOPY         Review of patient's allergies indicates:   Allergen Reactions    Adhesive tape-silicones Other (See Comments)     pulls skin off    Doxycycline      Dizzy. "Just didn't feel right".       Medications:  Continuous Infusions:  Scheduled Meds:   azithromycin  250 mg Oral Daily    budesonide  0.5 mg Nebulization BID    cefTRIAXone  1 g Intravenous Q24H    donepeziL  10 mg Oral Daily    insulin detemir U-100  15 Units Subcutaneous QHS    methylPREDNISolone sodium succinate injection  80 mg Intravenous Q6H    pantoprazole  40 mg Oral Daily    senna-docusate 8.6-50 mg  1 tablet Oral BID    sodium chloride 0.9%  10 mL Intravenous Q8H     PRN Meds:albuterol-ipratropium, calcium gluconate IVPB, calcium gluconate IVPB, calcium gluconate IVPB, dextrose 50%, dextrose 50%, glucagon (human recombinant), glucose, glucose, haloperidol lactate, insulin aspart U-100, magnesium oxide, magnesium oxide, magnesium sulfate IVPB, magnesium sulfate IVPB, melatonin, naloxone, ondansetron, oxyCODONE-acetaminophen, potassium bicarbonate, potassium bicarbonate, potassium bicarbonate, potassium chloride **AND** potassium chloride **AND** potassium chloride, potassium, sodium phosphates, potassium, sodium phosphates, potassium, sodium phosphates    Family History       Problem Relation (Age of Onset)    Alcohol abuse Father    Cancer Mother    Cirrhosis Father    Leukemia Mother    No Known Problems Daughter, Son, Daughter, Daughter, Daughter, Sister          Tobacco Use    Smoking status: Former Smoker     Packs/day: 1.00     Years: 25.00     Pack years: 25.00     Quit date: 2000     Years since quittin.6    Smokeless tobacco: Never Used   Substance and Sexual Activity    Alcohol use: Yes     Comment: rarely    Drug use: No    Sexual activity: Never       Review of Systems   Unable to perform ROS: Dementia   Objective: "     Vital Signs (Most Recent):  Temp: 99.2 °F (37.3 °C) (03/24/22 1232)  Pulse: 76 (03/24/22 1232)  Resp: 18 (03/24/22 1320)  BP: (!) 145/69 (03/24/22 1232)  SpO2: 98 % (03/24/22 1232)   Vital Signs (24h Range):  Temp:  [97.2 °F (36.2 °C)-99.2 °F (37.3 °C)] 99.2 °F (37.3 °C)  Pulse:  [76-94] 76  Resp:  [16-24] 18  SpO2:  [84 %-98 %] 98 %  BP: (129-155)/(60-80) 145/69     Weight: 77.6 kg (171 lb)  Body mass index is 25.25 kg/m².    Physical Exam  Vitals and nursing note reviewed.   Constitutional:       General: He is not in acute distress.     Appearance: He is well-developed. He is ill-appearing.      Comments: Calm ill appearing elderly male with nasal canula. Non distressed. Answering in one word sentences. Confused. Alert but not oriented.    HENT:      Head: Normocephalic and atraumatic.      Nose: Nose normal.   Eyes:      Conjunctiva/sclera: Conjunctivae normal.   Pulmonary:      Effort: Pulmonary effort is normal. No respiratory distress.   Abdominal:      General: There is no distension.      Palpations: Abdomen is soft.      Tenderness: There is no abdominal tenderness.   Neurological:      Mental Status: He is alert.      Cranial Nerves: No cranial nerve deficit.       Review of Symptoms      Symptom Assessment (ESAS 0-10 Scale)  Unable to complete assessment due to Dementia     CAM / Delirium:  Positive    Pain Assessment in Advanced Demential Scale (PAINAD)   Breathing - Independent of vocalization:  0  Negative vocalization:  0  Facial expression:  0  Body language:  0  Consolability:  0  Total:  0    ECOG Performance Status rdGrdrrdarddrderd:rd rd3rd Living Arrangements:  Lives with spouse    Psychosocial/Cultural:   Has 5 children. 3 with present partner.  Retired restaurant owner        Advance Care Planning   Advance Directives:   Living Will: Yes        Copy on chart: Yes    LaPOST: No    Do Not Resuscitate Status: Yes    Medical Power of : Yes      Decision Making:  Patient answered questions        Significant Labs: All pertinent labs within the past 24 hours have been reviewed.  CBC:   Recent Labs   Lab 03/24/22  0910   WBC 6.61   HGB 11.8*   HCT 36.9*   MCV 81*   PLT 87*     BMP:  Recent Labs   Lab 03/24/22  0910   *      K 3.0*      CO2 23   BUN 20   CREATININE 0.8   CALCIUM 8.8     LFT:  Lab Results   Component Value Date    AST 24 03/22/2022    GGT 34 03/13/2012    ALKPHOS 115 03/22/2022    BILITOT 2.7 (H) 03/22/2022     Albumin:   Albumin   Date Value Ref Range Status   03/22/2022 3.4 (L) 3.5 - 5.2 g/dL Final     Protein:   Total Protein   Date Value Ref Range Status   03/22/2022 6.8 6.0 - 8.4 g/dL Final     Lactic acid:   Lab Results   Component Value Date    LACTATE 1.5 03/22/2022    LACTATE 2.0 03/22/2022       Significant Imaging: I have reviewed all pertinent imaging results/findings within the past 24 hours.        > 50% of 70 min visit spent in chart review, face to face discussion of goals of care,  symptom assessment, coordination of care and emotional support.    Sulaiman Aldridge MD  Palliative Medicine  [facility]

## 2022-03-24 NOTE — SUBJECTIVE & OBJECTIVE
Interval History: discussed with wife and daughter at bedside Pt had episodes of  agitation, confusion and was  given medicine to sedate him.His resp status worsened escalated to 10L HFNC.  Pt made DNR.     Review of Systems   Unable to perform ROS: Dementia   Objective:     Vital Signs (Most Recent):  Temp: 98.5 °F (36.9 °C) (03/24/22 1553)  Pulse: 74 (03/24/22 1553)  Resp: 18 (03/24/22 1553)  BP: 139/66 (03/24/22 1553)  SpO2: 96 % (03/24/22 1554) Vital Signs (24h Range):  Temp:  [97.2 °F (36.2 °C)-99.2 °F (37.3 °C)] 98.5 °F (36.9 °C)  Pulse:  [74-94] 74  Resp:  [16-24] 18  SpO2:  [84 %-98 %] 96 %  BP: (129-155)/(60-80) 139/66     Weight: 77.6 kg (171 lb)  Body mass index is 25.25 kg/m².    Intake/Output Summary (Last 24 hours) at 3/24/2022 1604  Last data filed at 3/24/2022 1322  Gross per 24 hour   Intake 10 ml   Output 300 ml   Net -290 ml      Physical Exam  Vitals and nursing note reviewed.   Constitutional:       General: He is not in acute distress.     Appearance: He is well-developed. He is not ill-appearing or diaphoretic.   HENT:      Head: Normocephalic and atraumatic.      Right Ear: External ear normal.      Left Ear: External ear normal.      Nose: Nose normal.      Mouth/Throat:      Mouth: Mucous membranes are moist.   Eyes:      General: No scleral icterus.        Right eye: No discharge.         Left eye: No discharge.      Conjunctiva/sclera: Conjunctivae normal.      Pupils: Pupils are equal, round, and reactive to light.   Neck:      Thyroid: No thyromegaly.   Cardiovascular:      Rate and Rhythm: Normal rate and regular rhythm.      Heart sounds: Normal heart sounds. No murmur heard.  Pulmonary:      Effort: Pulmonary effort is normal. No respiratory distress.      Breath sounds: No stridor. Rales present. No wheezing.   Abdominal:      General: Bowel sounds are normal. There is no distension.      Palpations: Abdomen is soft.      Tenderness: There is no abdominal tenderness.    Musculoskeletal:         General: No tenderness.      Cervical back: Normal range of motion and neck supple.   Lymphadenopathy:      Cervical: No cervical adenopathy.   Skin:     General: Skin is warm and dry.      Capillary Refill: Capillary refill takes less than 2 seconds.      Findings: No erythema or rash.   Neurological:      General: No focal deficit present.      Mental Status: He is alert. Mental status is at baseline. He is disoriented.      Cranial Nerves: No cranial nerve deficit.      Sensory: No sensory deficit.      Motor: Weakness present. No abnormal muscle tone.      Coordination: Coordination normal.   Psychiatric:         Mood and Affect: Mood normal.         Behavior: Behavior normal.       Significant Labs: All pertinent labs within the past 24 hours have been reviewed.  BMP:   Recent Labs   Lab 03/24/22  0910   *      K 3.0*      CO2 23   BUN 20   CREATININE 0.8   CALCIUM 8.8     CBC:   Recent Labs   Lab 03/23/22  0455 03/24/22  0910   WBC 6.33 6.61   HGB 11.0* 11.8*   HCT 33.5* 36.9*   PLT 82* 87*       Significant Imaging: I have reviewed all pertinent imaging results/findings within the past 24 hours.

## 2022-03-24 NOTE — CARE UPDATE
Placed on HFNC       03/24/22 0829   PRE-TX-O2   O2 Device (Oxygen Therapy) High Flow nasal Cannula   $ Is the patient on Low Flow Oxygen? Yes   Flow (L/min) 10   SpO2 (!) 92 %   Pulse Oximetry Type Intermittent   $ Pulse Oximetry - Multiple Charge Pulse Oximetry - Multiple

## 2022-03-24 NOTE — HPI
Per record and family:  Steve June Jr. is a 75 y.o. male with a PMHx venous insufficiency, DM2, GERD, HTN, CHF, cirrhosis, chronic hepatitis C, pulmonary fibrosis, and HLD, hypertension valvular heart disease mild mitral regurg, chronic diastolic heart failure, interstitial lung disease who presents to the ED with   chief complain of worsening cough and shortness of breath over the past few days.  Patient's wife was at bedside states that patient has been having generalized weakness for the last month or to and has been completely bed-bound for the last 1 week. Patient does not want wears oxygen secondary to dementia. He is followed by Dr. Migue licona in pulm clinic for the past few years and thankfully has not had any hospitalizations until a fall resulting in a hip fracture 6 months ago. He has been very debilitated since then ambulating very short distances with a walker. Sleeping and eating little for the past month. Pulmonology following while in the hospital for presumed fibrosis flair increasing to 10 L today NC. Palliative medicine consulted for [facility].  His hospital course has been complicated additionally by agitation causing him to become combative. This has been a long standing and worsening result of dementia at home as well.       Introduced palliative medicine and its services. Wife and daughter bedside. Patient at baseline per family speaking in short sentences, mostly nodding head inappropriately to questions. He is lacking capacity. Has AD and wife present is POA. Both wife and daughter do not feel he has a good quality of life and are worried about escalating services to the ICU if required to support his life. They are most worried about his agitation if he goes to the ICU with his dementia. For this reason it is agreed that care not be escalated and transition to comfort care occur I this event. Otherwise pending survival of this hospitalization wife is desiring him to come home on hospice.  He did not fare well in a facility previously and feel they are honoring his wishes. Code status reviewed briefly and already DNR.  Plan to consult home hospice.     Summary:  1) No escalation to ICU. If further needs that cannot be met on floor plan to support life and he is struggling plan to transition to comfort care.  2) When stable to transport home with hospice

## 2022-03-25 PROBLEM — R41.0 DELIRIUM: Status: ACTIVE | Noted: 2022-03-25

## 2022-03-25 LAB
ANION GAP SERPL CALC-SCNC: 11 MMOL/L (ref 8–16)
BASOPHILS # BLD AUTO: 0.01 K/UL (ref 0–0.2)
BASOPHILS NFR BLD: 0.1 % (ref 0–1.9)
BUN SERPL-MCNC: 21 MG/DL (ref 8–23)
CALCIUM SERPL-MCNC: 8.7 MG/DL (ref 8.7–10.5)
CHLORIDE SERPL-SCNC: 99 MMOL/L (ref 95–110)
CO2 SERPL-SCNC: 20 MMOL/L (ref 23–29)
CREAT SERPL-MCNC: 0.8 MG/DL (ref 0.5–1.4)
DIFFERENTIAL METHOD: ABNORMAL
EOSINOPHIL # BLD AUTO: 0 K/UL (ref 0–0.5)
EOSINOPHIL NFR BLD: 0 % (ref 0–8)
ERYTHROCYTE [DISTWIDTH] IN BLOOD BY AUTOMATED COUNT: 15.4 % (ref 11.5–14.5)
EST. GFR  (AFRICAN AMERICAN): >60 ML/MIN/1.73 M^2
EST. GFR  (NON AFRICAN AMERICAN): >60 ML/MIN/1.73 M^2
GLUCOSE SERPL-MCNC: 395 MG/DL (ref 70–110)
HCT VFR BLD AUTO: 33.8 % (ref 40–54)
HGB BLD-MCNC: 11.1 G/DL (ref 14–18)
IMM GRANULOCYTES # BLD AUTO: 0.05 K/UL (ref 0–0.04)
IMM GRANULOCYTES NFR BLD AUTO: 0.7 % (ref 0–0.5)
LYMPHOCYTES # BLD AUTO: 0.2 K/UL (ref 1–4.8)
LYMPHOCYTES NFR BLD: 2.8 % (ref 18–48)
MCH RBC QN AUTO: 26.2 PG (ref 27–31)
MCHC RBC AUTO-ENTMCNC: 32.8 G/DL (ref 32–36)
MCV RBC AUTO: 80 FL (ref 82–98)
MONOCYTES # BLD AUTO: 0.3 K/UL (ref 0.3–1)
MONOCYTES NFR BLD: 3.4 % (ref 4–15)
NEUTROPHILS # BLD AUTO: 7.1 K/UL (ref 1.8–7.7)
NEUTROPHILS NFR BLD: 93 % (ref 38–73)
NRBC BLD-RTO: 0 /100 WBC
PLATELET # BLD AUTO: 82 K/UL (ref 150–450)
PMV BLD AUTO: 11.5 FL (ref 9.2–12.9)
POCT GLUCOSE: 254 MG/DL (ref 70–110)
POCT GLUCOSE: 279 MG/DL (ref 70–110)
POCT GLUCOSE: 294 MG/DL (ref 70–110)
POCT GLUCOSE: 296 MG/DL (ref 70–110)
POCT GLUCOSE: 317 MG/DL (ref 70–110)
POCT GLUCOSE: 332 MG/DL (ref 70–110)
POCT GLUCOSE: 375 MG/DL (ref 70–110)
POCT GLUCOSE: >500 MG/DL (ref 70–110)
POTASSIUM SERPL-SCNC: 4.7 MMOL/L (ref 3.5–5.1)
RBC # BLD AUTO: 4.24 M/UL (ref 4.6–6.2)
SODIUM SERPL-SCNC: 130 MMOL/L (ref 136–145)
WBC # BLD AUTO: 7.58 K/UL (ref 3.9–12.7)

## 2022-03-25 PROCEDURE — A4216 STERILE WATER/SALINE, 10 ML: HCPCS | Performed by: INTERNAL MEDICINE

## 2022-03-25 PROCEDURE — 27000221 HC OXYGEN, UP TO 24 HOURS

## 2022-03-25 PROCEDURE — 85025 COMPLETE CBC W/AUTO DIFF WBC: CPT | Performed by: INTERNAL MEDICINE

## 2022-03-25 PROCEDURE — 94640 AIRWAY INHALATION TREATMENT: CPT

## 2022-03-25 PROCEDURE — 94761 N-INVAS EAR/PLS OXIMETRY MLT: CPT

## 2022-03-25 PROCEDURE — 63700000 PHARM REV CODE 250 ALT 637 W/O HCPCS: Performed by: INTERNAL MEDICINE

## 2022-03-25 PROCEDURE — 12000002 HC ACUTE/MED SURGE SEMI-PRIVATE ROOM

## 2022-03-25 PROCEDURE — 36415 COLL VENOUS BLD VENIPUNCTURE: CPT | Performed by: INTERNAL MEDICINE

## 2022-03-25 PROCEDURE — 63600175 PHARM REV CODE 636 W HCPCS: Performed by: INTERNAL MEDICINE

## 2022-03-25 PROCEDURE — 25000003 PHARM REV CODE 250: Performed by: INTERNAL MEDICINE

## 2022-03-25 PROCEDURE — 80048 BASIC METABOLIC PNL TOTAL CA: CPT | Performed by: INTERNAL MEDICINE

## 2022-03-25 PROCEDURE — 63600175 PHARM REV CODE 636 W HCPCS: Performed by: NURSE PRACTITIONER

## 2022-03-25 PROCEDURE — 99233 PR SUBSEQUENT HOSPITAL CARE,LEVL III: ICD-10-PCS | Mod: ,,, | Performed by: INTERNAL MEDICINE

## 2022-03-25 PROCEDURE — 99233 SBSQ HOSP IP/OBS HIGH 50: CPT | Mod: ,,, | Performed by: INTERNAL MEDICINE

## 2022-03-25 PROCEDURE — 25000242 PHARM REV CODE 250 ALT 637 W/ HCPCS: Performed by: INTERNAL MEDICINE

## 2022-03-25 PROCEDURE — C9399 UNCLASSIFIED DRUGS OR BIOLOG: HCPCS | Performed by: INTERNAL MEDICINE

## 2022-03-25 RX ORDER — INSULIN ASPART 100 [IU]/ML
10 INJECTION, SOLUTION INTRAVENOUS; SUBCUTANEOUS ONCE
Status: COMPLETED | OUTPATIENT
Start: 2022-03-25 | End: 2022-03-25

## 2022-03-25 RX ORDER — ONDANSETRON 4 MG/1
4 TABLET, ORALLY DISINTEGRATING ORAL EVERY 6 HOURS PRN
Status: DISCONTINUED | OUTPATIENT
Start: 2022-03-25 | End: 2022-03-29 | Stop reason: HOSPADM

## 2022-03-25 RX ORDER — ACETAMINOPHEN 325 MG/1
650 TABLET ORAL EVERY 6 HOURS PRN
Status: DISCONTINUED | OUTPATIENT
Start: 2022-03-25 | End: 2022-03-29 | Stop reason: HOSPADM

## 2022-03-25 RX ADMIN — ONDANSETRON 4 MG: 4 TABLET, ORALLY DISINTEGRATING ORAL at 05:03

## 2022-03-25 RX ADMIN — DOCUSATE SODIUM AND SENNOSIDES 1 TABLET: 8.6; 5 TABLET, FILM COATED ORAL at 08:03

## 2022-03-25 RX ADMIN — DOCUSATE SODIUM AND SENNOSIDES 1 TABLET: 8.6; 5 TABLET, FILM COATED ORAL at 09:03

## 2022-03-25 RX ADMIN — Medication 10 ML: at 10:03

## 2022-03-25 RX ADMIN — BUDESONIDE 0.5 MG: 0.5 SUSPENSION RESPIRATORY (INHALATION) at 08:03

## 2022-03-25 RX ADMIN — OXYCODONE AND ACETAMINOPHEN 1 TABLET: 10; 325 TABLET ORAL at 09:03

## 2022-03-25 RX ADMIN — INSULIN ASPART 6 UNITS: 100 INJECTION, SOLUTION INTRAVENOUS; SUBCUTANEOUS at 07:03

## 2022-03-25 RX ADMIN — METHYLPREDNISOLONE SODIUM SUCCINATE 40 MG: 40 INJECTION, POWDER, FOR SOLUTION INTRAMUSCULAR; INTRAVENOUS at 05:03

## 2022-03-25 RX ADMIN — PANTOPRAZOLE SODIUM 40 MG: 40 TABLET, DELAYED RELEASE ORAL at 09:03

## 2022-03-25 RX ADMIN — AZITHROMYCIN MONOHYDRATE 250 MG: 250 TABLET ORAL at 09:03

## 2022-03-25 RX ADMIN — INSULIN ASPART 8 UNITS: 100 INJECTION, SOLUTION INTRAVENOUS; SUBCUTANEOUS at 05:03

## 2022-03-25 RX ADMIN — INSULIN ASPART 8 UNITS: 100 INJECTION, SOLUTION INTRAVENOUS; SUBCUTANEOUS at 01:03

## 2022-03-25 RX ADMIN — MELATONIN TAB 3 MG 6 MG: 3 TAB at 09:03

## 2022-03-25 RX ADMIN — CEFTRIAXONE 1 G: 1 INJECTION, SOLUTION INTRAVENOUS at 03:03

## 2022-03-25 RX ADMIN — INSULIN ASPART 6 UNITS: 100 INJECTION, SOLUTION INTRAVENOUS; SUBCUTANEOUS at 03:03

## 2022-03-25 RX ADMIN — METHYLPREDNISOLONE SODIUM SUCCINATE 40 MG: 40 INJECTION, POWDER, FOR SOLUTION INTRAMUSCULAR; INTRAVENOUS at 12:03

## 2022-03-25 RX ADMIN — DONEPEZIL HYDROCHLORIDE 10 MG: 5 TABLET, FILM COATED ORAL at 09:03

## 2022-03-25 RX ADMIN — METHYLPREDNISOLONE SODIUM SUCCINATE 80 MG: 40 INJECTION, POWDER, FOR SOLUTION INTRAMUSCULAR; INTRAVENOUS at 06:03

## 2022-03-25 RX ADMIN — IPRATROPIUM BROMIDE AND ALBUTEROL SULFATE 3 ML: 2.5; .5 SOLUTION RESPIRATORY (INHALATION) at 08:03

## 2022-03-25 RX ADMIN — INSULIN ASPART 10 UNITS: 100 INJECTION, SOLUTION INTRAVENOUS; SUBCUTANEOUS at 12:03

## 2022-03-25 RX ADMIN — INSULIN DETEMIR 15 UNITS: 100 INJECTION, SOLUTION SUBCUTANEOUS at 08:03

## 2022-03-25 RX ADMIN — ACETAMINOPHEN 650 MG: 325 TABLET ORAL at 05:03

## 2022-03-25 RX ADMIN — INSULIN ASPART 10 UNITS: 100 INJECTION, SOLUTION INTRAVENOUS; SUBCUTANEOUS at 11:03

## 2022-03-25 RX ADMIN — OXYCODONE AND ACETAMINOPHEN 1 TABLET: 10; 325 TABLET ORAL at 07:03

## 2022-03-25 RX ADMIN — Medication 10 ML: at 01:03

## 2022-03-25 NOTE — ASSESSMENT & PLAN NOTE
Dementia is controlled currently. Continue home dementia meds and non-pharmacologic interventions to prevent delirium (No VS between 11PM-5AM, activity during day, opening blinds, providing glasses/hearing aids, and up in chair during daytime). Use PRN anti-psychotics to prevent behavior of self harm during sundowning, and avoid narcotics and benzos unless absolutely necessary. PRN anti-psychotics prescribed to avoid self harm behaviors.       2 = assistive person

## 2022-03-25 NOTE — NURSING
Rechecked pt blood sugar prior to 6am dose of solu-medrol. Blood sugar was 279 with no insulin coverage given. Steroid dose was give. Will cont t monitor

## 2022-03-25 NOTE — PLAN OF CARE
Referral sent to [facility] for informational visit by LINDA Harris per family request     03/25/22 2121   Post-Acute Status   Post-Acute Authorization Hospice   Hospice Status Referrals Sent

## 2022-03-25 NOTE — PLAN OF CARE
Patirnent Drn status now SPO2 Mid 90's on 5 liters hi flow nasal cannula oxygen repositions self independently Potassium replacements given this shift no acute distress noted at this time.

## 2022-03-25 NOTE — ASSESSMENT & PLAN NOTE
Patient's FSGs are uncontrolled due to hyperglycemia on current medication regimen.  Last A1c reviewed-   Lab Results   Component Value Date    HGBA1C 7.2 (H) 03/23/2022     Most recent fingerstick glucose reviewed-   Recent Labs   Lab 03/24/22  2132 03/25/22  0040 03/25/22  0535 03/25/22  0733   POCTGLUCOSE >500* >500* 279* 254*     Current correctional scale  Low  Maintain anti-hyperglycemic dose as follows-   Antihyperglycemics (From admission, onward)            Start     Stop Route Frequency Ordered    03/22/22 2100  insulin detemir U-100 pen 15 Units         -- SubQ Nightly 03/22/22 1542    03/22/22 1642  insulin aspart U-100 pen 1-10 Units         -- SubQ Before meals & nightly PRN 03/22/22 1542        Hold Oral hypoglycemics while patient is in the hospital.\

## 2022-03-25 NOTE — CARE UPDATE
03/25/22 0809   Patient Assessment/Suction   Level of Consciousness (AVPU) alert   Respiratory Effort Normal;Unlabored   Expansion/Accessory Muscles/Retractions expansion symmetric;no retractions;no use of accessory muscles   All Lung Fields Breath Sounds diminished   Rhythm/Pattern, Respiratory unlabored   Cough Frequency infrequent   Cough Type dry;nonproductive   PRE-TX-O2   O2 Device (Oxygen Therapy) High Flow nasal Cannula   $ Is the patient on Low Flow Oxygen? Yes   Flow (L/min) 9   SpO2 (!) 93 %   Pulse Oximetry Type Intermittent   $ Pulse Oximetry - Multiple Charge Pulse Oximetry - Multiple   Pulse 79   Resp 18   Aerosol Therapy   $ Aerosol Therapy Charges Aerosol Treatment   Daily Review of Necessity (SVN) completed   Respiratory Treatment Status (SVN) given   Treatment Route (SVN) mask;oxygen   Patient Position (SVN) HOB elevated   Post Treatment Assessment (SVN) breath sounds unchanged   Signs of Intolerance (SVN) none   Breath Sounds Post-Respiratory Treatment   Throughout All Fields Post-Treatment All Fields   Throughout All Fields Post-Treatment no change   Post-treatment Heart Rate (beats/min) 77   Post-treatment Resp Rate (breaths/min) 20

## 2022-03-25 NOTE — PLAN OF CARE
POC reviewed VSS afebrile PAtient remains on 9 liters hi flow nasal cannula oxygen at this time Blood glucose checked every 2 hours routinely Glucose managed with sliding scale protocol insulin, Appetite much improved.

## 2022-03-25 NOTE — NURSING
Pt a/o x2 with confusion. Has some SOB on 4L O2 and had to raise O2 up to 9L. Family @ bedside with Nacuii system up for additional safety measures. Call light within reach bed in lowest position. Pt has had blood sugar >500 tonight. Checked twice and then lab did stat glucose test. Blood sugar 580. Secured chat NP on call and was told to give 10U novolog additional to 5U novolog and 15U Levemir that was given initially. When rechecked still >500 was instructed to give additional 10U novolog and to hold 0000 dose of solu-medrol. Will cont to monitor

## 2022-03-25 NOTE — HOSPITAL COURSE
Admitted for acute on chronic hypoxemic respiratory failure secondary to acute exacerbation of interstitial lung disease.  patient was initially thought to have pneumonia and was started on ceftriaxone and azithromycin.  Pulmonology was consulted started  the patient on steroids.  patient's respiratory status worsened and was placed on HFNC.  patient also has underlying dementia had multiple episodes of delirium in the hospital.   palliative was consulted,  patient's family thinking of home hospice at discharge. Hospice informational visit requested. Patient improved somewhat on steroids. Family chose to discharge on hospice. Pulmonologist recommended steroid taper then to continue prednisone 10 mg indefinitely while on hospice. Patient completed 7 days of antibiotics while in the hospital. He was discharged home on hospice.

## 2022-03-25 NOTE — ASSESSMENT & PLAN NOTE
Patient with Hypoxic Respiratory failure which is Acute on chronic.  he is on home oxygen at 3 LPM. Supplemental oxygen was provided and noted-  .   Worsened    resp status worsened now req 10L HFNC.   DNR     Signs/symptoms of respiratory failure include- tachypnea, increased work of breathing, respiratory distress and use of accessory muscles. Contributing diagnoses includes - Interstitial lung disease and Pneumonia Labs and images were reviewed. Patient Has not had a recent ABG.

## 2022-03-25 NOTE — PLAN OF CARE
[facility]  Discharge Reassessment    Primary Care Provider: Crispin Garcia MD    Expected Discharge Date:      Case Management continuing to follow and will assist with discharge planning as needed. Palliative following- possible home with hospice. Family wants to see how pt improves over weekend      Reassessment (most recent)     Discharge Reassessment - 03/25/22 1549        Discharge Reassessment    Assessment Type Discharge Planning Reassessment     Did the patient's condition or plan change since previous assessment? No     Discharge Plan discussed with: Patient;Spouse/sig other     Discharge Plan A Hospice/home;Home Health     Discharge Plan B Home with family

## 2022-03-25 NOTE — PT/OT/SLP PROGRESS
"Physical Therapy      Patient Name:  Steve June Jr.   MRN:  [id number]    Patient not seen today secondary to patient declined in a.m. stating "I'm agitated" and requesting medication with DONAL Saldivar notified. Patient declined secondary to fatigue in p.m. Will follow-up 03/26/22.        "

## 2022-03-25 NOTE — ACP (ADVANCE CARE PLANNING)
Advance Care Planning     Date: 03/25/2022    Today a meeting took place: bedside    Patient Participation: Patient is unable to participate     Attendees (Name and  Relationship to patient): Health care power of : wife        ACP Conversation (General): Understanding of advance care planning and role of health care agent defined explained  Understanding of current condition Acute resp failure  Experience with serious illness explained    Code Status: DNR; status confirmed/order placed in chart     ACP Documents: Provided ACP documents    Goals of care: The family endorses that what is most important right now is to focus on spending time at home and improvement in condition but with limits to invasive therapies    Accordingly, we have decided that the best plan to meet the patient's goals includes continuing with treatment      Recommendations/  Follow-up tasks: The patient and health care agent were provided the following recommendations Will monitor patient's response to current treatment.  If patient's condition worsens will likely go with comfort care      Length of ACP   conversation in minutes: 60 minutes

## 2022-03-25 NOTE — PROGRESS NOTES
[facility] Hospital Medicine  Progress Note    Patient Name: Steve June Jr.  MRN: [id number]  Patient Class: IP- Inpatient   Admission Date: 3/22/2022  Length of Stay: 3 days  Attending Physician: Yamini Perea MD  Primary Care Provider: Crispin Garcia MD        Subjective:     Principal Problem:Acute on chronic respiratory failure        HPI:  Steve June Jr. is a 75 y.o. male with a PMHx venous insufficiency, DM2, GERD, HTN, CHF, cirrhosis, chronic hepatitis C, pulmonary fibrosis, and HLD, hypertension valvular heart disease mild mitral regurg, chronic diastolic heart failure, interstitial lung disease who presents to the ED with   chief complain of worsening cough and shortness of breath over the past few days.  Patient has dementia unable to provide history but denies any other symptoms at this point.  Patient's wife was at bedside states that patient has been having generalized weakness for the last month or to and has been completely bed-bound for the last 1 week.  His breathing worsened in the last few days.  Patient does not want wears oxygen secondary to dementia.  Wife states his oxygen saturations were in the 70s.  Patient has not had any hospitalization for hypoxia or respiratory failure in the last 1 year.       Overview/Hospital Course:  No notes on file    Interval History: On High flow 9 L. Pt was hyperglycemic after he was started on Steroids. Had episodes of agitation yesterday. Chest xr no change There is persistent interstitial disease with superimposed multifocal airspace opacities throughout both lungs, showing no significant change.     Review of Systems   Unable to perform ROS: Dementia   Objective:     Vital Signs (Most Recent):  Temp: 96.9 °F (36.1 °C) (03/25/22 0739)  Pulse: 79 (03/25/22 0809)  Resp: 18 (03/25/22 0809)  BP: (!) 167/77 (03/25/22 0739)  SpO2: (!) 93 % (03/25/22 0809)   Vital Signs (24h Range):  Temp:  [96.9 °F (36.1 °C)-99.2 °F (37.3  °C)] 96.9 °F (36.1 °C)  Pulse:  [56-96] 79  Resp:  [17-24] 18  SpO2:  [90 %-98 %] 93 %  BP: (118-167)/(61-77) 167/77     Weight: 77.6 kg (171 lb)  Body mass index is 25.25 kg/m².    Intake/Output Summary (Last 24 hours) at 3/25/2022 1039  Last data filed at 3/25/2022 0558  Gross per 24 hour   Intake 1787.36 ml   Output 1080 ml   Net 707.36 ml      Physical Exam  Vitals and nursing note reviewed.   Constitutional:       General: He is not in acute distress.     Appearance: He is well-developed. He is not ill-appearing or diaphoretic.   HENT:      Head: Normocephalic and atraumatic.      Right Ear: External ear normal.      Left Ear: External ear normal.      Nose: Nose normal.      Mouth/Throat:      Mouth: Mucous membranes are moist.   Eyes:      General: No scleral icterus.        Right eye: No discharge.         Left eye: No discharge.      Conjunctiva/sclera: Conjunctivae normal.      Pupils: Pupils are equal, round, and reactive to light.   Neck:      Thyroid: No thyromegaly.   Cardiovascular:      Rate and Rhythm: Normal rate and regular rhythm.      Heart sounds: Normal heart sounds. No murmur heard.  Pulmonary:      Effort: Pulmonary effort is normal. No respiratory distress.      Breath sounds: No stridor. Rales present. No wheezing.   Abdominal:      General: Bowel sounds are normal. There is no distension.      Palpations: Abdomen is soft.      Tenderness: There is no abdominal tenderness.   Musculoskeletal:         General: No tenderness.      Cervical back: Normal range of motion and neck supple.   Lymphadenopathy:      Cervical: No cervical adenopathy.   Skin:     General: Skin is warm and dry.      Capillary Refill: Capillary refill takes less than 2 seconds.      Findings: No erythema or rash.   Neurological:      General: No focal deficit present.      Mental Status: He is alert. Mental status is at baseline. He is disoriented.      Cranial Nerves: No cranial nerve deficit.      Sensory: No sensory  deficit.      Motor: Weakness present. No abnormal muscle tone.      Coordination: Coordination normal.   Psychiatric:         Mood and Affect: Mood normal.         Behavior: Behavior normal.       Significant Labs: All pertinent labs within the past 24 hours have been reviewed.  CBC:   Recent Labs   Lab 03/24/22  0910   WBC 6.61   HGB 11.8*   HCT 36.9*   PLT 87*     CMP:   Recent Labs   Lab 03/24/22  0910 03/24/22  2144     --    K 3.0*  --      --    CO2 23  --    * 580*   BUN 20  --    CREATININE 0.8  --    CALCIUM 8.8  --    ANIONGAP 13  --    EGFRNONAA >60  --        Significant Imaging: I have reviewed all pertinent imaging results/findings within the past 24 hours.      Assessment/Plan:      * Acute on chronic respiratory failure  Patient with Hypoxic Respiratory failure which is Acute on chronic.  he is on home oxygen at 3 LPM. Supplemental oxygen was provided and noted-  .   Worsened    resp status worsened now req 10L HFNC.   DNR     Signs/symptoms of respiratory failure include- tachypnea, increased work of breathing, respiratory distress and use of accessory muscles. Contributing diagnoses includes - Interstitial lung disease and Pneumonia Labs and images were reviewed. Patient Has not had a recent ABG.     Community acquired pneumonia, bilateral  The chest x-ray shows consolidation Moderate chronic interstitial disease, with possible superimposed acute infiltrate  Will cont empiric treatment with IV ceftriaxone and IV azithromycin.  We will repeat CBC daily  Differential diagnosis:  Congestive heart failure, this can be supported by the  cough, and cardiovascular risk factors. However, the mildly elevated BNP supports a pulmonary process as well as the consolidation seen on chest x-ray.   On exam, the patient lacks JVD and lower extremity edema that would be suspected in right sided heart failure.   Pulmonary embolism,less likely.   BNP  Recent Labs   Lab 03/22/22  1341   *   149*       However, the patient lacks the risk factors of hypercoagulability and inactivity. The progression of the  symptoms over the course of a week does not support a diagnosis of a PE. On exam there  is no evidence of a deep venous thrombosis.      FEN/IVF: cardiac diet. No IVF        Delirium  Patient with acute delirium. There is no specific treatment. Will avoid narcotics/benzos that are known to worsen condition and add PRN antipsychotics to limit behaviors of self harm. Monitor closely.        Dementia without behavioral disturbance  Dementia is controlled currently. Continue home dementia meds and non-pharmacologic interventions to prevent delirium (No VS between 11PM-5AM, activity during day, opening blinds, providing glasses/hearing aids, and up in chair during daytime). Use PRN anti-psychotics to prevent behavior of self harm during sundowning, and avoid narcotics and benzos unless absolutely necessary. PRN anti-psychotics prescribed to avoid self harm behaviors.        Acute exacerbation of idiopathic pulmonary fibrosis  On 9 L HF.  Pulm recs appreciated   Will cont IV steroids    Interstitial lung disease  Hx noted  On home oxygen 3 L  Started on Steroids IV for Flare of ILD      Stasis dermatitis of both legs  Hx noted      Hypertension associated with diabetes  Will resume home meds      Hepatic cirrhosis due to chronic hepatitis C infection  Hx noted      Chronic low back pain  Hx noted      GERD (gastroesophageal reflux disease)  Hx noted      Type 2 diabetes mellitus with complication, with long-term current use of insulin  Patient's FSGs are uncontrolled due to hyperglycemia on current medication regimen.  Last A1c reviewed-   Lab Results   Component Value Date    HGBA1C 7.2 (H) 03/23/2022     Most recent fingerstick glucose reviewed-   Recent Labs   Lab 03/24/22  2132 03/25/22  0040 03/25/22  0535 03/25/22  0733   POCTGLUCOSE >500* >500* 279* 254*     Current correctional scale  Low  Maintain  anti-hyperglycemic dose as follows-   Antihyperglycemics (From admission, onward)            Start     Stop Route Frequency Ordered    03/22/22 2100  insulin detemir U-100 pen 15 Units         -- SubQ Nightly 03/22/22 1542    03/22/22 1642  insulin aspart U-100 pen 1-10 Units         -- SubQ Before meals & nightly PRN 03/22/22 1542        Hold Oral hypoglycemics while patient is in the hospital.\      PVD (peripheral vascular disease)  Hx noted      Chronic pain of left knee  chronic        VTE Risk Mitigation (From admission, onward)    None          Discharge Planning   PATO:      Code Status: DNR   Is the patient medically ready for discharge?:     Reason for patient still in hospital (select all that apply): Patient trending condition and Treatment  Discharge Plan A: Home with family                  Yamini Perea MD  Department of Hospital Medicine   [facility]

## 2022-03-25 NOTE — SUBJECTIVE & OBJECTIVE
Interval History: On High flow 9 L. Pt was hyperglycemic after he was started on Steroids. Had episodes of agitation yesterday. Chest xr no change There is persistent interstitial disease with superimposed multifocal airspace opacities throughout both lungs, showing no significant change.     Review of Systems   Unable to perform ROS: Dementia   Objective:     Vital Signs (Most Recent):  Temp: 96.9 °F (36.1 °C) (03/25/22 0739)  Pulse: 79 (03/25/22 0809)  Resp: 18 (03/25/22 0809)  BP: (!) 167/77 (03/25/22 0739)  SpO2: (!) 93 % (03/25/22 0809)   Vital Signs (24h Range):  Temp:  [96.9 °F (36.1 °C)-99.2 °F (37.3 °C)] 96.9 °F (36.1 °C)  Pulse:  [56-96] 79  Resp:  [17-24] 18  SpO2:  [90 %-98 %] 93 %  BP: (118-167)/(61-77) 167/77     Weight: 77.6 kg (171 lb)  Body mass index is 25.25 kg/m².    Intake/Output Summary (Last 24 hours) at 3/25/2022 1039  Last data filed at 3/25/2022 0558  Gross per 24 hour   Intake 1787.36 ml   Output 1080 ml   Net 707.36 ml      Physical Exam  Vitals and nursing note reviewed.   Constitutional:       General: He is not in acute distress.     Appearance: He is well-developed. He is not ill-appearing or diaphoretic.   HENT:      Head: Normocephalic and atraumatic.      Right Ear: External ear normal.      Left Ear: External ear normal.      Nose: Nose normal.      Mouth/Throat:      Mouth: Mucous membranes are moist.   Eyes:      General: No scleral icterus.        Right eye: No discharge.         Left eye: No discharge.      Conjunctiva/sclera: Conjunctivae normal.      Pupils: Pupils are equal, round, and reactive to light.   Neck:      Thyroid: No thyromegaly.   Cardiovascular:      Rate and Rhythm: Normal rate and regular rhythm.      Heart sounds: Normal heart sounds. No murmur heard.  Pulmonary:      Effort: Pulmonary effort is normal. No respiratory distress.      Breath sounds: No stridor. Rales present. No wheezing.   Abdominal:      General: Bowel sounds are normal. There is no  distension.      Palpations: Abdomen is soft.      Tenderness: There is no abdominal tenderness.   Musculoskeletal:         General: No tenderness.      Cervical back: Normal range of motion and neck supple.   Lymphadenopathy:      Cervical: No cervical adenopathy.   Skin:     General: Skin is warm and dry.      Capillary Refill: Capillary refill takes less than 2 seconds.      Findings: No erythema or rash.   Neurological:      General: No focal deficit present.      Mental Status: He is alert. Mental status is at baseline. He is disoriented.      Cranial Nerves: No cranial nerve deficit.      Sensory: No sensory deficit.      Motor: Weakness present. No abnormal muscle tone.      Coordination: Coordination normal.   Psychiatric:         Mood and Affect: Mood normal.         Behavior: Behavior normal.       Significant Labs: All pertinent labs within the past 24 hours have been reviewed.  CBC:   Recent Labs   Lab 03/24/22  0910   WBC 6.61   HGB 11.8*   HCT 36.9*   PLT 87*     CMP:   Recent Labs   Lab 03/24/22  0910 03/24/22  2144     --    K 3.0*  --      --    CO2 23  --    * 580*   BUN 20  --    CREATININE 0.8  --    CALCIUM 8.8  --    ANIONGAP 13  --    EGFRNONAA >60  --        Significant Imaging: I have reviewed all pertinent imaging results/findings within the past 24 hours.

## 2022-03-25 NOTE — PT/OT/SLP PROGRESS
Occupational Therapy      Patient Name:  Steve June JrTruman   MRN:  [id number]    Patient not seen today secondary to Other (Comment) (2 attempts made 1045 & 1325 and family not present; unable to complete family training.). Will follow-up by Monday 3/28/22 if pt is still admintted.    3/25/2022

## 2022-03-25 NOTE — PLAN OF CARE
Problem: Adult Inpatient Plan of Care  Goal: Plan of Care Review  Outcome: Ongoing, Progressing  Goal: Patient-Specific Goal (Individualized)  Outcome: Ongoing, Progressing  Goal: Absence of Hospital-Acquired Illness or Injury  Outcome: Ongoing, Progressing  Goal: Optimal Comfort and Wellbeing  Outcome: Ongoing, Progressing  Goal: Readiness for Transition of Care  Outcome: Ongoing, Progressing     Problem: Diabetes Comorbidity  Goal: Blood Glucose Level Within Targeted Range  Outcome: Ongoing, Progressing     Problem: Fluid Imbalance (Pneumonia)  Goal: Fluid Balance  Outcome: Ongoing, Progressing     Problem: Infection (Pneumonia)  Goal: Resolution of Infection Signs and Symptoms  Outcome: Ongoing, Progressing     Problem: Respiratory Compromise (Pneumonia)  Goal: Effective Oxygenation and Ventilation  Outcome: Ongoing, Progressing     Problem: Skin Injury Risk Increased  Goal: Skin Health and Integrity  Outcome: Ongoing, Progressing     Problem: Coping Ineffective  Goal: Effective Coping  Outcome: Ongoing, Progressing     Problem: Fall Injury Risk  Goal: Absence of Fall and Fall-Related Injury  Outcome: Ongoing, Progressing

## 2022-03-26 LAB
ANION GAP SERPL CALC-SCNC: 11 MMOL/L (ref 8–16)
BASOPHILS # BLD AUTO: 0.01 K/UL (ref 0–0.2)
BASOPHILS NFR BLD: 0.1 % (ref 0–1.9)
BUN SERPL-MCNC: 25 MG/DL (ref 8–23)
CALCIUM SERPL-MCNC: 9 MG/DL (ref 8.7–10.5)
CHLORIDE SERPL-SCNC: 101 MMOL/L (ref 95–110)
CO2 SERPL-SCNC: 21 MMOL/L (ref 23–29)
CREAT SERPL-MCNC: 0.9 MG/DL (ref 0.5–1.4)
DIFFERENTIAL METHOD: ABNORMAL
EOSINOPHIL # BLD AUTO: 0 K/UL (ref 0–0.5)
EOSINOPHIL NFR BLD: 0 % (ref 0–8)
ERYTHROCYTE [DISTWIDTH] IN BLOOD BY AUTOMATED COUNT: 15.4 % (ref 11.5–14.5)
EST. GFR  (AFRICAN AMERICAN): >60 ML/MIN/1.73 M^2
EST. GFR  (NON AFRICAN AMERICAN): >60 ML/MIN/1.73 M^2
GLUCOSE SERPL-MCNC: 0 MG/DL (ref 70–110)
GLUCOSE SERPL-MCNC: 0 MG/DL (ref 70–110)
GLUCOSE SERPL-MCNC: 289 MG/DL (ref 70–110)
GLUCOSE SERPL-MCNC: 410 MG/DL (ref 70–110)
HCT VFR BLD AUTO: 36.8 % (ref 40–54)
HGB BLD-MCNC: 11.9 G/DL (ref 14–18)
IMM GRANULOCYTES # BLD AUTO: 0.12 K/UL (ref 0–0.04)
IMM GRANULOCYTES NFR BLD AUTO: 1.1 % (ref 0–0.5)
LYMPHOCYTES # BLD AUTO: 0.3 K/UL (ref 1–4.8)
LYMPHOCYTES NFR BLD: 2.6 % (ref 18–48)
MCH RBC QN AUTO: 25.8 PG (ref 27–31)
MCHC RBC AUTO-ENTMCNC: 32.3 G/DL (ref 32–36)
MCV RBC AUTO: 80 FL (ref 82–98)
MONOCYTES # BLD AUTO: 0.4 K/UL (ref 0.3–1)
MONOCYTES NFR BLD: 3.6 % (ref 4–15)
NEUTROPHILS # BLD AUTO: 10.4 K/UL (ref 1.8–7.7)
NEUTROPHILS NFR BLD: 92.6 % (ref 38–73)
NRBC BLD-RTO: 0 /100 WBC
PLATELET # BLD AUTO: 84 K/UL (ref 150–450)
PMV BLD AUTO: 9.8 FL (ref 9.2–12.9)
POCT GLUCOSE: 267 MG/DL (ref 70–110)
POCT GLUCOSE: 366 MG/DL (ref 70–110)
POCT GLUCOSE: 396 MG/DL (ref 70–110)
POCT GLUCOSE: 410 MG/DL (ref 70–110)
POCT GLUCOSE: 437 MG/DL (ref 70–110)
POCT GLUCOSE: >500 MG/DL (ref 70–110)
POTASSIUM SERPL-SCNC: 4.5 MMOL/L (ref 3.5–5.1)
RBC # BLD AUTO: 4.61 M/UL (ref 4.6–6.2)
SODIUM SERPL-SCNC: 133 MMOL/L (ref 136–145)
WBC # BLD AUTO: 11.21 K/UL (ref 3.9–12.7)

## 2022-03-26 PROCEDURE — 80048 BASIC METABOLIC PNL TOTAL CA: CPT | Performed by: HOSPITALIST

## 2022-03-26 PROCEDURE — 63600175 PHARM REV CODE 636 W HCPCS: Performed by: INTERNAL MEDICINE

## 2022-03-26 PROCEDURE — C9399 UNCLASSIFIED DRUGS OR BIOLOG: HCPCS | Performed by: HOSPITALIST

## 2022-03-26 PROCEDURE — 99232 PR SUBSEQUENT HOSPITAL CARE,LEVL II: ICD-10-PCS | Mod: ,,, | Performed by: INTERNAL MEDICINE

## 2022-03-26 PROCEDURE — 85025 COMPLETE CBC W/AUTO DIFF WBC: CPT | Performed by: HOSPITALIST

## 2022-03-26 PROCEDURE — 25000003 PHARM REV CODE 250: Performed by: INTERNAL MEDICINE

## 2022-03-26 PROCEDURE — 12000002 HC ACUTE/MED SURGE SEMI-PRIVATE ROOM

## 2022-03-26 PROCEDURE — 94761 N-INVAS EAR/PLS OXIMETRY MLT: CPT

## 2022-03-26 PROCEDURE — 94640 AIRWAY INHALATION TREATMENT: CPT

## 2022-03-26 PROCEDURE — A4216 STERILE WATER/SALINE, 10 ML: HCPCS | Performed by: INTERNAL MEDICINE

## 2022-03-26 PROCEDURE — 99232 SBSQ HOSP IP/OBS MODERATE 35: CPT | Mod: ,,, | Performed by: INTERNAL MEDICINE

## 2022-03-26 PROCEDURE — 63600175 PHARM REV CODE 636 W HCPCS: Performed by: NURSE PRACTITIONER

## 2022-03-26 PROCEDURE — 25000242 PHARM REV CODE 250 ALT 637 W/ HCPCS: Performed by: INTERNAL MEDICINE

## 2022-03-26 PROCEDURE — 25000003 PHARM REV CODE 250: Performed by: HOSPITALIST

## 2022-03-26 PROCEDURE — 36415 COLL VENOUS BLD VENIPUNCTURE: CPT | Performed by: HOSPITALIST

## 2022-03-26 PROCEDURE — 27000221 HC OXYGEN, UP TO 24 HOURS

## 2022-03-26 PROCEDURE — 63700000 PHARM REV CODE 250 ALT 637 W/O HCPCS: Performed by: INTERNAL MEDICINE

## 2022-03-26 RX ORDER — INSULIN ASPART 100 [IU]/ML
8 INJECTION, SOLUTION INTRAVENOUS; SUBCUTANEOUS ONCE
Status: COMPLETED | OUTPATIENT
Start: 2022-03-26 | End: 2022-03-26

## 2022-03-26 RX ADMIN — INSULIN ASPART 8 UNITS: 100 INJECTION, SOLUTION INTRAVENOUS; SUBCUTANEOUS at 05:03

## 2022-03-26 RX ADMIN — METHYLPREDNISOLONE SODIUM SUCCINATE 40 MG: 40 INJECTION, POWDER, FOR SOLUTION INTRAMUSCULAR; INTRAVENOUS at 11:03

## 2022-03-26 RX ADMIN — INSULIN ASPART 10 UNITS: 100 INJECTION, SOLUTION INTRAVENOUS; SUBCUTANEOUS at 11:03

## 2022-03-26 RX ADMIN — ACETAMINOPHEN 650 MG: 325 TABLET ORAL at 04:03

## 2022-03-26 RX ADMIN — PANTOPRAZOLE SODIUM 40 MG: 40 TABLET, DELAYED RELEASE ORAL at 08:03

## 2022-03-26 RX ADMIN — METHYLPREDNISOLONE SODIUM SUCCINATE 40 MG: 40 INJECTION, POWDER, FOR SOLUTION INTRAMUSCULAR; INTRAVENOUS at 12:03

## 2022-03-26 RX ADMIN — METHYLPREDNISOLONE SODIUM SUCCINATE 40 MG: 40 INJECTION, POWDER, FOR SOLUTION INTRAMUSCULAR; INTRAVENOUS at 05:03

## 2022-03-26 RX ADMIN — MELATONIN TAB 3 MG 6 MG: 3 TAB at 11:03

## 2022-03-26 RX ADMIN — INSULIN DETEMIR 20 UNITS: 100 INJECTION, SOLUTION SUBCUTANEOUS at 09:03

## 2022-03-26 RX ADMIN — BUDESONIDE 0.5 MG: 0.5 SUSPENSION RESPIRATORY (INHALATION) at 07:03

## 2022-03-26 RX ADMIN — OXYCODONE AND ACETAMINOPHEN 1 TABLET: 10; 325 TABLET ORAL at 10:03

## 2022-03-26 RX ADMIN — Medication 10 ML: at 09:03

## 2022-03-26 RX ADMIN — ACETAMINOPHEN 650 MG: 325 TABLET ORAL at 11:03

## 2022-03-26 RX ADMIN — METHYLPREDNISOLONE SODIUM SUCCINATE 40 MG: 40 INJECTION, POWDER, FOR SOLUTION INTRAMUSCULAR; INTRAVENOUS at 06:03

## 2022-03-26 RX ADMIN — IPRATROPIUM BROMIDE AND ALBUTEROL SULFATE 3 ML: 2.5; .5 SOLUTION RESPIRATORY (INHALATION) at 07:03

## 2022-03-26 RX ADMIN — INSULIN ASPART 5 UNITS: 100 INJECTION, SOLUTION INTRAVENOUS; SUBCUTANEOUS at 09:03

## 2022-03-26 RX ADMIN — ACETAMINOPHEN 650 MG: 325 TABLET ORAL at 12:03

## 2022-03-26 RX ADMIN — Medication 10 ML: at 05:03

## 2022-03-26 RX ADMIN — DOCUSATE SODIUM AND SENNOSIDES 1 TABLET: 8.6; 5 TABLET, FILM COATED ORAL at 08:03

## 2022-03-26 RX ADMIN — INSULIN ASPART 10 UNITS: 100 INJECTION, SOLUTION INTRAVENOUS; SUBCUTANEOUS at 08:03

## 2022-03-26 RX ADMIN — DONEPEZIL HYDROCHLORIDE 10 MG: 5 TABLET, FILM COATED ORAL at 08:03

## 2022-03-26 RX ADMIN — INSULIN ASPART 6 UNITS: 100 INJECTION, SOLUTION INTRAVENOUS; SUBCUTANEOUS at 04:03

## 2022-03-26 RX ADMIN — DOCUSATE SODIUM AND SENNOSIDES 1 TABLET: 8.6; 5 TABLET, FILM COATED ORAL at 09:03

## 2022-03-26 RX ADMIN — CEFTRIAXONE 1 G: 1 INJECTION, SOLUTION INTRAVENOUS at 03:03

## 2022-03-26 RX ADMIN — AZITHROMYCIN MONOHYDRATE 250 MG: 250 TABLET ORAL at 08:03

## 2022-03-26 NOTE — ASSESSMENT & PLAN NOTE
Chronic, controlled.  Latest blood pressure and vitals reviewed-   Temp:  [96.3 °F (35.7 °C)-98.2 °F (36.8 °C)]   Pulse:  [64-93]   Resp:  [16-24]   BP: (135-156)/(65-70)   SpO2:  [91 %-97 %] .   Home meds for hypertension were reviewed and noted below.       While in the hospital, will manage blood pressure as follows; Continue home antihypertensive regimen    Will utilize p.r.n. blood pressure medication only if patient's blood pressure greater than  180/110 and he develops symptoms such as worsening chest pain or shortness of breath.

## 2022-03-26 NOTE — CARE UPDATE
03/26/22 0729   Patient Assessment/Suction   Level of Consciousness (AVPU) alert   Respiratory Effort Normal;Unlabored   Expansion/Accessory Muscles/Retractions expansion symmetric;no retractions;no use of accessory muscles   All Lung Fields Breath Sounds clear   Rhythm/Pattern, Respiratory unlabored   PRE-TX-O2   O2 Device (Oxygen Therapy) High Flow nasal Cannula   $ Is the patient on Low Flow Oxygen? Yes   Flow (L/min) 6  (weaned from 8L)   SpO2 97 %   Pulse Oximetry Type Intermittent   $ Pulse Oximetry - Multiple Charge Pulse Oximetry - Multiple   Pulse 93   Resp 18   Aerosol Therapy   $ Aerosol Therapy Charges Aerosol Treatment   Daily Review of Necessity (SVN) completed   Respiratory Treatment Status (SVN) given   Treatment Route (SVN) mask;oxygen   Patient Position (SVN) HOB elevated   Post Treatment Assessment (SVN) breath sounds unchanged   Signs of Intolerance (SVN) none   Breath Sounds Post-Respiratory Treatment   Throughout All Fields Post-Treatment All Fields   Throughout All Fields Post-Treatment no change   Post-treatment Heart Rate (beats/min) 86   Post-treatment Resp Rate (breaths/min) 20

## 2022-03-26 NOTE — PLAN OF CARE
POC reviewed with pt voiced understanding, lying in bed resting c/o routine accuchecks and refused several times throughout night, will attempt again in am. VS stable, IV intact and infusing, no respiratory distress noted during this shift, call light in reach, will continue to monitor.

## 2022-03-26 NOTE — ASSESSMENT & PLAN NOTE
The chest x-ray shows consolidation Moderate chronic interstitial disease, with possible superimposed acute infiltrate  Will cont empiric treatment with IV ceftriaxone and IV azithromycin.  We will repeat CBC daily    BNP  Recent Labs   Lab 03/22/22  1341   *  149*       However, the patient lacks the risk factors of hypercoagulability and inactivity. The progression of the  symptoms over the course of a week does not support a diagnosis of a PE. On exam there  is no evidence of a deep venous thrombosis.

## 2022-03-26 NOTE — ASSESSMENT & PLAN NOTE
Patient's FSGs are uncontrolled due to hyperglycemia on current medication regimen.  Last A1c reviewed-   Lab Results   Component Value Date    HGBA1C 7.2 (H) 03/23/2022     Most recent fingerstick glucose reviewed-   Recent Labs   Lab 03/26/22  0437 03/26/22  0836 03/26/22  1059 03/26/22  1102   POCTGLUCOSE 366* 410* >500* 437*     Current correctional scale  Low  Increase   anti-hyperglycemic dose as follows-   Antihyperglycemics (From admission, onward)            Start     Stop Route Frequency Ordered    03/26/22 2100  insulin detemir U-100 pen 20 Units         -- SubQ Nightly 03/26/22 1119    03/22/22 1642  insulin aspart U-100 pen 1-10 Units         -- SubQ Before meals & nightly PRN 03/22/22 1542        Hold Oral hypoglycemics while patient is in the hospital.\

## 2022-03-26 NOTE — PROGRESS NOTES
Progress Note  PULMONARY    Admit Date: 3/22/2022   03/26/2022      SUBJECTIVE:     3/24- wife and daughter at bedside. Pt calm now but per their report he had a rough night with agitation, confusion and had to be given medicine to sedate him. He c/o pain R arm where IV potassium is infusing. resp status worsened now req 10L HFNC. The family is meeting w/ palliative at 1  3/25- pt had initial improvement in O2 reqt then worsened again requiring 9L. Overnight dose of solumedrol had to be held due to blood glucose in the 500s. Pt feeling good with no complaints. Wife at bedside  3/26- pt feeling good with no complaints. Daughter at bedside. Blood glucose continues to be elevated 400s. O2 requirement down to 6L HFNC      OBJECTIVE:     Vitals (Most recent):  Vitals:    03/26/22 0819   BP: (!) 146/70   Pulse: 85   Resp: 18   Temp: 96.6 °F (35.9 °C)       Vitals (24 hour range):  Temp:  [96.3 °F (35.7 °C)-98.2 °F (36.8 °C)]   Pulse:  [64-93]   Resp:  [18-24]   BP: (135-156)/(65-70)   SpO2:  [91 %-97 %]       Intake/Output Summary (Last 24 hours) at 3/26/2022 1048  Last data filed at 3/26/2022 0631  Gross per 24 hour   Intake 2204.18 ml   Output 625 ml   Net 1579.18 ml          Physical Exam:  The patient's neuro status (alertness,orientation,cognitive function,motor skills,), pharyngeal exam (oral lesions, hygiene, abn dentition,), Neck (jvd,mass,thyroid,nodes in neck and above/below clavicle),RESPIRATORY(symmetry,effort,fremitus,percussion,auscultation),  Cor(rhythm,heart tones including gallops,perfusion,edema)ABD(distention,hepatic&splenomegaly,tenderness,masses), Skin(rash,cyanosis),Psyc(affect,judgement,).  Exam negative except for these pertinent findings:    Alert, pleasant, verbalizes, no distress  Hard of hearing  HR regular w/ systolic murmur  Clear breath sounds bilat  Abdomen soft nontender  Chronic discoloration bilat legs, no edema    Radiographs reviewed: view by direct vision   CXR 3/26- stable  CXR 3/25-  slight improved infiltrates on right, same on left  CXR 3/24- unchanged    TTE 3/23-   · The estimated ejection fraction is 65%.  · Grade I left ventricular diastolic dysfunction.  · Atrial fibrillation not observed.  · The left ventricle is normal in size with  · Normal right ventricular size with normal right ventricular systolic function.  · There is mild aortic valve stenosis.  · Aortic valve area is 1.98 cm2; peak velocity is 2.48 m/s; mean gradient is 13 mmHg.  · Mild mitral regurgitation.  · Mild tricuspid regurgitation.  · There is mild pulmonary hypertension.  · Normal central venous pressure (3 mmHg).  · The estimated PA systolic pressure is 50 mmHg.      Labs     No results for input(s): WBC, HGB, HCT, PLT, BAND, METAMYELOCYT, MYELOPCT, HGBA1C in the last 24 hours.  No results for input(s): NA, K, CL, CO2, BUN, CREATININE, GLU, CALCIUM, CAION, MG, PHOS, AST, ALT, ALKPHOS, BILITOT, BILIDIR, PROT, ALBUMIN, PREALBUMIN, AMYLASE, LIPASE, CRP, HSCRP, SEDRATE, PROCAL, INR, PTT, LABHEPA, LACTATE, TROPONINI, CPK, CPKMB, MB, BNP in the last 24 hours.No results for input(s): PH, PCO2, PO2, HCO3 in the last 24 hours.  Microbiology Results (last 7 days)     Procedure Component Value Units Date/Time    Blood Culture #1 [[id number]] Collected: 03/22/22 1454    Order Status: Completed Specimen: Blood from Antecubital, Right Updated: 03/25/22 2312     Blood Culture, Routine No Growth to date      No Growth to date      No Growth to date      No Growth to date    Blood Culture #2 [[id number]] Collected: 03/22/22 1454    Order Status: Completed Specimen: Blood from Peripheral, Left Arm Updated: 03/25/22 2312     Blood Culture, Routine No Growth to date      No Growth to date      No Growth to date      No Growth to date    Narrative:      drawn by nurys    Influenza A & B by Molecular [[id number]] Collected: 03/22/22 1407    Order Status: Completed Specimen: Nasopharyngeal Swab Updated: 03/22/22 1501     Influenza A, Molecular  Negative     Influenza B, Molecular Negative     Flu A & B Source Nasal swab          Impression:  Active Hospital Problems    Diagnosis  POA    *Acute on chronic respiratory failure [J96.20]  Yes    Delirium [R41.0]  No    Goals of care, counseling/discussion [Z71.89]  Not Applicable    Dementia without behavioral disturbance [F03.90]  Yes    Community acquired pneumonia, bilateral [J18.9]  Yes    Acute exacerbation of idiopathic pulmonary fibrosis [J84.112]  Yes    Interstitial lung disease [J84.9]  Yes    Stasis dermatitis of both legs [I87.2]  Yes    Hypertension associated with diabetes [E11.59, I15.2]  Yes    Hepatic cirrhosis due to chronic hepatitis C infection [B18.2, K74.60]  Yes    Chronic low back pain [M54.50, G89.29]  Yes    GERD (gastroesophageal reflux disease) [K21.9]  Yes    Type 2 diabetes mellitus with complication, with long-term current use of insulin [E11.8, Z79.4]  Not Applicable    PVD (peripheral vascular disease) [I73.9]  Yes     Decreased pulses. No claudication      Chronic pain of left knee [M25.562, G89.29]  Yes     Pain contract with Dr. Pereira Feb 2014.         Resolved Hospital Problems   No resolved problems to display.               Plan:         - continue supplemental O2 to keep sats 89-92%  - continue empiric antibiotics to cover for pneumonia  - PH likely due to lung disease and diastolic dysfunction, no advanced PH therapy recommended at this time  - continue solumedrol 40mg q6h  - glucose checks, cover w/ insulin- per hospitalist  - d/w hospitalist    Maribell Campo MD  Pulmonary & Critical Care Medicine                                    .

## 2022-03-26 NOTE — PROGRESS NOTES
[facility] Hospital Medicine  Progress Note    Patient Name: Steve June Jr.  MRN: [id number]  Patient Class: IP- Inpatient   Admission Date: 3/22/2022  Length of Stay: 4 days  Attending Physician: Adalgisa Schwartz MD  Primary Care Provider: Crispin Garcia MD        Subjective:     Principal Problem:Acute on chronic respiratory failure        HPI:  Steve June Jr. is a 75 y.o. male with a PMHx venous insufficiency, DM2, GERD, HTN, CHF, cirrhosis, chronic hepatitis C, pulmonary fibrosis, and HLD, hypertension valvular heart disease mild mitral regurg, chronic diastolic heart failure, interstitial lung disease who presents to the ED with   chief complain of worsening cough and shortness of breath over the past few days.  Patient has dementia unable to provide history but denies any other symptoms at this point.  Patient's wife was at bedside states that patient has been having generalized weakness for the last month or to and has been completely bed-bound for the last 1 week.  His breathing worsened in the last few days.  Patient does not want wears oxygen secondary to dementia.  Wife states his oxygen saturations were in the 70s.  Patient has not had any hospitalization for hypoxia or respiratory failure in the last 1 year.       Overview/Hospital Course:  Admitted for acute on chronic hypoxemic respiratory failure secondary to acute exacerbation of interstitial lung disease.  patient was initially thought to have pneumonia and was started on ceftriaxone and azithromycin.  Pulmonology was consulted started  the patient on steroids.  patient's respiratory status worsened and was placed on HF.  patient also has underlying dementia had multiple episodes of delirium in the hospital.   palliative was consulted,  patient's family thinking of home hospice at discharge. Hospice informational visit requested      Interval History: Down to 6L HFNC, denies new complaints. Blood sugar remains high,  insulin adjusted    Review of Systems   Unable to perform ROS: Dementia   Objective:     Vital Signs (Most Recent):  Temp: 96.6 °F (35.9 °C) (03/26/22 0819)  Pulse: 85 (03/26/22 0819)  Resp: 16 (03/26/22 1055)  BP: (!) 146/70 (03/26/22 0819)  SpO2: (!) 91 % (03/26/22 0819)   Vital Signs (24h Range):  Temp:  [96.3 °F (35.7 °C)-98.2 °F (36.8 °C)] 96.6 °F (35.9 °C)  Pulse:  [64-93] 85  Resp:  [16-24] 16  SpO2:  [91 %-97 %] 91 %  BP: (135-156)/(65-70) 146/70     Weight: 77.6 kg (171 lb)  Body mass index is 25.25 kg/m².    Intake/Output Summary (Last 24 hours) at 3/26/2022 1120  Last data filed at 3/26/2022 0631  Gross per 24 hour   Intake 2204.18 ml   Output 625 ml   Net 1579.18 ml        Physical Exam  Vitals and nursing note reviewed.   Constitutional:       General: He is not in acute distress.     Appearance: He is well-developed. He is not ill-appearing or diaphoretic.   HENT:      Head: Normocephalic and atraumatic.      Right Ear: External ear normal.      Left Ear: External ear normal.      Nose: Nose normal.      Mouth/Throat:      Mouth: Mucous membranes are moist.   Eyes:      General: No scleral icterus.        Right eye: No discharge.         Left eye: No discharge.      Conjunctiva/sclera: Conjunctivae normal.      Pupils: Pupils are equal, round, and reactive to light.   Neck:      Thyroid: No thyromegaly.   Cardiovascular:      Rate and Rhythm: Normal rate and regular rhythm.      Heart sounds: Normal heart sounds. No murmur heard.  Pulmonary:      Effort: Pulmonary effort is normal. No respiratory distress.      Breath sounds: No stridor. Rales present. No wheezing.   Abdominal:      General: Bowel sounds are normal. There is no distension.      Palpations: Abdomen is soft.      Tenderness: There is no abdominal tenderness.   Musculoskeletal:         General: No tenderness.      Cervical back: Normal range of motion and neck supple.   Lymphadenopathy:      Cervical: No cervical adenopathy.   Skin:      General: Skin is warm and dry.      Capillary Refill: Capillary refill takes less than 2 seconds.      Findings: No erythema or rash.   Neurological:      General: No focal deficit present.      Mental Status: He is alert. Mental status is at baseline. He is disoriented.      Cranial Nerves: No cranial nerve deficit.      Sensory: No sensory deficit.      Motor: Weakness present. No abnormal muscle tone.      Coordination: Coordination normal.   Psychiatric:         Mood and Affect: Mood normal.         Behavior: Behavior normal.       Significant Labs: All pertinent labs within the past 24 hours have been reviewed.  CBC:   Recent Labs   Lab 03/25/22  1039 03/26/22  1104   WBC 7.58 11.21   HGB 11.1* 11.9*   HCT 33.8* 36.8*   PLT 82* 84*       CMP:   Recent Labs   Lab 03/24/22  2144 03/25/22  1039   NA  --  130*   K  --  4.7   CL  --  99   CO2  --  20*   * 395*   BUN  --  21   CREATININE  --  0.8   CALCIUM  --  8.7   ANIONGAP  --  11   EGFRNONAA  --  >60         Significant Imaging: I have reviewed all pertinent imaging results/findings within the past 24 hours.      Assessment/Plan:      * Acute on chronic respiratory failure  Patient with Hypoxic Respiratory failure which is Acute on chronic.  he is on home oxygen at 3 LPM. Supplemental oxygen was provided and noted-  .   Worsened   DNR     Signs/symptoms of respiratory failure include- tachypnea, increased work of breathing, respiratory distress and use of accessory muscles. Contributing diagnoses includes - Interstitial lung disease and Pneumonia Labs and images were reviewed. Patient Has not had a recent ABG.     Delirium  Patient with acute delirium. There is no specific treatment. Will avoid narcotics/benzos that are known to worsen condition and add PRN antipsychotics to limit behaviors of self harm. Monitor closely.        Dementia without behavioral disturbance  Dementia is controlled currently. Continue home dementia meds and non-pharmacologic  interventions to prevent delirium (No VS between 11PM-5AM, activity during day, opening blinds, providing glasses/hearing aids, and up in chair during daytime). Use PRN anti-psychotics to prevent behavior of self harm during sundowning, and avoid narcotics and benzos unless absolutely necessary. PRN anti-psychotics prescribed to avoid self harm behaviors.        Community acquired pneumonia, bilateral  The chest x-ray shows consolidation Moderate chronic interstitial disease, with possible superimposed acute infiltrate  Will cont empiric treatment with IV ceftriaxone and IV azithromycin.  We will repeat CBC daily    BNP  Recent Labs   Lab 03/22/22  1341   *  149*       However, the patient lacks the risk factors of hypercoagulability and inactivity. The progression of the  symptoms over the course of a week does not support a diagnosis of a PE. On exam there  is no evidence of a deep venous thrombosis.           Acute exacerbation of idiopathic pulmonary fibrosis  On 6 L HF.  Pulm recs appreciated   Will cont IV steroids    Interstitial lung disease  Hx noted  On home oxygen 3 L  Started on Steroids IV for Flare of ILD      Stasis dermatitis of both legs  Hx noted      Hypertension associated with diabetes  Chronic, controlled.  Latest blood pressure and vitals reviewed-   Temp:  [96.3 °F (35.7 °C)-98.2 °F (36.8 °C)]   Pulse:  [64-93]   Resp:  [16-24]   BP: (135-156)/(65-70)   SpO2:  [91 %-97 %] .   Home meds for hypertension were reviewed and noted below.       While in the hospital, will manage blood pressure as follows; Continue home antihypertensive regimen    Will utilize p.r.n. blood pressure medication only if patient's blood pressure greater than  180/110 and he develops symptoms such as worsening chest pain or shortness of breath.        Hepatic cirrhosis due to chronic hepatitis C infection  Hx noted      Chronic low back pain  Hx noted      GERD (gastroesophageal reflux disease)  Hx  noted      Type 2 diabetes mellitus with complication, with long-term current use of insulin  Patient's FSGs are uncontrolled due to hyperglycemia on current medication regimen.  Last A1c reviewed-   Lab Results   Component Value Date    HGBA1C 7.2 (H) 03/23/2022     Most recent fingerstick glucose reviewed-   Recent Labs   Lab 03/26/22  0437 03/26/22  0836 03/26/22  1059 03/26/22  1102   POCTGLUCOSE 366* 410* >500* 437*     Current correctional scale  Low  Increase   anti-hyperglycemic dose as follows-   Antihyperglycemics (From admission, onward)            Start     Stop Route Frequency Ordered    03/26/22 2100  insulin detemir U-100 pen 20 Units         -- SubQ Nightly 03/26/22 1119    03/22/22 1642  insulin aspart U-100 pen 1-10 Units         -- SubQ Before meals & nightly PRN 03/22/22 1542        Hold Oral hypoglycemics while patient is in the hospital.\      PVD (peripheral vascular disease)  Hx noted      Chronic pain of left knee  chronic        VTE Risk Mitigation (From admission, onward)    None          Discharge Planning   PATO:      Code Status: DNR   Is the patient medically ready for discharge?:     Reason for patient still in hospital (select all that apply): Patient trending condition and Treatment  Discharge Plan A: Hospice/home, Home Health                  Adalgisa Schwartz MD  Department of Hospital Medicine   [facility]

## 2022-03-26 NOTE — ASSESSMENT & PLAN NOTE
Patient with Hypoxic Respiratory failure which is Acute on chronic.  he is on home oxygen at 3 LPM. Supplemental oxygen was provided and noted-  .   Worsened   DNR     Signs/symptoms of respiratory failure include- tachypnea, increased work of breathing, respiratory distress and use of accessory muscles. Contributing diagnoses includes - Interstitial lung disease and Pneumonia Labs and images were reviewed. Patient Has not had a recent ABG.

## 2022-03-26 NOTE — PLAN OF CARE
Plan of care reviewed with patient. Patient verbalized complete understanding. IV steroids administered as scheduled. BG >400 throughout shift, MD notified and insulin orders adjusted. All fall precautions maintained. Bed in lowest position, locked, call light within reach. Side rails up x's 2. Slip resistant socks maintained.

## 2022-03-26 NOTE — SUBJECTIVE & OBJECTIVE
Interval History: Down to 6L HFNC, denies new complaints. Blood sugar remains high, insulin adjusted    Review of Systems   Unable to perform ROS: Dementia   Objective:     Vital Signs (Most Recent):  Temp: 96.6 °F (35.9 °C) (03/26/22 0819)  Pulse: 85 (03/26/22 0819)  Resp: 16 (03/26/22 1055)  BP: (!) 146/70 (03/26/22 0819)  SpO2: (!) 91 % (03/26/22 0819)   Vital Signs (24h Range):  Temp:  [96.3 °F (35.7 °C)-98.2 °F (36.8 °C)] 96.6 °F (35.9 °C)  Pulse:  [64-93] 85  Resp:  [16-24] 16  SpO2:  [91 %-97 %] 91 %  BP: (135-156)/(65-70) 146/70     Weight: 77.6 kg (171 lb)  Body mass index is 25.25 kg/m².    Intake/Output Summary (Last 24 hours) at 3/26/2022 1120  Last data filed at 3/26/2022 0631  Gross per 24 hour   Intake 2204.18 ml   Output 625 ml   Net 1579.18 ml        Physical Exam  Vitals and nursing note reviewed.   Constitutional:       General: He is not in acute distress.     Appearance: He is well-developed. He is not ill-appearing or diaphoretic.   HENT:      Head: Normocephalic and atraumatic.      Right Ear: External ear normal.      Left Ear: External ear normal.      Nose: Nose normal.      Mouth/Throat:      Mouth: Mucous membranes are moist.   Eyes:      General: No scleral icterus.        Right eye: No discharge.         Left eye: No discharge.      Conjunctiva/sclera: Conjunctivae normal.      Pupils: Pupils are equal, round, and reactive to light.   Neck:      Thyroid: No thyromegaly.   Cardiovascular:      Rate and Rhythm: Normal rate and regular rhythm.      Heart sounds: Normal heart sounds. No murmur heard.  Pulmonary:      Effort: Pulmonary effort is normal. No respiratory distress.      Breath sounds: No stridor. Rales present. No wheezing.   Abdominal:      General: Bowel sounds are normal. There is no distension.      Palpations: Abdomen is soft.      Tenderness: There is no abdominal tenderness.   Musculoskeletal:         General: No tenderness.      Cervical back: Normal range of motion and  neck supple.   Lymphadenopathy:      Cervical: No cervical adenopathy.   Skin:     General: Skin is warm and dry.      Capillary Refill: Capillary refill takes less than 2 seconds.      Findings: No erythema or rash.   Neurological:      General: No focal deficit present.      Mental Status: He is alert. Mental status is at baseline. He is disoriented.      Cranial Nerves: No cranial nerve deficit.      Sensory: No sensory deficit.      Motor: Weakness present. No abnormal muscle tone.      Coordination: Coordination normal.   Psychiatric:         Mood and Affect: Mood normal.         Behavior: Behavior normal.       Significant Labs: All pertinent labs within the past 24 hours have been reviewed.  CBC:   Recent Labs   Lab 03/25/22  1039 03/26/22  1104   WBC 7.58 11.21   HGB 11.1* 11.9*   HCT 33.8* 36.8*   PLT 82* 84*       CMP:   Recent Labs   Lab 03/24/22  2144 03/25/22  1039   NA  --  130*   K  --  4.7   CL  --  99   CO2  --  20*   * 395*   BUN  --  21   CREATININE  --  0.8   CALCIUM  --  8.7   ANIONGAP  --  11   EGFRNONAA  --  >60         Significant Imaging: I have reviewed all pertinent imaging results/findings within the past 24 hours.

## 2022-03-26 NOTE — PLAN OF CARE
POC reviewed with patient voiced understanding expressed being stuck too many times, notified NP orders changed to accucheck q4h. NP ordered 8U novolog administered, no respiratory distress noted at this,pt resting in bed, bed in lowest position, call light within reach will continue to monitor.

## 2022-03-27 LAB
ANION GAP SERPL CALC-SCNC: 9 MMOL/L (ref 8–16)
BACTERIA BLD CULT: NORMAL
BACTERIA BLD CULT: NORMAL
BASOPHILS # BLD AUTO: 0 K/UL (ref 0–0.2)
BASOPHILS NFR BLD: 0 % (ref 0–1.9)
BUN SERPL-MCNC: 25 MG/DL (ref 8–23)
CALCIUM SERPL-MCNC: 8.7 MG/DL (ref 8.7–10.5)
CHLORIDE SERPL-SCNC: 100 MMOL/L (ref 95–110)
CO2 SERPL-SCNC: 25 MMOL/L (ref 23–29)
CREAT SERPL-MCNC: 0.7 MG/DL (ref 0.5–1.4)
DIFFERENTIAL METHOD: ABNORMAL
EOSINOPHIL # BLD AUTO: 0 K/UL (ref 0–0.5)
EOSINOPHIL NFR BLD: 0 % (ref 0–8)
ERYTHROCYTE [DISTWIDTH] IN BLOOD BY AUTOMATED COUNT: 15.2 % (ref 11.5–14.5)
EST. GFR  (AFRICAN AMERICAN): >60 ML/MIN/1.73 M^2
EST. GFR  (NON AFRICAN AMERICAN): >60 ML/MIN/1.73 M^2
GLUCOSE SERPL-MCNC: 239 MG/DL (ref 70–110)
HCT VFR BLD AUTO: 34.7 % (ref 40–54)
HGB BLD-MCNC: 11.2 G/DL (ref 14–18)
IMM GRANULOCYTES # BLD AUTO: 0.06 K/UL (ref 0–0.04)
IMM GRANULOCYTES NFR BLD AUTO: 1 % (ref 0–0.5)
LYMPHOCYTES # BLD AUTO: 0.2 K/UL (ref 1–4.8)
LYMPHOCYTES NFR BLD: 3.6 % (ref 18–48)
MCH RBC QN AUTO: 26.1 PG (ref 27–31)
MCHC RBC AUTO-ENTMCNC: 32.3 G/DL (ref 32–36)
MCV RBC AUTO: 81 FL (ref 82–98)
MONOCYTES # BLD AUTO: 0.2 K/UL (ref 0.3–1)
MONOCYTES NFR BLD: 3.6 % (ref 4–15)
NEUTROPHILS # BLD AUTO: 5.8 K/UL (ref 1.8–7.7)
NEUTROPHILS NFR BLD: 91.8 % (ref 38–73)
NRBC BLD-RTO: 0 /100 WBC
PLATELET # BLD AUTO: 62 K/UL (ref 150–450)
PMV BLD AUTO: 10.6 FL (ref 9.2–12.9)
POCT GLUCOSE: 240 MG/DL (ref 70–110)
POCT GLUCOSE: 253 MG/DL (ref 70–110)
POCT GLUCOSE: 256 MG/DL (ref 70–110)
POCT GLUCOSE: 279 MG/DL (ref 70–110)
POCT GLUCOSE: 376 MG/DL (ref 70–110)
POCT GLUCOSE: >500 MG/DL (ref 70–110)
POCT GLUCOSE: >500 MG/DL (ref 70–110)
POTASSIUM SERPL-SCNC: 4.7 MMOL/L (ref 3.5–5.1)
RBC # BLD AUTO: 4.29 M/UL (ref 4.6–6.2)
SODIUM SERPL-SCNC: 134 MMOL/L (ref 136–145)
WBC # BLD AUTO: 6.31 K/UL (ref 3.9–12.7)

## 2022-03-27 PROCEDURE — 25000003 PHARM REV CODE 250: Performed by: HOSPITALIST

## 2022-03-27 PROCEDURE — 12000002 HC ACUTE/MED SURGE SEMI-PRIVATE ROOM

## 2022-03-27 PROCEDURE — 99232 SBSQ HOSP IP/OBS MODERATE 35: CPT | Mod: ,,, | Performed by: INTERNAL MEDICINE

## 2022-03-27 PROCEDURE — A4216 STERILE WATER/SALINE, 10 ML: HCPCS | Performed by: INTERNAL MEDICINE

## 2022-03-27 PROCEDURE — 63600175 PHARM REV CODE 636 W HCPCS: Performed by: NURSE PRACTITIONER

## 2022-03-27 PROCEDURE — 63600175 PHARM REV CODE 636 W HCPCS: Performed by: INTERNAL MEDICINE

## 2022-03-27 PROCEDURE — 27000221 HC OXYGEN, UP TO 24 HOURS

## 2022-03-27 PROCEDURE — 94761 N-INVAS EAR/PLS OXIMETRY MLT: CPT

## 2022-03-27 PROCEDURE — 99232 PR SUBSEQUENT HOSPITAL CARE,LEVL II: ICD-10-PCS | Mod: ,,, | Performed by: INTERNAL MEDICINE

## 2022-03-27 PROCEDURE — 36415 COLL VENOUS BLD VENIPUNCTURE: CPT | Performed by: HOSPITALIST

## 2022-03-27 PROCEDURE — 85025 COMPLETE CBC W/AUTO DIFF WBC: CPT | Performed by: HOSPITALIST

## 2022-03-27 PROCEDURE — 25000242 PHARM REV CODE 250 ALT 637 W/ HCPCS: Performed by: INTERNAL MEDICINE

## 2022-03-27 PROCEDURE — 25000003 PHARM REV CODE 250: Performed by: INTERNAL MEDICINE

## 2022-03-27 PROCEDURE — C9399 UNCLASSIFIED DRUGS OR BIOLOG: HCPCS | Performed by: HOSPITALIST

## 2022-03-27 PROCEDURE — 63700000 PHARM REV CODE 250 ALT 637 W/O HCPCS: Performed by: INTERNAL MEDICINE

## 2022-03-27 PROCEDURE — 94640 AIRWAY INHALATION TREATMENT: CPT

## 2022-03-27 PROCEDURE — 80048 BASIC METABOLIC PNL TOTAL CA: CPT | Performed by: HOSPITALIST

## 2022-03-27 RX ORDER — PREDNISONE 20 MG/1
40 TABLET ORAL 2 TIMES DAILY
Status: DISCONTINUED | OUTPATIENT
Start: 2022-03-27 | End: 2022-03-27

## 2022-03-27 RX ORDER — INSULIN ASPART 100 [IU]/ML
5 INJECTION, SOLUTION INTRAVENOUS; SUBCUTANEOUS ONCE
Status: DISCONTINUED | OUTPATIENT
Start: 2022-03-27 | End: 2022-03-27

## 2022-03-27 RX ORDER — INSULIN ASPART 100 [IU]/ML
10 INJECTION, SOLUTION INTRAVENOUS; SUBCUTANEOUS ONCE
Status: COMPLETED | OUTPATIENT
Start: 2022-03-27 | End: 2022-03-27

## 2022-03-27 RX ORDER — PREDNISONE 20 MG/1
40 TABLET ORAL DAILY
Status: DISCONTINUED | OUTPATIENT
Start: 2022-03-28 | End: 2022-03-29 | Stop reason: HOSPADM

## 2022-03-27 RX ADMIN — BUDESONIDE 0.5 MG: 0.5 SUSPENSION RESPIRATORY (INHALATION) at 07:03

## 2022-03-27 RX ADMIN — IPRATROPIUM BROMIDE AND ALBUTEROL SULFATE 3 ML: 2.5; .5 SOLUTION RESPIRATORY (INHALATION) at 07:03

## 2022-03-27 RX ADMIN — AZITHROMYCIN MONOHYDRATE 250 MG: 250 TABLET ORAL at 09:03

## 2022-03-27 RX ADMIN — OXYCODONE AND ACETAMINOPHEN 1 TABLET: 10; 325 TABLET ORAL at 06:03

## 2022-03-27 RX ADMIN — INSULIN ASPART 10 UNITS: 100 INJECTION, SOLUTION INTRAVENOUS; SUBCUTANEOUS at 12:03

## 2022-03-27 RX ADMIN — OXYCODONE AND ACETAMINOPHEN 1 TABLET: 10; 325 TABLET ORAL at 12:03

## 2022-03-27 RX ADMIN — DOCUSATE SODIUM AND SENNOSIDES 1 TABLET: 8.6; 5 TABLET, FILM COATED ORAL at 09:03

## 2022-03-27 RX ADMIN — DONEPEZIL HYDROCHLORIDE 10 MG: 5 TABLET, FILM COATED ORAL at 09:03

## 2022-03-27 RX ADMIN — INSULIN ASPART 2 UNITS: 100 INJECTION, SOLUTION INTRAVENOUS; SUBCUTANEOUS at 08:03

## 2022-03-27 RX ADMIN — Medication 10 ML: at 10:03

## 2022-03-27 RX ADMIN — OXYCODONE AND ACETAMINOPHEN 1 TABLET: 10; 325 TABLET ORAL at 05:03

## 2022-03-27 RX ADMIN — INSULIN ASPART 4 UNITS: 100 INJECTION, SOLUTION INTRAVENOUS; SUBCUTANEOUS at 09:03

## 2022-03-27 RX ADMIN — ACETAMINOPHEN 650 MG: 325 TABLET ORAL at 08:03

## 2022-03-27 RX ADMIN — INSULIN ASPART 6 UNITS: 100 INJECTION, SOLUTION INTRAVENOUS; SUBCUTANEOUS at 05:03

## 2022-03-27 RX ADMIN — Medication 10 ML: at 05:03

## 2022-03-27 RX ADMIN — PANTOPRAZOLE SODIUM 40 MG: 40 TABLET, DELAYED RELEASE ORAL at 09:03

## 2022-03-27 RX ADMIN — INSULIN DETEMIR 25 UNITS: 100 INJECTION, SOLUTION SUBCUTANEOUS at 08:03

## 2022-03-27 RX ADMIN — CEFTRIAXONE 1 G: 1 INJECTION, SOLUTION INTRAVENOUS at 05:03

## 2022-03-27 RX ADMIN — MELATONIN TAB 3 MG 6 MG: 3 TAB at 08:03

## 2022-03-27 RX ADMIN — METHYLPREDNISOLONE SODIUM SUCCINATE 40 MG: 40 INJECTION, POWDER, FOR SOLUTION INTRAMUSCULAR; INTRAVENOUS at 05:03

## 2022-03-27 NOTE — PLAN OF CARE
Problem: Adult Inpatient Plan of Care  Goal: Plan of Care Review  Outcome: Ongoing, Progressing  Goal: Patient-Specific Goal (Individualized)  Outcome: Ongoing, Progressing  Goal: Absence of Hospital-Acquired Illness or Injury  Outcome: Ongoing, Progressing  Goal: Optimal Comfort and Wellbeing  Outcome: Ongoing, Progressing  Goal: Readiness for Transition of Care  Outcome: Ongoing, Progressing     Problem: Diabetes Comorbidity  Goal: Blood Glucose Level Within Targeted Range  Outcome: Ongoing, Progressing     Problem: Fluid Imbalance (Pneumonia)  Goal: Fluid Balance  Outcome: Ongoing, Progressing     Problem: Infection (Pneumonia)  Goal: Resolution of Infection Signs and Symptoms  Outcome: Ongoing, Progressing     Problem: Respiratory Compromise (Pneumonia)  Goal: Effective Oxygenation and Ventilation  Outcome: Ongoing, Progressing     Problem: Skin Injury Risk Increased  Goal: Skin Health and Integrity  Outcome: Ongoing, Progressing     Problem: Coping Ineffective  Goal: Effective Coping  Outcome: Ongoing, Progressing     Problem: Fall Injury Risk  Goal: Absence of Fall and Fall-Related Injury  Outcome: Ongoing, Progressing   Patient safety measures maintained throughout shift. Patient given medications as ordered.  Patient on 4L humidified nasal cannula. Patient wife at bedside. Patient switched from IV steriods to oral steroids.

## 2022-03-27 NOTE — PROGRESS NOTES
Progress Note  PULMONARY    Admit Date: 3/22/2022   03/27/2022      SUBJECTIVE:     3/24- wife and daughter at bedside. Pt calm now but per their report he had a rough night with agitation, confusion and had to be given medicine to sedate him. He c/o pain R arm where IV potassium is infusing. resp status worsened now req 10L HFNC. The family is meeting w/ palliative at 1  3/25- pt had initial improvement in O2 reqt then worsened again requiring 9L. Overnight dose of solumedrol had to be held due to blood glucose in the 500s. Pt feeling good with no complaints. Wife at bedside  3/26- pt feeling good with no complaints. Daughter at bedside. Blood glucose continues to be elevated 400s. O2 requirement down to 6L HFNC  3/27- improving O2 reqt- down to 4L NC      OBJECTIVE:     Vitals (Most recent):  Vitals:    03/27/22 0742   BP:    Pulse: 64   Resp: 17   Temp:        Vitals (24 hour range):  Temp:  [97 °F (36.1 °C)-98.2 °F (36.8 °C)]   Pulse:  [61-92]   Resp:  [16-20]   BP: (123-159)/(63-71)   SpO2:  [94 %-99 %]       Intake/Output Summary (Last 24 hours) at 3/27/2022 1128  Last data filed at 3/27/2022 0650  Gross per 24 hour   Intake 500 ml   Output 525 ml   Net -25 ml          Physical Exam:  The patient's neuro status (alertness,orientation,cognitive function,motor skills,), pharyngeal exam (oral lesions, hygiene, abn dentition,), Neck (jvd,mass,thyroid,nodes in neck and above/below clavicle),RESPIRATORY(symmetry,effort,fremitus,percussion,auscultation),  Cor(rhythm,heart tones including gallops,perfusion,edema)ABD(distention,hepatic&splenomegaly,tenderness,masses), Skin(rash,cyanosis),Psyc(affect,judgement,).  Exam negative except for these pertinent findings:    Alert, pleasant, verbalizes, no distress  Hard of hearing  HR regular w/ systolic murmur  Clear breath sounds bilat  Abdomen soft nontender  Chronic discoloration bilat legs, no edema    Radiographs reviewed: view by direct vision   CXR 3/27- stable  CXR  3/26- stable  CXR 3/25- slight improved infiltrates on right, same on left  CXR 3/24- unchanged    TTE 3/23-   · The estimated ejection fraction is 65%.  · Grade I left ventricular diastolic dysfunction.  · Atrial fibrillation not observed.  · The left ventricle is normal in size with  · Normal right ventricular size with normal right ventricular systolic function.  · There is mild aortic valve stenosis.  · Aortic valve area is 1.98 cm2; peak velocity is 2.48 m/s; mean gradient is 13 mmHg.  · Mild mitral regurgitation.  · Mild tricuspid regurgitation.  · There is mild pulmonary hypertension.  · Normal central venous pressure (3 mmHg).  · The estimated PA systolic pressure is 50 mmHg.      Labs     Recent Labs   Lab 03/27/22 0447   WBC 6.31   HGB 11.2*   HCT 34.7*   PLT 62*     Recent Labs   Lab 03/27/22 0447   *   K 4.7      CO2 25   BUN 25*   CREATININE 0.7   *   CALCIUM 8.7   No results for input(s): PH, PCO2, PO2, HCO3 in the last 24 hours.  Microbiology Results (last 7 days)     Procedure Component Value Units Date/Time    Blood Culture #1 [[id number]] Collected: 03/22/22 1454    Order Status: Completed Specimen: Blood from Antecubital, Right Updated: 03/26/22 2312     Blood Culture, Routine No Growth to date      No Growth to date      No Growth to date      No Growth to date      No Growth to date    Blood Culture #2 [[id number]] Collected: 03/22/22 1454    Order Status: Completed Specimen: Blood from Peripheral, Left Arm Updated: 03/26/22 2312     Blood Culture, Routine No Growth to date      No Growth to date      No Growth to date      No Growth to date      No Growth to date    Narrative:      drawn by nurys    Influenza A & B by Molecular [[id number]] Collected: 03/22/22 1407    Order Status: Completed Specimen: Nasopharyngeal Swab Updated: 03/22/22 1501     Influenza A, Molecular Negative     Influenza B, Molecular Negative     Flu A & B Source Nasal swab          Impression:  Active  Hospital Problems    Diagnosis  POA    *Acute on chronic respiratory failure [J96.20]  Yes    Delirium [R41.0]  No    Goals of care, counseling/discussion [Z71.89]  Not Applicable    Dementia without behavioral disturbance [F03.90]  Yes    Community acquired pneumonia, bilateral [J18.9]  Yes    Acute exacerbation of idiopathic pulmonary fibrosis [J84.112]  Yes    Interstitial lung disease [J84.9]  Yes    Stasis dermatitis of both legs [I87.2]  Yes    Hypertension associated with diabetes [E11.59, I15.2]  Yes    Hepatic cirrhosis due to chronic hepatitis C infection [B18.2, K74.60]  Yes    Chronic low back pain [M54.50, G89.29]  Yes    GERD (gastroesophageal reflux disease) [K21.9]  Yes    Type 2 diabetes mellitus with complication, with long-term current use of insulin [E11.8, Z79.4]  Not Applicable    PVD (peripheral vascular disease) [I73.9]  Yes     Decreased pulses. No claudication      Chronic pain of left knee [M25.562, G89.29]  Yes     Pain contract with Dr. Pereira Feb 2014.         Resolved Hospital Problems   No resolved problems to display.               Plan:         - continue supplemental O2 to keep sats 89-92%  - continue empiric antibiotics to cover for pneumonia  - PH likely due to lung disease and diastolic dysfunction, no advanced PH therapy recommended at this time  - taper steroid to prednisone 40mg daily- start in am  - glucose checks, cover w/ insulin- per hospitalist  - possible home hospice on dc- per CM  - family updated- wife at bedside      Maribell Campo MD  Pulmonary & Critical Care Medicine            .

## 2022-03-27 NOTE — PROGRESS NOTES
[facility] Hospital Medicine  Progress Note    Patient Name: Steve June Jr.  MRN: [id number]  Patient Class: IP- Inpatient   Admission Date: 3/22/2022  Length of Stay: 5 days  Attending Physician: Adalgisa Schwartz MD  Primary Care Provider: Crispin Garcia MD        Subjective:     Principal Problem:Acute on chronic respiratory failure        HPI:  Steve June Jr. is a 75 y.o. male with a PMHx venous insufficiency, DM2, GERD, HTN, CHF, cirrhosis, chronic hepatitis C, pulmonary fibrosis, and HLD, hypertension valvular heart disease mild mitral regurg, chronic diastolic heart failure, interstitial lung disease who presents to the ED with   chief complain of worsening cough and shortness of breath over the past few days.  Patient has dementia unable to provide history but denies any other symptoms at this point.  Patient's wife was at bedside states that patient has been having generalized weakness for the last month or to and has been completely bed-bound for the last 1 week.  His breathing worsened in the last few days.  Patient does not want wears oxygen secondary to dementia.  Wife states his oxygen saturations were in the 70s.  Patient has not had any hospitalization for hypoxia or respiratory failure in the last 1 year.       Overview/Hospital Course:  Admitted for acute on chronic hypoxemic respiratory failure secondary to acute exacerbation of interstitial lung disease.  patient was initially thought to have pneumonia and was started on ceftriaxone and azithromycin.  Pulmonology was consulted started  the patient on steroids.  patient's respiratory status worsened and was placed on HF.  patient also has underlying dementia had multiple episodes of delirium in the hospital.   palliative was consulted,  patient's family thinking of home hospice at discharge. Hospice informational visit requested      Interval History: Remains on 6L HFNC, denies new complaints. Wants to go home.  Blood sugar better with insulin adjustments though still high.    Review of Systems   Unable to perform ROS: Dementia   Objective:     Vital Signs (Most Recent):  Temp: 97.8 °F (36.6 °C) (03/27/22 0346)  Pulse: 64 (03/27/22 0742)  Resp: 17 (03/27/22 0742)  BP: (!) 146/71 (03/27/22 0346)  SpO2: 99 % (03/27/22 0742)   Vital Signs (24h Range):  Temp:  [97 °F (36.1 °C)-98.2 °F (36.8 °C)] 97.8 °F (36.6 °C)  Pulse:  [61-92] 64  Resp:  [16-20] 17  SpO2:  [94 %-99 %] 99 %  BP: (123-159)/(63-71) 146/71     Weight: 77.6 kg (171 lb)  Body mass index is 25.25 kg/m².    Intake/Output Summary (Last 24 hours) at 3/27/2022 1044  Last data filed at 3/27/2022 0650  Gross per 24 hour   Intake 500 ml   Output 525 ml   Net -25 ml        Physical Exam  Vitals and nursing note reviewed.   Constitutional:       General: He is not in acute distress.     Appearance: He is well-developed. He is not ill-appearing or diaphoretic.   HENT:      Head: Normocephalic and atraumatic.      Right Ear: External ear normal.      Left Ear: External ear normal.      Nose: Nose normal.      Mouth/Throat:      Mouth: Mucous membranes are moist.   Eyes:      General: No scleral icterus.        Right eye: No discharge.         Left eye: No discharge.      Conjunctiva/sclera: Conjunctivae normal.      Pupils: Pupils are equal, round, and reactive to light.   Neck:      Thyroid: No thyromegaly.   Cardiovascular:      Rate and Rhythm: Normal rate and regular rhythm.      Heart sounds: Normal heart sounds. No murmur heard.  Pulmonary:      Effort: Pulmonary effort is normal. No respiratory distress.      Breath sounds: No stridor. Rales present. No wheezing.   Abdominal:      General: Bowel sounds are normal. There is no distension.      Palpations: Abdomen is soft.      Tenderness: There is no abdominal tenderness.   Musculoskeletal:         General: No tenderness.      Cervical back: Normal range of motion and neck supple.   Lymphadenopathy:      Cervical: No  cervical adenopathy.   Skin:     General: Skin is warm and dry.      Capillary Refill: Capillary refill takes less than 2 seconds.      Findings: No erythema or rash.   Neurological:      General: No focal deficit present.      Mental Status: He is alert. Mental status is at baseline. He is disoriented.      Cranial Nerves: No cranial nerve deficit.      Sensory: No sensory deficit.      Motor: Weakness present. No abnormal muscle tone.      Coordination: Coordination normal.   Psychiatric:         Mood and Affect: Mood normal.         Behavior: Behavior normal.       Significant Labs: All pertinent labs within the past 24 hours have been reviewed.  CBC:   Recent Labs   Lab 03/26/22  1104 03/27/22  0447   WBC 11.21 6.31   HGB 11.9* 11.2*   HCT 36.8* 34.7*   PLT 84* 62*       CMP:   Recent Labs   Lab 03/26/22  1104 03/27/22  0447   * 134*   K 4.5 4.7    100   CO2 21* 25   * 239*   BUN 25* 25*   CREATININE 0.9 0.7   CALCIUM 9.0 8.7   ANIONGAP 11 9   EGFRNONAA >60 >60         Significant Imaging: I have reviewed all pertinent imaging results/findings within the past 24 hours.      Assessment/Plan:      * Acute on chronic respiratory failure  Patient with Hypoxic Respiratory failure which is Acute on chronic.  he is on home oxygen at 3 LPM. Supplemental oxygen was provided and noted-  .   Worsened   DNR     Signs/symptoms of respiratory failure include- tachypnea, increased work of breathing, respiratory distress and use of accessory muscles. Contributing diagnoses includes - Interstitial lung disease and Pneumonia Labs and images were reviewed. Patient Has not had a recent ABG.     Delirium  Patient with acute delirium. There is no specific treatment. Will avoid narcotics/benzos that are known to worsen condition and add PRN antipsychotics to limit behaviors of self harm. Monitor closely.        Dementia without behavioral disturbance  Dementia is controlled currently. Continue home dementia meds and  non-pharmacologic interventions to prevent delirium (No VS between 11PM-5AM, activity during day, opening blinds, providing glasses/hearing aids, and up in chair during daytime). Use PRN anti-psychotics to prevent behavior of self harm during sundowning, and avoid narcotics and benzos unless absolutely necessary. PRN anti-psychotics prescribed to avoid self harm behaviors.        Community acquired pneumonia, bilateral  The chest x-ray shows consolidation Moderate chronic interstitial disease, with possible superimposed acute infiltrate  Will cont empiric treatment with IV ceftriaxone and IV azithromycin.  We will repeat CBC daily    BNP  Recent Labs   Lab 03/22/22  1341   *  149*       However, the patient lacks the risk factors of hypercoagulability and inactivity. The progression of the  symptoms over the course of a week does not support a diagnosis of a PE. On exam there  is no evidence of a deep venous thrombosis.           Acute exacerbation of idiopathic pulmonary fibrosis  On 6 L HF.  Pulm recs appreciated   Will cont IV steroids    Interstitial lung disease  Hx noted  On home oxygen 3 L  Started on Steroids IV for Flare of ILD      Stasis dermatitis of both legs  Hx noted      Hypertension associated with diabetes  Chronic, controlled.  Latest blood pressure and vitals reviewed-   Temp:  [96.3 °F (35.7 °C)-98.2 °F (36.8 °C)]   Pulse:  [64-93]   Resp:  [16-24]   BP: (135-156)/(65-70)   SpO2:  [91 %-97 %] .   Home meds for hypertension were reviewed and noted below.       While in the hospital, will manage blood pressure as follows; Continue home antihypertensive regimen    Will utilize p.r.n. blood pressure medication only if patient's blood pressure greater than  180/110 and he develops symptoms such as worsening chest pain or shortness of breath.        Hepatic cirrhosis due to chronic hepatitis C infection  Hx noted      Chronic low back pain  Hx noted      GERD (gastroesophageal reflux  disease)  Hx noted      Type 2 diabetes mellitus with complication, with long-term current use of insulin  Patient's FSGs are uncontrolled due to hyperglycemia on current medication regimen.  Last A1c reviewed-   Lab Results   Component Value Date    HGBA1C 7.2 (H) 03/23/2022     Most recent fingerstick glucose reviewed-   Recent Labs   Lab 03/26/22  2145 03/27/22  0010 03/27/22  0426 03/27/22  0749   POCTGLUCOSE 396* 376* 240* 253*     Current correctional scale  Low  Increase   anti-hyperglycemic dose as follows-   Antihyperglycemics (From admission, onward)            Start     Stop Route Frequency Ordered    03/27/22 2100  insulin detemir U-100 pen 22 Units         -- SubQ Nightly 03/27/22 0823    03/22/22 1642  insulin aspart U-100 pen 1-10 Units         -- SubQ Before meals & nightly PRN 03/22/22 1542        Hold Oral hypoglycemics while patient is in the hospital.\      PVD (peripheral vascular disease)  Hx noted      Chronic pain of left knee  chronic        VTE Risk Mitigation (From admission, onward)    None          Discharge Planning   PATO:      Code Status: DNR   Is the patient medically ready for discharge?:     Reason for patient still in hospital (select all that apply): Patient trending condition and Treatment  Discharge Plan A: Hospice/home, Home Health                  Adalgisa Schwartz MD  Department of Hospital Medicine   [facility]

## 2022-03-27 NOTE — CARE UPDATE
03/27/22 0742   Patient Assessment/Suction   Level of Consciousness (AVPU) alert   Respiratory Effort Normal;Unlabored   Expansion/Accessory Muscles/Retractions expansion symmetric;no retractions;no use of accessory muscles   All Lung Fields Breath Sounds diminished;clear   Rhythm/Pattern, Respiratory unlabored   Cough Frequency infrequent   Cough Type nonproductive   PRE-TX-O2   O2 Device (Oxygen Therapy) nasal cannula w/ humidification   $ Is the patient on Low Flow Oxygen? Yes   Flow (L/min) 4  (weaned from 6L)   SpO2 99 %   Pulse Oximetry Type Intermittent   $ Pulse Oximetry - Multiple Charge Pulse Oximetry - Multiple   Pulse 64   Resp 17   Aerosol Therapy   $ Aerosol Therapy Charges Aerosol Treatment   Daily Review of Necessity (SVN) completed   Respiratory Treatment Status (SVN) given   Treatment Route (SVN) mask;oxygen   Patient Position (SVN) HOB elevated   Post Treatment Assessment (SVN) breath sounds unchanged   Signs of Intolerance (SVN) none   Breath Sounds Post-Respiratory Treatment   Throughout All Fields Post-Treatment All Fields   Throughout All Fields Post-Treatment no change   Post-treatment Heart Rate (beats/min) 62   Post-treatment Resp Rate (breaths/min) 18

## 2022-03-27 NOTE — SUBJECTIVE & OBJECTIVE
Interval History: Remains on 6L HFNC, denies new complaints. Wants to go home. Blood sugar better with insulin adjustments though still high.    Review of Systems   Unable to perform ROS: Dementia   Objective:     Vital Signs (Most Recent):  Temp: 97.8 °F (36.6 °C) (03/27/22 0346)  Pulse: 64 (03/27/22 0742)  Resp: 17 (03/27/22 0742)  BP: (!) 146/71 (03/27/22 0346)  SpO2: 99 % (03/27/22 0742)   Vital Signs (24h Range):  Temp:  [97 °F (36.1 °C)-98.2 °F (36.8 °C)] 97.8 °F (36.6 °C)  Pulse:  [61-92] 64  Resp:  [16-20] 17  SpO2:  [94 %-99 %] 99 %  BP: (123-159)/(63-71) 146/71     Weight: 77.6 kg (171 lb)  Body mass index is 25.25 kg/m².    Intake/Output Summary (Last 24 hours) at 3/27/2022 1044  Last data filed at 3/27/2022 0650  Gross per 24 hour   Intake 500 ml   Output 525 ml   Net -25 ml        Physical Exam  Vitals and nursing note reviewed.   Constitutional:       General: He is not in acute distress.     Appearance: He is well-developed. He is not ill-appearing or diaphoretic.   HENT:      Head: Normocephalic and atraumatic.      Right Ear: External ear normal.      Left Ear: External ear normal.      Nose: Nose normal.      Mouth/Throat:      Mouth: Mucous membranes are moist.   Eyes:      General: No scleral icterus.        Right eye: No discharge.         Left eye: No discharge.      Conjunctiva/sclera: Conjunctivae normal.      Pupils: Pupils are equal, round, and reactive to light.   Neck:      Thyroid: No thyromegaly.   Cardiovascular:      Rate and Rhythm: Normal rate and regular rhythm.      Heart sounds: Normal heart sounds. No murmur heard.  Pulmonary:      Effort: Pulmonary effort is normal. No respiratory distress.      Breath sounds: No stridor. Rales present. No wheezing.   Abdominal:      General: Bowel sounds are normal. There is no distension.      Palpations: Abdomen is soft.      Tenderness: There is no abdominal tenderness.   Musculoskeletal:         General: No tenderness.      Cervical back:  Normal range of motion and neck supple.   Lymphadenopathy:      Cervical: No cervical adenopathy.   Skin:     General: Skin is warm and dry.      Capillary Refill: Capillary refill takes less than 2 seconds.      Findings: No erythema or rash.   Neurological:      General: No focal deficit present.      Mental Status: He is alert. Mental status is at baseline. He is disoriented.      Cranial Nerves: No cranial nerve deficit.      Sensory: No sensory deficit.      Motor: Weakness present. No abnormal muscle tone.      Coordination: Coordination normal.   Psychiatric:         Mood and Affect: Mood normal.         Behavior: Behavior normal.       Significant Labs: All pertinent labs within the past 24 hours have been reviewed.  CBC:   Recent Labs   Lab 03/26/22  1104 03/27/22  0447   WBC 11.21 6.31   HGB 11.9* 11.2*   HCT 36.8* 34.7*   PLT 84* 62*       CMP:   Recent Labs   Lab 03/26/22  1104 03/27/22  0447   * 134*   K 4.5 4.7    100   CO2 21* 25   * 239*   BUN 25* 25*   CREATININE 0.9 0.7   CALCIUM 9.0 8.7   ANIONGAP 11 9   EGFRNONAA >60 >60         Significant Imaging: I have reviewed all pertinent imaging results/findings within the past 24 hours.

## 2022-03-27 NOTE — PLAN OF CARE
Problem: Physical Therapy  Goal: Physical Therapy Goal  Description: Goals to be met by: 22     Patient will increase functional independence with mobility by performin. Supine to sit with Stand-by Assistance  2. Sit to stand transfer with Stand-by Assistance  3. Bed to chair transfer with Stand-by Assistance using Rolling Walker  4. Gait  x 100 feet with Stand-by Assistance using Rolling Walker.   5. Lower extremity exercise program x20 reps per handout, with supervision    Outcome: Ongoing, Progressing

## 2022-03-27 NOTE — ASSESSMENT & PLAN NOTE
Patient's FSGs are uncontrolled due to hyperglycemia on current medication regimen.  Last A1c reviewed-   Lab Results   Component Value Date    HGBA1C 7.2 (H) 03/23/2022     Most recent fingerstick glucose reviewed-   Recent Labs   Lab 03/26/22  2145 03/27/22  0010 03/27/22  0426 03/27/22  0749   POCTGLUCOSE 396* 376* 240* 253*     Current correctional scale  Low  Increase   anti-hyperglycemic dose as follows-   Antihyperglycemics (From admission, onward)            Start     Stop Route Frequency Ordered    03/27/22 2100  insulin detemir U-100 pen 22 Units         -- SubQ Nightly 03/27/22 0823    03/22/22 1642  insulin aspart U-100 pen 1-10 Units         -- SubQ Before meals & nightly PRN 03/22/22 1542        Hold Oral hypoglycemics while patient is in the hospital.\

## 2022-03-27 NOTE — PT/OT/SLP PROGRESS
Physical Therapy Treatment    Patient Name:  Steve June Jr.   MRN:  [id number]    Recommendations:     Discharge Recommendations:  nursing facility, skilled   Discharge Equipment Recommendations:  (TBD at next level of care)   Barriers to discharge: None    Assessment:     Steve June Jr. is a 76 y.o. male admitted with a medical diagnosis of Acute on chronic respiratory failure.  He presents with the following impairments/functional limitations:  weakness, impaired endurance, impaired self care skills, impaired functional mobilty, gait instability, impaired balance, decreased lower extremity function, impaired cognition, decreased safety awareness, pain .    Rehab Prognosis: Fair; patient would benefit from acute skilled PT services to address these deficits and reach maximum level of function.    Recent Surgery: * No surgery found *      Plan:     During this hospitalization, patient to be seen 6 x/week to address the identified rehab impairments via gait training, therapeutic activities, therapeutic exercises and progress toward the following goals:    · Plan of Care Expires:  04/23/22    Subjective     Chief Complaint: Pt appeared agitated with therapist to do therapy.  Max encouragement required by pt's wife and therapist for patient to participate  Patient/Family Comments/goals: pt's wife wants patient to be bale to go home  Pain/Comfort:  ·        Objective:     Communicated with nurseAlbertina prior to session.  Patient found supine with bed alarm, oxygen, telemetry upon PT entry to room.   Pt asleep and not wanting to do PT initially.      General Precautions: Standard, fall   Orthopedic Precautions:N/A   Braces:    Respiratory Status: O@ not on upon PTA entry into room.  O2 reapplied per nasal canula at 2lpm     Functional Mobility:  · N/A      AM-PAC 6 CLICK MOBILITY          Therapeutic Activities and Exercises:   supine therapeutic exercises AP, Quad sets, heel slides, Abduction Straight leg  raise x 10 reps each.  Pt grimacing in pain despite therapist not touching him to help with exercises.    Patient left supine with all lines intact, call button in reach and wife present..    GOALS:   Multidisciplinary Problems     Physical Therapy Goals        Problem: Physical Therapy    Goal Priority Disciplines Outcome Goal Variances Interventions   Physical Therapy Goal     PT, PT/OT Ongoing, Progressing     Description: Goals to be met by: 22     Patient will increase functional independence with mobility by performin. Supine to sit with Stand-by Assistance  2. Sit to stand transfer with Stand-by Assistance  3. Bed to chair transfer with Stand-by Assistance using Rolling Walker  4. Gait  x 100 feet with Stand-by Assistance using Rolling Walker.   5. Lower extremity exercise program x20 reps per handout, with supervision                     Time Tracking:     PT Received On: 22  PT Start Time: 1052     PT Stop Time: 1105  PT Total Time (min): 13 min     Billable Minutes: Therapeutic Exercise 13    Treatment Type: Treatment  PT/PTA: PTA     PTA Visit Number: 1     2022

## 2022-03-28 LAB
POCT GLUCOSE: 161 MG/DL (ref 70–110)
POCT GLUCOSE: 189 MG/DL (ref 70–110)
POCT GLUCOSE: 244 MG/DL (ref 70–110)
POCT GLUCOSE: 285 MG/DL (ref 70–110)
POCT GLUCOSE: 413 MG/DL (ref 70–110)
POCT GLUCOSE: 415 MG/DL (ref 70–110)
POCT GLUCOSE: 420 MG/DL (ref 70–110)
POCT GLUCOSE: 423 MG/DL (ref 70–110)
POCT GLUCOSE: 54 MG/DL (ref 70–110)
POCT GLUCOSE: 56 MG/DL (ref 70–110)

## 2022-03-28 PROCEDURE — 63600175 PHARM REV CODE 636 W HCPCS: Performed by: INTERNAL MEDICINE

## 2022-03-28 PROCEDURE — 25000242 PHARM REV CODE 250 ALT 637 W/ HCPCS: Performed by: INTERNAL MEDICINE

## 2022-03-28 PROCEDURE — 97116 GAIT TRAINING THERAPY: CPT

## 2022-03-28 PROCEDURE — 63700000 PHARM REV CODE 250 ALT 637 W/O HCPCS: Performed by: INTERNAL MEDICINE

## 2022-03-28 PROCEDURE — A4216 STERILE WATER/SALINE, 10 ML: HCPCS | Performed by: INTERNAL MEDICINE

## 2022-03-28 PROCEDURE — 94761 N-INVAS EAR/PLS OXIMETRY MLT: CPT

## 2022-03-28 PROCEDURE — 97530 THERAPEUTIC ACTIVITIES: CPT

## 2022-03-28 PROCEDURE — 94640 AIRWAY INHALATION TREATMENT: CPT

## 2022-03-28 PROCEDURE — 99232 PR SUBSEQUENT HOSPITAL CARE,LEVL II: ICD-10-PCS | Mod: ,,, | Performed by: INTERNAL MEDICINE

## 2022-03-28 PROCEDURE — 27000221 HC OXYGEN, UP TO 24 HOURS

## 2022-03-28 PROCEDURE — 25000003 PHARM REV CODE 250: Performed by: INTERNAL MEDICINE

## 2022-03-28 PROCEDURE — 63600175 PHARM REV CODE 636 W HCPCS: Performed by: HOSPITALIST

## 2022-03-28 PROCEDURE — 12000002 HC ACUTE/MED SURGE SEMI-PRIVATE ROOM

## 2022-03-28 PROCEDURE — 99232 SBSQ HOSP IP/OBS MODERATE 35: CPT | Mod: ,,, | Performed by: INTERNAL MEDICINE

## 2022-03-28 RX ADMIN — Medication 10 ML: at 09:03

## 2022-03-28 RX ADMIN — ACETAMINOPHEN 650 MG: 325 TABLET ORAL at 10:03

## 2022-03-28 RX ADMIN — PREDNISONE 40 MG: 20 TABLET ORAL at 10:03

## 2022-03-28 RX ADMIN — AZITHROMYCIN MONOHYDRATE 250 MG: 250 TABLET ORAL at 10:03

## 2022-03-28 RX ADMIN — Medication 10 ML: at 03:03

## 2022-03-28 RX ADMIN — OXYCODONE AND ACETAMINOPHEN 1 TABLET: 10; 325 TABLET ORAL at 05:03

## 2022-03-28 RX ADMIN — CEFTRIAXONE 1 G: 1 INJECTION, SOLUTION INTRAVENOUS at 03:03

## 2022-03-28 RX ADMIN — DOCUSATE SODIUM AND SENNOSIDES 1 TABLET: 8.6; 5 TABLET, FILM COATED ORAL at 10:03

## 2022-03-28 RX ADMIN — INSULIN HUMAN 10 UNITS: 100 INJECTION, SOLUTION PARENTERAL at 09:03

## 2022-03-28 RX ADMIN — OXYCODONE AND ACETAMINOPHEN 1 TABLET: 10; 325 TABLET ORAL at 03:03

## 2022-03-28 RX ADMIN — BUDESONIDE 0.5 MG: 0.5 SUSPENSION RESPIRATORY (INHALATION) at 08:03

## 2022-03-28 RX ADMIN — OXYCODONE AND ACETAMINOPHEN 1 TABLET: 10; 325 TABLET ORAL at 11:03

## 2022-03-28 RX ADMIN — PANTOPRAZOLE SODIUM 40 MG: 40 TABLET, DELAYED RELEASE ORAL at 10:03

## 2022-03-28 RX ADMIN — DONEPEZIL HYDROCHLORIDE 10 MG: 5 TABLET, FILM COATED ORAL at 10:03

## 2022-03-28 RX ADMIN — MELATONIN TAB 3 MG 6 MG: 3 TAB at 11:03

## 2022-03-28 RX ADMIN — OXYCODONE AND ACETAMINOPHEN 1 TABLET: 10; 325 TABLET ORAL at 10:03

## 2022-03-28 RX ADMIN — INSULIN ASPART 10 UNITS: 100 INJECTION, SOLUTION INTRAVENOUS; SUBCUTANEOUS at 07:03

## 2022-03-28 RX ADMIN — Medication 10 ML: at 06:03

## 2022-03-28 RX ADMIN — INSULIN ASPART 10 UNITS: 100 INJECTION, SOLUTION INTRAVENOUS; SUBCUTANEOUS at 05:03

## 2022-03-28 RX ADMIN — BUDESONIDE 0.5 MG: 0.5 SUSPENSION RESPIRATORY (INHALATION) at 07:03

## 2022-03-28 NOTE — PLAN OF CARE
CM reached out to Miranda with [facility] ([phone]) to follow up on informational hospice visit. Per Miranda- visit was not completed, states that she could not get wife on the phone. States she will call her again today and have nurse sent out since somebody is normally at bedside.    CM called pt's spouse and discussed briefly over the phone- states she would rather speak with me in person regarding DC dispo.    CM met with Lili (spouse) at pt's bedside. Updated her that Dr. Schwartz let me know that Dr. Campo is saying prognosis is poor due to pulmonary fibrosis and home hospice is appropriate. I explained that if they are not ready for hospice that is perfectly fine and we can pursue home with home health for now. I explained that 6L of o2 may be pt's new baseline due to recent infection. Spouse concerned about pt not getting out of bed and worried she cannot care for him. I explained that even with hospice- pt will need 24/7 caregiver and hospice does not provide that. I explained that if worried about strength, then we can pursue SNF for right now to get him stronger and then DC to home and proceed with hospice if that's what they decide. Spouse states they have had a bad experience with SNF with pt in past and want to bring him home. Asked why pt can't stay in hospital until he can do more. I explained that this facility is an acute care setting and cannot stay just for physical therapy. I explained that anticipated DC is tomorrow and can set up with services they decided. I explained that we can do home health for physical therapy but again would need a 24/7 caregiver with family or hired. Spouse states she will reach out to [facility] for hospice visit to see if that's what they would like to proceed. CM will follow up with this afternoon. Again explained that anticipated is DC tomorrow         03/28/22 1318   Post-Acute Status   Post-Acute Authorization Hospice

## 2022-03-28 NOTE — PROGRESS NOTES
[facility] Hospital Medicine  Progress Note    Patient Name: Steve June Jr.  MRN: [id number]  Patient Class: IP- Inpatient   Admission Date: 3/22/2022  Length of Stay: 6 days  Attending Physician: Adalgisa Schwatrz MD  Primary Care Provider: Crispin Garcia MD        Subjective:     Principal Problem:Acute on chronic respiratory failure        HPI:  Steve June Jr. is a 75 y.o. male with a PMHx venous insufficiency, DM2, GERD, HTN, CHF, cirrhosis, chronic hepatitis C, pulmonary fibrosis, and HLD, hypertension valvular heart disease mild mitral regurg, chronic diastolic heart failure, interstitial lung disease who presents to the ED with   chief complain of worsening cough and shortness of breath over the past few days.  Patient has dementia unable to provide history but denies any other symptoms at this point.  Patient's wife was at bedside states that patient has been having generalized weakness for the last month or to and has been completely bed-bound for the last 1 week.  His breathing worsened in the last few days.  Patient does not want wears oxygen secondary to dementia.  Wife states his oxygen saturations were in the 70s.  Patient has not had any hospitalization for hypoxia or respiratory failure in the last 1 year.       Overview/Hospital Course:  Admitted for acute on chronic hypoxemic respiratory failure secondary to acute exacerbation of interstitial lung disease.  patient was initially thought to have pneumonia and was started on ceftriaxone and azithromycin.  Pulmonology was consulted started  the patient on steroids.  patient's respiratory status worsened and was placed on HF.  patient also has underlying dementia had multiple episodes of delirium in the hospital.   palliative was consulted,  patient's family thinking of home hospice at discharge. Hospice informational visit requested      Interval History: Remains on 6L HFNC, denies new complaints. Glucose 50 this AM,  steroids decreased yesterday. Decreased levemir dose.     Review of Systems   Unable to perform ROS: Dementia   Objective:     Vital Signs (Most Recent):  Temp: 97.6 °F (36.4 °C) (03/28/22 0739)  Pulse: 70 (03/28/22 0739)  Resp: 20 (03/28/22 1013)  BP: 123/65 (03/28/22 0739)  SpO2: (!) 94 % (03/28/22 0739)   Vital Signs (24h Range):  Temp:  [96.3 °F (35.7 °C)-97.9 °F (36.6 °C)] 97.6 °F (36.4 °C)  Pulse:  [66-86] 70  Resp:  [16-22] 20  SpO2:  [90 %-95 %] 94 %  BP: (123-139)/(60-73) 123/65     Weight: 77.6 kg (171 lb)  Body mass index is 25.25 kg/m².    Intake/Output Summary (Last 24 hours) at 3/28/2022 1120  Last data filed at 3/28/2022 0600  Gross per 24 hour   Intake 480 ml   Output 200 ml   Net 280 ml        Physical Exam  Vitals and nursing note reviewed.   Constitutional:       General: He is not in acute distress.     Appearance: He is well-developed. He is not ill-appearing or diaphoretic.   HENT:      Head: Normocephalic and atraumatic.      Right Ear: External ear normal.      Left Ear: External ear normal.      Nose: Nose normal.      Mouth/Throat:      Mouth: Mucous membranes are moist.   Eyes:      General: No scleral icterus.        Right eye: No discharge.         Left eye: No discharge.      Conjunctiva/sclera: Conjunctivae normal.      Pupils: Pupils are equal, round, and reactive to light.   Neck:      Thyroid: No thyromegaly.   Cardiovascular:      Rate and Rhythm: Normal rate and regular rhythm.      Heart sounds: Normal heart sounds. No murmur heard.  Pulmonary:      Effort: Pulmonary effort is normal. No respiratory distress.      Breath sounds: No stridor. Rales present. No wheezing.   Abdominal:      General: Bowel sounds are normal. There is no distension.      Palpations: Abdomen is soft.      Tenderness: There is no abdominal tenderness.   Musculoskeletal:         General: No tenderness.      Cervical back: Normal range of motion and neck supple.   Lymphadenopathy:      Cervical: No cervical  adenopathy.   Skin:     General: Skin is warm and dry.      Capillary Refill: Capillary refill takes less than 2 seconds.      Findings: No erythema or rash.   Neurological:      General: No focal deficit present.      Mental Status: He is alert. Mental status is at baseline. He is disoriented.      Cranial Nerves: No cranial nerve deficit.      Sensory: No sensory deficit.      Motor: Weakness present. No abnormal muscle tone.      Coordination: Coordination normal.   Psychiatric:         Mood and Affect: Mood normal.         Behavior: Behavior normal.       Significant Labs: All pertinent labs within the past 24 hours have been reviewed.  CBC:   Recent Labs   Lab 03/27/22  0447   WBC 6.31   HGB 11.2*   HCT 34.7*   PLT 62*       CMP:   Recent Labs   Lab 03/27/22 0447   *   K 4.7      CO2 25   *   BUN 25*   CREATININE 0.7   CALCIUM 8.7   ANIONGAP 9   EGFRNONAA >60         Significant Imaging: I have reviewed all pertinent imaging results/findings within the past 24 hours.      Assessment/Plan:      * Acute on chronic respiratory failure  Patient with Hypoxic Respiratory failure which is Acute on chronic.  he is on home oxygen at 3 LPM. Supplemental oxygen was provided and noted-  .   Worsened   DNR     Signs/symptoms of respiratory failure include- tachypnea, increased work of breathing, respiratory distress and use of accessory muscles. Contributing diagnoses includes - Interstitial lung disease and Pneumonia Labs and images were reviewed. Patient Has not had a recent ABG.     Delirium  Patient with acute delirium. There is no specific treatment. Will avoid narcotics/benzos that are known to worsen condition and add PRN antipsychotics to limit behaviors of self harm. Monitor closely.        Dementia without behavioral disturbance  Dementia is controlled currently. Continue home dementia meds and non-pharmacologic interventions to prevent delirium (No VS between 11PM-5AM, activity during day,  opening blinds, providing glasses/hearing aids, and up in chair during daytime). Use PRN anti-psychotics to prevent behavior of self harm during sundowning, and avoid narcotics and benzos unless absolutely necessary. PRN anti-psychotics prescribed to avoid self harm behaviors.        Community acquired pneumonia, bilateral  The chest x-ray shows consolidation Moderate chronic interstitial disease, with possible superimposed acute infiltrate  Will cont empiric treatment with IV ceftriaxone and IV azithromycin.  We will repeat CBC daily    BNP  Recent Labs   Lab 03/22/22  1341   *  149*       However, the patient lacks the risk factors of hypercoagulability and inactivity. The progression of the  symptoms over the course of a week does not support a diagnosis of a PE. On exam there  is no evidence of a deep venous thrombosis.           Acute exacerbation of idiopathic pulmonary fibrosis  On 6 L HF.  Pulm recs appreciated   PO steroids    Interstitial lung disease  Hx noted  On home oxygen 3 L  Started on Steroids IV for Flare of ILD, now on PO      Stasis dermatitis of both legs  Hx noted      Hypertension associated with diabetes  Chronic, controlled.  Latest blood pressure and vitals reviewed-   Temp:  [96.3 °F (35.7 °C)-98.2 °F (36.8 °C)]   Pulse:  [64-93]   Resp:  [16-24]   BP: (135-156)/(65-70)   SpO2:  [91 %-97 %] .   Home meds for hypertension were reviewed and noted below.       While in the hospital, will manage blood pressure as follows; Continue home antihypertensive regimen    Will utilize p.r.n. blood pressure medication only if patient's blood pressure greater than  180/110 and he develops symptoms such as worsening chest pain or shortness of breath.        Hepatic cirrhosis due to chronic hepatitis C infection  Hx noted      Chronic low back pain  Hx noted      GERD (gastroesophageal reflux disease)  Hx noted      Type 2 diabetes mellitus with complication, with long-term current use of  insulin  Patient's FSGs are uncontrolled due to hypoglycemia on current medication regimen.  Last A1c reviewed-   Lab Results   Component Value Date    HGBA1C 7.2 (H) 03/23/2022     Most recent fingerstick glucose reviewed-   Recent Labs   Lab 03/28/22  0009 03/28/22  0402 03/28/22  0734 03/28/22  0736   POCTGLUCOSE 244* 161* 56* 54*     Current correctional scale  Medium  Decrease anti-hyperglycemic dose as follows-   Antihyperglycemics (From admission, onward)            Start     Stop Route Frequency Ordered    03/28/22 2100  insulin detemir U-100 pen 12 Units         -- SubQ Nightly 03/28/22 0808    03/27/22 1330  insulin regular injection 10 Units         -- SubQ Once 03/27/22 1219    03/22/22 1642  insulin aspart U-100 pen 1-10 Units         -- SubQ Before meals & nightly PRN 03/22/22 1542        Hold Oral hypoglycemics while patient is in the hospital.\      PVD (peripheral vascular disease)  Hx noted      Chronic pain of left knee  chronic        VTE Risk Mitigation (From admission, onward)    None          Discharge Planning   PATO:      Code Status: DNR   Is the patient medically ready for discharge?:     Reason for patient still in hospital (select all that apply): Patient trending condition and Treatment  Discharge Plan A: Hospice/home, Home Health                  Adalgisa Schwartz MD  Department of Hospital Medicine   [facility]

## 2022-03-28 NOTE — PLAN OF CARE
Received call from Miranda with [facility]. States she has been talking with pt's spouse on the phone but does not seem like she is understanding everything (for example, that pt will need 24/7 caregiver from family). States she is sending a nurse over to hospital to meet with pt for better education       03/28/22 1610   Post-Acute Status   Post-Acute Authorization Hospice   Hospice Status Pending education

## 2022-03-28 NOTE — CARE UPDATE
03/28/22 0711   Patient Assessment/Suction   Level of Consciousness (AVPU) alert   Respiratory Effort Normal;Unlabored   Expansion/Accessory Muscles/Retractions expansion symmetric;no retractions;no use of accessory muscles   All Lung Fields Breath Sounds diminished;crackles, fine   Rhythm/Pattern, Respiratory unlabored   Cough Frequency infrequent   Cough Type dry;nonproductive   PRE-TX-O2   O2 Device (Oxygen Therapy) nasal cannula w/ humidification   $ Is the patient on Low Flow Oxygen? Yes   Flow (L/min) 6   SpO2 95 %   Pulse Oximetry Type Intermittent   $ Pulse Oximetry - Multiple Charge Pulse Oximetry - Multiple   Pulse 72   Resp 18   Aerosol Therapy   $ Aerosol Therapy Charges Aerosol Treatment   Daily Review of Necessity (SVN) completed   Respiratory Treatment Status (SVN) given   Treatment Route (SVN) mask   Patient Position (SVN) HOB elevated   Post Treatment Assessment (SVN) breath sounds unchanged   Signs of Intolerance (SVN) none   Breath Sounds Post-Respiratory Treatment   Throughout All Fields Post-Treatment All Fields   Throughout All Fields Post-Treatment no change   Post-treatment Heart Rate (beats/min) 68   Post-treatment Resp Rate (breaths/min) 20

## 2022-03-28 NOTE — PT/OT/SLP PROGRESS
Physical Therapy Treatment    Patient Name:  Steve June Jr.   MRN:  [id number]    Recommendations:     Discharge Recommendations:  home with hospice   Discharge Equipment Recommendations: wheelchair   Barriers to discharge: None    Assessment:     Steve June Jr. is a 76 y.o. male admitted with a medical diagnosis of Acute on chronic respiratory failure.  He presents with the following impairments/functional limitations:  weakness, impaired endurance, impaired functional mobilty, gait instability, impaired cardiopulmonary response to activity .    Pt seen supine in bed, alert and agreeable to PT- stated is going home.pt requiring min assist to sit EOB- stood with RW and gait 5ft with O2 at 7 liters with wheelchair following- pt with coughing. SAT 87-91%. OOB chair. ,spouse at bedside assisting with chair. Pt enjoying fresh pineapple once he got settled in chair.    Rehab Prognosis: Fair; patient would benefit from acute skilled PT services to address these deficits and reach maximum level of function.    Recent Surgery: * No surgery found *      Plan:     During this hospitalization, patient to be seen 6 x/week to address the identified rehab impairments via gait training, therapeutic activities, therapeutic exercises and progress toward the following goals:    · Plan of Care Expires:  04/23/22    Subjective   Stated is going home  Pt eager to mobilize and initiating mobility  Chief Complaint: none  Patient/Family Comments/goals: get home  Pain/Comfort:  · Pain Rating 1: 0/10      Objective:     Communicated with nurse Eng prior to session.  Patient found HOB elevated with oxygen, telemetry upon PT entry to room.     General Precautions: Standard, fall, respiratory   Orthopedic Precautions:N/A   Braces:    Respiratory Status: High flow, flow 7 L/min,      Functional Mobility:  · Bed Mobility:     · Rolling Right: minimum assistance  · Scooting: minimum assistance  · Supine to Sit: minimum  assistance  · Transfers:     · Sit to Stand:  minimum assistance with rolling walker  · Bed to Chair: minimum assistance with  rolling walker  using  Stand Pivot  · Gait: 5ft with RW with O2 at 7 liters with chair following- pt with coughing spells      AM-PAC 6 CLICK MOBILITY  Turning over in bed (including adjusting bedclothes, sheets and blankets)?: 3  Sitting down on and standing up from a chair with arms (e.g., wheelchair, bedside commode, etc.): 3  Moving from lying on back to sitting on the side of the bed?: 3  Moving to and from a bed to a chair (including a wheelchair)?: 3  Need to walk in hospital room?: 3  Climbing 3-5 steps with a railing?: 1  Basic Mobility Total Score: 16       Therapeutic Activities and Exercises:   Patient was educated on the importance of OOB activity and functional mobility to negate negative effects of prolonged bed rest during hospitalization, safe transfers and ambulation, and D/C planning   OOB chair post PT with spouse present  Pt reclined and eating pineapple at end of session    Patient left up in chair with all lines intact, call button in reach, case management Annmarie notified and spouse/CNA present..    GOALS:   Multidisciplinary Problems     Physical Therapy Goals        Problem: Physical Therapy    Goal Priority Disciplines Outcome Goal Variances Interventions   Physical Therapy Goal     PT, PT/OT Ongoing, Progressing     Description: Goals to be met by: 22     Patient will increase functional independence with mobility by performin. Supine to sit with Stand-by Assistance  2. Sit to stand transfer with Stand-by Assistance  3. Bed to chair transfer with Stand-by Assistance using Rolling Walker  4. Gait  x 100 feet with Stand-by Assistance using Rolling Walker.   5. Lower extremity exercise program x20 reps per handout, with supervision                     Time Tracking:     PT Received On: 22  PT Start Time: 1414     PT Stop Time: 1449  PT Total Time  (min): 35 min     Billable Minutes: Gait Training 15 and Therapeutic Activity 20    Treatment Type: Treatment  PT/PTA: PT     PTA Visit Number: 0     03/28/2022

## 2022-03-28 NOTE — ASSESSMENT & PLAN NOTE
Patient's FSGs are uncontrolled due to hypoglycemia on current medication regimen.  Last A1c reviewed-   Lab Results   Component Value Date    HGBA1C 7.2 (H) 03/23/2022     Most recent fingerstick glucose reviewed-   Recent Labs   Lab 03/28/22  0009 03/28/22  0402 03/28/22  0734 03/28/22  0736   POCTGLUCOSE 244* 161* 56* 54*     Current correctional scale  Medium  Decrease anti-hyperglycemic dose as follows-   Antihyperglycemics (From admission, onward)            Start     Stop Route Frequency Ordered    03/28/22 2100  insulin detemir U-100 pen 12 Units         -- SubQ Nightly 03/28/22 0808    03/27/22 1330  insulin regular injection 10 Units         -- SubQ Once 03/27/22 1219    03/22/22 1642  insulin aspart U-100 pen 1-10 Units         -- SubQ Before meals & nightly PRN 03/22/22 1542        Hold Oral hypoglycemics while patient is in the hospital.\

## 2022-03-28 NOTE — SUBJECTIVE & OBJECTIVE
Interval History: Remains on 6L HFNC, denies new complaints. Glucose 50 this AM, steroids decreased yesterday. Decreased levemir dose.     Review of Systems   Unable to perform ROS: Dementia   Objective:     Vital Signs (Most Recent):  Temp: 97.6 °F (36.4 °C) (03/28/22 0739)  Pulse: 70 (03/28/22 0739)  Resp: 20 (03/28/22 1013)  BP: 123/65 (03/28/22 0739)  SpO2: (!) 94 % (03/28/22 0739)   Vital Signs (24h Range):  Temp:  [96.3 °F (35.7 °C)-97.9 °F (36.6 °C)] 97.6 °F (36.4 °C)  Pulse:  [66-86] 70  Resp:  [16-22] 20  SpO2:  [90 %-95 %] 94 %  BP: (123-139)/(60-73) 123/65     Weight: 77.6 kg (171 lb)  Body mass index is 25.25 kg/m².    Intake/Output Summary (Last 24 hours) at 3/28/2022 1120  Last data filed at 3/28/2022 0600  Gross per 24 hour   Intake 480 ml   Output 200 ml   Net 280 ml        Physical Exam  Vitals and nursing note reviewed.   Constitutional:       General: He is not in acute distress.     Appearance: He is well-developed. He is not ill-appearing or diaphoretic.   HENT:      Head: Normocephalic and atraumatic.      Right Ear: External ear normal.      Left Ear: External ear normal.      Nose: Nose normal.      Mouth/Throat:      Mouth: Mucous membranes are moist.   Eyes:      General: No scleral icterus.        Right eye: No discharge.         Left eye: No discharge.      Conjunctiva/sclera: Conjunctivae normal.      Pupils: Pupils are equal, round, and reactive to light.   Neck:      Thyroid: No thyromegaly.   Cardiovascular:      Rate and Rhythm: Normal rate and regular rhythm.      Heart sounds: Normal heart sounds. No murmur heard.  Pulmonary:      Effort: Pulmonary effort is normal. No respiratory distress.      Breath sounds: No stridor. Rales present. No wheezing.   Abdominal:      General: Bowel sounds are normal. There is no distension.      Palpations: Abdomen is soft.      Tenderness: There is no abdominal tenderness.   Musculoskeletal:         General: No tenderness.      Cervical back:  Normal range of motion and neck supple.   Lymphadenopathy:      Cervical: No cervical adenopathy.   Skin:     General: Skin is warm and dry.      Capillary Refill: Capillary refill takes less than 2 seconds.      Findings: No erythema or rash.   Neurological:      General: No focal deficit present.      Mental Status: He is alert. Mental status is at baseline. He is disoriented.      Cranial Nerves: No cranial nerve deficit.      Sensory: No sensory deficit.      Motor: Weakness present. No abnormal muscle tone.      Coordination: Coordination normal.   Psychiatric:         Mood and Affect: Mood normal.         Behavior: Behavior normal.       Significant Labs: All pertinent labs within the past 24 hours have been reviewed.  CBC:   Recent Labs   Lab 03/27/22 0447   WBC 6.31   HGB 11.2*   HCT 34.7*   PLT 62*       CMP:   Recent Labs   Lab 03/27/22 0447   *   K 4.7      CO2 25   *   BUN 25*   CREATININE 0.7   CALCIUM 8.7   ANIONGAP 9   EGFRNONAA >60         Significant Imaging: I have reviewed all pertinent imaging results/findings within the past 24 hours.

## 2022-03-28 NOTE — PLAN OF CARE
Problem: Physical Therapy  Goal: Physical Therapy Goal  Description: Goals to be met by: 22     Patient will increase functional independence with mobility by performin. Supine to sit with Stand-by Assistance  2. Sit to stand transfer with Stand-by Assistance  3. Bed to chair transfer with Stand-by Assistance using Rolling Walker  4. Gait  x 100 feet with Stand-by Assistance using Rolling Walker.   5. Lower extremity exercise program x20 reps per handout, with supervision    Outcome: Ongoing, Progressing   Alert and agreeable to PT. Min assist to sit EOB, gait with RW with O2 at 7 liters with SAT 87-91%. OOB chair. Spouse at bedside. Pt enjoying pineapple sitting up.

## 2022-03-28 NOTE — CHAPLAIN
"Visited pt and family bedside; pt was sleeping and looked comfortable. Brief visit with family-- not sure of relation, but the woman said, "my  here is a , we're good" and they politely dismissed me, but "appreciated my role." Respected their subtle hint and said I'd keep pt and them in my prayers and they thanked me. Lord, in your mercy.  "

## 2022-03-28 NOTE — PROGRESS NOTES
Progress Note  PULMONARY    Admit Date: 3/22/2022   03/28/2022      SUBJECTIVE:     3/24- wife and daughter at bedside. Pt calm now but per their report he had a rough night with agitation, confusion and had to be given medicine to sedate him. He c/o pain R arm where IV potassium is infusing. resp status worsened now req 10L HFNC. The family is meeting w/ palliative at 1  3/25- pt had initial improvement in O2 reqt then worsened again requiring 9L. Overnight dose of solumedrol had to be held due to blood glucose in the 500s. Pt feeling good with no complaints. Wife at bedside  3/26- pt feeling good with no complaints. Daughter at bedside. Blood glucose continues to be elevated 400s. O2 requirement down to 6L HFNC  3/27- improving O2 reqt- down to 4L NC  3/28- req 6L NC, no new complaints. Had hypoglycemia to 54 this am, yesterday gluc >500 in afternoon. Steroid has been tapered to once daily      OBJECTIVE:     Vitals (Most recent):  Vitals:    03/28/22 1013   BP:    Pulse:    Resp: 20   Temp:        Vitals (24 hour range):  Temp:  [96.3 °F (35.7 °C)-97.9 °F (36.6 °C)]   Pulse:  [66-86]   Resp:  [16-22]   BP: (123-139)/(60-73)   SpO2:  [90 %-95 %]       Intake/Output Summary (Last 24 hours) at 3/28/2022 1048  Last data filed at 3/28/2022 0600  Gross per 24 hour   Intake 480 ml   Output 200 ml   Net 280 ml          Physical Exam:  The patient's neuro status (alertness,orientation,cognitive function,motor skills,), pharyngeal exam (oral lesions, hygiene, abn dentition,), Neck (jvd,mass,thyroid,nodes in neck and above/below clavicle),RESPIRATORY(symmetry,effort,fremitus,percussion,auscultation),  Cor(rhythm,heart tones including gallops,perfusion,edema)ABD(distention,hepatic&splenomegaly,tenderness,masses), Skin(rash,cyanosis),Psyc(affect,judgement,).  Exam negative except for these pertinent findings:    Alert, pleasant, verbalizes, no distress  Hard of hearing  HR regular w/ systolic murmur  Bibasilar fine  rales  Abdomen soft nontender  Chronic discoloration bilat legs, no edema    Radiographs reviewed: view by direct vision   CXR 3/28- same severe fibrosis  CXR 3/27- stable  CXR 3/26- stable  CXR 3/25- slight improved infiltrates on right, same on left  CXR 3/24- unchanged    TTE 3/23-   · The estimated ejection fraction is 65%.  · Grade I left ventricular diastolic dysfunction.  · Atrial fibrillation not observed.  · The left ventricle is normal in size with  · Normal right ventricular size with normal right ventricular systolic function.  · There is mild aortic valve stenosis.  · Aortic valve area is 1.98 cm2; peak velocity is 2.48 m/s; mean gradient is 13 mmHg.  · Mild mitral regurgitation.  · Mild tricuspid regurgitation.  · There is mild pulmonary hypertension.  · Normal central venous pressure (3 mmHg).  · The estimated PA systolic pressure is 50 mmHg.      Labs     No results for input(s): WBC, HGB, HCT, PLT, BAND, METAMYELOCYT, MYELOPCT, HGBA1C in the last 24 hours.  No results for input(s): NA, K, CL, CO2, BUN, CREATININE, GLU, CALCIUM, CAION, MG, PHOS, AST, ALT, ALKPHOS, BILITOT, BILIDIR, PROT, ALBUMIN, PREALBUMIN, AMYLASE, LIPASE, CRP, HSCRP, SEDRATE, PROCAL, INR, PTT, LABHEPA, LACTATE, TROPONINI, CPK, CPKMB, MB, BNP in the last 24 hours.No results for input(s): PH, PCO2, PO2, HCO3 in the last 24 hours.  Microbiology Results (last 7 days)     Procedure Component Value Units Date/Time    Blood Culture #1 [[id number]] Collected: 03/22/22 1454    Order Status: Completed Specimen: Blood from Antecubital, Right Updated: 03/27/22 2312     Blood Culture, Routine No growth after 5 days.    Blood Culture #2 [[id number]] Collected: 03/22/22 1454    Order Status: Completed Specimen: Blood from Peripheral, Left Arm Updated: 03/27/22 2312     Blood Culture, Routine No growth after 5 days.    Narrative:      drawn by nurys    Influenza A & B by Molecular [[id number]] Collected: 03/22/22 1407    Order Status: Completed  Specimen: Nasopharyngeal Swab Updated: 03/22/22 1501     Influenza A, Molecular Negative     Influenza B, Molecular Negative     Flu A & B Source Nasal swab          Impression:  Active Hospital Problems    Diagnosis  POA    *Acute on chronic respiratory failure [J96.20]  Yes    Delirium [R41.0]  No    Goals of care, counseling/discussion [Z71.89]  Not Applicable    Dementia without behavioral disturbance [F03.90]  Yes    Community acquired pneumonia, bilateral [J18.9]  Yes    Acute exacerbation of idiopathic pulmonary fibrosis [J84.112]  Yes    Interstitial lung disease [J84.9]  Yes    Stasis dermatitis of both legs [I87.2]  Yes    Hypertension associated with diabetes [E11.59, I15.2]  Yes    Hepatic cirrhosis due to chronic hepatitis C infection [B18.2, K74.60]  Yes    Chronic low back pain [M54.50, G89.29]  Yes    GERD (gastroesophageal reflux disease) [K21.9]  Yes    Type 2 diabetes mellitus with complication, with long-term current use of insulin [E11.8, Z79.4]  Not Applicable    PVD (peripheral vascular disease) [I73.9]  Yes     Decreased pulses. No claudication      Chronic pain of left knee [M25.562, G89.29]  Yes     Pain contract with Dr. Pereira Feb 2014.         Resolved Hospital Problems   No resolved problems to display.               Plan:         - continue supplemental O2 to keep sats 89-92%  - continue empiric antibiotics to cover for pneumonia  - PH likely due to lung disease and diastolic dysfunction, no advanced PH therapy recommended at this time  - prednisone 40mg daily- continue, tapered 3/27  - glucose checks, cover w/ insulin- per hospitalist  - possible home hospice on dc- per CM  - d/w hospitalist  - I do feel longterm prognosis is poor even with some improvement in the lungs and have discussed with his wife Lili (3/27) they want to bring him home on hospice following this stay. I still recommend this. They do want some treatments (steroids for lungs, antibiotics) however want  to focus on quality of life and symptom management and avoid hospitalization in the future.       Maribell Campo MD  Pulmonary & Critical Care Medicine            .

## 2022-03-29 VITALS
RESPIRATION RATE: 17 BRPM | SYSTOLIC BLOOD PRESSURE: 154 MMHG | TEMPERATURE: 98 F | HEART RATE: 67 BPM | HEIGHT: 69 IN | BODY MASS INDEX: 25.33 KG/M2 | OXYGEN SATURATION: 94 % | WEIGHT: 171 LBS | DIASTOLIC BLOOD PRESSURE: 70 MMHG

## 2022-03-29 LAB
ALBUMIN SERPL BCP-MCNC: 2.6 G/DL (ref 3.5–5.2)
ALP SERPL-CCNC: 140 U/L (ref 55–135)
ALT SERPL W/O P-5'-P-CCNC: 37 U/L (ref 10–44)
ANION GAP SERPL CALC-SCNC: 9 MMOL/L (ref 8–16)
AST SERPL-CCNC: 34 U/L (ref 10–40)
BILIRUB SERPL-MCNC: 0.6 MG/DL (ref 0.1–1)
BUN SERPL-MCNC: 21 MG/DL (ref 8–23)
CALCIUM SERPL-MCNC: 8.2 MG/DL (ref 8.7–10.5)
CHLORIDE SERPL-SCNC: 101 MMOL/L (ref 95–110)
CO2 SERPL-SCNC: 24 MMOL/L (ref 23–29)
CREAT SERPL-MCNC: 0.6 MG/DL (ref 0.5–1.4)
EST. GFR  (AFRICAN AMERICAN): >60 ML/MIN/1.73 M^2
EST. GFR  (NON AFRICAN AMERICAN): >60 ML/MIN/1.73 M^2
GLUCOSE SERPL-MCNC: 84 MG/DL (ref 70–110)
POCT GLUCOSE: 103 MG/DL (ref 70–110)
POCT GLUCOSE: 223 MG/DL (ref 70–110)
POTASSIUM SERPL-SCNC: 4.1 MMOL/L (ref 3.5–5.1)
PROT SERPL-MCNC: 5.1 G/DL (ref 6–8.4)
SODIUM SERPL-SCNC: 134 MMOL/L (ref 136–145)

## 2022-03-29 PROCEDURE — 99231 PR SUBSEQUENT HOSPITAL CARE,LEVL I: ICD-10-PCS | Mod: ,,, | Performed by: INTERNAL MEDICINE

## 2022-03-29 PROCEDURE — A4216 STERILE WATER/SALINE, 10 ML: HCPCS | Performed by: INTERNAL MEDICINE

## 2022-03-29 PROCEDURE — 99231 SBSQ HOSP IP/OBS SF/LOW 25: CPT | Mod: ,,, | Performed by: INTERNAL MEDICINE

## 2022-03-29 PROCEDURE — 97535 SELF CARE MNGMENT TRAINING: CPT | Mod: CO

## 2022-03-29 PROCEDURE — 25000003 PHARM REV CODE 250: Performed by: INTERNAL MEDICINE

## 2022-03-29 PROCEDURE — 99900035 HC TECH TIME PER 15 MIN (STAT)

## 2022-03-29 PROCEDURE — 63600175 PHARM REV CODE 636 W HCPCS: Performed by: INTERNAL MEDICINE

## 2022-03-29 PROCEDURE — 94761 N-INVAS EAR/PLS OXIMETRY MLT: CPT

## 2022-03-29 PROCEDURE — 36415 COLL VENOUS BLD VENIPUNCTURE: CPT | Performed by: HOSPITALIST

## 2022-03-29 PROCEDURE — 27000221 HC OXYGEN, UP TO 24 HOURS

## 2022-03-29 PROCEDURE — 80053 COMPREHEN METABOLIC PANEL: CPT | Performed by: HOSPITALIST

## 2022-03-29 RX ORDER — PREDNISONE 10 MG/1
TABLET ORAL
Qty: 163 TABLET | Refills: 0 | Status: SHIPPED | OUTPATIENT
Start: 2022-03-29 | End: 2022-07-28

## 2022-03-29 RX ORDER — PREDNISONE 20 MG/1
40 TABLET ORAL DAILY
Qty: 10 TABLET | Refills: 0 | Status: SHIPPED | OUTPATIENT
Start: 2022-03-29 | End: 2022-03-29 | Stop reason: SDUPTHER

## 2022-03-29 RX ADMIN — OXYCODONE AND ACETAMINOPHEN 1 TABLET: 10; 325 TABLET ORAL at 09:03

## 2022-03-29 RX ADMIN — PREDNISONE 40 MG: 20 TABLET ORAL at 09:03

## 2022-03-29 RX ADMIN — PANTOPRAZOLE SODIUM 40 MG: 40 TABLET, DELAYED RELEASE ORAL at 09:03

## 2022-03-29 RX ADMIN — DOCUSATE SODIUM AND SENNOSIDES 1 TABLET: 8.6; 5 TABLET, FILM COATED ORAL at 09:03

## 2022-03-29 RX ADMIN — INSULIN ASPART 4 UNITS: 100 INJECTION, SOLUTION INTRAVENOUS; SUBCUTANEOUS at 12:03

## 2022-03-29 RX ADMIN — DONEPEZIL HYDROCHLORIDE 10 MG: 5 TABLET, FILM COATED ORAL at 09:03

## 2022-03-29 RX ADMIN — Medication 10 ML: at 06:03

## 2022-03-29 NOTE — PT/OT/SLP PROGRESS
Physical Therapy      Patient Name:  Steve June Jr.   MRN:  [id number]    Patient not seen today secondary to patient discharging today on home hospice. Will follow-up if discharge and hospice plans do not proceed.

## 2022-03-29 NOTE — PT/OT/SLP PROGRESS
Occupational Therapy   Treatment    Name: Steve June Jr.  MRN: [id number]  Admitting Diagnosis:  Acute on chronic respiratory failure       Recommendations:     Discharge Recommendations: home with hospice  Discharge Equipment Recommendations:   (TBD)  Barriers to discharge:  None    Assessment:     Setve June Jr. is a 76 y.o. male with a medical diagnosis of Acute on chronic respiratory failure.  He presents with dementia at baseline and transitioning to home with hospice this date. PATEL initiating family planning for increased burden of care as pt mobility declines. Wife on phone and very receptive to strategies. Performance deficits affecting function are weakness, impaired endurance, impaired self care skills, impaired functional mobilty, impaired cognition, gait instability, impaired balance, decreased safety awareness, impaired cardiopulmonary response to activity.     Rehab Prognosis:  Poor; patient would benefit from acute skilled OT services to address these deficits and reach maximum level of function.       Plan:     Patient to be seen 4 x/week to address the above listed problems via self-care/home management, therapeutic activities, therapeutic exercises  · Plan of Care Expires: 04/13/22  · Plan of Care Reviewed with: patient, spouse    Subjective     Pain/Comfort:  · Pain Rating 1: 0/10    Objective:     Communicated with: RNAlbertina prior to session.  Patient found HOB slightly elevated and sleeping with oxygen, telemetry upon OT entry to room.    General Precautions: Standard, respiratory   Orthopedic Precautions:N/A   Braces:    Respiratory Status: Nasal cannula, flow 6 L/min     Occupational Performance:     AMPAC 6 Click ADL: 17    Treatment & Education:  -PATEL ed for BSC beside bed and also over toilet for increased assist to transfer. PATEL providing safety instructions for wife and family members as they mobilize pt. PATEL ed expectations for role of hospice and pt's self care and role  of family in self care. Pt's wife v/u and agreeable.    Patient left as he was upon entry with all lines intact and grandson presentEducation:      GOALS:   Multidisciplinary Problems     Occupational Therapy Goals        Problem: Occupational Therapy    Goal Priority Disciplines Outcome Interventions   Occupational Therapy Goal     OT, PT/OT Ongoing, Progressing    Description: Goals to be met by: 4/13/2022     Patient will increase functional independence with ADLs by performing:    LE Dressing with Stand-by Assistance and Assistive Devices as needed.  Grooming while seated with Supervision.  Toileting from bedside commode with Supervision for hygiene and clothing management.   Supine to sit with Stand-by Assistance.  Toilet transfer to bedside commode with Stand-by Assistance.                     Time Tracking:     OT Date of Treatment: 03/29/22  OT Start Time: 1249  OT Stop Time: 1259  OT Total Time (min): 10 min    Billable Minutes:Self Care/Home Management 10 min    OT/AZIZA: AZIZA ERVIN Visit Number: 1    3/29/2022

## 2022-03-29 NOTE — PLAN OF CARE
Pt clear for DC from case management standpoint. Discharging to home with [facility] hospice           03/29/22 1359   Final Note   Assessment Type Final Discharge Note   Anticipated Discharge Disposition \[facility] [place] [facility]\""

## 2022-03-29 NOTE — PLAN OF CARE
Left message for Miranda with [facility] requesting a call back regarding hospice discussion with family       03/29/22 0820   Post-Acute Status   Post-Acute Authorization Hospice

## 2022-03-29 NOTE — PLAN OF CARE
Spoke with Miranda from [facility]. [facility] nurse is coming to hospital to have consents signed by spouse. States they have ordered equip and having it delivered to pt's house this afternoon. Updated her that pt is on 6L o2 - states it has been ordered.     Rounded with Dr. Schwartz. Spouse is present with representative from hospice. Updated both that pt does not qualify for home o2 so will need family to transport home with o2 from home. Spouse unsure how much o2 tank supplies. o2 from [facility]         03/29/22 1115   Post-Acute Status   Post-Acute Authorization Hospice   Hospice Status Pending equipment/medication delivery

## 2022-03-29 NOTE — NURSING
Patient discharge instructions reviewed with wife and grandson. Both acknowledged understanding. Wife stated prescription had already been picked up from pharmacy. Patient IV removed; no telemetry.

## 2022-03-29 NOTE — DISCHARGE SUMMARY
[facility] Hospital Medicine  Discharge Summary      Patient Name: Steve June Jr.  MRN: [id number]  Patient Class: IP- Inpatient  Admission Date: 3/22/2022  Hospital Length of Stay: 7 days  Discharge Date and Time: 3/29/2022  3:20 PM  Attending Physician: No att. providers found   Discharging Provider: Adalgisa Schwartz MD  Primary Care Provider: Crispin Garcia MD      HPI:   Steve June Jr. is a 75 y.o. male with a PMHx venous insufficiency, DM2, GERD, HTN, CHF, cirrhosis, chronic hepatitis C, pulmonary fibrosis, and HLD, hypertension valvular heart disease mild mitral regurg, chronic diastolic heart failure, interstitial lung disease who presents to the ED with   chief complain of worsening cough and shortness of breath over the past few days.  Patient has dementia unable to provide history but denies any other symptoms at this point.  Patient's wife was at bedside states that patient has been having generalized weakness for the last month or to and has been completely bed-bound for the last 1 week.  His breathing worsened in the last few days.  Patient does not want wears oxygen secondary to dementia.  Wife states his oxygen saturations were in the 70s.  Patient has not had any hospitalization for hypoxia or respiratory failure in the last 1 year.       * No surgery found *      Hospital Course:   Admitted for acute on chronic hypoxemic respiratory failure secondary to acute exacerbation of interstitial lung disease.  patient was initially thought to have pneumonia and was started on ceftriaxone and azithromycin.  Pulmonology was consulted started  the patient on steroids.  patient's respiratory status worsened and was placed on HFNC.  patient also has underlying dementia had multiple episodes of delirium in the hospital.   palliative was consulted,  patient's family thinking of home hospice at discharge. Hospice informational visit requested. Patient improved somewhat on steroids.  Family chose to discharge on hospice. Pulmonologist recommended steroid taper then to continue prednisone 10 mg indefinitely while on hospice. Patient completed 7 days of antibiotics while in the hospital. He was discharged home on hospice.       Goals of Care Treatment Preferences:  Code Status: DNR    Health care agent: wife  Health care agent number: No value filed.    Living Will: Yes     What is most important right now is to focus on spending time at home, improvement in condition but with limits to invasive therapies.  Accordingly, we have decided that the best plan to meet the patient's goals includes continuing with treatment.      Consults:   Consults (From admission, onward)        Status Ordering Provider     Inpatient consult to Social Work/Case Management  Once        Provider:  (Not yet assigned)    Completed MILEY CHRISTINA     Inpatient consult to Pulmonology  Once        Provider:  Maribell Campo MD    Completed MILEY CHRISTINA     Inpatient consult to Palliative Care  Once        Provider:  Sulaiman Aldridge MD    Completed MILEY CHRISTINA          No new Assessment & Plan notes have been filed under this hospital service since the last note was generated.  Service: Hospital Medicine    Final Active Diagnoses:    Diagnosis Date Noted POA    PRINCIPAL PROBLEM:  Acute on chronic respiratory failure [J96.20] 03/22/2022 Yes    Delirium [R41.0] 03/25/2022 No    Goals of care, counseling/discussion [Z71.89] 03/24/2022 Not Applicable    Dementia without behavioral disturbance [F03.90]  Yes    Community acquired pneumonia, bilateral [J18.9] 03/22/2022 Yes    Acute exacerbation of idiopathic pulmonary fibrosis [J84.112] 04/15/2019 Yes    Interstitial lung disease [J84.9] 12/13/2018 Yes    Stasis dermatitis of both legs [I87.2] 11/12/2015 Yes    Hypertension associated with diabetes [E11.59, I15.2] 11/05/2015 Yes    Hepatic cirrhosis due to chronic hepatitis C infection [B18.2,  K74.60] 11/05/2015 Yes    Chronic low back pain [M54.50, G89.29] 05/20/2015 Yes    GERD (gastroesophageal reflux disease) [K21.9] 02/28/2015 Yes    Type 2 diabetes mellitus with complication, with long-term current use of insulin [E11.8, Z79.4] 01/22/2015 Not Applicable    PVD (peripheral vascular disease) [I73.9] 08/26/2014 Yes    Chronic pain of left knee [M25.562, G89.29] 01/29/2014 Yes      Problems Resolved During this Admission:       Discharged Condition: stable    Disposition: Home or Self Care    Follow Up:   Follow-up Information     [facility] Follow up.    Specialties: Hospice Services, Hospice and Palliative Medicine  Contact information:  [address]  [address]  [place] [address] [phone]                       Patient Instructions:   No discharge procedures on file.    Significant Diagnostic Studies: Labs:   BMP:   Recent Labs   Lab 03/29/22  0518   GLU 84   *   K 4.1      CO2 24   BUN 21   CREATININE 0.6   CALCIUM 8.2*    and CBC No results for input(s): WBC, HGB, HCT, PLT in the last 48 hours.  Radiology Results (last 7 days)    Procedure Component Value Units Date/Time   X-Ray Chest 1 View [[id number]] Resulted: 03/28/22 0804   Order Status: Completed Updated: 03/28/22 0807   Narrative:     EXAMINATION:   XR CHEST 1 VIEW     CLINICAL HISTORY:   pulm fibrosis flare;     TECHNIQUE:   Single frontal view of the chest was performed.     COMPARISON:   Chest of March 27, 2022     FINDINGS:   There is hypoventilation.  The cardiac size is within normal limits.  Severe interstitial fibrotic infiltrates are noted bilaterally unchanged from the prior study.    Impression:       Continued severe interstitial fibrotic infiltrates bilaterally.  Hypoventilation.       Electronically signed by: Renzo Israel MD   Date: 03/28/2022   Time: 08:04   X-Ray Chest 1 View [[id number]] Resulted: 03/27/22 0723   Order Status: Completed Updated: 03/27/22 0725   Narrative:      EXAMINATION:   XR CHEST 1 VIEW     CLINICAL HISTORY:   pneumonitis;     TECHNIQUE:   Single frontal view of the chest was performed.     COMPARISON:   03/26/2022     FINDINGS:   Diffuse bilateral airspace disease.  Low lung volumes.  Unchanged size of the heart.  Indistinct pulmonary vasculature.  No pleural effusion or pneumothorax.    Impression:       Unchanged extent of bilateral airspace disease.       Electronically signed by: Bert oDn   Date: 03/27/2022   Time: 07:23   X-Ray Chest 1 View [[id number]] Resulted: 03/26/22 0625   Order Status: Completed Updated: 03/26/22 0628   Narrative:     EXAMINATION:   XR CHEST 1 VIEW     CLINICAL HISTORY:   pneumonitis;     TECHNIQUE:   Single frontal view of the chest was performed.     COMPARISON:   Chest radiographs dating back to August 31, 2021     FINDINGS:   There are diffuse bilateral interstitial predominant pulmonary opacities which do not appear significantly changed compared to recent chest radiographs.  No pneumothorax or pleural effusion identified.  Cardiomediastinal silhouette is stable.  No acute osseous abnormality identified.    Impression:       As above.       Electronically signed by: Jimmy Moulton   Date: 03/26/2022   Time: 06:25   X-Ray Chest 1 View [[id number]] Resulted: 03/25/22 0808   Order Status: Completed Updated: 03/25/22 0810   Narrative:     EXAMINATION:   XR CHEST 1 VIEW     CLINICAL HISTORY:   pneumonitis;     TECHNIQUE:   Single frontal view of the chest was performed.     COMPARISON:   03/24/2022     FINDINGS:   The heart size is normal.  There is persistent interstitial disease with superimposed multifocal airspace opacities throughout both lungs, showing no significant change.  Lung volumes remain low.  There is no pleural effusion.  There is no pneumothorax.    Impression:       Stable appearance of the chest.       Electronically signed by: Shilo Kim MD   Date: 03/25/2022   Time: 08:08   X-Ray Chest 1 View  [[id number]] Resulted: 03/24/22 0920   Order Status: Completed Updated: 03/24/22 0922   Narrative:     EXAMINATION:   XR CHEST 1 VIEW     CLINICAL HISTORY:   ILD;     TECHNIQUE:   Single frontal view of the chest was performed.     COMPARISON:   03/22/2022     FINDINGS:   Low lung volumes with diffuse bilateral pulmonary opacities.  Normal size heart.  Indistinct pulmonary vasculature.  Aortic arch atherosclerosis.  No significant pleural effusion.  No pneumothorax.    Impression:       Unchanged extent of airspace and interstitial disease.       Electronically signed by: Bert Don   Date: 03/24/2022   Time: 09:20       Pending Diagnostic Studies:     None         Medications:  Reconciled Home Medications:      Medication List      START taking these medications    predniSONE 10 MG tablet  Commonly known as: DELTASONE  Take 4 tablets (40 mg total) by mouth once daily for 7 days, THEN 3 tablets (30 mg total) once daily for 7 days, THEN 2 tablets (20 mg total) once daily for 7 days, THEN 1 tablet (10 mg total) once daily.  Start taking on: March 29, 2022        CHANGE how you take these medications    budesonide 0.5 mg/2 mL nebulizer solution  Commonly known as: PULMICORT  Take 2 mLs (0.5 mg total) by nebulization 2 (two) times daily. Controller  What changed:   · when to take this  · reasons to take this     donepeziL 10 MG tablet  Commonly known as: ARICEPT  1/2 tab PO daily x1 week, then 1 tab PO daily thereafter  What changed:   · how much to take  · how to take this  · when to take this  · additional instructions     HumuLIN 70/30 U-100 KwikPen 100 unit/mL (70-30) Inpn pen  Generic drug: insulin NPH/Reg human  Inject 45 Units into the skin 2 (two) times daily with meals. Adjust dose as directed, max daily dose 140 units/day  What changed:   · when to take this  · reasons to take this        CONTINUE taking these medications    albuterol 90 mcg/actuation inhaler  Commonly known as: VENTOLIN HFA  Inhale 2  "puffs into the lungs every 4 (four) hours as needed for Wheezing or Shortness of Breath. Rescue     aspirin 325 MG tablet  Take 1 tablet (325 mg total) by mouth 2 (two) times daily.     blood sugar diagnostic Strp  To check BG 2 times daily, to use with insurance preferred meter     glimepiride 4 MG tablet  Commonly known as: AMARYL  Take 2 tablets (8 mg total) by mouth daily with breakfast.     lancets Misc  To check BG 2 times daily, to use with insurance preferred meter     mupirocin 2 % ointment  Commonly known as: BACTROBAN  Apply topically 3 (three) times daily.     ondansetron 4 MG Tbdl  Commonly known as: ZOFRAN-ODT  Take 1 tablet (4 mg total) by mouth every 8 (eight) hours as needed (nausea/vomiting).     oxyCODONE-acetaminophen  mg per tablet  Commonly known as: PERCOCET  Take 1 tablet by mouth every 6 (six) hours as needed for Pain.     pantoprazole 40 MG tablet  Commonly known as: PROTONIX  Take 1 tablet (40 mg total) by mouth once daily.     pen needle, diabetic 32 gauge x 1/6" Ndle  Commonly known as: NOVOFINE PLUS  Use with insulin twice a day.            Indwelling Lines/Drains at time of discharge:   Lines/Drains/Airways     None                 Time spent on the discharge of patient: 40 minutes     Discussed with wife and hospice nurse  Discussed with Dr. Alber Schwartz MD  Department of Hospital Medicine  [facility]  "

## 2022-03-29 NOTE — PLAN OF CARE
Per Miranda with [facility], DME has been delivered and ready to accept pt at home to begin their services       03/29/22 2260   Post-Acute Status   Post-Acute Authorization Hospice   Hospice Status Set-up Complete/Auth obtained

## 2022-03-29 NOTE — PLAN OF CARE
Spoke with pt's spouse, Lili, regarding portable o2. States she is in parking lot now about to come up. States she received needed o2 and equipment from [facility] for transportation home

## 2022-03-29 NOTE — NURSING
Patient BG >400. Made MD aware. Dr. Schwartz stated no stat labs and to give an additional 10units.

## 2022-03-29 NOTE — PROGRESS NOTES
Progress Note  PULMONARY    Admit Date: 3/22/2022   03/29/2022      SUBJECTIVE:     3/24- wife and daughter at bedside. Pt calm now but per their report he had a rough night with agitation, confusion and had to be given medicine to sedate him. He c/o pain R arm where IV potassium is infusing. resp status worsened now req 10L HFNC. The family is meeting w/ palliative at 1  3/25- pt had initial improvement in O2 reqt then worsened again requiring 9L. Overnight dose of solumedrol had to be held due to blood glucose in the 500s. Pt feeling good with no complaints. Wife at bedside  3/26- pt feeling good with no complaints. Daughter at bedside. Blood glucose continues to be elevated 400s. O2 requirement down to 6L HFNC  3/27- improving O2 reqt- down to 4L NC  3/28- req 6L NC, no new complaints. Had hypoglycemia to 54 this am, yesterday gluc >500 in afternoon. Steroid has been tapered to once daily  3/29- resp status stable on 6L oxygen, dc on home hospice being arranged      OBJECTIVE:     Vitals (Most recent):  Vitals:    03/29/22 0808   BP: 132/61   Pulse: 70   Resp: 17   Temp: 97.9 °F (36.6 °C)       Vitals (24 hour range):  Temp:  [96.3 °F (35.7 °C)-97.9 °F (36.6 °C)]   Pulse:  [68-82]   Resp:  [17-20]   BP: (132-141)/(61-88)   SpO2:  [91 %-98 %]       Intake/Output Summary (Last 24 hours) at 3/29/2022 0849  Last data filed at 3/29/2022 0451  Gross per 24 hour   Intake --   Output 225 ml   Net -225 ml          Physical Exam:  The patient's neuro status (alertness,orientation,cognitive function,motor skills,), pharyngeal exam (oral lesions, hygiene, abn dentition,), Neck (jvd,mass,thyroid,nodes in neck and above/below clavicle),RESPIRATORY(symmetry,effort,fremitus,percussion,auscultation),  Cor(rhythm,heart tones including gallops,perfusion,edema)ABD(distention,hepatic&splenomegaly,tenderness,masses), Skin(rash,cyanosis),Psyc(affect,judgement,).  Exam negative except for these pertinent findings:    Alert, pleasant,  verbalizes, no distress  Hard of hearing  HR regular w/ systolic murmur  Bibasilar fine rales  Abdomen soft nontender  Chronic discoloration bilat legs, no edema    Radiographs reviewed: view by direct vision   CXR 3/28- same severe fibrosis  CXR 3/27- stable  CXR 3/26- stable  CXR 3/25- slight improved infiltrates on right, same on left  CXR 3/24- unchanged    TTE 3/23-   · The estimated ejection fraction is 65%.  · Grade I left ventricular diastolic dysfunction.  · Atrial fibrillation not observed.  · The left ventricle is normal in size with  · Normal right ventricular size with normal right ventricular systolic function.  · There is mild aortic valve stenosis.  · Aortic valve area is 1.98 cm2; peak velocity is 2.48 m/s; mean gradient is 13 mmHg.  · Mild mitral regurgitation.  · Mild tricuspid regurgitation.  · There is mild pulmonary hypertension.  · Normal central venous pressure (3 mmHg).  · The estimated PA systolic pressure is 50 mmHg.      Labs     No results for input(s): WBC, HGB, HCT, PLT, BAND, METAMYELOCYT, MYELOPCT, HGBA1C in the last 24 hours.  Recent Labs   Lab 03/29/22  0518   *   K 4.1      CO2 24   BUN 21   CREATININE 0.6   GLU 84   CALCIUM 8.2*   AST 34   ALT 37   ALKPHOS 140*   BILITOT 0.6   PROT 5.1*   ALBUMIN 2.6*   No results for input(s): PH, PCO2, PO2, HCO3 in the last 24 hours.  Microbiology Results (last 7 days)     Procedure Component Value Units Date/Time    Blood Culture #1 [[id number]] Collected: 03/22/22 1454    Order Status: Completed Specimen: Blood from Antecubital, Right Updated: 03/27/22 2312     Blood Culture, Routine No growth after 5 days.    Blood Culture #2 [[id number]] Collected: 03/22/22 1454    Order Status: Completed Specimen: Blood from Peripheral, Left Arm Updated: 03/27/22 2312     Blood Culture, Routine No growth after 5 days.    Narrative:      drawn by nurys    Influenza A & B by Molecular [[id number]] Collected: 03/22/22 1407    Order Status: Completed  Specimen: Nasopharyngeal Swab Updated: 03/22/22 1501     Influenza A, Molecular Negative     Influenza B, Molecular Negative     Flu A & B Source Nasal swab          Impression:  Active Hospital Problems    Diagnosis  POA    *Acute on chronic respiratory failure [J96.20]  Yes    Delirium [R41.0]  No    Goals of care, counseling/discussion [Z71.89]  Not Applicable    Dementia without behavioral disturbance [F03.90]  Yes    Community acquired pneumonia, bilateral [J18.9]  Yes    Acute exacerbation of idiopathic pulmonary fibrosis [J84.112]  Yes    Interstitial lung disease [J84.9]  Yes    Stasis dermatitis of both legs [I87.2]  Yes    Hypertension associated with diabetes [E11.59, I15.2]  Yes    Hepatic cirrhosis due to chronic hepatitis C infection [B18.2, K74.60]  Yes    Chronic low back pain [M54.50, G89.29]  Yes    GERD (gastroesophageal reflux disease) [K21.9]  Yes    Type 2 diabetes mellitus with complication, with long-term current use of insulin [E11.8, Z79.4]  Not Applicable    PVD (peripheral vascular disease) [I73.9]  Yes     Decreased pulses. No claudication      Chronic pain of left knee [M25.562, G89.29]  Yes     Pain contract with Dr. Pereira Feb 2014.         Resolved Hospital Problems   No resolved problems to display.               Plan:         - continue supplemental O2 to keep sats 89-92%  - dc antibiotics; completed 7d course rocephin/azithro  - PH likely due to lung disease and diastolic dysfunction, no advanced PH therapy recommended at this time  - prednisone 40mg daily- continue, tapered 3/27, on discharge recommend slow taper over 1 month then stay on 10mg daily permanently   - glucose checks, cover w/ insulin- per hospitalist  - home hospice- ok for dc today  - d/w wife Lili at bedside, questions answered      Maribell Campo MD  Pulmonary & Critical Care Medicine            .

## 2022-03-29 NOTE — CARE UPDATE
03/29/22 0714   Patient Assessment/Suction   Level of Consciousness (AVPU) alert  (Pt. is agitated.  States he tired of us fooling with him.  Refused respiratory tx.)   Respiratory Effort Normal;Unlabored   PRE-TX-O2   O2 Device (Oxygen Therapy) nasal cannula w/ humidification   $ Is the patient on Low Flow Oxygen? Yes   Flow (L/min) 6   SpO2 96 %   Pulse Oximetry Type Intermittent   $ Pulse Oximetry - Multiple Charge Pulse Oximetry - Multiple   Pulse 68   Resp 18   Aerosol Therapy   $ Aerosol Therapy Charges Refused  (Pt. is agitated. Refused tx.)      ER Documentation


Chief Complaint


Date/Time


DATE: 5/25/17 


TIME: 16:38


Chief Complaint


PT HERE FOR PARACENTESIS





HPI


Patient is a 57-year-old male with ascites who presents with abdominal pain and 

distention.  His last paracentesis was on May 22.  He last had laboratory 

studies done on May 18.  He is well-known to our emergency department upon 

review of old medical records and comes approximately every 3 days for 

paracentesis.  He has no fevers.





ROS


All systems reviewed and are negative except as per history of present illness.





Medications


Home Meds


Active Scripts


Ciprofloxacin Hcl* (Ciprofloxacin Hcl*) 500 Mg Tablet, 500 MG PO BID for 10 Days

, TAB


   Prov:NICOLLE OWENS MD         5/8/17


Insulin Aspart* (Novolog Insulin Pen*) 100 Unit/Ml Soln, 10 UNIT SC WITH MEALS 

BEDTIME for 28 Days


   Prov:HANNAH CASTELLANO MD         11/15/16


Reported Medications


Insulin Glargine* (Lantus*) 100 Unit/Ml Soln, 25 UNIT SC QHS, #1 VIAL


   11/11/16


Losartan Potassium* (Cozaar*) 25 Mg Tablet, 25 MG PO DAILY, #30 TAB


   11/11/16


Pantoprazole* (Protonix*) 40 Mg Tablet.dr, 40 MG PO DAILY, TAB


   11/11/16


Discontinued Scripts


Ondansetron (Ondansetron Odt) 4 Mg Tab.rapdis, 4 MG PO Q6H Y for NAUSEA AND/OR 

VOMITING, #10 TAB


   Prov:NICOLLE OWENS MD         5/8/17


Hydrocodone/Acetaminophen (Norco 5-325 Tablet) 1 Each Tablet, 1 TAB PO Q6H Y 

for PAIN, #7 TAB


   Prov:NICOLLE OWENS MD         5/8/17





Allergies


Allergies:  


Coded Allergies:  


     No Known Drug Allergy (Verified  Allergy, Unknown, 5/25/17)





PMhx/Soc


History of Surgery:  No


Anesthesia Reaction:  No


Hx Neurological Disorder:  No


Hx Respiratory Disorders:  No


Hx Cardiac Disorders:  No


Hx Psychiatric Problems:  No


Hx Miscellaneous Medical Probl:  Yes (liver cirrhosis)


Hx Alcohol Use:  Yes (NO LONGER DRINKS)


Hx Substance Use:  No


Hx Tobacco Use:  No


Smoking Status:  Former smoker





FmHx


Family History:  No diabetes





Physical Exam


Vitals





Vital Signs








  Date Time  Temp Pulse Resp B/P Pulse Ox O2 Delivery O2 Flow Rate FiO2


 


5/25/17 15:28 97.2 76 16 110/70 97 Room Air  


 


5/25/17 12:21 97.4 89 17 108/67 99   








Physical Exam


Const: No acute distress


Head:   Atraumatic 


Eyes:    Normal Conjunctiva


ENT:    Normal External Ears, Nose and Mouth.


Neck:               Full range of motion..~ No meningismus.


Resp:    Clear to auscultation bilaterally


Cardio:    Regular rate and rhythm, no murmurs


Abd:    Distended abdomen with positive fluid wave


Skin:    No petechiae or rashes


Back:    No midline or flank tenderness


Ext:    No cyanosis, or edema


Neur:    Awake and alert


Psych:    Normal Mood and Affect


Results 24 hrs





 Current Medications








 Medications


  (Trade)  Dose


 Ordered  Sig/Johnathan


 Route


 PRN Reason  Start Time


 Stop Time Status Last Admin


Dose Admin


 


 Acetaminophen/


 Hydrocodone Bitart


  (Norco (10/325))  1 tab  ONCE  ONCE


 PO


   5/25/17 13:00


 5/25/17 13:01 DC 5/25/17 13:09


 


 


 Ondansetron HCl


  (Zofran Odt)  4 mg  ONCE  STAT


 ODT


   5/25/17 13:00


 5/25/17 13:01 DC 5/25/17 13:08


 


 


 Lidocaine


  (Xylocaine 1%


  (Mpf))  5 ml  STK-MED ONCE


 .ROUTE


   5/25/17 14:33


 5/25/17 14:34 DC  


 











Procedures/MDM


Ultrasound-guided paracentesis performed by radiology.





Patient is a 57-year-old male with ascites who presents with acute abdominal 

pain and ascites.  The patient had a ultrasound-guided paracentesis done and 

feels better.  He was given Norco for pain.  The patient will be discharged 

home and can follow-up with his primary doctor within 1-2 days.  He can return 

for any worsening symptoms.  I doubt spontaneous bacterial peritonitis at this 

time.





Departure


Diagnosis:  


 Primary Impression:  


 Ascites


 Ascites type:  other type  Qualified Code:  R18.8 - Other ascites


 Additional Impression:  


 Abdominal pain


 Abdominal location:  generalized  Qualified Code:  R10.84 - Generalized 

abdominal pain


Condition:  Fair


Patient Instructions:  Ascites





Additional Instructions:  


Call your primary care doctor TOMORROW for an appointment during the next 1-2 

days.See the doctor sooner or return here if your condition worsens before your 

appointment time.











FRANCESCA MONTOYA MD May 25, 2017 16:39

## 2022-03-30 ENCOUNTER — TELEPHONE (OUTPATIENT)
Dept: MEDSURG UNIT | Facility: HOSPITAL | Age: 77
End: 2022-03-30
Payer: MEDICARE

## 2022-03-30 ENCOUNTER — PATIENT OUTREACH (OUTPATIENT)
Dept: ADMINISTRATIVE | Facility: CLINIC | Age: 77
End: 2022-03-30
Payer: MEDICARE

## 2022-04-07 ENCOUNTER — TELEPHONE (OUTPATIENT)
Dept: FAMILY MEDICINE | Facility: CLINIC | Age: 77
End: 2022-04-07
Payer: MEDICARE

## 2022-09-22 NOTE — Clinical Note
Primary Care Providers: Alie Womack MD, MD (General)  Your patient was seen today for a HRA visit. Gap(s) in care (HEDIS gaps) have been identified during this visit that require additional testing and possible follow up.  Orders Placed This Encounter     Ambulatory referral to Outpatient Case Management         Referral Priority:Routine         Referral Type:Consultation         Referral Reason:Specialty Services Required         Number of Visits Requested:1   These orders were placed using [facility] approved protocol and any results will be forwarded to your office for appropriate follow up. I have included a copy of my visit note; please review the note and feel free to contact me with any questions.   Thank you for allowing me to participate in the care of your patients. CORNEL De Los Santos Yes

## 2022-11-21 ENCOUNTER — PATIENT MESSAGE (OUTPATIENT)
Dept: ADMINISTRATIVE | Facility: HOSPITAL | Age: 77
End: 2022-11-21
Payer: MEDICARE

## 2022-12-06 ENCOUNTER — PATIENT MESSAGE (OUTPATIENT)
Dept: ADMINISTRATIVE | Facility: HOSPITAL | Age: 77
End: 2022-12-06
Payer: MEDICARE

## 2022-12-08 DIAGNOSIS — E11.9 TYPE 2 DIABETES MELLITUS WITHOUT COMPLICATION: ICD-10-CM

## 2022-12-12 ENCOUNTER — PATIENT MESSAGE (OUTPATIENT)
Dept: ADMINISTRATIVE | Facility: HOSPITAL | Age: 77
End: 2022-12-12
Payer: MEDICARE

## 2023-02-27 ENCOUNTER — PATIENT MESSAGE (OUTPATIENT)
Dept: ADMINISTRATIVE | Facility: HOSPITAL | Age: 78
End: 2023-02-27
Payer: MEDICARE

## 2023-03-08 ENCOUNTER — PATIENT MESSAGE (OUTPATIENT)
Dept: ADMINISTRATIVE | Facility: HOSPITAL | Age: 78
End: 2023-03-08
Payer: MEDICARE

## 2023-03-30 ENCOUNTER — PATIENT MESSAGE (OUTPATIENT)
Dept: ADMINISTRATIVE | Facility: HOSPITAL | Age: 78
End: 2023-03-30
Payer: MEDICARE

## 2023-06-20 NOTE — TELEPHONE ENCOUNTER
Ear BCC needs Mohs   Other 3 sites may need excision by Mohs as well, but treatment with efudex cream BID x 4 weeks may be attempted first with a FU at 6-8 weeks to assess progress   I recommend treatment by a Mohs surgeon who has the ability to ensure negative surgical margins before repairing the defect. Previously under the care of Dr Blank.   Please notify patient and place referral request once patient has been given diagnosis and opportunity to discuss treatment plan  
Instructions: This plan will send the code FBSE to the PM system.  DO NOT or CHANGE the price.
Detail Level: Simple
Price (Do Not Change): 0.00

## 2023-10-18 NOTE — TELEPHONE ENCOUNTER
----- Message from Edna Olvera NP sent at 4/30/2019  1:30 PM CDT -----  Please call and ask if he has heard about esbriet medication and if a Aleta Pierson from pharmacy spoke to him. Does he still want the Esbriet?   No

## 2024-01-22 ENCOUNTER — PATIENT MESSAGE (OUTPATIENT)
Dept: FAMILY MEDICINE | Facility: CLINIC | Age: 79
End: 2024-01-22
Payer: COMMERCIAL

## 2024-09-21 NOTE — PROGRESS NOTES
Problem: At Risk for Falls  Goal: Patient does not fall  Outcome: Monitoring/Evaluating progress  Goal: Patient takes action to control fall-related risks  Outcome: Monitoring/Evaluating progress     Problem: At Risk for Injury Due to Fall  Goal: Patient does not fall  Outcome: Monitoring/Evaluating progress  Goal: Takes action to control condition specific risks  Outcome: Monitoring/Evaluating progress  Goal: Verbalizes understanding of fall-related injury personal risks  Description: Document education using the patient education activity  Outcome: Monitoring/Evaluating progress      "Referring Physician: No ref. provider found    PCP: Crispin Garcia MD      CC: neck and left shoulder pain; knee pain    Interval history:  Steve June Jr. is a 75 y.o. male with  neck and left shoulder pain who presents today for f/u and medication refill.  He had a series of 2 Euflexxa injections that worsened his knee pain initially.  He has tried physical therapy which did not provide much benefit.   Steroid injections performed have only given him short lived relief.  In the past he underwent visco supplementation injections for his left knee with benefit.  He continues to have neck pain. He has a history of cervical DDD.  However, he continues to have low platelets and we are unable to provide any  interventional procedures.  He takes oxycodone 10 mg every 6 hours as needed with mild to moderate benefit. In the past UDS was positive for THC. He ate marijuana cookies in [place]. Denies recent use. Previous UDS consistent. Oxycodone does cause some drowsiness. Pain today is rated 0/10.    Prior HPI:   Steve June Jr. is a 75 y.o. male referred to us for lower back and knee pain.  He has significant history of pancytopenia, cirrhosis.  He presents with constant aching, sharp pain in his lower back as well as his left knee.  Pain worsens sitting, standing, bending, walking.  Pain improves with rest.  X-rays performed of the lumbar spine as well as knee shows left knee osteoarthritis.  He has tried physical therapy with minimal benefit.  He currently takes Norco 10 mg every 6 hours as needed with mild to moderate benefit.  He is unable to have any procedures due to his thrombocytopenia.  He also has ventral hernia, but surgical attention is currently not recommended due to his thrombocytopenia.  His main concern today is wishing to decrease his opioid medications.  He states being very "foggy" with his current medications.  He denies any increased sedation.  He denies any weakness.  No bowel bladder " changes.    ROS:  CONSTITUTIONAL: No fevers, chills, night sweats, wt. loss, appetite changes  SKIN: no rashes or itching  ENT: No headaches, head trauma, vision changes, or eye pain  LYMPH NODES: None noticed   CV: No chest pain, palpitations.   RESP: No shortness of breath, dyspnea on exertion, cough, wheezing, or hemoptysis  GI: No nausea, emesis, diarrhea, constipation, melena, hematochezia, pain.    : No dysuria, hematuria, urgency, or frequency   HEME: No easy bruising, bleeding problems  PSYCHIATRIC: No depression, anxiety, psychosis, hallucinations.  NEURO: No seizures, memory loss, dizziness or difficulty sleeping  MSK: + History of present illness      Past Medical History:   Diagnosis Date    Abnormal thyroid function test     Allergy     Seasonal    Anemia     Anemia due to blood loss 7/2/2014    Arthritis     Gaviria esophagus     Basal cell carcinoma     right forearm    Basal cell carcinoma 12/2011    lower post neck    Basal cell carcinoma 04/23/2019    left posterior helix    Cancer     skin CA    Cataract     Cirrhosis     Diabetes mellitus     Diabetes mellitus, type 2     Encounter for blood transfusion     Esophageal varices in cirrhosis     grade II on 7/12 EGD    Gastritis     on 7/12 EGD    GERD (gastroesophageal reflux disease) 2/28/2015    Hard of hearing     Hiatal hernia     History of hepatitis C 8/10/2012    tx with harvoni x 41 days (started 10/22/15). SVR4     Hoarseness 2/28/2015    Hx of colonic polyps 01/10/2011    per colonoscopy report in media    Hypercholesteremia     Hypersplenism     Hypertension     No meds    Pain management 12/10/2014    Petechial hemorrhage 11/25/2015    Lower extremities bilat     Portal hypertensive gastropathy     on 7/12 EGD    Squamous cell carcinoma 04/23/2019    right preauricular cheek, right temple    Thrombocytopenia     Type II or unspecified type diabetes mellitus with neurological manifestations,  uncontrolled(250.62) 2013    Valvular heart disease     mild MR 12     Past Surgical History:   Procedure Laterality Date    CATARACT EXTRACTION  1/10/13    left eye    CATARACT EXTRACTION      right eye    CHOLECYSTECTOMY      COLONOSCOPY      EYE SURGERY      Cataract surgery to right eye    KNEE ARTHROSCOPY W/ MENISCAL REPAIR      KNEE CARTILAGE SURGERY      left knee    KNEE SURGERY  2006    left    SKIN LESION EXCISION      TONSILLECTOMY      UPPER GASTROINTESTINAL ENDOSCOPY       Family History   Problem Relation Age of Onset    Leukemia Mother     Cancer Mother         bone    Alcohol abuse Father     Cirrhosis Father         EtOH induced    No Known Problems Daughter     No Known Problems Son     No Known Problems Daughter     No Known Problems Daughter     No Known Problems Daughter     No Known Problems Sister     Amblyopia Neg Hx     Blindness Neg Hx     Cataracts Neg Hx     Diabetes Neg Hx     Glaucoma Neg Hx     Hypertension Neg Hx     Macular degeneration Neg Hx     Retinal detachment Neg Hx     Strabismus Neg Hx     Stroke Neg Hx     Thyroid disease Neg Hx     Psoriasis Neg Hx     Lupus Neg Hx     Eczema Neg Hx     Acne Neg Hx     Melanoma Neg Hx      Social History     Socioeconomic History    Marital status:      Spouse name: Not on file    Number of children: 5    Years of education: Not on file    Highest education level: Not on file   Occupational History    Not on file   Social Needs    Financial resource strain: Not very hard    Food insecurity     Worry: Never true     Inability: Never true    Transportation needs     Medical: No     Non-medical: No   Tobacco Use    Smoking status: Former Smoker     Packs/day: 1.00     Years: 25.00     Pack years: 25.00     Quit date: 2000     Years since quittin.0    Smokeless tobacco: Never Used   Substance and Sexual Activity    Alcohol use: Yes     Frequency: 2-3 times a week      "Drinks per session: 1 or 2     Binge frequency: Less than monthly     Comment: rarely    Drug use: No    Sexual activity: Never   Lifestyle    Physical activity     Days per week: 0 days     Minutes per session: Not on file    Stress: Only a little   Relationships    Social connections     Talks on phone: Three times a week     Gets together: Never     Attends Jain service: Not on file     Active member of club or organization: No     Attends meetings of clubs or organizations: Never     Relationship status:    Other Topics Concern    Not on file   Social History Narrative    He is .  He has children.  He is currently retired.         Medications/Allergies: See med card    Vitals:    09/02/20 1304   BP: 131/80   Pulse: 70   Weight: 89.8 kg (198 lb)   Height: 5' 9" (1.753 m)   PainSc: 0-No pain   PainLoc: Neck     Physical exam:    GENERAL: A and O x3, the patient appears well groomed and is in no acute distress.  Skin: No rashes or obvious lesions  HEENT: normocephalic, atraumatic  CARDIOVASCULAR:  RRR  LUNGS: non labored breathing  ABDOMEN: soft, nontender  UPPER EXTREMITIES: Normal alignment, normal range of motion, no atrophy, no skin changes,  hair growth and nail growth normal and equal bilaterally. No swelling, no tenderness.    LOWER EXTREMITIES:  Normal alignment, normal range of motion, no atrophy, no skin changes,  hair growth and nail growth normal and equal bilaterally. No swelling, no tenderness.    LUMBAR SPINE  Lumbar spine: ROM is full with flexion extension and oblique extension with moderate increased pain.    Erasmo's test causes no increased pain on either side.    Supine straight leg raise is negative bilaterally.    Internal and external rotation of the hip causes no increased pain on either side.  Myofascial exam: No tenderness to palpation across lumbar paraspinous muscles.      MENTAL STATUS: normal orientation, speech, language, and fund of knowledge for social " situation.  Emotional state appropriate.    CRANIAL NERVES:  II:  PERRL bilaterally,   III,IV,VI: EOMI.    V:  Facial sensation equal bilaterally  VII:  Facial motor function normal.  VIII:  Hearing equal to finger rub bilaterally  IX/X: Gag normal, palate symmetric  XI:  Shoulder shrug equal, head turn equal  XII:  Tongue midline without fasciculations      MOTOR: Tone and bulk: normal bilateral upper and lower Strength: normal   Delt Bi Tri WE WF     R 5 5 5 5 5 5   L 5 5 5 5 5 5     IP ADD ABD Quad TA Gas HAM  R 5 5 5 5 5 5 5  L 5 5 5 5 5 5 5    SENSATION: Light touch and pinprick intact bilaterally  REFLEXES: normal, symmetric, nonbrisk.  Toes down, no clonus. No hoffmans.  GAIT: normal rise, base, steps, and arm swing.      Imaging:  Xray L-spine 4/2013   Alignment is otherwise normal.  Vertebral body heights and disc space heights are relatively well maintained.  Vertebral end plate osteophytes are present anteriorly   at multiple levels.  The facet joints and posterior elements are unremarkable       Xray Knee 12/2013  Degenerative change of the knees, left greater than right.    Assessment:  Steve June Jr. is a 75 y.o. male with neck, back and left knee pain  1. DDD (degenerative disc disease), cervical    2. Facet arthropathy    3. Primary osteoarthritis of knee, unspecified laterality    4. Chronic pain of left knee    5. Other spondylosis, cervical region         Plan:  1. I have stressed the importance of physical activity and exercise to improve overall health.  Continue PT exercises learned at home.  2.  Any interventional procedures will be deferred due to his low platelet count.    3.  Monitor progress from left knee Euflexxa series  4. Percocet 10mg q 8 hrs as needed.  Hold for sedation.  reviewed. Previous UDS consistent. UDS Today  5.  Follow-up 3 months  All medication management was performed by Dr. Kapil Mario

## 2024-12-10 NOTE — TELEPHONE ENCOUNTER
----- Message from Shilpi Disla sent at 12/10/2020 11:08 AM CST -----  Regarding: advice  Contact: wes  Type: Needs Medical Advice    Who Called:  wes with concerned care  Symptoms (please be specific):  n/a  How long has patient had these symptoms:  n/a  Pharmacy name and phone #:  n/a  Best Call Back Number: [phone]  Additional Information: was suppose to see pt yesterday, pt refused visit and is refusing home health for the future. Please call to advise       Condition:: Unwanted hair Please Describe Your Condition:: .

## (undated) DEVICE — SEE MEDLINE ITEM 157131

## (undated) DEVICE — SOL IRR NACL .9% 3000ML

## (undated) DEVICE — NDL SPINAL 25GX3.5 SPINOCAN

## (undated) DEVICE — NDL SAFETY 25G X 1.5 ECLIPSE

## (undated) DEVICE — SUT X425H ETHIBOND 1-0

## (undated) DEVICE — SUT STRATAFIX SPRL PS-2 3-0

## (undated) DEVICE — CANNULA CVD 100MM X 20G

## (undated) DEVICE — TOGA FLYTE PEEL AWAY XLARGE

## (undated) DEVICE — LABEL FOR UTILITY MARKER

## (undated) DEVICE — SUT 2/0 18IN COATED VICRYL

## (undated) DEVICE — DRAPE INCISE IOBAN 2 23X17IN

## (undated) DEVICE — BLADE RECIP SINGLE SIDED

## (undated) DEVICE — LINER SUCTION 3000CC

## (undated) DEVICE — DRAPE STERI-DRAPE 1000 17X11IN

## (undated) DEVICE — DRESSING AQUACEL AG 3.5X10IN

## (undated) DEVICE — SYR 30CC LUER LOCK

## (undated) DEVICE — SYS CLSR DERMABOND PRINEO 22CM

## (undated) DEVICE — SUT CTD VICRYL 0 UND BR

## (undated) DEVICE — SEE MEDLINE ITEM 152622

## (undated) DEVICE — NDL SPINAL 18GX3.5 SPINOCAN

## (undated) DEVICE — CUP MEDICINE STERILE 2OZ

## (undated) DEVICE — TUBE SUCTION YANKAUER HI CAP

## (undated) DEVICE — GLOVE SURG ULTRA TOUCH 8

## (undated) DEVICE — SEE MEDLINE ITEM 152678

## (undated) DEVICE — SEE MEDLINE ITEM 152530

## (undated) DEVICE — GAUZE SPONGE 4X4 12PLY

## (undated) DEVICE — APPLICATOR CHLORAPREP CLR 10.5

## (undated) DEVICE — SYR 10CC LUER LOCK

## (undated) DEVICE — SYR DISP LL 5CC

## (undated) DEVICE — DRAPE PLASTIC U 60X72

## (undated) DEVICE — PAD ELECTROSURGICAL PAT PLATE

## (undated) DEVICE — MANIFOLD 4 PORT

## (undated) DEVICE — SPONGE SUPER KERLIX 6X6.75IN

## (undated) DEVICE — ELECTRODE REM PLYHSV RETURN 9

## (undated) DEVICE — GLOVE SURGICAL LATEX SZ 8

## (undated) DEVICE — SEE MEDLINE ITEM 107746

## (undated) DEVICE — SEE MEDLINE ITEM 157216

## (undated) DEVICE — PACK CUSTOM UNIV BASIN SLI

## (undated) DEVICE — GOWN TOGA SYS PEELWY ZIP 2 XL

## (undated) DEVICE — SEE MEDLINE ITEM 157166

## (undated) DEVICE — SEE MEDLINE ITEM 157128

## (undated) DEVICE — NDL HYPO 27G X 1 1/2

## (undated) DEVICE — GLOVE SURG ULTRA TOUCH 7

## (undated) DEVICE — APPLICATOR CHLORAPREP ORN 26ML

## (undated) DEVICE — MARKER SKIN STND TIP BLUE BARR

## (undated) DEVICE — DRAPE HIP TIBURON 87X115X134

## (undated) DEVICE — INTERPULSE SET

## (undated) DEVICE — SLEEVE SCD EXPRESS KNEE MEDIUM

## (undated) DEVICE — DRAPE STERI U-SHAPED 47X51IN

## (undated) DEVICE — RETRIEVER SUTURE HEWSON DISP

## (undated) DEVICE — SUT VICRYL 1 CT-1 27 UNDIE

## (undated) DEVICE — ELECTRODE BLD EXT 6.50 ST DISP

## (undated) DEVICE — DRAPE INCISE IOBAN 2 23X33IN

## (undated) DEVICE — PACK BASIC

## (undated) DEVICE — ALCOHOL 70% ISOP RUBBING 4OZ

## (undated) DEVICE — UNDERGLOVES BIOGEL PI SIZE 8

## (undated) DEVICE — KNEE IMMB UNV FOAM/MESH 20IN

## (undated) DEVICE — SOL 9P NACL IRR PIC IL

## (undated) DEVICE — BLADE SURG CARBON STEEL #10